# Patient Record
Sex: FEMALE | Race: WHITE | NOT HISPANIC OR LATINO | Employment: OTHER | URBAN - METROPOLITAN AREA
[De-identification: names, ages, dates, MRNs, and addresses within clinical notes are randomized per-mention and may not be internally consistent; named-entity substitution may affect disease eponyms.]

---

## 2017-01-04 RX ORDER — OMEPRAZOLE 40 MG/1
CAPSULE, DELAYED RELEASE ORAL
Qty: 30 CAPSULE | Refills: 0 | OUTPATIENT
Start: 2017-01-04

## 2017-01-10 DIAGNOSIS — I25.10 CHRONIC CORONARY ARTERY DISEASE: ICD-10-CM

## 2017-01-10 RX ORDER — ASPIRIN 325 MG
TABLET, DELAYED RELEASE (ENTERIC COATED) ORAL
Qty: 30 TABLET | Refills: 2 | Status: SHIPPED | OUTPATIENT
Start: 2017-01-10 | End: 2017-01-13 | Stop reason: SDUPTHER

## 2017-01-12 RX ORDER — OMEPRAZOLE 40 MG/1
CAPSULE, DELAYED RELEASE ORAL
Qty: 30 CAPSULE | Refills: 0 | Status: CANCELLED | OUTPATIENT
Start: 2017-01-12

## 2017-01-13 DIAGNOSIS — I25.10 CHRONIC CORONARY ARTERY DISEASE: ICD-10-CM

## 2017-01-13 RX ORDER — ASPIRIN 325 MG
325 TABLET, DELAYED RELEASE (ENTERIC COATED) ORAL DAILY
Qty: 30 TABLET | Refills: 5 | Status: SHIPPED | OUTPATIENT
Start: 2017-01-13 | End: 2017-10-10 | Stop reason: SDUPTHER

## 2017-01-13 RX ORDER — OMEPRAZOLE 40 MG/1
40 CAPSULE, DELAYED RELEASE ORAL DAILY
Qty: 30 CAPSULE | Refills: 5 | Status: SHIPPED | OUTPATIENT
Start: 2017-01-13 | End: 2017-07-14 | Stop reason: SDUPTHER

## 2017-01-13 RX ORDER — DULOXETIN HYDROCHLORIDE 60 MG/1
60 CAPSULE, DELAYED RELEASE ORAL DAILY
Qty: 30 CAPSULE | Refills: 5 | Status: SHIPPED | OUTPATIENT
Start: 2017-01-13 | End: 2017-10-10 | Stop reason: SDUPTHER

## 2017-01-16 ENCOUNTER — TELEPHONE (OUTPATIENT)
Dept: INTERNAL MEDICINE | Facility: CLINIC | Age: 72
End: 2017-01-16

## 2017-01-16 NOTE — TELEPHONE ENCOUNTER
----- Message from Adiel Morse sent at 2017  9:35 AM EST -----  Contact: Pt. Rx Refills  Patient informed 2017 that medication was refilled.  ----- Message -----     From: Gaby Guzman MD     Sent: 2017  12:30 PM       To: Adiel Morse    Is he asking for a RF? It says she received 5 RF's in 10/2016 for her Duloxetine.  ----- Message -----     From: Adiel Morse     Sent: 2017  12:10 PM       To: Gaby Guzman MD    Last seen:10/03/2016 by Syeda Combs for  Generalized anxiety disorder    Patient's new provider Dr. Mas appt. 2017    aspirin 325 MG tablet [[id number]] (Order [id number])   aspirin 325 MG tablet [[id number]]   Dose: 325 mg Route: Oral Frequency: Daily  Dispense Quantity:  -- Refills:  -- Fills Remaining:  --    Sig: Take 325 mg by mouth Daily.    omeprazole (priLOSEC) 40 MG capsule [[id number]]   Dose, Route, Frequency: As Directed   Dispense Quantity:  30 capsule Refills:  0 Fills Remainin   Written Date:  16     Sig: TAKE ONE CAPSULE BY MOUTH DAILY     DULoxetine (CYMBALTA) 60 MG capsule [[id number]]   Dose, Route, Frequency: As Directed   Dispense Quantity:  30 capsule Refills:  5 Fills Remainin   Written Date:  10/13/16      Sig: TAKE ONE CAPSULE BY MOUTH DAILY        DAQUAN:  Scan on 10/4/2016  4:21 PM by Kayleigh Betancourt : 10/1/2016    Pharmacy:    [id number] [place] [address] AT [facility] -   Phone:  [phone] Fax:  [phone]

## 2017-01-30 ENCOUNTER — OFFICE VISIT (OUTPATIENT)
Dept: INTERNAL MEDICINE | Facility: CLINIC | Age: 72
End: 2017-01-30

## 2017-01-30 VITALS
HEART RATE: 78 BPM | HEIGHT: 60 IN | TEMPERATURE: 98.4 F | BODY MASS INDEX: 45.16 KG/M2 | OXYGEN SATURATION: 98 % | WEIGHT: 230 LBS

## 2017-01-30 DIAGNOSIS — E11.42 DIABETIC PERIPHERAL NEUROPATHY (HCC): ICD-10-CM

## 2017-01-30 DIAGNOSIS — I25.10 CHRONIC CORONARY ARTERY DISEASE: ICD-10-CM

## 2017-01-30 DIAGNOSIS — G47.33 OSA (OBSTRUCTIVE SLEEP APNEA): ICD-10-CM

## 2017-01-30 DIAGNOSIS — G47.34 NOCTURNAL HYPOXIA: ICD-10-CM

## 2017-01-30 DIAGNOSIS — E78.2 MIXED HYPERLIPIDEMIA: ICD-10-CM

## 2017-01-30 DIAGNOSIS — E11.59 TYPE 2 DIABETES MELLITUS WITH OTHER CIRCULATORY COMPLICATION, WITH LONG-TERM CURRENT USE OF INSULIN (HCC): Primary | ICD-10-CM

## 2017-01-30 DIAGNOSIS — D72.829 LEUKOCYTOSIS, UNSPECIFIED TYPE: ICD-10-CM

## 2017-01-30 DIAGNOSIS — F33.1 MODERATE EPISODE OF RECURRENT MAJOR DEPRESSIVE DISORDER (HCC): ICD-10-CM

## 2017-01-30 DIAGNOSIS — J45.30 MILD PERSISTENT ASTHMA WITHOUT COMPLICATION: ICD-10-CM

## 2017-01-30 DIAGNOSIS — Z79.4 TYPE 2 DIABETES MELLITUS WITH OTHER CIRCULATORY COMPLICATION, WITH LONG-TERM CURRENT USE OF INSULIN (HCC): Primary | ICD-10-CM

## 2017-01-30 DIAGNOSIS — I10 ESSENTIAL HYPERTENSION: ICD-10-CM

## 2017-01-30 DIAGNOSIS — D50.9 IRON DEFICIENCY ANEMIA, UNSPECIFIED IRON DEFICIENCY ANEMIA TYPE: ICD-10-CM

## 2017-01-30 PROCEDURE — 99215 OFFICE O/P EST HI 40 MIN: CPT | Performed by: FAMILY MEDICINE

## 2017-01-30 RX ORDER — INFLUENZA A VIRUS A/MICHIGAN/45/2015 X-275 (H1N1) ANTIGEN (FORMALDEHYDE INACTIVATED), INFLUENZA A VIRUS A/SINGAPORE/INFIMH-16-0019/2016 IVR-186 (H3N2) ANTIGEN (FORMALDEHYDE INACTIVATED), AND INFLUENZA B VIRUS B/MARYLAND/15/2016 BX-69A (A B/COLORADO/6/2017-LIKE VIRUS) ANTIGEN (FORMALDEHYDE INACTIVATED) 60; 60; 60 UG/.5ML; UG/.5ML; UG/.5ML
INJECTION, SUSPENSION INTRAMUSCULAR
COMMUNITY
Start: 2016-12-27 | End: 2017-03-20

## 2017-01-30 RX ORDER — MIRTAZAPINE 15 MG/1
TABLET, FILM COATED ORAL
COMMUNITY
Start: 2017-01-19 | End: 2017-03-20 | Stop reason: ALTCHOICE

## 2017-01-30 RX ORDER — ATENOLOL 25 MG/1
TABLET ORAL
COMMUNITY
Start: 2016-12-11 | End: 2017-03-14 | Stop reason: SDUPTHER

## 2017-01-30 RX ORDER — PROMETHAZINE HYDROCHLORIDE 25 MG/1
25 TABLET ORAL EVERY 6 HOURS PRN
COMMUNITY
End: 2017-05-31 | Stop reason: SDUPTHER

## 2017-01-30 NOTE — PROGRESS NOTES
Subjective   Miguelina Lopez is a 71 y.o. female.     Chief Complaint   Patient presents with   • Hypertension   • Depression   • oxygen therapy   anemia, elevated white blood cells  Disease hyperlipidemia sleep apnea      History of Present Illness patient comes in for follow-up of chronic medical problems as stated abovethat are new to this office.she has been using her inhaler minutes intermittently for asthma her blood pressures well-controlled she has no chest pain shortness of breath or increased fatigue medications were reviewed she's been taking Tenormin and metoprolol.  Her blood sugars have been adequately controlled with the Amaryl and Levemirexposure blood sugars intermittently has a history of gastrointestinal bleeding but nothing significance of late and feels that her reflux is not  well-controlled Pepcid as it has been in the past she needs a new prescription for her nocturnal hypoxia and is presently on CPAP for the last year auto titrating she wakes well rested and also seemed to help her blood pressure  *By cardiology as well as psychiatrywe'll manage her anxiety and depressive symptoms    The following portions of the patient's history were reviewed and updated as appropriate: allergies, current medications, past family history, past medical history, past social history, past surgical history and problem list.    Review of Systems   Constitutional: Negative.    HENT: Negative.    Eyes: Negative.    Respiratory: Negative.    Cardiovascular: Negative.    Gastrointestinal: Negative.    Endocrine: Negative.    Genitourinary: Negative.    Musculoskeletal: Positive for gait problem.   Allergic/Immunologic: Negative.    Hematological: Negative.    Psychiatric/Behavioral: Positive for dysphoric mood. The patient is nervous/anxious.        Objective   Physical Exam   Constitutional: She is oriented to person, place, and time. She appears well-developed and well-nourished. No distress.   HENT:   Head:  Normocephalic and atraumatic.   Right Ear: External ear normal.   Nose: Nose normal.   Eyes: Conjunctivae and EOM are normal. Pupils are equal, round, and reactive to light. Right eye exhibits no discharge. Left eye exhibits no discharge. No scleral icterus.   Neck: Normal range of motion. Neck supple. No tracheal deviation present. No thyromegaly present.   Cardiovascular: Normal rate, regular rhythm and normal pulses.  Exam reveals no gallop.    Murmur heard.  Pulmonary/Chest: Effort normal and breath sounds normal. No respiratory distress. She has no wheezes. She has no rales.   Abdominal: Soft. Bowel sounds are normal. There is no tenderness.   Musculoskeletal: Normal range of motion.   Neurological: She is alert and oriented to person, place, and time. She exhibits normal muscle tone. Coordination normal.   Skin: Skin is warm. No rash noted. No erythema. No pallor.   Psychiatric: She has a normal mood and affect. Her behavior is normal. Judgment and thought content normal.   Nursing note and vitals reviewed.      Assessment/Plan   Miguelina was seen today for hypertension, depression and oxygen therapy.    Diagnoses and all orders for this visit:    Type 2 diabetes mellitus with other circulatory complication, with long-term current use of insulin  -     Hemoglobin A1c  -     Microalbumin / Creatinine Urine Ratio    Iron deficiency anemia, unspecified iron deficiency anemia type  -     CBC & Differential    Leukocytosis, unspecified type  -     CBC & Differential    Essential hypertension  -     Comprehensive Metabolic Panel    Mixed hyperlipidemia  -     Lipid Panel  -     TSH    Chronic coronary artery disease    Moderate episode of recurrent major depressive disorder    OCHOA (obstructive sleep apnea)    Mild persistent asthma without complication    Nocturnal hypoxia  -     Oxygen Therapy    Diabetic peripheral neuropathy  -     Hemoglobin A1c  -     Microalbumin / Creatinine Urine Ratio      Follow-up results  of blood work continue present management of chronic medical problems him in follow-up in 3 months

## 2017-01-30 NOTE — MR AVS SNAPSHOT
Miguelina Lopez   1/30/2017 2:00 PM   Office Visit    Dept Phone:  [phone]   Encounter #:  [id number]    Provider:  Channing Bush MD   Department:  [facility]                Your Full Care Plan              Today's Medication Changes          These changes are accurate as of: 1/30/17  2:50 PM.  If you have any questions, ask your nurse or doctor.               Medication(s)that have changed:     valsartan-hydrochlorothiazide 320-25 MG per tablet   Commonly known as:  DIOVAN-HCT   What changed:  Another medication with the same name was removed. Continue taking this medication, and follow the directions you see here.   Changed by:  Eliceer Mistry MD         Stop taking medication(s)listed here:     atorvastatin 40 MG tablet   Commonly known as:  LIPITOR   Stopped by:  Channing Bush MD           lisinopril 5 MG tablet   Commonly known as:  PRINIVIL,ZESTRIL   Stopped by:  Channing Bush MD           VOLTAREN 1 % gel gel   Generic drug:  diclofenac   Stopped by:  Channing Bush MD                      Your Updated Medication List          This list is accurate as of: 1/30/17  2:50 PM.  Always use your most recent med list.                ADVAIR DISKUS 250-50 MCG/DOSE DISKUS   Generic drug:  fluticasone-salmeterol       albuterol 108 (90 BASE) MCG/ACT inhaler   Commonly known as:  PROVENTIL HFA;VENTOLIN HFA   Inhale 2 puffs every 4 (four) hours as needed for wheezing.       ALPRAZolam 1 MG tablet   Commonly known as:  XANAX   Take 1 tablet by mouth 2 (Two) Times a Day As Needed for anxiety.       aspirin  MG tablet   Take 1 tablet by mouth Daily.       atenolol 25 MG tablet   Commonly known as:  TENORMIN       diclofenac 3 % gel gel   Commonly known as:  VOLTAREN   Apply  topically 2 (Two) Times a Day. for 60 days.       DULoxetine 60 MG capsule   Commonly known as:  CYMBALTA   Take 1 capsule by mouth Daily.       FLUZONE HIGH-DOSE  0.5 ML suspension prefilled syringe injection   Generic drug:  influenza vac split high-dose       furosemide 40 MG tablet   Commonly known as:  LASIX   Take 1 tablet by mouth Daily.       glimepiride 2 MG tablet   Commonly known as:  AMARYL   TAKE ONE TABLET BY MOUTH DAILY BEFORE BREAKFAST       insulin detemir 100 UNIT/ML injection   Commonly known as:  LEVEMIR   Inject 20 Units under the skin daily       ipratropium-albuterol 0.5-2.5 mg/mL nebulizer   Commonly known as:  DUO-NEB   Take 3 mL by nebulization 4 (four) times a day.       metFORMIN  MG 24 hr tablet   Commonly known as:  GLUCOPHAGE-XR   TAKE ONE TABLET BY MOUTH THREE TIMES A DAY       metoprolol tartrate 25 MG tablet   Commonly known as:  LOPRESSOR   Take 1 tablet by mouth 2 (Two) Times a Day.       mirtazapine 15 MG tablet   Commonly known as:  REMERON       omeprazole 40 MG capsule   Commonly known as:  priLOSEC   Take 1 capsule by mouth Daily.       potassium chloride 20 MEQ CR tablet   Commonly known as:  K-DUR,KLOR-CON   Take 1 tablet by mouth Daily.       promethazine 25 MG tablet   Commonly known as:  PHENERGAN       senna-docusate 8.6-50 MG per tablet   Commonly known as:  PERICOLACE       valsartan-hydrochlorothiazide 320-25 MG per tablet   Commonly known as:  DIOVAN-HCT               We Performed the Following     CBC & Differential     Comprehensive Metabolic Panel     Hemoglobin A1c     Lipid Panel     Microalbumin / Creatinine Urine Ratio     Oxygen Therapy     TSH       You Were Diagnosed With        Codes Comments    Type 2 diabetes mellitus with other circulatory complication, with long-term current use of insulin    -  Primary ICD-10-CM: E11.59, Z79.4     Iron deficiency anemia, unspecified iron deficiency anemia type     ICD-10-CM: D50.9  ICD-9-CM: 280.9     Leukocytosis, unspecified type     ICD-10-CM: D72.829  ICD-9-CM: 288.60     Essential hypertension     ICD-10-CM: I10  ICD-9-CM: 401.9     Mixed hyperlipidemia      "ICD-10-CM: E78.2  ICD-9-CM: 272.2     Chronic coronary artery disease     ICD-10-CM: I25.10  ICD-9-CM: 414.00     Moderate episode of recurrent major depressive disorder     ICD-10-CM: F33.1  ICD-9-CM: 296.32     OCHOA (obstructive sleep apnea)     ICD-10-CM: G47.33  ICD-9-CM: 327.23     Mild persistent asthma without complication     ICD-10-CM: J45.30  ICD-9-CM: 493.90     Nocturnal hypoxia     ICD-10-CM: G47.34  ICD-9-CM: 327.24     Diabetic peripheral neuropathy     ICD-10-CM: E11.42  ICD-9-CM: 250.60, 357.2       Instructions     None    Patient Instructions History      Upcoming Appointments     Visit Type Date Time Department    NEW PATIENT 2017  2:00 PM [facility] [place]      [facility] Signup     [facility] allows you to send messages to your doctor, view your test results, renew your prescriptions, schedule appointments, and more. To sign up, go to [employer] and click on the Sign Up Now link in the New User? box. Enter your [facility] Activation Code exactly as it appears below along with the last four digits of your Social Security Number and your Date of Birth () to complete the sign-up process. If you do not sign up before the expiration date, you must request a new code.    [facility] Activation Code: [email or web address]  Expires: 2017  2:50 PM    If you have questions, you can email [email or web address] or call [phone] to talk to our [facility] staff. Remember, [facility] is NOT to be used for urgent needs. For medical emergencies, dial 911.               Other Info from Your Visit           Allergies     Coumadin [Warfarin Sodium] Intolerance Diarrhea, Nausea Only    Erythromycin      Penicillins      Sulfa Antibiotics        Reason for Visit     Hypertension     Depression     oxygen therapy           Vital Signs     Pulse Temperature Height Weight Oxygen Saturation Breastfeeding?    78 98.4 °F (36.9 °C) (Oral) 60\" (152.4 cm) 230 lb (104 kg) 98% No    Body Mass " Index Smoking Status                44.92 kg/m2 Never Smoker          Problems and Diagnoses Noted     Anemia    Asthma    Heart disease due to blocked artery    Depression    Diabetic nerve disorder    Type 2 diabetes    High blood pressure    High cholesterol or triglycerides    Elevated white blood cell count    Nocturnal hypoxia    OCHOA (obstructive sleep apnea)      No Longer an Issue     Chest pain    SOB (shortness of breath)

## 2017-01-31 LAB
ALBUMIN SERPL-MCNC: 4.2 G/DL (ref 3.5–5.2)
ALBUMIN/CREAT UR: <5.9 MG/G CREAT (ref 0–30)
ALBUMIN/GLOB SERPL: 1.4 G/DL
ALP SERPL-CCNC: 72 U/L (ref 39–117)
ALT SERPL-CCNC: 20 U/L (ref 1–33)
AST SERPL-CCNC: 35 U/L (ref 1–32)
BASOPHILS # BLD AUTO: 0.11 10*3/MM3 (ref 0–0.2)
BASOPHILS NFR BLD AUTO: 0.7 % (ref 0–1.5)
BILIRUB SERPL-MCNC: 0.2 MG/DL (ref 0.1–1.2)
BUN SERPL-MCNC: 34 MG/DL (ref 8–23)
BUN/CREAT SERPL: 22.4 (ref 7–25)
CALCIUM SERPL-MCNC: 9.8 MG/DL (ref 8.6–10.5)
CHLORIDE SERPL-SCNC: 99 MMOL/L (ref 98–107)
CHOLEST SERPL-MCNC: 217 MG/DL (ref 0–200)
CO2 SERPL-SCNC: 24.9 MMOL/L (ref 22–29)
CREAT SERPL-MCNC: 1.52 MG/DL (ref 0.57–1)
CREAT UR-MCNC: 50.7 MG/DL
DIFFERENTIAL COMMENT: NORMAL
EOSINOPHIL # BLD AUTO: 0.3 10*3/MM3 (ref 0–0.7)
EOSINOPHIL NFR BLD AUTO: 1.9 % (ref 0.3–6.2)
ERYTHROCYTE [DISTWIDTH] IN BLOOD BY AUTOMATED COUNT: 17.4 % (ref 11.7–13)
GLOBULIN SER CALC-MCNC: 3 GM/DL
GLUCOSE SERPL-MCNC: 138 MG/DL (ref 65–99)
HBA1C MFR BLD: 5.96 % (ref 4.8–5.6)
HCT VFR BLD AUTO: 36.9 % (ref 35.6–45.5)
HDLC SERPL-MCNC: 52 MG/DL (ref 40–60)
HGB BLD-MCNC: 10.7 G/DL (ref 11.9–15.5)
IMM GRANULOCYTES # BLD: 0.41 10*3/MM3 (ref 0–0.03)
IMM GRANULOCYTES NFR BLD: 2.5 % (ref 0–0.5)
LDLC SERPL CALC-MCNC: 116 MG/DL (ref 0–100)
LYMPHOCYTES # BLD AUTO: 3.31 10*3/MM3 (ref 0.9–4.8)
LYMPHOCYTES NFR BLD AUTO: 20.6 % (ref 19.6–45.3)
MCH RBC QN AUTO: 23.1 PG (ref 26.9–32)
MCHC RBC AUTO-ENTMCNC: 29 G/DL (ref 32.4–36.3)
MCV RBC AUTO: 79.7 FL (ref 80.5–98.2)
MICROALBUMIN UR-MCNC: <3 UG/ML
MONOCYTES # BLD AUTO: 0.95 10*3/MM3 (ref 0.2–1.2)
MONOCYTES NFR BLD AUTO: 5.9 % (ref 5–12)
NEUTROPHILS # BLD AUTO: 11 10*3/MM3 (ref 1.9–8.1)
NEUTROPHILS NFR BLD AUTO: 68.4 % (ref 42.7–76)
NRBC BLD AUTO-RTO: 0 /100 WBC (ref 0–0)
PLATELET # BLD AUTO: 446 10*3/MM3 (ref 140–500)
PLATELET BLD QL SMEAR: NORMAL
POTASSIUM SERPL-SCNC: 4.2 MMOL/L (ref 3.5–5.2)
PROT SERPL-MCNC: 7.2 G/DL (ref 6–8.5)
RBC # BLD AUTO: 4.63 10*6/MM3 (ref 3.9–5.2)
RBC MORPH BLD: NORMAL
SODIUM SERPL-SCNC: 143 MMOL/L (ref 136–145)
TRIGL SERPL-MCNC: 244 MG/DL (ref 0–150)
TSH SERPL DL<=0.005 MIU/L-ACNC: 4.03 MIU/ML (ref 0.27–4.2)
VLDLC SERPL CALC-MCNC: 48.8 MG/DL (ref 5–40)
WBC # BLD AUTO: 16.08 10*3/MM3 (ref 4.5–10.7)

## 2017-02-02 ENCOUNTER — TELEPHONE (OUTPATIENT)
Dept: INTERNAL MEDICINE | Facility: CLINIC | Age: 72
End: 2017-02-02

## 2017-02-02 DIAGNOSIS — E61.1 LOW IRON: ICD-10-CM

## 2017-02-02 DIAGNOSIS — E78.2 ELEVATED CHOLESTEROL WITH HIGH TRIGLYCERIDES: Primary | ICD-10-CM

## 2017-02-02 RX ORDER — ATORVASTATIN CALCIUM 10 MG/1
10 TABLET, FILM COATED ORAL DAILY
Qty: 90 TABLET | Refills: 0 | Status: SHIPPED | OUTPATIENT
Start: 2017-02-02 | End: 2017-04-30 | Stop reason: SDUPTHER

## 2017-02-02 NOTE — TELEPHONE ENCOUNTER
----- Message from Channing Bush MD sent at 1/31/2017  3:14 PM EST -----  Has a slightly elevated iron recommend restarting her iron supplement and also cholesterol is elevated and she is not taking crest cholesterol medicine at this time recommend restarting Lipitor 10 mg daily # 30 Rf 3 recheck iron and lipid level in 3 months

## 2017-02-07 ENCOUNTER — TELEPHONE (OUTPATIENT)
Dept: INTERNAL MEDICINE | Facility: CLINIC | Age: 72
End: 2017-02-07

## 2017-02-07 DIAGNOSIS — R79.89 ELEVATED SERUM CREATININE: Primary | ICD-10-CM

## 2017-02-07 DIAGNOSIS — E78.2 ELEVATED CHOLESTEROL WITH ELEVATED TRIGLYCERIDES: ICD-10-CM

## 2017-02-07 NOTE — TELEPHONE ENCOUNTER
[facility] [phone] sent refill request for fenofibrate 145 mg.  Spoke to patient as this is not on her medication list.  She was not sure where the refill came from.  Refill denied.  Patient stated that she is unable to take the iron supplement because it makes her sick to her stomach and constipated.

## 2017-02-08 NOTE — TELEPHONE ENCOUNTER
Stop Lasix and recheck BMP in 1 month.  Hold fenofibrate will check fasting lipid level at that time also.

## 2017-02-15 RX ORDER — METFORMIN HYDROCHLORIDE 500 MG/1
TABLET, EXTENDED RELEASE ORAL
Qty: 90 TABLET | Refills: 2 | Status: CANCELLED | OUTPATIENT
Start: 2017-02-15

## 2017-02-23 RX ORDER — VALSARTAN AND HYDROCHLOROTHIAZIDE 320; 25 MG/1; MG/1
1 TABLET, FILM COATED ORAL DAILY
Qty: 90 TABLET | Refills: 1 | Status: SHIPPED | OUTPATIENT
Start: 2017-02-23 | End: 2017-08-14 | Stop reason: SDUPTHER

## 2017-02-23 RX ORDER — METFORMIN HYDROCHLORIDE 500 MG/1
500 TABLET, EXTENDED RELEASE ORAL 3 TIMES DAILY
Qty: 270 TABLET | Refills: 1 | Status: SHIPPED | OUTPATIENT
Start: 2017-02-23 | End: 2017-08-18 | Stop reason: SDUPTHER

## 2017-03-01 ENCOUNTER — TELEPHONE (OUTPATIENT)
Dept: INTERNAL MEDICINE | Facility: CLINIC | Age: 72
End: 2017-03-01

## 2017-03-01 NOTE — TELEPHONE ENCOUNTER
Patient called stating that she would like a prescription for Ibuprofen 800 mg.  She fell today - she said that she is fine but her arm and the side of her leg is sore.  She had a prescription for the Ibuprofen 800 mg from Dr. Guzman.  Please advise.

## 2017-03-14 ENCOUNTER — TELEPHONE (OUTPATIENT)
Dept: INTERNAL MEDICINE | Facility: CLINIC | Age: 72
End: 2017-03-14

## 2017-03-14 RX ORDER — ATENOLOL 25 MG/1
25 TABLET ORAL DAILY
Qty: 90 TABLET | Refills: 0 | Status: SHIPPED | OUTPATIENT
Start: 2017-03-14 | End: 2017-06-19 | Stop reason: SDUPTHER

## 2017-03-14 NOTE — TELEPHONE ENCOUNTER
Caro with [facility] called stating that patient has a mild cough and is taking Tracy Mellen for this and is using her nebulizer.  She wanted to know if patient could use Mucinex.  Spoke to patient - she stated that she has a cough (coughing up mucus), is losing her voice, temperature of 97.6.  She wanted to know if she could try Mucinex but she wanted to know which one to try.  Please advise.

## 2017-03-16 ENCOUNTER — TELEPHONE (OUTPATIENT)
Dept: INTERNAL MEDICINE | Facility: CLINIC | Age: 72
End: 2017-03-16

## 2017-03-16 NOTE — TELEPHONE ENCOUNTER
Patient called stating that she was notified to discontinue the Lasix due to her kidney function.  She has started to have swelling in her feet since yesterday.  She does not have any shortness of breath, dizziness, or other symptoms.  Please advise.

## 2017-03-20 ENCOUNTER — OFFICE VISIT (OUTPATIENT)
Dept: INTERNAL MEDICINE | Facility: CLINIC | Age: 72
End: 2017-03-20

## 2017-03-20 VITALS
WEIGHT: 254.4 LBS | HEIGHT: 60 IN | HEART RATE: 100 BPM | SYSTOLIC BLOOD PRESSURE: 178 MMHG | TEMPERATURE: 98.3 F | BODY MASS INDEX: 49.94 KG/M2 | OXYGEN SATURATION: 98 % | DIASTOLIC BLOOD PRESSURE: 110 MMHG

## 2017-03-20 DIAGNOSIS — I25.10 CHRONIC CORONARY ARTERY DISEASE: ICD-10-CM

## 2017-03-20 DIAGNOSIS — E61.1 LOW IRON: ICD-10-CM

## 2017-03-20 DIAGNOSIS — I48.20 CHRONIC ATRIAL FIBRILLATION (HCC): ICD-10-CM

## 2017-03-20 DIAGNOSIS — E78.2 ELEVATED CHOLESTEROL WITH HIGH TRIGLYCERIDES: ICD-10-CM

## 2017-03-20 DIAGNOSIS — N18.30 CHRONIC KIDNEY DISEASE, STAGE 3 (MODERATE): ICD-10-CM

## 2017-03-20 DIAGNOSIS — I10 ESSENTIAL HYPERTENSION: Primary | ICD-10-CM

## 2017-03-20 LAB
BASOPHILS # BLD AUTO: 0.04 10*3/MM3 (ref 0–0.2)
BASOPHILS NFR BLD AUTO: 0.3 % (ref 0–1.5)
BUN SERPL-MCNC: 18 MG/DL (ref 8–23)
BUN/CREAT SERPL: 17.5 (ref 7–25)
CALCIUM SERPL-MCNC: 9.8 MG/DL (ref 8.6–10.5)
CHLORIDE SERPL-SCNC: 100 MMOL/L (ref 98–107)
CHOLEST SERPL-MCNC: 178 MG/DL (ref 0–200)
CO2 SERPL-SCNC: 25.3 MMOL/L (ref 22–29)
CREAT SERPL-MCNC: 1.03 MG/DL (ref 0.57–1)
EOSINOPHIL # BLD AUTO: 0.21 10*3/MM3 (ref 0–0.7)
EOSINOPHIL NFR BLD AUTO: 1.5 % (ref 0.3–6.2)
ERYTHROCYTE [DISTWIDTH] IN BLOOD BY AUTOMATED COUNT: 20.2 % (ref 11.7–13)
GLUCOSE SERPL-MCNC: 158 MG/DL (ref 65–99)
HCT VFR BLD AUTO: 35.2 % (ref 35.6–45.5)
HDLC SERPL-MCNC: 71 MG/DL (ref 40–60)
HGB BLD-MCNC: 10.1 G/DL (ref 11.9–15.5)
IMM GRANULOCYTES # BLD: 0.06 10*3/MM3 (ref 0–0.03)
IMM GRANULOCYTES NFR BLD: 0.4 % (ref 0–0.5)
LDLC SERPL CALC-MCNC: 83 MG/DL (ref 0–100)
LYMPHOCYTES # BLD AUTO: 1.95 10*3/MM3 (ref 0.9–4.8)
LYMPHOCYTES NFR BLD AUTO: 14.1 % (ref 19.6–45.3)
MAGNESIUM SERPL-MCNC: 1.8 MG/DL (ref 1.6–2.4)
MCH RBC QN AUTO: 23.9 PG (ref 26.9–32)
MCHC RBC AUTO-ENTMCNC: 28.7 G/DL (ref 32.4–36.3)
MCV RBC AUTO: 83.2 FL (ref 80.5–98.2)
MONOCYTES # BLD AUTO: 0.76 10*3/MM3 (ref 0.2–1.2)
MONOCYTES NFR BLD AUTO: 5.5 % (ref 5–12)
NEUTROPHILS # BLD AUTO: 10.79 10*3/MM3 (ref 1.9–8.1)
NEUTROPHILS NFR BLD AUTO: 78.2 % (ref 42.7–76)
PLATELET # BLD AUTO: 327 10*3/MM3 (ref 140–500)
POTASSIUM SERPL-SCNC: 4.6 MMOL/L (ref 3.5–5.2)
RBC # BLD AUTO: 4.23 10*6/MM3 (ref 3.9–5.2)
SODIUM SERPL-SCNC: 142 MMOL/L (ref 136–145)
TRIGL SERPL-MCNC: 119 MG/DL (ref 0–150)
VLDLC SERPL CALC-MCNC: 23.8 MG/DL (ref 5–40)
WBC # BLD AUTO: 13.81 10*3/MM3 (ref 4.5–10.7)

## 2017-03-20 PROCEDURE — 99214 OFFICE O/P EST MOD 30 MIN: CPT | Performed by: FAMILY MEDICINE

## 2017-03-20 RX ORDER — TRAZODONE HYDROCHLORIDE 100 MG/1
1 TABLET ORAL NIGHTLY
COMMUNITY
Start: 2017-03-15 | End: 2018-02-22

## 2017-03-20 NOTE — PROGRESS NOTES
Subjective   Miguelina Lopez is a 71 y.o. female.     Chief Complaint   Patient presents with   • swelling in both legs/feet     about 1 week   • coughing up greenish/yellow mucus     for about 2 weeks - taking Mucinex   • Shortness of Breath     since Friday   • Back Pain     for 1 week         History of Present Illness   Patient with above symptoms for about a week or 2 he has no specific fevers arm muscle aches she has no chest pain she is sleeping with a couple pillows.  She monitors her blood pressure intermittently and some not taken her medications today. Rising creatinine reduced lasix  The following portions of the patient's history were reviewed and updated as appropriate: allergies, current medications, past family history, past medical history, past social history, past surgical history and problem list.    Review of Systems   Constitutional: Positive for activity change and unexpected weight change. Negative for diaphoresis, fatigue and fever.   HENT: Negative.    Eyes: Negative.    Respiratory: Positive for cough and shortness of breath.    Cardiovascular: Positive for leg swelling.   Gastrointestinal: Negative.    Endocrine: Negative.    Genitourinary: Negative.    Neurological: Negative.        Objective   Physical Exam   Constitutional: She is oriented to person, place, and time. She appears well-developed and well-nourished. No distress.   HENT:   Head: Normocephalic and atraumatic.   Right Ear: External ear normal.   Nose: Nose normal.   Eyes: Conjunctivae and EOM are normal. Pupils are equal, round, and reactive to light. Right eye exhibits no discharge. Left eye exhibits no discharge. No scleral icterus.   Neck: Normal range of motion. Neck supple. No tracheal deviation present. No thyromegaly present.   Cardiovascular: Normal rate, regular rhythm and normal pulses.  Exam reveals no gallop.    Murmur heard.  Pulmonary/Chest: Effort normal and breath sounds normal. No respiratory distress. She  has no wheezes. She has no rales.   Abdominal: Soft. Bowel sounds are normal. There is no tenderness.   Musculoskeletal: Normal range of motion. She exhibits edema.   Neurological: She is alert and oriented to person, place, and time. She exhibits normal muscle tone. Coordination normal.   Skin: Skin is warm. No rash noted. No erythema. No pallor.   Psychiatric: She has a normal mood and affect. Her behavior is normal. Judgment and thought content normal.   Nursing note and vitals reviewed.      Assessment/Plan   Miguelina was seen today for swelling in both legs/feet, coughing up greenish/yellow mucus, shortness of breath and back pain.    Diagnoses and all orders for this visit:    Essential hypertension  -     Magnesium    Chronic kidney disease, stage 3 (moderate)  -     Magnesium    Chronic coronary artery disease    Chronic atrial fibrillation    Other orders  -     Cancel: Comprehensive Metabolic Panel     patient will institute Lasix therapy again two a day as well as 2 potassiums daily she will call in 2 days for daily weight loss  She was recommended to  follow-up with her cardiologist and cardiac surgeon in January.

## 2017-03-22 ENCOUNTER — TELEPHONE (OUTPATIENT)
Dept: INTERNAL MEDICINE | Facility: CLINIC | Age: 72
End: 2017-03-22

## 2017-03-22 DIAGNOSIS — E61.1 LOW IRON: Primary | ICD-10-CM

## 2017-03-22 RX ORDER — POTASSIUM CHLORIDE 20 MEQ/1
20 TABLET, EXTENDED RELEASE ORAL 2 TIMES DAILY
Qty: 30 TABLET | Refills: 3
Start: 2017-03-22 | End: 2017-07-05 | Stop reason: SDUPTHER

## 2017-03-22 RX ORDER — FUROSEMIDE 40 MG/1
40 TABLET ORAL 2 TIMES DAILY
Qty: 30 TABLET | Refills: 3
Start: 2017-03-22 | End: 2017-04-25 | Stop reason: SDUPTHER

## 2017-03-22 NOTE — TELEPHONE ENCOUNTER
"Patient notified.  Patient stated that she is unable to take iron because it makes her \"deathly sick\".  Please advise.  "

## 2017-03-22 NOTE — TELEPHONE ENCOUNTER
----- Message from Channing Bush MD sent at 3/21/2017  8:27 AM EDT -----  Labs improved Lasix and potassium twice a day reestablish cardiology and cardiac surgery visits

## 2017-03-22 NOTE — TELEPHONE ENCOUNTER
----- Message from Channing Bush MD sent at 3/21/2017  8:31 AM EDT -----  Patient is to restart iron ferrous [id number] 1 daily over-the-counter

## 2017-03-23 ENCOUNTER — RESULTS ENCOUNTER (OUTPATIENT)
Dept: INTERNAL MEDICINE | Facility: CLINIC | Age: 72
End: 2017-03-23

## 2017-03-23 DIAGNOSIS — R79.89 ELEVATED SERUM CREATININE: ICD-10-CM

## 2017-03-23 DIAGNOSIS — E78.2 ELEVATED CHOLESTEROL WITH ELEVATED TRIGLYCERIDES: ICD-10-CM

## 2017-03-24 ENCOUNTER — TELEPHONE (OUTPATIENT)
Dept: INTERNAL MEDICINE | Facility: CLINIC | Age: 72
End: 2017-03-24

## 2017-03-24 RX ORDER — NITROGLYCERIN 0.4 MG/1
TABLET SUBLINGUAL
Qty: 25 TABLET | Refills: 0 | Status: SHIPPED | OUTPATIENT
Start: 2017-03-24 | End: 2018-07-31 | Stop reason: SDUPTHER

## 2017-03-29 DIAGNOSIS — E61.1 IRON DEFICIENCY: Primary | ICD-10-CM

## 2017-03-31 ENCOUNTER — APPOINTMENT (OUTPATIENT)
Dept: OTHER | Facility: HOSPITAL | Age: 72
End: 2017-03-31

## 2017-03-31 ENCOUNTER — APPOINTMENT (OUTPATIENT)
Dept: ONCOLOGY | Facility: CLINIC | Age: 72
End: 2017-03-31

## 2017-04-03 RX ORDER — POTASSIUM CHLORIDE 1500 MG/1
TABLET, EXTENDED RELEASE ORAL
Qty: 30 TABLET | Refills: 2 | Status: SHIPPED | OUTPATIENT
Start: 2017-04-03 | End: 2018-03-05 | Stop reason: SDUPTHER

## 2017-04-05 ENCOUNTER — TELEPHONE (OUTPATIENT)
Dept: INTERNAL MEDICINE | Facility: CLINIC | Age: 72
End: 2017-04-05

## 2017-04-05 DIAGNOSIS — D64.9 ANEMIA, UNSPECIFIED TYPE: Primary | ICD-10-CM

## 2017-04-05 NOTE — TELEPHONE ENCOUNTER
Patient called asking if Dr. Bush would prescribe iron pills.  She was referred to a hematologist but she would rather not do this.  She would prefer to try taking the iron pills instead.  Please advise.

## 2017-04-06 NOTE — TELEPHONE ENCOUNTER
Environmental survey over-the-counter is ferrous sulfate 325 mg daily however it is not just her low iron and anemia she has elevated white blood cell count and that is why I would like consultation with hematology she also is recommended to undergo GI consultation defined reason for her blood loss

## 2017-04-10 NOTE — TELEPHONE ENCOUNTER
Patient notified.  She stated that she has been taking OTC iron and hasn't had any problems taking this so far.  Patient cancelled the appointment with the hematologist but will call back to reschedule this appointment.  A referral was put in EPIC for patient to see GI.

## 2017-04-12 ENCOUNTER — APPOINTMENT (OUTPATIENT)
Dept: LAB | Facility: HOSPITAL | Age: 72
End: 2017-04-12

## 2017-04-12 ENCOUNTER — APPOINTMENT (OUTPATIENT)
Dept: ONCOLOGY | Facility: CLINIC | Age: 72
End: 2017-04-12

## 2017-04-25 RX ORDER — FUROSEMIDE 40 MG/1
40 TABLET ORAL 2 TIMES DAILY
Qty: 30 TABLET | Refills: 2 | Status: SHIPPED | OUTPATIENT
Start: 2017-04-25 | End: 2017-06-11 | Stop reason: SDUPTHER

## 2017-05-01 ENCOUNTER — APPOINTMENT (OUTPATIENT)
Dept: LAB | Facility: HOSPITAL | Age: 72
End: 2017-05-01

## 2017-05-01 ENCOUNTER — OFFICE VISIT (OUTPATIENT)
Dept: INTERNAL MEDICINE | Facility: CLINIC | Age: 72
End: 2017-05-01

## 2017-05-01 VITALS
BODY MASS INDEX: 47.63 KG/M2 | DIASTOLIC BLOOD PRESSURE: 70 MMHG | HEIGHT: 60 IN | HEART RATE: 73 BPM | WEIGHT: 242.6 LBS | OXYGEN SATURATION: 95 % | SYSTOLIC BLOOD PRESSURE: 160 MMHG | TEMPERATURE: 98.6 F

## 2017-05-01 DIAGNOSIS — L30.9 DERMATITIS: ICD-10-CM

## 2017-05-01 DIAGNOSIS — E78.2 MIXED HYPERLIPIDEMIA: ICD-10-CM

## 2017-05-01 DIAGNOSIS — I10 ESSENTIAL HYPERTENSION: Primary | ICD-10-CM

## 2017-05-01 DIAGNOSIS — Z79.4 TYPE 2 DIABETES MELLITUS WITH OTHER CIRCULATORY COMPLICATION, WITH LONG-TERM CURRENT USE OF INSULIN (HCC): ICD-10-CM

## 2017-05-01 DIAGNOSIS — E11.59 TYPE 2 DIABETES MELLITUS WITH OTHER CIRCULATORY COMPLICATION, WITH LONG-TERM CURRENT USE OF INSULIN (HCC): ICD-10-CM

## 2017-05-01 LAB
ALT SERPL W P-5'-P-CCNC: 9 U/L (ref 1–33)
AST SERPL-CCNC: 15 U/L (ref 1–32)
CHOLEST SERPL-MCNC: 214 MG/DL (ref 0–200)
HBA1C MFR BLD: 6.8 % (ref 4.8–5.6)
HDLC SERPL-MCNC: 60 MG/DL (ref 40–60)
LDLC SERPL CALC-MCNC: 121 MG/DL (ref 0–100)
LDLC/HDLC SERPL: 2.01 {RATIO}
TRIGL SERPL-MCNC: 167 MG/DL (ref 0–150)
VLDLC SERPL-MCNC: 33.4 MG/DL (ref 5–40)

## 2017-05-01 PROCEDURE — 80061 LIPID PANEL: CPT | Performed by: FAMILY MEDICINE

## 2017-05-01 PROCEDURE — 84450 TRANSFERASE (AST) (SGOT): CPT | Performed by: FAMILY MEDICINE

## 2017-05-01 PROCEDURE — 36415 COLL VENOUS BLD VENIPUNCTURE: CPT | Performed by: FAMILY MEDICINE

## 2017-05-01 PROCEDURE — 99214 OFFICE O/P EST MOD 30 MIN: CPT | Performed by: FAMILY MEDICINE

## 2017-05-01 PROCEDURE — 84460 ALANINE AMINO (ALT) (SGPT): CPT | Performed by: FAMILY MEDICINE

## 2017-05-01 PROCEDURE — 83036 HEMOGLOBIN GLYCOSYLATED A1C: CPT | Performed by: FAMILY MEDICINE

## 2017-05-01 RX ORDER — FERROUS SULFATE TAB EC 324 MG (65 MG FE EQUIVALENT) 324 (65 FE) MG
324 TABLET DELAYED RESPONSE ORAL
Status: ON HOLD | COMMUNITY
End: 2021-02-01

## 2017-05-01 RX ORDER — AMLODIPINE BESYLATE 5 MG/1
5 TABLET ORAL DAILY
Qty: 30 TABLET | Refills: 3 | Status: CANCELLED | OUTPATIENT
Start: 2017-05-01

## 2017-05-01 RX ORDER — ATORVASTATIN CALCIUM 10 MG/1
TABLET, FILM COATED ORAL
Qty: 90 TABLET | Refills: 0 | Status: SHIPPED | OUTPATIENT
Start: 2017-05-01 | End: 2017-05-04 | Stop reason: DRUGHIGH

## 2017-05-01 RX ORDER — NYSTATIN 100000 U/G
CREAM TOPICAL 2 TIMES DAILY
Qty: 30 G | Refills: 1 | Status: SHIPPED | OUTPATIENT
Start: 2017-05-01 | End: 2017-08-01

## 2017-05-04 ENCOUNTER — TELEPHONE (OUTPATIENT)
Dept: INTERNAL MEDICINE | Facility: CLINIC | Age: 72
End: 2017-05-04

## 2017-05-04 DIAGNOSIS — E78.5 HYPERLIPIDEMIA, UNSPECIFIED HYPERLIPIDEMIA TYPE: Primary | ICD-10-CM

## 2017-05-04 RX ORDER — ATORVASTATIN CALCIUM 20 MG/1
20 TABLET, FILM COATED ORAL DAILY
Qty: 90 TABLET | Refills: 0 | Status: SHIPPED | OUTPATIENT
Start: 2017-05-04 | End: 2017-07-31 | Stop reason: SDUPTHER

## 2017-05-31 RX ORDER — PROMETHAZINE HYDROCHLORIDE 25 MG/1
25 TABLET ORAL EVERY 6 HOURS PRN
Qty: 30 TABLET | Refills: 0 | Status: SHIPPED | OUTPATIENT
Start: 2017-05-31 | End: 2017-09-27 | Stop reason: SDUPTHER

## 2017-06-12 RX ORDER — FUROSEMIDE 40 MG/1
TABLET ORAL
Qty: 30 TABLET | Refills: 1 | Status: SHIPPED | OUTPATIENT
Start: 2017-06-12 | End: 2017-06-13 | Stop reason: SDUPTHER

## 2017-06-13 RX ORDER — FUROSEMIDE 40 MG/1
40 TABLET ORAL 2 TIMES DAILY
Qty: 60 TABLET | Refills: 1 | Status: SHIPPED | OUTPATIENT
Start: 2017-06-13 | End: 2017-08-09 | Stop reason: SDUPTHER

## 2017-06-19 RX ORDER — ATENOLOL 25 MG/1
TABLET ORAL
Qty: 90 TABLET | Refills: 0 | Status: SHIPPED | OUTPATIENT
Start: 2017-06-19 | End: 2017-09-11 | Stop reason: SDUPTHER

## 2017-07-05 RX ORDER — POTASSIUM CHLORIDE 20 MEQ/1
20 TABLET, EXTENDED RELEASE ORAL 2 TIMES DAILY
Qty: 30 TABLET | Refills: 2 | Status: SHIPPED | OUTPATIENT
Start: 2017-07-05 | End: 2017-09-06 | Stop reason: SDUPTHER

## 2017-07-14 RX ORDER — OMEPRAZOLE 40 MG/1
40 CAPSULE, DELAYED RELEASE ORAL DAILY
Qty: 30 CAPSULE | Refills: 2 | Status: SHIPPED | OUTPATIENT
Start: 2017-07-14 | End: 2017-10-09 | Stop reason: SDUPTHER

## 2017-07-14 NOTE — TELEPHONE ENCOUNTER
Patient called stating that she would like to have an order for a screening mammogram sent to [facility] ([facility] Please advise.

## 2017-07-28 RX ORDER — ATORVASTATIN CALCIUM 10 MG/1
TABLET, FILM COATED ORAL
Qty: 90 TABLET | Refills: 0 | OUTPATIENT
Start: 2017-07-28

## 2017-07-31 RX ORDER — ATORVASTATIN CALCIUM 20 MG/1
TABLET, FILM COATED ORAL
Qty: 90 TABLET | Refills: 0 | Status: SHIPPED | OUTPATIENT
Start: 2017-07-31 | End: 2017-11-02 | Stop reason: SDUPTHER

## 2017-08-01 ENCOUNTER — RESULTS ENCOUNTER (OUTPATIENT)
Dept: INTERNAL MEDICINE | Facility: CLINIC | Age: 72
End: 2017-08-01

## 2017-08-01 ENCOUNTER — OFFICE VISIT (OUTPATIENT)
Dept: INTERNAL MEDICINE | Facility: CLINIC | Age: 72
End: 2017-08-01

## 2017-08-01 VITALS
BODY MASS INDEX: 45.92 KG/M2 | SYSTOLIC BLOOD PRESSURE: 124 MMHG | DIASTOLIC BLOOD PRESSURE: 68 MMHG | HEIGHT: 60 IN | WEIGHT: 233.9 LBS | OXYGEN SATURATION: 96 % | TEMPERATURE: 98.7 F | HEART RATE: 83 BPM

## 2017-08-01 DIAGNOSIS — E78.5 HYPERLIPIDEMIA, UNSPECIFIED HYPERLIPIDEMIA TYPE: ICD-10-CM

## 2017-08-01 DIAGNOSIS — E11.59 TYPE 2 DIABETES MELLITUS WITH OTHER CIRCULATORY COMPLICATION, WITH LONG-TERM CURRENT USE OF INSULIN (HCC): ICD-10-CM

## 2017-08-01 DIAGNOSIS — D50.9 IRON DEFICIENCY ANEMIA, UNSPECIFIED IRON DEFICIENCY ANEMIA TYPE: ICD-10-CM

## 2017-08-01 DIAGNOSIS — Z12.31 ENCOUNTER FOR SCREENING MAMMOGRAM FOR MALIGNANT NEOPLASM OF BREAST: ICD-10-CM

## 2017-08-01 DIAGNOSIS — E78.2 MIXED HYPERLIPIDEMIA: ICD-10-CM

## 2017-08-01 DIAGNOSIS — Z79.4 TYPE 2 DIABETES MELLITUS WITH OTHER CIRCULATORY COMPLICATION, WITH LONG-TERM CURRENT USE OF INSULIN (HCC): ICD-10-CM

## 2017-08-01 DIAGNOSIS — I25.10 CHRONIC CORONARY ARTERY DISEASE: ICD-10-CM

## 2017-08-01 DIAGNOSIS — I10 ESSENTIAL HYPERTENSION: Primary | ICD-10-CM

## 2017-08-01 DIAGNOSIS — Z00.00 MEDICARE ANNUAL WELLNESS VISIT, INITIAL: ICD-10-CM

## 2017-08-01 PROCEDURE — 96160 PT-FOCUSED HLTH RISK ASSMT: CPT | Performed by: FAMILY MEDICINE

## 2017-08-01 PROCEDURE — 99214 OFFICE O/P EST MOD 30 MIN: CPT | Performed by: FAMILY MEDICINE

## 2017-08-01 PROCEDURE — G0438 PPPS, INITIAL VISIT: HCPCS | Performed by: FAMILY MEDICINE

## 2017-08-01 RX ORDER — HYDROXYZINE PAMOATE 50 MG/1
CAPSULE ORAL
COMMUNITY
Start: 2017-07-24 | End: 2018-02-22

## 2017-08-01 NOTE — PROGRESS NOTES
Miguelina Lopez is a 71 y.o. female.  Seen 08/01/2017    Assessment/Plan   Problem List Items Addressed This Visit        Cardiovascular and Mediastinum    Chronic coronary artery disease    Overview     Description: 2 stents in 2013         Relevant Orders    Comprehensive Metabolic Panel    Hyperlipidemia    Relevant Orders    Lipid Panel    Essential hypertension - Primary    Overview     Impression: 03/30/2015 - BP is high will add BB;          Relevant Orders    Comprehensive Metabolic Panel       Endocrine    DM type 2 (diabetes mellitus, type 2)    Overview     Impression: 03/30/2015 - A1c is 7.5 , Pt to check BS BID add, Amryl 1 mg once to twice daily;          Relevant Orders    Hemoglobin A1c       Hematopoietic and Hemostatic    Anemia    Overview     Impression: 03/30/2015 - recheck and get iron level , Her Colonoscopy was normal;             Other    Medicare annual wellness visit, initial    Relevant Orders    CBC & Differential    Mammo Screening Bilateral With CAD      Other Visit Diagnoses     Encounter for screening mammogram for malignant neoplasm of breast         Relevant Orders    Mammo Screening Bilateral With CAD           Follow-up results of lab work just medication that time continue monitoring blood pressure goals with some 140/90 scheduled preventative services for mammogram follow-up 3-6 months    Subjective     Chief Complaint   Patient presents with   • follow up to hypertension   • follow up to diabetes   • initial medicare wellness   Coronary artery disease hyperlipidemia obesity  Social History   Substance Use Topics   • Smoking status: Never Smoker   • Smokeless tobacco: Never Used   • Alcohol use No       History of Present Illness   Patient comes in for follow-up of chronic medical problems hyperlipidemia poorly controlled increased dose of Lasix Lipitor at last visit she is fasting today and we'll check labs for support in dosing.  Blood pressure is well controlled no chest  "pain shortness of breath or increased fatigue she is feeling much better than a year ago after having her hospitalization and surgery coronary disease.  Psychiatric her blood sugars very often and she has no change in vision or rashes noted she has no symptoms related to angina her chronic medical problems seem to be stable at this time other than unknown lipids  The following portions of the patient's history were reviewed and updated as appropriate:PMHroutine: Social history , Past Medical History, Surgical history , Allergies, Current Medications, Active Problem List and Health Maintenance    Review of Systems   Constitutional: Negative for activity change, diaphoresis, fatigue, fever and unexpected weight change.   HENT: Negative.  Negative for trouble swallowing.    Eyes: Negative.  Negative for photophobia.   Respiratory: Negative for cough and shortness of breath.    Cardiovascular: Negative for chest pain and leg swelling.   Gastrointestinal: Negative.    Endocrine: Negative.    Genitourinary: Negative.    Musculoskeletal: Positive for gait problem. Negative for neck pain.   Neurological: Negative for numbness and headaches.   Hematological: Negative.    Psychiatric/Behavioral: Positive for agitation and sleep disturbance. The patient is nervous/anxious.        Objective   Vitals:    08/01/17 1238   BP: 124/68   BP Location: Right arm   Patient Position: Sitting   Cuff Size: Large Adult   Pulse: 83   Temp: 98.7 °F (37.1 °C)   TempSrc: Oral   SpO2: 96%   Weight: 233 lb 14.4 oz (106 kg)   Height: 60\" (152.4 cm)     Body mass index is 45.68 kg/(m^2).  Physical Exam   Constitutional: She is oriented to person, place, and time. She appears well-developed and well-nourished. No distress.   HENT:   Head: Normocephalic and atraumatic.   Eyes: Conjunctivae and EOM are normal. Pupils are equal, round, and reactive to light. Right eye exhibits no discharge. Left eye exhibits no discharge. No scleral icterus.   Neck: " Normal range of motion. Neck supple. No tracheal deviation present. No thyromegaly present.   Cardiovascular: Normal rate, regular rhythm, normal heart sounds, intact distal pulses and normal pulses.  Exam reveals no gallop.    No murmur heard.  Pulmonary/Chest: Effort normal and breath sounds normal. No respiratory distress. She has no wheezes. She has no rales.   Abdominal: Soft. Bowel sounds are normal. She exhibits no distension. There is no tenderness.   Musculoskeletal: Normal range of motion.   Uses cane   Neurological: She is alert and oriented to person, place, and time. She exhibits normal muscle tone. Coordination normal.   Skin: Skin is warm. No rash noted. No erythema. No pallor.   Psychiatric: She has a normal mood and affect. Her behavior is normal. Judgment and thought content normal.   Nursing note and vitals reviewed.    Reviewed Data:  No visits with results within 1 Month(s) from this visit.  Latest known visit with results is:    Office Visit on 05/01/2017   Component Date Value Ref Range Status   • Total Cholesterol 05/01/2017 214* 0 - 200 mg/dL Final   • Triglycerides 05/01/2017 167* 0 - 150 mg/dL Final   • HDL Cholesterol 05/01/2017 60  40 - 60 mg/dL Final   • LDL Cholesterol  05/01/2017 121* 0 - 100 mg/dL Final   • VLDL Cholesterol 05/01/2017 33.4  5 - 40 mg/dL Final   • LDL/HDL Ratio 05/01/2017 2.01   Final   • Hemoglobin A1C 05/01/2017 6.80* 4.80 - 5.60 % Final   • AST (SGOT) 05/01/2017 15  1 - 32 U/L Final   • ALT (SGPT) 05/01/2017 9  1 - 33 U/L Final

## 2017-08-01 NOTE — PROGRESS NOTES
QUICK REFERENCE INFORMATION:  The ABCs of the Annual Wellness Visit    Initial Medicare Wellness Visit    HEALTH RISK ASSESSMENT    1945    Recent Hospitalizations:  No hospitalization(s) within the last year..        Current Medical Providers:  Patient Care Team:  Channing Bush MD as PCP - General (Family Medicine)  Channing Bush MD as Referring Physician (Family Medicine)  Robbie Wilson MD as Consulting Physician (Hematology and Oncology)  Chace Johnson MD as Consulting Physician (Cardiology)        Smoking Status:  History   Smoking Status   • Never Smoker   Smokeless Tobacco   • Never Used       Alcohol Consumption:  History   Alcohol Use No       Depression Screen:   PHQ-9 Depression Screening 8/1/2017   Little interest or pleasure in doing things 1   Feeling down, depressed, or hopeless 2   Trouble falling or staying asleep, or sleeping too much 2   Feeling tired or having little energy 1   Poor appetite or overeating 1   Feeling bad about yourself - or that you are a failure or have let yourself or your family down 1   Trouble concentrating on things, such as reading the newspaper or watching television 0   Moving or speaking so slowly that other people could have noticed. Or the opposite - being so fidgety or restless that you have been moving around a lot more than usual 0   Thoughts that you would be better off dead, or of hurting yourself in some way 0   PHQ-9 Total Score 8   If you checked off any problems, how difficult have these problems made it for you to do your work, take care of things at home, or get along with other people? Not difficult at all       Health Habits and Functional and Cognitive Screening:  Functional & Cognitive Status 8/1/2017   Do you have difficulty preparing food and eating? No   Do you have difficulty bathing yourself? No   Do you have difficulty getting dressed? No   Do you have difficulty using the toilet? No   Do you have difficulty moving around from  place to place? No   In the past year have you fallen or experienced a near fall? No   Do you need help using the phone?  No   Are you deaf or do you have serious difficulty hearing?  No   Do you need help with transportation? Yes   Do you need help shopping? No   Do you need help preparing meals?  No   Do you need help with housework?  No   Do you need help with laundry? No   Do you need help taking your medications? No   Do you need help managing money? No   Do you have difficulty concentrating, remembering or making decisions? No       Health Habits  Current Diet: Well Balanced Diet  Dental Exam: Not up to date  Eye Exam: Up to date  Exercise (times per week): 0 times per week  Current Exercise Activities Include: None          Does the patient have evidence of cognitive impairment? No    Asiprin use counseling: Taking ASA appropriately as indicated      Recent Lab Results:    Visual Acuity:  No exam data present    Age-appropriate Screening Schedule:  Refer to the list below for future screening recommendations based on patient's age, sex and/or medical conditions. Orders for these recommended tests are listed in the plan section. The patient has been provided with a written plan.    Health Maintenance   Topic Date Due   • TDAP/TD VACCINES (1 - Tdap) 12/20/1964   • MAMMOGRAM  03/01/2017   • HEMOGLOBIN A1C  11/01/2017   • URINE MICROALBUMIN  01/30/2018   • DIABETIC FOOT EXAM  05/01/2018   • LIPID PANEL  05/01/2018   • DIABETIC EYE EXAM  07/01/2018   • COLONOSCOPY  01/01/2023   • ZOSTER VACCINE  Completed   • INFLUENZA VACCINE  Excluded   • PNEUMOCOCCAL VACCINES (65+ LOW/MEDIUM RISK)  Excluded        Subjective   History of Present Illness    Miguelina Lopez is a 71 y.o. female who presents for an Annual Wellness Visit.    The following portions of the patient's history were reviewed and updated as appropriate: allergies, current medications, past family history, past medical history, past social history, past  surgical history and problem list.    Outpatient Medications Prior to Visit   Medication Sig Dispense Refill   • albuterol (PROVENTIL HFA;VENTOLIN HFA) 108 (90 BASE) MCG/ACT inhaler Inhale 2 puffs every 4 (four) hours as needed for wheezing. 1 inhaler 11   • ALPRAZolam (XANAX) 1 MG tablet Take 1 tablet by mouth 2 (Two) Times a Day As Needed for anxiety. 24 tablet 0   • aspirin  MG tablet Take 1 tablet by mouth Daily. 30 tablet 5   • atenolol (TENORMIN) 25 MG tablet TAKE ONE TABLET BY MOUTH DAILY 90 tablet 0   • atorvastatin (LIPITOR) 20 MG tablet TAKE ONE TABLET BY MOUTH DAILY 90 tablet 0   • diclofenac (VOLTAREN) 3 % gel gel Apply  topically 2 (Two) Times a Day. for 60 days. (Patient taking differently: Apply 2 g topically 2 (Two) Times a Day As Needed. for 60 days.) 100 g 0   • DULoxetine (CYMBALTA) 60 MG capsule Take 1 capsule by mouth Daily. 30 capsule 5   • ferrous sulfate 324 (65 FE) MG tablet delayed-release EC tablet Take 324 mg by mouth Daily With Breakfast.     • fluticasone-salmeterol (ADVAIR DISKUS) 250-50 MCG/DOSE DISKUS Inhale 2 puffs Daily As Needed.     • furosemide (LASIX) 40 MG tablet Take 1 tablet by mouth 2 (Two) Times a Day. 60 tablet 1   • glimepiride (AMARYL) 2 MG tablet TAKE ONE TABLET BY MOUTH DAILY BEFORE BREAKFAST 90 tablet 0   • insulin detemir (LEVEMIR) 100 UNIT/ML injection Inject 20 Units under the skin daily 3 mL 11   • ipratropium-albuterol (DUO-NEB) 0.5-2.5 mg/mL nebulizer Take 3 mL by nebulization 4 (four) times a day. (Patient taking differently: Take 3 mL by nebulization Daily As Needed.) 3 mL 5   • KLOR-CON 20 MEQ CR tablet TAKE ONE TABLET BY MOUTH DAILY 30 tablet 2   • metFORMIN ER (GLUCOPHAGE-XR) 500 MG 24 hr tablet Take 1 tablet by mouth 3 (Three) Times a Day. 270 tablet 1   • NITROSTAT 0.4 MG SL tablet DISSOLVE 1 TAB UNDER TONGUE FOR CHEST PAIN - IF PAIN REMAINS AFTER 5 MIN, CALL 911 AND REPEAT DOSE. MAX 3 TABS IN 15 MINUTES 25 tablet 0   • omeprazole (priLOSEC) 40 MG  capsule Take 1 capsule by mouth Daily. 30 capsule 2   • potassium chloride (K-DUR,KLOR-CON) 20 MEQ CR tablet Take 1 tablet by mouth 2 (Two) Times a Day. 30 tablet 2   • promethazine (PHENERGAN) 25 MG tablet Take 1 tablet by mouth Every 6 (Six) Hours As Needed for Nausea or Vomiting. 30 tablet 0   • senna-docusate (PERICOLACE) 8.6-50 MG per tablet Take 1 tablet by mouth daily.     • traZODone (DESYREL) 100 MG tablet Take 1 tablet by mouth Every Night.     • valsartan-hydrochlorothiazide (DIOVAN-HCT) 320-25 MG per tablet Take 1 tablet by mouth Daily. 90 tablet 1   • nystatin (MYCOSTATIN) 662344 UNIT/GM cream Apply  topically 2 (Two) Times a Day. 30 g 1     No facility-administered medications prior to visit.        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    Review of Systems    Compared to one year ago, the patient feels her physical health is better.  Compared to one year ago, the patient feels her mental health is better.    Objective     Physical Exam    Vitals:    08/01/17 1238   BP: 124/68   BP Location: Right arm   Patient Position:  "Sitting   Cuff Size: Large Adult   Pulse: 83   Temp: 98.7 °F (37.1 °C)   TempSrc: Oral   SpO2: 96%   Weight: 233 lb 14.4 oz (106 kg)   Height: 60\" (152.4 cm)   PainSc:   6       Body mass index is 45.68 kg/(m^2).  Discussed the patient's BMI with her. The BMI is above average; BMI management plan is completed.    Assessment/Plan   Patient Self-Management and Personalized Health Advice  The patient has been provided with information about: diet, exercise, weight management, prevention of cardiac or vascular disease, the relationship between weight and GERD, fall prevention and designing advance directives and preventive services including:   · Advance directive, Exercise counseling provided, Fall Risk assessment done, Fall Risk plan of care done.    Visit Diagnoses:    ICD-10-CM ICD-9-CM   1. Essential hypertension I10 401.9   2. Chronic coronary artery disease I25.10 414.00   3. Mixed hyperlipidemia E78.2 272.2   4. Type 2 diabetes mellitus with other circulatory complication, with long-term current use of insulin E11.59     Z79.4    5. Iron deficiency anemia, unspecified iron deficiency anemia type D50.9 280.9   6. Medicare annual wellness visit, initial Z00.00 V70.0   7. Encounter for screening mammogram for malignant neoplasm of breast  Z12.31 V76.12       Orders Placed This Encounter   Procedures   • Mammo Screening Bilateral With CAD     Order Specific Question:   Reason for Exam:     Answer:   screen   • Hemoglobin A1c   • Lipid Panel   • Comprehensive Metabolic Panel   • CBC & Differential     Order Specific Question:   Manual Differential     Answer:   No       Outpatient Encounter Prescriptions as of 8/1/2017   Medication Sig Dispense Refill   • albuterol (PROVENTIL HFA;VENTOLIN HFA) 108 (90 BASE) MCG/ACT inhaler Inhale 2 puffs every 4 (four) hours as needed for wheezing. 1 inhaler 11   • ALPRAZolam (XANAX) 1 MG tablet Take 1 tablet by mouth 2 (Two) Times a Day As Needed for anxiety. 24 tablet 0   • aspirin "  MG tablet Take 1 tablet by mouth Daily. 30 tablet 5   • atenolol (TENORMIN) 25 MG tablet TAKE ONE TABLET BY MOUTH DAILY 90 tablet 0   • atorvastatin (LIPITOR) 20 MG tablet TAKE ONE TABLET BY MOUTH DAILY 90 tablet 0   • diclofenac (VOLTAREN) 3 % gel gel Apply  topically 2 (Two) Times a Day. for 60 days. (Patient taking differently: Apply 2 g topically 2 (Two) Times a Day As Needed. for 60 days.) 100 g 0   • DULoxetine (CYMBALTA) 60 MG capsule Take 1 capsule by mouth Daily. 30 capsule 5   • ferrous sulfate 324 (65 FE) MG tablet delayed-release EC tablet Take 324 mg by mouth Daily With Breakfast.     • fluticasone-salmeterol (ADVAIR DISKUS) 250-50 MCG/DOSE DISKUS Inhale 2 puffs Daily As Needed.     • furosemide (LASIX) 40 MG tablet Take 1 tablet by mouth 2 (Two) Times a Day. 60 tablet 1   • glimepiride (AMARYL) 2 MG tablet TAKE ONE TABLET BY MOUTH DAILY BEFORE BREAKFAST 90 tablet 0   • insulin detemir (LEVEMIR) 100 UNIT/ML injection Inject 20 Units under the skin daily 3 mL 11   • ipratropium-albuterol (DUO-NEB) 0.5-2.5 mg/mL nebulizer Take 3 mL by nebulization 4 (four) times a day. (Patient taking differently: Take 3 mL by nebulization Daily As Needed.) 3 mL 5   • KLOR-CON 20 MEQ CR tablet TAKE ONE TABLET BY MOUTH DAILY 30 tablet 2   • metFORMIN ER (GLUCOPHAGE-XR) 500 MG 24 hr tablet Take 1 tablet by mouth 3 (Three) Times a Day. 270 tablet 1   • NITROSTAT 0.4 MG SL tablet DISSOLVE 1 TAB UNDER TONGUE FOR CHEST PAIN - IF PAIN REMAINS AFTER 5 MIN, CALL 911 AND REPEAT DOSE. MAX 3 TABS IN 15 MINUTES 25 tablet 0   • omeprazole (priLOSEC) 40 MG capsule Take 1 capsule by mouth Daily. 30 capsule 2   • potassium chloride (K-DUR,KLOR-CON) 20 MEQ CR tablet Take 1 tablet by mouth 2 (Two) Times a Day. 30 tablet 2   • promethazine (PHENERGAN) 25 MG tablet Take 1 tablet by mouth Every 6 (Six) Hours As Needed for Nausea or Vomiting. 30 tablet 0   • senna-docusate (PERICOLACE) 8.6-50 MG per tablet Take 1 tablet by mouth daily.      • traZODone (DESYREL) 100 MG tablet Take 1 tablet by mouth Every Night.     • valsartan-hydrochlorothiazide (DIOVAN-HCT) 320-25 MG per tablet Take 1 tablet by mouth Daily. 90 tablet 1   • hydrOXYzine (VISTARIL) 50 MG capsule 102 tablets daily at bedtime     • [DISCONTINUED] nystatin (MYCOSTATIN) 801761 UNIT/GM cream Apply  topically 2 (Two) Times a Day. 30 g 1     No facility-administered encounter medications on file as of 8/1/2017.        Reviewed use of high risk medication in the elderly: yes  Reviewed for potential of harmful drug interactions in the elderly: yes    Follow Up:  Chronic problems     An After Visit Summary and PPPS with all of these plans were given to the patient.

## 2017-08-02 ENCOUNTER — TELEPHONE (OUTPATIENT)
Dept: INTERNAL MEDICINE | Facility: CLINIC | Age: 72
End: 2017-08-02

## 2017-08-02 LAB
ALBUMIN SERPL-MCNC: 4.2 G/DL (ref 3.5–5.2)
ALBUMIN/GLOB SERPL: 1.3 G/DL
ALP SERPL-CCNC: 120 U/L (ref 39–117)
ALT SERPL-CCNC: 11 U/L (ref 1–33)
AST SERPL-CCNC: 13 U/L (ref 1–32)
BASOPHILS # BLD AUTO: 0.07 10*3/MM3 (ref 0–0.2)
BASOPHILS NFR BLD AUTO: 0.6 % (ref 0–1.5)
BILIRUB SERPL-MCNC: 0.2 MG/DL (ref 0.1–1.2)
BUN SERPL-MCNC: 35 MG/DL (ref 8–23)
BUN/CREAT SERPL: 24.5 (ref 7–25)
CALCIUM SERPL-MCNC: 9.5 MG/DL (ref 8.6–10.5)
CHLORIDE SERPL-SCNC: 96 MMOL/L (ref 98–107)
CHOLEST SERPL-MCNC: 200 MG/DL (ref 0–200)
CO2 SERPL-SCNC: 26.5 MMOL/L (ref 22–29)
CREAT SERPL-MCNC: 1.43 MG/DL (ref 0.57–1)
EOSINOPHIL # BLD AUTO: 0.14 10*3/MM3 (ref 0–0.7)
EOSINOPHIL NFR BLD AUTO: 1.2 % (ref 0.3–6.2)
ERYTHROCYTE [DISTWIDTH] IN BLOOD BY AUTOMATED COUNT: 16.8 % (ref 11.7–13)
GLOBULIN SER CALC-MCNC: 3.2 GM/DL
GLUCOSE SERPL-MCNC: 201 MG/DL (ref 65–99)
HBA1C MFR BLD: 8.09 % (ref 4.8–5.6)
HCT VFR BLD AUTO: 35.9 % (ref 35.6–45.5)
HDLC SERPL-MCNC: 53 MG/DL (ref 40–60)
HGB BLD-MCNC: 10.8 G/DL (ref 11.9–15.5)
IMM GRANULOCYTES # BLD: 0.04 10*3/MM3 (ref 0–0.03)
IMM GRANULOCYTES NFR BLD: 0.4 % (ref 0–0.5)
LDLC SERPL CALC-MCNC: 100 MG/DL (ref 0–100)
LYMPHOCYTES # BLD AUTO: 2.45 10*3/MM3 (ref 0.9–4.8)
LYMPHOCYTES NFR BLD AUTO: 21.5 % (ref 19.6–45.3)
MCH RBC QN AUTO: 24.4 PG (ref 26.9–32)
MCHC RBC AUTO-ENTMCNC: 30.1 G/DL (ref 32.4–36.3)
MCV RBC AUTO: 81.2 FL (ref 80.5–98.2)
MONOCYTES # BLD AUTO: 0.59 10*3/MM3 (ref 0.2–1.2)
MONOCYTES NFR BLD AUTO: 5.2 % (ref 5–12)
NEUTROPHILS # BLD AUTO: 8.1 10*3/MM3 (ref 1.9–8.1)
NEUTROPHILS NFR BLD AUTO: 71.1 % (ref 42.7–76)
NRBC BLD AUTO-RTO: 0 /100 WBC (ref 0–0)
PLATELET # BLD AUTO: 356 10*3/MM3 (ref 140–500)
POTASSIUM SERPL-SCNC: 4.4 MMOL/L (ref 3.5–5.2)
PROT SERPL-MCNC: 7.4 G/DL (ref 6–8.5)
RBC # BLD AUTO: 4.42 10*6/MM3 (ref 3.9–5.2)
SODIUM SERPL-SCNC: 142 MMOL/L (ref 136–145)
TRIGL SERPL-MCNC: 235 MG/DL (ref 0–150)
VLDLC SERPL CALC-MCNC: 47 MG/DL (ref 5–40)
WBC # BLD AUTO: 11.39 10*3/MM3 (ref 4.5–10.7)

## 2017-08-02 NOTE — TELEPHONE ENCOUNTER
----- Message from Channing Bush MD sent at 8/2/2017  7:47 AM EDT -----  Monitor morning blood sugars daily increase Levemir 4 units every 4 days until morning blood sugar is below 110.  Reconciled dose of furosemide recommend one daily since she has additional water pill with her valsartan.  Follow-up 1 month bring in blood sugar readings

## 2017-08-09 RX ORDER — FUROSEMIDE 40 MG/1
TABLET ORAL
Qty: 60 TABLET | Refills: 2 | Status: SHIPPED | OUTPATIENT
Start: 2017-08-09 | End: 2018-02-03 | Stop reason: SDUPTHER

## 2017-08-15 RX ORDER — VALSARTAN AND HYDROCHLOROTHIAZIDE 320; 25 MG/1; MG/1
TABLET, FILM COATED ORAL
Qty: 90 TABLET | Refills: 0 | Status: SHIPPED | OUTPATIENT
Start: 2017-08-15 | End: 2017-11-09 | Stop reason: SDUPTHER

## 2017-08-17 ENCOUNTER — TELEPHONE (OUTPATIENT)
Dept: INTERNAL MEDICINE | Facility: CLINIC | Age: 72
End: 2017-08-17

## 2017-08-17 NOTE — TELEPHONE ENCOUNTER
Caro Ballard ([phone]) with [employer] called stating that patient is having issues with her blood sugar running low.  Spoke to patient and she said that her blood sugar has been running, 156, 115, 115, 119, 94, 101, 109, 138, 28, 191.  Patient has been taking Levemir 20 units plus 4 units at times but she is still confused as to when to take the extra 4 units as previously directed.  It was decided to have patient take 22 units daily for 1 week and to have patient call with her blood sugar results and to have patient follow the American Diabetic Association 1600 calorie diet per Dr. Bush.  Patient asked for this to be explained to her granddaughter Carmen ([phone]) and to Caro Ballard.  Both were left voicemail messages stating the new instructions.

## 2017-08-18 ENCOUNTER — TELEPHONE (OUTPATIENT)
Dept: INTERNAL MEDICINE | Facility: CLINIC | Age: 72
End: 2017-08-18

## 2017-08-18 RX ORDER — METFORMIN HYDROCHLORIDE 500 MG/1
TABLET, EXTENDED RELEASE ORAL
Qty: 270 TABLET | Refills: 0 | Status: SHIPPED | OUTPATIENT
Start: 2017-08-18 | End: 2017-08-21 | Stop reason: SDUPTHER

## 2017-08-18 NOTE — TELEPHONE ENCOUNTER
----- Message from Channing Bush MD sent at 8/18/2017  7:50 AM EDT -----  No evidence of malignancy and breast exam

## 2017-08-22 RX ORDER — METFORMIN HYDROCHLORIDE 500 MG/1
TABLET, EXTENDED RELEASE ORAL
Qty: 270 TABLET | Refills: 0 | Status: SHIPPED | OUTPATIENT
Start: 2017-08-22 | End: 2018-02-18 | Stop reason: SDUPTHER

## 2017-08-24 ENCOUNTER — TELEPHONE (OUTPATIENT)
Dept: INTERNAL MEDICINE | Facility: CLINIC | Age: 72
End: 2017-08-24

## 2017-08-24 NOTE — TELEPHONE ENCOUNTER
Patient called to let Dr. Bush know how her blood sugars having been running for the past week.                              Before breakfast                          Before supper  7/18/17                          98                                               180  7/19/17                         105                                              164  7/20/17                          89                                               163  7/21/17                         138                                               133  7/22/17                          119                                               142  7/23/17                          115                                               111  7/24/17                          104      Please advise.  Patient is taking Levemir 22 units daily.

## 2017-09-07 RX ORDER — POTASSIUM CHLORIDE 20 MEQ/1
TABLET, EXTENDED RELEASE ORAL
Qty: 30 TABLET | Refills: 5 | Status: SHIPPED | OUTPATIENT
Start: 2017-09-07 | End: 2018-02-22 | Stop reason: SDUPTHER

## 2017-09-11 RX ORDER — ATENOLOL 25 MG/1
TABLET ORAL
Qty: 90 TABLET | Refills: 0 | Status: SHIPPED | OUTPATIENT
Start: 2017-09-11 | End: 2017-12-10 | Stop reason: SDUPTHER

## 2017-09-27 RX ORDER — PROMETHAZINE HYDROCHLORIDE 25 MG/1
TABLET ORAL
Qty: 30 TABLET | Refills: 0 | Status: SHIPPED | OUTPATIENT
Start: 2017-09-27 | End: 2018-01-05 | Stop reason: SDUPTHER

## 2017-10-09 RX ORDER — OMEPRAZOLE 40 MG/1
CAPSULE, DELAYED RELEASE ORAL
Qty: 30 CAPSULE | Refills: 0 | Status: SHIPPED | OUTPATIENT
Start: 2017-10-09 | End: 2017-11-13 | Stop reason: SDUPTHER

## 2017-10-10 DIAGNOSIS — I25.10 CHRONIC CORONARY ARTERY DISEASE: ICD-10-CM

## 2017-10-10 RX ORDER — ASPIRIN 325 MG
325 TABLET, DELAYED RELEASE (ENTERIC COATED) ORAL DAILY
Qty: 30 TABLET | Refills: 2 | Status: SHIPPED | OUTPATIENT
Start: 2017-10-10 | End: 2017-12-10 | Stop reason: SDUPTHER

## 2017-10-10 RX ORDER — DULOXETIN HYDROCHLORIDE 60 MG/1
60 CAPSULE, DELAYED RELEASE ORAL DAILY
Qty: 30 CAPSULE | Refills: 2 | Status: SHIPPED | OUTPATIENT
Start: 2017-10-10 | End: 2018-02-22

## 2017-10-23 RX ORDER — ATORVASTATIN CALCIUM 10 MG/1
TABLET, FILM COATED ORAL
Qty: 90 TABLET | Refills: 1 | Status: SHIPPED | OUTPATIENT
Start: 2017-10-23 | End: 2017-11-02 | Stop reason: SDUPTHER

## 2017-11-02 RX ORDER — ATORVASTATIN CALCIUM 20 MG/1
TABLET, FILM COATED ORAL
Qty: 90 TABLET | Refills: 0 | Status: SHIPPED | OUTPATIENT
Start: 2017-11-02 | End: 2018-02-01 | Stop reason: SDUPTHER

## 2017-11-06 RX ORDER — FUROSEMIDE 40 MG/1
TABLET ORAL
Qty: 60 TABLET | Refills: 1 | OUTPATIENT
Start: 2017-11-06

## 2017-11-06 NOTE — TELEPHONE ENCOUNTER
Patient canceled to previous appointments needs follow-up evaluation  through this office and  cardiology

## 2017-11-07 ENCOUNTER — TELEPHONE (OUTPATIENT)
Dept: INTERNAL MEDICINE | Facility: CLINIC | Age: 72
End: 2017-11-07

## 2017-11-07 NOTE — TELEPHONE ENCOUNTER
Patient called asking why her prescription for Lipitor was changed from 10 mg to 20 mg.  Please advise.

## 2017-11-09 RX ORDER — VALSARTAN AND HYDROCHLOROTHIAZIDE 320; 25 MG/1; MG/1
TABLET, FILM COATED ORAL
Qty: 90 TABLET | Refills: 0 | Status: SHIPPED | OUTPATIENT
Start: 2017-11-09 | End: 2018-02-03 | Stop reason: SDUPTHER

## 2017-11-13 RX ORDER — OMEPRAZOLE 40 MG/1
CAPSULE, DELAYED RELEASE ORAL
Qty: 30 CAPSULE | Refills: 0 | Status: SHIPPED | OUTPATIENT
Start: 2017-11-13 | End: 2017-12-10 | Stop reason: SDUPTHER

## 2017-12-10 DIAGNOSIS — I25.10 CHRONIC CORONARY ARTERY DISEASE: ICD-10-CM

## 2017-12-11 RX ORDER — OMEPRAZOLE 40 MG/1
CAPSULE, DELAYED RELEASE ORAL
Qty: 90 CAPSULE | Refills: 0 | Status: SHIPPED | OUTPATIENT
Start: 2017-12-11 | End: 2018-03-12 | Stop reason: SDUPTHER

## 2017-12-11 RX ORDER — ATENOLOL 25 MG/1
TABLET ORAL
Qty: 90 TABLET | Refills: 0 | Status: SHIPPED | OUTPATIENT
Start: 2017-12-11 | End: 2018-03-12 | Stop reason: SDUPTHER

## 2017-12-11 RX ORDER — ASPIRIN 325 MG
TABLET, DELAYED RELEASE (ENTERIC COATED) ORAL
Qty: 90 TABLET | Refills: 0 | Status: SHIPPED | OUTPATIENT
Start: 2017-12-11 | End: 2018-04-08 | Stop reason: SDUPTHER

## 2017-12-12 ENCOUNTER — TELEPHONE (OUTPATIENT)
Dept: INTERNAL MEDICINE | Facility: CLINIC | Age: 72
End: 2017-12-12

## 2017-12-12 NOTE — TELEPHONE ENCOUNTER
Pt called stating glucose checks have been in 200s the past few days. Hasn't changed anything.  Next appt Feb 2018.  Please advise.

## 2017-12-13 NOTE — TELEPHONE ENCOUNTER
Reconcile Levemir dose recommended increasing Levemir 2 units every 4 days until morning blood sugar is less than 120

## 2017-12-13 NOTE — TELEPHONE ENCOUNTER
Called pt to advise of medication changes per Dr Bush , advised to call back by Friday if this doesnt help or levels get much worse.

## 2017-12-15 ENCOUNTER — TELEPHONE (OUTPATIENT)
Dept: INTERNAL MEDICINE | Facility: CLINIC | Age: 72
End: 2017-12-15

## 2017-12-15 RX ORDER — INSULIN DETEMIR 100 [IU]/ML
INJECTION, SOLUTION SUBCUTANEOUS
Qty: 1 PEN | Refills: 0 | Status: SHIPPED | OUTPATIENT
Start: 2017-12-15 | End: 2018-03-19 | Stop reason: SDUPTHER

## 2017-12-15 NOTE — TELEPHONE ENCOUNTER
Patient had been on Levemir 20 units she's been on 22 units if her morning sugar is still greater than 120 she's had increased to 24 units check daily for 4 days in the morning if greater than 120 then she should increase to 26 units and so on

## 2017-12-15 NOTE — TELEPHONE ENCOUNTER
Pt called still concerned about glucose levels.  You increased Levemir to 2 units every 4 days on 12/12.  She states levels have been in low 200s nightly and mid 100s in AM.  Please advise.

## 2017-12-15 NOTE — TELEPHONE ENCOUNTER
Called pt to verify med direction, advised of increasing units and monitoring for the next week. If no other sx and feeling ok continue this and follow up as scheduled. If worse see us sooner or got to ER.

## 2018-01-05 RX ORDER — PROMETHAZINE HYDROCHLORIDE 25 MG/1
TABLET ORAL
Qty: 30 TABLET | Refills: 0 | Status: SHIPPED | OUTPATIENT
Start: 2018-01-05 | End: 2018-10-29 | Stop reason: SDUPTHER

## 2018-01-05 NOTE — TELEPHONE ENCOUNTER
Pt called complaining of diarrhea, and nausea.  Says her sugars are going up to low 200s, can she have something for the nausea?

## 2018-02-01 RX ORDER — ATORVASTATIN CALCIUM 20 MG/1
TABLET, FILM COATED ORAL
Qty: 90 TABLET | Refills: 0 | Status: SHIPPED | OUTPATIENT
Start: 2018-02-01 | End: 2018-05-03 | Stop reason: SDUPTHER

## 2018-02-05 RX ORDER — FUROSEMIDE 40 MG/1
TABLET ORAL
Qty: 60 TABLET | Refills: 2 | Status: SHIPPED | OUTPATIENT
Start: 2018-02-05 | End: 2018-02-23

## 2018-02-05 RX ORDER — VALSARTAN AND HYDROCHLOROTHIAZIDE 320; 25 MG/1; MG/1
TABLET, FILM COATED ORAL
Qty: 90 TABLET | Refills: 0 | Status: SHIPPED | OUTPATIENT
Start: 2018-02-05 | End: 2018-05-03 | Stop reason: SDUPTHER

## 2018-02-07 ENCOUNTER — TELEPHONE (OUTPATIENT)
Dept: INTERNAL MEDICINE | Facility: CLINIC | Age: 73
End: 2018-02-07

## 2018-02-07 NOTE — TELEPHONE ENCOUNTER
Patient called stating that she stopped taking Mucinex and she has felt stopped up and just doesn't feel well.  She thought that it is from her sinuses.  She wanted to know if she could take Mucinex again.  Please advise.

## 2018-02-19 RX ORDER — METFORMIN HYDROCHLORIDE 500 MG/1
TABLET, EXTENDED RELEASE ORAL
Qty: 270 TABLET | Refills: 0 | Status: SHIPPED | OUTPATIENT
Start: 2018-02-19 | End: 2018-02-23 | Stop reason: SDUPTHER

## 2018-02-22 ENCOUNTER — OFFICE VISIT (OUTPATIENT)
Dept: INTERNAL MEDICINE | Facility: CLINIC | Age: 73
End: 2018-02-22

## 2018-02-22 VITALS
OXYGEN SATURATION: 97 % | SYSTOLIC BLOOD PRESSURE: 120 MMHG | DIASTOLIC BLOOD PRESSURE: 72 MMHG | BODY MASS INDEX: 45.45 KG/M2 | TEMPERATURE: 98.5 F | HEART RATE: 102 BPM | HEIGHT: 60 IN | WEIGHT: 231.5 LBS

## 2018-02-22 DIAGNOSIS — Z79.4 TYPE 2 DIABETES MELLITUS WITH OTHER CIRCULATORY COMPLICATION, WITH LONG-TERM CURRENT USE OF INSULIN (HCC): ICD-10-CM

## 2018-02-22 DIAGNOSIS — E11.59 TYPE 2 DIABETES MELLITUS WITH OTHER CIRCULATORY COMPLICATION, WITH LONG-TERM CURRENT USE OF INSULIN (HCC): ICD-10-CM

## 2018-02-22 DIAGNOSIS — E11.42 DIABETIC PERIPHERAL NEUROPATHY (HCC): ICD-10-CM

## 2018-02-22 DIAGNOSIS — I48.20 CHRONIC ATRIAL FIBRILLATION (HCC): Primary | ICD-10-CM

## 2018-02-22 DIAGNOSIS — IMO0001 CLASS 3 OBESITY DUE TO EXCESS CALORIES WITH SERIOUS COMORBIDITY AND BODY MASS INDEX (BMI) OF 45.0 TO 49.9 IN ADULT: ICD-10-CM

## 2018-02-22 DIAGNOSIS — I10 ESSENTIAL HYPERTENSION: ICD-10-CM

## 2018-02-22 DIAGNOSIS — E78.2 MIXED HYPERLIPIDEMIA: ICD-10-CM

## 2018-02-22 PROCEDURE — 99214 OFFICE O/P EST MOD 30 MIN: CPT | Performed by: FAMILY MEDICINE

## 2018-02-22 RX ORDER — DULOXETIN HYDROCHLORIDE 30 MG/1
CAPSULE, DELAYED RELEASE ORAL
COMMUNITY
Start: 2018-02-04 | End: 2018-02-22

## 2018-02-22 RX ORDER — MIRTAZAPINE 15 MG/1
TABLET, FILM COATED ORAL
COMMUNITY
Start: 2017-12-11 | End: 2018-02-22

## 2018-02-22 RX ORDER — ESCITALOPRAM OXALATE 20 MG/1
20 TABLET ORAL DAILY
COMMUNITY
Start: 2018-02-15 | End: 2018-07-12 | Stop reason: ALTCHOICE

## 2018-02-23 ENCOUNTER — TELEPHONE (OUTPATIENT)
Dept: INTERNAL MEDICINE | Facility: CLINIC | Age: 73
End: 2018-02-23

## 2018-02-23 DIAGNOSIS — R79.89 ELEVATED SERUM CREATININE: Primary | ICD-10-CM

## 2018-02-23 LAB
ALBUMIN SERPL-MCNC: 4.2 G/DL (ref 3.5–5.2)
ALBUMIN/CREAT UR: <4.6 (ref 0–30)
ALBUMIN/GLOB SERPL: 1.3 G/DL
ALP SERPL-CCNC: 115 U/L (ref 39–117)
ALT SERPL-CCNC: 11 U/L (ref 1–33)
AST SERPL-CCNC: 13 U/L (ref 1–32)
BILIRUB SERPL-MCNC: 0.2 MG/DL (ref 0.1–1.2)
BUN SERPL-MCNC: 43 MG/DL (ref 8–23)
BUN/CREAT SERPL: 23.4 (ref 7–25)
CALCIUM SERPL-MCNC: 9.6 MG/DL (ref 8.6–10.5)
CHLORIDE SERPL-SCNC: 96 MMOL/L (ref 98–107)
CO2 SERPL-SCNC: 26.2 MMOL/L (ref 22–29)
CREAT SERPL-MCNC: 1.84 MG/DL (ref 0.57–1)
CREAT UR-MCNC: 65.4 MG/DL
GFR SERPLBLD CREATININE-BSD FMLA CKD-EPI: 27 ML/MIN/1.73
GFR SERPLBLD CREATININE-BSD FMLA CKD-EPI: 33 ML/MIN/1.73
GLOBULIN SER CALC-MCNC: 3.2 GM/DL
GLUCOSE SERPL-MCNC: 110 MG/DL (ref 65–99)
HBA1C MFR BLD: 7.63 % (ref 4.8–5.6)
MICROALBUMIN UR-MCNC: <3 UG/ML
POTASSIUM SERPL-SCNC: 4.6 MMOL/L (ref 3.5–5.2)
PROT SERPL-MCNC: 7.4 G/DL (ref 6–8.5)
SODIUM SERPL-SCNC: 143 MMOL/L (ref 136–145)

## 2018-02-23 RX ORDER — METFORMIN HYDROCHLORIDE 500 MG/1
TABLET, EXTENDED RELEASE ORAL
Qty: 270 TABLET | Refills: 0 | Status: SHIPPED | OUTPATIENT
Start: 2018-02-23 | End: 2018-02-23

## 2018-02-23 NOTE — TELEPHONE ENCOUNTER
----- Message from Channing Bush MD sent at 2/23/2018 12:03 PM EST -----  Stop metformin.  Stop Lasix recheck BMP in 2 weeks

## 2018-02-23 NOTE — TELEPHONE ENCOUNTER
Patient notified.  Patient to check her blood sugar and will call the office if she has any increase in her blood sugar, shortness of breath, or edema.

## 2018-02-28 ENCOUNTER — TELEPHONE (OUTPATIENT)
Dept: INTERNAL MEDICINE | Facility: CLINIC | Age: 73
End: 2018-02-28

## 2018-03-05 ENCOUNTER — RESULTS ENCOUNTER (OUTPATIENT)
Dept: INTERNAL MEDICINE | Facility: CLINIC | Age: 73
End: 2018-03-05

## 2018-03-05 DIAGNOSIS — R79.89 ELEVATED SERUM CREATININE: ICD-10-CM

## 2018-03-05 RX ORDER — POTASSIUM CHLORIDE 20 MEQ/1
TABLET, EXTENDED RELEASE ORAL
Qty: 30 TABLET | Refills: 2 | Status: SHIPPED | OUTPATIENT
Start: 2018-03-05 | End: 2018-04-28 | Stop reason: SDUPTHER

## 2018-03-12 RX ORDER — OMEPRAZOLE 40 MG/1
CAPSULE, DELAYED RELEASE ORAL
Qty: 30 CAPSULE | Refills: 2 | Status: SHIPPED | OUTPATIENT
Start: 2018-03-12 | End: 2018-06-12 | Stop reason: SDUPTHER

## 2018-03-12 RX ORDER — ATENOLOL 25 MG/1
TABLET ORAL
Qty: 90 TABLET | Refills: 0 | Status: SHIPPED | OUTPATIENT
Start: 2018-03-12 | End: 2018-05-28 | Stop reason: SDUPTHER

## 2018-03-16 LAB
BUN SERPL-MCNC: 24 MG/DL (ref 8–23)
BUN/CREAT SERPL: 17.5 (ref 7–25)
CALCIUM SERPL-MCNC: 9.4 MG/DL (ref 8.6–10.5)
CHLORIDE SERPL-SCNC: 99 MMOL/L (ref 98–107)
CO2 SERPL-SCNC: 25.2 MMOL/L (ref 22–29)
CREAT SERPL-MCNC: 1.37 MG/DL (ref 0.57–1)
GFR SERPLBLD CREATININE-BSD FMLA CKD-EPI: 38 ML/MIN/1.73
GFR SERPLBLD CREATININE-BSD FMLA CKD-EPI: 46 ML/MIN/1.73
GLUCOSE SERPL-MCNC: 188 MG/DL (ref 65–99)
POTASSIUM SERPL-SCNC: 4.6 MMOL/L (ref 3.5–5.2)
SODIUM SERPL-SCNC: 142 MMOL/L (ref 136–145)

## 2018-03-19 ENCOUNTER — TELEPHONE (OUTPATIENT)
Dept: INTERNAL MEDICINE | Facility: CLINIC | Age: 73
End: 2018-03-19

## 2018-03-19 RX ORDER — INSULIN DETEMIR 100 [IU]/ML
INJECTION, SOLUTION SUBCUTANEOUS
Qty: 15 ML | Refills: 0 | Status: SHIPPED | OUTPATIENT
Start: 2018-03-19 | End: 2018-05-11 | Stop reason: SDUPTHER

## 2018-03-19 NOTE — TELEPHONE ENCOUNTER
----- Message from Channing Bush MD sent at 3/19/2018  8:15 AM EDT -----  Do not take metformin, follow strict ADA 1600-calorie diet and monitor blood sugars follow-up office 1 month

## 2018-03-19 NOTE — TELEPHONE ENCOUNTER
Patient notified.  Patient asked to call granddaughter, Carmen [phone], to give her information - which was done.

## 2018-03-23 ENCOUNTER — TELEPHONE (OUTPATIENT)
Dept: INTERNAL MEDICINE | Facility: CLINIC | Age: 73
End: 2018-03-23

## 2018-03-23 NOTE — TELEPHONE ENCOUNTER
Patient has not been seen for 1 month recommend if she is worsening she needs to be seen before medication for infection is prescribed

## 2018-03-23 NOTE — TELEPHONE ENCOUNTER
Patient called stating that when she was seen by Dr. Bush he told her that her symptoms were allergy related.  She is still having symptoms and she thinks that she has a cold.  She is coughing up mucus and has started wheezing.  She is taking Mucinex and the nose spray that Dr. Bush recommended at her visit.  Please advise.

## 2018-03-27 RX ORDER — ALBUTEROL SULFATE 90 UG/1
2 AEROSOL, METERED RESPIRATORY (INHALATION) EVERY 4 HOURS PRN
Qty: 1 INHALER | Refills: 0 | Status: SHIPPED | OUTPATIENT
Start: 2018-03-27 | End: 2018-05-18 | Stop reason: SDUPTHER

## 2018-03-27 RX ORDER — ALBUTEROL SULFATE 90 UG/1
2 AEROSOL, METERED RESPIRATORY (INHALATION) EVERY 4 HOURS PRN
Qty: 1 INHALER | Refills: 11 | Status: CANCELLED | OUTPATIENT
Start: 2018-03-27

## 2018-03-27 NOTE — TELEPHONE ENCOUNTER
[facility] ([phone]) faxed our office requesting refills for patient on Ventolin inhaler and Advair inhaler. Dr. Bush has never prescribed these medications before. Medications are pending. Please advise.

## 2018-04-08 DIAGNOSIS — I25.10 CHRONIC CORONARY ARTERY DISEASE: ICD-10-CM

## 2018-04-09 RX ORDER — ASPIRIN 325 MG
TABLET, DELAYED RELEASE (ENTERIC COATED) ORAL
Qty: 90 TABLET | Refills: 0 | Status: SHIPPED | OUTPATIENT
Start: 2018-04-09 | End: 2018-06-30 | Stop reason: SDUPTHER

## 2018-04-10 ENCOUNTER — HOSPITAL ENCOUNTER (OUTPATIENT)
Dept: GENERAL RADIOLOGY | Facility: HOSPITAL | Age: 73
Discharge: HOME | End: 2018-04-10
Admitting: FAMILY MEDICINE

## 2018-04-10 ENCOUNTER — OFFICE VISIT (OUTPATIENT)
Dept: INTERNAL MEDICINE | Facility: CLINIC | Age: 73
End: 2018-04-10

## 2018-04-10 VITALS
WEIGHT: 241.2 LBS | TEMPERATURE: 98.3 F | HEART RATE: 63 BPM | HEIGHT: 60 IN | SYSTOLIC BLOOD PRESSURE: 124 MMHG | DIASTOLIC BLOOD PRESSURE: 82 MMHG | BODY MASS INDEX: 47.36 KG/M2 | OXYGEN SATURATION: 97 %

## 2018-04-10 DIAGNOSIS — R05.8 COUGH WITH SPUTUM: Primary | ICD-10-CM

## 2018-04-10 PROCEDURE — 71046 X-RAY EXAM CHEST 2 VIEWS: CPT

## 2018-04-10 PROCEDURE — 99214 OFFICE O/P EST MOD 30 MIN: CPT | Performed by: FAMILY MEDICINE

## 2018-04-10 RX ORDER — AZITHROMYCIN 250 MG/1
TABLET, FILM COATED ORAL
Qty: 6 TABLET | Refills: 0 | Status: SHIPPED | OUTPATIENT
Start: 2018-04-10 | End: 2018-06-04

## 2018-04-10 RX ORDER — FUROSEMIDE 40 MG/1
TABLET ORAL
COMMUNITY
Start: 2018-04-08 | End: 2018-04-10

## 2018-04-10 RX ORDER — DULOXETIN HYDROCHLORIDE 30 MG/1
CAPSULE, DELAYED RELEASE ORAL
COMMUNITY
Start: 2018-04-08 | End: 2018-04-10

## 2018-04-10 NOTE — PROGRESS NOTES
Miguelina Lopez is a 72 y.o. female.      Assessment/Plan   Problem List Items Addressed This Visit        Respiratory    Cough with sputum - Primary    Relevant Orders    CBC & Differential    XR Chest 2 View      Other Visit Diagnoses    None.          CBC and chest x-ray follow-up written prescription for Zithromax given discussed patient's blood sugar readings she has concerns about how she was doing her Levemir recommended to continue Levemir at 22 units daily and to administer daily check blood sugar in the morning if greater than 130 for  4 days increase Levemir 2 units.  Follow-up one month  25 minutes was spent with patient with greater than 50% time discussing coordination of care and education for insulin administration  Chief Complaint   Patient presents with   • chest congestion/pain   • Cough     used Flonase and Mucinex   • fatigued   • right ear pain   • blood sugar running 243,279     Social History   Substance Use Topics   • Smoking status: Never Smoker   • Smokeless tobacco: Never Used   • Alcohol use No       History of Present Illness   Patient with two-week history of cough with elevated blood sugars sputum is productive of yellow she has intermittent bouts and chills she is no improvement with over-the-counter remedies she's not changing her diet to start having trouble deciding how much insulin Levemir specifically to give herself she's not been following previous routine of increasing Levemir every 4 days by 2 units if her morning blood sugars greater than 1:30 over discuss this again in about instructions for her daughter to help her with  The following portions of the patient's history were reviewed and updated as appropriate:PMHroutine: Social history , Past Medical History, Allergies, Current Medications, Active Problem List and Health Maintenance    Review of Systems   Constitutional: Positive for fatigue. Negative for activity change and unexpected weight change.   HENT: Positive for  "congestion, postnasal drip and sore throat. Negative for ear pain.    Eyes: Negative for pain and discharge.   Respiratory: Positive for cough. Negative for chest tightness, shortness of breath and wheezing.    Cardiovascular: Negative for chest pain and palpitations.   Gastrointestinal: Negative for abdominal pain, diarrhea and vomiting.   Endocrine: Negative.    Genitourinary: Negative.    Musculoskeletal: Negative for joint swelling.   Skin: Negative for color change, rash and wound.   Allergic/Immunologic: Negative.    Neurological: Negative for seizures and syncope.   Psychiatric/Behavioral: Negative.        Objective   Vitals:    04/10/18 1501   BP: 124/82   BP Location: Right arm   Patient Position: Sitting   Cuff Size: Large Adult   Pulse: 63   Temp: 98.3 °F (36.8 °C)   TempSrc: Oral   SpO2: 97%   Weight: 109 kg (241 lb 3.2 oz)   Height: 152.4 cm (60\")     Body mass index is 47.11 kg/m².  Physical Exam   Constitutional: She is oriented to person, place, and time. She appears well-developed and well-nourished.   HENT:   Head: Normocephalic and atraumatic.   Right Ear: External ear normal.   Left Ear: External ear normal.   Mouth/Throat: Oropharynx is clear and moist.   Eyes: Conjunctivae are normal. Pupils are equal, round, and reactive to light.   Neck: No thyromegaly present.   Cardiovascular: Normal rate and regular rhythm.    Pulmonary/Chest: Effort normal. She has rhonchi.   Musculoskeletal: Normal range of motion. She exhibits no edema.   Lymphadenopathy:     She has no cervical adenopathy.   Neurological: She is alert and oriented to person, place, and time.   Skin: Skin is warm and dry.     Reviewed Data:  No visits with results within 1 Month(s) from this visit.   Latest known visit with results is:   Results Encounter on 03/05/2018   Component Date Value Ref Range Status   • Glucose 03/16/2018 188* 65 - 99 mg/dL Final   • BUN 03/16/2018 24* 8 - 23 mg/dL Final   • Creatinine 03/16/2018 1.37* 0.57 - " 1.00 mg/dL Final   • eGFR Non  Am 03/16/2018 38* >60 mL/min/1.73 Final    Comment: The MDRD GFR formula is only valid for adults with stable  renal function between ages 18 and 70.     • eGFR  Am 03/16/2018 46* >60 mL/min/1.73 Final   • BUN/Creatinine Ratio 03/16/2018 17.5  7.0 - 25.0 Final   • Sodium 03/16/2018 142  136 - 145 mmol/L Final   • Potassium 03/16/2018 4.6  3.5 - 5.2 mmol/L Final   • Chloride 03/16/2018 99  98 - 107 mmol/L Final   • Total CO2 03/16/2018 25.2  22.0 - 29.0 mmol/L Final   • Calcium 03/16/2018 9.4  8.6 - 10.5 mg/dL Final

## 2018-04-11 ENCOUNTER — TELEPHONE (OUTPATIENT)
Dept: INTERNAL MEDICINE | Facility: CLINIC | Age: 73
End: 2018-04-11

## 2018-04-11 LAB
BASOPHILS # BLD AUTO: 0.05 10*3/MM3 (ref 0–0.2)
BASOPHILS NFR BLD AUTO: 0.4 % (ref 0–1.5)
EOSINOPHIL # BLD AUTO: 0.15 10*3/MM3 (ref 0–0.7)
EOSINOPHIL NFR BLD AUTO: 1.1 % (ref 0.3–6.2)
ERYTHROCYTE [DISTWIDTH] IN BLOOD BY AUTOMATED COUNT: 18.4 % (ref 11.7–13)
HCT VFR BLD AUTO: 37.5 % (ref 35.6–45.5)
HGB BLD-MCNC: 11 G/DL (ref 11.9–15.5)
IMM GRANULOCYTES # BLD: 0.04 10*3/MM3 (ref 0–0.03)
IMM GRANULOCYTES NFR BLD: 0.3 % (ref 0–0.5)
LYMPHOCYTES # BLD AUTO: 2.33 10*3/MM3 (ref 0.9–4.8)
LYMPHOCYTES NFR BLD AUTO: 17.3 % (ref 19.6–45.3)
MCH RBC QN AUTO: 24.4 PG (ref 26.9–32)
MCHC RBC AUTO-ENTMCNC: 29.3 G/DL (ref 32.4–36.3)
MCV RBC AUTO: 83.1 FL (ref 80.5–98.2)
MONOCYTES # BLD AUTO: 0.85 10*3/MM3 (ref 0.2–1.2)
MONOCYTES NFR BLD AUTO: 6.3 % (ref 5–12)
NEUTROPHILS # BLD AUTO: 10.01 10*3/MM3 (ref 1.9–8.1)
NEUTROPHILS NFR BLD AUTO: 74.6 % (ref 42.7–76)
PLATELET # BLD AUTO: 331 10*3/MM3 (ref 140–500)
RBC # BLD AUTO: 4.51 10*6/MM3 (ref 3.9–5.2)
WBC # BLD AUTO: 13.43 10*3/MM3 (ref 4.5–10.7)

## 2018-04-11 NOTE — TELEPHONE ENCOUNTER
----- Message from Channing Bush MD sent at 4/11/2018 12:55 PM EDT -----  CXR normal if fever developing or worsening cough fill written Rx.

## 2018-04-30 RX ORDER — POTASSIUM CHLORIDE 20 MEQ/1
TABLET, EXTENDED RELEASE ORAL
Qty: 60 TABLET | Refills: 1 | Status: SHIPPED | OUTPATIENT
Start: 2018-04-30 | End: 2018-06-30 | Stop reason: SDUPTHER

## 2018-05-03 RX ORDER — VALSARTAN AND HYDROCHLOROTHIAZIDE 320; 25 MG/1; MG/1
TABLET, FILM COATED ORAL
Qty: 90 TABLET | Refills: 0 | Status: SHIPPED | OUTPATIENT
Start: 2018-05-03 | End: 2018-08-03 | Stop reason: RX

## 2018-05-03 RX ORDER — ATORVASTATIN CALCIUM 20 MG/1
TABLET, FILM COATED ORAL
Qty: 90 TABLET | Refills: 0 | Status: SHIPPED | OUTPATIENT
Start: 2018-05-03 | End: 2018-08-02 | Stop reason: SDUPTHER

## 2018-05-11 RX ORDER — INSULIN DETEMIR 100 [IU]/ML
INJECTION, SOLUTION SUBCUTANEOUS
Qty: 15 ML | Refills: 0 | Status: SHIPPED | OUTPATIENT
Start: 2018-05-11 | End: 2018-07-12 | Stop reason: SINTOL

## 2018-05-15 ENCOUNTER — TELEPHONE (OUTPATIENT)
Dept: INTERNAL MEDICINE | Facility: CLINIC | Age: 73
End: 2018-05-15

## 2018-05-15 NOTE — TELEPHONE ENCOUNTER
Patient's is taking a water pill as part of her losartan blood pressure medication needs to watch salt intake and increase her water intake

## 2018-05-15 NOTE — TELEPHONE ENCOUNTER
Patient states feet have been swollen and hurting since last night.  Wants to know if she should take water pill?

## 2018-05-18 RX ORDER — FUROSEMIDE 20 MG/1
20 TABLET ORAL DAILY
Qty: 30 TABLET | Refills: 0 | Status: SHIPPED | OUTPATIENT
Start: 2018-05-18 | End: 2018-05-28 | Stop reason: SDUPTHER

## 2018-05-29 RX ORDER — FUROSEMIDE 20 MG/1
TABLET ORAL
Qty: 30 TABLET | Refills: 3 | Status: SHIPPED | OUTPATIENT
Start: 2018-05-29 | End: 2018-07-26 | Stop reason: DRUGHIGH

## 2018-05-29 RX ORDER — ATENOLOL 25 MG/1
TABLET ORAL
Qty: 90 TABLET | Refills: 0 | Status: SHIPPED | OUTPATIENT
Start: 2018-05-29 | End: 2018-08-26 | Stop reason: SDUPTHER

## 2018-06-04 ENCOUNTER — OFFICE VISIT (OUTPATIENT)
Dept: INTERNAL MEDICINE | Facility: CLINIC | Age: 73
End: 2018-06-04

## 2018-06-04 VITALS
RESPIRATION RATE: 22 BRPM | HEIGHT: 60 IN | BODY MASS INDEX: 51.03 KG/M2 | DIASTOLIC BLOOD PRESSURE: 92 MMHG | HEART RATE: 74 BPM | SYSTOLIC BLOOD PRESSURE: 141 MMHG | WEIGHT: 259.9 LBS | OXYGEN SATURATION: 98 %

## 2018-06-04 DIAGNOSIS — G47.33 OSA (OBSTRUCTIVE SLEEP APNEA): ICD-10-CM

## 2018-06-04 DIAGNOSIS — I10 ESSENTIAL HYPERTENSION: ICD-10-CM

## 2018-06-04 DIAGNOSIS — Z95.2 HISTORY OF MVR WITH CARDIOPULMONARY BYPASS: ICD-10-CM

## 2018-06-04 DIAGNOSIS — Z79.4 TYPE 2 DIABETES MELLITUS WITH OTHER CIRCULATORY COMPLICATION, WITH LONG-TERM CURRENT USE OF INSULIN (HCC): ICD-10-CM

## 2018-06-04 DIAGNOSIS — G47.34 NOCTURNAL HYPOXIA: ICD-10-CM

## 2018-06-04 DIAGNOSIS — G47.8 PULMONARY HYPERTENSION DUE TO SLEEP-DISORDERED BREATHING (HCC): ICD-10-CM

## 2018-06-04 DIAGNOSIS — I27.29 PULMONARY HYPERTENSION DUE TO SLEEP-DISORDERED BREATHING (HCC): ICD-10-CM

## 2018-06-04 DIAGNOSIS — E11.59 TYPE 2 DIABETES MELLITUS WITH OTHER CIRCULATORY COMPLICATION, WITH LONG-TERM CURRENT USE OF INSULIN (HCC): ICD-10-CM

## 2018-06-04 DIAGNOSIS — I25.10 CHRONIC CORONARY ARTERY DISEASE: Primary | ICD-10-CM

## 2018-06-04 PROBLEM — R05.8 COUGH WITH SPUTUM: Status: RESOLVED | Noted: 2018-04-10 | Resolved: 2018-06-04

## 2018-06-04 PROCEDURE — 99214 OFFICE O/P EST MOD 30 MIN: CPT | Performed by: FAMILY MEDICINE

## 2018-06-04 NOTE — PROGRESS NOTES
Miguelina Lopez is a 72 y.o. female.      Assessment/Plan   Problem List Items Addressed This Visit        Cardiovascular and Mediastinum    Chronic coronary artery disease - Primary    Overview     Description: 2 stents in 2013         Essential hypertension    Overview     Impression: 03/30/2015 - BP is high will add BB;          Relevant Orders    Basic Metabolic Panel    Pulmonary hypertension due to sleep-disordered breathing    Overview     RVSP 61 mmHg TARIQ 06/10/16         Relevant Orders    CBC & Differential       Respiratory    OCHOA (obstructive sleep apnea)    Overview      CPAP auto with O2 at 2 L  Nasal Pillows         Nocturnal hypoxia    Relevant Orders    CBC & Differential       Endocrine    DM type 2 (diabetes mellitus, type 2)    Overview     Impression: 03/30/2015 - A1c is 7.5 , Pt to check BS BID add, Amryl 1 mg once to twice daily;             Other    s/p MVR, TV-repair, 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  Follow up results of blood work  Form filled out for nocturnal hypoxia with a sleep apnea follow-up results of blood work      Chief Complaint   Patient presents with   • Follow-up     hypertension   • Follow-up     LE edema   • paperwork for oxygen machines'     Social History   Substance Use Topics   • Smoking status: Never Smoker   • Smokeless tobacco: Never Used   • Alcohol use No       History of Present Illness   Patient comes in for follow-up of chronic problems of coronary artery disease hypertension pulmonary hypertension obstructive sleep apnea and nocturnal hypoxia diabetes type 2.  She is fairly inactive gaining weight without any evidence of edema blood pressures well-controlled is no chest pain or increasing shortness of breath she requires oxygen at night, some shallow breathing and sleep apnea she feels fairly stable at this time and is  "recommended to gradually increase her physical activity  The following portions of the patient's history were reviewed and updated as appropriate:PMHroutine: Social history , Past Medical History, Allergies, Current Medications, Active Problem List and Health Maintenance    Review of Systems   Constitutional: Negative for activity change, appetite change and unexpected weight change.   HENT: Negative for nosebleeds and trouble swallowing.    Eyes: Negative for pain and visual disturbance.   Respiratory: Negative for chest tightness, shortness of breath and wheezing.    Cardiovascular: Negative for chest pain and palpitations.   Gastrointestinal: Negative for abdominal pain and blood in stool.   Endocrine: Negative.    Genitourinary: Negative for difficulty urinating and hematuria.   Musculoskeletal: Negative for joint swelling.   Skin: Negative for color change and rash.   Allergic/Immunologic: Negative.    Neurological: Negative for syncope and speech difficulty.   Hematological: Negative for adenopathy.   Psychiatric/Behavioral: Negative for agitation and confusion.   All other systems reviewed and are negative.      Objective   Vitals:    06/04/18 1229   BP: 141/92   BP Location: Left arm   Patient Position: Sitting   Cuff Size: Large Adult   Pulse: 74   Resp: 22   SpO2: 98%   Weight: 118 kg (259 lb 14.4 oz)   Height: 152.4 cm (60\")     Body mass index is 50.76 kg/m².  Physical Exam   Constitutional: She is oriented to person, place, and time. She appears well-developed and well-nourished.   HENT:   Head: Normocephalic and atraumatic.   Right Ear: External ear normal.   Left Ear: External ear normal.   Mouth/Throat: Oropharynx is clear and moist.   Eyes: Conjunctivae are normal. Pupils are equal, round, and reactive to light.   Neck: No thyromegaly present.   Cardiovascular: Normal rate and regular rhythm.    Pulmonary/Chest: Effort normal. She has rhonchi.   Musculoskeletal: Normal range of motion. She exhibits " no edema.   Lymphadenopathy:     She has no cervical adenopathy.   Neurological: She is alert and oriented to person, place, and time.   Skin: Skin is warm and dry.     Reviewed Data:  No visits with results within 1 Month(s) from this visit.   Latest known visit with results is:   Office Visit on 04/10/2018   Component Date Value Ref Range Status   • WBC 04/10/2018 13.43* 4.50 - 10.70 10*3/mm3 Final   • RBC 04/10/2018 4.51  3.90 - 5.20 10*6/mm3 Final   • Hemoglobin 04/10/2018 11.0* 11.9 - 15.5 g/dL Final   • Hematocrit 04/10/2018 37.5  35.6 - 45.5 % Final   • MCV 04/10/2018 83.1  80.5 - 98.2 fL Final   • MCH 04/10/2018 24.4* 26.9 - 32.0 pg Final   • MCHC 04/10/2018 29.3* 32.4 - 36.3 g/dL Final   • RDW 04/10/2018 18.4* 11.7 - 13.0 % Final   • Platelets 04/10/2018 331  140 - 500 10*3/mm3 Final   • Neutrophil Rel % 04/10/2018 74.6  42.7 - 76.0 % Final   • Lymphocyte Rel % 04/10/2018 17.3* 19.6 - 45.3 % Final   • Monocyte Rel % 04/10/2018 6.3  5.0 - 12.0 % Final   • Eosinophil Rel % 04/10/2018 1.1  0.3 - 6.2 % Final   • Basophil Rel % 04/10/2018 0.4  0.0 - 1.5 % Final   • Neutrophils Absolute 04/10/2018 10.01* 1.90 - 8.10 10*3/mm3 Final   • Lymphocytes Absolute 04/10/2018 2.33  0.90 - 4.80 10*3/mm3 Final   • Monocytes Absolute 04/10/2018 0.85  0.20 - 1.20 10*3/mm3 Final   • Eosinophils Absolute 04/10/2018 0.15  0.00 - 0.70 10*3/mm3 Final   • Basophils Absolute 04/10/2018 0.05  0.00 - 0.20 10*3/mm3 Final   • Immature Granulocyte Rel % 04/10/2018 0.3  0.0 - 0.5 % Final   • Immature Grans Absolute 04/10/2018 0.04* 0.00 - 0.03 10*3/mm3 Final

## 2018-06-05 LAB
BASOPHILS # BLD AUTO: 0.04 10*3/MM3 (ref 0–0.2)
BASOPHILS NFR BLD AUTO: 0.3 % (ref 0–1.5)
BUN SERPL-MCNC: 22 MG/DL (ref 8–23)
BUN/CREAT SERPL: 18.3 (ref 7–25)
CALCIUM SERPL-MCNC: 9.4 MG/DL (ref 8.6–10.5)
CHLORIDE SERPL-SCNC: 99 MMOL/L (ref 98–107)
CO2 SERPL-SCNC: 31.2 MMOL/L (ref 22–29)
CREAT SERPL-MCNC: 1.2 MG/DL (ref 0.57–1)
EOSINOPHIL # BLD AUTO: 0.1 10*3/MM3 (ref 0–0.7)
EOSINOPHIL NFR BLD AUTO: 0.8 % (ref 0.3–6.2)
ERYTHROCYTE [DISTWIDTH] IN BLOOD BY AUTOMATED COUNT: 18 % (ref 11.7–13)
GFR SERPLBLD CREATININE-BSD FMLA CKD-EPI: 44 ML/MIN/1.73
GFR SERPLBLD CREATININE-BSD FMLA CKD-EPI: 53 ML/MIN/1.73
GLUCOSE SERPL-MCNC: 119 MG/DL (ref 65–99)
HCT VFR BLD AUTO: 42.3 % (ref 35.6–45.5)
HGB BLD-MCNC: 12.2 G/DL (ref 11.9–15.5)
IMM GRANULOCYTES # BLD: 0.05 10*3/MM3 (ref 0–0.03)
IMM GRANULOCYTES NFR BLD: 0.4 % (ref 0–0.5)
LYMPHOCYTES # BLD AUTO: 1.9 10*3/MM3 (ref 0.9–4.8)
LYMPHOCYTES NFR BLD AUTO: 15.9 % (ref 19.6–45.3)
MCH RBC QN AUTO: 24.4 PG (ref 26.9–32)
MCHC RBC AUTO-ENTMCNC: 28.8 G/DL (ref 32.4–36.3)
MCV RBC AUTO: 84.8 FL (ref 80.5–98.2)
MONOCYTES # BLD AUTO: 0.74 10*3/MM3 (ref 0.2–1.2)
MONOCYTES NFR BLD AUTO: 6.2 % (ref 5–12)
NEUTROPHILS # BLD AUTO: 9.19 10*3/MM3 (ref 1.9–8.1)
NEUTROPHILS NFR BLD AUTO: 76.8 % (ref 42.7–76)
NRBC BLD AUTO-RTO: 0 /100 WBC (ref 0–0)
PLATELET # BLD AUTO: 259 10*3/MM3 (ref 140–500)
POTASSIUM SERPL-SCNC: 3.9 MMOL/L (ref 3.5–5.2)
RBC # BLD AUTO: 4.99 10*6/MM3 (ref 3.9–5.2)
SODIUM SERPL-SCNC: 145 MMOL/L (ref 136–145)
WBC # BLD AUTO: 11.97 10*3/MM3 (ref 4.5–10.7)

## 2018-06-06 ENCOUNTER — TELEPHONE (OUTPATIENT)
Dept: INTERNAL MEDICINE | Facility: CLINIC | Age: 73
End: 2018-06-06

## 2018-06-06 NOTE — TELEPHONE ENCOUNTER
Patient called stating that when she was in the office on Monday she discussed that she has having swelling in both feet and hands, a rash where she gives herself her Levemir injection, and fatigue.  She read the package insert for Levemir that everything that she discussed are side-effects to the Levemir.  Please advise.

## 2018-06-06 NOTE — TELEPHONE ENCOUNTER
The swelling in her arms and legs is not due to the Levemir is more associated with her blood pressure and inactivity her blood count was normal for any evidence of inflammation nor anemia recommend continue taking the iron and continue taking the Levemir she is to increase her physical activity walking more and this will help reduce the swelling in her legs and hands follow-up appointment one month

## 2018-06-11 ENCOUNTER — TELEPHONE (OUTPATIENT)
Dept: INTERNAL MEDICINE | Facility: CLINIC | Age: 73
End: 2018-06-11

## 2018-06-11 NOTE — TELEPHONE ENCOUNTER
Patient called stating that her feet are still swollen.  She has been walking and everything else that Dr. Bush has suggested.  She said that if this is due to her blood pressure medication as Dr. Bush stated then she would like to have this changed or she will go to the ER because something needs to be done.  Please advise.

## 2018-06-11 NOTE — TELEPHONE ENCOUNTER
Patient called stating that she would like to start Slim fast and wanted to know if this was okay and if she could use the shakes with 1% milk.  Please advise.

## 2018-06-13 RX ORDER — OMEPRAZOLE 40 MG/1
CAPSULE, DELAYED RELEASE ORAL
Qty: 30 CAPSULE | Refills: 1 | Status: SHIPPED | OUTPATIENT
Start: 2018-06-13 | End: 2018-08-05 | Stop reason: SDUPTHER

## 2018-06-15 ENCOUNTER — TELEPHONE (OUTPATIENT)
Dept: INTERNAL MEDICINE | Facility: CLINIC | Age: 73
End: 2018-06-15

## 2018-06-15 NOTE — TELEPHONE ENCOUNTER
Patient called stating that she has been taking furosemide 20 mg 2 daily for 4 days and her feet are still swollen.  Please advise.

## 2018-06-30 DIAGNOSIS — I25.10 CHRONIC CORONARY ARTERY DISEASE: ICD-10-CM

## 2018-07-02 RX ORDER — POTASSIUM CHLORIDE 20 MEQ/1
TABLET, EXTENDED RELEASE ORAL
Qty: 30 TABLET | Refills: 0 | Status: SHIPPED | OUTPATIENT
Start: 2018-07-02 | End: 2018-07-12 | Stop reason: SDUPTHER

## 2018-07-02 RX ORDER — ASPIRIN 325 MG
TABLET, DELAYED RELEASE (ENTERIC COATED) ORAL
Qty: 90 TABLET | Refills: 0 | Status: SHIPPED | OUTPATIENT
Start: 2018-07-02 | End: 2018-09-27 | Stop reason: SDUPTHER

## 2018-07-06 ENCOUNTER — TELEPHONE (OUTPATIENT)
Dept: INTERNAL MEDICINE | Facility: CLINIC | Age: 73
End: 2018-07-06

## 2018-07-06 DIAGNOSIS — R60.9 EDEMA, UNSPECIFIED TYPE: ICD-10-CM

## 2018-07-06 DIAGNOSIS — M25.472 EDEMA OF BOTH ANKLES: Primary | ICD-10-CM

## 2018-07-06 DIAGNOSIS — M25.471 EDEMA OF BOTH ANKLES: Primary | ICD-10-CM

## 2018-07-06 NOTE — TELEPHONE ENCOUNTER
Patient called stating that her feet are swollen again.  She is not having any shortness of breath.  Patient was notified that Dr. Bush was out of the office and was referred to Urgent Care/ER.  Patient stated that she will just keep her feet elevated since this helped before and if it gets worse then she will go to Urgent Care/ER for treatment

## 2018-07-09 ENCOUNTER — TELEPHONE (OUTPATIENT)
Dept: INTERNAL MEDICINE | Facility: CLINIC | Age: 73
End: 2018-07-09

## 2018-07-09 NOTE — TELEPHONE ENCOUNTER
Patient called stating that she did not go to the Urgent Care/ER.  Not short of breath.  Patient stated that she did take an extra water pill as suggested by Dr. Bush when this happened before but it has not helped.  Please advise.

## 2018-07-09 NOTE — TELEPHONE ENCOUNTER
Patient notified that Dr. Bush would like to refer her to see a cardiologist.  She is fine with this and referral put in EPIC.

## 2018-07-09 NOTE — TELEPHONE ENCOUNTER
----- Message from Channing Bush MD sent at 7/9/2018 10:56 AM EDT -----  Confirm patient is receiving nasal cannula oxygen

## 2018-07-10 ENCOUNTER — TELEPHONE (OUTPATIENT)
Dept: INTERNAL MEDICINE | Facility: CLINIC | Age: 73
End: 2018-07-10

## 2018-07-10 NOTE — TELEPHONE ENCOUNTER
Patient called stating that she will see the cardiologist on 7/26/18.  She has an appointment to see Dr. Bush tomorrow and does not see a need to keep this.  Please advise.  She did also have a visit from a [facility]a physician yesterday.

## 2018-07-11 NOTE — TELEPHONE ENCOUNTER
Patient notified - patient now stated that she will keep her appointment to see Dr. Bush tomorrow.

## 2018-07-12 ENCOUNTER — OFFICE VISIT (OUTPATIENT)
Dept: INTERNAL MEDICINE | Facility: CLINIC | Age: 73
End: 2018-07-12

## 2018-07-12 VITALS
HEART RATE: 92 BPM | HEIGHT: 60 IN | RESPIRATION RATE: 22 BRPM | SYSTOLIC BLOOD PRESSURE: 139 MMHG | WEIGHT: 272.3 LBS | OXYGEN SATURATION: 100 % | BODY MASS INDEX: 53.46 KG/M2 | DIASTOLIC BLOOD PRESSURE: 82 MMHG

## 2018-07-12 DIAGNOSIS — Z99.81 SUPPLEMENTAL OXYGEN DEPENDENT: ICD-10-CM

## 2018-07-12 DIAGNOSIS — G47.33 OSA (OBSTRUCTIVE SLEEP APNEA): ICD-10-CM

## 2018-07-12 DIAGNOSIS — E66.2 CLASS 3 OBESITY WITH ALVEOLAR HYPOVENTILATION, SERIOUS COMORBIDITY, AND BODY MASS INDEX (BMI) OF 50.0 TO 59.9 IN ADULT (HCC): ICD-10-CM

## 2018-07-12 DIAGNOSIS — E11.59 TYPE 2 DIABETES MELLITUS WITH OTHER CIRCULATORY COMPLICATION, WITH LONG-TERM CURRENT USE OF INSULIN (HCC): ICD-10-CM

## 2018-07-12 DIAGNOSIS — I25.10 CHRONIC CORONARY ARTERY DISEASE: Primary | ICD-10-CM

## 2018-07-12 DIAGNOSIS — Z79.4 TYPE 2 DIABETES MELLITUS WITH OTHER CIRCULATORY COMPLICATION, WITH LONG-TERM CURRENT USE OF INSULIN (HCC): ICD-10-CM

## 2018-07-12 PROCEDURE — 99214 OFFICE O/P EST MOD 30 MIN: CPT | Performed by: FAMILY MEDICINE

## 2018-07-12 RX ORDER — METFORMIN HYDROCHLORIDE 750 MG/1
750 TABLET, EXTENDED RELEASE ORAL 2 TIMES DAILY
Qty: 60 TABLET | Refills: 6 | Status: SHIPPED | OUTPATIENT
Start: 2018-07-12 | End: 2019-02-03 | Stop reason: SDUPTHER

## 2018-07-12 RX ORDER — VILAZODONE HYDROCHLORIDE 10 MG/1
10 TABLET ORAL DAILY
COMMUNITY
End: 2019-01-03 | Stop reason: DRUGHIGH

## 2018-07-12 RX ORDER — POTASSIUM CHLORIDE 20 MEQ/1
20 TABLET, EXTENDED RELEASE ORAL 2 TIMES DAILY
Qty: 60 TABLET | Refills: 6 | Status: SHIPPED | OUTPATIENT
Start: 2018-07-12 | End: 2018-08-10 | Stop reason: DRUGHIGH

## 2018-07-12 NOTE — PROGRESS NOTES
Miguelina Lopez is a 72 y.o. female.      Assessment/Plan   Problem List Items Addressed This Visit        Cardiovascular and Mediastinum    Chronic coronary artery disease - Primary    Overview     Description: 2 stents in 2013            Respiratory    OCHOA (obstructive sleep apnea)    Overview      CPAP auto with O2 at 2 L  Nasal Pillows            Endocrine    DM type 2 (diabetes mellitus, type 2) (CMS/[facility])    Overview     Impression: 03/30/2015 - A1c is 7.5 , Pt to check BS BID add, Amryl 1 mg once to twice daily;          Relevant Medications    metFORMIN ER (GLUCOPHAGE-XR) 750 MG 24 hr tablet    Dulaglutide (TRULICITY) 0.75 MG/0.5ML solution pen-injector       Other    Class 3 obesity with serious comorbidity and body mass index (BMI) of 50.0 to 59.9 in adult (CMS/[facility])    Supplemental oxygen dependent    Overview     Restrictive lung disease              stopped Levemir  Start metformin extended release 750 mg 1 every 12 hours start Victoza 0.75 mg weekly follow-up in 2 months  Recommend support socks to apply daily in the morning        Chief Complaint   Patient presents with   • Follow-up     hypertension   • Follow-up     edema, weight gain   • Follow-up     shortness of breath/oxygen use     Social History   Substance Use Topics   • Smoking status: Never Smoker   • Smokeless tobacco: Never Used   • Alcohol use No       History of Present Illness   Patient follow-up for chronic problems of hypertension morbid obesity with hypoxia alveolar hypoventilation edema she feels that she is gaining weight ever she's and she started Levemir she is decreased activity because of lower extremities Monica her blood sugars are well controlled since starting 11 she is using supplemental potassium because of diuretic therapy her blood pressures well-controlled she has no chest pain or short of breath with oxygen concentrator and feels fairly stable at this point  The following portions of the patient's history were  "reviewed and updated as appropriate:PMHroutine: Social history , Past Medical History, Allergies, Current Medications, Active Problem List and Health Maintenance    Review of Systems   Constitutional: Positive for appetite change. Negative for activity change and unexpected weight change.   HENT: Negative for nosebleeds and trouble swallowing.    Eyes: Negative for pain and visual disturbance.   Respiratory: Positive for shortness of breath. Negative for chest tightness and wheezing.    Cardiovascular: Positive for leg swelling. Negative for chest pain and palpitations.   Gastrointestinal: Negative for abdominal pain and blood in stool.   Endocrine: Negative.    Genitourinary: Negative for difficulty urinating and hematuria.   Musculoskeletal: Positive for gait problem. Negative for joint swelling.   Skin: Negative for color change and rash.   Allergic/Immunologic: Negative.    Neurological: Negative for syncope and speech difficulty.   Hematological: Negative for adenopathy.   Psychiatric/Behavioral: Negative for agitation and confusion.   All other systems reviewed and are negative.      Objective   Vitals:    07/12/18 1527   BP: 139/82   BP Location: Left arm   Patient Position: Sitting   Cuff Size: Large Adult   Pulse: 92   Resp: 22   SpO2: 100%   Weight: 124 kg (272 lb 4.8 oz)   Height: 152.4 cm (60\")     Body mass index is 53.18 kg/m².  Physical Exam   Constitutional: She appears well-developed and well-nourished.   HENT:   Head: Normocephalic and atraumatic.   Neck: Normal range of motion.   Cardiovascular: Normal rate and regular rhythm.    Pulmonary/Chest: Effort normal and breath sounds normal. No respiratory distress. She has no wheezes. She has no rales. She exhibits no tenderness.   Abdominal:   obese   Musculoskeletal: She exhibits edema.   Large legs bilaterally secondary to adiposity   Skin: Skin is warm and dry.   Psychiatric: She has a normal mood and affect. Her behavior is normal. Judgment and " thought content normal.   Nursing note and vitals reviewed.    Reviewed Data:  No visits with results within 1 Month(s) from this visit.   Latest known visit with results is:   Office Visit on 06/04/2018   Component Date Value Ref Range Status   • WBC 06/04/2018 11.97* 4.50 - 10.70 10*3/mm3 Final   • RBC 06/04/2018 4.99  3.90 - 5.20 10*6/mm3 Final   • Hemoglobin 06/04/2018 12.2  11.9 - 15.5 g/dL Final   • Hematocrit 06/04/2018 42.3  35.6 - 45.5 % Final   • MCV 06/04/2018 84.8  80.5 - 98.2 fL Final   • MCH 06/04/2018 24.4* 26.9 - 32.0 pg Final   • MCHC 06/04/2018 28.8* 32.4 - 36.3 g/dL Final   • RDW 06/04/2018 18.0* 11.7 - 13.0 % Final   • Platelets 06/04/2018 259  140 - 500 10*3/mm3 Final   • Neutrophil Rel % 06/04/2018 76.8* 42.7 - 76.0 % Final   • Lymphocyte Rel % 06/04/2018 15.9* 19.6 - 45.3 % Final   • Monocyte Rel % 06/04/2018 6.2  5.0 - 12.0 % Final   • Eosinophil Rel % 06/04/2018 0.8  0.3 - 6.2 % Final   • Basophil Rel % 06/04/2018 0.3  0.0 - 1.5 % Final   • Neutrophils Absolute 06/04/2018 9.19* 1.90 - 8.10 10*3/mm3 Final   • Lymphocytes Absolute 06/04/2018 1.90  0.90 - 4.80 10*3/mm3 Final   • Monocytes Absolute 06/04/2018 0.74  0.20 - 1.20 10*3/mm3 Final   • Eosinophils Absolute 06/04/2018 0.10  0.00 - 0.70 10*3/mm3 Final   • Basophils Absolute 06/04/2018 0.04  0.00 - 0.20 10*3/mm3 Final   • Immature Granulocyte Rel % 06/04/2018 0.4  0.0 - 0.5 % Final   • Immature Grans Absolute 06/04/2018 0.05* 0.00 - 0.03 10*3/mm3 Final   • nRBC 06/04/2018 0.0  0.0 - 0.0 /100 WBC Final   • Glucose 06/04/2018 119* 65 - 99 mg/dL Final   • BUN 06/04/2018 22  8 - 23 mg/dL Final   • Creatinine 06/04/2018 1.20* 0.57 - 1.00 mg/dL Final   • eGFR Non African Am 06/04/2018 44* >60 mL/min/1.73 Final    Comment: The MDRD GFR formula is only valid for adults with stable  renal function between ages 18 and 70.     • eGFR  Am 06/04/2018 53* >60 mL/min/1.73 Final   • BUN/Creatinine Ratio 06/04/2018 18.3  7.0 - 25.0 Final   • Sodium  06/04/2018 145  136 - 145 mmol/L Final   • Potassium 06/04/2018 3.9  3.5 - 5.2 mmol/L Final   • Chloride 06/04/2018 99  98 - 107 mmol/L Final   • Total CO2 06/04/2018 31.2* 22.0 - 29.0 mmol/L Final   • Calcium 06/04/2018 9.4  8.6 - 10.5 mg/dL Final

## 2018-07-20 ENCOUNTER — TELEPHONE (OUTPATIENT)
Dept: INTERNAL MEDICINE | Facility: CLINIC | Age: 73
End: 2018-07-20

## 2018-07-20 NOTE — TELEPHONE ENCOUNTER
BRAIN COMBO FOR MS



CLINICAL HISTORY: Frequent falls. Syncope. Muscle weakness. Lyme disease.

Possible multiple sclerosis.    



COMPARISON STUDY:  MRI of the brain July 1, 2016.



TECHNIQUE: Utilizing 1.5 Mago magnet, multiplanar, multi echo imaging of the

brain was performed pre and postcontrast administration. Injection of 6.5 cc of

Gadavist IV was uneventful.



FINDINGS: There no areas of restricted diffusion. No acute intracranial

hemorrhage, midline shift or mass effect is present. Ventricular system is

normal. Basilar cisterns are patent. There are no extra-axial collections.

Flow-voids for the major intracranial vessels are present. Scattered white

matter T2 hyperintense foci are similar to exam of May 4, 2015 and July 1, 2016.

There are no areas of enhancement. There are no intracranial masses. The

appearance of the brain is unchanged. Calvarial signal is maintained.



IMPRESSION:  



1. No acute intracranial findings.



2. No significant change in a few small white matter T2 hyperintense foci which

could reflect minimal small vessel disease or sequela of migraine headaches. The

appearance is not strongly suggestive of multiple sclerosis.



3. No intracranial masses or pathologic enhancement.







Electronically signed by:  Ben Arana M.D.

4/13/2017 4:22 PM



Dictated Date/Time:  4/13/2017 4:17 PM Patient called stating that she has B/L feet swelling and pain due to the swelling. Patient wanted to know if she could have some medication for this since the swelling is causing pain. Dr. Bush was contacted via telephone and stated that patient may take furosemide 20 mg two tablet qd until the swelling is decreased, do not take any NSAIDS, take tylenol as well. Patient notified and voiced understanding.

## 2018-07-23 ENCOUNTER — TELEPHONE (OUTPATIENT)
Dept: INTERNAL MEDICINE | Facility: CLINIC | Age: 73
End: 2018-07-23

## 2018-07-26 ENCOUNTER — OFFICE VISIT (OUTPATIENT)
Dept: CARDIOLOGY | Facility: CLINIC | Age: 73
End: 2018-07-26

## 2018-07-26 VITALS
BODY MASS INDEX: 54.97 KG/M2 | HEIGHT: 60 IN | WEIGHT: 280 LBS | DIASTOLIC BLOOD PRESSURE: 92 MMHG | HEART RATE: 96 BPM | SYSTOLIC BLOOD PRESSURE: 162 MMHG

## 2018-07-26 DIAGNOSIS — G47.8 PULMONARY HYPERTENSION DUE TO SLEEP-DISORDERED BREATHING (HCC): ICD-10-CM

## 2018-07-26 DIAGNOSIS — I50.33 ACUTE ON CHRONIC DIASTOLIC CONGESTIVE HEART FAILURE (HCC): Primary | ICD-10-CM

## 2018-07-26 DIAGNOSIS — I36.1 NON-RHEUMATIC TRICUSPID VALVE INSUFFICIENCY: ICD-10-CM

## 2018-07-26 DIAGNOSIS — I27.29 PULMONARY HYPERTENSION DUE TO SLEEP-DISORDERED BREATHING (HCC): ICD-10-CM

## 2018-07-26 DIAGNOSIS — I25.10 CHRONIC CORONARY ARTERY DISEASE: ICD-10-CM

## 2018-07-26 DIAGNOSIS — I34.0 NON-RHEUMATIC MITRAL REGURGITATION: ICD-10-CM

## 2018-07-26 PROCEDURE — 99204 OFFICE O/P NEW MOD 45 MIN: CPT | Performed by: INTERNAL MEDICINE

## 2018-07-26 PROCEDURE — 93000 ELECTROCARDIOGRAM COMPLETE: CPT | Performed by: INTERNAL MEDICINE

## 2018-07-26 RX ORDER — FUROSEMIDE 40 MG/1
40 TABLET ORAL DAILY
COMMUNITY
End: 2018-07-26

## 2018-07-26 RX ORDER — BUMETANIDE 2 MG/1
2 TABLET ORAL 2 TIMES DAILY
Qty: 60 TABLET | Refills: 1 | Status: SHIPPED | OUTPATIENT
Start: 2018-07-26 | End: 2018-08-10 | Stop reason: SINTOL

## 2018-07-26 NOTE — PROGRESS NOTES
Date of Office Visit: 2018  Encounter Provider: Antoine Ayers MD  Place of Service: [facility] CARDIOLOGY  Patient Name: Miguelina Lopez  :1945  Channing Bush MD    Chief Complaint   Patient presents with   • Coronary Artery Disease   • Edema     History of Present Illness    The patient is a 72-year-old white female who enters the office today for a consultation.  She had previously been seen a number of years ago by another cardiologist.  At that time she was found to have coronary disease for which she underwent bypass surgery, mitral regurgitation with a mitral valve replacement, tissue prosthesis, and a tricuspid valve repair.  He has not had any cardiac follow-up in over 2 years.    The patient is referred today for evaluation of progressive lower extremity edema.  She also has chronic shortness of breath, orthopnea as well as fatigue.  She is a diabetic with sleep apnea, hypertension and hyperlipidemia.    When I review her records from the past in  she was down to approximately 220 pounds.  She is now up to 280 pounds.  She has been on a low dose Lasix prescription but it is not really benefited her edema.  She 9 is any chest pain.    Past Medical History:   Diagnosis Date   • Anemia    • Anxiety    • CHF (congestive heart failure) (CMS/HCC)    • Depression    • Diabetes mellitus (CMS/HCC)    • Heart murmur    • Pneumonia     2016   • Sleep apnea     Uses CPAP or oxygen         Past Surgical History:   Procedure Laterality Date   • CARDIAC CATHETERIZATION     • CARDIAC CATHETERIZATION N/A 6/10/2016    Procedure: Left Heart Cath;  Surgeon: Chace Johnson MD;  Location: [facility] CATH INVASIVE LOCATION;  Service:    • CARDIAC CATHETERIZATION N/A 6/10/2016    Procedure: Right Heart Cath;  Surgeon: Chace Johnson MD;  Location: [facility] CATH INVASIVE LOCATION;  Service:    • CORONARY ARTERY BYPASS GRAFT      2 vessel   • CORONARY ARTERY BYPASS  GRAFT WITH MITRAL VALVE REPAIR/REPLACEMENT N/A 6/13/2016    Procedure: INTRAOPERATIVE TARIQ, MIDLINE STERNOTOMY, CORONARY ARTERY BYPASS GRAFTING X  2 UTILIZING ENDOSCOPICALLY HARVESTED LEFT GREATER SAPHENOUS VEIN, MITRAL VALVE REPLACEMENT AND TRICUSPID VALVE REPAIR;  Surgeon: Eliecer Mistry MD;  Location: [facility];  Service:    • CORONARY STENT PLACEMENT  2010    Approx. 6 yrs ago at [facility]   • HEMORRHOIDECTOMY     • HYSTERECTOMY     • MITRAL VALVE REPLACEMENT     • REPLACEMENT TOTAL KNEE Right    • THYROID SURGERY      Cyst removed from thyroid           Current Outpatient Prescriptions:   •  ALPRAZolam (XANAX) 1 MG tablet, Take 1 tablet by mouth 2 (Two) Times a Day As Needed for anxiety., Disp: 24 tablet, Rfl: 0  •  aspirin  MG tablet, TAKE ONE TABLET BY MOUTH DAILY, Disp: 90 tablet, Rfl: 0  •  atenolol (TENORMIN) 25 MG tablet, TAKE ONE TABLET BY MOUTH DAILY, Disp: 90 tablet, Rfl: 0  •  atorvastatin (LIPITOR) 20 MG tablet, TAKE ONE TABLET BY MOUTH DAILY, Disp: 90 tablet, Rfl: 0  •  diclofenac (VOLTAREN) 3 % gel gel, Apply  topically 2 (Two) Times a Day. for 60 days. (Patient taking differently: Apply 2 g topically 2 (Two) Times a Day As Needed. for 60 days.), Disp: 100 g, Rfl: 0  •  Dulaglutide (TRULICITY) 0.75 MG/0.5ML solution pen-injector, Inject 0.75 mg under the skin 1 (One) Time Per Week., Disp: 4 pen, Rfl: 2  •  ferrous sulfate 324 (65 FE) MG tablet delayed-release EC tablet, Take 324 mg by mouth Daily With Breakfast., Disp: , Rfl:   •  fluticasone-salmeterol (ADVAIR DISKUS) 250-50 MCG/DOSE DISKUS, Inhale 1 puff Daily As Needed (cough and wheezing)., Disp: 60 each, Rfl: 0  •  ipratropium-albuterol (DUO-NEB) 0.5-2.5 mg/mL nebulizer, Take 3 mL by nebulization 4 (four) times a day. (Patient taking differently: Take 3 mL by nebulization Daily As Needed.), Disp: 3 mL, Rfl: 5  •  metFORMIN ER (GLUCOPHAGE-XR) 750 MG 24 hr tablet, Take 1 tablet by mouth 2 (Two) Times a Day., Disp: 60 tablet, Rfl: 6  •   NITROSTAT 0.4 MG SL tablet, DISSOLVE 1 TAB UNDER TONGUE FOR CHEST PAIN - IF PAIN REMAINS AFTER 5 MIN, CALL 911 AND REPEAT DOSE. MAX 3 TABS IN 15 MINUTES, Disp: 25 tablet, Rfl: 0  •  O2 (OXYGEN), Inhale 2 L/min Every Night., Disp: , Rfl:   •  omeprazole (priLOSEC) 40 MG capsule, TAKE ONE CAPSULE BY MOUTH DAILY, Disp: 30 capsule, Rfl: 1  •  potassium chloride (K-DUR,KLOR-CON) 20 MEQ CR tablet, Take 1 tablet by mouth 2 (Two) Times a Day., Disp: 60 tablet, Rfl: 6  •  promethazine (PHENERGAN) 25 MG tablet, TAKE ONE TABLET BY MOUTH EVERY 6 HOURS AS NEEDED FOR NAUSEA AND VOMITING, Disp: 30 tablet, Rfl: 0  •  senna-docusate (PERICOLACE) 8.6-50 MG per tablet, Take 1 tablet by mouth daily., Disp: , Rfl:   •  valsartan-hydrochlorothiazide (DIOVAN-HCT) 320-25 MG per tablet, TAKE ONE TABLET BY MOUTH DAILY, Disp: 90 tablet, Rfl: 0  •  VENTOLIN  (90 Base) MCG/ACT inhaler, INHALE TWO PUFFS BY MOUTH EVERY 4 HOURS AS NEEDED FOR WHEEZING, Disp: 18 g, Rfl: 0  •  vilazodone (VIIBRYD) 10 MG tablet tablet, Take 10 mg by mouth Daily., Disp: , Rfl:   •  bumetanide (BUMEX) 2 MG tablet, Take 1 tablet by mouth 2 (Two) Times a Day., Disp: 60 tablet, Rfl: 1      Social History     Social History   • Marital status:      Spouse name: N/A   • Number of children: N/A   • Years of education: N/A     Occupational History   • Not on file.     Social History Main Topics   • Smoking status: Never Smoker   • Smokeless tobacco: Never Used   • Alcohol use No   • Drug use: No   • Sexual activity: Defer     Other Topics Concern   • Not on file     Social History Narrative   • No narrative on file         Review of Systems   Constitution: Positive for malaise/fatigue.   HENT: Negative.    Eyes: Negative.    Cardiovascular: Positive for dyspnea on exertion and leg swelling.   Respiratory: Negative.    Endocrine: Negative.    Skin: Negative.    Musculoskeletal: Negative.    Gastrointestinal: Negative.    Neurological: Negative.   "  Psychiatric/Behavioral: Negative.        Procedures      ECG 12 Lead  Date/Time: 7/26/2018 3:12 PM  Performed by: FINESSE MEYER  Authorized by: FINESSE MEYER   Comparison: compared with previous ECG from 7/29/2016  Similar to previous ECG  Rhythm: sinus rhythm  Rate: normal  QRS axis: left  Other findings: PRWP                Objective:    /92   Pulse 96   Ht 152.4 cm (60\")   Wt 127 kg (280 lb)   BMI 54.68 kg/m²         Physical Exam   Constitutional: She is oriented to person, place, and time. She appears well-developed and well-nourished.   Morbidly obese   HENT:   Head: Normocephalic.   Eyes: Pupils are equal, round, and reactive to light.   Neck: Normal range of motion. No JVD present. Carotid bruit is not present. No thyromegaly present.   Cardiovascular: Normal rate, regular rhythm, S1 normal, S2 normal, normal heart sounds and intact distal pulses.  Exam reveals no gallop and no friction rub.    No murmur heard.  Pulmonary/Chest: Effort normal and breath sounds normal.   Abdominal: Soft. Bowel sounds are normal.   Pulsatile liver   Musculoskeletal: She exhibits edema.   Neurological: She is alert and oriented to person, place, and time.   Skin: Skin is warm, dry and intact. No erythema.   Psychiatric: She has a normal mood and affect.   Vitals reviewed.          Assessment:       Diagnosis Plan   1. Acute on chronic diastolic congestive heart failure (CMS/HCC)  Adult Transthoracic Echo 3D Complete W/ Cont If Necessary Per Protocol   2. Pulmonary hypertension due to sleep-disordered breathing     3. Chronic coronary artery disease     4. Non-rheumatic mitral regurgitation     5. Non-rheumatic tricuspid valve insufficiency       1.  Acute on chronic diastolic heart failure: I reviewed her evaluation from 2016.  The patient had normal left ventricular systolic function but pulmonary hypertension with pressures in the 60s.  Her failure appears to be mostly right sided. I'm going to try her " on Bumex 2 mg twice a day.  An echocardiogram will also be obtained  2.  Pulmonary hypertension: Severe uncertain as to status at this point  3.  Obstructive sleep apnea: Using sleep apparatus, chronic oxygen therapy  4.  Coronary artery disease: Status post CABG, no angina pectoris at this time  5.  Mitral regurgitation: Status post mitral valve replacement with tissue prosthesis  6.  Tricuspid regurgitation: Status post repair  7.  Chronic kidney disease: Last creatinine 1.2  8.  Diabetes mellitus, type II: On oral therapy  9.  Morbid obesity: Discussed health initiative is  Plan:       Review her echocardiogram and make further recommendations.  I did inform the patient that she may require hospitalization to initiate IV diuretic therapy if we cannot achieve any change with the oral medication.

## 2018-07-30 RX ORDER — NITROGLYCERIN 0.4 MG/1
TABLET SUBLINGUAL
Qty: 25 TABLET | Refills: 0 | OUTPATIENT
Start: 2018-07-30

## 2018-07-31 RX ORDER — NITROGLYCERIN 0.4 MG/1
0.4 TABLET SUBLINGUAL AS NEEDED
Qty: 25 TABLET | Refills: 5 | Status: SHIPPED | OUTPATIENT
Start: 2018-07-31 | End: 2020-09-18

## 2018-08-02 ENCOUNTER — HOSPITAL ENCOUNTER (OUTPATIENT)
Dept: CARDIOLOGY | Facility: HOSPITAL | Age: 73
Discharge: HOME | End: 2018-08-02
Attending: INTERNAL MEDICINE | Admitting: INTERNAL MEDICINE

## 2018-08-02 VITALS
DIASTOLIC BLOOD PRESSURE: 84 MMHG | HEART RATE: 92 BPM | BODY MASS INDEX: 54.97 KG/M2 | WEIGHT: 280 LBS | HEIGHT: 60 IN | SYSTOLIC BLOOD PRESSURE: 122 MMHG

## 2018-08-02 DIAGNOSIS — I50.33 ACUTE ON CHRONIC DIASTOLIC CONGESTIVE HEART FAILURE (HCC): ICD-10-CM

## 2018-08-02 LAB
AORTIC DIMENSIONLESS INDEX: 0.6 (DI)
ASCENDING AORTA: 2.5 CM
BH CV ECHO MEAS - ACS: 1.4 CM
BH CV ECHO MEAS - AO MAX PG (FULL): 20.7 MMHG
BH CV ECHO MEAS - AO MAX PG: 25 MMHG
BH CV ECHO MEAS - AO MEAN PG (FULL): 11.8 MMHG
BH CV ECHO MEAS - AO MEAN PG: 15 MMHG
BH CV ECHO MEAS - AO ROOT AREA (BSA CORRECTED): 1.4
BH CV ECHO MEAS - AO ROOT AREA: 7.2 CM^2
BH CV ECHO MEAS - AO ROOT DIAM: 3 CM
BH CV ECHO MEAS - AO V2 MAX: 253.9 CM/SEC
BH CV ECHO MEAS - AO V2 MEAN: 185.3 CM/SEC
BH CV ECHO MEAS - AO V2 VTI: 50.9 CM
BH CV ECHO MEAS - AVA(I,A): 1.5 CM^2
BH CV ECHO MEAS - AVA(I,D): 1.5 CM^2
BH CV ECHO MEAS - AVA(V,A): 1.4 CM^2
BH CV ECHO MEAS - AVA(V,D): 1.4 CM^2
BH CV ECHO MEAS - BSA(HAYCOCK): 2.4 M^2
BH CV ECHO MEAS - BSA: 2.2 M^2
BH CV ECHO MEAS - BZI_BMI: 54.7 KILOGRAMS/M^2
BH CV ECHO MEAS - BZI_METRIC_HEIGHT: 152.4 CM
BH CV ECHO MEAS - BZI_METRIC_WEIGHT: 127 KG
BH CV ECHO MEAS - CONTRAST EF (2CH): 46.7 ML/M^2
BH CV ECHO MEAS - CONTRAST EF 4CH: 44.6 ML/M^2
BH CV ECHO MEAS - EDV(MOD-SP2): 92 ML
BH CV ECHO MEAS - EDV(MOD-SP4): 101 ML
BH CV ECHO MEAS - EDV(TEICH): 95.9 ML
BH CV ECHO MEAS - EF(CUBED): 56.7 %
BH CV ECHO MEAS - EF(MOD-BP): 45 %
BH CV ECHO MEAS - EF(MOD-SP2): 46.7 %
BH CV ECHO MEAS - EF(MOD-SP4): 44.6 %
BH CV ECHO MEAS - EF(TEICH): 48.5 %
BH CV ECHO MEAS - ESV(MOD-SP2): 49 ML
BH CV ECHO MEAS - ESV(MOD-SP4): 56 ML
BH CV ECHO MEAS - ESV(TEICH): 49.4 ML
BH CV ECHO MEAS - FS: 24.4 %
BH CV ECHO MEAS - IVS/LVPW: 1.1
BH CV ECHO MEAS - IVSD: 1.1 CM
BH CV ECHO MEAS - LAT PEAK E' VEL: 6 CM/SEC
BH CV ECHO MEAS - LV DIASTOLIC VOL/BSA (35-75): 46.9 ML/M^2
BH CV ECHO MEAS - LV MASS(C)D: 169.5 GRAMS
BH CV ECHO MEAS - LV MASS(C)DI: 78.7 GRAMS/M^2
BH CV ECHO MEAS - LV MAX PG: 5.1 MMHG
BH CV ECHO MEAS - LV MEAN PG: 3.4 MMHG
BH CV ECHO MEAS - LV SYSTOLIC VOL/BSA (12-30): 26 ML/M^2
BH CV ECHO MEAS - LV V1 MAX: 113.1 CM/SEC
BH CV ECHO MEAS - LV V1 MEAN: 88.4 CM/SEC
BH CV ECHO MEAS - LV V1 VTI: 24.3 CM
BH CV ECHO MEAS - LVIDD: 4.6 CM
BH CV ECHO MEAS - LVIDS: 3.5 CM
BH CV ECHO MEAS - LVLD AP2: 7.7 CM
BH CV ECHO MEAS - LVLD AP4: 7.9 CM
BH CV ECHO MEAS - LVLS AP2: 7.4 CM
BH CV ECHO MEAS - LVLS AP4: 7.3 CM
BH CV ECHO MEAS - LVOT AREA (M): 3.1 CM^2
BH CV ECHO MEAS - LVOT AREA: 3.2 CM^2
BH CV ECHO MEAS - LVOT DIAM: 2 CM
BH CV ECHO MEAS - LVPWD: 1 CM
BH CV ECHO MEAS - MED PEAK E' VEL: 6 CM/SEC
BH CV ECHO MEAS - MV DEC TIME: 0.22 SEC
BH CV ECHO MEAS - MV E MAX VEL: 205.7 CM/SEC
BH CV ECHO MEAS - MV MAX PG: 24 MMHG
BH CV ECHO MEAS - MV MEAN PG: 7 MMHG
BH CV ECHO MEAS - MV P1/2T MAX VEL: 231.3 CM/SEC
BH CV ECHO MEAS - MV V2 MAX: 245.4 CM/SEC
BH CV ECHO MEAS - MV V2 MEAN: 112.1 CM/SEC
BH CV ECHO MEAS - MV V2 VTI: 53 CM
BH CV ECHO MEAS - MVA P1/2T LCG: 0.95 CM^2
BH CV ECHO MEAS - MVA(VTI): 1.5 CM^2
BH CV ECHO MEAS - PA ACC TIME: 0.15 SEC
BH CV ECHO MEAS - PA MAX PG (FULL): 3.6 MMHG
BH CV ECHO MEAS - PA MAX PG: 5.1 MMHG
BH CV ECHO MEAS - PA PR(ACCEL): 12.5 MMHG
BH CV ECHO MEAS - PA V2 MAX: 112.7 CM/SEC
BH CV ECHO MEAS - PVA(V,A): 2 CM^2
BH CV ECHO MEAS - PVA(V,D): 2 CM^2
BH CV ECHO MEAS - QP/QS: 0.62
BH CV ECHO MEAS - RAP SYSTOLE: 8 MMHG
BH CV ECHO MEAS - RV MAX PG: 1.5 MMHG
BH CV ECHO MEAS - RV MEAN PG: 0.8 MMHG
BH CV ECHO MEAS - RV V1 MAX: 61.1 CM/SEC
BH CV ECHO MEAS - RV V1 MEAN: 42.3 CM/SEC
BH CV ECHO MEAS - RV V1 VTI: 13.3 CM
BH CV ECHO MEAS - RVOT AREA: 3.6 CM^2
BH CV ECHO MEAS - RVOT DIAM: 2.1 CM
BH CV ECHO MEAS - RVSP: 56 MMHG
BH CV ECHO MEAS - SI(AO): 169.3 ML/M^2
BH CV ECHO MEAS - SI(CUBED): 25.2 ML/M^2
BH CV ECHO MEAS - SI(LVOT): 35.9 ML/M^2
BH CV ECHO MEAS - SI(MOD-SP2): 20 ML/M^2
BH CV ECHO MEAS - SI(MOD-SP4): 20.9 ML/M^2
BH CV ECHO MEAS - SI(TEICH): 21.6 ML/M^2
BH CV ECHO MEAS - SUP REN AO DIAM: 1.8 CM
BH CV ECHO MEAS - SV(AO): 364.6 ML
BH CV ECHO MEAS - SV(CUBED): 54.2 ML
BH CV ECHO MEAS - SV(LVOT): 77.2 ML
BH CV ECHO MEAS - SV(MOD-SP2): 43 ML
BH CV ECHO MEAS - SV(MOD-SP4): 45 ML
BH CV ECHO MEAS - SV(RVOT): 48 ML
BH CV ECHO MEAS - SV(TEICH): 46.5 ML
BH CV ECHO MEAS - TAPSE (>1.6): 0.8 CM2
BH CV ECHO MEAS - TR MAX VEL: 347.5 CM/SEC
BH CV ECHO MEASUREMENTS AVERAGE E/E' RATIO: 34.28
BH CV XLRA - RV BASE: 4 CM
BH CV XLRA - TDI S': 6 CM/SEC
LEFT ATRIUM VOLUME INDEX: 41 ML/M2
MAXIMAL PREDICTED HEART RATE: 147 BPM
SINUS: 3.2 CM
STJ: 2.3 CM
STRESS TARGET HR: 125 BPM
Z-SCORE OF LEFT VENTRICULAR DIMENSION IN END SYSTOLE: 4

## 2018-08-02 PROCEDURE — 93306 TTE W/DOPPLER COMPLETE: CPT

## 2018-08-02 PROCEDURE — 93306 TTE W/DOPPLER COMPLETE: CPT | Performed by: INTERNAL MEDICINE

## 2018-08-02 RX ORDER — ATORVASTATIN CALCIUM 20 MG/1
TABLET, FILM COATED ORAL
Qty: 90 TABLET | Refills: 0 | Status: SHIPPED | OUTPATIENT
Start: 2018-08-02 | End: 2018-11-01 | Stop reason: SDUPTHER

## 2018-08-03 RX ORDER — IRBESARTAN 300 MG/1
300 TABLET ORAL NIGHTLY
Qty: 90 TABLET | Refills: 0 | Status: SHIPPED | OUTPATIENT
Start: 2018-08-03 | End: 2018-10-20 | Stop reason: SDUPTHER

## 2018-08-03 RX ORDER — VALSARTAN AND HYDROCHLOROTHIAZIDE 320; 25 MG/1; MG/1
TABLET, FILM COATED ORAL
Qty: 90 TABLET | Refills: 0 | OUTPATIENT
Start: 2018-08-03

## 2018-08-03 RX ORDER — HYDROCHLOROTHIAZIDE 25 MG/1
25 TABLET ORAL DAILY
Qty: 90 TABLET | Refills: 0 | Status: SHIPPED | OUTPATIENT
Start: 2018-08-03 | End: 2018-08-07

## 2018-08-06 RX ORDER — OMEPRAZOLE 40 MG/1
CAPSULE, DELAYED RELEASE ORAL
Qty: 90 CAPSULE | Refills: 0 | Status: SHIPPED | OUTPATIENT
Start: 2018-08-06 | End: 2018-10-29 | Stop reason: SDUPTHER

## 2018-08-07 ENCOUNTER — APPOINTMENT (OUTPATIENT)
Dept: CT IMAGING | Facility: HOSPITAL | Age: 73
End: 2018-08-07

## 2018-08-07 ENCOUNTER — APPOINTMENT (OUTPATIENT)
Dept: GENERAL RADIOLOGY | Facility: HOSPITAL | Age: 73
End: 2018-08-07

## 2018-08-07 ENCOUNTER — HOSPITAL ENCOUNTER (EMERGENCY)
Facility: HOSPITAL | Age: 73
Discharge: HOME | End: 2018-08-07
Attending: EMERGENCY MEDICINE | Admitting: EMERGENCY MEDICINE

## 2018-08-07 VITALS
BODY MASS INDEX: 50.4 KG/M2 | DIASTOLIC BLOOD PRESSURE: 69 MMHG | HEART RATE: 92 BPM | SYSTOLIC BLOOD PRESSURE: 164 MMHG | WEIGHT: 256.7 LBS | RESPIRATION RATE: 18 BRPM | OXYGEN SATURATION: 100 % | TEMPERATURE: 98.2 F | HEIGHT: 60 IN

## 2018-08-07 DIAGNOSIS — T78.40XA ALLERGIC REACTION, INITIAL ENCOUNTER: Primary | ICD-10-CM

## 2018-08-07 LAB
ALBUMIN SERPL-MCNC: 4 G/DL (ref 3.5–5.2)
ALBUMIN/GLOB SERPL: 1.3 G/DL
ALP SERPL-CCNC: 168 U/L (ref 39–117)
ALT SERPL W P-5'-P-CCNC: 11 U/L (ref 1–33)
ANION GAP SERPL CALCULATED.3IONS-SCNC: 15 MMOL/L
AST SERPL-CCNC: 19 U/L (ref 1–32)
BASOPHILS # BLD AUTO: 0.03 10*3/MM3 (ref 0–0.2)
BASOPHILS NFR BLD AUTO: 0.3 % (ref 0–1.5)
BILIRUB SERPL-MCNC: 0.5 MG/DL (ref 0.1–1.2)
BILIRUB UR QL STRIP: NEGATIVE
BUN BLD-MCNC: 28 MG/DL (ref 8–23)
BUN/CREAT SERPL: 20.6 (ref 7–25)
CALCIUM SPEC-SCNC: 7.9 MG/DL (ref 8.6–10.5)
CHLORIDE SERPL-SCNC: 98 MMOL/L (ref 98–107)
CLARITY UR: CLEAR
CO2 SERPL-SCNC: 32 MMOL/L (ref 22–29)
COLOR UR: YELLOW
CREAT BLD-MCNC: 1.36 MG/DL (ref 0.57–1)
DEPRECATED RDW RBC AUTO: 54.2 FL (ref 37–54)
EOSINOPHIL # BLD AUTO: 0.25 10*3/MM3 (ref 0–0.7)
EOSINOPHIL NFR BLD AUTO: 2.1 % (ref 0.3–6.2)
ERYTHROCYTE [DISTWIDTH] IN BLOOD BY AUTOMATED COUNT: 17.6 % (ref 11.7–13)
GFR SERPL CREATININE-BSD FRML MDRD: 38 ML/MIN/1.73
GLOBULIN UR ELPH-MCNC: 3.2 GM/DL
GLUCOSE BLD-MCNC: 155 MG/DL (ref 65–99)
GLUCOSE UR STRIP-MCNC: NEGATIVE MG/DL
HCT VFR BLD AUTO: 38 % (ref 35.6–45.5)
HGB BLD-MCNC: 11.2 G/DL (ref 11.9–15.5)
HGB UR QL STRIP.AUTO: NEGATIVE
IMM GRANULOCYTES # BLD: 0.05 10*3/MM3 (ref 0–0.03)
IMM GRANULOCYTES NFR BLD: 0.4 % (ref 0–0.5)
KETONES UR QL STRIP: NEGATIVE
LEUKOCYTE ESTERASE UR QL STRIP.AUTO: NEGATIVE
LIPASE SERPL-CCNC: 66 U/L (ref 13–60)
LYMPHOCYTES # BLD AUTO: 2.14 10*3/MM3 (ref 0.9–4.8)
LYMPHOCYTES NFR BLD AUTO: 18.2 % (ref 19.6–45.3)
MCH RBC QN AUTO: 24.6 PG (ref 26.9–32)
MCHC RBC AUTO-ENTMCNC: 29.5 G/DL (ref 32.4–36.3)
MCV RBC AUTO: 83.5 FL (ref 80.5–98.2)
MONOCYTES # BLD AUTO: 0.81 10*3/MM3 (ref 0.2–1.2)
MONOCYTES NFR BLD AUTO: 6.9 % (ref 5–12)
NEUTROPHILS # BLD AUTO: 8.54 10*3/MM3 (ref 1.9–8.1)
NEUTROPHILS NFR BLD AUTO: 72.5 % (ref 42.7–76)
NITRITE UR QL STRIP: NEGATIVE
PH UR STRIP.AUTO: 6.5 [PH] (ref 5–8)
PLATELET # BLD AUTO: 260 10*3/MM3 (ref 140–500)
PMV BLD AUTO: 10.9 FL (ref 6–12)
POTASSIUM BLD-SCNC: 3.5 MMOL/L (ref 3.5–5.2)
PROT SERPL-MCNC: 7.2 G/DL (ref 6–8.5)
PROT UR QL STRIP: NEGATIVE
RBC # BLD AUTO: 4.55 10*6/MM3 (ref 3.9–5.2)
SODIUM BLD-SCNC: 145 MMOL/L (ref 136–145)
SP GR UR STRIP: <=1.005 (ref 1–1.03)
UROBILINOGEN UR QL STRIP: NORMAL
WBC NRBC COR # BLD: 11.77 10*3/MM3 (ref 4.5–10.7)

## 2018-08-07 PROCEDURE — 96374 THER/PROPH/DIAG INJ IV PUSH: CPT

## 2018-08-07 PROCEDURE — 83690 ASSAY OF LIPASE: CPT | Performed by: NURSE PRACTITIONER

## 2018-08-07 PROCEDURE — 70450 CT HEAD/BRAIN W/O DYE: CPT

## 2018-08-07 PROCEDURE — 85025 COMPLETE CBC W/AUTO DIFF WBC: CPT | Performed by: NURSE PRACTITIONER

## 2018-08-07 PROCEDURE — 71045 X-RAY EXAM CHEST 1 VIEW: CPT

## 2018-08-07 PROCEDURE — 81003 URINALYSIS AUTO W/O SCOPE: CPT | Performed by: NURSE PRACTITIONER

## 2018-08-07 PROCEDURE — 96375 TX/PRO/DX INJ NEW DRUG ADDON: CPT

## 2018-08-07 PROCEDURE — 80053 COMPREHEN METABOLIC PANEL: CPT | Performed by: NURSE PRACTITIONER

## 2018-08-07 PROCEDURE — 25010000002 METHYLPREDNISOLONE PER 125 MG: Performed by: EMERGENCY MEDICINE

## 2018-08-07 PROCEDURE — 99285 EMERGENCY DEPT VISIT HI MDM: CPT

## 2018-08-07 PROCEDURE — 96361 HYDRATE IV INFUSION ADD-ON: CPT

## 2018-08-07 RX ORDER — FAMOTIDINE 10 MG/ML
20 INJECTION, SOLUTION INTRAVENOUS ONCE
Status: COMPLETED | OUTPATIENT
Start: 2018-08-07 | End: 2018-08-07

## 2018-08-07 RX ORDER — DIPHENHYDRAMINE HCL 25 MG
25 TABLET ORAL EVERY 6 HOURS
Qty: 30 TABLET | Refills: 0 | Status: SHIPPED | OUTPATIENT
Start: 2018-08-07 | End: 2018-08-09

## 2018-08-07 RX ORDER — SODIUM CHLORIDE 9 MG/ML
125 INJECTION, SOLUTION INTRAVENOUS CONTINUOUS
Status: DISCONTINUED | OUTPATIENT
Start: 2018-08-07 | End: 2018-08-07

## 2018-08-07 RX ORDER — SODIUM CHLORIDE 0.9 % (FLUSH) 0.9 %
10 SYRINGE (ML) INJECTION AS NEEDED
Status: DISCONTINUED | OUTPATIENT
Start: 2018-08-07 | End: 2018-08-08 | Stop reason: HOSPADM

## 2018-08-07 RX ORDER — PREDNISONE 20 MG/1
40 TABLET ORAL DAILY
Qty: 4 TABLET | Refills: 0 | Status: SHIPPED | OUTPATIENT
Start: 2018-08-07 | End: 2018-08-09

## 2018-08-07 RX ORDER — CIMETIDINE 300 MG/1
300 TABLET, FILM COATED ORAL 2 TIMES DAILY
Qty: 8 TABLET | Refills: 0 | Status: SHIPPED | OUTPATIENT
Start: 2018-08-07 | End: 2018-08-09

## 2018-08-07 RX ORDER — METHYLPREDNISOLONE SODIUM SUCCINATE 125 MG/2ML
125 INJECTION, POWDER, LYOPHILIZED, FOR SOLUTION INTRAMUSCULAR; INTRAVENOUS ONCE
Status: COMPLETED | OUTPATIENT
Start: 2018-08-07 | End: 2018-08-07

## 2018-08-07 RX ADMIN — METHYLPREDNISOLONE SODIUM SUCCINATE 125 MG: 125 INJECTION, POWDER, FOR SOLUTION INTRAMUSCULAR; INTRAVENOUS at 20:48

## 2018-08-07 RX ADMIN — FAMOTIDINE 20 MG: 10 INJECTION, SOLUTION INTRAVENOUS at 20:48

## 2018-08-07 RX ADMIN — SODIUM CHLORIDE 1000 ML: 9 INJECTION, SOLUTION INTRAVENOUS at 19:00

## 2018-08-07 NOTE — ED PROVIDER NOTES
EMERGENCY DEPARTMENT ENCOUNTER    CHIEF COMPLAINT  Chief Complaint: facial swelling  History given by: patient  History limited by: nothing  Room Number: 14/14  PMD: Channing Bush MD      HPI:  Pt is a 72 y.o. female who presents complaining of right-sided facial swelling [of the right cheek, right side of the lips, and right side of the tongue] that began around 1730 today and has improved. The patient reports that she was given  Benadryl by EMS en route to the ED with improvement of her swelling. The patient also complains of nausea, diarrhea for the past two days, fatigue, and slurred speech d/t swelling of the tongue. The patient denies rash, visual disturbance, cough, cold, fever, vomiting, or weakness/numbness/tingling. The patient is on 2L of oxygen at home. The patient has been taking Hydrochlorothiazide BID for the past two weeks and Trulicity once a week for the past four weeks with her last dosage three days ago. There are no other complaints at this time.    Duration: 2.5 hours  Onset: gradual  Timing: constant  Location: right side of the face [cheek, lips, and tongue]  Quality: swelling  Intensity/Severity: moderate  Progression: improved  Associated Symptoms: nausea, diarrhea, fatigue, and slurred speech d/t swelling of the tongue  Aggravating Factors: none specified  Alleviating Factors: none specified  Previous Episodes: none specified  Treatment before arrival: The patient reports that she was given  Benadryl by EMS en route to the ED with improvement of her swelling.    PAST MEDICAL HISTORY  Active Ambulatory Problems     Diagnosis Date Noted   • Anxiety disorder 02/13/2016   • Arthritis of knee 02/13/2016   • Asthma 02/13/2016   • Chronic coronary artery disease 02/13/2016   • Chronic kidney disease 02/13/2016   • Depression 02/13/2016   • Diabetic peripheral neuropathy (CMS/HCC) 02/13/2016   • Gastroesophageal reflux disease 02/13/2016   • Hyperlipidemia 02/13/2016   • Insomnia 02/13/2016    • Lower gastrointestinal hemorrhage 02/13/2016   • Anemia 02/13/2016   • OCHOA (obstructive sleep apnea) 02/13/2016   • DM type 2 (diabetes mellitus, type 2) (CMS/HCC) 02/13/2016   • Essential hypertension 02/13/2016   • Hospital discharge follow-up 02/18/2016   • Mitral stenosis 02/24/2016   • Pulmonary hypertension due to sleep-disordered breathing 06/10/2016   • s/p MVR, TV-repair, CABG x2 6/13/16 06/18/2016   • OLI (acute kidney injury) (CMS/HCC) 06/18/2016   • Leukocytosis 06/18/2016   • Atrial fibrillation (CMS/HCC) 06/20/2016   • Nocturnal hypoxia 01/30/2017   • Dermatitis 05/01/2017   • Medicare annual wellness visit, initial 08/01/2017   • Class 3 obesity with serious comorbidity and body mass index (BMI) of 50.0 to 59.9 in adult (CMS/HCC) 02/22/2018   • Supplemental oxygen dependent 07/12/2018     Resolved Ambulatory Problems     Diagnosis Date Noted   • SOB (shortness of breath) 02/24/2016   • Chest pain 06/08/2016   • Wound infection 07/29/2016   • Cough with sputum 04/10/2018     Past Medical History:   Diagnosis Date   • Anemia    • Anxiety    • CHF (congestive heart failure) (CMS/HCC)    • Depression    • Diabetes mellitus (CMS/HCC)    • Heart murmur    • Pneumonia    • Sleep apnea        PAST SURGICAL HISTORY  Past Surgical History:   Procedure Laterality Date   • CARDIAC CATHETERIZATION     • CARDIAC CATHETERIZATION N/A 6/10/2016    Procedure: Left Heart Cath;  Surgeon: Chace Johnson MD;  Location: [facility] INVASIVE LOCATION;  Service:    • CARDIAC CATHETERIZATION N/A 6/10/2016    Procedure: Right Heart Cath;  Surgeon: Chace Johnson MD;  Location: [facility] CATH INVASIVE LOCATION;  Service:    • CORONARY ARTERY BYPASS GRAFT      2 vessel   • CORONARY ARTERY BYPASS GRAFT WITH MITRAL VALVE REPAIR/REPLACEMENT N/A 6/13/2016    Procedure: INTRAOPERATIVE TARIQ, MIDLINE STERNOTOMY, CORONARY ARTERY BYPASS GRAFTING X  2 UTILIZING ENDOSCOPICALLY HARVESTED LEFT GREATER SAPHENOUS VEIN, MITRAL  VALVE REPLACEMENT AND TRICUSPID VALVE REPAIR;  Surgeon: Eliecer Mistry MD;  Location: [facility] OR;  Service:    • CORONARY STENT PLACEMENT  2010    Approx. 6 yrs ago at [facility]   • HEMORRHOIDECTOMY     • HYSTERECTOMY     • MITRAL VALVE REPLACEMENT     • REPLACEMENT TOTAL KNEE Right    • THYROID SURGERY      Cyst removed from thyroid       FAMILY HISTORY  Family History   Problem Relation Age of Onset   • Heart attack Father    • Heart disease Father        SOCIAL HISTORY  Social History     Social History   • Marital status:      Spouse name: N/A   • Number of children: N/A   • Years of education: N/A     Occupational History   • Not on file.     Social History Main Topics   • Smoking status: Never Smoker   • Smokeless tobacco: Never Used   • Alcohol use No   • Drug use: No   • Sexual activity: Defer     Other Topics Concern   • Not on file     Social History Narrative   • No narrative on file       ALLERGIES  Coumadin [warfarin sodium]; Erythromycin; Penicillins; and Sulfa antibiotics    REVIEW OF SYSTEMS  Review of Systems   Constitutional: Positive for fatigue. Negative for chills and fever.   HENT: Positive for facial swelling (of the right check, tongue, and lips). Negative for rhinorrhea and sore throat.    Eyes: Negative for visual disturbance.   Respiratory: Negative for cough and shortness of breath.    Cardiovascular: Negative for chest pain, palpitations and leg swelling.   Gastrointestinal: Positive for diarrhea and nausea. Negative for abdominal pain and vomiting.   Endocrine: Negative.    Genitourinary: Negative for decreased urine volume, dysuria and frequency.   Musculoskeletal: Negative for neck pain.   Skin: Negative for rash.   Neurological: Positive for speech difficulty (slurred speech). Negative for syncope, numbness and headaches.   Psychiatric/Behavioral: Negative.    All other systems reviewed and are negative.      PHYSICAL EXAM  ED Triage Vitals [08/07/18 1844]   Temp Heart  Rate Resp BP SpO2   98.2 °F (36.8 °C) 92 18 161/73 94 %      Temp src Heart Rate Source Patient Position BP Location FiO2 (%)   Tympanic Monitor -- -- --       Physical Exam   Constitutional: She is oriented to person, place, and time. She appears distressed (mild).   HENT:   Head: Normocephalic and atraumatic.   Mouth/Throat: Oropharynx is clear and moist.   No appreciable swelling of face/mouth/throat   Eyes: Pupils are equal, round, and reactive to light. EOM are normal.   Neck: Normal range of motion. Neck supple.   Cardiovascular: Normal rate and regular rhythm.  Exam reveals no gallop and no friction rub.    Murmur heard.   Systolic murmur is present with a grade of 3/6   Pulses:       Posterior tibial pulses are 2+ on the right side, and 2+ on the left side.   Pulmonary/Chest: Effort normal. No respiratory distress. She has no decreased breath sounds. She has no wheezes. She has no rhonchi. She has no rales. She exhibits no tenderness.   Abdominal: Soft. She exhibits no distension and no mass. There is no tenderness. There is no rebound and no guarding.   Musculoskeletal: Normal range of motion. She exhibits edema (trace edema bilaterally lower ext).   Neurological: She is alert and oriented to person, place, and time. She has normal motor skills, normal sensation, normal strength and intact cranial nerves. She is not disoriented. No sensory deficit. She shows no pronator drift.   Skin: Skin is warm and dry. No rash noted.   Psychiatric: Mood and affect normal.   Nursing note and vitals reviewed.      LAB RESULTS  Lab Results (last 24 hours)     Procedure Component Value Units Date/Time    CBC & Differential [[id number]] Collected:  08/07/18 1858    Specimen:  Blood Updated:  08/07/18 1917    Narrative:       The following orders were created for panel order CBC & Differential.  Procedure                               Abnormality         Status                     ---------                                -----------         ------                     CBC Auto Differential[[id number]]        Abnormal            Final result                 Please view results for these tests on the individual orders.    Comprehensive Metabolic Panel [[id number]]  (Abnormal) Collected:  08/07/18 1858    Specimen:  Blood Updated:  08/07/18 1928     Glucose 155 (H) mg/dL      BUN 28 (H) mg/dL      Creatinine 1.36 (H) mg/dL      Sodium 145 mmol/L      Potassium 3.5 mmol/L      Chloride 98 mmol/L      CO2 32.0 (H) mmol/L      Calcium 7.9 (L) mg/dL      Total Protein 7.2 g/dL      Albumin 4.00 g/dL      ALT (SGPT) 11 U/L      AST (SGOT) 19 U/L      Alkaline Phosphatase 168 (H) U/L      Total Bilirubin 0.5 mg/dL      eGFR Non African Amer 38 (L) mL/min/1.73      Globulin 3.2 gm/dL      A/G Ratio 1.3 g/dL      BUN/Creatinine Ratio 20.6     Anion Gap 15.0 mmol/L     Narrative:       The MDRD GFR formula is only valid for adults with stable renal function between ages 18 and 70.    Lipase [[id number]]  (Abnormal) Collected:  08/07/18 1858    Specimen:  Blood Updated:  08/07/18 1928     Lipase 66 (H) U/L     CBC Auto Differential [[id number]]  (Abnormal) Collected:  08/07/18 1858    Specimen:  Blood Updated:  08/07/18 1917     WBC 11.77 (H) 10*3/mm3      RBC 4.55 10*6/mm3      Hemoglobin 11.2 (L) g/dL      Hematocrit 38.0 %      MCV 83.5 fL      MCH 24.6 (L) pg      MCHC 29.5 (L) g/dL      RDW 17.6 (H) %      RDW-SD 54.2 (H) fl      MPV 10.9 fL      Platelets 260 10*3/mm3      Neutrophil % 72.5 %      Lymphocyte % 18.2 (L) %      Monocyte % 6.9 %      Eosinophil % 2.1 %      Basophil % 0.3 %      Immature Grans % 0.4 %      Neutrophils, Absolute 8.54 (H) 10*3/mm3      Lymphocytes, Absolute 2.14 10*3/mm3      Monocytes, Absolute 0.81 10*3/mm3      Eosinophils, Absolute 0.25 10*3/mm3      Basophils, Absolute 0.03 10*3/mm3      Immature Grans, Absolute 0.05 (H) 10*3/mm3     Urinalysis With Microscopic If Indicated (No Culture) - Urine, Clean Catch  [[id number]]  (Normal) Collected:  08/07/18 1940    Specimen:  Urine from Urine, Clean Catch Updated:  08/07/18 2019     Color, UA Yellow     Appearance, UA Clear     pH, UA 6.5     Specific Gravity, UA <=1.005     Glucose, UA Negative     Ketones, UA Negative     Bilirubin, UA Negative     Blood, UA Negative     Protein, UA Negative     Leuk Esterase, UA Negative     Nitrite, UA Negative     Urobilinogen, UA 0.2 E.U./dL    Narrative:       Urine microscopic not indicated.          I ordered the above labs and reviewed the results    RADIOLOGY  CT Head Without Contrast   Final Result   1. No evidence for acute intracranial abnormality.   2. Within the inferior left cerebral hemisphere there is focal area of   encephalomalacia measuring 14 x 7 mm consistent with chronic infarction.       Radiation dose reduction techniques were utilized, including automated   exposure control and exposure modulation based on body size.       This report was finalized on 8/7/2018 8:58 PM by Dr. Jared Doyle M.D.          XR Chest 1 View   Final Result   The lungs are well-expanded and clear. There is moderate cardiomegaly with several prosthetic cardiac valves in place and the overall appearance shows no change from 04/10/2018.        I ordered the above noted radiological studies. Interpreted by radiologist. Reviewed by me in PACS.       PROCEDURES  Procedures    NIH Stroke Scale   Interval baseline     1a. Level of Consciousness 0-->Alert, keenly responsive     1b. LOC Questions 0-->Answers both questions correctly     1c. LOC Commands 0-->Performs both tasks correctly     2. Best Gaze 0-->Normal     3. Visual 0-->No visual loss     4. Facial Palsy 0-->Normal symmetrical movements     5a. Motor Arm, Left 0-->No drift, limb holds 90 (or 45) degrees for full 10 secs     5b. Motor Arm, Right 0-->No drift, limb holds 90 (or 45) degrees for full 10 secs     6a. Motor Leg, Left 0-->No drift, leg holds 30 degree position for full 5  secs     6b. Motor Leg, Right 0-->No drift, leg holds 30 degree position for full 5 secs     7. Limb Ataxia 0-->Absent     8. Sensory 0-->Normal, no sensory loss     9. Best Language 0-->No aphasia, normal     10. Dysarthria 0-->Normal     11. Extinction and Inattention (formerly Neglect) 0-->No abnormality     Total (NIH Stroke Scale) 0 (calculated)         PROGRESS AND CONSULTS  ED Course as of Aug 07 2227   Tue Aug 07, 2018   1855 Complaining of diarrhea for 2 days and lower abdominal pain.  With complaint of coughing, shortness of breath and having to increase her oxygen from 2 L to 3.  The patient was worried this afternoon she was having a stroke because her tongue felt thick; symptoms have resolved  [KG]      ED Course User Index  [KG] Faviola Chapa, APRN     2025 Ordered Pepcid to treat abdominal discomfort and Solu-Medrol to treat inflammation. Also ordered IVF for hydration.    2208 Rechecked pt who is resting comfortably and in NAD. The patient reports that she feels much better after hydrating in the ED. Discussed the unremarkable lab and imaging results. Also discussed the plan for discharge with a prescription for Tagamet, Benadryl, and Prednisone. Advised the patient to hydrate well and f/u with her PCP for further evaluation. Also advised the patient to return to the ED if the facial swelling worsens, if she develops any neurological deficits, or has trouble breathing/swallowing. Pt understands and agrees with the plan, all questions answered.    MEDICAL DECISION MAKING  Results were reviewed/discussed with the patient and they were also made aware of online access. Pt also made aware that some labs, such as cultures, will not be resulted during ER visit and follow up with PMD is necessary.     MDM  Number of Diagnoses or Management Options     Amount and/or Complexity of Data Reviewed  Clinical lab tests: ordered and reviewed (CBC WBC: 11.77)  Tests in the radiology section of CPT®: ordered  and reviewed (CT Head is unremarkable. CXR shows that the lungs are well-expanded and clear. There is moderate cardiomegaly with several prosthetic cardiac valves in place and the overall appearance shows no change from 04/10/2018.)  Decide to obtain previous medical records or to obtain history from someone other than the patient: yes  Review and summarize past medical records: yes (The patient has no pertinent previous ED visits in [facility].)  Independent visualization of images, tracings, or specimens: yes    Patient Progress  Patient progress: stable         DIAGNOSIS  Final diagnoses:   Allergic reaction, initial encounter       DISPOSITION  DISCHARGE    Patient discharged in stable condition.    Reviewed implications of results, diagnosis, meds, responsibility to follow up, warning signs and symptoms of possible worsening, potential complications and reasons to return to ER, including any new or worsening symptoms.    Patient/Family voiced understanding of above instructions.    Discussed plan for discharge, as there is no emergent indication for admission. Patient referred to primary care provider for BP management due to today's BP. Pt/family is agreeable and understands need for follow up and repeat testing.  Pt is aware that discharge does not mean that nothing is wrong but it indicates no emergency is present that requires admission and they must continue care with follow-up as given below or physician of their choice.     FOLLOW-UP  Channing Bush MD  [id number] [address]  [phone]    Schedule an appointment as soon as possible for a visit in 3 days  EVEN IF WELL         Medication List      New Prescriptions    cimetidine 300 MG tablet  Commonly known as:  TAGAMET  Take 1 tablet by mouth 2 (Two) Times a Day for 2 days. Then q 12 hours prn   rash/itching/swelling     diphenhydrAMINE 25 MG tablet  Commonly known as:  BENADRYL  Take 1 tablet by mouth Every 6 (Six) Hours for  2 days.     predniSONE 20 MG tablet  Commonly known as:  DELTASONE  Take 2 tablets by mouth Daily for 2 days.        Changed    ipratropium-albuterol 0.5-2.5 mg/3 ml nebulizer  Commonly known as:  DUO-NEB  Take 3 mL by nebulization 4 (four) times a day.  What changed:  when to take this  reasons to take this        Stop    diclofenac 3 % gel gel  Commonly known as:  VOLTAREN     hydrochlorothiazide 25 MG tablet  Commonly known as:  HYDRODIURIL              Latest Documented Vital Signs:  As of 10:27 PM  BP- 164/69 HR- 92 Temp- 98.2 °F (36.8 °C) (Tympanic) O2 sat- 100%    --  Documentation assistance provided by joann Moore for Dr. Maradiaga.  Information recorded by the scribe was done at my direction and has been verified and validated by me.                Summer Moore  08/07/18 [id number]       Yvonne Maradiaga MD  08/13/18 1659

## 2018-08-07 NOTE — ED NOTES
Pt c/o dry mouth; reports her voice is less slurred than it was earlier. Denies difficulty breathing. Denies chest pain, SOA, or abd pain. Reassurance given; call light in reach. Pts breathing even and unlabored. Pt appears in NAD at this time. Family at bedside.        Rylee Olguin, JAY  08/07/18 [id number]

## 2018-08-07 NOTE — ED TRIAGE NOTES
Pt started on Trulicity this week, today c/o right sided facial swelling and tongue swelling onset 1500. Denies difficulty breathing. Pt given 25mg Benadryl, pt states tongue no longer feels swollen. Pt in NAD upon arrival to ED

## 2018-08-08 NOTE — DISCHARGE INSTRUCTIONS
You are advised to follow closely with Dr Bush in 2-3 days for recheck, final results of lab work and imaging testing, and further testing/treatment as needed.    Strict low concentrated sweet diet  Hydrate well - at least 10 glasses of fluids daily  Probiotics daily  Discontinue hydrochlorothiazide    Please return to the emergency department immediately with chest pain different than usual for you, shortness of air, abdominal pain, persistent vomiting/fever, blood in emesis or stool, lightheadedness/fainting, problems with speech, one sided weakness/numbness, new incontinence, problems with vision, swelling of mouth or throat or for worsening of symptoms or other concerns.

## 2018-08-09 ENCOUNTER — TELEPHONE (OUTPATIENT)
Dept: CARDIOLOGY | Facility: CLINIC | Age: 73
End: 2018-08-09

## 2018-08-09 NOTE — TELEPHONE ENCOUNTER
Pt was in the ER for allergic reaction, facial swelling. They stopped her HCTZ due to this, do you want to put her on anything else to replace this. Pt # [phone]. dmk

## 2018-08-10 RX ORDER — POTASSIUM CHLORIDE 750 MG/1
10 TABLET, FILM COATED, EXTENDED RELEASE ORAL DAILY
Qty: 90 TABLET | Refills: 3 | Status: SHIPPED | OUTPATIENT
Start: 2018-08-10 | End: 2018-08-20 | Stop reason: DRUGHIGH

## 2018-08-10 RX ORDER — FUROSEMIDE 20 MG/1
20 TABLET ORAL DAILY
Qty: 90 TABLET | Refills: 3 | Status: SHIPPED | OUTPATIENT
Start: 2018-08-10 | End: 2018-08-20 | Stop reason: DRUGHIGH

## 2018-08-10 NOTE — TELEPHONE ENCOUNTER
Called patient, I went to put in rx for bumex 1 mg & you had already rx'd Bumex 2 mg bid, so I called her back to ask her if it was Bumex or HCTZ that she was taking. Patient is confused & I couldn't get a straight answer from her, she went back & forth with what caused the reaction, according to ER note she told them she was taking HCTZ bid, that you gave her. Dr. Bush gave her HCTZ qd on 8-3-18 to replace Valsartan. Bottom line, she is currently taking neither of them. dmk

## 2018-08-13 RX ORDER — METFORMIN HYDROCHLORIDE 500 MG/1
500 TABLET, EXTENDED RELEASE ORAL 2 TIMES DAILY
Qty: 180 TABLET | Refills: 0 | Status: SHIPPED | OUTPATIENT
Start: 2018-08-13 | End: 2018-08-21 | Stop reason: DRUGHIGH

## 2018-08-20 NOTE — TELEPHONE ENCOUNTER
Pt calling, the swelling in her legs did get better with the lasix 20 mg daily, but now they are swelling again. What do you want her to do? Pt 3# [phone]. dmk

## 2018-08-27 RX ORDER — ATENOLOL 25 MG/1
TABLET ORAL
Qty: 90 TABLET | Refills: 0 | Status: SHIPPED | OUTPATIENT
Start: 2018-08-27 | End: 2018-11-10 | Stop reason: SDUPTHER

## 2018-08-28 ENCOUNTER — TELEPHONE (OUTPATIENT)
Dept: INTERNAL MEDICINE | Facility: CLINIC | Age: 73
End: 2018-08-28

## 2018-08-28 RX ORDER — FUROSEMIDE 20 MG/1
20 TABLET ORAL 2 TIMES DAILY
Qty: 180 TABLET | Refills: 1 | Status: SHIPPED | OUTPATIENT
Start: 2018-08-28 | End: 2019-03-21 | Stop reason: SDUPTHER

## 2018-08-31 ENCOUNTER — TELEPHONE (OUTPATIENT)
Dept: CARDIOLOGY | Facility: CLINIC | Age: 73
End: 2018-08-31

## 2018-09-17 ENCOUNTER — TELEPHONE (OUTPATIENT)
Dept: INTERNAL MEDICINE | Facility: CLINIC | Age: 73
End: 2018-09-17

## 2018-09-17 NOTE — TELEPHONE ENCOUNTER
Patient called stating that she heard that there are a few flu vaccines out there and she wanted to know which one that she should get.  Please advise.

## 2018-09-24 RX ORDER — DULAGLUTIDE 0.75 MG/.5ML
INJECTION, SOLUTION SUBCUTANEOUS
Qty: 2 PEN | Refills: 1 | Status: SHIPPED | OUTPATIENT
Start: 2018-09-24 | End: 2018-11-17 | Stop reason: SDUPTHER

## 2018-09-27 DIAGNOSIS — I25.10 CHRONIC CORONARY ARTERY DISEASE: ICD-10-CM

## 2018-09-27 RX ORDER — ASPIRIN 325 MG
TABLET, DELAYED RELEASE (ENTERIC COATED) ORAL
Qty: 90 TABLET | Refills: 0 | Status: SHIPPED | OUTPATIENT
Start: 2018-09-27 | End: 2018-12-23 | Stop reason: SDUPTHER

## 2018-10-22 ENCOUNTER — TELEPHONE (OUTPATIENT)
Dept: INTERNAL MEDICINE | Facility: CLINIC | Age: 73
End: 2018-10-22

## 2018-10-22 RX ORDER — HYDROCHLOROTHIAZIDE 25 MG/1
TABLET ORAL
Qty: 90 TABLET | Refills: 0 | OUTPATIENT
Start: 2018-10-22

## 2018-10-22 RX ORDER — IRBESARTAN 300 MG/1
TABLET ORAL
Qty: 90 TABLET | Refills: 0 | Status: SHIPPED | OUTPATIENT
Start: 2018-10-22 | End: 2019-01-17 | Stop reason: SDUPTHER

## 2018-10-29 ENCOUNTER — OFFICE VISIT (OUTPATIENT)
Dept: INTERNAL MEDICINE | Facility: CLINIC | Age: 73
End: 2018-10-29

## 2018-10-29 VITALS
HEART RATE: 67 BPM | OXYGEN SATURATION: 96 % | HEIGHT: 60 IN | SYSTOLIC BLOOD PRESSURE: 162 MMHG | WEIGHT: 241 LBS | DIASTOLIC BLOOD PRESSURE: 84 MMHG | BODY MASS INDEX: 47.32 KG/M2 | RESPIRATION RATE: 18 BRPM

## 2018-10-29 DIAGNOSIS — I48.20 CHRONIC ATRIAL FIBRILLATION (HCC): ICD-10-CM

## 2018-10-29 DIAGNOSIS — I10 ESSENTIAL HYPERTENSION: Primary | ICD-10-CM

## 2018-10-29 DIAGNOSIS — E78.2 MIXED HYPERLIPIDEMIA: ICD-10-CM

## 2018-10-29 DIAGNOSIS — Z79.4 TYPE 2 DIABETES MELLITUS WITH OTHER CIRCULATORY COMPLICATION, WITH LONG-TERM CURRENT USE OF INSULIN (HCC): ICD-10-CM

## 2018-10-29 DIAGNOSIS — E66.01 CLASS 3 SEVERE OBESITY DUE TO EXCESS CALORIES WITH SERIOUS COMORBIDITY AND BODY MASS INDEX (BMI) OF 45.0 TO 49.9 IN ADULT (HCC): ICD-10-CM

## 2018-10-29 DIAGNOSIS — E11.59 TYPE 2 DIABETES MELLITUS WITH OTHER CIRCULATORY COMPLICATION, WITH LONG-TERM CURRENT USE OF INSULIN (HCC): ICD-10-CM

## 2018-10-29 LAB
ALBUMIN SERPL-MCNC: 4.4 G/DL (ref 3.5–5.2)
ALBUMIN/GLOB SERPL: 1.5 G/DL
ALP SERPL-CCNC: 183 U/L (ref 39–117)
ALT SERPL-CCNC: 11 U/L (ref 1–33)
AST SERPL-CCNC: 16 U/L (ref 1–32)
BILIRUB SERPL-MCNC: 0.4 MG/DL (ref 0.1–1.2)
BUN SERPL-MCNC: 28 MG/DL (ref 8–23)
BUN/CREAT SERPL: 21.1 (ref 7–25)
CALCIUM SERPL-MCNC: 9.7 MG/DL (ref 8.6–10.5)
CHLORIDE SERPL-SCNC: 99 MMOL/L (ref 98–107)
CHOLEST SERPL-MCNC: 181 MG/DL (ref 0–200)
CO2 SERPL-SCNC: 30.2 MMOL/L (ref 22–29)
CREAT SERPL-MCNC: 1.33 MG/DL (ref 0.57–1)
GLOBULIN SER CALC-MCNC: 3 GM/DL
GLUCOSE SERPL-MCNC: 138 MG/DL (ref 65–99)
HBA1C MFR BLD: 6.89 % (ref 4.8–5.6)
HDLC SERPL-MCNC: 67 MG/DL (ref 40–60)
LDLC SERPL CALC-MCNC: 90 MG/DL (ref 0–100)
POTASSIUM SERPL-SCNC: 4.2 MMOL/L (ref 3.5–5.2)
PROT SERPL-MCNC: 7.4 G/DL (ref 6–8.5)
SODIUM SERPL-SCNC: 146 MMOL/L (ref 136–145)
TRIGL SERPL-MCNC: 118 MG/DL (ref 0–150)
VLDLC SERPL CALC-MCNC: 23.6 MG/DL (ref 5–40)

## 2018-10-29 PROCEDURE — 99214 OFFICE O/P EST MOD 30 MIN: CPT | Performed by: FAMILY MEDICINE

## 2018-10-29 RX ORDER — BUMETANIDE 2 MG/1
TABLET ORAL
COMMUNITY
Start: 2018-08-21 | End: 2019-01-03

## 2018-10-29 RX ORDER — POTASSIUM CHLORIDE 20 MEQ/1
TABLET, EXTENDED RELEASE ORAL
COMMUNITY
Start: 2018-10-08 | End: 2018-10-29 | Stop reason: SDUPTHER

## 2018-10-29 RX ORDER — HYDROCHLOROTHIAZIDE 25 MG/1
TABLET ORAL
Qty: 90 TABLET | Refills: 0 | OUTPATIENT
Start: 2018-10-29

## 2018-10-29 RX ORDER — PROMETHAZINE HYDROCHLORIDE 25 MG/1
25 TABLET ORAL EVERY 8 HOURS PRN
Qty: 30 TABLET | Refills: 0 | Status: SHIPPED | OUTPATIENT
Start: 2018-10-29 | End: 2019-01-07 | Stop reason: SDUPTHER

## 2018-10-29 RX ORDER — OMEPRAZOLE 40 MG/1
CAPSULE, DELAYED RELEASE ORAL
Qty: 30 CAPSULE | Refills: 0 | Status: SHIPPED | OUTPATIENT
Start: 2018-10-29 | End: 2018-12-09 | Stop reason: SDUPTHER

## 2018-10-29 NOTE — PROGRESS NOTES
Miguelina Lopez is a 72 y.o. female.      Assessment/Plan   Problem List Items Addressed This Visit        Cardiovascular and Mediastinum    Hyperlipidemia    Relevant Orders    Lipid Panel    Essential hypertension - Primary    Overview     Impression: 03/30/2015 - BP is high will add BB;          Relevant Medications    bumetanide (BUMEX) 2 MG tablet    Other Relevant Orders    Comprehensive Metabolic Panel    Atrial fibrillation (CMS/[facility])       Digestive    Class 3 severe obesity due to excess calories with serious comorbidity and body mass index (BMI) of 45.0 to 49.9 in adult (CMS/[facility])       Endocrine    DM type 2 (diabetes mellitus, type 2) (CMS/[facility])    Overview     Impression: 03/30/2015 - A1c is 7.5 , Pt to check BS BID add, Amryl 1 mg once to twice daily;          Relevant Orders    Hemoglobin A1c           Follow-up results of blood for ongoing management of chronic problems continue weight loss is primary treatment for diabetes      Chief Complaint   Patient presents with   • Follow-up     for hypertension   • Follow-up     for diabetes   • some nausea     would like phenergan      Social History   Substance Use Topics   • Smoking status: Never Smoker   • Smokeless tobacco: Never Used   • Alcohol use No       History of Present Illness   Follow-up visit for chronic problems of hypertension diabetes hyperlipidemia morbid obesity she's been losing weight and blood pressures well-controlled sugars are doing well she has no chest pain shortness of breath or increased fatigue she's on aggressive diuretic therapy through the cardiologist to help control her edema      The following portions of the patient's history were reviewed and updated as appropriate:PMHroutine: Social history , Past Medical History, Surgical history , Allergies, Current Medications, Active Problem List and Health Maintenance    Review of Systems   Constitutional: Negative for activity change, appetite change and unexpected weight change.  "  HENT: Negative for nosebleeds and trouble swallowing.    Eyes: Negative for pain and visual disturbance.   Respiratory: Negative for chest tightness, shortness of breath and wheezing.    Cardiovascular: Negative for chest pain and palpitations.   Gastrointestinal: Negative for abdominal pain and blood in stool.   Endocrine: Negative.    Genitourinary: Negative for difficulty urinating and hematuria.   Musculoskeletal: Negative for joint swelling.   Skin: Negative for color change and rash.   Allergic/Immunologic: Negative.    Neurological: Negative for syncope and speech difficulty.   Hematological: Negative for adenopathy.   Psychiatric/Behavioral: Negative for agitation and confusion.   All other systems reviewed and are negative.      Objective   Vitals:    10/29/18 0957   BP: 162/84   BP Location: Right arm   Patient Position: Sitting   Cuff Size: Large Adult   Pulse: 67   Resp: 18   SpO2: 96%   Weight: 109 kg (241 lb)   Height: 152.4 cm (60\")     Body mass index is 47.07 kg/m².  Physical Exam   Constitutional: She is oriented to person, place, and time. She appears well-developed and well-nourished. No distress.   HENT:   Head: Normocephalic and atraumatic.   Eyes: Pupils are equal, round, and reactive to light. Conjunctivae and EOM are normal. Right eye exhibits no discharge. Left eye exhibits no discharge. No scleral icterus.   Neck: Normal range of motion. Neck supple. No tracheal deviation present. No thyromegaly present.   Cardiovascular: Normal rate, regular rhythm, normal heart sounds, intact distal pulses and normal pulses.  Exam reveals no gallop.    No murmur heard.  Pulmonary/Chest: Effort normal and breath sounds normal. No respiratory distress. She has no wheezes. She has no rales.   Musculoskeletal: Normal range of motion.    Miguelina had a diabetic foot exam performed today.   During the foot exam she had a monofilament test performed.    Neurological Sensory Findings -  Unaltered sharp/dull " right ankle/foot discrimination and unaltered sharp/dull left ankle/foot discrimination. No right ankle/foot altered proprioception and no left ankle/foot altered proprioception  Vascular Status -  Her right foot exhibits normal foot vasculature  and no edema. Her left foot exhibits normal foot vasculature  and no edema.  Skin Integrity  -  Her right foot skin is intact. She has callous right foot.Her left foot skin is intact.She has callous left foot..  Neurological: She is alert and oriented to person, place, and time. She exhibits normal muscle tone. Coordination normal.   Skin: Skin is warm. No rash noted. No erythema. No pallor.   Psychiatric: She has a normal mood and affect. Her behavior is normal. Judgment and thought content normal.   Nursing note and vitals reviewed.    Reviewed Data:  No visits with results within 1 Month(s) from this visit.   Latest known visit with results is:   Admission on 08/07/2018, Discharged on 08/07/2018   Component Date Value Ref Range Status   • Glucose 08/07/2018 155* 65 - 99 mg/dL Final   • BUN 08/07/2018 28* 8 - 23 mg/dL Final   • Creatinine 08/07/2018 1.36* 0.57 - 1.00 mg/dL Final   • Sodium 08/07/2018 145  136 - 145 mmol/L Final   • Potassium 08/07/2018 3.5  3.5 - 5.2 mmol/L Final   • Chloride 08/07/2018 98  98 - 107 mmol/L Final   • CO2 08/07/2018 32.0* 22.0 - 29.0 mmol/L Final   • Calcium 08/07/2018 7.9* 8.6 - 10.5 mg/dL Final   • Total Protein 08/07/2018 7.2  6.0 - 8.5 g/dL Final   • Albumin 08/07/2018 4.00  3.50 - 5.20 g/dL Final   • ALT (SGPT) 08/07/2018 11  1 - 33 U/L Final   • AST (SGOT) 08/07/2018 19  1 - 32 U/L Final   • Alkaline Phosphatase 08/07/2018 168* 39 - 117 U/L Final   • Total Bilirubin 08/07/2018 0.5  0.1 - 1.2 mg/dL Final   • eGFR Non  Amer 08/07/2018 38* >60 mL/min/1.73 Final   • Globulin 08/07/2018 3.2  gm/dL Final   • A/G Ratio 08/07/2018 1.3  g/dL Final   • BUN/Creatinine Ratio 08/07/2018 20.6  7.0 - 25.0 Final   • Anion Gap 08/07/2018 15.0   mmol/L Final   • Lipase 08/07/2018 66* 13 - 60 U/L Final   • Color, UA 08/07/2018 Yellow  Yellow, Straw Final   • Appearance, UA 08/07/2018 Clear  Clear Final   • pH, UA 08/07/2018 6.5  5.0 - 8.0 Final   • Specific Gravity, UA 08/07/2018 <=1.005  1.005 - 1.030 Final   • Glucose, UA 08/07/2018 Negative  Negative Final   • Ketones, UA 08/07/2018 Negative  Negative Final   • Bilirubin, UA 08/07/2018 Negative  Negative Final   • Blood, UA 08/07/2018 Negative  Negative Final   • Protein, UA 08/07/2018 Negative  Negative Final   • Leuk Esterase, UA 08/07/2018 Negative  Negative Final   • Nitrite, UA 08/07/2018 Negative  Negative Final   • Urobilinogen, UA 08/07/2018 0.2 E.U./dL  0.2 - 1.0 E.U./dL Final   • WBC 08/07/2018 11.77* 4.50 - 10.70 10*3/mm3 Final   • RBC 08/07/2018 4.55  3.90 - 5.20 10*6/mm3 Final   • Hemoglobin 08/07/2018 11.2* 11.9 - 15.5 g/dL Final   • Hematocrit 08/07/2018 38.0  35.6 - 45.5 % Final   • MCV 08/07/2018 83.5  80.5 - 98.2 fL Final   • MCH 08/07/2018 24.6* 26.9 - 32.0 pg Final   • MCHC 08/07/2018 29.5* 32.4 - 36.3 g/dL Final   • RDW 08/07/2018 17.6* 11.7 - 13.0 % Final   • RDW-SD 08/07/2018 54.2* 37.0 - 54.0 fl Final   • MPV 08/07/2018 10.9  6.0 - 12.0 fL Final   • Platelets 08/07/2018 260  140 - 500 10*3/mm3 Final   • Neutrophil % 08/07/2018 72.5  42.7 - 76.0 % Final   • Lymphocyte % 08/07/2018 18.2* 19.6 - 45.3 % Final   • Monocyte % 08/07/2018 6.9  5.0 - 12.0 % Final   • Eosinophil % 08/07/2018 2.1  0.3 - 6.2 % Final   • Basophil % 08/07/2018 0.3  0.0 - 1.5 % Final   • Immature Grans % 08/07/2018 0.4  0.0 - 0.5 % Final   • Neutrophils, Absolute 08/07/2018 8.54* 1.90 - 8.10 10*3/mm3 Final   • Lymphocytes, Absolute 08/07/2018 2.14  0.90 - 4.80 10*3/mm3 Final   • Monocytes, Absolute 08/07/2018 0.81  0.20 - 1.20 10*3/mm3 Final   • Eosinophils, Absolute 08/07/2018 0.25  0.00 - 0.70 10*3/mm3 Final   • Basophils, Absolute 08/07/2018 0.03  0.00 - 0.20 10*3/mm3 Final   • Immature Grans, Absolute  08/07/2018 0.05* 0.00 - 0.03 10*3/mm3 Final

## 2018-10-30 RX ORDER — HYDROCHLOROTHIAZIDE 25 MG/1
TABLET ORAL
Qty: 90 TABLET | Refills: 0 | OUTPATIENT
Start: 2018-10-30

## 2018-10-31 ENCOUNTER — TELEPHONE (OUTPATIENT)
Dept: INTERNAL MEDICINE | Facility: CLINIC | Age: 73
End: 2018-10-31

## 2018-10-31 NOTE — TELEPHONE ENCOUNTER
----- Message from Channing Bush MD sent at 10/30/2018  9:41 AM EDT -----  Labs sugar and lipids well-controlled renal status seems to be stable continue present medications

## 2018-10-31 NOTE — TELEPHONE ENCOUNTER
Patient called and would like to know why her HCTZ has not been renewed.   This is not on current med list, it was discontinued on 08/07/18 at ER by Dr Yvonne Marlow   She states she has been taking it for months now but has ran out.  Please advise

## 2018-11-01 RX ORDER — ATORVASTATIN CALCIUM 20 MG/1
TABLET, FILM COATED ORAL
Qty: 90 TABLET | Refills: 1 | Status: SHIPPED | OUTPATIENT
Start: 2018-11-01 | End: 2019-05-03 | Stop reason: SDUPTHER

## 2018-11-12 ENCOUNTER — TELEPHONE (OUTPATIENT)
Dept: INTERNAL MEDICINE | Facility: CLINIC | Age: 73
End: 2018-11-12

## 2018-11-12 RX ORDER — ATENOLOL 25 MG/1
TABLET ORAL
Qty: 90 TABLET | Refills: 0 | Status: SHIPPED | OUTPATIENT
Start: 2018-11-12 | End: 2019-02-17 | Stop reason: SDUPTHER

## 2018-11-12 NOTE — TELEPHONE ENCOUNTER
Patient called stating that she has a cold and is coughing up yellow mucus.  She wanted to know what she could take for this.  Please advise.

## 2018-11-19 ENCOUNTER — TELEPHONE (OUTPATIENT)
Dept: INTERNAL MEDICINE | Facility: CLINIC | Age: 73
End: 2018-11-19

## 2018-11-19 RX ORDER — DULAGLUTIDE 0.75 MG/.5ML
INJECTION, SOLUTION SUBCUTANEOUS
Qty: 6 ML | Refills: 0 | Status: SHIPPED | OUTPATIENT
Start: 2018-11-19 | End: 2019-02-15 | Stop reason: SDUPTHER

## 2018-11-19 NOTE — TELEPHONE ENCOUNTER
Patient called stating that she is still coughing after taking Robitussin DM.  Patient was referred to Urgent Care per Dr. Bush.  Patient stated that she would not go to Urgent Care. Patient wondered if the new Mucinex would help. Please advise.

## 2018-11-20 RX ORDER — DULAGLUTIDE 0.75 MG/.5ML
INJECTION, SOLUTION SUBCUTANEOUS
Refills: 0 | OUTPATIENT
Start: 2018-11-20

## 2018-12-10 RX ORDER — OMEPRAZOLE 40 MG/1
CAPSULE, DELAYED RELEASE ORAL
Qty: 30 CAPSULE | Refills: 0 | Status: SHIPPED | OUTPATIENT
Start: 2018-12-10 | End: 2019-01-07 | Stop reason: SDUPTHER

## 2018-12-20 ENCOUNTER — TELEPHONE (OUTPATIENT)
Dept: INTERNAL MEDICINE | Facility: CLINIC | Age: 73
End: 2018-12-20

## 2018-12-20 DIAGNOSIS — K14.6 TONGUE PAIN: Primary | ICD-10-CM

## 2018-12-20 NOTE — TELEPHONE ENCOUNTER
Bump on the very end of her tongue - some days it looks big, some days it looks small, but it hurts.  She wanted to know if there was something that Dr. Bush could suggest for her to do for this.  Please advise.

## 2018-12-20 NOTE — TELEPHONE ENCOUNTER
Usually they don't last longer than a couple days it persists recommend consultation with Dr. Camara ENT

## 2018-12-23 DIAGNOSIS — I25.10 CHRONIC CORONARY ARTERY DISEASE: ICD-10-CM

## 2018-12-24 RX ORDER — ASPIRIN 325 MG
TABLET, DELAYED RELEASE (ENTERIC COATED) ORAL
Qty: 90 TABLET | Refills: 0 | Status: SHIPPED | OUTPATIENT
Start: 2018-12-24 | End: 2019-03-24 | Stop reason: SDUPTHER

## 2019-01-02 PROBLEM — I34.0 MITRAL REGURGITATION: Status: ACTIVE | Noted: 2019-01-02

## 2019-01-02 PROBLEM — I35.0 AORTIC STENOSIS: Status: ACTIVE | Noted: 2019-01-02

## 2019-01-02 PROBLEM — I50.43 ACUTE ON CHRONIC COMBINED SYSTOLIC AND DIASTOLIC HF (HEART FAILURE) (HCC): Status: ACTIVE | Noted: 2019-01-02

## 2019-01-02 NOTE — PROGRESS NOTES
Date of Office Visit: 2019  Encounter Provider: ZOILA Ryan  Place of Service: [facility] CARDIOLOGY  Patient Name: Miguelina Lopez  :1945      Subjective:     Chief Complaint:  CAD, Pulm HTN, Valvular Heart Disease (MR s/p tissue replacement, TR s/p repair, AS), Obesity    History of Present Illness:  Miguelina Lopez is a pleasant 73 y.o. female who is new to me.  Outside records have been obtained and reviewed by me.     The patient was first seen by Dr. Ayers on 18 for consultation.  She has been followed by what looks like Dr. Johnson in the past.  She is on the left coronary disease for which she underwent CABG, mitral regurgitation with a mitral valve replacement with tissue prosthesis and a tricuspid valve repair.  She hadn't had any cardiac follow-up in over 2 years.  She was referred back in 2018 for evaluation of progressive lower extremity edema.  She also has chronic shortness of breath and orthopnea as well as fatigue.  She has diabetes, sleep apnea, hypertension and hyperlipidemia.  In 2016 her weight was up and down to 220 pounds but at the visit with Dr. Ayers she was up to 280 pounds.  She had been on low-dose Lasix but wasn't really helping her edema.  She wasn't having any chest pain at that time. Dr. Ayers ordered another echocardiogram to evaluate her LV function and valvular status and pulmonary hypertension.  He did informt the patient that she may require hospitalization to initiate IV diuretic therapy if he was unable to achieve any change in her symptoms with starting her on Bumex 2 mg twice daily. Patient had several telephone encounters in 2018.  Apparently she had a reaction/facial swelling r/t a medication and went to the ER and told them she was taking HCTZ BID but looks like it was just prescribed on 8/3/18 per Dr. Bush and so that was stopped, but eventually it turned out that she was confused on  whether or not she was taking Bumex or HCTZ and what was discontinued in the ED, but it looks like she wasn't actually taking either of those, so Dr. Ayers started her on Lasix 20 mg daily and later the patient reported it helped initially but then she started swelling again and so it was increased to twice a day.     She is here today for follow up after cancelling several prior appointments. She canceled appointments in September 2018, October 2018, and December 2018.  Since her last visit she has been doing pretty well.  She reports her swelling in her legs is much improved and she is taking Lasix 20 mg twice a day with potassium supplementation.  She still has some chronic shortness of breath and orthopnea as well as fatigue but this is not worsening.  She reports she always sleeps on 2 pillows ever tried without it.  She reports her dyspnea on exertion is intermittent.  She denies feeling short of breath at rest.  She wears 2 L of oxygen during the day and 3 L of oxygen at nighttime.  When asking her about her compliance with her CPAP machine she reports she has not worn it in approximately 3 months.  Currently she has a mildly productive cough (white mucus per her report) which started a few days ago per her report.  She reports she started discussed this with Dr. Bush.  She denies any fever or chills.  The patient denies any chest pain, palpitations, dizziness, lightheadedness, syncope or near-syncope.  She reports she has a scale at home that was sent by her insurance company and he gives her instructions on when to way and to call if there is an issue.  She reports her weight has been stable and she is actually down to about 252 pounds from 280 pounds in August.  She reports she has been trying to eat better which has helped.  The patient did not bring in a medication list and there was a little confusion on what she was taking called her pharmacy to get her med list faxed over.  She is going to be seen  ENT for consutl for a lesion on her tongue that grows in size and then shrinks back down.  Her blood pressure was little elevated today at 158/90.  She does not check her blood pressure at home but she does have a means to do so she just needs help from her granddaughter.      8/2/18 Echo:  · There is severe septal hypokinesis  · Estimated EF appears to be in the range of 46 - 50%.  · Normal right ventricular systolic function noted. Right ventricular cavity is moderately dilated.  · Left atrial cavity size is moderate-to-severely dilated.  · There is mild aortic stenosis with a peak gradient of 25 mmHg and a mean gradient of 15 mmHg  · There is severe nodular posterior mitral valve calcification.  · There is a bioprosthetic mitral valve replacement.  · Gradients across the bioprosthetic mitral valve replacement are elevated, with a peak gradient of 24 mmHg, and a mean gradient of 7 mmHg.  · There is mild to moderate tricuspid regurgitation through the tricuspid valve repair  · Calculated right ventricular systolic pressure from tricuspid regurgitation is 56 mmHg.  · There is no evidence of pericardial effusion.         Past Medical History:   Diagnosis Date   • Anemia    • Anxiety    • CHF (congestive heart failure) (CMS/HCC)    • Depression    • Diabetes mellitus (CMS/[facility])    • Heart murmur    • Pneumonia     1/2016   • Sleep apnea     Uses CPAP or oxygen     Past Surgical History:   Procedure Laterality Date   • CARDIAC CATHETERIZATION     • CARDIAC CATHETERIZATION N/A 6/10/2016    Procedure: Left Heart Cath;  Surgeon: Chace Johnson MD;  Location: [facility] CATH INVASIVE LOCATION;  Service:    • CARDIAC CATHETERIZATION N/A 6/10/2016    Procedure: Right Heart Cath;  Surgeon: Chace Johnson MD;  Location:  [facility] CATH INVASIVE LOCATION;  Service:    • CORONARY ARTERY BYPASS GRAFT      2 vessel   • CORONARY ARTERY BYPASS GRAFT WITH MITRAL VALVE REPAIR/REPLACEMENT N/A 6/13/2016    Procedure: INTRAOPERATIVE  TARIQ, MIDLINE STERNOTOMY, CORONARY ARTERY BYPASS GRAFTING X  2 UTILIZING ENDOSCOPICALLY HARVESTED LEFT GREATER SAPHENOUS VEIN, MITRAL VALVE REPLACEMENT AND TRICUSPID VALVE REPAIR;  Surgeon: Eliecer Mistry MD;  Location: [facility];  Service:    • CORONARY STENT PLACEMENT  2010    Approx. 6 yrs ago at [facility]   • HEMORRHOIDECTOMY     • HYSTERECTOMY     • MITRAL VALVE REPLACEMENT     • REPLACEMENT TOTAL KNEE Right    • THYROID SURGERY      Cyst removed from thyroid     Outpatient Medications Prior to Visit   Medication Sig Dispense Refill   • ALPRAZolam (XANAX) 1 MG tablet Take 1 tablet by mouth 2 (Two) Times a Day As Needed for anxiety. 24 tablet 0   • aspirin  MG tablet TAKE ONE TABLET BY MOUTH DAILY 90 tablet 0   • atenolol (TENORMIN) 25 MG tablet TAKE ONE TABLET BY MOUTH DAILY 90 tablet 0   • atorvastatin (LIPITOR) 20 MG tablet TAKE ONE TABLET BY MOUTH DAILY 90 tablet 1   • ferrous sulfate 324 (65 FE) MG tablet delayed-release EC tablet Take 324 mg by mouth Daily With Breakfast.     • fluticasone-salmeterol (ADVAIR DISKUS) 250-50 MCG/DOSE DISKUS Inhale 1 puff Daily As Needed (cough and wheezing). 60 each 0   • furosemide (LASIX) 20 MG tablet Take 1 tablet by mouth 2 (Two) Times a Day. 180 tablet 1   • ipratropium-albuterol (DUO-NEB) 0.5-2.5 mg/mL nebulizer Take 3 mL by nebulization 4 (four) times a day. (Patient taking differently: Take 3 mL by nebulization Daily As Needed.) 3 mL 5   • irbesartan (AVAPRO) 300 MG tablet TAKE ONE TABLET BY MOUTH ONCE NIGHTLY 90 tablet 0   • metFORMIN ER (GLUCOPHAGE-XR) 750 MG 24 hr tablet Take 1 tablet by mouth 2 (Two) Times a Day. 60 tablet 6   • nitroglycerin (NITROSTAT) 0.4 MG SL tablet Place 1 tablet under the tongue As Needed for Chest Pain.  - IF PAIN REMAINS AFTER 5 MIN CALL 911 AND REPEAT DOSE MAX 3 TABS IN 15 MINUTES 25 tablet 5   • O2 (OXYGEN) Inhale 2 L/min Every Night.     • omeprazole (priLOSEC) 40 MG capsule TAKE ONE CAPSULE BY MOUTH DAILY 30 capsule 0   •  potassium chloride (K-DUR,KLOR-CON) 10 MEQ CR tablet Take 10 mEq by mouth 2 (Two) Times a Day.     • promethazine (PHENERGAN) 25 MG tablet Take 1 tablet by mouth Every 8 (Eight) Hours As Needed for Nausea or Vomiting. 30 tablet 0   • senna-docusate (PERICOLACE) 8.6-50 MG per tablet Take 1 tablet by mouth daily.     • TRULICITY 0.75 MG/0.5ML solution pen-injector INJECT 0.75 MG UNDER THE SKIN ONCE WEEKLY 6 mL 0   • VENTOLIN  (90 Base) MCG/ACT inhaler INHALE TWO PUFFS BY MOUTH EVERY 4 HOURS AS NEEDED FOR WHEEZING 18 g 0   • VIIBRYD 20 MG tablet tablet      • bumetanide (BUMEX) 2 MG tablet      • UNKNOWN TO PATIENT Anti depressant from dr hines     • vilazodone (VIIBRYD) 10 MG tablet tablet Take 10 mg by mouth Daily.       No facility-administered medications prior to visit.        Allergies as of 01/03/2019 - Reviewed 01/03/2019   Allergen Reaction Noted   • Coumadin [warfarin sodium] Diarrhea and Nausea Only 06/12/2016   • Bumex [bumetanide] Swelling 08/28/2018   • Erythromycin  07/29/2016   • Hctz [hydrochlorothiazide] Swelling 08/28/2018   • Penicillins  02/18/2016   • Sulfa antibiotics  02/18/2016     Social History     Socioeconomic History   • Marital status:      Spouse name: Not on file   • Number of children: Not on file   • Years of education: Not on file   • Highest education level: Not on file   Social Needs   • Financial resource strain: Not on file   • Food insecurity - worry: Not on file   • Food insecurity - inability: Not on file   • Transportation needs - medical: Not on file   • Transportation needs - non-medical: Not on file   Occupational History   • Not on file   Tobacco Use   • Smoking status: Never Smoker   • Smokeless tobacco: Never Used   Substance and Sexual Activity   • Alcohol use: No   • Drug use: No   • Sexual activity: Defer   Other Topics Concern   • Not on file   Social History Narrative   • Not on file     Family History   Problem Relation Age of Onset   • Heart attack  "Father    • Heart disease Father      Review of Systems   Constitution: Positive for malaise/fatigue. Negative for chills and fever.   HENT: Negative for ear pain, hearing loss, nosebleeds and sore throat.    Eyes: Negative for double vision, pain, vision loss in left eye and vision loss in right eye.   Cardiovascular: Positive for leg swelling. Negative for chest pain, claudication, dyspnea on exertion, irregular heartbeat, near-syncope, orthopnea, palpitations, paroxysmal nocturnal dyspnea and syncope.        Pain in legs with walking   Respiratory: Positive for cough and shortness of breath. Negative for snoring and wheezing.    Endocrine: Negative for cold intolerance and heat intolerance.   Hematologic/Lymphatic: Negative for bleeding problem.   Skin: Negative for color change, itching, rash and unusual hair distribution.   Musculoskeletal: Positive for joint pain. Negative for joint swelling.   Gastrointestinal: Negative for abdominal pain, diarrhea, hematochezia, melena, nausea and vomiting.   Genitourinary: Negative for decreased libido, frequency, hematuria, hesitancy and incomplete emptying.   Neurological: Positive for headaches. Negative for excessive daytime sleepiness, dizziness, light-headedness, loss of balance, numbness, paresthesias and seizures.   Psychiatric/Behavioral: Positive for depression.          Objective:     Vitals:    01/03/19 1226   BP: 158/90   BP Location: Left arm   Patient Position: Sitting   Pulse: 70   Weight: 114 kg (252 lb)   Height: 152.4 cm (60\")     Body mass index is 49.22 kg/m².    PHYSICAL EXAM:  Physical Exam   Constitutional: She is oriented to person, place, and time. She appears well-developed and well-nourished. No distress. Nasal cannula in place.   Morbidly obese   HENT:   Head: Normocephalic and atraumatic.   Mouth/Throat: Oral lesions (tip of tongue ) present.   Eyes: Pupils are equal, round, and reactive to light.   glasses   Neck: Trachea normal. No " hepatojugular reflux present. Carotid bruit is not present.   Cardiovascular: Normal rate, regular rhythm, normal heart sounds and intact distal pulses.   No murmur heard.  Pulses:       Radial pulses are 2+ on the right side, and 2+ on the left side.        Posterior tibial pulses are 2+ on the right side, and 2+ on the left side.   Pulmonary/Chest: Effort normal and breath sounds normal. No accessory muscle usage. No tachypnea. No respiratory distress. She has no decreased breath sounds. She has no wheezes. She has no rhonchi. She has no rales.   Abdominal: Soft. Bowel sounds are normal. She exhibits no distension. There is no tenderness.   Obese   Musculoskeletal: Normal range of motion. She exhibits no edema.   Neurological: She is alert and oriented to person, place, and time.   Skin: Skin is warm, dry and intact. She is not diaphoretic. No erythema.   Psychiatric: She has a normal mood and affect. Her speech is normal and behavior is normal. Judgment and thought content normal. Cognition and memory are normal.   Nursing note and vitals reviewed.        ECG 12 Lead  Date/Time: 1/3/2019 12:37 PM  Performed by: Adri Ackerman APRN  Authorized by: Adri Ackerman APRN   Comparison: compared with previous ECG from 7/26/2018  Comparison to previous ECG: Presence of artifact  Rate: normal  BPM: 73  QRS axis: normal  Q waves: V1 and V2  Clinical impression: abnormal ECG  Comments: Sinus vs Ectopic atrial rhythem  Indication: CAD, Pulm HTN            Assessment:       Diagnosis Plan   1. 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 Pulmonary hypertension due to sleep-disordered breathing (CMS/HCC)  ECG 12 Lead   3. OCHOA (obstructive sleep apnea)  ECG 12 Lead   4. Supplemental oxygen dependent  ECG 12 Lead   5. Chronic coronary artery disease  ECG 12 Lead   6. s/p MVR, TV-repair, CABG x2 6/13/16  ECG 12 Lead   7. Mitral valve insufficiency, unspecified etiology  ECG  12 Lead   8. Stage 3 chronic kidney disease (CMS/[facility])  ECG 12 Lead   9. Type 2 diabetes mellitus with other circulatory complication, with long-term current use of insulin (CMS/HCC)  ECG 12 Lead   10. Class 3 severe obesity due to excess calories with serious comorbidity and body mass index (BMI) of 45.0 to 49.9 in adult (CMS/[facility])  ECG 12 Lead   11. Aortic valve stenosis, etiology of cardiac valve disease unspecified  ECG 12 Lead       Plan:       1.  Acute on chronic diastolic heart failure: I reviewed her evaluation from 2016.  The patient had normal left ventricular systolic function but pulmonary hypertension with pressures in the 60s.  Her failure appears to be mostly right sided. Confusion on prior meds and reaction to Bumex vs HCTZ, patient not taking either, both listed as allergy. Lasix resumed. Echo 8/2018 showed EF 46-50%. Currently the patient denies worsening SOB, orthopnea and reports her leg swelling is down.  No Rales or hepatic reflux noted on exam.     Heart Failure  Assessment  • Beta blocker prescribed  • Diuretics prescribed  • Angiotensin receptor blocker (ARB) prescribed  • The left ventricle was last assessed on 8/2/2018    Plan  • The patient has received heart failure education on the following topics: dietary sodium restriction, medication instructions, smoking cessation, minimizing or avoiding NSAID use, symptom management, physical activity, weight monitoring and minimizing alcohol intake  • The heart failure care plan was discussed with the patient today including: continuing the current program and lifestyle modifications    Subjective/Objective  • The patient reports dyspnea    • Physical exam findings negative for rales.      2.  Pulmonary hypertension: Repeat echo showed RVSP of 56 mmHg and moderately dilated RV. Intermittent dyspnea with exertion. Patient not compliant with CPAP for last 3 months. Advised to resume.     3.  Obstructive sleep apnea: Supposed to be wearing cpap  machine (has not been using last 3 months) chronic oxygen therapy. Advised to resume    4.  Coronary artery disease: Status post CABG, no angina pectoris at this time, on aspirin and statin  Coronary Artery Disease  Assessment  • The patient has no angina    Plan  • Lifestyle modifications discussed include adhering to a heart healthy diet, avoidance of tobacco products, maintenance of a healthy weight, medication compliance, regular exercise and regular monitoring of cholesterol and blood pressure    Subjective - Objective  • There is a history of previous coronary artery bypass graft  • Current antiplatelet therapy includes aspirin 325 mg        5.  Mitral regurgitation: Status post mitral valve replacement with tissue prosthesis, repeat echo on 8/2/18 showed a peak gradient of 25 mmHG and mean of 15 mmHg. Continue to monitor    6.  Tricuspid regurgitation: Status post repair, 8/2018 echo showed mild to mod TR with elevated RVSP 56mmHg. Continue to monitor    7.  Chronic kidney disease: Last creatinine 1.33.  10/29/18 with PCP labs.    8.  Diabetes mellitus, type II: On oral therapy, managed by PCP, A1c improving on recent labs    9.  Morbid obesity: BMI 49.2. Long discussion about the importance of maintaining healthy weight.  Patient has been reported late watching her diet.  She is down to 252 pounds from 280 back in August.    10. Aortic stenosis: Mild on 8/2018 echo. Continue to monitor.     11. Hypertension: Blood pressure little elevated today at 158/90.  Reports she has not been wearing her CPAP mask for the last 3 months or so.  I advised her to resume her cpap and check her blood pressure at home periodically and let me know if it is staying above 140/90 as we will have to titrate medications.  She demonstrated understanding of these instructions.      I think the biggest thing we need to do with this patient right now is to get her back on her CPAP mask and see if her blood pressure improves as well as  her intermittent dyspnea with exertion.  Her weight is still a huge issue/risk factor for her although she is working on diet to get her weight back down.  I also advised the patient that she needs to make a list of what medication she is taking and bring it with her to all of her doctors appointments so that there is no confusion or potential drug interactions with prescribing.  She demonstrated understanding.  Follow up with Dr. Ayers in 3 months, unless otherwise needed sooner.  I advised the patient to contact our office with any questions or concerns.         Your medication list           Accurate as of 1/3/19  1:31 PM. If you have any questions, ask your nurse or doctor.               CHANGE how you take these medications      Instructions Last Dose Given Next Dose Due   ipratropium-albuterol 0.5-2.5 mg/3 ml nebulizer  Commonly known as:  DUO-NEB  What changed:    · when to take this  · reasons to take this      Take 3 mL by nebulization 4 (four) times a day.       VIIBRYD 20 MG tablet tablet  Generic drug:  vilazodone  What changed:  Another medication with the same name was removed. Continue taking this medication, and follow the directions you see here.  Changed by:  ZOILA Ryan              CONTINUE taking these medications      Instructions Last Dose Given Next Dose Due   ALPRAZolam 1 MG tablet  Commonly known as:  XANAX      Take 1 tablet by mouth 2 (Two) Times a Day As Needed for anxiety.       aspirin  MG tablet      TAKE ONE TABLET BY MOUTH DAILY       atenolol 25 MG tablet  Commonly known as:  TENORMIN      TAKE ONE TABLET BY MOUTH DAILY       atorvastatin 20 MG tablet  Commonly known as:  LIPITOR      TAKE ONE TABLET BY MOUTH DAILY       ferrous sulfate 324 (65 Fe) MG tablet delayed-release EC tablet      Take 324 mg by mouth Daily With Breakfast.       fluticasone-salmeterol 250-50 MCG/DOSE DISKUS  Commonly known as:  ADVAIR DISKUS      Inhale 1 puff Daily As Needed (cough and  wheezing).       furosemide 20 MG tablet  Commonly known as:  LASIX      Take 1 tablet by mouth 2 (Two) Times a Day.       irbesartan 300 MG tablet  Commonly known as:  AVAPRO      TAKE ONE TABLET BY MOUTH ONCE NIGHTLY       metFORMIN  MG 24 hr tablet  Commonly known as:  GLUCOPHAGE-XR      Take 1 tablet by mouth 2 (Two) Times a Day.       nitroglycerin 0.4 MG SL tablet  Commonly known as:  NITROSTAT      Place 1 tablet under the tongue As Needed for Chest Pain.  - IF PAIN REMAINS AFTER 5 MIN CALL 911 AND REPEAT DOSE MAX 3 TABS IN 15 MINUTES       O2  Commonly known as:  OXYGEN      Inhale 2 L/min Every Night.       omeprazole 40 MG capsule  Commonly known as:  priLOSEC      TAKE ONE CAPSULE BY MOUTH DAILY       potassium chloride 10 MEQ CR tablet  Commonly known as:  K-DUR,KLOR-CON      Take 10 mEq by mouth 2 (Two) Times a Day.       promethazine 25 MG tablet  Commonly known as:  PHENERGAN      Take 1 tablet by mouth Every 8 (Eight) Hours As Needed for Nausea or Vomiting.       senna-docusate 8.6-50 MG per tablet  Commonly known as:  PERICOLACE      Take 1 tablet by mouth daily.       TRULICITY 0.75 MG/0.5ML solution pen-injector  Generic drug:  Dulaglutide      INJECT 0.75 MG UNDER THE SKIN ONCE WEEKLY       VENTOLIN  (90 Base) MCG/ACT inhaler  Generic drug:  albuterol sulfate HFA      INHALE TWO PUFFS BY MOUTH EVERY 4 HOURS AS NEEDED FOR WHEEZING          STOP taking these medications    bumetanide 2 MG tablet  Commonly known as:  BUMEX  Stopped by:  ZOILA Ryan               The above medication changes may not have been made by this provider.  Medication list was updated to reflect medications patient is currently taking including medication changes and discontinuations made by other healthcare providers.       I have reviewed this case with Dr. Ayers. It has been a pleasure to participate in this patient's care. Please feel free to contact me with any questions or concerns.     Adri  FRANKIE Ackerman, APRN  01/03/2019       Dictated utilizing Dragon Dictation System.

## 2019-01-03 ENCOUNTER — OFFICE VISIT (OUTPATIENT)
Dept: CARDIOLOGY | Facility: CLINIC | Age: 74
End: 2019-01-03

## 2019-01-03 VITALS
WEIGHT: 252 LBS | HEART RATE: 70 BPM | DIASTOLIC BLOOD PRESSURE: 90 MMHG | SYSTOLIC BLOOD PRESSURE: 158 MMHG | BODY MASS INDEX: 49.48 KG/M2 | HEIGHT: 60 IN

## 2019-01-03 DIAGNOSIS — I27.29 PULMONARY HYPERTENSION DUE TO SLEEP-DISORDERED BREATHING (HCC): ICD-10-CM

## 2019-01-03 DIAGNOSIS — N18.30 STAGE 3 CHRONIC KIDNEY DISEASE (HCC): ICD-10-CM

## 2019-01-03 DIAGNOSIS — I25.10 CHRONIC CORONARY ARTERY DISEASE: ICD-10-CM

## 2019-01-03 DIAGNOSIS — G47.8 PULMONARY HYPERTENSION DUE TO SLEEP-DISORDERED BREATHING (HCC): ICD-10-CM

## 2019-01-03 DIAGNOSIS — Z99.81 SUPPLEMENTAL OXYGEN DEPENDENT: ICD-10-CM

## 2019-01-03 DIAGNOSIS — I50.43 ACUTE ON CHRONIC COMBINED SYSTOLIC AND DIASTOLIC HF (HEART FAILURE) (HCC): Primary | ICD-10-CM

## 2019-01-03 DIAGNOSIS — G47.33 OSA (OBSTRUCTIVE SLEEP APNEA): ICD-10-CM

## 2019-01-03 DIAGNOSIS — Z95.2 HISTORY OF MVR WITH CARDIOPULMONARY BYPASS: ICD-10-CM

## 2019-01-03 DIAGNOSIS — I35.0 AORTIC VALVE STENOSIS, ETIOLOGY OF CARDIAC VALVE DISEASE UNSPECIFIED: ICD-10-CM

## 2019-01-03 DIAGNOSIS — Z79.4 TYPE 2 DIABETES MELLITUS WITH OTHER CIRCULATORY COMPLICATION, WITH LONG-TERM CURRENT USE OF INSULIN (HCC): ICD-10-CM

## 2019-01-03 DIAGNOSIS — I34.0 MITRAL VALVE INSUFFICIENCY, UNSPECIFIED ETIOLOGY: ICD-10-CM

## 2019-01-03 DIAGNOSIS — E11.59 TYPE 2 DIABETES MELLITUS WITH OTHER CIRCULATORY COMPLICATION, WITH LONG-TERM CURRENT USE OF INSULIN (HCC): ICD-10-CM

## 2019-01-03 DIAGNOSIS — E66.01 CLASS 3 SEVERE OBESITY DUE TO EXCESS CALORIES WITH SERIOUS COMORBIDITY AND BODY MASS INDEX (BMI) OF 45.0 TO 49.9 IN ADULT (HCC): ICD-10-CM

## 2019-01-03 PROCEDURE — 99214 OFFICE O/P EST MOD 30 MIN: CPT | Performed by: NURSE PRACTITIONER

## 2019-01-03 PROCEDURE — 93000 ELECTROCARDIOGRAM COMPLETE: CPT | Performed by: NURSE PRACTITIONER

## 2019-01-03 RX ORDER — VILAZODONE HYDROCHLORIDE 20 MG/1
TABLET ORAL
COMMUNITY
Start: 2019-01-02 | End: 2019-06-10

## 2019-01-07 RX ORDER — OMEPRAZOLE 40 MG/1
CAPSULE, DELAYED RELEASE ORAL
Qty: 30 CAPSULE | Refills: 0 | Status: SHIPPED | OUTPATIENT
Start: 2019-01-07 | End: 2019-02-03 | Stop reason: SDUPTHER

## 2019-01-07 RX ORDER — PROMETHAZINE HYDROCHLORIDE 25 MG/1
TABLET ORAL
Qty: 30 TABLET | Refills: 0 | Status: SHIPPED | OUTPATIENT
Start: 2019-01-07 | End: 2019-06-10

## 2019-01-10 ENCOUNTER — TELEPHONE (OUTPATIENT)
Dept: CARDIOLOGY | Facility: CLINIC | Age: 74
End: 2019-01-10

## 2019-01-10 NOTE — TELEPHONE ENCOUNTER
4:16 PM  Patient instructed to increase Atenolol and scheduled to see Adri Monday.  Solange Altman RN  Triage nurse

## 2019-01-10 NOTE — TELEPHONE ENCOUNTER
Patient called to report her daughter bought her a new automatic blood pressure monitor with a large cuff to fit her arm better.  She states that her BP yesterday with the new cuff was 197/101 HR 92 early in the day, 197/105  HR 92 later in the day.  She is using her CPAP every evening and the shortness of breath is improving.      Patient is currently taking:  Atenolol 25 mg daily, Irbesartan 300 mg daily and Lasix 20 mg daily.     Patient can be reached at [phone]./ MIKKI

## 2019-01-10 NOTE — TELEPHONE ENCOUNTER
Ewelina requested that I call for further information and triage.    Patients bp now is 209/116 hr 91  This is a new bp cuff, but she tells me that she got out her old bp cuff and got the same value, so she feels her machine is correct.    Her only symptom is a ha.    Cardiac meds  Atenolol 25mg daily  lipitor  Irbesartan 300mg hs  Furosemide 20mg BID    Does she need a med change?    Solange Altman RN  Triage nurse

## 2019-01-14 RX ORDER — CLONIDINE HYDROCHLORIDE 0.2 MG/1
0.2 TABLET ORAL DAILY
Qty: 30 TABLET | Refills: 0 | Status: SHIPPED | OUTPATIENT
Start: 2019-01-14 | End: 2019-02-09 | Stop reason: SDUPTHER

## 2019-01-14 NOTE — TELEPHONE ENCOUNTER
Have her take atenolol BID.  Also have Adri send in prescription for Clonidine 0.2 mg daily.  She needs to see pt later this week too

## 2019-01-14 NOTE — TELEPHONE ENCOUNTER
Pt called back stating she had to cancel her appt due to GI bug.  She states her BP is still 190/101.  Currently taking all meds as prescribed.  Please advise.    [facility] RMA

## 2019-01-14 NOTE — TELEPHONE ENCOUNTER
Rx sent. She needs to f/u with bp readings in 1 week and see her PCP as previously advised by Dr. Ayers. Also insure she is wearing her cpap machine as she had not been last time I saw her.

## 2019-01-17 RX ORDER — IRBESARTAN 300 MG/1
TABLET ORAL
Qty: 90 TABLET | Refills: 0 | Status: SHIPPED | OUTPATIENT
Start: 2019-01-17 | End: 2019-04-18 | Stop reason: SDUPTHER

## 2019-01-25 ENCOUNTER — TELEPHONE (OUTPATIENT)
Dept: INTERNAL MEDICINE | Facility: CLINIC | Age: 74
End: 2019-01-25

## 2019-01-25 NOTE — TELEPHONE ENCOUNTER
Patient states she has had cough and sore throat for past 2 days, She is taking  mucinex but it is not helping, is there anything else she can do?

## 2019-02-04 RX ORDER — METFORMIN HYDROCHLORIDE 750 MG/1
TABLET, EXTENDED RELEASE ORAL
Qty: 60 TABLET | Refills: 2 | Status: SHIPPED | OUTPATIENT
Start: 2019-02-04 | End: 2019-04-29

## 2019-02-04 RX ORDER — OMEPRAZOLE 40 MG/1
CAPSULE, DELAYED RELEASE ORAL
Qty: 30 CAPSULE | Refills: 2 | Status: SHIPPED | OUTPATIENT
Start: 2019-02-04 | End: 2019-05-05 | Stop reason: SDUPTHER

## 2019-02-11 ENCOUNTER — TELEPHONE (OUTPATIENT)
Dept: INTERNAL MEDICINE | Facility: CLINIC | Age: 74
End: 2019-02-11

## 2019-02-11 RX ORDER — CLONIDINE HYDROCHLORIDE 0.2 MG/1
TABLET ORAL
Qty: 90 TABLET | Refills: 0 | Status: SHIPPED | OUTPATIENT
Start: 2019-02-11 | End: 2019-06-10 | Stop reason: SDUPTHER

## 2019-02-11 NOTE — TELEPHONE ENCOUNTER
Patient called stating that she has a sinus headache (she thinks that it is from her oxygen).  She has tried Excedrin, Excedrin ES, and Excedrin Migraine.  She wanted to know if there was anything else that she could try.  Please advise.

## 2019-02-13 RX ORDER — CLONIDINE HYDROCHLORIDE 0.2 MG/1
TABLET ORAL
Qty: 30 TABLET | Refills: 0 | Status: SHIPPED | OUTPATIENT
Start: 2019-02-13 | End: 2019-03-21 | Stop reason: SDUPTHER

## 2019-02-15 DIAGNOSIS — J45.909 UNCOMPLICATED ASTHMA, UNSPECIFIED ASTHMA SEVERITY, UNSPECIFIED WHETHER PERSISTENT: Primary | ICD-10-CM

## 2019-02-15 RX ORDER — DULAGLUTIDE 0.75 MG/.5ML
INJECTION, SOLUTION SUBCUTANEOUS
Qty: 1 PEN | Refills: 2 | Status: SHIPPED | OUTPATIENT
Start: 2019-02-15 | End: 2019-05-10 | Stop reason: SDUPTHER

## 2019-02-15 RX ORDER — ALBUTEROL SULFATE 90 UG/1
2 AEROSOL, METERED RESPIRATORY (INHALATION) EVERY 6 HOURS PRN
Qty: 18 G | Refills: 0 | Status: SHIPPED | OUTPATIENT
Start: 2019-02-15 | End: 2021-01-06

## 2019-02-15 NOTE — TELEPHONE ENCOUNTER
Patient request refill for nebulizer medication, ipratropium albuterol  previously from Dr Guzman patient states she has not seen him in 3 years. She states she only uses it when she has a cold, Can you refill?

## 2019-02-15 NOTE — TELEPHONE ENCOUNTER
Use metered-dose Ventolin inhaler follow-up appointment if escalating treatment by using nebulizer

## 2019-02-18 RX ORDER — ATENOLOL 25 MG/1
TABLET ORAL
Qty: 90 TABLET | Refills: 0 | Status: SHIPPED | OUTPATIENT
Start: 2019-02-18 | End: 2019-05-19 | Stop reason: SDUPTHER

## 2019-03-01 RX ORDER — NYSTATIN 100000 U/G
CREAM TOPICAL
Qty: 30 G | Refills: 3 | Status: SHIPPED | OUTPATIENT
Start: 2019-03-01 | End: 2019-05-24 | Stop reason: SDUPTHER

## 2019-03-18 ENCOUNTER — TELEPHONE (OUTPATIENT)
Dept: INTERNAL MEDICINE | Facility: CLINIC | Age: 74
End: 2019-03-18

## 2019-03-18 NOTE — TELEPHONE ENCOUNTER
Tessa with [facility] called stating that patient has been having bilateral leg pain - pain level 10 out of 10.  She has been taking Excedrin.  Unable to reach patient - LMA to call.

## 2019-03-19 NOTE — TELEPHONE ENCOUNTER
Patient returned call stating that her legs are swollen and very painful.  It is hard for her to walk.  She said that they have been this way for about a month.  She has had some shortness of breath but said that it is normal for her. She said that if she keeps her feet elevated the swelling is better.  She said that Dr. Bush took her off of her Motrin 800 mg and this helped with the pain.  Patient stated that she will not go to the ER or Urgent Care to be seen due to the expense.  Patient was notified that Dr. Bush would like to see her in the office but she declined stating that she does not have a ride and then stated that her son-in-law has to take her dog for his shots and a check up.  Patient does not understand why she can not take the Motrin 800 mg.  Patient also stated that the nurse with [facility] told her that she probably needs a new sleep apnea study for a new C-PAP machine.  Please advise.

## 2019-03-21 ENCOUNTER — OFFICE VISIT (OUTPATIENT)
Dept: INTERNAL MEDICINE | Facility: CLINIC | Age: 74
End: 2019-03-21

## 2019-03-21 VITALS
DIASTOLIC BLOOD PRESSURE: 72 MMHG | BODY MASS INDEX: 55.37 KG/M2 | TEMPERATURE: 98.4 F | SYSTOLIC BLOOD PRESSURE: 156 MMHG | HEART RATE: 65 BPM | OXYGEN SATURATION: 98 % | WEIGHT: 283.5 LBS

## 2019-03-21 DIAGNOSIS — I27.29 PULMONARY HYPERTENSION DUE TO SLEEP-DISORDERED BREATHING (HCC): ICD-10-CM

## 2019-03-21 DIAGNOSIS — I35.0 AORTIC VALVE STENOSIS, ETIOLOGY OF CARDIAC VALVE DISEASE UNSPECIFIED: ICD-10-CM

## 2019-03-21 DIAGNOSIS — I50.43 ACUTE ON CHRONIC COMBINED SYSTOLIC AND DIASTOLIC HF (HEART FAILURE) (HCC): ICD-10-CM

## 2019-03-21 DIAGNOSIS — E78.2 MIXED HYPERLIPIDEMIA: ICD-10-CM

## 2019-03-21 DIAGNOSIS — I10 ESSENTIAL HYPERTENSION: Primary | ICD-10-CM

## 2019-03-21 DIAGNOSIS — G47.8 PULMONARY HYPERTENSION DUE TO SLEEP-DISORDERED BREATHING (HCC): ICD-10-CM

## 2019-03-21 DIAGNOSIS — G47.33 OSA (OBSTRUCTIVE SLEEP APNEA): ICD-10-CM

## 2019-03-21 PROCEDURE — 99214 OFFICE O/P EST MOD 30 MIN: CPT | Performed by: FAMILY MEDICINE

## 2019-03-21 RX ORDER — PNEUMOCOCCAL 13-VALENT CONJUGATE VACCINE 2.2; 2.2; 2.2; 2.2; 2.2; 4.4; 2.2; 2.2; 2.2; 2.2; 2.2; 2.2; 2.2 UG/.5ML; UG/.5ML; UG/.5ML; UG/.5ML; UG/.5ML; UG/.5ML; UG/.5ML; UG/.5ML; UG/.5ML; UG/.5ML; UG/.5ML; UG/.5ML; UG/.5ML
INJECTION, SUSPENSION INTRAMUSCULAR
COMMUNITY
Start: 2019-02-18 | End: 2019-03-21

## 2019-03-21 RX ORDER — DOXEPIN HYDROCHLORIDE 50 MG/1
50 CAPSULE ORAL DAILY
COMMUNITY
Start: 2019-03-04 | End: 2019-12-11

## 2019-03-21 RX ORDER — TRAMADOL HYDROCHLORIDE 50 MG/1
50 TABLET ORAL EVERY 8 HOURS PRN
Qty: 30 TABLET | Refills: 0 | Status: SHIPPED | OUTPATIENT
Start: 2019-03-21 | End: 2019-09-27 | Stop reason: SDUPTHER

## 2019-03-21 RX ORDER — FUROSEMIDE 20 MG/1
20 TABLET ORAL 3 TIMES DAILY
Qty: 42 TABLET | Refills: 0 | Status: SHIPPED | OUTPATIENT
Start: 2019-03-21 | End: 2019-04-01 | Stop reason: SDUPTHER

## 2019-03-21 RX ORDER — QUETIAPINE FUMARATE 100 MG/1
100 TABLET, FILM COATED ORAL NIGHTLY
COMMUNITY
Start: 2019-03-15 | End: 2019-06-10 | Stop reason: ALTCHOICE

## 2019-03-21 NOTE — PROGRESS NOTES
Miguelina Lopez is a 73 y.o. female.      Assessment/Plan   Problem List Items Addressed This Visit        Cardiovascular and Mediastinum    Hyperlipidemia    Essential hypertension - Primary    Overview     Impression: 03/30/2015 - BP is high will add BB;          Relevant Medications    furosemide (LASIX) 20 MG tablet    Other Relevant Orders    Basic Metabolic Panel    Pulmonary hypertension due to sleep-disordered breathing (CMS/HCC)    Overview     RVSP 61 mmHg TARIQ 06/10/16         Acute on chronic combined systolic and diastolic HF (heart failure) (CMS/HCC)    Aortic stenosis       Respiratory    OCHOA (obstructive sleep apnea)    Overview      CPAP auto with O2 at 2 L  Nasal Pillows              Increase furosemide 60 mg daily for 2 weeks follow-up results of BMP may need to decrease metformin or discontinue altogether encourage physical activity walking tramadol for pain follow-up in 1-2 weeks if no improvement    Return in about 2 weeks (around 4/4/2019), or if symptoms worsen or fail to improve, for Recheck.      Chief Complaint   Patient presents with   • leg/feet swelling     Social History     Tobacco Use   • Smoking status: Never Smoker   • Smokeless tobacco: Never Used   Substance Use Topics   • Alcohol use: No   • Drug use: No       History of Present Illness   Patient comes in for swelling of lower extremities that has been worsened over the last few weeks no specific dietary indiscretions she is not very active her furosemide had been controlling her lower extremity edema she is also using ambulatory O2 and well as CPAP like a new sleep apparatus and prescription is written for her to obtain those  She feels that her heart rate is stable is underlying pulmonary hypertension as well as obstructive sleep apnea.  Pain behind left knee is bothering her more than pain in the right this is been chronic in nature  The following portions of the patient's history were reviewed and updated as  appropriate:PMHroutine: Social history , Allergies, Current Medications, Active Problem List and Health Maintenance    Review of Systems   Constitutional: Positive for fatigue.   HENT: Negative.    Eyes: Negative.    Respiratory: Positive for shortness of breath. Negative for cough and chest tightness.    Cardiovascular: Positive for leg swelling. Negative for chest pain and palpitations.   Gastrointestinal: Negative.    Genitourinary: Negative.    Musculoskeletal: Positive for gait problem.   Psychiatric/Behavioral: Negative.        Objective   Vitals:    03/21/19 1521   BP: 156/72   BP Location: Right arm   Patient Position: Sitting   Cuff Size: Large Adult   Pulse: 65   Temp: 98.4 °F (36.9 °C)   TempSrc: Oral   SpO2: 98%   Weight: 129 kg (283 lb 8 oz)     Body mass index is 55.37 kg/m².  Physical Exam   Constitutional: She is oriented to person, place, and time. She appears well-developed and well-nourished.   HENT:   Head: Normocephalic and atraumatic.   Mouth/Throat: Oropharynx is clear and moist.   Eyes: Conjunctivae are normal. Right eye exhibits no discharge. Left eye exhibits no discharge. No scleral icterus.   Cardiovascular: Normal rate.   Murmur heard.  Pulmonary/Chest: Effort normal. No stridor. She has no wheezes.   Abdominal:   Morbidly obese   Musculoskeletal: She exhibits edema.   3+ bilaterally to knees   Neurological: She is alert and oriented to person, place, and time.   Psychiatric: She has a normal mood and affect. Her behavior is normal. Thought content normal.   Nursing note and vitals reviewed.    Reviewed Data:  No visits with results within 1 Month(s) from this visit.   Latest known visit with results is:   Office Visit on 10/29/2018   Component Date Value Ref Range Status   • Glucose 10/29/2018 138* 65 - 99 mg/dL Final   • BUN 10/29/2018 28* 8 - 23 mg/dL Final   • Creatinine 10/29/2018 1.33* 0.57 - 1.00 mg/dL Final   • eGFR Non  Am 10/29/2018 39* >60 mL/min/1.73 Final    Comment:  The MDRD GFR formula is only valid for adults with stable  renal function between ages 18 and 70.     • eGFR  Am 10/29/2018 47* >60 mL/min/1.73 Final   • BUN/Creatinine Ratio 10/29/2018 21.1  7.0 - 25.0 Final   • Sodium 10/29/2018 146* 136 - 145 mmol/L Final   • Potassium 10/29/2018 4.2  3.5 - 5.2 mmol/L Final   • Chloride 10/29/2018 99  98 - 107 mmol/L Final   • Total CO2 10/29/2018 30.2* 22.0 - 29.0 mmol/L Final   • Calcium 10/29/2018 9.7  8.6 - 10.5 mg/dL Final   • Total Protein 10/29/2018 7.4  6.0 - 8.5 g/dL Final   • Albumin 10/29/2018 4.40  3.50 - 5.20 g/dL Final   • Globulin 10/29/2018 3.0  gm/dL Final   • A/G Ratio 10/29/2018 1.5  g/dL Final   • Total Bilirubin 10/29/2018 0.4  0.1 - 1.2 mg/dL Final   • Alkaline Phosphatase 10/29/2018 183* 39 - 117 U/L Final   • AST (SGOT) 10/29/2018 16  1 - 32 U/L Final   • ALT (SGPT) 10/29/2018 11  1 - 33 U/L Final   • Hemoglobin A1C 10/29/2018 6.89* 4.80 - 5.60 % Final    Comment: Hemoglobin A1C Ranges:  Increased Risk for Diabetes  5.7% to 6.4%  Diabetes                     >= 6.5%  Diabetic Goal                < 7.0%     • Total Cholesterol 10/29/2018 181  0 - 200 mg/dL Final   • Triglycerides 10/29/2018 118  0 - 150 mg/dL Final   • HDL Cholesterol 10/29/2018 67* 40 - 60 mg/dL Final   • VLDL Cholesterol 10/29/2018 23.6  5 - 40 mg/dL Final   • LDL Cholesterol  10/29/2018 90  0 - 100 mg/dL Final

## 2019-03-22 LAB
BUN SERPL-MCNC: 30 MG/DL (ref 8–23)
BUN/CREAT SERPL: 24.8 (ref 7–25)
CALCIUM SERPL-MCNC: 9.4 MG/DL (ref 8.6–10.5)
CHLORIDE SERPL-SCNC: 101 MMOL/L (ref 98–107)
CO2 SERPL-SCNC: 29.5 MMOL/L (ref 22–29)
CREAT SERPL-MCNC: 1.21 MG/DL (ref 0.57–1)
GLUCOSE SERPL-MCNC: 104 MG/DL (ref 65–99)
POTASSIUM SERPL-SCNC: 4.8 MMOL/L (ref 3.5–5.2)
SODIUM SERPL-SCNC: 145 MMOL/L (ref 136–145)

## 2019-03-24 DIAGNOSIS — I25.10 CHRONIC CORONARY ARTERY DISEASE: ICD-10-CM

## 2019-03-25 RX ORDER — FUROSEMIDE 20 MG/1
TABLET ORAL
Qty: 180 TABLET | Refills: 3 | Status: SHIPPED | OUTPATIENT
Start: 2019-03-25 | End: 2019-04-10 | Stop reason: SDUPTHER

## 2019-03-25 RX ORDER — ASPIRIN 325 MG
TABLET, DELAYED RELEASE (ENTERIC COATED) ORAL
Qty: 90 TABLET | Refills: 0 | Status: SHIPPED | OUTPATIENT
Start: 2019-03-25 | End: 2019-06-10

## 2019-04-01 RX ORDER — FUROSEMIDE 20 MG/1
TABLET ORAL
Qty: 42 TABLET | Refills: 0 | Status: SHIPPED | OUTPATIENT
Start: 2019-04-01 | End: 2019-04-10 | Stop reason: SDUPTHER

## 2019-04-01 NOTE — TELEPHONE ENCOUNTER
Patient notified and expressed understanding.  Patient stated that she weighed 279 at home today.

## 2019-04-01 NOTE — TELEPHONE ENCOUNTER
----- Message from Channing Bush MD sent at 3/29/2019  1:46 PM EDT -----  Labs stable continue same medications follow-up in 3 months oe as needed

## 2019-04-03 ENCOUNTER — TELEPHONE (OUTPATIENT)
Dept: INTERNAL MEDICINE | Facility: CLINIC | Age: 74
End: 2019-04-03

## 2019-04-03 NOTE — TELEPHONE ENCOUNTER
Patient called stating that she would like to know if Dr. Bush would sign orders for her C-PAP machine (she does not see a pulmonologist). She has been using a C-PAP that was previously ordered by Dr. Bush.  She also wanted to know if she is to continue taking metformin.  Please advise.

## 2019-04-04 NOTE — TELEPHONE ENCOUNTER
Stop metformin because of kidney function up appointment 1 month to discuss all chronic medical problems come in fasting

## 2019-04-10 RX ORDER — FUROSEMIDE 20 MG/1
TABLET ORAL
Qty: 180 TABLET | Refills: 0 | Status: SHIPPED | OUTPATIENT
Start: 2019-04-10 | End: 2019-04-29 | Stop reason: SDUPTHER

## 2019-04-18 RX ORDER — IRBESARTAN 300 MG/1
TABLET ORAL
Qty: 90 TABLET | Refills: 0 | Status: SHIPPED | OUTPATIENT
Start: 2019-04-18 | End: 2019-06-10

## 2019-04-19 ENCOUNTER — TELEPHONE (OUTPATIENT)
Dept: INTERNAL MEDICINE | Facility: CLINIC | Age: 74
End: 2019-04-19

## 2019-04-19 DIAGNOSIS — R60.0 BILATERAL EDEMA OF LOWER EXTREMITY: Primary | ICD-10-CM

## 2019-04-19 NOTE — TELEPHONE ENCOUNTER
Patient called stated that she is still having edema in both legs.  She is fine if she has her feet elevated but as soon as she starts to walk around she gets out of breath and the swelling in her legs gets worse.  Patient also stated that she thinks that she has a spur on the bottom of her right foot.  Patient stated that she is taking furosemide 30 mg 1 three times a day.  Please advise.

## 2019-04-22 ENCOUNTER — TELEPHONE (OUTPATIENT)
Dept: INTERNAL MEDICINE | Facility: CLINIC | Age: 74
End: 2019-04-22

## 2019-04-22 RX ORDER — FUROSEMIDE 20 MG/1
20 TABLET ORAL DAILY
Qty: 30 TABLET | Refills: 1 | Status: SHIPPED | OUTPATIENT
Start: 2019-04-22 | End: 2019-06-10 | Stop reason: SDUPTHER

## 2019-04-22 NOTE — TELEPHONE ENCOUNTER
Rody [phone] called asking for correct dosage for Lasix - there is 2 sets of directions on prescription.  Please advise.

## 2019-04-22 NOTE — TELEPHONE ENCOUNTER
FYI - patient was also prescribed Lasix 20 mg 1 BID #180 by Dr. Ayers on 4/10/19 (this was confirmed by Rody).  Please advise.

## 2019-04-24 NOTE — TELEPHONE ENCOUNTER
Patient notified that Dr. Bush would like for her to follow  's direction on taking Lasix.  Patient expressed understanding.

## 2019-04-29 ENCOUNTER — RESULTS ENCOUNTER (OUTPATIENT)
Dept: INTERNAL MEDICINE | Facility: CLINIC | Age: 74
End: 2019-04-29

## 2019-04-29 ENCOUNTER — OFFICE VISIT (OUTPATIENT)
Dept: INTERNAL MEDICINE | Facility: CLINIC | Age: 74
End: 2019-04-29

## 2019-04-29 ENCOUNTER — TELEPHONE (OUTPATIENT)
Dept: INTERNAL MEDICINE | Facility: CLINIC | Age: 74
End: 2019-04-29

## 2019-04-29 VITALS
TEMPERATURE: 98.4 F | SYSTOLIC BLOOD PRESSURE: 154 MMHG | WEIGHT: 282.4 LBS | BODY MASS INDEX: 55.15 KG/M2 | DIASTOLIC BLOOD PRESSURE: 70 MMHG | OXYGEN SATURATION: 91 % | HEART RATE: 70 BPM

## 2019-04-29 DIAGNOSIS — I50.43 ACUTE ON CHRONIC COMBINED SYSTOLIC AND DIASTOLIC HF (HEART FAILURE) (HCC): Primary | ICD-10-CM

## 2019-04-29 DIAGNOSIS — E66.01 CLASS 3 SEVERE OBESITY DUE TO EXCESS CALORIES WITH SERIOUS COMORBIDITY AND BODY MASS INDEX (BMI) OF 45.0 TO 49.9 IN ADULT (HCC): ICD-10-CM

## 2019-04-29 DIAGNOSIS — I25.10 CHRONIC CORONARY ARTERY DISEASE: ICD-10-CM

## 2019-04-29 DIAGNOSIS — Z00.00 MEDICARE ANNUAL WELLNESS VISIT, SUBSEQUENT: ICD-10-CM

## 2019-04-29 DIAGNOSIS — E11.59 TYPE 2 DIABETES MELLITUS WITH OTHER CIRCULATORY COMPLICATION, WITH LONG-TERM CURRENT USE OF INSULIN (HCC): ICD-10-CM

## 2019-04-29 DIAGNOSIS — Z79.4 TYPE 2 DIABETES MELLITUS WITH OTHER CIRCULATORY COMPLICATION, WITH LONG-TERM CURRENT USE OF INSULIN (HCC): ICD-10-CM

## 2019-04-29 DIAGNOSIS — N18.30 STAGE 3 CHRONIC KIDNEY DISEASE (HCC): ICD-10-CM

## 2019-04-29 DIAGNOSIS — I10 ESSENTIAL HYPERTENSION: ICD-10-CM

## 2019-04-29 PROCEDURE — 99214 OFFICE O/P EST MOD 30 MIN: CPT | Performed by: FAMILY MEDICINE

## 2019-04-29 PROCEDURE — G0439 PPPS, SUBSEQ VISIT: HCPCS | Performed by: FAMILY MEDICINE

## 2019-04-29 PROCEDURE — 96160 PT-FOCUSED HLTH RISK ASSMT: CPT | Performed by: FAMILY MEDICINE

## 2019-04-29 RX ORDER — METOLAZONE 5 MG/1
5 TABLET ORAL DAILY
Qty: 15 TABLET | Refills: 1 | Status: SHIPPED | OUTPATIENT
Start: 2019-04-29 | End: 2019-05-02 | Stop reason: SINTOL

## 2019-04-29 RX ORDER — POTASSIUM CHLORIDE 20 MEQ/1
TABLET, EXTENDED RELEASE ORAL
COMMUNITY
Start: 2019-04-07 | End: 2019-05-06 | Stop reason: SDUPTHER

## 2019-04-29 RX ORDER — FUROSEMIDE 20 MG/1
60 TABLET ORAL DAILY
Qty: 90 TABLET | Refills: 3 | Status: SHIPPED | OUTPATIENT
Start: 2019-04-29 | End: 2019-06-14 | Stop reason: HOSPADM

## 2019-04-29 NOTE — PROGRESS NOTES
QUICK REFERENCE INFORMATION:  The ABCs of the Annual Wellness Visit    Subsequent Medicare Wellness Visit     HEALTH RISK ASSESSMENT    : 1945    Recent Hospitalizations:  No hospitalization(s) within the last year..  ccc      Current Medical Providers:  Patient Care Team:  Channing Bush MD as PCP - General (Family Medicine)  Channing Bush MD as Referring Physician (Family Medicine)  Rbobie Wilson MD as Consulting Physician (Hematology and Oncology)  Chace Johnson MD as Consulting Physician (Cardiology)        Smoking Status:  Social History     Tobacco Use   Smoking Status Never Smoker   Smokeless Tobacco Never Used       Alcohol Consumption:  Social History     Substance and Sexual Activity   Alcohol Use No       Depression Screen:   PHQ-2/PHQ-9 Depression Screening 2019   Little interest or pleasure in doing things 3   Feeling down, depressed, or hopeless 3   Trouble falling or staying asleep, or sleeping too much 1   Feeling tired or having little energy 1   Poor appetite or overeating 3   Feeling bad about yourself - or that you are a failure or have let yourself or your family down 1   Trouble concentrating on things, such as reading the newspaper or watching television 1   Moving or speaking so slowly that other people could have noticed. Or the opposite - being so fidgety or restless that you have been moving around a lot more than usual 3   Thoughts that you would be better off dead, or of hurting yourself in some way 0   Total Score 16   If you checked off any problems, how difficult have these problems made it for you to do your work, take care of things at home, or get along with other people? Somewhat difficult       Health Habits and Functional and Cognitive Screening:  Functional & Cognitive Status 2019   Do you have difficulty preparing food and eating? No   Do you have difficulty bathing yourself, getting dressed or grooming yourself? No   Do you have difficulty  using the toilet? No   Do you have difficulty moving around from place to place? Yes   Do you have trouble with steps or getting out of a bed or a chair? Yes   In the past year have you fallen or experienced a near fall? Yes   Current Diet Limited Junk Food   Dental Exam Not up to date   Eye Exam Not up to date   Exercise (times per week) 2 times per week   Current Exercise Activities Include Housecleaning   Do you need help using the phone?  No   Are you deaf or do you have serious difficulty hearing?  No   Do you need help with transportation? Yes   Do you need help shopping? No   Do you need help preparing meals?  No   Do you need help with housework?  Yes   Do you need help with laundry? No   Do you need help taking your medications? No   Do you need help managing money? No   Do you ever drive or ride in a car without wearing a seat belt? No   Have you felt unusual stress, anger or loneliness in the last month? Yes   Who do you live with? Other   If you need help, do you have trouble finding someone available to you? No   Have you been bothered in the last four weeks by sexual problems? No   Do you have difficulty concentrating, remembering or making decisions? Yes           Does the patient have evidence of cognitive impairment? No    Asiprin use counseling: Taking ASA appropriately as indicated      Recent Lab Results:    Lab Results   Component Value Date     (H) 03/21/2019     Lab Results   Component Value Date    HGBA1C 6.89 (H) 10/29/2018     Lab Results   Component Value Date    CHOL 214 (H) 05/01/2017    TRIG 118 10/29/2018    HDL 67 (H) 10/29/2018    VLDL 23.6 10/29/2018    LDLHDL 2.01 05/01/2017           Age-appropriate Screening Schedule:  Refer to the list below for future screening recommendations based on patient's age, sex and/or medical conditions. Orders for these recommended tests are listed in the plan section. The patient has been provided with a written plan.    Health Maintenance    Topic Date Due   • TDAP/TD VACCINES (1 - Tdap) 12/20/1963   • ZOSTER VACCINE (2 of 2) 06/26/2012   • DIABETIC EYE EXAM  09/01/2018   • URINE MICROALBUMIN  02/22/2019   • HEMOGLOBIN A1C  04/29/2019   • MAMMOGRAM  08/14/2019   • DIABETIC FOOT EXAM  10/29/2019   • LIPID PANEL  10/29/2019   • COLONOSCOPY  01/01/2023   • INFLUENZA VACCINE  Discontinued   • PNEUMOCOCCAL VACCINES (65+ LOW/MEDIUM RISK)  Discontinued        Subjective   History of Present Illness    Miguelina Lopez is a 73 y.o. female who presents for an Annual Wellness Visit.    The following portions of the patient's history were reviewed and updated as appropriate: allergies, current medications, past family history, past medical history, past social history, past surgical history and problem list.    Outpatient Medications Prior to Visit   Medication Sig Dispense Refill   • albuterol sulfate HFA (VENTOLIN HFA) 108 (90 Base) MCG/ACT inhaler Inhale 2 puffs Every 6 (Six) Hours As Needed for Wheezing. 18 g 0   • ALPRAZolam (XANAX) 1 MG tablet Take 1 tablet by mouth 2 (Two) Times a Day As Needed for anxiety. 24 tablet 0   • aspirin  MG tablet TAKE ONE TABLET BY MOUTH DAILY 90 tablet 0   • atenolol (TENORMIN) 25 MG tablet TAKE ONE TABLET BY MOUTH DAILY 90 tablet 0   • atorvastatin (LIPITOR) 20 MG tablet TAKE ONE TABLET BY MOUTH DAILY 90 tablet 1   • CloNIDine (CATAPRES) 0.2 MG tablet TAKE ONE TABLET BY MOUTH DAILY 90 tablet 0   • doxepin (SINEquan) 50 MG capsule Take 50 mg by mouth Daily.     • ferrous sulfate 324 (65 FE) MG tablet delayed-release EC tablet Take 324 mg by mouth Daily With Breakfast.     • fluticasone-salmeterol (ADVAIR DISKUS) 250-50 MCG/DOSE DISKUS Inhale 1 puff Daily As Needed (cough and wheezing). 60 each 0   • furosemide (LASIX) 20 MG tablet Take 1 tablet by mouth Daily. TAKE THREE TABLETS BY MOUTH DAILY FOR 2 WEEKS 30 tablet 1   • ipratropium-albuterol (DUO-NEB) 0.5-2.5 mg/mL nebulizer Take 3 mL by nebulization 4 (four) times a  day. (Patient taking differently: Take 3 mL by nebulization Daily As Needed.) 3 mL 5   • irbesartan (AVAPRO) 300 MG tablet TAKE ONE TABLET BY MOUTH ONCE NIGHTLY 90 tablet 0   • nitroglycerin (NITROSTAT) 0.4 MG SL tablet Place 1 tablet under the tongue As Needed for Chest Pain.  - IF PAIN REMAINS AFTER 5 MIN CALL 911 AND REPEAT DOSE MAX 3 TABS IN 15 MINUTES 25 tablet 5   • nystatin (MYCOSTATIN) 891811 UNIT/GM cream APPLY TO AFFECTED AREA(S) TWO TIMES A DAY (Patient taking differently: APPLY TO AFFECTED AREA(S) TWO TIMES A DAY PRN) 30 g 3   • O2 (OXYGEN) Inhale 2 L/min Continuous.     • omeprazole (priLOSEC) 40 MG capsule TAKE ONE CAPSULE BY MOUTH DAILY 30 capsule 2   • promethazine (PHENERGAN) 25 MG tablet TAKE ONE TABLET BY MOUTH EVERY 8 HOURS AS NEEDED FOR NAUSEA AND VOMITING 30 tablet 0   • QUEtiapine (SEROquel) 100 MG tablet Take 100 mg by mouth Every Night.     • senna-docusate (PERICOLACE) 8.6-50 MG per tablet Take 1 tablet by mouth daily.     • traMADol (ULTRAM) 50 MG tablet Take 1 tablet by mouth Every 8 (Eight) Hours As Needed for Moderate Pain . 30 tablet 0   • TRULICITY 0.75 MG/0.5ML solution pen-injector INJECT 0.75 MG UNDER THE SKIN ONCE WEEKLY 1 pen 2   • potassium chloride (K-DUR,KLOR-CON) 10 MEQ CR tablet Take 10 mEq by mouth 2 (Two) Times a Day.     • potassium chloride (K-DUR,KLOR-CON) 20 MEQ CR tablet      • VIIBRYD 20 MG tablet tablet      • furosemide (LASIX) 20 MG tablet TAKE ONE TABLET BY MOUTH TWICE A  tablet 0   • metFORMIN ER (GLUCOPHAGE-XR) 750 MG 24 hr tablet TAKE ONE TABLET BY MOUTH TWICE A DAY 60 tablet 2     No facility-administered medications prior to visit.        Patient Active Problem List   Diagnosis   • Anxiety disorder   • Arthritis of knee   • Asthma   • Chronic coronary artery disease   • Chronic kidney disease   • Depression   • Diabetic peripheral neuropathy (CMS/HCC)   • Gastroesophageal reflux disease   • Hyperlipidemia   • Insomnia   • Lower gastrointestinal  hemorrhage   • Anemia   • OCHOA (obstructive sleep apnea)   • DM type 2 (diabetes mellitus, type 2) (CMS/HCC)   • Essential hypertension   • Hospital discharge follow-up   • Mitral stenosis   • Pulmonary hypertension due to sleep-disordered breathing (CMS/HCC)   • s/p MVR, TV-repair, CABG x2 6/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   • OLI (acute kidney injury) (CMS/HCC)   • Leukocytosis   • Atrial fibrillation (CMS/HCC)   • Nocturnal hypoxia   • Dermatitis   • Medicare annual wellness visit, initial   • Class 3 severe obesity due to excess calories with serious comorbidity and body mass index (BMI) of 45.0 to 49.9 in adult (CMS/HCC)   • Supplemental oxygen dependent   • Acute on chronic combined systolic and diastolic HF (heart failure) (CMS/HCC)   • Mitral regurgitation   • Aortic stenosis   • Medicare annual wellness visit, subsequent       Advance Care Planning:  Patient does not have an advance directive - information provided to the patient today    Identification of Risk Factors:  Risk factors include: weight , cardiovascular risk, inactivity and increased fall risk.    Review of Systems    Compared to one year ago, the patient feels her physical health is worse.  Compared to one year ago, the patient feels her mental health is the same.    Objective     Physical Exam    Vitals:    04/29/19 1144   BP: 154/70   BP Location: Right arm   Patient Position: Sitting   Cuff Size: Large Adult   Pulse: 70   Temp: 98.4 °F (36.9 °C)   TempSrc: Oral   SpO2: 91%   Weight: 128 kg (282 lb 6.4 oz)   PainSc:   8       Patient's Body mass index is 55.15 kg/m². BMI is above normal parameters. Recommendations include: educational material.      Assessment/Plan   Patient Self-Management and Personalized Health Advice  The patient has been provided with information about: diet, weight management, prevention of cardiac or vascular disease and fall prevention and preventive services including:   · Advance directive.    Visit Diagnoses:    ICD-10-CM ICD-9-CM   1.  Acute on chronic combined systolic and diastolic HF (heart failure) (CMS/[facility]) I50.43 428.43   2. Class 3 severe obesity due to excess calories with serious comorbidity and body mass index (BMI) of 45.0 to 49.9 in adult (CMS/[facility]) E66.01 278.01    Z68.42 V85.42   3. Medicare annual wellness visit, subsequent Z00.00 V70.0   4. Essential hypertension I10 401.9   5. Chronic coronary artery disease I25.10 414.00   6. Stage 3 chronic kidney disease (CMS/HCC) N18.3 585.3       No orders of the defined types were placed in this encounter.      Outpatient Encounter Medications as of 4/29/2019   Medication Sig Dispense Refill   • albuterol sulfate HFA (VENTOLIN HFA) 108 (90 Base) MCG/ACT inhaler Inhale 2 puffs Every 6 (Six) Hours As Needed for Wheezing. 18 g 0   • ALPRAZolam (XANAX) 1 MG tablet Take 1 tablet by mouth 2 (Two) Times a Day As Needed for anxiety. 24 tablet 0   • aspirin  MG tablet TAKE ONE TABLET BY MOUTH DAILY 90 tablet 0   • atenolol (TENORMIN) 25 MG tablet TAKE ONE TABLET BY MOUTH DAILY 90 tablet 0   • atorvastatin (LIPITOR) 20 MG tablet TAKE ONE TABLET BY MOUTH DAILY 90 tablet 1   • CloNIDine (CATAPRES) 0.2 MG tablet TAKE ONE TABLET BY MOUTH DAILY 90 tablet 0   • doxepin (SINEquan) 50 MG capsule Take 50 mg by mouth Daily.     • ferrous sulfate 324 (65 FE) MG tablet delayed-release EC tablet Take 324 mg by mouth Daily With Breakfast.     • fluticasone-salmeterol (ADVAIR DISKUS) 250-50 MCG/DOSE DISKUS Inhale 1 puff Daily As Needed (cough and wheezing). 60 each 0   • furosemide (LASIX) 20 MG tablet Take 1 tablet by mouth Daily. TAKE THREE TABLETS BY MOUTH DAILY FOR 2 WEEKS 30 tablet 1   • ipratropium-albuterol (DUO-NEB) 0.5-2.5 mg/mL nebulizer Take 3 mL by nebulization 4 (four) times a day. (Patient taking differently: Take 3 mL by nebulization Daily As Needed.) 3 mL 5   • irbesartan (AVAPRO) 300 MG tablet TAKE ONE TABLET BY MOUTH ONCE NIGHTLY 90 tablet 0   • nitroglycerin (NITROSTAT) 0.4 MG SL tablet Place  1 tablet under the tongue As Needed for Chest Pain.  - IF PAIN REMAINS AFTER 5 MIN CALL 911 AND REPEAT DOSE MAX 3 TABS IN 15 MINUTES 25 tablet 5   • nystatin (MYCOSTATIN) 895887 UNIT/GM cream APPLY TO AFFECTED AREA(S) TWO TIMES A DAY (Patient taking differently: APPLY TO AFFECTED AREA(S) TWO TIMES A DAY PRN) 30 g 3   • O2 (OXYGEN) Inhale 2 L/min Continuous.     • omeprazole (priLOSEC) 40 MG capsule TAKE ONE CAPSULE BY MOUTH DAILY 30 capsule 2   • promethazine (PHENERGAN) 25 MG tablet TAKE ONE TABLET BY MOUTH EVERY 8 HOURS AS NEEDED FOR NAUSEA AND VOMITING 30 tablet 0   • QUEtiapine (SEROquel) 100 MG tablet Take 100 mg by mouth Every Night.     • senna-docusate (PERICOLACE) 8.6-50 MG per tablet Take 1 tablet by mouth daily.     • traMADol (ULTRAM) 50 MG tablet Take 1 tablet by mouth Every 8 (Eight) Hours As Needed for Moderate Pain . 30 tablet 0   • TRULICITY 0.75 MG/0.5ML solution pen-injector INJECT 0.75 MG UNDER THE SKIN ONCE WEEKLY 1 pen 2   • furosemide (LASIX) 20 MG tablet Take 3 tablets by mouth Daily. 90 tablet 3   • metOLazone (ZAROXOLYN) 5 MG tablet Take 1 tablet by mouth Daily. 15 tablet 1   • potassium chloride (K-DUR,KLOR-CON) 10 MEQ CR tablet Take 10 mEq by mouth 2 (Two) Times a Day.     • potassium chloride (K-DUR,KLOR-CON) 20 MEQ CR tablet      • VIIBRYD 20 MG tablet tablet      • [DISCONTINUED] furosemide (LASIX) 20 MG tablet TAKE ONE TABLET BY MOUTH TWICE A  tablet 0   • [DISCONTINUED] metFORMIN ER (GLUCOPHAGE-XR) 750 MG 24 hr tablet TAKE ONE TABLET BY MOUTH TWICE A DAY 60 tablet 2     No facility-administered encounter medications on file as of 4/29/2019.        Reviewed use of high risk medication in the elderly: yes  Reviewed for potential of harmful drug interactions in the elderly: yes    Follow Up:  Return in about 3 months (around 7/29/2019), or if symptoms worsen or fail to improve, for Recheck, Next scheduled follow up.     An After Visit Summary and PPPS with all of these plans were  given to the patient.

## 2019-04-29 NOTE — TELEPHONE ENCOUNTER
Patient wanted to know if she is still not to take metformin - her blood sugars in the morning have been running 200, 159, 157 in the mornings.  Please advise.

## 2019-04-29 NOTE — PROGRESS NOTES
Miguelina Lopez is a 73 y.o. female.      Assessment/Plan   Problem List Items Addressed This Visit        Cardiovascular and Mediastinum    Chronic coronary artery disease    Overview     Description: 2 stents in 2013         Essential hypertension    Overview     Impression: 03/30/2015 - BP is high will add BB;          Relevant Medications    metOLazone (ZAROXOLYN) 5 MG tablet    furosemide (LASIX) 20 MG tablet    Other Relevant Orders    Lipid Panel    Acute on chronic combined systolic and diastolic HF (heart failure) (CMS/[facility]) - Primary       Digestive    Class 3 severe obesity due to excess calories with serious comorbidity and body mass index (BMI) of 45.0 to 49.9 in adult (CMS/[facility])       Endocrine    DM type 2 (diabetes mellitus, type 2) (CMS/[facility])    Overview     Impression: 03/30/2015 - A1c is 7.5 , Pt to check BS BID add, Amryl 1 mg once to twice daily;          Relevant Orders    Hemoglobin A1c    Lipid Panel    Microalbumin / Creatinine Urine Ratio - Urine, Clean Catch    Basic Metabolic Panel       Genitourinary    Chronic kidney disease    Overview     Impression: 03/30/2015 - Will watch , Avoid NSAID  , Control DM;          Relevant Medications    metOLazone (ZAROXOLYN) 5 MG tablet    furosemide (LASIX) 20 MG tablet    Other Relevant Orders    Basic Metabolic Panel       Other    Medicare annual wellness visit, subsequent           Continue furosemide 20 mg take 60 mg daily add Zaroxolyn 5 mg daily 2 weeks to present blood pressure medicine, follow-up 1 to 2 weeks fasting labs as ordered symptoms of cardiac dysfunction discussed  Return in about 3 months (around 7/29/2019), or if symptoms worsen or fail to improve, for Recheck, Next scheduled follow up.      Chief Complaint   Patient presents with   • Medicare Wellness-subsequent     Social History     Tobacco Use   • Smoking status: Never Smoker   • Smokeless tobacco: Never Used   Substance Use Topics   • Alcohol use: No   • Drug use: No        History of Present Illness   Follow-up appointment for lower extremity swelling bilaterally known to have coronary disease mitral regurgitation CHF she has a basal weight of 200 2230 pounds and she is gained 30 pounds in the last 3 months she had been on Lasix and she says she is taking it 3 times a day she does have any muscle cramps she is trying to watch her fluid intake and following a low salt diet  She has minimal cough that is productive of whitish sputum no fever no sweats or chills she says she urinates throughout the day approximately half a cup she is also had combination with Bumex in the past with improved diuresis she has no chest pain or dizziness  The following portions of the patient's history were reviewed and updated as appropriate:PMHroutine: Social history , Allergies, Current Medications, Active Problem List and Health Maintenance    Review of Systems   Constitutional: Positive for fatigue.   HENT: Negative.    Respiratory: Positive for shortness of breath.    Cardiovascular: Positive for leg swelling.   Gastrointestinal: Negative.    Genitourinary: Negative.    Musculoskeletal: Positive for gait problem.   Skin: Negative.    Psychiatric/Behavioral: Negative.        Objective   Vitals:    04/29/19 1144   BP: 154/70   BP Location: Right arm   Patient Position: Sitting   Cuff Size: Large Adult   Pulse: 70   Temp: 98.4 °F (36.9 °C)   TempSrc: Oral   SpO2: 91%   Weight: 128 kg (282 lb 6.4 oz)     Body mass index is 55.15 kg/m².  Physical Exam   Constitutional: She is oriented to person, place, and time. She appears well-developed and well-nourished.   HENT:   Head: Normocephalic and atraumatic.   Eyes: Conjunctivae are normal. Pupils are equal, round, and reactive to light. No scleral icterus.   Cardiovascular: Normal rate.   Murmur heard.  Pulmonary/Chest: Effort normal. She has rales.   Abdominal: She exhibits no distension. There is no tenderness.   Musculoskeletal: She exhibits edema.    Neurological: She is alert and oriented to person, place, and time.   Psychiatric: She has a normal mood and affect. Her behavior is normal. Judgment and thought content normal.   Nursing note and vitals reviewed.    Reviewed Data:  No visits with results within 1 Month(s) from this visit.   Latest known visit with results is:   Office Visit on 03/21/2019   Component Date Value Ref Range Status   • Glucose 03/21/2019 104* 65 - 99 mg/dL Final   • BUN 03/21/2019 30* 8 - 23 mg/dL Final   • Creatinine 03/21/2019 1.21* 0.57 - 1.00 mg/dL Final   • eGFR Non African Am 03/21/2019 43* >60 mL/min/1.73 Final    Comment: The MDRD GFR formula is only valid for adults with stable  renal function between ages 18 and 70.     • eGFR  Am 03/21/2019 53* >60 mL/min/1.73 Final   • BUN/Creatinine Ratio 03/21/2019 24.8  7.0 - 25.0 Final   • Sodium 03/21/2019 145  136 - 145 mmol/L Final   • Potassium 03/21/2019 4.8  3.5 - 5.2 mmol/L Final   • Chloride 03/21/2019 101  98 - 107 mmol/L Final   • Total CO2 03/21/2019 29.5* 22.0 - 29.0 mmol/L Final   • Calcium 03/21/2019 9.4  8.6 - 10.5 mg/dL Final

## 2019-04-30 ENCOUNTER — TELEPHONE (OUTPATIENT)
Dept: INTERNAL MEDICINE | Facility: CLINIC | Age: 74
End: 2019-04-30

## 2019-04-30 RX ORDER — GLIMEPIRIDE 1 MG/1
1 TABLET ORAL
Qty: 90 TABLET | Refills: 0 | Status: SHIPPED | OUTPATIENT
Start: 2019-04-30 | End: 2019-07-25 | Stop reason: SDUPTHER

## 2019-04-30 NOTE — TELEPHONE ENCOUNTER
Patient called stating that at yesterday's appointment Dr. Bush asked her if she has having any problems with her kidneys.  She said that she pees when she goes to the bathroom but she does have an odor.  She thought that it was because of the medicine.  Please advise.

## 2019-05-02 ENCOUNTER — TELEPHONE (OUTPATIENT)
Dept: INTERNAL MEDICINE | Facility: CLINIC | Age: 74
End: 2019-05-02

## 2019-05-03 RX ORDER — ATORVASTATIN CALCIUM 20 MG/1
TABLET, FILM COATED ORAL
Qty: 90 TABLET | Refills: 0 | Status: SHIPPED | OUTPATIENT
Start: 2019-05-03 | End: 2019-06-10

## 2019-05-03 RX ORDER — METFORMIN HYDROCHLORIDE 750 MG/1
TABLET, EXTENDED RELEASE ORAL
Qty: 60 TABLET | Refills: 1 | OUTPATIENT
Start: 2019-05-03

## 2019-05-04 ENCOUNTER — RESULTS ENCOUNTER (OUTPATIENT)
Dept: INTERNAL MEDICINE | Facility: CLINIC | Age: 74
End: 2019-05-04

## 2019-05-04 DIAGNOSIS — I10 ESSENTIAL HYPERTENSION: ICD-10-CM

## 2019-05-04 DIAGNOSIS — N18.30 STAGE 3 CHRONIC KIDNEY DISEASE (HCC): ICD-10-CM

## 2019-05-04 DIAGNOSIS — E11.59 TYPE 2 DIABETES MELLITUS WITH OTHER CIRCULATORY COMPLICATION, WITH LONG-TERM CURRENT USE OF INSULIN (HCC): ICD-10-CM

## 2019-05-04 DIAGNOSIS — Z79.4 TYPE 2 DIABETES MELLITUS WITH OTHER CIRCULATORY COMPLICATION, WITH LONG-TERM CURRENT USE OF INSULIN (HCC): ICD-10-CM

## 2019-05-06 RX ORDER — OMEPRAZOLE 40 MG/1
CAPSULE, DELAYED RELEASE ORAL
Qty: 30 CAPSULE | Refills: 6 | Status: SHIPPED | OUTPATIENT
Start: 2019-05-06 | End: 2019-06-10

## 2019-05-06 RX ORDER — POTASSIUM CHLORIDE 20 MEQ/1
TABLET, EXTENDED RELEASE ORAL
Qty: 60 TABLET | Refills: 5 | Status: SHIPPED | OUTPATIENT
Start: 2019-05-06 | End: 2019-06-10

## 2019-05-09 ENCOUNTER — APPOINTMENT (OUTPATIENT)
Dept: OCCUPATIONAL THERAPY | Facility: HOSPITAL | Age: 74
End: 2019-05-09

## 2019-05-10 RX ORDER — DULAGLUTIDE 0.75 MG/.5ML
INJECTION, SOLUTION SUBCUTANEOUS
Qty: 2 PEN | Refills: 1 | Status: SHIPPED | OUTPATIENT
Start: 2019-05-10 | End: 2019-06-10

## 2019-05-16 ENCOUNTER — HOSPITAL ENCOUNTER (OUTPATIENT)
Dept: OCCUPATIONAL THERAPY | Facility: HOSPITAL | Age: 74
Setting detail: THERAPIES SERIES
Discharge: HOME | End: 2019-05-16

## 2019-05-16 DIAGNOSIS — I89.0 LYMPHEDEMA OF BOTH LOWER EXTREMITIES: Primary | ICD-10-CM

## 2019-05-16 PROCEDURE — 97165 OT EVAL LOW COMPLEX 30 MIN: CPT

## 2019-05-16 PROCEDURE — 97535 SELF CARE MNGMENT TRAINING: CPT

## 2019-05-16 NOTE — THERAPY EVALUATION
Outpatient Occupational Therapy Lymphedema Initial Evaluation  [facility]     Patient Name: Miguelina Lopez  : 1945  MRN: [id number]  Today's Date: 2019      Visit Date: 2019    Patient Active Problem List   Diagnosis   • Anxiety disorder   • Arthritis of knee   • Asthma   • Chronic coronary artery disease   • Chronic kidney disease   • Depression   • Diabetic peripheral neuropathy (CMS/[facility])   • Gastroesophageal reflux disease   • Hyperlipidemia   • Insomnia   • Lower gastrointestinal hemorrhage   • Anemia   • OCHOA (obstructive sleep apnea)   • DM type 2 (diabetes mellitus, type 2) (CMS/[facility])   • Essential hypertension   • Hospital discharge follow-up   • Mitral stenosis   • Pulmonary hypertension due to sleep-disordered breathing (CMS/[facility])   • s/p MVR, TV-repair, CABG x2 16   • OLI (acute kidney injury) (CMS/[facility])   • Leukocytosis   • Atrial fibrillation (CMS/[facility])   • Nocturnal hypoxia   • Dermatitis   • Medicare annual wellness visit, initial   • Class 3 severe obesity due to excess calories with serious comorbidity and body mass index (BMI) of 45.0 to 49.9 in adult (CMS/[facility])   • Supplemental oxygen dependent   • Acute on chronic combined systolic and diastolic HF (heart failure) (CMS/[facility])   • Mitral regurgitation   • Aortic stenosis   • Medicare annual wellness visit, subsequent        Past Medical History:   Diagnosis Date   • Anemia    • Anxiety    • Aortic valve stenosis    • CHF (congestive heart failure) (CMS/[facility])    • Chronic coronary artery disease    • Class 3 severe obesity due to excess calories in adult (CMS/[facility])    • Depression    • Diabetes mellitus (CMS/[facility])    • Heart murmur    • Mitral valve insufficiency    • Pneumonia     2016   • Pulmonary hypertension (CMS/[facility])     due to sleep disordered breathing   • Sleep apnea     Uses CPAP or oxygen   • Stage 3 chronic kidney disease (CMS/[facility])    • Supplemental oxygen dependent         Past Surgical History:   Procedure  Laterality Date   • CARDIAC CATHETERIZATION     • CARDIAC CATHETERIZATION N/A 6/10/2016    Procedure: Left Heart Cath;  Surgeon: Chace Johnson MD;  Location:  [facility]ATH [facility]ASIVE [facility];  Service:    • CARDIAC CATHETERIZATION N/A 6/10/2016    Procedure: Right Heart Cath;  Surgeon: Chace Johnson MD;  Location:  [facility] CATH [facility]VASIVE LOCATION;  Service:    • CORONARY ARTERY BYPASS GRAFT      2 vessel   • CORONARY ARTERY BYPASS GRAFT WITH MITRAL VALVE REPAIR/REPLACEMENT N/A 6/13/2016    Procedure: INTRAOPERATIVE TARIQ, MIDLINE STERNOTOMY, CORONARY ARTERY BYPASS GRAFTING X  2 UTILIZING ENDOSCOPICALLY HARVESTED LEFT GREATER SAPHENOUS VEIN, MITRAL VALVE REPLACEMENT AND TRICUSPID VALVE REPAIR;  Surgeon: Eliecer Mistry MD;  Location: [facility] MAIN OR;  Service:    • CORONARY STENT PLACEMENT  2010    Approx. 6 yrs ago at [facility]   • HEMORRHOIDECTOMY     • HYSTERECTOMY     • MITRAL VALVE REPLACEMENT     • REPLACEMENT TOTAL KNEE Right    • THYROID SURGERY      Cyst removed from thyroid         Visit Dx:     ICD-10-CM ICD-9-CM   1. Lymphedema of both lower extremities I89.0 457.1       Patient History     Row Name 05/16/19 1100             History    Chief Complaint  Swelling  -RE      Date Current Problem(s) Began  04/19/19  -RE      Brief Description of Current Complaint  Patient reports a history of fluctuating LE edema which has recently increased despite medication changes.   -RE      Patient/Caregiver Goals  Decrease swelling;Know what to do to help the symptoms;Return to prior level of function  -RE      How has patient tried to help current problem?  elevation  -RE      Are you or can you be pregnant  No  -RE         Pain     Pain Location  Leg  -RE      Pain at Present  6  -RE      Pain at Best  5  -RE      Pain at Worst  8  -RE      Pain Frequency  Constant/continuous  -RE         Fall Risk Assessment    Any falls in the past year:  Yes  -RE      Number of falls reported in the last 12 months  1  -RE       Factors that contributed to the fall:  Crowded environment  -RE         Services    Are you currently receiving Home Health services  No  -RE         Daily Activities    Primary Language  English  -RE      Are you able to read  Yes  -RE      Are you able to write  Yes  -RE      How does patient learn best?  Listening  -RE      Teaching needs identified  Home Exercise Program;Management of Condition  -RE      Patient is concerned about/has problems with  Walking;Standing;Performing home management (household chores, shopping, care of dependents)  -RE      Does patient have problems with the following?  Depression;Anxiety  -RE      Barriers to learning  None  -RE      Functional Status  mobility issues preventing performance of daily activities  -RE      Pt Participated in POC and Goals  Yes  -RE         Safety    Are you being hurt, hit, or frightened by anyone at home or in your life?  No  -RE      Are you being neglected by a caregiver  No  -RE        User Key  (r) = Recorded By, (t) = Taken By, (c) = Cosigned By    Initials Name Provider Type    RE Judy Shultz OTR Occupational Therapist          Lymphedema     Row Name 05/16/19 2207             Subjective Pain    Able to rate subjective pain?  yes  -RE      Pre-Treatment Pain Level  6  -RE      Post-Treatment Pain Level  6  -RE         Lymphedema Assessment    Lymphedema Classification  RLE:;LLE:;secondary;stage 2 (Spontaneously Irreversible)  -RE      Infections or Cellulitis?  no  -RE         General ROM    GENERAL ROM COMMENTS  ROM is impaired.   -RE         MMT (Manual Muscle Testing)    General MMT Comments  Impaired. Min to mod A to stand   -RE         Lymphedema Edema Assessment    Ptting Edema Category  By grade out of 4  -RE      Pitting Edema  + 3/4;Moderate;Severe  -RE      Stemmer Sign  bilateral:;positive  -RE      Laramie Hump  bilateral:;negative  -RE      Edema Assessment Comment  Edema extends from toes to groin.  Patient is most concerned  "re; lower legs  -RE         Skin Changes/Observations    Skin Observations Comment  Skinis intact and normal in color  -RE         Lymphedema Sensation    Lymphedema Sensation Reports  RLE:;LLE:;normal  -RE         Lymphedema Pulses/Capillary Refill    Lymphedema Pulses/Capillary Refill  capillary refill  -RE      Capillary Refill  lower extremity capillary refill  -RE      Lower Extremity Capillary Refill  right:;left:;less than 3 seconds  -RE         Lymphedema Measurements    Measurement Type(s)  Circumferential  -RE      Circumferential Areas  Lower extremities  -RE         BLE Circumferential (cm)    Measurement Location 1  20 cm above knee  -RE      Left 1  82 cm  -RE      Right 1  80 cm  -RE      Measurement Location 2  10 cm above knee  -RE      Left 2  74 cm  -RE      Right 2  70 cm  -RE      Measurement Location 3  Knee  -RE      Left 3  49 cm  -RE      Right 3  48 cm  -RE      Measurement Location 4  10 cm below knee  -RE      Left 4  56 cm  -RE      Right 4  51 cm  -RE      Measurement Location 5  20 cm below knee  -RE      Left 5  45 cm  -RE      Right 5  42.5 cm  -RE      Measurement Location 6  30 cm below knee  -RE      Left 6  26.5 cm  -RE      Right 6  27 cm  -RE      Measurement Location 7  Ankle   -RE      Left 7  25.5 cm  -RE      Right 7  25 cm  -RE      Measurement Location 8  Mid foot  -RE      Left 8  21.5 cm  -RE      Right 8  21.5 cm  -RE      LLE Circumferential Total  379.5 cm  -RE      RLE Circumferential Total  365 cm  -RE        User Key  (r) = Recorded By, (t) = Taken By, (c) = Cosigned By    Initials Name Provider Type    RE Judy Shultz, WOODR Occupational Therapist                  Therapy Education  Education Details: Discussed program and plan.  Gave bandage order form.  Patient does have family who can asist with bandaging.  Instructed on elevation. Pt is somewhat limited as she can not lay flat.  Gave written information on \"What is lymphedema\"and Healthy habits  How Provided: " "Verbal, Written  Provided to: Patient, Caregiver  Level of Understanding: Teach back education performed, Verbalized  36462 - OT Self Care/Mgmt Minutes: 15        OT Goals     Row Name 05/16/19 1700          OT Short Term Goals    STG Date to Achieve  05/30/19  -RE     STG 1  Patient independent and compliant with self-wrapping techniques of compression bandages with assistance of caregiver as needed for improved self-management of condition.  -RE     STG 1 Progress  New  -RE     STG 2  Patient will demonstrate proper awareness of “What is Lymphedema?” and \"Healthy Habits\" for improved prevention, management, care of symptoms and ease of transition to self-care of condition.   -RE     STG 2 Progress  New  -RE     STG 3  Patient will demonstrate decreased net edema of bilateral lower extremities >/= 10-15 cm  for decrease in edema, symptoms, decreased risk of infection and improved skin care/transition to self-care of condition.   -RE     STG 3 Progress  New  -RE        Long Term Goals    LTG Date to Achieve  06/13/19  -RE     LTG 1  Patient will demonstrate decreased net edema of bilateral lower extremities >/= 16-30 cm  for decrease in edema, symptoms, decreased risk of infection and improved skin care/transition to self-care of condition.   -RE     LTG 1 Progress  New  -RE     LTG 2  Patient independent and compliant with initial home exercise program focused on diaphragmatic breathing, range of motion, flexibility to decrease edema and improve lymphatic flow for decreased edema and decreased risk of infection.   -RE     LTG 2 Progress  New  -RE     LTG 3  Patient independent and compliant with use and care of compression garments or Velcro products with assistance of a caregiver as needed to promote self-care independence.    -RE     LTG 3 Progress  New  -RE        Time Calculation    OT Goal Re-Cert Due Date  08/16/19  -RE       User Key  (r) = Recorded By, (t) = Taken By, (c) = Cosigned By    Initials Name " Provider Type    Judy Fuentes OTR Occupational Therapist          OT Assessment/Plan     Row Name 05/16/19 0372          OT Assessment    Functional Limitations  Impaired gait;Limitation in home management;Performance in self-care ADL;Performance in leisure activities  -RE     Impairments  Impaired lymphatic circulation;Impaired venous circulation;Pain;Edema;Muscle strength  -RE     Assessment Comments  Patient is a 73 y.o. female that presents with signs and symptoms consistent with bilateralLE lymphedema which extends from toes to groin. Edema is firm with 3+ pitting.  The total circumference of the R LE is 365 cm and the L LE is 379.5m. she could benefit from Complete Decongestive Therapy to decrease edema, protect and improve skin integrity, decrease the risk of infection and to learn self-care strategies.    -RE     Please refer to paper survey for additional self-reported information  Yes  -RE     OT Diagnosis  funmilayo LE lymphedema  -RE     OT Rehab Potential  Good  -RE     Patient/caregiver participated in establishment of treatment plan and goals  Yes  -RE     Patient would benefit from skilled therapy intervention  Yes  -RE        OT Plan    OT Frequency  4x/week  -RE     Predicted Duration of Therapy Intervention (Therapy Eval)  4-6 weeks  -RE     Planned CPT's?  OT EVAL LOW COMPLEXITY: 72123;OT THER ACT EA 15 MIN: [id number];OT THER PROC EA 15 MIN: [id number];OT SELF CARE/MGMT/TRAIN 15 MIN: 95611;OT MANUAL THERAPY EA 15 MIN: 44186  -RE     Planned Therapy Interventions (Optional Details)  home exercise program;patient/family education;manual therapy techniques;other (see comments) skin care and compression bandaging  -RE       User Key  (r) = Recorded By, (t) = Taken By, (c) = Cosigned By    Initials Name Provider Type    Judy Fuentes OTR Occupational Therapist                    Time Calculation:   OT Start Time: 1400  OT Stop Time: 1500  OT Time Calculation (min): 60 min  Total Timed Code Minutes- OT:  15 minute(s)     Therapy Charges for Today     Code Description Service Date Service Provider Modifiers Qty    [id number] HC OT SELF CARE/MGMT/TRAIN EA 15 MIN 5/16/2019 Judy Shultz OTR GO 1    [id number]  OT EVAL LOW COMPLEXITY 3 5/16/2019 Judy Shultz OTR GO 1                    ANNEMARIE Jean-Baptiste  5/16/2019

## 2019-05-20 RX ORDER — ATENOLOL 25 MG/1
TABLET ORAL
Qty: 90 TABLET | Refills: 0 | Status: SHIPPED | OUTPATIENT
Start: 2019-05-20 | End: 2019-06-10

## 2019-05-24 RX ORDER — NYSTATIN 100000 U/G
CREAM TOPICAL 2 TIMES DAILY
Qty: 30 G | Refills: 3 | Status: SHIPPED | OUTPATIENT
Start: 2019-05-24 | End: 2022-01-01 | Stop reason: HOSPADM

## 2019-05-28 RX ORDER — METOLAZONE 5 MG/1
TABLET ORAL
Qty: 15 TABLET | Refills: 0 | OUTPATIENT
Start: 2019-05-28

## 2019-06-10 ENCOUNTER — OFFICE VISIT (OUTPATIENT)
Dept: INTERNAL MEDICINE | Facility: CLINIC | Age: 74
End: 2019-06-10

## 2019-06-10 ENCOUNTER — APPOINTMENT (OUTPATIENT)
Dept: GENERAL RADIOLOGY | Facility: HOSPITAL | Age: 74
End: 2019-06-10

## 2019-06-10 ENCOUNTER — HOSPITAL ENCOUNTER (INPATIENT)
Facility: HOSPITAL | Age: 74
LOS: 4 days | Discharge: HOME | End: 2019-06-14
Attending: EMERGENCY MEDICINE | Admitting: INTERNAL MEDICINE

## 2019-06-10 VITALS
SYSTOLIC BLOOD PRESSURE: 136 MMHG | HEART RATE: 67 BPM | TEMPERATURE: 98.2 F | BODY MASS INDEX: 57.16 KG/M2 | WEIGHT: 292.7 LBS | OXYGEN SATURATION: 95 % | DIASTOLIC BLOOD PRESSURE: 98 MMHG

## 2019-06-10 DIAGNOSIS — I50.43 ACUTE ON CHRONIC COMBINED SYSTOLIC AND DIASTOLIC HF (HEART FAILURE) (HCC): Primary | ICD-10-CM

## 2019-06-10 DIAGNOSIS — R09.02 HYPOXIA: ICD-10-CM

## 2019-06-10 DIAGNOSIS — N18.30 STAGE 3 CHRONIC KIDNEY DISEASE (HCC): ICD-10-CM

## 2019-06-10 DIAGNOSIS — I50.9 ACUTE ON CHRONIC CONGESTIVE HEART FAILURE, UNSPECIFIED HEART FAILURE TYPE (HCC): Primary | ICD-10-CM

## 2019-06-10 DIAGNOSIS — I25.10 CHRONIC CORONARY ARTERY DISEASE: ICD-10-CM

## 2019-06-10 DIAGNOSIS — R60.0 LOCALIZED EDEMA: ICD-10-CM

## 2019-06-10 LAB
ALBUMIN SERPL-MCNC: 4.4 G/DL (ref 3.5–5.2)
ALBUMIN/GLOB SERPL: 1.4 G/DL
ALP SERPL-CCNC: 266 U/L (ref 39–117)
ALT SERPL W P-5'-P-CCNC: 9 U/L (ref 1–33)
ANION GAP SERPL CALCULATED.3IONS-SCNC: 13.5 MMOL/L
AST SERPL-CCNC: 19 U/L (ref 1–32)
BASOPHILS # BLD AUTO: 0.07 10*3/MM3 (ref 0–0.2)
BASOPHILS NFR BLD AUTO: 0.7 % (ref 0–1.5)
BILIRUB SERPL-MCNC: 0.6 MG/DL (ref 0.2–1.2)
BUN BLD-MCNC: 23 MG/DL (ref 8–23)
BUN/CREAT SERPL: 15.2 (ref 7–25)
CALCIUM SPEC-SCNC: 9 MG/DL (ref 8.6–10.5)
CHLORIDE SERPL-SCNC: 101 MMOL/L (ref 98–107)
CO2 SERPL-SCNC: 30.5 MMOL/L (ref 22–29)
CREAT BLD-MCNC: 1.51 MG/DL (ref 0.57–1)
DEPRECATED RDW RBC AUTO: 52.2 FL (ref 37–54)
EOSINOPHIL # BLD AUTO: 0.38 10*3/MM3 (ref 0–0.4)
EOSINOPHIL NFR BLD AUTO: 3.6 % (ref 0.3–6.2)
ERYTHROCYTE [DISTWIDTH] IN BLOOD BY AUTOMATED COUNT: 16 % (ref 12.3–15.4)
GFR SERPL CREATININE-BSD FRML MDRD: 34 ML/MIN/1.73
GLOBULIN UR ELPH-MCNC: 3.1 GM/DL
GLUCOSE BLD-MCNC: 93 MG/DL (ref 65–99)
GLUCOSE BLDC GLUCOMTR-MCNC: 93 MG/DL (ref 70–130)
HCT VFR BLD AUTO: 41.7 % (ref 34–46.6)
HGB BLD-MCNC: 11.8 G/DL (ref 12–15.9)
HOLD SPECIMEN: NORMAL
HOLD SPECIMEN: NORMAL
IMM GRANULOCYTES # BLD AUTO: 0.04 10*3/MM3 (ref 0–0.05)
IMM GRANULOCYTES NFR BLD AUTO: 0.4 % (ref 0–0.5)
LYMPHOCYTES # BLD AUTO: 1.51 10*3/MM3 (ref 0.7–3.1)
LYMPHOCYTES NFR BLD AUTO: 14.3 % (ref 19.6–45.3)
MCH RBC QN AUTO: 25.1 PG (ref 26.6–33)
MCHC RBC AUTO-ENTMCNC: 28.3 G/DL (ref 31.5–35.7)
MCV RBC AUTO: 88.7 FL (ref 79–97)
MONOCYTES # BLD AUTO: 0.59 10*3/MM3 (ref 0.1–0.9)
MONOCYTES NFR BLD AUTO: 5.6 % (ref 5–12)
NEUTROPHILS # BLD AUTO: 7.97 10*3/MM3 (ref 1.7–7)
NEUTROPHILS NFR BLD AUTO: 75.4 % (ref 42.7–76)
NRBC BLD AUTO-RTO: 0 /100 WBC (ref 0–0.2)
NT-PROBNP SERPL-MCNC: ABNORMAL PG/ML (ref 5–900)
PLATELET # BLD AUTO: 264 10*3/MM3 (ref 140–450)
PMV BLD AUTO: 10.6 FL (ref 6–12)
POTASSIUM BLD-SCNC: 4.5 MMOL/L (ref 3.5–5.2)
PROT SERPL-MCNC: 7.5 G/DL (ref 6–8.5)
RBC # BLD AUTO: 4.7 10*6/MM3 (ref 3.77–5.28)
SODIUM BLD-SCNC: 145 MMOL/L (ref 136–145)
TROPONIN T SERPL-MCNC: 0.03 NG/ML (ref 0–0.03)
WBC NRBC COR # BLD: 10.56 10*3/MM3 (ref 3.4–10.8)
WHOLE BLOOD HOLD SPECIMEN: NORMAL
WHOLE BLOOD HOLD SPECIMEN: NORMAL

## 2019-06-10 PROCEDURE — 25010000002 FUROSEMIDE PER 20 MG: Performed by: INTERNAL MEDICINE

## 2019-06-10 PROCEDURE — 25010000002 ENOXAPARIN PER 10 MG: Performed by: INTERNAL MEDICINE

## 2019-06-10 PROCEDURE — 93010 ELECTROCARDIOGRAM REPORT: CPT | Performed by: INTERNAL MEDICINE

## 2019-06-10 PROCEDURE — G0378 HOSPITAL OBSERVATION PER HR: HCPCS

## 2019-06-10 PROCEDURE — 71046 X-RAY EXAM CHEST 2 VIEWS: CPT

## 2019-06-10 PROCEDURE — 82962 GLUCOSE BLOOD TEST: CPT

## 2019-06-10 PROCEDURE — 99214 OFFICE O/P EST MOD 30 MIN: CPT | Performed by: FAMILY MEDICINE

## 2019-06-10 PROCEDURE — 25010000002 FUROSEMIDE PER 20 MG: Performed by: NURSE PRACTITIONER

## 2019-06-10 PROCEDURE — 85025 COMPLETE CBC W/AUTO DIFF WBC: CPT | Performed by: NURSE PRACTITIONER

## 2019-06-10 PROCEDURE — 83880 ASSAY OF NATRIURETIC PEPTIDE: CPT | Performed by: NURSE PRACTITIONER

## 2019-06-10 PROCEDURE — 84484 ASSAY OF TROPONIN QUANT: CPT | Performed by: NURSE PRACTITIONER

## 2019-06-10 PROCEDURE — 99285 EMERGENCY DEPT VISIT HI MDM: CPT

## 2019-06-10 PROCEDURE — 93005 ELECTROCARDIOGRAM TRACING: CPT | Performed by: NURSE PRACTITIONER

## 2019-06-10 PROCEDURE — 80053 COMPREHEN METABOLIC PANEL: CPT | Performed by: NURSE PRACTITIONER

## 2019-06-10 RX ORDER — ATORVASTATIN CALCIUM 20 MG/1
20 TABLET, FILM COATED ORAL DAILY
COMMUNITY
End: 2019-08-01 | Stop reason: SDUPTHER

## 2019-06-10 RX ORDER — ALBUTEROL SULFATE 90 UG/1
2 AEROSOL, METERED RESPIRATORY (INHALATION) EVERY 6 HOURS PRN
Status: DISCONTINUED | OUTPATIENT
Start: 2019-06-10 | End: 2019-06-10 | Stop reason: CLARIF

## 2019-06-10 RX ORDER — PROMETHAZINE HYDROCHLORIDE 25 MG/1
25 TABLET ORAL EVERY 8 HOURS PRN
Status: DISCONTINUED | OUTPATIENT
Start: 2019-06-10 | End: 2019-06-14 | Stop reason: HOSPADM

## 2019-06-10 RX ORDER — POTASSIUM CHLORIDE 20 MEQ/1
20 TABLET, EXTENDED RELEASE ORAL 2 TIMES DAILY
COMMUNITY
End: 2019-11-11 | Stop reason: SDUPTHER

## 2019-06-10 RX ORDER — SENNA AND DOCUSATE SODIUM 50; 8.6 MG/1; MG/1
1 TABLET, FILM COATED ORAL DAILY
Status: DISCONTINUED | OUTPATIENT
Start: 2019-06-11 | End: 2019-06-14 | Stop reason: HOSPADM

## 2019-06-10 RX ORDER — PROMETHAZINE HYDROCHLORIDE 25 MG/1
25 TABLET ORAL EVERY 8 HOURS PRN
COMMUNITY
End: 2020-01-08 | Stop reason: HOSPADM

## 2019-06-10 RX ORDER — ACETAMINOPHEN 325 MG/1
650 TABLET ORAL EVERY 4 HOURS PRN
Status: DISCONTINUED | OUTPATIENT
Start: 2019-06-10 | End: 2019-06-14 | Stop reason: HOSPADM

## 2019-06-10 RX ORDER — POTASSIUM CHLORIDE 1.5 G/1.77G
20 POWDER, FOR SOLUTION ORAL DAILY
Status: DISCONTINUED | OUTPATIENT
Start: 2019-06-11 | End: 2019-06-13

## 2019-06-10 RX ORDER — TRAMADOL HYDROCHLORIDE 50 MG/1
50 TABLET ORAL EVERY 8 HOURS PRN
Status: DISCONTINUED | OUTPATIENT
Start: 2019-06-10 | End: 2019-06-14 | Stop reason: HOSPADM

## 2019-06-10 RX ORDER — CLONIDINE HYDROCHLORIDE 0.2 MG/1
0.2 TABLET ORAL DAILY
COMMUNITY
End: 2019-06-14 | Stop reason: HOSPADM

## 2019-06-10 RX ORDER — LOSARTAN POTASSIUM 100 MG/1
100 TABLET ORAL
Status: DISCONTINUED | OUTPATIENT
Start: 2019-06-11 | End: 2019-06-14 | Stop reason: HOSPADM

## 2019-06-10 RX ORDER — DEXTROSE MONOHYDRATE 25 G/50ML
25 INJECTION, SOLUTION INTRAVENOUS
Status: DISCONTINUED | OUTPATIENT
Start: 2019-06-10 | End: 2019-06-14 | Stop reason: HOSPADM

## 2019-06-10 RX ORDER — VILAZODONE HYDROCHLORIDE 20 MG/1
20 TABLET ORAL NIGHTLY
COMMUNITY
End: 2021-06-09 | Stop reason: SDUPTHER

## 2019-06-10 RX ORDER — IPRATROPIUM BROMIDE AND ALBUTEROL SULFATE 2.5; .5 MG/3ML; MG/3ML
3 SOLUTION RESPIRATORY (INHALATION) EVERY 4 HOURS PRN
Status: DISCONTINUED | OUTPATIENT
Start: 2019-06-10 | End: 2019-06-12

## 2019-06-10 RX ORDER — CLONIDINE HYDROCHLORIDE 0.2 MG/1
TABLET ORAL
Qty: 30 TABLET | Refills: 0 | Status: SHIPPED | OUTPATIENT
Start: 2019-06-10 | End: 2019-06-10

## 2019-06-10 RX ORDER — ATENOLOL 25 MG/1
25 TABLET ORAL DAILY
COMMUNITY
End: 2019-06-14 | Stop reason: HOSPADM

## 2019-06-10 RX ORDER — IRBESARTAN 300 MG/1
300 TABLET ORAL NIGHTLY
COMMUNITY
End: 2019-07-19 | Stop reason: SDUPTHER

## 2019-06-10 RX ORDER — PANTOPRAZOLE SODIUM 40 MG/1
40 TABLET, DELAYED RELEASE ORAL EVERY MORNING
Status: DISCONTINUED | OUTPATIENT
Start: 2019-06-11 | End: 2019-06-14 | Stop reason: HOSPADM

## 2019-06-10 RX ORDER — VILAZODONE HYDROCHLORIDE 40 MG/1
20 TABLET ORAL NIGHTLY
Status: DISCONTINUED | OUTPATIENT
Start: 2019-06-10 | End: 2019-06-14 | Stop reason: HOSPADM

## 2019-06-10 RX ORDER — OMEPRAZOLE 40 MG/1
40 CAPSULE, DELAYED RELEASE ORAL DAILY
COMMUNITY
End: 2019-12-09 | Stop reason: SDUPTHER

## 2019-06-10 RX ORDER — ASPIRIN 325 MG
325 TABLET ORAL DAILY
COMMUNITY
End: 2019-06-24 | Stop reason: SDUPTHER

## 2019-06-10 RX ORDER — NICOTINE POLACRILEX 4 MG
15 LOZENGE BUCCAL
Status: DISCONTINUED | OUTPATIENT
Start: 2019-06-10 | End: 2019-06-14 | Stop reason: HOSPADM

## 2019-06-10 RX ORDER — ALPRAZOLAM 0.5 MG/1
1 TABLET ORAL 2 TIMES DAILY PRN
Status: DISCONTINUED | OUTPATIENT
Start: 2019-06-10 | End: 2019-06-14 | Stop reason: HOSPADM

## 2019-06-10 RX ORDER — NITROGLYCERIN 0.4 MG/1
0.4 TABLET SUBLINGUAL
Status: DISCONTINUED | OUTPATIENT
Start: 2019-06-10 | End: 2019-06-14 | Stop reason: HOSPADM

## 2019-06-10 RX ORDER — SODIUM CHLORIDE 0.9 % (FLUSH) 0.9 %
10 SYRINGE (ML) INJECTION AS NEEDED
Status: DISCONTINUED | OUTPATIENT
Start: 2019-06-10 | End: 2019-06-14 | Stop reason: HOSPADM

## 2019-06-10 RX ORDER — ASPIRIN 325 MG
325 TABLET ORAL DAILY
Status: DISCONTINUED | OUTPATIENT
Start: 2019-06-11 | End: 2019-06-14 | Stop reason: HOSPADM

## 2019-06-10 RX ORDER — SODIUM CHLORIDE 0.9 % (FLUSH) 0.9 %
3-10 SYRINGE (ML) INJECTION AS NEEDED
Status: DISCONTINUED | OUTPATIENT
Start: 2019-06-10 | End: 2019-06-14 | Stop reason: HOSPADM

## 2019-06-10 RX ORDER — ATENOLOL 25 MG/1
25 TABLET ORAL DAILY
Status: DISCONTINUED | OUTPATIENT
Start: 2019-06-11 | End: 2019-06-12

## 2019-06-10 RX ORDER — ATORVASTATIN CALCIUM 20 MG/1
20 TABLET, FILM COATED ORAL DAILY
Status: DISCONTINUED | OUTPATIENT
Start: 2019-06-11 | End: 2019-06-14 | Stop reason: HOSPADM

## 2019-06-10 RX ORDER — FUROSEMIDE 10 MG/ML
40 INJECTION INTRAMUSCULAR; INTRAVENOUS ONCE
Status: COMPLETED | OUTPATIENT
Start: 2019-06-10 | End: 2019-06-10

## 2019-06-10 RX ORDER — FUROSEMIDE 10 MG/ML
60 INJECTION INTRAMUSCULAR; INTRAVENOUS ONCE
Status: COMPLETED | OUTPATIENT
Start: 2019-06-10 | End: 2019-06-10

## 2019-06-10 RX ORDER — ALBUTEROL SULFATE 2.5 MG/3ML
2.5 SOLUTION RESPIRATORY (INHALATION) EVERY 6 HOURS PRN
Status: DISCONTINUED | OUTPATIENT
Start: 2019-06-10 | End: 2019-06-14 | Stop reason: HOSPADM

## 2019-06-10 RX ORDER — SODIUM CHLORIDE 0.9 % (FLUSH) 0.9 %
3 SYRINGE (ML) INJECTION EVERY 12 HOURS SCHEDULED
Status: DISCONTINUED | OUTPATIENT
Start: 2019-06-10 | End: 2019-06-14 | Stop reason: HOSPADM

## 2019-06-10 RX ORDER — METOLAZONE 5 MG/1
TABLET ORAL
COMMUNITY
Start: 2019-05-11 | End: 2019-06-10 | Stop reason: SDUPTHER

## 2019-06-10 RX ORDER — DOXEPIN HYDROCHLORIDE 25 MG/1
50 CAPSULE ORAL DAILY
Status: DISCONTINUED | OUTPATIENT
Start: 2019-06-11 | End: 2019-06-14 | Stop reason: HOSPADM

## 2019-06-10 RX ORDER — CLONIDINE HYDROCHLORIDE 0.1 MG/1
0.2 TABLET ORAL DAILY
Status: DISCONTINUED | OUTPATIENT
Start: 2019-06-11 | End: 2019-06-12

## 2019-06-10 RX ADMIN — VILAZODONE HYDROCHLORIDE 20 MG: 40 TABLET ORAL at 23:58

## 2019-06-10 RX ADMIN — FUROSEMIDE 40 MG: 10 INJECTION, SOLUTION INTRAMUSCULAR; INTRAVENOUS at 23:57

## 2019-06-10 RX ADMIN — FUROSEMIDE 60 MG: 10 INJECTION, SOLUTION INTRAMUSCULAR; INTRAVENOUS at 20:36

## 2019-06-10 RX ADMIN — ALPRAZOLAM 1 MG: 0.5 TABLET ORAL at 23:57

## 2019-06-10 RX ADMIN — SODIUM CHLORIDE, PRESERVATIVE FREE 10 ML: 5 INJECTION INTRAVENOUS at 20:39

## 2019-06-10 RX ADMIN — SODIUM CHLORIDE, PRESERVATIVE FREE 3 ML: 5 INJECTION INTRAVENOUS at 23:58

## 2019-06-10 RX ADMIN — ENOXAPARIN SODIUM 40 MG: 40 INJECTION SUBCUTANEOUS at 23:58

## 2019-06-10 NOTE — ED PROVIDER NOTES
EMERGENCY DEPARTMENT ENCOUNTER    CHIEF COMPLAINT  Chief Complaint: bilateral leg swelling  History given by: patient  History limited by: none  Room Number: S409/1  PMD: Channing Bush MD      HPI:  Pt is a 73 y.o. female with hx of CHF who presents complaining of bilateral leg swelling that started three months ago and has progressively worsened since. Pt also c/o SOA with exertion and recent weight gain (ten pounds over the last month). Pt denies CP, heart palpitations, and syncope. Pt has hx of CABG performed three years ago. Pt denies hx of a-fib and having a pacemaker. Pt is currently taking lasix, but denies taking any blood thinners.     Duration/Onset/Timing: three months/gradual/constant  Location: bilateral legs  Radiation: n/a  Quality: bilateral leg swelling  Intensity/Severity: moderate  Associated Symptoms: SOA with exertion, weight gain (ten pounds over the last month)  Aggravating or Alleviating Factors: none  Previous Episodes: Pt has hx of CHF.       PAST MEDICAL HISTORY  Active Ambulatory Problems     Diagnosis Date Noted   • Anxiety disorder 02/13/2016   • Arthritis of knee 02/13/2016   • Asthma 02/13/2016   • Chronic coronary artery disease 02/13/2016   • Chronic kidney disease 02/13/2016   • Depression 02/13/2016   • Diabetic peripheral neuropathy (CMS/[facility]) 02/13/2016   • Gastroesophageal reflux disease 02/13/2016   • Hyperlipidemia 02/13/2016   • Insomnia 02/13/2016   • Lower gastrointestinal hemorrhage 02/13/2016   • Anemia 02/13/2016   • OCHOA (obstructive sleep apnea) 02/13/2016   • DM type 2 (diabetes mellitus, type 2) (CMS/[facility]) 02/13/2016   • Essential hypertension 02/13/2016   • Hospital discharge follow-up 02/18/2016   • Mitral stenosis 02/24/2016   • Pulmonary hypertension due to sleep-disordered breathing (CMS/[facility]) 06/10/2016   • s/p MVR, TV-repair, CABG x2 6/13/16 06/18/2016   • OLI (acute kidney injury) (CMS/[facility]) 06/18/2016   • Leukocytosis 06/18/2016   • Atrial fibrillation  (CMS/HCC) 06/20/2016   • Nocturnal hypoxia 01/30/2017   • Dermatitis 05/01/2017   • Medicare annual wellness visit, initial 08/01/2017   • Class 3 severe obesity due to excess calories with serious comorbidity and body mass index (BMI) of 45.0 to 49.9 in adult (CMS/HCC) 02/22/2018   • Supplemental oxygen dependent 07/12/2018   • Acute on chronic combined systolic and diastolic HF (heart failure) (CMS/HCC) 01/02/2019   • Mitral regurgitation 01/02/2019   • Aortic stenosis 01/02/2019   • Medicare annual wellness visit, subsequent 04/29/2019   • Localized edema 06/10/2019     Resolved Ambulatory Problems     Diagnosis Date Noted   • SOB (shortness of breath) 02/24/2016   • Chest pain 06/08/2016   • Wound infection 07/29/2016   • Cough with sputum 04/10/2018     Past Medical History:   Diagnosis Date   • Anemia    • Anxiety    • Aortic valve stenosis    • CHF (congestive heart failure) (CMS/HCC)    • Chronic coronary artery disease    • Class 3 severe obesity due to excess calories in adult (CMS/HCC)    • Depression    • Diabetes mellitus (CMS/HCC)    • Heart murmur    • Mitral valve insufficiency    • Pneumonia    • Pulmonary hypertension (CMS/HCC)    • Sleep apnea    • Stage 3 chronic kidney disease (CMS/HCC)    • Supplemental oxygen dependent        PAST SURGICAL HISTORY  Past Surgical History:   Procedure Laterality Date   • CARDIAC CATHETERIZATION     • CARDIAC CATHETERIZATION N/A 6/10/2016    Procedure: Left Heart Cath;  Surgeon: Chace Johnson MD;  Location: [facility] INVASIVE LOCATION;  Service:    • CARDIAC CATHETERIZATION N/A 6/10/2016    Procedure: Right Heart Cath;  Surgeon: Chace Johnson MD;  Location: [facility] INVASIVE LOCATION;  Service:    • CORONARY ARTERY BYPASS GRAFT      2 vessel   • CORONARY ARTERY BYPASS GRAFT WITH MITRAL VALVE REPAIR/REPLACEMENT N/A 6/13/2016    Procedure: INTRAOPERATIVE TARIQ, MIDLINE STERNOTOMY, CORONARY ARTERY BYPASS GRAFTING X  2 UTILIZING ENDOSCOPICALLY  HARVESTED LEFT GREATER SAPHENOUS VEIN, MITRAL VALVE REPLACEMENT AND TRICUSPID VALVE REPAIR;  Surgeon: Eliecer Mistry MD;  Location: [facility];  Service:    • CORONARY STENT PLACEMENT  2010    Approx. 6 yrs ago at [facility]   • HEMORRHOIDECTOMY     • HYSTERECTOMY     • MITRAL VALVE REPLACEMENT     • REPLACEMENT TOTAL KNEE Right    • THYROID SURGERY      Cyst removed from thyroid       FAMILY HISTORY  Family History   Problem Relation Age of Onset   • Heart attack Father    • Heart disease Father        SOCIAL HISTORY  Social History     Socioeconomic History   • Marital status:      Spouse name: Not on file   • Number of children: Not on file   • Years of education: Not on file   • Highest education level: Not on file   Tobacco Use   • Smoking status: Never Smoker   • Smokeless tobacco: Never Used   Substance and Sexual Activity   • Alcohol use: No   • Drug use: No   • Sexual activity: Defer       ALLERGIES  Coumadin [warfarin sodium]; Bumex [bumetanide]; Erythromycin; Hctz [hydrochlorothiazide]; Penicillins; Sulfa antibiotics; and Zaroxolyn [metolazone]    REVIEW OF SYSTEMS  Review of Systems   Constitutional: Positive for unexpected weight change (10 pounds gained over the last month). Negative for fatigue and fever.   HENT: Negative for congestion and sore throat.    Eyes: Negative for visual disturbance.   Respiratory: Positive for shortness of breath (with exertion). Negative for cough and wheezing.    Cardiovascular: Positive for leg swelling (bilaterally). Negative for chest pain and palpitations.   Gastrointestinal: Negative for abdominal pain, diarrhea, nausea and vomiting.   Genitourinary: Negative for dysuria, frequency and urgency.   Musculoskeletal: Negative for arthralgias, back pain and myalgias.   Skin: Negative for rash.   Neurological: Negative for dizziness, syncope, weakness and headaches.   Psychiatric/Behavioral: Negative for confusion and self-injury. The patient is not  nervous/anxious.        PHYSICAL EXAM  ED Triage Vitals [06/10/19 1607]   Temp Heart Rate Resp BP SpO2   97.8 °F (36.6 °C) 101 16 -- 93 %      Temp src Heart Rate Source Patient Position BP Location FiO2 (%)   Tympanic Monitor -- -- --       Physical Exam   Constitutional: She is oriented to person, place, and time and well-developed, well-nourished, and in no distress.   Pt is morbidly obese   HENT:   Head: Normocephalic and atraumatic.   Nose: Nose normal.   Mouth/Throat: Uvula is midline, oropharynx is clear and moist and mucous membranes are normal.   Eyes: Pupils are equal, round, and reactive to light.   Neck: Normal range of motion. Neck supple.   Cardiovascular: Normal rate, regular rhythm and normal heart sounds.   Pulmonary/Chest: Effort normal. Tachypnea noted. She has decreased breath sounds (diminished).   Abdominal: Soft. Normal appearance and bowel sounds are normal. There is no tenderness.   Musculoskeletal: She exhibits edema ( dependent edema 2+ bilaterally).   Neurological: She is alert and oriented to person, place, and time. GCS score is 15.   Skin: Skin is warm, dry and intact.   Psychiatric: Mood, memory, affect and judgment normal.   Nursing note and vitals reviewed.      LAB RESULTS  Lab Results (last 24 hours)     Procedure Component Value Units Date/Time    CBC & Differential [[id number]] Collected:  06/10/19 1714    Specimen:  Blood Updated:  06/10/19 1743    Narrative:       The following orders were created for panel order CBC & Differential.  Procedure                               Abnormality         Status                     ---------                               -----------         ------                     CBC Auto Differential[[id number]]        Abnormal            Final result                 Please view results for these tests on the individual orders.    Comprehensive Metabolic Panel [[id number]]  (Abnormal) Collected:  06/10/19 1714    Specimen:  Blood Updated:  06/10/19 1747      Glucose 93 mg/dL      BUN 23 mg/dL      Creatinine 1.51 mg/dL      Sodium 145 mmol/L      Potassium 4.5 mmol/L      Chloride 101 mmol/L      CO2 30.5 mmol/L      Calcium 9.0 mg/dL      Total Protein 7.5 g/dL      Albumin 4.40 g/dL      ALT (SGPT) 9 U/L      AST (SGOT) 19 U/L      Alkaline Phosphatase 266 U/L      Total Bilirubin 0.6 mg/dL      eGFR Non African Amer 34 mL/min/1.73      Globulin 3.1 gm/dL      A/G Ratio 1.4 g/dL      BUN/Creatinine Ratio 15.2     Anion Gap 13.5 mmol/L     Narrative:       GFR Normal >60  Chronic Kidney Disease <60  Kidney Failure <15    BNP [[id number]]  (Abnormal) Collected:  06/10/19 1714    Specimen:  Blood Updated:  06/10/19 1751     proBNP 15,016.0 pg/mL     Narrative:       Among patients with dyspnea, NT-proBNP is highly sensitive for the detection of acute congestive heart failure. In addition NT-proBNP of <300 pg/ml effectively rules out acute congestive heart failure with 99% negative predictive value.    Troponin [[id number]]  (Normal) Collected:  06/10/19 1714    Specimen:  Blood Updated:  06/10/19 1751     Troponin T 0.027 ng/mL     Narrative:       Troponin T Reference Range:  <= 0.03 ng/mL-   Negative for AMI  >0.03 ng/mL-     Abnormal for myocardial necrosis.  Clinicians would have to utilize clinical acumen, EKG, Troponin and serial changes to determine if it is an Acute Myocardial Infarction or myocardial injury due to an underlying chronic condition.     CBC Auto Differential [[id number]]  (Abnormal) Collected:  06/10/19 1714    Specimen:  Blood Updated:  06/10/19 1743     WBC 10.56 10*3/mm3      RBC 4.70 10*6/mm3      Hemoglobin 11.8 g/dL      Hematocrit 41.7 %      MCV 88.7 fL      MCH 25.1 pg      MCHC 28.3 g/dL      RDW 16.0 %      RDW-SD 52.2 fl      MPV 10.6 fL      Platelets 264 10*3/mm3      Neutrophil % 75.4 %      Lymphocyte % 14.3 %      Monocyte % 5.6 %      Eosinophil % 3.6 %      Basophil % 0.7 %      Immature Grans % 0.4 %      Neutrophils, Absolute  7.97 10*3/mm3      Lymphocytes, Absolute 1.51 10*3/mm3      Monocytes, Absolute 0.59 10*3/mm3      Eosinophils, Absolute 0.38 10*3/mm3      Basophils, Absolute 0.07 10*3/mm3      Immature Grans, Absolute 0.04 10*3/mm3      nRBC 0.0 /100 WBC           I ordered the above labs and reviewed the results    RADIOLOGY  XR Chest 2 View   Final Result   IMPRESSIONS: Moderate cardiomegaly. No acute process is identified.        I ordered the above noted radiological studies. Interpreted by radiologist. Reviewed by me in PACS.       PROCEDURES  Procedures      PROGRESS AND CONSULTS     1705 CXR, EKG, Troponin, and Labs ordered for further evaluation.     1740 Spoke with Dr. Barillas (ER Physician) and discussed pt's case. After bedside evaluation, they agree with the plan of care.    2001 Call placed to [facility]. Lasix ordered for further tx and symptom management.     2033 Received a call from Dr. Hoffamn (Cardiologist) and discussed patient's case. Dr. Hoffman agrees with the plan to admit the pt.     2110 Rechecked pt. Pt is resting comfortably. Notified pt that I spoke with Cardiology and that they would like to admit the pt for tx and evaluation of her acute on chronic CHF. Discussed the plan to admit the pt. Pt and family agree with the plan and all questions were addressed.    MEDICAL DECISION MAKING  Results were reviewed/discussed with the patient and they were also made aware of online access. Pt also made aware that some labs, such as cultures, will not be resulted during ER visit and follow up with PMD is necessary.     MDM  Number of Diagnoses or Management Options  Acute on chronic congestive heart failure, unspecified heart failure type (CMS/HCC):   Hypoxia:      Amount and/or Complexity of Data Reviewed  Clinical lab tests: reviewed and ordered (Creatinine - 1.51)  Tests in the radiology section of CPT®: ordered and reviewed (CXR - Moderate cardiomegaly. No acute process is identified.)  Tests in the medicine section of  CPT®: reviewed and ordered (EKG - see physician note)  Decide to obtain previous medical records or to obtain history from someone other than the patient: yes  Discuss the patient with other providers: yes (Dr. Hoffman (Cardiologist))  Independent visualization of images, tracings, or specimens: yes           DIAGNOSIS  Final diagnoses:   Acute on chronic congestive heart failure, unspecified heart failure type (CMS/HCC)   Hypoxia       DISPOSITION  ADMISSION    Discussed treatment plan and reason for admission with pt/family and admitting physician.  Pt/family voiced understanding of the plan for admission for further testing/treatment as needed.         Latest Documented Vital Signs:  As of 10:57 PM  BP- 144/100 HR- 79 Temp- 98.2 °F (36.8 °C) (Oral) O2 sat- 99%    --  Documentation assistance provided by joann Robles for Pau Saunders (APRN).  Information recorded by the scribe was done at my direction and has been verified and validated by me.       Fabrice Robles  06/10/19 2160       Pau Saunders APRN  06/10/19 6008

## 2019-06-10 NOTE — PROGRESS NOTES
Miguelina Lopez is a 73 y.o. female.      Assessment/Plan   Problem List Items Addressed This Visit        Cardiovascular and Mediastinum    Chronic coronary artery disease    Overview     Description: 2 stents in 2013         Acute on chronic combined systolic and diastolic HF (heart failure) (CMS/[facility]) - Primary       Genitourinary    Chronic kidney disease    Overview     Impression: 03/30/2015 - Will watch , Avoid NSAID  , Control DM;             Other    Localized edema         Failed outpatient management of chronic renal insufficiency and edema with CHF recommend inpatient short stay for diuresis  Patient sent to [facility], operated by [facility] emergency department    Chief Complaint   Patient presents with   • swelling in both legs   • Foot Pain     Social History     Tobacco Use   • Smoking status: Never Smoker   • Smokeless tobacco: Never Used   Substance Use Topics   • Alcohol use: No   • Drug use: No       History of Present Illness   Patient comes in for worsening swelling of legs bilaterally increasing shortness of breath and difficulty ambulating secondary to significant weight gain over the last couple months.  Has been intolerant of complementary diuretic therapy and is presently oral furosemide without much benefit  The following portions of the patient's history were reviewed and updated as appropriate:PMHroutine: Social history , Allergies, Current Medications, Active Problem List and Health Maintenance    Review of Systems   HENT: Negative.    Respiratory: Positive for cough and shortness of breath.    Cardiovascular: Positive for chest pain and leg swelling.   Gastrointestinal: Negative.    Genitourinary: Positive for frequency.   Musculoskeletal: Positive for gait problem.   Skin: Positive for rash.   Neurological: Positive for weakness.   Hematological: Negative.        Objective   Vitals:    06/10/19 1449   BP: 136/98   BP Location: Left arm   Patient Position: Sitting   Cuff Size: Large Adult   Pulse: 67    Temp: 98.2 °F (36.8 °C)   TempSrc: Oral   SpO2: 95%   Weight: 133 kg (292 lb 11.2 oz)     Body mass index is 57.16 kg/m².  Physical Exam   Constitutional: She is oriented to person, place, and time. She appears well-developed and well-nourished.   HENT:   Head: Normocephalic and atraumatic.   Eyes: EOM are normal. Pupils are equal, round, and reactive to light. Right eye exhibits no discharge. Left eye exhibits no discharge. No scleral icterus.   Cardiovascular: Normal rate and regular rhythm.   Pulmonary/Chest: She is in respiratory distress. She has rales.   Musculoskeletal: She exhibits edema and tenderness.   Neurological: She is alert and oriented to person, place, and time.   Psychiatric: She has a normal mood and affect. Her behavior is normal. Judgment and thought content normal.   Nursing note and vitals reviewed.    Reviewed Data:  No visits with results within 1 Month(s) from this visit.   Latest known visit with results is:   Office Visit on 03/21/2019   Component Date Value Ref Range Status   • Glucose 03/21/2019 104* 65 - 99 mg/dL Final   • BUN 03/21/2019 30* 8 - 23 mg/dL Final   • Creatinine 03/21/2019 1.21* 0.57 - 1.00 mg/dL Final   • eGFR Non African Am 03/21/2019 43* >60 mL/min/1.73 Final    Comment: The MDRD GFR formula is only valid for adults with stable  renal function between ages 18 and 70.     • eGFR  Am 03/21/2019 53* >60 mL/min/1.73 Final   • BUN/Creatinine Ratio 03/21/2019 24.8  7.0 - 25.0 Final   • Sodium 03/21/2019 145  136 - 145 mmol/L Final   • Potassium 03/21/2019 4.8  3.5 - 5.2 mmol/L Final   • Chloride 03/21/2019 101  98 - 107 mmol/L Final   • Total CO2 03/21/2019 29.5* 22.0 - 29.0 mmol/L Final   • Calcium 03/21/2019 9.4  8.6 - 10.5 mg/dL Final

## 2019-06-10 NOTE — ED TRIAGE NOTES
"Pt failed outpt tx for chf.  Gained 10 pounds over the last month.  Has been on lasix.  \"i'm on 3 water pills a day\"  "

## 2019-06-11 LAB
ANION GAP SERPL CALCULATED.3IONS-SCNC: 14.8 MMOL/L
ANION GAP SERPL CALCULATED.3IONS-SCNC: 15.2 MMOL/L
BUN BLD-MCNC: 23 MG/DL (ref 8–23)
BUN BLD-MCNC: 23 MG/DL (ref 8–23)
BUN/CREAT SERPL: 17.4 (ref 7–25)
BUN/CREAT SERPL: 18.5 (ref 7–25)
CALCIUM SPEC-SCNC: 9 MG/DL (ref 8.6–10.5)
CALCIUM SPEC-SCNC: 9.2 MG/DL (ref 8.6–10.5)
CHLORIDE SERPL-SCNC: 100 MMOL/L (ref 98–107)
CHLORIDE SERPL-SCNC: 101 MMOL/L (ref 98–107)
CO2 SERPL-SCNC: 29.8 MMOL/L (ref 22–29)
CO2 SERPL-SCNC: 31.2 MMOL/L (ref 22–29)
CREAT BLD-MCNC: 1.24 MG/DL (ref 0.57–1)
CREAT BLD-MCNC: 1.32 MG/DL (ref 0.57–1)
GFR SERPL CREATININE-BSD FRML MDRD: 39 ML/MIN/1.73
GFR SERPL CREATININE-BSD FRML MDRD: 42 ML/MIN/1.73
GLUCOSE BLD-MCNC: 117 MG/DL (ref 65–99)
GLUCOSE BLD-MCNC: 130 MG/DL (ref 65–99)
GLUCOSE BLDC GLUCOMTR-MCNC: 123 MG/DL (ref 70–130)
GLUCOSE BLDC GLUCOMTR-MCNC: 133 MG/DL (ref 70–130)
GLUCOSE BLDC GLUCOMTR-MCNC: 87 MG/DL (ref 70–130)
GLUCOSE BLDC GLUCOMTR-MCNC: 99 MG/DL (ref 70–130)
POTASSIUM BLD-SCNC: 3.7 MMOL/L (ref 3.5–5.2)
POTASSIUM BLD-SCNC: 4.3 MMOL/L (ref 3.5–5.2)
SODIUM BLD-SCNC: 145 MMOL/L (ref 136–145)
SODIUM BLD-SCNC: 147 MMOL/L (ref 136–145)

## 2019-06-11 PROCEDURE — 99220 PR INITIAL OBSERVATION CARE/DAY 70 MINUTES: CPT | Performed by: INTERNAL MEDICINE

## 2019-06-11 PROCEDURE — 82962 GLUCOSE BLOOD TEST: CPT

## 2019-06-11 PROCEDURE — 25010000002 ENOXAPARIN PER 10 MG: Performed by: INTERNAL MEDICINE

## 2019-06-11 PROCEDURE — G0378 HOSPITAL OBSERVATION PER HR: HCPCS

## 2019-06-11 PROCEDURE — 80048 BASIC METABOLIC PNL TOTAL CA: CPT | Performed by: INTERNAL MEDICINE

## 2019-06-11 PROCEDURE — 25010000002 FUROSEMIDE PER 20 MG: Performed by: INTERNAL MEDICINE

## 2019-06-11 PROCEDURE — 97162 PT EVAL MOD COMPLEX 30 MIN: CPT

## 2019-06-11 RX ORDER — FLUTICASONE PROPIONATE 50 MCG
2 SPRAY, SUSPENSION (ML) NASAL DAILY
Status: DISCONTINUED | OUTPATIENT
Start: 2019-06-11 | End: 2019-06-14 | Stop reason: HOSPADM

## 2019-06-11 RX ORDER — FUROSEMIDE 10 MG/ML
60 INJECTION INTRAMUSCULAR; INTRAVENOUS
Status: DISCONTINUED | OUTPATIENT
Start: 2019-06-11 | End: 2019-06-12

## 2019-06-11 RX ORDER — ECHINACEA PURPUREA EXTRACT 125 MG
2 TABLET ORAL AS NEEDED
Status: DISCONTINUED | OUTPATIENT
Start: 2019-06-11 | End: 2019-06-14 | Stop reason: HOSPADM

## 2019-06-11 RX ADMIN — PANTOPRAZOLE SODIUM 40 MG: 40 TABLET, DELAYED RELEASE ORAL at 06:38

## 2019-06-11 RX ADMIN — DOXEPIN HYDROCHLORIDE 50 MG: 25 CAPSULE ORAL at 08:16

## 2019-06-11 RX ADMIN — ATORVASTATIN CALCIUM 20 MG: 20 TABLET, FILM COATED ORAL at 08:16

## 2019-06-11 RX ADMIN — ALPRAZOLAM 1 MG: 0.5 TABLET ORAL at 18:24

## 2019-06-11 RX ADMIN — ATENOLOL 25 MG: 25 TABLET ORAL at 08:16

## 2019-06-11 RX ADMIN — FUROSEMIDE 60 MG: 10 INJECTION, SOLUTION INTRAMUSCULAR; INTRAVENOUS at 17:20

## 2019-06-11 RX ADMIN — LOSARTAN POTASSIUM 100 MG: 100 TABLET, FILM COATED ORAL at 08:16

## 2019-06-11 RX ADMIN — ASPIRIN 325 MG: 325 TABLET ORAL at 08:16

## 2019-06-11 RX ADMIN — SODIUM CHLORIDE, PRESERVATIVE FREE 3 ML: 5 INJECTION INTRAVENOUS at 21:02

## 2019-06-11 RX ADMIN — CLONIDINE HYDROCHLORIDE 0.2 MG: 0.1 TABLET ORAL at 08:16

## 2019-06-11 RX ADMIN — SENNOSIDES,DOCUSATE SODIUM 1 TABLET: 50; 8.6 TABLET, FILM COATED ORAL at 08:16

## 2019-06-11 RX ADMIN — ENOXAPARIN SODIUM 40 MG: 40 INJECTION SUBCUTANEOUS at 17:20

## 2019-06-11 RX ADMIN — FLUTICASONE PROPIONATE 2 SPRAY: 50 SPRAY, METERED NASAL at 12:28

## 2019-06-11 RX ADMIN — FUROSEMIDE 60 MG: 10 INJECTION, SOLUTION INTRAMUSCULAR; INTRAVENOUS at 10:15

## 2019-06-11 RX ADMIN — POTASSIUM CHLORIDE 20 MEQ: 1.5 POWDER, FOR SOLUTION ORAL at 08:16

## 2019-06-11 RX ADMIN — SODIUM CHLORIDE, PRESERVATIVE FREE 3 ML: 5 INJECTION INTRAVENOUS at 08:17

## 2019-06-11 RX ADMIN — VILAZODONE HYDROCHLORIDE 20 MG: 40 TABLET ORAL at 21:01

## 2019-06-11 NOTE — PROGRESS NOTES
Clinical Pharmacy Services: Medication History    Miguelina Lopez is a 73 y.o. female presenting to [facility] for   Chief Complaint   Patient presents with   • Edema       She  has a past medical history of Anemia, Anxiety, Aortic valve stenosis, CHF (congestive heart failure) (CMS/[facility]), Chronic coronary artery disease, Class 3 severe obesity due to excess calories in adult (CMS/[facility]), Depression, Diabetes mellitus (CMS/[facility]), Heart murmur, Mitral valve insufficiency, Pneumonia, Pulmonary hypertension (CMS/[facility]), Sleep apnea, Stage 3 chronic kidney disease (CMS/[facility]), and Supplemental oxygen dependent.    Allergies as of 06/10/2019 - Reviewed 06/10/2019   Allergen Reaction Noted   • Coumadin [warfarin sodium] Diarrhea and Nausea Only 06/12/2016   • Bumex [bumetanide] Swelling 08/28/2018   • Erythromycin  07/29/2016   • Hctz [hydrochlorothiazide] Swelling 08/28/2018   • Penicillins  02/18/2016   • Sulfa antibiotics  02/18/2016   • Zaroxolyn [metolazone] Diarrhea 05/02/2019       Medication information was obtained from: Patient  Pharmacy and Phone Number: [facility] [phone]    Prior to Admission Medications     Prescriptions Last Dose Informant Patient Reported? Taking?    albuterol sulfate HFA (VENTOLIN HFA) 108 (90 Base) MCG/ACT inhaler  Self No Yes    Inhale 2 puffs Every 6 (Six) Hours As Needed for Wheezing.    ALPRAZolam (XANAX) 1 MG tablet  Self No Yes    Take 1 tablet by mouth 2 (Two) Times a Day As Needed for anxiety.    aspirin 325 MG tablet  Self Yes Yes    Take 325 mg by mouth Daily.    atenolol (TENORMIN) 25 MG tablet  Self Yes Yes    Take 25 mg by mouth Daily.    atorvastatin (LIPITOR) 20 MG tablet  Self Yes Yes    Take 20 mg by mouth Daily.    CloNIDine (CATAPRES) 0.2 MG tablet  Self Yes Yes    Take 0.2 mg by mouth Daily.    doxepin (SINEquan) 50 MG capsule  Self Yes Yes    Take 50 mg by mouth Daily.    Dulaglutide (TRULICITY) 0.75 MG/0.5ML solution pen-injector  Self Yes Yes    Inject 0.5  mL under the skin into the appropriate area as directed 1 (One) Time Per Week. Saturday    ferrous sulfate 324 (65 FE) MG tablet delayed-release EC tablet  Self Yes Yes    Take 324 mg by mouth Daily With Breakfast.    fluticasone-salmeterol (ADVAIR DISKUS) 250-50 MCG/DOSE DISKUS  Self No Yes    Inhale 1 puff Daily As Needed (cough and wheezing).    furosemide (LASIX) 20 MG tablet  Self No Yes    Take 3 tablets by mouth Daily.    glimepiride (AMARYL) 1 MG tablet  Self No Yes    Take 1 tablet by mouth Every Morning Before Breakfast.    ipratropium-albuterol (DUO-NEB) 0.5-2.5 mg/mL nebulizer  Self No Yes    Take 3 mL by nebulization 4 (four) times a day.    Patient taking differently:  Take 3 mL by nebulization Daily As Needed.    irbesartan (AVAPRO) 300 MG tablet  Self Yes Yes    Take 300 mg by mouth Every Night.    nitroglycerin (NITROSTAT) 0.4 MG SL tablet  Self No Yes    Place 1 tablet under the tongue As Needed for Chest Pain.  - IF PAIN REMAINS AFTER 5 MIN CALL 911 AND REPEAT DOSE MAX 3 TABS IN 15 MINUTES    nystatin (MYCOSTATIN) 740646 UNIT/GM cream  Self No Yes    Apply  topically to the appropriate area as directed 2 (Two) Times a Day. to affected area(s)    O2 (OXYGEN)  Self Yes Yes    Inhale 2 L/min Continuous.    omeprazole (priLOSEC) 40 MG capsule  Self Yes Yes    Take 40 mg by mouth Daily.    potassium chloride (K-DUR,KLOR-CON) 20 MEQ CR tablet  Self Yes Yes    Take 20 mEq by mouth 2 (Two) Times a Day.    promethazine (PHENERGAN) 25 MG tablet  Self Yes Yes    Take 25 mg by mouth Every 8 (Eight) Hours As Needed for Nausea or Vomiting.    senna-docusate (PERICOLACE) 8.6-50 MG per tablet  Self Yes Yes    Take 1 tablet by mouth daily.    traMADol (ULTRAM) 50 MG tablet  Self No Yes    Take 1 tablet by mouth Every 8 (Eight) Hours As Needed for Moderate Pain .    vilazodone (VIIBRYD) 20 MG tablet tablet  Self Yes Yes    Take 20 mg by mouth Every Night.            Medication notes: Seroquel removed, patient says  she it no longer taking this medication. She is taking Viibryd QHS.    This medication list is complete to the best of my knowledge as of 6/10/2019    Please call if questions.    Reina Vitale, Medication History Technician  6/10/2019 9:06 PM

## 2019-06-11 NOTE — PLAN OF CARE
Problem: Patient Care Overview  Goal: Plan of Care Review   06/11/19 0248   Coping/Psychosocial   Plan of Care Reviewed With patient   Plan of Care Review   Progress no change   OTHER   Outcome Summary Denies any c/o of pain. Patient's BP has been elevated. IV lasix given and patient has been responding well. Frequent weight shift encouraged. Will continue to monitor

## 2019-06-11 NOTE — PLAN OF CARE
Problem: Patient Care Overview  Goal: Plan of Care Review  Outcome: Ongoing (interventions implemented as appropriate)   06/11/19 8696   Coping/Psychosocial   Plan of Care Reviewed With patient   OTHER   Outcome Summary no significant changes, continue to diurese, no c/o pain or discomfort, vss, plan for echo, will continue to monitor      Goal: Individualization and Mutuality  Outcome: Ongoing (interventions implemented as appropriate)    Goal: Discharge Needs Assessment  Outcome: Ongoing (interventions implemented as appropriate)    Goal: Interprofessional Rounds/Family Conf  Outcome: Ongoing (interventions implemented as appropriate)      Problem: Fall Risk (Adult)  Goal: Identify Related Risk Factors and Signs and Symptoms  Outcome: Ongoing (interventions implemented as appropriate)    Goal: Absence of Fall  Outcome: Ongoing (interventions implemented as appropriate)      Problem: Cardiac: Heart Failure (Adult)  Goal: Signs and Symptoms of Listed Potential Problems Will be Absent, Minimized or Managed (Cardiac: Heart Failure)  Outcome: Ongoing (interventions implemented as appropriate)

## 2019-06-11 NOTE — PLAN OF CARE
Problem: Patient Care Overview  Goal: Plan of Care Review   06/11/19 0177   Coping/Psychosocial   Plan of Care Reviewed With patient   OTHER   Outcome Summary Pt is a 74 yo female who presents from home with acute on chronic CHF, CAD, CKD, B LE edema. Upon exam, pt demonstrates generalized weakness, impaired balance, and decreased aerobic capacity/endurance limiting mobility. B LE pain also limiting. Pt was independent with cane prior to admission; currently requiring CGA to Min A for safety. Pt would benefit from continued PT to address impairments and increase independence with functional mobility.

## 2019-06-11 NOTE — THERAPY EVALUATION
Acute Care - Physical Therapy Initial Evaluation  [facility]     Patient Name: Miguelina Lopez  : 1945  MRN: [id number]  Today's Date: 2019   Onset of Illness/Injury or Date of Surgery: 06/10/19     Referring Physician: Armen      Admit Date: 6/10/2019    Visit Dx:     ICD-10-CM ICD-9-CM   1. Acute on chronic congestive heart failure, unspecified heart failure type (CMS/[facility]) I50.9 428.0   2. Hypoxia R09.02 799.02     Patient Active Problem List   Diagnosis   • Anxiety disorder   • Arthritis of knee   • Asthma   • Chronic coronary artery disease   • Chronic kidney disease   • Depression   • Diabetic peripheral neuropathy (CMS/[facility])   • Gastroesophageal reflux disease   • Hyperlipidemia   • Insomnia   • Lower gastrointestinal hemorrhage   • Anemia   • OCHOA (obstructive sleep apnea)   • DM type 2 (diabetes mellitus, type 2) (CMS/[facility])   • Essential hypertension   • Hospital discharge follow-up   • Mitral stenosis   • Pulmonary hypertension due to sleep-disordered breathing (CMS/[facility])   • s/p MVR, TV-repair, CABG x2 16   • OLI (acute kidney injury) (CMS/[facility])   • Leukocytosis   • Atrial fibrillation (CMS/[facility])   • Nocturnal hypoxia   • Dermatitis   • Medicare annual wellness visit, initial   • Class 3 severe obesity due to excess calories with serious comorbidity and body mass index (BMI) of 45.0 to 49.9 in adult (CMS/[facility])   • Supplemental oxygen dependent   • Acute on chronic combined systolic and diastolic HF (heart failure) (CMS/[facility])   • Mitral regurgitation   • Aortic stenosis   • Medicare annual wellness visit, subsequent   • Localized edema   • Acute on chronic congestive heart failure (CMS/[facility])     Past Medical History:   Diagnosis Date   • Anemia    • Anxiety    • Aortic valve stenosis    • CHF (congestive heart failure) (CMS/[facility])    • Chronic coronary artery disease    • Class 3 severe obesity due to excess calories in adult (CMS/[facility])    • Depression    • Diabetes mellitus (CMS/[facility])    •  Heart murmur    • Mitral valve insufficiency    • Pneumonia     1/2016   • Pulmonary hypertension (CMS/HCC)     due to sleep disordered breathing   • Sleep apnea     Uses CPAP or oxygen   • Stage 3 chronic kidney disease (CMS/HCC)    • Supplemental oxygen dependent      Past Surgical History:   Procedure Laterality Date   • CARDIAC CATHETERIZATION     • CARDIAC CATHETERIZATION N/A 6/10/2016    Procedure: Left Heart Cath;  Surgeon: Chace Johnson MD;  Location: [facility] CATH INVASIVE LOCATION;  Service:    • CARDIAC CATHETERIZATION N/A 6/10/2016    Procedure: Right Heart Cath;  Surgeon: Chace Johnson MD;  Location: [facility] CATH INVASIVE LOCATION;  Service:    • CORONARY ARTERY BYPASS GRAFT      2 vessel   • CORONARY ARTERY BYPASS GRAFT WITH MITRAL VALVE REPAIR/REPLACEMENT N/A 6/13/2016    Procedure: INTRAOPERATIVE TARIQ, MIDLINE STERNOTOMY, CORONARY ARTERY BYPASS GRAFTING X  2 UTILIZING ENDOSCOPICALLY HARVESTED LEFT GREATER SAPHENOUS VEIN, MITRAL VALVE REPLACEMENT AND TRICUSPID VALVE REPAIR;  Surgeon: Eliecer Mistry MD;  Location: [facility] OR;  Service:    • CORONARY STENT PLACEMENT  2010    Approx. 6 yrs ago at [facility]   • HEMORRHOIDECTOMY     • HYSTERECTOMY     • MITRAL VALVE REPLACEMENT     • REPLACEMENT TOTAL KNEE Right    • THYROID SURGERY      Cyst removed from thyroid        PT ASSESSMENT (last 12 hours)      Physical Therapy Evaluation     Row Name 06/11/19 1040          PT Evaluation Time/Intention    Subjective Information  complains of;pain  -EE     Document Type  evaluation  -EE     Mode of Treatment  physical therapy;individual therapy  -EE     Patient Effort  good  -EE     Symptoms Noted During/After Treatment  none  -EE     Row Name 06/11/19 1040          General Information    Onset of Illness/Injury or Date of Surgery  06/10/19  -EE     Referring Physician  Licandro  -EE     Patient Observations  alert;cooperative;agree to therapy  -EE     Patient/Family Observations  Pt supine in bed  in no acute distress  -EE     Prior Level of Function  independent:;all household mobility;community mobility  -EE     Equipment Currently Used at Home  cane, straight;oxygen  -EE     Pertinent History of Current Functional Problem  acute on chronic CHF, CAD, CKD, B LE edema  -EE     Existing Precautions/Restrictions  fall;oxygen therapy device and L/min  -EE     Barriers to Rehab  none identified  -EE     Row Name 06/11/19 1040          Cognitive Assessment/Interventions    Additional Documentation  Cognitive Assessment/Intervention (Group)  -EE     Row Name 06/11/19 1040          Cognitive Assessment/Intervention- PT/OT    Orientation Status (Cognition)  oriented x 4  -EE     Follows Commands (Cognition)  WNL  -EE     Personal Safety Interventions  fall prevention program maintained;gait belt;nonskid shoes/slippers when out of bed;supervised activity  -EE     Row Name 06/11/19 1040          Bed Mobility Assessment/Treatment    Bed Mobility Assessment/Treatment  supine-sit  -EE     Supine-Sit [place] (Bed Mobility)  supervision  -EE     Assistive Device (Bed Mobility)  bed rails  -EE     Row Name 06/11/19 1040          Transfer Assessment/Treatment    Transfer Assessment/Treatment  sit-stand transfer;stand-sit transfer;toilet transfer  -EE     Sit-Stand [place] (Transfers)  contact guard  -EE     Stand-Sit [place] (Transfers)  contact guard  -EE     [place] Level (Toilet Transfer)  stand by assist  -EE     Assistive Device (Toilet Transfer)  grab bars/safety frame  -EE     Row Name 06/11/19 1040          Toilet Transfer    Type (Toilet Transfer)  sit-stand;stand-sit  -EE     Row Name 06/11/19 1040          Gait/Stairs Assessment/Training    [place] Level (Gait)  minimum assist (75% patient effort);contact guard;verbal cues  -EE     Assistive Device (Gait)  -- HHA  -EE     Distance in Feet (Gait)  70  -EE     Deviations/Abnormal Patterns (Gait)  lina decreased;stride length decreased;base  of support, wide  -EE     Bilateral Gait Deviations  weight shift ability decreased  -EE     Comment (Gait/Stairs)  Fatigue and B LE pain limiting  -EE     Row Name 06/11/19 1040          General ROM    GENERAL ROM COMMENTS  B LE AROM WFL  -EE     Row Name 06/11/19 1040          MMT (Manual Muscle Testing)    General MMT Comments  B LEs grossly 3+/5  -EE     Row Name 06/11/19 1040          Pain Assessment    Additional Documentation  Pain Scale: Numbers Pre/Post-Treatment (Group)  -EE     Row Name 06/11/19 1040          Pain Scale: Numbers Pre/Post-Treatment    Pain Scale: Numbers, Pretreatment  5/10  -EE     Pain Scale: Numbers, Post-Treatment  5/10  -EE     Pain Location - Side  Bilateral  -EE     Pain Location - Orientation  lower  -EE     Pain Location  extremity  -EE     Pain Intervention(s)  Repositioned;Rest  -EE     Row Name 06/11/19 1040          Plan of Care Review    Plan of Care Reviewed With  patient  -EE     Row Name 06/11/19 1040          Physical Therapy Clinical Impression    Patient/Family Goals Statement (PT Clinical Impression)  Go home  -EE     Criteria for Skilled Interventions Met (PT Clinical Impression)  yes;treatment indicated  -EE     Pathology/Pathophysiology Noted (Describe Specifically for Each System)  musculoskeletal;pulmonary  -EE     Rehab Potential (PT Clinical Summary)  good, to achieve stated therapy goals  -EE     Row Name 06/11/19 1040          Vital Signs    Pre SpO2 (%)  95  -EE     O2 Delivery Pre Treatment  supplemental O2  -EE     O2 Delivery Intra Treatment  supplemental O2  -EE     Post SpO2 (%)  97  -EE     O2 Delivery Post Treatment  supplemental O2  -EE     Pre Patient Position  Supine  -EE     Intra Patient Position  Standing  -EE     Post Patient Position  Sitting  -EE     Row Name 06/11/19 1040          Physical Therapy Goals    Bed Mobility Goal Selection (PT)  bed mobility, PT goal 1  -EE     Transfer Goal Selection (PT)  transfer, PT goal 1  -EE     Gait  Training Goal Selection (PT)  gait training, PT goal 1  -EE     Row Name 06/11/19 1040          Bed Mobility Goal 1 (PT)    Activity/Assistive Device (Bed Mobility Goal 1, PT)  bed mobility activities, all  -EE     [place] Level/Cues Needed (Bed Mobility Goal 1, PT)  independent  -EE     Time Frame (Bed Mobility Goal 1, PT)  1 week  -EE     Progress/Outcomes (Bed Mobility Goal 1, PT)  goal ongoing  -EE     Row Name 06/11/19 1040          Transfer Goal 1 (PT)    Activity/Assistive Device (Transfer Goal 1, PT)  sit-to-stand/stand-to-sit  -EE     [place] Level/Cues Needed (Transfer Goal 1, PT)  supervision required  -EE     Time Frame (Transfer Goal 1, PT)  1 week  -EE     Progress/Outcome (Transfer Goal 1, PT)  goal ongoing  -EE     Row Name 06/11/19 1040          Gait Training Goal 1 (PT)    Activity/Assistive Device (Gait Training Goal 1, PT)  gait (walking locomotion);cane, straight  -EE     [place] Level (Gait Training Goal 1, PT)  standby assist  -EE     Distance (Gait Goal 1, PT)  150  -EE     Time Frame (Gait Training Goal 1, PT)  1 week  -EE     Progress/Outcome (Gait Training Goal 1, PT)  goal ongoing  -EE     Row Name 06/11/19 1040          Positioning and Restraints    Pre-Treatment Position  in bed  -EE     Post Treatment Position  chair  -EE     In Chair  reclined;call light within reach;encouraged to call for assist;notified nsg;legs elevated  -EE       User Key  (r) = Recorded By, (t) = Taken By, (c) = Cosigned By    Initials Name Provider Type    EE Claudia Pastrana PT Physical Therapist        Physical Therapy Education     Title: PT OT SLP Therapies (In Progress)     Topic: Physical Therapy (In Progress)     Point: Mobility training (Done)     Learning Progress Summary           Patient Acceptance, E, VU,NR by EE at 6/11/2019 10:48 AM                               User Key     Initials Effective Dates Name Provider Type Discipline    EE 04/03/18 -  Claudia Pastrana PT Physical Therapist PT               PT Recommendation and Plan  Anticipated Discharge Disposition (PT): home with home health  Planned Therapy Interventions (PT Eval): balance training, bed mobility training, gait training, home exercise program, patient/family education, ROM (range of motion), stair training, strengthening, stretching, transfer training  Therapy Frequency (PT Clinical Impression): daily  Outcome Summary/Treatment Plan (PT)  Anticipated Discharge Disposition (PT): home with home health  Plan of Care Reviewed With: patient  Outcome Summary: Pt is a 74 yo female who presents from home with acute on chronic CHF, CAD, CKD, B LE edema. Upon exam, pt demonstrates generalized weakness, impaired balance, and decreased aerobic capacity/endurance limiting mobility. B LE pain also limiting. Pt was independent with cane prior to admission; currently requiring CGA to Min A for safety. Pt would benefit from continued PT to address impairments and increase independence with functional mobility.   Outcome Measures     Row Name 06/11/19 1100             How much help from another person do you currently need...    Turning from your back to your side while in flat bed without using bedrails?  4  -EE      Moving from lying on back to sitting on the side of a flat bed without bedrails?  3  -EE      Moving to and from a bed to a chair (including a wheelchair)?  3  -EE      Standing up from a chair using your arms (e.g., wheelchair, bedside chair)?  3  -EE      Climbing 3-5 steps with a railing?  2  -EE      To walk in hospital room?  3  -EE      AM-PAC 6 Clicks Score  18  -EE         Functional Assessment    Outcome Measure Options  AM-PAC 6 Clicks Basic Mobility (PT)  -EE        User Key  (r) = Recorded By, (t) = Taken By, (c) = Cosigned By    Initials Name Provider Type    EE Claudia Pastrana, PT Physical Therapist         Time Calculation:   PT Charges     Row Name 06/11/19 1120             Time Calculation    Start Time  1040  -EE      Stop  Time  1056  -EE      Time Calculation (min)  16 min  -EE      PT Received On  06/11/19  -EE      PT - Next Appointment  06/12/19  -EE      PT Goal Re-Cert Due Date  06/18/19  -EE        User Key  (r) = Recorded By, (t) = Taken By, (c) = Cosigned By    Initials Name Provider Type    EE Claudia Pastrana, PT Physical Therapist        Therapy Charges for Today     Code Description Service Date Service Provider Modifiers Qty    [id number] HC PT EVAL MOD COMPLEXITY 2 6/11/2019 Claudia Pastrana, PT GP 1          PT G-Codes  Outcome Measure Options: AM-PAC 6 Clicks Basic Mobility (PT)  AM-PAC 6 Clicks Score: 18      Claudia Pastrana PT  6/11/2019

## 2019-06-11 NOTE — CONSULTS
Patient Name: Miguelina Lopez  Age/Sex: 73 y.o. female  : 1945  MRN: [id number]    Date of Admission: 6/10/2019  Date of Encounter Visit: 19  Encounter Provider: Antoine Ayers MD  Place of Service: [facility] CARDIOLOGY      Referring Provider: Antoine Ayers MD  Patient Care Team:  Channing Bush MD as PCP - General (Family Medicine)  Channing Bush MD as Referring Physician (Family Medicine)  Robbie Wilson MD as Consulting Physician (Hematology and Oncology)  Chace Johnson MD as Consulting Physician (Cardiology)    Subjective:   Admitted/Consulted for: acute on chronic CHF    Chief Complaint: worsening BLE edema x3 months    History of Present Illness:  Miguelina Lopez is a 73 y.o. female known to me with history of CAD, valvular heart disease, HTN, DM, HLD and obesity. She was seen previously in 2016 and found to have significant coronary disease and underwent bypass surgery, mitral valve regurgitation with a mitral valve replacement, tissue prosthesis, and a tricuspid valve repair. She did not follow up for a 2 year period until 2018 when she was evaluated for progressive lower extremity edema. She also reports chronic shortness of breath, orthopnea and fatigue and was changed from Lasix to Bumex at that time. Her Bumex was eventually discontinued and switched back to Lasix due to reported allergic reaction. Last echocardiogram completed  showed severe septal hypokinesis, normal LV function with EF 46-50%, moderate to severely dilated LA cavity size, mild AS, severe nodular posterior MC calcification, a bioprosthetic MVR with a peak gradient of 24 mmHg and mean gradient of 7 mmHg, mild to moderate TR through TVR and an RSVP of 56 mmHg.     She presented to the ED 6/10/19 with reports of worsening bilateral lower extremity edema and weight gain that had been ongoing for 3 months. Troponin was negative. BNP 15,016. Creatinine  1.51. CXR showed moderate cardiomegaly but no acute processes were noted. She recieved 100mg IV total Lasix and was admitted.         Previous testing:     Echo 8/2/18  · There is severe septal hypokinesis  · Estimated EF appears to be in the range of 46 - 50%.  · Normal right ventricular systolic function noted. Right ventricular cavity is moderately dilated.  · Left atrial cavity size is moderate-to-severely dilated.  · There is mild aortic stenosis with a peak gradient of 25 mmHg and a mean gradient of 15 mmHg  · There is severe nodular posterior mitral valve calcification.  · There is a bioprosthetic mitral valve replacement.  · Gradients across the bioprosthetic mitral valve replacement are elevated, with a peak gradient of 24 mmHg, and a mean gradient of 7 mmHg.  · There is mild to moderate tricuspid regurgitation through the tricuspid valve repair  · Calculated right ventricular systolic pressure from tricuspid regurgitation is 56 mmHg.  · There is no evidence of pericardial effusion.    Cardiac Catheterization 6/10/16  HEMODYNAMIC / ANGIOGRAPHIC DATA:    1. Pulmonary capillary wedge pressure was 30.   2. Pulmonary artery systolic pressure was 60/30.  3. Right atrial pressure was 12.  4. Cardiac index was 2.52.  5. Left ventricular end diastolic pressure was 10 mmHg.  6. Left ventriculography revealed the EF to be 60 %.  7. The left main is normal.  8. The LAD is early atherosclerotic plaque with a large diagonal 70-80%  9. Circumflex nondominant with early atherosclerotic plaque.  10. The right coronary artery is dominant but the distal 90% stenosis.     RECOMMENDATIONS: Considering significant coronary artery disease as well as significant mitral regurgitation, significant mitral stenosis, and severe tricuspid regurgitation patient will have surgical consultation obtained      Past Medical History:  Past Medical History:   Diagnosis Date   • Anemia    • Anxiety    • Aortic valve stenosis    • CHF  (congestive heart failure) (CMS/[facility])    • Chronic coronary artery disease    • Class 3 severe obesity due to excess calories in adult (CMS/[facility])    • Depression    • Diabetes mellitus (CMS/[facility])    • Heart murmur    • Mitral valve insufficiency    • Pneumonia     1/2016   • Pulmonary hypertension (CMS/[facility])     due to sleep disordered breathing   • Sleep apnea     Uses CPAP or oxygen   • Stage 3 chronic kidney disease (CMS/[facility])    • Supplemental oxygen dependent        Past Surgical History:   Procedure Laterality Date   • CARDIAC CATHETERIZATION     • CARDIAC CATHETERIZATION N/A 6/10/2016    Procedure: Left Heart Cath;  Surgeon: Chace Johnson MD;  Location: [facility] CATH INVASIVE LOCATION;  Service:    • CARDIAC CATHETERIZATION N/A 6/10/2016    Procedure: Right Heart Cath;  Surgeon: Chace Johnson MD;  Location: [facility] CATH INVASIVE LOCATION;  Service:    • CORONARY ARTERY BYPASS GRAFT      2 vessel   • CORONARY ARTERY BYPASS GRAFT WITH MITRAL VALVE REPAIR/REPLACEMENT N/A 6/13/2016    Procedure: INTRAOPERATIVE TARIQ, MIDLINE STERNOTOMY, CORONARY ARTERY BYPASS GRAFTING X  2 UTILIZING ENDOSCOPICALLY HARVESTED LEFT GREATER SAPHENOUS VEIN, MITRAL VALVE REPLACEMENT AND TRICUSPID VALVE REPAIR;  Surgeon: Eliecer Mistry MD;  Location: [facility] OR;  Service:    • CORONARY STENT PLACEMENT  2010    Approx. 6 yrs ago at [facility]   • HEMORRHOIDECTOMY     • HYSTERECTOMY     • MITRAL VALVE REPLACEMENT     • REPLACEMENT TOTAL KNEE Right    • THYROID SURGERY      Cyst removed from thyroid       Home Medications:   Medications Prior to Admission   Medication Sig Dispense Refill Last Dose   • albuterol sulfate HFA (VENTOLIN HFA) 108 (90 Base) MCG/ACT inhaler Inhale 2 puffs Every 6 (Six) Hours As Needed for Wheezing. 18 g 0 Taking   • ALPRAZolam (XANAX) 1 MG tablet Take 1 tablet by mouth 2 (Two) Times a Day As Needed for anxiety. 24 tablet 0 Taking   • aspirin 325 MG tablet Take 325 mg by mouth Daily.      • atenolol  (TENORMIN) 25 MG tablet Take 25 mg by mouth Daily.      • atorvastatin (LIPITOR) 20 MG tablet Take 20 mg by mouth Daily.      • CloNIDine (CATAPRES) 0.2 MG tablet Take 0.2 mg by mouth Daily.      • doxepin (SINEquan) 50 MG capsule Take 50 mg by mouth Daily.   Taking   • Dulaglutide (TRULICITY) 0.75 MG/0.5ML solution pen-injector Inject 0.5 mL under the skin into the appropriate area as directed 1 (One) Time Per Week. Saturday      • ferrous sulfate 324 (65 FE) MG tablet delayed-release EC tablet Take 324 mg by mouth Daily With Breakfast.   Taking   • fluticasone-salmeterol (ADVAIR DISKUS) 250-50 MCG/DOSE DISKUS Inhale 1 puff Daily As Needed (cough and wheezing). 60 each 0 Taking   • furosemide (LASIX) 20 MG tablet Take 3 tablets by mouth Daily. 90 tablet 3 Taking   • glimepiride (AMARYL) 1 MG tablet Take 1 tablet by mouth Every Morning Before Breakfast. 90 tablet 0 Taking   • ipratropium-albuterol (DUO-NEB) 0.5-2.5 mg/mL nebulizer Take 3 mL by nebulization 4 (four) times a day. (Patient taking differently: Take 3 mL by nebulization Daily As Needed.) 3 mL 5 Taking   • irbesartan (AVAPRO) 300 MG tablet Take 300 mg by mouth Every Night.      • nitroglycerin (NITROSTAT) 0.4 MG SL tablet Place 1 tablet under the tongue As Needed for Chest Pain.  - IF PAIN REMAINS AFTER 5 MIN CALL 911 AND REPEAT DOSE MAX 3 TABS IN 15 MINUTES 25 tablet 5 Taking   • nystatin (MYCOSTATIN) 512621 UNIT/GM cream Apply  topically to the appropriate area as directed 2 (Two) Times a Day. to affected area(s) 30 g 3 Taking   • O2 (OXYGEN) Inhale 2 L/min Continuous.   Taking   • omeprazole (priLOSEC) 40 MG capsule Take 40 mg by mouth Daily.      • potassium chloride (K-DUR,KLOR-CON) 20 MEQ CR tablet Take 20 mEq by mouth 2 (Two) Times a Day.      • promethazine (PHENERGAN) 25 MG tablet Take 25 mg by mouth Every 8 (Eight) Hours As Needed for Nausea or Vomiting.      • senna-docusate (PERICOLACE) 8.6-50 MG per tablet Take 1 tablet by mouth daily.    Taking   • traMADol (ULTRAM) 50 MG tablet Take 1 tablet by mouth Every 8 (Eight) Hours As Needed for Moderate Pain . 30 tablet 0 Taking   • vilazodone (VIIBRYD) 20 MG tablet tablet Take 20 mg by mouth Every Night.          Allergies:  Allergies   Allergen Reactions   • Coumadin [Warfarin Sodium] Diarrhea and Nausea Only   • Bumex [Bumetanide] Swelling   • Erythromycin    • Hctz [Hydrochlorothiazide] Swelling   • Penicillins    • Sulfa Antibiotics    • Zaroxolyn [Metolazone] Diarrhea       Past Social History:  Social History     Socioeconomic History   • Marital status:      Spouse name: Not on file   • Number of children: Not on file   • Years of education: Not on file   • Highest education level: Not on file   Tobacco Use   • Smoking status: Never Smoker   • Smokeless tobacco: Never Used   Substance and Sexual Activity   • Alcohol use: No   • Drug use: No   • Sexual activity: Defer        Past Family History:  Family History   Problem Relation Age of Onset   • Heart attack Father    • Heart disease Father        Review of Systems: All systems reviewed. Pertinent positives identified in HPI. All other systems are negative.     REVIEW OF SYSTEMS:   CONSTITUTIONAL: No weight loss, fever, chills, weakness or fatigue.   HEENT: Eyes: No visual loss, blurred vision, double vision or yellow sclerae. Ears, Nose, Throat: No hearing loss, sneezing, congestion, runny nose or sore throat.   SKIN: No rash or itching.     RESPIRATORY: No shortness of breath, hemoptysis, cough or sputum.   GASTROINTESTINAL: No anorexia, nausea, vomiting or diarrhea. No abdominal pain, bright red blood per rectum or melena.  GENITOURINARY: No burning on urination, hematuria or increased frequency.  NEUROLOGICAL: No headache, dizziness, syncope, paralysis, ataxia, numbness or tingling in the extremities. No change in bowel or bladder control.   MUSCULOSKELETAL: No muscle, back pain, joint pain or stiffness.   HEMATOLOGIC: No anemia,  bleeding or bruising.   LYMPHATICS: No enlarged nodes. No history of splenectomy.   PSYCHIATRIC: No history of depression, anxiety, hallucinations.   ENDOCRINOLOGIC: No reports of sweating, cold or heat intolerance. No polyuria or polydipsia.       Objective:     Objective:  Temp:  [97.8 °F (36.6 °C)-98.2 °F (36.8 °C)] 97.9 °F (36.6 °C)  Heart Rate:  [] 83  Resp:  [15-18] 18  BP: (136-212)/() 186/97    Intake/Output Summary (Last 24 hours) at 6/11/2019 0854  Last data filed at 6/11/2019 0639  Gross per 24 hour   Intake 170 ml   Output 5500 ml   Net -5330 ml     Body mass index is 54.7 kg/m².      06/10/19  1800 06/10/19  2200 06/11/19  0521   Weight: 132 kg (292 lb) 131 kg (289 lb) 127 kg (280 lb 1.6 oz)           Physical Exam:   Physical Exam   Constitutional: She is oriented to person, place, and time. She appears well-developed and well-nourished.   Morbidly obese   HENT:   Head: Normocephalic.   Eyes: Pupils are equal, round, and reactive to light.   Neck: Normal range of motion. No JVD present. Carotid bruit is not present. No thyromegaly present.   Cardiovascular: Normal rate, regular rhythm, S1 normal, S2 normal, normal heart sounds and intact distal pulses. Exam reveals no gallop and no friction rub.   No murmur heard.  Pulmonary/Chest: Effort normal and breath sounds normal.   Abdominal: Soft. Bowel sounds are normal.   Musculoskeletal: She exhibits no edema.   Neurological: She is alert and oriented to person, place, and time.   Skin: Skin is warm, dry and intact. No erythema.   Psychiatric: She has a normal mood and affect.   Vitals reviewed.        Lab Review:     Results from last 7 days   Lab Units 06/11/19  0338 06/10/19  2327 06/10/19  1714   SODIUM mmol/L 145 147* 145   POTASSIUM mmol/L 3.7 4.3 4.5   CHLORIDE mmol/L 100 101 101   CO2 mmol/L 29.8* 31.2* 30.5*   BUN mg/dL 23 23 23   CREATININE mg/dL 1.24* 1.32* 1.51*   GLUCOSE mg/dL 130* 117* 93   CALCIUM mg/dL 9.2 9.0 9.0       Results  from last 7 days   Lab Units 06/10/19  1714   TROPONIN T ng/mL 0.027     Results from last 7 days   Lab Units 06/10/19  1714   WBC 10*3/mm3 10.56   HEMOGLOBIN g/dL 11.8*   HEMATOCRIT % 41.7   PLATELETS 10*3/mm3 264                     Results from last 7 days   Lab Units 06/10/19  1714   PROBNP pg/mL 15,016.0*               Imaging:    Imaging Results (most recent)     Procedure Component Value Units Date/Time    XR Chest 2 View [[id number]] Collected:  06/10/19 1933     Updated:  06/10/19 1937    Narrative:       AP AND LATERAL CHEST     CLINICAL HISTORY: Weight gain. Edema. Possible CHF.     Compared to previous chest dated 08/07/2018.     The lungs are fairly well-expanded and appear free of focal infiltrates.  There are no pleural effusions. The heart is moderately enlarged and  unchanged. The pulmonary vasculature is within normal limits. Prosthetic  heart valves are noted. There are also stigmata of previous coronary  artery bypass procedure.     IMPRESSIONS: Moderate cardiomegaly. No acute process is identified.     This report was finalized on 6/10/2019 7:34 PM by Dr. Chris Dias M.D.             EKG 6/10/19        Baseline 1/319      I personally viewed and interpreted the patient's EKG/Telemetry data.    Assessment:     1.  Acute congestive heart failure: Probably systolic.  Last echocardiogram July 2018 with ejection fraction mildly reduced to 45%.  We will repeat  2.  Valvular heart disease: Status post mitral valve replacement with tissue prosthesis.  Mild aortic stenosis noted on last echo.  3.  Coronary artery disease: Status post CABG  4.  Chronic kidney disease  5.  Morbid obesity  6.  Hypertension: On medical therapy        Antoine Ayers MD  [facility]  06/11/19  8:54 AM

## 2019-06-12 ENCOUNTER — APPOINTMENT (OUTPATIENT)
Dept: CARDIOLOGY | Facility: HOSPITAL | Age: 74
End: 2019-06-12

## 2019-06-12 LAB
ANION GAP SERPL CALCULATED.3IONS-SCNC: 13.2 MMOL/L
AORTIC DIMENSIONLESS INDEX: 0.5 (DI)
BH CV ECHO MEAS - AO MAX PG: 15.4 MMHG
BH CV ECHO MEAS - AO MEAN PG (FULL): 14 MMHG
BH CV ECHO MEAS - AO MEAN PG: 17 MMHG
BH CV ECHO MEAS - AO ROOT AREA (BSA CORRECTED): 1.3
BH CV ECHO MEAS - AO ROOT AREA: 6.2 CM^2
BH CV ECHO MEAS - AO ROOT DIAM: 2.8 CM
BH CV ECHO MEAS - AO V2 MAX: 196 CM/SEC
BH CV ECHO MEAS - AO V2 MEAN: 195 CM/SEC
BH CV ECHO MEAS - AO V2 VTI: 56 CM
BH CV ECHO MEAS - ASC AORTA: 2.6 CM
BH CV ECHO MEAS - AVA(I,A): 1.3 CM^2
BH CV ECHO MEAS - AVA(I,D): 1.3 CM^2
BH CV ECHO MEAS - BSA(HAYCOCK): 2.4 M^2
BH CV ECHO MEAS - BSA: 2.1 M^2
BH CV ECHO MEAS - BZI_BMI: 53.5 KILOGRAMS/M^2
BH CV ECHO MEAS - BZI_METRIC_HEIGHT: 152.4 CM
BH CV ECHO MEAS - BZI_METRIC_WEIGHT: 124.3 KG
BH CV ECHO MEAS - EDV(CUBED): 85.2 ML
BH CV ECHO MEAS - EDV(MOD-SP2): 53 ML
BH CV ECHO MEAS - EDV(MOD-SP4): 120 ML
BH CV ECHO MEAS - EDV(TEICH): 87.7 ML
BH CV ECHO MEAS - EF(CUBED): 74.2 %
BH CV ECHO MEAS - EF(MOD-BP): 53 %
BH CV ECHO MEAS - EF(MOD-SP2): 49.1 %
BH CV ECHO MEAS - EF(MOD-SP4): 52.5 %
BH CV ECHO MEAS - EF(TEICH): 66.3 %
BH CV ECHO MEAS - ESV(CUBED): 22 ML
BH CV ECHO MEAS - ESV(MOD-SP2): 27 ML
BH CV ECHO MEAS - ESV(MOD-SP4): 57 ML
BH CV ECHO MEAS - ESV(TEICH): 29.6 ML
BH CV ECHO MEAS - FS: 36.4 %
BH CV ECHO MEAS - IVS/LVPW: 1.2
BH CV ECHO MEAS - IVSD: 1.5 CM
BH CV ECHO MEAS - LAT PEAK E' VEL: 4.5 CM/SEC
BH CV ECHO MEAS - LV DIASTOLIC VOL/BSA (35-75): 56.2 ML/M^2
BH CV ECHO MEAS - LV MASS(C)D: 240.3 GRAMS
BH CV ECHO MEAS - LV MASS(C)DI: 112.6 GRAMS/M^2
BH CV ECHO MEAS - LV MEAN PG: 3 MMHG
BH CV ECHO MEAS - LV SYSTOLIC VOL/BSA (12-30): 26.7 ML/M^2
BH CV ECHO MEAS - LV V1 MAX: 125 CM/SEC
BH CV ECHO MEAS - LV V1 MEAN: 86.9 CM/SEC
BH CV ECHO MEAS - LV V1 VTI: 26.3 CM
BH CV ECHO MEAS - LVIDD: 4.4 CM
BH CV ECHO MEAS - LVIDS: 2.8 CM
BH CV ECHO MEAS - LVLD AP2: 7.3 CM
BH CV ECHO MEAS - LVLD AP4: 8.3 CM
BH CV ECHO MEAS - LVLS AP2: 6.5 CM
BH CV ECHO MEAS - LVLS AP4: 7 CM
BH CV ECHO MEAS - LVOT AREA (M): 2.8 CM^2
BH CV ECHO MEAS - LVOT AREA: 2.8 CM^2
BH CV ECHO MEAS - LVOT DIAM: 1.9 CM
BH CV ECHO MEAS - LVPWD: 1.3 CM
BH CV ECHO MEAS - MED PEAK E' VEL: 3.2 CM/SEC
BH CV ECHO MEAS - MV A DUR: 0.17 SEC
BH CV ECHO MEAS - MV A MAX VEL: 114 CM/SEC
BH CV ECHO MEAS - MV DEC SLOPE: 776 CM/SEC^2
BH CV ECHO MEAS - MV DEC TIME: 239 SEC
BH CV ECHO MEAS - MV E MAX VEL: 201 CM/SEC
BH CV ECHO MEAS - MV E/A: 1.8
BH CV ECHO MEAS - MV MEAN PG: 7 MMHG
BH CV ECHO MEAS - MV P1/2T MAX VEL: 217 CM/SEC
BH CV ECHO MEAS - MV P1/2T: 81.9 MSEC
BH CV ECHO MEAS - MV V2 MEAN: 122 CM/SEC
BH CV ECHO MEAS - MV V2 VTI: 61.9 CM
BH CV ECHO MEAS - MVA P1/2T LCG: 1 CM^2
BH CV ECHO MEAS - MVA(P1/2T): 2.7 CM^2
BH CV ECHO MEAS - MVA(VTI): 1.2 CM^2
BH CV ECHO MEAS - PA ACC SLOPE: 1876 CM/SEC^2
BH CV ECHO MEAS - PA ACC TIME: 0.05 SEC
BH CV ECHO MEAS - PA MAX PG (FULL): 1.7 MMHG
BH CV ECHO MEAS - PA MAX PG: 3.8 MMHG
BH CV ECHO MEAS - PA PR(ACCEL): 55.2 MMHG
BH CV ECHO MEAS - PA V2 MAX: 97.7 CM/SEC
BH CV ECHO MEAS - RAP SYSTOLE: 15 MMHG
BH CV ECHO MEAS - RV MAX PG: 2.1 MMHG
BH CV ECHO MEAS - RV MEAN PG: 1 MMHG
BH CV ECHO MEAS - RV V1 MAX: 72.9 CM/SEC
BH CV ECHO MEAS - RV V1 MEAN: 55.4 CM/SEC
BH CV ECHO MEAS - RV V1 VTI: 18.3 CM
BH CV ECHO MEAS - RVSP: 53 MMHG
BH CV ECHO MEAS - SI(AO): 161.6 ML/M^2
BH CV ECHO MEAS - SI(CUBED): 29.6 ML/M^2
BH CV ECHO MEAS - SI(LVOT): 34.9 ML/M^2
BH CV ECHO MEAS - SI(MOD-SP2): 12.2 ML/M^2
BH CV ECHO MEAS - SI(MOD-SP4): 29.5 ML/M^2
BH CV ECHO MEAS - SI(TEICH): 27.2 ML/M^2
BH CV ECHO MEAS - SV(AO): 344.8 ML
BH CV ECHO MEAS - SV(CUBED): 63.2 ML
BH CV ECHO MEAS - SV(LVOT): 74.6 ML
BH CV ECHO MEAS - SV(MOD-SP2): 26 ML
BH CV ECHO MEAS - SV(MOD-SP4): 63 ML
BH CV ECHO MEAS - SV(TEICH): 58.1 ML
BH CV ECHO MEAS - TAPSE (>1.6): 1.4 CM2
BH CV ECHO MEAS - TR MAX VEL: 307 CM/SEC
BH CV ECHO MEASUREMENTS AVERAGE E/E' RATIO: 52.21
BH CV XLRA - RV BASE: 3.9 CM
BH CV XLRA - TDI S': 3.4 CM/SEC
BUN BLD-MCNC: 24 MG/DL (ref 8–23)
BUN/CREAT SERPL: 16.3 (ref 7–25)
CALCIUM SPEC-SCNC: 9 MG/DL (ref 8.6–10.5)
CHLORIDE SERPL-SCNC: 99 MMOL/L (ref 98–107)
CO2 SERPL-SCNC: 31.8 MMOL/L (ref 22–29)
CREAT BLD-MCNC: 1.47 MG/DL (ref 0.57–1)
GFR SERPL CREATININE-BSD FRML MDRD: 35 ML/MIN/1.73
GLUCOSE BLD-MCNC: 86 MG/DL (ref 65–99)
GLUCOSE BLDC GLUCOMTR-MCNC: 111 MG/DL (ref 70–130)
GLUCOSE BLDC GLUCOMTR-MCNC: 135 MG/DL (ref 70–130)
GLUCOSE BLDC GLUCOMTR-MCNC: 135 MG/DL (ref 70–130)
GLUCOSE BLDC GLUCOMTR-MCNC: 137 MG/DL (ref 70–130)
LEFT ATRIUM VOLUME INDEX: 44 ML/M2
LV EF 2D ECHO EST: 53 %
MAXIMAL PREDICTED HEART RATE: 146 BPM
POTASSIUM BLD-SCNC: 3.5 MMOL/L (ref 3.5–5.2)
SODIUM BLD-SCNC: 144 MMOL/L (ref 136–145)
STRESS TARGET HR: 124 BPM

## 2019-06-12 PROCEDURE — 82962 GLUCOSE BLOOD TEST: CPT

## 2019-06-12 PROCEDURE — 93306 TTE W/DOPPLER COMPLETE: CPT

## 2019-06-12 PROCEDURE — 99226 PR SBSQ OBSERVATION CARE/DAY 35 MINUTES: CPT | Performed by: INTERNAL MEDICINE

## 2019-06-12 PROCEDURE — 25010000002 ENOXAPARIN PER 10 MG: Performed by: INTERNAL MEDICINE

## 2019-06-12 PROCEDURE — 93306 TTE W/DOPPLER COMPLETE: CPT | Performed by: INTERNAL MEDICINE

## 2019-06-12 PROCEDURE — 25010000002 FUROSEMIDE PER 20 MG: Performed by: INTERNAL MEDICINE

## 2019-06-12 PROCEDURE — 80048 BASIC METABOLIC PNL TOTAL CA: CPT | Performed by: INTERNAL MEDICINE

## 2019-06-12 RX ORDER — FUROSEMIDE 10 MG/ML
40 INJECTION INTRAMUSCULAR; INTRAVENOUS
Status: DISCONTINUED | OUTPATIENT
Start: 2019-06-12 | End: 2019-06-13

## 2019-06-12 RX ORDER — CLONIDINE HYDROCHLORIDE 0.1 MG/1
0.2 TABLET ORAL EVERY 12 HOURS SCHEDULED
Status: DISCONTINUED | OUTPATIENT
Start: 2019-06-12 | End: 2019-06-14 | Stop reason: HOSPADM

## 2019-06-12 RX ORDER — ATENOLOL 50 MG/1
50 TABLET ORAL DAILY
Status: DISCONTINUED | OUTPATIENT
Start: 2019-06-12 | End: 2019-06-14 | Stop reason: HOSPADM

## 2019-06-12 RX ORDER — PETROLATUM 420 MG/G
OINTMENT TOPICAL
Status: DISCONTINUED | OUTPATIENT
Start: 2019-06-12 | End: 2019-06-14 | Stop reason: HOSPADM

## 2019-06-12 RX ADMIN — ASPIRIN 325 MG: 325 TABLET ORAL at 08:20

## 2019-06-12 RX ADMIN — ATENOLOL 50 MG: 25 TABLET ORAL at 08:30

## 2019-06-12 RX ADMIN — DOXEPIN HYDROCHLORIDE 50 MG: 25 CAPSULE ORAL at 08:20

## 2019-06-12 RX ADMIN — CLONIDINE HYDROCHLORIDE 0.2 MG: 0.1 TABLET ORAL at 20:53

## 2019-06-12 RX ADMIN — FUROSEMIDE 40 MG: 10 INJECTION, SOLUTION INTRAMUSCULAR; INTRAVENOUS at 18:15

## 2019-06-12 RX ADMIN — ENOXAPARIN SODIUM 40 MG: 40 INJECTION SUBCUTANEOUS at 18:15

## 2019-06-12 RX ADMIN — FUROSEMIDE 40 MG: 10 INJECTION, SOLUTION INTRAMUSCULAR; INTRAVENOUS at 08:30

## 2019-06-12 RX ADMIN — VILAZODONE HYDROCHLORIDE 20 MG: 40 TABLET ORAL at 20:53

## 2019-06-12 RX ADMIN — ALPRAZOLAM 1 MG: 0.5 TABLET ORAL at 08:19

## 2019-06-12 RX ADMIN — SENNOSIDES,DOCUSATE SODIUM 1 TABLET: 50; 8.6 TABLET, FILM COATED ORAL at 08:20

## 2019-06-12 RX ADMIN — SODIUM CHLORIDE, PRESERVATIVE FREE 3 ML: 5 INJECTION INTRAVENOUS at 08:20

## 2019-06-12 RX ADMIN — POTASSIUM CHLORIDE 20 MEQ: 1.5 POWDER, FOR SOLUTION ORAL at 08:20

## 2019-06-12 RX ADMIN — PANTOPRAZOLE SODIUM 40 MG: 40 TABLET, DELAYED RELEASE ORAL at 06:25

## 2019-06-12 RX ADMIN — FLUTICASONE PROPIONATE 2 SPRAY: 50 SPRAY, METERED NASAL at 08:21

## 2019-06-12 RX ADMIN — ENOXAPARIN SODIUM 40 MG: 40 INJECTION SUBCUTANEOUS at 06:25

## 2019-06-12 RX ADMIN — SODIUM CHLORIDE, PRESERVATIVE FREE 3 ML: 5 INJECTION INTRAVENOUS at 20:54

## 2019-06-12 RX ADMIN — ATORVASTATIN CALCIUM 20 MG: 20 TABLET, FILM COATED ORAL at 08:20

## 2019-06-12 RX ADMIN — PETROLATUM: 420 OINTMENT TOPICAL at 18:59

## 2019-06-12 RX ADMIN — LOSARTAN POTASSIUM 100 MG: 100 TABLET, FILM COATED ORAL at 08:20

## 2019-06-12 RX ADMIN — CLONIDINE HYDROCHLORIDE 0.2 MG: 0.1 TABLET ORAL at 08:31

## 2019-06-12 NOTE — PLAN OF CARE
Problem: Patient Care Overview  Goal: Plan of Care Review   06/12/19 1519   Coping/Psychosocial   Plan of Care Reviewed With patient   Plan of Care Review   Progress improving   OTHER   Outcome Summary VSS, denies any c/o of pain. Adequate urine output. Echo in the am. Will continue to monitor

## 2019-06-12 NOTE — PLAN OF CARE
Problem: Patient Care Overview  Goal: Plan of Care Review  Outcome: Ongoing (interventions implemented as appropriate)   06/12/19 1528   Coping/Psychosocial   Plan of Care Reviewed With patient   Plan of Care Review   Progress improving   OTHER   Outcome Summary Patient still getting iV lasix bid, ECHO today, and will continue to monitor.        Problem: Fall Risk (Adult)  Goal: Identify Related Risk Factors and Signs and Symptoms  Outcome: Outcome(s) achieved Date Met: 06/12/19    Goal: Absence of Fall  Outcome: Ongoing (interventions implemented as appropriate)      Problem: Cardiac: Heart Failure (Adult)  Goal: Signs and Symptoms of Listed Potential Problems Will be Absent, Minimized or Managed (Cardiac: Heart Failure)  Outcome: Ongoing (interventions implemented as appropriate)

## 2019-06-12 NOTE — PROGRESS NOTES
Hospital Follow Up    LOS:  LOS: 0 days   Patient Name: Miguelina Lopez  Age/Sex: 73 y.o. female  : 1945  MRN: [id number]    Date of Hospital Visit: 19  Length of Stay: 0  Encounter Provider: Antoine Ayers MD  Place of Service: [facility] CARDIOLOGY    Subjective:     Follow Up for: Congestive heart failure    Interval History: The patient has diuresed quite a bit.  Her blood pressure still remains an issue.  She is less short of breath and actually feeling better.  Echocardiogram is pending.      Objective:     Objective:  Temp:  [97.7 °F (36.5 °C)-98.1 °F (36.7 °C)] 98.1 °F (36.7 °C)  Heart Rate:  [48-91] 91  Resp:  [16-18] 16  BP: (155-182)/(66-99) 182/99  Body mass index is 53.51 kg/m².    Intake/Output Summary (Last 24 hours) at 2019 0801  Last data filed at 2019 0750  Gross per 24 hour   Intake 1240 ml   Output 4700 ml   Net -3460 ml         06/10/19  2200 19  0521 19  0558   Weight: 131 kg (289 lb) 127 kg (280 lb 1.6 oz) 124 kg (274 lb)     Weight change: -8.165 kg ()    Physical Exam:   General Appearance: Alert, cooperative, in no acute distress. AAOx4.   HEENT: Normocephalic.  Neck: Supple. No JVD. No Carotid bruit. No thyromegaly  Lungs: CTAB. Normal respiratory effort and rate.  Heart:: Regular rate and rhythm, normal S1 and S2, 2/6 systolic murmur heard at the right upper sternal border  Abdomen: Soft, nontender, non-distended. positive bowel sounds  Extremities: Warm, no cyanosis, or clubbing. No edema.     Lab Review:   Results from last 7 days   Lab Units 19  0353 19  0338 06/10/19  2327   SODIUM mmol/L 144 145 147*   POTASSIUM mmol/L 3.5 3.7 4.3   CHLORIDE mmol/L 99 100 101   CO2 mmol/L 31.8* 29.8* 31.2*   BUN mg/dL 24* 23 23   CREATININE mg/dL 1.47* 1.24* 1.32*   GLUCOSE mg/dL 86 130* 117*   CALCIUM mg/dL 9.0 9.2 9.0       Results from last 7 days   Lab Units 06/10/19  1714   TROPONIN T ng/mL 0.027     Results  from last 7 days   Lab Units 06/10/19  1714   WBC 10*3/mm3 10.56   HEMOGLOBIN g/dL 11.8*   HEMATOCRIT % 41.7   PLATELETS 10*3/mm3 264                 Results from last 7 days   Lab Units 06/10/19  1714   PROBNP pg/mL 15,016.0*             I reviewed the patient's new clinical results.          I personally viewed and interpreted the patient's EKG/Telemetry data.  Current Medications:   Scheduled Meds:  aspirin 325 mg Oral Daily   atenolol 25 mg Oral Daily   atorvastatin 20 mg Oral Daily   CloNIDine 0.2 mg Oral Daily   doxepin 50 mg Oral Daily   enoxaparin 40 mg Subcutaneous Q12H   fluticasone 2 spray Each Nare Daily   furosemide 60 mg Intravenous BID   insulin lispro 0-14 Units Subcutaneous 4x Daily With Meals & Nightly   losartan 100 mg Oral Q24H   pantoprazole 40 mg Oral QAM   potassium chloride 20 mEq Oral Daily   sennosides-docusate sodium 1 tablet Oral Daily   sodium chloride 3 mL Intravenous Q12H   vilazodone 20 mg Oral Nightly     Continuous Infusions:     Allergies:  Allergies   Allergen Reactions   • Coumadin [Warfarin Sodium] Diarrhea and Nausea Only   • Bumex [Bumetanide] Swelling   • Erythromycin    • Hctz [Hydrochlorothiazide] Swelling   • Penicillins    • Sulfa Antibiotics    • Zaroxolyn [Metolazone] Diarrhea       Assessment & Plan       Acute on chronic congestive heart failure (CMS/[facility])    1.  Congestive heart failure: Acute on chronic systolic.  Good diuresis.  2.  Chronic kidney disease: We will need to cut back slightly on diuretic therapy.  Creatinine slightly elevated.  3.  Coronary artery disease: Status post CABG: No angina  4.  Hypertension: Uncontrolled.  Adjust medication  5.  Valvular heart disease: Status post mitral valve replacement with tissue prosthesis, mild aortic stenosis.  Echocardiogram pending  6.  Morbid obesity  7.  Diabetes mellitus, type II on insulin therapy          Antoine Ayers MD  06/12/19

## 2019-06-13 LAB
ANION GAP SERPL CALCULATED.3IONS-SCNC: 10.9 MMOL/L
BUN BLD-MCNC: 25 MG/DL (ref 8–23)
BUN/CREAT SERPL: 18.2 (ref 7–25)
CALCIUM SPEC-SCNC: 8.4 MG/DL (ref 8.6–10.5)
CHLORIDE SERPL-SCNC: 98 MMOL/L (ref 98–107)
CO2 SERPL-SCNC: 33.1 MMOL/L (ref 22–29)
CREAT BLD-MCNC: 1.37 MG/DL (ref 0.57–1)
GFR SERPL CREATININE-BSD FRML MDRD: 38 ML/MIN/1.73
GLUCOSE BLD-MCNC: 137 MG/DL (ref 65–99)
GLUCOSE BLDC GLUCOMTR-MCNC: 109 MG/DL (ref 70–130)
GLUCOSE BLDC GLUCOMTR-MCNC: 126 MG/DL (ref 70–130)
GLUCOSE BLDC GLUCOMTR-MCNC: 146 MG/DL (ref 70–130)
GLUCOSE BLDC GLUCOMTR-MCNC: 178 MG/DL (ref 70–130)
GLUCOSE BLDC GLUCOMTR-MCNC: 548 MG/DL (ref 70–130)
PLATELET # BLD AUTO: 211 10*3/MM3 (ref 140–450)
POTASSIUM BLD-SCNC: 3.1 MMOL/L (ref 3.5–5.2)
SODIUM BLD-SCNC: 142 MMOL/L (ref 136–145)

## 2019-06-13 PROCEDURE — 25010000002 ENOXAPARIN PER 10 MG: Performed by: INTERNAL MEDICINE

## 2019-06-13 PROCEDURE — 85049 AUTOMATED PLATELET COUNT: CPT | Performed by: INTERNAL MEDICINE

## 2019-06-13 PROCEDURE — 82962 GLUCOSE BLOOD TEST: CPT

## 2019-06-13 PROCEDURE — 99233 SBSQ HOSP IP/OBS HIGH 50: CPT | Performed by: INTERNAL MEDICINE

## 2019-06-13 PROCEDURE — 97110 THERAPEUTIC EXERCISES: CPT

## 2019-06-13 PROCEDURE — 63710000001 INSULIN LISPRO (HUMAN) PER 5 UNITS: Performed by: INTERNAL MEDICINE

## 2019-06-13 PROCEDURE — 80048 BASIC METABOLIC PNL TOTAL CA: CPT | Performed by: INTERNAL MEDICINE

## 2019-06-13 RX ORDER — POTASSIUM CHLORIDE 1.5 G/1.77G
20 POWDER, FOR SOLUTION ORAL 2 TIMES DAILY
Status: DISCONTINUED | OUTPATIENT
Start: 2019-06-13 | End: 2019-06-14 | Stop reason: HOSPADM

## 2019-06-13 RX ORDER — FUROSEMIDE 40 MG/1
40 TABLET ORAL
Status: DISCONTINUED | OUTPATIENT
Start: 2019-06-13 | End: 2019-06-14 | Stop reason: HOSPADM

## 2019-06-13 RX ADMIN — FUROSEMIDE 40 MG: 40 TABLET ORAL at 20:01

## 2019-06-13 RX ADMIN — FUROSEMIDE 40 MG: 40 TABLET ORAL at 11:41

## 2019-06-13 RX ADMIN — ENOXAPARIN SODIUM 40 MG: 40 INJECTION SUBCUTANEOUS at 06:19

## 2019-06-13 RX ADMIN — LOSARTAN POTASSIUM 100 MG: 100 TABLET, FILM COATED ORAL at 08:44

## 2019-06-13 RX ADMIN — PETROLATUM: 420 OINTMENT TOPICAL at 11:41

## 2019-06-13 RX ADMIN — SODIUM CHLORIDE, PRESERVATIVE FREE 3 ML: 5 INJECTION INTRAVENOUS at 08:46

## 2019-06-13 RX ADMIN — CLONIDINE HYDROCHLORIDE 0.2 MG: 0.1 TABLET ORAL at 20:00

## 2019-06-13 RX ADMIN — DOXEPIN HYDROCHLORIDE 50 MG: 25 CAPSULE ORAL at 08:44

## 2019-06-13 RX ADMIN — POTASSIUM CHLORIDE 20 MEQ: 1.5 POWDER, FOR SOLUTION ORAL at 08:44

## 2019-06-13 RX ADMIN — PANTOPRAZOLE SODIUM 40 MG: 40 TABLET, DELAYED RELEASE ORAL at 06:18

## 2019-06-13 RX ADMIN — POTASSIUM CHLORIDE 20 MEQ: 1.5 POWDER, FOR SOLUTION ORAL at 21:28

## 2019-06-13 RX ADMIN — SODIUM CHLORIDE, PRESERVATIVE FREE 3 ML: 5 INJECTION INTRAVENOUS at 20:00

## 2019-06-13 RX ADMIN — ASPIRIN 325 MG: 325 TABLET ORAL at 08:44

## 2019-06-13 RX ADMIN — CLONIDINE HYDROCHLORIDE 0.2 MG: 0.1 TABLET ORAL at 08:44

## 2019-06-13 RX ADMIN — ENOXAPARIN SODIUM 40 MG: 40 INJECTION SUBCUTANEOUS at 18:59

## 2019-06-13 RX ADMIN — SENNOSIDES,DOCUSATE SODIUM 1 TABLET: 50; 8.6 TABLET, FILM COATED ORAL at 08:44

## 2019-06-13 RX ADMIN — ATENOLOL 50 MG: 25 TABLET ORAL at 08:44

## 2019-06-13 RX ADMIN — ALPRAZOLAM 1 MG: 0.5 TABLET ORAL at 22:33

## 2019-06-13 RX ADMIN — ATORVASTATIN CALCIUM 20 MG: 20 TABLET, FILM COATED ORAL at 08:44

## 2019-06-13 RX ADMIN — VILAZODONE HYDROCHLORIDE 20 MG: 40 TABLET ORAL at 20:01

## 2019-06-13 RX ADMIN — FLUTICASONE PROPIONATE 2 SPRAY: 50 SPRAY, METERED NASAL at 11:41

## 2019-06-13 RX ADMIN — INSULIN LISPRO 3 UNITS: 100 INJECTION, SOLUTION INTRAVENOUS; SUBCUTANEOUS at 21:02

## 2019-06-13 NOTE — THERAPY TREATMENT NOTE
Acute Care - Physical Therapy Treatment Note  [facility]     Patient Name: Miguelina Lopez  : 1945  MRN: [id number]  Today's Date: 2019  Onset of Illness/Injury or Date of Surgery: 06/10/19     Referring Physician: Armen    Admit Date: 6/10/2019    Visit Dx:    ICD-10-CM ICD-9-CM   1. Acute on chronic congestive heart failure, unspecified heart failure type (CMS/[facility]) I50.9 428.0   2. Hypoxia R09.02 799.02     Patient Active Problem List   Diagnosis   • Anxiety disorder   • Arthritis of knee   • Asthma   • Chronic coronary artery disease   • Chronic kidney disease   • Depression   • Diabetic peripheral neuropathy (CMS/[facility])   • Gastroesophageal reflux disease   • Hyperlipidemia   • Insomnia   • Lower gastrointestinal hemorrhage   • Anemia   • OCHOA (obstructive sleep apnea)   • DM type 2 (diabetes mellitus, type 2) (CMS/[facility])   • Essential hypertension   • Hospital discharge follow-up   • Mitral stenosis   • Pulmonary hypertension due to sleep-disordered breathing (CMS/[facility])   • s/p MVR, TV-repair, CABG x2 16   • OLI (acute kidney injury) (CMS/[facility])   • Leukocytosis   • Atrial fibrillation (CMS/[facility])   • Nocturnal hypoxia   • Dermatitis   • Medicare annual wellness visit, initial   • Class 3 severe obesity due to excess calories with serious comorbidity and body mass index (BMI) of 45.0 to 49.9 in adult (CMS/[facility])   • Supplemental oxygen dependent   • Acute on chronic combined systolic and diastolic HF (heart failure) (CMS/[facility])   • Mitral regurgitation   • Aortic stenosis   • Medicare annual wellness visit, subsequent   • Localized edema   • Acute on chronic congestive heart failure (CMS/[facility])       Therapy Treatment    Rehabilitation Treatment Summary     Row Name 19 1100             Treatment Time/Intention    Discipline  physical therapy assistant  -      Document Type  therapy note (daily note)  -SINDY      Subjective Information  no complaints  -SINDY      Care Plan Review  patient/other agree  "to care plan  -      Comment  pt reports feeling better today  -      Existing Precautions/Restrictions  fall;oxygen therapy device and L/min 3L  -      Recorded by [] Pau Hensley, [facility] 06/13/19 1117      Row Name 06/13/19 1100             Vital Signs    Pre SpO2 (%)  95  -      O2 Delivery Pre Treatment  supplemental O2  -JM      Post SpO2 (%)  92  -      O2 Delivery Post Treatment  supplemental O2  -JM      Recorded by [] Pau Hensley, [facility] 06/13/19 1117      Row Name 06/13/19 1100             Bed Mobility Assessment/Treatment    Comment (Bed Mobility)  in chair  -      Recorded by [] Pau Hesnley, [facility] 06/13/19 1117      Row Name 06/13/19 1100             Sit-Stand Transfer    Sit-Stand Jenkins (Transfers)  contact guard;verbal cues slight post lean  -JM      Recorded by [] Pau Hensley [facility] 06/13/19 1117      Row Name 06/13/19 1100             Stand-Sit Transfer    Stand-Sit Jenkins (Transfers)  supervision  -JM      Recorded by [] Pau Hensley, [facility] 06/13/19 1117      Row Name 06/13/19 1100             Gait/Stairs Assessment/Training    Jenkins Level (Gait)  contact guard;stand by assist  -      Assistive Device (Gait)  cane, straight  -      Distance in Feet (Gait)  100  -JM      Bilateral Gait Deviations  weight shift ability decreased L knee \"acts up\" sometimes  -JM      Recorded by [] Pau Hensley [facility] 06/13/19 1117      Row Name 06/13/19 1100             Therapeutic Exercise    Lower Extremity (Therapeutic Exercise)  LAQ (long arc quad), bilateral;marching while seated  -      Lower Extremity Range of Motion (Therapeutic Exercise)  ankle dorsiflexion/plantar flexion, bilateral  -      Sets/Reps (Therapeutic Exercise)  15 reps  -      Recorded by [] Pau Hensley, [facility] 06/13/19 1117      Row Name 06/13/19 1100             Positioning and Restraints    Pre-Treatment Position  sitting in chair/recliner  -      In Chair  reclined;call " light within reach;encouraged to call for assist no alarm upon entry  -      Recorded by [] Pau Hensley PTA 06/13/19 1117      Row Name 06/13/19 1100             Pain Scale: Numbers Pre/Post-Treatment    Pain Scale: Numbers, Pretreatment  0/10 - no pain  -SINDY      Pain Scale: Numbers, Post-Treatment  0/10 - no pain  -      Recorded by [] Pau Hensley PTA 06/13/19 1117        User Key  (r) = Recorded By, (t) = Taken By, (c) = Cosigned By    Initials Name Effective Dates Discipline     Pau Henlsey PTA 03/07/18 -  PT                   Physical Therapy Education     Title: PT OT SLP Therapies (Done)     Topic: Physical Therapy (Done)     Point: Mobility training (Done)     Learning Progress Summary           Patient Eager, E,TB, VU by  at 6/13/2019 11:19 AM    Acceptance, E, VU,NR by  at 6/11/2019 10:48 AM                   Point: Home exercise program (Done)     Learning Progress Summary           Patient Eager, E,TB, VU by SINDY at 6/13/2019 11:19 AM                   Point: Body mechanics (Done)     Learning Progress Summary           Patient Eager, E,TB, VU by  at 6/13/2019 11:19 AM                   Point: Precautions (Done)     Learning Progress Summary           Patient Eager, E,TB, VU by  at 6/13/2019 11:19 AM                               User Key     Initials Effective Dates Name Provider Type Discipline     04/03/18 -  Claudia Pastrana, PT Physical Therapist PT     03/07/18 -  Pau Hensley PTA Physical Therapy Assistant PT                PT Recommendation and Plan     Plan of Care Reviewed With: patient  Progress: improving  Outcome Summary: incr amb dist, feels stronger today, plans home at [place], possibly tomorrow  Outcome Measures     Row Name 06/13/19 1100 06/11/19 1100          How much help from another person do you currently need...    Turning from your back to your side while in flat bed without using bedrails?  4  -  4  -EE     Moving from lying on back to sitting  on the side of a flat bed without bedrails?  3  -  3  -EE     Moving to and from a bed to a chair (including a wheelchair)?  3  -  3  -EE     Standing up from a chair using your arms (e.g., wheelchair, bedside chair)?  3  -  3  -EE     Climbing 3-5 steps with a railing?  2  -  2  -EE     To walk in hospital room?  3  -  3  -EE     AM-PAC 6 Clicks Score  18  -  18  -EE        Functional Assessment    Outcome Measure Options  --  AM-PAC 6 Clicks Basic Mobility (PT)  -EE       User Key  (r) = Recorded By, (t) = Taken By, (c) = Cosigned By    Initials Name Provider Type     Claudia Pastrana, PT Physical Therapist    Pau Lopez PTA Physical Therapy Assistant         Time Calculation:   PT Charges     Row Name 06/13/19 1120             Time Calculation    Start Time  1055  -      Stop Time  1109  -      Time Calculation (min)  14 min  -      PT Received On  06/13/19  -SINDY      PT - Next Appointment  06/14/19  -SINDY         Time Calculation- PT    Total Timed Code Minutes- PT  14 minute(s)  -        User Key  (r) = Recorded By, (t) = Taken By, (c) = Cosigned By    Initials Name Provider Type    Pau Lopez PTA Physical Therapy Assistant        Therapy Charges for Today     Code Description Service Date Service Provider Modifiers Qty    74152224692 HC PT THER PROC EA 15 MIN 6/13/2019 Pau Hensley PTA GP 1          PT G-Codes  Outcome Measure Options: AM-PAC 6 Clicks Basic Mobility (PT)  AM-PAC 6 Clicks Score: 18    Pau Hensley PTA  6/13/2019

## 2019-06-13 NOTE — PLAN OF CARE
Problem: Patient Care Overview  Goal: Plan of Care Review   06/13/19 4351   Coping/Psychosocial   Plan of Care Reviewed With patient   Plan of Care Review   Progress improving   OTHER   Outcome Summary VSS, denies any pain. IV lasix with adequate output. Will continue to monitor

## 2019-06-13 NOTE — PROGRESS NOTES
Hospital Follow Up    LOS:  LOS: 1 day   Patient Name: Miguelina Lopez  Age/Sex: 73 y.o. female  : 1945  MRN: [id number]    Date of Hospital Visit: 19  Length of Stay: 1  Encounter Provider: Antoine Ayers MD  Place of Service: [facility] CARDIOLOGY    Subjective:     Follow Up for: CHF    Interval History: Patient feeling better.  Continues significant diuresis.  Blood pressure still elevated.  Echocardiogram reviewed.    Objective:     Objective:  Temp:  [97.3 °F (36.3 °C)-98.7 °F (37.1 °C)] 97.7 °F (36.5 °C)  Heart Rate:  [54-97] 54  Resp:  [16] 16  BP: (169-174)/(73-83) 169/83  Body mass index is 52.83 kg/m².    Intake/Output Summary (Last 24 hours) at 2019 0830  Last data filed at 2019 0547  Gross per 24 hour   Intake 1100 ml   Output 4200 ml   Net -3100 ml         19  0521 19  0558 19  0547   Weight: 127 kg (280 lb 1.6 oz) 124 kg (274 lb) 123 kg (270 lb 8 oz)     Weight change: -1.588 kg (-8 oz)    Physical Exam:   General Appearance: Alert, cooperative, in no acute distress. AAOx4.   HEENT: Normocephalic.  Neck: Supple. No JVD. No Carotid bruit. No thyromegaly  Lungs: CTAB. Normal respiratory effort and rate.  Heart:: Regular rate and rhythm, first and second heart sounds soft.  2/6 systolic murmur.  Abdomen: Soft, nontender, non-distended. positive bowel sounds  Extremities: Warm, no cyanosis, or clubbing. No edema.     Lab Review:   Results from last 7 days   Lab Units 19  0358 19  0353 19  0338   SODIUM mmol/L 142 144 145   POTASSIUM mmol/L 3.1* 3.5 3.7   CHLORIDE mmol/L 98 99 100   CO2 mmol/L 33.1* 31.8* 29.8*   BUN mg/dL 25* 24* 23   CREATININE mg/dL 1.37* 1.47* 1.24*   GLUCOSE mg/dL 137* 86 130*   CALCIUM mg/dL 8.4* 9.0 9.2       Results from last 7 days   Lab Units 06/10/19  1714   TROPONIN T ng/mL 0.027     Results from last 7 days   Lab Units 19  0358 06/10/19  1714   WBC 10*3/mm3  --  10.56    HEMOGLOBIN g/dL  --  11.8*   HEMATOCRIT %  --  41.7   PLATELETS 10*3/mm3 211 264                 Results from last 7 days   Lab Units 06/10/19  1714   PROBNP pg/mL 15,016.0*             I reviewed the patient's new clinical results.          I personally viewed and interpreted the patient's EKG/Telemetry data.  Current Medications:   Scheduled Meds:  aspirin 325 mg Oral Daily   atenolol 50 mg Oral Daily   atorvastatin 20 mg Oral Daily   CloNIDine 0.2 mg Oral Q12H   doxepin 50 mg Oral Daily   enoxaparin 40 mg Subcutaneous Q12H   fluticasone 2 spray Each Nare Daily   furosemide 40 mg Oral BID   insulin lispro 0-14 Units Subcutaneous 4x Daily With Meals & Nightly   losartan 100 mg Oral Q24H   pantoprazole 40 mg Oral QAM   Petrolatum  Topical Q24H   potassium chloride 20 mEq Oral BID   sennosides-docusate sodium 1 tablet Oral Daily   sodium chloride 3 mL Intravenous Q12H   vilazodone 20 mg Oral Nightly     Continuous Infusions:     Allergies:  Allergies   Allergen Reactions   • Coumadin [Warfarin Sodium] Diarrhea and Nausea Only   • Bumex [Bumetanide] Swelling   • Erythromycin    • Hctz [Hydrochlorothiazide] Swelling   • Penicillins    • Sulfa Antibiotics    • Zaroxolyn [Metolazone] Diarrhea       Assessment & Plan       Acute on chronic congestive heart failure (CMS/HCC)    1.  Congestive heart failure: Acute on chronic.  Appears to be more diastolic.  Continues to diuresis.  Switch to oral diuretics today.  2.  Chronic kidney disease: We will need to cut back slightly on diuretic therapy.  Creatinine slightly elevated.  3.  Coronary artery disease: Status post CABG: No angina  4.  Hypertension: Uncontrolled.  Adjust medication  5.  Valvular heart disease: Status post mitral valve replacement with tissue prosthesis, mild aortic stenosis.  Findings confirmed on echocardiogram   6.  Morbid obesity  7.  Diabetes mellitus, type II on insulin therapy        Plan:       Antoine Ayers MD  06/13/19

## 2019-06-13 NOTE — PLAN OF CARE
Problem: Patient Care Overview  Goal: Plan of Care Review  Outcome: Ongoing (interventions implemented as appropriate)   06/13/19 1117   Coping/Psychosocial   Plan of Care Reviewed With patient   Plan of Care Review   Progress improving   OTHER   Outcome Summary incr amb dist, feels stronger today, plans home at [place], possibly tomorrow     Goal: Individualization and Mutuality  Outcome: Ongoing (interventions implemented as appropriate)    Goal: Discharge Needs Assessment  Outcome: Ongoing (interventions implemented as appropriate)    Goal: Interprofessional Rounds/Family Conf  Outcome: Ongoing (interventions implemented as appropriate)      Problem: Fall Risk (Adult)  Goal: Absence of Fall  Outcome: Ongoing (interventions implemented as appropriate)      Problem: Cardiac: Heart Failure (Adult)  Goal: Signs and Symptoms of Listed Potential Problems Will be Absent, Minimized or Managed (Cardiac: Heart Failure)  Outcome: Ongoing (interventions implemented as appropriate)

## 2019-06-13 NOTE — PLAN OF CARE
Problem: Patient Care Overview  Goal: Plan of Care Review  Outcome: Ongoing (interventions implemented as appropriate)   06/13/19 1117   Coping/Psychosocial   Plan of Care Reviewed With patient   Plan of Care Review   Progress improving   OTHER   Outcome Summary incr amb dist, feels stronger today, plans home at [place], possibly tomorrow

## 2019-06-14 VITALS
RESPIRATION RATE: 18 BRPM | HEIGHT: 60 IN | OXYGEN SATURATION: 100 % | TEMPERATURE: 97.6 F | HEART RATE: 68 BPM | SYSTOLIC BLOOD PRESSURE: 176 MMHG | DIASTOLIC BLOOD PRESSURE: 87 MMHG | BODY MASS INDEX: 53.36 KG/M2 | WEIGHT: 271.8 LBS

## 2019-06-14 LAB
ANION GAP SERPL CALCULATED.3IONS-SCNC: 13 MMOL/L
BUN BLD-MCNC: 26 MG/DL (ref 8–23)
BUN/CREAT SERPL: 18.4 (ref 7–25)
CALCIUM SPEC-SCNC: 8.3 MG/DL (ref 8.6–10.5)
CHLORIDE SERPL-SCNC: 98 MMOL/L (ref 98–107)
CO2 SERPL-SCNC: 33 MMOL/L (ref 22–29)
CREAT BLD-MCNC: 1.41 MG/DL (ref 0.57–1)
GFR SERPL CREATININE-BSD FRML MDRD: 36 ML/MIN/1.73
GLUCOSE BLD-MCNC: 127 MG/DL (ref 65–99)
GLUCOSE BLDC GLUCOMTR-MCNC: 117 MG/DL (ref 70–130)
GLUCOSE BLDC GLUCOMTR-MCNC: 154 MG/DL (ref 70–130)
POTASSIUM BLD-SCNC: 3.5 MMOL/L (ref 3.5–5.2)
SODIUM BLD-SCNC: 144 MMOL/L (ref 136–145)

## 2019-06-14 PROCEDURE — 80048 BASIC METABOLIC PNL TOTAL CA: CPT | Performed by: INTERNAL MEDICINE

## 2019-06-14 PROCEDURE — 99238 HOSP IP/OBS DSCHRG MGMT 30/<: CPT | Performed by: INTERNAL MEDICINE

## 2019-06-14 PROCEDURE — 25010000002 ENOXAPARIN PER 10 MG: Performed by: INTERNAL MEDICINE

## 2019-06-14 PROCEDURE — 82962 GLUCOSE BLOOD TEST: CPT

## 2019-06-14 RX ORDER — FUROSEMIDE 40 MG/1
40 TABLET ORAL 2 TIMES DAILY
Qty: 60 TABLET | Refills: 3 | Status: SHIPPED | OUTPATIENT
Start: 2019-06-14 | End: 2019-11-24 | Stop reason: SDUPTHER

## 2019-06-14 RX ORDER — ATENOLOL 50 MG/1
50 TABLET ORAL DAILY
Qty: 30 TABLET | Refills: 3 | Status: SHIPPED | OUTPATIENT
Start: 2019-06-14 | End: 2019-10-09 | Stop reason: SDUPTHER

## 2019-06-14 RX ORDER — AMLODIPINE BESYLATE 10 MG/1
10 TABLET ORAL
Status: DISCONTINUED | OUTPATIENT
Start: 2019-06-14 | End: 2019-06-14 | Stop reason: HOSPADM

## 2019-06-14 RX ORDER — AMLODIPINE BESYLATE 10 MG/1
10 TABLET ORAL
Qty: 30 TABLET | Refills: 3 | Status: SHIPPED | OUTPATIENT
Start: 2019-06-14 | End: 2019-10-13 | Stop reason: SDUPTHER

## 2019-06-14 RX ORDER — CLONIDINE HYDROCHLORIDE 0.2 MG/1
0.2 TABLET ORAL 2 TIMES DAILY
Qty: 60 TABLET | Refills: 3 | Status: SHIPPED | OUTPATIENT
Start: 2019-06-14 | End: 2019-07-07 | Stop reason: SDUPTHER

## 2019-06-14 RX ADMIN — SODIUM CHLORIDE, PRESERVATIVE FREE 3 ML: 5 INJECTION INTRAVENOUS at 09:41

## 2019-06-14 RX ADMIN — AMLODIPINE BESYLATE 10 MG: 10 TABLET ORAL at 11:23

## 2019-06-14 RX ADMIN — FLUTICASONE PROPIONATE 2 SPRAY: 50 SPRAY, METERED NASAL at 09:43

## 2019-06-14 RX ADMIN — SENNOSIDES,DOCUSATE SODIUM 1 TABLET: 50; 8.6 TABLET, FILM COATED ORAL at 09:40

## 2019-06-14 RX ADMIN — ATENOLOL 50 MG: 25 TABLET ORAL at 09:40

## 2019-06-14 RX ADMIN — FUROSEMIDE 40 MG: 40 TABLET ORAL at 09:40

## 2019-06-14 RX ADMIN — PANTOPRAZOLE SODIUM 40 MG: 40 TABLET, DELAYED RELEASE ORAL at 06:02

## 2019-06-14 RX ADMIN — CLONIDINE HYDROCHLORIDE 0.2 MG: 0.1 TABLET ORAL at 09:40

## 2019-06-14 RX ADMIN — ATORVASTATIN CALCIUM 20 MG: 20 TABLET, FILM COATED ORAL at 09:40

## 2019-06-14 RX ADMIN — PETROLATUM 100 APPLICATION: 420 OINTMENT TOPICAL at 09:43

## 2019-06-14 RX ADMIN — LOSARTAN POTASSIUM 100 MG: 100 TABLET, FILM COATED ORAL at 09:40

## 2019-06-14 RX ADMIN — DOXEPIN HYDROCHLORIDE 50 MG: 25 CAPSULE ORAL at 09:41

## 2019-06-14 RX ADMIN — ASPIRIN 325 MG: 325 TABLET ORAL at 09:40

## 2019-06-14 RX ADMIN — POTASSIUM CHLORIDE 20 MEQ: 1.5 POWDER, FOR SOLUTION ORAL at 09:40

## 2019-06-14 RX ADMIN — ENOXAPARIN SODIUM 40 MG: 40 INJECTION SUBCUTANEOUS at 06:02

## 2019-06-14 NOTE — DISCHARGE SUMMARY
HOSPITAL DISCHARGE SUMMARY    Patient Name: Miguelina Lopez  Age/Sex: 73 y.o. female  : 1945  MRN: [id number]    Encounter Provider: Antoine Ayers MD  Referring Provider: Antoine Ayers MD  Place of Service: [facility] CARDIOLOGY  Patient Care Team:  Channing Bush MD as PCP - General (Family Medicine)  Channing Bush MD as Referring Physician (Family Medicine)  Robbie Wilson MD as Consulting Physician (Hematology and Oncology)  Chace Johnson MD as Consulting Physician (Cardiology)         Date of Discharge:  2019     Date of Admit: 6/10/2019      Discharge Diagnosis:     Chronic kidney disease    DM type 2 (diabetes mellitus, type 2) (CMS/[facility])    Essential hypertension    s/p MVR, TV-repair, CABG x2 16    Acute on chronic combined systolic and diastolic HF (heart failure) (CMS/[facility])    Acute on chronic congestive heart failure (CMS/[facility])  Pulmonary hypertension  Morbid obesity  Obstructive sleep apnea  Hospital Course: The patient is a 73-year-old white female with a history of coronary disease, valvular heart disease, hypertension, diabetes mellitus and obesity.  She has undergone bypass surgery as well as a mitral valve replacement with a tissue prosthesis and a tricuspid valve repair.  She has had intermittent follow-up.  Recently she came to the emergency room because of shortness of breath and worsening lower extremity edema.  Her troponin was elevated along with her creatinine at 1.5.  Chest x-ray showed moderate cardiomegaly but no acute process.  During the course of her hospitalization she is received intravenous Lasix and eventually transitioned over to oral.  Creatinine remains stable.  Her echocardiogram showed her left ventricular ejection fraction to be 53%.  There was septal hypokinesia.  The left atrium was severely dilated as was the right atrium.   Pulmonary hypertension was also noted.  In addition to her diastolic heart failure the patient is morbidly obese.  Dietary measures and healthy lifestyle were emphasized to the patient.  Her blood pressure did remain somewhat of a problem.  We will continue to monitor and work on this as an outpatient.  Procedures Performed:             Consults:  Consults     Date and Time Order Name Status Description    6/10/2019 2002 COLLETTE (on-call MD unless specified) Completed           Pertinent Test Results:    Results from last 7 days   Lab Units 06/14/19  0546 06/13/19  0358 06/12/19  0353   SODIUM mmol/L 144 142 144   POTASSIUM mmol/L 3.5 3.1* 3.5   CHLORIDE mmol/L 98 98 99   CO2 mmol/L 33.0* 33.1* 31.8*   BUN mg/dL 26* 25* 24*   CREATININE mg/dL 1.41* 1.37* 1.47*   GLUCOSE mg/dL 127* 137* 86   CALCIUM mg/dL 8.3* 8.4* 9.0       Results from last 7 days   Lab Units 06/10/19  1714   TROPONIN T ng/mL 0.027     Results from last 7 days   Lab Units 06/13/19  0358 06/10/19  1714   WBC 10*3/mm3  --  10.56   HEMOGLOBIN g/dL  --  11.8*   HEMATOCRIT %  --  41.7   PLATELETS 10*3/mm3 211 264                 Results from last 7 days   Lab Units 06/10/19  1714   PROBNP pg/mL 15,016.0*               Discharge Medications     Discharge Medications      New Medications      Instructions Start Date   amLODIPine 10 MG tablet  Commonly known as:  NORVASC   10 mg, Oral, Every 24 Hours Scheduled         Changes to Medications      Instructions Start Date   atenolol 50 MG tablet  Commonly known as:  TENORMIN  What changed:    · medication strength  · how much to take   50 mg, Oral, Daily      CloNIDine 0.2 MG tablet  Commonly known as:  CATAPRES  What changed:  when to take this   0.2 mg, Oral, 2 Times Daily      furosemide 40 MG tablet  Commonly known as:  LASIX  What changed:    · medication strength  · how much to take  · when to take this   40 mg, Oral, 2 Times Daily      ipratropium-albuterol 0.5-2.5 mg/3 ml nebulizer  Commonly known as:   DUO-NEB  What changed:    · when to take this  · reasons to take this   3 mL, Nebulization, 4 Times Daily - RT         Continue These Medications      Instructions Start Date   albuterol sulfate  (90 Base) MCG/ACT inhaler  Commonly known as:  VENTOLIN HFA   2 puffs, Inhalation, Every 6 Hours PRN      ALPRAZolam 1 MG tablet  Commonly known as:  XANAX   1 mg, Oral, 2 Times Daily PRN      aspirin 325 MG tablet   325 mg, Oral, Daily      atorvastatin 20 MG tablet  Commonly known as:  LIPITOR   20 mg, Oral, Daily      AVAPRO 300 MG tablet  Generic drug:  irbesartan   300 mg, Oral, Nightly      doxepin 50 MG capsule  Commonly known as:  SINEquan   50 mg, Oral, Daily      ferrous sulfate 324 (65 Fe) MG tablet delayed-release EC tablet   324 mg, Oral, Daily With Breakfast      fluticasone-salmeterol 250-50 MCG/DOSE DISKUS  Commonly known as:  ADVAIR DISKUS   1 puff, Inhalation, Daily PRN      glimepiride 1 MG tablet  Commonly known as:  AMARYL   1 mg, Oral, Every Morning Before Breakfast      nitroglycerin 0.4 MG SL tablet  Commonly known as:  NITROSTAT   0.4 mg, Sublingual, As Needed,  - IF PAIN REMAINS AFTER 5 MIN CALL 911 AND REPEAT DOSE MAX 3 TABS IN 15 MINUTES      nystatin [id number] UNIT/GM cream  Commonly known as:  MYCOSTATIN   Topical, 2 Times Daily, to affected area(s)      O2  Commonly known as:  OXYGEN   2 L/min, Inhalation, Continuous      omeprazole 40 MG capsule  Commonly known as:  priLOSEC   40 mg, Oral, Daily      potassium chloride 20 MEQ CR tablet  Commonly known as:  K-DUR,KLOR-CON   20 mEq, Oral, 2 Times Daily      promethazine 25 MG tablet  Commonly known as:  PHENERGAN   25 mg, Oral, Every 8 Hours PRN      senna-docusate 8.6-50 MG per tablet  Commonly known as:  PERICOLACE   1 tablet, Oral, Daily      traMADol 50 MG tablet  Commonly known as:  ULTRAM   50 mg, Oral, Every 8 Hours PRN      TRULICITY 0.75 MG/0.5ML solution pen-injector  Generic drug:  Dulaglutide   0.5 mL, Subcutaneous, Weekly,  Saturday      vilazodone 20 MG tablet tablet  Commonly known as:  VIIBRYD   20 mg, Oral, Nightly               Discharge Diet: Heart Healthy      Activity at Discharge:  Personal-care      Discharge disposition: Home    Discharge Activity Instructions:     1) No driving for 5 days and no when no longer taking Narcotics  2) May shower/sponge bath today  3) Do not lift/push/pull more than 5 pounds.       Follow-up Appointments  Future Appointments   Date Time Provider Department Center   6/21/2019 11:00 AM Channing Bush MD [facility]  [facility] None     Follow-up Information     Channing Bush MD Follow up in 7 day(s).    Specialty:  Family Medicine  Why:  Follow up on June 21st @ 11:00 AM  Contact information:  [address] [id number] [phone]               Patient will see ZOILA Hewitt in 7 to 10 days.  I will see her back in approximately 6 to 8 weeks         Antoine Ayers MD  06/14/19  10:23 AM

## 2019-06-14 NOTE — PLAN OF CARE
Problem: Patient Care Overview  Goal: Plan of Care Review  Outcome: Ongoing (interventions implemented as appropriate)   06/14/19 0230   Coping/Psychosocial   Plan of Care Reviewed With patient   Plan of Care Review   Progress improving   OTHER   Outcome Summary BP remains elevated, MD adjusting medications as necessary. Monitoring VS closely. Up frequently urinating--diuretics changed to PO on the 13th. Daily weight this AM and maintaining strict I&O. Denies pain. PRN Xanax given. Possible dc today.      Goal: Individualization and Mutuality  Outcome: Ongoing (interventions implemented as appropriate)    Goal: Discharge Needs Assessment  Outcome: Ongoing (interventions implemented as appropriate)    Goal: Interprofessional Rounds/Family Conf  Outcome: Ongoing (interventions implemented as appropriate)      Problem: Fall Risk (Adult)  Goal: Absence of Fall  Outcome: Ongoing (interventions implemented as appropriate)      Problem: Cardiac: Heart Failure (Adult)  Goal: Signs and Symptoms of Listed Potential Problems Will be Absent, Minimized or Managed (Cardiac: Heart Failure)  Outcome: Ongoing (interventions implemented as appropriate)

## 2019-06-14 NOTE — PLAN OF CARE
Problem: Patient Care Overview  Goal: Plan of Care Review  Outcome: Ongoing (interventions implemented as appropriate)   06/14/19 1027   Coping/Psychosocial   Plan of Care Reviewed With patient   Plan of Care Review   Progress improving   OTHER   Outcome Summary BP high but slowly improving, meds adjusted. Sinus olamide on monitor. Ambulating well to BR. To be discharged home today. Will closely monitor.       Problem: Fall Risk (Adult)  Goal: Absence of Fall  Outcome: Ongoing (interventions implemented as appropriate)      Problem: Cardiac: Heart Failure (Adult)  Goal: Signs and Symptoms of Listed Potential Problems Will be Absent, Minimized or Managed (Cardiac: Heart Failure)  Outcome: Ongoing (interventions implemented as appropriate)

## 2019-06-15 ENCOUNTER — READMISSION MANAGEMENT (OUTPATIENT)
Dept: CALL CENTER | Facility: HOSPITAL | Age: 74
End: 2019-06-15

## 2019-06-15 NOTE — OUTREACH NOTE
Prep Survey      Responses   Facility patient discharged from?  [place]   Is patient eligible?  Yes   Discharge diagnosis  Chronic kidney disease   Does the patient have one of the following disease processes/diagnoses(primary or secondary)?  CHF   Does the patient have Home health ordered?  No   Is there a DME ordered?  No   Prep survey completed?  Yes          Wanda Pa RN

## 2019-06-18 ENCOUNTER — READMISSION MANAGEMENT (OUTPATIENT)
Dept: CALL CENTER | Facility: HOSPITAL | Age: 74
End: 2019-06-18

## 2019-06-18 NOTE — OUTREACH NOTE
CHF Week 1 Survey      Responses   Facility patient discharged from?  [place]   Does the patient have one of the following disease processes/diagnoses(primary or secondary)?  CHF   Is there a successful TCM telephone encounter documented?  No   CHF Week 1 attempt successful?  Yes   Call start time  1436   Call end time  1444   Discharge diagnosis  Chronic kidney disease - Diastolic HF   Meds reviewed with patient/caregiver?  Yes   Is the patient having any side effects they believe may be caused by any medication additions or changes?  No   Does the patient have all medications ordered at discharge?  Yes   Is the patient taking all medications as directed (includes completed medication regime)?  Yes   Does the patient have a primary care provider?   Yes   Does the patient have an appointment with their PCP within 7 days of discharge?  Yes   Has the patient kept scheduled appointments due by today?  Yes   Has home health visited the patient within 72 hours of discharge?  N/A   Psychosocial issues?  No   Did the patient receive a copy of their discharge instructions?  Yes   Nursing interventions  Reviewed instructions with patient   What is the patient's perception of their health status since discharge?  Improving   Nursing interventions  Nurse provided patient education   Is the patient weighing daily?  No   Does the patient have scales?  Yes   Daily weight interventions  Education provided on importance of daily weight   Is the patient able to teach back Heart Failure diet management?  Yes   Is the patient able to teach back Heart Failure Zones?  Yes   Is the patient able to teach back signs and symptoms of worsening condition? (i.e. weight gain, shortness of air, etc.)  Yes   Is the patient/caregiver able to teach back the hierarchy of who to call/visit for symptoms/problems? PCP, Specialist, Home health nurse, Urgent Care, ED, 911  Yes   Additional teach back comments  No issues reported. Spoke about CHF  exacerbation s/s. Advised about diet choices. Advised to speak to PCP about her request for HH.    CHF Week 1 call completed?  Yes          Chris Lua RN

## 2019-06-26 ENCOUNTER — READMISSION MANAGEMENT (OUTPATIENT)
Dept: CALL CENTER | Facility: HOSPITAL | Age: 74
End: 2019-06-26

## 2019-06-26 NOTE — OUTREACH NOTE
CHF Week 2 Survey      Responses   Facility patient discharged from?  [place]   Does the patient have one of the following disease processes/diagnoses(primary or secondary)?  CHF   Week 2 attempt successful?  Yes   Call start time  1136   Call end time  1138   Discharge diagnosis  Chronic kidney disease - Diastolic HF   Meds reviewed with patient/caregiver?  Yes   Is the patient having any side effects they believe may be caused by any medication additions or changes?  No   Does the patient have all medications ordered at discharge?  Yes   Is the patient taking all medications as directed (includes completed medication regime)?  Yes   Does the patient have a primary care provider?   Yes   Does the patient have an appointment with their PCP within 7 days of discharge?  Yes   Has the patient kept scheduled appointments due by today?  Yes   Has home health visited the patient within 72 hours of discharge?  N/A   Psychosocial issues?  No   Comments  Patient is doing well. No weight gain.    Did the patient receive a copy of their discharge instructions?  Yes   Nursing interventions  Reviewed instructions with patient   What is the patient's perception of their health status since discharge?  Improving   Nursing interventions  Nurse provided patient education   Is the patient weighing daily?  Yes   Does the patient have scales?  Yes   Daily weight interventions  Education provided on importance of daily weight   Is the patient able to teach back Heart Failure diet management?  Yes   Is the patient able to teach back Heart Failure Zones?  Yes   Is the patient able to teach back signs and symptoms of worsening condition? (i.e. weight gain, shortness of air, etc.)  Yes   Is the patient/caregiver able to teach back the hierarchy of who to call/visit for symptoms/problems? PCP, Specialist, Home health nurse, Urgent Care, ED, 911  Yes   CHF Week 2 call completed?  Yes          Reji Srivastava RN

## 2019-07-01 ENCOUNTER — OFFICE VISIT (OUTPATIENT)
Dept: CARDIOLOGY | Facility: CLINIC | Age: 74
End: 2019-07-01

## 2019-07-01 VITALS
DIASTOLIC BLOOD PRESSURE: 70 MMHG | SYSTOLIC BLOOD PRESSURE: 120 MMHG | BODY MASS INDEX: 53.05 KG/M2 | HEIGHT: 60 IN | HEART RATE: 67 BPM | WEIGHT: 270.2 LBS | OXYGEN SATURATION: 98 %

## 2019-07-01 DIAGNOSIS — E11.59 TYPE 2 DIABETES MELLITUS WITH OTHER CIRCULATORY COMPLICATION, WITH LONG-TERM CURRENT USE OF INSULIN (HCC): ICD-10-CM

## 2019-07-01 DIAGNOSIS — I27.29 PULMONARY HYPERTENSION DUE TO SLEEP-DISORDERED BREATHING (HCC): ICD-10-CM

## 2019-07-01 DIAGNOSIS — I35.0 AORTIC VALVE STENOSIS, ETIOLOGY OF CARDIAC VALVE DISEASE UNSPECIFIED: ICD-10-CM

## 2019-07-01 DIAGNOSIS — I34.0 MITRAL VALVE INSUFFICIENCY, UNSPECIFIED ETIOLOGY: ICD-10-CM

## 2019-07-01 DIAGNOSIS — I25.10 CHRONIC CORONARY ARTERY DISEASE: ICD-10-CM

## 2019-07-01 DIAGNOSIS — I10 ESSENTIAL HYPERTENSION: ICD-10-CM

## 2019-07-01 DIAGNOSIS — G47.8 PULMONARY HYPERTENSION DUE TO SLEEP-DISORDERED BREATHING (HCC): ICD-10-CM

## 2019-07-01 DIAGNOSIS — E78.2 MIXED HYPERLIPIDEMIA: ICD-10-CM

## 2019-07-01 DIAGNOSIS — I50.23 ACUTE ON CHRONIC SYSTOLIC CONGESTIVE HEART FAILURE (HCC): Primary | ICD-10-CM

## 2019-07-01 DIAGNOSIS — Z79.4 TYPE 2 DIABETES MELLITUS WITH OTHER CIRCULATORY COMPLICATION, WITH LONG-TERM CURRENT USE OF INSULIN (HCC): ICD-10-CM

## 2019-07-01 DIAGNOSIS — E66.01 CLASS 3 SEVERE OBESITY DUE TO EXCESS CALORIES WITH SERIOUS COMORBIDITY AND BODY MASS INDEX (BMI) OF 45.0 TO 49.9 IN ADULT (HCC): ICD-10-CM

## 2019-07-01 DIAGNOSIS — G47.33 OSA (OBSTRUCTIVE SLEEP APNEA): ICD-10-CM

## 2019-07-01 DIAGNOSIS — I05.0 MITRAL VALVE STENOSIS, UNSPECIFIED ETIOLOGY: ICD-10-CM

## 2019-07-01 DIAGNOSIS — Z99.81 SUPPLEMENTAL OXYGEN DEPENDENT: ICD-10-CM

## 2019-07-01 PROCEDURE — 93000 ELECTROCARDIOGRAM COMPLETE: CPT | Performed by: NURSE PRACTITIONER

## 2019-07-01 PROCEDURE — 99214 OFFICE O/P EST MOD 30 MIN: CPT | Performed by: NURSE PRACTITIONER

## 2019-07-03 ENCOUNTER — READMISSION MANAGEMENT (OUTPATIENT)
Dept: CALL CENTER | Facility: HOSPITAL | Age: 74
End: 2019-07-03

## 2019-07-03 NOTE — OUTREACH NOTE
CHF Week 3 Survey      Responses   Facility patient discharged from?  [place]   Does the patient have one of the following disease processes/diagnoses(primary or secondary)?  CHF   Week 3 attempt successful?  No   Unsuccessful attempts  Attempt 1          Roseline Walsh RN

## 2019-07-05 ENCOUNTER — READMISSION MANAGEMENT (OUTPATIENT)
Dept: CALL CENTER | Facility: HOSPITAL | Age: 74
End: 2019-07-05

## 2019-07-05 NOTE — OUTREACH NOTE
"CHF Week 3 Survey      Responses   Facility patient discharged from?  [place]   Does the patient have one of the following disease processes/diagnoses(primary or secondary)?  CHF   Week 3 attempt successful?  Yes   Call start time  0907   Call end time  0909   Discharge diagnosis  Chronic kidney disease - Diastolic HF   Meds reviewed with patient/caregiver?  Yes   Is the patient having any side effects they believe may be caused by any medication additions or changes?  No   Does the patient have all medications ordered at discharge?  Yes   Is the patient taking all medications as directed (includes completed medication regime)?  Yes   Does the patient have a primary care provider?   Yes   Does the patient have an appointment with their PCP within 7 days of discharge?  Yes   Has the patient kept scheduled appointments due by today?  Yes   Has home health visited the patient within 72 hours of discharge?  N/A   Psychosocial issues?  No   Comments  Pt reports she is \"losing weight.\" Reports that she has a \"cold\" but not SOA or swelling in her feet or ankles. Advised to contact Dr. Bush's office if her symptoms worsen.   Did the patient receive a copy of their discharge instructions?  Yes   Nursing interventions  Reviewed instructions with patient   What is the patient's perception of their health status since discharge?  Improving   Nursing interventions  Nurse provided patient education   Is the patient weighing daily?  Yes   Does the patient have scales?  Yes   Daily weight interventions  Education provided on importance of daily weight   Is the patient able to teach back Heart Failure diet management?  Yes   Is the patient able to teach back Heart Failure Zones?  Yes   Is the patient able to teach back signs and symptoms of worsening condition? (i.e. weight gain, shortness of air, etc.)  Yes   Is the patient/caregiver able to teach back the hierarchy of who to call/visit for symptoms/problems? PCP, Specialist, " Home health nurse, Urgent Care, ED, 911  Yes   CHF Week 3 call completed?  Yes          Manuel Hernandez RN

## 2019-07-08 RX ORDER — CLONIDINE HYDROCHLORIDE 0.2 MG/1
TABLET ORAL
Qty: 30 TABLET | Refills: 0 | Status: SHIPPED | OUTPATIENT
Start: 2019-07-08 | End: 2019-07-29 | Stop reason: SDUPTHER

## 2019-07-08 RX ORDER — DULAGLUTIDE 0.75 MG/.5ML
INJECTION, SOLUTION SUBCUTANEOUS
Qty: 2 PEN | Refills: 0 | Status: SHIPPED | OUTPATIENT
Start: 2019-07-08 | End: 2019-08-02 | Stop reason: SDUPTHER

## 2019-07-11 ENCOUNTER — OFFICE VISIT (OUTPATIENT)
Dept: INTERNAL MEDICINE | Facility: CLINIC | Age: 74
End: 2019-07-11

## 2019-07-11 VITALS
HEART RATE: 84 BPM | WEIGHT: 270.5 LBS | TEMPERATURE: 98.4 F | OXYGEN SATURATION: 90 % | SYSTOLIC BLOOD PRESSURE: 148 MMHG | DIASTOLIC BLOOD PRESSURE: 66 MMHG | BODY MASS INDEX: 52.83 KG/M2

## 2019-07-11 DIAGNOSIS — G47.8 PULMONARY HYPERTENSION DUE TO SLEEP-DISORDERED BREATHING (HCC): ICD-10-CM

## 2019-07-11 DIAGNOSIS — I27.29 PULMONARY HYPERTENSION DUE TO SLEEP-DISORDERED BREATHING (HCC): ICD-10-CM

## 2019-07-11 DIAGNOSIS — E66.01 CLASS 3 SEVERE OBESITY DUE TO EXCESS CALORIES WITH SERIOUS COMORBIDITY AND BODY MASS INDEX (BMI) OF 45.0 TO 49.9 IN ADULT (HCC): ICD-10-CM

## 2019-07-11 DIAGNOSIS — E78.2 MIXED HYPERLIPIDEMIA: Primary | ICD-10-CM

## 2019-07-11 DIAGNOSIS — E11.59 TYPE 2 DIABETES MELLITUS WITH OTHER CIRCULATORY COMPLICATION, WITH LONG-TERM CURRENT USE OF INSULIN (HCC): ICD-10-CM

## 2019-07-11 DIAGNOSIS — R60.0 LOCALIZED EDEMA: ICD-10-CM

## 2019-07-11 DIAGNOSIS — Z79.4 TYPE 2 DIABETES MELLITUS WITH OTHER CIRCULATORY COMPLICATION, WITH LONG-TERM CURRENT USE OF INSULIN (HCC): ICD-10-CM

## 2019-07-11 PROCEDURE — 99214 OFFICE O/P EST MOD 30 MIN: CPT | Performed by: FAMILY MEDICINE

## 2019-07-11 NOTE — PROGRESS NOTES
Miguelina Lopez is a 73 y.o. female.      Assessment/Plan   Problem List Items Addressed This Visit        Cardiovascular and Mediastinum    Hyperlipidemia - Primary    Pulmonary hypertension due to sleep-disordered breathing (CMS/[facility])    Overview     RVSP 61 mmHg TARIQ 06/10/16            Digestive    Class 3 severe obesity due to excess calories with serious comorbidity and body mass index (BMI) of 45.0 to 49.9 in adult (CMS/[facility])       Endocrine    DM type 2 (diabetes mellitus, type 2) (CMS/[facility])    Overview     Impression: 03/30/2015 - A1c is 7.5 , Pt to check BS BID add, Amryl 1 mg once to twice daily;          Relevant Orders    Lipid Panel    Microalbumin / Creatinine Urine Ratio - Urine, Clean Catch    Hemoglobin A1c    Comprehensive Metabolic Panel       Other    Localized edema    Relevant Orders    Comprehensive Metabolic Panel         Follow-up results of blood work for ongoing management of chronic problems Daily weights call if greater than 3 pound weight gain in a week follow-up with cardiology follow-up with pulmonology for sleep apnea otherwise return as needed or    Return in about 3 months (around 10/11/2019), or if symptoms worsen or fail to improve, for Recheck, Next scheduled follow up.      Chief Complaint   Patient presents with   • [facility] follow up to CHF     Social History     Tobacco Use   • Smoking status: Never Smoker   • Smokeless tobacco: Never Used   Substance Use Topics   • Alcohol use: No   • Drug use: No       History of Present Illness   Patient follow-up for recent hospitalization admit date [facility] 10 discharge June 14 reason for hospitalization acute on chronic heart failure with pulmonary hypertension morbid obesity obstructive sleep apnea diabetes she feels that she is back to baseline doing well furosemide is 80 mg daily she is not checking her blood sugars but does not have any increased thirst she needs chronic diabetes management lab testing today and is stable on  present treatment plan through cardiology  The following portions of the patient's history were reviewed and updated as appropriate:PMHroutine: Social history , Allergies, Current Medications, Active Problem List and Health Maintenance    Review of Systems   Constitutional: Positive for activity change.   HENT: Negative.    Respiratory: Positive for shortness of breath.    Cardiovascular: Positive for leg swelling.   Gastrointestinal: Negative.    Endocrine: Negative.    Genitourinary: Negative.    Neurological: Negative.    Current outpatient and discharge medications have been reconciled for the patient.  Reviewed by: Channing Bush MD    Objective   Vitals:    07/11/19 1110   BP: 148/66   BP Location: Left arm   Patient Position: Sitting   Cuff Size: Adult   Pulse: 84   Temp: 98.4 °F (36.9 °C)   TempSrc: Oral   SpO2: 90%   Weight: 123 kg (270 lb 8 oz)     Body mass index is 52.83 kg/m².  Physical Exam   Constitutional: She appears well-developed and well-nourished.   HENT:   Head: Normocephalic and atraumatic.   Eyes: No scleral icterus.   Cardiovascular: Normal rate.   Murmur heard.  Pulmonary/Chest: Effort normal and breath sounds normal.   Abdominal: She exhibits distension.   Morbid obesity   Musculoskeletal: She exhibits edema.   Trace bilaterally   Psychiatric: She has a normal mood and affect. Her behavior is normal. Judgment and thought content normal.   Nursing note and vitals reviewed.    Reviewed Data:  Admission on 06/10/2019, Discharged on 06/14/2019   No results displayed because visit has over 200 results.

## 2019-07-12 ENCOUNTER — READMISSION MANAGEMENT (OUTPATIENT)
Dept: CALL CENTER | Facility: HOSPITAL | Age: 74
End: 2019-07-12

## 2019-07-12 LAB
ALBUMIN/CREAT UR: 161.2 MG/G CREAT (ref 0–30)
BUN SERPL-MCNC: 30 MG/DL (ref 8–23)
BUN/CREAT SERPL: 20.7 (ref 7–25)
CALCIUM SERPL-MCNC: 9.5 MG/DL (ref 8.6–10.5)
CHLORIDE SERPL-SCNC: 100 MMOL/L (ref 98–107)
CHOLEST SERPL-MCNC: 163 MG/DL (ref 0–200)
CO2 SERPL-SCNC: 32.2 MMOL/L (ref 22–29)
CREAT SERPL-MCNC: 1.45 MG/DL (ref 0.57–1)
CREAT UR-MCNC: 34.5 MG/DL
GLUCOSE SERPL-MCNC: 128 MG/DL (ref 65–99)
HBA1C MFR BLD: 6.7 % (ref 4.8–5.6)
HDLC SERPL-MCNC: 78 MG/DL (ref 40–60)
LDLC SERPL CALC-MCNC: 73 MG/DL (ref 0–100)
MICROALBUMIN UR-MCNC: 55.6 UG/ML
POTASSIUM SERPL-SCNC: 4.6 MMOL/L (ref 3.5–5.2)
SODIUM SERPL-SCNC: 147 MMOL/L (ref 136–145)
TRIGL SERPL-MCNC: 59 MG/DL (ref 0–150)
VLDLC SERPL CALC-MCNC: 11.8 MG/DL

## 2019-07-12 NOTE — OUTREACH NOTE
CHF Week 4 Survey      Responses   Facility patient discharged from?  [place]   Does the patient have one of the following disease processes/diagnoses(primary or secondary)?  CHF   Week 4 attempt successful?  Yes   Call start time  1448   Call end time  1451   Meds reviewed with patient/caregiver?  Yes   Is the patient taking all medications as directed (includes completed medication regime)?  Yes   Has the patient kept scheduled appointments due by today?  Yes   Is the patient still receiving Home Health Services?  N/A   What is the patient's perception of their health status since discharge?  Improving   Is the patient weighing daily?  Yes   Is the patient able to teach back Heart Failure Zones?  Yes   Week 4 Call Completed?  Yes   Would the patient like one additional call?  No   Graduated  Yes   Did the patient feel the follow up calls were helpful during their recovery period?  Yes   Was the number of calls appropriate?  Yes          Zenaida Harris RN

## 2019-07-15 ENCOUNTER — HOSPITAL ENCOUNTER (OUTPATIENT)
Dept: CARDIOLOGY | Facility: HOSPITAL | Age: 74
Discharge: HOME | End: 2019-07-15
Admitting: NURSE PRACTITIONER

## 2019-07-15 ENCOUNTER — TELEPHONE (OUTPATIENT)
Dept: CARDIOLOGY | Facility: CLINIC | Age: 74
End: 2019-07-15

## 2019-07-15 VITALS — BODY MASS INDEX: 57.52 KG/M2 | WEIGHT: 293 LBS | HEIGHT: 60 IN

## 2019-07-15 DIAGNOSIS — I05.0 MITRAL VALVE STENOSIS, UNSPECIFIED ETIOLOGY: ICD-10-CM

## 2019-07-15 DIAGNOSIS — Z79.4 TYPE 2 DIABETES MELLITUS WITH OTHER CIRCULATORY COMPLICATION, WITH LONG-TERM CURRENT USE OF INSULIN (HCC): ICD-10-CM

## 2019-07-15 DIAGNOSIS — G47.33 OSA (OBSTRUCTIVE SLEEP APNEA): ICD-10-CM

## 2019-07-15 DIAGNOSIS — I25.10 CHRONIC CORONARY ARTERY DISEASE: ICD-10-CM

## 2019-07-15 DIAGNOSIS — I34.0 MITRAL VALVE INSUFFICIENCY, UNSPECIFIED ETIOLOGY: ICD-10-CM

## 2019-07-15 DIAGNOSIS — E66.01 CLASS 3 SEVERE OBESITY DUE TO EXCESS CALORIES WITH SERIOUS COMORBIDITY AND BODY MASS INDEX (BMI) OF 45.0 TO 49.9 IN ADULT (HCC): ICD-10-CM

## 2019-07-15 DIAGNOSIS — I35.0 AORTIC VALVE STENOSIS, ETIOLOGY OF CARDIAC VALVE DISEASE UNSPECIFIED: ICD-10-CM

## 2019-07-15 DIAGNOSIS — G47.8 PULMONARY HYPERTENSION DUE TO SLEEP-DISORDERED BREATHING (HCC): ICD-10-CM

## 2019-07-15 DIAGNOSIS — I50.23 ACUTE ON CHRONIC SYSTOLIC CONGESTIVE HEART FAILURE (HCC): ICD-10-CM

## 2019-07-15 DIAGNOSIS — I10 ESSENTIAL HYPERTENSION: ICD-10-CM

## 2019-07-15 DIAGNOSIS — E11.59 TYPE 2 DIABETES MELLITUS WITH OTHER CIRCULATORY COMPLICATION, WITH LONG-TERM CURRENT USE OF INSULIN (HCC): ICD-10-CM

## 2019-07-15 DIAGNOSIS — Z99.81 SUPPLEMENTAL OXYGEN DEPENDENT: ICD-10-CM

## 2019-07-15 DIAGNOSIS — I27.29 PULMONARY HYPERTENSION DUE TO SLEEP-DISORDERED BREATHING (HCC): ICD-10-CM

## 2019-07-15 DIAGNOSIS — E78.2 MIXED HYPERLIPIDEMIA: ICD-10-CM

## 2019-07-15 LAB
BH CV NUCLEAR PRIOR STUDY: 3
BH CV STRESS BP STAGE 1: NORMAL
BH CV STRESS COMMENTS STAGE 1: NORMAL
BH CV STRESS DOSE REGADENOSON STAGE 1: 0.4
BH CV STRESS DURATION MIN STAGE 1: 0
BH CV STRESS DURATION SEC STAGE 1: 10
BH CV STRESS HR STAGE 1: 95
BH CV STRESS PROTOCOL 1: NORMAL
BH CV STRESS RECOVERY BP: NORMAL MMHG
BH CV STRESS RECOVERY HR: 85 BPM
BH CV STRESS STAGE 1: 1
LV EF NUC BP: 55 %
MAXIMAL PREDICTED HEART RATE: 146 BPM
PERCENT MAX PREDICTED HR: 65.07 %
STRESS BASELINE BP: NORMAL MMHG
STRESS BASELINE HR: 87 BPM
STRESS PERCENT HR: 77 %
STRESS POST EXERCISE DUR SEC: 10 SEC
STRESS POST PEAK BP: NORMAL MMHG
STRESS POST PEAK HR: 95 BPM
STRESS TARGET HR: 124 BPM

## 2019-07-15 PROCEDURE — 78452 HT MUSCLE IMAGE SPECT MULT: CPT | Performed by: INTERNAL MEDICINE

## 2019-07-15 PROCEDURE — 93016 CV STRESS TEST SUPVJ ONLY: CPT | Performed by: INTERNAL MEDICINE

## 2019-07-15 PROCEDURE — 93017 CV STRESS TEST TRACING ONLY: CPT

## 2019-07-15 PROCEDURE — 0 TECHNETIUM TETROFOSMIN KIT: Performed by: NURSE PRACTITIONER

## 2019-07-15 PROCEDURE — A9502 TC99M TETROFOSMIN: HCPCS | Performed by: NURSE PRACTITIONER

## 2019-07-15 PROCEDURE — 93018 CV STRESS TEST I&R ONLY: CPT | Performed by: INTERNAL MEDICINE

## 2019-07-15 PROCEDURE — 25010000002 REGADENOSON 0.4 MG/5ML SOLUTION: Performed by: NURSE PRACTITIONER

## 2019-07-15 PROCEDURE — 78452 HT MUSCLE IMAGE SPECT MULT: CPT

## 2019-07-15 RX ADMIN — REGADENOSON 0.4 MG: 0.08 INJECTION, SOLUTION INTRAVENOUS at 12:06

## 2019-07-15 RX ADMIN — TETROFOSMIN 1 DOSE: 1.38 INJECTION, POWDER, LYOPHILIZED, FOR SOLUTION INTRAVENOUS at 11:05

## 2019-07-15 RX ADMIN — TETROFOSMIN 1 DOSE: 1.38 INJECTION, POWDER, LYOPHILIZED, FOR SOLUTION INTRAVENOUS at 12:06

## 2019-07-15 NOTE — TELEPHONE ENCOUNTER
Patient notified of normal stress test results.  She was encouraged to contact the office between now and her scheduled 8/1/2019 follow-up visit with Dr. Ayers if she has any further symptoms or problems.  She demonstrated understanding.

## 2019-07-17 DIAGNOSIS — R79.89 ELEVATED SERUM CREATININE: Primary | ICD-10-CM

## 2019-07-19 RX ORDER — IRBESARTAN 300 MG/1
TABLET ORAL
Qty: 90 TABLET | Refills: 0 | Status: SHIPPED | OUTPATIENT
Start: 2019-07-19 | End: 2019-10-17 | Stop reason: SDUPTHER

## 2019-07-23 LAB
PROT 24H UR-MRATE: 360 MG/24HOURS (ref 0–150)
PROT UR-MCNC: 15 MG/DL

## 2019-07-25 ENCOUNTER — APPOINTMENT (OUTPATIENT)
Dept: SLEEP MEDICINE | Facility: HOSPITAL | Age: 74
End: 2019-07-25

## 2019-07-25 RX ORDER — GLIMEPIRIDE 1 MG/1
TABLET ORAL
Qty: 90 TABLET | Refills: 0 | Status: SHIPPED | OUTPATIENT
Start: 2019-07-25 | End: 2019-10-25 | Stop reason: SDUPTHER

## 2019-07-29 RX ORDER — CLONIDINE HYDROCHLORIDE 0.2 MG/1
TABLET ORAL
Qty: 30 TABLET | Refills: 0 | Status: SHIPPED | OUTPATIENT
Start: 2019-07-29 | End: 2019-08-26 | Stop reason: SDUPTHER

## 2019-08-01 ENCOUNTER — OFFICE VISIT (OUTPATIENT)
Dept: CARDIOLOGY | Facility: CLINIC | Age: 74
End: 2019-08-01

## 2019-08-01 VITALS
RESPIRATION RATE: 18 BRPM | SYSTOLIC BLOOD PRESSURE: 132 MMHG | WEIGHT: 271.4 LBS | OXYGEN SATURATION: 94 % | HEIGHT: 60 IN | DIASTOLIC BLOOD PRESSURE: 76 MMHG | BODY MASS INDEX: 53.28 KG/M2 | HEART RATE: 55 BPM

## 2019-08-01 DIAGNOSIS — I27.29 PULMONARY HYPERTENSION DUE TO SLEEP-DISORDERED BREATHING (HCC): ICD-10-CM

## 2019-08-01 DIAGNOSIS — N18.30 STAGE 3 CHRONIC KIDNEY DISEASE (HCC): ICD-10-CM

## 2019-08-01 DIAGNOSIS — I35.0 AORTIC VALVE STENOSIS, ETIOLOGY OF CARDIAC VALVE DISEASE UNSPECIFIED: ICD-10-CM

## 2019-08-01 DIAGNOSIS — I50.812 CHRONIC RIGHT-SIDED CONGESTIVE HEART FAILURE (HCC): ICD-10-CM

## 2019-08-01 DIAGNOSIS — G47.8 PULMONARY HYPERTENSION DUE TO SLEEP-DISORDERED BREATHING (HCC): ICD-10-CM

## 2019-08-01 DIAGNOSIS — Z95.2 HISTORY OF MVR WITH CARDIOPULMONARY BYPASS: ICD-10-CM

## 2019-08-01 DIAGNOSIS — G47.33 OSA (OBSTRUCTIVE SLEEP APNEA): ICD-10-CM

## 2019-08-01 DIAGNOSIS — I25.10 CHRONIC CORONARY ARTERY DISEASE: Primary | ICD-10-CM

## 2019-08-01 PROCEDURE — 93000 ELECTROCARDIOGRAM COMPLETE: CPT | Performed by: INTERNAL MEDICINE

## 2019-08-01 PROCEDURE — 99214 OFFICE O/P EST MOD 30 MIN: CPT | Performed by: INTERNAL MEDICINE

## 2019-08-01 RX ORDER — IBUPROFEN 200 MG
TABLET ORAL
COMMUNITY
Start: 2019-07-26 | End: 2020-01-08 | Stop reason: HOSPADM

## 2019-08-01 RX ORDER — QUETIAPINE FUMARATE 100 MG/1
100 TABLET, FILM COATED ORAL DAILY
COMMUNITY
Start: 2019-07-26 | End: 2019-12-11

## 2019-08-01 NOTE — PROGRESS NOTES
Date of Office Visit: 2019  Encounter Provider: Antoine Ayers MD  Place of Service: [facility] CARDIOLOGY  Patient Name: Miguelina Lopez  :1945      Chief Complaint   Patient presents with   • Congestive Heart Failure     History of Present Illness    The patient is a 73-year-old white female with coronary disease as well as valvular heart disease who returns to the office today for follow-up.  The patient has undergone previous bypass surgery as well as a mitral valve replacement with a tissue prosthesis and a tricuspid valve repair.     She was hospitalized in  of this year with worsening bilateral lower extremity edema and increase in her weight and signs and symptoms of congestive heart failure.  An echocardiogram was performed which showed normal left ventricular systolic function with an ejection fraction of 53%.  The diastolic function was indeterminate.  The left atrium was severely increased.  The right atrium was severely increased.  There was mild to moderate aortic stenosis with a mean gradient of 17.  There is moderate pulmonary hypertension with there are PSP of 53.  She was diuresed with adjustments due to renal insufficiency.    Comorbid conditions include morbid obesity, obstructive sleep apnea, diabetes mellitus.    The patient reports she feels slightly better.  She still has chronic exertional dyspnea.  She is on oxygen continuously.  She also complains of severe edema as well as easy fatigability.  Her weight is up about 10 pounds from her previous visit.  Past Medical History:   Diagnosis Date   • Anemia    • Anxiety    • Aortic valve stenosis    • CHF (congestive heart failure) (CMS/HCC)    • Chronic coronary artery disease    • Class 3 severe obesity due to excess calories in adult (CMS/HCC)    • Depression    • Diabetes mellitus (CMS/HCC)    • Heart murmur    • Mitral valve insufficiency    • Pneumonia     2016   • Pulmonary  hypertension (CMS/HCC)     due to sleep disordered breathing   • Sleep apnea     Uses CPAP or oxygen   • Stage 3 chronic kidney disease (CMS/HCC)    • Supplemental oxygen dependent          Past Surgical History:   Procedure Laterality Date   • CARDIAC CATHETERIZATION     • CARDIAC CATHETERIZATION N/A 6/10/2016    Procedure: Left Heart Cath;  Surgeon: Chace Johnson MD;  Location: [facility] CATH INVASIVE LOCATION;  Service:    • CARDIAC CATHETERIZATION N/A 6/10/2016    Procedure: Right Heart Cath;  Surgeon: Chace Johnson MD;  Location:  [facility] CATH INVASIVE LOCATION;  Service:    • CORONARY ARTERY BYPASS GRAFT      2 vessel   • CORONARY ARTERY BYPASS GRAFT WITH MITRAL VALVE REPAIR/REPLACEMENT N/A 6/13/2016    Procedure: INTRAOPERATIVE TARIQ, MIDLINE STERNOTOMY, CORONARY ARTERY BYPASS GRAFTING X  2 UTILIZING ENDOSCOPICALLY HARVESTED LEFT GREATER SAPHENOUS VEIN, MITRAL VALVE REPLACEMENT AND TRICUSPID VALVE REPAIR;  Surgeon: Eliecer Mistry MD;  Location: [facility] MAIN OR;  Service:    • CORONARY STENT PLACEMENT  2010    Approx. 6 yrs ago at [facility]   • HEMORRHOIDECTOMY     • HYSTERECTOMY     • MITRAL VALVE REPLACEMENT     • REPLACEMENT TOTAL KNEE Right    • THYROID SURGERY      Cyst removed from thyroid           Current Outpatient Medications:   •  albuterol sulfate HFA (VENTOLIN HFA) 108 (90 Base) MCG/ACT inhaler, Inhale 2 puffs Every 6 (Six) Hours As Needed for Wheezing., Disp: 18 g, Rfl: 0  •  ALPRAZolam (XANAX) 1 MG tablet, Take 1 tablet by mouth 2 (Two) Times a Day As Needed for anxiety., Disp: 24 tablet, Rfl: 0  •  amLODIPine (NORVASC) 10 MG tablet, Take 1 tablet by mouth Daily., Disp: 30 tablet, Rfl: 3  •  aspirin 325 MG EC tablet, TAKE ONE TABLET BY MOUTH DAILY, Disp: 90 tablet, Rfl: 0  •  atenolol (TENORMIN) 50 MG tablet, Take 1 tablet by mouth Daily., Disp: 30 tablet, Rfl: 3  •  atorvastatin (LIPITOR) 20 MG tablet, Take 20 mg by mouth Daily., Disp: , Rfl:   •  CloNIDine (CATAPRES) 0.2 MG tablet,  TAKE ONE TABLET BY MOUTH DAILY, Disp: 30 tablet, Rfl: 0  •  doxepin (SINEquan) 50 MG capsule, Take 50 mg by mouth Daily., Disp: , Rfl:   •  ferrous sulfate 324 (65 FE) MG tablet delayed-release EC tablet, Take 324 mg by mouth Daily With Breakfast., Disp: , Rfl:   •  fluticasone-salmeterol (ADVAIR DISKUS) 250-50 MCG/DOSE DISKUS, Inhale 1 puff Daily As Needed (cough and wheezing)., Disp: 60 each, Rfl: 0  •  furosemide (LASIX) 40 MG tablet, Take 1 tablet by mouth 2 (Two) Times a Day., Disp: 60 tablet, Rfl: 3  •  glimepiride (AMARYL) 1 MG tablet, TAKE ONE TABLET BY MOUTH EVERY MORNING BEFORE BREAKFAST, Disp: 90 tablet, Rfl: 0  •  ibuprofen (ADVIL,MOTRIN) 200 MG tablet, , Disp: , Rfl:   •  ipratropium-albuterol (DUO-NEB) 0.5-2.5 mg/mL nebulizer, Take 3 mL by nebulization 4 (four) times a day. (Patient taking differently: Take 3 mL by nebulization Daily As Needed.), Disp: 3 mL, Rfl: 5  •  irbesartan (AVAPRO) 300 MG tablet, TAKE ONE TABLET BY MOUTH ONCE NIGHTLY, Disp: 90 tablet, Rfl: 0  •  nitroglycerin (NITROSTAT) 0.4 MG SL tablet, Place 1 tablet under the tongue As Needed for Chest Pain.  - IF PAIN REMAINS AFTER 5 MIN CALL 911 AND REPEAT DOSE MAX 3 TABS IN 15 MINUTES, Disp: 25 tablet, Rfl: 5  •  nystatin (MYCOSTATIN) 869796 UNIT/GM cream, Apply  topically to the appropriate area as directed 2 (Two) Times a Day. to affected area(s), Disp: 30 g, Rfl: 3  •  O2 (OXYGEN), Inhale 2.5 L/min Continuous., Disp: , Rfl:   •  omeprazole (priLOSEC) 40 MG capsule, Take 40 mg by mouth Daily., Disp: , Rfl:   •  Oxymetazoline HCl (NASAL DECONGESTANT SPRAY NA), , Disp: , Rfl:   •  potassium chloride (K-DUR,KLOR-CON) 20 MEQ CR tablet, Take 20 mEq by mouth 2 (Two) Times a Day., Disp: , Rfl:   •  promethazine (PHENERGAN) 25 MG tablet, Take 25 mg by mouth Every 8 (Eight) Hours As Needed for Nausea or Vomiting., Disp: , Rfl:   •  QUEtiapine (SEROquel) 100 MG tablet, Take 100 mg by mouth Daily., Disp: , Rfl:   •  senna-docusate (PERICOLACE)  "8.6-50 MG per tablet, Take 1 tablet by mouth daily., Disp: , Rfl:   •  traMADol (ULTRAM) 50 MG tablet, Take 1 tablet by mouth Every 8 (Eight) Hours As Needed for Moderate Pain ., Disp: 30 tablet, Rfl: 0  •  TRULICITY 0.75 MG/0.5ML solution pen-injector, INJECT 0.75 MG UNDER THE SKIN ONCE WEEKLY, Disp: 2 pen, Rfl: 0  •  vilazodone (VIIBRYD) 20 MG tablet tablet, Take 20 mg by mouth Every Night., Disp: , Rfl:       Social History     Socioeconomic History   • Marital status:      Spouse name: Not on file   • Number of children: Not on file   • Years of education: Not on file   • Highest education level: Not on file   Tobacco Use   • Smoking status: Never Smoker   • Smokeless tobacco: Never Used   Substance and Sexual Activity   • Alcohol use: No   • Drug use: No   • Sexual activity: Defer         Review of Systems   Constitution: Positive for malaise/fatigue.   HENT: Negative.    Eyes: Negative.    Cardiovascular: Positive for dyspnea on exertion and leg swelling.   Respiratory: Negative.    Endocrine: Negative.    Skin: Negative.    Musculoskeletal: Negative.    Gastrointestinal: Negative.    Neurological: Negative.    Psychiatric/Behavioral: Negative.        Procedures      ECG 12 Lead  Date/Time: 8/1/2019 2:13 PM  Performed by: Antoine Ayers MD  Authorized by: Antoine Ayers MD   Comparison: compared with previous ECG from 7/1/2019  Rhythm: sinus rhythm  Rate: normal  Conduction: 1st degree AV block and non-specific intraventricular conduction delay  QRS axis: normal                  Objective:    /76 (BP Location: Left arm, Patient Position: Sitting)   Pulse 55   Resp 18   Ht 152.4 cm (60\")   Wt 123 kg (271 lb 6.4 oz)   SpO2 94%   BMI 53.00 kg/m²         Physical Exam   Constitutional: She is oriented to person, place, and time. She appears well-developed and well-nourished.   Morbidly obese   HENT:   Head: Normocephalic.   Eyes: Pupils are equal, round, and reactive to light. "   Neck: Normal range of motion. No JVD present. Carotid bruit is not present. No thyromegaly present.   Cardiovascular: Normal rate, regular rhythm, S1 normal, S2 normal and intact distal pulses. Exam reveals no gallop and no friction rub.   Murmur heard.   Harsh midsystolic murmur is present with a grade of 2/6 at the upper right sternal border radiating to the neck.  Pulmonary/Chest: Effort normal and breath sounds normal.   Abdominal: Soft. Bowel sounds are normal.   Musculoskeletal: She exhibits edema.   Neurological: She is alert and oriented to person, place, and time.   Skin: Skin is warm, dry and intact. No erythema.   Psychiatric: She has a normal mood and affect.   Vitals reviewed.          Assessment:       Diagnosis Plan   1. Chronic coronary artery disease     2. Aortic valve stenosis, etiology of cardiac valve disease unspecified     3. Chronic right-sided congestive heart failure (CMS/HCC)     4. s/p MVR, TV-repair, CABG x2 6/13/16     5. Stage 3 chronic kidney disease (CMS/HCC)     6. OCHOA (obstructive sleep apnea)     7. Pulmonary hypertension due to sleep-disordered breathing (CMS/HCC)         1. Coronary Artery Disease  Assessment  • The patient has no angina    Plan  • Lifestyle modifications discussed include adhering to a heart healthy diet and regular exercise    Subjective - Objective  • There is a history of previous coronary artery bypass graft  • Current antiplatelet therapy includes aspirin 325 mg      2.  Chronic right-sided congestive heart failure.  Slight improvement on diuretic therapy  3.  Valvular heart disease: Status post mitral valve replacement with tissue prosthesis, tricuspid valve repair 2016    4.  Chronic kidney disease: Stage III.  Continue to monitor    4.  Obstructive sleep apnea: Presently being treated    5.  Pulmonary hypertension: Moderate    6.  Morbid obesity: Healthy lifestyle and diet emphasized  Plan:       No change in the patient's medications today.  I will  have her see ZOILA Hewitt back in approximately 4 months

## 2019-08-02 RX ORDER — DULAGLUTIDE 0.75 MG/.5ML
INJECTION, SOLUTION SUBCUTANEOUS
Qty: 2 PEN | Refills: 0 | Status: SHIPPED | OUTPATIENT
Start: 2019-08-02 | End: 2019-08-12 | Stop reason: SDUPTHER

## 2019-08-02 RX ORDER — ATORVASTATIN CALCIUM 20 MG/1
TABLET, FILM COATED ORAL
Qty: 90 TABLET | Refills: 0 | Status: SHIPPED | OUTPATIENT
Start: 2019-08-02 | End: 2019-11-09 | Stop reason: SDUPTHER

## 2019-08-08 ENCOUNTER — APPOINTMENT (OUTPATIENT)
Dept: SLEEP MEDICINE | Facility: HOSPITAL | Age: 74
End: 2019-08-08

## 2019-08-12 RX ORDER — DULAGLUTIDE 0.75 MG/.5ML
INJECTION, SOLUTION SUBCUTANEOUS
Qty: 2 ML | Refills: 0 | Status: SHIPPED | OUTPATIENT
Start: 2019-08-12 | End: 2019-09-09 | Stop reason: SDUPTHER

## 2019-08-27 RX ORDER — CLONIDINE HYDROCHLORIDE 0.2 MG/1
TABLET ORAL
Qty: 30 TABLET | Refills: 0 | Status: SHIPPED | OUTPATIENT
Start: 2019-08-27 | End: 2019-09-22 | Stop reason: SDUPTHER

## 2019-09-09 RX ORDER — DULAGLUTIDE 0.75 MG/.5ML
INJECTION, SOLUTION SUBCUTANEOUS
Qty: 2 ML | Refills: 0 | Status: SHIPPED | OUTPATIENT
Start: 2019-09-09 | End: 2019-11-04 | Stop reason: SDUPTHER

## 2019-09-23 RX ORDER — CLONIDINE HYDROCHLORIDE 0.2 MG/1
TABLET ORAL
Qty: 30 TABLET | Refills: 0 | Status: SHIPPED | OUTPATIENT
Start: 2019-09-23 | End: 2019-10-23 | Stop reason: SDUPTHER

## 2019-09-23 RX ORDER — ASPIRIN 325 MG
TABLET, DELAYED RELEASE (ENTERIC COATED) ORAL
Qty: 90 TABLET | Refills: 0 | Status: SHIPPED | OUTPATIENT
Start: 2019-09-23 | End: 2020-01-21 | Stop reason: SDUPTHER

## 2019-09-26 ENCOUNTER — TELEPHONE (OUTPATIENT)
Dept: INTERNAL MEDICINE | Facility: CLINIC | Age: 74
End: 2019-09-26

## 2019-09-26 NOTE — TELEPHONE ENCOUNTER
Patient left a message yesterday stating that her feet are swollen and her left leg from the knee down was red/swollen.  Please advise.

## 2019-09-27 ENCOUNTER — OFFICE VISIT (OUTPATIENT)
Dept: INTERNAL MEDICINE | Facility: CLINIC | Age: 74
End: 2019-09-27

## 2019-09-27 VITALS
WEIGHT: 278.7 LBS | OXYGEN SATURATION: 98 % | BODY MASS INDEX: 54.43 KG/M2 | HEART RATE: 78 BPM | DIASTOLIC BLOOD PRESSURE: 66 MMHG | TEMPERATURE: 98 F | SYSTOLIC BLOOD PRESSURE: 134 MMHG

## 2019-09-27 DIAGNOSIS — R60.0 LOCALIZED EDEMA: ICD-10-CM

## 2019-09-27 DIAGNOSIS — E66.01 CLASS 3 SEVERE OBESITY DUE TO EXCESS CALORIES WITH SERIOUS COMORBIDITY AND BODY MASS INDEX (BMI) OF 50.0 TO 59.9 IN ADULT (HCC): Primary | ICD-10-CM

## 2019-09-27 DIAGNOSIS — Z99.81 SUPPLEMENTAL OXYGEN DEPENDENT: ICD-10-CM

## 2019-09-27 DIAGNOSIS — L03.116 CELLULITIS OF LEFT LOWER EXTREMITY: ICD-10-CM

## 2019-09-27 PROBLEM — I50.43 ACUTE ON CHRONIC COMBINED SYSTOLIC AND DIASTOLIC HF (HEART FAILURE) (HCC): Status: RESOLVED | Noted: 2019-01-02 | Resolved: 2019-09-27

## 2019-09-27 PROCEDURE — 99214 OFFICE O/P EST MOD 30 MIN: CPT | Performed by: FAMILY MEDICINE

## 2019-09-27 RX ORDER — BUSPIRONE HYDROCHLORIDE 7.5 MG/1
7.5 TABLET ORAL NIGHTLY
COMMUNITY
Start: 2019-09-05 | End: 2019-12-11

## 2019-09-27 RX ORDER — TRAMADOL HYDROCHLORIDE 50 MG/1
50 TABLET ORAL EVERY 8 HOURS PRN
Qty: 30 TABLET | Refills: 0 | Status: SHIPPED | OUTPATIENT
Start: 2019-09-27 | End: 2019-11-11 | Stop reason: SDUPTHER

## 2019-09-27 NOTE — PROGRESS NOTES
Miguelina Lopez is a 73 y.o. female.      Assessment/Plan   Problem List Items Addressed This Visit        Respiratory    Supplemental oxygen dependent    Overview     Restrictive lung disease            Digestive    Class 3 severe obesity due to excess calories with serious comorbidity and body mass index (BMI) of 50.0 to 59.9 in adult (CMS/[facility]) - Primary       Other    Localized edema    Cellulitis of left lower extremity         Applied Unna's boot patient will removing 1 week  Unable to wear support socks or hose  incease water intake  Return in about 1 week (around 10/4/2019), or if symptoms worsen or fail to improve, for Recheck, Next scheduled follow up.      Chief Complaint   Patient presents with   • bilateral leg swelling/reddness     for about 1 week     Social History     Tobacco Use   • Smoking status: Never Smoker   • Smokeless tobacco: Never Used   Substance Use Topics   • Alcohol use: No   • Drug use: No       History of Present Illness   Patient follows up for chronic problems obesity O2 dependence local edema cellulitis that is developed in her left lower leg she has no fever no sweats no chills she has had on-and-off episodes of this over the last couple years she is done fairly well since last hospitalization with appropriate diuretic therapy and blood pressure control and trying to have her leg raise when she is sitting morbid obesity contributes to significant vascular compromise she is had normal ejection fractions on cardiac evaluation  The following portions of the patient's history were reviewed and updated as appropriate:PMHroutine: Social history , Allergies, Current Medications, Active Problem List and Health Maintenance    Review of Systems   Constitutional: Positive for activity change. Negative for appetite change, diaphoresis, fatigue, fever and unexpected weight change.   HENT: Negative.    Respiratory: Negative.    Cardiovascular: Positive for leg swelling.   Gastrointestinal:  Negative.    Neurological: Negative.    Hematological: Negative.        Objective   Vitals:    09/27/19 1315   BP: 134/66   BP Location: Left arm   Patient Position: Sitting   Cuff Size: Large Adult   Pulse: 78   Temp: 98 °F (36.7 °C)   TempSrc: Oral   SpO2: 98%   Weight: 126 kg (278 lb 11.2 oz)     Body mass index is 54.43 kg/m².  Physical Exam   Constitutional: She is oriented to person, place, and time. She appears well-developed and well-nourished.   HENT:   Head: Normocephalic and atraumatic.   Eyes: Conjunctivae are normal. No scleral icterus.   Cardiovascular: Normal rate and regular rhythm.   Pulmonary/Chest: Effort normal and breath sounds normal.   Musculoskeletal: She exhibits edema.   Neurological: She is alert and oriented to person, place, and time.   Skin: There is erythema.   Left lower leg greater than right lower leg with some posterior blistering   Nursing note and vitals reviewed.    Reviewed Data:  No visits with results within 1 Month(s) from this visit.   Latest known visit with results is:   Orders Only on 07/17/2019   Component Date Value Ref Range Status   • Total Protein, Urine 07/22/2019 15.0  mg/dL Final   • Protein, 24H Urine 07/22/2019 360.0* 0.0 - 150.0 mg/24hours Final

## 2019-10-03 ENCOUNTER — TELEPHONE (OUTPATIENT)
Dept: INTERNAL MEDICINE | Facility: CLINIC | Age: 74
End: 2019-10-03

## 2019-10-03 DIAGNOSIS — L03.116 CELLULITIS OF LEFT LOWER EXTREMITY: Primary | ICD-10-CM

## 2019-10-03 DIAGNOSIS — R60.0 LOCALIZED EDEMA: ICD-10-CM

## 2019-10-03 NOTE — TELEPHONE ENCOUNTER
Patient called stating that she took the wrapping off last night.  She said that her leg is swollen but she thinks that it is from being wrapped so tight.  The rash is still there, it is not as red as it was.  She said that she had trouble last summer with the swelling and once it started getting cold it got better.  Please advise.

## 2019-10-09 RX ORDER — ATENOLOL 50 MG/1
TABLET ORAL
Qty: 30 TABLET | Refills: 5 | Status: SHIPPED | OUTPATIENT
Start: 2019-10-09 | End: 2020-01-08 | Stop reason: HOSPADM

## 2019-10-14 RX ORDER — AMLODIPINE BESYLATE 10 MG/1
TABLET ORAL
Qty: 90 TABLET | Refills: 3 | Status: SHIPPED | OUTPATIENT
Start: 2019-10-14 | End: 2020-01-08 | Stop reason: HOSPADM

## 2019-10-17 RX ORDER — IRBESARTAN 300 MG/1
TABLET ORAL
Qty: 90 TABLET | Refills: 0 | Status: SHIPPED | OUTPATIENT
Start: 2019-10-17 | End: 2020-01-08 | Stop reason: HOSPADM

## 2019-10-22 ENCOUNTER — APPOINTMENT (OUTPATIENT)
Dept: OCCUPATIONAL THERAPY | Facility: HOSPITAL | Age: 74
End: 2019-10-22

## 2019-10-24 RX ORDER — CLONIDINE HYDROCHLORIDE 0.2 MG/1
TABLET ORAL
Qty: 30 TABLET | Refills: 0 | Status: SHIPPED | OUTPATIENT
Start: 2019-10-24 | End: 2019-11-23 | Stop reason: SDUPTHER

## 2019-10-25 RX ORDER — GLIMEPIRIDE 1 MG/1
TABLET ORAL
Qty: 90 TABLET | Refills: 0 | Status: SHIPPED | OUTPATIENT
Start: 2019-10-25 | End: 2019-10-28 | Stop reason: SDUPTHER

## 2019-10-28 RX ORDER — GLIMEPIRIDE 1 MG/1
TABLET ORAL
Qty: 90 TABLET | Refills: 0 | Status: SHIPPED | OUTPATIENT
Start: 2019-10-28 | End: 2020-05-29 | Stop reason: SDUPTHER

## 2019-10-31 RX ORDER — CLONIDINE HYDROCHLORIDE 0.2 MG/1
TABLET ORAL
Qty: 30 TABLET | Refills: 0 | OUTPATIENT
Start: 2019-10-31

## 2019-11-04 RX ORDER — DULAGLUTIDE 0.75 MG/.5ML
INJECTION, SOLUTION SUBCUTANEOUS
Qty: 2 ML | Refills: 0 | Status: SHIPPED | OUTPATIENT
Start: 2019-11-04 | End: 2019-12-05 | Stop reason: SDUPTHER

## 2019-11-11 ENCOUNTER — TELEPHONE (OUTPATIENT)
Dept: INTERNAL MEDICINE | Facility: CLINIC | Age: 74
End: 2019-11-11

## 2019-11-11 RX ORDER — TRAMADOL HYDROCHLORIDE 50 MG/1
50 TABLET ORAL EVERY 8 HOURS PRN
Qty: 30 TABLET | Refills: 0 | Status: SHIPPED | OUTPATIENT
Start: 2019-11-11 | End: 2019-12-11

## 2019-11-11 RX ORDER — ATORVASTATIN CALCIUM 20 MG/1
TABLET, FILM COATED ORAL
Qty: 90 TABLET | Refills: 0 | Status: SHIPPED | OUTPATIENT
Start: 2019-11-11 | End: 2020-06-09 | Stop reason: SDUPTHER

## 2019-11-11 RX ORDER — POTASSIUM CHLORIDE 20 MEQ/1
TABLET, EXTENDED RELEASE ORAL
Qty: 60 TABLET | Refills: 4 | Status: SHIPPED | OUTPATIENT
Start: 2019-11-11 | End: 2020-01-08 | Stop reason: HOSPADM

## 2019-11-11 NOTE — TELEPHONE ENCOUNTER
Patient called stating that her feet swell off and on.  She is keeping them elevated and taking her water pills.  She also said that she fell Friday and fell on Saturday.  She would also like to have a prescription for her pain pills.  Please advise.

## 2019-11-14 NOTE — TELEPHONE ENCOUNTER
Patient notified that Dr. Bush would still like for her to go to the Lymphedema clinic.  She refused this stating that she never heard from them before and she she did not like the way she was treated by them.  Please advise.

## 2019-11-18 ENCOUNTER — TELEPHONE (OUTPATIENT)
Dept: INTERNAL MEDICINE | Facility: CLINIC | Age: 74
End: 2019-11-18

## 2019-11-18 DIAGNOSIS — Z79.4 TYPE 2 DIABETES MELLITUS WITH OTHER CIRCULATORY COMPLICATION, WITH LONG-TERM CURRENT USE OF INSULIN (HCC): Primary | ICD-10-CM

## 2019-11-18 DIAGNOSIS — E11.59 TYPE 2 DIABETES MELLITUS WITH OTHER CIRCULATORY COMPLICATION, WITH LONG-TERM CURRENT USE OF INSULIN (HCC): Primary | ICD-10-CM

## 2019-11-18 NOTE — TELEPHONE ENCOUNTER
Patient called asking to be referred to Cain Garcia M.D.  - podiatrist (ph: [phone]) for diabetic foot exam.  Please advise.

## 2019-11-25 RX ORDER — FUROSEMIDE 40 MG/1
TABLET ORAL
Qty: 60 TABLET | Refills: 0 | Status: SHIPPED | OUTPATIENT
Start: 2019-11-25 | End: 2020-03-23 | Stop reason: SDUPTHER

## 2019-11-26 RX ORDER — CLONIDINE HYDROCHLORIDE 0.2 MG/1
TABLET ORAL
Qty: 30 TABLET | Refills: 3 | Status: SHIPPED | OUTPATIENT
Start: 2019-11-26 | End: 2020-01-08 | Stop reason: HOSPADM

## 2019-11-27 RX ORDER — CLONIDINE HYDROCHLORIDE 0.2 MG/1
TABLET ORAL
Qty: 30 TABLET | Refills: 0 | OUTPATIENT
Start: 2019-11-27

## 2019-12-05 ENCOUNTER — TELEPHONE (OUTPATIENT)
Dept: INTERNAL MEDICINE | Facility: CLINIC | Age: 74
End: 2019-12-05

## 2019-12-05 NOTE — TELEPHONE ENCOUNTER
Patient called stating that she would like for Dr. Bush to prescribe a cream for the rash/swelling/ and scabs on the back of her legs.  Please advise.

## 2019-12-06 RX ORDER — CLOTRIMAZOLE AND BETAMETHASONE DIPROPIONATE 10; .64 MG/G; MG/G
CREAM TOPICAL 2 TIMES DAILY
Qty: 45 G | Refills: 0 | Status: SHIPPED | OUTPATIENT
Start: 2019-12-06 | End: 2021-04-09 | Stop reason: SDUPTHER

## 2019-12-09 RX ORDER — OMEPRAZOLE 40 MG/1
CAPSULE, DELAYED RELEASE ORAL
Qty: 30 CAPSULE | Refills: 5 | Status: SHIPPED | OUTPATIENT
Start: 2019-12-09 | End: 2020-07-13 | Stop reason: SDUPTHER

## 2019-12-11 ENCOUNTER — OFFICE VISIT (OUTPATIENT)
Dept: FAMILY MEDICINE CLINIC | Facility: CLINIC | Age: 74
End: 2019-12-11

## 2019-12-11 VITALS
DIASTOLIC BLOOD PRESSURE: 62 MMHG | TEMPERATURE: 97.7 F | HEIGHT: 60 IN | HEART RATE: 73 BPM | SYSTOLIC BLOOD PRESSURE: 132 MMHG | OXYGEN SATURATION: 93 % | WEIGHT: 290 LBS | RESPIRATION RATE: 16 BRPM | BODY MASS INDEX: 56.93 KG/M2

## 2019-12-11 DIAGNOSIS — D64.9 ANEMIA, UNSPECIFIED TYPE: ICD-10-CM

## 2019-12-11 DIAGNOSIS — R60.9 PERIPHERAL EDEMA: ICD-10-CM

## 2019-12-11 DIAGNOSIS — G47.8 PULMONARY HYPERTENSION DUE TO SLEEP-DISORDERED BREATHING (HCC): ICD-10-CM

## 2019-12-11 DIAGNOSIS — E11.42 TYPE 2 DIABETES MELLITUS WITH DIABETIC POLYNEUROPATHY, WITHOUT LONG-TERM CURRENT USE OF INSULIN (HCC): ICD-10-CM

## 2019-12-11 DIAGNOSIS — G62.9 NEUROPATHY: ICD-10-CM

## 2019-12-11 DIAGNOSIS — Z95.2 HISTORY OF MVR WITH CARDIOPULMONARY BYPASS: ICD-10-CM

## 2019-12-11 DIAGNOSIS — I50.812 CHRONIC RIGHT-SIDED CONGESTIVE HEART FAILURE (HCC): ICD-10-CM

## 2019-12-11 DIAGNOSIS — E66.01 CLASS 3 SEVERE OBESITY DUE TO EXCESS CALORIES WITH SERIOUS COMORBIDITY AND BODY MASS INDEX (BMI) OF 50.0 TO 59.9 IN ADULT (HCC): ICD-10-CM

## 2019-12-11 DIAGNOSIS — I25.10 CHRONIC CORONARY ARTERY DISEASE: ICD-10-CM

## 2019-12-11 DIAGNOSIS — N18.30 STAGE 3 CHRONIC KIDNEY DISEASE (HCC): ICD-10-CM

## 2019-12-11 DIAGNOSIS — E78.2 MIXED HYPERLIPIDEMIA: Primary | ICD-10-CM

## 2019-12-11 DIAGNOSIS — I27.29 PULMONARY HYPERTENSION DUE TO SLEEP-DISORDERED BREATHING (HCC): ICD-10-CM

## 2019-12-11 DIAGNOSIS — I35.0 AORTIC VALVE STENOSIS, ETIOLOGY OF CARDIAC VALVE DISEASE UNSPECIFIED: ICD-10-CM

## 2019-12-11 DIAGNOSIS — I10 ESSENTIAL HYPERTENSION: ICD-10-CM

## 2019-12-11 DIAGNOSIS — R53.82 CHRONIC FATIGUE: ICD-10-CM

## 2019-12-11 DIAGNOSIS — Z99.81 SUPPLEMENTAL OXYGEN DEPENDENT: ICD-10-CM

## 2019-12-11 DIAGNOSIS — E11.42 DIABETIC PERIPHERAL NEUROPATHY (HCC): ICD-10-CM

## 2019-12-11 DIAGNOSIS — G47.33 OSA (OBSTRUCTIVE SLEEP APNEA): ICD-10-CM

## 2019-12-11 PROCEDURE — 90653 IIV ADJUVANT VACCINE IM: CPT | Performed by: FAMILY MEDICINE

## 2019-12-11 PROCEDURE — G0008 ADMIN INFLUENZA VIRUS VAC: HCPCS | Performed by: FAMILY MEDICINE

## 2019-12-11 PROCEDURE — 99214 OFFICE O/P EST MOD 30 MIN: CPT | Performed by: FAMILY MEDICINE

## 2019-12-11 NOTE — PATIENT INSTRUCTIONS
Recommend increasing Lasix to 80mg in the morning and 40mg at night  Return in one week for recheck.  Call or return to clinic immediately if your symptoms return

## 2019-12-11 NOTE — PROGRESS NOTES
The ABCs of the Annual Wellness Visit  Subsequent Medicare Wellness Visit    Chief Complaint   Patient presents with   • Establish Care   • Leg Swelling     pt states has been going on for  months    • Foot Swelling       Subjective   History of Present Illness:  Miguelina Lopez is a 73 y.o. female who presents for a Subsequent Medicare Wellness Visit.  She has PMH of DM type 2 (diagnosed 15 years ago),  diabetic neuropathy, OCHOA (not currently using the CPAP due to technical issues with the machine), aortic stenosis, pulm HTN and right sided heart failure, CAD and valvular heart disease (2 vessel CABG, MVR and TVR in 2016). Currently takes aspirin 325mg daily for antiplatelet therapy.  She is on 3-3.5L oxygen/min continuously, up to 4 L if needed.     She presents today accompanied by her step-granddaughter with complaint of increased LE edema and pain for the past 6 months. She was hospitalized 6/2019 with CHF exacerbation, treated with IV diuretics at [facility] which helped temporarily but the swelling returned within one month. She also reports increased SOB with exertion for the past few months.   During hospitalization, TTE was done which showed EF 53%, indeterminate diastolic dysfunction, LA and RA severely dilated, mild-mod AS (mean gradient 17), moderate pulm HTN w/ PSP 53.  Her cardiologist is Dr. Antoine Ayers, last seen 8/2019 during which patient reported feeling slightly better however with persistent exertional dyspnea, severe peripheral edema, and fatigue.   Her current diuretic therapy consists of Lasix 40mg twice daily, but she takes both in the morning since she doesn't want to have to urinate so much during the night.    Angina has remained stable.  Denies fevers. Appetite is normal.  She is able to ambulate minimally at her apartment, but uses a cane and walker mostly. She does not own a wheelchair.   She uses albuterol nebulizer once a day if needed.  She has fallen twice at home  "within the past few months. Attributed to leg weakness and pain. No injuries were sustained.  She also sees a psychiatrist once a month or less for history of depression and anxiety.    She lives at home with her son-in law.   Granddaughter states she does not eat healthy.    She also takes trulicity once a week.    Oldest daughter- passed away from DM type 1 complications at age 37 yrs.     She states she has had pneumonia vaccinations.   She has received some vaccinations from [employer].     Will attempt to obtain patient's previous health records.    HEALTH RISK ASSESSMENT    Recent Hospitalizations:  {Hospitalization history:2921426390::\"No hospitalization(s) within the last year.\"}    Current Medical Providers:  Patient Care Team:  Channing Bush MD as PCP - General (Family Medicine)  Channing Bush MD as Referring Physician (Family Medicine)  Robbie Wilson MD as Consulting Physician (Hematology and Oncology)  Chace Johnson MD as Consulting Physician (Cardiology)    Smoking Status:  Social History     Tobacco Use   Smoking Status Never Smoker   Smokeless Tobacco Never Used       Alcohol Consumption:  Social History     Substance and Sexual Activity   Alcohol Use No       Depression Screen:   PHQ-2/PHQ-9 Depression Screening 4/29/2019   Little interest or pleasure in doing things 3   Feeling down, depressed, or hopeless 3   Trouble falling or staying asleep, or sleeping too much 1   Feeling tired or having little energy 1   Poor appetite or overeating 3   Feeling bad about yourself - or that you are a failure or have let yourself or your family down 1   Trouble concentrating on things, such as reading the newspaper or watching television 1   Moving or speaking so slowly that other people could have noticed. Or the opposite - being so fidgety or restless that you have been moving around a lot more than usual 3   Thoughts that you would be better off dead, or of hurting yourself in some way 0   Total " "Score 16   If you checked off any problems, how difficult have these problems made it for you to do your work, take care of things at home, or get along with other people? Somewhat difficult       Fall Risk Screen:  HOLLIE Fall Risk Assessment has not been completed.    Health Habits and Functional and Cognitive Screening:  Functional & Cognitive Status 4/29/2019   Do you have difficulty preparing food and eating? No   Do you have difficulty bathing yourself, getting dressed or grooming yourself? No   Do you have difficulty using the toilet? No   Do you have difficulty moving around from place to place? Yes   Do you have trouble with steps or getting out of a bed or a chair? Yes   Current Diet Limited Junk Food   Dental Exam Not up to date   Eye Exam Not up to date   Exercise (times per week) 2 times per week   Current Exercise Activities Include Housecleaning   Do you need help using the phone?  No   Are you deaf or do you have serious difficulty hearing?  No   Do you need help with transportation? Yes   Do you need help shopping? No   Do you need help preparing meals?  No   Do you need help with housework?  Yes   Do you need help with laundry? No   Do you need help taking your medications? No   Do you need help managing money? No   Do you ever drive or ride in a car without wearing a seat belt? No   Have you felt unusual stress, anger or loneliness in the last month? Yes   Who do you live with? Other   If you need help, do you have trouble finding someone available to you? No   Have you been bothered in the last four weeks by sexual problems? No   Do you have difficulty concentrating, remembering or making decisions? Yes         Does the patient have evidence of cognitive impairment? {Yes/No w/ pre-defaulted No:47908::\"No\"}    Asprin use counseling:{Aspirin :30240}    Age-appropriate Screening Schedule:  Refer to the list below for future screening recommendations based on patient's age, sex and/or medical " "conditions. Orders for these recommended tests are listed in the plan section. The patient has been provided with a written plan.    Health Maintenance   Topic Date Due   • TDAP/TD VACCINES (1 - Tdap) 12/20/1956   • ZOSTER VACCINE (2 of 2) 06/26/2012   • PNEUMOCOCCAL VACCINE (65+ HIGH RISK) (2 of 2 - PPSV23) 12/28/2015   • DIABETIC EYE EXAM  09/01/2018   • MAMMOGRAM  08/14/2019   • DIABETIC FOOT EXAM  10/29/2019   • HEMOGLOBIN A1C  01/11/2020   • LIPID PANEL  07/11/2020   • URINE MICROALBUMIN  07/22/2020   • COLONOSCOPY  01/01/2023   • INFLUENZA VACCINE  Discontinued          The following portions of the patient's history were reviewed and updated as appropriate: {history reviewed:20406::\"allergies\",\"current medications\",\"past family history\",\"past medical history\",\"past social history\",\"past surgical history\",\"problem list\"}.    Outpatient Medications Prior to Visit   Medication Sig Dispense Refill   • albuterol sulfate HFA (VENTOLIN HFA) 108 (90 Base) MCG/ACT inhaler Inhale 2 puffs Every 6 (Six) Hours As Needed for Wheezing. 18 g 0   • ALPRAZolam (XANAX) 1 MG tablet Take 1 tablet by mouth 2 (Two) Times a Day As Needed for anxiety. 24 tablet 0   • amLODIPine (NORVASC) 10 MG tablet TAKE ONE TABLET BY MOUTH DAILY 90 tablet 3   • aspirin  MG tablet TAKE ONE TABLET BY MOUTH DAILY 90 tablet 0   • atenolol (TENORMIN) 50 MG tablet TAKE ONE TABLET BY MOUTH DAILY 30 tablet 5   • atorvastatin (LIPITOR) 20 MG tablet TAKE ONE TABLET BY MOUTH DAILY 90 tablet 0   • cloNIDine (CATAPRES) 0.2 MG tablet TAKE ONE TABLET BY MOUTH DAILY 30 tablet 3   • clotrimazole-betamethasone (LOTRISONE) 1-0.05 % cream Apply  topically to the appropriate area as directed 2 (Two) Times a Day. 45 g 0   • Dulaglutide (TRULICITY) 0.75 MG/0.5ML solution pen-injector Inject 0.75 mg under the skin into the appropriate area as directed Every 7 (Seven) Days. 2 mL 0   • ferrous sulfate 324 (65 FE) MG tablet delayed-release EC tablet Take 324 mg by " mouth Daily With Breakfast.     • fluticasone-salmeterol (ADVAIR DISKUS) 250-50 MCG/DOSE DISKUS Inhale 1 puff Daily As Needed (cough and wheezing). 60 each 0   • furosemide (LASIX) 40 MG tablet TAKE ONE TABLET BY MOUTH TWICE A DAY 60 tablet 0   • glimepiride (AMARYL) 1 MG tablet TAKE ONE TABLET BY MOUTH EVERY MORNING BEFORE BREAKFAST 90 tablet 0   • ibuprofen (ADVIL,MOTRIN) 200 MG tablet      • ipratropium-albuterol (DUO-NEB) 0.5-2.5 mg/mL nebulizer Take 3 mL by nebulization 4 (four) times a day. (Patient taking differently: Take 3 mL by nebulization Daily As Needed.) 3 mL 5   • irbesartan (AVAPRO) 300 MG tablet TAKE ONE TABLET BY MOUTH ONCE NIGHTLY 90 tablet 0   • nitroglycerin (NITROSTAT) 0.4 MG SL tablet Place 1 tablet under the tongue As Needed for Chest Pain.  - IF PAIN REMAINS AFTER 5 MIN CALL 911 AND REPEAT DOSE MAX 3 TABS IN 15 MINUTES 25 tablet 5   • nystatin (MYCOSTATIN) 748022 UNIT/GM cream Apply  topically to the appropriate area as directed 2 (Two) Times a Day. to affected area(s) 30 g 3   • O2 (OXYGEN) Inhale 2.5 L/min Continuous.     • omeprazole (priLOSEC) 40 MG capsule TAKE ONE CAPSULE BY MOUTH DAILY 30 capsule 5   • potassium chloride (K-DUR,KLOR-CON) 20 MEQ CR tablet TAKE ONE TABLET BY MOUTH TWICE A DAY 60 tablet 4   • Probiotic Product (PROBIOTIC PO) Take  by mouth.     • promethazine (PHENERGAN) 25 MG tablet Take 25 mg by mouth Every 8 (Eight) Hours As Needed for Nausea or Vomiting.     • senna-docusate (PERICOLACE) 8.6-50 MG per tablet Take 1 tablet by mouth daily.     • vilazodone (VIIBRYD) 20 MG tablet tablet Take 20 mg by mouth Every Night.     • busPIRone (BUSPAR) 7.5 MG tablet Take 7.5 mg by mouth Every Night.     • doxepin (SINEquan) 50 MG capsule Take 50 mg by mouth Daily.     • QUEtiapine (SEROquel) 100 MG tablet Take 100 mg by mouth Daily.     • traMADol (ULTRAM) 50 MG tablet Take 1 tablet by mouth Every 8 (Eight) Hours As Needed for Moderate Pain . 30 tablet 0     No  "facility-administered medications prior to visit.        Patient Active Problem List   Diagnosis   • Anxiety disorder   • Arthritis of knee   • Asthma   • Chronic coronary artery disease   • Chronic kidney disease   • Depression   • Diabetic peripheral neuropathy (CMS/[facility])   • Gastroesophageal reflux disease   • Hyperlipidemia   • Insomnia   • Lower gastrointestinal hemorrhage   • Anemia   • OCHOA (obstructive sleep apnea)   • DM type 2 (diabetes mellitus, type 2) (CMS/[facility])   • Essential hypertension   • Hospital discharge follow-up   • Mitral stenosis   • Pulmonary hypertension due to sleep-disordered breathing (CMS/[facility])   • s/p MVR, TV-repair, CABG x2 6/13/16   • OLI (acute kidney injury) (CMS/HCC)   • Leukocytosis   • Atrial fibrillation (CMS/[facility])   • Nocturnal hypoxia   • Dermatitis   • Medicare annual wellness visit, initial   • Class 3 severe obesity due to excess calories with serious comorbidity and body mass index (BMI) of 50.0 to 59.9 in adult (CMS/[facility])   • Supplemental oxygen dependent   • Mitral regurgitation   • Aortic stenosis   • Medicare annual wellness visit, subsequent   • Localized edema   • Chronic right-sided congestive heart failure (CMS/[facility])   • Cellulitis of left lower extremity       Advanced Care Planning:  {Advanced Directive Status:76654}    Review of Systems    Compared to one year ago, the patient feels her physical health is {better worse same:22496}.  Compared to one year ago, the patient feels her mental health is {better worse same:14351}.    Reviewed chart for potential of high risk medication in the elderly: {Response;Yes/No/NA:[id number]::\"yes\"}  Reviewed chart for potential of harmful drug interactions in the elderly:{Response;Yes/No/NA:1723338497::\"yes\"}    Objective         Vitals:    12/11/19 1327   BP: 132/62   Pulse: 73   Resp: 16   Temp: 97.7 °F (36.5 °C)   TempSrc: Oral   SpO2: 93%   Weight: 132 kg (290 lb)   Height: 152.4 cm (60\")   PainSc:   8   PainLoc: Leg       Body " mass index is 56.64 kg/m².  Discussed the patient's BMI with her. The BMI {[facility]25578}.    Physical Exam          Assessment/Plan   Medicare Risks and Personalized Health Plan  CMS Preventative Services Quick Reference  {Medicare Wellness Risk Factors and Personalized Health Plan:13588}    The above risks/problems have been discussed with the patient.  Pertinent information has been shared with the patient in the After Visit Summary.  Follow up plans and orders are seen below in the Assessment/Plan Section.    There are no diagnoses linked to this encounter.  Follow Up:  No follow-ups on file.     An After Visit Summary and PPPS were given to the patient.       Recommend increasing Lasix to 80mg in the morning and 40mg at night  Return in one week for recheck.  Call or return to clinic immediately if your symptoms return.

## 2019-12-12 LAB
ALBUMIN SERPL-MCNC: 4.4 G/DL (ref 3.5–5.2)
ALBUMIN/GLOB SERPL: 1.5 G/DL
ALP SERPL-CCNC: 241 U/L (ref 39–117)
ALT SERPL-CCNC: 11 U/L (ref 1–33)
AST SERPL-CCNC: 17 U/L (ref 1–32)
BASOPHILS # BLD AUTO: 0.08 10*3/MM3 (ref 0–0.2)
BASOPHILS NFR BLD AUTO: 0.8 % (ref 0–1.5)
BILIRUB SERPL-MCNC: 0.5 MG/DL (ref 0.2–1.2)
BUN SERPL-MCNC: 42 MG/DL (ref 8–23)
BUN/CREAT SERPL: 22.7 (ref 7–25)
CALCIUM SERPL-MCNC: 8.8 MG/DL (ref 8.6–10.5)
CHLORIDE SERPL-SCNC: 98 MMOL/L (ref 98–107)
CHOLEST SERPL-MCNC: 147 MG/DL (ref 0–200)
CO2 SERPL-SCNC: 32.5 MMOL/L (ref 22–29)
CREAT SERPL-MCNC: 1.85 MG/DL (ref 0.57–1)
EOSINOPHIL # BLD AUTO: 0.56 10*3/MM3 (ref 0–0.4)
EOSINOPHIL NFR BLD AUTO: 5.4 % (ref 0.3–6.2)
ERYTHROCYTE [DISTWIDTH] IN BLOOD BY AUTOMATED COUNT: 15 % (ref 12.3–15.4)
GLOBULIN SER CALC-MCNC: 2.9 GM/DL
GLUCOSE SERPL-MCNC: 99 MG/DL (ref 65–99)
HCT VFR BLD AUTO: 32.6 % (ref 34–46.6)
HDLC SERPL-MCNC: 59 MG/DL (ref 40–60)
HGB BLD-MCNC: 10.3 G/DL (ref 12–15.9)
IMM GRANULOCYTES # BLD AUTO: 0.04 10*3/MM3 (ref 0–0.05)
IMM GRANULOCYTES NFR BLD AUTO: 0.4 % (ref 0–0.5)
LDLC SERPL CALC-MCNC: 71 MG/DL (ref 0–100)
LYMPHOCYTES # BLD AUTO: 1.07 10*3/MM3 (ref 0.7–3.1)
LYMPHOCYTES NFR BLD AUTO: 10.4 % (ref 19.6–45.3)
MCH RBC QN AUTO: 26.3 PG (ref 26.6–33)
MCHC RBC AUTO-ENTMCNC: 31.6 G/DL (ref 31.5–35.7)
MCV RBC AUTO: 83.2 FL (ref 79–97)
MONOCYTES # BLD AUTO: 0.68 10*3/MM3 (ref 0.1–0.9)
MONOCYTES NFR BLD AUTO: 6.6 % (ref 5–12)
NEUTROPHILS # BLD AUTO: 7.9 10*3/MM3 (ref 1.7–7)
NEUTROPHILS NFR BLD AUTO: 76.4 % (ref 42.7–76)
NRBC BLD AUTO-RTO: 0 /100 WBC (ref 0–0.2)
PLATELET # BLD AUTO: 269 10*3/MM3 (ref 140–450)
POTASSIUM SERPL-SCNC: 5.7 MMOL/L (ref 3.5–5.2)
PROT SERPL-MCNC: 7.3 G/DL (ref 6–8.5)
RBC # BLD AUTO: 3.92 10*6/MM3 (ref 3.77–5.28)
SODIUM SERPL-SCNC: 144 MMOL/L (ref 136–145)
TRIGL SERPL-MCNC: 87 MG/DL (ref 0–150)
VIT B12 SERPL-MCNC: 705 PG/ML (ref 211–946)
VLDLC SERPL CALC-MCNC: 17.4 MG/DL
WBC # BLD AUTO: 10.33 10*3/MM3 (ref 3.4–10.8)

## 2019-12-13 DIAGNOSIS — R74.8 ELEVATED ALKALINE PHOSPHATASE LEVEL: ICD-10-CM

## 2019-12-13 DIAGNOSIS — D64.9 ANEMIA, UNSPECIFIED TYPE: Primary | ICD-10-CM

## 2019-12-13 DIAGNOSIS — N18.30 STAGE 3 CHRONIC KIDNEY DISEASE (HCC): Primary | ICD-10-CM

## 2019-12-13 DIAGNOSIS — D64.9 ANEMIA, UNSPECIFIED TYPE: ICD-10-CM

## 2019-12-13 NOTE — PROGRESS NOTES
Discussed results with patient. Her labs demonstrate normocytic anemia with Hgb of 10.3, which appears relatively stable over the past two years. She has history of anemia, she is unsure why exactly but she takes an iron tablet daily, she is unsure of the dosage. She denies blood in stool or dark tarry stools. Denies vaginal bleeding. Her CKD has worsened slightly with Cr of 1.86 compared to 1.41 six months ago.  Potassium is elevated at 5.7, however with increased Lasix this should decrease. Her alkaline phosphatase is elevated. Will repeat CMP, CBC along with anemia work up including iron studies, folate, and FOBT. Vitamin B12 was already ordered and was normal.   Patient states her swelling has not decreased since Lasix was increased two days ago. She states that her weight has remained at 290 lbs. Will give this dose a few more days to see if it takes effect. Will call patient Monday to see if she has improved.

## 2019-12-15 ENCOUNTER — APPOINTMENT (OUTPATIENT)
Dept: GENERAL RADIOLOGY | Facility: HOSPITAL | Age: 74
End: 2019-12-15

## 2019-12-15 ENCOUNTER — APPOINTMENT (OUTPATIENT)
Dept: ULTRASOUND IMAGING | Facility: HOSPITAL | Age: 74
End: 2019-12-15

## 2019-12-15 ENCOUNTER — HOSPITAL ENCOUNTER (INPATIENT)
Facility: HOSPITAL | Age: 74
LOS: 24 days | Discharge: HOME HEALTH | End: 2020-01-08
Attending: EMERGENCY MEDICINE | Admitting: INTERNAL MEDICINE

## 2019-12-15 ENCOUNTER — DOCUMENTATION (OUTPATIENT)
Dept: FAMILY MEDICINE CLINIC | Facility: CLINIC | Age: 74
End: 2019-12-15

## 2019-12-15 DIAGNOSIS — I50.43 ACUTE ON CHRONIC COMBINED SYSTOLIC AND DIASTOLIC CHF (CONGESTIVE HEART FAILURE) (HCC): Primary | ICD-10-CM

## 2019-12-15 DIAGNOSIS — N17.9 ACUTE KIDNEY INJURY SUPERIMPOSED ON CHRONIC KIDNEY DISEASE (HCC): ICD-10-CM

## 2019-12-15 DIAGNOSIS — N18.9 ACUTE KIDNEY INJURY SUPERIMPOSED ON CHRONIC KIDNEY DISEASE (HCC): ICD-10-CM

## 2019-12-15 PROBLEM — J96.11 CHRONIC RESPIRATORY FAILURE WITH HYPOXIA (HCC): Status: ACTIVE | Noted: 2019-12-15

## 2019-12-15 PROBLEM — R60.0 BILATERAL LOWER EXTREMITY EDEMA: Status: ACTIVE | Noted: 2019-12-15

## 2019-12-15 PROBLEM — R80.9 PROTEINURIA: Status: ACTIVE | Noted: 2019-12-15

## 2019-12-15 LAB
ALBUMIN SERPL-MCNC: 4.3 G/DL (ref 3.5–5.2)
ALBUMIN/GLOB SERPL: 1.3 G/DL
ALP SERPL-CCNC: 207 U/L (ref 39–117)
ALT SERPL W P-5'-P-CCNC: 8 U/L (ref 1–33)
ANION GAP SERPL CALCULATED.3IONS-SCNC: 15.5 MMOL/L (ref 5–15)
AST SERPL-CCNC: 12 U/L (ref 1–32)
BASOPHILS # BLD AUTO: 0.05 10*3/MM3 (ref 0–0.2)
BASOPHILS NFR BLD AUTO: 0.5 % (ref 0–1.5)
BILIRUB SERPL-MCNC: 0.4 MG/DL (ref 0.2–1.2)
BUN BLD-MCNC: 60 MG/DL (ref 8–23)
BUN/CREAT SERPL: 23.3 (ref 7–25)
CALCIUM SPEC-SCNC: 8.1 MG/DL (ref 8.6–10.5)
CHLORIDE SERPL-SCNC: 97 MMOL/L (ref 98–107)
CO2 SERPL-SCNC: 30.5 MMOL/L (ref 22–29)
CREAT BLD-MCNC: 2.57 MG/DL (ref 0.57–1)
DEPRECATED RDW RBC AUTO: 46.1 FL (ref 37–54)
EOSINOPHIL # BLD AUTO: 0.42 10*3/MM3 (ref 0–0.4)
EOSINOPHIL NFR BLD AUTO: 4.6 % (ref 0.3–6.2)
ERYTHROCYTE [DISTWIDTH] IN BLOOD BY AUTOMATED COUNT: 15.1 % (ref 12.3–15.4)
GFR SERPL CREATININE-BSD FRML MDRD: 18 ML/MIN/1.73
GLOBULIN UR ELPH-MCNC: 3.4 GM/DL
GLUCOSE BLD-MCNC: 160 MG/DL (ref 65–99)
GLUCOSE BLDC GLUCOMTR-MCNC: 122 MG/DL (ref 70–130)
GLUCOSE BLDC GLUCOMTR-MCNC: 81 MG/DL (ref 70–130)
HCT VFR BLD AUTO: 31.1 % (ref 34–46.6)
HGB BLD-MCNC: 9.7 G/DL (ref 12–15.9)
HOLD SPECIMEN: NORMAL
HOLD SPECIMEN: NORMAL
IMM GRANULOCYTES # BLD AUTO: 0.06 10*3/MM3 (ref 0–0.05)
IMM GRANULOCYTES NFR BLD AUTO: 0.7 % (ref 0–0.5)
LYMPHOCYTES # BLD AUTO: 0.88 10*3/MM3 (ref 0.7–3.1)
LYMPHOCYTES NFR BLD AUTO: 9.5 % (ref 19.6–45.3)
MCH RBC QN AUTO: 26.1 PG (ref 26.6–33)
MCHC RBC AUTO-ENTMCNC: 31.2 G/DL (ref 31.5–35.7)
MCV RBC AUTO: 83.8 FL (ref 79–97)
MONOCYTES # BLD AUTO: 0.62 10*3/MM3 (ref 0.1–0.9)
MONOCYTES NFR BLD AUTO: 6.7 % (ref 5–12)
NEUTROPHILS # BLD AUTO: 7.19 10*3/MM3 (ref 1.7–7)
NEUTROPHILS NFR BLD AUTO: 78 % (ref 42.7–76)
NRBC BLD AUTO-RTO: 0 /100 WBC (ref 0–0.2)
NT-PROBNP SERPL-MCNC: 7841 PG/ML (ref 5–900)
PLATELET # BLD AUTO: 243 10*3/MM3 (ref 140–450)
PMV BLD AUTO: 10.7 FL (ref 6–12)
POTASSIUM BLD-SCNC: 5 MMOL/L (ref 3.5–5.2)
PROT SERPL-MCNC: 7.7 G/DL (ref 6–8.5)
RBC # BLD AUTO: 3.71 10*6/MM3 (ref 3.77–5.28)
SODIUM BLD-SCNC: 143 MMOL/L (ref 136–145)
T4 FREE SERPL-MCNC: 0.95 NG/DL (ref 0.93–1.7)
TROPONIN T SERPL-MCNC: <0.01 NG/ML (ref 0–0.03)
TSH SERPL DL<=0.05 MIU/L-ACNC: 10.1 UIU/ML (ref 0.27–4.2)
URATE SERPL-MCNC: 10.9 MG/DL (ref 2.4–5.7)
WBC NRBC COR # BLD: 9.22 10*3/MM3 (ref 3.4–10.8)
WHOLE BLOOD HOLD SPECIMEN: NORMAL
WHOLE BLOOD HOLD SPECIMEN: NORMAL

## 2019-12-15 PROCEDURE — 84484 ASSAY OF TROPONIN QUANT: CPT | Performed by: PHYSICIAN ASSISTANT

## 2019-12-15 PROCEDURE — 85025 COMPLETE CBC W/AUTO DIFF WBC: CPT | Performed by: PHYSICIAN ASSISTANT

## 2019-12-15 PROCEDURE — 25010000002 FUROSEMIDE PER 20 MG: Performed by: EMERGENCY MEDICINE

## 2019-12-15 PROCEDURE — 94799 UNLISTED PULMONARY SVC/PX: CPT

## 2019-12-15 PROCEDURE — 99285 EMERGENCY DEPT VISIT HI MDM: CPT

## 2019-12-15 PROCEDURE — 93005 ELECTROCARDIOGRAM TRACING: CPT | Performed by: PHYSICIAN ASSISTANT

## 2019-12-15 PROCEDURE — 83880 ASSAY OF NATRIURETIC PEPTIDE: CPT | Performed by: PHYSICIAN ASSISTANT

## 2019-12-15 PROCEDURE — 80053 COMPREHEN METABOLIC PANEL: CPT | Performed by: PHYSICIAN ASSISTANT

## 2019-12-15 PROCEDURE — 94664 DEMO&/EVAL PT USE INHALER: CPT

## 2019-12-15 PROCEDURE — 93010 ELECTROCARDIOGRAM REPORT: CPT | Performed by: INTERNAL MEDICINE

## 2019-12-15 PROCEDURE — 84550 ASSAY OF BLOOD/URIC ACID: CPT | Performed by: NURSE PRACTITIONER

## 2019-12-15 PROCEDURE — 82962 GLUCOSE BLOOD TEST: CPT

## 2019-12-15 PROCEDURE — 71045 X-RAY EXAM CHEST 1 VIEW: CPT

## 2019-12-15 PROCEDURE — 94640 AIRWAY INHALATION TREATMENT: CPT

## 2019-12-15 PROCEDURE — 84439 ASSAY OF FREE THYROXINE: CPT | Performed by: NURSE PRACTITIONER

## 2019-12-15 PROCEDURE — 76775 US EXAM ABDO BACK WALL LIM: CPT

## 2019-12-15 PROCEDURE — 25010000002 FUROSEMIDE PER 20 MG: Performed by: INTERNAL MEDICINE

## 2019-12-15 PROCEDURE — 84443 ASSAY THYROID STIM HORMONE: CPT | Performed by: NURSE PRACTITIONER

## 2019-12-15 RX ORDER — ACETAMINOPHEN 160 MG/5ML
650 SOLUTION ORAL EVERY 4 HOURS PRN
Status: DISCONTINUED | OUTPATIENT
Start: 2019-12-15 | End: 2020-01-08 | Stop reason: HOSPADM

## 2019-12-15 RX ORDER — ACETAMINOPHEN 325 MG/1
650 TABLET ORAL EVERY 4 HOURS PRN
Status: DISCONTINUED | OUTPATIENT
Start: 2019-12-15 | End: 2020-01-08 | Stop reason: HOSPADM

## 2019-12-15 RX ORDER — BUDESONIDE AND FORMOTEROL FUMARATE DIHYDRATE 160; 4.5 UG/1; UG/1
2 AEROSOL RESPIRATORY (INHALATION)
Status: DISCONTINUED | OUTPATIENT
Start: 2019-12-15 | End: 2020-01-08 | Stop reason: HOSPADM

## 2019-12-15 RX ORDER — DEXTROSE MONOHYDRATE 25 G/50ML
25 INJECTION, SOLUTION INTRAVENOUS
Status: DISCONTINUED | OUTPATIENT
Start: 2019-12-15 | End: 2020-01-08 | Stop reason: HOSPADM

## 2019-12-15 RX ORDER — SENNA AND DOCUSATE SODIUM 50; 8.6 MG/1; MG/1
1 TABLET, FILM COATED ORAL NIGHTLY PRN
Status: DISCONTINUED | OUTPATIENT
Start: 2019-12-15 | End: 2020-01-08 | Stop reason: HOSPADM

## 2019-12-15 RX ORDER — L.ACID,PARA/B.BIFIDUM/S.THERM 8B CELL
1 CAPSULE ORAL DAILY
Status: DISCONTINUED | OUTPATIENT
Start: 2019-12-15 | End: 2020-01-08 | Stop reason: HOSPADM

## 2019-12-15 RX ORDER — NICOTINE POLACRILEX 4 MG
15 LOZENGE BUCCAL
Status: DISCONTINUED | OUTPATIENT
Start: 2019-12-15 | End: 2020-01-08 | Stop reason: HOSPADM

## 2019-12-15 RX ORDER — IPRATROPIUM BROMIDE AND ALBUTEROL SULFATE 2.5; .5 MG/3ML; MG/3ML
6 SOLUTION RESPIRATORY (INHALATION) ONCE
Status: COMPLETED | OUTPATIENT
Start: 2019-12-15 | End: 2019-12-15

## 2019-12-15 RX ORDER — VILAZODONE HYDROCHLORIDE 40 MG/1
20 TABLET ORAL NIGHTLY
Status: DISCONTINUED | OUTPATIENT
Start: 2019-12-15 | End: 2020-01-08 | Stop reason: HOSPADM

## 2019-12-15 RX ORDER — PANTOPRAZOLE SODIUM 40 MG/1
40 TABLET, DELAYED RELEASE ORAL EVERY MORNING
Status: DISCONTINUED | OUTPATIENT
Start: 2019-12-16 | End: 2020-01-06

## 2019-12-15 RX ORDER — FUROSEMIDE 10 MG/ML
80 INJECTION INTRAMUSCULAR; INTRAVENOUS ONCE
Status: DISCONTINUED | OUTPATIENT
Start: 2019-12-15 | End: 2019-12-15

## 2019-12-15 RX ORDER — ONDANSETRON 2 MG/ML
4 INJECTION INTRAMUSCULAR; INTRAVENOUS EVERY 6 HOURS PRN
Status: DISCONTINUED | OUTPATIENT
Start: 2019-12-15 | End: 2020-01-08 | Stop reason: HOSPADM

## 2019-12-15 RX ORDER — ASPIRIN 325 MG
325 TABLET, DELAYED RELEASE (ENTERIC COATED) ORAL DAILY
Status: DISCONTINUED | OUTPATIENT
Start: 2019-12-15 | End: 2020-01-08 | Stop reason: HOSPADM

## 2019-12-15 RX ORDER — ALBUTEROL SULFATE 2.5 MG/3ML
2.5 SOLUTION RESPIRATORY (INHALATION) EVERY 6 HOURS PRN
Status: DISCONTINUED | OUTPATIENT
Start: 2019-12-15 | End: 2020-01-08 | Stop reason: HOSPADM

## 2019-12-15 RX ORDER — SODIUM CHLORIDE 0.9 % (FLUSH) 0.9 %
10 SYRINGE (ML) INJECTION AS NEEDED
Status: DISCONTINUED | OUTPATIENT
Start: 2019-12-15 | End: 2020-01-08 | Stop reason: HOSPADM

## 2019-12-15 RX ORDER — SODIUM CHLORIDE 0.9 % (FLUSH) 0.9 %
10 SYRINGE (ML) INJECTION EVERY 12 HOURS SCHEDULED
Status: DISCONTINUED | OUTPATIENT
Start: 2019-12-15 | End: 2020-01-08 | Stop reason: HOSPADM

## 2019-12-15 RX ORDER — ALPRAZOLAM 0.5 MG/1
1 TABLET ORAL 2 TIMES DAILY PRN
Status: DISCONTINUED | OUTPATIENT
Start: 2019-12-15 | End: 2019-12-22

## 2019-12-15 RX ORDER — ACETAMINOPHEN 650 MG/1
650 SUPPOSITORY RECTAL EVERY 4 HOURS PRN
Status: DISCONTINUED | OUTPATIENT
Start: 2019-12-15 | End: 2020-01-08 | Stop reason: HOSPADM

## 2019-12-15 RX ORDER — SODIUM CHLORIDE 9 MG/ML
50 INJECTION, SOLUTION INTRAVENOUS CONTINUOUS
Status: DISCONTINUED | OUTPATIENT
Start: 2019-12-15 | End: 2019-12-15

## 2019-12-15 RX ORDER — NYSTATIN 100000 U/G
CREAM TOPICAL 2 TIMES DAILY
Status: DISCONTINUED | OUTPATIENT
Start: 2019-12-15 | End: 2020-01-08 | Stop reason: HOSPADM

## 2019-12-15 RX ORDER — FUROSEMIDE 10 MG/ML
40 INJECTION INTRAMUSCULAR; INTRAVENOUS ONCE
Status: COMPLETED | OUTPATIENT
Start: 2019-12-15 | End: 2019-12-15

## 2019-12-15 RX ORDER — FERROUS SULFATE 325(65) MG
325 TABLET ORAL
Status: DISCONTINUED | OUTPATIENT
Start: 2019-12-16 | End: 2020-01-08 | Stop reason: HOSPADM

## 2019-12-15 RX ORDER — ATENOLOL 50 MG/1
50 TABLET ORAL DAILY
Status: DISCONTINUED | OUTPATIENT
Start: 2019-12-15 | End: 2019-12-27

## 2019-12-15 RX ORDER — CLOTRIMAZOLE AND BETAMETHASONE DIPROPIONATE 10; .64 MG/G; MG/G
CREAM TOPICAL 2 TIMES DAILY
Status: DISCONTINUED | OUTPATIENT
Start: 2019-12-15 | End: 2019-12-18

## 2019-12-15 RX ORDER — ECHINACEA PURPUREA EXTRACT 125 MG
1 TABLET ORAL AS NEEDED
Status: DISCONTINUED | OUTPATIENT
Start: 2019-12-15 | End: 2020-01-08 | Stop reason: HOSPADM

## 2019-12-15 RX ORDER — ATORVASTATIN CALCIUM 20 MG/1
20 TABLET, FILM COATED ORAL DAILY
Status: DISCONTINUED | OUTPATIENT
Start: 2019-12-15 | End: 2020-01-06

## 2019-12-15 RX ORDER — ALBUTEROL SULFATE 90 UG/1
2 AEROSOL, METERED RESPIRATORY (INHALATION) EVERY 6 HOURS PRN
Status: DISCONTINUED | OUTPATIENT
Start: 2019-12-15 | End: 2019-12-15 | Stop reason: CLARIF

## 2019-12-15 RX ADMIN — NYSTATIN: 100000 CREAM TOPICAL at 20:35

## 2019-12-15 RX ADMIN — SODIUM CHLORIDE, PRESERVATIVE FREE 10 ML: 5 INJECTION INTRAVENOUS at 20:37

## 2019-12-15 RX ADMIN — FUROSEMIDE 20 MG/HR: 10 INJECTION, SOLUTION INTRAMUSCULAR; INTRAVENOUS at 20:11

## 2019-12-15 RX ADMIN — IPRATROPIUM BROMIDE AND ALBUTEROL SULFATE 6 ML: 2.5; .5 SOLUTION RESPIRATORY (INHALATION) at 12:09

## 2019-12-15 RX ADMIN — FUROSEMIDE 40 MG: 20 INJECTION, SOLUTION INTRAMUSCULAR; INTRAVENOUS at 13:31

## 2019-12-15 RX ADMIN — BUDESONIDE AND FORMOTEROL FUMARATE DIHYDRATE 2 PUFF: 160; 4.5 AEROSOL RESPIRATORY (INHALATION) at 20:07

## 2019-12-15 NOTE — PROGRESS NOTES
Subjective   Miguelina Lopez is a 73 y.o. female.     Chief Complaint   Patient presents with   • Establish Care   • Leg Swelling     pt states has been going on for  months    • Foot Swelling       History of Present Illness   Miguelina presents to clinic today as a new patient with the complaint of leg swelling.  She has PMH of DM type 2 (diagnosed 15 years ago),  diabetic neuropathy, OCHOA (not currently using the CPAP due to technical issues with the machine), aortic stenosis, pulm HTN and right sided heart failure, CAD and valvular heart disease (2 vessel CABG, MVR and TVR in 2016). Currently takes aspirin 325mg daily for antiplatelet therapy.  She is on 3-3.5L oxygen/min continuously, up to 4 L if needed.     She presents today accompanied by her step-granddaughter with complaint of increased LE edema and pain for the past 6 months. She was hospitalized 6/2019 with CHF exacerbation, treated with IV diuretics at [facility] which helped temporarily but the swelling returned within one month. She also reports increased SOB with exertion for the past few months.   During hospitalization, TTE was done which showed EF 53%, indeterminate diastolic dysfunction, LA and RA severely dilated, mild-mod AS (mean gradient 17), moderate pulm HTN w/ PSP 53.  Her cardiologist is Dr. Antoine Ayers, last seen 8/2019 during which patient reported feeling slightly better however with persistent exertional dyspnea, severe peripheral edema, and fatigue.   Her current diuretic therapy consists of Lasix 40mg twice daily, but she takes both in the morning regularly since she doesn't want to have to urinate so much during the night.    Angina has remained stable.  Denies fevers. Appetite is normal.  She is able to ambulate minimally at her apartment, but uses a cane and walker mostly. She does not own a wheelchair.   She uses albuterol nebulizer once a day if needed.  She has fallen twice at home within the past few months.  Attributed to leg weakness and pain. No injuries were sustained.  She also sees a psychiatrist once a month or less for history of depression and anxiety.    She lives at home with her son-in law.   Granddaughter states she does not eat healthy.    She also takes trulicity once a week.    Oldest daughter- passed away from DM type 1 complications at age 37 yrs.     She states she has had pneumonia vaccinations.   She has received some vaccinations from [employer].     Will attempt to obtain patient's previous health records.      Past Medical History:   Diagnosis Date   • Anemia    • Anxiety    • Aortic valve stenosis    • CHF (congestive heart failure) (CMS/[facility])    • Chronic coronary artery disease    • Class 3 severe obesity due to excess calories in adult (CMS/[facility])    • Depression    • Diabetes mellitus (CMS/[facility])    • Heart murmur    • Mitral valve insufficiency    • Pneumonia     1/2016   • Pulmonary hypertension (CMS/[facility])     due to sleep disordered breathing   • Sleep apnea     Uses CPAP or oxygen   • Stage 3 chronic kidney disease (CMS/[facility])    • Supplemental oxygen dependent      Past Surgical History:   Procedure Laterality Date   • CARDIAC CATHETERIZATION     • CARDIAC CATHETERIZATION N/A 6/10/2016    Procedure: Left Heart Cath;  Surgeon: Chace Johnson MD;  Location: [facility] INVASIVE LOCATION;  Service:    • CARDIAC CATHETERIZATION N/A 6/10/2016    Procedure: Right Heart Cath;  Surgeon: Chace Johnson MD;  Location: [facility] INVASIVE LOCATION;  Service:    • CORONARY ARTERY BYPASS GRAFT      2 vessel   • CORONARY ARTERY BYPASS GRAFT WITH MITRAL VALVE REPAIR/REPLACEMENT N/A 6/13/2016    Procedure: INTRAOPERATIVE TARIQ, MIDLINE STERNOTOMY, CORONARY ARTERY BYPASS GRAFTING X  2 UTILIZING ENDOSCOPICALLY HARVESTED LEFT GREATER SAPHENOUS VEIN, MITRAL VALVE REPLACEMENT AND TRICUSPID VALVE REPAIR;  Surgeon: Eliecer Mistry MD;  Location: [facility] OR;  Service:    • CORONARY STENT PLACEMENT   "2010    Approx. 6 yrs ago at [facility]   • HEMORRHOIDECTOMY     • HYSTERECTOMY     • MITRAL VALVE REPLACEMENT     • REPLACEMENT TOTAL KNEE Right    • THYROID SURGERY      Cyst removed from thyroid     Social History     Tobacco Use   • Smoking status: Never Smoker   • Smokeless tobacco: Never Used   Substance Use Topics   • Alcohol use: No   • Drug use: No     Family History   Problem Relation Age of Onset   • Heart attack Father    • Heart disease Father        Review of Systems   Constitutional: Positive for fatigue. Negative for activity change, appetite change, fever and unexpected weight loss.   Respiratory: Positive for shortness of breath.    Cardiovascular: Positive for leg swelling. Negative for chest pain and palpitations.   Gastrointestinal: Negative for abdominal pain.   Neurological: Positive for numbness (Chronic neuropathy of legs/feet.).       Objective   /62   Pulse 73   Temp 97.7 °F (36.5 °C) (Oral)   Resp 16   Ht 152.4 cm (60\")   Wt 132 kg (290 lb)   SpO2 93%   BMI 56.64 kg/m²     Physical Exam   Constitutional: She appears well-developed and well-nourished. No distress.   Pleasant, morbidly obese female, accompanied by step- granddaughter.    HENT:   Head: Normocephalic.   Right Ear: External ear normal.   Left Ear: External ear normal.   Mouth/Throat: Oropharynx is clear and moist.   Eyes: Conjunctivae are normal.   Neck: Normal range of motion. Neck supple.   Cardiovascular: Normal rate, regular rhythm and intact distal pulses.   3+ pitting edema of the legs, up to the knees, bilaterally.     Pulmonary/Chest: Effort normal. No respiratory distress. She has no wheezes. She has rales (Bibasilar rales.).   Portable oxygen tank in use via NC, at 3.5L/min.  02 saturation 93%.  No signs of respiratory distress.   Musculoskeletal: Normal range of motion.   Lymphadenopathy:     She has no cervical adenopathy.   Neurological: She is alert.   Skin: Skin is warm and dry. Capillary refill takes " less than 2 seconds.   Psychiatric: She has a normal mood and affect.   Vitals reviewed.      Procedures    Assessment/Plan   Miguelina was seen today for establish care, leg swelling and foot swelling.    Diagnoses and all orders for this visit:    Mixed hyperlipidemia  -     Lipid Panel    Peripheral edema  -     Comprehensive Metabolic Panel    Chronic right-sided congestive heart failure (CMS/HCC)    Pulmonary hypertension due to sleep-disordered breathing (CMS/[facility])    OCHOA (obstructive sleep apnea)    Supplemental oxygen dependent    Class 3 severe obesity due to excess calories with serious comorbidity and body mass index (BMI) of 50.0 to 59.9 in adult (CMS/[facility])    Type 2 diabetes mellitus with diabetic polyneuropathy, without long-term current use of insulin (CMS/[facility])    Aortic valve stenosis, etiology of cardiac valve disease unspecified    Chronic coronary artery disease    s/p MVR, TV-repair, CABG x2 6/13/16    Essential hypertension  -     Comprehensive Metabolic Panel    Chronic fatigue  -     CBC & Differential    Anemia, unspecified type    Neuropathy  -     Vitamin B12    Stage 3 chronic kidney disease (CMS/[facility])    Diabetic peripheral neuropathy (CMS/[facility])    Other orders  -     Fluad Quad >65 years  -     Vitamin B12    Patient appears stable without significant respiratory distress. Will first try to increase Lasix to see if this relieves some of her edema.  Instructed patient to weigh herself daily and to call the office if her weight is not decreasing, and if her symptoms worsen or do not improve within the next few days.       Patient Instructions    Increase Lasix to 80mg in the morning and 40mg at night.  Return to clinic in one week for recheck.

## 2019-12-15 NOTE — PROGRESS NOTES
Spoke with caregiver by phone, weekend on call. Worsening leg swelling. Calling all night long in pain. Diuretics increased by PCP last week. Not helping. Multiple comorbidities including CKD, DM, CHF. No SOA change but trouble ambulating. On O2. Family aware ER visit may be a good option. Can’t rule out anything over the phone. Similar problem in past, was admitted for diuretics.

## 2019-12-16 ENCOUNTER — APPOINTMENT (OUTPATIENT)
Dept: CARDIOLOGY | Facility: HOSPITAL | Age: 74
End: 2019-12-16

## 2019-12-16 PROBLEM — E03.8 SUBCLINICAL HYPOTHYROIDISM: Status: ACTIVE | Noted: 2019-12-16

## 2019-12-16 LAB
ANION GAP SERPL CALCULATED.3IONS-SCNC: 16.4 MMOL/L (ref 5–15)
AORTIC DIMENSIONLESS INDEX: 0.4 (DI)
BH CV ECHO MEAS - ACS: 1.1 CM
BH CV ECHO MEAS - AO MAX PG: 51 MMHG
BH CV ECHO MEAS - AO MEAN PG (FULL): 30 MMHG
BH CV ECHO MEAS - AO MEAN PG: 34 MMHG
BH CV ECHO MEAS - AO ROOT AREA (BSA CORRECTED): 1.3
BH CV ECHO MEAS - AO ROOT AREA: 6.6 CM^2
BH CV ECHO MEAS - AO ROOT DIAM: 2.9 CM
BH CV ECHO MEAS - AO V2 MAX: 358 CM/SEC
BH CV ECHO MEAS - AO V2 MEAN: 271 CM/SEC
BH CV ECHO MEAS - AO V2 VTI: 89.5 CM
BH CV ECHO MEAS - ASC AORTA: 2.4 CM
BH CV ECHO MEAS - AVA(I,A): 0.79 CM^2
BH CV ECHO MEAS - AVA(I,D): 0.79 CM^2
BH CV ECHO MEAS - BSA(HAYCOCK): 2.5 M^2
BH CV ECHO MEAS - BSA: 2.2 M^2
BH CV ECHO MEAS - BZI_BMI: 57.2 KILOGRAMS/M^2
BH CV ECHO MEAS - BZI_METRIC_HEIGHT: 152.4 CM
BH CV ECHO MEAS - BZI_METRIC_WEIGHT: 132.9 KG
BH CV ECHO MEAS - EDV(CUBED): 110.6 ML
BH CV ECHO MEAS - EDV(MOD-SP2): 114 ML
BH CV ECHO MEAS - EDV(MOD-SP4): 115 ML
BH CV ECHO MEAS - EDV(TEICH): 107.5 ML
BH CV ECHO MEAS - EF(CUBED): 64.5 %
BH CV ECHO MEAS - EF(MOD-BP): 67 %
BH CV ECHO MEAS - EF(MOD-SP2): 67.5 %
BH CV ECHO MEAS - EF(MOD-SP4): 66.1 %
BH CV ECHO MEAS - EF(TEICH): 55.9 %
BH CV ECHO MEAS - ESV(CUBED): 39.3 ML
BH CV ECHO MEAS - ESV(MOD-SP2): 37 ML
BH CV ECHO MEAS - ESV(MOD-SP4): 39 ML
BH CV ECHO MEAS - ESV(TEICH): 47.4 ML
BH CV ECHO MEAS - FS: 29.2 %
BH CV ECHO MEAS - IVS/LVPW: 1
BH CV ECHO MEAS - IVSD: 1.2 CM
BH CV ECHO MEAS - LA DIMENSION: 4.1 CM
BH CV ECHO MEAS - LA/AO: 1.4
BH CV ECHO MEAS - LAT PEAK E' VEL: 9 CM/SEC
BH CV ECHO MEAS - LV DIASTOLIC VOL/BSA (35-75): 52.4 ML/M^2
BH CV ECHO MEAS - LV MASS(C)D: 219.1 GRAMS
BH CV ECHO MEAS - LV MASS(C)DI: 99.8 GRAMS/M^2
BH CV ECHO MEAS - LV MAX PG: 8 MMHG
BH CV ECHO MEAS - LV MEAN PG: 4 MMHG
BH CV ECHO MEAS - LV SYSTOLIC VOL/BSA (12-30): 17.8 ML/M^2
BH CV ECHO MEAS - LV V1 MAX: 140 CM/SEC
BH CV ECHO MEAS - LV V1 MEAN: 97.6 CM/SEC
BH CV ECHO MEAS - LV V1 VTI: 31.3 CM
BH CV ECHO MEAS - LVIDD: 4.8 CM
BH CV ECHO MEAS - LVIDS: 3.4 CM
BH CV ECHO MEAS - LVLD AP2: 8.2 CM
BH CV ECHO MEAS - LVLD AP4: 8.1 CM
BH CV ECHO MEAS - LVLS AP2: 6.8 CM
BH CV ECHO MEAS - LVLS AP4: 7.1 CM
BH CV ECHO MEAS - LVOT AREA (M): 2.3 CM^2
BH CV ECHO MEAS - LVOT AREA: 2.3 CM^2
BH CV ECHO MEAS - LVOT DIAM: 1.7 CM
BH CV ECHO MEAS - LVPWD: 1.2 CM
BH CV ECHO MEAS - MED PEAK E' VEL: 6 CM/SEC
BH CV ECHO MEAS - MV A DUR: 0.23 SEC
BH CV ECHO MEAS - MV A MAX VEL: 197 CM/SEC
BH CV ECHO MEAS - MV DEC SLOPE: 1092 CM/SEC^2
BH CV ECHO MEAS - MV DEC TIME: 300 SEC
BH CV ECHO MEAS - MV E MAX VEL: 231 CM/SEC
BH CV ECHO MEAS - MV E/A: 1.2
BH CV ECHO MEAS - MV MAX PG: 29 MMHG
BH CV ECHO MEAS - MV MEAN PG: 12 MMHG
BH CV ECHO MEAS - MV P1/2T MAX VEL: 271 CM/SEC
BH CV ECHO MEAS - MV P1/2T: 72.7 MSEC
BH CV ECHO MEAS - MV V2 MEAN: 156 CM/SEC
BH CV ECHO MEAS - MV V2 VTI: 59.1 CM
BH CV ECHO MEAS - MVA P1/2T LCG: 0.81 CM^2
BH CV ECHO MEAS - MVA(P1/2T): 3 CM^2
BH CV ECHO MEAS - MVA(VTI): 1.2 CM^2
BH CV ECHO MEAS - PA ACC SLOPE: 1137 CM/SEC^2
BH CV ECHO MEAS - PA ACC TIME: 0.11 SEC
BH CV ECHO MEAS - PA MAX PG (FULL): 3.3 MMHG
BH CV ECHO MEAS - PA MAX PG: 6.7 MMHG
BH CV ECHO MEAS - PA PR(ACCEL): 30 MMHG
BH CV ECHO MEAS - PA V2 MAX: 129 CM/SEC
BH CV ECHO MEAS - PI END-D VEL: 230 CM/SEC
BH CV ECHO MEAS - PULM DIAS VEL: 54.3 CM/SEC
BH CV ECHO MEAS - PULM S/D: 0.74
BH CV ECHO MEAS - PULM SYS VEL: 40 CM/SEC
BH CV ECHO MEAS - PVA(V,A): 2.5 CM^2
BH CV ECHO MEAS - PVA(V,D): 2.5 CM^2
BH CV ECHO MEAS - QP/QS: 0.79
BH CV ECHO MEAS - RAP SYSTOLE: 8 MMHG
BH CV ECHO MEAS - RV MAX PG: 3.4 MMHG
BH CV ECHO MEAS - RV MEAN PG: 2 MMHG
BH CV ECHO MEAS - RV V1 MAX: 91.7 CM/SEC
BH CV ECHO MEAS - RV V1 MEAN: 62.7 CM/SEC
BH CV ECHO MEAS - RV V1 VTI: 16.3 CM
BH CV ECHO MEAS - RVOT AREA: 3.5 CM^2
BH CV ECHO MEAS - RVOT DIAM: 2.1 CM
BH CV ECHO MEAS - RVSP: 57.3 MMHG
BH CV ECHO MEAS - SI(AO): 269.3 ML/M^2
BH CV ECHO MEAS - SI(CUBED): 32.5 ML/M^2
BH CV ECHO MEAS - SI(LVOT): 32.4 ML/M^2
BH CV ECHO MEAS - SI(MOD-SP2): 35.1 ML/M^2
BH CV ECHO MEAS - SI(MOD-SP4): 34.6 ML/M^2
BH CV ECHO MEAS - SI(TEICH): 27.4 ML/M^2
BH CV ECHO MEAS - SV(AO): 591.2 ML
BH CV ECHO MEAS - SV(CUBED): 71.3 ML
BH CV ECHO MEAS - SV(LVOT): 71 ML
BH CV ECHO MEAS - SV(MOD-SP2): 77 ML
BH CV ECHO MEAS - SV(MOD-SP4): 76 ML
BH CV ECHO MEAS - SV(RVOT): 56.5 ML
BH CV ECHO MEAS - SV(TEICH): 60.1 ML
BH CV ECHO MEAS - TAPSE (>1.6): 1.3 CM2
BH CV ECHO MEAS - TR MAX VEL: 351 CM/SEC
BH CV ECHO MEASUREMENTS AVERAGE E/E' RATIO: 30.8
BH CV LOWER VASCULAR LEFT COMMON FEMORAL AUGMENT: NORMAL
BH CV LOWER VASCULAR LEFT COMMON FEMORAL COMPETENT: NORMAL
BH CV LOWER VASCULAR LEFT COMMON FEMORAL COMPRESS: NORMAL
BH CV LOWER VASCULAR LEFT COMMON FEMORAL PHASIC: NORMAL
BH CV LOWER VASCULAR LEFT COMMON FEMORAL SPONT: NORMAL
BH CV LOWER VASCULAR LEFT DISTAL FEMORAL COMPRESS: NORMAL
BH CV LOWER VASCULAR LEFT GASTRONEMIUS COMPRESS: NORMAL
BH CV LOWER VASCULAR LEFT GREATER SAPH AK COMPRESS: NORMAL
BH CV LOWER VASCULAR LEFT GREATER SAPH BK COMPRESS: NORMAL
BH CV LOWER VASCULAR LEFT MID FEMORAL AUGMENT: NORMAL
BH CV LOWER VASCULAR LEFT MID FEMORAL COMPETENT: NORMAL
BH CV LOWER VASCULAR LEFT MID FEMORAL COMPRESS: NORMAL
BH CV LOWER VASCULAR LEFT MID FEMORAL PHASIC: NORMAL
BH CV LOWER VASCULAR LEFT MID FEMORAL SPONT: NORMAL
BH CV LOWER VASCULAR LEFT PERONEAL COMPRESS: NORMAL
BH CV LOWER VASCULAR LEFT POPLITEAL AUGMENT: NORMAL
BH CV LOWER VASCULAR LEFT POPLITEAL COMPETENT: NORMAL
BH CV LOWER VASCULAR LEFT POPLITEAL COMPRESS: NORMAL
BH CV LOWER VASCULAR LEFT POPLITEAL PHASIC: NORMAL
BH CV LOWER VASCULAR LEFT POPLITEAL SPONT: NORMAL
BH CV LOWER VASCULAR LEFT POSTERIOR TIBIAL COMPRESS: NORMAL
BH CV LOWER VASCULAR LEFT PROFUNDA FEMORAL COMPRESS: NORMAL
BH CV LOWER VASCULAR LEFT PROXIMAL FEMORAL COMPRESS: NORMAL
BH CV LOWER VASCULAR LEFT SAPHENOFEMORAL JUNCTION COMPRESS: NORMAL
BH CV LOWER VASCULAR RIGHT COMMON FEMORAL AUGMENT: NORMAL
BH CV LOWER VASCULAR RIGHT COMMON FEMORAL COMPETENT: NORMAL
BH CV LOWER VASCULAR RIGHT COMMON FEMORAL COMPRESS: NORMAL
BH CV LOWER VASCULAR RIGHT COMMON FEMORAL PHASIC: NORMAL
BH CV LOWER VASCULAR RIGHT COMMON FEMORAL SPONT: NORMAL
BH CV LOWER VASCULAR RIGHT DISTAL FEMORAL COMPRESS: NORMAL
BH CV LOWER VASCULAR RIGHT GASTRONEMIUS COMPRESS: NORMAL
BH CV LOWER VASCULAR RIGHT GREATER SAPH AK COMPRESS: NORMAL
BH CV LOWER VASCULAR RIGHT GREATER SAPH BK COMPRESS: NORMAL
BH CV LOWER VASCULAR RIGHT MID FEMORAL AUGMENT: NORMAL
BH CV LOWER VASCULAR RIGHT MID FEMORAL COMPETENT: NORMAL
BH CV LOWER VASCULAR RIGHT MID FEMORAL COMPRESS: NORMAL
BH CV LOWER VASCULAR RIGHT MID FEMORAL PHASIC: NORMAL
BH CV LOWER VASCULAR RIGHT MID FEMORAL SPONT: NORMAL
BH CV LOWER VASCULAR RIGHT PERONEAL COMPRESS: NORMAL
BH CV LOWER VASCULAR RIGHT POPLITEAL AUGMENT: NORMAL
BH CV LOWER VASCULAR RIGHT POPLITEAL COMPETENT: NORMAL
BH CV LOWER VASCULAR RIGHT POPLITEAL COMPRESS: NORMAL
BH CV LOWER VASCULAR RIGHT POPLITEAL PHASIC: NORMAL
BH CV LOWER VASCULAR RIGHT POPLITEAL SPONT: NORMAL
BH CV LOWER VASCULAR RIGHT POSTERIOR TIBIAL COMPRESS: NORMAL
BH CV LOWER VASCULAR RIGHT PROFUNDA FEMORAL COMPRESS: NORMAL
BH CV LOWER VASCULAR RIGHT PROXIMAL FEMORAL COMPRESS: NORMAL
BH CV LOWER VASCULAR RIGHT SAPHENOFEMORAL JUNCTION COMPRESS: NORMAL
BH CV VAS BP RIGHT ARM: NORMAL MMHG
BH CV XLRA - RV BASE: 4.1 CM
BH CV XLRA - TDI S': 5 CM/SEC
BILIRUB UR QL STRIP: NEGATIVE
BUN BLD-MCNC: 58 MG/DL (ref 8–23)
BUN/CREAT SERPL: 29.6 (ref 7–25)
CALCIUM SPEC-SCNC: 7.9 MG/DL (ref 8.6–10.5)
CHLORIDE SERPL-SCNC: 101 MMOL/L (ref 98–107)
CLARITY UR: CLEAR
CO2 SERPL-SCNC: 29.6 MMOL/L (ref 22–29)
COLOR UR: YELLOW
CREAT BLD-MCNC: 1.96 MG/DL (ref 0.57–1)
CREAT UR-MCNC: 25.9 MG/DL
DEPRECATED RDW RBC AUTO: 44.9 FL (ref 37–54)
ERYTHROCYTE [DISTWIDTH] IN BLOOD BY AUTOMATED COUNT: 14.8 % (ref 12.3–15.4)
FERRITIN SERPL-MCNC: 80.7 NG/ML (ref 13–150)
GFR SERPL CREATININE-BSD FRML MDRD: 25 ML/MIN/1.73
GLUCOSE BLD-MCNC: 63 MG/DL (ref 65–99)
GLUCOSE BLDC GLUCOMTR-MCNC: 111 MG/DL (ref 70–130)
GLUCOSE BLDC GLUCOMTR-MCNC: 76 MG/DL (ref 70–130)
GLUCOSE BLDC GLUCOMTR-MCNC: 77 MG/DL (ref 70–130)
GLUCOSE BLDC GLUCOMTR-MCNC: 92 MG/DL (ref 70–130)
GLUCOSE UR STRIP-MCNC: NEGATIVE MG/DL
HBA1C MFR BLD: 6.46 % (ref 4.8–5.6)
HCT VFR BLD AUTO: 29.3 % (ref 34–46.6)
HGB BLD-MCNC: 9.3 G/DL (ref 12–15.9)
HGB UR QL STRIP.AUTO: NEGATIVE
IRON 24H UR-MRATE: 25 MCG/DL (ref 37–145)
IRON SATN MFR SERPL: 7 % (ref 20–50)
KETONES UR QL STRIP: NEGATIVE
LEFT ATRIUM VOLUME INDEX: 36 ML/M2
LEFT ATRIUM VOLUME: 74 CM3
LEUKOCYTE ESTERASE UR QL STRIP.AUTO: ABNORMAL
MAXIMAL PREDICTED HEART RATE: 146 BPM
MCH RBC QN AUTO: 26.3 PG (ref 26.6–33)
MCHC RBC AUTO-ENTMCNC: 31.7 G/DL (ref 31.5–35.7)
MCV RBC AUTO: 82.8 FL (ref 79–97)
NITRITE UR QL STRIP: NEGATIVE
PH UR STRIP.AUTO: <=5 [PH] (ref 5–8)
PLATELET # BLD AUTO: 240 10*3/MM3 (ref 140–450)
PMV BLD AUTO: 10.7 FL (ref 6–12)
POTASSIUM BLD-SCNC: 3.8 MMOL/L (ref 3.5–5.2)
PROT UR QL STRIP: NEGATIVE
PROT UR-MCNC: <4 MG/DL
PROT/CREAT UR: NORMAL MG/G{CREAT}
RBC # BLD AUTO: 3.54 10*6/MM3 (ref 3.77–5.28)
SODIUM BLD-SCNC: 147 MMOL/L (ref 136–145)
SP GR UR STRIP: 1.01 (ref 1–1.03)
STRESS TARGET HR: 124 BPM
TIBC SERPL-MCNC: 347 MCG/DL (ref 298–536)
TRANSFERRIN SERPL-MCNC: 233 MG/DL (ref 200–360)
UROBILINOGEN UR QL STRIP: ABNORMAL
WBC NRBC COR # BLD: 10.03 10*3/MM3 (ref 3.4–10.8)

## 2019-12-16 PROCEDURE — 94799 UNLISTED PULMONARY SVC/PX: CPT

## 2019-12-16 PROCEDURE — 82570 ASSAY OF URINE CREATININE: CPT | Performed by: INTERNAL MEDICINE

## 2019-12-16 PROCEDURE — 25010000002 FUROSEMIDE PER 20 MG: Performed by: INTERNAL MEDICINE

## 2019-12-16 PROCEDURE — 99221 1ST HOSP IP/OBS SF/LOW 40: CPT | Performed by: INTERNAL MEDICINE

## 2019-12-16 PROCEDURE — 93970 EXTREMITY STUDY: CPT

## 2019-12-16 PROCEDURE — 85027 COMPLETE CBC AUTOMATED: CPT | Performed by: NURSE PRACTITIONER

## 2019-12-16 PROCEDURE — 84156 ASSAY OF PROTEIN URINE: CPT | Performed by: INTERNAL MEDICINE

## 2019-12-16 PROCEDURE — 82728 ASSAY OF FERRITIN: CPT | Performed by: NURSE PRACTITIONER

## 2019-12-16 PROCEDURE — 83540 ASSAY OF IRON: CPT | Performed by: NURSE PRACTITIONER

## 2019-12-16 PROCEDURE — 80048 BASIC METABOLIC PNL TOTAL CA: CPT | Performed by: NURSE PRACTITIONER

## 2019-12-16 PROCEDURE — 93306 TTE W/DOPPLER COMPLETE: CPT | Performed by: INTERNAL MEDICINE

## 2019-12-16 PROCEDURE — 81003 URINALYSIS AUTO W/O SCOPE: CPT | Performed by: INTERNAL MEDICINE

## 2019-12-16 PROCEDURE — 84466 ASSAY OF TRANSFERRIN: CPT | Performed by: NURSE PRACTITIONER

## 2019-12-16 PROCEDURE — 83036 HEMOGLOBIN GLYCOSYLATED A1C: CPT | Performed by: HOSPITALIST

## 2019-12-16 PROCEDURE — 82962 GLUCOSE BLOOD TEST: CPT

## 2019-12-16 PROCEDURE — 93306 TTE W/DOPPLER COMPLETE: CPT

## 2019-12-16 RX ORDER — LEVOTHYROXINE SODIUM 0.03 MG/1
25 TABLET ORAL
Status: DISCONTINUED | OUTPATIENT
Start: 2019-12-17 | End: 2020-01-08 | Stop reason: HOSPADM

## 2019-12-16 RX ADMIN — Medication 1 CAPSULE: at 08:20

## 2019-12-16 RX ADMIN — NYSTATIN: 100000 CREAM TOPICAL at 08:21

## 2019-12-16 RX ADMIN — BUDESONIDE AND FORMOTEROL FUMARATE DIHYDRATE 2 PUFF: 160; 4.5 AEROSOL RESPIRATORY (INHALATION) at 19:33

## 2019-12-16 RX ADMIN — FUROSEMIDE 20 MG/HR: 10 INJECTION, SOLUTION INTRAMUSCULAR; INTRAVENOUS at 06:19

## 2019-12-16 RX ADMIN — ASPIRIN 325 MG: 325 TABLET, COATED ORAL at 08:20

## 2019-12-16 RX ADMIN — NYSTATIN: 100000 CREAM TOPICAL at 20:37

## 2019-12-16 RX ADMIN — FUROSEMIDE 20 MG/HR: 10 INJECTION, SOLUTION INTRAMUSCULAR; INTRAVENOUS at 16:50

## 2019-12-16 RX ADMIN — VILAZODONE HYDROCHLORIDE 20 MG: 40 TABLET ORAL at 20:37

## 2019-12-16 RX ADMIN — SODIUM CHLORIDE, PRESERVATIVE FREE 10 ML: 5 INJECTION INTRAVENOUS at 08:21

## 2019-12-16 RX ADMIN — ALPRAZOLAM 1 MG: 0.5 TABLET ORAL at 16:05

## 2019-12-16 RX ADMIN — SODIUM CHLORIDE, PRESERVATIVE FREE 10 ML: 5 INJECTION INTRAVENOUS at 21:40

## 2019-12-16 RX ADMIN — CLOTRIMAZOLE AND BETAMETHASONE DIPROPIONATE: 10; .5 CREAM TOPICAL at 20:37

## 2019-12-16 RX ADMIN — FUROSEMIDE 20 MG/HR: 10 INJECTION, SOLUTION INTRAMUSCULAR; INTRAVENOUS at 11:44

## 2019-12-16 RX ADMIN — FUROSEMIDE 20 MG/HR: 10 INJECTION, SOLUTION INTRAMUSCULAR; INTRAVENOUS at 02:00

## 2019-12-16 RX ADMIN — ATENOLOL 50 MG: 50 TABLET ORAL at 08:20

## 2019-12-16 RX ADMIN — ATORVASTATIN CALCIUM 20 MG: 20 TABLET, FILM COATED ORAL at 08:20

## 2019-12-16 RX ADMIN — PANTOPRAZOLE SODIUM 40 MG: 40 TABLET, DELAYED RELEASE ORAL at 05:54

## 2019-12-16 RX ADMIN — VILAZODONE HYDROCHLORIDE 20 MG: 40 TABLET ORAL at 01:25

## 2019-12-16 RX ADMIN — CLOTRIMAZOLE AND BETAMETHASONE DIPROPIONATE: 10; .5 CREAM TOPICAL at 08:21

## 2019-12-16 RX ADMIN — FERROUS SULFATE TAB 325 MG (65 MG ELEMENTAL FE) 325 MG: 325 (65 FE) TAB at 08:20

## 2019-12-16 RX ADMIN — BUDESONIDE AND FORMOTEROL FUMARATE DIHYDRATE 2 PUFF: 160; 4.5 AEROSOL RESPIRATORY (INHALATION) at 06:57

## 2019-12-16 RX ADMIN — FUROSEMIDE 20 MG/HR: 10 INJECTION, SOLUTION INTRAMUSCULAR; INTRAVENOUS at 21:40

## 2019-12-17 PROBLEM — I50.33 ACUTE ON CHRONIC DIASTOLIC CHF (CONGESTIVE HEART FAILURE) (HCC): Chronic | Status: ACTIVE | Noted: 2019-12-17

## 2019-12-17 LAB
ANION GAP SERPL CALCULATED.3IONS-SCNC: 16.5 MMOL/L (ref 5–15)
BUN BLD-MCNC: 48 MG/DL (ref 8–23)
BUN/CREAT SERPL: 27.3 (ref 7–25)
CALCIUM SPEC-SCNC: 8.5 MG/DL (ref 8.6–10.5)
CHLORIDE SERPL-SCNC: 96 MMOL/L (ref 98–107)
CO2 SERPL-SCNC: 36.5 MMOL/L (ref 22–29)
CREAT BLD-MCNC: 1.76 MG/DL (ref 0.57–1)
GFR SERPL CREATININE-BSD FRML MDRD: 28 ML/MIN/1.73
GLUCOSE BLD-MCNC: 119 MG/DL (ref 65–99)
GLUCOSE BLDC GLUCOMTR-MCNC: 113 MG/DL (ref 70–130)
GLUCOSE BLDC GLUCOMTR-MCNC: 149 MG/DL (ref 70–130)
GLUCOSE BLDC GLUCOMTR-MCNC: 150 MG/DL (ref 70–130)
GLUCOSE BLDC GLUCOMTR-MCNC: 189 MG/DL (ref 70–130)
POTASSIUM BLD-SCNC: 3.2 MMOL/L (ref 3.5–5.2)
POTASSIUM BLD-SCNC: 3.4 MMOL/L (ref 3.5–5.2)
SODIUM BLD-SCNC: 149 MMOL/L (ref 136–145)

## 2019-12-17 PROCEDURE — 63710000001 INSULIN LISPRO (HUMAN) PER 5 UNITS: Performed by: NURSE PRACTITIONER

## 2019-12-17 PROCEDURE — 82962 GLUCOSE BLOOD TEST: CPT

## 2019-12-17 PROCEDURE — 84132 ASSAY OF SERUM POTASSIUM: CPT | Performed by: INTERNAL MEDICINE

## 2019-12-17 PROCEDURE — 99233 SBSQ HOSP IP/OBS HIGH 50: CPT | Performed by: INTERNAL MEDICINE

## 2019-12-17 PROCEDURE — 25010000002 FUROSEMIDE PER 20 MG: Performed by: INTERNAL MEDICINE

## 2019-12-17 PROCEDURE — 94799 UNLISTED PULMONARY SVC/PX: CPT

## 2019-12-17 PROCEDURE — 97110 THERAPEUTIC EXERCISES: CPT

## 2019-12-17 PROCEDURE — 97140 MANUAL THERAPY 1/> REGIONS: CPT

## 2019-12-17 PROCEDURE — 97165 OT EVAL LOW COMPLEX 30 MIN: CPT

## 2019-12-17 PROCEDURE — 80048 BASIC METABOLIC PNL TOTAL CA: CPT | Performed by: NURSE PRACTITIONER

## 2019-12-17 PROCEDURE — 97162 PT EVAL MOD COMPLEX 30 MIN: CPT

## 2019-12-17 RX ORDER — FUROSEMIDE 10 MG/ML
60 INJECTION INTRAMUSCULAR; INTRAVENOUS EVERY 8 HOURS
Status: DISPENSED | OUTPATIENT
Start: 2019-12-17 | End: 2019-12-18

## 2019-12-17 RX ORDER — POTASSIUM CHLORIDE 750 MG/1
40 CAPSULE, EXTENDED RELEASE ORAL EVERY 4 HOURS
Status: COMPLETED | OUTPATIENT
Start: 2019-12-17 | End: 2019-12-17

## 2019-12-17 RX ADMIN — SODIUM CHLORIDE, PRESERVATIVE FREE 10 ML: 5 INJECTION INTRAVENOUS at 08:35

## 2019-12-17 RX ADMIN — FUROSEMIDE 20 MG/HR: 10 INJECTION, SOLUTION INTRAMUSCULAR; INTRAVENOUS at 08:35

## 2019-12-17 RX ADMIN — ALPRAZOLAM 1 MG: 0.5 TABLET ORAL at 00:14

## 2019-12-17 RX ADMIN — POTASSIUM CHLORIDE 40 MEQ: 750 CAPSULE, EXTENDED RELEASE ORAL at 18:19

## 2019-12-17 RX ADMIN — LEVOTHYROXINE SODIUM 25 MCG: 25 TABLET ORAL at 07:04

## 2019-12-17 RX ADMIN — PANTOPRAZOLE SODIUM 40 MG: 40 TABLET, DELAYED RELEASE ORAL at 07:04

## 2019-12-17 RX ADMIN — POTASSIUM CHLORIDE 40 MEQ: 750 CAPSULE, EXTENDED RELEASE ORAL at 14:23

## 2019-12-17 RX ADMIN — FUROSEMIDE 20 MG/HR: 10 INJECTION, SOLUTION INTRAMUSCULAR; INTRAVENOUS at 03:51

## 2019-12-17 RX ADMIN — CLOTRIMAZOLE AND BETAMETHASONE DIPROPIONATE: 10; .5 CREAM TOPICAL at 22:08

## 2019-12-17 RX ADMIN — Medication 1 CAPSULE: at 08:34

## 2019-12-17 RX ADMIN — BUDESONIDE AND FORMOTEROL FUMARATE DIHYDRATE 2 PUFF: 160; 4.5 AEROSOL RESPIRATORY (INHALATION) at 19:10

## 2019-12-17 RX ADMIN — NYSTATIN: 100000 CREAM TOPICAL at 08:35

## 2019-12-17 RX ADMIN — ALPRAZOLAM 1 MG: 0.5 TABLET ORAL at 13:02

## 2019-12-17 RX ADMIN — CLOTRIMAZOLE AND BETAMETHASONE DIPROPIONATE: 10; .5 CREAM TOPICAL at 08:35

## 2019-12-17 RX ADMIN — ATENOLOL 50 MG: 50 TABLET ORAL at 08:34

## 2019-12-17 RX ADMIN — INSULIN LISPRO 2 UNITS: 100 INJECTION, SOLUTION INTRAVENOUS; SUBCUTANEOUS at 12:50

## 2019-12-17 RX ADMIN — ATORVASTATIN CALCIUM 20 MG: 20 TABLET, FILM COATED ORAL at 08:34

## 2019-12-17 RX ADMIN — ACETAMINOPHEN 650 MG: 325 TABLET, FILM COATED ORAL at 04:47

## 2019-12-17 RX ADMIN — SODIUM CHLORIDE, PRESERVATIVE FREE 10 ML: 5 INJECTION INTRAVENOUS at 22:08

## 2019-12-17 RX ADMIN — ALPRAZOLAM 1 MG: 0.5 TABLET ORAL at 23:50

## 2019-12-17 RX ADMIN — BUDESONIDE AND FORMOTEROL FUMARATE DIHYDRATE 2 PUFF: 160; 4.5 AEROSOL RESPIRATORY (INHALATION) at 07:55

## 2019-12-17 RX ADMIN — ASPIRIN 325 MG: 325 TABLET, COATED ORAL at 08:34

## 2019-12-17 RX ADMIN — FUROSEMIDE 60 MG: 20 INJECTION, SOLUTION INTRAMUSCULAR; INTRAVENOUS at 22:08

## 2019-12-17 RX ADMIN — VILAZODONE HYDROCHLORIDE 20 MG: 40 TABLET ORAL at 22:07

## 2019-12-17 RX ADMIN — NYSTATIN: 100000 CREAM TOPICAL at 22:08

## 2019-12-18 ENCOUNTER — RESULTS ENCOUNTER (OUTPATIENT)
Dept: FAMILY MEDICINE CLINIC | Facility: CLINIC | Age: 74
End: 2019-12-18

## 2019-12-18 DIAGNOSIS — D64.9 ANEMIA, UNSPECIFIED TYPE: ICD-10-CM

## 2019-12-18 DIAGNOSIS — R74.8 ELEVATED ALKALINE PHOSPHATASE LEVEL: ICD-10-CM

## 2019-12-18 DIAGNOSIS — N18.30 STAGE 3 CHRONIC KIDNEY DISEASE (HCC): ICD-10-CM

## 2019-12-18 LAB
ANION GAP SERPL CALCULATED.3IONS-SCNC: 13.6 MMOL/L (ref 5–15)
BUN BLD-MCNC: 42 MG/DL (ref 8–23)
BUN/CREAT SERPL: 30.7 (ref 7–25)
CALCIUM SPEC-SCNC: 8 MG/DL (ref 8.6–10.5)
CHLORIDE SERPL-SCNC: 98 MMOL/L (ref 98–107)
CO2 SERPL-SCNC: 36.4 MMOL/L (ref 22–29)
CREAT BLD-MCNC: 1.37 MG/DL (ref 0.57–1)
DEPRECATED RDW RBC AUTO: 43.8 FL (ref 37–54)
ERYTHROCYTE [DISTWIDTH] IN BLOOD BY AUTOMATED COUNT: 14.6 % (ref 12.3–15.4)
GFR SERPL CREATININE-BSD FRML MDRD: 38 ML/MIN/1.73
GLUCOSE BLD-MCNC: 110 MG/DL (ref 65–99)
GLUCOSE BLDC GLUCOMTR-MCNC: 114 MG/DL (ref 70–130)
GLUCOSE BLDC GLUCOMTR-MCNC: 116 MG/DL (ref 70–130)
GLUCOSE BLDC GLUCOMTR-MCNC: 150 MG/DL (ref 70–130)
GLUCOSE BLDC GLUCOMTR-MCNC: 154 MG/DL (ref 70–130)
HCT VFR BLD AUTO: 30.4 % (ref 34–46.6)
HGB BLD-MCNC: 9.9 G/DL (ref 12–15.9)
MAGNESIUM SERPL-MCNC: 2 MG/DL (ref 1.6–2.4)
MCH RBC QN AUTO: 26.8 PG (ref 26.6–33)
MCHC RBC AUTO-ENTMCNC: 32.6 G/DL (ref 31.5–35.7)
MCV RBC AUTO: 82.4 FL (ref 79–97)
PLATELET # BLD AUTO: 251 10*3/MM3 (ref 140–450)
PMV BLD AUTO: 10.6 FL (ref 6–12)
POTASSIUM BLD-SCNC: 3.5 MMOL/L (ref 3.5–5.2)
RBC # BLD AUTO: 3.69 10*6/MM3 (ref 3.77–5.28)
SODIUM BLD-SCNC: 148 MMOL/L (ref 136–145)
URATE SERPL-MCNC: 11.5 MG/DL (ref 2.4–5.7)
WBC NRBC COR # BLD: 8.84 10*3/MM3 (ref 3.4–10.8)

## 2019-12-18 PROCEDURE — 97110 THERAPEUTIC EXERCISES: CPT

## 2019-12-18 PROCEDURE — 99233 SBSQ HOSP IP/OBS HIGH 50: CPT | Performed by: INTERNAL MEDICINE

## 2019-12-18 PROCEDURE — 25010000002 FUROSEMIDE PER 20 MG: Performed by: INTERNAL MEDICINE

## 2019-12-18 PROCEDURE — 82962 GLUCOSE BLOOD TEST: CPT

## 2019-12-18 PROCEDURE — 97140 MANUAL THERAPY 1/> REGIONS: CPT

## 2019-12-18 PROCEDURE — 94799 UNLISTED PULMONARY SVC/PX: CPT

## 2019-12-18 PROCEDURE — 85027 COMPLETE CBC AUTOMATED: CPT | Performed by: HOSPITALIST

## 2019-12-18 PROCEDURE — 83735 ASSAY OF MAGNESIUM: CPT | Performed by: INTERNAL MEDICINE

## 2019-12-18 PROCEDURE — 84550 ASSAY OF BLOOD/URIC ACID: CPT | Performed by: INTERNAL MEDICINE

## 2019-12-18 PROCEDURE — 80048 BASIC METABOLIC PNL TOTAL CA: CPT | Performed by: NURSE PRACTITIONER

## 2019-12-18 PROCEDURE — 25010000002 ONDANSETRON PER 1 MG: Performed by: NURSE PRACTITIONER

## 2019-12-18 PROCEDURE — 63710000001 INSULIN LISPRO (HUMAN) PER 5 UNITS: Performed by: NURSE PRACTITIONER

## 2019-12-18 RX ORDER — AMLODIPINE BESYLATE 2.5 MG/1
2.5 TABLET ORAL
Status: DISCONTINUED | OUTPATIENT
Start: 2019-12-18 | End: 2020-01-01

## 2019-12-18 RX ORDER — CLOTRIMAZOLE AND BETAMETHASONE DIPROPIONATE 10; .64 MG/G; MG/G
CREAM TOPICAL DAILY
Status: DISCONTINUED | OUTPATIENT
Start: 2019-12-19 | End: 2020-01-08 | Stop reason: HOSPADM

## 2019-12-18 RX ADMIN — PANTOPRAZOLE SODIUM 40 MG: 40 TABLET, DELAYED RELEASE ORAL at 06:21

## 2019-12-18 RX ADMIN — NYSTATIN: 100000 CREAM TOPICAL at 21:25

## 2019-12-18 RX ADMIN — AMLODIPINE BESYLATE 2.5 MG: 2.5 TABLET ORAL at 10:07

## 2019-12-18 RX ADMIN — VILAZODONE HYDROCHLORIDE 20 MG: 40 TABLET ORAL at 21:23

## 2019-12-18 RX ADMIN — BUDESONIDE AND FORMOTEROL FUMARATE DIHYDRATE 2 PUFF: 160; 4.5 AEROSOL RESPIRATORY (INHALATION) at 08:23

## 2019-12-18 RX ADMIN — SODIUM CHLORIDE, PRESERVATIVE FREE 10 ML: 5 INJECTION INTRAVENOUS at 10:08

## 2019-12-18 RX ADMIN — ALPRAZOLAM 1 MG: 0.5 TABLET ORAL at 21:23

## 2019-12-18 RX ADMIN — LEVOTHYROXINE SODIUM 25 MCG: 25 TABLET ORAL at 06:21

## 2019-12-18 RX ADMIN — FUROSEMIDE 60 MG: 20 INJECTION, SOLUTION INTRAMUSCULAR; INTRAVENOUS at 06:21

## 2019-12-18 RX ADMIN — Medication 1 CAPSULE: at 10:06

## 2019-12-18 RX ADMIN — INSULIN LISPRO 2 UNITS: 100 INJECTION, SOLUTION INTRAVENOUS; SUBCUTANEOUS at 12:50

## 2019-12-18 RX ADMIN — FERROUS SULFATE TAB 325 MG (65 MG ELEMENTAL FE) 325 MG: 325 (65 FE) TAB at 10:06

## 2019-12-18 RX ADMIN — CLOTRIMAZOLE AND BETAMETHASONE DIPROPIONATE: 10; .5 CREAM TOPICAL at 13:01

## 2019-12-18 RX ADMIN — ATORVASTATIN CALCIUM 20 MG: 20 TABLET, FILM COATED ORAL at 10:07

## 2019-12-18 RX ADMIN — ACETAMINOPHEN 650 MG: 325 TABLET, FILM COATED ORAL at 14:57

## 2019-12-18 RX ADMIN — ASPIRIN 325 MG: 325 TABLET, COATED ORAL at 10:06

## 2019-12-18 RX ADMIN — BUDESONIDE AND FORMOTEROL FUMARATE DIHYDRATE 2 PUFF: 160; 4.5 AEROSOL RESPIRATORY (INHALATION) at 20:59

## 2019-12-18 RX ADMIN — ONDANSETRON 4 MG: 2 INJECTION INTRAMUSCULAR; INTRAVENOUS at 15:03

## 2019-12-18 RX ADMIN — ATENOLOL 50 MG: 50 TABLET ORAL at 10:06

## 2019-12-19 PROBLEM — J96.11 CHRONIC RESPIRATORY FAILURE WITH HYPOXIA AND HYPERCAPNIA (HCC): Chronic | Status: ACTIVE | Noted: 2019-12-19

## 2019-12-19 PROBLEM — J96.12 CHRONIC RESPIRATORY FAILURE WITH HYPOXIA AND HYPERCAPNIA (HCC): Chronic | Status: ACTIVE | Noted: 2019-12-19

## 2019-12-19 LAB
ANION GAP SERPL CALCULATED.3IONS-SCNC: 14 MMOL/L (ref 5–15)
ARTERIAL PATENCY WRIST A: POSITIVE
ATMOSPHERIC PRESS: 761.6 MMHG
BASE EXCESS BLDA CALC-SCNC: 13.7 MMOL/L (ref 0–2)
BDY SITE: ABNORMAL
BUN BLD-MCNC: 40 MG/DL (ref 8–23)
BUN/CREAT SERPL: 27.6 (ref 7–25)
CALCIUM SPEC-SCNC: 8.1 MG/DL (ref 8.6–10.5)
CHLORIDE SERPL-SCNC: 97 MMOL/L (ref 98–107)
CO2 SERPL-SCNC: 36 MMOL/L (ref 22–29)
CREAT BLD-MCNC: 1.45 MG/DL (ref 0.57–1)
GAS FLOW AIRWAY: 2 LPM
GFR SERPL CREATININE-BSD FRML MDRD: 35 ML/MIN/1.73
GLUCOSE BLD-MCNC: 105 MG/DL (ref 65–99)
GLUCOSE BLDC GLUCOMTR-MCNC: 103 MG/DL (ref 70–130)
GLUCOSE BLDC GLUCOMTR-MCNC: 125 MG/DL (ref 70–130)
GLUCOSE BLDC GLUCOMTR-MCNC: 177 MG/DL (ref 70–130)
GLUCOSE BLDC GLUCOMTR-MCNC: 215 MG/DL (ref 70–130)
HCO3 BLDA-SCNC: 39.8 MMOL/L (ref 22–28)
MODALITY: ABNORMAL
PCO2 BLDA: 55.1 MM HG (ref 35–45)
PH BLDA: 7.47 PH UNITS (ref 7.35–7.45)
PO2 BLDA: 62.6 MM HG (ref 80–100)
POTASSIUM BLD-SCNC: 3.6 MMOL/L (ref 3.5–5.2)
SAO2 % BLDCOA: 92.2 % (ref 92–99)
SET MECH RESP RATE: 20
SODIUM BLD-SCNC: 147 MMOL/L (ref 136–145)

## 2019-12-19 PROCEDURE — 99232 SBSQ HOSP IP/OBS MODERATE 35: CPT | Performed by: NURSE PRACTITIONER

## 2019-12-19 PROCEDURE — 80048 BASIC METABOLIC PNL TOTAL CA: CPT | Performed by: NURSE PRACTITIONER

## 2019-12-19 PROCEDURE — 94799 UNLISTED PULMONARY SVC/PX: CPT

## 2019-12-19 PROCEDURE — 63710000001 INSULIN LISPRO (HUMAN) PER 5 UNITS: Performed by: NURSE PRACTITIONER

## 2019-12-19 PROCEDURE — 36600 WITHDRAWAL OF ARTERIAL BLOOD: CPT

## 2019-12-19 PROCEDURE — 82962 GLUCOSE BLOOD TEST: CPT

## 2019-12-19 PROCEDURE — 82803 BLOOD GASES ANY COMBINATION: CPT

## 2019-12-19 PROCEDURE — 97140 MANUAL THERAPY 1/> REGIONS: CPT

## 2019-12-19 RX ORDER — FUROSEMIDE 40 MG/1
40 TABLET ORAL
Status: DISCONTINUED | OUTPATIENT
Start: 2019-12-19 | End: 2020-01-08 | Stop reason: HOSPADM

## 2019-12-19 RX ORDER — GUAIFENESIN AND DEXTROMETHORPHAN HYDROBROMIDE 600; 30 MG/1; MG/1
1 TABLET, EXTENDED RELEASE ORAL 2 TIMES DAILY PRN
Status: DISCONTINUED | OUTPATIENT
Start: 2019-12-19 | End: 2020-01-08 | Stop reason: HOSPADM

## 2019-12-19 RX ADMIN — NYSTATIN: 100000 CREAM TOPICAL at 08:37

## 2019-12-19 RX ADMIN — INSULIN LISPRO 2 UNITS: 100 INJECTION, SOLUTION INTRAVENOUS; SUBCUTANEOUS at 12:53

## 2019-12-19 RX ADMIN — SODIUM CHLORIDE, PRESERVATIVE FREE 10 ML: 5 INJECTION INTRAVENOUS at 08:39

## 2019-12-19 RX ADMIN — ATENOLOL 50 MG: 50 TABLET ORAL at 08:38

## 2019-12-19 RX ADMIN — BUDESONIDE AND FORMOTEROL FUMARATE DIHYDRATE 2 PUFF: 160; 4.5 AEROSOL RESPIRATORY (INHALATION) at 08:52

## 2019-12-19 RX ADMIN — CLOTRIMAZOLE AND BETAMETHASONE DIPROPIONATE: 10; .5 CREAM TOPICAL at 08:37

## 2019-12-19 RX ADMIN — AMLODIPINE BESYLATE 2.5 MG: 2.5 TABLET ORAL at 08:37

## 2019-12-19 RX ADMIN — FUROSEMIDE 40 MG: 40 TABLET ORAL at 12:54

## 2019-12-19 RX ADMIN — GUAIFENESIN AND DEXTROMETHORPHAN HYDROBROMIDE 1 TABLET: 600; 30 TABLET, EXTENDED RELEASE ORAL at 16:39

## 2019-12-19 RX ADMIN — ALPRAZOLAM 1 MG: 0.5 TABLET ORAL at 16:38

## 2019-12-19 RX ADMIN — PANTOPRAZOLE SODIUM 40 MG: 40 TABLET, DELAYED RELEASE ORAL at 06:40

## 2019-12-19 RX ADMIN — ASPIRIN 325 MG: 325 TABLET, COATED ORAL at 08:38

## 2019-12-19 RX ADMIN — FUROSEMIDE 40 MG: 40 TABLET ORAL at 18:00

## 2019-12-19 RX ADMIN — NYSTATIN: 100000 CREAM TOPICAL at 21:22

## 2019-12-19 RX ADMIN — Medication 1 CAPSULE: at 08:38

## 2019-12-19 RX ADMIN — INSULIN LISPRO 4 UNITS: 100 INJECTION, SOLUTION INTRAVENOUS; SUBCUTANEOUS at 21:22

## 2019-12-19 RX ADMIN — ACETAMINOPHEN 650 MG: 325 TABLET, FILM COATED ORAL at 16:38

## 2019-12-19 RX ADMIN — VILAZODONE HYDROCHLORIDE 20 MG: 40 TABLET ORAL at 21:21

## 2019-12-19 RX ADMIN — LEVOTHYROXINE SODIUM 25 MCG: 25 TABLET ORAL at 06:40

## 2019-12-19 RX ADMIN — ATORVASTATIN CALCIUM 20 MG: 20 TABLET, FILM COATED ORAL at 08:38

## 2019-12-20 LAB
ALBUMIN SERPL-MCNC: 3.6 G/DL (ref 3.5–5.2)
ANION GAP SERPL CALCULATED.3IONS-SCNC: 17 MMOL/L (ref 5–15)
BUN BLD-MCNC: 36 MG/DL (ref 8–23)
BUN/CREAT SERPL: 27.3 (ref 7–25)
CALCIUM SPEC-SCNC: 8.3 MG/DL (ref 8.6–10.5)
CHLORIDE SERPL-SCNC: 98 MMOL/L (ref 98–107)
CO2 SERPL-SCNC: 32 MMOL/L (ref 22–29)
CREAT BLD-MCNC: 1.32 MG/DL (ref 0.57–1)
DEPRECATED RDW RBC AUTO: 44.8 FL (ref 37–54)
ERYTHROCYTE [DISTWIDTH] IN BLOOD BY AUTOMATED COUNT: 14.6 % (ref 12.3–15.4)
GFR SERPL CREATININE-BSD FRML MDRD: 39 ML/MIN/1.73
GLUCOSE BLD-MCNC: 127 MG/DL (ref 65–99)
GLUCOSE BLDC GLUCOMTR-MCNC: 125 MG/DL (ref 70–130)
GLUCOSE BLDC GLUCOMTR-MCNC: 152 MG/DL (ref 70–130)
GLUCOSE BLDC GLUCOMTR-MCNC: 196 MG/DL (ref 70–130)
GLUCOSE BLDC GLUCOMTR-MCNC: 236 MG/DL (ref 70–130)
HCT VFR BLD AUTO: 31.6 % (ref 34–46.6)
HGB BLD-MCNC: 10 G/DL (ref 12–15.9)
MCH RBC QN AUTO: 27 PG (ref 26.6–33)
MCHC RBC AUTO-ENTMCNC: 31.6 G/DL (ref 31.5–35.7)
MCV RBC AUTO: 85.2 FL (ref 79–97)
PHOSPHATE SERPL-MCNC: 4.2 MG/DL (ref 2.5–4.5)
PLATELET # BLD AUTO: 235 10*3/MM3 (ref 140–450)
PMV BLD AUTO: 11 FL (ref 6–12)
POTASSIUM BLD-SCNC: 3.7 MMOL/L (ref 3.5–5.2)
RBC # BLD AUTO: 3.71 10*6/MM3 (ref 3.77–5.28)
SODIUM BLD-SCNC: 147 MMOL/L (ref 136–145)
WBC NRBC COR # BLD: 12.27 10*3/MM3 (ref 3.4–10.8)

## 2019-12-20 PROCEDURE — 97110 THERAPEUTIC EXERCISES: CPT

## 2019-12-20 PROCEDURE — 80069 RENAL FUNCTION PANEL: CPT | Performed by: INTERNAL MEDICINE

## 2019-12-20 PROCEDURE — 99232 SBSQ HOSP IP/OBS MODERATE 35: CPT | Performed by: INTERNAL MEDICINE

## 2019-12-20 PROCEDURE — 82962 GLUCOSE BLOOD TEST: CPT

## 2019-12-20 PROCEDURE — 94799 UNLISTED PULMONARY SVC/PX: CPT

## 2019-12-20 PROCEDURE — 97140 MANUAL THERAPY 1/> REGIONS: CPT

## 2019-12-20 PROCEDURE — 63710000001 INSULIN LISPRO (HUMAN) PER 5 UNITS: Performed by: NURSE PRACTITIONER

## 2019-12-20 PROCEDURE — 85027 COMPLETE CBC AUTOMATED: CPT | Performed by: HOSPITALIST

## 2019-12-20 RX ORDER — GABAPENTIN 100 MG/1
100 CAPSULE ORAL EVERY 8 HOURS SCHEDULED
Status: DISCONTINUED | OUTPATIENT
Start: 2019-12-20 | End: 2019-12-23

## 2019-12-20 RX ADMIN — SODIUM CHLORIDE, PRESERVATIVE FREE 10 ML: 5 INJECTION INTRAVENOUS at 08:12

## 2019-12-20 RX ADMIN — LEVOTHYROXINE SODIUM 25 MCG: 25 TABLET ORAL at 07:16

## 2019-12-20 RX ADMIN — ASPIRIN 325 MG: 325 TABLET, COATED ORAL at 08:11

## 2019-12-20 RX ADMIN — GABAPENTIN 100 MG: 100 CAPSULE ORAL at 21:18

## 2019-12-20 RX ADMIN — VILAZODONE HYDROCHLORIDE 20 MG: 40 TABLET ORAL at 21:18

## 2019-12-20 RX ADMIN — INSULIN LISPRO 2 UNITS: 100 INJECTION, SOLUTION INTRAVENOUS; SUBCUTANEOUS at 11:54

## 2019-12-20 RX ADMIN — BUDESONIDE AND FORMOTEROL FUMARATE DIHYDRATE 2 PUFF: 160; 4.5 AEROSOL RESPIRATORY (INHALATION) at 21:07

## 2019-12-20 RX ADMIN — NYSTATIN: 100000 CREAM TOPICAL at 21:18

## 2019-12-20 RX ADMIN — ACETAMINOPHEN 650 MG: 325 TABLET, FILM COATED ORAL at 01:15

## 2019-12-20 RX ADMIN — ATENOLOL 50 MG: 50 TABLET ORAL at 08:11

## 2019-12-20 RX ADMIN — Medication 1 CAPSULE: at 08:11

## 2019-12-20 RX ADMIN — FERROUS SULFATE TAB 325 MG (65 MG ELEMENTAL FE) 325 MG: 325 (65 FE) TAB at 08:11

## 2019-12-20 RX ADMIN — AMLODIPINE BESYLATE 2.5 MG: 2.5 TABLET ORAL at 08:11

## 2019-12-20 RX ADMIN — ALPRAZOLAM 1 MG: 0.5 TABLET ORAL at 01:14

## 2019-12-20 RX ADMIN — CLOTRIMAZOLE AND BETAMETHASONE DIPROPIONATE: 10; .5 CREAM TOPICAL at 08:10

## 2019-12-20 RX ADMIN — INSULIN LISPRO 4 UNITS: 100 INJECTION, SOLUTION INTRAVENOUS; SUBCUTANEOUS at 17:34

## 2019-12-20 RX ADMIN — NYSTATIN: 100000 CREAM TOPICAL at 08:10

## 2019-12-20 RX ADMIN — ATORVASTATIN CALCIUM 20 MG: 20 TABLET, FILM COATED ORAL at 08:11

## 2019-12-20 RX ADMIN — GABAPENTIN 100 MG: 100 CAPSULE ORAL at 16:31

## 2019-12-20 RX ADMIN — BUDESONIDE AND FORMOTEROL FUMARATE DIHYDRATE 2 PUFF: 160; 4.5 AEROSOL RESPIRATORY (INHALATION) at 00:16

## 2019-12-20 RX ADMIN — FUROSEMIDE 40 MG: 40 TABLET ORAL at 17:33

## 2019-12-20 RX ADMIN — BUDESONIDE AND FORMOTEROL FUMARATE DIHYDRATE 2 PUFF: 160; 4.5 AEROSOL RESPIRATORY (INHALATION) at 07:18

## 2019-12-20 RX ADMIN — FUROSEMIDE 40 MG: 40 TABLET ORAL at 08:11

## 2019-12-20 RX ADMIN — PANTOPRAZOLE SODIUM 40 MG: 40 TABLET, DELAYED RELEASE ORAL at 07:16

## 2019-12-21 LAB
ANION GAP SERPL CALCULATED.3IONS-SCNC: 13.3 MMOL/L (ref 5–15)
ARTERIAL PATENCY WRIST A: POSITIVE
ARTERIAL PATENCY WRIST A: POSITIVE
ATMOSPHERIC PRESS: 761.4 MMHG
ATMOSPHERIC PRESS: 761.6 MMHG
BASE EXCESS BLDA CALC-SCNC: 10.1 MMOL/L (ref 0–2)
BASE EXCESS BLDA CALC-SCNC: 11.9 MMOL/L (ref 0–2)
BDY SITE: ABNORMAL
BDY SITE: ABNORMAL
BUN BLD-MCNC: 31 MG/DL (ref 8–23)
BUN/CREAT SERPL: 23 (ref 7–25)
CALCIUM SPEC-SCNC: 8.2 MG/DL (ref 8.6–10.5)
CHLORIDE SERPL-SCNC: 97 MMOL/L (ref 98–107)
CO2 SERPL-SCNC: 33.7 MMOL/L (ref 22–29)
CREAT BLD-MCNC: 1.35 MG/DL (ref 0.57–1)
DEPRECATED RDW RBC AUTO: 42.8 FL (ref 37–54)
ERYTHROCYTE [DISTWIDTH] IN BLOOD BY AUTOMATED COUNT: 14.6 % (ref 12.3–15.4)
GFR SERPL CREATININE-BSD FRML MDRD: 38 ML/MIN/1.73
GLUCOSE BLD-MCNC: 172 MG/DL (ref 65–99)
GLUCOSE BLDC GLUCOMTR-MCNC: 114 MG/DL (ref 70–130)
GLUCOSE BLDC GLUCOMTR-MCNC: 118 MG/DL (ref 70–130)
GLUCOSE BLDC GLUCOMTR-MCNC: 165 MG/DL (ref 70–130)
GLUCOSE BLDC GLUCOMTR-MCNC: 196 MG/DL (ref 70–130)
HCO3 BLDA-SCNC: 38.5 MMOL/L (ref 22–28)
HCO3 BLDA-SCNC: 38.9 MMOL/L (ref 22–28)
HCT VFR BLD AUTO: 29.9 % (ref 34–46.6)
HGB BLD-MCNC: 9.4 G/DL (ref 12–15.9)
HOROWITZ INDEX BLD+IHG-RTO: 40 %
HOROWITZ INDEX BLD+IHG-RTO: 50 %
MCH RBC QN AUTO: 25.8 PG (ref 26.6–33)
MCHC RBC AUTO-ENTMCNC: 31.4 G/DL (ref 31.5–35.7)
MCV RBC AUTO: 82.1 FL (ref 79–97)
MODALITY: ABNORMAL
MODALITY: ABNORMAL
O2 A-A PPRESDIFF RESPIRATORY: 0.2 MMHG
O2 A-A PPRESDIFF RESPIRATORY: 0.3 MMHG
PCO2 BLDA: 63.7 MM HG (ref 35–45)
PCO2 BLDA: 69.3 MM HG (ref 35–45)
PEEP RESPIRATORY: 6 CM[H2O]
PH BLDA: 7.35 PH UNITS (ref 7.35–7.45)
PH BLDA: 7.39 PH UNITS (ref 7.35–7.45)
PLATELET # BLD AUTO: 233 10*3/MM3 (ref 140–450)
PMV BLD AUTO: 10.3 FL (ref 6–12)
PO2 BLDA: 51.1 MM HG (ref 80–100)
PO2 BLDA: 65.6 MM HG (ref 80–100)
POTASSIUM BLD-SCNC: 3.9 MMOL/L (ref 3.5–5.2)
PSV: 14 CMH2O
RBC # BLD AUTO: 3.64 10*6/MM3 (ref 3.77–5.28)
SAO2 % BLDCOA: 82.2 % (ref 92–99)
SAO2 % BLDCOA: 91.5 % (ref 92–99)
SET MECH RESP RATE: 16
SODIUM BLD-SCNC: 144 MMOL/L (ref 136–145)
TOTAL RATE: 18 BREATHS/MINUTE
TOTAL RATE: 22 BREATHS/MINUTE
VIT B12 BLD-MCNC: 576 PG/ML (ref 211–946)
VT ON VENT VENT: 412 ML
WBC NRBC COR # BLD: 11.38 10*3/MM3 (ref 3.4–10.8)

## 2019-12-21 PROCEDURE — 36600 WITHDRAWAL OF ARTERIAL BLOOD: CPT

## 2019-12-21 PROCEDURE — 94799 UNLISTED PULMONARY SVC/PX: CPT

## 2019-12-21 PROCEDURE — 80048 BASIC METABOLIC PNL TOTAL CA: CPT | Performed by: HOSPITALIST

## 2019-12-21 PROCEDURE — 85027 COMPLETE CBC AUTOMATED: CPT | Performed by: HOSPITALIST

## 2019-12-21 PROCEDURE — 63710000001 INSULIN LISPRO (HUMAN) PER 5 UNITS: Performed by: NURSE PRACTITIONER

## 2019-12-21 PROCEDURE — 82962 GLUCOSE BLOOD TEST: CPT

## 2019-12-21 PROCEDURE — 82803 BLOOD GASES ANY COMBINATION: CPT

## 2019-12-21 PROCEDURE — 94660 CPAP INITIATION&MGMT: CPT

## 2019-12-21 PROCEDURE — 82607 VITAMIN B-12: CPT | Performed by: HOSPITALIST

## 2019-12-21 RX ADMIN — SODIUM CHLORIDE, PRESERVATIVE FREE 10 ML: 5 INJECTION INTRAVENOUS at 21:13

## 2019-12-21 RX ADMIN — ASPIRIN 325 MG: 325 TABLET, COATED ORAL at 18:07

## 2019-12-21 RX ADMIN — GABAPENTIN 100 MG: 100 CAPSULE ORAL at 21:11

## 2019-12-21 RX ADMIN — ATENOLOL 50 MG: 50 TABLET ORAL at 18:07

## 2019-12-21 RX ADMIN — PANTOPRAZOLE SODIUM 40 MG: 40 TABLET, DELAYED RELEASE ORAL at 08:16

## 2019-12-21 RX ADMIN — LEVOTHYROXINE SODIUM 25 MCG: 25 TABLET ORAL at 08:16

## 2019-12-21 RX ADMIN — NYSTATIN: 100000 CREAM TOPICAL at 21:12

## 2019-12-21 RX ADMIN — GABAPENTIN 100 MG: 100 CAPSULE ORAL at 05:12

## 2019-12-21 RX ADMIN — SODIUM CHLORIDE, PRESERVATIVE FREE 10 ML: 5 INJECTION INTRAVENOUS at 08:42

## 2019-12-21 RX ADMIN — SENNOSIDES AND DOCUSATE SODIUM 1 TABLET: 8.6; 5 TABLET ORAL at 22:31

## 2019-12-21 RX ADMIN — FUROSEMIDE 40 MG: 40 TABLET ORAL at 18:06

## 2019-12-21 RX ADMIN — AMLODIPINE BESYLATE 2.5 MG: 2.5 TABLET ORAL at 18:06

## 2019-12-21 RX ADMIN — INSULIN LISPRO 2 UNITS: 100 INJECTION, SOLUTION INTRAVENOUS; SUBCUTANEOUS at 21:11

## 2019-12-21 RX ADMIN — BUDESONIDE AND FORMOTEROL FUMARATE DIHYDRATE 2 PUFF: 160; 4.5 AEROSOL RESPIRATORY (INHALATION) at 23:28

## 2019-12-21 RX ADMIN — GUAIFENESIN AND DEXTROMETHORPHAN HYDROBROMIDE 1 TABLET: 600; 30 TABLET, EXTENDED RELEASE ORAL at 22:31

## 2019-12-21 RX ADMIN — ATORVASTATIN CALCIUM 20 MG: 20 TABLET, FILM COATED ORAL at 18:07

## 2019-12-21 RX ADMIN — VILAZODONE HYDROCHLORIDE 20 MG: 40 TABLET ORAL at 20:20

## 2019-12-21 RX ADMIN — ALPRAZOLAM 1 MG: 0.5 TABLET ORAL at 20:20

## 2019-12-22 LAB
ALBUMIN SERPL-MCNC: 3.3 G/DL (ref 3.5–5.2)
ALBUMIN/GLOB SERPL: 0.9 G/DL
ALP SERPL-CCNC: 184 U/L (ref 39–117)
ALT SERPL W P-5'-P-CCNC: 12 U/L (ref 1–33)
ANION GAP SERPL CALCULATED.3IONS-SCNC: 13.3 MMOL/L (ref 5–15)
ARTERIAL PATENCY WRIST A: POSITIVE
AST SERPL-CCNC: 16 U/L (ref 1–32)
ATMOSPHERIC PRESS: 754.1 MMHG
BASE EXCESS BLDA CALC-SCNC: 8.9 MMOL/L (ref 0–2)
BASOPHILS # BLD AUTO: 0.06 10*3/MM3 (ref 0–0.2)
BASOPHILS NFR BLD AUTO: 0.5 % (ref 0–1.5)
BDY SITE: ABNORMAL
BILIRUB SERPL-MCNC: 0.6 MG/DL (ref 0.2–1.2)
BUN BLD-MCNC: 30 MG/DL (ref 8–23)
BUN/CREAT SERPL: 23.6 (ref 7–25)
CALCIUM SPEC-SCNC: 8.6 MG/DL (ref 8.6–10.5)
CHLORIDE SERPL-SCNC: 98 MMOL/L (ref 98–107)
CO2 SERPL-SCNC: 33.7 MMOL/L (ref 22–29)
CREAT BLD-MCNC: 1.27 MG/DL (ref 0.57–1)
DEPRECATED RDW RBC AUTO: 42.6 FL (ref 37–54)
EOSINOPHIL # BLD AUTO: 0.27 10*3/MM3 (ref 0–0.4)
EOSINOPHIL NFR BLD AUTO: 2.2 % (ref 0.3–6.2)
ERYTHROCYTE [DISTWIDTH] IN BLOOD BY AUTOMATED COUNT: 14.5 % (ref 12.3–15.4)
GFR SERPL CREATININE-BSD FRML MDRD: 41 ML/MIN/1.73
GLOBULIN UR ELPH-MCNC: 3.8 GM/DL
GLUCOSE BLD-MCNC: 132 MG/DL (ref 65–99)
GLUCOSE BLDC GLUCOMTR-MCNC: 122 MG/DL (ref 70–130)
GLUCOSE BLDC GLUCOMTR-MCNC: 147 MG/DL (ref 70–130)
GLUCOSE BLDC GLUCOMTR-MCNC: 203 MG/DL (ref 70–130)
GLUCOSE BLDC GLUCOMTR-MCNC: 267 MG/DL (ref 70–130)
HCO3 BLDA-SCNC: 35.8 MMOL/L (ref 22–28)
HCT VFR BLD AUTO: 29.3 % (ref 34–46.6)
HGB BLD-MCNC: 9.5 G/DL (ref 12–15.9)
HOROWITZ INDEX BLD+IHG-RTO: 40 %
IMM GRANULOCYTES # BLD AUTO: 0.05 10*3/MM3 (ref 0–0.05)
IMM GRANULOCYTES NFR BLD AUTO: 0.4 % (ref 0–0.5)
LYMPHOCYTES # BLD AUTO: 1.01 10*3/MM3 (ref 0.7–3.1)
LYMPHOCYTES NFR BLD AUTO: 8.1 % (ref 19.6–45.3)
MCH RBC QN AUTO: 26.4 PG (ref 26.6–33)
MCHC RBC AUTO-ENTMCNC: 32.4 G/DL (ref 31.5–35.7)
MCV RBC AUTO: 81.4 FL (ref 79–97)
MODALITY: ABNORMAL
MONOCYTES # BLD AUTO: 1.08 10*3/MM3 (ref 0.1–0.9)
MONOCYTES NFR BLD AUTO: 8.6 % (ref 5–12)
NEUTROPHILS # BLD AUTO: 10.07 10*3/MM3 (ref 1.7–7)
NEUTROPHILS NFR BLD AUTO: 80.2 % (ref 42.7–76)
NRBC BLD AUTO-RTO: 0 /100 WBC (ref 0–0.2)
O2 A-A PPRESDIFF RESPIRATORY: 0.3 MMHG
PCO2 BLDA: 61.4 MM HG (ref 35–45)
PH BLDA: 7.37 PH UNITS (ref 7.35–7.45)
PLATELET # BLD AUTO: 226 10*3/MM3 (ref 140–450)
PMV BLD AUTO: 10.7 FL (ref 6–12)
PO2 BLDA: 69.2 MM HG (ref 80–100)
POTASSIUM BLD-SCNC: 3.9 MMOL/L (ref 3.5–5.2)
PROT SERPL-MCNC: 7.1 G/DL (ref 6–8.5)
RBC # BLD AUTO: 3.6 10*6/MM3 (ref 3.77–5.28)
SAO2 % BLDCOA: 92.4 % (ref 92–99)
SET MECH RESP RATE: 16
SODIUM BLD-SCNC: 145 MMOL/L (ref 136–145)
TOTAL RATE: 16 BREATHS/MINUTE
VT ON VENT VENT: 1052 ML
WBC NRBC COR # BLD: 12.54 10*3/MM3 (ref 3.4–10.8)

## 2019-12-22 PROCEDURE — 93010 ELECTROCARDIOGRAM REPORT: CPT | Performed by: INTERNAL MEDICINE

## 2019-12-22 PROCEDURE — 93005 ELECTROCARDIOGRAM TRACING: CPT | Performed by: HOSPITALIST

## 2019-12-22 PROCEDURE — 82803 BLOOD GASES ANY COMBINATION: CPT

## 2019-12-22 PROCEDURE — 94799 UNLISTED PULMONARY SVC/PX: CPT

## 2019-12-22 PROCEDURE — 82962 GLUCOSE BLOOD TEST: CPT

## 2019-12-22 PROCEDURE — 85025 COMPLETE CBC W/AUTO DIFF WBC: CPT | Performed by: HOSPITALIST

## 2019-12-22 PROCEDURE — 36600 WITHDRAWAL OF ARTERIAL BLOOD: CPT

## 2019-12-22 PROCEDURE — 63710000001 INSULIN LISPRO (HUMAN) PER 5 UNITS: Performed by: NURSE PRACTITIONER

## 2019-12-22 PROCEDURE — 80053 COMPREHEN METABOLIC PANEL: CPT | Performed by: HOSPITALIST

## 2019-12-22 RX ORDER — ALPRAZOLAM 0.5 MG/1
0.5 TABLET ORAL 2 TIMES DAILY PRN
Status: DISCONTINUED | OUTPATIENT
Start: 2019-12-22 | End: 2020-01-08 | Stop reason: HOSPADM

## 2019-12-22 RX ADMIN — ASPIRIN 325 MG: 325 TABLET, COATED ORAL at 08:24

## 2019-12-22 RX ADMIN — NYSTATIN: 100000 CREAM TOPICAL at 08:24

## 2019-12-22 RX ADMIN — PANTOPRAZOLE SODIUM 40 MG: 40 TABLET, DELAYED RELEASE ORAL at 06:14

## 2019-12-22 RX ADMIN — INSULIN LISPRO 6 UNITS: 100 INJECTION, SOLUTION INTRAVENOUS; SUBCUTANEOUS at 12:13

## 2019-12-22 RX ADMIN — SODIUM CHLORIDE, PRESERVATIVE FREE 10 ML: 5 INJECTION INTRAVENOUS at 08:23

## 2019-12-22 RX ADMIN — FUROSEMIDE 40 MG: 40 TABLET ORAL at 17:16

## 2019-12-22 RX ADMIN — VILAZODONE HYDROCHLORIDE 20 MG: 40 TABLET ORAL at 21:17

## 2019-12-22 RX ADMIN — GABAPENTIN 100 MG: 100 CAPSULE ORAL at 06:14

## 2019-12-22 RX ADMIN — ATORVASTATIN CALCIUM 20 MG: 20 TABLET, FILM COATED ORAL at 08:24

## 2019-12-22 RX ADMIN — AMLODIPINE BESYLATE 2.5 MG: 2.5 TABLET ORAL at 08:24

## 2019-12-22 RX ADMIN — SODIUM CHLORIDE, PRESERVATIVE FREE 10 ML: 5 INJECTION INTRAVENOUS at 21:17

## 2019-12-22 RX ADMIN — ALPRAZOLAM 1 MG: 0.5 TABLET ORAL at 17:16

## 2019-12-22 RX ADMIN — BUDESONIDE AND FORMOTEROL FUMARATE DIHYDRATE 2 PUFF: 160; 4.5 AEROSOL RESPIRATORY (INHALATION) at 11:11

## 2019-12-22 RX ADMIN — NYSTATIN: 100000 CREAM TOPICAL at 21:22

## 2019-12-22 RX ADMIN — FUROSEMIDE 40 MG: 40 TABLET ORAL at 08:24

## 2019-12-22 RX ADMIN — LEVOTHYROXINE SODIUM 25 MCG: 25 TABLET ORAL at 06:14

## 2019-12-22 RX ADMIN — BUDESONIDE AND FORMOTEROL FUMARATE DIHYDRATE 2 PUFF: 160; 4.5 AEROSOL RESPIRATORY (INHALATION) at 20:23

## 2019-12-22 RX ADMIN — Medication 1 CAPSULE: at 08:24

## 2019-12-22 RX ADMIN — GABAPENTIN 100 MG: 100 CAPSULE ORAL at 14:12

## 2019-12-22 RX ADMIN — ATENOLOL 50 MG: 50 TABLET ORAL at 08:24

## 2019-12-22 RX ADMIN — CLOTRIMAZOLE AND BETAMETHASONE DIPROPIONATE: 10; .5 CREAM TOPICAL at 08:24

## 2019-12-22 RX ADMIN — FERROUS SULFATE TAB 325 MG (65 MG ELEMENTAL FE) 325 MG: 325 (65 FE) TAB at 08:24

## 2019-12-23 PROBLEM — J96.21 ACUTE ON CHRONIC RESPIRATORY FAILURE WITH HYPOXIA AND HYPERCAPNIA (HCC): Status: ACTIVE | Noted: 2019-12-23

## 2019-12-23 PROBLEM — J96.22 ACUTE ON CHRONIC RESPIRATORY FAILURE WITH HYPOXIA AND HYPERCAPNIA: Status: ACTIVE | Noted: 2019-12-23

## 2019-12-23 LAB
ALBUMIN SERPL-MCNC: 3.2 G/DL (ref 3.5–5.2)
ALBUMIN/GLOB SERPL: 0.8 G/DL
ALP SERPL-CCNC: 177 U/L (ref 39–117)
ALT SERPL W P-5'-P-CCNC: 13 U/L (ref 1–33)
ANION GAP SERPL CALCULATED.3IONS-SCNC: 12.2 MMOL/L (ref 5–15)
AST SERPL-CCNC: 16 U/L (ref 1–32)
BASOPHILS # BLD AUTO: 0.05 10*3/MM3 (ref 0–0.2)
BASOPHILS NFR BLD AUTO: 0.5 % (ref 0–1.5)
BILIRUB SERPL-MCNC: 0.5 MG/DL (ref 0.2–1.2)
BUN BLD-MCNC: 28 MG/DL (ref 8–23)
BUN/CREAT SERPL: 22.2 (ref 7–25)
CALCIUM SPEC-SCNC: 8.7 MG/DL (ref 8.6–10.5)
CHLORIDE SERPL-SCNC: 101 MMOL/L (ref 98–107)
CO2 SERPL-SCNC: 33.8 MMOL/L (ref 22–29)
CREAT BLD-MCNC: 1.26 MG/DL (ref 0.57–1)
DEPRECATED RDW RBC AUTO: 43.3 FL (ref 37–54)
EOSINOPHIL # BLD AUTO: 0.23 10*3/MM3 (ref 0–0.4)
EOSINOPHIL NFR BLD AUTO: 2.1 % (ref 0.3–6.2)
ERYTHROCYTE [DISTWIDTH] IN BLOOD BY AUTOMATED COUNT: 14.5 % (ref 12.3–15.4)
GFR SERPL CREATININE-BSD FRML MDRD: 41 ML/MIN/1.73
GLOBULIN UR ELPH-MCNC: 3.9 GM/DL
GLUCOSE BLD-MCNC: 115 MG/DL (ref 65–99)
GLUCOSE BLDC GLUCOMTR-MCNC: 103 MG/DL (ref 70–130)
GLUCOSE BLDC GLUCOMTR-MCNC: 165 MG/DL (ref 70–130)
GLUCOSE BLDC GLUCOMTR-MCNC: 195 MG/DL (ref 70–130)
GLUCOSE BLDC GLUCOMTR-MCNC: 200 MG/DL (ref 70–130)
HCT VFR BLD AUTO: 29.7 % (ref 34–46.6)
HGB BLD-MCNC: 9.4 G/DL (ref 12–15.9)
IMM GRANULOCYTES # BLD AUTO: 0.05 10*3/MM3 (ref 0–0.05)
IMM GRANULOCYTES NFR BLD AUTO: 0.5 % (ref 0–0.5)
LYMPHOCYTES # BLD AUTO: 1.43 10*3/MM3 (ref 0.7–3.1)
LYMPHOCYTES NFR BLD AUTO: 12.9 % (ref 19.6–45.3)
MCH RBC QN AUTO: 26.3 PG (ref 26.6–33)
MCHC RBC AUTO-ENTMCNC: 31.6 G/DL (ref 31.5–35.7)
MCV RBC AUTO: 83.2 FL (ref 79–97)
MONOCYTES # BLD AUTO: 0.99 10*3/MM3 (ref 0.1–0.9)
MONOCYTES NFR BLD AUTO: 8.9 % (ref 5–12)
NEUTROPHILS # BLD AUTO: 8.34 10*3/MM3 (ref 1.7–7)
NEUTROPHILS NFR BLD AUTO: 75.1 % (ref 42.7–76)
NRBC BLD AUTO-RTO: 0 /100 WBC (ref 0–0.2)
PLATELET # BLD AUTO: 242 10*3/MM3 (ref 140–450)
PMV BLD AUTO: 10.8 FL (ref 6–12)
POTASSIUM BLD-SCNC: 4 MMOL/L (ref 3.5–5.2)
PROT SERPL-MCNC: 7.1 G/DL (ref 6–8.5)
RBC # BLD AUTO: 3.57 10*6/MM3 (ref 3.77–5.28)
SODIUM BLD-SCNC: 147 MMOL/L (ref 136–145)
WBC NRBC COR # BLD: 11.09 10*3/MM3 (ref 3.4–10.8)

## 2019-12-23 PROCEDURE — 63710000001 INSULIN LISPRO (HUMAN) PER 5 UNITS: Performed by: NURSE PRACTITIONER

## 2019-12-23 PROCEDURE — 99232 SBSQ HOSP IP/OBS MODERATE 35: CPT | Performed by: INTERNAL MEDICINE

## 2019-12-23 PROCEDURE — 25010000002 ONDANSETRON PER 1 MG: Performed by: NURSE PRACTITIONER

## 2019-12-23 PROCEDURE — 97110 THERAPEUTIC EXERCISES: CPT

## 2019-12-23 PROCEDURE — 82962 GLUCOSE BLOOD TEST: CPT

## 2019-12-23 PROCEDURE — 94799 UNLISTED PULMONARY SVC/PX: CPT

## 2019-12-23 PROCEDURE — 97140 MANUAL THERAPY 1/> REGIONS: CPT

## 2019-12-23 PROCEDURE — 85025 COMPLETE CBC W/AUTO DIFF WBC: CPT | Performed by: HOSPITALIST

## 2019-12-23 PROCEDURE — 80053 COMPREHEN METABOLIC PANEL: CPT | Performed by: HOSPITALIST

## 2019-12-23 RX ORDER — GLIPIZIDE 5 MG/1
5 TABLET ORAL
Status: DISCONTINUED | OUTPATIENT
Start: 2019-12-24 | End: 2020-01-08 | Stop reason: HOSPADM

## 2019-12-23 RX ORDER — GABAPENTIN 100 MG/1
100 CAPSULE ORAL EVERY 12 HOURS SCHEDULED
Status: DISCONTINUED | OUTPATIENT
Start: 2019-12-23 | End: 2020-01-08 | Stop reason: HOSPADM

## 2019-12-23 RX ADMIN — INSULIN LISPRO 4 UNITS: 100 INJECTION, SOLUTION INTRAVENOUS; SUBCUTANEOUS at 21:28

## 2019-12-23 RX ADMIN — ATORVASTATIN CALCIUM 20 MG: 20 TABLET, FILM COATED ORAL at 08:05

## 2019-12-23 RX ADMIN — FERROUS SULFATE TAB 325 MG (65 MG ELEMENTAL FE) 325 MG: 325 (65 FE) TAB at 08:05

## 2019-12-23 RX ADMIN — ONDANSETRON 4 MG: 2 INJECTION INTRAMUSCULAR; INTRAVENOUS at 12:10

## 2019-12-23 RX ADMIN — NYSTATIN: 100000 CREAM TOPICAL at 21:29

## 2019-12-23 RX ADMIN — AMLODIPINE BESYLATE 2.5 MG: 2.5 TABLET ORAL at 08:05

## 2019-12-23 RX ADMIN — PANTOPRAZOLE SODIUM 40 MG: 40 TABLET, DELAYED RELEASE ORAL at 06:26

## 2019-12-23 RX ADMIN — ATENOLOL 50 MG: 50 TABLET ORAL at 08:05

## 2019-12-23 RX ADMIN — INSULIN LISPRO 2 UNITS: 100 INJECTION, SOLUTION INTRAVENOUS; SUBCUTANEOUS at 17:08

## 2019-12-23 RX ADMIN — SODIUM CHLORIDE, PRESERVATIVE FREE 10 ML: 5 INJECTION INTRAVENOUS at 21:29

## 2019-12-23 RX ADMIN — ASPIRIN 325 MG: 325 TABLET, COATED ORAL at 08:05

## 2019-12-23 RX ADMIN — INSULIN LISPRO 2 UNITS: 100 INJECTION, SOLUTION INTRAVENOUS; SUBCUTANEOUS at 12:07

## 2019-12-23 RX ADMIN — GABAPENTIN 100 MG: 100 CAPSULE ORAL at 14:02

## 2019-12-23 RX ADMIN — NYSTATIN: 100000 CREAM TOPICAL at 08:05

## 2019-12-23 RX ADMIN — Medication 1 CAPSULE: at 08:05

## 2019-12-23 RX ADMIN — LEVOTHYROXINE SODIUM 25 MCG: 25 TABLET ORAL at 06:26

## 2019-12-23 RX ADMIN — BUDESONIDE AND FORMOTEROL FUMARATE DIHYDRATE 2 PUFF: 160; 4.5 AEROSOL RESPIRATORY (INHALATION) at 07:40

## 2019-12-23 RX ADMIN — CLOTRIMAZOLE AND BETAMETHASONE DIPROPIONATE: 10; .5 CREAM TOPICAL at 08:05

## 2019-12-23 RX ADMIN — GABAPENTIN 100 MG: 100 CAPSULE ORAL at 06:26

## 2019-12-23 RX ADMIN — VILAZODONE HYDROCHLORIDE 20 MG: 40 TABLET ORAL at 21:29

## 2019-12-23 RX ADMIN — BUDESONIDE AND FORMOTEROL FUMARATE DIHYDRATE 2 PUFF: 160; 4.5 AEROSOL RESPIRATORY (INHALATION) at 22:01

## 2019-12-23 RX ADMIN — SODIUM CHLORIDE, PRESERVATIVE FREE 10 ML: 5 INJECTION INTRAVENOUS at 08:05

## 2019-12-23 RX ADMIN — GABAPENTIN 100 MG: 100 CAPSULE ORAL at 21:29

## 2019-12-23 RX ADMIN — FUROSEMIDE 40 MG: 40 TABLET ORAL at 17:08

## 2019-12-24 LAB
ALBUMIN SERPL-MCNC: 2.9 G/DL (ref 3.5–5.2)
ALBUMIN/GLOB SERPL: 0.7 G/DL
ALP SERPL-CCNC: 182 U/L (ref 39–117)
ALT SERPL W P-5'-P-CCNC: 17 U/L (ref 1–33)
ANION GAP SERPL CALCULATED.3IONS-SCNC: 10.4 MMOL/L (ref 5–15)
AST SERPL-CCNC: 19 U/L (ref 1–32)
BASOPHILS # BLD AUTO: 0.05 10*3/MM3 (ref 0–0.2)
BASOPHILS NFR BLD AUTO: 0.5 % (ref 0–1.5)
BILIRUB SERPL-MCNC: 0.4 MG/DL (ref 0.2–1.2)
BUN BLD-MCNC: 26 MG/DL (ref 8–23)
BUN/CREAT SERPL: 21.7 (ref 7–25)
CALCIUM SPEC-SCNC: 8.5 MG/DL (ref 8.6–10.5)
CHLORIDE SERPL-SCNC: 100 MMOL/L (ref 98–107)
CO2 SERPL-SCNC: 35.6 MMOL/L (ref 22–29)
CREAT BLD-MCNC: 1.2 MG/DL (ref 0.57–1)
DEPRECATED RDW RBC AUTO: 41.7 FL (ref 37–54)
EOSINOPHIL # BLD AUTO: 0.26 10*3/MM3 (ref 0–0.4)
EOSINOPHIL NFR BLD AUTO: 2.7 % (ref 0.3–6.2)
ERYTHROCYTE [DISTWIDTH] IN BLOOD BY AUTOMATED COUNT: 14.4 % (ref 12.3–15.4)
GFR SERPL CREATININE-BSD FRML MDRD: 44 ML/MIN/1.73
GLOBULIN UR ELPH-MCNC: 3.9 GM/DL
GLUCOSE BLD-MCNC: 109 MG/DL (ref 65–99)
GLUCOSE BLDC GLUCOMTR-MCNC: 113 MG/DL (ref 70–130)
GLUCOSE BLDC GLUCOMTR-MCNC: 117 MG/DL (ref 70–130)
GLUCOSE BLDC GLUCOMTR-MCNC: 152 MG/DL (ref 70–130)
GLUCOSE BLDC GLUCOMTR-MCNC: 166 MG/DL (ref 70–130)
HCT VFR BLD AUTO: 28.4 % (ref 34–46.6)
HGB BLD-MCNC: 9.1 G/DL (ref 12–15.9)
IMM GRANULOCYTES # BLD AUTO: 0.04 10*3/MM3 (ref 0–0.05)
IMM GRANULOCYTES NFR BLD AUTO: 0.4 % (ref 0–0.5)
LYMPHOCYTES # BLD AUTO: 1.16 10*3/MM3 (ref 0.7–3.1)
LYMPHOCYTES NFR BLD AUTO: 11.9 % (ref 19.6–45.3)
MCH RBC QN AUTO: 25.9 PG (ref 26.6–33)
MCHC RBC AUTO-ENTMCNC: 32 G/DL (ref 31.5–35.7)
MCV RBC AUTO: 80.9 FL (ref 79–97)
MONOCYTES # BLD AUTO: 0.92 10*3/MM3 (ref 0.1–0.9)
MONOCYTES NFR BLD AUTO: 9.5 % (ref 5–12)
NEUTROPHILS # BLD AUTO: 7.3 10*3/MM3 (ref 1.7–7)
NEUTROPHILS NFR BLD AUTO: 75 % (ref 42.7–76)
NRBC BLD AUTO-RTO: 0 /100 WBC (ref 0–0.2)
PLATELET # BLD AUTO: 233 10*3/MM3 (ref 140–450)
PMV BLD AUTO: 10.5 FL (ref 6–12)
POTASSIUM BLD-SCNC: 3.7 MMOL/L (ref 3.5–5.2)
PROT SERPL-MCNC: 6.8 G/DL (ref 6–8.5)
RBC # BLD AUTO: 3.51 10*6/MM3 (ref 3.77–5.28)
SODIUM BLD-SCNC: 146 MMOL/L (ref 136–145)
WBC NRBC COR # BLD: 9.73 10*3/MM3 (ref 3.4–10.8)

## 2019-12-24 PROCEDURE — 94799 UNLISTED PULMONARY SVC/PX: CPT

## 2019-12-24 PROCEDURE — 80053 COMPREHEN METABOLIC PANEL: CPT | Performed by: HOSPITALIST

## 2019-12-24 PROCEDURE — 97140 MANUAL THERAPY 1/> REGIONS: CPT

## 2019-12-24 PROCEDURE — 82962 GLUCOSE BLOOD TEST: CPT

## 2019-12-24 PROCEDURE — 63710000001 INSULIN LISPRO (HUMAN) PER 5 UNITS: Performed by: NURSE PRACTITIONER

## 2019-12-24 PROCEDURE — 97110 THERAPEUTIC EXERCISES: CPT

## 2019-12-24 PROCEDURE — 85025 COMPLETE CBC W/AUTO DIFF WBC: CPT | Performed by: HOSPITALIST

## 2019-12-24 PROCEDURE — 99232 SBSQ HOSP IP/OBS MODERATE 35: CPT | Performed by: INTERNAL MEDICINE

## 2019-12-24 RX ORDER — ASPIRIN 325 MG
TABLET, DELAYED RELEASE (ENTERIC COATED) ORAL
Qty: 90 TABLET | Refills: 0 | OUTPATIENT
Start: 2019-12-24

## 2019-12-24 RX ADMIN — GABAPENTIN 100 MG: 100 CAPSULE ORAL at 21:37

## 2019-12-24 RX ADMIN — ASPIRIN 325 MG: 325 TABLET, COATED ORAL at 08:52

## 2019-12-24 RX ADMIN — GUAIFENESIN AND DEXTROMETHORPHAN HYDROBROMIDE 1 TABLET: 600; 30 TABLET, EXTENDED RELEASE ORAL at 21:37

## 2019-12-24 RX ADMIN — ALPRAZOLAM 0.5 MG: 0.5 TABLET ORAL at 16:27

## 2019-12-24 RX ADMIN — BUDESONIDE AND FORMOTEROL FUMARATE DIHYDRATE 2 PUFF: 160; 4.5 AEROSOL RESPIRATORY (INHALATION) at 21:00

## 2019-12-24 RX ADMIN — SODIUM CHLORIDE, PRESERVATIVE FREE 10 ML: 5 INJECTION INTRAVENOUS at 08:55

## 2019-12-24 RX ADMIN — SODIUM CHLORIDE, PRESERVATIVE FREE 10 ML: 5 INJECTION INTRAVENOUS at 21:38

## 2019-12-24 RX ADMIN — INSULIN LISPRO 2 UNITS: 100 INJECTION, SOLUTION INTRAVENOUS; SUBCUTANEOUS at 11:53

## 2019-12-24 RX ADMIN — FUROSEMIDE 40 MG: 40 TABLET ORAL at 17:16

## 2019-12-24 RX ADMIN — INSULIN LISPRO 2 UNITS: 100 INJECTION, SOLUTION INTRAVENOUS; SUBCUTANEOUS at 21:38

## 2019-12-24 RX ADMIN — NYSTATIN: 100000 CREAM TOPICAL at 21:37

## 2019-12-24 RX ADMIN — ATENOLOL 50 MG: 50 TABLET ORAL at 08:52

## 2019-12-24 RX ADMIN — AMLODIPINE BESYLATE 2.5 MG: 2.5 TABLET ORAL at 08:52

## 2019-12-24 RX ADMIN — VILAZODONE HYDROCHLORIDE 20 MG: 40 TABLET ORAL at 21:37

## 2019-12-24 RX ADMIN — CLOTRIMAZOLE AND BETAMETHASONE DIPROPIONATE: 10; .5 CREAM TOPICAL at 08:52

## 2019-12-24 RX ADMIN — Medication 1 CAPSULE: at 08:52

## 2019-12-24 RX ADMIN — GLIPIZIDE 5 MG: 5 TABLET ORAL at 06:37

## 2019-12-24 RX ADMIN — GABAPENTIN 100 MG: 100 CAPSULE ORAL at 08:53

## 2019-12-24 RX ADMIN — LEVOTHYROXINE SODIUM 25 MCG: 25 TABLET ORAL at 06:37

## 2019-12-24 RX ADMIN — ATORVASTATIN CALCIUM 20 MG: 20 TABLET, FILM COATED ORAL at 08:52

## 2019-12-24 RX ADMIN — FERROUS SULFATE TAB 325 MG (65 MG ELEMENTAL FE) 325 MG: 325 (65 FE) TAB at 08:52

## 2019-12-24 RX ADMIN — FUROSEMIDE 40 MG: 40 TABLET ORAL at 08:52

## 2019-12-24 RX ADMIN — NYSTATIN: 100000 CREAM TOPICAL at 08:52

## 2019-12-24 RX ADMIN — PANTOPRAZOLE SODIUM 40 MG: 40 TABLET, DELAYED RELEASE ORAL at 06:37

## 2019-12-25 LAB
ALBUMIN SERPL-MCNC: 3.3 G/DL (ref 3.5–5.2)
ANION GAP SERPL CALCULATED.3IONS-SCNC: 11.4 MMOL/L (ref 5–15)
BUN BLD-MCNC: 27 MG/DL (ref 8–23)
BUN/CREAT SERPL: 21.1 (ref 7–25)
CALCIUM SPEC-SCNC: 8.6 MG/DL (ref 8.6–10.5)
CHLORIDE SERPL-SCNC: 96 MMOL/L (ref 98–107)
CO2 SERPL-SCNC: 33.6 MMOL/L (ref 22–29)
CREAT BLD-MCNC: 1.28 MG/DL (ref 0.57–1)
GFR SERPL CREATININE-BSD FRML MDRD: 41 ML/MIN/1.73
GLUCOSE BLD-MCNC: 125 MG/DL (ref 65–99)
GLUCOSE BLDC GLUCOMTR-MCNC: 120 MG/DL (ref 70–130)
GLUCOSE BLDC GLUCOMTR-MCNC: 154 MG/DL (ref 70–130)
GLUCOSE BLDC GLUCOMTR-MCNC: 161 MG/DL (ref 70–130)
GLUCOSE BLDC GLUCOMTR-MCNC: 170 MG/DL (ref 70–130)
MAGNESIUM SERPL-MCNC: 2.1 MG/DL (ref 1.6–2.4)
PHOSPHATE SERPL-MCNC: 3.9 MG/DL (ref 2.5–4.5)
POTASSIUM BLD-SCNC: 4 MMOL/L (ref 3.5–5.2)
SODIUM BLD-SCNC: 141 MMOL/L (ref 136–145)

## 2019-12-25 PROCEDURE — 94799 UNLISTED PULMONARY SVC/PX: CPT

## 2019-12-25 PROCEDURE — 82962 GLUCOSE BLOOD TEST: CPT

## 2019-12-25 PROCEDURE — 94760 N-INVAS EAR/PLS OXIMETRY 1: CPT

## 2019-12-25 PROCEDURE — 63710000001 INSULIN LISPRO (HUMAN) PER 5 UNITS: Performed by: NURSE PRACTITIONER

## 2019-12-25 PROCEDURE — 83735 ASSAY OF MAGNESIUM: CPT | Performed by: INTERNAL MEDICINE

## 2019-12-25 PROCEDURE — 80069 RENAL FUNCTION PANEL: CPT | Performed by: INTERNAL MEDICINE

## 2019-12-25 RX ADMIN — ATORVASTATIN CALCIUM 20 MG: 20 TABLET, FILM COATED ORAL at 10:23

## 2019-12-25 RX ADMIN — SODIUM CHLORIDE, PRESERVATIVE FREE 10 ML: 5 INJECTION INTRAVENOUS at 22:32

## 2019-12-25 RX ADMIN — FUROSEMIDE 40 MG: 40 TABLET ORAL at 10:23

## 2019-12-25 RX ADMIN — INSULIN LISPRO 2 UNITS: 100 INJECTION, SOLUTION INTRAVENOUS; SUBCUTANEOUS at 12:45

## 2019-12-25 RX ADMIN — GLIPIZIDE 5 MG: 5 TABLET ORAL at 06:37

## 2019-12-25 RX ADMIN — ATENOLOL 50 MG: 50 TABLET ORAL at 10:23

## 2019-12-25 RX ADMIN — ASPIRIN 325 MG: 325 TABLET, COATED ORAL at 10:23

## 2019-12-25 RX ADMIN — PANTOPRAZOLE SODIUM 40 MG: 40 TABLET, DELAYED RELEASE ORAL at 06:37

## 2019-12-25 RX ADMIN — GABAPENTIN 100 MG: 100 CAPSULE ORAL at 10:22

## 2019-12-25 RX ADMIN — SODIUM CHLORIDE, PRESERVATIVE FREE 10 ML: 5 INJECTION INTRAVENOUS at 10:23

## 2019-12-25 RX ADMIN — LEVOTHYROXINE SODIUM 25 MCG: 25 TABLET ORAL at 06:37

## 2019-12-25 RX ADMIN — AMLODIPINE BESYLATE 2.5 MG: 2.5 TABLET ORAL at 10:23

## 2019-12-25 RX ADMIN — GABAPENTIN 100 MG: 100 CAPSULE ORAL at 22:30

## 2019-12-25 RX ADMIN — VILAZODONE HYDROCHLORIDE 20 MG: 40 TABLET ORAL at 22:31

## 2019-12-25 RX ADMIN — INSULIN LISPRO 2 UNITS: 100 INJECTION, SOLUTION INTRAVENOUS; SUBCUTANEOUS at 22:31

## 2019-12-25 RX ADMIN — NYSTATIN: 100000 CREAM TOPICAL at 22:30

## 2019-12-25 RX ADMIN — BUDESONIDE AND FORMOTEROL FUMARATE DIHYDRATE 2 PUFF: 160; 4.5 AEROSOL RESPIRATORY (INHALATION) at 11:09

## 2019-12-25 RX ADMIN — Medication 1 CAPSULE: at 10:22

## 2019-12-25 RX ADMIN — BUDESONIDE AND FORMOTEROL FUMARATE DIHYDRATE 2 PUFF: 160; 4.5 AEROSOL RESPIRATORY (INHALATION) at 21:47

## 2019-12-25 RX ADMIN — CLOTRIMAZOLE AND BETAMETHASONE DIPROPIONATE: 10; .5 CREAM TOPICAL at 16:16

## 2019-12-25 RX ADMIN — FUROSEMIDE 40 MG: 40 TABLET ORAL at 17:26

## 2019-12-26 LAB
ALBUMIN SERPL-MCNC: 3.2 G/DL (ref 3.5–5.2)
ANION GAP SERPL CALCULATED.3IONS-SCNC: 13.1 MMOL/L (ref 5–15)
BUN BLD-MCNC: 25 MG/DL (ref 8–23)
BUN/CREAT SERPL: 21 (ref 7–25)
CALCIUM SPEC-SCNC: 8.5 MG/DL (ref 8.6–10.5)
CHLORIDE SERPL-SCNC: 99 MMOL/L (ref 98–107)
CO2 SERPL-SCNC: 31.9 MMOL/L (ref 22–29)
CREAT BLD-MCNC: 1.19 MG/DL (ref 0.57–1)
GFR SERPL CREATININE-BSD FRML MDRD: 44 ML/MIN/1.73
GLUCOSE BLD-MCNC: 126 MG/DL (ref 65–99)
GLUCOSE BLDC GLUCOMTR-MCNC: 124 MG/DL (ref 70–130)
GLUCOSE BLDC GLUCOMTR-MCNC: 152 MG/DL (ref 70–130)
GLUCOSE BLDC GLUCOMTR-MCNC: 165 MG/DL (ref 70–130)
GLUCOSE BLDC GLUCOMTR-MCNC: 204 MG/DL (ref 70–130)
MAGNESIUM SERPL-MCNC: 2.2 MG/DL (ref 1.6–2.4)
PHOSPHATE SERPL-MCNC: 4.2 MG/DL (ref 2.5–4.5)
POTASSIUM BLD-SCNC: 4 MMOL/L (ref 3.5–5.2)
SODIUM BLD-SCNC: 144 MMOL/L (ref 136–145)

## 2019-12-26 PROCEDURE — 94799 UNLISTED PULMONARY SVC/PX: CPT

## 2019-12-26 PROCEDURE — 97110 THERAPEUTIC EXERCISES: CPT

## 2019-12-26 PROCEDURE — 82962 GLUCOSE BLOOD TEST: CPT

## 2019-12-26 PROCEDURE — 63710000001 INSULIN LISPRO (HUMAN) PER 5 UNITS: Performed by: NURSE PRACTITIONER

## 2019-12-26 PROCEDURE — 83735 ASSAY OF MAGNESIUM: CPT | Performed by: INTERNAL MEDICINE

## 2019-12-26 PROCEDURE — 80069 RENAL FUNCTION PANEL: CPT | Performed by: INTERNAL MEDICINE

## 2019-12-26 PROCEDURE — 97140 MANUAL THERAPY 1/> REGIONS: CPT

## 2019-12-26 RX ORDER — DOCUSATE SODIUM 100 MG/1
100 CAPSULE, LIQUID FILLED ORAL 2 TIMES DAILY
Status: DISCONTINUED | OUTPATIENT
Start: 2019-12-26 | End: 2020-01-08 | Stop reason: HOSPADM

## 2019-12-26 RX ADMIN — CLOTRIMAZOLE AND BETAMETHASONE DIPROPIONATE: 10; .5 CREAM TOPICAL at 09:23

## 2019-12-26 RX ADMIN — GABAPENTIN 100 MG: 100 CAPSULE ORAL at 09:23

## 2019-12-26 RX ADMIN — AMLODIPINE BESYLATE 2.5 MG: 2.5 TABLET ORAL at 09:23

## 2019-12-26 RX ADMIN — INSULIN LISPRO 2 UNITS: 100 INJECTION, SOLUTION INTRAVENOUS; SUBCUTANEOUS at 21:54

## 2019-12-26 RX ADMIN — LEVOTHYROXINE SODIUM 25 MCG: 25 TABLET ORAL at 07:05

## 2019-12-26 RX ADMIN — PANTOPRAZOLE SODIUM 40 MG: 40 TABLET, DELAYED RELEASE ORAL at 07:05

## 2019-12-26 RX ADMIN — GLIPIZIDE 5 MG: 5 TABLET ORAL at 07:05

## 2019-12-26 RX ADMIN — NYSTATIN: 100000 CREAM TOPICAL at 22:29

## 2019-12-26 RX ADMIN — DOCUSATE SODIUM 100 MG: 100 CAPSULE, LIQUID FILLED ORAL at 21:54

## 2019-12-26 RX ADMIN — GUAIFENESIN AND DEXTROMETHORPHAN HYDROBROMIDE 1 TABLET: 600; 30 TABLET, EXTENDED RELEASE ORAL at 21:54

## 2019-12-26 RX ADMIN — Medication 1 CAPSULE: at 09:23

## 2019-12-26 RX ADMIN — NYSTATIN: 100000 CREAM TOPICAL at 09:23

## 2019-12-26 RX ADMIN — ACETAMINOPHEN 650 MG: 325 TABLET, FILM COATED ORAL at 03:23

## 2019-12-26 RX ADMIN — ATORVASTATIN CALCIUM 20 MG: 20 TABLET, FILM COATED ORAL at 09:23

## 2019-12-26 RX ADMIN — GABAPENTIN 100 MG: 100 CAPSULE ORAL at 21:54

## 2019-12-26 RX ADMIN — INSULIN LISPRO 2 UNITS: 100 INJECTION, SOLUTION INTRAVENOUS; SUBCUTANEOUS at 17:27

## 2019-12-26 RX ADMIN — VILAZODONE HYDROCHLORIDE 20 MG: 40 TABLET ORAL at 21:54

## 2019-12-26 RX ADMIN — SODIUM CHLORIDE, PRESERVATIVE FREE 10 ML: 5 INJECTION INTRAVENOUS at 21:55

## 2019-12-26 RX ADMIN — FERROUS SULFATE TAB 325 MG (65 MG ELEMENTAL FE) 325 MG: 325 (65 FE) TAB at 09:23

## 2019-12-26 RX ADMIN — INSULIN LISPRO 4 UNITS: 100 INJECTION, SOLUTION INTRAVENOUS; SUBCUTANEOUS at 12:47

## 2019-12-26 RX ADMIN — DOCUSATE SODIUM 100 MG: 100 CAPSULE, LIQUID FILLED ORAL at 12:47

## 2019-12-26 RX ADMIN — FUROSEMIDE 40 MG: 40 TABLET ORAL at 17:27

## 2019-12-26 RX ADMIN — FUROSEMIDE 40 MG: 40 TABLET ORAL at 09:24

## 2019-12-26 RX ADMIN — BUDESONIDE AND FORMOTEROL FUMARATE DIHYDRATE 2 PUFF: 160; 4.5 AEROSOL RESPIRATORY (INHALATION) at 19:28

## 2019-12-26 RX ADMIN — SODIUM CHLORIDE, PRESERVATIVE FREE 10 ML: 5 INJECTION INTRAVENOUS at 09:24

## 2019-12-26 RX ADMIN — ASPIRIN 325 MG: 325 TABLET, COATED ORAL at 09:23

## 2019-12-26 RX ADMIN — ATENOLOL 50 MG: 50 TABLET ORAL at 09:24

## 2019-12-26 RX ADMIN — ACETAMINOPHEN 650 MG: 325 TABLET, FILM COATED ORAL at 22:29

## 2019-12-27 LAB
ANION GAP SERPL CALCULATED.3IONS-SCNC: 11.2 MMOL/L (ref 5–15)
BUN BLD-MCNC: 23 MG/DL (ref 8–23)
BUN/CREAT SERPL: 19.8 (ref 7–25)
CALCIUM SPEC-SCNC: 8.4 MG/DL (ref 8.6–10.5)
CHLORIDE SERPL-SCNC: 100 MMOL/L (ref 98–107)
CO2 SERPL-SCNC: 32.8 MMOL/L (ref 22–29)
CREAT BLD-MCNC: 1.16 MG/DL (ref 0.57–1)
GFR SERPL CREATININE-BSD FRML MDRD: 46 ML/MIN/1.73
GLUCOSE BLD-MCNC: 117 MG/DL (ref 65–99)
GLUCOSE BLDC GLUCOMTR-MCNC: 105 MG/DL (ref 70–130)
GLUCOSE BLDC GLUCOMTR-MCNC: 119 MG/DL (ref 70–130)
GLUCOSE BLDC GLUCOMTR-MCNC: 155 MG/DL (ref 70–130)
GLUCOSE BLDC GLUCOMTR-MCNC: 182 MG/DL (ref 70–130)
POTASSIUM BLD-SCNC: 4 MMOL/L (ref 3.5–5.2)
SODIUM BLD-SCNC: 144 MMOL/L (ref 136–145)

## 2019-12-27 PROCEDURE — 94799 UNLISTED PULMONARY SVC/PX: CPT

## 2019-12-27 PROCEDURE — 82962 GLUCOSE BLOOD TEST: CPT

## 2019-12-27 PROCEDURE — 63710000001 INSULIN LISPRO (HUMAN) PER 5 UNITS: Performed by: NURSE PRACTITIONER

## 2019-12-27 PROCEDURE — 80048 BASIC METABOLIC PNL TOTAL CA: CPT | Performed by: INTERNAL MEDICINE

## 2019-12-27 PROCEDURE — 97140 MANUAL THERAPY 1/> REGIONS: CPT

## 2019-12-27 RX ORDER — ATENOLOL 25 MG/1
25 TABLET ORAL DAILY
Status: DISCONTINUED | OUTPATIENT
Start: 2019-12-28 | End: 2020-01-06

## 2019-12-27 RX ORDER — ATENOLOL 25 MG/1
25 TABLET ORAL ONCE
Status: COMPLETED | OUTPATIENT
Start: 2019-12-27 | End: 2019-12-27

## 2019-12-27 RX ADMIN — NYSTATIN: 100000 CREAM TOPICAL at 08:36

## 2019-12-27 RX ADMIN — CLOTRIMAZOLE AND BETAMETHASONE DIPROPIONATE: 10; .5 CREAM TOPICAL at 08:36

## 2019-12-27 RX ADMIN — FUROSEMIDE 40 MG: 40 TABLET ORAL at 08:36

## 2019-12-27 RX ADMIN — ALPRAZOLAM 0.5 MG: 0.5 TABLET ORAL at 16:49

## 2019-12-27 RX ADMIN — Medication 1 CAPSULE: at 08:35

## 2019-12-27 RX ADMIN — SODIUM CHLORIDE, PRESERVATIVE FREE 10 ML: 5 INJECTION INTRAVENOUS at 13:00

## 2019-12-27 RX ADMIN — BUDESONIDE AND FORMOTEROL FUMARATE DIHYDRATE 2 PUFF: 160; 4.5 AEROSOL RESPIRATORY (INHALATION) at 19:29

## 2019-12-27 RX ADMIN — FUROSEMIDE 40 MG: 40 TABLET ORAL at 17:23

## 2019-12-27 RX ADMIN — LEVOTHYROXINE SODIUM 25 MCG: 25 TABLET ORAL at 06:56

## 2019-12-27 RX ADMIN — ALPRAZOLAM 0.5 MG: 0.5 TABLET ORAL at 21:44

## 2019-12-27 RX ADMIN — ASPIRIN 325 MG: 325 TABLET, COATED ORAL at 08:36

## 2019-12-27 RX ADMIN — DOCUSATE SODIUM 100 MG: 100 CAPSULE, LIQUID FILLED ORAL at 08:35

## 2019-12-27 RX ADMIN — GLIPIZIDE 5 MG: 5 TABLET ORAL at 06:56

## 2019-12-27 RX ADMIN — NYSTATIN: 100000 CREAM TOPICAL at 21:45

## 2019-12-27 RX ADMIN — GABAPENTIN 100 MG: 100 CAPSULE ORAL at 21:44

## 2019-12-27 RX ADMIN — BUDESONIDE AND FORMOTEROL FUMARATE DIHYDRATE 2 PUFF: 160; 4.5 AEROSOL RESPIRATORY (INHALATION) at 07:33

## 2019-12-27 RX ADMIN — INSULIN LISPRO 2 UNITS: 100 INJECTION, SOLUTION INTRAVENOUS; SUBCUTANEOUS at 21:44

## 2019-12-27 RX ADMIN — GUAIFENESIN AND DEXTROMETHORPHAN HYDROBROMIDE 1 TABLET: 600; 30 TABLET, EXTENDED RELEASE ORAL at 21:44

## 2019-12-27 RX ADMIN — AMLODIPINE BESYLATE 2.5 MG: 2.5 TABLET ORAL at 08:36

## 2019-12-27 RX ADMIN — SODIUM CHLORIDE, PRESERVATIVE FREE 10 ML: 5 INJECTION INTRAVENOUS at 21:45

## 2019-12-27 RX ADMIN — INSULIN LISPRO 2 UNITS: 100 INJECTION, SOLUTION INTRAVENOUS; SUBCUTANEOUS at 17:23

## 2019-12-27 RX ADMIN — VILAZODONE HYDROCHLORIDE 20 MG: 40 TABLET ORAL at 21:44

## 2019-12-27 RX ADMIN — GABAPENTIN 100 MG: 100 CAPSULE ORAL at 08:35

## 2019-12-27 RX ADMIN — ATENOLOL 25 MG: 25 TABLET ORAL at 16:39

## 2019-12-27 RX ADMIN — ATORVASTATIN CALCIUM 20 MG: 20 TABLET, FILM COATED ORAL at 08:35

## 2019-12-27 RX ADMIN — DOCUSATE SODIUM 100 MG: 100 CAPSULE, LIQUID FILLED ORAL at 21:44

## 2019-12-27 RX ADMIN — PANTOPRAZOLE SODIUM 40 MG: 40 TABLET, DELAYED RELEASE ORAL at 06:56

## 2019-12-28 LAB
ANION GAP SERPL CALCULATED.3IONS-SCNC: 13.1 MMOL/L (ref 5–15)
BUN BLD-MCNC: 23 MG/DL (ref 8–23)
BUN/CREAT SERPL: 20.4 (ref 7–25)
CALCIUM SPEC-SCNC: 8.4 MG/DL (ref 8.6–10.5)
CHLORIDE SERPL-SCNC: 100 MMOL/L (ref 98–107)
CO2 SERPL-SCNC: 29.9 MMOL/L (ref 22–29)
CREAT BLD-MCNC: 1.13 MG/DL (ref 0.57–1)
DEPRECATED RDW RBC AUTO: 43.2 FL (ref 37–54)
ERYTHROCYTE [DISTWIDTH] IN BLOOD BY AUTOMATED COUNT: 14.4 % (ref 12.3–15.4)
GFR SERPL CREATININE-BSD FRML MDRD: 47 ML/MIN/1.73
GLUCOSE BLD-MCNC: 121 MG/DL (ref 65–99)
GLUCOSE BLDC GLUCOMTR-MCNC: 121 MG/DL (ref 70–130)
GLUCOSE BLDC GLUCOMTR-MCNC: 143 MG/DL (ref 70–130)
GLUCOSE BLDC GLUCOMTR-MCNC: 144 MG/DL (ref 70–130)
GLUCOSE BLDC GLUCOMTR-MCNC: 230 MG/DL (ref 70–130)
HCT VFR BLD AUTO: 28 % (ref 34–46.6)
HGB BLD-MCNC: 8.7 G/DL (ref 12–15.9)
MCH RBC QN AUTO: 26 PG (ref 26.6–33)
MCHC RBC AUTO-ENTMCNC: 31.1 G/DL (ref 31.5–35.7)
MCV RBC AUTO: 83.8 FL (ref 79–97)
PLATELET # BLD AUTO: 277 10*3/MM3 (ref 140–450)
PMV BLD AUTO: 10.6 FL (ref 6–12)
POTASSIUM BLD-SCNC: 4.1 MMOL/L (ref 3.5–5.2)
RBC # BLD AUTO: 3.34 10*6/MM3 (ref 3.77–5.28)
SODIUM BLD-SCNC: 143 MMOL/L (ref 136–145)
WBC NRBC COR # BLD: 10.02 10*3/MM3 (ref 3.4–10.8)

## 2019-12-28 PROCEDURE — 82962 GLUCOSE BLOOD TEST: CPT

## 2019-12-28 PROCEDURE — 80048 BASIC METABOLIC PNL TOTAL CA: CPT | Performed by: INTERNAL MEDICINE

## 2019-12-28 PROCEDURE — 93010 ELECTROCARDIOGRAM REPORT: CPT | Performed by: INTERNAL MEDICINE

## 2019-12-28 PROCEDURE — 94799 UNLISTED PULMONARY SVC/PX: CPT

## 2019-12-28 PROCEDURE — 85027 COMPLETE CBC AUTOMATED: CPT | Performed by: INTERNAL MEDICINE

## 2019-12-28 PROCEDURE — 63710000001 INSULIN LISPRO (HUMAN) PER 5 UNITS: Performed by: NURSE PRACTITIONER

## 2019-12-28 PROCEDURE — 93005 ELECTROCARDIOGRAM TRACING: CPT | Performed by: INTERNAL MEDICINE

## 2019-12-28 RX ADMIN — CLOTRIMAZOLE AND BETAMETHASONE DIPROPIONATE 1 APPLICATION: 10; .5 CREAM TOPICAL at 09:46

## 2019-12-28 RX ADMIN — AMLODIPINE BESYLATE 2.5 MG: 2.5 TABLET ORAL at 09:45

## 2019-12-28 RX ADMIN — Medication 1 CAPSULE: at 09:44

## 2019-12-28 RX ADMIN — ACETAMINOPHEN 650 MG: 325 TABLET, FILM COATED ORAL at 21:49

## 2019-12-28 RX ADMIN — LEVOTHYROXINE SODIUM 25 MCG: 25 TABLET ORAL at 06:57

## 2019-12-28 RX ADMIN — SODIUM CHLORIDE, PRESERVATIVE FREE 10 ML: 5 INJECTION INTRAVENOUS at 09:50

## 2019-12-28 RX ADMIN — VILAZODONE HYDROCHLORIDE 20 MG: 40 TABLET ORAL at 21:31

## 2019-12-28 RX ADMIN — PANTOPRAZOLE SODIUM 40 MG: 40 TABLET, DELAYED RELEASE ORAL at 06:57

## 2019-12-28 RX ADMIN — GUAIFENESIN AND DEXTROMETHORPHAN HYDROBROMIDE 1 TABLET: 600; 30 TABLET, EXTENDED RELEASE ORAL at 21:32

## 2019-12-28 RX ADMIN — FERROUS SULFATE TAB 325 MG (65 MG ELEMENTAL FE) 325 MG: 325 (65 FE) TAB at 09:45

## 2019-12-28 RX ADMIN — BUDESONIDE AND FORMOTEROL FUMARATE DIHYDRATE 2 PUFF: 160; 4.5 AEROSOL RESPIRATORY (INHALATION) at 08:18

## 2019-12-28 RX ADMIN — NYSTATIN: 100000 CREAM TOPICAL at 09:45

## 2019-12-28 RX ADMIN — NYSTATIN: 100000 CREAM TOPICAL at 21:32

## 2019-12-28 RX ADMIN — BUDESONIDE AND FORMOTEROL FUMARATE DIHYDRATE 2 PUFF: 160; 4.5 AEROSOL RESPIRATORY (INHALATION) at 19:10

## 2019-12-28 RX ADMIN — GABAPENTIN 100 MG: 100 CAPSULE ORAL at 09:45

## 2019-12-28 RX ADMIN — INSULIN LISPRO 4 UNITS: 100 INJECTION, SOLUTION INTRAVENOUS; SUBCUTANEOUS at 21:32

## 2019-12-28 RX ADMIN — ATORVASTATIN CALCIUM 20 MG: 20 TABLET, FILM COATED ORAL at 09:44

## 2019-12-28 RX ADMIN — ACETAMINOPHEN 650 MG: 325 TABLET, FILM COATED ORAL at 03:19

## 2019-12-28 RX ADMIN — SODIUM CHLORIDE, PRESERVATIVE FREE 10 ML: 5 INJECTION INTRAVENOUS at 21:32

## 2019-12-28 RX ADMIN — DOCUSATE SODIUM 100 MG: 100 CAPSULE, LIQUID FILLED ORAL at 21:32

## 2019-12-28 RX ADMIN — FUROSEMIDE 40 MG: 40 TABLET ORAL at 17:57

## 2019-12-28 RX ADMIN — FUROSEMIDE 40 MG: 40 TABLET ORAL at 09:44

## 2019-12-28 RX ADMIN — GLIPIZIDE 5 MG: 5 TABLET ORAL at 06:57

## 2019-12-28 RX ADMIN — DOCUSATE SODIUM 100 MG: 100 CAPSULE, LIQUID FILLED ORAL at 09:45

## 2019-12-28 RX ADMIN — GABAPENTIN 100 MG: 100 CAPSULE ORAL at 21:32

## 2019-12-28 RX ADMIN — ASPIRIN 325 MG: 325 TABLET, COATED ORAL at 09:45

## 2019-12-29 LAB
GLUCOSE BLDC GLUCOMTR-MCNC: 107 MG/DL (ref 70–130)
GLUCOSE BLDC GLUCOMTR-MCNC: 119 MG/DL (ref 70–130)
GLUCOSE BLDC GLUCOMTR-MCNC: 129 MG/DL (ref 70–130)
GLUCOSE BLDC GLUCOMTR-MCNC: 185 MG/DL (ref 70–130)

## 2019-12-29 PROCEDURE — 63710000001 INSULIN LISPRO (HUMAN) PER 5 UNITS: Performed by: NURSE PRACTITIONER

## 2019-12-29 PROCEDURE — 82962 GLUCOSE BLOOD TEST: CPT

## 2019-12-29 PROCEDURE — 94660 CPAP INITIATION&MGMT: CPT

## 2019-12-29 PROCEDURE — 94799 UNLISTED PULMONARY SVC/PX: CPT

## 2019-12-29 RX ADMIN — ASPIRIN 325 MG: 325 TABLET, COATED ORAL at 09:46

## 2019-12-29 RX ADMIN — SODIUM CHLORIDE, PRESERVATIVE FREE 10 ML: 5 INJECTION INTRAVENOUS at 09:46

## 2019-12-29 RX ADMIN — NYSTATIN: 100000 CREAM TOPICAL at 21:14

## 2019-12-29 RX ADMIN — ATENOLOL 25 MG: 25 TABLET ORAL at 09:46

## 2019-12-29 RX ADMIN — FUROSEMIDE 40 MG: 40 TABLET ORAL at 17:25

## 2019-12-29 RX ADMIN — ALPRAZOLAM 0.5 MG: 0.5 TABLET ORAL at 23:06

## 2019-12-29 RX ADMIN — DOCUSATE SODIUM 100 MG: 100 CAPSULE, LIQUID FILLED ORAL at 21:13

## 2019-12-29 RX ADMIN — NYSTATIN: 100000 CREAM TOPICAL at 09:46

## 2019-12-29 RX ADMIN — DOCUSATE SODIUM 100 MG: 100 CAPSULE, LIQUID FILLED ORAL at 09:45

## 2019-12-29 RX ADMIN — GABAPENTIN 100 MG: 100 CAPSULE ORAL at 21:13

## 2019-12-29 RX ADMIN — GLIPIZIDE 5 MG: 5 TABLET ORAL at 09:46

## 2019-12-29 RX ADMIN — VILAZODONE HYDROCHLORIDE 20 MG: 40 TABLET ORAL at 21:13

## 2019-12-29 RX ADMIN — ATORVASTATIN CALCIUM 20 MG: 20 TABLET, FILM COATED ORAL at 09:46

## 2019-12-29 RX ADMIN — GABAPENTIN 100 MG: 100 CAPSULE ORAL at 09:45

## 2019-12-29 RX ADMIN — FERROUS SULFATE TAB 325 MG (65 MG ELEMENTAL FE) 325 MG: 325 (65 FE) TAB at 09:45

## 2019-12-29 RX ADMIN — BUDESONIDE AND FORMOTEROL FUMARATE DIHYDRATE 2 PUFF: 160; 4.5 AEROSOL RESPIRATORY (INHALATION) at 19:52

## 2019-12-29 RX ADMIN — PANTOPRAZOLE SODIUM 40 MG: 40 TABLET, DELAYED RELEASE ORAL at 09:45

## 2019-12-29 RX ADMIN — CLOTRIMAZOLE AND BETAMETHASONE DIPROPIONATE: 10; .5 CREAM TOPICAL at 09:46

## 2019-12-29 RX ADMIN — INSULIN LISPRO 2 UNITS: 100 INJECTION, SOLUTION INTRAVENOUS; SUBCUTANEOUS at 21:13

## 2019-12-29 RX ADMIN — Medication 1 CAPSULE: at 09:45

## 2019-12-29 RX ADMIN — AMLODIPINE BESYLATE 2.5 MG: 2.5 TABLET ORAL at 09:46

## 2019-12-29 RX ADMIN — FUROSEMIDE 40 MG: 40 TABLET ORAL at 09:45

## 2019-12-29 RX ADMIN — LEVOTHYROXINE SODIUM 25 MCG: 25 TABLET ORAL at 09:46

## 2019-12-30 ENCOUNTER — TELEPHONE (OUTPATIENT)
Dept: INTERNAL MEDICINE | Facility: CLINIC | Age: 74
End: 2019-12-30

## 2019-12-30 LAB
ANION GAP SERPL CALCULATED.3IONS-SCNC: 11.6 MMOL/L (ref 5–15)
BUN BLD-MCNC: 19 MG/DL (ref 8–23)
BUN/CREAT SERPL: 17 (ref 7–25)
CALCIUM SPEC-SCNC: 8.7 MG/DL (ref 8.6–10.5)
CHLORIDE SERPL-SCNC: 99 MMOL/L (ref 98–107)
CO2 SERPL-SCNC: 31.4 MMOL/L (ref 22–29)
CREAT BLD-MCNC: 1.12 MG/DL (ref 0.57–1)
DEPRECATED RDW RBC AUTO: 41.9 FL (ref 37–54)
ERYTHROCYTE [DISTWIDTH] IN BLOOD BY AUTOMATED COUNT: 14.3 % (ref 12.3–15.4)
GFR SERPL CREATININE-BSD FRML MDRD: 47 ML/MIN/1.73
GLUCOSE BLD-MCNC: 79 MG/DL (ref 65–99)
GLUCOSE BLDC GLUCOMTR-MCNC: 104 MG/DL (ref 70–130)
GLUCOSE BLDC GLUCOMTR-MCNC: 84 MG/DL (ref 70–130)
GLUCOSE BLDC GLUCOMTR-MCNC: 95 MG/DL (ref 70–130)
GLUCOSE BLDC GLUCOMTR-MCNC: 99 MG/DL (ref 70–130)
HCT VFR BLD AUTO: 28 % (ref 34–46.6)
HGB BLD-MCNC: 9.2 G/DL (ref 12–15.9)
MAGNESIUM SERPL-MCNC: 2.1 MG/DL (ref 1.6–2.4)
MCH RBC QN AUTO: 27.1 PG (ref 26.6–33)
MCHC RBC AUTO-ENTMCNC: 32.9 G/DL (ref 31.5–35.7)
MCV RBC AUTO: 82.4 FL (ref 79–97)
PLATELET # BLD AUTO: 304 10*3/MM3 (ref 140–450)
PMV BLD AUTO: 10.4 FL (ref 6–12)
POTASSIUM BLD-SCNC: 3.6 MMOL/L (ref 3.5–5.2)
RBC # BLD AUTO: 3.4 10*6/MM3 (ref 3.77–5.28)
SODIUM BLD-SCNC: 142 MMOL/L (ref 136–145)
WBC NRBC COR # BLD: 9.04 10*3/MM3 (ref 3.4–10.8)

## 2019-12-30 PROCEDURE — 80048 BASIC METABOLIC PNL TOTAL CA: CPT | Performed by: INTERNAL MEDICINE

## 2019-12-30 PROCEDURE — 82962 GLUCOSE BLOOD TEST: CPT

## 2019-12-30 PROCEDURE — 94799 UNLISTED PULMONARY SVC/PX: CPT

## 2019-12-30 PROCEDURE — 97110 THERAPEUTIC EXERCISES: CPT

## 2019-12-30 PROCEDURE — 83735 ASSAY OF MAGNESIUM: CPT | Performed by: INTERNAL MEDICINE

## 2019-12-30 PROCEDURE — 97140 MANUAL THERAPY 1/> REGIONS: CPT

## 2019-12-30 PROCEDURE — 85027 COMPLETE CBC AUTOMATED: CPT | Performed by: INTERNAL MEDICINE

## 2019-12-30 RX ORDER — POTASSIUM CHLORIDE 750 MG/1
40 CAPSULE, EXTENDED RELEASE ORAL ONCE
Status: COMPLETED | OUTPATIENT
Start: 2019-12-30 | End: 2019-12-30

## 2019-12-30 RX ADMIN — ALPRAZOLAM 0.5 MG: 0.5 TABLET ORAL at 23:24

## 2019-12-30 RX ADMIN — PANTOPRAZOLE SODIUM 40 MG: 40 TABLET, DELAYED RELEASE ORAL at 06:41

## 2019-12-30 RX ADMIN — FUROSEMIDE 40 MG: 40 TABLET ORAL at 09:28

## 2019-12-30 RX ADMIN — BUDESONIDE AND FORMOTEROL FUMARATE DIHYDRATE 2 PUFF: 160; 4.5 AEROSOL RESPIRATORY (INHALATION) at 08:25

## 2019-12-30 RX ADMIN — Medication 1 CAPSULE: at 09:28

## 2019-12-30 RX ADMIN — ATORVASTATIN CALCIUM 20 MG: 20 TABLET, FILM COATED ORAL at 09:28

## 2019-12-30 RX ADMIN — DOCUSATE SODIUM 100 MG: 100 CAPSULE, LIQUID FILLED ORAL at 21:40

## 2019-12-30 RX ADMIN — AMLODIPINE BESYLATE 2.5 MG: 2.5 TABLET ORAL at 09:28

## 2019-12-30 RX ADMIN — SODIUM CHLORIDE, PRESERVATIVE FREE 10 ML: 5 INJECTION INTRAVENOUS at 09:28

## 2019-12-30 RX ADMIN — FUROSEMIDE 40 MG: 40 TABLET ORAL at 17:14

## 2019-12-30 RX ADMIN — LEVOTHYROXINE SODIUM 25 MCG: 25 TABLET ORAL at 06:41

## 2019-12-30 RX ADMIN — NYSTATIN: 100000 CREAM TOPICAL at 21:43

## 2019-12-30 RX ADMIN — VILAZODONE HYDROCHLORIDE 20 MG: 40 TABLET ORAL at 21:40

## 2019-12-30 RX ADMIN — GABAPENTIN 100 MG: 100 CAPSULE ORAL at 21:40

## 2019-12-30 RX ADMIN — DOCUSATE SODIUM 100 MG: 100 CAPSULE, LIQUID FILLED ORAL at 09:28

## 2019-12-30 RX ADMIN — NYSTATIN: 100000 CREAM TOPICAL at 09:28

## 2019-12-30 RX ADMIN — GLIPIZIDE 5 MG: 5 TABLET ORAL at 06:41

## 2019-12-30 RX ADMIN — ASPIRIN 325 MG: 325 TABLET, COATED ORAL at 09:28

## 2019-12-30 RX ADMIN — ALPRAZOLAM 0.5 MG: 0.5 TABLET ORAL at 16:06

## 2019-12-30 RX ADMIN — CLOTRIMAZOLE AND BETAMETHASONE DIPROPIONATE: 10; .5 CREAM TOPICAL at 09:28

## 2019-12-30 RX ADMIN — ATENOLOL 25 MG: 25 TABLET ORAL at 09:28

## 2019-12-30 RX ADMIN — BUDESONIDE AND FORMOTEROL FUMARATE DIHYDRATE 2 PUFF: 160; 4.5 AEROSOL RESPIRATORY (INHALATION) at 20:44

## 2019-12-30 RX ADMIN — GABAPENTIN 100 MG: 100 CAPSULE ORAL at 09:28

## 2019-12-30 RX ADMIN — POTASSIUM CHLORIDE 40 MEQ: 750 CAPSULE, EXTENDED RELEASE ORAL at 21:40

## 2019-12-30 NOTE — TELEPHONE ENCOUNTER
Patient left voicemail message stating that she is receiving a bill from [facility] for her oxygen that shows that she owes over $1000.00 and that Dr. Bush better sign the paperwork to get this covered.  Spoke to [facility] [phone] and was notified that they do not need anything from this office.  Patient's insurance does not cover her oxygen in full and she does owe this.  They are trying to get her covered as a financial hardship and have explained this to patient and her granddaughter.  They will call them again to let them know that this office does not need to do anything and will try to work out something with them for payment.

## 2019-12-31 LAB
ALBUMIN SERPL-MCNC: 3.2 G/DL (ref 3.5–5.2)
ANION GAP SERPL CALCULATED.3IONS-SCNC: 11.5 MMOL/L (ref 5–15)
BUN BLD-MCNC: 19 MG/DL (ref 8–23)
BUN/CREAT SERPL: 17 (ref 7–25)
CALCIUM SPEC-SCNC: 8.7 MG/DL (ref 8.6–10.5)
CHLORIDE SERPL-SCNC: 102 MMOL/L (ref 98–107)
CO2 SERPL-SCNC: 30.5 MMOL/L (ref 22–29)
CREAT BLD-MCNC: 1.12 MG/DL (ref 0.57–1)
GFR SERPL CREATININE-BSD FRML MDRD: 47 ML/MIN/1.73
GLUCOSE BLD-MCNC: 88 MG/DL (ref 65–99)
GLUCOSE BLDC GLUCOMTR-MCNC: 140 MG/DL (ref 70–130)
GLUCOSE BLDC GLUCOMTR-MCNC: 152 MG/DL (ref 70–130)
GLUCOSE BLDC GLUCOMTR-MCNC: 156 MG/DL (ref 70–130)
GLUCOSE BLDC GLUCOMTR-MCNC: 88 MG/DL (ref 70–130)
PHOSPHATE SERPL-MCNC: 4.5 MG/DL (ref 2.5–4.5)
POTASSIUM BLD-SCNC: 4.5 MMOL/L (ref 3.5–5.2)
SODIUM BLD-SCNC: 144 MMOL/L (ref 136–145)

## 2019-12-31 PROCEDURE — 94799 UNLISTED PULMONARY SVC/PX: CPT

## 2019-12-31 PROCEDURE — 80069 RENAL FUNCTION PANEL: CPT | Performed by: INTERNAL MEDICINE

## 2019-12-31 PROCEDURE — 63710000001 INSULIN LISPRO (HUMAN) PER 5 UNITS: Performed by: NURSE PRACTITIONER

## 2019-12-31 PROCEDURE — 82962 GLUCOSE BLOOD TEST: CPT

## 2019-12-31 PROCEDURE — 94660 CPAP INITIATION&MGMT: CPT

## 2019-12-31 RX ADMIN — AMLODIPINE BESYLATE 2.5 MG: 2.5 TABLET ORAL at 09:20

## 2019-12-31 RX ADMIN — FUROSEMIDE 40 MG: 40 TABLET ORAL at 18:52

## 2019-12-31 RX ADMIN — ATENOLOL 25 MG: 25 TABLET ORAL at 09:20

## 2019-12-31 RX ADMIN — GABAPENTIN 100 MG: 100 CAPSULE ORAL at 20:03

## 2019-12-31 RX ADMIN — FUROSEMIDE 40 MG: 40 TABLET ORAL at 09:20

## 2019-12-31 RX ADMIN — INSULIN LISPRO 2 UNITS: 100 INJECTION, SOLUTION INTRAVENOUS; SUBCUTANEOUS at 21:57

## 2019-12-31 RX ADMIN — NYSTATIN: 100000 CREAM TOPICAL at 09:20

## 2019-12-31 RX ADMIN — ATORVASTATIN CALCIUM 20 MG: 20 TABLET, FILM COATED ORAL at 09:20

## 2019-12-31 RX ADMIN — ASPIRIN 325 MG: 325 TABLET, COATED ORAL at 09:20

## 2019-12-31 RX ADMIN — GABAPENTIN 100 MG: 100 CAPSULE ORAL at 09:20

## 2019-12-31 RX ADMIN — PANTOPRAZOLE SODIUM 40 MG: 40 TABLET, DELAYED RELEASE ORAL at 06:20

## 2019-12-31 RX ADMIN — DOCUSATE SODIUM 100 MG: 100 CAPSULE, LIQUID FILLED ORAL at 09:20

## 2019-12-31 RX ADMIN — DOCUSATE SODIUM 100 MG: 100 CAPSULE, LIQUID FILLED ORAL at 20:03

## 2019-12-31 RX ADMIN — NYSTATIN: 100000 CREAM TOPICAL at 20:03

## 2019-12-31 RX ADMIN — VILAZODONE HYDROCHLORIDE 20 MG: 40 TABLET ORAL at 20:02

## 2019-12-31 RX ADMIN — CLOTRIMAZOLE AND BETAMETHASONE DIPROPIONATE: 10; .5 CREAM TOPICAL at 09:20

## 2019-12-31 RX ADMIN — ALPRAZOLAM 0.5 MG: 0.5 TABLET ORAL at 20:02

## 2019-12-31 RX ADMIN — SODIUM CHLORIDE, PRESERVATIVE FREE 10 ML: 5 INJECTION INTRAVENOUS at 20:03

## 2019-12-31 RX ADMIN — LEVOTHYROXINE SODIUM 25 MCG: 25 TABLET ORAL at 06:20

## 2019-12-31 RX ADMIN — BUDESONIDE AND FORMOTEROL FUMARATE DIHYDRATE 2 PUFF: 160; 4.5 AEROSOL RESPIRATORY (INHALATION) at 20:33

## 2019-12-31 RX ADMIN — FERROUS SULFATE TAB 325 MG (65 MG ELEMENTAL FE) 325 MG: 325 (65 FE) TAB at 09:20

## 2019-12-31 RX ADMIN — GLIPIZIDE 5 MG: 5 TABLET ORAL at 06:20

## 2019-12-31 RX ADMIN — Medication 1 CAPSULE: at 09:20

## 2019-12-31 RX ADMIN — SODIUM CHLORIDE, PRESERVATIVE FREE 10 ML: 5 INJECTION INTRAVENOUS at 09:21

## 2019-12-31 RX ADMIN — INSULIN LISPRO 2 UNITS: 100 INJECTION, SOLUTION INTRAVENOUS; SUBCUTANEOUS at 12:01

## 2020-01-01 LAB
GLUCOSE BLDC GLUCOMTR-MCNC: 121 MG/DL (ref 70–130)
GLUCOSE BLDC GLUCOMTR-MCNC: 132 MG/DL (ref 70–130)
GLUCOSE BLDC GLUCOMTR-MCNC: 143 MG/DL (ref 70–130)
GLUCOSE BLDC GLUCOMTR-MCNC: 90 MG/DL (ref 70–130)

## 2020-01-01 PROCEDURE — 82962 GLUCOSE BLOOD TEST: CPT

## 2020-01-01 PROCEDURE — 94799 UNLISTED PULMONARY SVC/PX: CPT

## 2020-01-01 PROCEDURE — 97110 THERAPEUTIC EXERCISES: CPT

## 2020-01-01 RX ORDER — LOSARTAN POTASSIUM 100 MG/1
100 TABLET ORAL
Status: DISCONTINUED | OUTPATIENT
Start: 2020-01-01 | End: 2020-01-08 | Stop reason: HOSPADM

## 2020-01-01 RX ADMIN — ATORVASTATIN CALCIUM 20 MG: 20 TABLET, FILM COATED ORAL at 09:13

## 2020-01-01 RX ADMIN — LEVOTHYROXINE SODIUM 25 MCG: 25 TABLET ORAL at 06:20

## 2020-01-01 RX ADMIN — Medication 1 CAPSULE: at 09:13

## 2020-01-01 RX ADMIN — ACETAMINOPHEN 650 MG: 325 TABLET, FILM COATED ORAL at 00:42

## 2020-01-01 RX ADMIN — BUDESONIDE AND FORMOTEROL FUMARATE DIHYDRATE 2 PUFF: 160; 4.5 AEROSOL RESPIRATORY (INHALATION) at 09:29

## 2020-01-01 RX ADMIN — NYSTATIN: 100000 CREAM TOPICAL at 09:13

## 2020-01-01 RX ADMIN — GABAPENTIN 100 MG: 100 CAPSULE ORAL at 09:13

## 2020-01-01 RX ADMIN — VILAZODONE HYDROCHLORIDE 20 MG: 40 TABLET ORAL at 20:30

## 2020-01-01 RX ADMIN — ASPIRIN 325 MG: 325 TABLET, COATED ORAL at 09:12

## 2020-01-01 RX ADMIN — ATENOLOL 25 MG: 25 TABLET ORAL at 09:12

## 2020-01-01 RX ADMIN — GUAIFENESIN AND DEXTROMETHORPHAN HYDROBROMIDE 1 TABLET: 600; 30 TABLET, EXTENDED RELEASE ORAL at 21:28

## 2020-01-01 RX ADMIN — GLIPIZIDE 5 MG: 5 TABLET ORAL at 09:13

## 2020-01-01 RX ADMIN — AMLODIPINE BESYLATE 2.5 MG: 2.5 TABLET ORAL at 09:13

## 2020-01-01 RX ADMIN — FUROSEMIDE 40 MG: 40 TABLET ORAL at 17:59

## 2020-01-01 RX ADMIN — BUDESONIDE AND FORMOTEROL FUMARATE DIHYDRATE 2 PUFF: 160; 4.5 AEROSOL RESPIRATORY (INHALATION) at 23:24

## 2020-01-01 RX ADMIN — NYSTATIN: 100000 CREAM TOPICAL at 20:30

## 2020-01-01 RX ADMIN — DOCUSATE SODIUM 100 MG: 100 CAPSULE, LIQUID FILLED ORAL at 09:13

## 2020-01-01 RX ADMIN — SODIUM CHLORIDE, PRESERVATIVE FREE 10 ML: 5 INJECTION INTRAVENOUS at 20:30

## 2020-01-01 RX ADMIN — GABAPENTIN 100 MG: 100 CAPSULE ORAL at 20:30

## 2020-01-01 RX ADMIN — SODIUM CHLORIDE, PRESERVATIVE FREE 10 ML: 5 INJECTION INTRAVENOUS at 09:14

## 2020-01-01 RX ADMIN — CLOTRIMAZOLE AND BETAMETHASONE DIPROPIONATE: 10; .5 CREAM TOPICAL at 09:14

## 2020-01-01 RX ADMIN — ALPRAZOLAM 0.5 MG: 0.5 TABLET ORAL at 11:33

## 2020-01-01 RX ADMIN — PANTOPRAZOLE SODIUM 40 MG: 40 TABLET, DELAYED RELEASE ORAL at 06:20

## 2020-01-01 RX ADMIN — LOSARTAN POTASSIUM 100 MG: 100 TABLET, FILM COATED ORAL at 15:18

## 2020-01-01 RX ADMIN — FUROSEMIDE 40 MG: 40 TABLET ORAL at 09:12

## 2020-01-01 RX ADMIN — ACETAMINOPHEN 650 MG: 325 TABLET, FILM COATED ORAL at 23:09

## 2020-01-01 RX ADMIN — DOCUSATE SODIUM 100 MG: 100 CAPSULE, LIQUID FILLED ORAL at 20:30

## 2020-01-01 NOTE — PLAN OF CARE
Problem: Patient Care Overview  Goal: Plan of Care Review  Flowsheets (Taken 1/1/2020 1603)  Progress: improving  Plan of Care Reviewed With: patient  Outcome Summary: Ambulated 200' SBA with a RW- 92% O2 sats reading post ambulation. Patient plans for home with  PT upon DC to ensure home safety and address any environmental concerns. Patient has met all goals for PT services acutely- encourage patient to continue to ambulate with nursing.

## 2020-01-01 NOTE — PROGRESS NOTES
[facility] INPATIENT PROGRESS NOTE         [address]    2020      PATIENT IDENTIFICATION:  Name: Miguelina Lopez ADMIT: 12/15/2019   : 1945  PCP: Michelle Abernathy MD    MRN: [id number] LOS: 17 days   AGE/SEX: 74 y.o. female  ROOM: [facility]                     LOS 17    Reason for visit: Follow-up respiratory failure with OCHOA/OHS and chronic CO2 retention      SUBJECTIVE:      Attempting to do [id number].  Have called the phone number for peer to peer multiple times.  Unable to get an agent.  The numbers that I am not aware of.  Discussed with patient.  She spoke to [employer] herself yesterday and says that the wrap on the telephone felt that she would likely have approval in the next day or so.  Their office is currently closed and I am unable to connect to a real person for [id number].    Objective   OBJECTIVE:    Vital Sign Min/Max for last 24 hours  Temp  Min: 98.4 °F (36.9 °C)  Max: 99.9 °F (37.7 °C)   BP  Min: 129/64  Max: 150/60   Pulse  Min: 43  Max: 84   Resp  Min: 16  Max: 16   SpO2  Min: 94 %  Max: 100 %   No data recorded   Weight  Min: 120 kg (265 lb 9.6 oz)  Max: 120 kg (265 lb 9.6 oz)                         Body mass index is 51.87 kg/m².    Intake/Output Summary (Last 24 hours) at 2020 1138  Last data filed at 2020 1008  Gross per 24 hour   Intake 642 ml   Output 1801 ml   Net -1159 ml         Exam:  GEN:  No distress, appears stated age  EYES:   PERRL, anicteric sclera  ENT:    External ears/nose normal, OP clear  NECK:  No adenopathy, midline trachea  LUNGS: Normal chest on inspection, palpation and diminished breath sounds on auscultation  CV:  Normal S1S2, without murmur  ABD:  Non tender, non distended, no hepatosplenomegaly, +BS  EXT:  No edema, cyanosis or clubbing    Scheduled meds:      amLODIPine 2.5 mg Oral Q24H   aspirin  mg Oral Daily   atenolol 25 mg Oral Daily   atorvastatin 20 mg Oral Daily   budesonide-formoterol 2 puff Inhalation BID -  RT   clotrimazole-betamethasone  Topical Daily   docusate sodium 100 mg Oral BID   ferrous sulfate 325 mg Oral Daily With Breakfast   furosemide 40 mg Oral BID   gabapentin 100 mg Oral Q12H   glipizide 5 mg Oral QAM AC   insulin lispro 0-9 Units Subcutaneous 4x Daily With Meals & Nightly   lactobacillus acidophilus 1 capsule Oral Daily   levothyroxine 25 mcg Oral Q AM   nystatin  Topical BID   pantoprazole 40 mg Oral QAM   sodium chloride 10 mL Intravenous Q12H   vilazodone 20 mg Oral Nightly     IV meds:                         Data Review:  Results from last 7 days   Lab Units 12/31/19  0631 12/30/19  0629 12/28/19  0544 12/27/19  0459 12/26/19  0548   SODIUM mmol/L 144 142 143 144 144   POTASSIUM mmol/L 4.5 3.6 4.1 4.0 4.0   CHLORIDE mmol/L 102 99 100 100 99   CO2 mmol/L 30.5* 31.4* 29.9* 32.8* 31.9*   BUN mg/dL 19 19 23 23 25*   CREATININE mg/dL 1.12* 1.12* 1.13* 1.16* 1.19*   GLUCOSE mg/dL 88 79 121* 117* 126*   CALCIUM mg/dL 8.7 8.7 8.4* 8.4* 8.5*         Estimated Creatinine Clearance: 51.6 mL/min (A) (by C-G formula based on SCr of 1.12 mg/dL (H)).  Results from last 7 days   Lab Units 12/30/19  0629 12/28/19  0544   WBC 10*3/mm3 9.04 10.02   HEMOGLOBIN g/dL 9.2* 8.7*   PLATELETS 10*3/mm3 304 277                             )Assessment   ASSESSMENT:      Active Hospital Problems    Diagnosis  POA   • **Acute on chronic diastolic CHF (congestive heart failure) (CMS/[facility]) [I50.33]  Yes   • Acute on chronic respiratory failure with hypoxia and hypercapnia (CMS/[facility]) [J96.21, J96.22]  No   • Subclinical hypothyroidism [E03.9]  Yes   • Proteinuria [R80.9]  Yes   • Bilateral lower extremity edema [R60.0]  Yes   • Class 3 severe obesity due to excess calories with serious comorbidity and body mass index (BMI) of 50.0 to 59.9 in adult (CMS/HCC) [E66.01, Z68.43]  Not Applicable   • OLI (acute kidney injury) (CMS/HCC) [N17.9]  Yes   • Pulmonary hypertension due to sleep-disordered breathing (CMS/HCC) [I27.29, G47.8]   Yes   • Anemia [D64.9]  Yes   • DM type 2 (diabetes mellitus, type 2) (CMS/[facility]) [E11.9]  Yes   • Diabetic peripheral neuropathy (CMS/[facility]) [E11.42]  Yes   • Essential hypertension [I10]  Yes   • CKD (chronic kidney disease) stage 3, GFR 30-59 ml/min (CMS/[facility]) [N18.3]  Yes      Resolved Hospital Problems   No resolved problems to display.     Acute hypoxemic and chronic hypercapnic respiratory failure  Obstructive sleep apnea   Obesity hypoventilation  Morbid obesity  Chronic respiratory failure  Congestive heart failure  Chronic kidney disease  Valvular heart disease  CAD  DM      PLAN:  Awaiting trilogy machine.  Has chronic CO2 retention and would certainly benefit from Trilogy use.  Has tried and failed CPAP/BiPAP with persistent elevation of CO2 despite BiPAP attempt.  Without having trilogy available patient will certainly be at increased risk for recurrent hospitalizations as well as death.  Discussed with Dr Huang.  Encourage pulmonary toilet.    Sree Kilpatrick MD  Pulmonary and Critical Care Medicine  [facility], [facility]  1/1/2020    11:38 AM

## 2020-01-01 NOTE — PROGRESS NOTES
Name: Miguelina Lopez ADMIT: 12/15/2019   : 1945  PCP: Michelle Abernathy MD    MRN: [id number] LOS: 17 days   AGE/SEX: 74 y.o. female  ROOM: [facility]   Subjective   Chief Complaint   Patient presents with   • Foot Swelling      No CP NVD. SOA stable.    Objective   Vital Signs  Temp:  [98.4 °F (36.9 °C)-99.9 °F (37.7 °C)] 98.6 °F (37 °C)  Heart Rate:  [43-84] 70  Resp:  [16] 16  BP: (129-150)/(60-71) 150/60  SpO2:  [94 %-100 %] 98 %  on  Flow (L/min):  [1.5-3] 2;   Device (Oxygen Therapy): nasal cannula  Body mass index is 51.87 kg/m².    Physical Exam   Constitutional: She is oriented to person, place, and time. She appears well-developed and well-nourished.   HENT:   Head: Atraumatic.   Nose: Nose normal.   Eyes: Conjunctivae and EOM are normal.   Neck: Neck supple. No tracheal deviation present.   Cardiovascular: Normal rate and regular rhythm.   Murmur heard.  Pulmonary/Chest: Effort normal. No respiratory distress. She has decreased breath sounds. She has no wheezes. She has no rales.   Abdominal: Soft. She exhibits no distension. There is no tenderness.   Musculoskeletal: She exhibits edema (BLE wraps in place). She exhibits no tenderness.   Neurological: She is alert and oriented to person, place, and time.   Skin: Skin is warm and dry. She is not diaphoretic.   Psychiatric: She has a normal mood and affect. Her behavior is normal.   Nursing note and vitals reviewed.      Results Review:       I reviewed the patient's new clinical results.      Results from last 7 days   Lab Units 19  0629 19  0544   WBC 10*3/mm3 9.04 10.02   HEMOGLOBIN g/dL 9.2* 8.7*   PLATELETS 10*3/mm3 304 277     Results from last 7 days   Lab Units 19  0631 19  0629 19  0544 19  0459   SODIUM mmol/L 144 142 143 144   POTASSIUM mmol/L 4.5 3.6 4.1 4.0   CHLORIDE mmol/L 102 99 100 100   CO2 mmol/L 30.5* 31.4* 29.9* 32.8*   BUN mg/dL 19 19 23 23   CREATININE mg/dL 1.12* 1.12* 1.13* 1.16*    GLUCOSE mg/dL 88 79 121* 117*   Estimated Creatinine Clearance: 51.6 mL/min (A) (by C-G formula based on SCr of 1.12 mg/dL (H)).  Results from last 7 days   Lab Units 12/31/19  0631 12/30/19  0629 12/28/19  0544 12/27/19  0459 12/26/19  0548   CALCIUM mg/dL 8.7 8.7 8.4* 8.4* 8.5*   ALBUMIN g/dL 3.20*  --   --   --  3.20*   MAGNESIUM mg/dL  --  2.1  --   --  2.2   PHOSPHORUS mg/dL 4.5  --   --   --  4.2         amLODIPine 2.5 mg Oral Q24H   aspirin  mg Oral Daily   atenolol 25 mg Oral Daily   atorvastatin 20 mg Oral Daily   budesonide-formoterol 2 puff Inhalation BID - RT   clotrimazole-betamethasone  Topical Daily   docusate sodium 100 mg Oral BID   ferrous sulfate 325 mg Oral Daily With Breakfast   furosemide 40 mg Oral BID   gabapentin 100 mg Oral Q12H   glipizide 5 mg Oral QAM AC   insulin lispro 0-9 Units Subcutaneous 4x Daily With Meals & Nightly   lactobacillus acidophilus 1 capsule Oral Daily   levothyroxine 25 mcg Oral Q AM   nystatin  Topical BID   pantoprazole 40 mg Oral QAM   sodium chloride 10 mL Intravenous Q12H   vilazodone 20 mg Oral Nightly      Diet Regular; Cardiac, Consistent Carbohydrate, Daily Fluid Restriction; 1500 mL Fluid Per Day      Assessment/Plan      Active Hospital Problems    Diagnosis  POA   • **Acute on chronic diastolic CHF (congestive heart failure) (CMS/[facility]) [I50.33]  Yes   • Acute on chronic respiratory failure with hypoxia and hypercapnia (CMS/[facility]) [J96.21, J96.22]  No   • Subclinical hypothyroidism [E03.9]  Yes   • Proteinuria [R80.9]  Yes   • Bilateral lower extremity edema [R60.0]  Yes   • Class 3 severe obesity due to excess calories with serious comorbidity and body mass index (BMI) of 50.0 to 59.9 in adult (CMS/[facility]) [E66.01, Z68.43]  Not Applicable   • OLI (acute kidney injury) (CMS/[facility]) [N17.9]  Yes   • Pulmonary hypertension due to sleep-disordered breathing (CMS/[facility]) [I27.29, G47.8]  Yes   • Anemia [D64.9]  Yes   • DM type 2 (diabetes mellitus, type 2)  "(CMS/[facility]) [E11.9]  Yes   • Diabetic peripheral neuropathy (CMS/[facility]) [E11.42]  Yes   • Essential hypertension [I10]  Yes   • CKD (chronic kidney disease) stage 3, GFR 30-59 ml/min (CMS/[facility]) [N18.3]  Yes      Resolved Hospital Problems   No resolved problems to display.       · Acute on chronic diastolic heart failure/valvular heart disease: S/P Lasix gtt. Continue oral diuretics.  Nephrology following.  · OLI/CKD 3  · Pulmonary hypertension/OHS/OCHOA/acute on chronic mixed respiratory failure: Trilogy recommended by pulmonology and awaiting insurance approval/coordination.  Pulmonology following.  · Hypothyroidism: Synthroid.  Repeat TSH in 6 to 8 weeks.  · Diabetes: A1c 6.4.  Continue current diabetic regimen  · Lymphedema  · Anemia: Chronic iron deficient  · Disposition: Home health, STILL waiting on trilogy determination by her insurance. This is the only thing keeping her in the hospital.    Patient called her [employer] rep and was told that decision should be made in the morning when the appropriate staff at [facility] is available.  They apparently told her that it would help if the hospital contacted them.  I personally know that our care coordinator has been contacting them daily.  In addition I discussed with Dr. Kilpatrick, pulmonology, and he attempted to contact them multiple times today getting voicemail and some type of machine that required so much information he could not get through.  He was never able to successfully talk to a person.  I appreciate his attention to the patient and multiple attempts with trying to coordinate with her insurance.  Per his note the patient \"Has tried and failed CPAP/BiPAP with persistent elevation of CO2 despite BiPAP attempt.  Without having trilogy available patient will certainly be at increased risk for recurrent hospitalizations as well as death.\"    Calvin Huang MD  [facility]  01/01/20  11:49 AM    Dictated portions using Dragon dictation " software.

## 2020-01-01 NOTE — PLAN OF CARE
Problem: Patient Care Overview  Goal: Plan of Care Review  Outcome: Ongoing (interventions implemented as appropriate)  Flowsheets (Taken 1/1/2020 8298)  Progress: improving  Plan of Care Reviewed With: patient  Outcome Summary: VSS. BiPAP on while sleeping. Awaiting insurance and Trilogy to come in for patient to be discharged. No new complaints overnight. Will continue to monitor.     Problem: Fall Risk (Adult)  Goal: Absence of Fall  Outcome: Ongoing (interventions implemented as appropriate)     Problem: Skin Injury Risk (Adult)  Goal: Skin Health and Integrity  Outcome: Ongoing (interventions implemented as appropriate)     Problem: Activity Intolerance (Adult)  Goal: Activity Tolerance  Outcome: Ongoing (interventions implemented as appropriate)     Problem: Breathing Pattern Ineffective (Adult)  Goal: Anxiety/Fear Reduction  Outcome: Ongoing (interventions implemented as appropriate)

## 2020-01-01 NOTE — THERAPY DISCHARGE NOTE
Patient Name: Miguelina Lopez  : 1945    MRN: [id number]                              Today's Date: 2020       Admit Date: 12/15/2019    Visit Dx:     ICD-10-CM ICD-9-CM   1. Acute on chronic combined systolic and diastolic CHF (congestive heart failure) (CMS/[facility]) I50.43 428.43     428.0   2. Acute kidney injury superimposed on chronic kidney disease (CMS/[facility]) N17.9 866.00    N18.9 585.9     Patient Active Problem List   Diagnosis   • Anxiety disorder   • Arthritis of knee   • Asthma   • Chronic coronary artery disease   • CKD (chronic kidney disease) stage 3, GFR 30-59 ml/min (CMS/[facility])   • Depression   • Diabetic peripheral neuropathy (CMS/[facility])   • Gastroesophageal reflux disease   • Hyperlipidemia   • Insomnia   • Lower gastrointestinal hemorrhage   • Anemia   • OCHOA (obstructive sleep apnea)   • DM type 2 (diabetes mellitus, type 2) (CMS/[facility])   • Essential hypertension   • Hospital discharge follow-up   • Pulmonary hypertension due to sleep-disordered breathing (CMS/[facility])   • s/p MVR, TV-repair, CABG x2 16   • OLI (acute kidney injury) (CMS/[facility])   • Leukocytosis   • Atrial fibrillation (CMS/[facility])   • Nocturnal hypoxia   • Dermatitis   • Medicare annual wellness visit, initial   • Class 3 severe obesity due to excess calories with serious comorbidity and body mass index (BMI) of 50.0 to 59.9 in adult (CMS/[facility])   • Supplemental oxygen dependent   • Mitral regurgitation   • Aortic stenosis   • Medicare annual wellness visit, subsequent   • Localized edema   • Chronic right-sided congestive heart failure (CMS/[facility])   • Cellulitis of left lower extremity   • Proteinuria   • Bilateral lower extremity edema   • Subclinical hypothyroidism   • Acute on chronic diastolic CHF (congestive heart failure) (CMS/[facility])   • Chronic respiratory failure with hypoxia and hypercapnia (CMS/[facility])   • Acute on chronic respiratory failure with hypoxia and hypercapnia (CMS/[facility])     Past Medical History:   Diagnosis Date   •  Anemia    • Anxiety    • Aortic valve stenosis    • CHF (congestive heart failure) (CMS/[facility])    • Chronic coronary artery disease    • Class 3 severe obesity due to excess calories in adult (CMS/[facility])    • Depression    • Diabetes mellitus (CMS/[facility])    • Heart murmur    • Mitral valve insufficiency    • Pneumonia     1/2016   • Pulmonary hypertension (CMS/[facility])     due to sleep disordered breathing   • Sleep apnea     Uses CPAP or oxygen   • Stage 3 chronic kidney disease (CMS/[facility])    • Supplemental oxygen dependent      Past Surgical History:   Procedure Laterality Date   • CARDIAC CATHETERIZATION     • CARDIAC CATHETERIZATION N/A 6/10/2016    Procedure: Left Heart Cath;  Surgeon: Chace Johnson MD;  Location: [facility] CATH INVASIVE LOCATION;  Service:    • CARDIAC CATHETERIZATION N/A 6/10/2016    Procedure: Right Heart Cath;  Surgeon: Chace Johnson MD;  Location: [facility] CATH INVASIVE LOCATION;  Service:    • CORONARY ARTERY BYPASS GRAFT      2 vessel   • CORONARY ARTERY BYPASS GRAFT WITH MITRAL VALVE REPAIR/REPLACEMENT N/A 6/13/2016    Procedure: INTRAOPERATIVE TARIQ, MIDLINE STERNOTOMY, CORONARY ARTERY BYPASS GRAFTING X  2 UTILIZING ENDOSCOPICALLY HARVESTED LEFT GREATER SAPHENOUS VEIN, MITRAL VALVE REPLACEMENT AND TRICUSPID VALVE REPAIR;  Surgeon: Eilecer Mistry MD;  Location: [facility] MAIN OR;  Service:    • CORONARY STENT PLACEMENT  2010    Approx. 6 yrs ago at [facility]   • HEMORRHOIDECTOMY     • HYSTERECTOMY     • MITRAL VALVE REPLACEMENT     • REPLACEMENT TOTAL KNEE Right    • THYROID SURGERY      Cyst removed from thyroid     General Information     Row Name 01/01/20 1601          PT Evaluation Time/Intention    Document Type  discharge treatment  -MS     Mode of Treatment  physical therapy  -MS     Row Name 01/01/20 1601          General Information    Existing Precautions/Restrictions  fall;oxygen therapy device and L/min  -MS     Row Name 01/01/20 1601          Cognitive Assessment/Intervention-  PT/OT    Orientation Status (Cognition)  oriented x 3  -MS       User Key  (r) = Recorded By, (t) = Taken By, (c) = Cosigned By    Initials Name Provider Type    MS NeilSylvie PT Physical Therapist        Mobility     Row Name 01/01/20 1601          Bed Mobility Assessment/Treatment    Comment (Bed Mobility)  NT- [facility]  -MS     Row Name 01/01/20 1601          Sit-Stand Transfer    Sit-Stand [place] (Transfers)  stand by assist;verbal cues;nonverbal cues (demo/gesture)  -MS     Assistive Device (Sit-Stand Transfers)  walker, front-wheeled  -MS     Row Name 01/01/20 1601          Gait/Stairs Assessment/Training    Gait/Stairs Assessment/Training  gait/ambulation independence  -MS     [place] Level (Gait)  stand by assist;verbal cues;nonverbal cues (demo/gesture)  -MS     Assistive Device (Gait)  walker, front-wheeled  -MS     Distance in Feet (Gait)  200  -MS     Pattern (Gait)  step-through  -MS     Deviations/Abnormal Patterns (Gait)  base of support, wide;gait speed decreased;lina decreased;stride length decreased  -MS     Bilateral Gait Deviations  forward flexed posture  -MS     Comment (Gait/Stairs)  Slowed lina but steady, no LOB or veering noted. O2 sats reading 93% post ambulation.  -MS       User Key  (r) = Recorded By, (t) = Taken By, (c) = Cosigned By    Initials Name Provider Type    Sylvie Ventura CHAZ PT Physical Therapist        Obj/Interventions     Row Name 01/01/20 1603          Static Sitting Balance    Level of [place] (Unsupported Sitting, Static Balance)  supervision  -MS     Sitting Position (Unsupported Sitting, Static Balance)  sitting on edge of bed  -MS     Row Name 01/01/20 1603          Static Standing Balance    Level of [place] (Supported Standing, Static Balance)  standby assist  -MS     Assistive Device Utilized (Supported Standing, Static Balance)  walker, rolling  -MS       User Key  (r) = Recorded By, (t) = Taken By, (c) = Cosigned By    Initials  Name Provider Type    Sylvie Ventura, PT Physical Therapist        Goals/Plan    No documentation.       Clinical Impression     Row Name 01/01/20 1603          Pain Scale: Numbers Pre/Post-Treatment    Pain Scale: Numbers, Pretreatment  0/10 - no pain  -MS     Pain Scale: Numbers, Post-Treatment  0/10 - no pain  -MS     Row Name 01/01/20 1603          Plan of Care Review    Plan of Care Reviewed With  patient  -MS     Progress  improving  -MS     Outcome Summary  Ambulated 200' SBA with a RW- 92% O2 sats reading post ambulation. Patient plans for home with HH PT upon DC to ensure home safety and address any environmental concerns. Patient has met all goals for PT services acutely- encourage patient to continue to ambulate with nursing.  -MS     Row Name 01/01/20 1603          Vital Signs    Pre SpO2 (%)  95  -MS     O2 Delivery Pre Treatment  supplemental O2  -MS     Intra SpO2 (%)  92  -MS     O2 Delivery Intra Treatment  supplemental O2  -MS     Post SpO2 (%)  94  -MS     O2 Delivery Post Treatment  supplemental O2  -MS     Row Name 01/01/20 1603          Positioning and Restraints    Pre-Treatment Position  sitting in chair/recliner  -MS     Post Treatment Position  chair  -MS     In Chair  sitting;call light within reach;encouraged to call for assist;legs elevated  -MS       User Key  (r) = Recorded By, (t) = Taken By, (c) = Cosigned By    Initials Name Provider Type    MS NeilSylvie, PT Physical Therapist        Outcome Measures     Row Name 01/01/20 1606          How much help from another person do you currently need...    Turning from your back to your side while in flat bed without using bedrails?  4  -MS     Moving from lying on back to sitting on the side of a flat bed without bedrails?  3  -MS     Moving to and from a bed to a chair (including a wheelchair)?  3  -MS     Standing up from a chair using your arms (e.g., wheelchair, bedside chair)?  3  -MS     Climbing 3-5 steps with a  railing?  3  -MS     To walk in hospital room?  3  -MS     AM-PAC 6 Clicks Score (PT)  19  -MS       User Key  (r) = Recorded By, (t) = Taken By, (c) = Cosigned By    Initials Name Provider Type    MS NeilSylvie PT Physical Therapist          PT Recommendation and Plan  Planned Therapy Interventions (PT Eval): balance training, bed mobility training, gait training, home exercise program, patient/family education, postural re-education, ROM (range of motion), stair training, strengthening, transfer training  Outcome Summary/Treatment Plan (PT)  Anticipated Discharge Disposition (PT): home with assist, home with home health  Plan of Care Reviewed With: patient  Progress: improving  Outcome Summary: Ambulated 200' SBA with a RW- 92% O2 sats reading post ambulation. Patient plans for home with  PT upon DC to ensure home safety and address any environmental concerns. Patient has met all goals for PT services acutely- encourage patient to continue to ambulate with nursing.     Time Calculation:   PT Charges     Row Name 01/01/20 1600             Time Calculation    Start Time  1544  -MS      Stop Time  1600  -MS      Time Calculation (min)  16 min  -MS      PT Received On  01/01/20  -MS        User Key  (r) = Recorded By, (t) = Taken By, (c) = Cosigned By    Initials Name Provider Type    MS NeilSylvie PT Physical Therapist        Therapy Charges for Today     Code Description Service Date Service Provider Modifiers Qty    15001675518  PT THER PROC EA 15 MIN 1/1/2020 Sylvie Neil, PT GP 1    [id number] HC PT THER SUPP EA 15 MIN 1/1/2020 Sylvie Neil, PT GP 1          PT G-Codes  Outcome Measure Options: AM-PAC 6 Clicks Daily Activity (OT)  AM-PAC 6 Clicks Score (PT): 19  AM-PAC 6 Clicks Score (OT): 18    PT Discharge Summary  Anticipated Discharge Disposition (PT): home with assist, home with home health    Sylvie Neil, CHARISMA  1/1/2020

## 2020-01-01 NOTE — PROGRESS NOTES
"   LOS: 17 days    Patient Care Team:  Michelle Abernathy MD as PCP - General (Family Medicine)  Robbie Wilson MD as Consulting Physician (Hematology and Oncology)  Chace Johnson MD as Consulting Physician (Cardiology)    Chief Complaint:    Chief Complaint   Patient presents with   • Foot Swelling     Follow UP OLI on CKD 3  Subjective     Interval History:   Frustrated by [employer]'s inertia on Trilogy; wheezing is comfortable at present; leg swelling improving; appetite fine    Review of Systems:   Bowels moving.  Eating.      Objective     Vital Signs  Temp:  [98.4 °F (36.9 °C)-99.9 °F (37.7 °C)] 98.6 °F (37 °C)  Heart Rate:  [43-84] 70  Resp:  [16] 16  BP: (129-150)/(60-71) 150/60    Flowsheet Rows      First Filed Value   Admission Height  152.4 cm (60\") Documented at 12/15/2019 1138   Admission Weight  (!) 139 kg (306 lb 6.4 oz) Documented at 12/15/2019 1138          I/O this shift:  In: 180 [P.O.:180]  Out: 200 [Urine:200]  I/O last 3 completed shifts:  In: 882 [P.O.:882]  Out: 2501 [Urine:2500; Stool:1]    Intake/Output Summary (Last 24 hours) at 1/1/2020 1302  Last data filed at 1/1/2020 1008  Gross per 24 hour   Intake 642 ml   Output 1801 ml   Net -1159 ml       Physical Exam:  Awake, alert; obese  Oral mucosa moist  Unable to  JVD  Heart RRR, 3/6 systolic M over precordium. No rub  Lungs clear; not labored on 3 L/min  Abd Obese, soft, nontender; nondistended; body wall edema: BS +  Ext 1-2+ edema lower ext; wrapped      Results Review:    Results from last 7 days   Lab Units 12/31/19  0631 12/30/19  0629 12/28/19  0544   SODIUM mmol/L 144 142 143   POTASSIUM mmol/L 4.5 3.6 4.1   CHLORIDE mmol/L 102 99 100   CO2 mmol/L 30.5* 31.4* 29.9*   BUN mg/dL 19 19 23   CREATININE mg/dL 1.12* 1.12* 1.13*   CALCIUM mg/dL 8.7 8.7 8.4*   GLUCOSE mg/dL 88 79 121*       Estimated Creatinine Clearance: 51.6 mL/min (A) (by C-G formula based on SCr of 1.12 mg/dL (H)).    Results from last 7 days   Lab Units " 12/31/19  0631 12/30/19  0629 12/26/19  0548   MAGNESIUM mg/dL  --  2.1 2.2   PHOSPHORUS mg/dL 4.5  --  4.2             Results from last 7 days   Lab Units 12/30/19  0629 12/28/19  0544   WBC 10*3/mm3 9.04 10.02   HEMOGLOBIN g/dL 9.2* 8.7*   PLATELETS 10*3/mm3 304 277               Imaging Results (Last 24 Hours)     ** No results found for the last 24 hours. **          amLODIPine 2.5 mg Oral Q24H   aspirin  mg Oral Daily   atenolol 25 mg Oral Daily   atorvastatin 20 mg Oral Daily   budesonide-formoterol 2 puff Inhalation BID - RT   clotrimazole-betamethasone  Topical Daily   docusate sodium 100 mg Oral BID   ferrous sulfate 325 mg Oral Daily With Breakfast   furosemide 40 mg Oral BID   gabapentin 100 mg Oral Q12H   glipizide 5 mg Oral QAM AC   insulin lispro 0-9 Units Subcutaneous 4x Daily With Meals & Nightly   lactobacillus acidophilus 1 capsule Oral Daily   levothyroxine 25 mcg Oral Q AM   nystatin  Topical BID   pantoprazole 40 mg Oral QAM   sodium chloride 10 mL Intravenous Q12H   vilazodone 20 mg Oral Nightly          Medication Review:   Current Facility-Administered Medications   Medication Dose Route Frequency Provider Last Rate Last Dose   • acetaminophen (TYLENOL) tablet 650 mg  650 mg Oral Q4H PRN Brenda Mcclain APRN   650 mg at 01/01/20 0042    Or   • acetaminophen (TYLENOL) 160 MG/5ML solution 650 mg  650 mg Oral Q4H PRN Brenda Mcclain APRN        Or   • acetaminophen (TYLENOL) suppository 650 mg  650 mg Rectal Q4H PRN Brenda Mcclain APRN       • albuterol (PROVENTIL) nebulizer solution 0.083% 2.5 mg/3mL  2.5 mg Nebulization Q6H PRN Brenda Mcclain APRN       • ALPRAZolam (XANAX) tablet 0.5 mg  0.5 mg Oral BID PRN Pavan Garcia MD   0.5 mg at 01/01/20 1133   • amLODIPine (NORVASC) tablet 2.5 mg  2.5 mg Oral Q24H Antoine Ayers MD   2.5 mg at 01/01/20 0913   • aspirin EC tablet 325 mg  325 mg Oral Daily Brenda Mcclain APRN   325 mg at 01/01/20 0912   • atenolol (TENORMIN) tablet  25 mg  25 mg Oral Daily Calvin Huang MD   25 mg at 01/01/20 0912   • atorvastatin (LIPITOR) tablet 20 mg  20 mg Oral Daily Brenda Mcclain APRN   20 mg at 01/01/20 0913   • budesonide-formoterol (SYMBICORT) 160-4.5 MCG/ACT inhaler 2 puff  2 puff Inhalation BID - RT Brenda Mcclain APRN   2 puff at 01/01/20 0929   • clotrimazole-betamethasone (LOTRISONE) 1-0.05 % cream   Topical Daily Eliazar Bridges MD       • dextrose (D50W) 25 g/ 50mL Intravenous Solution 25 g  25 g Intravenous Q15 Min PRN Brenda Mcclain APRN       • dextrose (GLUTOSE) oral gel 15 g  15 g Oral Q15 Min PRN Brenda Mcclain APRN       • docusate sodium (COLACE) capsule 100 mg  100 mg Oral BID Brenda Mcclain APRN   100 mg at 01/01/20 0913   • ferrous sulfate tablet 325 mg  325 mg Oral Daily With Breakfast Brenda Mcclain APRN   325 mg at 12/31/19 0920   • furosemide (LASIX) tablet 40 mg  40 mg Oral BID Raisa Rosa MD   40 mg at 01/01/20 0912   • gabapentin (NEURONTIN) capsule 100 mg  100 mg Oral Q12H Eliazar Bridges MD   100 mg at 01/01/20 0913   • glipizide (GLUCOTROL) tablet 5 mg  5 mg Oral QAM AC Eliazar Bridges MD   5 mg at 01/01/20 0913   • glucagon (human recombinant) (GLUCAGEN DIAGNOSTIC) injection 1 mg  1 mg Subcutaneous Q15 Min PRN Brenda Mcclain APRN       • guaifenesin-dextromethorphan (MUCINEX DM) tablet 1 tablet  1 tablet Oral BID PRN Adri Ackerman APRN   1 tablet at 12/28/19 2132   • insulin lispro (humaLOG) injection 0-9 Units  0-9 Units Subcutaneous 4x Daily With Meals & Nightly Brenda Mcclain APRN   2 Units at 12/31/19 2157   • lactobacillus acidophilus (RISAQUAD) capsule 1 capsule  1 capsule Oral Daily Brenda Mcclain APRN   1 capsule at 01/01/20 0913   • levothyroxine (SYNTHROID, LEVOTHROID) tablet 25 mcg  25 mcg Oral Q AM Brennen Conley MD   25 mcg at 01/01/20 0620   • nystatin (MYCOSTATIN) 374479 UNIT/GM cream   Topical BID Brenda Mcclain APRN       • ondansetron (ZOFRAN) injection 4 mg  4 mg  Intravenous Q6H PRN Brenda Mcclain APRN   4 mg at 12/23/19 1210   • pantoprazole (PROTONIX) EC tablet 40 mg  40 mg Oral QAM Brenda Mcclain APRN   40 mg at 01/01/20 0620   • polyethylene glycol 3350 powder (packet)  17 g Oral Daily PRN Brenda Mcclain APRN       • senna-docusate sodium (SENOKOT-S) 8.6-50 MG tablet 1 tablet  1 tablet Oral Nightly PRN Brenda Mcclain APRN   1 tablet at 12/21/19 2231   • sodium chloride 0.9 % flush 10 mL  10 mL Intravenous Q12H Brenda Mcclain APRN   10 mL at 01/01/20 0914   • sodium chloride 0.9 % flush 10 mL  10 mL Intravenous PRN Brenda Mcclain APRN       • sodium chloride nasal spray 1 spray  1 spray Each Nare PRN Brennen Conley MD       • vilazodone (VIIBRYD) tablet 20 mg  20 mg Oral Nightly Brenda Mcclain APRN   20 mg at 12/31/19 2002       Assessment/Plan   1. OLI on CKD3:  renal function stable.  Improving volume excess with help of diuretic. Mild hypernatremia and elevated serum bicarbonate at last check at last check yesterday, due to diuretic therapy, but stable  2. Acute on chronic diastolic heart failure.  Valvular heart disease: moderate AS, mild to moderate TR, mitral valve replacement, regurg.  Weight is down 30 pounds this admission and continues to remain stable  3. DM2    4. Anemia, iron deficiency.   5. HTN, not controlled  6. Lymphedema   7. Confusion.  Resolved  8. OCHOA, needs BIPAP   9. Morbid obesity     Plan:  1.  Continue fluid restriction: 1500 mL/day  2.  Continue oral furosemide 40 mg twice daily  3.  Home once Trilogy device approved by insurance  4.  Discontinue amlodipine and resume ARB (her home irbesartan--300 mg daily--not available in hospital, so will give losartan 100 mg daily while she remains in-patient, with plan to revert to irbesartan 300 mg daily once home0    Calvin Diamond MD  01/01/20  1:02 PM

## 2020-01-02 LAB
ALBUMIN SERPL-MCNC: 3.4 G/DL (ref 3.5–5.2)
ANION GAP SERPL CALCULATED.3IONS-SCNC: 13.1 MMOL/L (ref 5–15)
BUN BLD-MCNC: 22 MG/DL (ref 8–23)
BUN/CREAT SERPL: 18 (ref 7–25)
CALCIUM SPEC-SCNC: 8.7 MG/DL (ref 8.6–10.5)
CHLORIDE SERPL-SCNC: 103 MMOL/L (ref 98–107)
CO2 SERPL-SCNC: 29.9 MMOL/L (ref 22–29)
CREAT BLD-MCNC: 1.22 MG/DL (ref 0.57–1)
GFR SERPL CREATININE-BSD FRML MDRD: 43 ML/MIN/1.73
GLUCOSE BLD-MCNC: 131 MG/DL (ref 65–99)
GLUCOSE BLDC GLUCOMTR-MCNC: 108 MG/DL (ref 70–130)
GLUCOSE BLDC GLUCOMTR-MCNC: 130 MG/DL (ref 70–130)
GLUCOSE BLDC GLUCOMTR-MCNC: 147 MG/DL (ref 70–130)
GLUCOSE BLDC GLUCOMTR-MCNC: 159 MG/DL (ref 70–130)
PHOSPHATE SERPL-MCNC: 4.7 MG/DL (ref 2.5–4.5)
POTASSIUM BLD-SCNC: 4 MMOL/L (ref 3.5–5.2)
SODIUM BLD-SCNC: 146 MMOL/L (ref 136–145)

## 2020-01-02 PROCEDURE — 25010000002 ONDANSETRON PER 1 MG: Performed by: NURSE PRACTITIONER

## 2020-01-02 PROCEDURE — 94799 UNLISTED PULMONARY SVC/PX: CPT

## 2020-01-02 PROCEDURE — 82962 GLUCOSE BLOOD TEST: CPT

## 2020-01-02 PROCEDURE — 80069 RENAL FUNCTION PANEL: CPT | Performed by: INTERNAL MEDICINE

## 2020-01-02 PROCEDURE — 63710000001 INSULIN LISPRO (HUMAN) PER 5 UNITS: Performed by: NURSE PRACTITIONER

## 2020-01-02 PROCEDURE — 97140 MANUAL THERAPY 1/> REGIONS: CPT

## 2020-01-02 RX ADMIN — LOSARTAN POTASSIUM 100 MG: 100 TABLET, FILM COATED ORAL at 08:16

## 2020-01-02 RX ADMIN — ACETAMINOPHEN 650 MG: 325 TABLET, FILM COATED ORAL at 11:00

## 2020-01-02 RX ADMIN — ALPRAZOLAM 0.5 MG: 0.5 TABLET ORAL at 15:32

## 2020-01-02 RX ADMIN — FERROUS SULFATE TAB 325 MG (65 MG ELEMENTAL FE) 325 MG: 325 (65 FE) TAB at 08:17

## 2020-01-02 RX ADMIN — ONDANSETRON 4 MG: 2 INJECTION INTRAMUSCULAR; INTRAVENOUS at 09:56

## 2020-01-02 RX ADMIN — NYSTATIN: 100000 CREAM TOPICAL at 20:50

## 2020-01-02 RX ADMIN — GLIPIZIDE 5 MG: 5 TABLET ORAL at 08:17

## 2020-01-02 RX ADMIN — VILAZODONE HYDROCHLORIDE 20 MG: 40 TABLET ORAL at 20:50

## 2020-01-02 RX ADMIN — GUAIFENESIN AND DEXTROMETHORPHAN HYDROBROMIDE 1 TABLET: 600; 30 TABLET, EXTENDED RELEASE ORAL at 22:17

## 2020-01-02 RX ADMIN — CLOTRIMAZOLE AND BETAMETHASONE DIPROPIONATE: 10; .5 CREAM TOPICAL at 17:41

## 2020-01-02 RX ADMIN — ATENOLOL 25 MG: 25 TABLET ORAL at 08:16

## 2020-01-02 RX ADMIN — PANTOPRAZOLE SODIUM 40 MG: 40 TABLET, DELAYED RELEASE ORAL at 06:00

## 2020-01-02 RX ADMIN — BUDESONIDE AND FORMOTEROL FUMARATE DIHYDRATE 2 PUFF: 160; 4.5 AEROSOL RESPIRATORY (INHALATION) at 22:39

## 2020-01-02 RX ADMIN — BUDESONIDE AND FORMOTEROL FUMARATE DIHYDRATE 2 PUFF: 160; 4.5 AEROSOL RESPIRATORY (INHALATION) at 08:43

## 2020-01-02 RX ADMIN — LEVOTHYROXINE SODIUM 25 MCG: 25 TABLET ORAL at 06:00

## 2020-01-02 RX ADMIN — GABAPENTIN 100 MG: 100 CAPSULE ORAL at 08:16

## 2020-01-02 RX ADMIN — ATORVASTATIN CALCIUM 20 MG: 20 TABLET, FILM COATED ORAL at 08:16

## 2020-01-02 RX ADMIN — FUROSEMIDE 40 MG: 40 TABLET ORAL at 08:17

## 2020-01-02 RX ADMIN — SODIUM CHLORIDE, PRESERVATIVE FREE 10 ML: 5 INJECTION INTRAVENOUS at 20:49

## 2020-01-02 RX ADMIN — GABAPENTIN 100 MG: 100 CAPSULE ORAL at 20:50

## 2020-01-02 RX ADMIN — ASPIRIN 325 MG: 325 TABLET, COATED ORAL at 08:17

## 2020-01-02 RX ADMIN — FUROSEMIDE 40 MG: 40 TABLET ORAL at 17:40

## 2020-01-02 RX ADMIN — DOCUSATE SODIUM 100 MG: 100 CAPSULE, LIQUID FILLED ORAL at 20:50

## 2020-01-02 RX ADMIN — Medication 1 CAPSULE: at 08:17

## 2020-01-02 RX ADMIN — DOCUSATE SODIUM 100 MG: 100 CAPSULE, LIQUID FILLED ORAL at 08:16

## 2020-01-02 RX ADMIN — NYSTATIN: 100000 CREAM TOPICAL at 14:12

## 2020-01-02 RX ADMIN — SODIUM CHLORIDE, PRESERVATIVE FREE 10 ML: 5 INJECTION INTRAVENOUS at 08:23

## 2020-01-02 RX ADMIN — INSULIN LISPRO 2 UNITS: 100 INJECTION, SOLUTION INTRAVENOUS; SUBCUTANEOUS at 21:37

## 2020-01-02 NOTE — PLAN OF CARE
Problem: Patient Care Overview  Goal: Plan of Care Review  Outcome: Ongoing (interventions implemented as appropriate)  Flowsheets  Taken 1/2/2020 0437 by Maliha Ayala RN  Progress: improving  Taken 1/2/2020 1454 by Ericka Bishop OTR  Plan of Care Reviewed With: patient     Problem: Fall Risk (Adult)  Goal: Absence of Fall  Outcome: Ongoing (interventions implemented as appropriate)  Flowsheets (Taken 12/29/2019 0503 by Judi Raya, RN)  Absence of Fall: achieves outcome     Problem: Skin Injury Risk (Adult)  Goal: Skin Health and Integrity  Outcome: Ongoing (interventions implemented as appropriate)  Flowsheets (Taken 12/29/2019 0503 by Judi Raya, RN)  Skin Health and Integrity: achieves outcome     Problem: Activity Intolerance (Adult)  Goal: Activity Tolerance  Outcome: Ongoing (interventions implemented as appropriate)  Flowsheets (Taken 12/29/2019 0503 by Judi Raya, RN)  Activity Tolerance: making progress toward outcome     Problem: Activity Intolerance (Adult)  Goal: Effective Energy Conservation Techniques  Outcome: Ongoing (interventions implemented as appropriate)  Flowsheets (Taken 12/29/2019 0503 by Judi Raya, RN)  Effective Energy Conservation Techniques: making progress toward outcome     Problem: Breathing Pattern Ineffective (Adult)  Goal: Anxiety/Fear Reduction  Outcome: Ongoing (interventions implemented as appropriate)  Flowsheets (Taken 12/29/2019 0503 by Judi Raya, RN)  Anxiety/Fear Reduction: achieves outcome

## 2020-01-02 NOTE — THERAPY TREATMENT NOTE
Acute Care - Occupational Therapy Treatment Note  [facility]     Patient Name: Miguelina Lopez  : 1945  MRN: [id number]  Today's Date: 2020             Admit Date: 12/15/2019       ICD-10-CM ICD-9-CM   1. Acute on chronic combined systolic and diastolic CHF (congestive heart failure) (CMS/[facility]) I50.43 428.43     428.0   2. Acute kidney injury superimposed on chronic kidney disease (CMS/[facility]) N17.9 866.00    N18.9 585.9     Patient Active Problem List   Diagnosis   • Anxiety disorder   • Arthritis of knee   • Asthma   • Chronic coronary artery disease   • CKD (chronic kidney disease) stage 3, GFR 30-59 ml/min (CMS/[facility])   • Depression   • Diabetic peripheral neuropathy (CMS/[facility])   • Gastroesophageal reflux disease   • Hyperlipidemia   • Insomnia   • Lower gastrointestinal hemorrhage   • Anemia   • OCHOA (obstructive sleep apnea)   • DM type 2 (diabetes mellitus, type 2) (CMS/[facility])   • Essential hypertension   • Hospital discharge follow-up   • Pulmonary hypertension due to sleep-disordered breathing (CMS/[facility])   • s/p MVR, TV-repair, CABG x2 16   • OLI (acute kidney injury) (CMS/[facility])   • Leukocytosis   • Atrial fibrillation (CMS/[facility])   • Nocturnal hypoxia   • Dermatitis   • Medicare annual wellness visit, initial   • Class 3 severe obesity due to excess calories with serious comorbidity and body mass index (BMI) of 50.0 to 59.9 in adult (CMS/[facility])   • Supplemental oxygen dependent   • Mitral regurgitation   • Aortic stenosis   • Medicare annual wellness visit, subsequent   • Localized edema   • Chronic right-sided congestive heart failure (CMS/[facility])   • Cellulitis of left lower extremity   • Proteinuria   • Bilateral lower extremity edema   • Subclinical hypothyroidism   • Acute on chronic diastolic CHF (congestive heart failure) (CMS/[facility])   • Chronic respiratory failure with hypoxia and hypercapnia (CMS/[facility])   • Acute on chronic respiratory failure with hypoxia and hypercapnia (CMS/[facility])     Past Medical  History:   Diagnosis Date   • Anemia    • Anxiety    • Aortic valve stenosis    • CHF (congestive heart failure) (CMS/[facility])    • Chronic coronary artery disease    • Class 3 severe obesity due to excess calories in adult (CMS/[facility])    • Depression    • Diabetes mellitus (CMS/[facility])    • Heart murmur    • Mitral valve insufficiency    • Pneumonia     1/2016   • Pulmonary hypertension (CMS/[facility])     due to sleep disordered breathing   • Sleep apnea     Uses CPAP or oxygen   • Stage 3 chronic kidney disease (CMS/[facility])    • Supplemental oxygen dependent      Past Surgical History:   Procedure Laterality Date   • CARDIAC CATHETERIZATION     • CARDIAC CATHETERIZATION N/A 6/10/2016    Procedure: Left Heart Cath;  Surgeon: Chace Johnson MD;  Location: [facility] CATH INVASIVE LOCATION;  Service:    • CARDIAC CATHETERIZATION N/A 6/10/2016    Procedure: Right Heart Cath;  Surgeon: Chace Johnson MD;  Location: [facility] CATH INVASIVE LOCATION;  Service:    • CORONARY ARTERY BYPASS GRAFT      2 vessel   • CORONARY ARTERY BYPASS GRAFT WITH MITRAL VALVE REPAIR/REPLACEMENT N/A 6/13/2016    Procedure: INTRAOPERATIVE TARIQ, MIDLINE STERNOTOMY, CORONARY ARTERY BYPASS GRAFTING X  2 UTILIZING ENDOSCOPICALLY HARVESTED LEFT GREATER SAPHENOUS VEIN, MITRAL VALVE REPLACEMENT AND TRICUSPID VALVE REPAIR;  Surgeon: Eliecer Mistry MD;  Location: [facility] OR;  Service:    • CORONARY STENT PLACEMENT  2010    Approx. 6 yrs ago at [facility]   • HEMORRHOIDECTOMY     • HYSTERECTOMY     • MITRAL VALVE REPLACEMENT     • REPLACEMENT TOTAL KNEE Right    • THYROID SURGERY      Cyst removed from thyroid       Therapy Treatment    Rehabilitation Treatment Summary     Row Name 01/02/20 3651             Treatment Time/Intention    Discipline  occupational therapist  -VS      Document Type  therapy note (daily note)  -VS      Subjective Information  no complaints  -VS      Mode of Treatment  occupational therapy  -VS      Patient/Family Observations  Pt.  "was reclined in chair in room  -VS      Care Plan Review  evaluation/treatment results reviewed;care plan/treatment goals reviewed;patient/other agree to care plan  -VS      Patient Effort  good  -VS      Existing Precautions/Restrictions  fall;oxygen therapy device and L/min  -VS      Patient Response to Treatment  \"I like my legs wrapped they feel better when they are wrapped\" pt. stated   -VS      Recorded by [VS] Ericka Bishop OTR 01/02/20 1454      Row Name 01/02/20 1205             Cognitive Assessment/Intervention- PT/OT    Orientation Status (Cognition)  oriented x 3  -VS      Follows Commands (Cognition)  WFL  -VS      Recorded by [VS] Ericka Bishop OTR 01/02/20 1454      Row Name 01/02/20 120             Functional Mobility    Functional Mobility- Comment  NA  -VS      Recorded by [VS] Ericka Bishop OTR 01/02/20 1454      Row Name 01/02/20 1205             Bathing Assessment/Intervention    Bathing Sawyer Level  bathing skills;lower body  -VS      Comment (Bathing)  (B)LE bathed toes to knees and lotion applied   -VS      Recorded by [VS] Ericka Bishop OTR 01/02/20 1454      Row Name 01/02/20 120             Positioning and Restraints    Pre-Treatment Position  sitting in chair/recliner  -VS      Post Treatment Position  chair  -VS      In Chair  notified nsg;call light within reach;encouraged to call for assist;exit alarm on;legs elevated  -VS      Recorded by [VS] Ericka Bishop OTR 01/02/20 1454      Row Name 01/02/20 1205             Pain Assessment    Additional Documentation  Pain Scale: Numbers Pre/Post-Treatment (Group)  -VS      Recorded by [VS] Ericka Bishop OTR 01/02/20 1454      Row Name 01/02/20 1209             Pain Scale: Numbers Pre/Post-Treatment    Pain Scale: Numbers, Pretreatment  0/10 - no pain  -VS      Pain Scale: Numbers, Post-Treatment  0/10 - no pain  -VS      Recorded by [VS] Ericka Bishop OTR 01/02/20 1454      Row Name 01/02/20 1205          "    Edema Assessment & Management    Location (Edema)  lower extremity, left;lower extremity, right  -VS      Additional Documentation  Edema Symptoms/Interventions (Group)  -VS      Recorded by [VS] Ericka Bishop, OTR 01/02/20 1454      Row Name 01/02/20 1205             Lower Extremity Edema Measures, Left    Lower Extremity, Left (Edema)  knee;mid-calf  -VS      Recorded by [VS] Ericka Bishop, OTR 01/02/20 1454      Row Name 01/02/20 1205             Lower Extremity Edema Measures, Right    Lower Extremity, Right (Edema)  mid-calf  -VS      Recorded by [VS] Ericka Bishop, OTR 01/02/20 1454      Row Name 01/02/20 1205             Edema Symptoms/Interventions    Description (Edema)  localized;pitting  -VS      Pitting Scale (Edema)  -- (L)LE - Mild, (R)LE Mod   -VS      Associated Symptoms (Edema)  skin color changes  -VS      Treatment Interventions (Edema)  compression bandaging, short stretch;compression wrapping/taping [id number],#[id number],Rosdial #8&10 toes to knees (B)ly   -VS      Recorded by [VS] Ericka Bishop, OTR 01/02/20 1806        User Key  (r) = Recorded By, (t) = Taken By, (c) = Cosigned By    Initials Name Effective Dates Discipline    VS Ericka Bishop, OTR 06/08/18 -  OT                 OT Recommendation and Plan  Outcome Summary/Treatment Plan (OT)  Anticipated Discharge Disposition (OT): home with assist, home with home health  Planned Therapy Interventions (OT Eval): edema control/reduction  Therapy Frequency (OT Eval): 5 times/wk  Plan of Care Review  Plan of Care Reviewed With: patient  Plan of Care Reviewed With: patient  Outcome Summary: OT:  Edema with LE greater in (L) than (R).  Pt was wrapped from toes to knees (B)ly.  Pt. tolerateing it well.  Pt. will benefit for Lymph wraps while in- patient  Outcome Measures     Row Name 01/02/20 1400             How much help from another is currently needed...    Putting on and taking off regular lower body clothing?  2  -VS       Bathing (including washing, rinsing, and drying)  2  -VS      Toileting (which includes using toilet bed pan or urinal)  3  -VS      Putting on and taking off regular upper body clothing  3  -VS      Taking care of personal grooming (such as brushing teeth)  4  -VS      Eating meals  4  -VS      AM-PAC 6 Clicks Score (OT)  18  -VS         Functional Assessment    Outcome Measure Options  AM-PAC 6 Clicks Daily Activity (OT)  -VS        User Key  (r) = Recorded By, (t) = Taken By, (c) = Cosigned By    Initials Name Provider Type    VS Ericka Bishop OTR Occupational Therapist           Time Calculation:   Time Calculation- OT     Row Name 01/02/20 1457             Time Calculation- OT    OT Start Time  1103  -VS      OT Stop Time  1134  -VS      OT Time Calculation (min)  31 min  -VS      Total Timed Code Minutes- OT  31 minute(s)  -VS        User Key  (r) = Recorded By, (t) = Taken By, (c) = Cosigned By    Initials Name Provider Type    VS Ericka Bishop OTR Occupational Therapist        Therapy Charges for Today     Code Description Service Date Service Provider Modifiers Qty    [id number]  OT MANUAL THERAPY EA 15 MIN 1/2/2020 Ericka Bishop OTR GO 2               ANNEMARIE Wiggins  1/2/2020

## 2020-01-02 NOTE — PLAN OF CARE
Problem: Patient Care Overview  Goal: Plan of Care Review  Flowsheets (Taken 1/2/2020 6292)  Plan of Care Reviewed With: patient  Note:   OT Lymph:  Pt. Continue to tolerate the LE wraps, (L)LE now has mild edema and (R) remains at moderate.  Pt. Continues to benefit for LE wrapping, Pt's grand daughter will continue to wrap the LE once the pt. Has been discharged.  Pt. Grand daughter has written instructions for LE wrapping.

## 2020-01-02 NOTE — PROGRESS NOTES
[facility] INPATIENT PROGRESS NOTE         [address]    2020      PATIENT IDENTIFICATION:  Name: Miguelina Lopez ADMIT: 12/15/2019   : 1945  PCP: Michelle Abernathy MD    MRN: [id number] LOS: 18 days   AGE/SEX: 74 y.o. female  ROOM: [facility]                     LOS 18    Reason for visit: Follow-up respiratory failure with OCHOA/OHS and chronic CO2 retention      SUBJECTIVE:      No new issues.  Still awaiting approval for trilogy.  Attempted to do [id number] yesterday but unable to actually speak to a doctor.  Discussed with CCP this morning.  They are going to look into it further and get back with me.    Objective   OBJECTIVE:    Vital Sign Min/Max for last 24 hours  Temp  Min: 97.5 °F (36.4 °C)  Max: 98.1 °F (36.7 °C)   BP  Min: 136/62  Max: 152/74   Pulse  Min: 48  Max: 68   Resp  Min: 16  Max: 22   SpO2  Min: 90 %  Max: 97 %   No data recorded   Weight  Min: 120 kg (264 lb 4.8 oz)  Max: 120 kg (264 lb 4.8 oz)                         Body mass index is 51.62 kg/m².    Intake/Output Summary (Last 24 hours) at 2020 1051  Last data filed at 2020 0950  Gross per 24 hour   Intake 720 ml   Output 1750 ml   Net -1030 ml         Exam:  GEN:  No distress, appears stated age  EYES:   PERRL, anicteric sclera  ENT:    External ears/nose normal, OP clear  NECK:  No adenopathy, midline trachea  LUNGS: Normal chest on inspection, palpation and diminished breath sounds on auscultation  CV:  Normal S1S2, without murmur  ABD:  Non tender, non distended, no hepatosplenomegaly, +BS  EXT:  No edema, cyanosis or clubbing    Scheduled meds:      aspirin  mg Oral Daily   atenolol 25 mg Oral Daily   atorvastatin 20 mg Oral Daily   budesonide-formoterol 2 puff Inhalation BID - RT   clotrimazole-betamethasone  Topical Daily   docusate sodium 100 mg Oral BID   ferrous sulfate 325 mg Oral Daily With Breakfast   furosemide 40 mg Oral BID   gabapentin 100 mg Oral Q12H   glipizide 5 mg Oral QAM AC    insulin lispro 0-9 Units Subcutaneous 4x Daily With Meals & Nightly   lactobacillus acidophilus 1 capsule Oral Daily   levothyroxine 25 mcg Oral Q AM   losartan 100 mg Oral Q24H   nystatin  Topical BID   pantoprazole 40 mg Oral QAM   sodium chloride 10 mL Intravenous Q12H   vilazodone 20 mg Oral Nightly     IV meds:                         Data Review:  Results from last 7 days   Lab Units 01/02/20  0530 12/31/19  0631 12/30/19  0629 12/28/19  0544 12/27/19  0459   SODIUM mmol/L 146* 144 142 143 144   POTASSIUM mmol/L 4.0 4.5 3.6 4.1 4.0   CHLORIDE mmol/L 103 102 99 100 100   CO2 mmol/L 29.9* 30.5* 31.4* 29.9* 32.8*   BUN mg/dL 22 19 19 23 23   CREATININE mg/dL 1.22* 1.12* 1.12* 1.13* 1.16*   GLUCOSE mg/dL 131* 88 79 121* 117*   CALCIUM mg/dL 8.7 8.7 8.7 8.4* 8.4*         Estimated Creatinine Clearance: 47.4 mL/min (A) (by C-G formula based on SCr of 1.22 mg/dL (H)).  Results from last 7 days   Lab Units 12/30/19  0629 12/28/19  0544   WBC 10*3/mm3 9.04 10.02   HEMOGLOBIN g/dL 9.2* 8.7*   PLATELETS 10*3/mm3 304 277                             )Assessment   ASSESSMENT:      Active Hospital Problems    Diagnosis  POA   • **Acute on chronic diastolic CHF (congestive heart failure) (CMS/[facility]) [I50.33]  Yes   • Acute on chronic respiratory failure with hypoxia and hypercapnia (CMS/[facility]) [J96.21, J96.22]  No   • Subclinical hypothyroidism [E03.9]  Yes   • Proteinuria [R80.9]  Yes   • Bilateral lower extremity edema [R60.0]  Yes   • Class 3 severe obesity due to excess calories with serious comorbidity and body mass index (BMI) of 50.0 to 59.9 in adult (CMS/[facility]) [E66.01, Z68.43]  Not Applicable   • OLI (acute kidney injury) (CMS/[facility]) [N17.9]  Yes   • Pulmonary hypertension due to sleep-disordered breathing (CMS/[facility]) [I27.29, G47.8]  Yes   • Anemia [D64.9]  Yes   • DM type 2 (diabetes mellitus, type 2) (CMS/[facility]) [E11.9]  Yes   • Diabetic peripheral neuropathy (CMS/[facility]) [E11.42]  Yes   • Essential hypertension [I10]  Yes   •  CKD (chronic kidney disease) stage 3, GFR 30-59 ml/min (CMS/[facility]) [N18.3]  Yes      Resolved Hospital Problems   No resolved problems to display.     Acute hypoxemic and chronic hypercapnic respiratory failure  Obstructive sleep apnea   Obesity hypoventilation  Morbid obesity  Chronic respiratory failure  Congestive heart failure  Chronic kidney disease  Valvular heart disease  CAD  DM      PLAN:  Awaiting trilogy machine.  Has chronic CO2 retention and would certainly benefit from Trilogy use.  Has tried and failed CPAP/BiPAP with persistent elevation of CO2 despite BiPAP attempt.  Without having trilogy available patient will certainly be at increased risk for recurrent hospitalizations as well as death.  Discussed with CCP.  Encourage pulmonary toilet.    Sree Kilpatrick MD  Pulmonary and Critical Care Medicine  [facility], [facility]  1/2/2020    10:51 AM

## 2020-01-02 NOTE — PLAN OF CARE
Problem: Patient Care Overview  Goal: Plan of Care Review  Outcome: Ongoing (interventions implemented as appropriate)  Flowsheets (Taken 1/2/2020 5013)  Progress: improving  Plan of Care Reviewed With: patient  Outcome Summary: VSS. No new complaints. Slept most of the night. Waiting for insurance and [employer] to deliver Trilogy machine. Will continue to monitor.     Problem: Fall Risk (Adult)  Goal: Absence of Fall  Outcome: Ongoing (interventions implemented as appropriate)     Problem: Skin Injury Risk (Adult)  Goal: Skin Health and Integrity  Outcome: Ongoing (interventions implemented as appropriate)     Problem: Activity Intolerance (Adult)  Goal: Activity Tolerance  Outcome: Ongoing (interventions implemented as appropriate)     Problem: Breathing Pattern Ineffective (Adult)  Goal: Anxiety/Fear Reduction  Outcome: Ongoing (interventions implemented as appropriate)

## 2020-01-02 NOTE — PROGRESS NOTES
" LOS: 18 days     Name: Miguelina Lopez  Age: 74 y.o.  Sex: female  :  1945  MRN: [id number]         Primary Care Physician: Michelle Abernathy MD    Subjective   Subjective  Feels a little bit congested in her nose today.  \"I have a cold\".  Denies shortness of breath or chest pain.  Still awaiting approval of trilogy    Objective   Vital Signs  Temp:  [97.5 °F (36.4 °C)-98.1 °F (36.7 °C)] 97.5 °F (36.4 °C)  Heart Rate:  [48-68] 68  Resp:  [16-22] 16  BP: (136-152)/(62-77) 152/74  Body mass index is 51.62 kg/m².    Objective:  General Appearance:  Comfortable and in no acute distress (Chronically ill-appearing).    Vital signs: (most recent): Blood pressure 152/74, pulse 68, temperature 97.5 °F (36.4 °C), temperature source Oral, resp. rate 16, height 152.4 cm (60\"), weight 120 kg (264 lb 4.8 oz), SpO2 97 %, not currently breastfeeding.    Lungs:  Normal effort and normal respiratory rate.  She is not in respiratory distress.  There are decreased breath sounds.    Heart: Normal rate.  Regular rhythm.    Abdomen: Abdomen is soft.  Bowel sounds are normal.   There is no abdominal tenderness.     Extremities: There is no dependent edema or local swelling.    Neurological: Patient is alert and oriented to person, place and time.    Skin:  Warm and dry.              Results Review:       I reviewed the patient's new clinical results.    Results from last 7 days   Lab Units 19  0629 19  0544   WBC 10*3/mm3 9.04 10.02   HEMOGLOBIN g/dL 9.2* 8.7*   PLATELETS 10*3/mm3 304 277     Results from last 7 days   Lab Units 20  0530 19  0631 19  0629 19  0544 19  0459   SODIUM mmol/L 146* 144 142 143 144   POTASSIUM mmol/L 4.0 4.5 3.6 4.1 4.0   CHLORIDE mmol/L 103 102 99 100 100   CO2 mmol/L 29.9* 30.5* 31.4* 29.9* 32.8*   BUN mg/dL 22 19 19 23 23   CREATININE mg/dL 1.22* 1.12* 1.12* 1.13* 1.16*   CALCIUM mg/dL 8.7 8.7 8.7 8.4* 8.4*   GLUCOSE mg/dL 131* 88 79 121* 117*       "           Scheduled Meds:     aspirin  mg Oral Daily   atenolol 25 mg Oral Daily   atorvastatin 20 mg Oral Daily   budesonide-formoterol 2 puff Inhalation BID - RT   clotrimazole-betamethasone  Topical Daily   docusate sodium 100 mg Oral BID   ferrous sulfate 325 mg Oral Daily With Breakfast   furosemide 40 mg Oral BID   gabapentin 100 mg Oral Q12H   glipizide 5 mg Oral QAM AC   insulin lispro 0-9 Units Subcutaneous 4x Daily With Meals & Nightly   lactobacillus acidophilus 1 capsule Oral Daily   levothyroxine 25 mcg Oral Q AM   losartan 100 mg Oral Q24H   nystatin  Topical BID   pantoprazole 40 mg Oral QAM   sodium chloride 10 mL Intravenous Q12H   vilazodone 20 mg Oral Nightly     PRN Meds:   •  acetaminophen **OR** acetaminophen **OR** acetaminophen  •  albuterol  •  ALPRAZolam  •  dextrose  •  dextrose  •  glucagon (human recombinant)  •  guaifenesin-dextromethorphan  •  ondansetron  •  polyethylene glycol  •  senna-docusate sodium  •  sodium chloride  •  sodium chloride  Continuous Infusions:       Assessment/Plan   Active Hospital Problems    Diagnosis  POA   • **Acute on chronic diastolic CHF (congestive heart failure) (CMS/[facility]) [I50.33]  Yes   • Acute on chronic respiratory failure with hypoxia and hypercapnia (CMS/[facility]) [J96.21, J96.22]  No   • Subclinical hypothyroidism [E03.9]  Yes   • Proteinuria [R80.9]  Yes   • Bilateral lower extremity edema [R60.0]  Yes   • Class 3 severe obesity due to excess calories with serious comorbidity and body mass index (BMI) of 50.0 to 59.9 in adult (CMS/[facility]) [E66.01, Z68.43]  Not Applicable   • OLI (acute kidney injury) (CMS/[facility]) [N17.9]  Yes   • Pulmonary hypertension due to sleep-disordered breathing (CMS/[facility]) [I27.29, G47.8]  Yes   • Anemia [D64.9]  Yes   • DM type 2 (diabetes mellitus, type 2) (CMS/[facility]) [E11.9]  Yes   • Diabetic peripheral neuropathy (CMS/[facility]) [E11.42]  Yes   • Essential hypertension [I10]  Yes   • CKD (chronic kidney disease) stage 3, GFR 30-59  ml/min (CMS/[facility]) [N18.3]  Yes      Resolved Hospital Problems   No resolved problems to display.       Assessment & Plan    · Acute on chronic diastolic heart failure/valvular heart disease: S/P Lasix gtt. Continue oral diuretics.  Nephrology following.  · OLI/CKD 3  · Pulmonary hypertension/OHS/OCHOA/acute on chronic mixed respiratory failure: Trilogy recommended by pulmonology and awaiting insurance approval/coordination.  Pulmonology following.  · Hypothyroidism: Synthroid.  Repeat TSH in 6 to 8 weeks.  · Diabetes: A1c 6.4.  Continue current diabetic regimen  · Lymphedema  · Anemia: Chronic iron deficient  · Disposition: Home health, STILL waiting on trilogy determination by her insurance. This is the only thing keeping her in the hospital.    Inder Salvador MD  [facility]  01/02/20  12:39 PM

## 2020-01-03 ENCOUNTER — APPOINTMENT (OUTPATIENT)
Dept: RESPIRATORY THERAPY | Facility: HOSPITAL | Age: 75
End: 2020-01-03

## 2020-01-03 LAB
GLUCOSE BLDC GLUCOMTR-MCNC: 127 MG/DL (ref 70–130)
GLUCOSE BLDC GLUCOMTR-MCNC: 134 MG/DL (ref 70–130)
GLUCOSE BLDC GLUCOMTR-MCNC: 139 MG/DL (ref 70–130)
GLUCOSE BLDC GLUCOMTR-MCNC: 161 MG/DL (ref 70–130)

## 2020-01-03 PROCEDURE — 94799 UNLISTED PULMONARY SVC/PX: CPT

## 2020-01-03 PROCEDURE — 94010 BREATHING CAPACITY TEST: CPT

## 2020-01-03 PROCEDURE — 94660 CPAP INITIATION&MGMT: CPT

## 2020-01-03 PROCEDURE — 82962 GLUCOSE BLOOD TEST: CPT

## 2020-01-03 PROCEDURE — 97140 MANUAL THERAPY 1/> REGIONS: CPT

## 2020-01-03 PROCEDURE — 63710000001 INSULIN LISPRO (HUMAN) PER 5 UNITS: Performed by: NURSE PRACTITIONER

## 2020-01-03 RX ADMIN — LOSARTAN POTASSIUM 100 MG: 100 TABLET, FILM COATED ORAL at 08:06

## 2020-01-03 RX ADMIN — FUROSEMIDE 40 MG: 40 TABLET ORAL at 08:06

## 2020-01-03 RX ADMIN — DOCUSATE SODIUM 100 MG: 100 CAPSULE, LIQUID FILLED ORAL at 08:05

## 2020-01-03 RX ADMIN — INSULIN LISPRO 2 UNITS: 100 INJECTION, SOLUTION INTRAVENOUS; SUBCUTANEOUS at 21:40

## 2020-01-03 RX ADMIN — GLIPIZIDE 5 MG: 5 TABLET ORAL at 06:18

## 2020-01-03 RX ADMIN — PANTOPRAZOLE SODIUM 40 MG: 40 TABLET, DELAYED RELEASE ORAL at 06:16

## 2020-01-03 RX ADMIN — Medication 1 CAPSULE: at 08:06

## 2020-01-03 RX ADMIN — ACETAMINOPHEN 650 MG: 325 TABLET, FILM COATED ORAL at 13:12

## 2020-01-03 RX ADMIN — VILAZODONE HYDROCHLORIDE 20 MG: 40 TABLET ORAL at 20:34

## 2020-01-03 RX ADMIN — ACETAMINOPHEN 650 MG: 325 TABLET, FILM COATED ORAL at 23:09

## 2020-01-03 RX ADMIN — DOCUSATE SODIUM 100 MG: 100 CAPSULE, LIQUID FILLED ORAL at 20:34

## 2020-01-03 RX ADMIN — BUDESONIDE AND FORMOTEROL FUMARATE DIHYDRATE 2 PUFF: 160; 4.5 AEROSOL RESPIRATORY (INHALATION) at 20:49

## 2020-01-03 RX ADMIN — SODIUM CHLORIDE, PRESERVATIVE FREE 10 ML: 5 INJECTION INTRAVENOUS at 08:15

## 2020-01-03 RX ADMIN — NYSTATIN: 100000 CREAM TOPICAL at 20:34

## 2020-01-03 RX ADMIN — NYSTATIN: 100000 CREAM TOPICAL at 08:06

## 2020-01-03 RX ADMIN — ALPRAZOLAM 0.5 MG: 0.5 TABLET ORAL at 15:05

## 2020-01-03 RX ADMIN — SODIUM CHLORIDE, PRESERVATIVE FREE 10 ML: 5 INJECTION INTRAVENOUS at 20:34

## 2020-01-03 RX ADMIN — ATORVASTATIN CALCIUM 20 MG: 20 TABLET, FILM COATED ORAL at 08:06

## 2020-01-03 RX ADMIN — FERROUS SULFATE TAB 325 MG (65 MG ELEMENTAL FE) 325 MG: 325 (65 FE) TAB at 08:06

## 2020-01-03 RX ADMIN — LEVOTHYROXINE SODIUM 25 MCG: 25 TABLET ORAL at 06:16

## 2020-01-03 RX ADMIN — GABAPENTIN 100 MG: 100 CAPSULE ORAL at 08:05

## 2020-01-03 RX ADMIN — ASPIRIN 325 MG: 325 TABLET, COATED ORAL at 08:06

## 2020-01-03 RX ADMIN — GABAPENTIN 100 MG: 100 CAPSULE ORAL at 20:34

## 2020-01-03 RX ADMIN — GUAIFENESIN AND DEXTROMETHORPHAN HYDROBROMIDE 1 TABLET: 600; 30 TABLET, EXTENDED RELEASE ORAL at 20:34

## 2020-01-03 RX ADMIN — ATENOLOL 25 MG: 25 TABLET ORAL at 08:06

## 2020-01-03 RX ADMIN — BUDESONIDE AND FORMOTEROL FUMARATE DIHYDRATE 2 PUFF: 160; 4.5 AEROSOL RESPIRATORY (INHALATION) at 09:10

## 2020-01-03 RX ADMIN — FUROSEMIDE 40 MG: 40 TABLET ORAL at 17:59

## 2020-01-03 NOTE — PROGRESS NOTES
Nephrology    Stable kidney function  Electrolytes compensated  Good control of volume with fluid restriction  (1.5 L/day) and forsemide (40 mg BID)  Will arrange f/u in our office in one month  Will sign off, but please call if any questions    --Calvin Diamond MD

## 2020-01-03 NOTE — PROGRESS NOTES
[facility] INPATIENT PROGRESS NOTE         [address]    1/3/2020      PATIENT IDENTIFICATION:  Name: Miguelina Lopez ADMIT: 12/15/2019   : 1945  PCP: Michelle Abernathy MD    MRN: [id number] LOS: 19 days   AGE/SEX: 74 y.o. female  ROOM: [facility]                     LOS 19    Reason for visit: Follow-up respiratory failure with OCHOA/OHS and chronic CO2 retention      SUBJECTIVE:      Still awaiting input from CCP in relation to trilogy.  No new issues from a pulmonary standpoint otherwise.    Objective   OBJECTIVE:    Vital Sign Min/Max for last 24 hours  Temp  Min: 97.8 °F (36.6 °C)  Max: 98.5 °F (36.9 °C)   BP  Min: 136/75  Max: 147/62   Pulse  Min: 44  Max: 80   Resp  Min: 16  Max: 18   SpO2  Min: 93 %  Max: 100 %   No data recorded   Weight  Min: 120 kg (263 lb 14.4 oz)  Max: 120 kg (263 lb 14.4 oz)                         Body mass index is 51.54 kg/m².    Intake/Output Summary (Last 24 hours) at 1/3/2020 1000  Last data filed at 1/3/2020 0600  Gross per 24 hour   Intake 570 ml   Output 900 ml   Net -330 ml         Exam:  GEN:  No distress, appears stated age  EYES:   PERRL, anicteric sclera  ENT:    External ears/nose normal, OP clear  NECK:  No adenopathy, midline trachea  LUNGS: Normal chest on inspection, palpation and diminished breath sounds on auscultation  CV:  Normal S1S2, without murmur  ABD:  Non tender, non distended, no hepatosplenomegaly, +BS  EXT:  No edema, cyanosis or clubbing    Scheduled meds:      aspirin  mg Oral Daily   atenolol 25 mg Oral Daily   atorvastatin 20 mg Oral Daily   budesonide-formoterol 2 puff Inhalation BID - RT   clotrimazole-betamethasone  Topical Daily   docusate sodium 100 mg Oral BID   ferrous sulfate 325 mg Oral Daily With Breakfast   furosemide 40 mg Oral BID   gabapentin 100 mg Oral Q12H   glipizide 5 mg Oral QAM AC   insulin lispro 0-9 Units Subcutaneous 4x Daily With Meals & Nightly   lactobacillus acidophilus 1 capsule Oral Daily    levothyroxine 25 mcg Oral Q AM   losartan 100 mg Oral Q24H   nystatin  Topical BID   pantoprazole 40 mg Oral QAM   sodium chloride 10 mL Intravenous Q12H   vilazodone 20 mg Oral Nightly     IV meds:                         Data Review:  Results from last 7 days   Lab Units 01/02/20  0530 12/31/19  0631 12/30/19  0629 12/28/19  0544   SODIUM mmol/L 146* 144 142 143   POTASSIUM mmol/L 4.0 4.5 3.6 4.1   CHLORIDE mmol/L 103 102 99 100   CO2 mmol/L 29.9* 30.5* 31.4* 29.9*   BUN mg/dL 22 19 19 23   CREATININE mg/dL 1.22* 1.12* 1.12* 1.13*   GLUCOSE mg/dL 131* 88 79 121*   CALCIUM mg/dL 8.7 8.7 8.7 8.4*         Estimated Creatinine Clearance: 47.4 mL/min (A) (by C-G formula based on SCr of 1.22 mg/dL (H)).  Results from last 7 days   Lab Units 12/30/19  0629 12/28/19  0544   WBC 10*3/mm3 9.04 10.02   HEMOGLOBIN g/dL 9.2* 8.7*   PLATELETS 10*3/mm3 304 277                             )Assessment   ASSESSMENT:      Active Hospital Problems    Diagnosis  POA   • **Acute on chronic diastolic CHF (congestive heart failure) (CMS/[facility]) [I50.33]  Yes   • Acute on chronic respiratory failure with hypoxia and hypercapnia (CMS/[facility]) [J96.21, J96.22]  No   • Subclinical hypothyroidism [E03.9]  Yes   • Proteinuria [R80.9]  Yes   • Bilateral lower extremity edema [R60.0]  Yes   • Class 3 severe obesity due to excess calories with serious comorbidity and body mass index (BMI) of 50.0 to 59.9 in adult (CMS/[facility]) [E66.01, Z68.43]  Not Applicable   • OLI (acute kidney injury) (CMS/[facility]) [N17.9]  Yes   • Pulmonary hypertension due to sleep-disordered breathing (CMS/[facility]) [I27.29, G47.8]  Yes   • Anemia [D64.9]  Yes   • DM type 2 (diabetes mellitus, type 2) (CMS/[facility]) [E11.9]  Yes   • Diabetic peripheral neuropathy (CMS/HCC) [E11.42]  Yes   • Essential hypertension [I10]  Yes   • CKD (chronic kidney disease) stage 3, GFR 30-59 ml/min (CMS/HCC) [N18.3]  Yes      Resolved Hospital Problems   No resolved problems to display.     Acute hypoxemic and  chronic hypercapnic respiratory failure  Obstructive sleep apnea   Obesity hypoventilation  Morbid obesity  Chronic respiratory failure  Congestive heart failure  Chronic kidney disease  Valvular heart disease  CAD  DM      PLAN:  Awaiting trilogy machine.  Has chronic CO2 retention and would certainly benefit from Trilogy use.  Has tried and failed CPAP/BiPAP with persistent elevation of CO2 despite BiPAP attempt.  Without having trilogy available patient will certainly be at increased risk for recurrent hospitalizations as well as death.  Discussed with CCP.  Encourage pulmonary toilet.    Sree Kilpatrick MD  Pulmonary and Critical Care Medicine  [facility], [facility]  1/3/2020    10:00 AM       Addendum:    I discussed case with [id number] with [employer].  They stated that this is only qualify for trilogy in patients with her insurance if she has COPD or neuromuscular disorder.  They did not consider obesity hypoventilation syndrome with sleep apnea and chronic hypercapnic respiratory failure despite failed BiPAP a reason to approve trilogy.  We could check spirometry and if has obstructive process could re-appeal with diagnosis of COPD.      Sree Kilpatrick MD

## 2020-01-03 NOTE — PLAN OF CARE
Pt was denied her Trilogy equipment and is disappointed however she has tolerated the news well and has stayed in good spirits.

## 2020-01-03 NOTE — THERAPY TREATMENT NOTE
Acute Care - Occupational Therapy Treatment Note  [facility]     Patient Name: Miguelina Lopez  : 1945  MRN: [id number]  Today's Date: 1/3/2020             Admit Date: 12/15/2019       ICD-10-CM ICD-9-CM   1. Acute on chronic combined systolic and diastolic CHF (congestive heart failure) (CMS/[facility]) I50.43 428.43     428.0   2. Acute kidney injury superimposed on chronic kidney disease (CMS/[facility]) N17.9 866.00    N18.9 585.9     Patient Active Problem List   Diagnosis   • Anxiety disorder   • Arthritis of knee   • Asthma   • Chronic coronary artery disease   • CKD (chronic kidney disease) stage 3, GFR 30-59 ml/min (CMS/[facility])   • Depression   • Diabetic peripheral neuropathy (CMS/[facility])   • Gastroesophageal reflux disease   • Hyperlipidemia   • Insomnia   • Lower gastrointestinal hemorrhage   • Anemia   • OCHOA (obstructive sleep apnea)   • DM type 2 (diabetes mellitus, type 2) (CMS/[facility])   • Essential hypertension   • Hospital discharge follow-up   • Pulmonary hypertension due to sleep-disordered breathing (CMS/[facility])   • s/p MVR, TV-repair, CABG x2 16   • OLI (acute kidney injury) (CMS/[facility])   • Leukocytosis   • Atrial fibrillation (CMS/[facility])   • Nocturnal hypoxia   • Dermatitis   • Medicare annual wellness visit, initial   • Class 3 severe obesity due to excess calories with serious comorbidity and body mass index (BMI) of 50.0 to 59.9 in adult (CMS/[facility])   • Supplemental oxygen dependent   • Mitral regurgitation   • Aortic stenosis   • Medicare annual wellness visit, subsequent   • Localized edema   • Chronic right-sided congestive heart failure (CMS/[facility])   • Cellulitis of left lower extremity   • Proteinuria   • Bilateral lower extremity edema   • Subclinical hypothyroidism   • Acute on chronic diastolic CHF (congestive heart failure) (CMS/[facility])   • Chronic respiratory failure with hypoxia and hypercapnia (CMS/[facility])   • Acute on chronic respiratory failure with hypoxia and hypercapnia (CMS/[facility])     Past Medical  History:   Diagnosis Date   • Anemia    • Anxiety    • Aortic valve stenosis    • CHF (congestive heart failure) (CMS/[facility])    • Chronic coronary artery disease    • Class 3 severe obesity due to excess calories in adult (CMS/[facility])    • Depression    • Diabetes mellitus (CMS/[facility])    • Heart murmur    • Mitral valve insufficiency    • Pneumonia     1/2016   • Pulmonary hypertension (CMS/[facility])     due to sleep disordered breathing   • Sleep apnea     Uses CPAP or oxygen   • Stage 3 chronic kidney disease (CMS/[facility])    • Supplemental oxygen dependent      Past Surgical History:   Procedure Laterality Date   • CARDIAC CATHETERIZATION     • CARDIAC CATHETERIZATION N/A 6/10/2016    Procedure: Left Heart Cath;  Surgeon: Chace Johnson MD;  Location: [facility] CATH INVASIVE LOCATION;  Service:    • CARDIAC CATHETERIZATION N/A 6/10/2016    Procedure: Right Heart Cath;  Surgeon: Chace Johnson MD;  Location: [facility] CATH INVASIVE LOCATION;  Service:    • CORONARY ARTERY BYPASS GRAFT      2 vessel   • CORONARY ARTERY BYPASS GRAFT WITH MITRAL VALVE REPAIR/REPLACEMENT N/A 6/13/2016    Procedure: INTRAOPERATIVE TARIQ, MIDLINE STERNOTOMY, CORONARY ARTERY BYPASS GRAFTING X  2 UTILIZING ENDOSCOPICALLY HARVESTED LEFT GREATER SAPHENOUS VEIN, MITRAL VALVE REPLACEMENT AND TRICUSPID VALVE REPAIR;  Surgeon: Eliecer Mistry MD;  Location: [facility] OR;  Service:    • CORONARY STENT PLACEMENT  2010    Approx. 6 yrs ago at [facility]   • HEMORRHOIDECTOMY     • HYSTERECTOMY     • MITRAL VALVE REPLACEMENT     • REPLACEMENT TOTAL KNEE Right    • THYROID SURGERY      Cyst removed from thyroid       Therapy Treatment    Rehabilitation Treatment Summary     Row Name 01/03/20 1207             Treatment Time/Intention    Discipline  occupational therapist  -VS      Document Type  therapy note (daily note)  -VS      Subjective Information  no complaints  -VS      Mode of Treatment  occupational therapy  -VS      Patient/Family Observations  Pt.  "was reclined in a  chair in the room  -VS      Care Plan Review  evaluation/treatment results reviewed;care plan/treatment goals reviewed;risks/benefits reviewed;patient/other agree to care plan  -VS      Patient Effort  adequate  -VS      Comment  \" My insurance company is still saying no to the machine. \"  pt. stated   -VS      Existing Precautions/Restrictions  fall  -VS      Patient Response to Treatment  \" My legs feel better when they are wrapped by you\"   -VS      Recorded by [VS] Ericka Bishop OTR 01/03/20 1322      Row Name 01/03/20 1207             Cognitive Assessment/Intervention- PT/OT    Orientation Status (Cognition)  oriented x 3  -VS      Follows Commands (Cognition)  WFL  -VS      Recorded by [VS] Ericka Bishop OTR 01/03/20 1322      Row Name 01/03/20 1207             ADL Assessment/Intervention    BADL Assessment/Intervention  bathing  -VS      Recorded by [VS] Ericka Bishop OTR 01/03/20 1322      Row Name 01/03/20 1207             Bathing Assessment/Intervention    Comment (Bathing)  Therapist bathed and applied lotion from toes to knees (B)ly   -VS      Recorded by [VS] Ericka Bishop OTR 01/03/20 1322      Row Name 01/03/20 1207             Positioning and Restraints    Pre-Treatment Position  sitting in chair/recliner  -VS      Post Treatment Position  chair  -VS      In Chair  notified nsg;reclined;call light within reach;encouraged to call for assist;exit alarm on;with other staff;legs elevated with RT  -VS      Recorded by [VS] Ericka Bishop OTR 01/03/20 1322      Row Name 01/03/20 1207             Pain Assessment    Additional Documentation  Pain Scale: Numbers Pre/Post-Treatment (Group)  -VS      Recorded by [VS] Ericka Bishop OTR 01/03/20 1322      Row Name 01/03/20 1207             Pain Scale: Numbers Pre/Post-Treatment    Pain Scale: Numbers, Pretreatment  0/10 - no pain  -VS      Recorded by [VS] Ericka Bishop OTR 01/03/20 1322      Row Name 01/03/20 1207    "          Edema Assessment & Management    Location (Edema)  lower extremity, left;lower extremity, right  -VS      Additional Documentation  Edema Symptoms/Interventions (Group)  -VS      Recorded by [VS] Ericka Bishop, OTR 01/03/20 1322      Row Name 01/03/20 1207             Lower Extremity Edema Measures, Left    Lower Extremity, Left (Edema)  mid-calf  -VS      Recorded by [VS] Ericka Bishop, OTR 01/03/20 1322      Row Name 01/03/20 1207             Lower Extremity Edema Measures, Right    Lower Extremity, Right (Edema)  mid-calf  -VS      Recorded by [VS] Ericka Bishop, OTR 01/03/20 1322      Row Name 01/03/20 1207             Edema Symptoms/Interventions    Description (Edema)  localized;pitting  -VS      Pitting Scale (Edema)  -- (R) mild, (L) Moderate   -VS      Associated Symptoms (Edema)  skin color changes  -VS      Treatment Interventions (Edema)  compression bandaging, short stretch;compression wrapping/taping [id number],#10artiflex,#8&10Rosdial bandages toe/knee B  -VS      Recorded by [VS] Ericka Bishop, OTR 01/03/20 1322        User Key  (r) = Recorded By, (t) = Taken By, (c) = Cosigned By    Initials Name Effective Dates Discipline    VS Ericka Bishop, OTR 06/08/18 -  OT           Rehab Goal Summary     Row Name 01/03/20 1300             Occupational Therapy Goals    Problem Specific Goal Selection (OT)  problem specific goal 1, OT  -VS         Problem Specific Goal 1 (OT)    Problem Specific Goal 1 (OT)  Pt's LE edema to be reduced  and pt. to tolerate wearing the LE wraps   -VS      Time Frame (Problem Specific Goal 1, OT)  short term goal (STG);1 week  -VS      Progress/Outcome (Problem Specific Goal 1, OT)  continuing progress toward goal  -VS        User Key  (r) = Recorded By, (t) = Taken By, (c) = Cosigned By    Initials Name Provider Type Discipline    VS Ericka Bishop, OTR Occupational Therapist OT            OT Recommendation and Plan  Outcome Summary/Treatment Plan  (OT)  Anticipated Discharge Disposition (OT): home with assist, home with home health  Planned Therapy Interventions (OT Eval): edema control/reduction  Therapy Frequency (OT Eval): 5 times/wk  Plan of Care Review  Plan of Care Reviewed With: patient  Plan of Care Reviewed With: patient  Outcome Summary: OT:  Edema with LE greater in (L) than (R).  Pt was wrapped from toes to knees (B)ly.  Pt. tolerateing it well.  Pt. will benefit for Lymph wraps while in- patient  Outcome Measures     Row Name 01/03/20 1300 01/02/20 1400          How much help from another is currently needed...    Putting on and taking off regular lower body clothing?  2  -VS  2  -VS     Bathing (including washing, rinsing, and drying)  2  -VS  2  -VS     Toileting (which includes using toilet bed pan or urinal)  3  -VS  3  -VS     Putting on and taking off regular upper body clothing  3  -VS  3  -VS     Taking care of personal grooming (such as brushing teeth)  4  -VS  4  -VS     Eating meals  4  -VS  4  -VS     AM-PAC 6 Clicks Score (OT)  18  -VS  18  -VS        Functional Assessment    Outcome Measure Options  9 Hole Peg;AM-PAC 6 Clicks Daily Activity (OT)  -VS  AM-PAC 6 Clicks Daily Activity (OT)  -VS       User Key  (r) = Recorded By, (t) = Taken By, (c) = Cosigned By    Initials Name Provider Type    VS Ericka Bishop OTR Occupational Therapist           Time Calculation:   Time Calculation- OT     Row Name 01/03/20 1326             Time Calculation- OT    OT Start Time  1131  -VS      OT Stop Time  1206  -VS      OT Time Calculation (min)  35 min  -VS      Total Timed Code Minutes- OT  35 minute(s)  -VS        User Key  (r) = Recorded By, (t) = Taken By, (c) = Cosigned By    Initials Name Provider Type    VS Ericka Bishop OTR Occupational Therapist        Therapy Charges for Today     Code Description Service Date Service Provider Modifiers Qty    88400270329  OT MANUAL THERAPY EA 15 MIN 1/2/2020 Ericka Bishop OTR GO 2     [id number]  OT MANUAL THERAPY EA 15 MIN 1/3/2020 Ericka Bishop, ANNEMARIE GO 2               ANNEMARIE Wiggins  1/3/2020

## 2020-01-03 NOTE — PLAN OF CARE
Patients vitals stable. Patient denies pain and discomfort this shift. Patient reports feeling like she has a cold and request PRN municex once this shift. Patient put on bipap to sleep around 2300 this shift and was asleep for rest of shift. Will continue to monitor.

## 2020-01-03 NOTE — PROGRESS NOTES
" LOS: 19 days     Name: Miguelina Lopez  Age: 74 y.o.  Sex: female  :  1945  MRN: [id number]         Primary Care Physician: Michelle Abernathy MD    Subjective   Subjective  No new complaints or overnight events.    Objective   Vital Signs  Temp:  [97.8 °F (36.6 °C)-98.5 °F (36.9 °C)] 98.5 °F (36.9 °C)  Heart Rate:  [44-80] 54  Resp:  [18] 18  BP: (136-158)/(62-81) 158/63  Body mass index is 51.54 kg/m².    Objective:  General Appearance:  Comfortable and in no acute distress (Chronically ill-appearing).    Vital signs: (most recent): Blood pressure 158/63, pulse 54, temperature 98.5 °F (36.9 °C), temperature source Oral, resp. rate 18, height 152.4 cm (60\"), weight 120 kg (263 lb 14.4 oz), SpO2 99 %, not currently breastfeeding.    Lungs:  Normal effort and normal respiratory rate.  She is not in respiratory distress.  There are decreased breath sounds.    Heart: Normal rate.  Regular rhythm.    Abdomen: Abdomen is soft.  Bowel sounds are normal.   There is no abdominal tenderness.     Extremities: There is no dependent edema or local swelling.    Neurological: Patient is alert and oriented to person, place and time.    Skin:  Warm and dry.              Results Review:       I reviewed the patient's new clinical results.    Results from last 7 days   Lab Units 19  0629 19  0544   WBC 10*3/mm3 9.04 10.02   HEMOGLOBIN g/dL 9.2* 8.7*   PLATELETS 10*3/mm3 304 277     Results from last 7 days   Lab Units 20  0530 19  0631 19  0629 19  0544   SODIUM mmol/L 146* 144 142 143   POTASSIUM mmol/L 4.0 4.5 3.6 4.1   CHLORIDE mmol/L 103 102 99 100   CO2 mmol/L 29.9* 30.5* 31.4* 29.9*   BUN mg/dL 22  19 23   CREATININE mg/dL 1.22* 1.12* 1.12* 1.13*   CALCIUM mg/dL 8.7 8.7 8.7 8.4*   GLUCOSE mg/dL 131* 88 79 121*                 Scheduled Meds:     aspirin  mg Oral Daily   atenolol 25 mg Oral Daily   atorvastatin 20 mg Oral Daily   budesonide-formoterol 2 puff Inhalation BID - RT "   clotrimazole-betamethasone  Topical Daily   docusate sodium 100 mg Oral BID   ferrous sulfate 325 mg Oral Daily With Breakfast   furosemide 40 mg Oral BID   gabapentin 100 mg Oral Q12H   glipizide 5 mg Oral QAM AC   insulin lispro 0-9 Units Subcutaneous 4x Daily With Meals & Nightly   lactobacillus acidophilus 1 capsule Oral Daily   levothyroxine 25 mcg Oral Q AM   losartan 100 mg Oral Q24H   nystatin  Topical BID   pantoprazole 40 mg Oral QAM   sodium chloride 10 mL Intravenous Q12H   vilazodone 20 mg Oral Nightly     PRN Meds:   •  acetaminophen **OR** acetaminophen **OR** acetaminophen  •  albuterol  •  ALPRAZolam  •  dextrose  •  dextrose  •  glucagon (human recombinant)  •  guaifenesin-dextromethorphan  •  ondansetron  •  polyethylene glycol  •  senna-docusate sodium  •  sodium chloride  •  sodium chloride  Continuous Infusions:       Assessment/Plan   Active Hospital Problems    Diagnosis  POA   • **Acute on chronic diastolic CHF (congestive heart failure) (CMS/[facility]) [I50.33]  Yes   • Acute on chronic respiratory failure with hypoxia and hypercapnia (CMS/[facility]) [J96.21, J96.22]  No   • Subclinical hypothyroidism [E03.9]  Yes   • Proteinuria [R80.9]  Yes   • Bilateral lower extremity edema [R60.0]  Yes   • Class 3 severe obesity due to excess calories with serious comorbidity and body mass index (BMI) of 50.0 to 59.9 in adult (CMS/[facility]) [E66.01, Z68.43]  Not Applicable   • OLI (acute kidney injury) (CMS/[facility]) [N17.9]  Yes   • Pulmonary hypertension due to sleep-disordered breathing (CMS/[facility]) [I27.29, G47.8]  Yes   • Anemia [D64.9]  Yes   • DM type 2 (diabetes mellitus, type 2) (CMS/[facility]) [E11.9]  Yes   • Diabetic peripheral neuropathy (CMS/[facility]) [E11.42]  Yes   • Essential hypertension [I10]  Yes   • CKD (chronic kidney disease) stage 3, GFR 30-59 ml/min (CMS/[facility]) [N18.3]  Yes      Resolved Hospital Problems   No resolved problems to display.       Assessment & Plan    · Acute on chronic diastolic heart  failure/valvular heart disease: S/P Lasix gtt. Continue oral diuretics.  Nephrology indicates that her renal function and electrolytes are stable on current regimen and have signed off.  They will see her in 1 month.  · OLI/CKD 3  · Pulmonary hypertension/OHS/OCHOA/acute on chronic mixed respiratory failure: Trilogy recommended by pulmonology and awaiting insurance approval/coordination.  Pulmonology following and discussed with Dr. Kilpatrick.  [employer] insurance continues to decline approval of the Trilogy.  PFTs were performed today with results pending.  We feel that without this the patient will be at high risk for recurrent hospitalizations or possibly even death.  · Hypothyroidism: Synthroid.  Repeat TSH in 6 to 8 weeks.  · Diabetes: A1c 6.4.  Continue current diabetic regimen  · Lymphedema  · Anemia: Chronic iron deficient  · Disposition: Home health, STILL waiting on trilogy determination by her insurance. This is the only thing keeping her in the hospital.    Inder Salvador MD  [facility]  01/03/20  4:36 PM

## 2020-01-03 NOTE — PROGRESS NOTES
Continued Stay Note  [facility]     Patient Name: Miguelina Lopez  MRN: [id number]  Today's Date: 1/3/2020    Admit Date: 12/15/2019    Discharge Plan     Row Name 01/03/20 1642       Plan    Plan  Plans are home with family and [facility].     Plan Comments  Pt has been denied for Trilogy after peer to peer.  CCP will follow for additional orders and needs.  Raisa Sarmiento RN/CCP        Discharge Codes    No documentation.             Raisa Sarmineto RN

## 2020-01-03 NOTE — PLAN OF CARE
Problem: Patient Care Overview  Goal: Plan of Care Review  Flowsheets (Taken 1/3/2020 1323)  Plan of Care Reviewed With: patient  Note:   OT Lymph:  Pt. Continue to tolerate the LE Lymph wraps.  (R)LE edema - mild (L)LE edema - moderate.  Pt. Continue to benefit from skilled OT for LE wrapping.  Pt's grand daughter has been instructed on how to wrap the LEs, written information in the room.  OT will continue to wrap Mon - Fri and RN will wrap Sat &  Sun.

## 2020-01-04 LAB
GLUCOSE BLDC GLUCOMTR-MCNC: 102 MG/DL (ref 70–130)
GLUCOSE BLDC GLUCOMTR-MCNC: 110 MG/DL (ref 70–130)
GLUCOSE BLDC GLUCOMTR-MCNC: 310 MG/DL (ref 70–130)
GLUCOSE BLDC GLUCOMTR-MCNC: 63 MG/DL (ref 70–130)

## 2020-01-04 PROCEDURE — 94799 UNLISTED PULMONARY SVC/PX: CPT

## 2020-01-04 PROCEDURE — 63710000001 INSULIN LISPRO (HUMAN) PER 5 UNITS: Performed by: NURSE PRACTITIONER

## 2020-01-04 PROCEDURE — 82962 GLUCOSE BLOOD TEST: CPT

## 2020-01-04 RX ADMIN — ATORVASTATIN CALCIUM 20 MG: 20 TABLET, FILM COATED ORAL at 09:20

## 2020-01-04 RX ADMIN — DOCUSATE SODIUM 100 MG: 100 CAPSULE, LIQUID FILLED ORAL at 09:19

## 2020-01-04 RX ADMIN — ASPIRIN 325 MG: 325 TABLET, COATED ORAL at 09:20

## 2020-01-04 RX ADMIN — NYSTATIN: 100000 CREAM TOPICAL at 20:28

## 2020-01-04 RX ADMIN — GLIPIZIDE 5 MG: 5 TABLET ORAL at 06:31

## 2020-01-04 RX ADMIN — VILAZODONE HYDROCHLORIDE 20 MG: 40 TABLET ORAL at 20:26

## 2020-01-04 RX ADMIN — FERROUS SULFATE TAB 325 MG (65 MG ELEMENTAL FE) 325 MG: 325 (65 FE) TAB at 09:20

## 2020-01-04 RX ADMIN — FUROSEMIDE 40 MG: 40 TABLET ORAL at 17:02

## 2020-01-04 RX ADMIN — INSULIN LISPRO 7 UNITS: 100 INJECTION, SOLUTION INTRAVENOUS; SUBCUTANEOUS at 17:02

## 2020-01-04 RX ADMIN — GABAPENTIN 100 MG: 100 CAPSULE ORAL at 20:26

## 2020-01-04 RX ADMIN — CLOTRIMAZOLE AND BETAMETHASONE DIPROPIONATE: 10; .5 CREAM TOPICAL at 09:21

## 2020-01-04 RX ADMIN — SODIUM CHLORIDE, PRESERVATIVE FREE 10 ML: 5 INJECTION INTRAVENOUS at 20:28

## 2020-01-04 RX ADMIN — BUDESONIDE AND FORMOTEROL FUMARATE DIHYDRATE 2 PUFF: 160; 4.5 AEROSOL RESPIRATORY (INHALATION) at 11:16

## 2020-01-04 RX ADMIN — Medication 1 CAPSULE: at 09:19

## 2020-01-04 RX ADMIN — FUROSEMIDE 40 MG: 40 TABLET ORAL at 09:19

## 2020-01-04 RX ADMIN — ATENOLOL 25 MG: 25 TABLET ORAL at 09:19

## 2020-01-04 RX ADMIN — DOCUSATE SODIUM 100 MG: 100 CAPSULE, LIQUID FILLED ORAL at 20:26

## 2020-01-04 RX ADMIN — LOSARTAN POTASSIUM 100 MG: 100 TABLET, FILM COATED ORAL at 11:16

## 2020-01-04 RX ADMIN — ACETAMINOPHEN 650 MG: 325 TABLET, FILM COATED ORAL at 20:26

## 2020-01-04 RX ADMIN — ALPRAZOLAM 0.5 MG: 0.5 TABLET ORAL at 22:02

## 2020-01-04 RX ADMIN — NYSTATIN: 100000 CREAM TOPICAL at 09:20

## 2020-01-04 RX ADMIN — GABAPENTIN 100 MG: 100 CAPSULE ORAL at 09:19

## 2020-01-04 RX ADMIN — PANTOPRAZOLE SODIUM 40 MG: 40 TABLET, DELAYED RELEASE ORAL at 06:31

## 2020-01-04 RX ADMIN — BUDESONIDE AND FORMOTEROL FUMARATE DIHYDRATE 2 PUFF: 160; 4.5 AEROSOL RESPIRATORY (INHALATION) at 21:53

## 2020-01-04 RX ADMIN — MAGNESIUM HYDROXIDE 10 ML: 2400 SUSPENSION ORAL at 12:06

## 2020-01-04 RX ADMIN — ALPRAZOLAM 0.5 MG: 0.5 TABLET ORAL at 14:33

## 2020-01-04 RX ADMIN — LEVOTHYROXINE SODIUM 25 MCG: 25 TABLET ORAL at 06:31

## 2020-01-04 NOTE — PROGRESS NOTES
Continued Stay Note  [facility]     Patient Name: Miguelina Lopez  MRN: [id number]  Today's Date: 1/4/2020    Admit Date: 12/15/2019    Discharge Plan     Row Name 01/04/20 1525       Plan    Plan  Plans home with [facility] and Michele for Trilogy if approved by insurance    Patient/Family in Agreement with Plan  yes    Plan Comments  Spoke with Nancy and she states we are waiting for [employer] to approve Trilogy. CCP to work with Benigno to see if this can be arranged for the weekend. Nancy faxed new MD order to [phone]. New order signed by Dr. Kilpatrick. Called [employer]o greivance&appeals #[phone] option#3. Spoke with Nate and requested an expidited appeal for Trilogy based off of MD notes from 1/3/2020 and 1/4/2020--pt has completed PFTs and has new diagnosis of COPD which is what MD documented Benigno needed to support Trilogy use, among her several other comorbidities, after a adsr-kg-jqap was done on 1/3/2020. Faxed updated clinicals and MD order to [phone]. Per Nate, this appeal could take up to 72hrs to complete, but he will request a 24hr turn-around if possible d/t pt's already extensive hospital stay. Case# for expidited appeal is #[id number]. Updated Nancy and she states they would be able to supply the Trilogy to pt this weekend if we get the authorization. CCP to follow................JW    Row Name 01/04/20 1422       Plan    Plan Comments  CCP consult from 1/4/2020 noted: emailed [facility]hele rep Reji Valdivia in follow up. Waiting for return response from Michele at this time. CCP to follow...........JW         Discharge Codes    No documentation.             Fabiana Segundo RN

## 2020-01-04 NOTE — PLAN OF CARE
Pt is 75 yo female; pt VS's stable and up in chair.  Pt will be discharged as soon as her trilogy machine is approved with insurance.  PFT showed moderate obstruction with decreased FEVI and flow loop consistent with obstructive process consistent with COPD.   Will continue to monitor.    Problem: Patient Care Overview  Goal: Plan of Care Review  Outcome: Ongoing (interventions implemented as appropriate)  Flowsheets (Taken 1/4/2020 1729)  Progress: no change  Plan of Care Reviewed With: patient  Goal: Individualization and Mutuality  Outcome: Ongoing (interventions implemented as appropriate)  Flowsheets  Taken 12/27/2019 0540 by Judi Raya, RN  Patient Specific Goals (Include Timeframe): personal safety during stay  Taken 12/26/2019 1636 by Adri Mathis RN  Patient Specific Interventions: Monitor VS, labs, admin meds per MD order  Taken 1/4/2020 1729 by Guerline Arias RN  Patient Specific Preferences: To get trilogy machine and go home  Goal: Discharge Needs Assessment  Outcome: Ongoing (interventions implemented as appropriate)  Flowsheets (Taken 1/4/2020 1729)  Concerns to be Addressed: discharge planning; care coordination/care conferences  Readmission Within the Last 30 Days: no previous admission in last 30 days  Goal: Interprofessional Rounds/Family Conf  Outcome: Ongoing (interventions implemented as appropriate)  Flowsheets (Taken 1/4/2020 1729)  Participants: nursing; physician     Problem: Fall Risk (Adult)  Goal: Absence of Fall  Outcome: Ongoing (interventions implemented as appropriate)  Flowsheets (Taken 1/4/2020 1729)  Absence of Fall: making progress toward outcome     Problem: Skin Injury Risk (Adult)  Goal: Skin Health and Integrity  Outcome: Ongoing (interventions implemented as appropriate)  Flowsheets (Taken 1/4/2020 1729)  Skin Health and Integrity: making progress toward outcome     Problem: Activity Intolerance (Adult)  Goal: Activity Tolerance  Outcome: Ongoing (interventions  implemented as appropriate)  Flowsheets (Taken 1/4/2020 1720)  Activity Tolerance: making progress toward outcome     Problem: Breathing Pattern Ineffective (Adult)  Goal: Anxiety/Fear Reduction  Outcome: Ongoing (interventions implemented as appropriate)  Flowsheets (Taken 1/4/2020 172)  Anxiety/Fear Reduction: making progress toward outcome

## 2020-01-04 NOTE — PLAN OF CARE
Patients vitals stable. Patient reports headache this shift and PRN tylenol given. Patient denies any other discomfort. Patient reports still feels sick and PRN municex given. Patients O2 was weaned to 2liters and patient reports she feels short of air so it was increased back to 3 liters patient reports she is on 3 liters at home. Will continue to monitor.

## 2020-01-04 NOTE — PROGRESS NOTES
[facility] INPATIENT PROGRESS NOTE         [address]    2020      PATIENT IDENTIFICATION:  Name: Miguelina Lopez ADMIT: 12/15/2019   : 1945  PCP: Michelle Abernathy MD    MRN: [id number] LOS: 20 days   AGE/SEX: 74 y.o. female  ROOM: [facility]                     LOS 20    Reason for visit: Follow-up respiratory failure with OCHOA/OHS and chronic CO2 retention      SUBJECTIVE:      Still awaiting input from CCP in relation to trilogy.  No new issues from a pulmonary standpoint otherwise.  Spirometry performed yesterday read by Dr. Amado as normal since both FEV1 and FVC were reduced but clearly this is an inaccurate interpretation.  Did not take obesity into consideration.  Flow loop clearly shows signs of obstruction.    Objective   OBJECTIVE:    Vital Sign Min/Max for last 24 hours  Temp  Min: 97.9 °F (36.6 °C)  Max: 98.5 °F (36.9 °C)   BP  Min: 135/58  Max: 158/63   Pulse  Min: 52  Max: 68   Resp  Min: 18  Max: 23   SpO2  Min: 94 %  Max: 100 %   No data recorded   Weight  Min: 121 kg (267 lb 3.2 oz)  Max: 121 kg (267 lb 3.2 oz)                         Body mass index is 52.18 kg/m².    Intake/Output Summary (Last 24 hours) at 2020 1107  Last data filed at 1/3/2020 1227  Gross per 24 hour   Intake --   Output 250 ml   Net -250 ml         Exam:  GEN:  No distress, appears stated age  EYES:   PERRL, anicteric sclera  ENT:    External ears/nose normal, OP clear  NECK:  No adenopathy, midline trachea  LUNGS: Normal chest on inspection, palpation and diminished breath sounds on auscultation  CV:  Normal S1S2, without murmur  ABD:  Non tender, non distended, no hepatosplenomegaly, +BS  EXT:  No edema, cyanosis or clubbing    Scheduled meds:      aspirin  mg Oral Daily   atenolol 25 mg Oral Daily   atorvastatin 20 mg Oral Daily   budesonide-formoterol 2 puff Inhalation BID - RT   clotrimazole-betamethasone  Topical Daily   docusate sodium 100 mg Oral BID   ferrous sulfate 325 mg Oral  Daily With Breakfast   furosemide 40 mg Oral BID   gabapentin 100 mg Oral Q12H   glipizide 5 mg Oral QAM AC   insulin lispro 0-9 Units Subcutaneous 4x Daily With Meals & Nightly   lactobacillus acidophilus 1 capsule Oral Daily   levothyroxine 25 mcg Oral Q AM   losartan 100 mg Oral Q24H   nystatin  Topical BID   pantoprazole 40 mg Oral QAM   sodium chloride 10 mL Intravenous Q12H   vilazodone 20 mg Oral Nightly     IV meds:                         Data Review:  Results from last 7 days   Lab Units 01/02/20  0530 12/31/19  0631 12/30/19  0629   SODIUM mmol/L 146* 144 142   POTASSIUM mmol/L 4.0 4.5 3.6   CHLORIDE mmol/L 103 102 99   CO2 mmol/L 29.9* 30.5* 31.4*   BUN mg/dL 22 19 19   CREATININE mg/dL 1.22* 1.12* 1.12*   GLUCOSE mg/dL 131* 88 79   CALCIUM mg/dL 8.7 8.7 8.7         Estimated Creatinine Clearance: 47.6 mL/min (A) (by C-G formula based on SCr of 1.22 mg/dL (H)).  Results from last 7 days   Lab Units 12/30/19  0629   WBC 10*3/mm3 9.04   HEMOGLOBIN g/dL 9.2*   PLATELETS 10*3/mm3 304                               I reviewed the pulmonary function testing and absolutely disagree with Dr. Amado's interpretation.  Reduced FVC is due to obesity.  FEV1 reduction and flow loop clearly consistent with obstructive process with moderate COPD.      )Assessment   ASSESSMENT:      Active Hospital Problems    Diagnosis  POA   • **Acute on chronic diastolic CHF (congestive heart failure) (CMS/[facility]) [I50.33]  Yes   • Acute on chronic respiratory failure with hypoxia and hypercapnia (CMS/[facility]) [J96.21, J96.22]  No   • Subclinical hypothyroidism [E03.9]  Yes   • Proteinuria [R80.9]  Yes   • Bilateral lower extremity edema [R60.0]  Yes   • Class 3 severe obesity due to excess calories with serious comorbidity and body mass index (BMI) of 50.0 to 59.9 in adult (CMS/[facility]) [E66.01, Z68.43]  Not Applicable   • OLI (acute kidney injury) (CMS/[facility]) [N17.9]  Yes   • Pulmonary hypertension due to sleep-disordered breathing (CMS/[facility])  [I27.29, G47.8]  Yes   • Anemia [D64.9]  Yes   • DM type 2 (diabetes mellitus, type 2) (CMS/HCC) [E11.9]  Yes   • Diabetic peripheral neuropathy (CMS/HCC) [E11.42]  Yes   • Essential hypertension [I10]  Yes   • CKD (chronic kidney disease) stage 3, GFR 30-59 ml/min (CMS/HCC) [N18.3]  Yes      Resolved Hospital Problems   No resolved problems to display.     Acute hypoxemic and chronic hypercapnic respiratory failure  Obstructive sleep apnea   Obesity hypoventilation  Morbid obesity  Chronic respiratory failure  Congestive heart failure  Chronic kidney disease  Valvular heart disease  CAD  DM  COPD      PLAN:  Awaiting trilogy machine.  Has chronic CO2 retention and would certainly benefit from Trilogy use.  Has tried and failed CPAP/BiPAP with persistent elevation of CO2 despite BiPAP attempt.  Without having trilogy available patient will certainly be at increased risk for recurrent hospitalizations as well as death.  Has signs of obstructive lung disease and spirometry shows moderate obstruction with decreased FEV1 and flow loop consistent with obstructive process consistent with COPD.  On Symbicort as maintenance inhaled COPD treatment.  We will re-appeal for her trilogy machine.  Encourage pulmonary toilet.    Sree Kilpatrick MD  Pulmonary and Critical Care Medicine  [facility]  1/4/2020    11:07 AM

## 2020-01-04 NOTE — PROGRESS NOTES
" LOS: 20 days     Name: Miguelina Lopez  Age: 74 y.o.  Sex: female  :  1945  MRN: [id number]         Primary Care Physician: Michelle Abernathy MD    Subjective   Subjective  No new complaints or overnight events    Objective   Vital Signs  Temp:  [97.9 °F (36.6 °C)-98.7 °F (37.1 °C)] 98.7 °F (37.1 °C)  Heart Rate:  [52-68] 54  Resp:  [18-23] 20  BP: (135-158)/(50-63) 149/55  Body mass index is 52.18 kg/m².    Objective:  General Appearance:  Comfortable and in no acute distress.    Vital signs: (most recent): Blood pressure 149/55, pulse 54, temperature 98.7 °F (37.1 °C), temperature source Oral, resp. rate 20, height 152.4 cm (60\"), weight 121 kg (267 lb 3.2 oz), SpO2 95 %, not currently breastfeeding.    Lungs:  Normal effort and normal respiratory rate.    Heart: Normal rate.  Regular rhythm.    Abdomen: Abdomen is soft.  Bowel sounds are normal.   There is no abdominal tenderness.     Extremities: There is no dependent edema or local swelling.    Neurological: Patient is alert and oriented to person, place and time.    Skin:  Warm and dry.              Results Review:       I reviewed the patient's new clinical results.    Results from last 7 days   Lab Units 19  0629   WBC 10*3/mm3 9.04   HEMOGLOBIN g/dL 9.2*   PLATELETS 10*3/mm3 304     Results from last 7 days   Lab Units 20  0530 19  0631 19  0629   SODIUM mmol/L 146* 144 142   POTASSIUM mmol/L 4.0 4.5 3.6   CHLORIDE mmol/L 103 102 99   CO2 mmol/L 29.9* 30.5* 31.4*   BUN mg/dL    CREATININE mg/dL 1.22* 1.12* 1.12*   CALCIUM mg/dL 8.7 8.7 8.7   GLUCOSE mg/dL 131* 88 79                 Scheduled Meds:     aspirin  mg Oral Daily   atenolol 25 mg Oral Daily   atorvastatin 20 mg Oral Daily   budesonide-formoterol 2 puff Inhalation BID - RT   clotrimazole-betamethasone  Topical Daily   docusate sodium 100 mg Oral BID   ferrous sulfate 325 mg Oral Daily With Breakfast   furosemide 40 mg Oral BID   gabapentin 100 mg Oral " Q12H   glipizide 5 mg Oral QAM AC   insulin lispro 0-9 Units Subcutaneous 4x Daily With Meals & Nightly   lactobacillus acidophilus 1 capsule Oral Daily   levothyroxine 25 mcg Oral Q AM   losartan 100 mg Oral Q24H   nystatin  Topical BID   pantoprazole 40 mg Oral QAM   sodium chloride 10 mL Intravenous Q12H   vilazodone 20 mg Oral Nightly     PRN Meds:   •  acetaminophen **OR** acetaminophen **OR** acetaminophen  •  albuterol  •  ALPRAZolam  •  dextrose  •  dextrose  •  glucagon (human recombinant)  •  guaifenesin-dextromethorphan  •  magnesium hydroxide  •  ondansetron  •  polyethylene glycol  •  senna-docusate sodium  •  sodium chloride  •  sodium chloride  Continuous Infusions:       Assessment/Plan   Active Hospital Problems    Diagnosis  POA   • **Acute on chronic diastolic CHF (congestive heart failure) (CMS/[facility]) [I50.33]  Yes   • Acute on chronic respiratory failure with hypoxia and hypercapnia (CMS/[facility]) [J96.21, J96.22]  No   • Subclinical hypothyroidism [E03.9]  Yes   • Proteinuria [R80.9]  Yes   • Bilateral lower extremity edema [R60.0]  Yes   • Class 3 severe obesity due to excess calories with serious comorbidity and body mass index (BMI) of 50.0 to 59.9 in adult (CMS/[facility]) [E66.01, Z68.43]  Not Applicable   • OLI (acute kidney injury) (CMS/[facility]) [N17.9]  Yes   • Pulmonary hypertension due to sleep-disordered breathing (CMS/[facility]) [I27.29, G47.8]  Yes   • Anemia [D64.9]  Yes   • DM type 2 (diabetes mellitus, type 2) (CMS/[facility]) [E11.9]  Yes   • Diabetic peripheral neuropathy (CMS/[facility]) [E11.42]  Yes   • Essential hypertension [I10]  Yes   • CKD (chronic kidney disease) stage 3, GFR 30-59 ml/min (CMS/[facility]) [N18.3]  Yes      Resolved Hospital Problems   No resolved problems to display.       Assessment & Plan    · Acute on chronic diastolic heart failure/valvular heart disease: S/P Lasix gtt. Continue oral diuretics.  Nephrology indicates that her renal function and electrolytes are stable on current regimen and  have signed off.  They will see her in 1 month.  · OLI/CKD 3  · Pulmonary hypertension/OHS/OCHOA/acute on chronic mixed respiratory failure: Trilogy recommended by pulmonology and awaiting insurance approval/coordination.  Pulmonology following and discussed with Dr. Kilpatrick.  [employer] continues to decline approval of the Trilogy.  We feel that without this the patient will be at high risk for recurrent hospitalizations or possibly even death.  PFTs were performed which Dr Kilpatrick says are consistent with COPD and appeal will be sent for approval of the trilogy on Monday.  · Hypothyroidism: Synthroid.  Repeat TSH in 6 to 8 weeks.  · Diabetes: A1c 6.4.  Continue current diabetic regimen  · Lymphedema  · Anemia: Chronic iron deficient  · Disposition: Home health, STILL waiting on trilogy determination by her insurance. This is the only thing keeping her in the hospital.    Inder Salvador MD  [facility]  01/04/20  3:12 PM

## 2020-01-05 LAB
GLUCOSE BLDC GLUCOMTR-MCNC: 143 MG/DL (ref 70–130)
GLUCOSE BLDC GLUCOMTR-MCNC: 166 MG/DL (ref 70–130)
GLUCOSE BLDC GLUCOMTR-MCNC: 169 MG/DL (ref 70–130)
GLUCOSE BLDC GLUCOMTR-MCNC: 90 MG/DL (ref 70–130)

## 2020-01-05 PROCEDURE — 82962 GLUCOSE BLOOD TEST: CPT

## 2020-01-05 PROCEDURE — 94799 UNLISTED PULMONARY SVC/PX: CPT

## 2020-01-05 PROCEDURE — 63710000001 INSULIN LISPRO (HUMAN) PER 5 UNITS: Performed by: NURSE PRACTITIONER

## 2020-01-05 RX ADMIN — INSULIN LISPRO 2 UNITS: 100 INJECTION, SOLUTION INTRAVENOUS; SUBCUTANEOUS at 22:41

## 2020-01-05 RX ADMIN — ATENOLOL 25 MG: 25 TABLET ORAL at 10:31

## 2020-01-05 RX ADMIN — FUROSEMIDE 40 MG: 40 TABLET ORAL at 17:14

## 2020-01-05 RX ADMIN — Medication 1 CAPSULE: at 10:30

## 2020-01-05 RX ADMIN — SODIUM CHLORIDE, PRESERVATIVE FREE 10 ML: 5 INJECTION INTRAVENOUS at 20:39

## 2020-01-05 RX ADMIN — FUROSEMIDE 40 MG: 40 TABLET ORAL at 10:30

## 2020-01-05 RX ADMIN — ASPIRIN 325 MG: 325 TABLET, COATED ORAL at 10:31

## 2020-01-05 RX ADMIN — BUDESONIDE AND FORMOTEROL FUMARATE DIHYDRATE 2 PUFF: 160; 4.5 AEROSOL RESPIRATORY (INHALATION) at 10:31

## 2020-01-05 RX ADMIN — NYSTATIN: 100000 CREAM TOPICAL at 20:39

## 2020-01-05 RX ADMIN — BUDESONIDE AND FORMOTEROL FUMARATE DIHYDRATE 2 PUFF: 160; 4.5 AEROSOL RESPIRATORY (INHALATION) at 19:27

## 2020-01-05 RX ADMIN — ATORVASTATIN CALCIUM 20 MG: 20 TABLET, FILM COATED ORAL at 10:31

## 2020-01-05 RX ADMIN — DOCUSATE SODIUM 100 MG: 100 CAPSULE, LIQUID FILLED ORAL at 10:30

## 2020-01-05 RX ADMIN — DOCUSATE SODIUM 100 MG: 100 CAPSULE, LIQUID FILLED ORAL at 20:39

## 2020-01-05 RX ADMIN — NYSTATIN: 100000 CREAM TOPICAL at 10:31

## 2020-01-05 RX ADMIN — LEVOTHYROXINE SODIUM 25 MCG: 25 TABLET ORAL at 06:16

## 2020-01-05 RX ADMIN — ALPRAZOLAM 0.5 MG: 0.5 TABLET ORAL at 22:37

## 2020-01-05 RX ADMIN — GLIPIZIDE 5 MG: 5 TABLET ORAL at 06:16

## 2020-01-05 RX ADMIN — PANTOPRAZOLE SODIUM 40 MG: 40 TABLET, DELAYED RELEASE ORAL at 06:16

## 2020-01-05 RX ADMIN — ALPRAZOLAM 0.5 MG: 0.5 TABLET ORAL at 14:08

## 2020-01-05 RX ADMIN — FERROUS SULFATE TAB 325 MG (65 MG ELEMENTAL FE) 325 MG: 325 (65 FE) TAB at 10:30

## 2020-01-05 RX ADMIN — CLOTRIMAZOLE AND BETAMETHASONE DIPROPIONATE: 10; .5 CREAM TOPICAL at 10:33

## 2020-01-05 RX ADMIN — LOSARTAN POTASSIUM 100 MG: 100 TABLET, FILM COATED ORAL at 10:31

## 2020-01-05 RX ADMIN — VILAZODONE HYDROCHLORIDE 20 MG: 40 TABLET ORAL at 20:39

## 2020-01-05 RX ADMIN — GABAPENTIN 100 MG: 100 CAPSULE ORAL at 20:39

## 2020-01-05 RX ADMIN — SODIUM CHLORIDE, PRESERVATIVE FREE 10 ML: 5 INJECTION INTRAVENOUS at 10:32

## 2020-01-05 RX ADMIN — GABAPENTIN 100 MG: 100 CAPSULE ORAL at 10:30

## 2020-01-05 NOTE — PROGRESS NOTES
Continued Stay Note  [facility]     Patient Name: Miguelina Lopez  MRN: [id number]  Today's Date: 1/5/2020    Admit Date: 12/15/2019    Discharge Plan     Row Name 01/05/20 1037       Plan    Plan  Plans home with [facility] and Trilogy from [facility] if insurance approves the expidited appeal    Patient/Family in Agreement with Plan  yes    Plan Comments  Inbound call from Brianna/[employer] Grievance[employer]Appeals--per Terry, pt needs an Appointment of Representative form on file to continue the expidited appeal initiated on 1/4/2020 by SEE Segundo RN. Received Appointment of Representative form via fax from [facility]. Spoke with pt at bedside. Form completed and signed by patient. Form faxed back to [facility] as requested. Benigno Reed also requested additional clinicals be faxed to [phone]. Updated Terry that clinicals were faxed yesterday and confirmation of fax was received. Brianna requested clinicals be faxed again. Clinicals faxed again and confirmation of fax received. Updated CHRISTOPH AYALA of status of appeal. Waiting for return communication from Benigno. SEE to follow..............JW        Discharge Codes    No documentation.             Fabiana Segundo RN

## 2020-01-05 NOTE — PROGRESS NOTES
Continued Stay Note  [facility]     Patient Name: Miguelina Lopez  MRN: [id number]  Today's Date: 1/5/2020    Admit Date: 12/15/2019    Discharge Plan     Row Name 01/05/20 1426       Plan    Plan Comments  Called [employer]'s Grievance&Appeals line ([phone] option #2) to check status of appeal and make sure [employer] had received all info I faxed to them earlier in the day. Spoke with Radha--she was able to confirm receipt of informaion faxed today at 10:41AM. Radha states case is still pending review in their system and they have until 1/8/2020 to complete their review. Updated Radha that CCP will be available for any determinations until 8PM today and phone number they have is a confidential voicemail. CCP will continue to follow................JW        Discharge Codes    No documentation.             Fabiana Segundo RN

## 2020-01-05 NOTE — PROGRESS NOTES
" LOS: 21 days     Name: Miguelina Lopez  Age: 74 y.o.  Sex: female  :  1945  MRN: [id number]         Primary Care Physician: Michelle Abernathy MD    Subjective   Subjective  No new complaints or overnight events.    Objective   Vital Signs  Temp:  [98.3 °F (36.8 °C)-98.7 °F (37.1 °C)] 98.3 °F (36.8 °C)  Heart Rate:  [44-68] 51  Resp:  [18-20] 20  BP: (140-149)/(50-68) 144/50  Body mass index is 52.18 kg/m².    Objective:  General Appearance:  Comfortable and in no acute distress (Chronically ill-appearing).    Vital signs: (most recent): Blood pressure 144/50, pulse 51, temperature 98.3 °F (36.8 °C), temperature source Oral, resp. rate 20, height 152.4 cm (60\"), weight 121 kg (267 lb 3.2 oz), SpO2 95 %, not currently breastfeeding.    Lungs:  Normal effort and normal respiratory rate.  She is not in respiratory distress.  There are decreased breath sounds.    Heart: Normal rate.  Regular rhythm.    Abdomen: Abdomen is soft.  Bowel sounds are normal.   There is no abdominal tenderness.     Extremities: There is no dependent edema or local swelling.    Neurological: Patient is alert and oriented to person, place and time.    Skin:  Warm and dry.              Results Review:       I reviewed the patient's new clinical results.    Results from last 7 days   Lab Units 19  0629   WBC 10*3/mm3 9.04   HEMOGLOBIN g/dL 9.2*   PLATELETS 10*3/mm3 304     Results from last 7 days   Lab Units 20  0530 19  0631 19  0629   SODIUM mmol/L 146* 144 142   POTASSIUM mmol/L 4.0 4.5 3.6   CHLORIDE mmol/L 103 102 99   CO2 mmol/L 29.9* 30.5* 31.4*   BUN mg/dL    CREATININE mg/dL 1.22* 1.12* 1.12*   CALCIUM mg/dL 8.7 8.7 8.7   GLUCOSE mg/dL 131* 88 79                 Scheduled Meds:     aspirin  mg Oral Daily   atenolol 25 mg Oral Daily   atorvastatin 20 mg Oral Daily   budesonide-formoterol 2 puff Inhalation BID - RT   clotrimazole-betamethasone  Topical Daily   docusate sodium 100 mg Oral BID "   ferrous sulfate 325 mg Oral Daily With Breakfast   furosemide 40 mg Oral BID   gabapentin 100 mg Oral Q12H   glipizide 5 mg Oral QAM AC   insulin lispro 0-9 Units Subcutaneous 4x Daily With Meals & Nightly   lactobacillus acidophilus 1 capsule Oral Daily   levothyroxine 25 mcg Oral Q AM   losartan 100 mg Oral Q24H   nystatin  Topical BID   pantoprazole 40 mg Oral QAM   sodium chloride 10 mL Intravenous Q12H   vilazodone 20 mg Oral Nightly     PRN Meds:   •  acetaminophen **OR** acetaminophen **OR** acetaminophen  •  albuterol  •  ALPRAZolam  •  dextrose  •  dextrose  •  glucagon (human recombinant)  •  guaifenesin-dextromethorphan  •  magnesium hydroxide  •  ondansetron  •  polyethylene glycol  •  senna-docusate sodium  •  sodium chloride  •  sodium chloride  Continuous Infusions:       Assessment/Plan   Active Hospital Problems    Diagnosis  POA   • **Acute on chronic diastolic CHF (congestive heart failure) (CMS/[facility]) [I50.33]  Yes   • Acute on chronic respiratory failure with hypoxia and hypercapnia (CMS/[facility]) [J96.21, J96.22]  No   • Subclinical hypothyroidism [E03.9]  Yes   • Proteinuria [R80.9]  Yes   • Bilateral lower extremity edema [R60.0]  Yes   • Class 3 severe obesity due to excess calories with serious comorbidity and body mass index (BMI) of 50.0 to 59.9 in adult (CMS/[facility]) [E66.01, Z68.43]  Not Applicable   • OLI (acute kidney injury) (CMS/[facility]) [N17.9]  Yes   • Pulmonary hypertension due to sleep-disordered breathing (CMS/[facility]) [I27.29, G47.8]  Yes   • Anemia [D64.9]  Yes   • DM type 2 (diabetes mellitus, type 2) (CMS/[facility]) [E11.9]  Yes   • Diabetic peripheral neuropathy (CMS/[facility]) [E11.42]  Yes   • Essential hypertension [I10]  Yes   • CKD (chronic kidney disease) stage 3, GFR 30-59 ml/min (CMS/[facility]) [N18.3]  Yes      Resolved Hospital Problems   No resolved problems to display.       Assessment & Plan    · Acute on chronic diastolic heart failure/valvular heart disease: S/P Lasix gtt. Continue oral  diuretics.  Nephrology indicates that her renal function and electrolytes are stable on current regimen and have signed off.  They will see her in 1 month.  · OLI/CKD 3  · Pulmonary hypertension/OHS/OCHOA/acute on chronic mixed respiratory failure: Trilogy recommended by pulmonology and awaiting insurance approval/coordination.  Pulmonology following and discussed with Dr. Kilpatrick.  [employer] insurance continues to decline approval of the Trilogy.  We feel that without this the patient will be at high risk for recurrent hospitalizations or possibly even death.  PFTs were performed which Dr Kilpatrick says are consistent with COPD and appeal has been sent for approval of the trilogy  · Hypothyroidism: Synthroid.  Repeat TSH in 6 to 8 weeks.  · Diabetes: A1c 6.4.  Continue current diabetic regimen  · Lymphedema  · Anemia: Chronic iron deficient  · Disposition: Home health, STILL waiting on trilogy determination by her insurance. This is the only thing keeping her in the hospital.    Inder Salvador MD  [facility]  01/05/20  12:53 PM

## 2020-01-05 NOTE — PROGRESS NOTES
Continued Stay Note  [facility]     Patient Name: Miguelina Lopez  MRN: [id number]  Today's Date: 1/5/2020    Admit Date: 12/15/2019    Discharge Plan     Row Name 01/05/20 1809       Plan    Plan Comments  Called [employer]'s Grievance&Appeals line ([phone] option #3) to check status of appeal for Trilogy vent. Spoke with Lyric who states case is now in medical review vs pending medical review. Per Lyric if a determination is made this evening Benigno will call CCP at [phone]. Updated Lyric that CCP will be available at that number until 8PM this evening and that it is a confidential voicemail and determination can be left on that line if necessary. Updated staff RN. CCP to follow....................JW        Discharge Codes    No documentation.             Fabiana Segundo, RN

## 2020-01-05 NOTE — PROGRESS NOTES
[facility] INPATIENT PROGRESS NOTE         [address]    2020      PATIENT IDENTIFICATION:  Name: Miguelina Lopez ADMIT: 12/15/2019   : 1945  PCP: Michelle Abernathy MD    MRN: [id number] LOS: 21 days   AGE/SEX: 74 y.o. female  ROOM: [facility]                     LOS 21    Reason for visit: Follow-up respiratory failure with OCHOA/OHS and chronic CO2 retention      SUBJECTIVE:      Resting comfortably.  No new complaints.  Hoping to have approval for trilogy today.    Objective   OBJECTIVE:    Vital Sign Min/Max for last 24 hours  Temp  Min: 98.3 °F (36.8 °C)  Max: 98.7 °F (37.1 °C)   BP  Min: 140/63  Max: 149/55   Pulse  Min: 44  Max: 68   Resp  Min: 18  Max: 20   SpO2  Min: 95 %  Max: 100 %   No data recorded   No data recorded                         Body mass index is 52.18 kg/m².    Intake/Output Summary (Last 24 hours) at 2020 1307  Last data filed at 2020 1142  Gross per 24 hour   Intake 720 ml   Output 1618 ml   Net -898 ml         Exam:  GEN:  No distress, appears stated age  EYES:   PERRL, anicteric sclera  ENT:    External ears/nose normal, OP clear  NECK:  No adenopathy, midline trachea  LUNGS: Normal chest on inspection, palpation and diminished breath sounds on auscultation  CV:  Normal S1S2, without murmur  ABD:  Non tender, non distended, no hepatosplenomegaly, +BS  EXT:  No edema, cyanosis or clubbing    Scheduled meds:      aspirin  mg Oral Daily   atenolol 25 mg Oral Daily   atorvastatin 20 mg Oral Daily   budesonide-formoterol 2 puff Inhalation BID - RT   clotrimazole-betamethasone  Topical Daily   docusate sodium 100 mg Oral BID   ferrous sulfate 325 mg Oral Daily With Breakfast   furosemide 40 mg Oral BID   gabapentin 100 mg Oral Q12H   glipizide 5 mg Oral QAM AC   insulin lispro 0-9 Units Subcutaneous 4x Daily With Meals & Nightly   lactobacillus acidophilus 1 capsule Oral Daily   levothyroxine 25 mcg Oral Q AM   losartan 100 mg Oral Q24H   nystatin   Topical BID   pantoprazole 40 mg Oral QAM   sodium chloride 10 mL Intravenous Q12H   vilazodone 20 mg Oral Nightly     IV meds:                         Data Review:  Results from last 7 days   Lab Units 01/02/20  0530 12/31/19  0631 12/30/19  0629   SODIUM mmol/L 146* 144 142   POTASSIUM mmol/L 4.0 4.5 3.6   CHLORIDE mmol/L 103 102 99   CO2 mmol/L 29.9* 30.5* 31.4*   BUN mg/dL 22 19 19   CREATININE mg/dL 1.22* 1.12* 1.12*   GLUCOSE mg/dL 131* 88 79   CALCIUM mg/dL 8.7 8.7 8.7         Estimated Creatinine Clearance: 47.6 mL/min (A) (by C-G formula based on SCr of 1.22 mg/dL (H)).  Results from last 7 days   Lab Units 12/30/19  0629   WBC 10*3/mm3 9.04   HEMOGLOBIN g/dL 9.2*   PLATELETS 10*3/mm3 304                               I reviewed the pulmonary function testing and absolutely disagree with Dr. Amado's interpretation.  Reduced FVC is due to obesity.  FEV1 reduction and flow loop clearly consistent with obstructive process with moderate COPD.      )Assessment   ASSESSMENT:      Active Hospital Problems    Diagnosis  POA   • **Acute on chronic diastolic CHF (congestive heart failure) (CMS/[facility]) [I50.33]  Yes   • Acute on chronic respiratory failure with hypoxia and hypercapnia (CMS/[facility]) [J96.21, J96.22]  No   • Subclinical hypothyroidism [E03.9]  Yes   • Proteinuria [R80.9]  Yes   • Bilateral lower extremity edema [R60.0]  Yes   • Class 3 severe obesity due to excess calories with serious comorbidity and body mass index (BMI) of 50.0 to 59.9 in adult (CMS/[facility]) [E66.01, Z68.43]  Not Applicable   • OLI (acute kidney injury) (CMS/[facility]) [N17.9]  Yes   • Pulmonary hypertension due to sleep-disordered breathing (CMS/[facility]) [I27.29, G47.8]  Yes   • Anemia [D64.9]  Yes   • DM type 2 (diabetes mellitus, type 2) (CMS/[facility]) [E11.9]  Yes   • Diabetic peripheral neuropathy (CMS/[facility]) [E11.42]  Yes   • Essential hypertension [I10]  Yes   • CKD (chronic kidney disease) stage 3, GFR 30-59 ml/min (CMS/[facility]) [N18.3]  Yes      Resolved  Hospital Problems   No resolved problems to display.     Acute hypoxemic and chronic hypercapnic respiratory failure  Obstructive sleep apnea   Obesity hypoventilation  Morbid obesity  Chronic respiratory failure  Congestive heart failure  Chronic kidney disease  Valvular heart disease  CAD  DM  COPD      PLAN:  Awaiting trilogy machine.  Has chronic CO2 retention and would certainly benefit from Trilogy use.  Has tried and failed CPAP/BiPAP with persistent elevation of CO2 despite BiPAP attempt.  Without having trilogy available patient will certainly be at increased risk for recurrent hospitalizations as well as death.  Has signs of obstructive lung disease and spirometry shows moderate obstruction with decreased FEV1 and flow loop consistent with obstructive process consistent with COPD.  On Symbicort as maintenance inhaled COPD treatment.  We will re-appeal for her trilogy machine.  Encourage pulmonary toilet.    Sree Kilpatrick MD  Pulmonary and Critical Care Medicine  [facility], [facility]  1/5/2020    1:07 PM

## 2020-01-06 LAB
GLUCOSE BLDC GLUCOMTR-MCNC: 126 MG/DL (ref 70–130)
GLUCOSE BLDC GLUCOMTR-MCNC: 145 MG/DL (ref 70–130)
GLUCOSE BLDC GLUCOMTR-MCNC: 147 MG/DL (ref 70–130)
GLUCOSE BLDC GLUCOMTR-MCNC: 179 MG/DL (ref 70–130)

## 2020-01-06 PROCEDURE — 94799 UNLISTED PULMONARY SVC/PX: CPT

## 2020-01-06 PROCEDURE — 97140 MANUAL THERAPY 1/> REGIONS: CPT

## 2020-01-06 PROCEDURE — 63710000001 INSULIN LISPRO (HUMAN) PER 5 UNITS: Performed by: NURSE PRACTITIONER

## 2020-01-06 PROCEDURE — 99232 SBSQ HOSP IP/OBS MODERATE 35: CPT | Performed by: INTERNAL MEDICINE

## 2020-01-06 PROCEDURE — 25010000002 ONDANSETRON PER 1 MG: Performed by: NURSE PRACTITIONER

## 2020-01-06 PROCEDURE — 82962 GLUCOSE BLOOD TEST: CPT

## 2020-01-06 RX ORDER — PANTOPRAZOLE SODIUM 40 MG/1
40 TABLET, DELAYED RELEASE ORAL
Status: DISCONTINUED | OUTPATIENT
Start: 2020-01-07 | End: 2020-01-08 | Stop reason: HOSPADM

## 2020-01-06 RX ORDER — ATORVASTATIN CALCIUM 20 MG/1
20 TABLET, FILM COATED ORAL NIGHTLY
Status: DISCONTINUED | OUTPATIENT
Start: 2020-01-06 | End: 2020-01-08 | Stop reason: HOSPADM

## 2020-01-06 RX ADMIN — ALPRAZOLAM 0.5 MG: 0.5 TABLET ORAL at 23:23

## 2020-01-06 RX ADMIN — INSULIN LISPRO 2 UNITS: 100 INJECTION, SOLUTION INTRAVENOUS; SUBCUTANEOUS at 18:51

## 2020-01-06 RX ADMIN — FUROSEMIDE 40 MG: 40 TABLET ORAL at 11:23

## 2020-01-06 RX ADMIN — ALPRAZOLAM 0.5 MG: 0.5 TABLET ORAL at 12:55

## 2020-01-06 RX ADMIN — GABAPENTIN 100 MG: 100 CAPSULE ORAL at 20:27

## 2020-01-06 RX ADMIN — ONDANSETRON 4 MG: 2 INJECTION INTRAMUSCULAR; INTRAVENOUS at 23:15

## 2020-01-06 RX ADMIN — BUDESONIDE AND FORMOTEROL FUMARATE DIHYDRATE 2 PUFF: 160; 4.5 AEROSOL RESPIRATORY (INHALATION) at 09:50

## 2020-01-06 RX ADMIN — FUROSEMIDE 40 MG: 40 TABLET ORAL at 18:51

## 2020-01-06 RX ADMIN — GLIPIZIDE 5 MG: 5 TABLET ORAL at 06:23

## 2020-01-06 RX ADMIN — DOCUSATE SODIUM 100 MG: 100 CAPSULE, LIQUID FILLED ORAL at 20:27

## 2020-01-06 RX ADMIN — BUDESONIDE AND FORMOTEROL FUMARATE DIHYDRATE 2 PUFF: 160; 4.5 AEROSOL RESPIRATORY (INHALATION) at 20:59

## 2020-01-06 RX ADMIN — NYSTATIN: 100000 CREAM TOPICAL at 20:27

## 2020-01-06 RX ADMIN — DOCUSATE SODIUM 100 MG: 100 CAPSULE, LIQUID FILLED ORAL at 11:22

## 2020-01-06 RX ADMIN — Medication 1 CAPSULE: at 11:23

## 2020-01-06 RX ADMIN — PANTOPRAZOLE SODIUM 40 MG: 40 TABLET, DELAYED RELEASE ORAL at 06:23

## 2020-01-06 RX ADMIN — NYSTATIN: 100000 CREAM TOPICAL at 11:24

## 2020-01-06 RX ADMIN — CLOTRIMAZOLE AND BETAMETHASONE DIPROPIONATE: 10; .5 CREAM TOPICAL at 11:25

## 2020-01-06 RX ADMIN — ACETAMINOPHEN 650 MG: 325 TABLET, FILM COATED ORAL at 16:40

## 2020-01-06 RX ADMIN — SODIUM CHLORIDE, PRESERVATIVE FREE 10 ML: 5 INJECTION INTRAVENOUS at 11:24

## 2020-01-06 RX ADMIN — LEVOTHYROXINE SODIUM 25 MCG: 25 TABLET ORAL at 06:23

## 2020-01-06 RX ADMIN — ATORVASTATIN CALCIUM 20 MG: 20 TABLET, FILM COATED ORAL at 20:27

## 2020-01-06 RX ADMIN — ASPIRIN 325 MG: 325 TABLET, COATED ORAL at 11:23

## 2020-01-06 RX ADMIN — GABAPENTIN 100 MG: 100 CAPSULE ORAL at 11:26

## 2020-01-06 RX ADMIN — LOSARTAN POTASSIUM 100 MG: 100 TABLET, FILM COATED ORAL at 11:23

## 2020-01-06 RX ADMIN — SODIUM CHLORIDE, PRESERVATIVE FREE 10 ML: 5 INJECTION INTRAVENOUS at 20:28

## 2020-01-06 RX ADMIN — VILAZODONE HYDROCHLORIDE 20 MG: 40 TABLET ORAL at 20:27

## 2020-01-06 NOTE — PROGRESS NOTES
Patient Name: Miguelina Lopez  Patient : 1945        Date of Service:20  Provider of Service: Antoine Ayers MD  Place of Service: [facility]  Referral Provider: Brennen Conley MD          Follow Up: Bradycardia    Interval Hx: Asked to see the patient again because of sinus bradycardia with rates down into the 30s.        OBJECTIVE  Temp:  [97.6 °F (36.4 °C)-99.1 °F (37.3 °C)] 97.9 °F (36.6 °C)  Heart Rate:  [28-56] 56  Resp:  [16-22] 16  BP: (131-152)/(50-74) 150/50     Intake/Output Summary (Last 24 hours) at 2020 1156  Last data filed at 2020 0600  Gross per 24 hour   Intake 535 ml   Output 1150 ml   Net -615 ml     Body mass index is 51.93 kg/m².      20  0600 20  0446 20  0600   Weight: 120 kg (263 lb 14.4 oz) 121 kg (267 lb 3.2 oz) 121 kg (265 lb 14 oz)         Physical Exam:   Physical Exam   Cardiovascular: A regularly irregular rhythm present. Bradycardia present.         CURRENT MEDS    Scheduled Meds:  aspirin  mg Oral Daily   atenolol 25 mg Oral Daily   atorvastatin 20 mg Oral Nightly   budesonide-formoterol 2 puff Inhalation BID - RT   clotrimazole-betamethasone  Topical Daily   docusate sodium 100 mg Oral BID   ferrous sulfate 325 mg Oral Daily With Breakfast   furosemide 40 mg Oral BID   gabapentin 100 mg Oral Q12H   glipizide 5 mg Oral QAM AC   insulin lispro 0-9 Units Subcutaneous 4x Daily With Meals & Nightly   lactobacillus acidophilus 1 capsule Oral Daily   levothyroxine 25 mcg Oral Q AM   losartan 100 mg Oral Q24H   nystatin  Topical BID   [START ON 2020] pantoprazole 40 mg Oral Q AM   sodium chloride 10 mL Intravenous Q12H   vilazodone 20 mg Oral Nightly     Continuous Infusions:       Lab Review:   Results from last 7 days   Lab Units 20  0530 19  0631   SODIUM mmol/L 146* 144   POTASSIUM mmol/L 4.0 4.5   CHLORIDE mmol/L 103 102   CO2 mmol/L 29.9* 30.5*   BUN mg/dL 22 19   CREATININE mg/dL 1.22*  1.12*   GLUCOSE mg/dL 131* 88   CALCIUM mg/dL 8.7 8.7                                   I personally reviewed the patient's ECG and telemetry data    ASSESSMENT & PLAN    Acute on chronic diastolic CHF (congestive heart failure) (CMS/[facility])    CKD (chronic kidney disease) stage 3, GFR 30-59 ml/min (CMS/[facility])    Diabetic peripheral neuropathy (CMS/[facility])    Anemia    DM type 2 (diabetes mellitus, type 2) (CMS/[facility])    Essential hypertension    Pulmonary hypertension due to sleep-disordered breathing (CMS/[facility])    OLI (acute kidney injury) (CMS/[facility])    Class 3 severe obesity due to excess calories with serious comorbidity and body mass index (BMI) of 50.0 to 59.9 in adult (CMS/[facility])    Proteinuria    Bilateral lower extremity edema    Subclinical hypothyroidism    Acute on chronic respiratory failure with hypoxia and hypercapnia (CMS/[facility])    1.  Bradycardia: At this point we will discontinue the patient's atenolol.  Her TSH was elevated.  She is on replacement therapy.  She might need an increase in her dose.  This could possibly be the early signs of sick sinus syndrome as well.  We will continue to monitor.      Antoine Ayers MD  01/06/20

## 2020-01-06 NOTE — PROGRESS NOTES
Continued Stay Note  [facility]     Patient Name: Miguelina Lopez  MRN: [id number]  Today's Date: 1/6/2020    Admit Date: 12/15/2019    Discharge Plan     Row Name 01/06/20 0841       Plan    Plan  Home with [facility] and Trilogy from [facility] if insurance approves the expidited appeal    Patient/Family in Agreement with Plan  yes    Plan Comments  Called [employer] Grievance[employer]Appeals line ([phone] option #3) to check status of appeal for Trilogy vent. Spoke with Rosaura/[employer] (reference# for call [id number]) who states case (case # for the expidited appeal [id number]) is still in progress, waiting for MD review--[employer] has until 1/8/2020 to make their decision. [facility] to continue to follow up with [employer] on status of expidited appeal for DME............JW        Discharge Codes    No documentation.             Fabiana Segundo RN

## 2020-01-06 NOTE — PROGRESS NOTES
" LOS: 22 days     Name: Miguelina Lopez  Age: 74 y.o.  Sex: female  :  1945  MRN: [id number]         Primary Care Physician: Michelle Abernathy MD    Subjective   Subjective  No new complaints or overnight events.    Objective   Vital Signs  Temp:  [97.6 °F (36.4 °C)-99.1 °F (37.3 °C)] 97.9 °F (36.6 °C)  Heart Rate:  [28-56] 56  Resp:  [16-22] 16  BP: (131-152)/(50-74) 150/50  Body mass index is 51.93 kg/m².    Objective:  General Appearance:  Comfortable and in no acute distress (Chronically ill-appearing).    Vital signs: (most recent): Blood pressure 150/50, pulse 56, temperature 97.9 °F (36.6 °C), temperature source Oral, resp. rate 16, height 152.4 cm (60\"), weight 121 kg (265 lb 14 oz), SpO2 96 %, not currently breastfeeding.    Lungs:  Normal effort and normal respiratory rate.  She is not in respiratory distress.  There are decreased breath sounds.    Heart: Normal rate.  Regular rhythm.    Abdomen: Abdomen is soft.  Bowel sounds are normal.   There is no abdominal tenderness.     Extremities: There is no dependent edema or local swelling.    Neurological: Patient is alert and oriented to person, place and time.    Skin:  Warm and dry.              Results Review:       I reviewed the patient's new clinical results.        Results from last 7 days   Lab Units 20  0530 19  0631   SODIUM mmol/L 146* 144   POTASSIUM mmol/L 4.0 4.5   CHLORIDE mmol/L 103 102   CO2 mmol/L 29.9* 30.5*   BUN mg/dL 22 19   CREATININE mg/dL 1.22* 1.12*   CALCIUM mg/dL 8.7 8.7   GLUCOSE mg/dL 131* 88                 Scheduled Meds:     aspirin  mg Oral Daily   atenolol 25 mg Oral Daily   atorvastatin 20 mg Oral Nightly   budesonide-formoterol 2 puff Inhalation BID - RT   clotrimazole-betamethasone  Topical Daily   docusate sodium 100 mg Oral BID   ferrous sulfate 325 mg Oral Daily With Breakfast   furosemide 40 mg Oral BID   gabapentin 100 mg Oral Q12H   glipizide 5 mg Oral QAM AC   insulin lispro 0-9 Units " Subcutaneous 4x Daily With Meals & Nightly   lactobacillus acidophilus 1 capsule Oral Daily   levothyroxine 25 mcg Oral Q AM   losartan 100 mg Oral Q24H   nystatin  Topical BID   [START ON 1/7/2020] pantoprazole 40 mg Oral Q AM   sodium chloride 10 mL Intravenous Q12H   vilazodone 20 mg Oral Nightly     PRN Meds:   •  acetaminophen **OR** acetaminophen **OR** acetaminophen  •  albuterol  •  ALPRAZolam  •  dextrose  •  dextrose  •  glucagon (human recombinant)  •  guaifenesin-dextromethorphan  •  magnesium hydroxide  •  ondansetron  •  polyethylene glycol  •  senna-docusate sodium  •  sodium chloride  •  sodium chloride  Continuous Infusions:       Assessment/Plan   Active Hospital Problems    Diagnosis  POA   • **Acute on chronic diastolic CHF (congestive heart failure) (CMS/[facility]) [I50.33]  Yes   • Acute on chronic respiratory failure with hypoxia and hypercapnia (CMS/[facility]) [J96.21, J96.22]  No   • Subclinical hypothyroidism [E03.9]  Yes   • Proteinuria [R80.9]  Yes   • Bilateral lower extremity edema [R60.0]  Yes   • Class 3 severe obesity due to excess calories with serious comorbidity and body mass index (BMI) of 50.0 to 59.9 in adult (CMS/[facility]) [E66.01, Z68.43]  Not Applicable   • OLI (acute kidney injury) (CMS/[facility]) [N17.9]  Yes   • Pulmonary hypertension due to sleep-disordered breathing (CMS/[facility]) [I27.29, G47.8]  Yes   • Anemia [D64.9]  Yes   • DM type 2 (diabetes mellitus, type 2) (CMS/[facility]) [E11.9]  Yes   • Diabetic peripheral neuropathy (CMS/[facility]) [E11.42]  Yes   • Essential hypertension [I10]  Yes   • CKD (chronic kidney disease) stage 3, GFR 30-59 ml/min (CMS/[facility]) [N18.3]  Yes      Resolved Hospital Problems   No resolved problems to display.       Assessment & Plan    · Acute on chronic diastolic heart failure/valvular heart disease: S/P Lasix gtt. Continue oral diuretics.  Nephrology indicates that her renal function and electrolytes are stable on current regimen and have signed off.  They will see her in 1  month.  · OLI/CKD 3  · Pulmonary hypertension/OHS/OCHOA/acute on chronic mixed respiratory failure: Trilogy recommended by pulmonology and awaiting insurance approval/coordination.  Pulmonology following and discussed with Dr. Kilpatrick.  [employer] insurance continues to decline approval of the Trilogy.  We feel that without this the patient will be at high risk for recurrent hospitalizations or possibly even death.  PFTs were performed which Dr Kilpatrick says are consistent with COPD and appeal has been sent for approval of the trilogy  · Hypothyroidism: Synthroid.  Repeat TSH in 6 to 8 weeks.  · Diabetes: A1c 6.4.  Continue current diabetic regimen  · Lymphedema  · Anemia: Chronic iron deficient  · Disposition: Home health, STILL waiting on trilogy determination by her insurance, apprently they have until 1/8/20 to make their decision. This is the only thing keeping her in the hospital.    ZOILA Jung  [facility]  01/06/20  11:32 AM

## 2020-01-06 NOTE — PLAN OF CARE
Problem: Patient Care Overview  Goal: Plan of Care Review  Outcome: Ongoing (interventions implemented as appropriate)  Flowsheets (Taken 1/6/2020 1638)  Plan of Care Reviewed With: patient  Outcome Summary: Lymphedema clinic.-Pt. tolerated the compression bandages over the weekend. Bandages removed, washed LE's and re-applied the bandages with moderate compression. No pain c/o from the bandages at present. Edema B lower legs decreased per pt. observation. LLE edema >RLE. Reviewed bandage precautions and wearing schedule. Discussed long term edema management.

## 2020-01-06 NOTE — PLAN OF CARE
Problem: Patient Care Overview  Goal: Plan of Care Review  Outcome: Ongoing (interventions implemented as appropriate)  Flowsheets (Taken 1/6/2020 8004)  Progress: improving  Plan of Care Reviewed With: patient  Outcome Summary: HR continues to drop, olamide 40s-30s, droppped to high 20s while sleeping 2.1 second pause. call to MD, as no note speaks of HR in recent days. no complaints. asymptomatic. BiPAP. slept well. up to bathroom with walker. 3L NC. Awaiting insurance for [employer] machine. will continue to monitor.

## 2020-01-06 NOTE — PROGRESS NOTES
LOS: 22 days   Patient Care Team:  Michelle Abernathy MD as PCP - General (Family Medicine)  Robbie Wilson MD as Consulting Physician (Hematology and Oncology)  Chace Johnson MD as Consulting Physician (Cardiology)    Subjective     Patient reports she is doing well she says with the noninvasive ventilator she is sleeping much better she is back to her baseline 3 L O2 with no shortness of breath.  She wants to know what kind of exercise she can do to help her lose weight in addition to diet and we discussed with her multiple problems probably water exercise just walking in the water even would be 1 of the best options she says she likes that idea very well and will look into it.    Review of Systems:          Objective     Vital Signs  Vital Sign Min/Max for last 24 hours  Temp  Min: 97.6 °F (36.4 °C)  Max: 98.2 °F (36.8 °C)   BP  Min: 140/52  Max: 152/74   Pulse  Min: 28  Max: 56   Resp  Min: 16  Max: 22   SpO2  Min: 93 %  Max: 98 %   Flow (L/min)  Min: 3  Max: 3   Weight  Min: 121 kg (265 lb 14 oz)  Max: 121 kg (265 lb 14 oz)        Ventilator/Non-Invasive Ventilation Settings (From admission, onward)     Start     Ordered    12/21/19 0930  NIPPV (CPAP or BIPAP)  Until Discontinued     Question Answer Comment   Indication: Sleep Apnea    Type: BIPAP    NIPPV Mask Interface: Full Face Mask    Tidal Volume  >500   Titrate for SPO2 90%        12/21/19 0930    12/20/19 1400  NIPPV-IPPV / BIPAP / CPAP  At Bedtime & As Needed-RT     Question Answer Comment   Type: AVAPS-AE    NIPPV Mask Interface: Per Patient Preference    Rate 8    VT (mL) 550    EPAP Min (cm H2O) 8    EPAP Max (cm H2O) 20    Max Pressure (cm H2O) 30    Pressure Support Min (cm H2O) 4    Pressure Support Max (cm H2O) 12        12/20/19 1359                             Body mass index is 51.93 kg/m².  I/O last 3 completed shifts:  In: 555 [P.O.:555]  Out: 1950 [Urine:1950]  No intake/output data recorded.        Physical Exam:  General  Appearance: Well-developed morbidly obese white female she is resting comfortably in a chair on 3 L O2 in no acute distress oxygen saturations are 94%  Eyes: Conjunctiva are clear and anicteric pupils are equal  ENT: Mucous membranes are moist no erythema no exudates she has a Mallampati type III airway nasal septum is midline  Neck: No adenopathy or thyromegaly no jugular venous tension trachea is midline neck is obese  Lungs: Clear nonlabored symmetric expansion no wheezes no rales no rhonchi nonlabored  Cardiac: Regular rate rhythm there is a murmur heard loudest at the right upper sternal border systolic  Abdomen: Obese soft nontender no palpable hepatosplenomegaly or masses but obesity does limit exam  : Not examined  Musculoskeletal: He has wraps on both lower extremities  Skin: No jaundice no petechiae skin is warm and dry  Neuro: She is alert oriented cooperative following commands grossly intact  Extremities/P Vascular: No clubbing no cyanosis she does have some lower extremity edema its hard to determine exactly with the wraps in place.  She has palpable radial pulses I cannot palpate dorsalis pedis pulses through the wraps.  MSE: Seems to be in good spirits       Labs:  Results from last 7 days   Lab Units 01/02/20  0530 12/31/19  0631   GLUCOSE mg/dL 131* 88   SODIUM mmol/L 146* 144   POTASSIUM mmol/L 4.0 4.5   CO2 mmol/L 29.9* 30.5*   CHLORIDE mmol/L 103 102   ANION GAP mmol/L 13.1 11.5   CREATININE mg/dL 1.22* 1.12*   BUN mg/dL 22 19   BUN / CREAT RATIO  18.0 17.0   CALCIUM mg/dL 8.7 8.7   EGFR IF NONAFRICN AM mL/min/1.73 43* 47*   ALBUMIN g/dL 3.40* 3.20*     Estimated Creatinine Clearance: 47.6 mL/min (A) (by C-G formula based on SCr of 1.22 mg/dL (H)).                                  Microbiology Results (last 10 days)     ** No results found for the last 240 hours. **                aspirin  mg Oral Daily   atorvastatin 20 mg Oral Nightly   budesonide-formoterol 2 puff Inhalation BID - RT    clotrimazole-betamethasone  Topical Daily   docusate sodium 100 mg Oral BID   ferrous sulfate 325 mg Oral Daily With Breakfast   furosemide 40 mg Oral BID   gabapentin 100 mg Oral Q12H   glipizide 5 mg Oral QAM AC   insulin lispro 0-9 Units Subcutaneous 4x Daily With Meals & Nightly   lactobacillus acidophilus 1 capsule Oral Daily   levothyroxine 25 mcg Oral Q AM   losartan 100 mg Oral Q24H   nystatin  Topical BID   [START ON 1/7/2020] pantoprazole 40 mg Oral Q AM   sodium chloride 10 mL Intravenous Q12H   vilazodone 20 mg Oral Nightly          Diagnostics:  Xr Chest 1 View    Result Date: 12/15/2019  1 VIEW PORTABLE CHEST  HISTORY: Shortness of breath.  FINDINGS: There is cardiomegaly with sternal wires from previous cardiac surgery. There is minimal vascular congestion and the overall appearance shows no change from 06/10/2019. There is no focal infiltrate.  This report was finalized on 12/15/2019 1:37 PM by Dr. Joshua Rogel M.D.      Us Renal Bilateral    Result Date: 12/15/2019  BILATERAL RENAL SONOGRAM  HISTORY: Renal failure.  TECHNIQUE: Bilateral renal sonogram was performed in standard fashion.  FINDINGS: Both kidneys appear normal. There is no hydronephrosis or renal lesion. The urinary bladder appears normal. The right kidney measures 10.5 x 5.0 x 5.0 cm and the left kidney measures 9.8 x 5.5 x 5.3 cm.      Negative.  This report was finalized on 12/15/2019 7:35 PM by Dr. Eliazar Ruelas M.D.      Results for orders placed during the hospital encounter of 12/15/19   Adult Transthoracic Echo Complete W/ Cont if Necessary Per Protocol    Narrative · Left ventricular systolic function is normal. Calculated EF = 67.0%.   Estimated EF was in disagreement with the calculated EF. Estimated EF   appears to be in the range of 56 - 60%  · Normal left ventricular cavity size noted. Left ventricular wall   thickness is consistent with mild-to-moderate concentric hypertrophy.   Septal wall motion is abnormal,  consistent with a post-operative state.   Systolic flattening of the interventricular septum consistent with right   ventricle pressure overload. Left ventricular diastolic function was   indeterminate.  · Right ventricular cavity is mild-to-moderately dilated.  · The aortic valve is abnormal in structure. There is moderate thickening   of the aortic valve. No significant aortic valve regurgitation is present.   Moderate aortic valve stenosis is present.  · There is a bioprosthetic mitral valve present. The mean inflow gradient   across the prosthetic mitral valve is 12 mmHg, which is elevated. There   appears to be a mild to moderate amount of paravalvular regurgitation.  · Mild to moderate tricuspid valve regurgitation is present. Estimated   right ventricular systolic pressure from tricuspid regurgitation is   markedly elevated (>55 mmHg). Calculated right ventricular systolic   pressure from tricuspid regurgitation is 57.3 mmHg. There is a tricuspid   ring valve present.              Active Hospital Problems    Diagnosis  POA   • **Acute on chronic diastolic CHF (congestive heart failure) (CMS/[facility]) [I50.33]  Yes   • Acute on chronic respiratory failure with hypoxia and hypercapnia (CMS/[facility]) [J96.21, J96.22]  No   • Subclinical hypothyroidism [E03.9]  Yes   • Proteinuria [R80.9]  Yes   • Bilateral lower extremity edema [R60.0]  Yes   • Class 3 severe obesity due to excess calories with serious comorbidity and body mass index (BMI) of 50.0 to 59.9 in adult (CMS/[facility]) [E66.01, Z68.43]  Not Applicable   • OLI (acute kidney injury) (CMS/[facility]) [N17.9]  Yes   • Pulmonary hypertension due to sleep-disordered breathing (CMS/[facility]) [I27.29, G47.8]  Yes   • Anemia [D64.9]  Yes   • DM type 2 (diabetes mellitus, type 2) (CMS/[facility]) [E11.9]  Yes   • Diabetic peripheral neuropathy (CMS/[facility]) [E11.42]  Yes   • Essential hypertension [I10]  Yes   • CKD (chronic kidney disease) stage 3, GFR 30-59 ml/min (CMS/[facility]) [N18.3]  Yes       Resolved Hospital Problems   No resolved problems to display.         Assessment/Plan     1. Acute on chronic hypoxemic and hypercapnic respiratory failure she is back to her home 3 L O2.  Patient does have chronic respiratory failure hypercapnic and per Dr. Kilpatrick's note they have tried CPAP and it is not effective they have tried BiPAP and it is not effective in correcting her CO2.  And volume assured ventilation seems to be the best therapy for her.  It is felt that this therapy can be life prolonging and certainly improve the quality of her life  2. Obstructive sleep apnea  3. Obesity hypoventilation syndrome  4. COPD  5. Morbid obesity  6. Acute kidney injury on chronic kidney disease stage III  7. On chronic diastolic CHF  8. Prosthetic mitral valve with regurgitation  9. Moderate aortic stenosis  10. Pulmonary hypertension by echocardiogram probably WHO group 2 and 3 disease  11. Diabetes mellitus type 2  12. Diabetic peripheral neuropathy  13. Essential hypertension  14. Hypothyroidism  15. Anemia    Plan for disposition:    Heath Amado MD  01/06/20  1:59 PM    Time:

## 2020-01-06 NOTE — THERAPY TREATMENT NOTE
Acute Care - Occupational Therapy Lymphedema Treatment Note  [facility]     Patient Name: Miguelnia Lopez  : 1945  MRN: [id number]  Today's Date: 2020                 Admit Date: 12/15/2019    Visit Dx:    ICD-10-CM ICD-9-CM   1. Acute on chronic combined systolic and diastolic CHF (congestive heart failure) (CMS/[facility]) I50.43 428.43     428.0   2. Acute kidney injury superimposed on chronic kidney disease (CMS/[facility]) N17.9 866.00    N18.9 585.9       Patient Active Problem List   Diagnosis   • Anxiety disorder   • Arthritis of knee   • Asthma   • Chronic coronary artery disease   • CKD (chronic kidney disease) stage 3, GFR 30-59 ml/min (CMS/[facility])   • Depression   • Diabetic peripheral neuropathy (CMS/[facility])   • Gastroesophageal reflux disease   • Hyperlipidemia   • Insomnia   • Lower gastrointestinal hemorrhage   • Anemia   • OCHOA (obstructive sleep apnea)   • DM type 2 (diabetes mellitus, type 2) (CMS/[facility])   • Essential hypertension   • Hospital discharge follow-up   • Pulmonary hypertension due to sleep-disordered breathing (CMS/[facility])   • s/p MVR, TV-repair, 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   • OLI (acute kidney injury) (CMS/[facility])   • Leukocytosis   • Atrial fibrillation (CMS/[facility])   • Nocturnal hypoxia   • Dermatitis   • Medicare annual wellness visit, initial   • Class 3 severe obesity due to excess calories with serious comorbidity and body mass index (BMI) of 50.0 to 59.9 in adult (CMS/[facility])   • Supplemental oxygen dependent   • Mitral regurgitation   • Aortic stenosis   • Medicare annual wellness visit, subsequent   • Localized edema   • Chronic right-sided congestive heart failure (CMS/[facility])   • Cellulitis of left lower extremity   • Proteinuria   • Bilateral lower extremity edema   • Subclinical hypothyroidism   • Acute on chronic diastolic CHF (congestive heart failure) (CMS/[facility])   • Chronic respiratory failure with hypoxia and hypercapnia (CMS/[facility])   • Acute on chronic respiratory failure with hypoxia and hypercapnia  (CMS/[facility])             Therapy Treatment  Rehabilitation Treatment Summary     Row Name 01/06/20 1300             Treatment Time/Intention    Discipline  occupational therapist Lymphedema  -CW      Document Type  therapy note (daily note)  -CW      Subjective Information  complains of;pain toes  -CW      Mode of Treatment  occupational therapy  -CW      Patient/Family Observations  Pt. sitting in recliner with LE's elevated  -CW      Existing Precautions/Restrictions  fall  -CW      Patient Response to Treatment  Pt. says her LE pain is decreased with the bandages on.   -CW      Recorded by [CW] Briana Cramer OTR 01/06/20 1331      Row Name 01/06/20 1300             ADL Assessment/Intervention    BADL Assessment/Intervention  bathing  -CW      Recorded by [CW] Braina Cramer OTR 01/06/20 1331      Row Name 01/06/20 1300             Bathing Assessment/Intervention    Comment (Bathing)  Bandages removed. Washed LE's with mild soap. Eucerin lotion to lower legs.   -CW      Recorded by [CW] Briana Cramer OTR 01/06/20 1331      Row Name 01/06/20 1300             Positioning and Restraints    Pre-Treatment Position  sitting in chair/recliner  -CW      Post Treatment Position  chair  -CW      In Chair  reclined;call light within reach;encouraged to call for assist  -CW      Recorded by [CW] Briana Cramer OTR 01/06/20 1331      Row Name 01/06/20 1300             Pain Scale: Numbers Pre/Post-Treatment    Pain Scale: Numbers, Pretreatment  4/10  -CW      Pain Scale: Numbers, Post-Treatment  4/10  -CW      Pain Location - Side  Bilateral  -CW      Pain Location  toe B toes  -CW      Pain Intervention(s)  Repositioned  -CW      Recorded by [CW] Briana Cramer OTR 01/06/20 1331      Row Name 01/06/20 1300             Edema Symptoms/Interventions    Description (Edema)  localized;pitting  -CW      Pitting Scale (Edema)  3-->moderate  LLE>RLE today.   -CW      Associated Symptoms (Edema)  skin color changes;skin creases  diminished;tissue elasticity loss  -CW      Treatment Interventions (Edema)  compression bandaging, short stretch Tg 9., 1-15 cm. artiflex 1- 8cm., 1- 10cm. Rosidal.   -CW      Recorded by [CW] Briana Cramer OTR 01/06/20 1331      Row Name 01/06/20 1300             Plan of Care Review    Plan of Care Reviewed With  patient  -CW      Progress  improving  -CW      Recorded by [CW] Briana Cramer OTR 01/06/20 1331        User Key  (r) = Recorded By, (t) = Taken By, (c) = Cosigned By    Initials Name Effective Dates Discipline    CW Briana Cramer OTR 06/08/18 -  OT               Outcome Summary                  OT Recommendation and Plan     Plan of Care Review  Plan of Care Reviewed With: patient  Plan of Care Reviewed With: patient  Outcome Summary: Lymphedema clinic.-Pt. tolerated the compression bandages over the weekend. Bandages removed, washed LE's and re-applied the bandages with moderate compression. No pain c/o from the bandages at present. Edema B lower legs decreased per pt. observation. LLE edema >RLE. Reviewed bandage precautions and wearing schedule. Discussed long term edema management.    Outcome Measures     Row Name 01/06/20 1300             How much help from another is currently needed...    Putting on and taking off regular lower body clothing?  2  -CW      Bathing (including washing, rinsing, and drying)  2  -CW      Toileting (which includes using toilet bed pan or urinal)  3  -CW      Putting on and taking off regular upper body clothing  3  -CW      Taking care of personal grooming (such as brushing teeth)  4  -CW      Eating meals  4  -CW      AM-PAC 6 Clicks Score (OT)  18  -CW         Functional Assessment    Outcome Measure Options  AM-PAC 6 Clicks Daily Activity (OT)  -CW        User Key  (r) = Recorded By, (t) = Taken By, (c) = Cosigned By    Initials Name Provider Type    CW Briana Cramer OTR Occupational Therapist            Time Calculation:   Time Calculation- OT     Row Name  01/06/20 1337             Time Calculation- OT    OT Start Time  0930  -CW      OT Stop Time  0958  -CW      OT Time Calculation (min)  28 min  -CW      Total Timed Code Minutes- OT  28 minute(s)  -CW        User Key  (r) = Recorded By, (t) = Taken By, (c) = Cosigned By    Initials Name Provider Type    Briana Holbrook OTR Occupational Therapist          Therapy Charges for Today     Code Description Service Date Service Provider Modifiers Qty    [id number] HC OT MANUAL THERAPY EA 15 MIN 1/6/2020 Briana Cramer OTR GO 2                   ANNEMARIE Galicia  1/6/2020

## 2020-01-06 NOTE — PROGRESS NOTES
Adult Nutrition  Assessment/PES    Patient Name:  Miguelina Lopez  YOB: 1945  MRN: [id number]  Admit Date:  12/15/2019    Assessment Date:  1/6/2020    Comments:  Nutrition follow up.  Awaiting insurance for trilog[employer]" machine.  Patient reports appetite comes and goes, 0-75% at meals.  Eating well at lunch at time of visit.  Reports likes the food.  RD to continue to follow.    Reason for Assessment     Row Name 01/06/20 1324          Reason for Assessment    Reason For Assessment  follow-up protocol         Nutrition/Diet History     Row Name 01/06/20 1324          Nutrition/Diet History    Typical Food/Fluid Intake  reports appetite comes and goes, has eaten most of her lunch at time of visit, likes the food         Anthropometrics     Row Name 01/06/20 1324 01/06/20 0600       Anthropometrics    Weight  --  121 kg (265 lb 14 oz)       Admit Weight    Admit Weight  -- 265# 1/6  --       Body Mass Index (BMI)    BMI Assessment  BMI 40 or greater: obesity grade III  --        Labs/Tests/Procedures/Meds     Row Name 01/06/20 1325          Labs/Procedures/Meds    Lab Results Reviewed  reviewed, pertinent     Lab Results Comments  Gluc        Diagnostic Tests/Procedures    Diagnostic Test/Procedure Reviewed  reviewed, pertinent        Medications    Pertinent Medications Reviewed  reviewed, pertinent     Pertinent Medications Comments  colace, FeSO4, lasix, glucotrol, insulin, synthroid, protonix         Physical Findings     Row Name 01/06/20 1326          Physical Findings    Overall Physical Appearance  obese;on oxygen therapy     Skin  -- B=19, intact, bruised         Nutrition Prescription Ordered     Row Name 01/06/20 1326          Nutrition Prescription PO    Current PO Diet  Regular     Common Modifiers  Cardiac;Consistent Carbohydrate;Fluid Restriction     Fluid Restriction mL per Day  1500 mL         Evaluation of Received Nutrient/Fluid Intake     Row Name 01/06/20 1326          PO Evaluation     Number of Meals  2     % PO Intake  38 0-75%         Problem/Interventions:    Intervention Goal     Row Name 01/06/20 1327          Intervention Goal    General  Maintain nutrition;Reduce/improve symptoms;Disease management/therapy     PO  Increase intake;PO intake (%)     PO Intake %  75 %     Weight  Appropriate weight loss         Nutrition Intervention     Row Name 01/06/20 1327          Nutrition Intervention    RD/Tech Action  Follow Tx progress;Care plan reviewd;Encourage intake         Education/Evaluation     Row Name 01/06/20 1327          Education    Education  Will Instruct as appropriate        Monitor/Evaluation    Monitor  Per protocol;PO intake;Pertinent labs;Weight;Symptoms           Electronically signed by:  Jessica Pelletier RD  01/06/20 1:27 PM

## 2020-01-07 LAB
GLUCOSE BLDC GLUCOMTR-MCNC: 127 MG/DL (ref 70–130)
GLUCOSE BLDC GLUCOMTR-MCNC: 131 MG/DL (ref 70–130)
GLUCOSE BLDC GLUCOMTR-MCNC: 148 MG/DL (ref 70–130)
GLUCOSE BLDC GLUCOMTR-MCNC: 152 MG/DL (ref 70–130)
TSH SERPL DL<=0.05 MIU/L-ACNC: 3.04 UIU/ML (ref 0.27–4.2)

## 2020-01-07 PROCEDURE — 93010 ELECTROCARDIOGRAM REPORT: CPT | Performed by: INTERNAL MEDICINE

## 2020-01-07 PROCEDURE — 63710000001 INSULIN LISPRO (HUMAN) PER 5 UNITS: Performed by: NURSE PRACTITIONER

## 2020-01-07 PROCEDURE — 82962 GLUCOSE BLOOD TEST: CPT

## 2020-01-07 PROCEDURE — 97140 MANUAL THERAPY 1/> REGIONS: CPT

## 2020-01-07 PROCEDURE — 93005 ELECTROCARDIOGRAM TRACING: CPT | Performed by: INTERNAL MEDICINE

## 2020-01-07 PROCEDURE — 94799 UNLISTED PULMONARY SVC/PX: CPT

## 2020-01-07 PROCEDURE — 84443 ASSAY THYROID STIM HORMONE: CPT | Performed by: INTERNAL MEDICINE

## 2020-01-07 PROCEDURE — 99232 SBSQ HOSP IP/OBS MODERATE 35: CPT | Performed by: INTERNAL MEDICINE

## 2020-01-07 RX ADMIN — DOCUSATE SODIUM 100 MG: 100 CAPSULE, LIQUID FILLED ORAL at 21:23

## 2020-01-07 RX ADMIN — GLIPIZIDE 5 MG: 5 TABLET ORAL at 06:47

## 2020-01-07 RX ADMIN — BUDESONIDE AND FORMOTEROL FUMARATE DIHYDRATE 2 PUFF: 160; 4.5 AEROSOL RESPIRATORY (INHALATION) at 09:19

## 2020-01-07 RX ADMIN — ALPRAZOLAM 0.5 MG: 0.5 TABLET ORAL at 21:41

## 2020-01-07 RX ADMIN — DOCUSATE SODIUM 100 MG: 100 CAPSULE, LIQUID FILLED ORAL at 09:55

## 2020-01-07 RX ADMIN — ACETAMINOPHEN 650 MG: 325 TABLET, FILM COATED ORAL at 13:44

## 2020-01-07 RX ADMIN — VILAZODONE HYDROCHLORIDE 20 MG: 40 TABLET ORAL at 21:23

## 2020-01-07 RX ADMIN — GABAPENTIN 100 MG: 100 CAPSULE ORAL at 21:23

## 2020-01-07 RX ADMIN — CLOTRIMAZOLE AND BETAMETHASONE DIPROPIONATE: 10; .5 CREAM TOPICAL at 09:55

## 2020-01-07 RX ADMIN — BUDESONIDE AND FORMOTEROL FUMARATE DIHYDRATE 2 PUFF: 160; 4.5 AEROSOL RESPIRATORY (INHALATION) at 21:27

## 2020-01-07 RX ADMIN — SODIUM CHLORIDE, PRESERVATIVE FREE 10 ML: 5 INJECTION INTRAVENOUS at 09:55

## 2020-01-07 RX ADMIN — Medication 1 CAPSULE: at 09:54

## 2020-01-07 RX ADMIN — FUROSEMIDE 40 MG: 40 TABLET ORAL at 18:27

## 2020-01-07 RX ADMIN — GUAIFENESIN AND DEXTROMETHORPHAN HYDROBROMIDE 1 TABLET: 600; 30 TABLET, EXTENDED RELEASE ORAL at 21:23

## 2020-01-07 RX ADMIN — SODIUM CHLORIDE, PRESERVATIVE FREE 10 ML: 5 INJECTION INTRAVENOUS at 21:24

## 2020-01-07 RX ADMIN — ATORVASTATIN CALCIUM 20 MG: 20 TABLET, FILM COATED ORAL at 21:23

## 2020-01-07 RX ADMIN — PANTOPRAZOLE SODIUM 40 MG: 40 TABLET, DELAYED RELEASE ORAL at 06:47

## 2020-01-07 RX ADMIN — NYSTATIN: 100000 CREAM TOPICAL at 21:23

## 2020-01-07 RX ADMIN — LEVOTHYROXINE SODIUM 25 MCG: 25 TABLET ORAL at 06:47

## 2020-01-07 RX ADMIN — ASPIRIN 325 MG: 325 TABLET, COATED ORAL at 09:54

## 2020-01-07 RX ADMIN — ALPRAZOLAM 0.5 MG: 0.5 TABLET ORAL at 13:44

## 2020-01-07 RX ADMIN — FUROSEMIDE 40 MG: 40 TABLET ORAL at 09:54

## 2020-01-07 RX ADMIN — INSULIN LISPRO 2 UNITS: 100 INJECTION, SOLUTION INTRAVENOUS; SUBCUTANEOUS at 18:26

## 2020-01-07 RX ADMIN — NYSTATIN: 100000 CREAM TOPICAL at 09:55

## 2020-01-07 RX ADMIN — GABAPENTIN 100 MG: 100 CAPSULE ORAL at 09:55

## 2020-01-07 RX ADMIN — LOSARTAN POTASSIUM 100 MG: 100 TABLET, FILM COATED ORAL at 09:54

## 2020-01-07 NOTE — PROGRESS NOTES
Patient Name: Miguelina Lopez  Patient : 1945        Date of Service:20  Provider of Service: Antoine Ayers MD  Place of Service: [facility]  Referral Provider: Brennen Conley MD          Follow Up: bradycardia    Interval Hx: The patient's heart rate is slightly better but there is still periods of bradycardia.  That she is either in atrial fibrillation or A-V dissociation.  No significant pauses noted.  Blood pressure is more elevated now that she is off atenolol.  Thyroid profile normal        OBJECTIVE  Temp:  [97.4 °F (36.3 °C)-98.2 °F (36.8 °C)] 97.4 °F (36.3 °C)  Heart Rate:  [37-56] 52  Resp:  [16-26] 26  BP: (142-158)/(41-74) 158/74     Intake/Output Summary (Last 24 hours) at 2020 0753  Last data filed at 2020 2300  Gross per 24 hour   Intake 240 ml   Output --   Net 240 ml     Body mass index is 51.93 kg/m².      20  0600 20  0446 20  0600   Weight: 120 kg (263 lb 14.4 oz) 121 kg (267 lb 3.2 oz) 121 kg (265 lb 14 oz)         Physical Exam:   Physical Exam   Constitutional: She is oriented to person, place, and time. She appears well-developed and well-nourished.   HENT:   Head: Normocephalic.   Eyes: Pupils are equal, round, and reactive to light.   Neck: Normal range of motion. No JVD present. Carotid bruit is not present. No thyromegaly present.   Cardiovascular: S1 normal, S2 normal, normal heart sounds and intact distal pulses. An irregularly irregular rhythm present. Bradycardia present. Exam reveals no gallop and no friction rub.   No murmur heard.  Pulmonary/Chest: Effort normal and breath sounds normal.   Abdominal: Soft. Bowel sounds are normal.   Musculoskeletal: She exhibits no edema.   Neurological: She is alert and oriented to person, place, and time.   Skin: Skin is warm, dry and intact. No erythema.   Psychiatric: She has a normal mood and affect.   Vitals reviewed.        CURRENT MEDS    Scheduled Meds:  aspirin EC  325 mg Oral Daily   atorvastatin 20 mg Oral Nightly   budesonide-formoterol 2 puff Inhalation BID - RT   clotrimazole-betamethasone  Topical Daily   docusate sodium 100 mg Oral BID   ferrous sulfate 325 mg Oral Daily With Breakfast   furosemide 40 mg Oral BID   gabapentin 100 mg Oral Q12H   glipizide 5 mg Oral QAM AC   insulin lispro 0-9 Units Subcutaneous 4x Daily With Meals & Nightly   lactobacillus acidophilus 1 capsule Oral Daily   levothyroxine 25 mcg Oral Q AM   losartan 100 mg Oral Q24H   nystatin  Topical BID   pantoprazole 40 mg Oral Q AM   sodium chloride 10 mL Intravenous Q12H   vilazodone 20 mg Oral Nightly     Continuous Infusions:       Lab Review:   Results from last 7 days   Lab Units 01/02/20  0530   SODIUM mmol/L 146*   POTASSIUM mmol/L 4.0   CHLORIDE mmol/L 103   CO2 mmol/L 29.9*   BUN mg/dL 22   CREATININE mg/dL 1.22*   GLUCOSE mg/dL 131*   CALCIUM mg/dL 8.7                             Results from last 7 days   Lab Units 01/07/20  0445   TSH uIU/mL 3.040       I personally reviewed the patient's ECG and telemetry data    ASSESSMENT & PLAN    Acute on chronic diastolic CHF (congestive heart failure) (CMS/[facility])    CKD (chronic kidney disease) stage 3, GFR 30-59 ml/min (CMS/[facility])    Diabetic peripheral neuropathy (CMS/[facility])    Anemia    DM type 2 (diabetes mellitus, type 2) (CMS/[facility])    Essential hypertension    Pulmonary hypertension due to sleep-disordered breathing (CMS/[facility])    OLI (acute kidney injury) (CMS/[facility])    Class 3 severe obesity due to excess calories with serious comorbidity and body mass index (BMI) of 50.0 to 59.9 in adult (CMS/[facility])    Proteinuria    Bilateral lower extremity edema    Subclinical hypothyroidism    Acute on chronic respiratory failure with hypoxia and hypercapnia (CMS/[facility])    Bradycardia: We will continue to monitor at this time.  Her blood pressure may need further adjustment now that she is off atenolol.  I suspect the patient is now developing sick sinus syndrome.   She may require permanent pacemaker.  Would like to monitor 24 more hours before making a decision.      Antoine Ayers MD  01/07/20

## 2020-01-07 NOTE — PROGRESS NOTES
LOS: 23 days   Patient Care Team:  Michelle Abernathy MD as PCP - General (Family Medicine)  Robbie Wilson MD as Consulting Physician (Hematology and Oncology)  Chace Johnson MD as Consulting Physician (Cardiology)    Subjective     Patient is noted no real change from yesterday slept with noninvasive ventilation again last night and says did well with it.    Review of Systems:          Objective     Vital Signs  Vital Sign Min/Max for last 24 hours  Temp  Min: 97.9 °F (36.6 °C)  Max: 98.2 °F (36.8 °C)   BP  Min: 142/54  Max: 158/74   Pulse  Min: 37  Max: 56   Resp  Min: 16  Max: 26   SpO2  Min: 94 %  Max: 98 %   Flow (L/min)  Min: 3  Max: 3   No data recorded        Ventilator/Non-Invasive Ventilation Settings (From admission, onward)     Start     Ordered    12/21/19 0930  NIPPV (CPAP or BIPAP)  Until Discontinued     Question Answer Comment   Indication: Sleep Apnea    Type: BIPAP    NIPPV Mask Interface: Full Face Mask    Tidal Volume  >500   Titrate for SPO2 90%        12/21/19 0930    12/20/19 1400  NIPPV-IPPV / BIPAP / CPAP  At Bedtime & As Needed-RT     Question Answer Comment   Type: AVAPS-AE    NIPPV Mask Interface: Per Patient Preference    Rate 8    VT (mL) 550    EPAP Min (cm H2O) 8    EPAP Max (cm H2O) 20    Max Pressure (cm H2O) 30    Pressure Support Min (cm H2O) 4    Pressure Support Max (cm H2O) 12        12/20/19 1359                             Body mass index is 51.93 kg/m².  I/O last 3 completed shifts:  In: 660 [P.O.:660]  Out: 800 [Urine:800]  No intake/output data recorded.        Physical Exam:  General Appearance: Well-developed morbidly obese white female she is resting comfortably in a chair on 3 L O2 in no acute distress oxygen saturations are 95%  Eyes: Conjunctiva are clear and anicteric pupils are equal  ENT: Mucous membranes are moist no erythema no exudates she has a Mallampati type III airway nasal septum is midline  Neck: No adenopathy or thyromegaly no jugular  venous tension trachea is midline neck is obese  Lungs: Clear nonlabored symmetric expansion no wheezes no rales no rhonchi nonlabored  Cardiac: Regular rate rhythm there is a murmur heard loudest at the right upper sternal border systolic  Abdomen: Obese soft nontender no palpable hepatosplenomegaly or masses but obesity does limit exam  : Not examined  Musculoskeletal: He has wraps on both lower extremities  Skin: No jaundice no petechiae skin is warm and dry  Neuro: She is alert oriented cooperative following commands grossly intact  Extremities/P Vascular: No clubbing no cyanosis she does have some lower extremity edema its hard to determine exactly with the wraps in place.  She has palpable radial pulses I cannot palpate dorsalis pedis pulses through the wraps.  MSE: Seems to be in good spirits  Exam is essentially unchanged from yesterday.     Labs:  Results from last 7 days   Lab Units 01/02/20  0530   GLUCOSE mg/dL 131*   SODIUM mmol/L 146*   POTASSIUM mmol/L 4.0   CO2 mmol/L 29.9*   CHLORIDE mmol/L 103   ANION GAP mmol/L 13.1   CREATININE mg/dL 1.22*   BUN mg/dL 22   BUN / CREAT RATIO  18.0   CALCIUM mg/dL 8.7   EGFR IF NONAFRICN AM mL/min/1.73 43*   ALBUMIN g/dL 3.40*     Estimated Creatinine Clearance: 47.6 mL/min (A) (by C-G formula based on SCr of 1.22 mg/dL (H)).                      Results from last 7 days   Lab Units 01/07/20  0445   TSH uIU/mL 3.040             Microbiology Results (last 10 days)     ** No results found for the last 240 hours. **                aspirin  mg Oral Daily   atorvastatin 20 mg Oral Nightly   budesonide-formoterol 2 puff Inhalation BID - RT   clotrimazole-betamethasone  Topical Daily   docusate sodium 100 mg Oral BID   ferrous sulfate 325 mg Oral Daily With Breakfast   furosemide 40 mg Oral BID   gabapentin 100 mg Oral Q12H   glipizide 5 mg Oral QAM AC   insulin lispro 0-9 Units Subcutaneous 4x Daily With Meals & Nightly   lactobacillus acidophilus 1 capsule Oral  Daily   levothyroxine 25 mcg Oral Q AM   losartan 100 mg Oral Q24H   nystatin  Topical BID   pantoprazole 40 mg Oral Q AM   sodium chloride 10 mL Intravenous Q12H   vilazodone 20 mg Oral Nightly          Diagnostics:  Xr Chest 1 View    Result Date: 12/15/2019  1 VIEW PORTABLE CHEST  HISTORY: Shortness of breath.  FINDINGS: There is cardiomegaly with sternal wires from previous cardiac surgery. There is minimal vascular congestion and the overall appearance shows no change from 06/10/2019. There is no focal infiltrate.  This report was finalized on 12/15/2019 1:37 PM by Dr. Joshua Rogel M.D.      Us Renal Bilateral    Result Date: 12/15/2019  BILATERAL RENAL SONOGRAM  HISTORY: Renal failure.  TECHNIQUE: Bilateral renal sonogram was performed in standard fashion.  FINDINGS: Both kidneys appear normal. There is no hydronephrosis or renal lesion. The urinary bladder appears normal. The right kidney measures 10.5 x 5.0 x 5.0 cm and the left kidney measures 9.8 x 5.5 x 5.3 cm.      Negative.  This report was finalized on 12/15/2019 7:35 PM by Dr. Eliazar Ruelas M.D.      Results for orders placed during the hospital encounter of 12/15/19   Adult Transthoracic Echo Complete W/ Cont if Necessary Per Protocol    Narrative · Left ventricular systolic function is normal. Calculated EF = 67.0%.   Estimated EF was in disagreement with the calculated EF. Estimated EF   appears to be in the range of 56 - 60%  · Normal left ventricular cavity size noted. Left ventricular wall   thickness is consistent with mild-to-moderate concentric hypertrophy.   Septal wall motion is abnormal, consistent with a post-operative state.   Systolic flattening of the interventricular septum consistent with right   ventricle pressure overload. Left ventricular diastolic function was   indeterminate.  · Right ventricular cavity is mild-to-moderately dilated.  · The aortic valve is abnormal in structure. There is moderate thickening   of the aortic  valve. No significant aortic valve regurgitation is present.   Moderate aortic valve stenosis is present.  · There is a bioprosthetic mitral valve present. The mean inflow gradient   across the prosthetic mitral valve is 12 mmHg, which is elevated. There   appears to be a mild to moderate amount of paravalvular regurgitation.  · Mild to moderate tricuspid valve regurgitation is present. Estimated   right ventricular systolic pressure from tricuspid regurgitation is   markedly elevated (>55 mmHg). Calculated right ventricular systolic   pressure from tricuspid regurgitation is 57.3 mmHg. There is a tricuspid   ring valve present.              Active Hospital Problems    Diagnosis  POA   • **Acute on chronic diastolic CHF (congestive heart failure) (CMS/[facility]) [I50.33]  Yes   • Acute on chronic respiratory failure with hypoxia and hypercapnia (CMS/[facility]) [J96.21, J96.22]  No   • Subclinical hypothyroidism [E03.9]  Yes   • Proteinuria [R80.9]  Yes   • Bilateral lower extremity edema [R60.0]  Yes   • Class 3 severe obesity due to excess calories with serious comorbidity and body mass index (BMI) of 50.0 to 59.9 in adult (CMS/[facility]) [E66.01, Z68.43]  Not Applicable   • OLI (acute kidney injury) (CMS/[facility]) [N17.9]  Yes   • Pulmonary hypertension due to sleep-disordered breathing (CMS/[facility]) [I27.29, G47.8]  Yes   • Anemia [D64.9]  Yes   • DM type 2 (diabetes mellitus, type 2) (CMS/[facility]) [E11.9]  Yes   • Diabetic peripheral neuropathy (CMS/[facility]) [E11.42]  Yes   • Essential hypertension [I10]  Yes   • CKD (chronic kidney disease) stage 3, GFR 30-59 ml/min (CMS/[facility]) [N18.3]  Yes      Resolved Hospital Problems   No resolved problems to display.         Assessment/Plan     1. Acute on chronic hypoxemic and hypercapnic respiratory failure she is back to her home 3 L O2.  Patient does have chronic respiratory failure hypercapnic and per Dr. Kilpatrick's note they have tried CPAP and it is not effective they have tried BiPAP and it is not  effective in correcting her CO2.  And volume assured ventilation seems to be the best therapy for her.  It is felt that this therapy can be life prolonging and certainly improve the quality of her life.  Still awaiting insurance word on volume assured ventilation at discharge.  2. Obstructive sleep apnea  3. Obesity hypoventilation syndrome  4. COPD  5. Morbid obesity  6. Acute kidney injury on chronic kidney disease stage III  7. On chronic diastolic CHF  8. Prosthetic mitral valve with regurgitation  9. Moderate aortic stenosis  10. Pulmonary hypertension by echocardiogram probably WHO group 2 and 3 disease  11. Diabetes mellitus type 2  12. Diabetic peripheral neuropathy  13. Essential hypertension  14. Hypothyroidism  15. Anemia    Plan for disposition: Okay pulmonary standpoint for discharge pending insurance approval for noninvasive volume assured ventilation    Heath Amado MD  01/07/20  7:18 AM    Time:

## 2020-01-07 NOTE — PROGRESS NOTES
" LOS: 23 days     Name: Miguelina Lopez  Age: 74 y.o.  Sex: female  :  1945  MRN: [id number]         Primary Care Physician: Michelle Abernathy MD    Subjective   Subjective  No new complaints or overnight events.    Objective   Vital Signs  Temp:  [97.4 °F (36.3 °C)-98.2 °F (36.8 °C)] 97.4 °F (36.3 °C)  Heart Rate:  [37-56] 55  Resp:  [16-26] 22  BP: (142-158)/(41-74) 145/64  Body mass index is 51.93 kg/m².    Objective:  General Appearance:  Comfortable and in no acute distress (Chronically ill-appearing).    Vital signs: (most recent): Blood pressure 145/64, pulse 55, temperature 97.4 °F (36.3 °C), temperature source Oral, resp. rate 22, height 152.4 cm (60\"), weight 121 kg (265 lb 14 oz), SpO2 95 %, not currently breastfeeding.    Lungs:  Normal effort and normal respiratory rate.  She is not in respiratory distress.  There are decreased breath sounds.    Heart: Normal rate.  Regular rhythm.    Abdomen: Abdomen is soft.  Bowel sounds are normal.   There is no abdominal tenderness.     Extremities: There is no dependent edema or local swelling.    Neurological: Patient is alert and oriented to person, place and time.    Skin:  Warm and dry.              Results Review:       I reviewed the patient's new clinical results.        Results from last 7 days   Lab Units 20  0530   SODIUM mmol/L 146*   POTASSIUM mmol/L 4.0   CHLORIDE mmol/L 103   CO2 mmol/L 29.9*   BUN mg/dL 22   CREATININE mg/dL 1.22*   CALCIUM mg/dL 8.7   GLUCOSE mg/dL 131*                 Scheduled Meds:     aspirin  mg Oral Daily   atorvastatin 20 mg Oral Nightly   budesonide-formoterol 2 puff Inhalation BID - RT   clotrimazole-betamethasone  Topical Daily   docusate sodium 100 mg Oral BID   ferrous sulfate 325 mg Oral Daily With Breakfast   furosemide 40 mg Oral BID   gabapentin 100 mg Oral Q12H   glipizide 5 mg Oral QAM AC   insulin lispro 0-9 Units Subcutaneous 4x Daily With Meals & Nightly   lactobacillus acidophilus 1 capsule " Oral Daily   levothyroxine 25 mcg Oral Q AM   losartan 100 mg Oral Q24H   nystatin  Topical BID   pantoprazole 40 mg Oral Q AM   sodium chloride 10 mL Intravenous Q12H   vilazodone 20 mg Oral Nightly     PRN Meds:   •  acetaminophen **OR** acetaminophen **OR** acetaminophen  •  albuterol  •  ALPRAZolam  •  dextrose  •  dextrose  •  glucagon (human recombinant)  •  guaifenesin-dextromethorphan  •  magnesium hydroxide  •  ondansetron  •  polyethylene glycol  •  senna-docusate sodium  •  sodium chloride  •  sodium chloride  Continuous Infusions:       Assessment/Plan   Active Hospital Problems    Diagnosis  POA   • **Acute on chronic diastolic CHF (congestive heart failure) (CMS/HCC) [I50.33]  Yes   • Acute on chronic respiratory failure with hypoxia and hypercapnia (CMS/HCC) [J96.21, J96.22]  No   • Subclinical hypothyroidism [E03.9]  Yes   • Proteinuria [R80.9]  Yes   • Bilateral lower extremity edema [R60.0]  Yes   • Class 3 severe obesity due to excess calories with serious comorbidity and body mass index (BMI) of 50.0 to 59.9 in adult (CMS/[facility]) [E66.01, Z68.43]  Not Applicable   • OLI (acute kidney injury) (CMS/HCC) [N17.9]  Yes   • Pulmonary hypertension due to sleep-disordered breathing (CMS/HCC) [I27.29, G47.8]  Yes   • Anemia [D64.9]  Yes   • DM type 2 (diabetes mellitus, type 2) (CMS/HCC) [E11.9]  Yes   • Diabetic peripheral neuropathy (CMS/HCC) [E11.42]  Yes   • Essential hypertension [I10]  Yes   • CKD (chronic kidney disease) stage 3, GFR 30-59 ml/min (CMS/HCC) [N18.3]  Yes      Resolved Hospital Problems   No resolved problems to display.       Assessment & Plan    · Acute on chronic diastolic heart failure/valvular heart disease: S/P Lasix gtt. Continue oral diuretics.  Nephrology indicates that her renal function and electrolytes are stable on current regimen and have signed off.  They will see her in 1 month. Cardiology afraid patient developing SSS and would like to monitor for another 24 hours. May  require PPM.  · OLI/CKD 3  · Pulmonary hypertension/OHS/OCHOA/acute on chronic mixed respiratory failure: Trilogy recommended by pulmonology.  Pulmonology following and discussed with Dr. Kilpatrick. We feel that without this the patient will be at high risk for recurrent hospitalizations or possibly even death.  PFTs were performed which Dr Kilpatrick says are consistent with COPD  · Hypothyroidism: Synthroid.  Repeat TSH in 6 to 8 weeks.  · Diabetes: A1c 6.4.  Continue current diabetic regimen  · Lymphedema  · Anemia: Chronic iron deficient  · Disposition: trilogy finally approved     ZOILA Jung  [facility]  01/07/20  10:58 AM

## 2020-01-07 NOTE — PROGRESS NOTES
Continued Stay Note  [facility]     Patient Name: Miguelina Lopez  MRN: [id number]  Today's Date: 1/7/2020    Admit Date: 12/15/2019    Discharge Plan     Row Name 01/07/20 1516       Plan    Plan  Plans are home with [facility].      Plan Comments  Pt has been approved for Trilogy// spoke with Dr. Amado, pt will not need to trial Trilogy, so Michele will deliver to home at [place].  Call to [facility] to have them follow for [facility] @ [place].  Raisa Sarmiento RN/CCP        Discharge Codes    No documentation.             Raisa Sarmiento, RN

## 2020-01-07 NOTE — PLAN OF CARE
Problem: Patient Care Overview  Goal: Plan of Care Review  Outcome: Ongoing (interventions implemented as appropriate)  Flowsheets (Taken 1/7/2020 0622)  Progress: improving  Plan of Care Reviewed With: patient  Outcome Summary: HR better tonight, 50-30s. BiPAP, slept through the night. ambulated to bathroom. pt concerned about weight gain. fluid restriction. vss. will continue to monitor.

## 2020-01-07 NOTE — PROGRESS NOTES
" LOS: 23 days     Name: Miguelina Lopez  Age: 74 y.o.  Sex: female  :  1945  MRN: [id number]         Primary Care Physician: Michelle Abernathy MD    Subjective   Subjective  No new complaints or overnight events.  Excited that she was approved for the trilogy.  Remains bradycardic.    Objective   Vital Signs  Temp:  [97.4 °F (36.3 °C)-98.2 °F (36.8 °C)] 97.4 °F (36.3 °C)  Heart Rate:  [37-55] 55  Resp:  [16-26] 22  BP: (142-158)/(41-74) 145/64  Body mass index is 51.93 kg/m².    Objective:  General Appearance:  Comfortable and in no acute distress.    Vital signs: (most recent): Blood pressure 145/64, pulse 55, temperature 97.4 °F (36.3 °C), temperature source Oral, resp. rate 22, height 152.4 cm (60\"), weight 121 kg (265 lb 14 oz), SpO2 95 %, not currently breastfeeding.    Lungs:  Normal effort and normal respiratory rate.  She is not in respiratory distress.  There are decreased breath sounds.    Heart: Bradycardia.  Regular rhythm.    Abdomen: Abdomen is soft.  Bowel sounds are normal.   There is no abdominal tenderness.     Extremities: There is no dependent edema or local swelling.    Neurological: Patient is alert and oriented to person, place and time.    Skin:  Warm and dry.              Results Review:       I reviewed the patient's new clinical results.        Results from last 7 days   Lab Units 20  0530   SODIUM mmol/L 146*   POTASSIUM mmol/L 4.0   CHLORIDE mmol/L 103   CO2 mmol/L 29.9*   BUN mg/dL 22   CREATININE mg/dL 1.22*   CALCIUM mg/dL 8.7   GLUCOSE mg/dL 131*                 Scheduled Meds:     aspirin  mg Oral Daily   atorvastatin 20 mg Oral Nightly   budesonide-formoterol 2 puff Inhalation BID - RT   clotrimazole-betamethasone  Topical Daily   docusate sodium 100 mg Oral BID   ferrous sulfate 325 mg Oral Daily With Breakfast   furosemide 40 mg Oral BID   gabapentin 100 mg Oral Q12H   glipizide 5 mg Oral QAM AC   insulin lispro 0-9 Units Subcutaneous 4x Daily With Meals & " Nightly   lactobacillus acidophilus 1 capsule Oral Daily   levothyroxine 25 mcg Oral Q AM   losartan 100 mg Oral Q24H   nystatin  Topical BID   pantoprazole 40 mg Oral Q AM   sodium chloride 10 mL Intravenous Q12H   vilazodone 20 mg Oral Nightly     PRN Meds:   •  acetaminophen **OR** acetaminophen **OR** acetaminophen  •  albuterol  •  ALPRAZolam  •  dextrose  •  dextrose  •  glucagon (human recombinant)  •  guaifenesin-dextromethorphan  •  magnesium hydroxide  •  ondansetron  •  polyethylene glycol  •  senna-docusate sodium  •  sodium chloride  •  sodium chloride  Continuous Infusions:       Assessment/Plan   Active Hospital Problems    Diagnosis  POA   • **Acute on chronic diastolic CHF (congestive heart failure) (CMS/[facility]) [I50.33]  Yes   • Acute on chronic respiratory failure with hypoxia and hypercapnia (CMS/[facility]) [J96.21, J96.22]  No   • Subclinical hypothyroidism [E03.9]  Yes   • Proteinuria [R80.9]  Yes   • Bilateral lower extremity edema [R60.0]  Yes   • Class 3 severe obesity due to excess calories with serious comorbidity and body mass index (BMI) of 50.0 to 59.9 in adult (CMS/[facility]) [E66.01, Z68.43]  Not Applicable   • OLI (acute kidney injury) (CMS/[facility]) [N17.9]  Yes   • Pulmonary hypertension due to sleep-disordered breathing (CMS/[facility]) [I27.29, G47.8]  Yes   • Anemia [D64.9]  Yes   • DM type 2 (diabetes mellitus, type 2) (CMS/[facility]) [E11.9]  Yes   • Diabetic peripheral neuropathy (CMS/[facility]) [E11.42]  Yes   • Essential hypertension [I10]  Yes   • CKD (chronic kidney disease) stage 3, GFR 30-59 ml/min (CMS/[facility]) [N18.3]  Yes      Resolved Hospital Problems   No resolved problems to display.       Assessment & Plan  · Bradycardia: Atenolol has been discontinued and cardiology indicates they will watch the patient another 24 hours and decide on pacemaker tomorrow as it is suspected she is developing sick sinus syndrome  · Acute on chronic diastolic heart failure/valvular heart disease: S/P Lasix gtt. Continue oral  diuretics.  Nephrology indicates that her renal function and electrolytes are stable on current regimen and have signed off.  They will see her in 1 month.  · OLI/CKD 3  · Pulmonary hypertension/OHS/OCHOA/acute on chronic mixed respiratory failure: Trilogy has now been approved and will be brought to the patient today  · Hypothyroidism: Synthroid.  Repeat TSH in 6 to 8 weeks.  · Diabetes: A1c 6.4.  Continue current diabetic regimen  · Lymphedema  · Anemia: Chronic iron deficient  · Disposition: Home health upon discharge once bradycardia is sorted out now that she has been approved for Trilogy    Inder Salvador MD  [facility]  01/07/20  12:24 PM

## 2020-01-07 NOTE — THERAPY TREATMENT NOTE
Acute Care - Occupational Therapy Lymphedema Treatment Note  [facility]     Patient Name: Miguelina Lopez  : 1945  MRN: [id number]  Today's Date: 2020                 Admit Date: 12/15/2019    Visit Dx:    ICD-10-CM ICD-9-CM   1. Acute on chronic combined systolic and diastolic CHF (congestive heart failure) (CMS/[facility]) I50.43 428.43     428.0   2. Acute kidney injury superimposed on chronic kidney disease (CMS/[facility]) N17.9 866.00    N18.9 585.9       Patient Active Problem List   Diagnosis   • Anxiety disorder   • Arthritis of knee   • Asthma   • Chronic coronary artery disease   • CKD (chronic kidney disease) stage 3, GFR 30-59 ml/min (CMS/[facility])   • Depression   • Diabetic peripheral neuropathy (CMS/[facility])   • Gastroesophageal reflux disease   • Hyperlipidemia   • Insomnia   • Lower gastrointestinal hemorrhage   • Anemia   • OCHOA (obstructive sleep apnea)   • DM type 2 (diabetes mellitus, type 2) (CMS/[facility])   • Essential hypertension   • Hospital discharge follow-up   • Pulmonary hypertension due to sleep-disordered breathing (CMS/[facility])   • s/p MVR, TV-repair, 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   • OLI (acute kidney injury) (CMS/[facility])   • Leukocytosis   • Atrial fibrillation (CMS/[facility])   • Nocturnal hypoxia   • Dermatitis   • Medicare annual wellness visit, initial   • Class 3 severe obesity due to excess calories with serious comorbidity and body mass index (BMI) of 50.0 to 59.9 in adult (CMS/[facility])   • Supplemental oxygen dependent   • Mitral regurgitation   • Aortic stenosis   • Medicare annual wellness visit, subsequent   • Localized edema   • Chronic right-sided congestive heart failure (CMS/[facility])   • Cellulitis of left lower extremity   • Proteinuria   • Bilateral lower extremity edema   • Subclinical hypothyroidism   • Acute on chronic diastolic CHF (congestive heart failure) (CMS/[facility])   • Chronic respiratory failure with hypoxia and hypercapnia (CMS/[facility])   • Acute on chronic respiratory failure with hypoxia and hypercapnia  (CMS/[facility])             Therapy Treatment  Rehabilitation Treatment Summary     Row Name 01/07/20 1200             Treatment Time/Intention    Discipline  occupational therapist Lymphedema  -CW      Document Type  therapy note (daily note)  -CW      Subjective Information  complains of;pain Toes L foot  -CW      Mode of Treatment  occupational therapy  -CW      Patient/Family Observations  Pt. awake supine bed level  -CW      Existing Precautions/Restrictions  fall cardiac  -CW      Treatment Considerations/Comments  RN completed wound dressing (Mepilex) yesterday L foot   -CW      Patient Response to Treatment  Pt. reports her legs feel better with the bandages on.   -CW      Recorded by [CW] Briana Cramer OTR 01/07/20 1310      Row Name 01/07/20 1200             Bathing Assessment/Intervention    Bathing [place] Level  bathing skills;lower body  -CW      Comment (Bathing)  Bandages removed, washed LE's with mild soap. Eucerin lotion to BLE's.   -CW      Recorded by [CW] Briana Cramer OTR 01/07/20 1310      Row Name 01/07/20 1200             Positioning and Restraints    Pre-Treatment Position  in bed  -CW      Post Treatment Position  bed  -CW      In Bed  supine;patient within staff view;with other staff Staff in room for EKG  -CW      Recorded by [CW] Briana Cramer OTR 01/07/20 1310      Row Name 01/07/20 1200             Pain Scale: Numbers Pre/Post-Treatment    Pain Scale: Numbers, Pretreatment  4/10  -CW      Pain Scale: Numbers, Post-Treatment  4/10  -CW      Pain Location - Side  Left  -CW      Pain Location - Orientation  lower  -CW      Pain Location  toe toes L foot  -CW      Pain Intervention(s)  Repositioned  -CW      Recorded by [CW] Briana Cramer OTR 01/07/20 1310      Row Name 01/07/20 1200             Edema Assessment & Management    Additional Documentation  -- Bandaged BLE's feet to knees with mod compression  -CW      Recorded by [CW] Briana Cramer OTR 01/07/20 1310      Row Name  01/07/20 1200             Edema Symptoms/Interventions    Description (Edema)  localized;pitting  -CW      Pitting Scale (Edema)  3-->moderate  LLE>RLE today.   -CW      Associated Symptoms (Edema)  skin color changes;skin creases diminished;tissue elasticity loss  -CW      Treatment Interventions (Edema)  compression bandaging, short stretch Tg 9, 1- 15 cm Artiflex, 1- 8cm., 1- 10 cm. Rosidal  -CW      Recorded by [CW] Briana Cramer, OTR 01/07/20 1310      Row Name 01/07/20 1200             Plan of Care Review    Plan of Care Reviewed With  patient  -CW      Recorded by [CW] Briana Cramer, OTR 01/07/20 1310        User Key  (r) = Recorded By, (t) = Taken By, (c) = Cosigned By    Initials Name Effective Dates Discipline    CW Briana Cramer, OTR 06/08/18 -  OT               Outcome Summary                  OT Recommendation and Plan     Plan of Care Review  Plan of Care Reviewed With: patient  Plan of Care Reviewed With: patient  Outcome Summary: Lymphedema clinic-Pt. was able to wear the bandages all night. Pain L toes noted. Edema decreased RLE vs. LLE. Skin intact. Reviewed bandage precautions, wearing schedule, LE positioning/elevation, modified HEP, and skin care. Issued pt. handout on how to apply bandages. Pt. reports her grand[place] will be able to assist at home. Discussed long term edema management with options when her edema is reduced.Plan to cont. tx.    Outcome Measures     Row Name 01/07/20 1300 01/06/20 1300          How much help from another is currently needed...    Putting on and taking off regular lower body clothing?  2  -CW  2  -CW     Bathing (including washing, rinsing, and drying)  2  -CW  2  -CW     Toileting (which includes using toilet bed pan or urinal)  3  -CW  3  -CW     Putting on and taking off regular upper body clothing  3  -CW  3  -CW     Taking care of personal grooming (such as brushing teeth)  4  -CW  4  -CW     Eating meals  4  -CW  4  -CW     AM-PAC 6 Clicks Score (OT)  18   -CW  18  -CW        Functional Assessment    Outcome Measure Options  AM-PAC 6 Clicks Daily Activity (OT)  -CW  AM-PAC 6 Clicks Daily Activity (OT)  -CW       User Key  (r) = Recorded By, (t) = Taken By, (c) = Cosigned By    Initials Name Provider Type    Briana Holbrook OTR Occupational Therapist            Time Calculation:   Time Calculation- OT     Row Name 01/07/20 1320             Time Calculation- OT    OT Start Time  0813  -CW      OT Stop Time  0843  -CW      OT Time Calculation (min)  30 min  -CW      Total Timed Code Minutes- OT  30 minute(s)  -CW         Timed Charges    63706 - OT Manual Therapy Minutes  30  -CW        User Key  (r) = Recorded By, (t) = Taken By, (c) = Cosigned By    Initials Name Provider Type    Briana Holbrook OTR Occupational Therapist          Therapy Charges for Today     Code Description Service Date Service Provider Modifiers Qty    68468105865 HC OT MANUAL THERAPY EA 15 MIN 1/6/2020 Briana Cramer OTR GO 2    01557411221 HC OT MANUAL THERAPY EA 15 MIN 1/7/2020 Briana Cramer OTR GO 2                   Briana Cramer OTR  1/7/2020

## 2020-01-07 NOTE — PROGRESS NOTES
Continued Stay Note  [facility]     Patient Name: Miguelina Lopez  MRN: [id number]  Today's Date: 1/7/2020    Admit Date: 12/15/2019    Discharge Plan     Row Name 01/07/20 0949       Plan    Plan  Home with [facility], Trilogy from [facility].    Patient/Family in Agreement with Plan  yes    Plan Comments  M from [facility]--after medical review, expedited appeal for Trilogy vent has been approved (case# [id number]). Updated 5S CCP. Updated pt at bedside. Updated MD. Email to Reji/Michele with update. CCP to follow............JW        Discharge Codes    No documentation.             Fabiana Segundo RN

## 2020-01-07 NOTE — PLAN OF CARE
Problem: Patient Care Overview  Goal: Plan of Care Review  Outcome: Ongoing (interventions implemented as appropriate)  Flowsheets (Taken 1/7/2020 1313)  Plan of Care Reviewed With: patient  Outcome Summary: Lymphedema clinic-Pt. was able to wear the bandages all night. Pain L toes noted. Edema decreased RLE vs. LLE. Skin intact. Reviewed bandage precautions, wearing schedule, LE positioning/elevation, modified HEP, and skin care. Issued pt. handout on how to apply bandages. Pt. reports her grandaujustin will be able to assist at home. Discussed long term edema management with options when her edema is reduced.Plan to cont. tx.

## 2020-01-08 ENCOUNTER — APPOINTMENT (OUTPATIENT)
Dept: CARDIOLOGY | Facility: HOSPITAL | Age: 75
End: 2020-01-08

## 2020-01-08 VITALS
HEART RATE: 44 BPM | DIASTOLIC BLOOD PRESSURE: 46 MMHG | WEIGHT: 261.69 LBS | HEIGHT: 60 IN | OXYGEN SATURATION: 100 % | SYSTOLIC BLOOD PRESSURE: 145 MMHG | RESPIRATION RATE: 18 BRPM | TEMPERATURE: 98.2 F | BODY MASS INDEX: 51.38 KG/M2

## 2020-01-08 LAB
GLUCOSE BLDC GLUCOMTR-MCNC: 163 MG/DL (ref 70–130)
GLUCOSE BLDC GLUCOMTR-MCNC: 97 MG/DL (ref 70–130)

## 2020-01-08 PROCEDURE — 94799 UNLISTED PULMONARY SVC/PX: CPT

## 2020-01-08 PROCEDURE — 63710000001 INSULIN LISPRO (HUMAN) PER 5 UNITS: Performed by: NURSE PRACTITIONER

## 2020-01-08 PROCEDURE — 82962 GLUCOSE BLOOD TEST: CPT

## 2020-01-08 PROCEDURE — 97140 MANUAL THERAPY 1/> REGIONS: CPT | Performed by: OCCUPATIONAL THERAPIST

## 2020-01-08 PROCEDURE — 99232 SBSQ HOSP IP/OBS MODERATE 35: CPT | Performed by: INTERNAL MEDICINE

## 2020-01-08 PROCEDURE — 0298T HOLTER MONITOR - 72 HOUR UP TO 21 DAY: CPT | Performed by: INTERNAL MEDICINE

## 2020-01-08 PROCEDURE — 0296T HC EXT ECG > 48HR TO 21 DAY RCRD W/CONECT INTL RCRD: CPT

## 2020-01-08 RX ORDER — ALPRAZOLAM 0.5 MG/1
0.5 TABLET ORAL 2 TIMES DAILY PRN
Start: 2020-01-08 | End: 2020-01-22

## 2020-01-08 RX ORDER — LOSARTAN POTASSIUM 100 MG/1
100 TABLET ORAL
Qty: 30 TABLET | Refills: 0 | Status: SHIPPED | OUTPATIENT
Start: 2020-01-09 | End: 2020-02-06 | Stop reason: SDUPTHER

## 2020-01-08 RX ORDER — LEVOTHYROXINE SODIUM 0.03 MG/1
25 TABLET ORAL DAILY
Qty: 30 TABLET | Refills: 0 | Status: SHIPPED | OUTPATIENT
Start: 2020-01-08 | End: 2020-02-06 | Stop reason: SDUPTHER

## 2020-01-08 RX ORDER — GABAPENTIN 100 MG/1
100 CAPSULE ORAL EVERY 12 HOURS SCHEDULED
Qty: 30 CAPSULE | Refills: 0 | Status: SHIPPED | OUTPATIENT
Start: 2020-01-08 | End: 2020-01-22 | Stop reason: SDUPTHER

## 2020-01-08 RX ADMIN — Medication 1 CAPSULE: at 09:26

## 2020-01-08 RX ADMIN — ASPIRIN 325 MG: 325 TABLET, COATED ORAL at 09:26

## 2020-01-08 RX ADMIN — SODIUM CHLORIDE, PRESERVATIVE FREE 10 ML: 5 INJECTION INTRAVENOUS at 09:32

## 2020-01-08 RX ADMIN — LOSARTAN POTASSIUM 100 MG: 100 TABLET, FILM COATED ORAL at 09:26

## 2020-01-08 RX ADMIN — GLIPIZIDE 5 MG: 5 TABLET ORAL at 06:44

## 2020-01-08 RX ADMIN — BUDESONIDE AND FORMOTEROL FUMARATE DIHYDRATE 2 PUFF: 160; 4.5 AEROSOL RESPIRATORY (INHALATION) at 09:00

## 2020-01-08 RX ADMIN — DOCUSATE SODIUM 100 MG: 100 CAPSULE, LIQUID FILLED ORAL at 09:26

## 2020-01-08 RX ADMIN — PANTOPRAZOLE SODIUM 40 MG: 40 TABLET, DELAYED RELEASE ORAL at 06:44

## 2020-01-08 RX ADMIN — FUROSEMIDE 40 MG: 40 TABLET ORAL at 09:26

## 2020-01-08 RX ADMIN — INSULIN LISPRO 2 UNITS: 100 INJECTION, SOLUTION INTRAVENOUS; SUBCUTANEOUS at 12:59

## 2020-01-08 RX ADMIN — GABAPENTIN 100 MG: 100 CAPSULE ORAL at 09:26

## 2020-01-08 RX ADMIN — FERROUS SULFATE TAB 325 MG (65 MG ELEMENTAL FE) 325 MG: 325 (65 FE) TAB at 09:25

## 2020-01-08 RX ADMIN — LEVOTHYROXINE SODIUM 25 MCG: 25 TABLET ORAL at 06:44

## 2020-01-08 NOTE — PROGRESS NOTES
Continued Stay Note  [facility]     Patient Name: Miguelina Lopez  MRN: [id number]  Today's Date: 1/8/2020    Admit Date: 12/15/2019    Discharge Plan     Row Name 01/08/20 0930       Plan    Plan  Plans are home with her granddtr and son in law, and [facility]    Plan Comments  Spoke with Reji/ Michele to notify anticpate probable DC today.  Financial assistance packet faxed from Reji and given to pt per Reji' request.  CCP will notify Reji when pt is DC'd.  [facility]  is following.         Discharge Codes    No documentation.             Zenaida Woo RN

## 2020-01-08 NOTE — PLAN OF CARE
Problem: Patient Care Overview  Goal: Plan of Care Review  Outcome: Ongoing (interventions implemented as appropriate)  Flowsheets (Taken 1/8/2020 4645)  Plan of Care Reviewed With: patient  Outcome Summary: Pt remains in a junctional rhythm. Having more P waves than QRS. Appears like a 2nd degree type 2 but junctional. When QRS is conducted from the extra P wave, it is a different configeration. Pt very concerned about possible discharge home wit HR in the 30's.

## 2020-01-08 NOTE — PLAN OF CARE
Problem: Patient Care Overview  Goal: Plan of Care Review  Flowsheets  Taken 1/7/2020 0622 by Isabel Farr, RN  Progress: improving  Taken 1/8/2020 1148 by Jen Martinez OTR  Plan of Care Reviewed With: patient  Outcome Summary: pt B LEs washed, applied lotion and wraps re applied, pt w. decr in swelling and happy w. progress. Pt to dc today home w. family to A w. wrapping her B LEs.

## 2020-01-08 NOTE — PROGRESS NOTES
Patient Name: Miguelina Lopez  Patient : 1945        Date of Service:20  Provider of Service: Antoine Ayers MD  Place of Service: [facility]  Referral Provider: Brennen Conley MD          Follow Up: Arrhythmia  Interval Hx: Patient continues to show evidence of 2-1 heart block as well as possible second-degree Mobitz type I heart block.  No prolonged pauses noted.        OBJECTIVE  Temp:  [97.4 °F (36.3 °C)-98.4 °F (36.9 °C)] 98.4 °F (36.9 °C)  Heart Rate:  [32-60] 32  Resp:  [18-24] 18  BP: (136-165)/(56-64) 136/62     Intake/Output Summary (Last 24 hours) at 2020 0731  Last data filed at 2020 0650  Gross per 24 hour   Intake 568 ml   Output 350 ml   Net 218 ml     Body mass index is 51.11 kg/m².      20  0446 20  0600 20  0500   Weight: 121 kg (267 lb 3.2 oz) 121 kg (265 lb 14 oz) 119 kg (261 lb 11 oz)         Physical Exam:   Physical Exam      CURRENT MEDS    Scheduled Meds:  aspirin  mg Oral Daily   atorvastatin 20 mg Oral Nightly   budesonide-formoterol 2 puff Inhalation BID - RT   clotrimazole-betamethasone  Topical Daily   docusate sodium 100 mg Oral BID   ferrous sulfate 325 mg Oral Daily With Breakfast   furosemide 40 mg Oral BID   gabapentin 100 mg Oral Q12H   glipizide 5 mg Oral QAM AC   insulin lispro 0-9 Units Subcutaneous 4x Daily With Meals & Nightly   lactobacillus acidophilus 1 capsule Oral Daily   levothyroxine 25 mcg Oral Q AM   losartan 100 mg Oral Q24H   nystatin  Topical BID   pantoprazole 40 mg Oral Q AM   sodium chloride 10 mL Intravenous Q12H   vilazodone 20 mg Oral Nightly     Continuous Infusions:       Lab Review:   Results from last 7 days   Lab Units 20  0530   SODIUM mmol/L 146*   POTASSIUM mmol/L 4.0   CHLORIDE mmol/L 103   CO2 mmol/L 29.9*   BUN mg/dL 22   CREATININE mg/dL 1.22*   GLUCOSE mg/dL 131*   CALCIUM mg/dL 8.7                             Results from last 7 days   Lab Units 20  1800    TSH uIU/mL 3.040       I personally reviewed the patient's ECG and telemetry data    ASSESSMENT & PLAN    Acute on chronic diastolic CHF (congestive heart failure) (CMS/[facility])    CKD (chronic kidney disease) stage 3, GFR 30-59 ml/min (CMS/[facility])    Diabetic peripheral neuropathy (CMS/[facility])    Anemia    DM type 2 (diabetes mellitus, type 2) (CMS/[facility])    Essential hypertension    Pulmonary hypertension due to sleep-disordered breathing (CMS/[facility])    OLI (acute kidney injury) (CMS/[facility])    Class 3 severe obesity due to excess calories with serious comorbidity and body mass index (BMI) of 50.0 to 59.9 in adult (CMS/[facility])    Proteinuria    Bilateral lower extremity edema    Subclinical hypothyroidism    Acute on chronic respiratory failure with hypoxia and hypercapnia (CMS/[facility])  Arrhythmia: The patient appears to have 2-1 heart block as well as probable Mobitz type I second-degree heart block.  There are no prolonged pauses.  I am going to have the patient wear a ZIO patch at discharge.  I will see her back in about 3 weeks.  Eventually she may need a pacemaker but I do not see the indication to proceed at this time.        Antoine Ayers MD  01/08/20

## 2020-01-08 NOTE — PROGRESS NOTES
Continued Stay Note  [facility]     Patient Name: Miguelina Lopez  MRN: [id number]  Today's Date: 1/8/2020    Admit Date: 12/15/2019    Discharge Plan     Row Name 01/08/20 1456       Plan    Plan Comments  DC orders noted.  [facility] notified Reji/ [facility]hele ([phone]) and she will notify [facility]hele for delivery of Trilogy vent at home.  [facility] notified Olga Lidia/ [facility] CAMPBELL and she will followup.     Final Note  Pt to be DC'd home with her granddtr and son in law with [facility].  New Trilogy vent will be delivered to pt's home today from [facility].         Discharge Codes    No documentation.       Expected Discharge Date and Time     Expected Discharge Date Expected Discharge Time    Jan 8, 2020             Zenaida Woo RN

## 2020-01-08 NOTE — THERAPY TREATMENT NOTE
Acute Care - Occupational Therapy Lymphedema Treatment Note  [facility]     Patient Name: Miguelina Lopez  : 1945  MRN: [id number]  Today's Date: 2020                 Admit Date: 12/15/2019    Visit Dx:    ICD-10-CM ICD-9-CM   1. Acute on chronic combined systolic and diastolic CHF (congestive heart failure) (CMS/[facility]) I50.43 428.43     428.0   2. Acute kidney injury superimposed on chronic kidney disease (CMS/[facility]) N17.9 866.00    N18.9 585.9       Patient Active Problem List   Diagnosis   • Anxiety disorder   • Arthritis of knee   • Asthma   • Chronic coronary artery disease   • CKD (chronic kidney disease) stage 3, GFR 30-59 ml/min (CMS/[facility])   • Depression   • Diabetic peripheral neuropathy (CMS/[facility])   • Gastroesophageal reflux disease   • Hyperlipidemia   • Insomnia   • Lower gastrointestinal hemorrhage   • Anemia   • OCHOA (obstructive sleep apnea)   • DM type 2 (diabetes mellitus, type 2) (CMS/[facility])   • Essential hypertension   • Hospital discharge follow-up   • Pulmonary hypertension due to sleep-disordered breathing (CMS/[facility])   • s/p MVR, TV-repair, 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   • OLI (acute kidney injury) (CMS/[facility])   • Leukocytosis   • Atrial fibrillation (CMS/[facility])   • Nocturnal hypoxia   • Dermatitis   • Medicare annual wellness visit, initial   • Class 3 severe obesity due to excess calories with serious comorbidity and body mass index (BMI) of 50.0 to 59.9 in adult (CMS/[facility])   • Supplemental oxygen dependent   • Mitral regurgitation   • Aortic stenosis   • Medicare annual wellness visit, subsequent   • Localized edema   • Chronic right-sided congestive heart failure (CMS/[facility])   • Cellulitis of left lower extremity   • Proteinuria   • Bilateral lower extremity edema   • Subclinical hypothyroidism   • Acute on chronic diastolic CHF (congestive heart failure) (CMS/[facility])   • Chronic respiratory failure with hypoxia and hypercapnia (CMS/[facility])   • Acute on chronic respiratory failure with hypoxia and hypercapnia  (CMS/[facility])         Lymphedema     Row Name 01/08/20 1100             Lymphedema Assessment    Lymphedema Classification  RLE:;LLE:  -KP      Lymphedema Assessment Comments  Removed B LE wraps, washed B LEs, applied stocinet, artiflex, and re wrapped B LEs foot to knee. Two wraps on each LE. Legs are decr in swelling and pt happy with progress.   -KP      Recorded by [] Jen Martinez, OTR              Lymphedema Sensation    Lymphedema Sensation Reports  RLE:;LLE:;normal  -      Lymphedema Sensation Tests  light touch  -KP      Recorded by [] Jen Martinez, OTR        User Key  (r) = Recorded By, (t) = Taken By, (c) = Cosigned By    Initials Name Effective Dates     Jen Martinez, OTR 06/08/18 -           Therapy Treatment           Outcome Summary                  OT Recommendation and Plan     Plan of Care Review  Plan of Care Reviewed With: patient  Plan of Care Reviewed With: patient  Outcome Summary: pt B LEs washed, applied lotion and wraps re applied, pt w. decr in swelling and happy w. progress. Pt to [place] today home w. family to [place] w. wrapping her B LEs.    Outcome Measures     Row Name 01/08/20 1149 01/07/20 1300 01/06/20 1300       How much help from another is currently needed...    Putting on and taking off regular lower body clothing?  2  -KP  2  -CW  2  -CW    Bathing (including washing, rinsing, and drying)  2  -KP  2  -CW  2  -CW    Toileting (which includes using toilet bed pan or urinal)  3  -KP  3  -CW  3  -CW    Putting on and taking off regular upper body clothing  3  -KP  3  -CW  3  -CW    Taking care of personal grooming (such as brushing teeth)  4  -KP  4  -CW  4  -CW    Eating meals  4  -KP  4  -CW  4  -CW    AM-PAC 6 Clicks Score (OT)  18  -KP  18  -CW  18  -CW       Functional Assessment    Outcome Measure Options  --  AM-PAC 6 Clicks Daily Activity (OT)  -CW  AM-PAC 6 Clicks Daily Activity (OT)  -CW      User Key  (r) = Recorded By, (t) = Taken By, (c) =  Cosigned By    Initials Name Provider Type    Jen Toscano OTR Occupational Therapist     Briana Cramer OTR Occupational Therapist            Time Calculation:   Time Calculation- OT     Row Name 01/08/20 1149             Time Calculation- OT    OT Start Time  0928  -      OT Stop Time  1006  -      OT Time Calculation (min)  38 min  -      Total Timed Code Minutes- OT  38 minute(s)  -      OT Received On  01/08/20  -        User Key  (r) = Recorded By, (t) = Taken By, (c) = Cosigned By    Initials Name Provider Type    Jen Toscano OTR Occupational Therapist          Therapy Charges for Today     Code Description Service Date Service Provider Modifiers Qty    34704448347 HC OT MANUAL THERAPY EA 15 MIN 1/8/2020 Jen Martinez OTR GO 3                   ANNEMARIE Johns  1/8/2020

## 2020-01-08 NOTE — DISCHARGE SUMMARY
Date of Admission: 12/15/2019  Date of Discharge:  1/8/2020  Primary Care Physician: Michelle Abernathy MD     Discharge Diagnosis:  Active Hospital Problems    Diagnosis  POA   • **Acute on chronic diastolic CHF (congestive heart failure) (CMS/HCC) [I50.33]  Yes   • Acute on chronic respiratory failure with hypoxia and hypercapnia (CMS/HCC) [J96.21, J96.22]  No   • Subclinical hypothyroidism [E03.9]  Yes   • Proteinuria [R80.9]  Yes   • Bilateral lower extremity edema [R60.0]  Yes   • Class 3 severe obesity due to excess calories with serious comorbidity and body mass index (BMI) of 50.0 to 59.9 in adult (CMS/HCC) [E66.01, Z68.43]  Not Applicable   • OLI (acute kidney injury) (CMS/HCC) [N17.9]  Yes   • Pulmonary hypertension due to sleep-disordered breathing (CMS/HCC) [I27.29, G47.8]  Yes   • Anemia [D64.9]  Yes   • DM type 2 (diabetes mellitus, type 2) (CMS/HCC) [E11.9]  Yes   • Diabetic peripheral neuropathy (CMS/HCC) [E11.42]  Yes   • Essential hypertension [I10]  Yes   • CKD (chronic kidney disease) stage 3, GFR 30-59 ml/min (CMS/HCC) [N18.3]  Yes      Resolved Hospital Problems   No resolved problems to display.       DETAILS OF HOSPITAL STAY     Pertinent Test Results and Procedures Performed    Chest x-ray:  There is cardiomegaly with sternal wires from previous cardiac  surgery. There is minimal vascular congestion and the overall appearance  shows no change from 06/10/2019. There is no focal infiltrate.    Renal ultrasound:  Both kidneys appear normal. There is no hydronephrosis or  renal lesion. The urinary bladder appears normal. The right kidney  measures 10.5 x 5.0 x 5.0 cm and the left kidney measures 9.8 x 5.5 x  5.3 cm.    Lower extremity Doppler negative for DVT bilaterally    Hospital Course  This is a 74-year-old female with a history of valvular heart disease admitted with edema and acute kidney injury.  Please see H&P for full details of admission nephrology and cardiology were consulted and she  was placed on a Lasix drip.  They managed her diuresis throughout her hospitalization and she is now stable on oral diuretics and a fluid restriction.  Several days into her hospital stay the patient was found to have acid/base derangements due to obstructive sleep apnea and obesity hypoventilation syndrome.  She required BiPAP for correction of this and pulmonology followed her very closely.  Ultimately, pulmonology recommended use of Trilogy at home and we were not comfortable sending her out of the hospital without this.  She had a very prolonged hospital stay due to the fact that her [employer] insurance would not approve this.  Ultimately, she was approved after we obtained pulmonary function test that were consistent with COPD.  Towards the end of her hospital stay, as we were wrapping all this up, she was noted to become more bradycardic.  Her atenolol was discontinued and cardiology came back to see her.  They do believe that she may be developing sick sinus syndrome but do not feel that she needs a pacemaker at this time given that she is asymptomatic from the bradycardia.  They will place a ZIO patch upon discharge and see her in the office in 3 weeks for further decisions regarding this.  At this time the patient is medically stable, has been cleared by all consulting services for discharge, has been approved for the Trology and will be released home today.    Physical Exam at Discharge:  General: No acute distress, AAOx3, chronically ill-appearing  HEENT: EOMI, PERRL  Cardiovascular: +s1 and s2, RRR  Lungs: No rhonchi or wheezing  Abdomen: soft, nontender, morbidly obese    Consults:   Consults     Date and Time Order Name Status Description    1/6/2020 1010 Inpatient Cardiology Consult      12/28/2019 0955 Inpatient Cardiology Consult      12/19/2019 0826 Inpatient Pulmonology Consult Completed     12/15/2019 1703 Inpatient Cardiology Consult      12/15/2019 1632 Inpatient Nephrology Consult Completed      12/15/2019 1335 [facility] (on-call MD unless specified) Details Completed             Condition on Discharge: Stable, improved    Discharge Disposition  Home or Self Care    Discharge Medications     Discharge Medications      New Medications      Instructions Start Date   Commode Bedside misc   1 each, Does not apply, Daily      gabapentin 100 MG capsule  Commonly known as:  NEURONTIN   100 mg, Oral, Every 12 Hours Scheduled      levothyroxine 25 MCG tablet  Commonly known as:  SYNTHROID, LEVOTHROID   25 mcg, Oral, Daily      losartan 100 MG tablet  Commonly known as:  COZAAR   100 mg, Oral, Every 24 Hours Scheduled   Start Date:  January 9, 2020        Changes to Medications      Instructions Start Date   ALPRAZolam 0.5 MG tablet  Commonly known as:  XANAX  What changed:    · medication strength  · how much to take   0.5 mg, Oral, 2 Times Daily PRN      ipratropium-albuterol 0.5-2.5 mg/3 ml nebulizer  Commonly known as:  DUO-NEB  What changed:    · when to take this  · reasons to take this   3 mL, Nebulization, 4 Times Daily - RT         Continue These Medications      Instructions Start Date   albuterol sulfate  (90 Base) MCG/ACT inhaler  Commonly known as:  VENTOLIN HFA   2 puffs, Inhalation, Every 6 Hours PRN      aspirin  MG tablet   TAKE ONE TABLET BY MOUTH DAILY      atorvastatin 20 MG tablet  Commonly known as:  LIPITOR   TAKE ONE TABLET BY MOUTH DAILY      clotrimazole-betamethasone 1-0.05 % cream  Commonly known as:  LOTRISONE   Topical, 2 Times Daily      Dulaglutide 0.75 MG/0.5ML solution pen-injector  Commonly known as:  TRULICITY   0.75 mg, Subcutaneous, Every 7 Days      ferrous sulfate 324 (65 Fe) MG tablet delayed-release EC tablet   324 mg, Oral, Daily With Breakfast      fluticasone-salmeterol 250-50 MCG/DOSE DISKUS  Commonly known as:  ADVAIR DISKUS   1 puff, Inhalation, Daily PRN      furosemide 40 MG tablet  Commonly known as:  LASIX   TAKE ONE TABLET BY MOUTH TWICE A DAY       glimepiride 1 MG tablet  Commonly known as:  AMARYL   TAKE ONE TABLET BY MOUTH EVERY MORNING BEFORE BREAKFAST      nitroglycerin 0.4 MG SL tablet  Commonly known as:  NITROSTAT   0.4 mg, Sublingual, As Needed,  - IF PAIN REMAINS AFTER 5 MIN CALL 911 AND REPEAT DOSE MAX 3 TABS IN 15 MINUTES      nystatin [id number] UNIT/GM cream  Commonly known as:  MYCOSTATIN   Topical, 2 Times Daily, to affected area(s)      O2  Commonly known as:  OXYGEN   2.5 L/min, Inhalation, Continuous      omeprazole 40 MG capsule  Commonly known as:  priLOSEC   TAKE ONE CAPSULE BY MOUTH DAILY      PROBIOTIC PO   Oral      sennosides-docusate 8.6-50 MG per tablet  Commonly known as:  PERICOLACE   1 tablet, Oral, Daily      vilazodone 20 MG tablet tablet  Commonly known as:  VIIBRYD   20 mg, Oral, Nightly         Stop These Medications    amLODIPine 10 MG tablet  Commonly known as:  NORVASC     atenolol 50 MG tablet  Commonly known as:  TENORMIN     cloNIDine 0.2 MG tablet  Commonly known as:  CATAPRES     ibuprofen 200 MG tablet  Commonly known as:  ADVIL,MOTRIN     irbesartan 300 MG tablet  Commonly known as:  AVAPRO     potassium chloride 20 MEQ CR tablet  Commonly known as:  K-DUR,KLOR-CON     promethazine 25 MG tablet  Commonly known as:  PHENERGAN            Discharge Diet:   Diet Instructions     Diet: Regular, Cardiac, Consistent Carbohydrate      Discharge Diet:   Regular  Cardiac  Consistent Carbohydrate       Fluid Restriction per day:  1500 mL Fluid          Activity at Discharge:   Activity Instructions     Activity as Tolerated            Follow-up Appointments  Future Appointments   Date Time Provider Department Center   1/29/2020 11:30 AM Antoine Ayers MD [facility] None     Additional Instructions for the Follow-ups that You Need to Schedule     Discharge Follow-up with PCP   As directed       Currently Documented PCP:    Michelle Abernathy MD    PCP Phone Number:    [phone]     Follow Up Details:  1 week          Discharge Follow-up with Specified Provider: Dr. Diamond of nephrology in 1 month   As directed      To:  Dr. Diamond of nephrology in 1 month         Discharge Follow-up with Specified Provider: Dr. Ayers in 3 weeks   As directed      To:  Dr. Ayers in 3 weeks         Discharge Follow-up with Specified Provider: Dr. Cuevas of pulmonology in 2-3 weeks   As directed      To:  Dr. Cuevas of pulmonology in 2-3 weeks               I have examined and discussed discharge planning with the patient today.     Inder Salvador MD  01/08/20  12:54 PM    Time: Discharge greater than 30 min

## 2020-01-09 ENCOUNTER — READMISSION MANAGEMENT (OUTPATIENT)
Dept: CALL CENTER | Facility: HOSPITAL | Age: 75
End: 2020-01-09

## 2020-01-09 ENCOUNTER — TELEPHONE (OUTPATIENT)
Dept: CARDIOLOGY | Facility: CLINIC | Age: 75
End: 2020-01-09

## 2020-01-09 NOTE — TELEPHONE ENCOUNTER
[phone]  3:24 pm    Felicita at [facility] states she saw the pt and her HR was 42 and /80.  She states she knows RL is aware of pts low HR but she wanted it documented.     [facility]

## 2020-01-09 NOTE — OUTREACH NOTE
Prep Survey      Responses   Facility patient discharged from?  [place]   Is patient eligible?  Yes   Discharge diagnosis  Acute on chronic diastolic CHF    Does the patient have one of the following disease processes/diagnoses(primary or secondary)?  CHF   Does the patient have Home health ordered?  Yes   Is there a DME ordered?  Yes   What DME was ordered?  Trilogy lexi Bautista    Prep survey completed?  Yes          Wanda Pa RN

## 2020-01-10 ENCOUNTER — READMISSION MANAGEMENT (OUTPATIENT)
Dept: CALL CENTER | Facility: HOSPITAL | Age: 75
End: 2020-01-10

## 2020-01-10 NOTE — OUTREACH NOTE
CHF Week 1 Survey      Responses   Facility patient discharged from?  [facility]   Does the patient have one of the following disease processes/diagnoses(primary or secondary)?  CHF   Is there a successful TCM telephone encounter documented?  No   CHF Week 1 attempt successful?  Yes   Call start time  1443   Call end time  1448   Is patient permission given to speak with other caregiver?  Yes   List who call center can speak with  Alin, or Carmenleeroy Maciels reviewed with patient/caregiver?  Yes   Is the patient having any side effects they believe may be caused by any medication additions or changes?  No   Does the patient have all medications ordered at discharge?  Yes   Is the patient taking all medications as directed (includes completed medication regime)?  Yes   Does the patient have a primary care provider?   Yes   Does the patient have an appointment with their PCP within 7 days of discharge?  Yes   Comments regarding PCP  Michelle Abernathy   Has the patient kept scheduled appointments due by today?  N/A   Comments  HH comes an Golgi appts.    What is the Home health agency?   HH and PT has been there [facility]   Has home health visited the patient within 72 hours of discharge?  Yes   What DME was ordered?  Trilogy lexi Bautista    Has all DME been delivered?  Yes   Psychosocial issues?  No   Comments  kaitlynn Gauthier,   Did the patient receive a copy of their discharge instructions?  Yes   Nursing interventions  Reviewed instructions with patient   What is the patient's perception of their health status since discharge?  Improving   Nursing interventions  Nurse provided patient education   Is the patient weighing daily?  Yes   Does the patient have scales?  Yes   Daily weight interventions  Education provided on importance of daily weight   Is the patient able to teach back Heart Failure diet management?  Yes   Is the patient able to teach back Heart Failure Zones?  -- [green zone]   Is the patient able to teach back  signs and symptoms of worsening condition? (i.e. weight gain, shortness of air, etc.)  Yes    CHF Week 1 call completed?  Yes   Wrap up additional comments  Is wearing a monitor will mail it back next week , sleeping well with triology          Karie Islas, RN

## 2020-01-13 RX ORDER — IRBESARTAN 300 MG/1
TABLET ORAL
Qty: 90 TABLET | Refills: 0 | OUTPATIENT
Start: 2020-01-13

## 2020-01-20 ENCOUNTER — READMISSION MANAGEMENT (OUTPATIENT)
Dept: CALL CENTER | Facility: HOSPITAL | Age: 75
End: 2020-01-20

## 2020-01-20 NOTE — OUTREACH NOTE
CHF Week 2 Survey      Responses   Facility patient discharged from?  [place]   Does the patient have one of the following disease processes/diagnoses(primary or secondary)?  CHF   Week 2 attempt successful?  Yes   Call start time  1231   Call end time  1237   Discharge diagnosis  Acute on chronic diastolic CHF    Is patient permission given to speak with other caregiver?  Yes   List who call center can speak with  Alin, or Carmen Maciels reviewed with patient/caregiver?  Yes   Is the patient having any side effects they believe may be caused by any medication additions or changes?  No   Does the patient have all medications ordered at discharge?  Yes   Is the patient taking all medications as directed (includes completed medication regime)?  Yes   Does the patient have a primary care provider?   Yes   Has the patient kept scheduled appointments due by today?  Yes   Comments  Hospital Follow Up with Antoine Ayers MD, Wednesday Jan 29, 2020 11:30 AM   What is the Home health agency?   [facility] continued   Psychosocial issues?  No   Did the patient receive a copy of their discharge instructions?  Yes   Nursing interventions  Reviewed instructions with patient   What is the patient's perception of their health status since discharge?  Improving   Is the patient weighing daily?  No   Does the patient have scales?  Yes [Patient states that she has new scales but needs to get battery. ]   Daily weight interventions  Education provided on importance of daily weight [Advised to have HH weigh weekly until she gets battery for her scales. ]   Is the patient able to teach back Heart Failure diet management?  Yes   Is the patient able to teach back signs and symptoms of worsening condition? (i.e. weight gain, shortness of air, etc.)  Yes   Is the patient/caregiver able to teach back the hierarchy of who to call/visit for symptoms/problems? PCP, Specialist, Home health nurse, Urgent Care, ED, 911  Yes   CHF Week 2  call completed?  Yes          Elaine Oliver RN

## 2020-01-21 RX ORDER — ASPIRIN 325 MG
325 TABLET, DELAYED RELEASE (ENTERIC COATED) ORAL DAILY
Qty: 90 TABLET | Refills: 1 | Status: SHIPPED | OUTPATIENT
Start: 2020-01-21 | End: 2020-07-16

## 2020-01-21 RX ORDER — ASPIRIN 325 MG
TABLET, DELAYED RELEASE (ENTERIC COATED) ORAL
Qty: 90 TABLET | Refills: 0 | OUTPATIENT
Start: 2020-01-21

## 2020-01-22 ENCOUNTER — OFFICE VISIT (OUTPATIENT)
Dept: FAMILY MEDICINE CLINIC | Facility: CLINIC | Age: 75
End: 2020-01-22

## 2020-01-22 VITALS
RESPIRATION RATE: 16 BRPM | OXYGEN SATURATION: 93 % | WEIGHT: 266 LBS | DIASTOLIC BLOOD PRESSURE: 84 MMHG | HEIGHT: 60 IN | HEART RATE: 83 BPM | BODY MASS INDEX: 52.22 KG/M2 | TEMPERATURE: 97.7 F | SYSTOLIC BLOOD PRESSURE: 144 MMHG

## 2020-01-22 DIAGNOSIS — R79.89 ELEVATED SERUM CREATININE: Primary | ICD-10-CM

## 2020-01-22 DIAGNOSIS — M79.604 BILATERAL LEG PAIN: ICD-10-CM

## 2020-01-22 DIAGNOSIS — G63 POLYNEUROPATHY ASSOCIATED WITH UNDERLYING DISEASE (HCC): ICD-10-CM

## 2020-01-22 DIAGNOSIS — M79.605 BILATERAL LEG PAIN: ICD-10-CM

## 2020-01-22 DIAGNOSIS — D50.9 IRON DEFICIENCY ANEMIA, UNSPECIFIED IRON DEFICIENCY ANEMIA TYPE: ICD-10-CM

## 2020-01-22 DIAGNOSIS — E03.9 HYPOTHYROIDISM, UNSPECIFIED TYPE: ICD-10-CM

## 2020-01-22 PROCEDURE — 99213 OFFICE O/P EST LOW 20 MIN: CPT | Performed by: FAMILY MEDICINE

## 2020-01-22 RX ORDER — AMITRIPTYLINE HYDROCHLORIDE 50 MG/1
TABLET, FILM COATED ORAL
COMMUNITY
Start: 2019-12-23 | End: 2020-01-22

## 2020-01-22 RX ORDER — BUSPIRONE HYDROCHLORIDE 15 MG/1
TABLET ORAL
COMMUNITY
Start: 2019-12-23 | End: 2020-01-22

## 2020-01-22 RX ORDER — ALPRAZOLAM 1 MG/1
1 TABLET ORAL 2 TIMES DAILY
Status: ON HOLD | COMMUNITY
Start: 2020-01-12 | End: 2021-02-11 | Stop reason: SDUPTHER

## 2020-01-22 RX ORDER — TRAMADOL HYDROCHLORIDE 50 MG/1
50 TABLET ORAL EVERY 8 HOURS PRN
Qty: 30 TABLET | Refills: 2 | Status: SHIPPED | OUTPATIENT
Start: 2020-01-22 | End: 2020-03-04

## 2020-01-22 RX ORDER — ATENOLOL 50 MG/1
TABLET ORAL
COMMUNITY
Start: 2020-01-08 | End: 2020-01-22

## 2020-01-22 RX ORDER — GABAPENTIN 100 MG/1
100 CAPSULE ORAL EVERY 12 HOURS SCHEDULED
Qty: 60 CAPSULE | Refills: 2 | Status: SHIPPED | OUTPATIENT
Start: 2020-01-22 | End: 2020-04-17

## 2020-01-22 NOTE — PROGRESS NOTES
Subjective   Miguelina Lopez is a 74 y.o. female.     Chief Complaint   Patient presents with   • Follow-up     chf        History of Present Illness   Patient presents for follow up after hospitalization for CHF exacerbation with edema and OLI, managed with IV lasix. She was hospitalized on 12/15/19 and discharged early January after 3.5 weeks. During hospitalization was found to have acid/base derangements from OCHOA and obesity hypoventilation syndrome requiring BiPAP. Hospitalization was prolonged due to insurance not approving Trilogy for home use, which was ultimately approved after PFT's were consistent with COPD. She was also diagnosed with iron deficiency anemia (hg stable around 9.5) and started on iron supplement. End of hospitalization was complicated by asymptomatic bradycardia- atenolol was discontinued, she was given ZIO patch, with plan to follow up with cardiology in three weeks.  At her last office visit approximately 6 weeks ago, she weighed 290 lbs. Today she is 266 lbs (24 lbs less).     Home Health has been visiting patient twice a week.   She denies CP, SOB.   Her HR has been decreasing to 40's. She has been wearing a COREEN patch. She follows up with cardiologist next Wednesday and may need to have a pacemaker.     She is on fluid restriction- 1500ml/day and she is taking Lasix 40mg BID.   Her appetite comes and goes, but has improved since hospitalization.   She has been taking Iron supplement- one every other day to avoid constipation.   She was also officially diagnosed with neuropathy and has been started on neurontin twice a day which she states is very helpful for her.  She was started on levothyroxine as well during hospitalization for TSH of 10.1.   Past Medical History:   Diagnosis Date   • Anemia    • Anxiety    • Aortic valve stenosis    • CHF (congestive heart failure) (CMS/[facility])    • Chronic coronary artery disease    • Class 3 severe obesity due to excess calories in adult (CMS/[facility])     • Depression    • Diabetes mellitus (CMS/HCC)    • Heart murmur    • Mitral valve insufficiency    • Pneumonia     1/2016   • Pulmonary hypertension (CMS/[facility])     due to sleep disordered breathing   • Sleep apnea     Uses CPAP or oxygen   • Stage 3 chronic kidney disease (CMS/[facility])    • Supplemental oxygen dependent      Past Surgical History:   Procedure Laterality Date   • CARDIAC CATHETERIZATION     • CARDIAC CATHETERIZATION N/A 6/10/2016    Procedure: Left Heart Cath;  Surgeon: Chace Johnson MD;  Location: [facility] CATH INVASIVE LOCATION;  Service:    • CARDIAC CATHETERIZATION N/A 6/10/2016    Procedure: Right Heart Cath;  Surgeon: Chace Johnson MD;  Location:  [facility] CATH INVASIVE LOCATION;  Service:    • CORONARY ARTERY BYPASS GRAFT      2 vessel   • CORONARY ARTERY BYPASS GRAFT WITH MITRAL VALVE REPAIR/REPLACEMENT N/A 6/13/2016    Procedure: INTRAOPERATIVE TARIQ, MIDLINE STERNOTOMY, CORONARY ARTERY BYPASS GRAFTING X  2 UTILIZING ENDOSCOPICALLY HARVESTED LEFT GREATER SAPHENOUS VEIN, MITRAL VALVE REPLACEMENT AND TRICUSPID VALVE REPAIR;  Surgeon: Eliecer Mistry MD;  Location: [facility] OR;  Service:    • CORONARY STENT PLACEMENT  2010    Approx. 6 yrs ago at [facility]   • HEMORRHOIDECTOMY     • HYSTERECTOMY     • MITRAL VALVE REPLACEMENT     • REPLACEMENT TOTAL KNEE Right    • THYROID SURGERY      Cyst removed from thyroid     Social History     Tobacco Use   • Smoking status: Never Smoker   • Smokeless tobacco: Never Used   Substance Use Topics   • Alcohol use: No   • Drug use: No     Family History   Problem Relation Age of Onset   • Heart attack Father    • Heart disease Father        Review of Systems   Constitutional: Positive for fatigue. Negative for activity change, appetite change (Improved appetite), fever, unexpected weight gain and unexpected weight loss.   Respiratory: Negative for cough, chest tightness, shortness of breath and wheezing.    Cardiovascular: Positive for leg swelling  "(Improved significantly). Negative for chest pain and palpitations.   Gastrointestinal: Positive for constipation. Negative for blood in stool, diarrhea, nausea and vomiting.   Musculoskeletal: Positive for arthralgias (Tramadol helps her knee pain).       Objective   /84   Pulse 83   Temp 97.7 °F (36.5 °C) (Oral)   Resp 16   Ht 152.4 cm (60\")   Wt 121 kg (266 lb)   SpO2 93%   BMI 51.95 kg/m²     Physical Exam   Constitutional: She appears well-developed and well-nourished. No distress.   Pleasant, elderly, obese female. Accompanied by her daughter.   HENT:   Head: Normocephalic.   Right Ear: External ear normal.   Left Ear: External ear normal.   Mouth/Throat: Oropharynx is clear and moist.   Eyes: Pupils are equal, round, and reactive to light. Conjunctivae and EOM are normal.   Neck: Normal range of motion. Neck supple.   Cardiovascular: Normal rate, regular rhythm and normal heart sounds.   Legs wrapped bilaterally with clean, dry, ace wraps. Edema has significantly decreased.    Pulmonary/Chest: Effort normal and breath sounds normal. No stridor. No respiratory distress. She has no decreased breath sounds. She has no wheezes. She has no rhonchi. She has no rales.   Abdominal: Soft. Bowel sounds are normal.   Musculoskeletal: Normal range of motion.   Lymphadenopathy:     She has no cervical adenopathy.   Neurological: She is alert.   Skin: Skin is warm and dry. Capillary refill takes less than 2 seconds.   Psychiatric: She has a normal mood and affect.   Vitals reviewed.      Procedures    Assessment/Plan   Miguelina was seen today for follow-up.    Diagnoses and all orders for this visit:    Elevated serum creatinine  -     Comprehensive Metabolic Panel    Iron deficiency anemia, unspecified iron deficiency anemia type  -     CBC & Differential    Polyneuropathy associated with underlying disease (CMS/HCC)  -     gabapentin (NEURONTIN) 100 MG capsule; Take 1 capsule by mouth every 12 (Twelve) " hours.    Hypothyroidism, unspecified type  -     TSH    Bilateral leg pain  -     traMADol (ULTRAM) 50 MG tablet; Take 1 tablet by mouth Every 8 (Eight) Hours As Needed for Moderate Pain  (leg pain) for up to 90 doses.          I do not see that FOBT was done during hospitalization. I had ordered it previously along with iron studies just prior to patient's hospitalization.   Patient is stable and doing much better since prior to hospitalization. Will recheck lab work today.   Gabapentin and Tramadol refilled. Will plan to see her back in 3 months, sooner if needed.          Patient Instructions    Lab work ordered, will call with results  Gabapentin refilled  Tramadol refilled  Follow up with cardiologist as planned next week  Return to clinic in 3 months for recheck, sooner if needed

## 2020-01-22 NOTE — PATIENT INSTRUCTIONS
Patient Instructions    Lab work ordered, will call with results  Gabapentin refilled  Tramadol refilled  Follow up with cardiologist as planned next week  Return to clinic in 3 months for recheck, sooner if needed

## 2020-01-23 LAB
ALBUMIN SERPL-MCNC: 4.4 G/DL (ref 3.7–4.7)
ALBUMIN/GLOB SERPL: 1.5 {RATIO} (ref 1.2–2.2)
ALP SERPL-CCNC: 253 IU/L (ref 39–117)
ALT SERPL-CCNC: 9 IU/L (ref 0–32)
AST SERPL-CCNC: 18 IU/L (ref 0–40)
BASOPHILS # BLD AUTO: 0.1 X10E3/UL (ref 0–0.2)
BASOPHILS NFR BLD AUTO: 1 %
BILIRUB SERPL-MCNC: 0.5 MG/DL (ref 0–1.2)
BUN SERPL-MCNC: 21 MG/DL (ref 8–27)
BUN/CREAT SERPL: 17 (ref 12–28)
CALCIUM SERPL-MCNC: 9.4 MG/DL (ref 8.7–10.3)
CHLORIDE SERPL-SCNC: 100 MMOL/L (ref 96–106)
CO2 SERPL-SCNC: 27 MMOL/L (ref 20–29)
CREAT SERPL-MCNC: 1.24 MG/DL (ref 0.57–1)
EOSINOPHIL # BLD AUTO: 0.2 X10E3/UL (ref 0–0.4)
EOSINOPHIL NFR BLD AUTO: 2 %
ERYTHROCYTE [DISTWIDTH] IN BLOOD BY AUTOMATED COUNT: 14.4 % (ref 11.7–15.4)
GLOBULIN SER CALC-MCNC: 3 G/DL (ref 1.5–4.5)
GLUCOSE SERPL-MCNC: 105 MG/DL (ref 65–99)
HCT VFR BLD AUTO: 36.5 % (ref 34–46.6)
HGB BLD-MCNC: 11.3 G/DL (ref 11.1–15.9)
IMM GRANULOCYTES # BLD AUTO: 0.1 X10E3/UL (ref 0–0.1)
IMM GRANULOCYTES NFR BLD AUTO: 1 %
LYMPHOCYTES # BLD AUTO: 1.2 X10E3/UL (ref 0.7–3.1)
LYMPHOCYTES NFR BLD AUTO: 12 %
MCH RBC QN AUTO: 25.6 PG (ref 26.6–33)
MCHC RBC AUTO-ENTMCNC: 31 G/DL (ref 31.5–35.7)
MCV RBC AUTO: 83 FL (ref 79–97)
MONOCYTES # BLD AUTO: 0.5 X10E3/UL (ref 0.1–0.9)
MONOCYTES NFR BLD AUTO: 5 %
NEUTROPHILS # BLD AUTO: 8.1 X10E3/UL (ref 1.4–7)
NEUTROPHILS NFR BLD AUTO: 79 %
PLATELET # BLD AUTO: 260 X10E3/UL (ref 150–450)
POTASSIUM SERPL-SCNC: 4 MMOL/L (ref 3.5–5.2)
PROT SERPL-MCNC: 7.4 G/DL (ref 6–8.5)
RBC # BLD AUTO: 4.42 X10E6/UL (ref 3.77–5.28)
SODIUM SERPL-SCNC: 148 MMOL/L (ref 134–144)
TSH SERPL DL<=0.005 MIU/L-ACNC: 3.46 UIU/ML (ref 0.45–4.5)
WBC # BLD AUTO: 10.1 X10E3/UL (ref 3.4–10.8)

## 2020-01-27 ENCOUNTER — READMISSION MANAGEMENT (OUTPATIENT)
Dept: CALL CENTER | Facility: HOSPITAL | Age: 75
End: 2020-01-27

## 2020-01-27 NOTE — OUTREACH NOTE
CHF Week 3 Survey      Responses   Facility patient discharged from?  [place]   Does the patient have one of the following disease processes/diagnoses(primary or secondary)?  CHF   Week 3 attempt successful?  Yes   Call start time  1613   Call end time  1615   Meds reviewed with patient/caregiver?  Yes   Is the patient taking all medications as directed (includes completed medication regime)?  Yes   Has the patient kept scheduled appointments due by today?  Yes   What is the patient's perception of their health status since discharge?  Improving   Is the patient weighing daily?  No [She is weighing weekly.]   Daily weight interventions  Education provided on importance of daily weight   Is the patient able to teach back Heart Failure Zones?  Yes   CHF Week 3 call completed?  Yes          Zenaida Harris, RN

## 2020-01-28 ENCOUNTER — TELEPHONE (OUTPATIENT)
Dept: FAMILY MEDICINE CLINIC | Facility: CLINIC | Age: 75
End: 2020-01-28

## 2020-01-29 DIAGNOSIS — R74.8 ELEVATED ALKALINE PHOSPHATASE LEVEL: Primary | ICD-10-CM

## 2020-01-29 NOTE — PROGRESS NOTES
Spoke with patient regarding lab results. Her Cr has returned to baseline. Anemia has resolved. Alk phos has increased- looking back at her previous lab work, it appears alk phos has been increased since 8/2018 and is now higher at 253. Patient denies RUQ pain or significant bone pain. The elevation may be medication related- will review patient's medications. Will check lab work including GGT and 5' nucleotidase. Patient is in agreement with this plan.

## 2020-02-03 ENCOUNTER — RESULTS ENCOUNTER (OUTPATIENT)
Dept: FAMILY MEDICINE CLINIC | Facility: CLINIC | Age: 75
End: 2020-02-03

## 2020-02-03 ENCOUNTER — READMISSION MANAGEMENT (OUTPATIENT)
Dept: CALL CENTER | Facility: HOSPITAL | Age: 75
End: 2020-02-03

## 2020-02-03 DIAGNOSIS — R74.8 ELEVATED ALKALINE PHOSPHATASE LEVEL: ICD-10-CM

## 2020-02-03 NOTE — OUTREACH NOTE
CHF Week 4 Survey      Responses   Facility patient discharged from?  [place]   Does the patient have one of the following disease processes/diagnoses(primary or secondary)?  CHF   Week 4 attempt successful?  Yes   Call start time  1524   Call end time  1525   Discharge diagnosis  Acute on chronic diastolic CHF    Meds reviewed with patient/caregiver?  Yes   Is the patient taking all medications as directed (includes completed medication regime)?  Yes   Has the patient kept scheduled appointments due by today?  Yes   Is the patient still receiving Home Health Services?  Yes   What is the patient's perception of their health status since discharge?  New symptoms unrelated to diagnosis   Is the patient weighing daily?  Yes   Is the patient able to teach back Heart Failure Zones?  Yes   Week 4 Call Completed?  Yes   Would the patient like one additional call?  No   Graduated  Yes   Was the number of calls appropriate?  Yes          Roseline Walsh RN

## 2020-02-05 ENCOUNTER — TELEPHONE (OUTPATIENT)
Dept: FAMILY MEDICINE CLINIC | Facility: CLINIC | Age: 75
End: 2020-02-05

## 2020-02-06 RX ORDER — LOSARTAN POTASSIUM 100 MG/1
100 TABLET ORAL
Qty: 30 TABLET | Refills: 5 | Status: SHIPPED | OUTPATIENT
Start: 2020-02-06 | End: 2020-06-03

## 2020-02-06 RX ORDER — LEVOTHYROXINE SODIUM 0.03 MG/1
25 TABLET ORAL DAILY
Qty: 30 TABLET | Refills: 5 | Status: SHIPPED | OUTPATIENT
Start: 2020-02-06 | End: 2020-08-03

## 2020-02-06 RX ORDER — ATORVASTATIN CALCIUM 20 MG/1
TABLET, FILM COATED ORAL
Qty: 90 TABLET | Refills: 0 | OUTPATIENT
Start: 2020-02-06

## 2020-03-04 ENCOUNTER — OFFICE VISIT (OUTPATIENT)
Dept: CARDIOLOGY | Facility: CLINIC | Age: 75
End: 2020-03-04

## 2020-03-04 VITALS
BODY MASS INDEX: 50.58 KG/M2 | HEIGHT: 60 IN | SYSTOLIC BLOOD PRESSURE: 134 MMHG | HEART RATE: 56 BPM | OXYGEN SATURATION: 97 % | DIASTOLIC BLOOD PRESSURE: 76 MMHG | WEIGHT: 257.6 LBS

## 2020-03-04 DIAGNOSIS — I44.1 AV BLOCK, 2ND DEGREE: ICD-10-CM

## 2020-03-04 DIAGNOSIS — E03.8 SUBCLINICAL HYPOTHYROIDISM: ICD-10-CM

## 2020-03-04 DIAGNOSIS — I25.10 CHRONIC CORONARY ARTERY DISEASE: ICD-10-CM

## 2020-03-04 DIAGNOSIS — E66.01 CLASS 3 SEVERE OBESITY DUE TO EXCESS CALORIES WITH SERIOUS COMORBIDITY AND BODY MASS INDEX (BMI) OF 50.0 TO 59.9 IN ADULT (HCC): ICD-10-CM

## 2020-03-04 DIAGNOSIS — I34.0 MITRAL VALVE INSUFFICIENCY, UNSPECIFIED ETIOLOGY: ICD-10-CM

## 2020-03-04 DIAGNOSIS — E11.42 DIABETIC PERIPHERAL NEUROPATHY (HCC): ICD-10-CM

## 2020-03-04 DIAGNOSIS — I44.0 1ST DEGREE AV BLOCK: ICD-10-CM

## 2020-03-04 DIAGNOSIS — I50.33 ACUTE ON CHRONIC DIASTOLIC CHF (CONGESTIVE HEART FAILURE) (HCC): Chronic | ICD-10-CM

## 2020-03-04 DIAGNOSIS — I10 ESSENTIAL HYPERTENSION: ICD-10-CM

## 2020-03-04 DIAGNOSIS — I35.0 AORTIC VALVE STENOSIS, ETIOLOGY OF CARDIAC VALVE DISEASE UNSPECIFIED: ICD-10-CM

## 2020-03-04 DIAGNOSIS — G47.8 PULMONARY HYPERTENSION DUE TO SLEEP-DISORDERED BREATHING (HCC): ICD-10-CM

## 2020-03-04 DIAGNOSIS — I50.812 CHRONIC RIGHT-SIDED CONGESTIVE HEART FAILURE (HCC): Primary | ICD-10-CM

## 2020-03-04 DIAGNOSIS — J96.11 CHRONIC RESPIRATORY FAILURE WITH HYPOXIA AND HYPERCAPNIA (HCC): Chronic | ICD-10-CM

## 2020-03-04 DIAGNOSIS — E78.2 MIXED HYPERLIPIDEMIA: ICD-10-CM

## 2020-03-04 DIAGNOSIS — J96.12 CHRONIC RESPIRATORY FAILURE WITH HYPOXIA AND HYPERCAPNIA (HCC): Chronic | ICD-10-CM

## 2020-03-04 DIAGNOSIS — Z95.2 HISTORY OF MVR WITH CARDIOPULMONARY BYPASS: ICD-10-CM

## 2020-03-04 DIAGNOSIS — I27.29 PULMONARY HYPERTENSION DUE TO SLEEP-DISORDERED BREATHING (HCC): ICD-10-CM

## 2020-03-04 PROCEDURE — 99215 OFFICE O/P EST HI 40 MIN: CPT | Performed by: NURSE PRACTITIONER

## 2020-03-04 PROCEDURE — 93000 ELECTROCARDIOGRAM COMPLETE: CPT | Performed by: NURSE PRACTITIONER

## 2020-03-04 NOTE — PROGRESS NOTES
Date of Office Visit: 2020  Encounter Provider: ZOILA Ryan  Place of Service: [facility] CARDIOLOGY  Patient Name: Miguelina Lopez  :1945  Primary Cardiologist: Dr. Ayers    Subjective:     Chief Complaint: Hospital follow up, right heart/diastolic HF, CAD, valvular disease      History of Present Illness:  Miguelina Lopez is a pleasant 74 y.o. female who is know to me.  Outside records have been obtained and reviewed by me.     This is a patient of Dr. Ayers with coronary artery disease s/p CABG, valvular heart disease s/p mitral valve replacement with tissue prosthesis and tricuspid valve repair, hypertension, diabetes, hyperlipidemia, morbid obesity, OCHOA,  oxygen dependent, pulmonary hypertension and renal insufficiency.    The patient was first seen by Dr. Ayers on 18 for consultation for lower extremity edema.  She has been followed by Dr. Johnson in the past but prior to for seeing Dr. Ayers had not had cardiac follow-up in over 2 years.  She is on the left coronary disease for which she underwent CABG, mitral regurgitation with a mitral valve replacement with tissue prosthesis and a tricuspid valve repair.  She hadn't had any cardiac follow-up in over 2 years.  She was referred back in 2018 for evaluation of progressive lower extremity edema.,  Chronic shortness of breath orthopnea and fatigue.  Repeat echo was ordered done in 2018 showing severe septal hypokinesis, low normal LV function with EF 46 to 50%, moderate to severely dilated left atrial cavity size, mild aortic stenosis, severe nodule posterior mitral valve calcification, mild to moderate tricuspid regurgitation through tricuspid valve repair, elevated gradients across her bioprosthetic mitral valve peak gradient 24 mmHg, mean gradient 7 mmHg, moderate pulmonary hypertension with normal RV systolic function and moderately dilated RV cavity size. He adjusted  diuretics although there was confusion and she had reaction to Bumex as well as HCTZ so Lasix was resumed and increased frequency.     I saw her for the first time January 2019 after she canceled appointments in September 2018, October 2018, and December 2018.  She was doing pretty well on Lasix 20 mg twice a day and her weight was down and edema was improved.  She had chronic shortness of breath, orthopnea and fatigue but was not worsening.  She had no chest pain, palpitations, syncope, near syncope dizziness/lightheadedness.  She admitted to noncompliance with her CPAP machine.  She also was not checking her blood pressure at home although it was elevated in the office that day and she was advised to do monitor b/p at home and resume CPAP therapy.  She called shortly after and required increasing her atenolol to twice daily for hypertension.    She presented to the ED on 6/10/2019 with heart failure and worsening bilateral lower extremity edema and weight gain ongoing for 3 months.  Negative troponin, elevated BNP 15,016.  Creatinine 1.51.  X-ray showed moderate cardiomegaly but no acute processes she was given IV Lasix and admitted.  She diuresed well but diuretics had to be cut back d/t kidney function. Repeat echo was ordered which was done on 6/12/2019 and showed normal LV systolic function with EF of 53%, LV diastolic function was indeterminate, septal hypokinesis, severely increased left atrial volume, severely dilated right atrial cavity, mild aortic stenosis aortic valve area 1.3 cm², mean pressure gradient 17 there was some abnormalities of prosthetic mitral valve with mild perivalvular regurgitation and mean gradient increased at 7 mmHg, moderate tricuspid valve regurgitation with previous tricuspid valve repair and moderate pulmonary hypertension with RVSP of 53 mmHg (which were similar findings to her previous echo).  Hypertension remained an issue so meds were adjusted. She was discharged on 6/14/2019  amlodipine 10 mg daily was added to her medical regimen, in addition to Avapro 300 mg nightly, furosemide 40 mg twice daily, clonidine 0.2 mg twice daily.    07/1/2019 I last saw her in the office for a hospital follow-up appointment.  Her weight was down about 23 pounds and her lower extremity edema was some better.  She had no chest pain.  Her palpitations had improved.  She had chronic dyspnea on exertion and orthopnea that was not changed.  She had issues with intermittent occasional productive cough without fevers.  She had a rash on her lower extremities that she developed in the hospital that was improving with the topical cream.  She was not having nocturnal dyspnea.  They were having trouble monitoring her blood pressure at home with her home blood pressure cuff.  I offered them a blood pressure check but they wanted to take it into her upcoming appointment with her PCP.  Unfortunately she had still not been using her CPAP machine.  I referred her to sleep medicine to guide to get her sleep apnea appropriately treated especially with her right-sided heart failure. She had a new left bundle branch block so stress test was ordered and performed on 7/15/2019 which showed no evidence of ischemia some GI artifact was present and her EF was 55%.     8/1/2019 patient followed up with Dr. Ayers in the office.  She felt slightly better but had chronic exertional dyspnea and was on oxygen continuously.  She had severe edema as well as easy fatigability and her weight was up about 10 pounds since her last visit.  Dr. Ayers made no changes to her regimen for chronic right sided congestive heart failure and wanted her to see me back in about 4 months.    She was a no-show to her 12/5/2019 appointment with me.  She ended up getting admitted from 12/15/2019-1/8/2020.  She had a prolonged hospital stay requiring aggressive diuresis with Lasix drip she also had some renal dysfunction and nephrology was stabilizing her  fluid status with fluid restriction she was eventually able to be transitioned on oral diuretics.  She was found to have acid-base derangements due to obstructive sleep apnea and obesity hypoventilation syndrome required BiPAP for correction of that.  Pulmonology followed with her very closely and did not feel comfortable sending her out of the hospital without use of trilogy home ventilator.  She eventually had pulmonary function tests that were consistent with COPD and was eventually approved for trilogy home ventilator.  Towards the end of her hospitalization she was noted to be bradycardic.  It was felt that she was developing sick sinus syndrome.  Her atenolol was stopped.  She had a ZIO placed placed at discharge.  She was supposed to follow-up in the office with me in 3 weeks but this did not happen.    12/16/2019 patient had a repeat echocardiogram which showed her EF was 67% calculated but estimated appear to be 56 to 60% she had normal LV cavity size, mild to moderate concentric LVH, flattening of interventricular septum consistent with right ventricular pressure overload, indeterminate diastolic function, mild to moderately dilated right ventricle, dilated left atrial cavity size, mildly dilated right atrial cavity size, moderate thickening of the aortic valve with moderate aortic valve stenosis present, bioprosthetic mitral valve with elevated gradient and mild to moderate perivalvular regurgitation, mild to moderate tricuspid valve regurgitation with tricuspid valve ring and severe pulmonary hypertension with an RVSP of 57.3 mmHg mild to moderate pulmonic regurgitation.She had a normal bilateral lower extremity venous duplex at that time as well and a bilateral renal ultrasound was reportedly negative.    1/8/2020 patient wore a Holter monitor.  She was found to have rare PACs and PVCs, underlying rhythm was sinus rhythm with first-degree AV block and bundle branch block at baseline with occasional  second-degree Mobitz 1 AV block and occasional 2-1 AV block documented with no pauses greater than 3 seconds.  Dr. Ayers did not feel she needed a pacemaker at this time.    She is presenting today for hospital follow-up. She is present today with her granddaughter.  She reports she has overall been doing okay.  She continues to experience chronic dyspnea on exertion that is better if she uses her oxygen.  She has intermittent chest pain with exertion that is worse if she forgets to wear oxygen.  She will have to take a sublingual nitroglycerin 1 tablet max a couple times a week.  She said she over the last 6 months she is new lease started having to take nitroglycerin but has not progressed in the last month or 2.  She has been compliant with [facility] home ventilator.  Home health nurse has signed off this past week.  She is not measuring her blood pressure at home or heart rate and she does not know how to do it but has been pretty stable reportedly with home health since her hospital discharge.  She reports her weight has been stable and only fluctuates a pound up or down since hospital discharge.  Her lower extremity edema though still present is greatly improved.  She intermittently wraps her legs with compression wraps which helps.  She denies dizziness, lightheadedness, syncope, near syncope.    Past Medical History:   Diagnosis Date   • Acute on chronic respiratory failure with hypoxia and hypercapnia (CMS/[facility])    • OLI (acute kidney injury) (CMS/[facility])    • Anemia    • Anxiety    • Aortic valve stenosis    • Bilateral lower extremity edema    • CHF (congestive heart failure) (CMS/[facility])    • Chronic coronary artery disease    • Class 3 severe obesity due to excess calories in adult (CMS/[facility])    • Depression    • Diabetes mellitus (CMS/[facility])    • Heart murmur    • Hypertension    • Mitral valve insufficiency    • Pneumonia     1/2016   • Pulmonary hypertension (CMS/[facility])     due to sleep disordered breathing   •  Sleep apnea     Uses CPAP or oxygen   • Stage 3 chronic kidney disease (CMS/HCC)    • Subclinical hypothyroidism    • Supplemental oxygen dependent    • Valvular heart disease      Past Surgical History:   Procedure Laterality Date   • CARDIAC CATHETERIZATION     • CARDIAC CATHETERIZATION N/A 6/10/2016    Procedure: Left Heart Cath;  Surgeon: Chace Johnson MD;  Location: [facility] CATH INVASIVE LOCATION;  Service:    • CARDIAC CATHETERIZATION N/A 6/10/2016    Procedure: Right Heart Cath;  Surgeon: Chace Johnson MD;  Location: [facility] CATH INVASIVE LOCATION;  Service:    • CORONARY ARTERY BYPASS GRAFT      2 vessel   • CORONARY ARTERY BYPASS GRAFT WITH MITRAL VALVE REPAIR/REPLACEMENT N/A 6/13/2016    Procedure: INTRAOPERATIVE TARIQ, MIDLINE STERNOTOMY, CORONARY ARTERY BYPASS GRAFTING X  2 UTILIZING ENDOSCOPICALLY HARVESTED LEFT GREATER SAPHENOUS VEIN, MITRAL VALVE REPLACEMENT AND TRICUSPID VALVE REPAIR;  Surgeon: Eliecer Mistry MD;  Location: [facility] MAIN OR;  Service:    • CORONARY STENT PLACEMENT  2010    Approx. 6 yrs ago at [facility]   • HEMORRHOIDECTOMY     • HYSTERECTOMY     • MITRAL VALVE REPLACEMENT     • REPLACEMENT TOTAL KNEE Right    • THYROID SURGERY      Cyst removed from thyroid     Outpatient Medications Prior to Visit   Medication Sig Dispense Refill   • albuterol sulfate HFA (VENTOLIN HFA) 108 (90 Base) MCG/ACT inhaler Inhale 2 puffs Every 6 (Six) Hours As Needed for Wheezing. 18 g 0   • ALPRAZolam (XANAX) 1 MG tablet Take 0.5 mg by mouth At Night As Needed.     • aspirin  MG tablet Take 1 tablet by mouth Daily. 90 tablet 1   • atorvastatin (LIPITOR) 20 MG tablet TAKE ONE TABLET BY MOUTH DAILY 90 tablet 0   • clotrimazole-betamethasone (LOTRISONE) 1-0.05 % cream Apply  topically to the appropriate area as directed 2 (Two) Times a Day. 45 g 0   • Dulaglutide 0.75 MG/0.5ML solution pen-injector INJECT 0.75 MG UNDER THE SKIN ONCE WEEKLY 2 pen 0   • ferrous sulfate 324 (65 FE) MG tablet  delayed-release EC tablet Take 324 mg by mouth Daily With Breakfast.     • fluticasone-salmeterol (ADVAIR DISKUS) 250-50 MCG/DOSE DISKUS Inhale 1 puff Daily As Needed (cough and wheezing). 60 each 0   • furosemide (LASIX) 40 MG tablet TAKE ONE TABLET BY MOUTH TWICE A DAY 60 tablet 0   • gabapentin (NEURONTIN) 100 MG capsule Take 1 capsule by mouth every 12 (Twelve) hours. 60 capsule 2   • glimepiride (AMARYL) 1 MG tablet TAKE ONE TABLET BY MOUTH EVERY MORNING BEFORE BREAKFAST 90 tablet 0   • ipratropium-albuterol (DUO-NEB) 0.5-2.5 mg/mL nebulizer Take 3 mL by nebulization 4 (four) times a day. (Patient taking differently: Take 3 mL by nebulization Daily As Needed.) 3 mL 5   • levothyroxine (SYNTHROID, LEVOTHROID) 25 MCG tablet Take 1 tablet by mouth daily for 30 days. 30 tablet 5   • losartan (COZAAR) 100 MG tablet Take 1 tablet by mouth daily for 30 days. 30 tablet 5   • Misc. Devices (COMMODE BEDSIDE) misc 1 each Daily. 1 each 0   • nitroglycerin (NITROSTAT) 0.4 MG SL tablet Place 1 tablet under the tongue As Needed for Chest Pain.  - IF PAIN REMAINS AFTER 5 MIN CALL 911 AND REPEAT DOSE MAX 3 TABS IN 15 MINUTES 25 tablet 5   • nystatin (MYCOSTATIN) 797295 UNIT/GM cream Apply  topically to the appropriate area as directed 2 (Two) Times a Day. to affected area(s) 30 g 3   • O2 (OXYGEN) Inhale 2.5 L/min Continuous.     • omeprazole (priLOSEC) 40 MG capsule TAKE ONE CAPSULE BY MOUTH DAILY 30 capsule 5   • Probiotic Product (PROBIOTIC PO) Take  by mouth.     • senna-docusate (PERICOLACE) 8.6-50 MG per tablet Take 1 tablet by mouth daily.     • vilazodone (VIIBRYD) 20 MG tablet tablet Take 20 mg by mouth Every Night.     • traMADol (ULTRAM) 50 MG tablet Take 1 tablet by mouth Every 8 (Eight) Hours As Needed for Moderate Pain  (leg pain) for up to 90 doses. 30 tablet 2     No facility-administered medications prior to visit.        Allergies as of 03/04/2020 - Reviewed 03/04/2020   Allergen Reaction Noted   • Coumadin  [warfarin sodium] Diarrhea and Nausea Only 06/12/2016   • Bumex [bumetanide] Swelling 08/28/2018   • Erythromycin  07/29/2016   • Hctz [hydrochlorothiazide] Swelling 08/28/2018   • Penicillins  02/18/2016   • Sulfa antibiotics  02/18/2016   • Zaroxolyn [metolazone] Diarrhea 05/02/2019     Social History     Socioeconomic History   • Marital status:      Spouse name: Not on file   • Number of children: Not on file   • Years of education: Not on file   • Highest education level: Not on file   Tobacco Use   • Smoking status: Never Smoker   • Smokeless tobacco: Never Used   • Tobacco comment: no caffeine    Substance and Sexual Activity   • Alcohol use: No   • Drug use: No   • Sexual activity: Defer     Family History   Problem Relation Age of Onset   • Heart attack Father    • Heart disease Father      Review of Systems   Constitution: Positive for malaise/fatigue. Negative for chills and fever.   HENT: Positive for nosebleeds. Negative for ear pain, hearing loss and sore throat.    Eyes: Positive for double vision. Negative for pain, vision loss in left eye and vision loss in right eye.   Cardiovascular: Positive for chest pain, dyspnea on exertion and leg swelling. Negative for irregular heartbeat, near-syncope, orthopnea, palpitations, paroxysmal nocturnal dyspnea and syncope.        Pain in legs with walking   Respiratory: Positive for shortness of breath and wheezing. Negative for cough and snoring.    Endocrine: Positive for heat intolerance. Negative for cold intolerance and polyuria.   Hematologic/Lymphatic: Negative for bleeding problem.   Skin: Positive for rash. Negative for color change, itching and unusual hair distribution.   Musculoskeletal: Positive for joint pain, joint swelling and myalgias.   Gastrointestinal: Positive for nausea. Negative for abdominal pain, diarrhea, hematochezia, melena and vomiting.   Genitourinary: Negative for decreased libido, frequency, hematuria, hesitancy and  "incomplete emptying.   Neurological: Positive for excessive daytime sleepiness. Negative for dizziness, headaches, light-headedness, loss of balance, numbness, paresthesias and seizures.   Psychiatric/Behavioral: Positive for depression. The patient is nervous/anxious.           Objective:     Vitals:    03/04/20 1344 03/04/20 1452   BP: 134/76    BP Location: Right arm    Patient Position: Sitting    Cuff Size: Large Adult    Pulse: 56    SpO2: 97% 97%   Weight: 117 kg (257 lb 9.6 oz)    Height: 152.4 cm (60\")      Body mass index is 50.31 kg/m².    PHYSICAL EXAM:  Physical Exam   Constitutional: She is oriented to person, place, and time. She appears well-developed and well-nourished. No distress. Nasal cannula in place.   Morbidly obese   HENT:   Head: Normocephalic and atraumatic.   Mouth/Throat: Oral lesions: tip of tongue    Eyes: Pupils are equal, round, and reactive to light. Conjunctivae and EOM are normal.   Wears glasses   Neck: Trachea normal. Carotid bruit is not present.   Difficult to assess JVD d/t body habitus   Cardiovascular: Normal rate, regular rhythm and intact distal pulses.   Murmur heard.   Harsh midsystolic murmur is present with a grade of 3/6 at the upper right sternal border radiating to the neck.  Pulses:       Radial pulses are 2+ on the right side, and 2+ on the left side.        Dorsalis pedis pulses are 2+ on the right side, and 2+ on the left side.        Posterior tibial pulses are 2+ on the right side, and 2+ on the left side.   1 + BLE left > right   Pulmonary/Chest: Effort normal and breath sounds normal. No accessory muscle usage. No tachypnea. No respiratory distress. She has no decreased breath sounds. She has no wheezes. She has no rhonchi. She has no rales. She exhibits no tenderness.   Abdominal: Soft. Bowel sounds are normal. She exhibits no distension. There is no tenderness. There is no rebound and no guarding.   Obese   Neurological: She is alert and oriented to " person, place, and time.   Skin: Skin is warm, dry and intact. No rash (bilateral shin, faint) noted. She is not diaphoretic. No erythema.   Psychiatric: She has a normal mood and affect. Her speech is normal and behavior is normal. Judgment and thought content normal. Cognition and memory are normal.   Nursing note and vitals reviewed.        ECG 12 Lead  Date/Time: 3/4/2020 2:05 PM  Performed by: Adri Ackerman APRN  Authorized by: Adri Ackerman APRN   Comparison: compared with previous ECG from 1/7/2020  Similar to previous ECG  Rhythm comments: ectopic atrial rhythm  Rate: normal  BPM: 68  Conduction: right bundle branch block and 2nd degree AV block (Mobitz 1)  Other findings: non-specific ST-T wave changes    Clinical impression: abnormal EKG  Comments: Indication: CAD, chronic right sided/diastolic heart failure, intermittent second degree AVB Mobitz 1 and 2-1 AV block, pulm HTN, intermittent chest pain, chronic RAPHAEL            Assessment:       Diagnosis Plan   1. Chronic right-sided congestive heart failure (CMS/HCC)     2. Acute on chronic diastolic CHF (congestive heart failure) (CMS/HCC)     3. Chronic coronary artery disease     4. Aortic valve stenosis, etiology of cardiac valve disease unspecified     5. Mitral valve insufficiency, unspecified etiology     6. Pulmonary hypertension due to sleep-disordered breathing (CMS/HCC)     7. Chronic respiratory failure with hypoxia and hypercapnia (CMS/HCC)     8. Essential hypertension     9. Mixed hyperlipidemia     10. Diabetic peripheral neuropathy (CMS/HCC)     11. Subclinical hypothyroidism     12. Class 3 severe obesity due to excess calories with serious comorbidity and body mass index (BMI) of 50.0 to 59.9 in adult (CMS/HCC)     13. s/p MVR, TV-repair, CABG x2 6/13/16     14. 1st degree AV block     15. AV block, 2nd degree         Plan:     This is a patient with multiple comorbidities and medical problems.    1.  Acute on chronic diastolic and  right sided heart failure: Reported Allergies to Metolazone, Bumex and HCTZ. On lasix and overall appears to be more stable than in the past.     2.  Pulmonary hypertension: Follows with pulmonology now but has not made a hospital follow up appt. She was advised and given the pulmonary office phone number to call to make appt..  She has severe underlying lung disease.  RVSP 57 mmHg and mild to moderate pulmonic [id number]/2019 echo.    3.  Obstructive sleep apnea: This had not been well treated in the past.  She was sent home on trilogy home ventilator after her last admission January 2020.     4.  Coronary artery disease: Status post CABG in 2016, 7/2019 had a stress test that was negative for ischemia.  On aspirin and statin. December 2019 lipid panel under good control    5.  Mitral regurgitation: Status post mitral valve replacement with tissue prosthesis 2016, repeat echo on 12/2019 showed bioprosthetic mitral valve with elevated gradient and mild to moderate perivalvular regurgitation    6.  Tricuspid regurgitation: Status post repair 2016, 12/2019 echo showed mild to moderate tricuspid regurgitation . Continue to monitor    7.  OLI and Chronic kidney disease: Nephrology followed her in the hospital during her 12/2019 admission.  She had a bilateral renal ultrasound that was negative.  She was advised to follow-up with nephrology as an outpatient but has not done so yet. She was advised and given the nephrology office phone number to call to make appt.    8.  Diabetes mellitus, type II: Managed by PCP. Hemoglobin A1c at goal 6.46% 12/2019    9.  Morbid obesity: BMI 50.3. We discussed this is a major health concern for the patient.     10. Aortic stenosis: Mild on 6/2019 echo mean gradient 17 mmHg, COREY 1.3cm2,now moderate on 12/2019 echo with COREY 0.79 cm2, mean pressure at 34 mmHg, max pressure gradient 51 mmHg    11. Hypertension: Blood pressure is stable today.    12.  First-degree AV block with left  bundle branch block    13.  Intermittent 2-1 AV block and Mobitz type I: This was on January 2020 Dante and present on today's ECG.  Reviewed by Dr. Ayers who did not feel that she met criteria for pacemaker.  She was already discontinued off her beta-blocker for bradycardia.     14.  Iron deficiency anemia: On iron supplement.  Defer.    15.  Elevated alkaline phosphatase level: Work-up ordered per PCP.      I spent over 40 minutes with the patient discussing her conditions and plan of care.  She started requiring nitroglycerin about 6 months ago.  It is not progressed in the last month or 2 and symptoms typically resolve with 1 sublingual nitroglycerin and she is averaging  2-3 nitroglycerin per week.  We discussed the possibility of proceeding with right and left cardiac catheterization and the risks versus benefits were discussed at great length.  Since her symptoms have not progressed in the last month or 2 she is going to keep an eye on her symptoms pay close attention to how frequently she is having to use nitroglycerin and will call if her symptoms progress between now and her next follow-up visit with Dr. Ayers at which time we will possibly proceed with right and left cardiac catheterization to determine if her symptoms are caused by her coronary disease or possible worsening of her aortic stenosis.    Follow up with Dr. Ayers in 2-3 months, unless otherwise needed sooner.  I advised the patient to contact our office with any questions or concerns.     I have reviewed this case with Dr. Ayers. It has been a pleasure to participate in this patient's care. Please feel free to contact me with any questions or concerns.     ZOILA Ryan  03/04/2020          Your medication list           Accurate as of March 4, 2020  3:20 PM. If you have any questions, ask your nurse or doctor.               CHANGE how you take these medications      Instructions Last Dose Given Next Dose Due      ipratropium-albuterol 0.5-2.5 mg/3 ml nebulizer  Commonly known as:  DUO-NEB  What changed:    · when to take this  · reasons to take this      Take 3 mL by nebulization 4 (four) times a day.          CONTINUE taking these medications      Instructions Last Dose Given Next Dose Due   albuterol sulfate  (90 Base) MCG/ACT inhaler  Commonly known as:  VENTOLIN HFA      Inhale 2 puffs Every 6 (Six) Hours As Needed for Wheezing.       ALPRAZolam 1 MG tablet  Commonly known as:  XANAX      Take 0.5 mg by mouth At Night As Needed.       aspirin  MG tablet      Take 1 tablet by mouth Daily.       atorvastatin 20 MG tablet  Commonly known as:  LIPITOR      TAKE ONE TABLET BY MOUTH DAILY       clotrimazole-betamethasone 1-0.05 % cream  Commonly known as:  LOTRISONE      Apply  topically to the appropriate area as directed 2 (Two) Times a Day.       Commode Bedside misc      1 each Daily.       Dulaglutide 0.75 MG/0.5ML solution pen-injector      INJECT 0.75 MG UNDER THE SKIN ONCE WEEKLY       ferrous sulfate 324 (65 Fe) MG tablet delayed-release EC tablet      Take 324 mg by mouth Daily With Breakfast.       fluticasone-salmeterol 250-50 MCG/DOSE DISKUS  Commonly known as:  ADVAIR DISKUS      Inhale 1 puff Daily As Needed (cough and wheezing).       furosemide 40 MG tablet  Commonly known as:  LASIX      TAKE ONE TABLET BY MOUTH TWICE A DAY       gabapentin 100 MG capsule  Commonly known as:  NEURONTIN      Take 1 capsule by mouth every 12 (Twelve) hours.       glimepiride 1 MG tablet  Commonly known as:  AMARYL      TAKE ONE TABLET BY MOUTH EVERY MORNING BEFORE BREAKFAST       levothyroxine 25 MCG tablet  Commonly known as:  SYNTHROID, LEVOTHROID      Take 1 tablet by mouth daily for 30 days.       losartan 100 MG tablet  Commonly known as:  COZAAR      Take 1 tablet by mouth daily for 30 days.       nitroglycerin 0.4 MG SL tablet  Commonly known as:  NITROSTAT      Place 1 tablet under the tongue As Needed  for Chest Pain.  - IF PAIN REMAINS AFTER 5 MIN CALL 911 AND REPEAT DOSE MAX 3 TABS IN 15 MINUTES       nystatin 488026 UNIT/GM cream  Commonly known as:  MYCOSTATIN      Apply  topically to the appropriate area as directed 2 (Two) Times a Day. to affected area(s)       O2  Commonly known as:  OXYGEN      Inhale 2.5 L/min Continuous.       omeprazole 40 MG capsule  Commonly known as:  priLOSEC      TAKE ONE CAPSULE BY MOUTH DAILY       PROBIOTIC PO      Take  by mouth.       sennosides-docusate 8.6-50 MG per tablet  Commonly known as:  PERICOLACE      Take 1 tablet by mouth daily.       vilazodone 20 MG tablet tablet  Commonly known as:  VIIBRYD      Take 20 mg by mouth Every Night.          STOP taking these medications    traMADol 50 MG tablet  Commonly known as:  ULTRAM  Stopped by:  ZOILA Ryan               The above medication changes may not have been made by this provider.  Medication list was updated to reflect medications patient is currently taking including medication changes and discontinuations made by other healthcare providers.           Dictated utilizing Dragon Dictation System.

## 2020-03-23 RX ORDER — FUROSEMIDE 40 MG/1
40 TABLET ORAL 2 TIMES DAILY
Qty: 60 TABLET | Refills: 3 | Status: SHIPPED | OUTPATIENT
Start: 2020-03-23 | End: 2020-07-28

## 2020-04-17 DIAGNOSIS — G63 POLYNEUROPATHY ASSOCIATED WITH UNDERLYING DISEASE (HCC): ICD-10-CM

## 2020-04-17 RX ORDER — GABAPENTIN 100 MG/1
CAPSULE ORAL
Qty: 60 CAPSULE | Refills: 2 | Status: SHIPPED | OUTPATIENT
Start: 2020-04-17 | End: 2020-07-16

## 2020-04-20 ENCOUNTER — TELEPHONE (OUTPATIENT)
Dept: FAMILY MEDICINE CLINIC | Facility: CLINIC | Age: 75
End: 2020-04-20

## 2020-04-20 NOTE — TELEPHONE ENCOUNTER
PATIENT SAW ON THE TV THAT SHE SHOULD CHECK IN WITH HER DOCTOR. PATIENT WANTED TO SPEAK TO NURSE REGARDING SEVERAL UPDATES ON HOW SHE'S DOING.     PATIENT DID NOT WANT TO GO INTO TOO MUCH DETAIL ON THE PHONE UNTIL THE NURSE CALLED HER BACK SHE CAN BE REACHED [phone]     OR SHE CAN BE REACHED AT HER [facility] NUMBER [phone]

## 2020-05-04 RX ORDER — FUROSEMIDE 20 MG/1
TABLET ORAL
Qty: 180 TABLET | Refills: 3 | Status: SHIPPED | OUTPATIENT
Start: 2020-05-04 | End: 2020-09-09

## 2020-05-26 RX ORDER — GLIMEPIRIDE 1 MG/1
TABLET ORAL
Qty: 90 TABLET | Refills: 0 | OUTPATIENT
Start: 2020-05-26

## 2020-05-28 RX ORDER — GLIMEPIRIDE 1 MG/1
TABLET ORAL
Qty: 90 TABLET | Refills: 0 | OUTPATIENT
Start: 2020-05-28

## 2020-05-29 RX ORDER — GLIMEPIRIDE 1 MG/1
1 TABLET ORAL
Qty: 90 TABLET | Refills: 0 | Status: SHIPPED | OUTPATIENT
Start: 2020-05-29 | End: 2020-08-19

## 2020-05-29 NOTE — TELEPHONE ENCOUNTER
PATIENT  CALLED TO REQUEST A REFILL ON HER GLIMEPIRIDE 1 MG TABLET MEDICATION AND WOULD LIKE TO HAVE THIS SENT [facility] THAT IS IN HER CHART.    THE PATIENT WOULD LIKE FOR THIS TO BE SENT  TO HER PHARM AS SOON AS POSSIBLE AS SHE STATES THAT SHE IS OUT AND HAS BEEN FOR TWO DAYS NOW.    THE PATIENT WOULD LIKE TO GET A CALL BACK IN REGARDS TO THIS MATTER -.

## 2020-06-03 RX ORDER — LOSARTAN POTASSIUM 100 MG/1
TABLET ORAL
Qty: 90 TABLET | Refills: 4 | Status: SHIPPED | OUTPATIENT
Start: 2020-06-03 | End: 2021-02-11 | Stop reason: HOSPADM

## 2020-06-04 RX ORDER — ATORVASTATIN CALCIUM 20 MG/1
TABLET, FILM COATED ORAL
Qty: 90 TABLET | Refills: 0 | OUTPATIENT
Start: 2020-06-04

## 2020-06-09 RX ORDER — ATORVASTATIN CALCIUM 20 MG/1
TABLET, FILM COATED ORAL
Qty: 90 TABLET | Refills: 0 | OUTPATIENT
Start: 2020-06-09

## 2020-06-09 NOTE — TELEPHONE ENCOUNTER
PATIENT CALLED FOR MED REFILL    atorvastatin (LIPITOR) 20 MG tablet    SHE IS OUT OF MEDICATION AS OF TONIGHT    [facility] [address] [address] [facility] [address] [address] - [phone]  - [phone]   [phone]      PATIENT CALL BACK NUMBER [phone]

## 2020-06-10 RX ORDER — ATORVASTATIN CALCIUM 20 MG/1
20 TABLET, FILM COATED ORAL DAILY
Qty: 90 TABLET | Refills: 1 | Status: SHIPPED | OUTPATIENT
Start: 2020-06-10 | End: 2020-06-10 | Stop reason: SDUPTHER

## 2020-06-10 RX ORDER — ATORVASTATIN CALCIUM 20 MG/1
20 TABLET, FILM COATED ORAL DAILY
Qty: 90 TABLET | Refills: 1 | Status: SHIPPED | OUTPATIENT
Start: 2020-06-10 | End: 2020-12-03

## 2020-07-08 RX ORDER — OMEPRAZOLE 40 MG/1
CAPSULE, DELAYED RELEASE ORAL
Qty: 30 CAPSULE | Refills: 4 | OUTPATIENT
Start: 2020-07-08

## 2020-07-13 RX ORDER — OMEPRAZOLE 40 MG/1
40 CAPSULE, DELAYED RELEASE ORAL DAILY
Qty: 30 CAPSULE | Refills: 5 | Status: SHIPPED | OUTPATIENT
Start: 2020-07-13 | End: 2020-12-17 | Stop reason: SDUPTHER

## 2020-07-15 DIAGNOSIS — G63 POLYNEUROPATHY ASSOCIATED WITH UNDERLYING DISEASE (HCC): ICD-10-CM

## 2020-07-16 RX ORDER — ASPIRIN 325 MG
TABLET, DELAYED RELEASE (ENTERIC COATED) ORAL
Qty: 90 TABLET | Refills: 1 | Status: SHIPPED | OUTPATIENT
Start: 2020-07-16 | End: 2021-01-19 | Stop reason: SDUPTHER

## 2020-07-16 RX ORDER — GABAPENTIN 100 MG/1
CAPSULE ORAL
Qty: 60 CAPSULE | Refills: 2 | Status: SHIPPED | OUTPATIENT
Start: 2020-07-16 | End: 2020-10-12

## 2020-07-28 RX ORDER — FUROSEMIDE 40 MG/1
TABLET ORAL
Qty: 60 TABLET | Refills: 2 | Status: SHIPPED | OUTPATIENT
Start: 2020-07-28 | End: 2020-10-26

## 2020-08-03 RX ORDER — LEVOTHYROXINE SODIUM 0.03 MG/1
TABLET ORAL
Qty: 30 TABLET | Refills: 4 | Status: SHIPPED | OUTPATIENT
Start: 2020-08-03 | End: 2020-12-17 | Stop reason: SDUPTHER

## 2020-08-07 RX ORDER — DULAGLUTIDE 0.75 MG/.5ML
INJECTION, SOLUTION SUBCUTANEOUS
Qty: 6 PEN | Refills: 5 | Status: SHIPPED | OUTPATIENT
Start: 2020-08-07 | End: 2021-03-20 | Stop reason: HOSPADM

## 2020-08-19 RX ORDER — GLIMEPIRIDE 1 MG/1
TABLET ORAL
Qty: 90 TABLET | Refills: 1 | Status: SHIPPED | OUTPATIENT
Start: 2020-08-19 | End: 2021-02-18 | Stop reason: SDUPTHER

## 2020-09-08 NOTE — PROGRESS NOTES
Date of Office Visit: 2020  Encounter Provider: ZOILA Ryan  Place of Service: [facility] CARDIOLOGY  Patient Name: Miguelina Lopez  :1945  Primary Cardiologist: Dr. Ayers    Subjective:     Chief Complaint:   Overdue follow-up CAD, valvular disease, right-sided/diastolic CHF      History of Present Illness:  Miguelina Lopez is a pleasant 74 y.o. female who is known to me.  Outside records have been obtained and reviewed by me.     This is a patient of Dr. Ayers with numerous medical problems including coronary artery disease s/p CABG, valvular heart disease s/p mitral valve replacement with tissue prosthesis and tricuspid valve repair, hypertension, diabetes, hyperlipidemia, morbid obesity, OCHOA,  oxygen dependent, pulmonary hypertension and renal insufficiency.    The patient was first seen by Dr. Ayers on 18 for consultation for lower extremity edema.  She has been followed by Dr. Johnson in the past but prior to for seeing Dr. Ayers had not had cardiac follow-up in over 2 years.  She is on the left coronary disease for which she underwent CABG, mitral regurgitation with a mitral valve replacement with tissue prosthesis and a tricuspid valve repair.  She hadn't had any cardiac follow-up in over 2 years.  She was referred back in 2018 for evaluation of progressive lower extremity edema.,  Chronic shortness of breath orthopnea and fatigue.  Repeat echo was ordered done in 2018 showing severe septal hypokinesis, low normal LV function with EF 46 to 50%, moderate to severely dilated left atrial cavity size, mild aortic stenosis, severe nodule posterior mitral valve calcification, mild to moderate tricuspid regurgitation through tricuspid valve repair, elevated gradients across her bioprosthetic mitral valve peak gradient 24 mmHg, mean gradient 7 mmHg, moderate pulmonary hypertension with normal RV systolic function and moderately  dilated RV cavity size. He adjusted diuretics although there was confusion and she had reaction to Bumex as well as HCTZ so Lasix was resumed and increased frequency.     I saw her for the first time January 2019 after she canceled appointments in September 2018, October 2018, and December 2018.  She was doing pretty well on Lasix 20 mg twice a day and her weight was down and edema was improved.  She had chronic shortness of breath, orthopnea and fatigue but was not worsening.  She had no chest pain, palpitations, syncope, near syncope dizziness/lightheadedness.  She admitted to noncompliance with her CPAP machine.  She also was not checking her blood pressure at home although it was elevated in the office that day and she was advised to do monitor b/p at home and resume CPAP therapy.  She called shortly after and required increasing her atenolol to twice daily for hypertension.    She presented to the ED on 6/10/2019 with heart failure and worsening bilateral lower extremity edema and weight gain ongoing for 3 months.  Negative troponin, elevated BNP 15,016.  Creatinine 1.51.  X-ray showed moderate cardiomegaly but no acute processes she was given IV Lasix and admitted.  She diuresed well but diuretics had to be cut back d/t kidney function. Repeat echo was ordered which was done on 6/12/2019 and showed normal LV systolic function with EF of 53%, LV diastolic function was indeterminate, septal hypokinesis, severely increased left atrial volume, severely dilated right atrial cavity, mild aortic stenosis aortic valve area 1.3 cm², mean pressure gradient 17 there was some abnormalities of prosthetic mitral valve with mild perivalvular regurgitation and mean gradient increased at 7 mmHg, moderate tricuspid valve regurgitation with previous tricuspid valve repair and moderate pulmonary hypertension with RVSP of 53 mmHg (which were similar findings to her previous echo).  Hypertension remained an issue so meds were  adjusted. She was discharged on 6/14/2019 amlodipine 10 mg daily was added to her medical regimen, in addition to Avapro 300 mg nightly, furosemide 40 mg twice daily, clonidine 0.2 mg twice daily.    07/1/2019 I last saw her in the office for a hospital follow-up appointment.  Her weight was down about 23 pounds and her lower extremity edema was some better.  She had no chest pain.  Her palpitations had improved.  She had chronic dyspnea on exertion and orthopnea that was not changed.  She had issues with intermittent occasional productive cough without fevers.  She had a rash on her lower extremities that she developed in the hospital that was improving with the topical cream.  She was not having nocturnal dyspnea.  They were having trouble monitoring her blood pressure at home with her home blood pressure cuff.  I offered them a blood pressure check but they wanted to take it into her upcoming appointment with her PCP.  Unfortunately she had still not been using her CPAP machine.  I referred her to sleep medicine to guide to get her sleep apnea appropriately treated especially with her right-sided heart failure. She had a new left bundle branch block so stress test was ordered and performed on 7/15/2019 which showed no evidence of ischemia some GI artifact was present and her EF was 55%.     8/1/2019 patient followed up with Dr. Ayers in the office.  She felt slightly better but had chronic exertional dyspnea and was on oxygen continuously.  She had severe edema as well as easy fatigability and her weight was up about 10 pounds since her last visit.  Dr. Ayers made no changes to her regimen for chronic right sided congestive heart failure and wanted her to see me back in about 4 months.    She was a no-show to her 12/5/2019 appointment with me.  She ended up getting admitted from 12/15/2019-1/8/2020.  She had a prolonged hospital stay requiring aggressive diuresis with Lasix drip she also had some renal  dysfunction and nephrology was stabilizing her fluid status with fluid restriction she was eventually able to be transitioned on oral diuretics.  She was found to have acid-base derangements due to obstructive sleep apnea and obesity hypoventilation syndrome required BiPAP for correction of that.  Pulmonology followed with her very closely and did not feel comfortable sending her out of the hospital without use of trilogy home ventilator.  She eventually had pulmonary function tests that were consistent with COPD and was eventually approved for trilogy home ventilator.  Towards the end of her hospitalization she was noted to be bradycardic.  It was felt that she was developing sick sinus syndrome.  Her atenolol was stopped.  She had a ZIO placed placed at discharge.  She was supposed to follow-up in the office with me in 3 weeks but this did not happen.    12/16/2019 patient had a repeat echocardiogram which showed her EF was 67% calculated but estimated appear to be 56 to 60% she had normal LV cavity size, mild to moderate concentric LVH, flattening of interventricular septum consistent with right ventricular pressure overload, indeterminate diastolic function, mild to moderately dilated right ventricle, dilated left atrial cavity size, mildly dilated right atrial cavity size, moderate thickening of the aortic valve with moderate aortic valve stenosis present, bioprosthetic mitral valve with elevated gradient and mild to moderate perivalvular regurgitation, mild to moderate tricuspid valve regurgitation with tricuspid valve ring and severe pulmonary hypertension with an RVSP of 57.3 mmHg mild to moderate pulmonic regurgitation.She had a normal bilateral lower extremity venous duplex at that time as well and a bilateral renal ultrasound was reportedly negative.    1/8/2020 patient wore a Holter monitor.  She was found to have rare PACs and PVCs, underlying rhythm was sinus rhythm with first-degree AV block and  bundle branch block at baseline with occasional second-degree Mobitz 1 AV block and occasional 2-1 AV block documented with no pauses greater than 3 seconds.  Dr. Ayers did not feel she needed a pacemaker at this time.    03/04/2020 I last saw her in the office for hospital follow-up.  She continue experience chronic dyspnea on exertion that was better with use of her oxygen as well as chest pain with exertion that was worse if she would forget her oxygen.  She was taking sublingual nitroglycerin 1 tablet at a time a few times a week.  That started over the last 6 months but does not progress in the last month or 2.  She was compliant with her trilogy home ventilator.  She did not believe she was having issues with her blood pressure heart rate at home as with home health it was pretty stable but she was not measuring it herself.  Lower extremity edema though present was greatly improved and she was intermittently wrapping her legs with compression wraps which would help.  No dizziness, lightheadedness, syncope, near syncope.  Her weight was overall stable.  We discussed proceeding with right and left heart cath to determine if her symptoms were caused by her aortic stenosis or possibly worsening coronary disease.  Patient declined to proceed with further testing at that time and the plan was going to be to monitor her symptoms for progression and follow-up with Dr. Ayers in 2 to 3 months for reevaluation.      She is presenting today for overdue follow-up.  She had not been seen in the office again due to the pandemic.  She refused an in office evaluation and has consented to help telehealth only despite me explaining to her that I prefer to do in an office visit as with her multiple comorbid conditions I think she needs an EKG and physical examination.  She has not left her house since March to see any providers at all.  This patient has consented to a telehealth visit via telephone. The visit was scheduled  as a telephone visit to comply with patient safety concerns in accordance with CDC recommendations.  All vitals recorded within this visit are reported by the patient.  I spent 25 minutes in total including but not limited to the 13 minutes spent in direct conversation with this patient.  Patient reports that she is overall been doing pretty well.  Fortunately she has had no more chest pain.  Her dyspnea is stable with no worsening symptoms.  She chronically sleeps elevated but this is not worsening and she is not waking up out of her sleep out of breath.  She feels she is doing really well with her home ventilator.  She has chronic lower extremity edema left greater than right but she reports both legs are down at this time.  Her weight has been relatively stable range between 255 and 257 pounds.  She denies any significant cough.  No bleeding issues.  Oxygen saturation is good on her 3 L of O2.  Her rate is stable.  She has not had any significant dizziness, syncope, near syncope and denies palpitations.      Past Medical History:   Diagnosis Date   • Acute on chronic respiratory failure with hypoxia and hypercapnia (CMS/Formerly [facility])    • OLI (acute kidney injury) (CMS/Formerly [facility])    • Anemia    • Anxiety    • Aortic valve stenosis    • Bilateral lower extremity edema    • CHF (congestive heart failure) (CMS/HCC)    • Chronic coronary artery disease    • Class 3 severe obesity due to excess calories in adult (CMS/HCC)    • COPD (chronic obstructive pulmonary disease) (CMS/HCC)    • Depression    • Diabetes mellitus (CMS/HCC)    • Heart murmur    • Hypertension    • Mitral valve insufficiency    • Pneumonia     1/2016   • Pulmonary hypertension (CMS/[facility])     due to sleep disordered breathing   • Sleep apnea     Uses CPAP or oxygen   • Stage 3 chronic kidney disease (CMS/Formerly [facility])    • Subclinical hypothyroidism    • Supplemental oxygen dependent    • Valvular heart disease      Past Surgical History:   Procedure Laterality Date   •  CARDIAC CATHETERIZATION     • CARDIAC CATHETERIZATION N/A 6/10/2016    Procedure: Left Heart Cath;  Surgeon: Chace Johnson MD;  Location:  [facility] CATH INVASIVE [facility];  Service:    • CARDIAC CATHETERIZATION N/A 6/10/2016    Procedure: Right Heart Cath;  Surgeon: Chace Johnson MD;  Location:  [facility] CATH INVASIVE [facility]ATION;  Service:    • CORONARY ARTERY BYPASS GRAFT      2 vessel   • CORONARY ARTERY BYPASS GRAFT WITH MITRAL VALVE REPAIR/REPLACEMENT N/A 6/13/2016    Procedure: INTRAOPERATIVE TARIQ, MIDLINE STERNOTOMY, CORONARY ARTERY BYPASS GRAFTING X  2 UTILIZING ENDOSCOPICALLY HARVESTED LEFT GREATER SAPHENOUS VEIN, MITRAL VALVE REPLACEMENT AND TRICUSPID VALVE REPAIR;  Surgeon: Eliecer Mistry MD;  Location: [facility] MAIN OR;  Service:    • CORONARY STENT PLACEMENT  2010    Approx. 6 yrs ago at [facility]   • HEMORRHOIDECTOMY     • HYSTERECTOMY     • MITRAL VALVE REPLACEMENT     • REPLACEMENT TOTAL KNEE Right    • THYROID SURGERY      Cyst removed from thyroid     Outpatient Medications Prior to Visit   Medication Sig Dispense Refill   • albuterol sulfate HFA (VENTOLIN HFA) 108 (90 Base) MCG/ACT inhaler Inhale 2 puffs Every 6 (Six) Hours As Needed for Wheezing. 18 g 0   • ALPRAZolam (XANAX) 1 MG tablet Take 0.5 mg by mouth At Night As Needed.     • aspirin  MG tablet TAKE ONE TABLET BY MOUTH DAILY 90 tablet 1   • atorvastatin (LIPITOR) 20 MG tablet Take 1 tablet by mouth Daily. 90 tablet 1   • clotrimazole-betamethasone (LOTRISONE) 1-0.05 % cream Apply  topically to the appropriate area as directed 2 (Two) Times a Day. 45 g 0   • ferrous sulfate 324 (65 FE) MG tablet delayed-release EC tablet Take 324 mg by mouth Daily With Breakfast.     • fluticasone-salmeterol (ADVAIR DISKUS) 250-50 MCG/DOSE DISKUS Inhale 1 puff Daily As Needed (cough and wheezing). 60 each 0   • furosemide (LASIX) 40 MG tablet TAKE ONE TABLET BY MOUTH TWICE A DAY 60 tablet 2   • gabapentin (NEURONTIN) 100 MG capsule TAKE ONE  CAPSULE BY MOUTH EVERY 12 HOURS 60 capsule 2   • glimepiride (AMARYL) 1 MG tablet TAKE ONE TABLET BY MOUTH EVERY MORNING BEFORE BREAKFAST 90 tablet 1   • ipratropium-albuterol (DUO-NEB) 0.5-2.5 mg/mL nebulizer Take 3 mL by nebulization 4 (four) times a day. (Patient taking differently: Take 3 mL by nebulization Daily As Needed.) 3 mL 5   • levothyroxine (SYNTHROID, LEVOTHROID) 25 MCG tablet TAKE ONE TABLET BY MOUTH DAILY 30 tablet 4   • losartan (COZAAR) 100 MG tablet TAKE ONE TABLET BY MOUTH DAILY 90 tablet 4   • Misc. Devices (COMMODE BEDSIDE) misc 1 each Daily. 1 each 0   • nitroglycerin (NITROSTAT) 0.4 MG SL tablet Place 1 tablet under the tongue As Needed for Chest Pain.  - IF PAIN REMAINS AFTER 5 MIN CALL 911 AND REPEAT DOSE MAX 3 TABS IN 15 MINUTES 25 tablet 5   • nystatin (MYCOSTATIN) 884526 UNIT/GM cream Apply  topically to the appropriate area as directed 2 (Two) Times a Day. to affected area(s) 30 g 3   • O2 (OXYGEN) Inhale 2.5 L/min Continuous.     • omeprazole (priLOSEC) 40 MG capsule Take 1 capsule by mouth Daily. 30 capsule 5   • Probiotic Product (PROBIOTIC PO) Take  by mouth.     • senna-docusate (PERICOLACE) 8.6-50 MG per tablet Take 1 tablet by mouth daily.     • traMADol (ULTRAM) 50 MG tablet Take 50 mg by mouth Every 6 (Six) Hours As Needed.     • TRULICITY 0.75 MG/0.5ML solution pen-injector INJECT 0.75 MG UNDER THE SKIN ONCE WEEKLY 6 pen 5   • vilazodone (VIIBRYD) 20 MG tablet tablet Take 20 mg by mouth Every Night.     • furosemide (LASIX) 20 MG tablet TAKE ONE TABLET BY MOUTH TWICE A  tablet 3     No facility-administered medications prior to visit.        Allergies as of 09/09/2020 - Reviewed 09/09/2020   Allergen Reaction Noted   • Coumadin [warfarin sodium] Diarrhea and Nausea Only 06/12/2016   • Bumex [bumetanide] Swelling 08/28/2018   • Erythromycin  07/29/2016   • Hctz [hydrochlorothiazide] Swelling 08/28/2018   • Zaroxolyn [metolazone] Diarrhea 05/02/2019   • Penicillins Rash  02/18/2016   • Sulfa antibiotics Rash 02/18/2016     Social History     Socioeconomic History   • Marital status:      Spouse name: Not on file   • Number of children: Not on file   • Years of education: Not on file   • Highest education level: Not on file   Tobacco Use   • Smoking status: Never Smoker   • Smokeless tobacco: Never Used   • Tobacco comment: no caffeine    Substance and Sexual Activity   • Alcohol use: No   • Drug use: No   • Sexual activity: Defer     Family History   Problem Relation Age of Onset   • Heart attack Father    • Heart disease Father      Review of Systems   Constitution: Negative for chills, fever, malaise/fatigue, weight gain and weight loss.   HENT: Negative for ear pain, hearing loss, nosebleeds (no recurrence) and sore throat.    Eyes: Negative for double vision (improved with new glasses), pain, vision loss in left eye and vision loss in right eye.   Cardiovascular: Positive for dyspnea on exertion and leg swelling (overall better). Negative for chest pain, irregular heartbeat, near-syncope, orthopnea (not worsening), palpitations, paroxysmal nocturnal dyspnea and syncope.        Pain in legs with walking   Respiratory: Positive for shortness of breath. Negative for cough, snoring and wheezing.    Endocrine: Positive for heat intolerance. Negative for cold intolerance and polyuria.   Hematologic/Lymphatic: Negative for bleeding problem.   Skin: Negative for color change, itching, rash and unusual hair distribution.   Musculoskeletal: Positive for joint pain, joint swelling and myalgias. Negative for falls.   Gastrointestinal: Positive for nausea. Negative for abdominal pain, diarrhea, hematochezia, melena and vomiting.   Genitourinary: Negative for decreased libido, frequency, hematuria, hesitancy and incomplete emptying.   Neurological: Negative for excessive daytime sleepiness, dizziness, headaches, light-headedness, loss of balance, numbness, paresthesias and seizures.  "  Psychiatric/Behavioral: Positive for depression. The patient is nervous/anxious.           Objective:        Vitals:    09/09/20 1251 09/09/20 1326   Pulse:  87   SpO2:  98%   Weight: 121 kg (267 lb)    Height: 152.4 cm (60\")      Body mass index is 52.14 kg/m². Patient's weight is not actually 267 lbs. Andre HENDRICKS MA to fix this when he returns tomorrow.  She reports her weight ranges between 255 to 257 pounds.  She does not have a current working blood pressure cuff but was advised to get a new one and her granddaughter will work on that.    PHYSICAL EXAM:  Physical Exam      Unable to perform due to telephone encounter.    Procedures    Unable to perform 12 lead ECG due to out of office telehealth visit.     Assessment:       Diagnosis Plan   1. Acute on chronic diastolic CHF (congestive heart failure) (CMS/[facility])     2. Chronic right-sided congestive heart failure (CMS/[facility])     3. Chronic coronary artery disease     4. Pulmonary hypertension due to sleep-disordered breathing (CMS/[facility])     5. Chronic respiratory failure with hypoxia and hypercapnia (CMS/[facility])     6. Supplemental oxygen dependent     7. OCHOA (obstructive sleep apnea)     8. Atrial fibrillation, unspecified type (CMS/[facility])     9. Aortic valve stenosis, etiology of cardiac valve disease unspecified     10. Essential hypertension     11. Mitral valve insufficiency, unspecified etiology     12. Mixed hyperlipidemia     13. AV block, 2nd degree     14. Class 3 severe obesity due to excess calories with serious comorbidity and body mass index (BMI) of 50.0 to 59.9 in adult (CMS/[facility])     15. CKD (chronic kidney disease) stage 3, GFR 30-59 ml/min (CMS/[facility])     16. Anemia, unspecified type     17. s/p MVR, TV-repair, CABG x2 6/13/16         Plan:     This is a patient with multiple comorbidities and medical problems.    1.  Acute on chronic diastolic and right sided heart failure: Reported Allergies to Metolazone, Bumex and HCTZ. On lasix and overall appears to " be more stable than in the past.     2.  Pulmonary hypertension: Follows with pulmonology now but has not made a hospital follow up appt. She was advised and given the pulmonary office phone number to call to make appt..  She has severe underlying lung disease.  RVSP 57 mmHg and mild to moderate pulmonic [id number] echo.    3.  Obstructive sleep apnea: This had not been well treated in the past.  She was sent home on trilogy home ventilator after her last admission January 2020.     4.  Coronary artery disease: Status post CABG in 2016, 7/2019 had a stress test that was negative for ischemia.  On aspirin and statin. December 2019 lipid panel under good control.  Her chest pain symptoms have resolved and no longer requiring nitroglycerin fortunately.    5.  Mitral regurgitation: Status post mitral valve replacement with tissue prosthesis 2016, repeat echo on 12/2019 showed bioprosthetic mitral valve with elevated gradient and mild to moderate perivalvular regurgitation    6.  Tricuspid regurgitation: Status post repair 2016, 12/2019 echo showed mild to moderate tricuspid regurgitation . Continue to monitor    7.  OLI and Chronic kidney disease: Nephrology followed her in the hospital during her 12/2019 admission.  She had a bilateral renal ultrasound that was negative.  She was advised to follow-up with nephrology as an outpatient but did not appear to do so.  She has not had repeat labs.  She is going arrange us with her PCP as she has repeat labs scheduled with their office anyways including a CMP.  She is going to call their office today.    8.  Diabetes mellitus, type II: Managed by PCP. Hemoglobin A1c at goal 6.46% 12/2019    9.  Morbid obesity: BMI greater than 50.  It has been discussed this is a major health concern for the patient.  She is fortunately doing some walking and overall her weight has been down some recently.    10. Aortic stenosis: Mild on 6/2019 echo mean gradient 17 mmHg, COREY  1.3cm2,now moderate on 12/2019 echo with COREY 0.79 cm2, mean pressure at 34 mmHg, max pressure gradient 51 mmHg    11. Hypertension: Unable to check blood pressure at home due to not having a working blood pressure cuff.    12.  First-degree AV block with left bundle branch block    13.  Intermittent 2-1 AV block and Mobitz type I: This was on January 2020 Dante and present on today's ECG.  Reviewed by Dr. Ayers who did not feel that she met criteria for pacemaker.  She was already discontinued off her beta-blocker for bradycardia.  Heart rate was stable and her pulse ox today at 87.    14.  Iron deficiency anemia: On iron supplement.  Defer.    15.  Elevated alkaline phosphatase level: Work-up ordered per PCP.      Overall from a symptom standpoint she sounds like she is doing better than she has in the past.  She has no major complaints or concerns today.  Her chest pain is resolved.  She is past due for labs with her PCP.  She admits she has not left her house since March due to fear of COVID-19.  I recommended she discuss with her PCP having her follow-up labs to reassess her kidney function as well as the follow-up LFT testing that they had plan to do back in January.  Based on today's visit no changes will be made but we will need to see her in office soon for an EKG and to follow-up on her aortic stenosis.    Follow up with Dr. Ayers in 6- 8 weeks IN OFFICE, unless otherwise needed sooner.  I advised the patient to contact our office with any questions or concerns.  They denied the need for any medication refills today.     It has been a pleasure to participate in this patient's care. Please feel free to contact me with any questions or concerns.     Adri Ackerman, ZOILA  09/09/2020          Your medication list           Accurate as of September 9, 2020  1:35 PM. If you have any questions, ask your nurse or doctor.               CHANGE how you take these medications      Instructions Last Dose Given  Next Dose Due   furosemide 40 MG tablet  Commonly known as:  LASIX  What changed:  Another medication with the same name was removed. Continue taking this medication, and follow the directions you see here.  Changed by:  ZOILA Ryan      TAKE ONE TABLET BY MOUTH TWICE A DAY       ipratropium-albuterol 0.5-2.5 mg/3 ml nebulizer  Commonly known as:  DUO-NEB  What changed:    · when to take this  · reasons to take this      Take 3 mL by nebulization 4 (four) times a day.          CONTINUE taking these medications      Instructions Last Dose Given Next Dose Due   albuterol sulfate  (90 Base) MCG/ACT inhaler  Commonly known as:  Ventolin HFA      Inhale 2 puffs Every 6 (Six) Hours As Needed for Wheezing.       ALPRAZolam 1 MG tablet  Commonly known as:  XANAX      Take 0.5 mg by mouth At Night As Needed.       aspirin  MG tablet      TAKE ONE TABLET BY MOUTH DAILY       atorvastatin 20 MG tablet  Commonly known as:  LIPITOR      Take 1 tablet by mouth Daily.       clotrimazole-betamethasone 1-0.05 % cream  Commonly known as:  Lotrisone      Apply  topically to the appropriate area as directed 2 (Two) Times a Day.       Commode Bedside misc      1 each Daily.       ferrous sulfate 324 (65 Fe) MG tablet delayed-release EC tablet      Take 324 mg by mouth Daily With Breakfast.       fluticasone-salmeterol 250-50 MCG/DOSE DISKUS  Commonly known as:  Advair Diskus      Inhale 1 puff Daily As Needed (cough and wheezing).       gabapentin 100 MG capsule  Commonly known as:  NEURONTIN      TAKE ONE CAPSULE BY MOUTH EVERY 12 HOURS       glimepiride 1 MG tablet  Commonly known as:  AMARYL      TAKE ONE TABLET BY MOUTH EVERY MORNING BEFORE BREAKFAST       levothyroxine 25 MCG tablet  Commonly known as:  SYNTHROID, LEVOTHROID      TAKE ONE TABLET BY MOUTH DAILY       losartan 100 MG tablet  Commonly known as:  COZAAR      TAKE ONE TABLET BY MOUTH DAILY       nitroglycerin 0.4 MG SL tablet  Commonly known as:   Nitrostat      Place 1 tablet under the tongue As Needed for Chest Pain.  - IF PAIN REMAINS AFTER 5 MIN CALL 911 AND REPEAT DOSE MAX 3 TABS IN 15 MINUTES       nystatin 072443 UNIT/GM cream  Commonly known as:  MYCOSTATIN      Apply  topically to the appropriate area as directed 2 (Two) Times a Day. to affected area(s)       O2  Commonly known as:  OXYGEN      Inhale 2.5 L/min Continuous.       omeprazole 40 MG capsule  Commonly known as:  priLOSEC      Take 1 capsule by mouth Daily.       PROBIOTIC PO      Take  by mouth.       sennosides-docusate 8.6-50 MG per tablet  Commonly known as:  PERICOLACE      Take 1 tablet by mouth daily.       traMADol 50 MG tablet  Commonly known as:  ULTRAM      Take 50 mg by mouth Every 6 (Six) Hours As Needed.       Trulicity 0.75 MG/0.5ML solution pen-injector  Generic drug:  Dulaglutide      INJECT 0.75 MG UNDER THE SKIN ONCE WEEKLY       vilazodone 20 MG tablet tablet  Commonly known as:  VIIBRYD      Take 20 mg by mouth Every Night.              The above medication changes may not have been made by this provider.  Medication list was updated to reflect medications patient is currently taking including medication changes and discontinuations made by other healthcare providers.           Dictated utilizing Dragon Dictation System.

## 2020-09-09 ENCOUNTER — TELEPHONE (OUTPATIENT)
Dept: CARDIOLOGY | Facility: CLINIC | Age: 75
End: 2020-09-09

## 2020-09-09 ENCOUNTER — OFFICE VISIT (OUTPATIENT)
Dept: CARDIOLOGY | Facility: CLINIC | Age: 75
End: 2020-09-09

## 2020-09-09 VITALS — HEIGHT: 60 IN | BODY MASS INDEX: 50.31 KG/M2 | OXYGEN SATURATION: 98 % | HEART RATE: 87 BPM

## 2020-09-09 DIAGNOSIS — I44.1 AV BLOCK, 2ND DEGREE: ICD-10-CM

## 2020-09-09 DIAGNOSIS — E78.2 MIXED HYPERLIPIDEMIA: ICD-10-CM

## 2020-09-09 DIAGNOSIS — J96.12 CHRONIC RESPIRATORY FAILURE WITH HYPOXIA AND HYPERCAPNIA (HCC): Chronic | ICD-10-CM

## 2020-09-09 DIAGNOSIS — J96.11 CHRONIC RESPIRATORY FAILURE WITH HYPOXIA AND HYPERCAPNIA (HCC): Chronic | ICD-10-CM

## 2020-09-09 DIAGNOSIS — Z99.81 SUPPLEMENTAL OXYGEN DEPENDENT: ICD-10-CM

## 2020-09-09 DIAGNOSIS — I27.29 PULMONARY HYPERTENSION DUE TO SLEEP-DISORDERED BREATHING (HCC): ICD-10-CM

## 2020-09-09 DIAGNOSIS — I35.0 AORTIC VALVE STENOSIS, ETIOLOGY OF CARDIAC VALVE DISEASE UNSPECIFIED: ICD-10-CM

## 2020-09-09 DIAGNOSIS — D64.9 ANEMIA, UNSPECIFIED TYPE: ICD-10-CM

## 2020-09-09 DIAGNOSIS — G47.8 PULMONARY HYPERTENSION DUE TO SLEEP-DISORDERED BREATHING (HCC): ICD-10-CM

## 2020-09-09 DIAGNOSIS — I25.10 CHRONIC CORONARY ARTERY DISEASE: ICD-10-CM

## 2020-09-09 DIAGNOSIS — I34.0 MITRAL VALVE INSUFFICIENCY, UNSPECIFIED ETIOLOGY: ICD-10-CM

## 2020-09-09 DIAGNOSIS — I50.812 CHRONIC RIGHT-SIDED CONGESTIVE HEART FAILURE (HCC): ICD-10-CM

## 2020-09-09 DIAGNOSIS — I10 ESSENTIAL HYPERTENSION: ICD-10-CM

## 2020-09-09 DIAGNOSIS — G47.33 OSA (OBSTRUCTIVE SLEEP APNEA): ICD-10-CM

## 2020-09-09 DIAGNOSIS — N18.30 CKD (CHRONIC KIDNEY DISEASE) STAGE 3, GFR 30-59 ML/MIN (HCC): ICD-10-CM

## 2020-09-09 DIAGNOSIS — E66.01 CLASS 3 SEVERE OBESITY DUE TO EXCESS CALORIES WITH SERIOUS COMORBIDITY AND BODY MASS INDEX (BMI) OF 50.0 TO 59.9 IN ADULT (HCC): ICD-10-CM

## 2020-09-09 DIAGNOSIS — I48.91 ATRIAL FIBRILLATION, UNSPECIFIED TYPE (HCC): ICD-10-CM

## 2020-09-09 DIAGNOSIS — I50.33 ACUTE ON CHRONIC DIASTOLIC CHF (CONGESTIVE HEART FAILURE) (HCC): Primary | Chronic | ICD-10-CM

## 2020-09-09 DIAGNOSIS — Z95.2 HISTORY OF MVR WITH CARDIOPULMONARY BYPASS: ICD-10-CM

## 2020-09-09 PROCEDURE — 99442 PR PHYS/QHP TELEPHONE EVALUATION 11-20 MIN: CPT | Performed by: NURSE PRACTITIONER

## 2020-09-09 RX ORDER — TRAMADOL HYDROCHLORIDE 50 MG/1
50 TABLET ORAL EVERY 6 HOURS PRN
COMMUNITY
Start: 2020-06-22 | End: 2020-11-10

## 2020-09-09 NOTE — TELEPHONE ENCOUNTER
Andre- will you please delete the weight you charted for this patient for her telehealth visit?.  She actually told me this was incorrect. Thanks!

## 2020-09-18 RX ORDER — NITROGLYCERIN 0.4 MG/1
TABLET SUBLINGUAL
Qty: 25 TABLET | Refills: 4 | Status: SHIPPED | OUTPATIENT
Start: 2020-09-18

## 2020-10-12 DIAGNOSIS — G63 POLYNEUROPATHY ASSOCIATED WITH UNDERLYING DISEASE (HCC): ICD-10-CM

## 2020-10-12 RX ORDER — GABAPENTIN 100 MG/1
CAPSULE ORAL
Qty: 60 CAPSULE | Refills: 1 | Status: SHIPPED | OUTPATIENT
Start: 2020-10-12 | End: 2020-12-23 | Stop reason: SDUPTHER

## 2020-10-14 ENCOUNTER — OFFICE VISIT (OUTPATIENT)
Dept: FAMILY MEDICINE CLINIC | Facility: CLINIC | Age: 75
End: 2020-10-14

## 2020-10-14 VITALS
HEART RATE: 90 BPM | HEIGHT: 60 IN | WEIGHT: 262 LBS | BODY MASS INDEX: 51.44 KG/M2 | OXYGEN SATURATION: 92 % | SYSTOLIC BLOOD PRESSURE: 132 MMHG | DIASTOLIC BLOOD PRESSURE: 78 MMHG | TEMPERATURE: 97 F | RESPIRATION RATE: 16 BRPM

## 2020-10-14 DIAGNOSIS — M25.562 CHRONIC PAIN OF LEFT KNEE: ICD-10-CM

## 2020-10-14 DIAGNOSIS — G47.33 OSA (OBSTRUCTIVE SLEEP APNEA): ICD-10-CM

## 2020-10-14 DIAGNOSIS — Z11.59 NEED FOR HEPATITIS C SCREENING TEST: ICD-10-CM

## 2020-10-14 DIAGNOSIS — E55.9 VITAMIN D DEFICIENCY: ICD-10-CM

## 2020-10-14 DIAGNOSIS — Z23 NEED FOR INFLUENZA VACCINATION: ICD-10-CM

## 2020-10-14 DIAGNOSIS — I50.812 CHRONIC RIGHT-SIDED CONGESTIVE HEART FAILURE (HCC): ICD-10-CM

## 2020-10-14 DIAGNOSIS — Z12.31 ENCOUNTER FOR SCREENING MAMMOGRAM FOR MALIGNANT NEOPLASM OF BREAST: ICD-10-CM

## 2020-10-14 DIAGNOSIS — E66.01 CLASS 3 SEVERE OBESITY DUE TO EXCESS CALORIES WITH SERIOUS COMORBIDITY AND BODY MASS INDEX (BMI) OF 50.0 TO 59.9 IN ADULT (HCC): ICD-10-CM

## 2020-10-14 DIAGNOSIS — M17.12 PRIMARY OSTEOARTHRITIS OF LEFT KNEE: ICD-10-CM

## 2020-10-14 DIAGNOSIS — E03.9 HYPOTHYROIDISM, UNSPECIFIED TYPE: ICD-10-CM

## 2020-10-14 DIAGNOSIS — E78.2 MIXED HYPERLIPIDEMIA: Primary | ICD-10-CM

## 2020-10-14 DIAGNOSIS — I10 ESSENTIAL HYPERTENSION: ICD-10-CM

## 2020-10-14 DIAGNOSIS — Z99.81 SUPPLEMENTAL OXYGEN DEPENDENT: ICD-10-CM

## 2020-10-14 DIAGNOSIS — G89.29 CHRONIC PAIN OF LEFT KNEE: ICD-10-CM

## 2020-10-14 DIAGNOSIS — E11.42 TYPE 2 DIABETES MELLITUS WITH DIABETIC POLYNEUROPATHY, WITHOUT LONG-TERM CURRENT USE OF INSULIN (HCC): ICD-10-CM

## 2020-10-14 PROCEDURE — 96160 PT-FOCUSED HLTH RISK ASSMT: CPT | Performed by: FAMILY MEDICINE

## 2020-10-14 PROCEDURE — G0009 ADMIN PNEUMOCOCCAL VACCINE: HCPCS | Performed by: FAMILY MEDICINE

## 2020-10-14 PROCEDURE — G0439 PPPS, SUBSEQ VISIT: HCPCS | Performed by: FAMILY MEDICINE

## 2020-10-14 PROCEDURE — 90694 VACC AIIV4 NO PRSRV 0.5ML IM: CPT | Performed by: FAMILY MEDICINE

## 2020-10-14 PROCEDURE — 90732 PPSV23 VACC 2 YRS+ SUBQ/IM: CPT | Performed by: FAMILY MEDICINE

## 2020-10-14 PROCEDURE — G0008 ADMIN INFLUENZA VIRUS VAC: HCPCS | Performed by: FAMILY MEDICINE

## 2020-10-14 RX ORDER — METHYLPREDNISOLONE ACETATE 40 MG/ML
40 INJECTION, SUSPENSION INTRA-ARTICULAR; INTRALESIONAL; INTRAMUSCULAR; SOFT TISSUE ONCE
Status: DISCONTINUED | OUTPATIENT
Start: 2020-10-14 | End: 2021-02-11 | Stop reason: HOSPADM

## 2020-10-14 NOTE — PATIENT INSTRUCTIONS
Recommend Shingrix vaccination- can get this at your pharmacy  Pneumovax administered  Lab work done  Mammogram ordered  Will obtain eye exam records  Return to clinic in 6 months for follow up, and as needed

## 2020-10-14 NOTE — PROGRESS NOTES
The ABCs of the Annual Wellness Visit  Subsequent Medicare Wellness Visit    Chief Complaint   Patient presents with   • Hyperlipidemia     6 month f/u   • Itching     itching on buttocks       Subjective   History of Present Illness:  Miguelina Lopez is a 74 y.o. female who presents for a Subsequent Medicare Wellness Visit.  PMH of DM type 2 (diagnosed 15 years ago),  diabetic neuropathy, OCHOA (not currently using the CPAP due to technical issues with the machine), aortic stenosis, pulm HTN and right sided heart failure, CAD and valvular heart disease (2 vessel CABG, MVR and TVR in 2016). Currently takes aspirin 325mg daily for antiplatelet therapy.  Has COPD as well. She is on 3-3.5L oxygen/min continuously, up to 4 L if needed.     She has never been a smoker.  She sees Dr. Ayers next month (cardiologist). She has also been referred to pulmonologist since her hospitalization and nephrologist since hospitalization.     UTD on flu vx.  Needs pneumovax 23. Needs mammogram.   She just had an eye exam- vision works off [address].     She complains of L knee pain and is requesting injection today she has history of OA and injection has been helpful for her in the past. No recent injury or worsening of her pain.     HEALTH RISK ASSESSMENT    Recent Hospitalizations:  No hospitalization(s) within the last year.    Current Medical Providers:  Patient Care Team:  Michelle Abernathy MD as PCP - General (Family Medicine)  Robbie Wilson MD as Consulting Physician (Hematology and Oncology)  Chace Johnson MD as Consulting Physician (Cardiology)    Smoking Status:  Social History     Tobacco Use   Smoking Status Never Smoker   Smokeless Tobacco Never Used   Tobacco Comment    no caffeine        Alcohol Consumption:  Social History     Substance and Sexual Activity   Alcohol Use No       Depression Screen:   PHQ-2/PHQ-9 Depression Screening 10/14/2020   Little interest or pleasure in doing things 0   Feeling  down, depressed, or hopeless 0   Trouble falling or staying asleep, or sleeping too much -   Feeling tired or having little energy -   Poor appetite or overeating -   Feeling bad about yourself - or that you are a failure or have let yourself or your family down -   Trouble concentrating on things, such as reading the newspaper or watching television -   Moving or speaking so slowly that other people could have noticed. Or the opposite - being so fidgety or restless that you have been moving around a lot more than usual -   Thoughts that you would be better off dead, or of hurting yourself in some way -   Total Score 0   If you checked off any problems, how difficult have these problems made it for you to do your work, take care of things at home, or get along with other people? -       Fall Risk Screen:  HOLLIE Fall Risk Assessment was completed, and patient is at LOW risk for falls.Assessment completed on:10/14/2020    Health Habits and Functional and Cognitive Screening:  Functional & Cognitive Status 4/29/2019   Do you have difficulty preparing food and eating? No   Do you have difficulty bathing yourself, getting dressed or grooming yourself? No   Do you have difficulty using the toilet? No   Do you have difficulty moving around from place to place? Yes   Do you have trouble with steps or getting out of a bed or a chair? Yes   Current Diet Limited Junk Food   Dental Exam Not up to date   Eye Exam Not up to date   Exercise (times per week) 2 times per week   Current Exercise Activities Include Housecleaning   Do you need help using the phone?  No   Are you deaf or do you have serious difficulty hearing?  No   Do you need help with transportation? Yes   Do you need help shopping? No   Do you need help preparing meals?  No   Do you need help with housework?  Yes   Do you need help with laundry? No   Do you need help taking your medications? No   Do you need help managing money? No   Do you ever drive or ride in a  car without wearing a seat belt? No   Have you felt unusual stress, anger or loneliness in the last month? Yes   Who do you live with? Other   If you need help, do you have trouble finding someone available to you? No   Have you been bothered in the last four weeks by sexual problems? No   Do you have difficulty concentrating, remembering or making decisions? Yes         Does the patient have evidence of cognitive impairment? No    Asprin use counseling:Taking ASA appropriately as indicated    Age-appropriate Screening Schedule:  Refer to the list below for future screening recommendations based on patient's age, sex and/or medical conditions. Orders for these recommended tests are listed in the plan section. The patient has been provided with a written plan.    Health Maintenance   Topic Date Due   • TDAP/TD VACCINES (1 - Tdap) 12/20/1964   • ZOSTER VACCINE (2 of 2) 06/26/2012   • DIABETIC EYE EXAM  09/01/2018   • MAMMOGRAM  08/14/2019   • DIABETIC FOOT EXAM  10/29/2019   • HEMOGLOBIN A1C  06/16/2020   • LIPID PANEL  12/11/2020   • URINE MICROALBUMIN  12/16/2020   • COLONOSCOPY  01/01/2023   • INFLUENZA VACCINE  Discontinued          The following portions of the patient's history were reviewed and updated as appropriate: allergies, current medications, past family history, past medical history, past social history, past surgical history and problem list.    Outpatient Medications Prior to Visit   Medication Sig Dispense Refill   • albuterol sulfate HFA (VENTOLIN HFA) 108 (90 Base) MCG/ACT inhaler Inhale 2 puffs Every 6 (Six) Hours As Needed for Wheezing. 18 g 0   • ALPRAZolam (XANAX) 1 MG tablet Take 0.5 mg by mouth At Night As Needed.     • aspirin  MG tablet TAKE ONE TABLET BY MOUTH DAILY 90 tablet 1   • atorvastatin (LIPITOR) 20 MG tablet Take 1 tablet by mouth Daily. 90 tablet 1   • clotrimazole-betamethasone (LOTRISONE) 1-0.05 % cream Apply  topically to the appropriate area as directed 2 (Two) Times a  Day. 45 g 0   • ferrous sulfate 324 (65 FE) MG tablet delayed-release EC tablet Take 324 mg by mouth Daily With Breakfast.     • fluticasone-salmeterol (ADVAIR DISKUS) 250-50 MCG/DOSE DISKUS Inhale 1 puff Daily As Needed (cough and wheezing). 60 each 0   • furosemide (LASIX) 40 MG tablet TAKE ONE TABLET BY MOUTH TWICE A DAY 60 tablet 2   • gabapentin (NEURONTIN) 100 MG capsule TAKE ONE CAPSULE BY MOUTH EVERY 12 HOURS 60 capsule 1   • glimepiride (AMARYL) 1 MG tablet TAKE ONE TABLET BY MOUTH EVERY MORNING BEFORE BREAKFAST 90 tablet 1   • ipratropium-albuterol (DUO-NEB) 0.5-2.5 mg/mL nebulizer Take 3 mL by nebulization 4 (four) times a day. (Patient taking differently: Take 3 mL by nebulization Daily As Needed.) 3 mL 5   • levothyroxine (SYNTHROID, LEVOTHROID) 25 MCG tablet TAKE ONE TABLET BY MOUTH DAILY 30 tablet 4   • losartan (COZAAR) 100 MG tablet TAKE ONE TABLET BY MOUTH DAILY 90 tablet 4   • Misc. Devices (COMMODE BEDSIDE) misc 1 each Daily. 1 each 0   • nitroglycerin (NITROSTAT) 0.4 MG SL tablet DISSOLVE 1 TAB UNDER TONGUE FOR CHEST PAIN - IF PAIN REMAINS AFTER 5 MIN, CALL 911 AND REPEAT DOSE. MAX 3 TABS IN 15 MINUTES 25 tablet 4   • nystatin (MYCOSTATIN) 848232 UNIT/GM cream Apply  topically to the appropriate area as directed 2 (Two) Times a Day. to affected area(s) 30 g 3   • O2 (OXYGEN) Inhale 2.5 L/min Continuous.     • omeprazole (priLOSEC) 40 MG capsule Take 1 capsule by mouth Daily. 30 capsule 5   • Probiotic Product (PROBIOTIC PO) Take  by mouth.     • senna-docusate (PERICOLACE) 8.6-50 MG per tablet Take 1 tablet by mouth daily.     • traMADol (ULTRAM) 50 MG tablet Take 50 mg by mouth Every 6 (Six) Hours As Needed.     • TRULICITY 0.75 MG/0.5ML solution pen-injector INJECT 0.75 MG UNDER THE SKIN ONCE WEEKLY 6 pen 5   • vilazodone (VIIBRYD) 20 MG tablet tablet Take 20 mg by mouth Every Night.       No facility-administered medications prior to visit.        Patient Active Problem List   Diagnosis   •  Anxiety disorder   • Arthritis of knee   • Asthma   • Chronic coronary artery disease   • CKD (chronic kidney disease) stage 3, GFR 30-59 ml/min   • Depression   • Diabetic peripheral neuropathy (CMS/[facility])   • Gastroesophageal reflux disease   • Hyperlipidemia   • Insomnia   • Lower gastrointestinal hemorrhage   • Anemia   • OCHOA (obstructive sleep apnea)   • DM type 2 (diabetes mellitus, type 2) (CMS/[facility])   • Essential hypertension   • Hospital discharge follow-up   • Pulmonary hypertension due to sleep-disordered breathing (CMS/[facility])   • s/p MVR, TV-repair, CABG x2 6/13/16   • OLI (acute kidney injury) (CMS/[facility])   • Leukocytosis   • Atrial fibrillation (CMS/[facility])   • Nocturnal hypoxia   • Dermatitis   • Medicare annual wellness visit, initial   • Class 3 severe obesity due to excess calories with serious comorbidity and body mass index (BMI) of 50.0 to 59.9 in adult (CMS/[facility])   • Supplemental oxygen dependent   • Mitral regurgitation   • Aortic stenosis   • Medicare annual wellness visit, subsequent   • Localized edema   • Chronic right-sided congestive heart failure (CMS/[facility])   • Cellulitis of left lower extremity   • Proteinuria   • Bilateral lower extremity edema   • Subclinical hypothyroidism   • Acute on chronic diastolic CHF (congestive heart failure) (CMS/[facility])   • Chronic respiratory failure with hypoxia and hypercapnia (CMS/[facility])   • Acute on chronic respiratory failure with hypoxia and hypercapnia (CMS/[facility])   • AV block, 2nd degree   • 1st degree AV block       Advanced Care Planning:  ACP discussion was held with the patient during this visit. Patient does not have an advance directive, information provided.    Review of Systems   Constitutional: Negative for activity change, appetite change, fatigue, fever and unexpected weight change.   HENT: Negative for sore throat and trouble swallowing.    Respiratory: Positive for shortness of breath (chronic exertional dyspnea, at baseline). Negative for cough.   "  Cardiovascular: Negative for chest pain and leg swelling.   Gastrointestinal: Negative for abdominal pain, constipation and diarrhea.   Musculoskeletal: Positive for arthralgias (L knee). Negative for back pain.   Neurological: Negative for dizziness and headaches.   Psychiatric/Behavioral: Negative for sleep disturbance. The patient is not nervous/anxious.        Compared to one year ago, the patient feels her physical health is the same.  Compared to one year ago, the patient feels her mental health is the same.    Reviewed chart for potential of high risk medication in the elderly: yes  Reviewed chart for potential of harmful drug interactions in the elderly:yes    Objective         Vitals:    10/14/20 1325   BP: 132/78   Pulse: 90   Resp: 16   Temp: 97 °F (36.1 °C)   SpO2: 92%   Weight: 119 kg (262 lb)   Height: 152.4 cm (60\")       Body mass index is 51.17 kg/m².  Discussed the patient's BMI with her. The BMI is above average; BMI management plan is completed.    Physical Exam  Vitals signs reviewed.   Constitutional:       General: She is not in acute distress.     Appearance: She is well-developed. She is obese. She is not ill-appearing.      Comments: Pleasant, morbidly obese female   HENT:      Head: Normocephalic and atraumatic.      Right Ear: External ear normal.      Left Ear: External ear normal.      Nose: Nose normal.   Eyes:      Conjunctiva/sclera: Conjunctivae normal.      Pupils: Pupils are equal, round, and reactive to light.   Neck:      Musculoskeletal: Normal range of motion and neck supple.      Thyroid: No thyromegaly.   Cardiovascular:      Rate and Rhythm: Normal rate and regular rhythm.      Heart sounds: Normal heart sounds.   Pulmonary:      Effort: Pulmonary effort is normal. No respiratory distress.      Breath sounds: Normal breath sounds. No wheezing or rales.      Comments: on 4 L 02 via NC  Abdominal:      General: Bowel sounds are normal. There is no distension.      " "Palpations: Abdomen is soft.      Tenderness: There is no abdominal tenderness.   Musculoskeletal:         General: No swelling or tenderness.      Right lower leg: No edema.      Left lower leg: No edema.      Comments: In wheelchair.     Lymphadenopathy:      Cervical: No cervical adenopathy.   Skin:     General: Skin is warm and dry.      Findings: No rash.   Neurological:      Mental Status: She is alert.       L knee injection intraarticular    Date/Time: 10/25/2020 1:24 PM  Performed by: Michelle Abernathy MD  Authorized by: Michelle Abernathy MD   Consent: Verbal consent obtained.  Consent given by: patient  Patient understanding: patient states understanding of the procedure being performed  Patient consent: the patient's understanding of the procedure matches consent given  Site marked: the operative site was marked  Patient identity confirmed: verbally with patient  Indications: pain   Body area: knee  Joint: left knee  Local anesthesia used: no    Anesthesia:  Local anesthesia used: no    Sedation:  Patient sedated: no    Preparation: Patient was prepped and draped in the usual sterile fashion.  Ultrasound guidance: no  Approach: lateral  Patient tolerance: patient tolerated the procedure well with no immediate complications  Comments: 2ml of 1% lidocaine, and 1 ml of 40mg depo-medrol administered with 5ml syringe and 25g 1.5\" needle to the L knee joint, from lateral approach.  No complications. Patient tolerated procedure well.               Assessment/Plan   Medicare Risks and Personalized Health Plan  CMS Preventative Services Quick Reference  Advance Directive Discussion  Breast Cancer/Mammogram Screening  Cardiovascular risk  Colon Cancer Screening  Depression/Dysphoria  Immunizations Discussed/Encouraged (specific immunizations; Shingrix )  Obesity/Overweight     The above risks/problems have been discussed with the patient.  Pertinent information has been shared with the patient in the After Visit " Summary.  Follow up plans and orders are seen below in the Assessment/Plan Section.    Diagnoses and all orders for this visit:    1. Mixed hyperlipidemia (Primary)  -     Lipid Panel    2. Need for influenza vaccination  -     Fluad Quad >65 years    3. Essential hypertension  -     Comprehensive Metabolic Panel    4. Chronic right-sided congestive heart failure (CMS/HCC)  -     CBC & Differential    5. OCHOA (obstructive sleep apnea)  -     CBC & Differential    6. Supplemental oxygen dependent    7. Class 3 severe obesity due to excess calories with serious comorbidity and body mass index (BMI) of 50.0 to 59.9 in adult (CMS/HCC)    8. Type 2 diabetes mellitus with diabetic polyneuropathy, without long-term current use of insulin (CMS/[facility])  -     Hemoglobin A1c    9. Hypothyroidism, unspecified type  -     TSH Rfx On Abnormal To Free T4    10. Need for hepatitis C screening test  -     Hepatitis C Antibody    11. Vitamin D deficiency  -     Vitamin D 25 Hydroxy    12. Chronic pain of right knee  -     methylPREDNISolone acetate (DEPO-medrol) injection 40 mg    13. Primary osteoarthritis of right knee  -     methylPREDNISolone acetate (DEPO-medrol) injection 40 mg    14. Encounter for screening mammogram for malignant neoplasm of breast  -     Mammo Screening Bilateral With CAD; Future    Other orders  -     Pneumococcal Polysaccharide Vaccine 23-Valent Greater Than or Equal To 1yo Subcutaneous / IM      Follow Up:  Return in about 6 months (around 4/14/2021) for Recheck.     An After Visit Summary and PPPS were given to the patient.       Recommended Shingrix vaccination  Pneumovax administered  Knee injection performed in office  Lab work ordered  Mammogram ordered  Will obtain eye exam records  Will plan to see her in 6 months for follow up, and as needed.

## 2020-10-15 LAB
25(OH)D3+25(OH)D2 SERPL-MCNC: 8.4 NG/ML (ref 30–100)
ALBUMIN SERPL-MCNC: 4.3 G/DL (ref 3.7–4.7)
ALBUMIN/GLOB SERPL: 1.4 {RATIO} (ref 1.2–2.2)
ALP SERPL-CCNC: 272 IU/L (ref 39–117)
ALT SERPL-CCNC: 11 IU/L (ref 0–32)
AST SERPL-CCNC: 20 IU/L (ref 0–40)
BASOPHILS # BLD AUTO: 0.1 X10E3/UL (ref 0–0.2)
BASOPHILS NFR BLD AUTO: 1 %
BILIRUB SERPL-MCNC: 0.7 MG/DL (ref 0–1.2)
BUN SERPL-MCNC: 23 MG/DL (ref 8–27)
BUN/CREAT SERPL: 17 (ref 12–28)
CALCIUM SERPL-MCNC: 9.2 MG/DL (ref 8.7–10.3)
CHLORIDE SERPL-SCNC: 101 MMOL/L (ref 96–106)
CHOLEST SERPL-MCNC: 162 MG/DL (ref 100–199)
CO2 SERPL-SCNC: 27 MMOL/L (ref 20–29)
CREAT SERPL-MCNC: 1.38 MG/DL (ref 0.57–1)
EOSINOPHIL # BLD AUTO: 0.2 X10E3/UL (ref 0–0.4)
EOSINOPHIL NFR BLD AUTO: 2 %
ERYTHROCYTE [DISTWIDTH] IN BLOOD BY AUTOMATED COUNT: 16.3 % (ref 11.7–15.4)
GLOBULIN SER CALC-MCNC: 3 G/DL (ref 1.5–4.5)
GLUCOSE SERPL-MCNC: 96 MG/DL (ref 65–99)
HBA1C MFR BLD: 6.4 % (ref 4.8–5.6)
HCT VFR BLD AUTO: 39.2 % (ref 34–46.6)
HCV AB S/CO SERPL IA: <0.1 S/CO RATIO (ref 0–0.9)
HDLC SERPL-MCNC: 64 MG/DL
HGB BLD-MCNC: 12.5 G/DL (ref 11.1–15.9)
IMM GRANULOCYTES # BLD AUTO: 0 X10E3/UL (ref 0–0.1)
IMM GRANULOCYTES NFR BLD AUTO: 0 %
LDLC SERPL CALC-MCNC: 79 MG/DL (ref 0–99)
LYMPHOCYTES # BLD AUTO: 1.3 X10E3/UL (ref 0.7–3.1)
LYMPHOCYTES NFR BLD AUTO: 12 %
MCH RBC QN AUTO: 25.3 PG (ref 26.6–33)
MCHC RBC AUTO-ENTMCNC: 31.9 G/DL (ref 31.5–35.7)
MCV RBC AUTO: 79 FL (ref 79–97)
MONOCYTES # BLD AUTO: 0.5 X10E3/UL (ref 0.1–0.9)
MONOCYTES NFR BLD AUTO: 5 %
NEUTROPHILS # BLD AUTO: 8.7 X10E3/UL (ref 1.4–7)
NEUTROPHILS NFR BLD AUTO: 80 %
PLATELET # BLD AUTO: 260 X10E3/UL (ref 150–450)
POTASSIUM SERPL-SCNC: 4.1 MMOL/L (ref 3.5–5.2)
PROT SERPL-MCNC: 7.3 G/DL (ref 6–8.5)
RBC # BLD AUTO: 4.95 X10E6/UL (ref 3.77–5.28)
SODIUM SERPL-SCNC: 145 MMOL/L (ref 134–144)
TRIGL SERPL-MCNC: 106 MG/DL (ref 0–149)
TSH SERPL DL<=0.005 MIU/L-ACNC: 4.24 UIU/ML (ref 0.45–4.5)
VLDLC SERPL CALC-MCNC: 19 MG/DL (ref 5–40)
WBC # BLD AUTO: 10.8 X10E3/UL (ref 3.4–10.8)

## 2020-10-16 ENCOUNTER — TELEPHONE (OUTPATIENT)
Dept: FAMILY MEDICINE CLINIC | Facility: CLINIC | Age: 75
End: 2020-10-16

## 2020-10-16 NOTE — TELEPHONE ENCOUNTER
A medical record request was sent back from Dr.Gay Jarrett, stating that they have no records for the pt. I tried to call the pt to get more information but there was no answer and I was unable to lvm.

## 2020-10-22 ENCOUNTER — TELEPHONE (OUTPATIENT)
Dept: FAMILY MEDICINE CLINIC | Facility: CLINIC | Age: 75
End: 2020-10-22

## 2020-10-23 DIAGNOSIS — E55.9 VITAMIN D DEFICIENCY: Primary | ICD-10-CM

## 2020-10-23 DIAGNOSIS — R74.8 ELEVATED ALKALINE PHOSPHATASE LEVEL: ICD-10-CM

## 2020-10-23 RX ORDER — ERGOCALCIFEROL 1.25 MG/1
50000 CAPSULE ORAL
Qty: 8 CAPSULE | Refills: 0 | Status: SHIPPED | OUTPATIENT
Start: 2020-10-23 | End: 2020-12-12

## 2020-10-23 NOTE — TELEPHONE ENCOUNTER
Yes, I received it and I spoke to patient and I made the appt for her.  Please see result note for the documentation.

## 2020-10-23 NOTE — PROGRESS NOTES
Alicia,    Could you please call Miguelina with her lab results? Everything looks good and stable- however her vitamin D level is very low- I am going to prescribe a prescription strength Vitamin D tablet that she should take once a week for 8 weeks- then we can recheck the levels. I'm also going to repeat her liver enzymes at that time because one of her liver enzymes is high (it has been high in the past but is a little higher now). Could you schedule her for lab work in 8 weeks? I will go ahead and order them. Please let me know if she has any questions or concerns. Thank you!    CN

## 2020-10-25 PROCEDURE — 20610 DRAIN/INJ JOINT/BURSA W/O US: CPT | Performed by: FAMILY MEDICINE

## 2020-10-25 RX ADMIN — METHYLPREDNISOLONE ACETATE 40 MG: 40 INJECTION, SUSPENSION INTRA-ARTICULAR; INTRALESIONAL; INTRAMUSCULAR; SOFT TISSUE at 13:24

## 2020-10-26 RX ORDER — FUROSEMIDE 40 MG/1
TABLET ORAL
Qty: 60 TABLET | Refills: 1 | Status: SHIPPED | OUTPATIENT
Start: 2020-10-26 | End: 2020-12-04

## 2020-10-26 RX ORDER — METHYLPREDNISOLONE ACETATE 40 MG/ML
40 INJECTION, SUSPENSION INTRA-ARTICULAR; INTRALESIONAL; INTRAMUSCULAR; SOFT TISSUE
Status: COMPLETED | OUTPATIENT
Start: 2020-10-25 | End: 2020-10-25

## 2020-11-08 DIAGNOSIS — M25.562 CHRONIC PAIN OF LEFT KNEE: Primary | ICD-10-CM

## 2020-11-08 DIAGNOSIS — G89.29 CHRONIC PAIN OF LEFT KNEE: Primary | ICD-10-CM

## 2020-11-10 RX ORDER — TRAMADOL HYDROCHLORIDE 50 MG/1
TABLET ORAL
Qty: 30 TABLET | Refills: 1 | Status: SHIPPED | OUTPATIENT
Start: 2020-11-10 | End: 2021-02-11 | Stop reason: HOSPADM

## 2020-11-11 ENCOUNTER — OFFICE VISIT (OUTPATIENT)
Dept: CARDIOLOGY | Facility: CLINIC | Age: 75
End: 2020-11-11

## 2020-11-11 VITALS — BODY MASS INDEX: 50.26 KG/M2 | HEIGHT: 60 IN | WEIGHT: 256 LBS

## 2020-11-11 DIAGNOSIS — I10 ESSENTIAL HYPERTENSION: ICD-10-CM

## 2020-11-11 DIAGNOSIS — I35.0 NONRHEUMATIC AORTIC VALVE STENOSIS: ICD-10-CM

## 2020-11-11 DIAGNOSIS — I34.0 NONRHEUMATIC MITRAL VALVE REGURGITATION: ICD-10-CM

## 2020-11-11 DIAGNOSIS — I50.812 CHRONIC RIGHT-SIDED CONGESTIVE HEART FAILURE (HCC): ICD-10-CM

## 2020-11-11 DIAGNOSIS — N18.32 STAGE 3B CHRONIC KIDNEY DISEASE (HCC): ICD-10-CM

## 2020-11-11 DIAGNOSIS — I50.32 CHRONIC DIASTOLIC HEART FAILURE (HCC): Primary | ICD-10-CM

## 2020-11-11 DIAGNOSIS — I48.0 PAROXYSMAL ATRIAL FIBRILLATION (HCC): ICD-10-CM

## 2020-11-11 PROCEDURE — 99442 PR PHYS/QHP TELEPHONE EVALUATION 11-20 MIN: CPT | Performed by: INTERNAL MEDICINE

## 2020-11-11 NOTE — PROGRESS NOTES
Date of Office Visit: 2020    Patient Name: Miguelina Lopez  : 1945    Encounter Provider: Antoine Ayers MD  Referring Provider: No ref. provider found  Place of Service: [facility] CARDIOLOGY  Patient Care Team:  Michelle Abernathy MD as PCP - General (Family Medicine)  Robbie Wilson MD as Consulting Physician (Hematology and Oncology)  Chace Johnson MD as Consulting Physician (Cardiology)    The patient consented to a telephone telehealth visit today.    Chief Complaint   Patient presents with   • Coronary Artery Disease     History of Present Illness    The patient is a 74-year-old white female with a fairly complex medical history.  We are having a telephone visit today due to the patient's concerns about the pandemic department.  She is fairly well confined to her apartment.      The patient has a history of coronary artery disease and valvular heart disease.  She underwent coronary bypass surgery as well as a mitral valve replacement with a tissue prosthesis and a tricuspid valve repair.  The patient's last echocardiogram was performed in 2019.  She was noted to have normal left ventricular systolic function.  There is mild to moderate concentric left ventricular hypertrophy.  Septum contracted abnormally consistent with a postsurgical finding.  The right ventricle is moderately dilated.  The aortic valve has thickening with moderate aortic stenosis present.  There is a bioprosthetic mitral valve that is essentially abnormal with perivalvular regurgitation.  Right-sided pressures are elevated with a pulmonary pressure of 57.    The patient is presently on oxygen therapy.  She also continues on diuretic therapy because of her right-sided heart failure.  From a symptom standpoint other than fatigue she states overall she has generally been feeling fairly well.  She does not complain of chest pain.  She denies any significant exertional  dyspnea although she leads a rather sedentary existence.  She denies palpitations nor lightheadedness.    I reviewed her medication.  She had a question about beta-blockers.  At one point she was very bradycardic and the medication was discontinued.  She is now noted to have normal left ventricular systolic function so the evidence to support beta-blocker in view of this is not there.    Past Medical History:   Diagnosis Date   • Acute on chronic respiratory failure with hypoxia and hypercapnia (CMS/[facility])    • OLI (acute kidney injury) (CMS/[facility])    • Anemia    • Anxiety    • Aortic valve stenosis    • Bilateral lower extremity edema    • CHF (congestive heart failure) (CMS/[facility])    • Chronic coronary artery disease    • Class 3 severe obesity due to excess calories in adult (CMS/[facility])    • COPD (chronic obstructive pulmonary disease) (CMS/[facility])    • Depression    • Diabetes mellitus (CMS/[facility])    • Heart murmur    • Hypertension    • Mitral valve insufficiency    • Pneumonia     1/2016   • Pulmonary hypertension (CMS/[facility])     due to sleep disordered breathing   • Sleep apnea     Uses CPAP or oxygen   • Stage 3 chronic kidney disease    • Subclinical hypothyroidism    • Supplemental oxygen dependent    • Valvular heart disease          Past Surgical History:   Procedure Laterality Date   • CARDIAC CATHETERIZATION     • CARDIAC CATHETERIZATION N/A 6/10/2016    Procedure: Left Heart Cath;  Surgeon: Chace Johnson MD;  Location: [facility] CATH INVASIVE LOCATION;  Service:    • CARDIAC CATHETERIZATION N/A 6/10/2016    Procedure: Right Heart Cath;  Surgeon: Chace Johnson MD;  Location: [facility] CATH INVASIVE LOCATION;  Service:    • CORONARY ARTERY BYPASS GRAFT      2 vessel   • CORONARY ARTERY BYPASS GRAFT WITH MITRAL VALVE REPAIR/REPLACEMENT N/A 6/13/2016    Procedure: INTRAOPERATIVE TARIQ, MIDLINE STERNOTOMY, CORONARY ARTERY BYPASS GRAFTING X  2 UTILIZING ENDOSCOPICALLY HARVESTED LEFT GREATER SAPHENOUS VEIN, MITRAL  VALVE REPLACEMENT AND TRICUSPID VALVE REPAIR;  Surgeon: Eliecer Mistry MD;  Location: [facility];  Service:    • CORONARY STENT PLACEMENT  2010    Approx. 6 yrs ago at [facility]   • HEMORRHOIDECTOMY     • HYSTERECTOMY     • MITRAL VALVE REPLACEMENT     • REPLACEMENT TOTAL KNEE Right    • THYROID SURGERY      Cyst removed from thyroid           Current Outpatient Medications:   •  albuterol sulfate HFA (VENTOLIN HFA) 108 (90 Base) MCG/ACT inhaler, Inhale 2 puffs Every 6 (Six) Hours As Needed for Wheezing., Disp: 18 g, Rfl: 0  •  ALPRAZolam (XANAX) 1 MG tablet, Take 0.5 mg by mouth At Night As Needed., Disp: , Rfl:   •  aspirin  MG tablet, TAKE ONE TABLET BY MOUTH DAILY, Disp: 90 tablet, Rfl: 1  •  atorvastatin (LIPITOR) 20 MG tablet, Take 1 tablet by mouth Daily., Disp: 90 tablet, Rfl: 1  •  clotrimazole-betamethasone (LOTRISONE) 1-0.05 % cream, Apply  topically to the appropriate area as directed 2 (Two) Times a Day., Disp: 45 g, Rfl: 0  •  ferrous sulfate 324 (65 FE) MG tablet delayed-release EC tablet, Take 324 mg by mouth Daily With Breakfast., Disp: , Rfl:   •  fluticasone-salmeterol (ADVAIR DISKUS) 250-50 MCG/DOSE DISKUS, Inhale 1 puff Daily As Needed (cough and wheezing)., Disp: 60 each, Rfl: 0  •  furosemide (LASIX) 40 MG tablet, TAKE ONE TABLET BY MOUTH TWICE A DAY, Disp: 60 tablet, Rfl: 1  •  gabapentin (NEURONTIN) 100 MG capsule, TAKE ONE CAPSULE BY MOUTH EVERY 12 HOURS, Disp: 60 capsule, Rfl: 1  •  glimepiride (AMARYL) 1 MG tablet, TAKE ONE TABLET BY MOUTH EVERY MORNING BEFORE BREAKFAST, Disp: 90 tablet, Rfl: 1  •  ipratropium-albuterol (DUO-NEB) 0.5-2.5 mg/mL nebulizer, Take 3 mL by nebulization 4 (four) times a day. (Patient taking differently: Take 3 mL by nebulization Daily As Needed.), Disp: 3 mL, Rfl: 5  •  levothyroxine (SYNTHROID, LEVOTHROID) 25 MCG tablet, TAKE ONE TABLET BY MOUTH DAILY, Disp: 30 tablet, Rfl: 4  •  losartan (COZAAR) 100 MG tablet, TAKE ONE TABLET BY MOUTH DAILY, Disp:  90 tablet, Rfl: 4  •  Misc. Devices (COMMODE BEDSIDE) misc, 1 each Daily., Disp: 1 each, Rfl: 0  •  nitroglycerin (NITROSTAT) 0.4 MG SL tablet, DISSOLVE 1 TAB UNDER TONGUE FOR CHEST PAIN - IF PAIN REMAINS AFTER 5 MIN, CALL 911 AND REPEAT DOSE. MAX 3 TABS IN 15 MINUTES, Disp: 25 tablet, Rfl: 4  •  nystatin (MYCOSTATIN) 288247 UNIT/GM cream, Apply  topically to the appropriate area as directed 2 (Two) Times a Day. to affected area(s), Disp: 30 g, Rfl: 3  •  O2 (OXYGEN), Inhale 2.5 L/min Continuous., Disp: , Rfl:   •  omeprazole (priLOSEC) 40 MG capsule, Take 1 capsule by mouth Daily., Disp: 30 capsule, Rfl: 5  •  Probiotic Product (PROBIOTIC PO), Take  by mouth., Disp: , Rfl:   •  senna-docusate (PERICOLACE) 8.6-50 MG per tablet, Take 1 tablet by mouth daily., Disp: , Rfl:   •  traMADol (ULTRAM) 50 MG tablet, TAKE ONE TABLET BY MOUTH EVERY 8 HOURS AS NEEDED FOR MODERATE LEG PAIN FOR UP TO 90 DOSES, Disp: 30 tablet, Rfl: 1  •  TRULICITY 0.75 MG/0.5ML solution pen-injector, INJECT 0.75 MG UNDER THE SKIN ONCE WEEKLY, Disp: 6 pen, Rfl: 5  •  vilazodone (VIIBRYD) 20 MG tablet tablet, Take 20 mg by mouth Every Night., Disp: , Rfl:   •  vitamin D (ERGOCALCIFEROL) 1.25 MG (65063 UT) capsule capsule, Take 1 capsule by mouth Every 7 (Seven) Days for 8 doses., Disp: 8 capsule, Rfl: 0    Current Facility-Administered Medications:   •  methylPREDNISolone acetate (DEPO-medrol) injection 40 mg, 40 mg, Intra-articular, Once, Michelle Abernathy MD      Social History     Socioeconomic History   • Marital status:      Spouse name: Not on file   • Number of children: Not on file   • Years of education: Not on file   • Highest education level: Not on file   Tobacco Use   • Smoking status: Never Smoker   • Smokeless tobacco: Never Used   • Tobacco comment: no caffeine    Substance and Sexual Activity   • Alcohol use: No   • Drug use: No   • Sexual activity: Defer         Review of Systems   Constitution: Positive for malaise/fatigue.  "  HENT: Negative.    Eyes: Negative.    Cardiovascular: Negative.    Respiratory: Negative.    Endocrine: Negative.    Skin: Negative.    Musculoskeletal: Negative.    Gastrointestinal: Negative.    Neurological: Negative.    Psychiatric/Behavioral: Negative.        Procedures    Procedures      No ECG performed.    Objective:    Ht 152.4 cm (60\")   Wt 116 kg (256 lb)   BMI 50.00 kg/m²         Physical Exam  No physical examination performed.      Assessment:       Diagnosis Plan   1. Chronic diastolic heart failure (CMS/HCC)     2. Chronic right-sided congestive heart failure (CMS/HCC)     3. Paroxysmal atrial fibrillation (CMS/HCC)     4. Nonrheumatic mitral valve regurgitation     5. Nonrheumatic aortic valve stenosis     6. Essential hypertension     7. Stage 3b chronic kidney disease       1.  Chronic diastolic heart failure: Moderate left ventricular hypertrophy  2.  Chronic right-sided congestive heart failure: Secondary to severe pulmonary disease  3.  Paroxysmal atrial fibrillation: No known recent recurrence  4.  Mitral regurgitation: Status post mitral valve replacement with tissue prosthesis: Perivalvular leak noted  5.  Tricuspid regurgitation: That is post repair  6.  Aortic stenosis: Mild  7.  Pulmonary hypertension: Severe  8.  Hypertension: No blood pressure reading obtainable  9.  Chronic kidney disease: Renal function about the same  10.  Morbid obesity  11.  Diabetes mellitus on oral therapy  12.  Respiratory failure: On nocturnal oxygen       Plan:       No changes were made in the patient's medication.  Hopefully the patient will be able to follow-up in office visit in 6 months.    Total time for the patient's evaluation including direct patient contact: 15 minutes  "

## 2020-11-23 ENCOUNTER — TELEPHONE (OUTPATIENT)
Dept: FAMILY MEDICINE CLINIC | Facility: CLINIC | Age: 75
End: 2020-11-23

## 2020-11-23 NOTE — TELEPHONE ENCOUNTER
PT IS CALLING IN STATING THAT DR HEART PUT HER ON A VITAMIN D TABLET(TAKE ONE A WEEK FOR 8 WEEKS)) AND WAS TO COME IN AFTER SHE IS DONE FOR A FOLLOW UP VISIT WITH DR.  NOW THAT DR HEART IS NO LONGER WITH THE OFFICE SHE WILL BE SEEING CARIN FOSTER BUT CANT GET IN UNTIL April.  PT WANTS TO KNOW IF SHE CAN BE SEEN SOONER.      PT CALL BACK  [phone]

## 2020-12-04 RX ORDER — FUROSEMIDE 40 MG/1
TABLET ORAL
Qty: 60 TABLET | Refills: 0 | Status: SHIPPED | OUTPATIENT
Start: 2020-12-04 | End: 2021-02-11 | Stop reason: HOSPADM

## 2020-12-04 RX ORDER — ATORVASTATIN CALCIUM 20 MG/1
TABLET, FILM COATED ORAL
Qty: 90 TABLET | Refills: 0 | Status: SHIPPED | OUTPATIENT
Start: 2020-12-04 | End: 2021-09-20

## 2020-12-17 RX ORDER — ERGOCALCIFEROL 1.25 MG/1
50000 CAPSULE ORAL WEEKLY
Qty: 4 CAPSULE | Refills: 0 | Status: SHIPPED | OUTPATIENT
Start: 2020-12-17 | End: 2021-01-11

## 2020-12-17 RX ORDER — OMEPRAZOLE 40 MG/1
40 CAPSULE, DELAYED RELEASE ORAL DAILY
Qty: 30 CAPSULE | Refills: 0 | Status: SHIPPED | OUTPATIENT
Start: 2020-12-17 | End: 2021-01-14 | Stop reason: SDUPTHER

## 2020-12-17 RX ORDER — LEVOTHYROXINE SODIUM 0.03 MG/1
25 TABLET ORAL DAILY
Qty: 30 TABLET | Refills: 0 | Status: SHIPPED | OUTPATIENT
Start: 2020-12-17 | End: 2021-01-05 | Stop reason: SDUPTHER

## 2020-12-18 ENCOUNTER — TELEPHONE (OUTPATIENT)
Dept: FAMILY MEDICINE CLINIC | Facility: CLINIC | Age: 75
End: 2020-12-18

## 2020-12-18 DIAGNOSIS — G63 POLYNEUROPATHY ASSOCIATED WITH UNDERLYING DISEASE (HCC): ICD-10-CM

## 2020-12-18 RX ORDER — GABAPENTIN 100 MG/1
100 CAPSULE ORAL EVERY 12 HOURS
Qty: 60 CAPSULE | Refills: 1 | Status: CANCELLED | OUTPATIENT
Start: 2020-12-18

## 2020-12-18 NOTE — TELEPHONE ENCOUNTER
Caller: Doyle Carrizalesa    Relationship: Emergency Contact    Best call back number: [phone]    Medication needed:   Requested Prescriptions     Pending Prescriptions Disp Refills   • gabapentin (NEURONTIN) 100 MG capsule 60 capsule 1     Sig: Take 1 capsule by mouth Every 12 (Twelve) Hours.       When do you need the refill by: ENOUGH TO GET THROUGH TO 12/19.    Does the patient have less than a 3 day supply:  [x] Yes  [] No    What is the patient's preferred pharmacy: [address] [facility] [phone] [facility] [phone] FX           DELETE AFTER READING TO PATIENT: “Thank you for sharing this information with me. I will send a message to the clinical team. Please allow 48 hours for the clinical staff to follow up on this request.”

## 2020-12-21 ENCOUNTER — RESULTS ENCOUNTER (OUTPATIENT)
Dept: FAMILY MEDICINE CLINIC | Facility: CLINIC | Age: 75
End: 2020-12-21

## 2020-12-21 DIAGNOSIS — R74.8 ELEVATED ALKALINE PHOSPHATASE LEVEL: ICD-10-CM

## 2020-12-21 DIAGNOSIS — E55.9 VITAMIN D DEFICIENCY: ICD-10-CM

## 2020-12-21 NOTE — TELEPHONE ENCOUNTER
Patient now has an appt scheduled 12/28/20200 patient has been made aware that she needs an appt before medication can be prescribed

## 2020-12-23 DIAGNOSIS — G63 POLYNEUROPATHY ASSOCIATED WITH UNDERLYING DISEASE (HCC): ICD-10-CM

## 2020-12-23 RX ORDER — GABAPENTIN 100 MG/1
100 CAPSULE ORAL EVERY 12 HOURS
Qty: 15 CAPSULE | Refills: 0 | Status: SHIPPED | OUTPATIENT
Start: 2020-12-23 | End: 2020-12-28 | Stop reason: SDUPTHER

## 2020-12-23 NOTE — TELEPHONE ENCOUNTER
Pt states she is going through withdrawls with now being out of Gabapentin for a week. Rosie instructed me to send to James Epley and see if he would be ok filling this until patient's appointment on Monday 12/28.

## 2020-12-28 NOTE — TELEPHONE ENCOUNTER
Patient takes a low-dose gabapentin  Her doctor no longer practices with [facility]  She has appointment next week  We will refill 1 week due to symptoms of withdrawal

## 2021-01-01 ENCOUNTER — TELEPHONE (OUTPATIENT)
Dept: FAMILY MEDICINE CLINIC | Facility: CLINIC | Age: 76
End: 2021-01-01

## 2021-01-01 DIAGNOSIS — E11.65 UNCONTROLLED TYPE 2 DIABETES MELLITUS WITH HYPERGLYCEMIA (HCC): ICD-10-CM

## 2021-01-01 DIAGNOSIS — G63 POLYNEUROPATHY ASSOCIATED WITH UNDERLYING DISEASE (HCC): ICD-10-CM

## 2021-01-01 RX ORDER — ATORVASTATIN CALCIUM 20 MG/1
TABLET, FILM COATED ORAL
Qty: 90 TABLET | Refills: 0 | Status: SHIPPED | OUTPATIENT
Start: 2021-01-01 | End: 2022-01-01 | Stop reason: DRUGHIGH

## 2021-01-01 RX ORDER — ERGOCALCIFEROL 1.25 MG/1
CAPSULE ORAL
Qty: 12 CAPSULE | Refills: 0 | OUTPATIENT
Start: 2021-01-01

## 2021-01-01 RX ORDER — GABAPENTIN 100 MG/1
100 CAPSULE ORAL EVERY 12 HOURS
Qty: 60 CAPSULE | Refills: 2 | Status: SHIPPED | OUTPATIENT
Start: 2021-01-01 | End: 2022-01-01

## 2021-01-01 RX ORDER — DULAGLUTIDE 0.75 MG/.5ML
0.75 INJECTION, SOLUTION SUBCUTANEOUS WEEKLY
Qty: 3 PEN | Refills: 2 | Status: SHIPPED | OUTPATIENT
Start: 2021-01-01 | End: 2022-01-01 | Stop reason: SDUPTHER

## 2021-01-05 RX ORDER — LEVOTHYROXINE SODIUM 0.03 MG/1
25 TABLET ORAL DAILY
Qty: 90 TABLET | Refills: 1 | Status: SHIPPED | OUTPATIENT
Start: 2021-01-05 | End: 2021-06-24 | Stop reason: SDUPTHER

## 2021-01-06 DIAGNOSIS — J45.909 UNCOMPLICATED ASTHMA, UNSPECIFIED ASTHMA SEVERITY, UNSPECIFIED WHETHER PERSISTENT: ICD-10-CM

## 2021-01-06 RX ORDER — ALBUTEROL SULFATE 90 UG/1
AEROSOL, METERED RESPIRATORY (INHALATION)
Qty: 18 G | Refills: 2 | Status: ON HOLD | OUTPATIENT
Start: 2021-01-06 | End: 2021-02-01

## 2021-01-11 RX ORDER — ERGOCALCIFEROL 1.25 MG/1
CAPSULE ORAL
Qty: 4 CAPSULE | Refills: 0 | Status: ON HOLD | OUTPATIENT
Start: 2021-01-11 | End: 2021-02-08

## 2021-01-12 ENCOUNTER — APPOINTMENT (OUTPATIENT)
Dept: MAMMOGRAPHY | Facility: HOSPITAL | Age: 76
End: 2021-01-12

## 2021-01-14 RX ORDER — OMEPRAZOLE 40 MG/1
40 CAPSULE, DELAYED RELEASE ORAL DAILY
Qty: 90 CAPSULE | Refills: 0 | Status: ON HOLD | OUTPATIENT
Start: 2021-01-14 | End: 2021-03-20 | Stop reason: SDUPTHER

## 2021-01-18 RX ORDER — LEVOTHYROXINE SODIUM 0.03 MG/1
TABLET ORAL
Qty: 30 TABLET | Refills: 0 | OUTPATIENT
Start: 2021-01-18

## 2021-01-20 ENCOUNTER — TELEMEDICINE (OUTPATIENT)
Dept: FAMILY MEDICINE CLINIC | Facility: CLINIC | Age: 76
End: 2021-01-20

## 2021-01-20 DIAGNOSIS — I89.0 ACQUIRED LYMPHEDEMA OF LEG: ICD-10-CM

## 2021-01-20 DIAGNOSIS — E11.42 TYPE 2 DIABETES MELLITUS WITH DIABETIC POLYNEUROPATHY, WITHOUT LONG-TERM CURRENT USE OF INSULIN (HCC): ICD-10-CM

## 2021-01-20 DIAGNOSIS — I50.812 CHRONIC RIGHT-SIDED CONGESTIVE HEART FAILURE (HCC): ICD-10-CM

## 2021-01-20 DIAGNOSIS — G63 POLYNEUROPATHY ASSOCIATED WITH UNDERLYING DISEASE (HCC): Primary | ICD-10-CM

## 2021-01-20 DIAGNOSIS — G89.29 CHRONIC PAIN OF LEFT KNEE: ICD-10-CM

## 2021-01-20 DIAGNOSIS — L30.4 INTERTRIGO: ICD-10-CM

## 2021-01-20 DIAGNOSIS — M25.562 CHRONIC PAIN OF LEFT KNEE: ICD-10-CM

## 2021-01-20 DIAGNOSIS — R53.1 GENERALIZED WEAKNESS: ICD-10-CM

## 2021-01-20 DIAGNOSIS — R60.9 PERIPHERAL EDEMA: ICD-10-CM

## 2021-01-20 PROCEDURE — 99214 OFFICE O/P EST MOD 30 MIN: CPT | Performed by: NURSE PRACTITIONER

## 2021-01-20 RX ORDER — ASPIRIN 325 MG
325 TABLET, DELAYED RELEASE (ENTERIC COATED) ORAL DAILY
Qty: 90 TABLET | Refills: 1 | Status: SHIPPED | OUTPATIENT
Start: 2021-01-20 | End: 2021-02-11 | Stop reason: HOSPADM

## 2021-01-20 NOTE — PROGRESS NOTES
Chief Complaint  Extremity Weakness, Leg Swelling, and Rash    Subjective          Miguelina Lopez presents to [facility] for   History of Present Illness   Patient scheduled a televisit with her granddaughter present to assist with technology.  Patient reports that she fell on the floor over there the weekend.  She was there alone.  She reports that normally she is able to walk across the room with her walker but her left knee gave out.  It has been more swollen than normal.  She called 911 not because she was hurt but because she could not get up off the floor.  She had 4 first responders come and help her get off the floor.  Since she was not injured she did not go the hospital.  She denies any LOC or injuries.  She has no bruising.    She has had chronic leg swelling due to lymphedema and in the past has done wraps.  Her left leg is chronically more swollen than her right due to CABG vein harvesting.  She is taking and tolerating her diuretics well.  She follows the recommended water restriction.    She has had some trouble bending her legs due to the weakness and also due to the swelling.  Her granddaughter began wrapping her legs again and the swelling is improved.    She lives in an apartment with her son.  Her granddaughter works night shift and her son works during the day.  So she is left alone quite a bit.  She is not able to go up and down the stairs of her apartment.  She reports there are 4 steps and she is not able to navigate these.    She was instructed to monitor her weight for history of CHF but she has not been doing this.  She denies any new dyspnea or worsening leg swelling.  She denies any cough or new orthopnea.  She continues oxygen at home.    She also has a chronic intermittent rash which is currently on one side beneath her abdominal folds.  It is bright red, itchy and irritated and has an odor.  It has improved over the last 2 days with the application of  medicated powders and making sure they are drying the area and using a folded towel between skin folds as well as some hydrocortisone as needed.    Patient denies any chest pain, heart palpitations, dizziness, lightheadedness.  She is at her baseline for dyspnea and cough, uses her oxygen continuously as directed.  She denies any nausea, vomiting, diarrhea, blood loss in her stool or urine.  She denies constipation.  She denies any dysuria or flank pain.  She denies any new or worsening headache or any vision changes.  She has no polydipsia or polyuria.        Objective   Vital Signs:   There were no vitals taken for this visit.    Physical Exam  Limited by video visit.  She is well appearing, non toxic, and does not seem to be distressed.  She seems alert and oriented and her mood and affect are normal, good historian of medical history.  No cough or dyspnea appreciated, able to complete sentences without problem.  She is wearing nasal cannula.  Her legs do appear swollen.  The left is about 3+ in the right about 2+.  There are no bruises noted.  She is able to move them well.  Rash noted to left abdominal skin folds.  No signs or symptoms of complications.    Result Review :                 Assessment and Plan    Problem List Items Addressed This Visit        Cardiac and Vasculature    Chronic right-sided congestive heart failure (CMS/[facility])    Relevant Orders    Ambulatory Referral to Home Health       Endocrine and Metabolic    DM type 2 (diabetes mellitus, type 2) (CMS/[facility])    Overview     Impression: 03/30/2015 - A1c is 7.5 , Pt to check BS BID add, Amryl 1 mg once to twice daily;          Relevant Orders    Ambulatory Referral to Home Health      Other Visit Diagnoses     Polyneuropathy associated with underlying disease (CMS/[facility])    -  Primary    Relevant Orders    Ambulatory Referral to Home Health    Chronic pain of left knee        Relevant Orders    Ambulatory Referral to Home Health    Peripheral edema         Generalized weakness        Relevant Orders    Ambulatory Referral to Home Health    Acquired lymphedema of leg        Relevant Orders    Ambulatory Referral to Home Health    Intertrigo            She has a scale at home and I recommend a daily weight with documentation.  If she experiences a weight gain of more than 3 to 5 pounds overnight and shortness of breath or cough or worsening leg swelling she is to contact cardiology or heart failure clinic.  We discussed the importance of early monitoring and outpatient treatment.    Lymphedema: She has had wraps in the past and does have chronic leg swelling.  We discussed elevating and continuing diuretics, mobilization.  She is not able to leave her home for evaluation by lymphedema clinic at this time.  Family will continue to wrap as they have been previously instructed.    Intertrigo: They will continue the medicated power.  We discussed management and prevention.  She is diabetic making it more likely to get fungal disease.  If it worsens they will let me know.    Leg weakness and fall: It is difficult to evaluate her in a televisit.  She is not able come to the office due to mobility.  I will order physical therapy as well as nursing for home management of COPD, CHF, type 2 diabetes, skin care.  She can take Tylenol as needed for knee pain.    Family is agreeable with this plan and will let me know if there are any worsening problems or complications.  We did discuss need for patient to always make sure her walker is available and that she uses it even with short walks.    Patient gave consent today for a telehealth video visit as following recommendations of our governor and CDC during the COVID-19 pandemic.    30 min was spent in discussion with pt and greater than 50% of that time was spent counseling.          Follow Up   No follow-ups on file.  Patient was given instructions and counseling regarding her condition or for health maintenance advice. Please  see specific information pulled into the AVS if appropriate.     Zoom platform used with good A/V quality.

## 2021-01-27 ENCOUNTER — TELEPHONE (OUTPATIENT)
Dept: FAMILY MEDICINE CLINIC | Facility: CLINIC | Age: 76
End: 2021-01-27

## 2021-02-01 ENCOUNTER — APPOINTMENT (OUTPATIENT)
Dept: GENERAL RADIOLOGY | Facility: HOSPITAL | Age: 76
End: 2021-02-01

## 2021-02-01 ENCOUNTER — HOSPITAL ENCOUNTER (INPATIENT)
Facility: HOSPITAL | Age: 76
LOS: 10 days | Discharge: NURSING, REHAB OR LONG-TERM CARE | End: 2021-02-11
Attending: EMERGENCY MEDICINE | Admitting: STUDENT IN AN ORGANIZED HEALTH CARE EDUCATION/TRAINING PROGRAM

## 2021-02-01 ENCOUNTER — APPOINTMENT (OUTPATIENT)
Dept: CARDIOLOGY | Facility: HOSPITAL | Age: 76
End: 2021-02-01

## 2021-02-01 DIAGNOSIS — R93.89 ABNORMAL CHEST X-RAY: ICD-10-CM

## 2021-02-01 DIAGNOSIS — I10 HYPERTENSION, UNSPECIFIED TYPE: ICD-10-CM

## 2021-02-01 DIAGNOSIS — I50.9 CONGESTIVE HEART FAILURE, UNSPECIFIED HF CHRONICITY, UNSPECIFIED HEART FAILURE TYPE (HCC): ICD-10-CM

## 2021-02-01 DIAGNOSIS — M25.562 CHRONIC PAIN OF LEFT KNEE: ICD-10-CM

## 2021-02-01 DIAGNOSIS — G63 POLYNEUROPATHY ASSOCIATED WITH UNDERLYING DISEASE (HCC): ICD-10-CM

## 2021-02-01 DIAGNOSIS — E11.42 DIABETIC PERIPHERAL NEUROPATHY (HCC): ICD-10-CM

## 2021-02-01 DIAGNOSIS — I44.2 COMPLETE HEART BLOCK (HCC): ICD-10-CM

## 2021-02-01 DIAGNOSIS — G89.29 CHRONIC PAIN OF LEFT KNEE: ICD-10-CM

## 2021-02-01 DIAGNOSIS — R07.9 CHEST PAIN, UNSPECIFIED TYPE: Primary | ICD-10-CM

## 2021-02-01 DIAGNOSIS — J44.1 COPD EXACERBATION (HCC): ICD-10-CM

## 2021-02-01 DIAGNOSIS — M17.10 ARTHRITIS OF KNEE: ICD-10-CM

## 2021-02-01 DIAGNOSIS — F41.1 GENERALIZED ANXIETY DISORDER: ICD-10-CM

## 2021-02-01 LAB
ALBUMIN SERPL-MCNC: 3.7 G/DL (ref 3.5–5.2)
ALBUMIN/GLOB SERPL: 1.6 G/DL
ALP SERPL-CCNC: 259 U/L (ref 39–117)
ALT SERPL W P-5'-P-CCNC: 14 U/L (ref 1–33)
ANION GAP SERPL CALCULATED.3IONS-SCNC: 10.7 MMOL/L (ref 5–15)
ANION GAP SERPL CALCULATED.3IONS-SCNC: 9.6 MMOL/L (ref 5–15)
AORTIC DIMENSIONLESS INDEX: 0.4 (DI)
ARTERIAL PATENCY WRIST A: POSITIVE
AST SERPL-CCNC: 23 U/L (ref 1–32)
ATMOSPHERIC PRESS: 748.6 MMHG
B PARAPERT DNA SPEC QL NAA+PROBE: NOT DETECTED
B PERT DNA SPEC QL NAA+PROBE: NOT DETECTED
BASE EXCESS BLDA CALC-SCNC: 5.1 MMOL/L (ref 0–2)
BASOPHILS # BLD AUTO: 0.06 10*3/MM3 (ref 0–0.2)
BASOPHILS # BLD AUTO: 0.07 10*3/MM3 (ref 0–0.2)
BASOPHILS NFR BLD AUTO: 0.5 % (ref 0–1.5)
BASOPHILS NFR BLD AUTO: 0.6 % (ref 0–1.5)
BDY SITE: ABNORMAL
BH CV ECHO MEAS - AO MAX PG (FULL): 60.3 MMHG
BH CV ECHO MEAS - AO MAX PG: 69 MMHG
BH CV ECHO MEAS - AO MEAN PG (FULL): 32 MMHG
BH CV ECHO MEAS - AO MEAN PG: 42 MMHG
BH CV ECHO MEAS - AO ROOT AREA (BSA CORRECTED): 1.3
BH CV ECHO MEAS - AO ROOT AREA: 5.7 CM^2
BH CV ECHO MEAS - AO ROOT DIAM: 2.7 CM
BH CV ECHO MEAS - AO V2 MAX: 425 CM/SEC
BH CV ECHO MEAS - AO V2 MEAN: 290 CM/SEC
BH CV ECHO MEAS - AO V2 VTI: 108 CM
BH CV ECHO MEAS - AVA(I,A): 1.1 CM^2
BH CV ECHO MEAS - AVA(I,D): 1.1 CM^2
BH CV ECHO MEAS - AVA(V,A): 1.2 CM^2
BH CV ECHO MEAS - AVA(V,D): 1.2 CM^2
BH CV ECHO MEAS - BSA(HAYCOCK): 2.4 M^2
BH CV ECHO MEAS - BSA: 2.1 M^2
BH CV ECHO MEAS - BZI_BMI: 52.7 KILOGRAMS/M^2
BH CV ECHO MEAS - BZI_METRIC_HEIGHT: 152.4 CM
BH CV ECHO MEAS - BZI_METRIC_WEIGHT: 122.5 KG
BH CV ECHO MEAS - EDV(MOD-SP2): 114 ML
BH CV ECHO MEAS - EDV(MOD-SP4): 119 ML
BH CV ECHO MEAS - EF(MOD-BP): 71.3 %
BH CV ECHO MEAS - EF(MOD-SP2): 69.3 %
BH CV ECHO MEAS - EF(MOD-SP4): 73.1 %
BH CV ECHO MEAS - ESV(MOD-SP2): 35 ML
BH CV ECHO MEAS - ESV(MOD-SP4): 32 ML
BH CV ECHO MEAS - LAT PEAK E' VEL: 6.6 CM/SEC
BH CV ECHO MEAS - LV DIASTOLIC VOL/BSA (35-75): 56.1 ML/M^2
BH CV ECHO MEAS - LV MAX PG: 12 MMHG
BH CV ECHO MEAS - LV MEAN PG: 6 MMHG
BH CV ECHO MEAS - LV SYSTOLIC VOL/BSA (12-30): 15.1 ML/M^2
BH CV ECHO MEAS - LV V1 MAX: 173 CM/SEC
BH CV ECHO MEAS - LV V1 MEAN: 116 CM/SEC
BH CV ECHO MEAS - LV V1 VTI: 42.9 CM
BH CV ECHO MEAS - LVLD AP2: 8.6 CM
BH CV ECHO MEAS - LVLD AP4: 8.3 CM
BH CV ECHO MEAS - LVLS AP2: 7.7 CM
BH CV ECHO MEAS - LVLS AP4: 7.3 CM
BH CV ECHO MEAS - LVOT AREA (M): 2.8 CM^2
BH CV ECHO MEAS - LVOT AREA: 2.8 CM^2
BH CV ECHO MEAS - LVOT DIAM: 1.9 CM
BH CV ECHO MEAS - MED PEAK E' VEL: 4.8 CM/SEC
BH CV ECHO MEAS - MV DEC SLOPE: 720 CM/SEC^2
BH CV ECHO MEAS - MV DEC TIME: 176 SEC
BH CV ECHO MEAS - MV E MAX VEL: 232 CM/SEC
BH CV ECHO MEAS - MV MAX PG: 24 MMHG
BH CV ECHO MEAS - MV MEAN PG: 10 MMHG
BH CV ECHO MEAS - MV P1/2T MAX VEL: 237 CM/SEC
BH CV ECHO MEAS - MV P1/2T: 96.4 MSEC
BH CV ECHO MEAS - MV V2 MAX: 242 CM/SEC
BH CV ECHO MEAS - MV V2 MEAN: 150 CM/SEC
BH CV ECHO MEAS - MV V2 VTI: 78.9 CM
BH CV ECHO MEAS - MVA P1/2T LCG: 0.93 CM^2
BH CV ECHO MEAS - MVA(P1/2T): 2.3 CM^2
BH CV ECHO MEAS - MVA(VTI): 1.5 CM^2
BH CV ECHO MEAS - PA ACC TIME: 0.09 SEC
BH CV ECHO MEAS - PA MAX PG: 7.8 MMHG
BH CV ECHO MEAS - PA PR(ACCEL): 37.6 MMHG
BH CV ECHO MEAS - PA V2 MAX: 140 CM/SEC
BH CV ECHO MEAS - RAP SYSTOLE: 3 MMHG
BH CV ECHO MEAS - RVSP: 47 MMHG
BH CV ECHO MEAS - SI(AO): 291.6 ML/M^2
BH CV ECHO MEAS - SI(LVOT): 57.4 ML/M^2
BH CV ECHO MEAS - SI(MOD-SP2): 37.3 ML/M^2
BH CV ECHO MEAS - SI(MOD-SP4): 41 ML/M^2
BH CV ECHO MEAS - SV(AO): 618.4 ML
BH CV ECHO MEAS - SV(LVOT): 121.6 ML
BH CV ECHO MEAS - SV(MOD-SP2): 79 ML
BH CV ECHO MEAS - SV(MOD-SP4): 87 ML
BH CV ECHO MEAS - TAPSE (>1.6): 1.6 CM
BH CV ECHO MEAS - TR MAX VEL: 326 CM/SEC
BH CV ECHO MEASUREMENTS AVERAGE E/E' RATIO: 40.7
BH CV XLRA - RV BASE: 4.1 CM
BH CV XLRA - TDI S': 5.1 CM/SEC
BILIRUB SERPL-MCNC: 0.6 MG/DL (ref 0–1.2)
BUN SERPL-MCNC: 19 MG/DL (ref 8–23)
BUN SERPL-MCNC: 20 MG/DL (ref 8–23)
BUN/CREAT SERPL: 13.4 (ref 7–25)
BUN/CREAT SERPL: 14.5 (ref 7–25)
C PNEUM DNA NPH QL NAA+NON-PROBE: NOT DETECTED
CALCIUM SPEC-SCNC: 8.5 MG/DL (ref 8.6–10.5)
CALCIUM SPEC-SCNC: 8.9 MG/DL (ref 8.6–10.5)
CHLORIDE SERPL-SCNC: 102 MMOL/L (ref 98–107)
CHLORIDE SERPL-SCNC: 103 MMOL/L (ref 98–107)
CO2 SERPL-SCNC: 32.3 MMOL/L (ref 22–29)
CO2 SERPL-SCNC: 33.4 MMOL/L (ref 22–29)
CREAT SERPL-MCNC: 1.38 MG/DL (ref 0.57–1)
CREAT SERPL-MCNC: 1.42 MG/DL (ref 0.57–1)
DEPRECATED RDW RBC AUTO: 47.2 FL (ref 37–54)
DEPRECATED RDW RBC AUTO: 48.9 FL (ref 37–54)
EOSINOPHIL # BLD AUTO: 0.11 10*3/MM3 (ref 0–0.4)
EOSINOPHIL # BLD AUTO: 0.14 10*3/MM3 (ref 0–0.4)
EOSINOPHIL NFR BLD AUTO: 1 % (ref 0.3–6.2)
EOSINOPHIL NFR BLD AUTO: 1.2 % (ref 0.3–6.2)
ERYTHROCYTE [DISTWIDTH] IN BLOOD BY AUTOMATED COUNT: 15.7 % (ref 12.3–15.4)
ERYTHROCYTE [DISTWIDTH] IN BLOOD BY AUTOMATED COUNT: 15.9 % (ref 12.3–15.4)
FLUAV SUBTYP SPEC NAA+PROBE: NOT DETECTED
FLUBV RNA ISLT QL NAA+PROBE: NOT DETECTED
GAS FLOW AIRWAY: 5 LPM
GFR SERPL CREATININE-BSD FRML MDRD: 36 ML/MIN/1.73
GFR SERPL CREATININE-BSD FRML MDRD: 37 ML/MIN/1.73
GLOBULIN UR ELPH-MCNC: 2.3 GM/DL
GLUCOSE BLDC GLUCOMTR-MCNC: 111 MG/DL (ref 70–130)
GLUCOSE BLDC GLUCOMTR-MCNC: 140 MG/DL (ref 70–130)
GLUCOSE SERPL-MCNC: 112 MG/DL (ref 65–99)
GLUCOSE SERPL-MCNC: 128 MG/DL (ref 65–99)
HADV DNA SPEC NAA+PROBE: NOT DETECTED
HBA1C MFR BLD: 6.71 % (ref 4.8–5.6)
HCO3 BLDA-SCNC: 30.6 MMOL/L (ref 22–28)
HCOV 229E RNA SPEC QL NAA+PROBE: NOT DETECTED
HCOV HKU1 RNA SPEC QL NAA+PROBE: NOT DETECTED
HCOV NL63 RNA SPEC QL NAA+PROBE: NOT DETECTED
HCOV OC43 RNA SPEC QL NAA+PROBE: NOT DETECTED
HCT VFR BLD AUTO: 37 % (ref 34–46.6)
HCT VFR BLD AUTO: 37 % (ref 34–46.6)
HGB BLD-MCNC: 11.6 G/DL (ref 12–15.9)
HGB BLD-MCNC: 11.7 G/DL (ref 12–15.9)
HMPV RNA NPH QL NAA+NON-PROBE: NOT DETECTED
HPIV1 RNA SPEC QL NAA+PROBE: NOT DETECTED
HPIV2 RNA SPEC QL NAA+PROBE: NOT DETECTED
HPIV3 RNA NPH QL NAA+PROBE: NOT DETECTED
HPIV4 P GENE NPH QL NAA+PROBE: NOT DETECTED
IMM GRANULOCYTES # BLD AUTO: 0.04 10*3/MM3 (ref 0–0.05)
IMM GRANULOCYTES # BLD AUTO: 0.07 10*3/MM3 (ref 0–0.05)
IMM GRANULOCYTES NFR BLD AUTO: 0.3 % (ref 0–0.5)
IMM GRANULOCYTES NFR BLD AUTO: 0.6 % (ref 0–0.5)
LEFT ATRIUM VOLUME INDEX: 47 ML/M2
LYMPHOCYTES # BLD AUTO: 1.11 10*3/MM3 (ref 0.7–3.1)
LYMPHOCYTES # BLD AUTO: 1.41 10*3/MM3 (ref 0.7–3.1)
LYMPHOCYTES NFR BLD AUTO: 12.2 % (ref 19.6–45.3)
LYMPHOCYTES NFR BLD AUTO: 9.7 % (ref 19.6–45.3)
M PNEUMO IGG SER IA-ACNC: NOT DETECTED
MAGNESIUM SERPL-MCNC: 2 MG/DL (ref 1.6–2.4)
MCH RBC QN AUTO: 26.3 PG (ref 26.6–33)
MCH RBC QN AUTO: 26.4 PG (ref 26.6–33)
MCHC RBC AUTO-ENTMCNC: 31.4 G/DL (ref 31.5–35.7)
MCHC RBC AUTO-ENTMCNC: 31.6 G/DL (ref 31.5–35.7)
MCV RBC AUTO: 83.1 FL (ref 79–97)
MCV RBC AUTO: 84.1 FL (ref 79–97)
MODALITY: ABNORMAL
MONOCYTES # BLD AUTO: 0.56 10*3/MM3 (ref 0.1–0.9)
MONOCYTES # BLD AUTO: 0.62 10*3/MM3 (ref 0.1–0.9)
MONOCYTES NFR BLD AUTO: 4.9 % (ref 5–12)
MONOCYTES NFR BLD AUTO: 5.4 % (ref 5–12)
NEUTROPHILS NFR BLD AUTO: 80.4 % (ref 42.7–76)
NEUTROPHILS NFR BLD AUTO: 83.2 % (ref 42.7–76)
NEUTROPHILS NFR BLD AUTO: 9.3 10*3/MM3 (ref 1.7–7)
NEUTROPHILS NFR BLD AUTO: 9.5 10*3/MM3 (ref 1.7–7)
NRBC BLD AUTO-RTO: 0 /100 WBC (ref 0–0.2)
NRBC BLD AUTO-RTO: 0 /100 WBC (ref 0–0.2)
NT-PROBNP SERPL-MCNC: ABNORMAL PG/ML (ref 0–1800)
PCO2 BLDA: 46.8 MM HG (ref 35–45)
PH BLDA: 7.42 PH UNITS (ref 7.35–7.45)
PLATELET # BLD AUTO: 226 10*3/MM3 (ref 140–450)
PLATELET # BLD AUTO: 253 10*3/MM3 (ref 140–450)
PMV BLD AUTO: 11 FL (ref 6–12)
PMV BLD AUTO: 11.8 FL (ref 6–12)
PO2 BLDA: 92.4 MM HG (ref 80–100)
POTASSIUM SERPL-SCNC: 3.9 MMOL/L (ref 3.5–5.2)
POTASSIUM SERPL-SCNC: 4.4 MMOL/L (ref 3.5–5.2)
PROCALCITONIN SERPL-MCNC: 0.07 NG/ML (ref 0–0.25)
PROT SERPL-MCNC: 6 G/DL (ref 6–8.5)
QT INTERVAL: 522 MS
RBC # BLD AUTO: 4.4 10*6/MM3 (ref 3.77–5.28)
RBC # BLD AUTO: 4.45 10*6/MM3 (ref 3.77–5.28)
RHINOVIRUS RNA SPEC NAA+PROBE: NOT DETECTED
RSV RNA NPH QL NAA+NON-PROBE: NOT DETECTED
SAO2 % BLDCOA: 97.2 % (ref 92–99)
SARS-COV-2 RNA NPH QL NAA+NON-PROBE: NOT DETECTED
SODIUM SERPL-SCNC: 145 MMOL/L (ref 136–145)
SODIUM SERPL-SCNC: 146 MMOL/L (ref 136–145)
TOTAL RATE: 20 BREATHS/MINUTE
TROPONIN T SERPL-MCNC: 0.01 NG/ML (ref 0–0.03)
TROPONIN T SERPL-MCNC: 0.02 NG/ML (ref 0–0.03)
TROPONIN T SERPL-MCNC: 0.03 NG/ML (ref 0–0.03)
WBC # BLD AUTO: 11.42 10*3/MM3 (ref 3.4–10.8)
WBC # BLD AUTO: 11.57 10*3/MM3 (ref 3.4–10.8)

## 2021-02-01 PROCEDURE — 97166 OT EVAL MOD COMPLEX 45 MIN: CPT

## 2021-02-01 PROCEDURE — 94799 UNLISTED PULMONARY SVC/PX: CPT

## 2021-02-01 PROCEDURE — 82803 BLOOD GASES ANY COMBINATION: CPT

## 2021-02-01 PROCEDURE — 93010 ELECTROCARDIOGRAM REPORT: CPT | Performed by: INTERNAL MEDICINE

## 2021-02-01 PROCEDURE — 84484 ASSAY OF TROPONIN QUANT: CPT | Performed by: NURSE PRACTITIONER

## 2021-02-01 PROCEDURE — 85025 COMPLETE CBC W/AUTO DIFF WBC: CPT | Performed by: STUDENT IN AN ORGANIZED HEALTH CARE EDUCATION/TRAINING PROGRAM

## 2021-02-01 PROCEDURE — 83880 ASSAY OF NATRIURETIC PEPTIDE: CPT | Performed by: PHYSICIAN ASSISTANT

## 2021-02-01 PROCEDURE — 99222 1ST HOSP IP/OBS MODERATE 55: CPT | Performed by: INTERNAL MEDICINE

## 2021-02-01 PROCEDURE — 84145 PROCALCITONIN (PCT): CPT | Performed by: PHYSICIAN ASSISTANT

## 2021-02-01 PROCEDURE — 87077 CULTURE AEROBIC IDENTIFY: CPT | Performed by: NURSE PRACTITIONER

## 2021-02-01 PROCEDURE — 81001 URINALYSIS AUTO W/SCOPE: CPT | Performed by: NURSE PRACTITIONER

## 2021-02-01 PROCEDURE — 25010000002 HYDRALAZINE PER 20 MG: Performed by: PHYSICIAN ASSISTANT

## 2021-02-01 PROCEDURE — 71045 X-RAY EXAM CHEST 1 VIEW: CPT

## 2021-02-01 PROCEDURE — 87186 SC STD MICRODIL/AGAR DIL: CPT | Performed by: NURSE PRACTITIONER

## 2021-02-01 PROCEDURE — 83036 HEMOGLOBIN GLYCOSYLATED A1C: CPT | Performed by: NURSE PRACTITIONER

## 2021-02-01 PROCEDURE — 36415 COLL VENOUS BLD VENIPUNCTURE: CPT | Performed by: STUDENT IN AN ORGANIZED HEALTH CARE EDUCATION/TRAINING PROGRAM

## 2021-02-01 PROCEDURE — 93306 TTE W/DOPPLER COMPLETE: CPT

## 2021-02-01 PROCEDURE — 36600 WITHDRAWAL OF ARTERIAL BLOOD: CPT

## 2021-02-01 PROCEDURE — 85025 COMPLETE CBC W/AUTO DIFF WBC: CPT | Performed by: PHYSICIAN ASSISTANT

## 2021-02-01 PROCEDURE — 93005 ELECTROCARDIOGRAM TRACING: CPT | Performed by: EMERGENCY MEDICINE

## 2021-02-01 PROCEDURE — 97530 THERAPEUTIC ACTIVITIES: CPT

## 2021-02-01 PROCEDURE — 25010000002 HYDRALAZINE PER 20 MG: Performed by: NURSE PRACTITIONER

## 2021-02-01 PROCEDURE — 25010000002 PERFLUTREN (DEFINITY) 8.476 MG IN SODIUM CHLORIDE (PF) 0.9 % 10 ML INJECTION: Performed by: NURSE PRACTITIONER

## 2021-02-01 PROCEDURE — 93306 TTE W/DOPPLER COMPLETE: CPT | Performed by: INTERNAL MEDICINE

## 2021-02-01 PROCEDURE — 82962 GLUCOSE BLOOD TEST: CPT

## 2021-02-01 PROCEDURE — 94640 AIRWAY INHALATION TREATMENT: CPT

## 2021-02-01 PROCEDURE — 0202U NFCT DS 22 TRGT SARS-COV-2: CPT | Performed by: PHYSICIAN ASSISTANT

## 2021-02-01 PROCEDURE — 5A09357 ASSISTANCE WITH RESPIRATORY VENTILATION, LESS THAN 24 CONSECUTIVE HOURS, CONTINUOUS POSITIVE AIRWAY PRESSURE: ICD-10-PCS | Performed by: STUDENT IN AN ORGANIZED HEALTH CARE EDUCATION/TRAINING PROGRAM

## 2021-02-01 PROCEDURE — 83735 ASSAY OF MAGNESIUM: CPT | Performed by: PHYSICIAN ASSISTANT

## 2021-02-01 PROCEDURE — 80053 COMPREHEN METABOLIC PANEL: CPT | Performed by: PHYSICIAN ASSISTANT

## 2021-02-01 PROCEDURE — 36415 COLL VENOUS BLD VENIPUNCTURE: CPT

## 2021-02-01 PROCEDURE — 84484 ASSAY OF TROPONIN QUANT: CPT | Performed by: PHYSICIAN ASSISTANT

## 2021-02-01 PROCEDURE — 25010000002 ENOXAPARIN PER 10 MG: Performed by: NURSE PRACTITIONER

## 2021-02-01 PROCEDURE — 25010000002 FUROSEMIDE PER 20 MG: Performed by: PHYSICIAN ASSISTANT

## 2021-02-01 PROCEDURE — 99285 EMERGENCY DEPT VISIT HI MDM: CPT

## 2021-02-01 PROCEDURE — 87086 URINE CULTURE/COLONY COUNT: CPT | Performed by: NURSE PRACTITIONER

## 2021-02-01 RX ORDER — CALCIUM CARBONATE 200(500)MG
2 TABLET,CHEWABLE ORAL 2 TIMES DAILY PRN
Status: DISCONTINUED | OUTPATIENT
Start: 2021-02-01 | End: 2021-02-11 | Stop reason: HOSPADM

## 2021-02-01 RX ORDER — PANTOPRAZOLE SODIUM 40 MG/1
40 TABLET, DELAYED RELEASE ORAL EVERY MORNING
Status: DISCONTINUED | OUTPATIENT
Start: 2021-02-01 | End: 2021-02-11 | Stop reason: HOSPADM

## 2021-02-01 RX ORDER — HYDRALAZINE HYDROCHLORIDE 20 MG/ML
10 INJECTION INTRAMUSCULAR; INTRAVENOUS ONCE
Status: COMPLETED | OUTPATIENT
Start: 2021-02-01 | End: 2021-02-01

## 2021-02-01 RX ORDER — NITROGLYCERIN 0.4 MG/1
0.4 TABLET SUBLINGUAL
Status: DISCONTINUED | OUTPATIENT
Start: 2021-02-01 | End: 2021-02-11 | Stop reason: HOSPADM

## 2021-02-01 RX ORDER — ASPIRIN 325 MG
325 TABLET ORAL ONCE
Status: COMPLETED | OUTPATIENT
Start: 2021-02-01 | End: 2021-02-01

## 2021-02-01 RX ORDER — NICOTINE POLACRILEX 4 MG
15 LOZENGE BUCCAL
Status: DISCONTINUED | OUTPATIENT
Start: 2021-02-01 | End: 2021-02-11 | Stop reason: HOSPADM

## 2021-02-01 RX ORDER — IPRATROPIUM BROMIDE AND ALBUTEROL SULFATE 2.5; .5 MG/3ML; MG/3ML
3 SOLUTION RESPIRATORY (INHALATION) EVERY 6 HOURS PRN
Status: DISCONTINUED | OUTPATIENT
Start: 2021-02-01 | End: 2021-02-11 | Stop reason: HOSPADM

## 2021-02-01 RX ORDER — HYDRALAZINE HYDROCHLORIDE 20 MG/ML
10 INJECTION INTRAMUSCULAR; INTRAVENOUS EVERY 6 HOURS PRN
Status: DISCONTINUED | OUTPATIENT
Start: 2021-02-01 | End: 2021-02-11 | Stop reason: HOSPADM

## 2021-02-01 RX ORDER — ONDANSETRON 4 MG/1
4 TABLET, FILM COATED ORAL EVERY 6 HOURS PRN
Status: DISCONTINUED | OUTPATIENT
Start: 2021-02-01 | End: 2021-02-11 | Stop reason: HOSPADM

## 2021-02-01 RX ORDER — SODIUM CHLORIDE 0.9 % (FLUSH) 0.9 %
10 SYRINGE (ML) INJECTION AS NEEDED
Status: DISCONTINUED | OUTPATIENT
Start: 2021-02-01 | End: 2021-02-11 | Stop reason: HOSPADM

## 2021-02-01 RX ORDER — VILAZODONE HYDROCHLORIDE 40 MG/1
20 TABLET ORAL NIGHTLY
Status: DISCONTINUED | OUTPATIENT
Start: 2021-02-01 | End: 2021-02-11 | Stop reason: HOSPADM

## 2021-02-01 RX ORDER — ACETAMINOPHEN 650 MG/1
650 SUPPOSITORY RECTAL EVERY 4 HOURS PRN
Status: DISCONTINUED | OUTPATIENT
Start: 2021-02-01 | End: 2021-02-11 | Stop reason: HOSPADM

## 2021-02-01 RX ORDER — BISACODYL 5 MG/1
5 TABLET, DELAYED RELEASE ORAL DAILY PRN
Status: DISCONTINUED | OUTPATIENT
Start: 2021-02-01 | End: 2021-02-11 | Stop reason: HOSPADM

## 2021-02-01 RX ORDER — FUROSEMIDE 10 MG/ML
40 INJECTION INTRAMUSCULAR; INTRAVENOUS ONCE
Status: COMPLETED | OUTPATIENT
Start: 2021-02-01 | End: 2021-02-01

## 2021-02-01 RX ORDER — ACETAMINOPHEN 160 MG/5ML
650 SOLUTION ORAL EVERY 4 HOURS PRN
Status: DISCONTINUED | OUTPATIENT
Start: 2021-02-01 | End: 2021-02-11 | Stop reason: HOSPADM

## 2021-02-01 RX ORDER — ATORVASTATIN CALCIUM 20 MG/1
20 TABLET, FILM COATED ORAL DAILY
Status: DISCONTINUED | OUTPATIENT
Start: 2021-02-01 | End: 2021-02-11 | Stop reason: HOSPADM

## 2021-02-01 RX ORDER — IPRATROPIUM BROMIDE AND ALBUTEROL SULFATE 2.5; .5 MG/3ML; MG/3ML
3 SOLUTION RESPIRATORY (INHALATION) ONCE
Status: COMPLETED | OUTPATIENT
Start: 2021-02-01 | End: 2021-02-01

## 2021-02-01 RX ORDER — ECHINACEA PURPUREA EXTRACT 125 MG
2 TABLET ORAL AS NEEDED
Status: DISCONTINUED | OUTPATIENT
Start: 2021-02-01 | End: 2021-02-01

## 2021-02-01 RX ORDER — ACETAMINOPHEN 325 MG/1
650 TABLET ORAL EVERY 4 HOURS PRN
Status: DISCONTINUED | OUTPATIENT
Start: 2021-02-01 | End: 2021-02-11 | Stop reason: HOSPADM

## 2021-02-01 RX ORDER — INSULIN LISPRO 100 [IU]/ML
0-7 INJECTION, SOLUTION INTRAVENOUS; SUBCUTANEOUS
Status: DISCONTINUED | OUTPATIENT
Start: 2021-02-01 | End: 2021-02-11 | Stop reason: HOSPADM

## 2021-02-01 RX ORDER — NYSTATIN 100000 U/G
CREAM TOPICAL 2 TIMES DAILY
Status: DISCONTINUED | OUTPATIENT
Start: 2021-02-01 | End: 2021-02-11 | Stop reason: HOSPADM

## 2021-02-01 RX ORDER — TRAMADOL HYDROCHLORIDE 50 MG/1
50 TABLET ORAL EVERY 8 HOURS PRN
Status: DISCONTINUED | OUTPATIENT
Start: 2021-02-01 | End: 2021-02-11 | Stop reason: HOSPADM

## 2021-02-01 RX ORDER — SODIUM CHLORIDE 0.9 % (FLUSH) 0.9 %
10 SYRINGE (ML) INJECTION EVERY 12 HOURS SCHEDULED
Status: DISCONTINUED | OUTPATIENT
Start: 2021-02-01 | End: 2021-02-11

## 2021-02-01 RX ORDER — CLOTRIMAZOLE AND BETAMETHASONE DIPROPIONATE 10; .64 MG/G; MG/G
CREAM TOPICAL 2 TIMES DAILY
Status: DISCONTINUED | OUTPATIENT
Start: 2021-02-01 | End: 2021-02-11 | Stop reason: HOSPADM

## 2021-02-01 RX ORDER — DEXTROSE MONOHYDRATE 25 G/50ML
25 INJECTION, SOLUTION INTRAVENOUS
Status: DISCONTINUED | OUTPATIENT
Start: 2021-02-01 | End: 2021-02-11 | Stop reason: HOSPADM

## 2021-02-01 RX ORDER — OXYMETAZOLINE HYDROCHLORIDE 0.05 G/100ML
2 SPRAY NASAL 2 TIMES DAILY
Status: DISCONTINUED | OUTPATIENT
Start: 2021-02-01 | End: 2021-02-02

## 2021-02-01 RX ORDER — L.ACID,PARA/B.BIFIDUM/S.THERM 8B CELL
1 CAPSULE ORAL DAILY
Status: DISCONTINUED | OUTPATIENT
Start: 2021-02-01 | End: 2021-02-11

## 2021-02-01 RX ORDER — GABAPENTIN 100 MG/1
100 CAPSULE ORAL EVERY 12 HOURS
Status: DISCONTINUED | OUTPATIENT
Start: 2021-02-01 | End: 2021-02-11 | Stop reason: HOSPADM

## 2021-02-01 RX ORDER — ONDANSETRON 2 MG/ML
4 INJECTION INTRAMUSCULAR; INTRAVENOUS EVERY 6 HOURS PRN
Status: DISCONTINUED | OUTPATIENT
Start: 2021-02-01 | End: 2021-02-11 | Stop reason: HOSPADM

## 2021-02-01 RX ORDER — BUDESONIDE AND FORMOTEROL FUMARATE DIHYDRATE 160; 4.5 UG/1; UG/1
2 AEROSOL RESPIRATORY (INHALATION)
Status: DISCONTINUED | OUTPATIENT
Start: 2021-02-01 | End: 2021-02-11 | Stop reason: HOSPADM

## 2021-02-01 RX ORDER — ASPIRIN 325 MG
325 TABLET, DELAYED RELEASE (ENTERIC COATED) ORAL DAILY
Status: DISCONTINUED | OUTPATIENT
Start: 2021-02-02 | End: 2021-02-03

## 2021-02-01 RX ORDER — AMLODIPINE BESYLATE 5 MG/1
5 TABLET ORAL
Status: DISCONTINUED | OUTPATIENT
Start: 2021-02-01 | End: 2021-02-11 | Stop reason: HOSPADM

## 2021-02-01 RX ORDER — FUROSEMIDE 10 MG/ML
40 INJECTION INTRAMUSCULAR; INTRAVENOUS EVERY 8 HOURS
Status: DISCONTINUED | OUTPATIENT
Start: 2021-02-01 | End: 2021-02-01

## 2021-02-01 RX ORDER — ASPIRIN 325 MG
325 TABLET, DELAYED RELEASE (ENTERIC COATED) ORAL DAILY
Status: DISCONTINUED | OUTPATIENT
Start: 2021-02-01 | End: 2021-02-01

## 2021-02-01 RX ORDER — FLUTICASONE PROPIONATE 50 MCG
2 SPRAY, SUSPENSION (ML) NASAL DAILY
Status: DISCONTINUED | OUTPATIENT
Start: 2021-02-01 | End: 2021-02-11 | Stop reason: HOSPADM

## 2021-02-01 RX ORDER — AMOXICILLIN 250 MG
1 CAPSULE ORAL DAILY
Status: DISCONTINUED | OUTPATIENT
Start: 2021-02-01 | End: 2021-02-06

## 2021-02-01 RX ORDER — HYDRALAZINE HYDROCHLORIDE 20 MG/ML
20 INJECTION INTRAMUSCULAR; INTRAVENOUS ONCE
Status: COMPLETED | OUTPATIENT
Start: 2021-02-01 | End: 2021-02-01

## 2021-02-01 RX ORDER — ALPRAZOLAM 0.5 MG/1
1 TABLET ORAL 2 TIMES DAILY PRN
Status: DISCONTINUED | OUTPATIENT
Start: 2021-02-01 | End: 2021-02-11 | Stop reason: HOSPADM

## 2021-02-01 RX ORDER — LEVOTHYROXINE SODIUM 0.03 MG/1
25 TABLET ORAL DAILY
Status: DISCONTINUED | OUTPATIENT
Start: 2021-02-01 | End: 2021-02-11 | Stop reason: HOSPADM

## 2021-02-01 RX ORDER — ECHINACEA PURPUREA EXTRACT 125 MG
1 TABLET ORAL AS NEEDED
Status: DISCONTINUED | OUTPATIENT
Start: 2021-02-01 | End: 2021-02-11 | Stop reason: HOSPADM

## 2021-02-01 RX ADMIN — ATORVASTATIN CALCIUM 20 MG: 20 TABLET, FILM COATED ORAL at 14:38

## 2021-02-01 RX ADMIN — FUROSEMIDE 40 MG: 10 INJECTION, SOLUTION INTRAMUSCULAR; INTRAVENOUS at 04:57

## 2021-02-01 RX ADMIN — DOCUSATE SODIUM 50MG AND SENNOSIDES 8.6MG 1 TABLET: 8.6; 5 TABLET, FILM COATED ORAL at 14:39

## 2021-02-01 RX ADMIN — BUMETANIDE 1 MG/HR: 0.25 INJECTION INTRAMUSCULAR; INTRAVENOUS at 18:04

## 2021-02-01 RX ADMIN — NYSTATIN: 100000 CREAM TOPICAL at 20:25

## 2021-02-01 RX ADMIN — AMLODIPINE BESYLATE 5 MG: 5 TABLET ORAL at 14:38

## 2021-02-01 RX ADMIN — ASPIRIN 325 MG: 325 TABLET ORAL at 05:34

## 2021-02-01 RX ADMIN — ALPRAZOLAM 1 MG: 0.5 TABLET ORAL at 23:35

## 2021-02-01 RX ADMIN — IPRATROPIUM BROMIDE AND ALBUTEROL SULFATE 3 ML: 2.5; .5 SOLUTION RESPIRATORY (INHALATION) at 19:18

## 2021-02-01 RX ADMIN — TRAMADOL HYDROCHLORIDE 50 MG: 50 TABLET, FILM COATED ORAL at 18:03

## 2021-02-01 RX ADMIN — NYSTATIN: 100000 CREAM TOPICAL at 14:40

## 2021-02-01 RX ADMIN — LEVOTHYROXINE SODIUM 25 MCG: 0.03 TABLET ORAL at 14:38

## 2021-02-01 RX ADMIN — FLUTICASONE PROPIONATE 2 SPRAY: 50 SPRAY, METERED NASAL at 16:44

## 2021-02-01 RX ADMIN — VILAZODONE HYDROCHLORIDE 20 MG: 40 TABLET ORAL at 20:25

## 2021-02-01 RX ADMIN — ALPRAZOLAM 1 MG: 0.5 TABLET ORAL at 14:39

## 2021-02-01 RX ADMIN — ENOXAPARIN SODIUM 40 MG: 40 INJECTION SUBCUTANEOUS at 16:44

## 2021-02-01 RX ADMIN — Medication 1 CAPSULE: at 14:39

## 2021-02-01 RX ADMIN — CLOTRIMAZOLE AND BETAMETHASONE DIPROPIONATE: 10; .5 CREAM TOPICAL at 20:25

## 2021-02-01 RX ADMIN — SODIUM CHLORIDE, PRESERVATIVE FREE 10 ML: 5 INJECTION INTRAVENOUS at 20:25

## 2021-02-01 RX ADMIN — PERFLUTREN 3 ML: 6.52 INJECTION, SUSPENSION INTRAVENOUS at 13:30

## 2021-02-01 RX ADMIN — IPRATROPIUM BROMIDE AND ALBUTEROL SULFATE 3 ML: 2.5; .5 SOLUTION RESPIRATORY (INHALATION) at 08:10

## 2021-02-01 RX ADMIN — HYDRALAZINE HYDROCHLORIDE 20 MG: 20 INJECTION INTRAMUSCULAR; INTRAVENOUS at 06:25

## 2021-02-01 RX ADMIN — ACETAMINOPHEN 650 MG: 325 TABLET, FILM COATED ORAL at 07:03

## 2021-02-01 RX ADMIN — PANTOPRAZOLE SODIUM 40 MG: 40 TABLET, DELAYED RELEASE ORAL at 14:38

## 2021-02-01 RX ADMIN — ACETAMINOPHEN 650 MG: 325 TABLET, FILM COATED ORAL at 20:24

## 2021-02-01 RX ADMIN — OXYMETAZOLINE HYDROCHLORIDE 2 SPRAY: 0.05 SPRAY NASAL at 20:25

## 2021-02-01 RX ADMIN — HYDRALAZINE HYDROCHLORIDE 10 MG: 20 INJECTION INTRAMUSCULAR; INTRAVENOUS at 05:33

## 2021-02-01 RX ADMIN — HYDRALAZINE HYDROCHLORIDE 10 MG: 20 INJECTION, SOLUTION INTRAMUSCULAR; INTRAVENOUS at 20:33

## 2021-02-01 RX ADMIN — GABAPENTIN 100 MG: 100 CAPSULE ORAL at 20:25

## 2021-02-01 NOTE — PROGRESS NOTES
"Pharmacy Consult - Lovenox    Miguelina Lopez has been consulted for pharmacy to dose enoxaparin for VTE prophylaxis per ZOILA Culver's request.     Relevant clinical data and objective history reviewed:  75 y.o. female 152.4 cm (60\") 122 kg (270 lb)    Home Anticoagulation: none    Past Medical History:   Diagnosis Date   • Acute on chronic respiratory failure with hypoxia and hypercapnia (CMS/[facility])    • OLI (acute kidney injury) (CMS/[facility])    • Anemia    • Anxiety    • Aortic valve stenosis    • Bilateral lower extremity edema    • CHF (congestive heart failure) (CMS/[facility])    • Chronic coronary artery disease    • Class 3 severe obesity due to excess calories in adult (CMS/[facility])    • COPD (chronic obstructive pulmonary disease) (CMS/[facility])    • Depression    • Diabetes mellitus (CMS/[facility])    • Elevated cholesterol    • GERD (gastroesophageal reflux disease)    • Heart murmur    • Hypertension    • Mitral valve insufficiency    • Pneumonia     1/2016   • Pulmonary hypertension (CMS/[facility])     due to sleep disordered breathing   • Sleep apnea     Uses CPAP or oxygen   • Stage 3 chronic kidney disease (CMS/[facility])    • Subclinical hypothyroidism    • Supplemental oxygen dependent    • Valvular heart disease      is allergic to coumadin [warfarin sodium]; bumex [bumetanide]; erythromycin; hctz [hydrochlorothiazide]; zaroxolyn [metolazone]; penicillins; and sulfa antibiotics.    Lab Results   Component Value Date    WBC 11.57 (H) 02/01/2021    HGB 11.6 (L) 02/01/2021    HCT 37.0 02/01/2021    MCV 84.1 02/01/2021     02/01/2021     Lab Results   Component Value Date    GLUCOSE 112 (H) 02/01/2021    CALCIUM 8.9 02/01/2021     (H) 02/01/2021    K 3.9 02/01/2021    CO2 33.4 (H) 02/01/2021     02/01/2021    BUN 19 02/01/2021    CREATININE 1.42 (H) 02/01/2021    EGFRIFAFRI 43 (L) 10/14/2020    EGFRIFNONA 36 (L) 02/01/2021    BCR 13.4 02/01/2021    ANIONGAP 9.6 02/01/2021       Estimated Creatinine Clearance: " 40.5 mL/min (A) (by C-G formula based on SCr of 1.42 mg/dL (H)).    Active Inpatient Anticoagulation Orders: none    Assessment/Plan    1. Will start patient on Lovenox 40 mg SQ every 12 hours based on the indication of VTE prophylaxis, adjusted for BMI=52.73 kg/m2. CrCl is borderline for decreased dosing adjustment - pharmacy will monitor renal function closely.     2. Platelet count is currently 226 at baseline. Platelets should be drawn every 3 days while the patient is on Lovenox per protocol.    3. The Pharmacy to Dose Consult will be discontinued at this time. Pharmacy will continue to follow the patient while on Lovenox and make any necessary adjustments.    Labs to be ordered: platelets q3days    Thank you for allowing me to participate in the care of your patient,    Fernando Harman, PharmD  2/1/2021

## 2021-02-01 NOTE — CONSULTS
Referring Provider: Rina Green APRN   Reason for Consultation: OLI    Subjective     Chief complaint   Chief Complaint   Patient presents with   • Shortness of Breath   • Chest Pain       History of present illness:      73 years old white female with a past medical history of hypertension with baseline creatinine 1.3 mg/dL, hypertension, diabetes mellitus type 2, COPD on home oxygen and pulmonary hypertension, coronary disease status post CABG, valvular heart disease status post mitral valve replacement and morbid obesity who presented to the hospital due to worsening shortness of air and chest tightness.  Last echocardiogram showed pulmonary arterial pressure 57, diastolic dysfunction with a bioprosthetic mitral valve replacement with moderate regurgitation.  Patient wears oxygen chronically 4 L at home.  At time of encounter patient stated that she is short of air despite being on oxygen.  She is trying to get out of bed and go to bedside commode with the help of the staff.  In the emergency department patient was hypertensive with blood pressure 220/71 and chest x-ray showed airspace opacities especially in the right lower lobe.  Labs showed sodium 146 with a potassium 3.9 bicarb 33.4 BUN 19 and creatinine 1.4.  Were consulted to help with management of her kidney dysfunction and volume status.      Past Medical History:   Diagnosis Date   • Acute on chronic respiratory failure with hypoxia and hypercapnia (CMS/[facility])    • OLI (acute kidney injury) (CMS/[facility])    • Anemia    • Anxiety    • Aortic valve stenosis    • Bilateral lower extremity edema    • CHF (congestive heart failure) (CMS/[facility])    • Chronic coronary artery disease    • Class 3 severe obesity due to excess calories in adult (CMS/[facility])    • COPD (chronic obstructive pulmonary disease) (CMS/[facility])    • Depression    • Diabetes mellitus (CMS/[facility])    • Elevated cholesterol    • GERD (gastroesophageal reflux disease)    • Heart murmur    • Hypertension     • Mitral valve insufficiency    • Pneumonia     1/2016   • Pulmonary hypertension (CMS/HCC)     due to sleep disordered breathing   • Sleep apnea     Uses CPAP or oxygen   • Stage 3 chronic kidney disease (CMS/HCC)    • Subclinical hypothyroidism    • Supplemental oxygen dependent    • Valvular heart disease      Past Surgical History:   Procedure Laterality Date   • CARDIAC CATHETERIZATION     • CARDIAC CATHETERIZATION N/A 6/10/2016    Procedure: Left Heart Cath;  Surgeon: Chace Johnson MD;  Location: [facility] CATH INVASIVE LOCATION;  Service:    • CARDIAC CATHETERIZATION N/A 6/10/2016    Procedure: Right Heart Cath;  Surgeon: Chace Johnson MD;  Location: [facility] CATH INVASIVE LOCATION;  Service:    • CARDIAC SURGERY     • CORONARY ARTERY BYPASS GRAFT      2 vessel   • CORONARY ARTERY BYPASS GRAFT WITH MITRAL VALVE REPAIR/REPLACEMENT N/A 6/13/2016    Procedure: INTRAOPERATIVE TARIQ, MIDLINE STERNOTOMY, CORONARY ARTERY BYPASS GRAFTING X  2 UTILIZING ENDOSCOPICALLY HARVESTED LEFT GREATER SAPHENOUS VEIN, MITRAL VALVE REPLACEMENT AND TRICUSPID VALVE REPAIR;  Surgeon: Eliecer Mistry MD;  Location: [facility] OR;  Service:    • CORONARY STENT PLACEMENT  2010    Approx. 6 yrs ago at [facility]   • HEMORRHOIDECTOMY     • HYSTERECTOMY     • MITRAL VALVE REPLACEMENT     • REPLACEMENT TOTAL KNEE Right    • THYROID SURGERY      Cyst removed from thyroid   • VASCULAR SURGERY       Family History   Problem Relation Age of Onset   • Heart attack Father    • Heart disease Father      Social History     Tobacco Use   • Smoking status: Never Smoker   • Smokeless tobacco: Never Used   • Tobacco comment: no caffeine    Substance Use Topics   • Alcohol use: No   • Drug use: No     Facility-Administered Medications Prior to Admission   Medication Dose Route Frequency Provider Last Rate Last Admin   • methylPREDNISolone acetate (DEPO-medrol) injection 40 mg  40 mg Intra-articular Once Michelle Abernathy MD         Medications  Prior to Admission   Medication Sig Dispense Refill Last Dose   • ALPRAZolam (XANAX) 1 MG tablet Take 1 mg by mouth 2 (Two) Times a Day.   Patient Taking Differently at Unknown time   • aspirin  MG tablet Take 1 tablet by mouth Daily. 90 tablet 1 1/31/2021 at Unknown time   • atorvastatin (LIPITOR) 20 MG tablet TAKE ONE TABLET BY MOUTH DAILY 90 tablet 0 1/31/2021 at Unknown time   • clotrimazole-betamethasone (LOTRISONE) 1-0.05 % cream Apply  topically to the appropriate area as directed 2 (Two) Times a Day. 45 g 0 Past Week at Unknown time   • fluticasone-salmeterol (ADVAIR DISKUS) 250-50 MCG/DOSE DISKUS Inhale 1 puff Daily As Needed (cough and wheezing). 60 each 0 1/31/2021 at Unknown time   • furosemide (LASIX) 40 MG tablet TAKE ONE TABLET BY MOUTH TWICE A DAY 60 tablet 0 1/31/2021 at Unknown time   • gabapentin (NEURONTIN) 100 MG capsule Take 1 capsule by mouth Every 12 (Twelve) Hours. 60 capsule 2 1/31/2021 at Unknown time   • glimepiride (AMARYL) 1 MG tablet TAKE ONE TABLET BY MOUTH EVERY MORNING BEFORE BREAKFAST 90 tablet 1 1/31/2021 at Unknown time   • ipratropium-albuterol (DUO-NEB) 0.5-2.5 mg/mL nebulizer Take 3 mL by nebulization 4 (four) times a day. (Patient taking differently: Take 3 mL by nebulization Daily As Needed.) 3 mL 5 1/31/2021 at Unknown time   • levothyroxine (SYNTHROID, LEVOTHROID) 25 MCG tablet Take 1 tablet by mouth Daily. 90 tablet 1 1/31/2021 at Unknown time   • losartan (COZAAR) 100 MG tablet TAKE ONE TABLET BY MOUTH DAILY 90 tablet 4 1/31/2021 at Unknown time   • Misc. Devices (COMMODE BEDSIDE) misc 1 each Daily. 1 each 0 1/31/2021 at Unknown time   • nitroglycerin (NITROSTAT) 0.4 MG SL tablet DISSOLVE 1 TAB UNDER TONGUE FOR CHEST PAIN - IF PAIN REMAINS AFTER 5 MIN, CALL 911 AND REPEAT DOSE. MAX 3 TABS IN 15 MINUTES 25 tablet 4 1/31/2021 at Unknown time   • nystatin (MYCOSTATIN) 478730 UNIT/GM cream Apply  topically to the appropriate area as directed 2 (Two) Times a Day. to  "affected area(s) 30 g 3 1/31/2021 at Unknown time   • O2 (OXYGEN) Inhale 4 L/min Continuous.   Patient Taking Differently at Unknown time   • omeprazole (priLOSEC) 40 MG capsule Take 1 capsule by mouth Daily. 90 capsule 0 1/31/2021 at Unknown time   • Probiotic Product (PROBIOTIC PO) Take  by mouth.   1/31/2021 at Unknown time   • senna-docusate (PERICOLACE) 8.6-50 MG per tablet Take 1 tablet by mouth daily.   1/31/2021 at Unknown time   • traMADol (ULTRAM) 50 MG tablet TAKE ONE TABLET BY MOUTH EVERY 8 HOURS AS NEEDED FOR MODERATE LEG PAIN FOR UP TO 90 DOSES 30 tablet 1 1/31/2021 at Unknown time   • TRULICITY 0.75 MG/0.5ML solution pen-injector INJECT 0.75 MG UNDER THE SKIN ONCE WEEKLY 6 pen 5 1/31/2021 at Unknown time   • vilazodone (VIIBRYD) 20 MG tablet tablet Take 20 mg by mouth Every Night.   1/31/2021 at Unknown time   • vitamin D (ERGOCALCIFEROL) 1.25 MG (11076 UT) capsule capsule TAKE ONE CAPSULE BY MOUTH ONCE WEEKLY 4 capsule 0 Past Week at Unknown time     Allergies:  Coumadin [warfarin sodium], Bumex [bumetanide], Erythromycin, Hctz [hydrochlorothiazide], Zaroxolyn [metolazone], Penicillins, and Sulfa antibiotics    Review of Systems  Pertinent items are noted in HPI.    Objective     Vital Signs  Temp:  [98.1 °F (36.7 °C)-99 °F (37.2 °C)] 98.1 °F (36.7 °C)  Heart Rate:  [43-48] 44  Resp:  [16] 16  BP: (149-224)/(54-94) 173/54    Flowsheet Rows      First Filed Value   Admission Height  152.4 cm (60\") Documented at 02/01/2021 0315   Admission Weight  122 kg (270 lb) Documented at 02/01/2021 0759           No intake/output data recorded.  I/O last 3 completed shifts:  In: -   Out: 100 [Urine:100]    Intake/Output Summary (Last 24 hours) at 2/1/2021 1401  Last data filed at 2/1/2021 0457  Gross per 24 hour   Intake --   Output 100 ml   Net -100 ml       Physical Exam:     General Appearance:   Ill appearing female in mild respiratory distress   Head:    Normocephalic, without obvious abnormality, atraumatic "   Eyes:            Lids and lashes normal, conjunctivae and sclerae normal, no   icterus, no pallor, corneas clear, PERRLA   Ears:    Ears appear intact with no abnormalities noted   Throat:   No oral lesions, no thrush, oral mucosa moist   Neck:   No adenopathy, supple, trachea midline, no thyromegaly, no     carotid bruit, no JVD   Back:     No kyphosis present, no scoliosis present, no skin lesions,       erythema or scars, no tenderness to percussion or                   palpation,   range of motion normal   Lungs:     Clear to auscultation,respirations regular, even and                   unlabored    Heart:    Regular rhythm and normal rate, normal S1 and S2, no            murmur, no gallop, no rub, no click   Breast Exam:    Deferred   Abdomen:     Normal bowel sounds, no masses, no organomegaly, soft        non-tender, non-distended, no guarding, no rebound                 tenderness   Genitalia:    Deferred   Extremities:   ++ edema, no cyanosis, no              redness   Pulses:   Pulses palpable and equal bilaterally   Skin:   No bleeding, bruising or rash               Results Review:  Results from last 7 days   Lab Units 02/01/21  0551 02/01/21  0339   SODIUM mmol/L 146* 145   POTASSIUM mmol/L 3.9 4.4   CHLORIDE mmol/L 103 102   CO2 mmol/L 33.4* 32.3*   BUN mg/dL 19 20   CREATININE mg/dL 1.42* 1.38*   CALCIUM mg/dL 8.9 8.5*   BILIRUBIN mg/dL  --  0.6   ALK PHOS U/L  --  259*   ALT (SGPT) U/L  --  14   AST (SGOT) U/L  --  23   GLUCOSE mg/dL 112* 128*       Estimated Creatinine Clearance: 40.5 mL/min (A) (by C-G formula based on SCr of 1.42 mg/dL (H)).    Results from last 7 days   Lab Units 02/01/21  0339   MAGNESIUM mg/dL 2.0       Results from last 7 days   Lab Units 02/01/21  0829 02/01/21  0339   WBC 10*3/mm3 11.57* 11.42*   HEMOGLOBIN g/dL 11.6* 11.7*   PLATELETS 10*3/mm3 226 253             Active Medications  amLODIPine, 5 mg, Oral, Q24H  [START ON 2/2/2021] aspirin EC, 325 mg, Oral,  Daily  atorvastatin, 20 mg, Oral, Daily  budesonide-formoterol, 2 puff, Inhalation, BID - RT  clotrimazole-betamethasone, , Topical, BID  enoxaparin, 40 mg, Subcutaneous, Q12H  fluticasone, 2 spray, Each Nare, Daily  furosemide, 40 mg, Intravenous, Q8H  gabapentin, 100 mg, Oral, Q12H  insulin lispro, 0-7 Units, Subcutaneous, TID AC  lactobacillus acidophilus, 1 capsule, Oral, Daily  levothyroxine, 25 mcg, Oral, Daily  nystatin, , Topical, BID  pantoprazole, 40 mg, Oral, QAM  sennosides-docusate, 1 tablet, Oral, Daily  sodium chloride, 10 mL, Intravenous, Q12H  vilazodone, 20 mg, Oral, Nightly           Assessment/Plan   Assessment      Chest pain    CKD (chronic kidney disease) stage 3, GFR 30-59 ml/min (CMS/[facility])    Diabetic peripheral neuropathy (CMS/[facility])    Hyperlipidemia    OCHOA (obstructive sleep apnea)    DM type 2 (diabetes mellitus, type 2) (CMS/[facility])    Essential hypertension    Pulmonary hypertension due to sleep-disordered breathing (CMS/[facility])    s/p MVR, TV-repair, CABG x2 6/13/16    Supplemental oxygen dependent    Chronic diastolic heart failure (CMS/[facility])    Chronic respiratory failure with hypoxia and hypercapnia (CMS/[facility])    COPD (chronic obstructive pulmonary disease) (CMS/[facility])         - OLI on CKD3:  Creatinine slightly above her baseline 1.4 mg/dL but patient with significant volume excess.  I will start patient on a Bumex drip and repeat her labs in the afternoon.  Will need repeat echocardiogram for further evaluation of her right heart failure as well.       - Acute on chronic diastolic heart failure with moderate right pulmonary HTN  Valvular heart disease: moderate AS, mild to moderate TR, mitral valve replacement, regurg.    Will need to obtain repeat echocardiogram for further evaluation of her right heart failure  - Acute on chronic respiratory failure: Patient will benefit from using Trelegy.  We will get pulmonary on board  - Anemia, iron deficiency.   - HTN, not controlled: Blood pressure  was extremely elevated on admission.  It is better now.  Will start patient on Bumex drip that would likely improve her volume status and her blood pressure  - Lymphedema   - Morbid obesity       Jhonathan Mitchell MD  02/01/21  14:01 EST

## 2021-02-01 NOTE — ED PROVIDER NOTES
MD ATTESTATION NOTE    The KO and I have discussed this patient's history, physical exam, and treatment plan.  I have reviewed the documentation and personally had a face to face interaction with the patient. I affirm the documentation and agree with the treatment and plan.  The attached note describes my personal findings.      Miguelina Lopez is a 75 y.o. female who presents to the ED c/o shortness of breath and fluid retention and swelling in her legs.  Also ports intermittent chest pains been on for several weeks.  Shortness of breath has been getting worse over the past couple days.  She has history of CHF, paroxysmal A. fib, kidney failure.      On exam:  Acute alert and oriented x3, no acute distress, NCAT, EOMI, regular rate and rhythm 2+ nonpitting edema bilateral lower extremities, diminished breath sounds bilaterally, soft nontender nondistended, moving all extremities well    Labs  Recent Results (from the past 24 hour(s))   ECG 12 Lead    Collection Time: 02/01/21  3:30 AM   Result Value Ref Range    QT Interval 522 ms   Comprehensive Metabolic Panel    Collection Time: 02/01/21  3:39 AM    Specimen: Blood   Result Value Ref Range    Glucose 128 (H) 65 - 99 mg/dL    BUN 20 8 - 23 mg/dL    Creatinine 1.38 (H) 0.57 - 1.00 mg/dL    Sodium 145 136 - 145 mmol/L    Potassium 4.4 3.5 - 5.2 mmol/L    Chloride 102 98 - 107 mmol/L    CO2 32.3 (H) 22.0 - 29.0 mmol/L    Calcium 8.5 (L) 8.6 - 10.5 mg/dL    Total Protein 6.0 6.0 - 8.5 g/dL    Albumin 3.70 3.50 - 5.20 g/dL    ALT (SGPT) 14 1 - 33 U/L    AST (SGOT) 23 1 - 32 U/L    Alkaline Phosphatase 259 (H) 39 - 117 U/L    Total Bilirubin 0.6 0.0 - 1.2 mg/dL    eGFR Non African Amer 37 (L) >60 mL/min/1.73    Globulin 2.3 gm/dL    A/G Ratio 1.6 g/dL    BUN/Creatinine Ratio 14.5 7.0 - 25.0    Anion Gap 10.7 5.0 - 15.0 mmol/L   Troponin    Collection Time: 02/01/21  3:39 AM    Specimen: Blood   Result Value Ref Range    Troponin T 0.011 0.000 - 0.030 ng/mL   BNP     Collection Time: 02/01/21  3:39 AM    Specimen: Blood   Result Value Ref Range    proBNP 12,821.0 (H) 0.0-1,800.0 pg/mL   Procalcitonin    Collection Time: 02/01/21  3:39 AM    Specimen: Blood   Result Value Ref Range    Procalcitonin 0.07 0.00 - 0.25 ng/mL   CBC Auto Differential    Collection Time: 02/01/21  3:39 AM    Specimen: Blood   Result Value Ref Range    WBC 11.42 (H) 3.40 - 10.80 10*3/mm3    RBC 4.45 3.77 - 5.28 10*6/mm3    Hemoglobin 11.7 (L) 12.0 - 15.9 g/dL    Hematocrit 37.0 34.0 - 46.6 %    MCV 83.1 79.0 - 97.0 fL    MCH 26.3 (L) 26.6 - 33.0 pg    MCHC 31.6 31.5 - 35.7 g/dL    RDW 15.7 (H) 12.3 - 15.4 %    RDW-SD 47.2 37.0 - 54.0 fl    MPV 11.8 6.0 - 12.0 fL    Platelets 253 140 - 450 10*3/mm3    Neutrophil % 83.2 (H) 42.7 - 76.0 %    Lymphocyte % 9.7 (L) 19.6 - 45.3 %    Monocyte % 4.9 (L) 5.0 - 12.0 %    Eosinophil % 1.0 0.3 - 6.2 %    Basophil % 0.6 0.0 - 1.5 %    Immature Grans % 0.6 (H) 0.0 - 0.5 %    Neutrophils, Absolute 9.50 (H) 1.70 - 7.00 10*3/mm3    Lymphocytes, Absolute 1.11 0.70 - 3.10 10*3/mm3    Monocytes, Absolute 0.56 0.10 - 0.90 10*3/mm3    Eosinophils, Absolute 0.11 0.00 - 0.40 10*3/mm3    Basophils, Absolute 0.07 0.00 - 0.20 10*3/mm3    Immature Grans, Absolute 0.07 (H) 0.00 - 0.05 10*3/mm3    nRBC 0.0 0.0 - 0.2 /100 WBC   Magnesium    Collection Time: 02/01/21  3:39 AM    Specimen: Blood   Result Value Ref Range    Magnesium 2.0 1.6 - 2.4 mg/dL   Troponin    Collection Time: 02/01/21  5:51 AM    Specimen: Hand, Right; Blood   Result Value Ref Range    Troponin T 0.016 0.000 - 0.030 ng/mL   POC Glucose Once    Collection Time: 02/01/21  6:20 AM    Specimen: Blood   Result Value Ref Range    Glucose 111 70 - 130 mg/dL       Radiology  Xr Chest 1 View    Result Date: 2/1/2021  Patient: VALERIA PACHECO  Time Out: 04:19 Exam(s): FILM CXR 1 VIEW EXAM:   XR Chest, 1 View CLINICAL HISTORY:    Chest pain  Reason for exam: Chest pain. Reason for Exam:->Chest pain. Portable->Yes..  Additional notes: Pt also c o CP with bradycardia. TECHNIQUE:   Frontal view of the chest. COMPARISON:   Chest x-ray dated 12 15 2019 FINDINGS:   Lungs:  Diffuse airspace opacities which may be inflammatory or infectious.   Pleural space:  Unremarkable.   Heart:  Heart is enlarged.   Mediastinum:  Unremarkable.   Bones joints:  Evidence of prior median sternotomy. IMPRESSION:       Diffuse airspace opacities which may be inflammatory or infectious.     Electronically signed by Gerald Moeller MD on 02-01-21 at 0419      Medical Decision Making:  ED Course as of Feb 01 0637   Mon Feb 01, 2021   0331 BP(!): 224/71 [RC]   0331 Temp: 99 °F (37.2 °C) [RC]   0331 Heart Rate(!): 46 [RC]   0331 Resp: 16 [RC]   0331 98% on 4 L oxygen    [RC]   0446 EKG          EKG time: 0330  Rhythm/Rate: 46/Sinus  P waves and AZ: prolonged pr interval  QRS, axis: RBBB   ST and T waves: No acute st changes     Interpreted Contemporaneously by me, independently viewed  -Prolonged AZ interval is new.        [RC]   0448 HEART SCORE    History 1  ECG Normal (0)  Age > or = 65 (2)  Risk factors > or = to 3 RF for atherosclerotic dx (2)  Troponin < or = Normal limit (0)    This patient's HEART score is 5    HEART Score Key:  Scores 0-3: 0.9-1.7% risk of adverse cardiac event. In the HEART Score study, these patients were discharged (0.99% in the retrospective study, 1.7% in the prospective study)  Scores 4-6: 12-16.6% risk of adverse cardiac event. In the HEART Score study, these patients were admitted to the hospital. (11.6% retrospective, 16.6% prospective)  Scores ?7: 50-65% risk of adverse cardiac event. In the HEART Score study, these patients were candidates for early invasive measures. (65.2% retrospective, 50.1% prospective)       [RC]   0450 I have reviewed the patient's chest x-ray and there are diffuse airspace opacity which may be inflammatory or infectious.  She also looks slightly congested to me.  I will order a dose of her Lasix  IV.    [RC]   0523 Discussed patient's case with ZOILA Swain with [facility] .  To admit to Dr Mccray's care for further evaluation.    [RC]   0524 WBC(!): 11.42 [RC]   0524 RBC: 4.45 [RC]   0524 Hemoglobin(!): 11.7 [RC]   0524 Hematocrit: 37.0 [RC]   0525 Platelets: 253 [RC]   0525 Magnesium: 2.0 [RC]   0525 proBNP(!): 12,821.0 [RC]   0525 Procalcitonin: 0.07 [RC]   0525 Troponin T: 0.011 [RC]   0525 Glucose(!): 128 [RC]   0525 BUN: 20 [RC]   0525 Creatinine(!): 1.38 [RC]   0525 Sodium: 145 [RC]   0525 Potassium: 4.4 [RC]   0525 CO2(!): 32.3 [RC]   0525 Anion Gap: 10.7 [RC]   0525 Procalcitonin: 0.07 [RC]      ED Course User Index  [RC] Dwayne Porras III, PA           PPE: Both the patient and I wore a surgical mask throughout the entire patient encounter. I wore protective goggles.     Diagnosis  Final diagnoses:   Chest pain, unspecified type   COPD exacerbation (CMS/HCC)   Congestive heart failure, unspecified HF chronicity, unspecified heart failure type (CMS/HCC)   Hypertension, unspecified type   Abnormal chest x-ray        Nicolás Pepper MD  02/01/21 0666

## 2021-02-01 NOTE — PLAN OF CARE
Goal Outcome Evaluation:     Progress: no change  Outcome Summary: Patient now on 6L NC instead of her home dose of 4-5L, BP elevated, home medicines restarted. Headache noted and pain medicine given per order. Bed alarm in place and functioning. Bed side commode next to bed. UA ordered but unable to get due to patient having BM the same time as urinating. Patient started on Bumex gtt per order. Will ctm

## 2021-02-01 NOTE — ED NOTES
Carmen (granddaughter) [phone]    Okay per pt to give updates     Sade Srivastava, RN  02/01/21 9333

## 2021-02-01 NOTE — CONSULTS
[facility]    Reason for consult: Chronic hypoxemic and hypercapnic respiratory failure, obesity hypoventilation syndrome, COPD, OCHOA    HPI:  Mrs. Lopez is a 76yo wf with multiple medical problems followed by Dr. de la rosa in our office. I reviewed notes by Dr. Amado from hospital admission about a year ago.  Mrs. Lopez presented to the ER today with shortness of berath, fluid retention and intermittent chest pressures for the past few weeks. It has been intermittent, getting worse and better with nitro.  She said the feeling was similar to her prior chest pain she has had in the past.  She downplays all this at the moment and she tells me her breathing and cough are the same as usual at the moment and she says she is here due to nasal congestion.  She uses a NIPPV at home and she says she will have her son in law bring it to the hospital for her to use while she is here.  Her CXR on admission appears to have cardiomegaly and pulmonary vascular congestion.  She has already received some lasix.    Past Medical History:   Diagnosis Date   • Acute on chronic respiratory failure with hypoxia and hypercapnia (CMS/HCC)    • OLI (acute kidney injury) (CMS/HCC)    • Anemia    • Anxiety    • Aortic valve stenosis    • Bilateral lower extremity edema    • CHF (congestive heart failure) (CMS/HCC)    • Chronic coronary artery disease    • Class 3 severe obesity due to excess calories in adult (CMS/HCC)    • COPD (chronic obstructive pulmonary disease) (CMS/HCC)    • Depression    • Diabetes mellitus (CMS/HCC)    • Elevated cholesterol    • GERD (gastroesophageal reflux disease)    • Heart murmur    • Hypertension    • Mitral valve insufficiency    • Pneumonia     1/2016   • Pulmonary hypertension (CMS/HCC)     due to sleep disordered breathing   • Sleep apnea     Uses CPAP or oxygen   • Stage 3 chronic kidney disease (CMS/HCC)    • Subclinical hypothyroidism    • Supplemental oxygen dependent    • Valvular  heart disease      Social History     Socioeconomic History   • Marital status:      Spouse name: Not on file   • Number of children: Not on file   • Years of education: Not on file   • Highest education level: Not on file   Tobacco Use   • Smoking status: Never Smoker   • Smokeless tobacco: Never Used   • Tobacco comment: no caffeine    Substance and Sexual Activity   • Alcohol use: No   • Drug use: No   • Sexual activity: Defer     Family History   Problem Relation Age of Onset   • Heart attack Father    • Heart disease Father      MEDS: reviewed as per chart  ALL: list of 7, reviewed as per chart  ROS: 12 point negative except as in HPI    Vital Sign Min/Max for last 24 hours  Temp  Min: 97.8 °F (36.6 °C)  Max: 99 °F (37.2 °C)   BP  Min: 149/55  Max: 224/71   Pulse  Min: 43  Max: 48   Resp  Min: 16  Max: 16   SpO2  Min: 96 %  Max: 100 %   Flow (L/min)  Min: 4  Max: 10   Weight  Min: 122 kg (270 lb)  Max: 122 kg (270 lb)     GEN:   appears ill, obese, AxOx3  HEENT: PERRL, EOMI, no icterus, mmm, no jvd, trachea midline, neck supple  CHEST: decreased ae bilat, no wheezes, no crackles, no use of accessory muscles  CV: olamide, no m/g/r  ABD: soft, nt, nd +bs, no hepatosplenomegaly  EXT: no c/c/ 2+edema bilaterally  SKIN: no rashes, no xanthomas, nl turgor, warm, dry  LYMPH: no palpable cervical or supraclavicular lymphadenopathy  NEURO: CN 2-12 intact and symmetric bilaterally  PSYCH: nl affect, nl orientation, nl judgement, nl mood  MSK: +kyphosis, 5/5 strength ue and le bilaterally    Labs: 2/1: reviewed:  7.423/46/92  Wbc 11.6  hgb 11.6  plts 226  Trop 0.026  Glucose 112  Bun 19  Cr 1.42  Bicarb 33.4  rpp negative    CXR: 2/1: reviewed: cardiomegaly, pulm vasc congestion    A/P:  1. Acute on chronic diastolic chf -- as per cards/nephrology  2. Pulmonary hypertension group II/III   3. COPD -- stable-- continue bronchodilators  4. OHV -- patient to get her home NIPPV machine  5. OCHOA  6. Morbid obesity  7.  Nasal congestion  8. CKD  9. Chronic hypoxemic respiratory failure

## 2021-02-01 NOTE — PLAN OF CARE
Goal Outcome Evaluation:  Plan of Care Reviewed With: patient     Outcome Summary: Pt presents from home with chest pain, fluid retention. Pt lives with son in law and reports has been sleeping in lift chair for weeks and decline in mobility.  Family assist for dressing and bathing. Pt has functional B UE.  Pt may benefit from skilled OT to increase safety and independence.        Patient was placed in a face mask during this therapy encounter. Therapist used appropriate personal protective equipment including surgical mask, eye shield and gloves during the entire therapy session. Hand hygiene was completed before and after therapy session. Patient is not in enhanced droplet precautions.

## 2021-02-01 NOTE — ED PROVIDER NOTES
EMERGENCY DEPARTMENT ENCOUNTER    Room Number:  06/06  Date of encounter:  2/1/2021  PCP: Arcelia Talbot APRN  Historian: Patient      HPI:  Chief Complaint: Shortness of breath, fluid retention, chest pain  A complete HPI/ROS/PMH/PSH/SH/FH are unobtainable due to: Nothing    Context: Miguelina Lopez is a 75 y.o. female who presents to the ED c/o shortness of breath, fluid retention, intermittent chest pain that is been ongoing for the past few weeks.  Patient states that shortness of breath became significantly worse over the past 2 days.  She describes her chest pain as a pressure radiates to the back.  It comes on at rest and is relieved with nitro.  She denies associated nausea, vomiting, diaphoresis.  She does inform me that it feels very similar to previous chest pain that led to a coronary artery bypass surgery as well as a mitral valve replacement with a tissue prosthesis and tricuspid valve repair in 2019.     Reviewing the patient's chart she also has a history of chronic diastolic heart failure, chronic right-sided heart failure, paroxysmal A. fib, stage III chronic kidney disease, essential hypertension in addition to the valvular disease and coronary artery disease.  She is followed by Dr. Ayers with [place] [facility].  She takes ASA for antiplatelet therapy, Lasix 40 mg, sublingual nitro as needed.  According to her medications listed in epic she is not on any anticoagulant therapy.    PAST MEDICAL HISTORY  Active Ambulatory Problems     Diagnosis Date Noted   • Anxiety disorder 02/13/2016   • Arthritis of knee 02/13/2016   • Asthma 02/13/2016   • Chronic coronary artery disease 02/13/2016   • CKD (chronic kidney disease) stage 3, GFR 30-59 ml/min (CMS/[facility]) 02/13/2016   • Depression 02/13/2016   • Diabetic peripheral neuropathy (CMS/[facility]) 02/13/2016   • Gastroesophageal reflux disease 02/13/2016   • Hyperlipidemia 02/13/2016   • Insomnia 02/13/2016   • Lower gastrointestinal hemorrhage  02/13/2016   • Anemia 02/13/2016   • OCHOA (obstructive sleep apnea) 02/13/2016   • DM type 2 (diabetes mellitus, type 2) (CMS/HCC) 02/13/2016   • Essential hypertension 02/13/2016   • Hospital discharge follow-up 02/18/2016   • Pulmonary hypertension due to sleep-disordered breathing (CMS/HCC) 06/10/2016   • s/p MVR, TV-repair, CABG x2 6/13/16 06/18/2016   • OLI (acute kidney injury) (CMS/HCC) 06/18/2016   • Leukocytosis 06/18/2016   • Atrial fibrillation (CMS/HCC) 06/20/2016   • Nocturnal hypoxia 01/30/2017   • Dermatitis 05/01/2017   • Medicare annual wellness visit, initial 08/01/2017   • Class 3 severe obesity due to excess calories with serious comorbidity and body mass index (BMI) of 50.0 to 59.9 in adult (CMS/HCC) 02/22/2018   • Supplemental oxygen dependent 07/12/2018   • Mitral regurgitation 01/02/2019   • Aortic stenosis 01/02/2019   • Medicare annual wellness visit, subsequent 04/29/2019   • Localized edema 06/10/2019   • Chronic right-sided congestive heart failure (CMS/[facility]) 06/10/2019   • Cellulitis of left lower extremity 09/27/2019   • Proteinuria 12/15/2019   • Bilateral lower extremity edema 12/15/2019   • Subclinical hypothyroidism 12/16/2019   • Chronic diastolic heart failure (CMS/[facility]) 12/17/2019   • Chronic respiratory failure with hypoxia and hypercapnia (CMS/HCC) 12/19/2019   • Acute on chronic respiratory failure with hypoxia and hypercapnia (CMS/HCC) 12/23/2019   • AV block, 2nd degree 03/04/2020   • 1st degree AV block 03/04/2020     Resolved Ambulatory Problems     Diagnosis Date Noted   • Mitral stenosis 02/24/2016   • SOB (shortness of breath) 02/24/2016   • Chest pain 06/08/2016   • Wound infection 07/29/2016   • Cough with sputum 04/10/2018   • Acute on chronic combined systolic and diastolic HF (heart failure) (CMS/HCC) 01/02/2019     Past Medical History:   Diagnosis Date   • Anxiety    • Aortic valve stenosis    • CHF (congestive heart failure) (CMS/HCC)    • Class 3 severe obesity  due to excess calories in adult (CMS/[facility])    • COPD (chronic obstructive pulmonary disease) (CMS/[facility])    • Diabetes mellitus (CMS/[facility])    • Heart murmur    • Hypertension    • Mitral valve insufficiency    • Pneumonia    • Pulmonary hypertension (CMS/[facility])    • Sleep apnea    • Stage 3 chronic kidney disease (CMS/[facility])    • Valvular heart disease          PAST SURGICAL HISTORY  Past Surgical History:   Procedure Laterality Date   • CARDIAC CATHETERIZATION     • CARDIAC CATHETERIZATION N/A 6/10/2016    Procedure: Left Heart Cath;  Surgeon: Chace Johnson MD;  Location: [facility] CATH INVASIVE LOCATION;  Service:    • CARDIAC CATHETERIZATION N/A 6/10/2016    Procedure: Right Heart Cath;  Surgeon: Chace Johnson MD;  Location: [facility] CATH INVASIVE LOCATION;  Service:    • CORONARY ARTERY BYPASS GRAFT      2 vessel   • CORONARY ARTERY BYPASS GRAFT WITH MITRAL VALVE REPAIR/REPLACEMENT N/A 6/13/2016    Procedure: INTRAOPERATIVE TARIQ, MIDLINE STERNOTOMY, CORONARY ARTERY BYPASS GRAFTING X  2 UTILIZING ENDOSCOPICALLY HARVESTED LEFT GREATER SAPHENOUS VEIN, MITRAL VALVE REPLACEMENT AND TRICUSPID VALVE REPAIR;  Surgeon: Eliecer Mistry MD;  Location: [facility] OR;  Service:    • CORONARY STENT PLACEMENT  2010    Approx. 6 yrs ago at [facility]   • HEMORRHOIDECTOMY     • HYSTERECTOMY     • MITRAL VALVE REPLACEMENT     • REPLACEMENT TOTAL KNEE Right    • THYROID SURGERY      Cyst removed from thyroid         FAMILY HISTORY  Family History   Problem Relation Age of Onset   • Heart attack Father    • Heart disease Father          SOCIAL HISTORY  Social History     Socioeconomic History   • Marital status:      Spouse name: Not on file   • Number of children: Not on file   • Years of education: Not on file   • Highest education level: Not on file   Tobacco Use   • Smoking status: Never Smoker   • Smokeless tobacco: Never Used   • Tobacco comment: no caffeine    Substance and Sexual Activity   • Alcohol use: No   •  Drug use: No   • Sexual activity: Defer         ALLERGIES  Coumadin [warfarin sodium], Bumex [bumetanide], Erythromycin, Hctz [hydrochlorothiazide], Zaroxolyn [metolazone], Penicillins, and Sulfa antibiotics        REVIEW OF SYSTEMS  Review of Systems   Constitutional: Negative for chills and fever.   HENT: Negative.    Eyes: Negative.    Respiratory: Positive for shortness of breath. Negative for cough.    Cardiovascular: Positive for chest pain and leg swelling. Negative for palpitations.   Gastrointestinal: Negative for abdominal pain, nausea and vomiting.   Genitourinary: Negative for dysuria, flank pain and hematuria.   Musculoskeletal: Negative.    Skin: Negative.    Neurological: Negative.    Psychiatric/Behavioral: Negative.         All systems reviewed and negative except for those discussed in HPI.       PHYSICAL EXAM    I have reviewed the triage vital signs and nursing notes.    ED Triage Vitals   Temp Heart Rate Resp BP SpO2   02/01/21 0259 02/01/21 0259 02/01/21 0259 02/01/21 0259 02/01/21 0259   99 °F (37.2 °C) (!) 46 16 (!) 224/71 100 %      Temp src Heart Rate Source Patient Position BP Location FiO2 (%)   -- -- 02/01/21 0329 02/01/21 0329 --     Lying Right arm        Physical Exam  GENERAL: [place] obese, nontoxic  HENT: nares patent, atraumatic  EYES: no scleral icterus  CV: regular rhythm, regular rate, systolic murmur best heard at the right upper sternal border, bilateral nonpitting edema, palpable pedal pulses, no obvious JVD  RESPIRATORY: normal effort, 95% on 4 L of O2 nasal cannula, diminished lung sounds bilaterally  ABDOMEN: soft, nontender  MUSCULOSKELETAL: no deformity  NEURO: alert and oriented x4, moves all extremities, follows commands  SKIN: warm, dry        LAB RESULTS  Recent Results (from the past 24 hour(s))   ECG 12 Lead    Collection Time: 02/01/21  3:30 AM   Result Value Ref Range    QT Interval 522 ms   Comprehensive Metabolic Panel    Collection Time: 02/01/21  3:39 AM     Specimen: Blood   Result Value Ref Range    Glucose 128 (H) 65 - 99 mg/dL    BUN 20 8 - 23 mg/dL    Creatinine 1.38 (H) 0.57 - 1.00 mg/dL    Sodium 145 136 - 145 mmol/L    Potassium 4.4 3.5 - 5.2 mmol/L    Chloride 102 98 - 107 mmol/L    CO2 32.3 (H) 22.0 - 29.0 mmol/L    Calcium 8.5 (L) 8.6 - 10.5 mg/dL    Total Protein 6.0 6.0 - 8.5 g/dL    Albumin 3.70 3.50 - 5.20 g/dL    ALT (SGPT) 14 1 - 33 U/L    AST (SGOT) 23 1 - 32 U/L    Alkaline Phosphatase 259 (H) 39 - 117 U/L    Total Bilirubin 0.6 0.0 - 1.2 mg/dL    eGFR Non African Amer 37 (L) >60 mL/min/1.73    Globulin 2.3 gm/dL    A/G Ratio 1.6 g/dL    BUN/Creatinine Ratio 14.5 7.0 - 25.0    Anion Gap 10.7 5.0 - 15.0 mmol/L   Troponin    Collection Time: 02/01/21  3:39 AM    Specimen: Blood   Result Value Ref Range    Troponin T 0.011 0.000 - 0.030 ng/mL   BNP    Collection Time: 02/01/21  3:39 AM    Specimen: Blood   Result Value Ref Range    proBNP 12,821.0 (H) 0.0-1,800.0 pg/mL   Procalcitonin    Collection Time: 02/01/21  3:39 AM    Specimen: Blood   Result Value Ref Range    Procalcitonin 0.07 0.00 - 0.25 ng/mL   CBC Auto Differential    Collection Time: 02/01/21  3:39 AM    Specimen: Blood   Result Value Ref Range    WBC 11.42 (H) 3.40 - 10.80 10*3/mm3    RBC 4.45 3.77 - 5.28 10*6/mm3    Hemoglobin 11.7 (L) 12.0 - 15.9 g/dL    Hematocrit 37.0 34.0 - 46.6 %    MCV 83.1 79.0 - 97.0 fL    MCH 26.3 (L) 26.6 - 33.0 pg    MCHC 31.6 31.5 - 35.7 g/dL    RDW 15.7 (H) 12.3 - 15.4 %    RDW-SD 47.2 37.0 - 54.0 fl    MPV 11.8 6.0 - 12.0 fL    Platelets 253 140 - 450 10*3/mm3    Neutrophil % 83.2 (H) 42.7 - 76.0 %    Lymphocyte % 9.7 (L) 19.6 - 45.3 %    Monocyte % 4.9 (L) 5.0 - 12.0 %    Eosinophil % 1.0 0.3 - 6.2 %    Basophil % 0.6 0.0 - 1.5 %    Immature Grans % 0.6 (H) 0.0 - 0.5 %    Neutrophils, Absolute 9.50 (H) 1.70 - 7.00 10*3/mm3    Lymphocytes, Absolute 1.11 0.70 - 3.10 10*3/mm3    Monocytes, Absolute 0.56 0.10 - 0.90 10*3/mm3    Eosinophils, Absolute 0.11 0.00 -  0.40 10*3/mm3    Basophils, Absolute 0.07 0.00 - 0.20 10*3/mm3    Immature Grans, Absolute 0.07 (H) 0.00 - 0.05 10*3/mm3    nRBC 0.0 0.0 - 0.2 /100 WBC   Magnesium    Collection Time: 02/01/21  3:39 AM    Specimen: Blood   Result Value Ref Range    Magnesium 2.0 1.6 - 2.4 mg/dL       Ordered the above labs and independently reviewed the results.        RADIOLOGY  Xr Chest 1 View    Result Date: 2/1/2021  Patient: VALERIA PACHECO  Time Out: 04:19 Exam(s): FILM CXR 1 VIEW EXAM:   XR Chest, 1 View CLINICAL HISTORY:    Chest pain  Reason for exam: Chest pain. Reason for Exam:->Chest pain. Portable->Yes.. Additional notes: Pt also c o CP with bradycardia. TECHNIQUE:   Frontal view of the chest. COMPARISON:   Chest x-ray dated 12 15 2019 FINDINGS:   Lungs:  Diffuse airspace opacities which may be inflammatory or infectious.   Pleural space:  Unremarkable.   Heart:  Heart is enlarged.   Mediastinum:  Unremarkable.   Bones joints:  Evidence of prior median sternotomy. IMPRESSION:       Diffuse airspace opacities which may be inflammatory or infectious.     Electronically signed by Gerald Moeller MD on 02-01-21 at 0419      I ordered the above noted radiological studies. Reviewed by me and discussed with radiologist.  See dictation for official radiology interpretation.      PROCEDURES    Procedures      MEDICATIONS GIVEN IN ER    Medications   sodium chloride 0.9 % flush 10 mL (has no administration in time range)   sodium chloride 0.9 % flush 10 mL (has no administration in time range)   sodium chloride 0.9 % flush 10 mL (has no administration in time range)   nitroglycerin (NITROSTAT) SL tablet 0.4 mg (has no administration in time range)   acetaminophen (TYLENOL) tablet 650 mg (has no administration in time range)     Or   acetaminophen (TYLENOL) 160 MG/5ML solution 650 mg (has no administration in time range)     Or   acetaminophen (TYLENOL) suppository 650 mg (has no administration in time range)   bisacodyl  (DULCOLAX) EC tablet 5 mg (has no administration in time range)   ondansetron (ZOFRAN) tablet 4 mg (has no administration in time range)     Or   ondansetron (ZOFRAN) injection 4 mg (has no administration in time range)   calcium carbonate (TUMS) chewable tablet 500 mg (200 mg elemental) (has no administration in time range)   furosemide (LASIX) injection 40 mg (40 mg Intravenous Given 2/1/21 1335)   aspirin tablet 325 mg (325 mg Oral Given 2/1/21 0703)   hydrALAZINE (APRESOLINE) injection 10 mg (10 mg Intravenous Given 2/1/21 6093)         PROGRESS, DATA ANALYSIS, CONSULTS, AND MEDICAL DECISION MAKING    All labs have been independently reviewed by me.  All radiology studies have been reviewed by me and discussed with radiologist dictating the report.   EKG's independently viewed and interpreted by me.  Discussion below represents my analysis of pertinent findings related to patient's condition, differential diagnosis, treatment plan and final disposition.    DDx includes but is not limited to: ACS, PTX, PE, PNA, CHF, COPD GERD, pleurisy, musculoskeletal pain.  After history, physical exam, work-up.  The working diagnosis will be COPD exacerbation, CHF, with chest pain.  Given her heart score, bradycardia, new prolonged ME interval, it is felt she would better be brought into the hospital where the COPD, CHF, and chest pain can can be evaluated and treated.  We will place a call to the hospitalist at this time.  I have ordered 40 mg of Lasix IV and will order nebulizer treatments once her COVID-19 testing is negative.  She has been given 325 mg of ASA and 10 mg of hydralazine to lower her blood pressure.      The patient's chest x-ray did show diffuse opacities which could be inflammatory or infectious.  The patient's procalcitonin is normal and I do not feel like this is the picture of a bacterial pneumonia.  I will defer antibiotic therapy to the admitting doctor.    ED Course as of Feb 01 0609   Mon Feb 01, 2021    0331 BP(!): 224/71 [RC]   0331 Temp: 99 °F (37.2 °C) [RC]   0331 Heart Rate(!): 46 [RC]   0331 Resp: 16 [RC]   0331 98% on 4 L oxygen    [RC]   0446 EKG          EKG time: 0330  Rhythm/Rate: 46/Sinus  P waves and FL: prolonged pr interval  QRS, axis: RBBB   ST and T waves: No acute st changes     Interpreted Contemporaneously by me, independently viewed  -Prolonged FL interval is new.        [RC]   0448 HEART SCORE    History 1  ECG Normal (0)  Age > or = 65 (2)  Risk factors > or = to 3 RF for atherosclerotic dx (2)  Troponin < or = Normal limit (0)    This patient's HEART score is 5    HEART Score Key:  Scores 0-3: 0.9-1.7% risk of adverse cardiac event. In the HEART Score study, these patients were discharged (0.99% in the retrospective study, 1.7% in the prospective study)  Scores 4-6: 12-16.6% risk of adverse cardiac event. In the HEART Score study, these patients were admitted to the hospital. (11.6% retrospective, 16.6% prospective)  Scores ?7: 50-65% risk of adverse cardiac event. In the HEART Score study, these patients were candidates for early invasive measures. (65.2% retrospective, 50.1% prospective)       [RC]   0450 I have reviewed the patient's chest x-ray and there are diffuse airspace opacity which may be inflammatory or infectious.  She also looks slightly congested to me.  I will order a dose of her Lasix IV.    [RC]   0523 Discussed patient's case with ZOILA Swain with [facility] .  To admit to Dr Mccray's care for further evaluation.    [RC]   0524 WBC(!): 11.42 [RC]   0524 RBC: 4.45 [RC]   0524 Hemoglobin(!): 11.7 [RC]   0524 Hematocrit: 37.0 [RC]   0525 Platelets: 253 [RC]   0525 Magnesium: 2.0 [RC]   0525 proBNP(!): 12,821.0 [RC]   0525 Procalcitonin: 0.07 [RC]   0525 Troponin T: 0.011 [RC]   0525 Glucose(!): 128 [RC]   0525 BUN: 20 [RC]   0525 Creatinine(!): 1.38 [RC]   0525 Sodium: 145 [RC]   0525 Potassium: 4.4 [RC]   0525 CO2(!): 32.3 [RC]   0525 Anion Gap: 10.7 [RC]   0525  Procalcitonin: 0.07 [RC]      ED Course User Index  [RC] Dwayne Porras III, PA       Patient was placed in face mask in first look. Patient was wearing facemask when I entered the room and throughout our encounter. I wore full protective equipment throughout this patient encounter including a face mask, eye shield, gown and gloves. Hand hygiene was performed before donning protective equipment and after removal when leaving the room.    AS OF 06:09 EST VITALS:    BP - (!) 201/74  HR - (!) 44  TEMP - 99 °F (37.2 °C)  O2 SATS - 98%        DIAGNOSIS  Final diagnoses:   Chest pain, unspecified type   COPD exacerbation (CMS/HCC)   Congestive heart failure, unspecified HF chronicity, unspecified heart failure type (CMS/HCC)   Hypertension, unspecified type   Abnormal chest x-ray         DISPOSITION  ADMISSION    Discussed treatment plan and reason for admission with pt/family and admitting physician.  Pt/family voiced understanding of the plan for admission for further testing/treatment as needed.              Dwayne Porras III, PA  02/01/21 0530       Nicolás Pepper MD  02/01/21 0622

## 2021-02-01 NOTE — ED NOTES
This RN wore gloves, mask, eye protection and all other necessary PPE while performing pt care.     Sade Srivastava, RN  02/01/21 0304

## 2021-02-01 NOTE — H&P
Patient Name:  Miguelina Lopez  YOB: 1945  MRN:  [id number]  Admit Date:  2/1/2021  Patient Care Team:  Arcelia Talbot APRN as PCP - General (Nurse Practitioner)  Robbie Wilson MD as Consulting Physician (Hematology and Oncology)  Chace Johnson MD as Consulting Physician (Cardiology)      Subjective   History Present Illness     Chief Complaint   Patient presents with   • Shortness of Breath   • Chest Pain     History of Present Illness   Ms. Lopez is a 75 y.o. non-smoker with a history of chronic diastolic heart failure, CAD, valvular heart disease, OCHOA with right sided heart failure, chronic oxygen dependence, DM2, neuropathy, CKD3 and COPD that presents to [facility] complaining of shortness of breath and chest pain. The patient states for the last week she has had several episodes of chest pain. She states the pain occurs with rest. It has been on both sides of her chest (left and right) and usually has some radiation to her back. She denies any radiation to her jaw or arms, but states the pain is sharp and usually lasts about 15 minutes. She states she usually takes one nitro with the episodes which relieves her pain.    In addition to her chest pain, she states she has had worsening dyspnea with exertion. She wears 4 L oxygen chronically and states this does not help alleviate her breathing. She has had some cough with phlegm, but no fevers or chills. She denies any recent nausea, vomiting or abdominal pain. She states she did have a couple episodes of diarrhea last week after taking laxatives. She has gained about 2 pounds this week and has had associated lower extremity swelling as well. She denies any dysuria, but does report some foul-smelling urine.   She is an established patient of Dr. Ayers. She was last at this facility in December of 2019 where she was treated for heart failure. She was on a lasix drip at that time at the direction of Nephrology  and felt to have OCHOA and obesity hypoventilation syndrome for which she was placed on Trilogy at home. She was noted to be bradycardic at that time and to be possibly developing SSS. She was not felt to need a pacemaker at that point.   In the ED, she was found to be bradycardic in the 40s. Initially she was on NRB and hiflow oxygen but was weaned to her usual 4 L after having over 700 cc out with one dose of IV Lasix. Her BP was elevated at 220/71 which has also improved. CXR showed diffuse airspace opacities with were felt inflammatory versus infectious. RVP with COVID19 was negative. WBC were 11.42 and proBNP 12, 821. Procal 0.07 and troponin 0.011. creatinine was 1.38 with normal electrolytes. She has been admitted for further care at this time.    Review of Systems   Constitutional: Positive for unexpected weight change. Negative for activity change, appetite change, chills, fatigue and fever.   HENT: Positive for congestion. Negative for ear pain.    Eyes: Negative for pain and discharge.   Respiratory: Positive for cough and shortness of breath. Negative for chest tightness.    Cardiovascular: Positive for chest pain and leg swelling.   Gastrointestinal: Positive for diarrhea. Negative for abdominal distention, abdominal pain, nausea and vomiting.   Endocrine: Negative for cold intolerance and heat intolerance.   Genitourinary: Negative for difficulty urinating and dysuria.        Foul-smelling urine   Musculoskeletal: Negative for arthralgias and back pain.   Skin: Negative for color change and pallor.   Neurological: Positive for weakness. Negative for dizziness and numbness.   Psychiatric/Behavioral: Negative for confusion. The patient is not nervous/anxious.       Personal History     Past Medical History:   Diagnosis Date   • Acute on chronic respiratory failure with hypoxia and hypercapnia (CMS/HCC)    • OLI (acute kidney injury) (CMS/HCC)    • Anemia    • Anxiety    • Aortic valve stenosis    •  Bilateral lower extremity edema    • CHF (congestive heart failure) (CMS/[facility])    • Chronic coronary artery disease    • Class 3 severe obesity due to excess calories in adult (CMS/[facility])    • COPD (chronic obstructive pulmonary disease) (CMS/[facility])    • Depression    • Diabetes mellitus (CMS/[facility])    • Elevated cholesterol    • GERD (gastroesophageal reflux disease)    • Heart murmur    • Hypertension    • Mitral valve insufficiency    • Pneumonia     1/2016   • Pulmonary hypertension (CMS/[facility])     due to sleep disordered breathing   • Sleep apnea     Uses CPAP or oxygen   • Stage 3 chronic kidney disease (CMS/[facility])    • Subclinical hypothyroidism    • Supplemental oxygen dependent    • Valvular heart disease      Past Surgical History:   Procedure Laterality Date   • CARDIAC CATHETERIZATION     • CARDIAC CATHETERIZATION N/A 6/10/2016    Procedure: Left Heart Cath;  Surgeon: Chace Johnson MD;  Location: [facility] CATH INVASIVE LOCATION;  Service:    • CARDIAC CATHETERIZATION N/A 6/10/2016    Procedure: Right Heart Cath;  Surgeon: Chace Johnson MD;  Location: [facility] CATH INVASIVE LOCATION;  Service:    • CARDIAC SURGERY     • CORONARY ARTERY BYPASS GRAFT      2 vessel   • CORONARY ARTERY BYPASS GRAFT WITH MITRAL VALVE REPAIR/REPLACEMENT N/A 6/13/2016    Procedure: INTRAOPERATIVE TARIQ, MIDLINE STERNOTOMY, CORONARY ARTERY BYPASS GRAFTING X  2 UTILIZING ENDOSCOPICALLY HARVESTED LEFT GREATER SAPHENOUS VEIN, MITRAL VALVE REPLACEMENT AND TRICUSPID VALVE REPAIR;  Surgeon: Eliecer Mistry MD;  Location: [facility] OR;  Service:    • CORONARY STENT PLACEMENT  2010    Approx. 6 yrs ago at [facility]   • HEMORRHOIDECTOMY     • HYSTERECTOMY     • MITRAL VALVE REPLACEMENT     • REPLACEMENT TOTAL KNEE Right    • THYROID SURGERY      Cyst removed from thyroid   • VASCULAR SURGERY       Family History   Problem Relation Age of Onset   • Heart attack Father    • Heart disease Father      Social History     Tobacco Use   •  Smoking status: Never Smoker   • Smokeless tobacco: Never Used   • Tobacco comment: no caffeine    Substance Use Topics   • Alcohol use: No   • Drug use: No     Facility-Administered Medications Prior to Admission   Medication Dose Route Frequency Provider Last Rate Last Admin   • methylPREDNISolone acetate (DEPO-medrol) injection 40 mg  40 mg Intra-articular Once Michelle Abernathy MD         Medications Prior to Admission   Medication Sig Dispense Refill Last Dose   • ALPRAZolam (XANAX) 1 MG tablet Take 1 mg by mouth 2 (Two) Times a Day.   Patient Taking Differently at Unknown time   • aspirin  MG tablet Take 1 tablet by mouth Daily. 90 tablet 1 1/31/2021 at Unknown time   • atorvastatin (LIPITOR) 20 MG tablet TAKE ONE TABLET BY MOUTH DAILY 90 tablet 0 1/31/2021 at Unknown time   • clotrimazole-betamethasone (LOTRISONE) 1-0.05 % cream Apply  topically to the appropriate area as directed 2 (Two) Times a Day. 45 g 0 Past Week at Unknown time   • fluticasone-salmeterol (ADVAIR DISKUS) 250-50 MCG/DOSE DISKUS Inhale 1 puff Daily As Needed (cough and wheezing). 60 each 0 1/31/2021 at Unknown time   • furosemide (LASIX) 40 MG tablet TAKE ONE TABLET BY MOUTH TWICE A DAY 60 tablet 0 1/31/2021 at Unknown time   • gabapentin (NEURONTIN) 100 MG capsule Take 1 capsule by mouth Every 12 (Twelve) Hours. 60 capsule 2 1/31/2021 at Unknown time   • glimepiride (AMARYL) 1 MG tablet TAKE ONE TABLET BY MOUTH EVERY MORNING BEFORE BREAKFAST 90 tablet 1 1/31/2021 at Unknown time   • ipratropium-albuterol (DUO-NEB) 0.5-2.5 mg/mL nebulizer Take 3 mL by nebulization 4 (four) times a day. (Patient taking differently: Take 3 mL by nebulization Daily As Needed.) 3 mL 5 1/31/2021 at Unknown time   • levothyroxine (SYNTHROID, LEVOTHROID) 25 MCG tablet Take 1 tablet by mouth Daily. 90 tablet 1 1/31/2021 at Unknown time   • losartan (COZAAR) 100 MG tablet TAKE ONE TABLET BY MOUTH DAILY 90 tablet 4 1/31/2021 at Unknown time   • Misc. Devices  (COMMODE BEDSIDE) misc 1 each Daily. 1 each 0 1/31/2021 at Unknown time   • nitroglycerin (NITROSTAT) 0.4 MG SL tablet DISSOLVE 1 TAB UNDER TONGUE FOR CHEST PAIN - IF PAIN REMAINS AFTER 5 MIN, CALL 911 AND REPEAT DOSE. MAX 3 TABS IN 15 MINUTES 25 tablet 4 1/31/2021 at Unknown time   • nystatin (MYCOSTATIN) 604389 UNIT/GM cream Apply  topically to the appropriate area as directed 2 (Two) Times a Day. to affected area(s) 30 g 3 1/31/2021 at Unknown time   • O2 (OXYGEN) Inhale 4 L/min Continuous.   Patient Taking Differently at Unknown time   • omeprazole (priLOSEC) 40 MG capsule Take 1 capsule by mouth Daily. 90 capsule 0 1/31/2021 at Unknown time   • Probiotic Product (PROBIOTIC PO) Take  by mouth.   1/31/2021 at Unknown time   • senna-docusate (PERICOLACE) 8.6-50 MG per tablet Take 1 tablet by mouth daily.   1/31/2021 at Unknown time   • traMADol (ULTRAM) 50 MG tablet TAKE ONE TABLET BY MOUTH EVERY 8 HOURS AS NEEDED FOR MODERATE LEG PAIN FOR UP TO 90 DOSES 30 tablet 1 1/31/2021 at Unknown time   • TRULICITY 0.75 MG/0.5ML solution pen-injector INJECT 0.75 MG UNDER THE SKIN ONCE WEEKLY 6 pen 5 1/31/2021 at Unknown time   • vilazodone (VIIBRYD) 20 MG tablet tablet Take 20 mg by mouth Every Night.   1/31/2021 at Unknown time   • vitamin D (ERGOCALCIFEROL) 1.25 MG (34638 UT) capsule capsule TAKE ONE CAPSULE BY MOUTH ONCE WEEKLY 4 capsule 0 Past Week at Unknown time     Allergies:    Allergies   Allergen Reactions   • Coumadin [Warfarin Sodium] Diarrhea and Nausea Only   • Bumex [Bumetanide] Swelling   • Erythromycin    • Hctz [Hydrochlorothiazide] Swelling   • Zaroxolyn [Metolazone] Diarrhea   • Penicillins Rash   • Sulfa Antibiotics Rash       Objective    Objective     Vital Signs  Temp:  [98.1 °F (36.7 °C)-99 °F (37.2 °C)] 98.1 °F (36.7 °C)  Heart Rate:  [43-48] 44  Resp:  [16] 16  BP: (149-224)/(54-94) 173/54  SpO2:  [96 %-100 %] 99 %  on  Flow (L/min):  [4-10] 4;   Device (Oxygen Therapy): high-flow nasal  cannula  Body mass index is 52.73 kg/m².    Physical Exam  Vitals signs and nursing note reviewed.   Constitutional:       General: She is not in acute distress.     Appearance: She is obese. She is ill-appearing (chronically). She is not toxic-appearing.   HENT:      Head: Normocephalic and atraumatic.      Right Ear: External ear normal.      Left Ear: External ear normal.      Nose: Nose normal.      Mouth/Throat:      Mouth: Mucous membranes are moist.   Eyes:      General:         Right eye: No discharge.         Left eye: No discharge.      Conjunctiva/sclera: Conjunctivae normal.   Neck:      Musculoskeletal: Normal range of motion and neck supple.   Cardiovascular:      Rate and Rhythm: Regular rhythm. Bradycardia present.      Heart sounds: Murmur present.   Pulmonary:      Effort: Pulmonary effort is normal. No respiratory distress.      Comments: Diminished throughout. On 4 L  Abdominal:      General: Bowel sounds are normal. There is no distension.      Palpations: Abdomen is soft.      Tenderness: There is no abdominal tenderness.   Genitourinary:     Comments: Clear yellow urine 700 cc in bedside cannister.  Musculoskeletal: Normal range of motion.         General: Swelling (2+ BLE) present.   Skin:     General: Skin is warm and dry.      Findings: No bruising.      Comments: Venous stasis changes to BLE   Neurological:      Mental Status: She is alert and oriented to person, place, and time.      Sensory: No sensory deficit.      Motor: Weakness present.      Coordination: Coordination normal.   Psychiatric:         Mood and Affect: Mood normal.         Behavior: Behavior normal.       Results Review:  I reviewed the patient's new clinical results.  I reviewed the patient's new imaging results and agree with the interpretation.  I reviewed the patient's other test results and agree with the interpretation  I personally viewed and interpreted the patient's EKG/Telemetry data  Discussed with ED  provider.    Lab Results (last 24 hours)     Procedure Component Value Units Date/Time    CBC & Differential [[id number]]  (Abnormal) Collected: 02/01/21 0339    Specimen: Blood Updated: 02/01/21 0403    Narrative:      The following orders were created for panel order CBC & Differential.  Procedure                               Abnormality         Status                     ---------                               -----------         ------                     CBC Auto Differential[[id number]]        Abnormal            Final result                 Please view results for these tests on the individual orders.    Comprehensive Metabolic Panel [[id number]]  (Abnormal) Collected: 02/01/21 0339    Specimen: Blood Updated: 02/01/21 0422     Glucose 128 mg/dL      BUN 20 mg/dL      Creatinine 1.38 mg/dL      Sodium 145 mmol/L      Potassium 4.4 mmol/L      Comment: Slight hemolysis detected by analyzer. Results may be affected.        Chloride 102 mmol/L      CO2 32.3 mmol/L      Calcium 8.5 mg/dL      Total Protein 6.0 g/dL      Albumin 3.70 g/dL      ALT (SGPT) 14 U/L      AST (SGOT) 23 U/L      Alkaline Phosphatase 259 U/L      Total Bilirubin 0.6 mg/dL      eGFR Non African Amer 37 mL/min/1.73      Globulin 2.3 gm/dL      A/G Ratio 1.6 g/dL      BUN/Creatinine Ratio 14.5     Anion Gap 10.7 mmol/L     Narrative:      GFR Normal >60  Chronic Kidney Disease <60  Kidney Failure <15      Troponin [[id number]]  (Normal) Collected: 02/01/21 0339    Specimen: Blood Updated: 02/01/21 0428     Troponin T 0.011 ng/mL     Narrative:      Troponin T Reference Range:  <= 0.03 ng/mL-   Negative for AMI  >0.03 ng/mL-     Abnormal for myocardial necrosis.  Clinicians would have to utilize clinical acumen, EKG, Troponin and serial changes to determine if it is an Acute Myocardial Infarction or myocardial injury due to an underlying chronic condition.       Results may be falsely decreased if patient taking Biotin.      BNP [[id number]]   "(Abnormal) Collected: 02/01/21 0339    Specimen: Blood Updated: 02/01/21 0428     proBNP 12,821.0 pg/mL     Narrative:      Among patients with dyspnea, NT-proBNP is highly sensitive for the detection of acute congestive heart failure. In addition NT-proBNP of <300 pg/ml effectively rules out acute congestive heart failure with 99% negative predictive value.    Results may be falsely decreased if patient taking Biotin.      Procalcitonin [[id number]]  (Normal) Collected: 02/01/21 0339    Specimen: Blood Updated: 02/01/21 0428     Procalcitonin 0.07 ng/mL     Narrative:      As a Marker for Sepsis (Non-Neonates):   1. <0.5 ng/mL represents a low risk of severe sepsis and/or septic shock.  1. >2 ng/mL represents a high risk of severe sepsis and/or septic shock.    As a Marker for Lower Respiratory Tract Infections that require antibiotic therapy:  PCT on Admission     Antibiotic Therapy             6-12 Hrs later  > 0.5                Strongly Recommended            >0.25 - <0.5         Recommended  0.1 - 0.25           Discouraged                   Remeasure/reassess PCT  <0.1                 Strongly Discouraged          Remeasure/reassess PCT      As 28 day mortality risk marker: \"Change in Procalcitonin Result\" (> 80 % or <=80 %) if Day 0 (or Day 1) and Day 4 values are available. Refer to http://www.[facility]-pct-calculator.com/   Change in PCT <=80 %   A decrease of PCT levels below or equal to 80 % defines a positive change in PCT test result representing a higher risk for 28-day all-cause mortality of patients diagnosed with severe sepsis or septic shock.  Change in PCT > 80 %   A decrease of PCT levels of more than 80 % defines a negative change in PCT result representing a lower risk for 28-day all-cause mortality of patients diagnosed with severe sepsis or septic shock.                Results may be falsely decreased if patient taking Biotin.     CBC Auto Differential [[id number]]  (Abnormal) Collected: 02/01/21 " 0339    Specimen: Blood Updated: 02/01/21 0403     WBC 11.42 10*3/mm3      RBC 4.45 10*6/mm3      Hemoglobin 11.7 g/dL      Hematocrit 37.0 %      MCV 83.1 fL      MCH 26.3 pg      MCHC 31.6 g/dL      RDW 15.7 %      RDW-SD 47.2 fl      MPV 11.8 fL      Platelets 253 10*3/mm3      Neutrophil % 83.2 %      Lymphocyte % 9.7 %      Monocyte % 4.9 %      Eosinophil % 1.0 %      Basophil % 0.6 %      Immature Grans % 0.6 %      Neutrophils, Absolute 9.50 10*3/mm3      Lymphocytes, Absolute 1.11 10*3/mm3      Monocytes, Absolute 0.56 10*3/mm3      Eosinophils, Absolute 0.11 10*3/mm3      Basophils, Absolute 0.07 10*3/mm3      Immature Grans, Absolute 0.07 10*3/mm3      nRBC 0.0 /100 WBC     Magnesium [[id number]]  (Normal) Collected: 02/01/21 0339    Specimen: Blood Updated: 02/01/21 0422     Magnesium 2.0 mg/dL     Respiratory Panel PCR w/COVID-19(SARS-CoV-2) BREA/KAROLYN/ELMO/PAD/COR/MAD/JARET In-House, NP Swab in UTM/VTM, 3-4 HR TAT - Swab, Nasopharynx [[id number]]  (Normal) Collected: 02/01/21 0341    Specimen: Swab from Nasopharynx Updated: 02/01/21 0652     ADENOVIRUS, PCR Not Detected     Coronavirus 229E Not Detected     Coronavirus HKU1 Not Detected     Coronavirus NL63 Not Detected     Coronavirus OC43 Not Detected     COVID19 Not Detected     Human Metapneumovirus Not Detected     Human Rhinovirus/Enterovirus Not Detected     Influenza A PCR Not Detected     Influenza B PCR Not Detected     Parainfluenza Virus 1 Not Detected     Parainfluenza Virus 2 Not Detected     Parainfluenza Virus 3 Not Detected     Parainfluenza Virus 4 Not Detected     RSV, PCR Not Detected     Bordetella pertussis pcr Not Detected     Bordetella parapertussis PCR Not Detected     Chlamydophila pneumoniae PCR Not Detected     Mycoplasma pneumo by PCR Not Detected    Narrative:      Fact sheet for providers: https://docs.[employer]/wp-content/uploads/ZKH0741-4499-RU9.1-EUA-Provider-Fact-Sheet-3.pdf    Fact sheet for patients:  https://docs.[employer]/wp-content/uploads/GZZ3319-1073-KK5.1-EUA-Patient-Fact-Sheet-1.pdf    Test performed by PCR.    Troponin [[id number]]  (Normal) Collected: 02/01/21 0551    Specimen: Blood from Hand, Right Updated: 02/01/21 0618     Troponin T 0.016 ng/mL     Narrative:      Troponin T Reference Range:  <= 0.03 ng/mL-   Negative for AMI  >0.03 ng/mL-     Abnormal for myocardial necrosis.  Clinicians would have to utilize clinical acumen, EKG, Troponin and serial changes to determine if it is an Acute Myocardial Infarction or myocardial injury due to an underlying chronic condition.       Results may be falsely decreased if patient taking Biotin.      Basic Metabolic Panel [[id number]]  (Abnormal) Collected: 02/01/21 0551    Specimen: Blood from Hand, Right Updated: 02/01/21 0804     Glucose 112 mg/dL      BUN 19 mg/dL      Creatinine 1.42 mg/dL      Sodium 146 mmol/L      Potassium 3.9 mmol/L      Chloride 103 mmol/L      CO2 33.4 mmol/L      Calcium 8.9 mg/dL      eGFR Non African Amer 36 mL/min/1.73      BUN/Creatinine Ratio 13.4     Anion Gap 9.6 mmol/L     Narrative:      GFR Normal >60  Chronic Kidney Disease <60  Kidney Failure <15      POC Glucose Once [[id number]]  (Normal) Collected: 02/01/21 0620    Specimen: Blood Updated: 02/01/21 0624     Glucose 111 mg/dL     CBC & Differential [[id number]]  (Abnormal) Collected: 02/01/21 0829    Specimen: Blood Updated: 02/01/21 0907    Narrative:      The following orders were created for panel order CBC & Differential.  Procedure                               Abnormality         Status                     ---------                               -----------         ------                     CBC Auto Differential[[id number]]        Abnormal            Final result                 Please view results for these tests on the individual orders.    CBC Auto Differential [[id number]]  (Abnormal) Collected: 02/01/21 0829    Specimen: Blood Updated: 02/01/21 0907     WBC  11.57 10*3/mm3      RBC 4.40 10*6/mm3      Hemoglobin 11.6 g/dL      Hematocrit 37.0 %      MCV 84.1 fL      MCH 26.4 pg      MCHC 31.4 g/dL      RDW 15.9 %      RDW-SD 48.9 fl      MPV 11.0 fL      Platelets 226 10*3/mm3      Neutrophil % 80.4 %      Lymphocyte % 12.2 %      Monocyte % 5.4 %      Eosinophil % 1.2 %      Basophil % 0.5 %      Immature Grans % 0.3 %      Neutrophils, Absolute 9.30 10*3/mm3      Lymphocytes, Absolute 1.41 10*3/mm3      Monocytes, Absolute 0.62 10*3/mm3      Eosinophils, Absolute 0.14 10*3/mm3      Basophils, Absolute 0.06 10*3/mm3      Immature Grans, Absolute 0.04 10*3/mm3      nRBC 0.0 /100 WBC     Hemoglobin A1c [[id number]]  (Abnormal) Collected: 02/01/21 0829    Specimen: Blood Updated: 02/01/21 1117     Hemoglobin A1C 6.71 %     Narrative:      Hemoglobin A1C Ranges:    Increased Risk for Diabetes  5.7% to 6.4%  Diabetes                     >= 6.5%  Diabetic Goal                < 7.0%          Imaging Results (Last 24 Hours)     Procedure Component Value Units Date/Time    XR Chest 1 View [[id number]] Collected: 02/01/21 0419     Updated: 02/01/21 0419    Narrative:        Patient: VALERIA PACHECO  Time Out: 04:19  Exam(s): FILM CXR 1 VIEW     EXAM:    XR Chest, 1 View    CLINICAL HISTORY:     Chest pain  Reason for exam: Chest pain. Reason for Exam:->Chest pain.   Portable->Yes.. Additional notes: Pt also c o CP with bradycardia.     TECHNIQUE:    Frontal view of the chest.    COMPARISON:    Chest x-ray dated 12 15 2019    FINDINGS:    Lungs:  Diffuse airspace opacities which may be inflammatory or   infectious.    Pleural space:  Unremarkable.    Heart:  Heart is enlarged.    Mediastinum:  Unremarkable.    Bones joints:  Evidence of prior median sternotomy.    IMPRESSION:         Diffuse airspace opacities which may be inflammatory or infectious.      Impression:          Electronically signed by Gerald Moeller MD on 02-01-21 at 0419          Results for orders placed  during the hospital encounter of 12/15/19   Adult Transthoracic Echo Complete W/ Cont if Necessary Per Protocol    Narrative · Left ventricular systolic function is normal. Calculated EF = 67.0%.   Estimated EF was in disagreement with the calculated EF. Estimated EF   appears to be in the range of 56 - 60%  · Normal left ventricular cavity size noted. Left ventricular wall   thickness is consistent with mild-to-moderate concentric hypertrophy.   Septal wall motion is abnormal, consistent with a post-operative state.   Systolic flattening of the interventricular septum consistent with right   ventricle pressure overload. Left ventricular diastolic function was   indeterminate.  · Right ventricular cavity is mild-to-moderately dilated.  · The aortic valve is abnormal in structure. There is moderate thickening   of the aortic valve. No significant aortic valve regurgitation is present.   Moderate aortic valve stenosis is present.  · There is a bioprosthetic mitral valve present. The mean inflow gradient   across the prosthetic mitral valve is 12 mmHg, which is elevated. There   appears to be a mild to moderate amount of paravalvular regurgitation.  · Mild to moderate tricuspid valve regurgitation is present. Estimated   right ventricular systolic pressure from tricuspid regurgitation is   markedly elevated (>55 mmHg). Calculated right ventricular systolic   pressure from tricuspid regurgitation is 57.3 mmHg. There is a tricuspid   ring valve present.          ECG 12 Lead   Preliminary Result   HEART RATE= 46  bpm   RR Interval= 1312  ms   MT Interval= 270  ms   P Horizontal Axis= 151  deg   P Front Axis= 0  deg   QRSD Interval= 155  ms   QT Interval= 522  ms   QRS Axis= -88  deg   T Wave Axis= 68  deg   - ABNORMAL ECG -   Sinus bradycardia   Prolonged MT interval   Right bundle branch block   Electronically Signed By:    Date and Time of Study: 2021-02-01 03:30:22           Assessment/Plan     Active Hospital Problems     Diagnosis POA   • **Chest pain [R07.9] Yes   • COPD (chronic obstructive pulmonary disease) (CMS/[facility]) [J44.9] Yes   • Chronic respiratory failure with hypoxia and hypercapnia (CMS/[facility]) [J96.11, J96.12] Yes   • Chronic diastolic heart failure (CMS/[facility]) [I50.32] Yes   • Supplemental oxygen dependent [Z99.81] Not Applicable     Restrictive lung disease     • s/p MVR, TV-repair, CABG x2 6/13/16 [Z95.2] Not Applicable   • Pulmonary hypertension due to sleep-disordered breathing (CMS/[facility]) [I27.29, G47.8] Yes     RVSP 61 mmHg TARIQ 06/10/16     • OCHOA (obstructive sleep apnea) [G47.33] Yes      CPAP auto with O2 at 2 L  Nasal Pillows     • DM type 2 (diabetes mellitus, type 2) (CMS/[facility]) [E11.9] Yes     Impression: 03/30/2015 - A1c is 7.5 , Pt to check BS BID add, Amryl 1 mg once to twice daily;      • Diabetic peripheral neuropathy (CMS/[facility]) [E11.42] Yes   • Hyperlipidemia [E78.5] Yes   • Essential hypertension [I10] Yes     Impression: 03/30/2015 - BP is high will add BB;      • CKD (chronic kidney disease) stage 3, GFR 30-59 ml/min (CMS/[facility]) [N18.30] Yes     Impression: 03/30/2015 - Will watch , Avoid NSAID  , Control DM;        Ms. Lopez is a 75 year old female who presented to the hospital with chest pain and dyspnea. Found to have diffuse opacities on CXR with elevated proBNP.    Chest pain/acute on chronic diastolic heart failure  -Given 1 dose IV Lasix in ED with good output. Will continue Lasix 40 mg IV Q8 hours for now (allergy to Bumex) and monitor daily weight/strict intake and output. Need to monitor renal function closely. Will ask Nephrology to see given her history of requiring Lasix gtt in setting of CKD3.  -Consult Cardiology. CP has typical and atypical features. Will continue to monitor on telemetry and use Nitro as needed. Trend troponins.   -Bradycardia with known history. Cards to weigh in. ? SSS in past.  -Resume home regimen when able. ASA on board.    COPD/chronic respiratory failure with hypoxia  and hypercapnia/OCHOA  -May need Pulmonology consultation if unable to get her home Trilogy here.  -Currently down to home oxygen levels. Monitor and resume home inhaler regimen.    CAD/history of CABG/valvular heart disease  -Cards to see as above.  -Repeat echocardiogram.    DM2/neuropathy  -SSI as needed. Monitor ACHS. Hold oral medications.  -Check a1c.    HTN  -BP improving with diuresis. Hold ARB therapy for now.    CKD3  -Baseline creatinine around 1.2. Up to 1.3-1.4.  -Nephrology to see given need for diuresis. Hold ARB therapy for now.  -Avoid nephrotoxins.    Mild leukocytosis as well as foul-smelling urine reported. Will check UA.    · I discussed the patients findings and my recommendations with patient and nursing staff.    VTE Prophylaxis - Pharmacy to dose Lovenox.  Code Status - Full code. Confirmed with patient.       ZOILA Powers  [facility]  02/01/21  11:28 EST

## 2021-02-01 NOTE — THERAPY EVALUATION
Patient Name: Miguelina Lopez  : 1945    MRN: [id number]                              Today's Date: 2021       Admit Date: 2021    Visit Dx:     ICD-10-CM ICD-9-CM   1. Chest pain, unspecified type  R07.9 786.50   2. COPD exacerbation (CMS/HCC)  J44.1 491.21   3. Congestive heart failure, unspecified HF chronicity, unspecified heart failure type (CMS/HCC)  I50.9 428.0   4. Hypertension, unspecified type  I10 401.9   5. Abnormal chest x-ray  R93.89 793.2     Patient Active Problem List   Diagnosis   • Anxiety disorder   • Arthritis of knee   • Asthma   • Chronic coronary artery disease   • CKD (chronic kidney disease) stage 3, GFR 30-59 ml/min (CMS/HCC)   • Depression   • Diabetic peripheral neuropathy (CMS/HCC)   • Gastroesophageal reflux disease   • Hyperlipidemia   • Insomnia   • Lower gastrointestinal hemorrhage   • Anemia   • OCHOA (obstructive sleep apnea)   • DM type 2 (diabetes mellitus, type 2) (CMS/HCC)   • Essential hypertension   • Hospital discharge follow-up   • Pulmonary hypertension due to sleep-disordered breathing (CMS/HCC)   • s/p MVR, TV-repair, CABG x2 16   • OLI (acute kidney injury) (CMS/HCC)   • Leukocytosis   • Atrial fibrillation (CMS/HCC)   • Nocturnal hypoxia   • Dermatitis   • Medicare annual wellness visit, initial   • Class 3 severe obesity due to excess calories with serious comorbidity and body mass index (BMI) of 50.0 to 59.9 in adult (CMS/[facility])   • Supplemental oxygen dependent   • Acute on chronic combined systolic and diastolic HF (heart failure) (CMS/HCC)   • Mitral regurgitation   • Aortic stenosis   • Medicare annual wellness visit, subsequent   • Localized edema   • Chronic right-sided congestive heart failure (CMS/[facility])   • Cellulitis of left lower extremity   • Proteinuria   • Bilateral lower extremity edema   • Subclinical hypothyroidism   • Chronic diastolic heart failure (CMS/[facility])   • Chronic respiratory failure with hypoxia and hypercapnia (CMS/HCC)    • Acute on chronic respiratory failure with hypoxia and hypercapnia (CMS/[facility])   • AV block, 2nd degree   • 1st degree AV block   • Chest pain   • COPD (chronic obstructive pulmonary disease) (CMS/[facility])     Past Medical History:   Diagnosis Date   • Acute on chronic respiratory failure with hypoxia and hypercapnia (CMS/[facility])    • OLI (acute kidney injury) (CMS/[facility])    • Anemia    • Anxiety    • Aortic valve stenosis    • Bilateral lower extremity edema    • CHF (congestive heart failure) (CMS/[facility])    • Chronic coronary artery disease    • Class 3 severe obesity due to excess calories in adult (CMS/[facility])    • COPD (chronic obstructive pulmonary disease) (CMS/[facility])    • Depression    • Diabetes mellitus (CMS/[facility])    • Elevated cholesterol    • GERD (gastroesophageal reflux disease)    • Heart murmur    • Hypertension    • Mitral valve insufficiency    • Pneumonia     1/2016   • Pulmonary hypertension (CMS/HCC)     due to sleep disordered breathing   • Sleep apnea     Uses CPAP or oxygen   • Stage 3 chronic kidney disease (CMS/[facility])    • Subclinical hypothyroidism    • Supplemental oxygen dependent    • Valvular heart disease      Past Surgical History:   Procedure Laterality Date   • CARDIAC CATHETERIZATION     • CARDIAC CATHETERIZATION N/A 6/10/2016    Procedure: Left Heart Cath;  Surgeon: Chace Johnson MD;  Location: [facility] CATH INVASIVE LOCATION;  Service:    • CARDIAC CATHETERIZATION N/A 6/10/2016    Procedure: Right Heart Cath;  Surgeon: Chace Johnson MD;  Location:  [facility] CATH INVASIVE LOCATION;  Service:    • CARDIAC SURGERY     • CORONARY ARTERY BYPASS GRAFT      2 vessel   • CORONARY ARTERY BYPASS GRAFT WITH MITRAL VALVE REPAIR/REPLACEMENT N/A 6/13/2016    Procedure: INTRAOPERATIVE TARIQ, MIDLINE STERNOTOMY, CORONARY ARTERY BYPASS GRAFTING X  2 UTILIZING ENDOSCOPICALLY HARVESTED LEFT GREATER SAPHENOUS VEIN, MITRAL VALVE REPLACEMENT AND TRICUSPID VALVE REPAIR;  Surgeon: Eliecer Mistry MD;   Location: [facility] OR;  Service:    • CORONARY STENT PLACEMENT  2010    Approx. 6 yrs ago at [facility]   • HEMORRHOIDECTOMY     • HYSTERECTOMY     • MITRAL VALVE REPLACEMENT     • REPLACEMENT TOTAL KNEE Right    • THYROID SURGERY      Cyst removed from thyroid   • VASCULAR SURGERY       General Information     Row Name 02/01/21 1514          OT Time and Intention    Document Type  evaluation;therapy note (daily note)  -LE     Mode of Treatment  individual therapy;occupational therapy  -     Row Name 02/01/21 1514          General Information    Patient Profile Reviewed  yes  -LE     Prior Level of Function  -- walker to bathroom. stays in lift chair past weeks.  Son in law walks with pt to BR and helps pt get in/out of bed.  grandght assist with dressing and bathing.  -LE     Existing Precautions/Restrictions  fall reports a fall last week  -     Row Name 02/01/21 1514          Living Environment    Lives With  -- SON IN LAW LIVES WITH PT  -     Row Name 02/01/21 1514          Cognition    Orientation Status (Cognition)  oriented to;person;place vauge with answers and mild confusion  -     Row Name 02/01/21 1514          Safety Issues, Functional Mobility    Safety Issues Affecting Function (Mobility)  insight into deficits/self-awareness  -LE       User Key  (r) = Recorded By, (t) = Taken By, (c) = Cosigned By    Initials Name Provider Type    Solange Cedeno OTR Occupational Therapist          Mobility/ADL's     Row Name 02/01/21 1519          Bed Mobility    Bed Mobility  supine-sit;sit-supine  -LE     Supine-Sit [place] (Bed Mobility)  maximum assist (25% patient effort)  -LE     Sit-Supine [place] (Bed Mobility)  maximum assist (25% patient effort)  -LE     Bed Mobility, Safety Issues  decreased use of arms for pushing/pulling;decreased use of legs for bridging/pushing;impaired trunk control for bed mobility  -LE     Assistive Device (Bed Mobility)  bed rails;head of bed elevated  -LE      "Comment (Bed Mobility)  pt moves mostly to EOB with OT assist to move legs and to support pt to sitting position.  pt's face is red and states \"I have to lay down\".  Assisted with legs to return supine.  -     Row Name 02/01/21 1519          Transfers    Comment (Transfers)  not able to attempt.states at home son in law pulls her up from lift chair.  -     Row Name 02/01/21 1519          Functional Mobility    Functional Mobility- Comment  not able to attempt  -     Row Name 02/01/21 1519          Activities of Daily Living    BADL Assessment/Intervention  feeding assist with all lower body ADL at baseline and at current due to limited mobility, activity tolerance.  -     Row Name 02/01/21 1519          Self-Feeding Assessment/Training    Comment (Feeding)  set up to self feed  -LE       User Key  (r) = Recorded By, (t) = Taken By, (c) = Cosigned By    Initials Name Provider Type    Solange Cedeno, OTFRANKIE Occupational Therapist        Obj/Interventions     Row Name 02/01/21 1519          Range of Motion Comprehensive    Comment, General Range of Motion  B  UE AROM 2/3  -[facility] Name 02/01/21 1519          Strength Comprehensive (MMT)    Comment, General Manual Muscle Testing (MMT) Assessment  B Ue 4/5  -     Row Name 02/01/21 1519          Balance    Balance Assessment  sitting static balance  -LE     Static Sitting Balance  moderate impairment;severe impairment pt unable to move fully to EOB, requires support for sitting up  -LE       User Key  (r) = Recorded By, (t) = Taken By, (c) = Cosigned By    Initials Name Provider Type    Solange Cedeno OTR Occupational Therapist        Goals/Plan     Row Name 02/01/21 1547          Bed Mobility Goal 1 (OT)    Activity/Assistive Device (Bed Mobility Goal 1, OT)  sit to supine;supine to sit  -LE     [place] Level/Cues Needed (Bed Mobility Goal 1, OT)  maximum assist (25-49% patient effort)  -LE     Time Frame (Bed Mobility Goal 1, OT)  2 weeks  -LE     " Progress/Outcomes (Bed Mobility Goal 1, OT)  goal ongoing  -LE     Row Name 02/01/21 1547          Toileting Goal 1 (OT)    Activity/Device (Toileting Goal 1, OT)  perform perineal hygiene  -LE     [place] Level/Cues Needed (Toileting Goal 1, OT)  minimum assist (75% or more patient effort)  -LE     Progress/Outcome (Toileting Goal 1, OT)  goal ongoing  -LE     Row Name 02/01/21 1547          Grooming Goal 1 (OT)    Activity/Device (Grooming Goal 1, OT)  oral care  -LE     [place] (Grooming Goal 1, OT)  set-up required;standby assist sitting EOB  -LE     Time Frame (Grooming Goal 1, OT)  2 weeks  -LE     Progress/Outcome (Grooming Goal 1, OT)  goal ongoing  -LE     Row Name 02/01/21 1547          Therapy Assessment/Plan (OT)    Planned Therapy Interventions (OT)  activity tolerance training;adaptive equipment training;BADL retraining;occupation/activity based interventions;patient/caregiver education/training;transfer/mobility retraining;functional balance retraining  -LE       User Key  (r) = Recorded By, (t) = Taken By, (c) = Cosigned By    Initials Name Provider Type    Solange Cedeno OTR Occupational Therapist        Clinical Impression     Row Name 02/01/21 1521          Pain Assessment    Additional Documentation  Pain Scale: FACES Pre/Post-Treatment (Group)  -[facility] Name 02/01/21 1521          Pain Scale: FACES Pre/Post-Treatment    Pain: FACES Scale, Pretreatment  6-->hurts even more  -LE     Posttreatment Pain Rating  6-->hurts even more  -LE     Pain Location  head  -LE     Row Name 02/01/21 1521          Plan of Care Review    Plan of Care Reviewed With  patient  -LE     Outcome Summary  Pt presents from home with chest pain, fluid retention. Pt lives with son in law and reports has been sleeping in lift chair for weeks and decline in mobility.  Family assist for dressing and bathing. Pt has functional B UE.  Pt may benefit from skilled OT to increase safety and independence.  -PATRICIO Macias  Name 02/01/21 1521          Therapy Assessment/Plan (OT)    Patient/Family Therapy Goal Statement (OT)  get moving better.  -LE     Rehab Potential (OT)  good, to achieve stated therapy goals  -LE     Criteria for Skilled Therapeutic Interventions Met (OT)  yes;meets criteria  -LE     Therapy Frequency (OT)  5 times/wk  -LE     Row Name 02/01/21 1521          Therapy Plan Review/Discharge Plan (OT)    Equipment Needs Upon Discharge (OT)  -- uses walker  -LE     Row Name 02/01/21 1521          Vital Signs    Pre Systolic BP Rehab  208  -LE     Pre Treatment Diastolic BP  71 pt reports BP stays high  -LE     Pretreatment Heart Rate (beats/min)  43  -LE     Posttreatment Heart Rate (beats/min)  41  -LE     Pre SpO2 (%)  100  -LE     O2 Delivery Pre Treatment  supplemental O2 6L high flow  -LE     Intra SpO2 (%)  95 red face and felt too SOA to move fully to EOB  -LE     O2 Delivery Intra Treatment  supplemental O2  -LE     Post SpO2 (%)  96  -LE     O2 Delivery Post Treatment  supplemental O2  -LE     Intra Patient Position  Sitting  -LE     Post Patient Position  Supine  -LE     Activity Duration  -- limited tolerance in sitting, felt difficult to breath.  this happened at home  -LE     Row Name 02/01/21 1521          Positioning and Restraints    Pre-Treatment Position  in bed  -LE     Post Treatment Position  bed  -LE     In Bed  notified nsg;fowlers;exit alarm on;encouraged to call for assist;call light within reach  -LE       User Key  (r) = Recorded By, (t) = Taken By, (c) = Cosigned By    Initials Name Provider Type    Solange Cedeno OTR Occupational Therapist        Outcome Measures     Row Name 02/01/21 1549          How much help from another is currently needed...    Putting on and taking off regular lower body clothing?  1  -LE     Bathing (including washing, rinsing, and drying)  2  -LE     Toileting (which includes using toilet bed pan or urinal)  1  -LE     Putting on and taking off regular upper body  clothing  2  -LE     Taking care of personal grooming (such as brushing teeth)  3  -LE     Eating meals  3  -LE     AM-PAC 6 Clicks Score (OT)  12  -LE     Row Name 02/01/21 1549          Functional Assessment    Outcome Measure Options  AM-PAC 6 Clicks Daily Activity (OT)  -LE       User Key  (r) = Recorded By, (t) = Taken By, (c) = Cosigned By    Initials Name Provider Type    LE Solange Dawkins OTFRANKIE Occupational Therapist        Occupational Therapy Education                 Title: PT OT SLP Therapies (Done)     Topic: Occupational Therapy (Done)     Point: ADL training (Done)     Description:   Instruct learner(s) on proper safety adaptation and remediation techniques during self care or transfers.   Instruct in proper use of assistive devices.              Learning Progress Summary           Patient Acceptance, E,D, VU by PATRICIO at 2/1/2021 1550    Comment: role of OT, plan of care, body mechanics                   Point: Precautions (Done)     Description:   Instruct learner(s) on prescribed precautions during self-care and functional transfers.              Learning Progress Summary           Patient Acceptance, E,D, VU by PATRICIO at 2/1/2021 1550    Comment: role of OT, plan of care, body mechanics                   Point: Body mechanics (Done)     Description:   Instruct learner(s) on proper positioning and spine alignment during self-care, functional mobility activities and/or exercises.              Learning Progress Summary           Patient Acceptance, E,D, VU by PATRICIO at 2/1/2021 1550    Comment: role of OT, plan of care, body mechanics                               User Key     Initials Effective Dates Name Provider Type Discipline    LE 06/08/18 -  Solange Dawkins OTR Occupational Therapist OT              OT Recommendation and Plan  Planned Therapy Interventions (OT): activity tolerance training, adaptive equipment training, BADL retraining, occupation/activity based interventions, patient/caregiver  education/training, transfer/mobility retraining, functional balance retraining  Therapy Frequency (OT): 5 times/wk  Plan of Care Review  Plan of Care Reviewed With: patient  Outcome Summary: Pt presents from home with chest pain, fluid retention. Pt lives with son in law and reports has been sleeping in lift chair for weeks and decline in mobility.  Family assist for dressing and bathing. Pt has functional B UE.  Pt may benefit from skilled OT to increase safety and independence.     Time Calculation:   Time Calculation- OT     Row Name 02/01/21 1552             Time Calculation- OT    OT Start Time  1520  -LE      OT Stop Time  1535  -LE      OT Time Calculation (min)  15 min  -LE      OT Received On  02/01/21  -LE      OT - Next Appointment  02/02/21  -LE      OT Goal Re-Cert Due Date  02/15/21  -        User Key  (r) = Recorded By, (t) = Taken By, (c) = Cosigned By    Initials Name Provider Type    Solange Cedeno OTR Occupational Therapist        Therapy Charges for Today     Code Description Service Date Service Provider Modifiers Qty    [id number]  OT EVAL MOD COMPLEXITY 2 2/1/2021 Solange Dawkins OTR GO 1    [id number]  OT THERAPEUTIC ACT EA 15 MIN 2/1/2021 Solange Dawkins OTR GO 1               ANNEMARIE Rey  2/1/2021

## 2021-02-01 NOTE — CONSULTS
Patient Name: Miguelina Lopez  :1945  75 y.o.    Date of Admission: 2021  Encounter Provider: Pau Hoffman MD  Date of Encounter Visit: 21  Place of Service: [facility] CARDIOLOGY  Referring Provider: No ref. provider found  Patient Care Team:  Arcelia Talbot APRN as PCP - General (Nurse Practitioner)  Robbie Wilson MD as Consulting Physician (Hematology and Oncology)  Chace Johnson MD as Consulting Physician (Cardiology)      Chief complaint: shortness of breath and fluid retention      History of Present Illness:       This is a patient of Dr. Cardoza.  She has a history of chronic diastolic heart failure, paroxysmal atrial fibrillation, chronic kidney disease, systemic hypertension, coronary artery disease with a history of bypass surgery, mitral valve replacement with a tissue valve, tricuspid valve repair, obstructive sleep apnea, diabetes, COPD.    An ECHO in  showed  normal left ventricular systolic function.  There is mild to moderate concentric left ventricular hypertrophy.  Septum contracted abnormally consistent with a postsurgical finding.  The right ventricle is moderately dilated.  The aortic valve has thickening with moderate aortic stenosis present.  There is a bioprosthetic mitral valve that is essentially abnormal with perivalvular regurgitation. Right-sided pressures are elevated with a pulmonary pressure of 57.    She was admitted overnight with shortness of breath and chest pressure.  BNP was elevated.  She says that she has been very short of breath.  She has orthopnea.  She feels like her legs are much more swollen than usual.  She denies  palpitations.      ECHO 19      · Left ventricular systolic function is normal. Calculated EF = 67.0%. Estimated EF was in disagreement with the calculated EF. Estimated EF appears to be in the range of 56 - 60%  · Normal left ventricular cavity size noted. Left ventricular wall  thickness is consistent with mild-to-moderate concentric hypertrophy. Septal wall motion is abnormal, consistent with a post-operative state. Systolic flattening of the interventricular septum consistent with right ventricle pressure overload. Left ventricular diastolic function was indeterminate.  · Right ventricular cavity is mild-to-moderately dilated.  · The aortic valve is abnormal in structure. There is moderate thickening of the aortic valve. No significant aortic valve regurgitation is present. Moderate aortic valve stenosis is present.  · There is a bioprosthetic mitral valve present. The mean inflow gradient across the prosthetic mitral valve is 12 mmHg, which is elevated. There appears to be a mild to moderate amount of paravalvular regurgitation.  · Mild to moderate tricuspid valve regurgitation is present. Estimated right ventricular systolic pressure from tricuspid regurgitation is markedly elevated (>55 mmHg). Calculated right ventricular systolic pressure from tricuspid regurgitation is 57.3 mmHg. There is a tricuspid ring valve present.      Stress Test 7/15/19      · Myocardial perfusion imaging indicates a normal myocardial perfusion study with no evidence of ischemia.  · Left ventricular ejection fraction is normal (Calculated EF = 55%).  · GI artifact is present.      CATH 6/10/16  · Successful right heart catheterization  · Successful left heart catheterization  · Normal left main  · Early atherosclerotic plaque of LAD with 70% first large diagonal  · Nondominant circumflex with early atherosclerotic plaque  · Dominant RCA with a distal 90% stenosis  · 2+ mitral regurgitation  · Moderate mitral stenosis and severe tricuspid regurgitation by transesophageal echocardiogram      Past Medical History:   Diagnosis Date   • Acute on chronic respiratory failure with hypoxia and hypercapnia (CMS/HCC)    • OLI (acute kidney injury) (CMS/HCC)    • Anemia    • Anxiety    • Aortic valve stenosis    •  Bilateral lower extremity edema    • CHF (congestive heart failure) (CMS/[facility])    • Chronic coronary artery disease    • Class 3 severe obesity due to excess calories in adult (CMS/[facility])    • COPD (chronic obstructive pulmonary disease) (CMS/[facility])    • Depression    • Diabetes mellitus (CMS/[facility])    • Elevated cholesterol    • GERD (gastroesophageal reflux disease)    • Heart murmur    • Hypertension    • Mitral valve insufficiency    • Pneumonia     1/2016   • Pulmonary hypertension (CMS/[facility])     due to sleep disordered breathing   • Sleep apnea     Uses CPAP or oxygen   • Stage 3 chronic kidney disease (CMS/[facility])    • Subclinical hypothyroidism    • Supplemental oxygen dependent    • Valvular heart disease        Past Surgical History:   Procedure Laterality Date   • CARDIAC CATHETERIZATION     • CARDIAC CATHETERIZATION N/A 6/10/2016    Procedure: Left Heart Cath;  Surgeon: Chace Johnson MD;  Location: [facility] CATH IN[facility]ASIVE LOC[facility];  Service:    • CARDIAC CATHETERIZATION N/A 6/10/2016    Procedure: Right Heart Cath;  Surgeon: Chace Johnson MD;  Location:  [facility] CATH INVASIVE LOCATION;  Service:    • CARDIAC SURGERY     • CORONARY ARTERY BYPASS GRAFT      2 vessel   • CORONARY ARTERY BYPASS GRAFT WITH MITRAL VALVE REPAIR/REPLACEMENT N/A 6/13/2016    Procedure: INTRAOPERATIVE TARIQ, MIDLINE STERNOTOMY, CORONARY ARTERY BYPASS GRAFTING X  2 UTILIZING ENDOSCOPICALLY HARVESTED LEFT GREATER SAPHENOUS VEIN, MITRAL VALVE REPLACEMENT AND TRICUSPID VALVE REPAIR;  Surgeon: Eliecer Mistry MD;  Location: [facility] OR;  Service:    • CORONARY STENT PLACEMENT  2010    Approx. 6 yrs ago at [facility]   • HEMORRHOIDECTOMY     • HYSTERECTOMY     • MITRAL VALVE REPLACEMENT     • REPLACEMENT TOTAL KNEE Right    • THYROID SURGERY      Cyst removed from thyroid   • VASCULAR SURGERY           Prior to Admission medications    Medication Sig Start Date End Date Taking? Authorizing Provider   ALPRAZolam (XANAX) 1 MG tablet Take  1 mg by mouth 2 (Two) Times a Day. 1/12/20  Yes Pito Argueta MD   aspirin  MG tablet Take 1 tablet by mouth Daily. 1/20/21  Yes Arcelia Talbot APRN   atorvastatin (LIPITOR) 20 MG tablet TAKE ONE TABLET BY MOUTH DAILY 12/4/20  Yes Arcelia Talbot APRN   clotrimazole-betamethasone (LOTRISONE) 1-0.05 % cream Apply  topically to the appropriate area as directed 2 (Two) Times a Day. 12/6/19  Yes Channing Bush MD   fluticasone-salmeterol (ADVAIR DISKUS) 250-50 MCG/DOSE DISKUS Inhale 1 puff Daily As Needed (cough and wheezing). 3/27/18  Yes Channing Bush MD   furosemide (LASIX) 40 MG tablet TAKE ONE TABLET BY MOUTH TWICE A DAY 12/4/20  Yes Antoine Ayers MD   gabapentin (NEURONTIN) 100 MG capsule Take 1 capsule by mouth Every 12 (Twelve) Hours. 12/28/20  Yes Arcelia Talbot APRN   glimepiride (AMARYL) 1 MG tablet TAKE ONE TABLET BY MOUTH EVERY MORNING BEFORE BREAKFAST 8/19/20  Yes Michelle Abenrathy MD   ipratropium-albuterol (DUO-NEB) 0.5-2.5 mg/mL nebulizer Take 3 mL by nebulization 4 (four) times a day.  Patient taking differently: Take 3 mL by nebulization Daily As Needed. 3/18/16  Yes Gaby Guzman MD   levothyroxine (SYNTHROID, LEVOTHROID) 25 MCG tablet Take 1 tablet by mouth Daily. 1/5/21  Yes Arcelia Talbot APRN   losartan (COZAAR) 100 MG tablet TAKE ONE TABLET BY MOUTH DAILY 6/3/20  Yes Michelle Abernathy MD   Misc. Devices (COMMODE BEDSIDE) misc 1 each Daily. 12/24/19  Yes Eliazar Bridges MD   nitroglycerin (NITROSTAT) 0.4 MG SL tablet DISSOLVE 1 TAB UNDER TONGUE FOR CHEST PAIN - IF PAIN REMAINS AFTER 5 MIN, CALL 911 AND REPEAT DOSE. MAX 3 TABS IN 15 MINUTES 9/18/20  Yes Antoine Ayers MD   nystatin (MYCOSTATIN) 739353 UNIT/GM cream Apply  topically to the appropriate area as directed 2 (Two) Times a Day. to affected area(s) 5/24/19  Yes Channing Bush MD   O2 (OXYGEN) Inhale 4 L/min Continuous.   Yes Provider, MD Pito   omeprazole (priLOSEC) 40 MG capsule Take 1  capsule by mouth Daily. 1/14/21  Yes Arcelia Talbot APRN   Probiotic Product (PROBIOTIC PO) Take  by mouth.   Yes ProviderPito MD   senna-docusate (PERICOLACE) 8.6-50 MG per tablet Take 1 tablet by mouth daily.   Yes ProviderPito MD   traMADol (ULTRAM) 50 MG tablet TAKE ONE TABLET BY MOUTH EVERY 8 HOURS AS NEEDED FOR MODERATE LEG PAIN FOR UP TO 90 DOSES 11/10/20  Yes Michelle Abernathy MD   TRULICITY 0.75 MG/0.5ML solution pen-injector INJECT 0.75 MG UNDER THE SKIN ONCE WEEKLY 8/7/20  Yes Michelle Abernathy MD   vilazodone (VIIBRYD) 20 MG tablet tablet Take 20 mg by mouth Every Night.   Yes ProviderPito MD   vitamin D (ERGOCALCIFEROL) 1.25 MG (60658 UT) capsule capsule TAKE ONE CAPSULE BY MOUTH ONCE WEEKLY 1/11/21  Yes Arcelia Talbot APRN   ferrous sulfate 324 (65 FE) MG tablet delayed-release EC tablet Take 324 mg by mouth Daily With Breakfast.    ProviderPito MD   Ventolin  (90 Base) MCG/ACT inhaler INHALE TWO PUFFS BY MOUTH EVERY 6 HOURS FOR WHEEZING AS NEEDED 1/6/21   Arcelia Talbot APRN       Allergies   Allergen Reactions   • Coumadin [Warfarin Sodium] Diarrhea and Nausea Only   • Bumex [Bumetanide] Swelling   • Erythromycin    • Hctz [Hydrochlorothiazide] Swelling   • Zaroxolyn [Metolazone] Diarrhea   • Penicillins Rash   • Sulfa Antibiotics Rash       Social History     Socioeconomic History   • Marital status:      Spouse name: Not on file   • Number of children: Not on file   • Years of education: Not on file   • Highest education level: Not on file   Tobacco Use   • Smoking status: Never Smoker   • Smokeless tobacco: Never Used   • Tobacco comment: no caffeine    Substance and Sexual Activity   • Alcohol use: No   • Drug use: No   • Sexual activity: Defer       Family History   Problem Relation Age of Onset   • Heart attack Father    • Heart disease Father        REVIEW OF SYSTEMS:   All other systems reviewed and negative.        Objective:      Vitals:    02/01/21 0759 02/01/21 0810 02/01/21 0816 02/01/21 1036   BP:    173/54   BP Location:    Right arm   Patient Position:    Lying   Pulse:  (!) 46 (!) 43 (!) 44   Resp:  16 16 16   Temp:    98.1 °F (36.7 °C)   TempSrc:    Oral   SpO2:  97% 99%    Weight: 122 kg (270 lb)      Height:         Body mass index is 52.73 kg/m².    Intake/Output Summary (Last 24 hours) at 2/1/2021 1316  Last data filed at 2/1/2021 0457  Gross per 24 hour   Intake --   Output 100 ml   Net -100 ml       Constitutional: She is oriented to person, place, and time. She appears well-developed. She does not appear ill.   HENT:   Head: Normocephalic and atraumatic. Head is without contusion.   Right Ear: Hearing normal. No drainage.   Left Ear: Hearing normal. No drainage.   Nose: No nasal deformity. No epistaxis.   Eyes: Lids are normal. Right eye exhibits no exudate. Left eye exhibits no exudate.  Neck: + JVD present. Carotid bruit is not present.  Cardiovascular: Normal rate, regular rhythm and normal heart sounds.    Generalized edema.  Pulmonary/Chest: Effort normal and breath sounds normal.   Abdominal: Soft. Normal appearance and bowel sounds are normal. There is no tenderness.   Musculoskeletal: Normal range of motion.        Right shoulder: She exhibits no deformity.        Left shoulder: She exhibits no deformity.   Neurological: She is alert and oriented to person, place, and time. She has normal strength.   Skin: Skin is warm, dry and intact. No rash noted.   Psychiatric: She has a normal mood and affect.  Her memory seems poor   Vitals reviewed    Lab Review:     Results from last 7 days   Lab Units 02/01/21  0551 02/01/21  0339   SODIUM mmol/L 146* 145   POTASSIUM mmol/L 3.9 4.4   CHLORIDE mmol/L 103 102   CO2 mmol/L 33.4* 32.3*   BUN mg/dL 19 20   CREATININE mg/dL 1.42* 1.38*   CALCIUM mg/dL 8.9 8.5*   BILIRUBIN mg/dL  --  0.6   ALK PHOS U/L  --  259*   ALT (SGPT) U/L  --  14   AST (SGOT) U/L  --  23   GLUCOSE mg/dL  112* 128*     Results from last 7 days   Lab Units 02/01/21  1200 02/01/21  0551 02/01/21  0339   TROPONIN T ng/mL 0.026 0.016 0.011     Results from last 7 days   Lab Units 02/01/21  0829   WBC 10*3/mm3 11.57*   HEMOGLOBIN g/dL 11.6*   HEMATOCRIT % 37.0   PLATELETS 10*3/mm3 226         Results from last 7 days   Lab Units 02/01/21  0339   MAGNESIUM mg/dL 2.0                      I personally viewed and interpreted the patient's EKG/Telemetry data.        Assessment and Plan:       1.  Chest pain.  2 - troponins.  No acute EKG changes.  2.  Shortness of breath consistent with acute on chronic diastolic heart failure.  Ejection fraction was normal in December 2019.  BNP is markedly elevated compared to her baseline.  Renal function is essentially at baseline.  I agree with IV Lasix and monitoring laboratory data.  Echocardiogram has been ordered.  3.  Hypertension.  Blood pressure is markedly elevated.  Losartan has been held.  I am going to add some amlodipine.  4.  Sinus bradycardia.  Not on any rate lowering medications.  Thyroid normal.  She says Dr. Ayers has mentioned a pacemaker to her in the past.  It seems that her heart failure is from bradycardia we may need to readdress a pacemaker.  However, at this time I think that it is more likely hypertension that is because the acute exacerbation.  5.  History of paroxysmal atrial fibrillation.  6.  Stage III chronic kidney disease  7.  Coronary disease with history of bypass surgery in the past  8.  Bioprosthetic mitral valve replacement  9.  Tricuspid valve repair  10.  Pulmonary hypertension.  11.  Struct of sleep apnea.  She reports compliance with CPAP at home.    Pau Hoffman MD  02/01/21  13:16 EST

## 2021-02-02 PROBLEM — I44.2 COMPLETE HEART BLOCK: Status: ACTIVE | Noted: 2021-02-01

## 2021-02-02 LAB
ANION GAP SERPL CALCULATED.3IONS-SCNC: 12.7 MMOL/L (ref 5–15)
BACTERIA UR QL AUTO: ABNORMAL /HPF
BILIRUB UR QL STRIP: NEGATIVE
BUN SERPL-MCNC: 20 MG/DL (ref 8–23)
BUN/CREAT SERPL: 14 (ref 7–25)
CALCIUM SPEC-SCNC: 9 MG/DL (ref 8.6–10.5)
CHLORIDE SERPL-SCNC: 101 MMOL/L (ref 98–107)
CLARITY UR: ABNORMAL
CO2 SERPL-SCNC: 31.3 MMOL/L (ref 22–29)
COLOR UR: YELLOW
CREAT SERPL-MCNC: 1.43 MG/DL (ref 0.57–1)
GFR SERPL CREATININE-BSD FRML MDRD: 36 ML/MIN/1.73
GLUCOSE BLDC GLUCOMTR-MCNC: 114 MG/DL (ref 70–130)
GLUCOSE BLDC GLUCOMTR-MCNC: 129 MG/DL (ref 70–130)
GLUCOSE BLDC GLUCOMTR-MCNC: 139 MG/DL (ref 70–130)
GLUCOSE BLDC GLUCOMTR-MCNC: 187 MG/DL (ref 70–130)
GLUCOSE BLDC GLUCOMTR-MCNC: 71 MG/DL (ref 70–130)
GLUCOSE SERPL-MCNC: 142 MG/DL (ref 65–99)
GLUCOSE UR STRIP-MCNC: NEGATIVE MG/DL
HGB UR QL STRIP.AUTO: NEGATIVE
HYALINE CASTS UR QL AUTO: ABNORMAL /LPF
KETONES UR QL STRIP: NEGATIVE
LEUKOCYTE ESTERASE UR QL STRIP.AUTO: ABNORMAL
NITRITE UR QL STRIP: NEGATIVE
PH UR STRIP.AUTO: 8.5 [PH] (ref 5–8)
POTASSIUM SERPL-SCNC: 3.9 MMOL/L (ref 3.5–5.2)
PROT UR QL STRIP: NEGATIVE
QT INTERVAL: 472 MS
QT INTERVAL: 581 MS
RBC # UR: ABNORMAL /HPF
REF LAB TEST METHOD: ABNORMAL
SODIUM SERPL-SCNC: 145 MMOL/L (ref 136–145)
SP GR UR STRIP: 1.01 (ref 1–1.03)
SQUAMOUS #/AREA URNS HPF: ABNORMAL /HPF
TROPONIN T SERPL-MCNC: 0.05 NG/ML (ref 0–0.03)
UROBILINOGEN UR QL STRIP: ABNORMAL
WBC UR QL AUTO: ABNORMAL /HPF

## 2021-02-02 PROCEDURE — 94799 UNLISTED PULMONARY SVC/PX: CPT

## 2021-02-02 PROCEDURE — 33274 TCAT INSJ/RPL PERM LDLS PM: CPT | Performed by: INTERNAL MEDICINE

## 2021-02-02 PROCEDURE — 25010000002 HYDRALAZINE PER 20 MG: Performed by: NURSE PRACTITIONER

## 2021-02-02 PROCEDURE — 84484 ASSAY OF TROPONIN QUANT: CPT | Performed by: INTERNAL MEDICINE

## 2021-02-02 PROCEDURE — 97530 THERAPEUTIC ACTIVITIES: CPT

## 2021-02-02 PROCEDURE — 25010000002 VANCOMYCIN PER 500 MG: Performed by: INTERNAL MEDICINE

## 2021-02-02 PROCEDURE — 94660 CPAP INITIATION&MGMT: CPT

## 2021-02-02 PROCEDURE — 94640 AIRWAY INHALATION TREATMENT: CPT

## 2021-02-02 PROCEDURE — 97535 SELF CARE MNGMENT TRAINING: CPT

## 2021-02-02 PROCEDURE — 80048 BASIC METABOLIC PNL TOTAL CA: CPT | Performed by: INTERNAL MEDICINE

## 2021-02-02 PROCEDURE — 93005 ELECTROCARDIOGRAM TRACING: CPT | Performed by: STUDENT IN AN ORGANIZED HEALTH CARE EDUCATION/TRAINING PROGRAM

## 2021-02-02 PROCEDURE — 25010000002 FENTANYL CITRATE (PF) 100 MCG/2ML SOLUTION: Performed by: INTERNAL MEDICINE

## 2021-02-02 PROCEDURE — C1769 GUIDE WIRE: HCPCS | Performed by: INTERNAL MEDICINE

## 2021-02-02 PROCEDURE — 82962 GLUCOSE BLOOD TEST: CPT

## 2021-02-02 PROCEDURE — C1894 INTRO/SHEATH, NON-LASER: HCPCS | Performed by: INTERNAL MEDICINE

## 2021-02-02 PROCEDURE — 02HK3NZ INSERTION OF INTRACARDIAC PACEMAKER INTO RIGHT VENTRICLE, PERCUTANEOUS APPROACH: ICD-10-PCS | Performed by: INTERNAL MEDICINE

## 2021-02-02 PROCEDURE — 93005 ELECTROCARDIOGRAM TRACING: CPT | Performed by: NURSE PRACTITIONER

## 2021-02-02 PROCEDURE — 99233 SBSQ HOSP IP/OBS HIGH 50: CPT | Performed by: INTERNAL MEDICINE

## 2021-02-02 PROCEDURE — 93010 ELECTROCARDIOGRAM REPORT: CPT | Performed by: INTERNAL MEDICINE

## 2021-02-02 PROCEDURE — C1786 PMKR, SINGLE, RATE-RESP: HCPCS | Performed by: INTERNAL MEDICINE

## 2021-02-02 PROCEDURE — 25010000002 MIDAZOLAM PER 1 MG: Performed by: INTERNAL MEDICINE

## 2021-02-02 PROCEDURE — 63710000001 INSULIN LISPRO (HUMAN) PER 5 UNITS: Performed by: NURSE PRACTITIONER

## 2021-02-02 PROCEDURE — 25010000002 ENOXAPARIN PER 10 MG: Performed by: NURSE PRACTITIONER

## 2021-02-02 PROCEDURE — 25010000002 HEPARIN (PORCINE) PER 1000 UNITS: Performed by: INTERNAL MEDICINE

## 2021-02-02 PROCEDURE — 0 IOPAMIDOL PER 1 ML: Performed by: INTERNAL MEDICINE

## 2021-02-02 DEVICE — GEN PM TPS LEADLESS MICRA/AV VVIR RR 1.75G 0.8CC 18MM: Type: IMPLANTABLE DEVICE | Status: FUNCTIONAL

## 2021-02-02 RX ORDER — ACETAMINOPHEN 650 MG/1
650 SUPPOSITORY RECTAL EVERY 4 HOURS PRN
Status: DISCONTINUED | OUTPATIENT
Start: 2021-02-02 | End: 2021-02-11 | Stop reason: HOSPADM

## 2021-02-02 RX ORDER — HEPARIN SODIUM 1000 [USP'U]/ML
INJECTION, SOLUTION INTRAVENOUS; SUBCUTANEOUS AS NEEDED
Status: DISCONTINUED | OUTPATIENT
Start: 2021-02-02 | End: 2021-02-02 | Stop reason: HOSPADM

## 2021-02-02 RX ORDER — VANCOMYCIN HYDROCHLORIDE 1 G/200ML
1000 INJECTION, SOLUTION INTRAVENOUS
Status: ACTIVE | OUTPATIENT
Start: 2021-02-02 | End: 2021-02-02

## 2021-02-02 RX ORDER — METOPROLOL TARTRATE 5 MG/5ML
INJECTION INTRAVENOUS AS NEEDED
Status: DISCONTINUED | OUTPATIENT
Start: 2021-02-02 | End: 2021-02-02 | Stop reason: HOSPADM

## 2021-02-02 RX ORDER — NALOXONE HCL 0.4 MG/ML
0.4 VIAL (ML) INJECTION
Status: DISCONTINUED | OUTPATIENT
Start: 2021-02-02 | End: 2021-02-11 | Stop reason: HOSPADM

## 2021-02-02 RX ORDER — SODIUM CHLORIDE 0.9 % (FLUSH) 0.9 %
10 SYRINGE (ML) INJECTION AS NEEDED
Status: DISCONTINUED | OUTPATIENT
Start: 2021-02-02 | End: 2021-02-11 | Stop reason: HOSPADM

## 2021-02-02 RX ORDER — HYDROMORPHONE HYDROCHLORIDE 1 MG/ML
0.5 INJECTION, SOLUTION INTRAMUSCULAR; INTRAVENOUS; SUBCUTANEOUS
Status: ACTIVE | OUTPATIENT
Start: 2021-02-02 | End: 2021-02-09

## 2021-02-02 RX ORDER — SODIUM CHLORIDE 9 MG/ML
INJECTION, SOLUTION INTRAVENOUS CONTINUOUS PRN
Status: COMPLETED | OUTPATIENT
Start: 2021-02-02 | End: 2021-02-02

## 2021-02-02 RX ORDER — SODIUM CHLORIDE 0.9 % (FLUSH) 0.9 %
3 SYRINGE (ML) INJECTION EVERY 12 HOURS SCHEDULED
Status: DISCONTINUED | OUTPATIENT
Start: 2021-02-02 | End: 2021-02-11

## 2021-02-02 RX ORDER — HYDROCODONE BITARTRATE AND ACETAMINOPHEN 5; 325 MG/1; MG/1
1 TABLET ORAL EVERY 4 HOURS PRN
Status: DISPENSED | OUTPATIENT
Start: 2021-02-02 | End: 2021-02-09

## 2021-02-02 RX ORDER — FENTANYL CITRATE 50 UG/ML
INJECTION, SOLUTION INTRAMUSCULAR; INTRAVENOUS AS NEEDED
Status: DISCONTINUED | OUTPATIENT
Start: 2021-02-02 | End: 2021-02-02 | Stop reason: HOSPADM

## 2021-02-02 RX ORDER — MIDAZOLAM HYDROCHLORIDE 1 MG/ML
INJECTION INTRAMUSCULAR; INTRAVENOUS AS NEEDED
Status: DISCONTINUED | OUTPATIENT
Start: 2021-02-02 | End: 2021-02-02 | Stop reason: HOSPADM

## 2021-02-02 RX ORDER — LIDOCAINE HYDROCHLORIDE AND EPINEPHRINE 10; 10 MG/ML; UG/ML
INJECTION, SOLUTION INFILTRATION; PERINEURAL AS NEEDED
Status: DISCONTINUED | OUTPATIENT
Start: 2021-02-02 | End: 2021-02-02 | Stop reason: HOSPADM

## 2021-02-02 RX ORDER — ACETAMINOPHEN 325 MG/1
650 TABLET ORAL EVERY 4 HOURS PRN
Status: DISCONTINUED | OUTPATIENT
Start: 2021-02-02 | End: 2021-02-11 | Stop reason: HOSPADM

## 2021-02-02 RX ORDER — VANCOMYCIN HYDROCHLORIDE 1 G/200ML
INJECTION, SOLUTION INTRAVENOUS CONTINUOUS PRN
Status: COMPLETED | OUTPATIENT
Start: 2021-02-02 | End: 2021-02-02

## 2021-02-02 RX ADMIN — NYSTATIN: 100000 CREAM TOPICAL at 21:12

## 2021-02-02 RX ADMIN — INSULIN LISPRO 2 UNITS: 100 INJECTION, SOLUTION INTRAVENOUS; SUBCUTANEOUS at 18:51

## 2021-02-02 RX ADMIN — HYDRALAZINE HYDROCHLORIDE 10 MG: 20 INJECTION, SOLUTION INTRAMUSCULAR; INTRAVENOUS at 23:48

## 2021-02-02 RX ADMIN — AMLODIPINE BESYLATE 5 MG: 5 TABLET ORAL at 09:58

## 2021-02-02 RX ADMIN — GABAPENTIN 100 MG: 100 CAPSULE ORAL at 09:58

## 2021-02-02 RX ADMIN — PANTOPRAZOLE SODIUM 40 MG: 40 TABLET, DELAYED RELEASE ORAL at 06:03

## 2021-02-02 RX ADMIN — ATORVASTATIN CALCIUM 20 MG: 20 TABLET, FILM COATED ORAL at 09:58

## 2021-02-02 RX ADMIN — ALPRAZOLAM 1 MG: 0.5 TABLET ORAL at 21:10

## 2021-02-02 RX ADMIN — BUMETANIDE 1 MG/HR: 0.25 INJECTION INTRAMUSCULAR; INTRAVENOUS at 02:45

## 2021-02-02 RX ADMIN — GABAPENTIN 100 MG: 100 CAPSULE ORAL at 21:11

## 2021-02-02 RX ADMIN — ASPIRIN 325 MG: 325 TABLET, COATED ORAL at 09:58

## 2021-02-02 RX ADMIN — BUDESONIDE AND FORMOTEROL FUMARATE DIHYDRATE 2 PUFF: 160; 4.5 AEROSOL RESPIRATORY (INHALATION) at 11:16

## 2021-02-02 RX ADMIN — BUMETANIDE 1 MG/HR: 0.25 INJECTION INTRAMUSCULAR; INTRAVENOUS at 14:41

## 2021-02-02 RX ADMIN — HYDRALAZINE HYDROCHLORIDE 10 MG: 20 INJECTION, SOLUTION INTRAMUSCULAR; INTRAVENOUS at 02:45

## 2021-02-02 RX ADMIN — BUDESONIDE AND FORMOTEROL FUMARATE DIHYDRATE 2 PUFF: 160; 4.5 AEROSOL RESPIRATORY (INHALATION) at 20:51

## 2021-02-02 RX ADMIN — SODIUM CHLORIDE, PRESERVATIVE FREE 3 ML: 5 INJECTION INTRAVENOUS at 21:14

## 2021-02-02 RX ADMIN — SODIUM CHLORIDE, PRESERVATIVE FREE 10 ML: 5 INJECTION INTRAVENOUS at 21:12

## 2021-02-02 RX ADMIN — ACETAMINOPHEN 650 MG: 325 TABLET, FILM COATED ORAL at 03:19

## 2021-02-02 RX ADMIN — LEVOTHYROXINE SODIUM 25 MCG: 0.03 TABLET ORAL at 09:58

## 2021-02-02 RX ADMIN — ENOXAPARIN SODIUM 40 MG: 40 INJECTION SUBCUTANEOUS at 06:03

## 2021-02-02 RX ADMIN — DOCUSATE SODIUM 50MG AND SENNOSIDES 8.6MG 1 TABLET: 8.6; 5 TABLET, FILM COATED ORAL at 09:59

## 2021-02-02 RX ADMIN — NITROGLYCERIN 0.4 MG: 0.4 TABLET SUBLINGUAL at 06:03

## 2021-02-02 RX ADMIN — Medication 1 CAPSULE: at 09:58

## 2021-02-02 RX ADMIN — CLOTRIMAZOLE AND BETAMETHASONE DIPROPIONATE: 10; .5 CREAM TOPICAL at 21:11

## 2021-02-02 NOTE — CONSULTS
Patient Name: Miguelina Lopez  Patient : 1945        Date of Service:21  Provider of Service: Antoine Ayers MD  Place of Service: [facility]  Referral Provider: No ref. provider found          Follow Up: Congestive heart failure    Interval Hx: The patient states that she is not experiencing any chest discomfort.      OBJECTIVE  Temp:  [97.7 °F (36.5 °C)-98.6 °F (37 °C)] 98.2 °F (36.8 °C)  Heart Rate:  [44-47] 46  Resp:  [16-22] 18  BP: (142-208)/(50-76) 176/58     Intake/Output Summary (Last 24 hours) at 2021 0911  Last data filed at 2021 0603  Gross per 24 hour   Intake --   Output 2525 ml   Net -2525 ml     Body mass index is 52.73 kg/m².      21  0759   Weight: 122 kg (270 lb)         Physical Exam:   Vitals signs reviewed.   Constitutional:       Appearance: Well-developed.   Eyes:      Pupils: Pupils are equal, round, and reactive to light.   HENT:      Head: Normocephalic.   Neck:      Musculoskeletal: Normal range of motion.      Thyroid: No thyromegaly.      Vascular: No carotid bruit or JVD.   Pulmonary:      Effort: Pulmonary effort is normal.      Breath sounds: Normal breath sounds.   Cardiovascular:      Bradycardia present. Regular rhythm.      No gallop.   Pulses:     Intact distal pulses.   Edema:     Thigh: bilateral edema of the thigh.     Pretibial: bilateral edema of the pretibial area.     Ankle: bilateral 2+ edema of the ankle.     Feet: bilateral 2+ edema of the feet.  Abdominal:      General: Bowel sounds are normal.      Palpations: Abdomen is soft.   Skin:     General: Skin is warm and dry.      Findings: No erythema.   Neurological:      Mental Status: Alert and oriented to person, place, and time.           CURRENT MEDS    Scheduled Meds:amLODIPine, 5 mg, Oral, Q24H  aspirin EC, 325 mg, Oral, Daily  atorvastatin, 20 mg, Oral, Daily  budesonide-formoterol, 2 puff, Inhalation, BID - RT  clotrimazole-betamethasone, , Topical,  BID  enoxaparin, 40 mg, Subcutaneous, Q12H  fluticasone, 2 spray, Each Nare, Daily  gabapentin, 100 mg, Oral, Q12H  insulin lispro, 0-7 Units, Subcutaneous, TID AC  lactobacillus acidophilus, 1 capsule, Oral, Daily  levothyroxine, 25 mcg, Oral, Daily  nystatin, , Topical, BID  oxymetazoline, 2 spray, Each Nare, BID  pantoprazole, 40 mg, Oral, QAM  sennosides-docusate, 1 tablet, Oral, Daily  sodium chloride, 10 mL, Intravenous, Q12H  vilazodone, 20 mg, Oral, Nightly      Continuous Infusions:bumetanide, 1 mg/hr, Last Rate: 1 mg/hr (02/02/21 0245)          Lab Review:   Results from last 7 days   Lab Units 02/02/21  0739 02/01/21  0551 02/01/21  0339   SODIUM mmol/L 145 146* 145   POTASSIUM mmol/L 3.9 3.9 4.4   CHLORIDE mmol/L 101 103 102   CO2 mmol/L 31.3* 33.4* 32.3*   BUN mg/dL 20 19 20   CREATININE mg/dL 1.43* 1.42* 1.38*   GLUCOSE mg/dL 142* 112* 128*   CALCIUM mg/dL 9.0 8.9 8.5*   AST (SGOT) U/L  --   --  23   ALT (SGPT) U/L  --   --  14     Results from last 7 days   Lab Units 02/02/21  0739 02/01/21  1200 02/01/21  0551 02/01/21  0339   TROPONIN T ng/mL 0.046* 0.026 0.016 0.011     Results from last 7 days   Lab Units 02/01/21  0829 02/01/21  0339   WBC 10*3/mm3 11.57* 11.42*   HEMOGLOBIN g/dL 11.6* 11.7*   HEMATOCRIT % 37.0 37.0   PLATELETS 10*3/mm3 226 253         Results from last 7 days   Lab Units 02/01/21  0339   MAGNESIUM mg/dL 2.0         Results from last 7 days   Lab Units 02/01/21  0339   PROBNP pg/mL 12,821.0*           I personally reviewed the patient's ECG and telemetry data    ASSESSMENT & PLAN    Chest pain    CKD (chronic kidney disease) stage 3, GFR 30-59 ml/min (CMS/[facility])    Diabetic peripheral neuropathy (CMS/[facility])    Hyperlipidemia    OCHOA (obstructive sleep apnea)    DM type 2 (diabetes mellitus, type 2) (CMS/[facility])    Essential hypertension    Pulmonary hypertension due to sleep-disordered breathing (CMS/[facility])    s/p MVR, TV-repair, CABG x2 6/13/16    Supplemental oxygen dependent    Chronic  diastolic heart failure (CMS/HCC)    Chronic respiratory failure with hypoxia and hypercapnia (CMS/HCC)    COPD (chronic obstructive pulmonary disease) (CMS/HCC)    1.  Complete heart block: Patient has chronic right bundle branch block and a history of paroxysmal atrial fibrillation.  Now she has developed complete heart block which I believe is contributing to her heart failure.  She will require permanent pacemaker implant which will be done today.  I explained the procedure to her in detail  2.  Coronary artery disease: Status post CABG 2016.  Patient without chest discomfort today.  Troponin trending higher but this may be due to heart failure and heart block versus acute coronary syndrome.  No angina pectoris today.  3.  Valvular heart disease: Status post mitral valve replacement, tricuspid valve repair  4.  Chronic kidney disease  5.  Hypertension: We will need to add medication post pacemaker implant for pressure control.  6.  Diabetes mellitus, type II  7.  Obesity    I explained the pacemaker implant to the patient in detail.  She is agreeable to proceed.  Discussed with Dr. Eliazar Bond who will be placing the device.      Antoine Ayers MD  02/02/21

## 2021-02-02 NOTE — THERAPY TREATMENT NOTE
Patient Name: Miguelina Lopez  : 1945    MRN: [id number]                              Today's Date: 2021       Admit Date: 2021    Visit Dx:     ICD-10-CM ICD-9-CM   1. Chest pain, unspecified type  R07.9 786.50   2. COPD exacerbation (CMS/HCC)  J44.1 491.21   3. Congestive heart failure, unspecified HF chronicity, unspecified heart failure type (CMS/HCC)  I50.9 428.0   4. Hypertension, unspecified type  I10 401.9   5. Abnormal chest x-ray  R93.89 793.2   6. Complete heart block (CMS/HCC)  I44.2 426.0     Patient Active Problem List   Diagnosis   • Anxiety disorder   • Arthritis of knee   • Asthma   • Chronic coronary artery disease   • CKD (chronic kidney disease) stage 3, GFR 30-59 ml/min (CMS/HCC)   • Depression   • Diabetic peripheral neuropathy (CMS/HCC)   • Gastroesophageal reflux disease   • Hyperlipidemia   • Insomnia   • Lower gastrointestinal hemorrhage   • Anemia   • OCHOA (obstructive sleep apnea)   • DM type 2 (diabetes mellitus, type 2) (CMS/HCC)   • Essential hypertension   • Hospital discharge follow-up   • Pulmonary hypertension due to sleep-disordered breathing (CMS/HCC)   • s/p MVR, TV-repair, CABG x2 16   • OLI (acute kidney injury) (CMS/HCC)   • Leukocytosis   • Atrial fibrillation (CMS/HCC)   • Nocturnal hypoxia   • Dermatitis   • Medicare annual wellness visit, initial   • Class 3 severe obesity due to excess calories with serious comorbidity and body mass index (BMI) of 50.0 to 59.9 in adult (CMS/[facility])   • Supplemental oxygen dependent   • Acute on chronic combined systolic and diastolic HF (heart failure) (CMS/[facility])   • Mitral regurgitation   • Aortic stenosis   • Medicare annual wellness visit, subsequent   • Localized edema   • Chronic right-sided congestive heart failure (CMS/[facility])   • Cellulitis of left lower extremity   • Proteinuria   • Bilateral lower extremity edema   • Subclinical hypothyroidism   • Chronic diastolic heart failure (CMS/[facility])   • Chronic respiratory  failure with hypoxia and hypercapnia (CMS/[facility])   • Acute on chronic respiratory failure with hypoxia and hypercapnia (CMS/[facility])   • AV block, 2nd degree   • 1st degree AV block   • Chest pain   • COPD (chronic obstructive pulmonary disease) (CMS/[facility])   • Complete heart block (CMS/[facility])     Past Medical History:   Diagnosis Date   • Acute on chronic respiratory failure with hypoxia and hypercapnia (CMS/[facility])    • OLI (acute kidney injury) (CMS/[facility])    • Anemia    • Anxiety    • Aortic valve stenosis    • Bilateral lower extremity edema    • CHF (congestive heart failure) (CMS/[facility])    • Chronic coronary artery disease    • Class 3 severe obesity due to excess calories in adult (CMS/[facility])    • COPD (chronic obstructive pulmonary disease) (CMS/[facility])    • Depression    • Diabetes mellitus (CMS/[facility])    • Elevated cholesterol    • GERD (gastroesophageal reflux disease)    • Heart murmur    • Hypertension    • Mitral valve insufficiency    • Pneumonia     1/2016   • Pulmonary hypertension (CMS/[facility])     due to sleep disordered breathing   • Sleep apnea     Uses CPAP or oxygen   • Stage 3 chronic kidney disease (CMS/[facility])    • Subclinical hypothyroidism    • Supplemental oxygen dependent    • Valvular heart disease      Past Surgical History:   Procedure Laterality Date   • CARDIAC CATHETERIZATION     • CARDIAC CATHETERIZATION N/A 6/10/2016    Procedure: Left Heart Cath;  Surgeon: Chace Johnson MD;  Location:  [facility];  Service:    • CARDIAC CATHETERIZATION N/A 6/10/2016    Procedure: Right Heart Cath;  Surgeon: Chace Johnson MD;  Location:  [facility];  Service:    • CARDIAC SURGERY     • CORONARY ARTERY BYPASS GRAFT      2 vessel   • CORONARY ARTERY BYPASS GRAFT WITH MITRAL VALVE REPAIR/REPLACEMENT N/A 6/13/2016    Procedure: INTRAOPERATIVE TARIQ, MIDLINE STERNOTOMY, CORONARY ARTERY BYPASS GRAFTING X  2 UTILIZING ENDOSCOPICALLY HARVESTED LEFT GREATER SAPHENOUS VEIN, MITRAL  VALVE REPLACEMENT AND TRICUSPID VALVE REPAIR;  Surgeon: Eliecer Mistry MD;  Location: [facility];  Service:    • CORONARY STENT PLACEMENT  2010    Approx. 6 yrs ago at [facility]   • HEMORRHOIDECTOMY     • HYSTERECTOMY     • MITRAL VALVE REPLACEMENT     • REPLACEMENT TOTAL KNEE Right    • THYROID SURGERY      Cyst removed from thyroid   • VASCULAR SURGERY       General Information     Row Name 02/02/21 0959          OT Time and Intention    Document Type  therapy note (daily note)  -RB     Mode of Treatment  individual therapy;occupational therapy  -RB     Row Name 02/02/21 0959          General Information    Patient Profile Reviewed  yes  -RB     Existing Precautions/Restrictions  fall  -RB     Row Name 02/02/21 0959          Cognition    Orientation Status (Cognition)  oriented to;person;place;situation  -RB     Row Name 02/02/21 0959          Safety Issues, Functional Mobility    Safety Issues Affecting Function (Mobility)  safety precaution awareness;safety precautions follow-through/compliance;problem-solving;insight into deficits/self-awareness;awareness of need for assistance  -RB       User Key  (r) = Recorded By, (t) = Taken By, (c) = Cosigned By    Initials Name Provider Type    RB Wanda Lucero OT Occupational Therapist          Mobility/ADL's     Row Name 02/02/21 1000          Bed Mobility    Bed Mobility  bed mobility (all) activities  -RB     All Activities, [place] (Bed Mobility)  moderate assist (50% patient effort)  -RB     Bed Mobility, Safety Issues  decreased use of arms for pushing/pulling;decreased use of legs for bridging/pushing;impaired trunk control for bed mobility  -RB     Assistive Device (Bed Mobility)  bed rails;draw sheet  -RB     Comment (Bed Mobility)  Pt holds breath during transfers, face turns bright red.  -RB     Row Name 02/02/21 1000          Transfers    Transfers  sit-stand transfer  -RB     Sit-Stand [place] (Transfers)  moderate assist (50% patient  effort)  -RB     Row Name 02/02/21 1000          Sit-Stand Transfer    Assistive Device (Sit-Stand Transfers)  walker, front-wheeled  -[facility] Name 02/02/21 1000          Functional Mobility    Functional Mobility- Ind. Level  not tested;unable to perform  -[facility] Name 02/02/21 1000          Activities of Daily Living    BADL Assessment/Intervention  grooming;lower body dressing  -RB     Row Name 02/02/21 1000          Grooming Assessment/Training    [place] Level (Grooming)  grooming skills;set up;supervision  -     Position (Grooming)  edge of bed sitting  -RB     Row Name 02/02/21 1000          Lower Body Dressing Assessment/Training    [place] Level (Lower Body Dressing)  lower body dressing skills;dependent (less than 25% patient effort)  -RB     Position (Lower Body Dressing)  edge of bed sitting  -       User Key  (r) = Recorded By, (t) = Taken By, (c) = Cosigned By    Initials Name Provider Type    Wanda Mckeon OT Occupational Therapist        Obj/Interventions     Row Name 02/02/21 1001          Balance    Balance Assessment  sitting static balance;sitting dynamic balance;sit to stand dynamic balance;standing static balance  -RB     Static Sitting Balance  mild impairment;sitting, edge of bed;supported  -RB     Dynamic Sitting Balance  moderate impairment;sitting, edge of bed;supported  -RB     Sit to Stand Dynamic Balance  moderate impairment;standing;supported  -RB     Static Standing Balance  moderate impairment;standing;supported  -RB       User Key  (r) = Recorded By, (t) = Taken By, (c) = Cosigned By    Initials Name Provider Type    Wanda Mckeon OT Occupational Therapist        Goals/Plan    No documentation.       Clinical Impression     Row Name 02/02/21 1002          Pain Scale: Numbers Pre/Post-Treatment    Pain Intervention(s)  Repositioned;Rest  -RB     Row Name 02/02/21 1002          Pain Scale: FACES Pre/Post-Treatment    Pain: FACES Scale, Pretreatment   0-->no hurt  -RB     Posttreatment Pain Rating  0-->no hurt  -RB     Row Name 02/02/21 1002          Plan of Care Review    Progress  improving  -RB     Row Name 02/02/21 1002          Therapy Assessment/Plan (OT)    Rehab Potential (OT)  good, to achieve stated therapy goals  -RB     Criteria for Skilled Therapeutic Interventions Met (OT)  yes;skilled treatment is necessary  -RB     Therapy Frequency (OT)  5 times/wk  -RB     Row Name 02/02/21 1002          Therapy Plan Review/Discharge Plan (OT)    Anticipated Discharge Disposition (OT)  [facility] nursing facility  -RB     Row Name 02/02/21 1002          Vital Signs    Pre SpO2 (%)  94  -RB     O2 Delivery Pre Treatment  supplemental O2  -RB     Intra SpO2 (%)  91  -RB     O2 Delivery Intra Treatment  supplemental O2  -RB     Post SpO2 (%)  97  -RB     O2 Delivery Post Treatment  supplemental O2  -RB     Pre Patient Position  Supine  -RB     Intra Patient Position  Standing  -RB     Post Patient Position  Supine  -RB     Row Name 02/02/21 1002          Positioning and Restraints    Pre-Treatment Position  in bed  -RB     Post Treatment Position  bed  -RB     In Bed  notified nsg;supine;fowlers;call light within reach;encouraged to call for assist;exit alarm on  -RB       User Key  (r) = Recorded By, (t) = Taken By, (c) = Cosigned By    Initials Name Provider Type    Wanda Mckeon OT Occupational Therapist        Outcome Measures    No documentation.       Occupational Therapy Education                 Title: PT OT SLP Therapies (Done)     Topic: Occupational Therapy (Done)     Point: ADL training (Done)     Description:   Instruct learner(s) on proper safety adaptation and remediation techniques during self care or transfers.   Instruct in proper use of assistive devices.              Learning Progress Summary           Patient Acceptance, E,D, VU by PATRICIO at 2/1/2021 8230    Comment: role of OT, plan of care, body mechanics                   Point: Precautions  (Done)     Description:   Instruct learner(s) on prescribed precautions during self-care and functional transfers.              Learning Progress Summary           Patient Acceptance, E,D, VU by PATRICIO at 2/1/2021 1550    Comment: role of OT, plan of care, body mechanics                   Point: Body mechanics (Done)     Description:   Instruct learner(s) on proper positioning and spine alignment during self-care, functional mobility activities and/or exercises.              Learning Progress Summary           Patient Acceptance, E,D, VU by PATRICIO at 2/1/2021 1550    Comment: role of OT, plan of care, body mechanics                               User Key     Initials Effective Dates Name Provider Type Discipline    LE 06/08/18 -  Solange Dawkins, OTR Occupational Therapist OT              OT Recommendation and Plan  Therapy Frequency (OT): 5 times/wk  Plan of Care Review  Progress: improving     Time Calculation:   Time Calculation- OT     Row Name 02/02/21 0959             Time Calculation- OT    OT Start Time  0903  -RB      OT Stop Time  0932  -RB      OT Time Calculation (min)  29 min  -RB      OT - Next Appointment  02/03/21  -        User Key  (r) = Recorded By, (t) = Taken By, (c) = Cosigned By    Initials Name Provider Type    RB Wanda Lucero OT Occupational Therapist        Therapy Charges for Today     Code Description Service Date Service Provider Modifiers Qty    [id number]  OT SELF CARE/MGMT/TRAIN EA 15 MIN 2/2/2021 Wanda Lucero OT GO 1    [id number]  OT THERAPEUTIC ACT EA 15 MIN 2/2/2021 Wanda Lucero OT GO 1               Wanda Lucero OT  2/2/2021

## 2021-02-02 NOTE — PROGRESS NOTES
[facility]     Mar/chart reviewed  Follow up chronic hypercapnic respiratory failure, chronic hypoxemic respiratory failure, copd, vladimir/OHV  Had chest pain early, has been on bumex drip, complete heart block noted. She has her home trilogy now.    Vital Sign Min/Max for last 24 hours  Temp  Min: 97.7 °F (36.5 °C)  Max: 98.6 °F (37 °C)   BP  Min: 142/74  Max: 208/71   Pulse  Min: 43  Max: 47   Resp  Min: 16  Max: 22   SpO2  Min: 93 %  Max: 99 %   Flow (L/min)  Min: 4  Max: 6   Weight  Min: 122 kg (270 lb)  Max: 122 kg (270 lb)   I?/2525  Nad, axox3,   perrl, eomi, no icterus,  mmm, no jvd, trachea midline, neck supple,  chest decreased bilaterally, no crackles, no wheezes,   Wolf, irrg   soft, nt, nd +bs,  no c/c/ 2+ edema  Skin warm, dry no rashes    Labs: 2/2: reviewed:  Pending    A/P:  1. Acute on chronic diastolic chf -- as per cards/nephrology  2. Pulmonary hypertension group II/III   3. COPD -- stable-- continue bronchodilators  4. OHV -- patient to get her home NIPPV machine  5. VLADIMIR  6. Morbid obesity  7. Nasal congestion  8. CKD  9. Chronic hypoxemic respiratory failure  10. Heart block -- as per cards

## 2021-02-02 NOTE — PLAN OF CARE
Goal Outcome Evaluation:  Plan of Care Reviewed With: patient  Progress: no change  Outcome Summary: Pt on 6L heated hiflow NC, pt with SOA with any amount of exertion. On Bumex drip with adaquate urine output, purkwick in place. Pt hypertensive overnight and given PRN hydralazine X2 and responded well to dose. Pt complaining of chest pain around 6am, nitro given, ekg done. EKG resulted as complete AV block. VVS at this time. Spoke with Loyda with cardiology. Pt being made NPO and stat labs orders. Will continue to monitor.

## 2021-02-02 NOTE — PROGRESS NOTES
Name: Miguelina Lopez ADMIT: 2021   : 1945  PCP: Arcelia Talbot APRN    MRN: [id number] LOS: 1 days   AGE/SEX: 75 y.o. female  ROOM: [facility]/     Subjective   Subjective   Resting in bed on phone. She is going for PPM today and is nervous. Denies any nausea or vomiting. Had some chest pain last night. No cough, fever or chills. No abdominal pain. Breathing about the same.      Objective   Objective   Vital Signs  Temp:  [97.7 °F (36.5 °C)-98.6 °F (37 °C)] 98.2 °F (36.8 °C)  Heart Rate:  [44-47] 46  Resp:  [16-22] 18  BP: (142-208)/(50-76) 176/58  SpO2:  [93 %-97 %] 97 %  on  Flow (L/min):  [4-6] 6;   Device (Oxygen Therapy): nasal cannula  Body mass index is 52.73 kg/m².     Physical Exam  Vitals signs and nursing note reviewed.   Constitutional:       General: She is not in acute distress.     Appearance: She is obese. She is ill-appearing (chronically). She is not toxic-appearing.   Neck:      Musculoskeletal: Normal range of motion and neck supple.   Cardiovascular:      Rate and Rhythm: Regular rhythm. Bradycardia present.      Heart sounds: Murmur present.   Pulmonary:      Effort: Pulmonary effort is normal. No respiratory distress.      Comments: Diminished throughout. On 6 L hi flow.  Abdominal:      General: Bowel sounds are normal. There is no distension.      Palpations: Abdomen is soft.      Tenderness: There is no abdominal tenderness.   Genitourinary:     Comments: Clear yellow urine in bedside cannister.  Musculoskeletal: Normal range of motion.         General: Swelling (2+ BLE) present.   Skin:     General: Skin is warm and dry.      Findings: No bruising.      Comments: Venous stasis changes to BLE   Neurological:      Mental Status: She is alert and oriented to person, place, and time.      Sensory: No sensory deficit.      Motor: Weakness present.      Coordination: Coordination normal.   Psychiatric:         Mood and Affect: Mood normal.         Behavior: Behavior normal.      Results Review:       I reviewed the patient's new clinical results.  Results from last 7 days   Lab Units 02/01/21  0829 02/01/21  0339   WBC 10*3/mm3 11.57* 11.42*   HEMOGLOBIN g/dL 11.6* 11.7*   PLATELETS 10*3/mm3 226 253     Results from last 7 days   Lab Units 02/02/21  0739 02/01/21  0551 02/01/21  0339   SODIUM mmol/L 145 146* 145   POTASSIUM mmol/L 3.9 3.9 4.4   CHLORIDE mmol/L 101 103 102   CO2 mmol/L 31.3* 33.4* 32.3*   BUN mg/dL 20 19 20   CREATININE mg/dL 1.43* 1.42* 1.38*   GLUCOSE mg/dL 142* 112* 128*   Estimated Creatinine Clearance: 40.2 mL/min (A) (by C-G formula based on SCr of 1.43 mg/dL (H)).  Results from last 7 days   Lab Units 02/01/21  0339   ALBUMIN g/dL 3.70   BILIRUBIN mg/dL 0.6   ALK PHOS U/L 259*   AST (SGOT) U/L 23   ALT (SGPT) U/L 14     Results from last 7 days   Lab Units 02/02/21  0739 02/01/21  0551 02/01/21  0339   CALCIUM mg/dL 9.0 8.9 8.5*   ALBUMIN g/dL  --   --  3.70   MAGNESIUM mg/dL  --   --  2.0     Results from last 7 days   Lab Units 02/01/21  0339   PROCALCITONIN ng/mL 0.07     Hemoglobin A1C   Date/Time Value Ref Range Status   02/01/2021 0829 6.71 (H) 4.80 - 5.60 % Final     Glucose   Date/Time Value Ref Range Status   02/02/2021 0646 129 70 - 130 mg/dL Final   02/01/2021 2103 140 (H) 70 - 130 mg/dL Final   02/01/2021 0620 111 70 - 130 mg/dL Final       amLODIPine, 5 mg, Oral, Q24H  aspirin EC, 325 mg, Oral, Daily  atorvastatin, 20 mg, Oral, Daily  budesonide-formoterol, 2 puff, Inhalation, BID - RT  clotrimazole-betamethasone, , Topical, BID  fluticasone, 2 spray, Each Nare, Daily  gabapentin, 100 mg, Oral, Q12H  insulin lispro, 0-7 Units, Subcutaneous, TID AC  lactobacillus acidophilus, 1 capsule, Oral, Daily  levothyroxine, 25 mcg, Oral, Daily  nystatin, , Topical, BID  oxymetazoline, 2 spray, Each Nare, BID  pantoprazole, 40 mg, Oral, QAM  sennosides-docusate, 1 tablet, Oral, Daily  sodium chloride, 10 mL, Intravenous, Q12H  vancomycin, 1,000 mg, Intravenous,  On Call  vilazodone, 20 mg, Oral, Nightly      bumetanide, 1 mg/hr, Last Rate: 1 mg/hr (02/02/21 0245)    NPO Diet       Assessment/Plan     Active Hospital Problems    Diagnosis  POA   • **Chest pain [R07.9]  Yes   • COPD (chronic obstructive pulmonary disease) (CMS/HCC) [J44.9]  Yes   • Chronic respiratory failure with hypoxia and hypercapnia (CMS/HCC) [J96.11, J96.12]  Yes   • Chronic diastolic heart failure (CMS/[facility]) [I50.32]  Yes   • Supplemental oxygen dependent [Z99.81]  Not Applicable   • s/p MVR, TV-repair, CABG x2 6/13/16 [Z95.2]  Not Applicable   • Pulmonary hypertension due to sleep-disordered breathing (CMS/[facility]) [I27.29, G47.8]  Yes   • OCHOA (obstructive sleep apnea) [G47.33]  Yes   • DM type 2 (diabetes mellitus, type 2) (CMS/[facility]) [E11.9]  Yes   • Diabetic peripheral neuropathy (CMS/[facility]) [E11.42]  Yes   • Hyperlipidemia [E78.5]  Yes   • Essential hypertension [I10]  Yes   • CKD (chronic kidney disease) stage 3, GFR 30-59 ml/min (CMS/[facility]) [N18.30]  Yes      Resolved Hospital Problems   No resolved problems to display.     Ms. Lopez is a 75 year old female who presented to the hospital with chest pain and dyspnea. Found to have diffuse opacities on CXR with elevated proBNP.     Chest pain/acute on chronic diastolic heart failure  -Cardiology following. Plans for PPM today secondary to new complete heart block.  -Nephrology managing diuretics. Now on Bumex gtt.   -Cardiac regimen in place with ASA, statin. Norvasc for BP.     COPD/chronic respiratory failure with hypoxia and hypercapnia/OCHOA  -Pulmonology following. To use home Trilogy.   -ABGs yesterday stable. Wean oxygen as able.      CAD/history of CABG/valvular heart disease  -Cards following as above.  -Repeat echo with preserved EF and severe AS.     DM2/neuropathy  -SSI as needed. Monitor ACHS. Hold oral medications.  -A1c 6.71%.     HTN  -BP improving with diuresis. Arb on hold. Has PRN hydralazine. Norvasc added by Cards.     CKD3  -Baseline  creatinine around 1.2. Up to 1.3-1.4.  -Nephrology following.      Mild leukocytosis as well as foul-smelling urine reported. UA with 2+ bacteria and 13-20 WBC. Repeat CBC in AM. Await urine culture.      · I discussed the patients findings and my recommendations with patient, nursing staff and Dr. Guardado.     VTE Prophylaxis - Lovenox.   Code Status - Full code. Confirmed with patient.  Disposition - Anticipate discharge TBD.      ZOILA Powers  [facility]  02/02/21  09:39 EST

## 2021-02-02 NOTE — PLAN OF CARE
Pt agreeable to OT this morning. She transferred to EOB with Min A. Total A for LB dressing. S/s for grooming tasks at EOB. Pt with a short height stature so bed lowered to lowest setting. She stood with Mod A x10 seconds and took side steps towards HOB. Pt returned to supine post session with Mod A. Pt going for a pacemaker placement later this morning/afternoon. OT recommends continued skilled inpatient OT services during her hospital stay to increase strength, endurance and independence with functional tasks. Pt reports she will d/c back home with her son in law. Pt may benefit from a short rehab stay at d/c prior to going home.     Patient was not wearing a face mask during this therapy encounter. Therapist used appropriate personal protective equipment including mask, goggles and gloves. Mask used was standard procedure mask. Appropriate PPE was worn during the entire therapy session. Hand hygiene was completed before and after therapy session. Patient is not in enhanced droplet precautions.

## 2021-02-03 PROBLEM — N39.0 UTI (URINARY TRACT INFECTION), BACTERIAL: Status: ACTIVE | Noted: 2021-02-03

## 2021-02-03 PROBLEM — A49.9 UTI (URINARY TRACT INFECTION), BACTERIAL: Status: ACTIVE | Noted: 2021-02-03

## 2021-02-03 LAB
ANION GAP SERPL CALCULATED.3IONS-SCNC: 12 MMOL/L (ref 5–15)
ANION GAP SERPL CALCULATED.3IONS-SCNC: 14.3 MMOL/L (ref 5–15)
BUN SERPL-MCNC: 20 MG/DL (ref 8–23)
BUN SERPL-MCNC: 21 MG/DL (ref 8–23)
BUN/CREAT SERPL: 14.4 (ref 7–25)
BUN/CREAT SERPL: 18.6 (ref 7–25)
CALCIUM SPEC-SCNC: 8.9 MG/DL (ref 8.6–10.5)
CALCIUM SPEC-SCNC: 9 MG/DL (ref 8.6–10.5)
CHLORIDE SERPL-SCNC: 100 MMOL/L (ref 98–107)
CHLORIDE SERPL-SCNC: 99 MMOL/L (ref 98–107)
CO2 SERPL-SCNC: 31.7 MMOL/L (ref 22–29)
CO2 SERPL-SCNC: 35 MMOL/L (ref 22–29)
CREAT SERPL-MCNC: 1.13 MG/DL (ref 0.57–1)
CREAT SERPL-MCNC: 1.39 MG/DL (ref 0.57–1)
DEPRECATED RDW RBC AUTO: 46.1 FL (ref 37–54)
ERYTHROCYTE [DISTWIDTH] IN BLOOD BY AUTOMATED COUNT: 15.6 % (ref 12.3–15.4)
GFR SERPL CREATININE-BSD FRML MDRD: 37 ML/MIN/1.73
GFR SERPL CREATININE-BSD FRML MDRD: 47 ML/MIN/1.73
GLUCOSE BLDC GLUCOMTR-MCNC: 119 MG/DL (ref 70–130)
GLUCOSE BLDC GLUCOMTR-MCNC: 137 MG/DL (ref 70–130)
GLUCOSE BLDC GLUCOMTR-MCNC: 177 MG/DL (ref 70–130)
GLUCOSE SERPL-MCNC: 126 MG/DL (ref 65–99)
GLUCOSE SERPL-MCNC: 77 MG/DL (ref 65–99)
HCT VFR BLD AUTO: 38.1 % (ref 34–46.6)
HGB BLD-MCNC: 12.2 G/DL (ref 12–15.9)
MAGNESIUM SERPL-MCNC: 2.1 MG/DL (ref 1.6–2.4)
MCH RBC QN AUTO: 26.3 PG (ref 26.6–33)
MCHC RBC AUTO-ENTMCNC: 32 G/DL (ref 31.5–35.7)
MCV RBC AUTO: 82.3 FL (ref 79–97)
PHOSPHATE SERPL-MCNC: 3.5 MG/DL (ref 2.5–4.5)
PLATELET # BLD AUTO: 247 10*3/MM3 (ref 140–450)
PMV BLD AUTO: 11.4 FL (ref 6–12)
POTASSIUM SERPL-SCNC: 3.2 MMOL/L (ref 3.5–5.2)
POTASSIUM SERPL-SCNC: 3.5 MMOL/L (ref 3.5–5.2)
POTASSIUM SERPL-SCNC: 4.5 MMOL/L (ref 3.5–5.2)
QT INTERVAL: 469 MS
RBC # BLD AUTO: 4.63 10*6/MM3 (ref 3.77–5.28)
SODIUM SERPL-SCNC: 145 MMOL/L (ref 136–145)
SODIUM SERPL-SCNC: 147 MMOL/L (ref 136–145)
WBC # BLD AUTO: 12.59 10*3/MM3 (ref 3.4–10.8)

## 2021-02-03 PROCEDURE — 80048 BASIC METABOLIC PNL TOTAL CA: CPT | Performed by: INTERNAL MEDICINE

## 2021-02-03 PROCEDURE — 97140 MANUAL THERAPY 1/> REGIONS: CPT

## 2021-02-03 PROCEDURE — 84100 ASSAY OF PHOSPHORUS: CPT | Performed by: STUDENT IN AN ORGANIZED HEALTH CARE EDUCATION/TRAINING PROGRAM

## 2021-02-03 PROCEDURE — 99233 SBSQ HOSP IP/OBS HIGH 50: CPT | Performed by: INTERNAL MEDICINE

## 2021-02-03 PROCEDURE — 94799 UNLISTED PULMONARY SVC/PX: CPT

## 2021-02-03 PROCEDURE — 85027 COMPLETE CBC AUTOMATED: CPT | Performed by: STUDENT IN AN ORGANIZED HEALTH CARE EDUCATION/TRAINING PROGRAM

## 2021-02-03 PROCEDURE — 93005 ELECTROCARDIOGRAM TRACING: CPT | Performed by: NURSE PRACTITIONER

## 2021-02-03 PROCEDURE — 83735 ASSAY OF MAGNESIUM: CPT | Performed by: STUDENT IN AN ORGANIZED HEALTH CARE EDUCATION/TRAINING PROGRAM

## 2021-02-03 PROCEDURE — 80048 BASIC METABOLIC PNL TOTAL CA: CPT | Performed by: STUDENT IN AN ORGANIZED HEALTH CARE EDUCATION/TRAINING PROGRAM

## 2021-02-03 PROCEDURE — 82962 GLUCOSE BLOOD TEST: CPT

## 2021-02-03 PROCEDURE — 97530 THERAPEUTIC ACTIVITIES: CPT

## 2021-02-03 PROCEDURE — 84132 ASSAY OF SERUM POTASSIUM: CPT | Performed by: INTERNAL MEDICINE

## 2021-02-03 PROCEDURE — 25010000002 CEFTRIAXONE PER 250 MG: Performed by: NURSE PRACTITIONER

## 2021-02-03 PROCEDURE — 97162 PT EVAL MOD COMPLEX 30 MIN: CPT

## 2021-02-03 RX ORDER — POTASSIUM CHLORIDE 1.5 G/1.77G
40 POWDER, FOR SOLUTION ORAL AS NEEDED
Status: DISCONTINUED | OUTPATIENT
Start: 2021-02-03 | End: 2021-02-11 | Stop reason: HOSPADM

## 2021-02-03 RX ORDER — POTASSIUM CHLORIDE 750 MG/1
40 TABLET, FILM COATED, EXTENDED RELEASE ORAL AS NEEDED
Status: DISCONTINUED | OUTPATIENT
Start: 2021-02-03 | End: 2021-02-11 | Stop reason: HOSPADM

## 2021-02-03 RX ORDER — HYDROXYZINE HYDROCHLORIDE 25 MG/1
25 TABLET, FILM COATED ORAL 3 TIMES DAILY PRN
Status: DISCONTINUED | OUTPATIENT
Start: 2021-02-03 | End: 2021-02-11 | Stop reason: HOSPADM

## 2021-02-03 RX ORDER — POTASSIUM CHLORIDE 7.45 MG/ML
10 INJECTION INTRAVENOUS
Status: DISCONTINUED | OUTPATIENT
Start: 2021-02-03 | End: 2021-02-11 | Stop reason: HOSPADM

## 2021-02-03 RX ORDER — CEFTRIAXONE SODIUM 1 G/50ML
1 INJECTION, SOLUTION INTRAVENOUS EVERY 24 HOURS
Status: COMPLETED | OUTPATIENT
Start: 2021-02-03 | End: 2021-02-05

## 2021-02-03 RX ORDER — CARVEDILOL 6.25 MG/1
6.25 TABLET ORAL 2 TIMES DAILY WITH MEALS
Status: DISCONTINUED | OUTPATIENT
Start: 2021-02-03 | End: 2021-02-11 | Stop reason: HOSPADM

## 2021-02-03 RX ORDER — METOLAZONE 5 MG/1
5 TABLET ORAL DAILY
Status: DISCONTINUED | OUTPATIENT
Start: 2021-02-03 | End: 2021-02-05

## 2021-02-03 RX ADMIN — CLOTRIMAZOLE AND BETAMETHASONE DIPROPIONATE: 10; .5 CREAM TOPICAL at 08:39

## 2021-02-03 RX ADMIN — GABAPENTIN 100 MG: 100 CAPSULE ORAL at 08:37

## 2021-02-03 RX ADMIN — HYDROXYZINE HYDROCHLORIDE 25 MG: 25 TABLET ORAL at 16:52

## 2021-02-03 RX ADMIN — APIXABAN 5 MG: 5 TABLET, FILM COATED ORAL at 23:29

## 2021-02-03 RX ADMIN — GABAPENTIN 100 MG: 100 CAPSULE ORAL at 20:22

## 2021-02-03 RX ADMIN — SODIUM CHLORIDE, PRESERVATIVE FREE 10 ML: 5 INJECTION INTRAVENOUS at 23:43

## 2021-02-03 RX ADMIN — ALPRAZOLAM 1 MG: 0.5 TABLET ORAL at 08:44

## 2021-02-03 RX ADMIN — CARVEDILOL 6.25 MG: 6.25 TABLET, FILM COATED ORAL at 08:37

## 2021-02-03 RX ADMIN — BUDESONIDE AND FORMOTEROL FUMARATE DIHYDRATE 2 PUFF: 160; 4.5 AEROSOL RESPIRATORY (INHALATION) at 09:05

## 2021-02-03 RX ADMIN — BUMETANIDE 1 MG/HR: 0.25 INJECTION INTRAMUSCULAR; INTRAVENOUS at 01:10

## 2021-02-03 RX ADMIN — NYSTATIN: 100000 CREAM TOPICAL at 20:23

## 2021-02-03 RX ADMIN — AMLODIPINE BESYLATE 5 MG: 5 TABLET ORAL at 08:38

## 2021-02-03 RX ADMIN — LEVOTHYROXINE SODIUM 25 MCG: 0.03 TABLET ORAL at 08:38

## 2021-02-03 RX ADMIN — APIXABAN 5 MG: 5 TABLET, FILM COATED ORAL at 10:35

## 2021-02-03 RX ADMIN — BUMETANIDE 2 MG/HR: 0.25 INJECTION INTRAMUSCULAR; INTRAVENOUS at 13:02

## 2021-02-03 RX ADMIN — POTASSIUM CHLORIDE 40 MEQ: 750 TABLET, EXTENDED RELEASE ORAL at 13:00

## 2021-02-03 RX ADMIN — CLOTRIMAZOLE AND BETAMETHASONE DIPROPIONATE: 10; .5 CREAM TOPICAL at 20:23

## 2021-02-03 RX ADMIN — SODIUM CHLORIDE, PRESERVATIVE FREE 10 ML: 5 INJECTION INTRAVENOUS at 08:39

## 2021-02-03 RX ADMIN — SODIUM CHLORIDE, PRESERVATIVE FREE 3 ML: 5 INJECTION INTRAVENOUS at 23:44

## 2021-02-03 RX ADMIN — Medication 1 CAPSULE: at 08:38

## 2021-02-03 RX ADMIN — ATORVASTATIN CALCIUM 20 MG: 20 TABLET, FILM COATED ORAL at 08:37

## 2021-02-03 RX ADMIN — PANTOPRAZOLE SODIUM 40 MG: 40 TABLET, DELAYED RELEASE ORAL at 04:59

## 2021-02-03 RX ADMIN — METOLAZONE 5 MG: 5 TABLET ORAL at 12:58

## 2021-02-03 RX ADMIN — VILAZODONE HYDROCHLORIDE 20 MG: 40 TABLET ORAL at 20:21

## 2021-02-03 RX ADMIN — CARVEDILOL 6.25 MG: 6.25 TABLET, FILM COATED ORAL at 18:23

## 2021-02-03 RX ADMIN — POTASSIUM CHLORIDE 40 MEQ: 750 TABLET, EXTENDED RELEASE ORAL at 08:37

## 2021-02-03 RX ADMIN — HYDROCODONE BITARTRATE AND ACETAMINOPHEN 1 TABLET: 5; 325 TABLET ORAL at 12:58

## 2021-02-03 RX ADMIN — NYSTATIN: 100000 CREAM TOPICAL at 08:39

## 2021-02-03 RX ADMIN — BUDESONIDE AND FORMOTEROL FUMARATE DIHYDRATE 2 PUFF: 160; 4.5 AEROSOL RESPIRATORY (INHALATION) at 20:55

## 2021-02-03 RX ADMIN — ALPRAZOLAM 1 MG: 0.5 TABLET ORAL at 20:22

## 2021-02-03 RX ADMIN — CEFTRIAXONE SODIUM 1 G: 1 INJECTION, SOLUTION INTRAVENOUS at 18:23

## 2021-02-03 RX ADMIN — ACETAMINOPHEN 650 MG: 325 TABLET, FILM COATED ORAL at 04:59

## 2021-02-03 NOTE — PROGRESS NOTES
Patient Name: Miguelina Lopez  Patient : 1945        Date of Service:21  Provider of Service: Antoine Ayers MD  Place of Service: [facility]  Referral Provider: No ref. provider found          Follow Up: Heart block, CHF, atrial flutter    Interval Hx: Patient now status post pacemaker implant.  Has ventricular paced rhythm.  Breathing better.  Good urine output.      OBJECTIVE  Temp:  [97.4 °F (36.3 °C)-97.9 °F (36.6 °C)] 97.9 °F (36.6 °C)  Heart Rate:  [79-85] 80  Resp:  [17-32] 17  BP: (124-187)/(54-84) 145/66     Intake/Output Summary (Last 24 hours) at 2/3/2021 0759  Last data filed at 2/3/2021 0700  Gross per 24 hour   Intake 655 ml   Output 3750 ml   Net -3095 ml     Body mass index is 52.73 kg/m².      21  0759   Weight: 122 kg (270 lb)         Physical Exam:   Vitals signs reviewed.   Constitutional:       Appearance: Well-developed.   Eyes:      Pupils: Pupils are equal, round, and reactive to light.   HENT:      Head: Normocephalic.   Neck:      Musculoskeletal: Normal range of motion.      Thyroid: No thyromegaly.      Vascular: No carotid bruit or JVD.   Pulmonary:      Effort: Pulmonary effort is normal.      Breath sounds: Normal breath sounds.   Cardiovascular:      Normal rate. Regular rhythm. distant S1. distant S2.      Murmurs: There is a grade 2/6 harsh midsystolic murmur at the URSB, radiating to the neck.      No gallop.   Pulses:     Intact distal pulses.   Abdominal:      General: Bowel sounds are normal.      Palpations: Abdomen is soft.   Skin:     General: Skin is warm and dry.      Findings: No erythema.   Neurological:      Mental Status: Alert and oriented to person, place, and time.           CURRENT MEDS    Scheduled Meds:amLODIPine, 5 mg, Oral, Q24H  aspirin EC, 325 mg, Oral, Daily  atorvastatin, 20 mg, Oral, Daily  budesonide-formoterol, 2 puff, Inhalation, BID - RT  clotrimazole-betamethasone, , Topical, BID  fluticasone, 2  spray, Each Nare, Daily  gabapentin, 100 mg, Oral, Q12H  insulin lispro, 0-7 Units, Subcutaneous, TID AC  lactobacillus acidophilus, 1 capsule, Oral, Daily  levothyroxine, 25 mcg, Oral, Daily  nystatin, , Topical, BID  pantoprazole, 40 mg, Oral, QAM  sennosides-docusate, 1 tablet, Oral, Daily  sodium chloride, 10 mL, Intravenous, Q12H  sodium chloride, 3 mL, Intravenous, Q12H  vilazodone, 20 mg, Oral, Nightly      Continuous Infusions:bumetanide, 1 mg/hr, Last Rate: 1 mg/hr (02/03/21 0110)          Lab Review:   Results from last 7 days   Lab Units 02/03/21 0419 02/02/21  0739  02/01/21  0339   SODIUM mmol/L 145 145   < > 145   POTASSIUM mmol/L 3.2* 3.9   < > 4.4   CHLORIDE mmol/L 99 101   < > 102   CO2 mmol/L 31.7* 31.3*   < > 32.3*   BUN mg/dL 21 20   < > 20   CREATININE mg/dL 1.13* 1.43*   < > 1.38*   GLUCOSE mg/dL 77 142*   < > 128*   CALCIUM mg/dL 8.9 9.0   < > 8.5*   AST (SGOT) U/L  --   --   --  23   ALT (SGPT) U/L  --   --   --  14    < > = values in this interval not displayed.     Results from last 7 days   Lab Units 02/02/21  0739 02/01/21  1200 02/01/21  0551 02/01/21  0339   TROPONIN T ng/mL 0.046* 0.026 0.016 0.011     Results from last 7 days   Lab Units 02/03/21  0419 02/01/21  0829   WBC 10*3/mm3 12.59* 11.57*   HEMOGLOBIN g/dL 12.2 11.6*   HEMATOCRIT % 38.1 37.0   PLATELETS 10*3/mm3 247 226         Results from last 7 days   Lab Units 02/03/21  0419 02/01/21  0339   MAGNESIUM mg/dL 2.1 2.0         Results from last 7 days   Lab Units 02/01/21  0339   PROBNP pg/mL 12,821.0*           I personally reviewed the patient's ECG and telemetry data    ASSESSMENT & PLAN    Chest pain    CKD (chronic kidney disease) stage 3, GFR 30-59 ml/min (CMS/[facility])    Diabetic peripheral neuropathy (CMS/[facility])    Hyperlipidemia    OCHOA (obstructive sleep apnea)    DM type 2 (diabetes mellitus, type 2) (CMS/[facility])    Essential hypertension    Pulmonary hypertension due to sleep-disordered breathing (CMS/[facility])    s/p MVR,  TV-repair, CABG x2 6/13/16    Supplemental oxygen dependent    Chronic diastolic heart failure (CMS/HCC)    Chronic respiratory failure with hypoxia and hypercapnia (CMS/HCC)    COPD (chronic obstructive pulmonary disease) (CMS/HCC)    Complete heart block (CMS/HCC)    1.  Acute on chronic diastolic heart failure: Left ventricular ejection fraction greater than 50%.  Patient presently on Bumex drip with good diuresis.  Continue per nephrology  2.  Complete heart block: Status post pacemaker implant.  Patient in atrial flutter.  Will need to be back on anticoagulation.  Hope to convert at some point in the future.   3.  Aortic stenosis: When compared to the previous echocardiogram her aortic stenosis appears to be worse.  Certainly a contributing factor to her heart failure.  After stabilization we will evaluate for possible valve replacement or TAVR.   4.  Hypertension: Uncontrolled.  We will add beta-blocker to her medical regimen in the hopes of lowering her blood pressure   5.  Coronary artery disease: Status post CABG.  No further angina pectoris.  Will recheck troponin now that pacemaker is in place.  Do not suspect acute coronary syndrome.   6.  Mitral regurgitation: Status post mitral valve replacement with tissue prosthesis.   7.  Tricuspid regurgitation: Status post repair   8.  Obstructive sleep apnea: Treated   9.  Obesity   10.  Pulmonary hypertension    We will plan to add beta-blocker and initiate anticoagulation for atrial flutter.  At some point we will consider cardioversion.      Antoine Ayers MD  02/03/21

## 2021-02-03 NOTE — PROGRESS NOTES
Name: Miguelina Lopez ADMIT: 2021   : 1945  PCP: Arcelia Talbot APRN    MRN: [id number] LOS: 2 days   AGE/SEX: 75 y.o. female  ROOM: /1     Subjective   Subjective   Resting in chair. Denies any chest pain, but having some soreness. No nausea or vomiting. Eating and drinking. Reports confusion last night that was relieved with re-orientation. Breathing improved and down 2 L. No cough/fever/chills. Reports foul-smelling urine prior to admission.     Objective   Objective   Vital Signs  Temp:  [97.4 °F (36.3 °C)-97.9 °F (36.6 °C)] 97.6 °F (36.4 °C)  Heart Rate:  [79-85] 80  Resp:  [16-22] 18  BP: (124-187)/(54-84) 179/75  SpO2:  [92 %-100 %] 94 %  on  Flow (L/min):  [2-5] 2;   Device (Oxygen Therapy): nasal cannula  Body mass index is 52.73 kg/m².     Physical Exam  Vitals signs and nursing note reviewed.   Constitutional:       General: She is not in acute distress.     Appearance: She is obese. She is ill-appearing (chronically). She is not toxic-appearing.   Neck:      Musculoskeletal: Normal range of motion and neck supple.   Cardiovascular:      Rate and Rhythm: Regular rhythm. Normal rate present. Pacing on monitor.     Heart sounds: Murmur present.   Pulmonary:      Effort: Pulmonary effort is normal. No respiratory distress.      Comments: Diminished throughout. On 2 L nc.  Abdominal:      General: Bowel sounds are normal. There is no distension.      Palpations: Abdomen is soft.      Tenderness: There is no abdominal tenderness.   Genitourinary:     Comments: Clear yellow urine in bedside cannister.  Musculoskeletal: Normal range of motion.         General: moderate Swelling (2+ BLE) present. Slightly improved from yesterday.  Skin:     General: Skin is warm and dry.      Findings: No bruising.      Comments: Venous stasis changes to BLE   Neurological:      Mental Status: She is alert and oriented to person, place, and time.      Sensory: No sensory deficit.      Motor:  Weakness present.      Coordination: Coordination normal.   Psychiatric:         Mood and Affect: Mood normal.         Behavior: Behavior normal.     Results Review:       I reviewed the patient's new clinical results.  Results from last 7 days   Lab Units 02/03/21 0419 02/01/21  0829 02/01/21  0339   WBC 10*3/mm3 12.59* 11.57* 11.42*   HEMOGLOBIN g/dL 12.2 11.6* 11.7*   PLATELETS 10*3/mm3 247 226 253     Results from last 7 days   Lab Units 02/03/21 0419 02/02/21  0739 02/01/21  0551 02/01/21  0339   SODIUM mmol/L 145 145 146* 145   POTASSIUM mmol/L 3.2* 3.9 3.9 4.4   CHLORIDE mmol/L 99 101 103 102   CO2 mmol/L 31.7* 31.3* 33.4* 32.3*   BUN mg/dL 21 20 19 20   CREATININE mg/dL 1.13* 1.43* 1.42* 1.38*   GLUCOSE mg/dL 77 142* 112* 128*   Estimated Creatinine Clearance: 50.9 mL/min (A) (by C-G formula based on SCr of 1.13 mg/dL (H)).  Results from last 7 days   Lab Units 02/01/21  0339   ALBUMIN g/dL 3.70   BILIRUBIN mg/dL 0.6   ALK PHOS U/L 259*   AST (SGOT) U/L 23   ALT (SGPT) U/L 14     Results from last 7 days   Lab Units 02/03/21 0419 02/02/21  0739 02/01/21 0551 02/01/21  0339   CALCIUM mg/dL 8.9 9.0 8.9 8.5*   ALBUMIN g/dL  --   --   --  3.70   MAGNESIUM mg/dL 2.1  --   --  2.0   PHOSPHORUS mg/dL 3.5  --   --   --      Results from last 7 days   Lab Units 02/01/21  0339   PROCALCITONIN ng/mL 0.07     Hemoglobin A1C   Date/Time Value Ref Range Status   02/01/2021 0829 6.71 (H) 4.80 - 5.60 % Final     Glucose   Date/Time Value Ref Range Status   02/03/2021 1054 119 70 - 130 mg/dL Final   02/02/2021 2029 71 70 - 130 mg/dL Final   02/02/2021 1800 187 (H) 70 - 130 mg/dL Final   02/02/2021 1443 139 (H) 70 - 130 mg/dL Final   02/02/2021 1107 114 70 - 130 mg/dL Final   02/02/2021 0646 129 70 - 130 mg/dL Final   02/01/2021 2103 140 (H) 70 - 130 mg/dL Final       amLODIPine, 5 mg, Oral, Q24H  apixaban, 5 mg, Oral, Q12H  atorvastatin, 20 mg, Oral, Daily  budesonide-formoterol, 2 puff, Inhalation, BID - RT  carvedilol,  6.25 mg, Oral, BID With Meals  cefTRIAXone, 1 g, Intravenous, Q24H  clotrimazole-betamethasone, , Topical, BID  fluticasone, 2 spray, Each Nare, Daily  gabapentin, 100 mg, Oral, Q12H  insulin lispro, 0-7 Units, Subcutaneous, TID AC  lactobacillus acidophilus, 1 capsule, Oral, Daily  levothyroxine, 25 mcg, Oral, Daily  metOLazone, 5 mg, Oral, Daily  nystatin, , Topical, BID  pantoprazole, 40 mg, Oral, QAM  sennosides-docusate, 1 tablet, Oral, Daily  sodium chloride, 10 mL, Intravenous, Q12H  sodium chloride, 3 mL, Intravenous, Q12H  vilazodone, 20 mg, Oral, Nightly      bumetanide, 2 mg/hr, Last Rate: 2 mg/hr (02/03/21 1302)    Diet Regular; Consistent Carbohydrate, Cardiac, Daily Fluid Restriction; 1000 mL Fluid Per Day       Assessment/Plan     Active Hospital Problems    Diagnosis  POA   • **Chest pain [R07.9]  Yes   • UTI (urinary tract infection), bacterial [N39.0, A49.9]  Yes   • Complete heart block (CMS/HCC) [I44.2]  Unknown   • COPD (chronic obstructive pulmonary disease) (CMS/[facility]) [J44.9]  Yes   • Chronic respiratory failure with hypoxia and hypercapnia (CMS/HCC) [J96.11, J96.12]  Yes   • Chronic diastolic heart failure (CMS/[facility]) [I50.32]  Yes   • Supplemental oxygen dependent [Z99.81]  Not Applicable   • s/p MVR, TV-repair, CABG x2 6/13/16 [Z95.2]  Not Applicable   • Pulmonary hypertension due to sleep-disordered breathing (CMS/[facility]) [I27.29, G47.8]  Yes   • OCHOA (obstructive sleep apnea) [G47.33]  Yes   • DM type 2 (diabetes mellitus, type 2) (CMS/[facility]) [E11.9]  Yes   • Diabetic peripheral neuropathy (CMS/[facility]) [E11.42]  Yes   • Hyperlipidemia [E78.5]  Yes   • Essential hypertension [I10]  Yes   • CKD (chronic kidney disease) stage 3, GFR 30-59 ml/min (CMS/[facility]) [N18.30]  Yes      Resolved Hospital Problems   No resolved problems to display.     Ms. Lopez is a 75 year old female who presented to the hospital with chest pain and dyspnea. Found to have diffuse opacities on CXR with elevated proBNP.     Chest  pain/acute on chronic diastolic heart failure  -Cardiology following. Found to have complete heart block/aflutter. Had PPM placed 2/2 and now pacing. Now on Eliquis with possible cardioversion in future. BB now back on board.  -Nephrology managing diuretics. Continues on Bumex gtt. metolazone added.   -Cardiac regimen in place with ASA, statin. Norvasc for BP. Coreg added 2/3.     COPD/chronic respiratory failure with hypoxia and hypercapnia/OCHOA  -Pulmonology following. To use home Trilogy.   -ABGs yesterday stable. Wean oxygen as able. Only on 2 L today.     CAD/history of CABG/valvular heart disease  -Cards following as above.  -Repeat echo with preserved EF and severe AS. Valve is worse than prior. May need TAVR in future.     DM2/neuropathy  -SSI as needed. Monitor ACHS. Hold oral medications.  -A1c 6.71%. sugars stable/borderline low. Monitor and encourage oral intake.     HTN  -BP improving with diuresis. Arb on hold. BB and Norvasc added this admission.     CKD3  -Baseline creatinine around 1.2. Down to 1.13 today.  -Nephrology following. Potassium replaced with protocol.     UTI  -Mild leukocytosis as well as foul-smelling urine reported. UA with 2+ bacteria and 13-20 WBC. Culture growing > 100, 000 gram positive cocci. Could be contaminate but with urinary symptoms and leukocytosis will treat for 3 days. Allergy to PCN, but tolerant of Keflex and cefazolin in the past. Will add Rocephin. Await final culture      · I discussed the patients findings and my recommendations with patient and Dr. Guardado.     VTE Prophylaxis - Eliquis  Code Status - Full code. Confirmed with patient.  Disposition - Anticipate discharge TBD.    ZOILA Powers  [facility]  02/03/21  13:27 EST

## 2021-02-03 NOTE — THERAPY TREATMENT NOTE
Acute Care - Occupational Therapy Treatment Note  [facility]     Patient Name: Miguelina Lopez  : 1945  MRN: [id number]  Today's Date: 2/3/2021             Admit Date: 2021       ICD-10-CM ICD-9-CM   1. Chest pain, unspecified type  R07.9 786.50   2. COPD exacerbation (CMS/HCC)  J44.1 491.21   3. Congestive heart failure, unspecified HF chronicity, unspecified heart failure type (CMS/HCC)  I50.9 428.0   4. Hypertension, unspecified type  I10 401.9   5. Abnormal chest x-ray  R93.89 793.2   6. Complete heart block (CMS/HCC)  I44.2 426.0     Patient Active Problem List   Diagnosis   • Anxiety disorder   • Arthritis of knee   • Asthma   • Chronic coronary artery disease   • CKD (chronic kidney disease) stage 3, GFR 30-59 ml/min (CMS/HCC)   • Depression   • Diabetic peripheral neuropathy (CMS/HCC)   • Gastroesophageal reflux disease   • Hyperlipidemia   • Insomnia   • Lower gastrointestinal hemorrhage   • Anemia   • OCHOA (obstructive sleep apnea)   • DM type 2 (diabetes mellitus, type 2) (CMS/HCC)   • Essential hypertension   • Hospital discharge follow-up   • Pulmonary hypertension due to sleep-disordered breathing (CMS/HCC)   • s/p MVR, TV-repair, CABG x2 16   • OLI (acute kidney injury) (CMS/HCC)   • Leukocytosis   • Atrial fibrillation (CMS/HCC)   • Nocturnal hypoxia   • Dermatitis   • Medicare annual wellness visit, initial   • Class 3 severe obesity due to excess calories with serious comorbidity and body mass index (BMI) of 50.0 to 59.9 in adult (CMS/[facility])   • Supplemental oxygen dependent   • Acute on chronic combined systolic and diastolic HF (heart failure) (CMS/[facility])   • Mitral regurgitation   • Aortic stenosis   • Medicare annual wellness visit, subsequent   • Localized edema   • Chronic right-sided congestive heart failure (CMS/[facility])   • Cellulitis of left lower extremity   • Proteinuria   • Bilateral lower extremity edema   • Subclinical hypothyroidism   • Chronic diastolic heart failure  (CMS/[facility])   • Chronic respiratory failure with hypoxia and hypercapnia (CMS/HCC)   • Acute on chronic respiratory failure with hypoxia and hypercapnia (CMS/[facility])   • AV block, 2nd degree   • 1st degree AV block   • Chest pain   • COPD (chronic obstructive pulmonary disease) (CMS/[facility])   • Complete heart block (CMS/HCC)   • UTI (urinary tract infection), bacterial     Past Medical History:   Diagnosis Date   • Acute on chronic respiratory failure with hypoxia and hypercapnia (CMS/HCC)    • OLI (acute kidney injury) (CMS/HCC)    • Anemia    • Anxiety    • Aortic valve stenosis    • Bilateral lower extremity edema    • CHF (congestive heart failure) (CMS/HCC)    • Chronic coronary artery disease    • Class 3 severe obesity due to excess calories in adult (CMS/HCC)    • COPD (chronic obstructive pulmonary disease) (CMS/HCC)    • Depression    • Diabetes mellitus (CMS/HCC)    • Elevated cholesterol    • GERD (gastroesophageal reflux disease)    • Heart murmur    • Hypertension    • Mitral valve insufficiency    • Pneumonia     1/2016   • Pulmonary hypertension (CMS/HCC)     due to sleep disordered breathing   • Sleep apnea     Uses CPAP or oxygen   • Stage 3 chronic kidney disease (CMS/HCC)    • Subclinical hypothyroidism    • Supplemental oxygen dependent    • Valvular heart disease      Past Surgical History:   Procedure Laterality Date   • CARDIAC CATHETERIZATION     • CARDIAC CATHETERIZATION N/A 6/10/2016    Procedure: Left Heart Cath;  Surgeon: Chace Johnson MD;  Location: [facility] INVASIVE LOCATION;  Service:    • CARDIAC CATHETERIZATION N/A 6/10/2016    Procedure: Right Heart Cath;  Surgeon: Chace Johnson MD;  Location: [facility] CATH INVASIVE LOCATION;  Service:    • CARDIAC ELECTROPHYSIOLOGY PROCEDURE N/A 2/2/2021    Procedure: Pacemaker DC new---[employer] MICRA;  Surgeon: Eliazar Bond MD;  Location: [facility] CATH INVASIVE LOCATION;  Service: Cardiology;  Laterality: N/A;   • CARDIAC SURGERY     •  CORONARY ARTERY BYPASS GRAFT      2 vessel   • CORONARY ARTERY BYPASS GRAFT WITH MITRAL VALVE REPAIR/REPLACEMENT N/A 6/13/2016    Procedure: INTRAOPERATIVE TARIQ, MIDLINE STERNOTOMY, CORONARY ARTERY BYPASS GRAFTING X  2 UTILIZING ENDOSCOPICALLY HARVESTED LEFT GREATER SAPHENOUS VEIN, MITRAL VALVE REPLACEMENT AND TRICUSPID VALVE REPAIR;  Surgeon: Eliecer Mistry MD;  Location: [facility];  Service:    • CORONARY STENT PLACEMENT  2010    Approx. 6 yrs ago at [facility]   • HEMORRHOIDECTOMY     • HYSTERECTOMY     • MITRAL VALVE REPLACEMENT     • REPLACEMENT TOTAL KNEE Right    • THYROID SURGERY      Cyst removed from thyroid   • VASCULAR SURGERY         Lymphedema     Row Name 02/03/21 1400             Lymphedema Assessment    Lymphedema Classification  RLE:;LLE:  -VS      Recorded by [VS] Ericka Bishop OTR              Lymphedema Edema Assessment    Ptting Edema Category  -- Mod with (R) and ModSevere with (L)   -VS      Recorded by [VS] Ericka Bishop OTR              Compression/Skin Care    Compression/Skin Care  skin care;wrapping location;bandaging;remove bandages  -VS      Skin Care  washed/dried;lotion applied  -VS      Wrapping Location  lower extremity  -VS      Wrapping Location LE  bilateral:;foot to knee  -VS      Wrapping Comments  Both pain and edema greater in the (L)   -VS      Bandage Layers  cotton liner;padding/fluff layer K1 stockinette, #10 artiflex  -VS      Bandaging Comments  Bandages #6 & #8 toes to knee   -VS      Bandaging Technique  light compression  -VS      Recorded by [VS] Ericka Bishop OTR        User Key  (r) = Recorded By, (t) = Taken By, (c) = Cosigned By    Initials Name Effective Dates    VS Ericka Bishop OTR 03/02/20 -            OT ASSESSMENT FLOWSHEET (last 12 hours)      OT Evaluation and Treatment     Row Name 02/03/21 8047                   OT Time and Intention    Subjective Information  complains of;pain  -VS        Document Type  therapy note (daily note)   -VS        Mode of Treatment  occupational therapy Lymphedema Treatment  -VS        Comment, Session Not Performed  Pt. remembered OT from Lymph treatment 2016  -VS        Patient Effort  adequate  -VS        Symptoms Noted During/After Treatment  increased pain  -VS        Comment  Pt. stated he daughter has wrapped hr LEs since last admission, they were last wrapped 10 days ago per patient   -VS           General Information    Patient Profile Reviewed  yes  -VS        Prior Level of Function  min assist:;transfer;ADL's  -VS        Equipment Currently Used at Home  walker, standard  -VS        Existing Precautions/Restrictions  fall;oxygen therapy device and L/min  -VS        Barriers to Rehab  medically complex;previous functional deficit  -VS           Cognition    Orientation Status (Cognition)  oriented x 3  -VS        Follows Commands (Cognition)  WFL  -VS           Pain Assessment    Additional Documentation  Pain Scale: Numbers Pre/Post-Treatment (Group)  -VS           Pain Scale: Numbers Pre/Post-Treatment    Pretreatment Pain Rating  3/10  -VS        Posttreatment Pain Rating  4/10  -VS        Pain Location - Side  Bilateral  -VS        Pain Location - Orientation  lower  -VS        Pain Location  extremity  -VS        Pain Intervention(s)  Repositioned  -VS           Range of Motion (ROM)    Range of Motion  ROM is WFL;bilateral upper extremities  -VS           Strength Comprehensive (MMT)    Comment, General Manual Muscle Testing (MMT) Assessment  Pt. is wesak with all functional movments :  rolling, supie to sit and sit to supine   -VS           Bed Mobility    Bed Mobility  rolling left;rolling right;scooting/bridging;supine-sit;sit-supine  -VS        Rolling Left [place] (Bed Mobility)  moderate assist (50% patient effort);maximum assist (25% patient effort)  -VS        Rolling Right [place] (Bed Mobility)  moderate assist (50% patient effort);maximum assist (25% patient effort)  -VS         Scooting/Bridging [place] (Bed Mobility)  dependent (less than 25% patient effort);2 person assist  -VS        Supine-Sit [place] (Bed Mobility)  moderate assist (50% patient effort)  -VS        Sit-Supine [place] (Bed Mobility)  moderate assist (50% patient effort)  -VS        Assistive Device (Bed Mobility)  draw sheet  -VS        Comment (Bed Mobility)  Pt. sat on the EOB so therapist could complete LE lymph wrapping  -VS           Transfer Assessment/Treatment    Comment (Transfers)  N/A due to fatique: I just got back to bed I do not want to get up again   -VS           Activities of Daily Living    BADL Assessment/Intervention  bathing  -VS           Bathing Assessment/Intervention    Comment (Bathing)  Therapist bathed, dried and applied lotion to (B)LEs   -VS           Plan of Care Review    Plan of Care Reviewed With  patient  -VS        Outcome Summary  OT Lymph:  Pt. was known to this therapist from Lymph treatment in 2016.  Pt. stated hr daughter has wrapped LEs and then were last wrapped 10 days ago.  Pt. presents with Mod edema in the (R) and Mod/severe in the (L).  Pt. has pain in (B)LE but greater in (L) than (R)..  Therapist applied LE lymph wraps from the base of the toes to her knees (B)ly.  Pt. will benfit from skilled OT to apply LE lymph wraps Mon - Fri, RN to address on Sat and Sun.  Pt. was not able to wear a mask due to SOA with O2 on, OT wore a mask, and eye protection and complete hand hygiene before and after the treatment .  -VS           OT Goals    Problem Specific Goal Selection (OT)  problem specific goal 1, OT  -VS           Problem Specific Goal 1 (OT)    Problem Specific Goal 1 (OT)  Lymph wraps to assist with edema reduction in the LEs to assist with increased ADLs and mobility skills   -VS        Time Frame (Problem Specific Goal 1, OT)  2 weeks  -VS        Progress/Outcome (Problem Specific Goal 1, OT)  continuing progress toward goal  -VS           Positioning  and Restraints    Pre-Treatment Position  in bed  -VS        Post Treatment Position  chair  -VS        In Bed  supine;call light within reach;encouraged to call for assist;exit alarm on;with nsg;side rails up x3  -VS          User Key  (r) = Recorded By, (t) = Taken By, (c) = Cosigned By    Initials Name Effective Dates    VS DarioAislinne, OTR 03/02/20 -          Occupational Therapy Education                 Title: PT OT SLP Therapies (In Progress)     Topic: Occupational Therapy (Done)     Point: ADL training (Done)     Description:   Instruct learner(s) on proper safety adaptation and remediation techniques during self care or transfers.   Instruct in proper use of assistive devices.              Learning Progress Summary           Patient Acceptance, E,D,H, VU by VS at 2/3/2021 1421    Comment: Lymphedema bandages wearing precaution, written/verbal  instruction and demonstration on wrapping LEs    Acceptance, E,D, VU by LE at 2/1/2021 1550    Comment: role of OT, plan of care, body mechanics                   Point: Precautions (Done)     Description:   Instruct learner(s) on prescribed precautions during self-care and functional transfers.              Learning Progress Summary           Patient Acceptance, E,D,H, VU by VS at 2/3/2021 1421    Comment: Lymphedema bandages wearing precaution, written/verbal  instruction and demonstration on wrapping LEs    Acceptance, E,D, VU by LE at 2/1/2021 1550    Comment: role of OT, plan of care, body mechanics                   Point: Body mechanics (Done)     Description:   Instruct learner(s) on proper positioning and spine alignment during self-care, functional mobility activities and/or exercises.              Learning Progress Summary           Patient Acceptance, E,D,H, VU by VS at 2/3/2021 1421    Comment: Lymphedema bandages wearing precaution, written/verbal  instruction and demonstration on wrapping LEs    Acceptance, E,D, VU by LE at 2/1/2021 1550     Comment: role of OT, plan of care, body mechanics                               User Key     Initials Effective Dates Name Provider Type Discipline    LE 06/08/18 -  Solange Dawkins, OTR Occupational Therapist OT    VS 03/02/20 -  Ericka Bishop OTR Occupational Therapist OT                  OT Recommendation and Plan     Plan of Care Review  Plan of Care Reviewed With: patient  Outcome Summary: OT Lymph:  Pt. was known to this therapist from Lymph treatment in 2016.  Pt. stated hr daughter has wrapped LEs and then were last wrapped 10 days ago.  Pt. presents with Mod edema in the (R) and Mod/severe in the (L).  Pt. has pain in (B)LE but greater in (L) than (R)..  Therapist applied LE lymph wraps from the base of the toes to her knees (B)ly.  Pt. will benfit from skilled OT to apply LE lymph wraps Mon - Fri, RN to address on Sat and Sun.  Pt. was not able to wear a mask due to SOA with O2 on, OT wore a mask, and eye protection and complete hand hygiene before and after the treatment .  Plan of Care Reviewed With: patient  Outcome Summary: OT Lymph:  Pt. was known to this therapist from Lymph treatment in 2016.  Pt. stated hr daughter has wrapped LEs and then were last wrapped 10 days ago.  Pt. presents with Mod edema in the (R) and Mod/severe in the (L).  Pt. has pain in (B)LE but greater in (L) than (R)..  Therapist applied LE lymph wraps from the base of the toes to her knees (B)ly.  Pt. will benfit from skilled OT to apply LE lymph wraps Mon - Fri, RN to address on Sat and Sun.  Pt. was not able to wear a mask due to SOA with O2 on, OT wore a mask, and eye protection and complete hand hygiene before and after the treatment .    Outcome Measures     Row Name 02/03/21 1400             How much help from another is currently needed...    Putting on and taking off regular lower body clothing?  1  -VS      Bathing (including washing, rinsing, and drying)  2  -VS      Toileting (which includes using toilet bed pan or  urinal)  1  -VS      Putting on and taking off regular upper body clothing  2  -VS      Taking care of personal grooming (such as brushing teeth)  3  -VS      Eating meals  3  -VS      AM-PAC 6 Clicks Score (OT)  12  -VS         Functional Assessment    Outcome Measure Options  AM-PAC 6 Clicks Daily Activity (OT)  -VS        User Key  (r) = Recorded By, (t) = Taken By, (c) = Cosigned By    Initials Name Provider Type    VS Ericka Bishop OTR Occupational Therapist          Time Calculation:   Time Calculation- OT     Row Name 02/03/21 1424             Time Calculation- OT    OT Start Time  1240  -VS      OT Stop Time  1320  -VS      OT Time Calculation (min)  40 min  -VS      Total Timed Code Minutes- OT  40 minute(s)  -VS      OT Received On  02/03/21  -VS      OT - Next Appointment  02/04/21  -VS      OT Goal Re-Cert Due Date  02/17/21  -VS        User Key  (r) = Recorded By, (t) = Taken By, (c) = Cosigned By    Initials Name Provider Type    VS Ericka Bishop OTR Occupational Therapist        Therapy Charges for Today     Code Description Service Date Service Provider Modifiers Qty    [id number] HC OT MANUAL THERAPY EA 15 MIN 2/3/2021 Ericka Bishop OTR GO 3               ANNEMARIE Wiggins  2/3/2021

## 2021-02-03 NOTE — THERAPY EVALUATION
Patient Name: Miguelina Lopez  : 1945    MRN: [id number]                              Today's Date: 2/3/2021       Admit Date: 2021    Visit Dx:     ICD-10-CM ICD-9-CM   1. Chest pain, unspecified type  R07.9 786.50   2. COPD exacerbation (CMS/HCC)  J44.1 491.21   3. Congestive heart failure, unspecified HF chronicity, unspecified heart failure type (CMS/HCC)  I50.9 428.0   4. Hypertension, unspecified type  I10 401.9   5. Abnormal chest x-ray  R93.89 793.2   6. Complete heart block (CMS/HCC)  I44.2 426.0     Patient Active Problem List   Diagnosis   • Anxiety disorder   • Arthritis of knee   • Asthma   • Chronic coronary artery disease   • CKD (chronic kidney disease) stage 3, GFR 30-59 ml/min (CMS/HCC)   • Depression   • Diabetic peripheral neuropathy (CMS/HCC)   • Gastroesophageal reflux disease   • Hyperlipidemia   • Insomnia   • Lower gastrointestinal hemorrhage   • Anemia   • OCHOA (obstructive sleep apnea)   • DM type 2 (diabetes mellitus, type 2) (CMS/HCC)   • Essential hypertension   • Hospital discharge follow-up   • Pulmonary hypertension due to sleep-disordered breathing (CMS/HCC)   • s/p MVR, TV-repair, CABG x2 16   • OLI (acute kidney injury) (CMS/HCC)   • Leukocytosis   • Atrial fibrillation (CMS/HCC)   • Nocturnal hypoxia   • Dermatitis   • Medicare annual wellness visit, initial   • Class 3 severe obesity due to excess calories with serious comorbidity and body mass index (BMI) of 50.0 to 59.9 in adult (CMS/[facility])   • Supplemental oxygen dependent   • Acute on chronic combined systolic and diastolic HF (heart failure) (CMS/[facility])   • Mitral regurgitation   • Aortic stenosis   • Medicare annual wellness visit, subsequent   • Localized edema   • Chronic right-sided congestive heart failure (CMS/[facility])   • Cellulitis of left lower extremity   • Proteinuria   • Bilateral lower extremity edema   • Subclinical hypothyroidism   • Chronic diastolic heart failure (CMS/[facility])   • Chronic respiratory  failure with hypoxia and hypercapnia (CMS/[facility])   • Acute on chronic respiratory failure with hypoxia and hypercapnia (CMS/[facility])   • AV block, 2nd degree   • 1st degree AV block   • Chest pain   • COPD (chronic obstructive pulmonary disease) (CMS/[facility])   • Complete heart block (CMS/[facility])     Past Medical History:   Diagnosis Date   • Acute on chronic respiratory failure with hypoxia and hypercapnia (CMS/[facility])    • OLI (acute kidney injury) (CMS/[facility])    • Anemia    • Anxiety    • Aortic valve stenosis    • Bilateral lower extremity edema    • CHF (congestive heart failure) (CMS/[facility])    • Chronic coronary artery disease    • Class 3 severe obesity due to excess calories in adult (CMS/[facility])    • COPD (chronic obstructive pulmonary disease) (CMS/[facility])    • Depression    • Diabetes mellitus (CMS/[facility])    • Elevated cholesterol    • GERD (gastroesophageal reflux disease)    • Heart murmur    • Hypertension    • Mitral valve insufficiency    • Pneumonia     1/2016   • Pulmonary hypertension (CMS/[facility])     due to sleep disordered breathing   • Sleep apnea     Uses CPAP or oxygen   • Stage 3 chronic kidney disease (CMS/[facility])    • Subclinical hypothyroidism    • Supplemental oxygen dependent    • Valvular heart disease      Past Surgical History:   Procedure Laterality Date   • CARDIAC CATHETERIZATION     • CARDIAC CATHETERIZATION N/A 6/10/2016    Procedure: Left Heart Cath;  Surgeon: Chace Johnson MD;  Location: [facility] CATH INVASIVE LOCATION;  Service:    • CARDIAC CATHETERIZATION N/A 6/10/2016    Procedure: Right Heart Cath;  Surgeon: Chace Johnson MD;  Location: [facility] CATH INVASIVE LOCATION;  Service:    • CARDIAC ELECTROPHYSIOLOGY PROCEDURE N/A 2/2/2021    Procedure: Pacemaker DC new---[employer] MICRA;  Surgeon: Eliazar Bond MD;  Location: [facility] CATH INVASIVE LOCATION;  Service: Cardiology;  Laterality: N/A;   • CARDIAC SURGERY     • CORONARY ARTERY BYPASS GRAFT      2 vessel   • CORONARY ARTERY BYPASS GRAFT  WITH MITRAL VALVE REPAIR/REPLACEMENT N/A 6/13/2016    Procedure: INTRAOPERATIVE TARIQ, MIDLINE STERNOTOMY, CORONARY ARTERY BYPASS GRAFTING X  2 UTILIZING ENDOSCOPICALLY HARVESTED LEFT GREATER SAPHENOUS VEIN, MITRAL VALVE REPLACEMENT AND TRICUSPID VALVE REPAIR;  Surgeon: Eliecer Mistry MD;  Location: [facility];  Service:    • CORONARY STENT PLACEMENT  2010    Approx. 6 yrs ago at [facility]   • HEMORRHOIDECTOMY     • HYSTERECTOMY     • MITRAL VALVE REPLACEMENT     • REPLACEMENT TOTAL KNEE Right    • THYROID SURGERY      Cyst removed from thyroid   • VASCULAR SURGERY       General Information     Row Name 02/03/21 1045          Physical Therapy Time and Intention    Document Type  evaluation  -     Mode of Treatment  individual therapy;physical therapy  -     Row Name 02/03/21 1045          General Information    Prior Level of Function  min assist:;mod assist:;gait;transfer;bed mobility pt walks with walker and assist, requires assistance from son-in-law to get in and out of bed  -     Existing Precautions/Restrictions  fall  -     Barriers to Rehab  medically complex;previous functional deficit  -     Row Name 02/03/21 1045          Living Environment    Lives With  child(kamryn), adult  -     Row Name 02/03/21 1045          Cognition    Orientation Status (Cognition)  oriented x 3  -     Row Name 02/03/21 1045          Safety Issues, Functional Mobility    Impairments Affecting Function (Mobility)  balance;endurance/activity tolerance;pain;shortness of breath;strength  -       User Key  (r) = Recorded By, (t) = Taken By, (c) = Cosigned By    Initials Name Provider Type     Precious Cisneros, PT Physical Therapist        Mobility     Row Name 02/03/21 1048          Bed Mobility    Bed Mobility  supine-sit;sit-supine  -     Supine-Sit [place] (Bed Mobility)  verbal cues;nonverbal cues (demo/gesture);moderate assist (50% patient effort);maximum assist (25% patient effort)  -     Sit-Supine  [place] (Bed Mobility)  not tested sitting in chair  -     Assistive Device (Bed Mobility)  bed rails;draw sheet;head of bed elevated  -     Row Name 02/03/21 1048          Sit-Stand Transfer    Sit-Stand [place] (Transfers)  verbal cues;nonverbal cues (demo/gesture);moderate assist (50% patient effort)  -     Assistive Device (Sit-Stand Transfers)  walker, front-wheeled  -     Row Name 02/03/21 1048          Gait/Stairs (Locomotion)    [place] Level (Gait)  verbal cues;nonverbal cues (demo/gesture);minimum assist (75% patient effort)  -     Assistive Device (Gait)  walker, front-wheeled  -     Distance in Feet (Gait)  5 ft bed to chair  -     Deviations/Abnormal Patterns (Gait)  lina decreased;gait speed decreased;stride length decreased  -     Bilateral Gait Deviations  forward flexed posture  -     Comment (Gait/Stairs)  cues for posture  -       User Key  (r) = Recorded By, (t) = Taken By, (c) = Cosigned By    Initials Name Provider Type     Precious Cisneros, PT Physical Therapist        Obj/Interventions     Row Name 02/03/21 1049          Range of Motion Comprehensive    Comment, General Range of Motion  B LE AROM appears WFL, some impaired movement secondary to pain and bodily habitus  -     Row Name 02/03/21 1049          Strength Comprehensive (MMT)    Comment, General Manual Muscle Testing (MMT) Assessment  generalized weakness noted with functional mobility and gait, B LE grossly 3/5  -     Row Name 02/03/21 1049          Balance    Balance Assessment  standing static balance;standing dynamic balance  -     Static Standing Balance  mild impairment  -     Dynamic Standing Balance  mild impairment  -       User Key  (r) = Recorded By, (t) = Taken By, (c) = Cosigned By    Initials Name Provider Type     Precious Cisneros PT Physical Therapist        Goals/Plan     Row Name 02/03/21 1054          Bed Mobility Goal 1 (PT)    Activity/Assistive Device (Bed  Mobility Goal 1, PT)  bed mobility activities, all  -CH     [place] Level/Cues Needed (Bed Mobility Goal 1, PT)  contact guard assist  -CH     Time Frame (Bed Mobility Goal 1, PT)  1 week  -     Row Name 02/03/21 1054          Transfer Goal 1 (PT)    Activity/Assistive Device (Transfer Goal 1, PT)  transfers, all;walker, rolling  -CH     [place] Level/Cues Needed (Transfer Goal 1, PT)  contact guard assist  -CH     Time Frame (Transfer Goal 1, PT)  1 week  -     Row Name 02/03/21 1054          Gait Training Goal 1 (PT)    Activity/Assistive Device (Gait Training Goal 1, PT)  gait (walking locomotion);walker, rolling  -CH     [place] Level (Gait Training Goal 1, PT)  contact guard assist  -CH     Distance (Gait Training Goal 1, PT)  100  -CH     Time Frame (Gait Training Goal 1, PT)  1 week  -       User Key  (r) = Recorded By, (t) = Taken By, (c) = Cosigned By    Initials Name Provider Type     Precious Cisneros, PT Physical Therapist        Clinical Impression     Row Name 02/03/21 1051          Pain    Additional Documentation  Pain Scale: FACES Pre/Post-Treatment (Group)  -[facility] Name 02/03/21 1051          Pain Scale: Numbers Pre/Post-Treatment    Pain Intervention(s)  Repositioned  -[facility] Name 02/03/21 1051          Pain Scale: FACES Pre/Post-Treatment    Pain: FACES Scale, Pretreatment  2-->hurts little bit  -CH     Posttreatment Pain Rating  6-->hurts even more  -     Pain Location - Side  Left  -     Pain Location - Orientation  lower  -     Pain Location  extremity  -     Row Name 02/03/21 1051          Plan of Care Review    Plan of Care Reviewed With  patient  -     Outcome Summary  Pt is a 76 yo F who was admitted with chest pain, SOA, and CHF. Pt presents to PT with impaired functional mobility and gait secondary to generalized weakness, pain, impaired balance, and decreased activity tolerance. Pt may benefit from skilled PT to address strength, mobility, and  gait.  -     Row Name 02/03/21 1051          Therapy Assessment/Plan (PT)    Patient/Family Therapy Goals Statement (PT)  to improve mobility  -     Rehab Potential (PT)  good, to achieve stated therapy goals  -     Criteria for Skilled Interventions Met (PT)  skilled treatment is necessary  -     Row Name 02/03/21 1051          Positioning and Restraints    Pre-Treatment Position  in bed  -CH     Post Treatment Position  chair  -     In Chair  reclined;call light within reach;encouraged to call for assist;notified nsg  -       User Key  (r) = Recorded By, (t) = Taken By, (c) = Cosigned By    Initials Name Provider Type     Precious Cisneros, PT Physical Therapist        Outcome Measures     Row Name 02/03/21 1055          How much help from another person do you currently need...    Turning from your back to your side while in flat bed without using bedrails?  2  -CH     Moving from lying on back to sitting on the side of a flat bed without bedrails?  2  -CH     Moving to and from a bed to a chair (including a wheelchair)?  3  -CH     Standing up from a chair using your arms (e.g., wheelchair, bedside chair)?  2  -CH     Climbing 3-5 steps with a railing?  1  -CH     To walk in hospital room?  1  -     AM-PAC 6 Clicks Score (PT)  11  -     Row Name 02/03/21 1055          Functional Assessment    Outcome Measure Options  AM-PAC 6 Clicks Basic Mobility (PT)  -       User Key  (r) = Recorded By, (t) = Taken By, (c) = Cosigned By    Initials Name Provider Type     Precious Cisneros, PT Physical Therapist        Physical Therapy Education                 Title: PT OT SLP Therapies (In Progress)     Topic: Physical Therapy (In Progress)     Point: Mobility training (Done)     Learning Progress Summary           Patient Acceptance, E,TB,D, VU,NR by  at 2/3/2021 1055                   Point: Home exercise program (Not Started)     Learner Progress:  Not documented in this visit.          Point:  Body mechanics (Done)     Learning Progress Summary           Patient Acceptance, E,TB,D, VU,NR by  at 2/3/2021 1055                   Point: Precautions (Done)     Learning Progress Summary           Patient Acceptance, E,TB,D, VU,NR by  at 2/3/2021 1055                               User Key     Initials Effective Dates Name Provider Type Discipline     04/03/18 -  Precious Cisneros PT Physical Therapist PT              PT Recommendation and Plan  Planned Therapy Interventions (PT): balance training, bed mobility training, gait training, home exercise program, patient/family education, strengthening, transfer training  Plan of Care Reviewed With: patient  Outcome Summary: Pt is a 74 yo F who was admitted with chest pain, SOA, and CHF. Pt presents to PT with impaired functional mobility and gait secondary to generalized weakness, pain, impaired balance, and decreased activity tolerance. Pt may benefit from skilled PT to address strength, mobility, and gait.     Time Calculation:   PT Charges     Row Name 02/03/21 1056             Time Calculation    Start Time  1004  -      Stop Time  1021  -      Time Calculation (min)  17 min  -      PT Received On  02/03/21  -      PT - Next Appointment  02/04/21  -      PT Goal Re-Cert Due Date  02/10/21  -         Time Calculation- PT    Total Timed Code Minutes- PT  10 minute(s)  -        User Key  (r) = Recorded By, (t) = Taken By, (c) = Cosigned By    Initials Name Provider Type     Precious Cisneros PT Physical Therapist        Therapy Charges for Today     Code Description Service Date Service Provider Modifiers Qty    [id number]  PT EVAL MOD COMPLEXITY 2 2/3/2021 Precious Cisneros, PT GP 1    [id number]  PT THERAPEUTIC ACT EA 15 MIN 2/3/2021 Precious Cisneros, PT GP 1          PT G-Codes  Outcome Measure Options: AM-PAC 6 Clicks Basic Mobility (PT)  AM-PAC 6 Clicks Score (PT): 11  AM-PAC 6 Clicks Score (OT): 12    Precious Cisneros  PT  2/3/2021

## 2021-02-03 NOTE — PROGRESS NOTES
NEPHROLOGY PROGRESS NOTE    PATIENT IDENTIFICATION:   Name:  Miguelina Lopez      MRN:  [id number]     75 y.o.  female             Reason for visit: OLI    SUBJECTIVE:     Seen and examined. On bumex drip. Breathing easier. No ne libertysudario        OBJECTIVE:  Vitals:    02/03/21 0109 02/03/21 0337 02/03/21 0423 02/03/21 0530   BP: 159/80 166/75  145/66   BP Location:  Left arm  Right arm   Patient Position:  Lying  Lying   Pulse:  85  80   Resp:  18  17   Temp:  97.9 °F (36.6 °C)  97.9 °F (36.6 °C)   TempSrc:  Oral  Oral   SpO2: 95% 93% 96% 95%   Weight:       Height:               Body mass index is 52.73 kg/m².    Intake/Output Summary (Last 24 hours) at 2/3/2021 0749  Last data filed at 2/3/2021 0337  Gross per 24 hour   Intake 655 ml   Output 2800 ml   Net -2145 ml         Exam:  GEN:  No distress, appears stated age  EYES:   Anicteric sclera  ENT:    External ears/nose normal, MM are moist  NECK:  No adenopathy, JVP none  LUNGS: Normal chest on inspection; not labored  CV:  Normal S1S2, without murmur  ABD:  Non-tender, non-distended, no hepatosplenomegaly, +BS  EXT:  ++ edema; no cyanosis; clubbing    Scheduled meds:  amLODIPine, 5 mg, Oral, Q24H  aspirin EC, 325 mg, Oral, Daily  atorvastatin, 20 mg, Oral, Daily  budesonide-formoterol, 2 puff, Inhalation, BID - RT  clotrimazole-betamethasone, , Topical, BID  fluticasone, 2 spray, Each Nare, Daily  gabapentin, 100 mg, Oral, Q12H  insulin lispro, 0-7 Units, Subcutaneous, TID AC  lactobacillus acidophilus, 1 capsule, Oral, Daily  levothyroxine, 25 mcg, Oral, Daily  nystatin, , Topical, BID  pantoprazole, 40 mg, Oral, QAM  sennosides-docusate, 1 tablet, Oral, Daily  sodium chloride, 10 mL, Intravenous, Q12H  sodium chloride, 3 mL, Intravenous, Q12H  vilazodone, 20 mg, Oral, Nightly      IV meds:                      bumetanide, 1 mg/hr, Last Rate: 1 mg/hr (02/03/21 0110)        Data Review:    Results from last 7 days   Lab Units 02/03/21  0419 02/02/21  0751  02/01/21  0551 02/01/21  0339   SODIUM mmol/L 145 145 146* 145   POTASSIUM mmol/L 3.2* 3.9 3.9 4.4   CHLORIDE mmol/L 99 101 103 102   CO2 mmol/L 31.7* 31.3* 33.4* 32.3*   BUN mg/dL 21 20 19 20   CREATININE mg/dL 1.13* 1.43* 1.42* 1.38*   CALCIUM mg/dL 8.9 9.0 8.9 8.5*   BILIRUBIN mg/dL  --   --   --  0.6   ALK PHOS U/L  --   --   --  259*   ALT (SGPT) U/L  --   --   --  14   AST (SGOT) U/L  --   --   --  23   GLUCOSE mg/dL 77 142* 112* 128*       Estimated Creatinine Clearance: 50.9 mL/min (A) (by C-G formula based on SCr of 1.13 mg/dL (H)).    Results from last 7 days   Lab Units 02/03/21 0419 02/01/21  0339   MAGNESIUM mg/dL 2.1 2.0   PHOSPHORUS mg/dL 3.5  --        Results from last 7 days   Lab Units 02/03/21  0419 02/01/21  0829 02/01/21  0339   WBC 10*3/mm3 12.59* 11.57* 11.42*   HEMOGLOBIN g/dL 12.2 11.6* 11.7*   PLATELETS 10*3/mm3 247 226 253                   ASSESSMENT:     Chest pain    CKD (chronic kidney disease) stage 3, GFR 30-59 ml/min (CMS/HCC)    Diabetic peripheral neuropathy (CMS/HCC)    Hyperlipidemia    OCHOA (obstructive sleep apnea)    DM type 2 (diabetes mellitus, type 2) (CMS/[facility])    Essential hypertension    Pulmonary hypertension due to sleep-disordered breathing (CMS/[facility])    s/p MVR, TV-repair, CABG x2 6/13/16    Supplemental oxygen dependent    Chronic diastolic heart failure (CMS/HCC)    Chronic respiratory failure with hypoxia and hypercapnia (CMS/HCC)    COPD (chronic obstructive pulmonary disease) (CMS/HCC)    Complete heart block (CMS/HCC)      - OIL on CKD3:  Creatinine slightly above her baseline 1.4 mg/dL  on admission but patient with significant volume excess.    Him down to 1.1 mg/dL      - Acute on chronic diastolic heart failure with moderate right pulmonary HTN  Valvular heart disease: moderate AS, mild to moderate TR, mitral valve replacement, regurg.      - Acute on chronic respiratory failure:   - Anemia, iron deficiency.   - HTN, not controlled: Blood pressure was  extremely elevated on admission.  Better now  - Lymphedema   - Morbid obesity     PLAN:    Continue Bumex drip but will go up to 2 mg/h and give her 1 dose of metolazone  Continue with the fluid restriction  Replete potassium and recheck in the afternoon  Discussed with Dr. Luna  Surveillance labs      Jhonathan Mitchell MD  2/3/2021    07:49 EST

## 2021-02-03 NOTE — PLAN OF CARE
Goal Outcome Evaluation:  Plan of Care Reviewed With: patient     Outcome Summary: OT Lymph:  Pt. was known to this therapist from Lymph treatment in 2016.  Pt. stated hr daughter has wrapped LEs and then were last wrapped 10 days ago.  Pt. presents with Mod edema in the (R) and Mod/severe in the (L).  Pt. has pain in (B)LE but greater in (L) than (R)..  Therapist applied LE lymph wraps from the base of the toes to her knees (B)ly.  Pt. will benfit from skilled OT to apply LE lymph wraps Mon - Fri, RN to address on Sat and Sun.  Pt. was not able to wear a mask due to SOA with O2 on, OT wore a mask, and eye protection and complete hand hygiene before and after the treatment .

## 2021-02-03 NOTE — PROGRESS NOTES
[facility]      Mar/chart reviewed  Follow up chronic hypercapnic respiratory failure, chronic hypoxemic respiratory failure, copd, vladimir/OHV  Less short of breath  S/p pacer implantation    Vital Sign Min/Max for last 24 hours  Temp  Min: 97.4 °F (36.3 °C)  Max: 97.9 °F (36.6 °C)   BP  Min: 124/58  Max: 187/84   Pulse  Min: 79  Max: 85   Resp  Min: 16  Max: 32   SpO2  Min: 92 %  Max: 100 %   Flow (L/min)  Min: 2  Max: 6   No data recorded   655/3750  Nad, axox3,   perrl, eomi, no icterus,  mmm, no jvd, trachea midline, neck supple,  chest decreased bilaterally, no crackles, no wheezes,   Wolf, irrg   soft, nt, nd +bs,  no c/c/ 1+ edema  Skin warm, dry no rashes    Labs: 2/3: reviewed:  Wbc 12.6  hgb 12.2  plts 247  Glucose 77  Bun 21  Cr 1.13  Na 145  k 3.2  Bicarb 31.7    A/P:  1. Acute on chronic diastolic chf -- as per cards/nephrology  2. Pulmonary hypertension group II/III   3. COPD -- stable-- continue bronchodilators  4. OHV -- patient using her home NIPPV machine  5. VLADIMIR  6. Morbid obesity  7. Nasal congestion  8. CKD  9. Chronic hypoxemic respiratory failure  10. Heart block -- as per cards    improving

## 2021-02-03 NOTE — PLAN OF CARE
Goal Outcome Evaluation:   Paced rhythm on monitor, SBP 140s to 170. Hydralazine given per prn order and informed on-call MD. On Bumex drip .

## 2021-02-04 ENCOUNTER — TELEPHONE (OUTPATIENT)
Dept: FAMILY MEDICINE CLINIC | Facility: CLINIC | Age: 76
End: 2021-02-04

## 2021-02-04 LAB
ALBUMIN SERPL-MCNC: 3.1 G/DL (ref 3.5–5.2)
ALBUMIN/GLOB SERPL: 1 G/DL
ALP SERPL-CCNC: 216 U/L (ref 39–117)
ALT SERPL W P-5'-P-CCNC: 12 U/L (ref 1–33)
ANION GAP SERPL CALCULATED.3IONS-SCNC: 13.9 MMOL/L (ref 5–15)
AST SERPL-CCNC: 28 U/L (ref 1–32)
BACTERIA SPEC AEROBE CULT: ABNORMAL
BILIRUB SERPL-MCNC: 0.7 MG/DL (ref 0–1.2)
BUN SERPL-MCNC: 20 MG/DL (ref 8–23)
BUN/CREAT SERPL: 14.1 (ref 7–25)
CALCIUM SPEC-SCNC: 8.3 MG/DL (ref 8.6–10.5)
CHLORIDE SERPL-SCNC: 100 MMOL/L (ref 98–107)
CO2 SERPL-SCNC: 31.1 MMOL/L (ref 22–29)
CREAT SERPL-MCNC: 1.42 MG/DL (ref 0.57–1)
DEPRECATED RDW RBC AUTO: 47.3 FL (ref 37–54)
ERYTHROCYTE [DISTWIDTH] IN BLOOD BY AUTOMATED COUNT: 15.7 % (ref 12.3–15.4)
GFR SERPL CREATININE-BSD FRML MDRD: 36 ML/MIN/1.73
GLOBULIN UR ELPH-MCNC: 3 GM/DL
GLUCOSE BLDC GLUCOMTR-MCNC: 125 MG/DL (ref 70–130)
GLUCOSE BLDC GLUCOMTR-MCNC: 150 MG/DL (ref 70–130)
GLUCOSE BLDC GLUCOMTR-MCNC: 165 MG/DL (ref 70–130)
GLUCOSE BLDC GLUCOMTR-MCNC: 172 MG/DL (ref 70–130)
GLUCOSE SERPL-MCNC: 133 MG/DL (ref 65–99)
HCT VFR BLD AUTO: 35.5 % (ref 34–46.6)
HGB BLD-MCNC: 11.1 G/DL (ref 12–15.9)
MCH RBC QN AUTO: 26.3 PG (ref 26.6–33)
MCHC RBC AUTO-ENTMCNC: 31.3 G/DL (ref 31.5–35.7)
MCV RBC AUTO: 84.1 FL (ref 79–97)
PLATELET # BLD AUTO: 226 10*3/MM3 (ref 140–450)
PMV BLD AUTO: 11.1 FL (ref 6–12)
POTASSIUM SERPL-SCNC: 3.7 MMOL/L (ref 3.5–5.2)
PROT SERPL-MCNC: 6.1 G/DL (ref 6–8.5)
QT INTERVAL: 470 MS
RBC # BLD AUTO: 4.22 10*6/MM3 (ref 3.77–5.28)
SODIUM SERPL-SCNC: 145 MMOL/L (ref 136–145)
TROPONIN T SERPL-MCNC: 0.1 NG/ML (ref 0–0.03)
WBC # BLD AUTO: 11.44 10*3/MM3 (ref 3.4–10.8)

## 2021-02-04 PROCEDURE — 97140 MANUAL THERAPY 1/> REGIONS: CPT

## 2021-02-04 PROCEDURE — 97110 THERAPEUTIC EXERCISES: CPT

## 2021-02-04 PROCEDURE — 99233 SBSQ HOSP IP/OBS HIGH 50: CPT | Performed by: INTERNAL MEDICINE

## 2021-02-04 PROCEDURE — 94799 UNLISTED PULMONARY SVC/PX: CPT

## 2021-02-04 PROCEDURE — 93005 ELECTROCARDIOGRAM TRACING: CPT | Performed by: INTERNAL MEDICINE

## 2021-02-04 PROCEDURE — 93010 ELECTROCARDIOGRAM REPORT: CPT | Performed by: INTERNAL MEDICINE

## 2021-02-04 PROCEDURE — 97530 THERAPEUTIC ACTIVITIES: CPT

## 2021-02-04 PROCEDURE — 85027 COMPLETE CBC AUTOMATED: CPT | Performed by: NURSE PRACTITIONER

## 2021-02-04 PROCEDURE — 82962 GLUCOSE BLOOD TEST: CPT

## 2021-02-04 PROCEDURE — 80053 COMPREHEN METABOLIC PANEL: CPT | Performed by: NURSE PRACTITIONER

## 2021-02-04 PROCEDURE — 25010000002 CEFTRIAXONE PER 250 MG: Performed by: NURSE PRACTITIONER

## 2021-02-04 PROCEDURE — 63710000001 INSULIN LISPRO (HUMAN) PER 5 UNITS: Performed by: NURSE PRACTITIONER

## 2021-02-04 PROCEDURE — 84484 ASSAY OF TROPONIN QUANT: CPT | Performed by: INTERNAL MEDICINE

## 2021-02-04 RX ADMIN — APIXABAN 5 MG: 5 TABLET, FILM COATED ORAL at 21:14

## 2021-02-04 RX ADMIN — PANTOPRAZOLE SODIUM 40 MG: 40 TABLET, DELAYED RELEASE ORAL at 05:42

## 2021-02-04 RX ADMIN — ATORVASTATIN CALCIUM 20 MG: 20 TABLET, FILM COATED ORAL at 09:04

## 2021-02-04 RX ADMIN — SODIUM CHLORIDE, PRESERVATIVE FREE 10 ML: 5 INJECTION INTRAVENOUS at 11:23

## 2021-02-04 RX ADMIN — CARVEDILOL 6.25 MG: 6.25 TABLET, FILM COATED ORAL at 09:03

## 2021-02-04 RX ADMIN — SODIUM CHLORIDE, PRESERVATIVE FREE 10 ML: 5 INJECTION INTRAVENOUS at 21:51

## 2021-02-04 RX ADMIN — POTASSIUM CHLORIDE 40 MEQ: 750 TABLET, EXTENDED RELEASE ORAL at 14:13

## 2021-02-04 RX ADMIN — BUDESONIDE AND FORMOTEROL FUMARATE DIHYDRATE 2 PUFF: 160; 4.5 AEROSOL RESPIRATORY (INHALATION) at 19:29

## 2021-02-04 RX ADMIN — CLOTRIMAZOLE AND BETAMETHASONE DIPROPIONATE: 10; .5 CREAM TOPICAL at 09:01

## 2021-02-04 RX ADMIN — VILAZODONE HYDROCHLORIDE 20 MG: 40 TABLET ORAL at 21:14

## 2021-02-04 RX ADMIN — APIXABAN 5 MG: 5 TABLET, FILM COATED ORAL at 09:03

## 2021-02-04 RX ADMIN — BUDESONIDE AND FORMOTEROL FUMARATE DIHYDRATE 2 PUFF: 160; 4.5 AEROSOL RESPIRATORY (INHALATION) at 08:07

## 2021-02-04 RX ADMIN — INSULIN LISPRO 2 UNITS: 100 INJECTION, SOLUTION INTRAVENOUS; SUBCUTANEOUS at 17:33

## 2021-02-04 RX ADMIN — CARVEDILOL 6.25 MG: 6.25 TABLET, FILM COATED ORAL at 17:33

## 2021-02-04 RX ADMIN — LEVOTHYROXINE SODIUM 25 MCG: 0.03 TABLET ORAL at 05:42

## 2021-02-04 RX ADMIN — HYDROCODONE BITARTRATE AND ACETAMINOPHEN 1 TABLET: 5; 325 TABLET ORAL at 06:12

## 2021-02-04 RX ADMIN — METOLAZONE 5 MG: 5 TABLET ORAL at 09:03

## 2021-02-04 RX ADMIN — BUMETANIDE 2 MG/HR: 0.25 INJECTION INTRAMUSCULAR; INTRAVENOUS at 01:54

## 2021-02-04 RX ADMIN — GABAPENTIN 100 MG: 100 CAPSULE ORAL at 09:04

## 2021-02-04 RX ADMIN — ALPRAZOLAM 1 MG: 0.5 TABLET ORAL at 21:14

## 2021-02-04 RX ADMIN — SODIUM CHLORIDE, PRESERVATIVE FREE 3 ML: 5 INJECTION INTRAVENOUS at 21:51

## 2021-02-04 RX ADMIN — BUMETANIDE 2 MG/HR: 0.25 INJECTION INTRAMUSCULAR; INTRAVENOUS at 09:05

## 2021-02-04 RX ADMIN — BUMETANIDE 2 MG/HR: 0.25 INJECTION INTRAMUSCULAR; INTRAVENOUS at 23:44

## 2021-02-04 RX ADMIN — SODIUM CHLORIDE, PRESERVATIVE FREE 3 ML: 5 INJECTION INTRAVENOUS at 11:23

## 2021-02-04 RX ADMIN — HYDROCODONE BITARTRATE AND ACETAMINOPHEN 1 TABLET: 5; 325 TABLET ORAL at 14:12

## 2021-02-04 RX ADMIN — BUMETANIDE 2 MG/HR: 0.25 INJECTION INTRAMUSCULAR; INTRAVENOUS at 17:34

## 2021-02-04 RX ADMIN — Medication 1 CAPSULE: at 09:02

## 2021-02-04 RX ADMIN — AMLODIPINE BESYLATE 5 MG: 5 TABLET ORAL at 09:03

## 2021-02-04 RX ADMIN — GABAPENTIN 100 MG: 100 CAPSULE ORAL at 21:14

## 2021-02-04 RX ADMIN — NYSTATIN: 100000 CREAM TOPICAL at 09:02

## 2021-02-04 RX ADMIN — FLUTICASONE PROPIONATE 2 SPRAY: 50 SPRAY, METERED NASAL at 09:01

## 2021-02-04 RX ADMIN — CEFTRIAXONE SODIUM 1 G: 1 INJECTION, SOLUTION INTRAVENOUS at 14:12

## 2021-02-04 RX ADMIN — ALPRAZOLAM 1 MG: 0.5 TABLET ORAL at 09:28

## 2021-02-04 RX ADMIN — DOCUSATE SODIUM 50MG AND SENNOSIDES 8.6MG 1 TABLET: 8.6; 5 TABLET, FILM COATED ORAL at 09:04

## 2021-02-04 RX ADMIN — HYDROCODONE BITARTRATE AND ACETAMINOPHEN 1 TABLET: 5; 325 TABLET ORAL at 19:39

## 2021-02-04 NOTE — PROGRESS NOTES
[facility]      Mar/chart reviewed  Follow up chronic hypercapnic respiratory failure, chronic hypoxemic respiratory failure, copd, vladimir/OHV  Less short of breath    Vital Sign Min/Max for last 24 hours  Temp  Min: 97.9 °F (36.6 °C)  Max: 98.7 °F (37.1 °C)   BP  Min: 143/70  Max: 166/80   Pulse  Min: 79  Max: 82   Resp  Min: 16  Max: 18   SpO2  Min: 90 %  Max: 97 %   Flow (L/min)  Min: 2  Max: 3   No data recorded   590/3750  Nad, axox3,   perrl, eomi, no icterus,  mmm, no jvd, trachea midline, neck supple,  chest decreased bilaterally, no crackles, no wheezes,   rrr   soft, nt, nd +bs,  no c/c/ 1+ edema  Skin warm, dry no rashes    Labs: 2/4: reviewed:  Wbc 11  hgb 11  plts 226  Trop 0.1  Cr 1.42  Bicarb 31      A/P:  1. Acute on chronic diastolic chf -- as per cards/nephrology  2. Pulmonary hypertension group II/III   3. COPD -- stable-- continue bronchodilators  4. OHV -- patient using her home NIPPV machine  5. VLADIMIR  6. Morbid obesity  7. Nasal congestion  8. CKD  9. Chronic hypoxemic respiratory failure  10. Heart block -- as per cards    Ongoing management as per Cards/nephrology

## 2021-02-04 NOTE — THERAPY TREATMENT NOTE
Acute Care - Occupational Therapy Treatment Note  [facility]     Patient Name: Miguelina Lopez  : 1945  MRN: [id number]  Today's Date: 2021             Admit Date: 2021       ICD-10-CM ICD-9-CM   1. Chest pain, unspecified type  R07.9 786.50   2. COPD exacerbation (CMS/HCC)  J44.1 491.21   3. Congestive heart failure, unspecified HF chronicity, unspecified heart failure type (CMS/HCC)  I50.9 428.0   4. Hypertension, unspecified type  I10 401.9   5. Abnormal chest x-ray  R93.89 793.2   6. Complete heart block (CMS/HCC)  I44.2 426.0     Patient Active Problem List   Diagnosis   • Anxiety disorder   • Arthritis of knee   • Asthma   • Chronic coronary artery disease   • CKD (chronic kidney disease) stage 3, GFR 30-59 ml/min (CMS/HCC)   • Depression   • Diabetic peripheral neuropathy (CMS/HCC)   • Gastroesophageal reflux disease   • Hyperlipidemia   • Insomnia   • Lower gastrointestinal hemorrhage   • Anemia   • OCHOA (obstructive sleep apnea)   • DM type 2 (diabetes mellitus, type 2) (CMS/HCC)   • Essential hypertension   • Hospital discharge follow-up   • Pulmonary hypertension due to sleep-disordered breathing (CMS/HCC)   • s/p MVR, TV-repair, CABG x2 16   • OLI (acute kidney injury) (CMS/HCC)   • Leukocytosis   • Atrial fibrillation (CMS/HCC)   • Nocturnal hypoxia   • Dermatitis   • Medicare annual wellness visit, initial   • Class 3 severe obesity due to excess calories with serious comorbidity and body mass index (BMI) of 50.0 to 59.9 in adult (CMS/[facility])   • Supplemental oxygen dependent   • Acute on chronic combined systolic and diastolic HF (heart failure) (CMS/[facility])   • Mitral regurgitation   • Aortic stenosis   • Medicare annual wellness visit, subsequent   • Localized edema   • Chronic right-sided congestive heart failure (CMS/[facility])   • Cellulitis of left lower extremity   • Proteinuria   • Bilateral lower extremity edema   • Subclinical hypothyroidism   • Chronic diastolic heart failure  (CMS/[facility])   • Chronic respiratory failure with hypoxia and hypercapnia (CMS/HCC)   • Acute on chronic respiratory failure with hypoxia and hypercapnia (CMS/[facility])   • AV block, 2nd degree   • 1st degree AV block   • Chest pain   • COPD (chronic obstructive pulmonary disease) (CMS/[facility])   • Complete heart block (CMS/HCC)   • UTI (urinary tract infection), bacterial     Past Medical History:   Diagnosis Date   • Acute on chronic respiratory failure with hypoxia and hypercapnia (CMS/HCC)    • OLI (acute kidney injury) (CMS/HCC)    • Anemia    • Anxiety    • Aortic valve stenosis    • Bilateral lower extremity edema    • CHF (congestive heart failure) (CMS/HCC)    • Chronic coronary artery disease    • Class 3 severe obesity due to excess calories in adult (CMS/HCC)    • COPD (chronic obstructive pulmonary disease) (CMS/HCC)    • Depression    • Diabetes mellitus (CMS/HCC)    • Elevated cholesterol    • GERD (gastroesophageal reflux disease)    • Heart murmur    • Hypertension    • Mitral valve insufficiency    • Pneumonia     1/2016   • Pulmonary hypertension (CMS/HCC)     due to sleep disordered breathing   • Sleep apnea     Uses CPAP or oxygen   • Stage 3 chronic kidney disease (CMS/HCC)    • Subclinical hypothyroidism    • Supplemental oxygen dependent    • Valvular heart disease      Past Surgical History:   Procedure Laterality Date   • CARDIAC CATHETERIZATION     • CARDIAC CATHETERIZATION N/A 6/10/2016    Procedure: Left Heart Cath;  Surgeon: Chace Johnson MD;  Location:  INVASIVE LOCATION;  Service:    • CARDIAC CATHETERIZATION N/A 6/10/2016    Procedure: Right Heart Cath;  Surgeon: Chace Johnson MD;  Location: [facility] CATH INVASIVE LOCATION;  Service:    • CARDIAC ELECTROPHYSIOLOGY PROCEDURE N/A 2/2/2021    Procedure: Pacemaker DC new---[employer] MICRA;  Surgeon: Eliazar Bond MD;  Location: [facility] CATH INVASIVE LOCATION;  Service: Cardiology;  Laterality: N/A;   • CARDIAC SURGERY     •  CORONARY ARTERY BYPASS GRAFT      2 vessel   • CORONARY ARTERY BYPASS GRAFT WITH MITRAL VALVE REPAIR/REPLACEMENT N/A 6/13/2016    Procedure: INTRAOPERATIVE TARIQ, MIDLINE STERNOTOMY, CORONARY ARTERY BYPASS GRAFTING X  2 UTILIZING ENDOSCOPICALLY HARVESTED LEFT GREATER SAPHENOUS VEIN, MITRAL VALVE REPLACEMENT AND TRICUSPID VALVE REPAIR;  Surgeon: Eliecer Mistry MD;  Location: [facility];  Service:    • CORONARY STENT PLACEMENT  2010    Approx. 6 yrs ago at [facility]   • HEMORRHOIDECTOMY     • HYSTERECTOMY     • MITRAL VALVE REPLACEMENT     • REPLACEMENT TOTAL KNEE Right    • THYROID SURGERY      Cyst removed from thyroid   • VASCULAR SURGERY         Lymphedema     Row Name 02/04/21 1600 02/03/21 1400          Lymphedema Assessment    Lymphedema Classification  RLE:;LLE:  -VS  RLE:;LLE:  -VS     Recorded by [VS] Ericka Bishop OTR [VS] Ericka Bishop OTR            Lymphedema Edema Assessment    Ptting Edema Category  --  -- Mod with (R) and ModSevere with (L)   -VS     Pitting Edema  Moderate;Severe (L) greater than (R)  -VS  --     Recorded by [VS] Ericka Bishop OTR [VS] Ericka Bishop OTR            Skin Changes/Observations    Skin Observations Comment  Small abrasion on the back of the (R) calf RN applied Mepliex to this area  -VS  --     Recorded by [VS] Ericka Bishop OTR             Compression/Skin Care    Compression/Skin Care  skin care;wrapping location;remove bandages  -VS  skin care;wrapping location;bandaging;remove bandages  -VS     Skin Care  washed/dried;lotion applied  -VS  washed/dried;lotion applied  -VS     Wrapping Location  lower extremity  -VS  lower extremity  -VS     Wrapping Location LE  bilateral:;foot to knee  -VS  bilateral:;foot to knee  -VS     Wrapping Comments  --  Both pain and edema greater in the (L)   -VS     Bandage Layers  cotton liner;padding/fluff layer;short-stretch bandages (comment size/quantity) 1 x #6 and 1 x #8   -VS  cotton liner;padding/fluff layer  "K1 stockinette, #10 artiflex  -VS     Bandaging Comments  --  Bandages #6 & #8 toes to knee   -VS     Bandaging Technique  light compression  -VS  light compression  -VS     Recorded by [VS] Ericka Bishop OTR [VS] Ericka Bishop OTR       User Key  (r) = Recorded By, (t) = Taken By, (c) = Cosigned By    Initials Name Effective Dates    VS Ericka Bishop OTR 03/02/20 -            OT ASSESSMENT FLOWSHEET (last 12 hours)      OT Evaluation and Treatment     Row Name 02/04/21 1600                   OT Time and Intention    Subjective Information  complains of;weakness;fatigue;pain  -VS        Document Type  therapy note (daily note)  -VS        Mode of Treatment  occupational therapy  -VS        Patient Effort  adequate  -VS        Symptoms Noted During/After Treatment  increased pain  -VS        Comment  \"My legs hurt but they feel better after they are wrapped\" pt. stated   -VS           General Information    General Observations of Patient  Pt. was sitting in chair reclined beside the bed   -VS        Existing Precautions/Restrictions  fall;oxygen therapy device and L/min  -VS        Barriers to Rehab  medically complex;previous functional deficit  -VS           Cognition    Orientation Status (Cognition)  oriented x 3  -VS        Follows Commands (Cognition)  WFL  -VS           Pain Assessment    Additional Documentation  Pain Scale: Numbers Pre/Post-Treatment (Group)  -VS           Pain Scale: Numbers Pre/Post-Treatment    Pretreatment Pain Rating  5/10  -VS        Posttreatment Pain Rating  5/10  -VS        Pain Location - Side  Left  -VS        Pain Location - Orientation  lower  -VS        Pain Location  extremity  -VS        Pain Intervention(s)  Repositioned  -VS           Activities of Daily Living    BADL Assessment/Intervention  bathing;lower body dressing  -VS           Bathing Assessment/Intervention    Comment (Bathing)  Therapist bathed dried and applied lotion   -VS           Lower Body " Dressing Assessment/Training    Comment (Lower Body Dressing)  doff/lore non slip socks   -VS           Plan of Care Review    Plan of Care Reviewed With  patient  -VS        Outcome Summary  OT Lymph:  Pt. is O x 3.  PT. tolerated the LE wraps.  A  small area was noted on the back of the (R) calf when the bandages were doffed, RN applied Mepilex to this area. Pt's edema continues to me mod/severe, (L) greater than (R).  Therrapist rewrapped (B)LE toes to knees.  OT will wrap the LEs Monday - riday and RNs will address on Sat and Sun.  Pt. continues to [facility] for OT.  Therapist wore a mask, eye protections and complete hand hygiene before and after the treatment.  Pt. was not able to tolerate wearing a mask, O2 in palce.    -VS           Positioning and Restraints    Pre-Treatment Position  sitting in chair/recliner  -VS        Post Treatment Position  chair  -VS        In Chair  notified nsg;sitting;call light within reach;encouraged to call for assist  -VS          User Key  (r) = Recorded By, (t) = Taken By, (c) = Cosigned By    Initials Name Effective Dates    VS Ericka Bishop, OTR 03/02/20 -          Occupational Therapy Education                 Title: PT OT SLP Therapies (Done)     Topic: Occupational Therapy (Done)     Point: ADL training (Done)     Description:   Instruct learner(s) on proper safety adaptation and remediation techniques during self care or transfers.   Instruct in proper use of assistive devices.              Learning Progress Summary           Patient Acceptance, E,D,H, VU by VS at 2/3/2021 1421    Comment: Lymphedema bandages wearing precaution, written/verbal  instruction and demonstration on wrapping LEs    Acceptance, E,D, VU by LE at 2/1/2021 1550    Comment: role of OT, plan of care, body mechanics                   Point: Precautions (Done)     Description:   Instruct learner(s) on prescribed precautions during self-care and functional transfers.              Learning Progress  Summary           Patient Acceptance, E,D,H, VU by VS at 2/3/2021 1421    Comment: Lymphedema bandages wearing precaution, written/verbal  instruction and demonstration on wrapping LEs    Acceptance, E,D, VU by PATRICIO at 2/1/2021 1550    Comment: role of OT, plan of care, body mechanics                   Point: Body mechanics (Done)     Description:   Instruct learner(s) on proper positioning and spine alignment during self-care, functional mobility activities and/or exercises.              Learning Progress Summary           Patient Acceptance, E,D,H, VU by VS at 2/3/2021 1421    Comment: Lymphedema bandages wearing precaution, written/verbal  instruction and demonstration on wrapping LEs    Acceptance, E,D, VU by PATRICIO at 2/1/2021 1550    Comment: role of OT, plan of care, body mechanics                               User Key     Initials Effective Dates Name Provider Type Discipline    PATRICIO 06/08/18 -  Solange Dawkins OTR Occupational Therapist OT    VS 03/02/20 -  Ericka Bishop OTFRANKIE Occupational Therapist OT                  OT Recommendation and Plan     Plan of Care Review  Plan of Care Reviewed With: patient  Outcome Summary: OT Lymph:  Pt. is O x 3.  PT. tolerated the LE wraps.  A  small area was noted on the back of the (R) calf when the bandages were doffed, RN applied Mepilex to this area. Pt's edema continues to me mod/severe, (L) greater than (R).  Therrapist rewrapped (B)LE toes to knees.  OT will wrap the LEs Monday - riday and RNs will address on Sat and Sun.  Pt. continues to [facility] for OT.  Therapist wore a mask, eye protections and complete hand hygiene before and after the treatment.  Pt. was not able to tolerate wearing a mask, O2 in palce.    Plan of Care Reviewed With: patient  Outcome Summary: OT Lymph:  Pt. is O x 3.  PT. tolerated the LE wraps.  A  small area was noted on the back of the (R) calf when the bandages were doffed, RN applied Mepilex to this area. Pt's edema continues to me mod/severe,  (L) greater than (R).  Therrapist rewrapped (B)LE toes to knees.  OT will wrap the LEs Monday - riday and RNs will address on Sat and Sun.  Pt. continues to [facility] for OT.  Therapist wore a mask, eye protections and complete hand hygiene before and after the treatment.  Pt. was not able to tolerate wearing a mask, O2 in palce.      Outcome Measures     Row Name 02/04/21 1600 02/03/21 1400          How much help from another is currently needed...    Putting on and taking off regular lower body clothing?  1  -VS  1  -VS     Bathing (including washing, rinsing, and drying)  2  -VS  2  -VS     Toileting (which includes using toilet bed pan or urinal)  1  -VS  1  -VS     Putting on and taking off regular upper body clothing  2  -VS  2  -VS     Taking care of personal grooming (such as brushing teeth)  3  -VS  3  -VS     Eating meals  3  -VS  3  -VS     AM-PAC 6 Clicks Score (OT)  12  -VS  12  -VS        Functional Assessment    Outcome Measure Options  AM-PAC 6 Clicks Daily Activity (OT)  -VS  AM-PAC 6 Clicks Daily Activity (OT)  -VS       User Key  (r) = Recorded By, (t) = Taken By, (c) = Cosigned By    Initials Name Provider Type    VS Ericka Bishop OTR Occupational Therapist          Time Calculation:   Time Calculation- OT     Row Name 02/04/21 1633             Time Calculation- OT    OT Start Time  1500  -VS      OT Stop Time  1535  -VS      OT Time Calculation (min)  35 min  -VS      Total Timed Code Minutes- OT  35 minute(s)  -VS      OT Received On  02/04/21  -VS      OT - Next Appointment  02/05/21  -VS        User Key  (r) = Recorded By, (t) = Taken By, (c) = Cosigned By    Initials Name Provider Type    VS Ericka Bishop OTR Occupational Therapist        Therapy Charges for Today     Code Description Service Date Service Provider Modifiers Qty    [id number] HC OT MANUAL THERAPY EA 15 MIN 2/3/2021 Ericka Bishop OTR GO 3    [id number] HC OT MANUAL THERAPY EA 15 MIN 2/4/2021 Ericka Bishop OTR  GO 2               Ericka Bishop, OTR  2/4/2021

## 2021-02-04 NOTE — THERAPY TREATMENT NOTE
Patient Name: Miguelina Lopez  : 1945    MRN: [id number]                              Today's Date: 2021       Admit Date: 2021    Visit Dx:     ICD-10-CM ICD-9-CM   1. Chest pain, unspecified type  R07.9 786.50   2. COPD exacerbation (CMS/HCC)  J44.1 491.21   3. Congestive heart failure, unspecified HF chronicity, unspecified heart failure type (CMS/HCC)  I50.9 428.0   4. Hypertension, unspecified type  I10 401.9   5. Abnormal chest x-ray  R93.89 793.2   6. Complete heart block (CMS/HCC)  I44.2 426.0     Patient Active Problem List   Diagnosis   • Anxiety disorder   • Arthritis of knee   • Asthma   • Chronic coronary artery disease   • CKD (chronic kidney disease) stage 3, GFR 30-59 ml/min (CMS/HCC)   • Depression   • Diabetic peripheral neuropathy (CMS/HCC)   • Gastroesophageal reflux disease   • Hyperlipidemia   • Insomnia   • Lower gastrointestinal hemorrhage   • Anemia   • OCHOA (obstructive sleep apnea)   • DM type 2 (diabetes mellitus, type 2) (CMS/HCC)   • Essential hypertension   • Hospital discharge follow-up   • Pulmonary hypertension due to sleep-disordered breathing (CMS/HCC)   • s/p MVR, TV-repair, CABG x2 16   • OLI (acute kidney injury) (CMS/HCC)   • Leukocytosis   • Atrial fibrillation (CMS/HCC)   • Nocturnal hypoxia   • Dermatitis   • Medicare annual wellness visit, initial   • Class 3 severe obesity due to excess calories with serious comorbidity and body mass index (BMI) of 50.0 to 59.9 in adult (CMS/[facility])   • Supplemental oxygen dependent   • Acute on chronic combined systolic and diastolic HF (heart failure) (CMS/[facility])   • Mitral regurgitation   • Aortic stenosis   • Medicare annual wellness visit, subsequent   • Localized edema   • Chronic right-sided congestive heart failure (CMS/[facility])   • Cellulitis of left lower extremity   • Proteinuria   • Bilateral lower extremity edema   • Subclinical hypothyroidism   • Chronic diastolic heart failure (CMS/[facility])   • Chronic respiratory  failure with hypoxia and hypercapnia (CMS/[facility])   • Acute on chronic respiratory failure with hypoxia and hypercapnia (CMS/[facility])   • AV block, 2nd degree   • 1st degree AV block   • Chest pain   • COPD (chronic obstructive pulmonary disease) (CMS/[facility])   • Complete heart block (CMS/[facility])   • UTI (urinary tract infection), bacterial     Past Medical History:   Diagnosis Date   • Acute on chronic respiratory failure with hypoxia and hypercapnia (CMS/[facility])    • OLI (acute kidney injury) (CMS/HCC)    • Anemia    • Anxiety    • Aortic valve stenosis    • Bilateral lower extremity edema    • CHF (congestive heart failure) (CMS/HCC)    • Chronic coronary artery disease    • Class 3 severe obesity due to excess calories in adult (CMS/HCC)    • COPD (chronic obstructive pulmonary disease) (CMS/HCC)    • Depression    • Diabetes mellitus (CMS/HCC)    • Elevated cholesterol    • GERD (gastroesophageal reflux disease)    • Heart murmur    • Hypertension    • Mitral valve insufficiency    • Pneumonia     1/2016   • Pulmonary hypertension (CMS/HCC)     due to sleep disordered breathing   • Sleep apnea     Uses CPAP or oxygen   • Stage 3 chronic kidney disease (CMS/HCC)    • Subclinical hypothyroidism    • Supplemental oxygen dependent    • Valvular heart disease      Past Surgical History:   Procedure Laterality Date   • CARDIAC CATHETERIZATION     • CARDIAC CATHETERIZATION N/A 6/10/2016    Procedure: Left Heart Cath;  Surgeon: Chace Johnson MD;  Location: [facility] CATH INVASIVE LOCATION;  Service:    • CARDIAC CATHETERIZATION N/A 6/10/2016    Procedure: Right Heart Cath;  Surgeon: Chace Johnson MD;  Location: [facility] CATH INVASIVE LOCATION;  Service:    • CARDIAC ELECTROPHYSIOLOGY PROCEDURE N/A 2/2/2021    Procedure: Pacemaker DC new---[employer] MICRA;  Surgeon: Eliazar Bond MD;  Location: [facility] CATH INVASIVE LOCATION;  Service: Cardiology;  Laterality: N/A;   • CARDIAC SURGERY     • CORONARY ARTERY BYPASS GRAFT       2 vessel   • CORONARY ARTERY BYPASS GRAFT WITH MITRAL VALVE REPAIR/REPLACEMENT N/A 6/13/2016    Procedure: INTRAOPERATIVE TARIQ, MIDLINE STERNOTOMY, CORONARY ARTERY BYPASS GRAFTING X  2 UTILIZING ENDOSCOPICALLY HARVESTED LEFT GREATER SAPHENOUS VEIN, MITRAL VALVE REPLACEMENT AND TRICUSPID VALVE REPAIR;  Surgeon: Eliecer Mistry MD;  Location: [facility];  Service:    • CORONARY STENT PLACEMENT  2010    Approx. 6 yrs ago at [facility]   • HEMORRHOIDECTOMY     • HYSTERECTOMY     • MITRAL VALVE REPLACEMENT     • REPLACEMENT TOTAL KNEE Right    • THYROID SURGERY      Cyst removed from thyroid   • VASCULAR SURGERY       General Information     Row Name 02/04/21 1524          Physical Therapy Time and Intention    Document Type  therapy note (daily note)  -     Mode of Treatment  individual therapy;physical therapy  -     Row Name 02/04/21 1524          General Information    Existing Precautions/Restrictions  fall;oxygen therapy device and L/min  -     Row Name 02/04/21 1524          Cognition    Orientation Status (Cognition)  oriented x 3  -     Row Name 02/04/21 1524          Safety Issues, Functional Mobility    Impairments Affecting Function (Mobility)  balance;endurance/activity tolerance;pain;shortness of breath;strength  -       User Key  (r) = Recorded By, (t) = Taken By, (c) = Cosigned By    Initials Name Provider Type     Precious Cisneros, PT Physical Therapist        Mobility     Row Name 02/04/21 1527          Bed Mobility    Supine-Sit [place] (Bed Mobility)  verbal cues;nonverbal cues (demo/gesture);moderate assist (50% patient effort)  -     Sit-Supine [place] (Bed Mobility)  not tested sitting in chair  -     Assistive Device (Bed Mobility)  bed rails;draw sheet;head of bed elevated  -     Row Name 02/04/21 1527          Sit-Stand Transfer    Sit-Stand [place] (Transfers)  verbal cues;nonverbal cues (demo/gesture);moderate assist (50% patient effort)  -     Assistive  Device (Sit-Stand Transfers)  walker, front-wheeled  -     Row Name 02/04/21 1527          Gait/Stairs (Locomotion)    [place] Level (Gait)  verbal cues;nonverbal cues (demo/gesture);minimum assist (75% patient effort)  -     Assistive Device (Gait)  walker, front-wheeled  -     Distance in Feet (Gait)  10  -CH     Deviations/Abnormal Patterns (Gait)  lina decreased;gait speed decreased;stride length decreased  -     Bilateral Gait Deviations  forward flexed posture  -       User Key  (r) = Recorded By, (t) = Taken By, (c) = Cosigned By    Initials Name Provider Type     Precious Cisneros, PT Physical Therapist        Obj/Interventions     Row Name 02/04/21 1532          Motor Skills    Therapeutic Exercise  -- 10 reps B LE AP, QS, and hip abduction  -       User Key  (r) = Recorded By, (t) = Taken By, (c) = Cosigned By    Initials Name Provider Type     Precious Cisneros, PT Physical Therapist        Goals/Plan    No documentation.       Clinical Impression     Row Name 02/04/21 1532          Pain    Additional Documentation  Pain Scale: Numbers Pre/Post-Treatment (Group)  -     Row Name 02/04/21 1532          Pain Scale: Numbers Pre/Post-Treatment    Pretreatment Pain Rating  9/10  -     Posttreatment Pain Rating  9/10  -     Pain Location - Side  Left  -     Pain Location - Orientation  lower  -     Pain Location  extremity  -     Pain Intervention(s)  Repositioned  -     Row Name 02/04/21 1532          Plan of Care Review    Plan of Care Reviewed With  patient  -     Outcome Summary  Pt demonstrates some increased activity tolerance as she was able to increase her gait distance slightly. Pt continues to be limited LE pain and fatigue. PT will continue to follow to address strength, mobility, and gait.  -     Row Name 02/04/21 153          Positioning and Restraints    Pre-Treatment Position  in bed  -     Post Treatment Position  chair  -     In Chair   reclined;call light within reach;encouraged to call for assist;exit alarm on  -       User Key  (r) = Recorded By, (t) = Taken By, (c) = Cosigned By    Initials Name Provider Type    Precious Torres PT Physical Therapist        Outcome Measures     Row Name 02/04/21 1539          How much help from another person do you currently need...    Turning from your back to your side while in flat bed without using bedrails?  2  -CH     Moving from lying on back to sitting on the side of a flat bed without bedrails?  2  -CH     Moving to and from a bed to a chair (including a wheelchair)?  3  -CH     Standing up from a chair using your arms (e.g., wheelchair, bedside chair)?  3  -CH     Climbing 3-5 steps with a railing?  1  -CH     To walk in hospital room?  2  -     AM-PAC 6 Clicks Score (PT)  13  -     Row Name 02/04/21 1539          Functional Assessment    Outcome Measure Options  AM-PAC 6 Clicks Basic Mobility (PT)  -       User Key  (r) = Recorded By, (t) = Taken By, (c) = Cosigned By    Initials Name Provider Type    Precious Torres PT Physical Therapist        Physical Therapy Education                 Title: PT OT SLP Therapies (Done)     Topic: Physical Therapy (Done)     Point: Mobility training (Done)     Learning Progress Summary           Patient Acceptance, E,TB,D, VU,NR by  at 2/4/2021 1539    Acceptance, E,TB,D, VU,NR by  at 2/3/2021 1055                   Point: Home exercise program (Done)     Learning Progress Summary           Patient Acceptance, E,TB,D, VU,NR by  at 2/4/2021 1539                   Point: Body mechanics (Done)     Learning Progress Summary           Patient Acceptance, E,TB,D, VU,NR by  at 2/4/2021 1539    Acceptance, E,TB,D, VU,NR by  at 2/3/2021 1055                   Point: Precautions (Done)     Learning Progress Summary           Patient Acceptance, E,TB,D, VU,NR by  at 2/4/2021 1539    Acceptance, E,TB,D, VU,NR by  at 2/3/2021 1055                                User Key     Initials Effective Dates Name Provider Type Discipline     04/03/18 -  Precious Cisneros PT Physical Therapist PT              PT Recommendation and Plan  Planned Therapy Interventions (PT): balance training, bed mobility training, gait training, home exercise program, patient/family education, strengthening, transfer training  Plan of Care Reviewed With: patient  Outcome Summary: Pt demonstrates some increased activity tolerance as she was able to increase her gait distance slightly. Pt continues to be limited LE pain and fatigue. PT will continue to follow to address strength, mobility, and gait.     Time Calculation:   PT Charges     Row Name 02/04/21 1540             Time Calculation    Start Time  1337  -      Stop Time  1401  -      Time Calculation (min)  24 min  -      PT Received On  02/04/21  -      PT - Next Appointment  02/05/21  -         Time Calculation- PT    Total Timed Code Minutes- PT  23 minute(s)  -        User Key  (r) = Recorded By, (t) = Taken By, (c) = Cosigned By    Initials Name Provider Type     Precious Cisneros PT Physical Therapist        Therapy Charges for Today     Code Description Service Date Service Provider Modifiers Qty    05886142501 HC PT EVAL MOD COMPLEXITY 2 2/3/2021 Precious Cisneros, PT GP 1    01303339320 HC PT THERAPEUTIC ACT EA 15 MIN 2/3/2021 Precious Cisneros, PT GP 1    34578435215 HC PT THERAPEUTIC ACT EA 15 MIN 2/4/2021 Precious Cisneros, PT GP 1    03753607816 HC PT THER PROC EA 15 MIN 2/4/2021 Precious Cisneros, PT GP 1          PT G-Codes  Outcome Measure Options: AM-PAC 6 Clicks Basic Mobility (PT)  AM-PAC 6 Clicks Score (PT): 13  AM-PAC 6 Clicks Score (OT): 12    Precious Cisneros PT  2/4/2021

## 2021-02-04 NOTE — TELEPHONE ENCOUNTER
Caller: CARLA     Relationship to patient: [facility]     Best call back number: [phone]    Patient is needing: KYLER KAYE FROM [facility] IS NEEDING VERBAL ORDERS FOR NURSING AND PT FOR INITIAL EVALUATION. KYLER KAYE GAVE THE OK TO LEAVE A VOICEMAIL IF SHE DOES NOT ANSWER.     PLEASE ADVISE

## 2021-02-04 NOTE — PROGRESS NOTES
Name: Miguelina Lopez ADMIT: 2021   : 1945  PCP: Arcelia Talbot APRN    MRN: [id number] LOS: 3 days   AGE/SEX: 75 y.o. female  ROOM: /     Subjective   Subjective   Resting in bed. Reports chest soreness and leg pain on both sides. Legs are wrapped. Denies any nausea, vomiting, diarrhea. No BM in few days. No chest pain, cough, fever or chills. Tolerating a diet.      Objective   Objective   Vital Signs  Temp:  [97.9 °F (36.6 °C)-98.7 °F (37.1 °C)] 98.4 °F (36.9 °C)  Heart Rate:  [79-82] 81  Resp:  [16-18] 18  BP: (147-166)/(50-94) 153/61  SpO2:  [90 %-97 %] 94 %  on  Flow (L/min):  [2-3] 3;   Device (Oxygen Therapy): nasal cannula  Body mass index is 52.73 kg/m².     Physical Exam  Vitals signs and nursing note reviewed.   Constitutional:       General: She is not in acute distress.     Appearance: She is obese. She is ill-appearing (chronically). She is not toxic-appearing.   Neck:      Musculoskeletal: Normal range of motion and neck supple.   Cardiovascular:      Rate and Rhythm: Regular rhythm. Normal rate present. Pacing on monitor.     Heart sounds: Murmur present.   Pulmonary:      Effort: Pulmonary effort is normal. No respiratory distress.      Comments: Diminished throughout. On 3 L nc.  Abdominal:      General: Bowel sounds are normal. There is no distension.      Palpations: Abdomen is soft.      Tenderness: There is no abdominal tenderness.   Genitourinary:     Comments: Clear yellow urine in bedside cannister.  Musculoskeletal: Normal range of motion.         General: moderate Swelling present.   Skin:     General: Skin is warm and dry.      Findings: No bruising.      Comments: BLE wrapped.  Neurological:      Mental Status: She is alert and oriented to person, place, and time.      Sensory: No sensory deficit.      Motor: Weakness present.      Coordination: Coordination normal.   Psychiatric:         Mood and Affect: Mood normal.         Behavior:  Behavior normal.      Results Review:       I reviewed the patient's new clinical results.  Results from last 7 days   Lab Units 02/03/21  0419 02/01/21  0829 02/01/21  0339   WBC 10*3/mm3 12.59* 11.57* 11.42*   HEMOGLOBIN g/dL 12.2 11.6* 11.7*   PLATELETS 10*3/mm3 247 226 253     Results from last 7 days   Lab Units 02/03/21  1716 02/03/21  1304 02/03/21  0419 02/02/21  0739 02/01/21  0551   SODIUM mmol/L  --  147* 145 145 146*   POTASSIUM mmol/L 4.5 3.5 3.2* 3.9 3.9   CHLORIDE mmol/L  --  100 99 101 103   CO2 mmol/L  --  35.0* 31.7* 31.3* 33.4*   BUN mg/dL  --  20 21 20 19   CREATININE mg/dL  --  1.39* 1.13* 1.43* 1.42*   GLUCOSE mg/dL  --  126* 77 142* 112*   Estimated Creatinine Clearance: 41.4 mL/min (A) (by C-G formula based on SCr of 1.39 mg/dL (H)).  Results from last 7 days   Lab Units 02/01/21  0339   ALBUMIN g/dL 3.70   BILIRUBIN mg/dL 0.6   ALK PHOS U/L 259*   AST (SGOT) U/L 23   ALT (SGPT) U/L 14     Results from last 7 days   Lab Units 02/03/21  1304 02/03/21  0419 02/02/21 0739 02/01/21  0551 02/01/21  0339   CALCIUM mg/dL 9.0 8.9 9.0 8.9 8.5*   ALBUMIN g/dL  --   --   --   --  3.70   MAGNESIUM mg/dL  --  2.1  --   --  2.0   PHOSPHORUS mg/dL  --  3.5  --   --   --      Results from last 7 days   Lab Units 02/01/21  0339   PROCALCITONIN ng/mL 0.07     Glucose   Date/Time Value Ref Range Status   02/04/2021 0539 125 70 - 130 mg/dL Final   02/03/2021 2011 177 (H) 70 - 130 mg/dL Final   02/03/2021 1544 137 (H) 70 - 130 mg/dL Final   02/03/2021 1054 119 70 - 130 mg/dL Final   02/02/2021 2029 71 70 - 130 mg/dL Final   02/02/2021 1800 187 (H) 70 - 130 mg/dL Final   02/02/2021 1443 139 (H) 70 - 130 mg/dL Final       amLODIPine, 5 mg, Oral, Q24H  apixaban, 5 mg, Oral, Q12H  atorvastatin, 20 mg, Oral, Daily  budesonide-formoterol, 2 puff, Inhalation, BID - RT  carvedilol, 6.25 mg, Oral, BID With Meals  cefTRIAXone, 1 g, Intravenous, Q24H  clotrimazole-betamethasone, , Topical, BID  fluticasone, 2 spray, Each  Nare, Daily  gabapentin, 100 mg, Oral, Q12H  insulin lispro, 0-7 Units, Subcutaneous, TID AC  lactobacillus acidophilus, 1 capsule, Oral, Daily  levothyroxine, 25 mcg, Oral, Daily  metOLazone, 5 mg, Oral, Daily  nystatin, , Topical, BID  pantoprazole, 40 mg, Oral, QAM  sennosides-docusate, 1 tablet, Oral, Daily  sodium chloride, 10 mL, Intravenous, Q12H  sodium chloride, 3 mL, Intravenous, Q12H  vilazodone, 20 mg, Oral, Nightly      bumetanide, 2 mg/hr, Last Rate: 2 mg/hr (02/04/21 0905)    Diet Regular; Consistent Carbohydrate, Cardiac, Daily Fluid Restriction; 1000 mL Fluid Per Day  NPO Diet       Assessment/Plan     Active Hospital Problems    Diagnosis  POA   • **Chest pain [R07.9]  Yes   • UTI (urinary tract infection), bacterial [N39.0, A49.9]  Yes   • Complete heart block (CMS/HCC) [I44.2]  Unknown   • COPD (chronic obstructive pulmonary disease) (CMS/HCC) [J44.9]  Yes   • Chronic respiratory failure with hypoxia and hypercapnia (CMS/HCC) [J96.11, J96.12]  Yes   • Chronic diastolic heart failure (CMS/HCC) [I50.32]  Yes   • Supplemental oxygen dependent [Z99.81]  Not Applicable   • s/p MVR, TV-repair, CABG x2 6/13/16 [Z95.2]  Not Applicable   • Pulmonary hypertension due to sleep-disordered breathing (CMS/[facility]) [I27.29, G47.8]  Yes   • OCHOA (obstructive sleep apnea) [G47.33]  Yes   • DM type 2 (diabetes mellitus, type 2) (CMS/HCC) [E11.9]  Yes   • Diabetic peripheral neuropathy (CMS/HCC) [E11.42]  Yes   • Hyperlipidemia [E78.5]  Yes   • Essential hypertension [I10]  Yes   • CKD (chronic kidney disease) stage 3, GFR 30-59 ml/min (CMS/HCC) [N18.30]  Yes      Resolved Hospital Problems   No resolved problems to display.     Ms. Lopez is a 75 year old female who presented to the hospital with chest pain and dyspnea. Found to have diffuse opacities on CXR with elevated proBNP.     Chest pain/acute on chronic diastolic heart failure  -Cardiology following. Found to have complete heart block/aflutter. Had PPM placed  2/2 and now pacing. Now on Eliquis with possible cardioversion in future. BB now back on board.  -Nephrology managing diuretics. Continues on Bumex gtt. metolazone added.   -Cardiac regimen in place with ASA, statin. Norvasc for BP. Coreg added 2/3.  -Troponin elevated. Cards planning possible cath.     COPD/chronic respiratory failure with hypoxia and hypercapnia/OCHOA  -Pulmonology following. To use home Trilogy.   -ABGs yesterday stable. Wean oxygen as able. Only on 3 L today.     CAD/history of CABG/valvular heart disease  -Cards following as above.  -Repeat echo with preserved EF and severe AS. Valve is worse than prior. May need TAVR in future.     DM2/neuropathy  -SSI as needed. Monitor ACHS. Hold oral medications.  -A1c 6.71%. sugars stable/borderline low. Monitor and encourage oral intake.     HTN  -BP improving with diuresis. Arb on hold. BB and Norvasc added this admission.     CKD3  -Baseline creatinine around 1.2. up some to 1.42 today.  -Nephrology following. Potassium replaced with protocol.      UTI  -Mild leukocytosis as well as foul-smelling urine reported. UA with 2+ bacteria and 13-20 WBC. Culture growing > 100, 000 Enterococcus faecalis. Could be contaminate but with urinary symptoms and leukocytosis will treat for 3 days. Allergy to PCN, but tolerant of Keflex and cefazolin in the past. On Rocephin.     · I discussed the patients findings and my recommendations with patient and Dr. Guardado.     VTE Prophylaxis - Eliquis  Code Status - Full code. Confirmed with patient.  Disposition - Anticipate discharge TBD.    ZOILA Powers  [facility]  02/04/21  10:29 EST

## 2021-02-04 NOTE — PROGRESS NOTES
NEPHROLOGY PROGRESS NOTE    PATIENT IDENTIFICATION:   Name:  Miguelina Lopez      MRN:  [id number]     75 y.o.  female             Reason for visit: OLI    SUBJECTIVE:     Seen and examined. On bumex drip. Breathing easier.  Diuresing well.        OBJECTIVE:  Vitals:    02/04/21 0336 02/04/21 0616 02/04/21 0757 02/04/21 0809   BP: 166/80 149/64 153/61    BP Location: Left arm  Left arm    Patient Position: Lying  Lying    Pulse: 82  81 81   Resp:   18 18   Temp: 98.7 °F (37.1 °C)  98.4 °F (36.9 °C)    TempSrc: Oral  Oral    SpO2:  95% 97% 94%   Weight:       Height:               Body mass index is 52.73 kg/m².    Intake/Output Summary (Last 24 hours) at 2/4/2021 1200  Last data filed at 2/4/2021 0905  Gross per 24 hour   Intake 590 ml   Output 4750 ml   Net -4160 ml         Exam:  GEN:  No distress, appears stated age  EYES:   Anicteric sclera  ENT:    External ears/nose normal, MM are moist  NECK:  No adenopathy, JVP none  LUNGS: Normal chest on inspection; not labored  CV:  Normal S1S2, without murmur  ABD:  Morbidly obese, non-tender, non-distended, no hepatosplenomegaly, +BS  EXT:  ++ edema; no cyanosis; clubbing    Scheduled meds:  amLODIPine, 5 mg, Oral, Q24H  apixaban, 5 mg, Oral, Q12H  atorvastatin, 20 mg, Oral, Daily  budesonide-formoterol, 2 puff, Inhalation, BID - RT  carvedilol, 6.25 mg, Oral, BID With Meals  cefTRIAXone, 1 g, Intravenous, Q24H  clotrimazole-betamethasone, , Topical, BID  fluticasone, 2 spray, Each Nare, Daily  gabapentin, 100 mg, Oral, Q12H  insulin lispro, 0-7 Units, Subcutaneous, TID AC  lactobacillus acidophilus, 1 capsule, Oral, Daily  levothyroxine, 25 mcg, Oral, Daily  metOLazone, 5 mg, Oral, Daily  nystatin, , Topical, BID  pantoprazole, 40 mg, Oral, QAM  sennosides-docusate, 1 tablet, Oral, Daily  sodium chloride, 10 mL, Intravenous, Q12H  sodium chloride, 3 mL, Intravenous, Q12H  vilazodone, 20 mg, Oral, Nightly      IV meds:                      bumetanide, 2 mg/hr, Last  Rate: 2 mg/hr (02/04/21 0905)        Data Review:    Results from last 7 days   Lab Units 02/04/21  0434 02/03/21  1716 02/03/21  1304 02/03/21  0419  02/01/21  0339   SODIUM mmol/L 145  --  147* 145   < > 145   POTASSIUM mmol/L 3.7 4.5 3.5 3.2*   < > 4.4   CHLORIDE mmol/L 100  --  100 99   < > 102   CO2 mmol/L 31.1*  --  35.0* 31.7*   < > 32.3*   BUN mg/dL 20  --  20 21   < > 20   CREATININE mg/dL 1.42*  --  1.39* 1.13*   < > 1.38*   CALCIUM mg/dL 8.3*  --  9.0 8.9   < > 8.5*   BILIRUBIN mg/dL 0.7  --   --   --   --  0.6   ALK PHOS U/L 216*  --   --   --   --  259*   ALT (SGPT) U/L 12  --   --   --   --  14   AST (SGOT) U/L 28  --   --   --   --  23   GLUCOSE mg/dL 133*  --  126* 77   < > 128*    < > = values in this interval not displayed.       Estimated Creatinine Clearance: 40.5 mL/min (A) (by C-G formula based on SCr of 1.42 mg/dL (H)).    Results from last 7 days   Lab Units 02/03/21 0419 02/01/21  0339   MAGNESIUM mg/dL 2.1 2.0   PHOSPHORUS mg/dL 3.5  --        Results from last 7 days   Lab Units 02/04/21  1124 02/03/21  0419 02/01/21  0829 02/01/21  0339   WBC 10*3/mm3 11.44* 12.59* 11.57* 11.42*   HEMOGLOBIN g/dL 11.1* 12.2 11.6* 11.7*   PLATELETS 10*3/mm3 226 247 226 253                   ASSESSMENT:     Chest pain    CKD (chronic kidney disease) stage 3, GFR 30-59 ml/min (CMS/[facility])    Diabetic peripheral neuropathy (CMS/[facility])    Hyperlipidemia    OCHOA (obstructive sleep apnea)    DM type 2 (diabetes mellitus, type 2) (CMS/[facility])    Essential hypertension    Pulmonary hypertension due to sleep-disordered breathing (CMS/[facility])    s/p MVR, TV-repair, CABG x2 6/13/16    Supplemental oxygen dependent    Chronic diastolic heart failure (CMS/[facility])    Chronic respiratory failure with hypoxia and hypercapnia (CMS/[facility])    COPD (chronic obstructive pulmonary disease) (CMS/[facility])    Complete heart block (CMS/[facility])    UTI (urinary tract infection), bacterial      - OLI on CKD3:  Creatinine slightly above her baseline 1.4  mg/dL  on admission but patient with significant volume excess.  Scr 1.4 mg/dl         - Acute on chronic diastolic heart failure with moderate right pulmonary HTN  Valvular heart disease: moderate AS, mild to moderate TR, mitral valve replacement, regurg.      - Acute on chronic respiratory failure:   - Anemia, iron deficiency.   - HTN, not controlled: Blood pressure was extremely elevated on admission.  Better now  - Lymphedema   - Morbid obesity     PLAN:    Continue Bumex drip for now but we might need to hold if she is going for cardiac cath at [facility]   Continue with the fluid restriction  Replete potassium and recheck in the afternoon  Discussed with Dr. Luna  Surveillance labs      Jhonathan Mitchell MD  2/4/2021    12:00 EST

## 2021-02-04 NOTE — PLAN OF CARE
Goal Outcome Evaluation:  Plan of Care Reviewed With: patient     Outcome Summary: Pt demonstrates some increased activity tolerance as she was able to increase her gait distance slightly. Pt continues to be limited LE pain and fatigue. PT will continue to follow to address strength, mobility, and gait.Patient was intermittently wearing a face mask during this therapy encounter. Therapist used appropriate personal protective equipment including eye protection, mask, and gloves.  Mask used was standard procedure mask. Appropriate PPE was worn during the entire therapy session. Hand hygiene was completed before and after therapy session. Patient is not in enhanced droplet precautions.

## 2021-02-04 NOTE — PROGRESS NOTES
Continued Stay Note  [facility]     Patient Name: Miguelina Lopez  MRN: [id number]  Today's Date: 2/4/2021    Admit Date: 2/1/2021    Discharge Plan     Row Name 02/04/21 1152       Plan    Plan  2/4 Home w/ [facility]    Plan Comments  [facility] has approved and will follow Pt at home.  [facility]P following.  JAY PARKINSON/[facility]P        Discharge Codes    No documentation.             Malina Campos RN

## 2021-02-04 NOTE — PROGRESS NOTES
Discharge Planning Assessment  [facility]     Patient Name: Miguelina Lopez  MRN: [id number]  Today's Date: 2/4/2021    Admit Date: 2/1/2021    Discharge Needs Assessment     Row Name 02/04/21 0830       Living Environment    Lives With  child(kamryn), adult;grandchild(kamryn)    Name(s) of Who Lives With Patient  son-in-law Alin (and occasionally her granddtr Carmen)    Current Living Arrangements  home/apartment/condo    Primary Care Provided by  self    Provides Primary Care For  no one    Family Caregiver if Needed  child(kamryn), adult;grandchild(kamryn), adult    Quality of Family Relationships  supportive;helpful       Resource/Environmental Concerns    Resource/Environmental Concerns  none    Transportation Concerns  car, none       Transition Planning    Patient/Family Anticipates Transition to  home;home with family;home with help/services    Patient/Family Anticipated Services at Transition  home health care    Transportation Anticipated  family or friend will provide       Discharge Needs Assessment    Current Outpatient/Agency/Support Group  homecare agency    Equipment Currently Used at Home  walker, rolling;rollator;shower chair;commode;oxygen;cpap    Equipment Needed After Discharge  none    Outpatient/Agency/Support Group Needs  homecare agency    Discharge Facility/Level of Care Needs  home with home health    Provided Post Acute Provider List?  N/A    N/A Provider List Comment  has used [facility] in the past and agreeable to using them again    Provided Post Acute Provider Quality & Resource List?  N/A        Discharge Plan     Row Name 02/04/21 0830       Plan    Plan  Home with family assistance; [facility] referral pending    Plan Comments  Met w/ patient in room.  Introduced self.  Facesheet verified.  Hx with [facility] in 2016, and Michele for her Trilogy.  Denies any other d/c needs.  Epic referral made to [facility].        Continued Care and Services - Admitted Since 2/1/2021     Home Medical Care     Service  Provider Request Status Selected Services Address Phone Fax Patient Preferred    [facility]  Pending - Request Sent N/A [address] [place] [id number] [phone] --                Demographic Summary     Row Name 02/04/21 0830       General Information    Admission Type  inpatient    Arrived From  home    Referral Source  admission list    Reason for Consult  discharge planning    Preferred Language  English     Used During This Interaction  no       Contact Information    Permission Granted to Share Info With  ;family/designee        Functional Status     Row Name 02/04/21 0830       Functional Status    Usual Activity Tolerance  good    Current Activity Tolerance  good       Functional Status, IADL    Medications  independent    Meal Preparation  independent;assistive person    Housekeeping  independent;assistive person    Laundry  independent;assistive person    Shopping  independent;assistive person       Mental Status    General Appearance WDL  WDL       Mental Status Summary    Recent Changes in Mental Status/Cognitive Functioning  no changes       Employment/    Employment Status  retired              Yen Arthur RN

## 2021-02-04 NOTE — PLAN OF CARE
Goal Outcome Evaluation:  Plan of Care Reviewed With: patient     Outcome Summary: OT Lymph:  Pt. is O x 3.  PT. tolerated the LE wraps.  A  small area was noted on the back of the (R) calf when the bandages were doffed, RN applied Mepilex to this area. Pt's edema continues to me mod/severe, (L) greater than (R).  Therrapist rewrapped (B)LE toes to knees.  OT will wrap the LEs Monday - riday and RNs will address on Sat and Sun.  Pt. continues to [facility] for [facility].  Therapist wore a mask, eye protections and complete hand hygiene before and after the treatment.  Pt. was not able to tolerate wearing a mask, O2 in palce.

## 2021-02-04 NOTE — DISCHARGE PLACEMENT REQUEST
"Miguelina Lopez (75 y.o. Female)     Date of Birth Social Security Number Address Home Phone MRN    1945  [id number]  [place] [id number] [phone] [id number]    J[employer] Marital Status          None        Admission Date Admission Type Admitting Provider Attending Provider Department, Room/Bed    2/1/21 Emergency Salvador Guardado MD Shah, Aakash, MD [facility] CARDIOVASC UNIT, 2228/1    Discharge Date Discharge Disposition Discharge Destination                       Attending Provider: Salvador Guardado MD    Allergies: Coumadin [Warfarin Sodium], Bumex [Bumetanide], Erythromycin, Hctz [Hydrochlorothiazide], Zaroxolyn [Metolazone], Penicillins, Sulfa Antibiotics    Isolation: None   Infection: None   Code Status: CPR    Ht: 152.4 cm (60\")   Wt: 122 kg (270 lb)    Admission Cmt: None   Principal Problem: Chest pain [R07.9]                 Active Insurance as of 2/1/2021     Primary Coverage     Payor Plan Insurance Group Employer/Plan Group    HUMANA MEDICARE REPLACEMENT HUMANA MEDICARE REPLACEMENT [id number]     Payor Plan Address Payor Plan Phone Number Payor Plan Fax Number Effective Dates    [address] [address] [phone]  1/1/2018 - None Entered    [facility] [address]       Subscriber Name Subscriber Birth Date Member ID       MIGUELINA LOPEZ 1945 [id number]                 Emergency Contacts      (Rel.) Home Phone Work Phone Mobile Phone    Carmen Carrizales (Grandchild) [phone] -- --    Puja Rubio (Daughter) [phone] -- [phone]    ANGELICAHERBERTHCHRISTIANNE (Son) [phone] -- [phone]              "

## 2021-02-04 NOTE — PROGRESS NOTES
Patient Name: Migeulina Lopez  Patient : 1945        Date of Service:21  Provider of Service: Antoine Ayers MD  Place of Service: [facility]  Referral Provider: No ref. provider found          Follow Up: CHF, heart block, arrhythmia    Interval Hx: still c/o leg soreness.  Breathing better      OBJECTIVE  Temp:  [97.9 °F (36.6 °C)-98.7 °F (37.1 °C)] 98.4 °F (36.9 °C)  Heart Rate:  [79-82] 81  Resp:  [16-18] 18  BP: (147-166)/(50-94) 153/61     Intake/Output Summary (Last 24 hours) at 2021 0940  Last data filed at 2021 0757  Gross per 24 hour   Intake 590 ml   Output 3750 ml   Net -3160 ml     Body mass index is 52.73 kg/m².      21  0759   Weight: 122 kg (270 lb)         Physical Exam:   Vitals signs reviewed.   Constitutional:       Appearance: Well-developed.   Eyes:      Pupils: Pupils are equal, round, and reactive to light.   HENT:      Head: Normocephalic.   Neck:      Musculoskeletal: Normal range of motion.      Thyroid: No thyromegaly.      Vascular: No carotid bruit or JVD.   Pulmonary:      Effort: Pulmonary effort is normal.      Breath sounds: Normal breath sounds.   Cardiovascular:      Normal rate. Regular rhythm. distant S1. distant S2.      Murmurs: There is a harsh midsystolic murmur at the URSB, radiating to the neck.      No gallop.      Comments: Legs wrapped  Pulses:     Intact distal pulses.   Edema:     Peripheral edema absent.   Abdominal:      General: Bowel sounds are normal.      Palpations: Abdomen is soft.   Skin:     General: Skin is warm and dry.      Findings: No erythema.   Neurological:      Mental Status: Alert and oriented to person, place, and time.           CURRENT MEDS    Scheduled Meds:amLODIPine, 5 mg, Oral, Q24H  apixaban, 5 mg, Oral, Q12H  atorvastatin, 20 mg, Oral, Daily  budesonide-formoterol, 2 puff, Inhalation, BID - RT  carvedilol, 6.25 mg, Oral, BID With Meals  cefTRIAXone, 1 g, Intravenous,  Q24H  clotrimazole-betamethasone, , Topical, BID  fluticasone, 2 spray, Each Nare, Daily  gabapentin, 100 mg, Oral, Q12H  insulin lispro, 0-7 Units, Subcutaneous, TID AC  lactobacillus acidophilus, 1 capsule, Oral, Daily  levothyroxine, 25 mcg, Oral, Daily  metOLazone, 5 mg, Oral, Daily  nystatin, , Topical, BID  pantoprazole, 40 mg, Oral, QAM  sennosides-docusate, 1 tablet, Oral, Daily  sodium chloride, 10 mL, Intravenous, Q12H  sodium chloride, 3 mL, Intravenous, Q12H  vilazodone, 20 mg, Oral, Nightly      Continuous Infusions:bumetanide, 2 mg/hr, Last Rate: 2 mg/hr (02/04/21 0905)          Lab Review:   Results from last 7 days   Lab Units 02/03/21  1716 02/03/21  1304 02/03/21  0419  02/01/21  0339   SODIUM mmol/L  --  147* 145   < > 145   POTASSIUM mmol/L 4.5 3.5 3.2*   < > 4.4   CHLORIDE mmol/L  --  100 99   < > 102   CO2 mmol/L  --  35.0* 31.7*   < > 32.3*   BUN mg/dL  --  20 21   < > 20   CREATININE mg/dL  --  1.39* 1.13*   < > 1.38*   GLUCOSE mg/dL  --  126* 77   < > 128*   CALCIUM mg/dL  --  9.0 8.9   < > 8.5*   AST (SGOT) U/L  --   --   --   --  23   ALT (SGPT) U/L  --   --   --   --  14    < > = values in this interval not displayed.     Results from last 7 days   Lab Units 02/04/21  0434 02/02/21  0739 02/01/21  1200 02/01/21  0551 02/01/21  0339   TROPONIN T ng/mL 0.101* 0.046* 0.026 0.016 0.011     Results from last 7 days   Lab Units 02/03/21  0419 02/01/21  0829   WBC 10*3/mm3 12.59* 11.57*   HEMOGLOBIN g/dL 12.2 11.6*   HEMATOCRIT % 38.1 37.0   PLATELETS 10*3/mm3 247 226         Results from last 7 days   Lab Units 02/03/21  0419 02/01/21  0339   MAGNESIUM mg/dL 2.1 2.0         Results from last 7 days   Lab Units 02/01/21  0339   PROBNP pg/mL 12,821.0*           I personally reviewed the patient's ECG and telemetry data    ASSESSMENT & PLAN    Chest pain    CKD (chronic kidney disease) stage 3, GFR 30-59 ml/min (CMS/[facility])    Diabetic peripheral neuropathy (CMS/[facility])    Hyperlipidemia    OCHOA  (obstructive sleep apnea)    DM type 2 (diabetes mellitus, type 2) (CMS/[facility])    Essential hypertension    Pulmonary hypertension due to sleep-disordered breathing (CMS/[facility])    s/p MVR, TV-repair, CABG x2 6/13/16    Supplemental oxygen dependent    Chronic diastolic heart failure (CMS/[facility])    Chronic respiratory failure with hypoxia and hypercapnia (CMS/[facility])    COPD (chronic obstructive pulmonary disease) (CMS/[facility])    Complete heart block (CMS/[facility])    UTI (urinary tract infection), bacterial      1.  Acute on chronic diastolic heart failure: Left ventricular ejection fraction greater than 50%.  Patient presently on Bumex drip with good diuresis.  Continue per nephrology  2.  Complete heart block: Status post pacemaker implant.  Patient in atrial flutter.  Apixaban started              3.  Aortic stenosis: When compared to the previous echocardiogram her aortic stenosis appears to be worse.  Certainly a contributing factor to her heart failure.  After stabilization we will evaluate for possible valve replacement or TAVR.              4.  Hypertension: improving                5.  Coronary artery disease: Status post CABG.  No further angina pectoris.  Will recheck troponin now that pacemaker is in place.  Troponin elevated.  ECG not helpful.              6.  Mitral regurgitation: Status post mitral valve replacement with tissue prosthesis.              7.  Tricuspid regurgitation: Status post repair              8.  Obstructive sleep apnea: Treated              9.  Obesity              10.  Pulmonary hypertension     Will continue same today.  May need cath tomorrow if troponin continues to rise.  Previous CABg to LAD and PDA    Antoine Ayers MD  02/04/21

## 2021-02-05 LAB
ALBUMIN SERPL-MCNC: 3.1 G/DL (ref 3.5–5.2)
ALBUMIN SERPL-MCNC: 3.3 G/DL (ref 3.5–5.2)
ALBUMIN/GLOB SERPL: 1.1 G/DL
ALBUMIN/GLOB SERPL: 1.1 G/DL
ALP SERPL-CCNC: 218 U/L (ref 39–117)
ALP SERPL-CCNC: 230 U/L (ref 39–117)
ALT SERPL W P-5'-P-CCNC: 10 U/L (ref 1–33)
ALT SERPL W P-5'-P-CCNC: 12 U/L (ref 1–33)
ANION GAP SERPL CALCULATED.3IONS-SCNC: 10.5 MMOL/L (ref 5–15)
ANION GAP SERPL CALCULATED.3IONS-SCNC: 9.3 MMOL/L (ref 5–15)
AST SERPL-CCNC: 24 U/L (ref 1–32)
AST SERPL-CCNC: 25 U/L (ref 1–32)
BILIRUB SERPL-MCNC: 0.6 MG/DL (ref 0–1.2)
BILIRUB SERPL-MCNC: 0.6 MG/DL (ref 0–1.2)
BUN SERPL-MCNC: 25 MG/DL (ref 8–23)
BUN SERPL-MCNC: 26 MG/DL (ref 8–23)
BUN/CREAT SERPL: 15.7 (ref 7–25)
BUN/CREAT SERPL: 17.2 (ref 7–25)
CALCIUM SPEC-SCNC: 8.5 MG/DL (ref 8.6–10.5)
CALCIUM SPEC-SCNC: 8.6 MG/DL (ref 8.6–10.5)
CHLORIDE SERPL-SCNC: 97 MMOL/L (ref 98–107)
CHLORIDE SERPL-SCNC: 98 MMOL/L (ref 98–107)
CO2 SERPL-SCNC: 37.7 MMOL/L (ref 22–29)
CO2 SERPL-SCNC: 38.5 MMOL/L (ref 22–29)
CREAT SERPL-MCNC: 1.51 MG/DL (ref 0.57–1)
CREAT SERPL-MCNC: 1.59 MG/DL (ref 0.57–1)
GFR SERPL CREATININE-BSD FRML MDRD: 32 ML/MIN/1.73
GFR SERPL CREATININE-BSD FRML MDRD: 34 ML/MIN/1.73
GLOBULIN UR ELPH-MCNC: 2.8 GM/DL
GLOBULIN UR ELPH-MCNC: 3 GM/DL
GLUCOSE BLDC GLUCOMTR-MCNC: 113 MG/DL (ref 70–130)
GLUCOSE BLDC GLUCOMTR-MCNC: 118 MG/DL (ref 70–130)
GLUCOSE BLDC GLUCOMTR-MCNC: 122 MG/DL (ref 70–130)
GLUCOSE BLDC GLUCOMTR-MCNC: 215 MG/DL (ref 70–130)
GLUCOSE SERPL-MCNC: 119 MG/DL (ref 65–99)
GLUCOSE SERPL-MCNC: 99 MG/DL (ref 65–99)
HCT VFR BLDA CALC: 34 % (ref 38–51)
HCT VFR BLDA CALC: 35 % (ref 38–51)
HGB BLDA-MCNC: 11.6 G/DL (ref 12–17)
HGB BLDA-MCNC: 11.9 G/DL (ref 12–17)
POTASSIUM SERPL-SCNC: 3.5 MMOL/L (ref 3.5–5.2)
POTASSIUM SERPL-SCNC: 3.5 MMOL/L (ref 3.5–5.2)
PROT SERPL-MCNC: 5.9 G/DL (ref 6–8.5)
PROT SERPL-MCNC: 6.3 G/DL (ref 6–8.5)
SAO2 % BLDA: 71 % (ref 95–98)
SAO2 % BLDA: 98 % (ref 95–98)
SODIUM SERPL-SCNC: 145 MMOL/L (ref 136–145)
SODIUM SERPL-SCNC: 146 MMOL/L (ref 136–145)
TROPONIN T SERPL-MCNC: 0.12 NG/ML (ref 0–0.03)

## 2021-02-05 PROCEDURE — 93461 R&L HRT ART/VENTRICLE ANGIO: CPT | Performed by: INTERNAL MEDICINE

## 2021-02-05 PROCEDURE — C1769 GUIDE WIRE: HCPCS | Performed by: INTERNAL MEDICINE

## 2021-02-05 PROCEDURE — 80053 COMPREHEN METABOLIC PANEL: CPT | Performed by: INTERNAL MEDICINE

## 2021-02-05 PROCEDURE — 25010000002 CEFTRIAXONE PER 250 MG: Performed by: INTERNAL MEDICINE

## 2021-02-05 PROCEDURE — 4A023N8 MEASUREMENT OF CARDIAC SAMPLING AND PRESSURE, BILATERAL, PERCUTANEOUS APPROACH: ICD-10-PCS | Performed by: INTERNAL MEDICINE

## 2021-02-05 PROCEDURE — 99233 SBSQ HOSP IP/OBS HIGH 50: CPT | Performed by: INTERNAL MEDICINE

## 2021-02-05 PROCEDURE — B2131ZZ FLUOROSCOPY OF MULTIPLE CORONARY ARTERY BYPASS GRAFTS USING LOW OSMOLAR CONTRAST: ICD-10-PCS | Performed by: INTERNAL MEDICINE

## 2021-02-05 PROCEDURE — 85018 HEMOGLOBIN: CPT

## 2021-02-05 PROCEDURE — 94799 UNLISTED PULMONARY SVC/PX: CPT

## 2021-02-05 PROCEDURE — 25010000002 HEPARIN (PORCINE) PER 1000 UNITS: Performed by: INTERNAL MEDICINE

## 2021-02-05 PROCEDURE — 25010000002 MIDAZOLAM PER 1 MG: Performed by: INTERNAL MEDICINE

## 2021-02-05 PROCEDURE — 99233 SBSQ HOSP IP/OBS HIGH 50: CPT | Performed by: THORACIC SURGERY (CARDIOTHORACIC VASCULAR SURGERY)

## 2021-02-05 PROCEDURE — 82962 GLUCOSE BLOOD TEST: CPT

## 2021-02-05 PROCEDURE — 25010000002 FENTANYL CITRATE (PF) 100 MCG/2ML SOLUTION: Performed by: INTERNAL MEDICINE

## 2021-02-05 PROCEDURE — 84484 ASSAY OF TROPONIN QUANT: CPT | Performed by: INTERNAL MEDICINE

## 2021-02-05 PROCEDURE — 99222 1ST HOSP IP/OBS MODERATE 55: CPT | Performed by: INTERNAL MEDICINE

## 2021-02-05 PROCEDURE — 0 IOPAMIDOL PER 1 ML: Performed by: INTERNAL MEDICINE

## 2021-02-05 PROCEDURE — C1894 INTRO/SHEATH, NON-LASER: HCPCS | Performed by: INTERNAL MEDICINE

## 2021-02-05 PROCEDURE — 97140 MANUAL THERAPY 1/> REGIONS: CPT

## 2021-02-05 PROCEDURE — B2111ZZ FLUOROSCOPY OF MULTIPLE CORONARY ARTERIES USING LOW OSMOLAR CONTRAST: ICD-10-PCS | Performed by: INTERNAL MEDICINE

## 2021-02-05 PROCEDURE — 85014 HEMATOCRIT: CPT

## 2021-02-05 RX ORDER — MIDAZOLAM HYDROCHLORIDE 1 MG/ML
INJECTION INTRAMUSCULAR; INTRAVENOUS AS NEEDED
Status: DISCONTINUED | OUTPATIENT
Start: 2021-02-05 | End: 2021-02-05 | Stop reason: HOSPADM

## 2021-02-05 RX ORDER — FENTANYL CITRATE 50 UG/ML
INJECTION, SOLUTION INTRAMUSCULAR; INTRAVENOUS AS NEEDED
Status: DISCONTINUED | OUTPATIENT
Start: 2021-02-05 | End: 2021-02-05 | Stop reason: HOSPADM

## 2021-02-05 RX ORDER — SODIUM CHLORIDE 9 MG/ML
75 INJECTION, SOLUTION INTRAVENOUS CONTINUOUS
Status: DISCONTINUED | OUTPATIENT
Start: 2021-02-05 | End: 2021-02-05

## 2021-02-05 RX ORDER — ACETAMINOPHEN 325 MG/1
650 TABLET ORAL EVERY 4 HOURS PRN
Status: DISCONTINUED | OUTPATIENT
Start: 2021-02-05 | End: 2021-02-11 | Stop reason: HOSPADM

## 2021-02-05 RX ORDER — LIDOCAINE HYDROCHLORIDE 20 MG/ML
INJECTION, SOLUTION INFILTRATION; PERINEURAL AS NEEDED
Status: DISCONTINUED | OUTPATIENT
Start: 2021-02-05 | End: 2021-02-05 | Stop reason: HOSPADM

## 2021-02-05 RX ADMIN — AMLODIPINE BESYLATE 5 MG: 5 TABLET ORAL at 08:43

## 2021-02-05 RX ADMIN — CEFTRIAXONE SODIUM 1 G: 1 INJECTION, SOLUTION INTRAVENOUS at 13:22

## 2021-02-05 RX ADMIN — CLOTRIMAZOLE AND BETAMETHASONE DIPROPIONATE 1 APPLICATION: 10; .5 CREAM TOPICAL at 08:45

## 2021-02-05 RX ADMIN — SODIUM CHLORIDE, PRESERVATIVE FREE 3 ML: 5 INJECTION INTRAVENOUS at 09:17

## 2021-02-05 RX ADMIN — CARVEDILOL 6.25 MG: 6.25 TABLET, FILM COATED ORAL at 08:44

## 2021-02-05 RX ADMIN — VILAZODONE HYDROCHLORIDE 20 MG: 40 TABLET ORAL at 21:41

## 2021-02-05 RX ADMIN — CARVEDILOL 6.25 MG: 6.25 TABLET, FILM COATED ORAL at 17:08

## 2021-02-05 RX ADMIN — ALPRAZOLAM 1 MG: 0.5 TABLET ORAL at 21:29

## 2021-02-05 RX ADMIN — SODIUM CHLORIDE 75 ML/HR: 9 INJECTION, SOLUTION INTRAVENOUS at 09:18

## 2021-02-05 RX ADMIN — BUDESONIDE AND FORMOTEROL FUMARATE DIHYDRATE 2 PUFF: 160; 4.5 AEROSOL RESPIRATORY (INHALATION) at 20:28

## 2021-02-05 RX ADMIN — GABAPENTIN 100 MG: 100 CAPSULE ORAL at 08:45

## 2021-02-05 RX ADMIN — SODIUM CHLORIDE, PRESERVATIVE FREE 10 ML: 5 INJECTION INTRAVENOUS at 22:45

## 2021-02-05 RX ADMIN — APIXABAN 5 MG: 5 TABLET, FILM COATED ORAL at 21:29

## 2021-02-05 RX ADMIN — HYDROCODONE BITARTRATE AND ACETAMINOPHEN 1 TABLET: 5; 325 TABLET ORAL at 19:56

## 2021-02-05 RX ADMIN — Medication 1 CAPSULE: at 11:47

## 2021-02-05 RX ADMIN — NYSTATIN: 100000 CREAM TOPICAL at 08:46

## 2021-02-05 RX ADMIN — DOCUSATE SODIUM 50MG AND SENNOSIDES 8.6MG 1 TABLET: 8.6; 5 TABLET, FILM COATED ORAL at 08:44

## 2021-02-05 RX ADMIN — ALPRAZOLAM 1 MG: 0.5 TABLET ORAL at 08:59

## 2021-02-05 RX ADMIN — PANTOPRAZOLE SODIUM 40 MG: 40 TABLET, DELAYED RELEASE ORAL at 08:56

## 2021-02-05 RX ADMIN — LEVOTHYROXINE SODIUM 25 MCG: 0.03 TABLET ORAL at 06:46

## 2021-02-05 RX ADMIN — GABAPENTIN 100 MG: 100 CAPSULE ORAL at 21:29

## 2021-02-05 RX ADMIN — BUMETANIDE 2 MG/HR: 0.25 INJECTION INTRAMUSCULAR; INTRAVENOUS at 06:46

## 2021-02-05 RX ADMIN — SODIUM CHLORIDE, PRESERVATIVE FREE 10 ML: 5 INJECTION INTRAVENOUS at 09:18

## 2021-02-05 RX ADMIN — BUDESONIDE AND FORMOTEROL FUMARATE DIHYDRATE 2 PUFF: 160; 4.5 AEROSOL RESPIRATORY (INHALATION) at 08:21

## 2021-02-05 RX ADMIN — ATORVASTATIN CALCIUM 20 MG: 20 TABLET, FILM COATED ORAL at 21:29

## 2021-02-05 NOTE — PLAN OF CARE
Goal Outcome Evaluation:         Pt has IV Bumex gtt ongoing; responding well with good amount of urine output; see documentation; pt verbally acknowledged PO fluid intake restrictions; no complaints; Discussed external urine catheter - changing of collection container etc; Worried that NA didn't restart suction of ext catheter; Reorientated pt twice; assured pt she was dry with dry linens and gown; pt anxious therefore we changed her gown and completed change of all linens;    Pt verbally acknowledged need for ambulation and willingly worked with PT/OT today;

## 2021-02-05 NOTE — PROGRESS NOTES
Name: Miguelina Lopez ADMIT: 2021   : 1945  PCP: Arcelia Talbot APRN    MRN: [id number] LOS: 4 days   AGE/SEX: 75 y.o. female  ROOM: [facility]/     Subjective   Subjective   Examined at bedside.  Reports no acute issues.  Continues to diurese well.  Down to 2 L via nasal cannula    Review of Systems   Constitutional: Negative.    Respiratory: Negative.    Cardiovascular: Positive for leg swelling. Negative for chest pain.   Gastrointestinal: Negative.         Objective   Objective   Vital Signs  Temp:  [97.6 °F (36.4 °C)-98.2 °F (36.8 °C)] 98 °F (36.7 °C)  Heart Rate:  [79-91] 79  Resp:  [16-18] 16  BP: (130-155)/(59-75) 130/59  SpO2:  [91 %-95 %] 91 %  on  Flow (L/min):  [3] 3;   Device (Oxygen Therapy): nasal cannula  Body mass index is 52.79 kg/m².  Physical Exam  Constitutional:       Appearance: Normal appearance. She is obese.   HENT:      Head: Normocephalic and atraumatic.   Cardiovascular:      Rate and Rhythm: Regular rhythm.      Heart sounds: Murmur present.   Pulmonary:      Effort: Pulmonary effort is normal. No respiratory distress.      Comments: On 2 L nasal cannula  Abdominal:      General: There is no distension.      Palpations: Abdomen is soft.      Tenderness: There is no abdominal tenderness.   Musculoskeletal:      Right lower leg: Edema present.      Left lower leg: Edema present.   Neurological:      Mental Status: She is alert.         Intake/Output Summary (Last 24 hours) at 2021 1233  Last data filed at 2021 1100  Gross per 24 hour   Intake 580 ml   Output 4200 ml   Net -3620 ml       Results Review     I reviewed the patient's new clinical results.  Results from last 7 days   Lab Units 21  0958 21  0952 21  1124 21  0419 21  0829 21  0339   WBC 10*3/mm3  --   --  11.44* 12.59* 11.57* 11.42*   HEMOGLOBIN g/dL  --   --  11.1* 12.2 11.6* 11.7*   HEMOGLOBIN, POC g/dL 11.6* 11.9*  --   --   --   --    PLATELETS 10*3/mm3  --   --   226 247 226 253     Results from last 7 days   Lab Units 02/05/21  1100 02/05/21  0628 02/04/21  0434 02/03/21  1716 02/03/21  1304   SODIUM mmol/L 145 146* 145  --  147*   POTASSIUM mmol/L 3.5 3.5 3.7 4.5 3.5   CHLORIDE mmol/L 98 97* 100  --  100   CO2 mmol/L 37.7* 38.5* 31.1*  --  35.0*   BUN mg/dL 25* 26* 20  --  20   CREATININE mg/dL 1.59* 1.51* 1.42*  --  1.39*   GLUCOSE mg/dL 119* 99 133*  --  126*   Estimated Creatinine Clearance: 36.4 mL/min (A) (by C-G formula based on SCr of 1.59 mg/dL (H)).  Results from last 7 days   Lab Units 02/05/21  1100 02/05/21  0628 02/04/21  0434 02/01/21  0339   ALBUMIN g/dL 3.10* 3.30* 3.10* 3.70   BILIRUBIN mg/dL 0.6 0.6 0.7 0.6   ALK PHOS U/L 218* 230* 216* 259*   AST (SGOT) U/L 25 24 28 23   ALT (SGPT) U/L 10 12 12 14     Results from last 7 days   Lab Units 02/05/21  1100 02/05/21  0628 02/04/21  0434 02/03/21  1304 02/03/21  0419  02/01/21  0339   CALCIUM mg/dL 8.5* 8.6 8.3* 9.0 8.9   < > 8.5*   ALBUMIN g/dL 3.10* 3.30* 3.10*  --   --   --  3.70   MAGNESIUM mg/dL  --   --   --   --  2.1  --  2.0   PHOSPHORUS mg/dL  --   --   --   --  3.5  --   --     < > = values in this interval not displayed.     Results from last 7 days   Lab Units 02/01/21  0339   PROCALCITONIN ng/mL 0.07     COVID19   Date Value Ref Range Status   02/01/2021 Not Detected Not Detected - Ref. Range Final     Glucose   Date/Time Value Ref Range Status   02/05/2021 1053 118 70 - 130 mg/dL Final   02/05/2021 0618 113 70 - 130 mg/dL Final   02/04/2021 2121 165 (H) 70 - 130 mg/dL Final   02/04/2021 1554 172 (H) 70 - 130 mg/dL Final   02/04/2021 1048 150 (H) 70 - 130 mg/dL Final   02/04/2021 0539 125 70 - 130 mg/dL Final   02/03/2021 2011 177 (H) 70 - 130 mg/dL Final       Cardiac Catheterization/Vascular Study  1.  Ischemic heart disease:  Etiology: Coronary atherosclerosis  Anatomy: Three-vessel coronary disease.  Patent AO-D1 SVG, patent AO-PDA   SVG, patent mid LAD stent, patent LCx stent.  Physiology:  Elevated troponin    2.  Aortic stenosis: Severe    3.  Pulmonary hypertension: Severe    Scheduled Medications  amLODIPine, 5 mg, Oral, Q24H  apixaban, 5 mg, Oral, Q12H  atorvastatin, 20 mg, Oral, Daily  budesonide-formoterol, 2 puff, Inhalation, BID - RT  carvedilol, 6.25 mg, Oral, BID With Meals  cefTRIAXone, 1 g, Intravenous, Q24H  clotrimazole-betamethasone, , Topical, BID  fluticasone, 2 spray, Each Nare, Daily  gabapentin, 100 mg, Oral, Q12H  insulin lispro, 0-7 Units, Subcutaneous, TID AC  lactobacillus acidophilus, 1 capsule, Oral, Daily  levothyroxine, 25 mcg, Oral, Daily  nystatin, , Topical, BID  pantoprazole, 40 mg, Oral, QAM  sennosides-docusate, 1 tablet, Oral, Daily  sodium chloride, 10 mL, Intravenous, Q12H  sodium chloride, 3 mL, Intravenous, Q12H  vilazodone, 20 mg, Oral, Nightly    Infusions  sodium chloride, 75 mL/hr, Last Rate: 75 mL/hr (02/05/21 0918)    Diet  Diet Regular; Consistent Carbohydrate, Cardiac       Assessment/Plan     Active Hospital Problems    Diagnosis  POA   • **Chest pain [R07.9]  Yes   • UTI (urinary tract infection), bacterial [N39.0, A49.9]  Yes   • Complete heart block (CMS/HCC) [I44.2]  Unknown   • COPD (chronic obstructive pulmonary disease) (CMS/HCC) [J44.9]  Yes   • Chronic respiratory failure with hypoxia and hypercapnia (CMS/HCC) [J96.11, J96.12]  Yes   • Chronic diastolic heart failure (CMS/HCC) [I50.32]  Yes   • Supplemental oxygen dependent [Z99.81]  Not Applicable   • s/p MVR, TV-repair, CABG x2 6/13/16 [Z95.2]  Not Applicable   • Pulmonary hypertension due to sleep-disordered breathing (CMS/HCC) [I27.29, G47.8]  Yes   • OCHOA (obstructive sleep apnea) [G47.33]  Yes   • DM type 2 (diabetes mellitus, type 2) (CMS/HCC) [E11.9]  Yes   • Diabetic peripheral neuropathy (CMS/HCC) [E11.42]  Yes   • Hyperlipidemia [E78.5]  Yes   • Essential hypertension [I10]  Yes   • CKD (chronic kidney disease) stage 3, GFR 30-59 ml/min (CMS/[facility]) [N18.30]  Yes      Resolved Hospital  Problems   No resolved problems to display.       75 y.o. female admitted with Chest pain.    Ms. Lopez is a 75 year old female who presented to the hospital with chest pain and dyspnea. Found to have diffuse opacities on CXR with elevated proBNP.     Chest pain/acute on chronic diastolic heart failure  -Cardiology following. Found to have complete heart block/aflutter. Had PPM placed 2/2 and now pacing. Now on Eliquis with possible cardioversion in future. BB now back on board.  -Nephrology managing diuretics. Continues on Bumex gtt. metolazone added.   -Cardiac regimen in place with ASA, statin. Norvasc for BP. Coreg added 2/3.  -Troponin elevated.  She will go for left heart cath today provided that her renal function is okay.      COPD/chronic respiratory failure with hypoxia and hypercapnia/OCHOA  -Pulmonology following. To use home Trilogy.   -ABGs stable. Wean oxygen as able. Only on 2-3 L today.     CAD/history of CABG/valvular heart disease  -Cards following as above.  -Repeat echo with preserved EF and severe AS. Valve is worse than prior. May need TAVR in future.     DM2/neuropathy  -SSI as needed. Monitor ACHS. Hold oral medications.  -A1c 6.71%. sugars stable/borderline low. Monitor and encourage oral intake.     HTN  -BP improving with diuresis. Arb on hold. BB and Norvasc added this admission.     CKD3  -Baseline creatinine around 1.2, currently slightly uptrending    -Nephrology following. Potassium replaced with protocol.      UTI  -Mild leukocytosis as well as foul-smelling urine reported. UA with 2+ bacteria and 13-20 WBC. Culture growing > 100, 000 Enterococcus faecalis. Could be contaminate but with urinary symptoms and leukocytosis will treat for 3 days (last dose 2/5/21)  With ceftriaxone.      · Eliquis (home med) for DVT prophylaxis.  · Full code.  · Discussed with patient.  · Anticipate discharge TBD.  when cleared by consultants.      Salvador Guardado MD  [facility]  02/05/21  12:31 EST    Patient was wearing facemask when I entered the room and throughout our encounter.  I wore protective equipment throughout this patient encounter including a face mask, gloves and protective eyewear.  Hand hygiene was performed before donning protective equipment and after removal when leaving the room.

## 2021-02-05 NOTE — CONSULTS
Structural heart team consultation    Referring Provider:   brie    Patient Care Team:  Arcelia Talbot APRN as PCP - General (Nurse Practitioner)  Robbie Wilson MD as Consulting Physician (Hematology and Oncology)  Chace Johnson MD as Consulting Physician (Cardiology)      Reason for Consultation:   Aortic valve stenosis    History of present illness: Pleasant 75-year-old female patient of Dr. Mcgill with a medical history of chronic diastolic heart failure, paroxysmal atrial fibrillation/flutter, chronic kidney disease, essential hypertension, coronary disease with prior CABG, mitral valve replacement with bioprosthetic mitral valve and tricuspid valve repair.  She also has a history of diabetes mellitus and COPD.  She was admitted with shortness of breath and chest discomfort.  She was noted to have an elevated proBNP and acute on chronic diastolic heart failure.  Troponin was noted to be elevated at 0.1 and appears to be stable.  Blood pressure has been poorly controlled and systolic is ranging anywhere from 150 to 160 mmHg.  Chronic kidney disease appears to be stable.  Has been diuresing well and was net -3 L yesterday.  Taken for coronary and graft angiography today with patent grafts.  Transthoracic echocardiogram was performed and reviewed as below showing severe aortic valve stenosis.    Review of Systems  All other systems reviewed and negative.     Past Medical History:   Past Medical History:   Diagnosis Date   • Acute on chronic respiratory failure with hypoxia and hypercapnia (CMS/[facility])    • OLI (acute kidney injury) (CMS/HCC)    • Anemia    • Anxiety    • Aortic valve stenosis    • Bilateral lower extremity edema    • CHF (congestive heart failure) (CMS/HCC)    • Chronic coronary artery disease    • Class 3 severe obesity due to excess calories in adult (CMS/HCC)    • COPD (chronic obstructive pulmonary disease) (CMS/HCC)    • Depression    • Diabetes mellitus (CMS/HCC)    •  Elevated cholesterol    • GERD (gastroesophageal reflux disease)    • Heart murmur    • Hypertension    • Mitral valve insufficiency    • Pneumonia     1/2016   • Pulmonary hypertension (CMS/HCC)     due to sleep disordered breathing   • Sleep apnea     Uses CPAP or oxygen   • Stage 3 chronic kidney disease (CMS/HCC)    • Subclinical hypothyroidism    • Supplemental oxygen dependent    • Valvular heart disease        Past Surgical History:   Past Surgical History:   Procedure Laterality Date   • CARDIAC CATHETERIZATION     • CARDIAC CATHETERIZATION N/A 6/10/2016    Procedure: Left Heart Cath;  Surgeon: Chace Johnson MD;  Location: [facility] CATH INVASIVE LOCATION;  Service:    • CARDIAC CATHETERIZATION N/A 6/10/2016    Procedure: Right Heart Cath;  Surgeon: Chace Johnson MD;  Location: [facility] CATH INVASIVE LOCATION;  Service:    • CARDIAC ELECTROPHYSIOLOGY PROCEDURE N/A 2/2/2021    Procedure: Pacemaker DC new---Medtronic MICRA;  Surgeon: Eliazar Bond MD;  Location: [facility] CATH INVASIVE LOCATION;  Service: Cardiology;  Laterality: N/A;   • CARDIAC SURGERY     • CORONARY ARTERY BYPASS GRAFT      2 vessel   • CORONARY ARTERY BYPASS GRAFT WITH MITRAL VALVE REPAIR/REPLACEMENT N/A 6/13/2016    Procedure: INTRAOPERATIVE TARIQ, MIDLINE STERNOTOMY, CORONARY ARTERY BYPASS GRAFTING X  2 UTILIZING ENDOSCOPICALLY HARVESTED LEFT GREATER SAPHENOUS VEIN, MITRAL VALVE REPLACEMENT AND TRICUSPID VALVE REPAIR;  Surgeon: Eliecer Mistry MD;  Location: [facility] OR;  Service:    • CORONARY STENT PLACEMENT  2010    Approx. 6 yrs ago at [facility]   • HEMORRHOIDECTOMY     • HYSTERECTOMY     • MITRAL VALVE REPLACEMENT     • REPLACEMENT TOTAL KNEE Right    • THYROID SURGERY      Cyst removed from thyroid   • VASCULAR SURGERY         Family History:   Family History   Problem Relation Age of Onset   • Heart attack Father    • Heart disease Father        Social History:   Social History     Tobacco Use   • Smoking status: Never  Smoker   • Smokeless tobacco: Never Used   • Tobacco comment: no caffeine    Substance Use Topics   • Alcohol use: No   • Drug use: No       Home Medications:   Facility-Administered Medications Prior to Admission   Medication Dose Route Frequency Provider Last Rate Last Admin   • methylPREDNISolone acetate (DEPO-medrol) injection 40 mg  40 mg Intra-articular Once Michelle Abernathy MD         Medications Prior to Admission   Medication Sig Dispense Refill Last Dose   • ALPRAZolam (XANAX) 1 MG tablet Take 1 mg by mouth 2 (Two) Times a Day.   Patient Taking Differently at Unknown time   • aspirin  MG tablet Take 1 tablet by mouth Daily. 90 tablet 1 1/31/2021 at Unknown time   • atorvastatin (LIPITOR) 20 MG tablet TAKE ONE TABLET BY MOUTH DAILY 90 tablet 0 1/31/2021 at Unknown time   • clotrimazole-betamethasone (LOTRISONE) 1-0.05 % cream Apply  topically to the appropriate area as directed 2 (Two) Times a Day. 45 g 0 Past Week at Unknown time   • fluticasone-salmeterol (ADVAIR DISKUS) 250-50 MCG/DOSE DISKUS Inhale 1 puff Daily As Needed (cough and wheezing). 60 each 0 1/31/2021 at Unknown time   • furosemide (LASIX) 40 MG tablet TAKE ONE TABLET BY MOUTH TWICE A DAY 60 tablet 0 1/31/2021 at Unknown time   • gabapentin (NEURONTIN) 100 MG capsule Take 1 capsule by mouth Every 12 (Twelve) Hours. 60 capsule 2 1/31/2021 at Unknown time   • glimepiride (AMARYL) 1 MG tablet TAKE ONE TABLET BY MOUTH EVERY MORNING BEFORE BREAKFAST 90 tablet 1 1/31/2021 at Unknown time   • ipratropium-albuterol (DUO-NEB) 0.5-2.5 mg/mL nebulizer Take 3 mL by nebulization 4 (four) times a day. (Patient taking differently: Take 3 mL by nebulization Daily As Needed.) 3 mL 5 1/31/2021 at Unknown time   • levothyroxine (SYNTHROID, LEVOTHROID) 25 MCG tablet Take 1 tablet by mouth Daily. 90 tablet 1 1/31/2021 at Unknown time   • losartan (COZAAR) 100 MG tablet TAKE ONE TABLET BY MOUTH DAILY 90 tablet 4 1/31/2021 at Unknown time   • Misc. Devices  (COMMODE BEDSIDE) misc 1 each Daily. 1 each 0 1/31/2021 at Unknown time   • nitroglycerin (NITROSTAT) 0.4 MG SL tablet DISSOLVE 1 TAB UNDER TONGUE FOR CHEST PAIN - IF PAIN REMAINS AFTER 5 MIN, CALL 911 AND REPEAT DOSE. MAX 3 TABS IN 15 MINUTES 25 tablet 4 1/31/2021 at Unknown time   • nystatin (MYCOSTATIN) 876404 UNIT/GM cream Apply  topically to the appropriate area as directed 2 (Two) Times a Day. to affected area(s) 30 g 3 1/31/2021 at Unknown time   • O2 (OXYGEN) Inhale 4 L/min Continuous.   Patient Taking Differently at Unknown time   • omeprazole (priLOSEC) 40 MG capsule Take 1 capsule by mouth Daily. 90 capsule 0 1/31/2021 at Unknown time   • Probiotic Product (PROBIOTIC PO) Take  by mouth.   1/31/2021 at Unknown time   • senna-docusate (PERICOLACE) 8.6-50 MG per tablet Take 1 tablet by mouth daily.   1/31/2021 at Unknown time   • traMADol (ULTRAM) 50 MG tablet TAKE ONE TABLET BY MOUTH EVERY 8 HOURS AS NEEDED FOR MODERATE LEG PAIN FOR UP TO 90 DOSES 30 tablet 1 1/31/2021 at Unknown time   • TRULICITY 0.75 MG/0.5ML solution pen-injector INJECT 0.75 MG UNDER THE SKIN ONCE WEEKLY 6 pen 5 1/31/2021 at Unknown time   • vilazodone (VIIBRYD) 20 MG tablet tablet Take 20 mg by mouth Every Night.   1/31/2021 at Unknown time   • vitamin D (ERGOCALCIFEROL) 1.25 MG (46952 UT) capsule capsule TAKE ONE CAPSULE BY MOUTH ONCE WEEKLY 4 capsule 0 Past Week at Unknown time       Current Medications:   Current Facility-Administered Medications:   •  acetaminophen (TYLENOL) tablet 650 mg, 650 mg, Oral, Q4H PRN, 650 mg at 02/02/21 0319 **OR** acetaminophen (TYLENOL) 160 MG/5ML solution 650 mg, 650 mg, Oral, Q4H PRN **OR** acetaminophen (TYLENOL) suppository 650 mg, 650 mg, Rectal, Q4H PRN, Antoine Ayers MD  •  acetaminophen (TYLENOL) tablet 650 mg, 650 mg, Oral, Q4H PRN, 650 mg at 02/03/21 0459 **OR** acetaminophen (TYLENOL) suppository 650 mg, 650 mg, Rectal, Q4H PRN, Antoine Ayers MD  •  acetaminophen (TYLENOL)  tablet 650 mg, 650 mg, Oral, Q4H PRN, Antoine Ayers MD  •  ALPRAZolam (XANAX) tablet 1 mg, 1 mg, Oral, BID PRN, Antoine Ayers MD, 1 mg at 02/05/21 0859  •  amLODIPine (NORVASC) tablet 5 mg, 5 mg, Oral, Q24H, Antoine Ayers MD, 5 mg at 02/05/21 0843  •  apixaban (ELIQUIS) tablet 5 mg, 5 mg, Oral, Q12H, Antoine Ayers MD  •  atorvastatin (LIPITOR) tablet 20 mg, 20 mg, Oral, Daily, Antoine Ayers MD, 20 mg at 02/04/21 0904  •  bisacodyl (DULCOLAX) EC tablet 5 mg, 5 mg, Oral, Daily PRN, Antoine Ayers MD  •  budesonide-formoterol (SYMBICORT) 160-4.5 MCG/ACT inhaler 2 puff, 2 puff, Inhalation, BID - RT, Antoine Ayers MD, 2 puff at 02/05/21 0821  •  calcium carbonate (TUMS) chewable tablet 500 mg (200 mg elemental), 2 tablet, Oral, BID PRN, Antoine Ayers MD  •  carvedilol (COREG) tablet 6.25 mg, 6.25 mg, Oral, BID With Meals, Antoine Ayers MD, 6.25 mg at 02/05/21 0844  •  cefTRIAXone (ROCEPHIN) IVPB 1 g, 1 g, Intravenous, Q24H, Antoine Ayers MD, Last Rate: 100 mL/hr at 02/04/21 1412, 1 g at 02/04/21 1412  •  clotrimazole-betamethasone (LOTRISONE) 1-0.05 % cream, , Topical, BID, Antoine Ayers MD, 1 application at 02/05/21 0845  •  dextrose (D50W) 25 g/ 50mL Intravenous Solution 25 g, 25 g, Intravenous, Q15 Min PRN, Antoine Ayers MD  •  dextrose (GLUTOSE) oral gel 15 g, 15 g, Oral, Q15 Min PRN, Antoine Ayers MD  •  fluticasone (FLONASE) 50 MCG/ACT nasal spray 2 spray, 2 spray, Each Nare, Daily, Antoine Ayers MD, 2 spray at 02/04/21 0901  •  gabapentin (NEURONTIN) capsule 100 mg, 100 mg, Oral, Q12H, Antoine Ayers MD, 100 mg at 02/05/21 0845  •  glucagon (human recombinant) (GLUCAGEN DIAGNOSTIC) injection 1 mg, 1 mg, Subcutaneous, Q15 Min PRN, Antoine Ayers MD  •  hydrALAZINE (APRESOLINE) injection 10 mg, 10 mg, Intravenous, Q6H PRN, Antoine Ayers MD, 10 mg at 02/02/21 2866  •  HYDROcodone-acetaminophen (NORCO)  5-325 MG per tablet 1 tablet, 1 tablet, Oral, Q4H PRN, Antoine Ayers MD, 1 tablet at 02/04/21 1939  •  HYDROmorphone (DILAUDID) injection 0.5 mg, 0.5 mg, Intravenous, Q2H PRN **AND** naloxone (NARCAN) injection 0.4 mg, 0.4 mg, Intravenous, Q5 Min PRN, Antoine Ayers MD  •  hydrOXYzine (ATARAX) tablet 25 mg, 25 mg, Oral, TID PRN, Antoine Ayers MD, 25 mg at 02/03/21 1652  •  insulin lispro (humaLOG, ADMELOG) injection 0-7 Units, 0-7 Units, Subcutaneous, TID AC, Antoine Ayers MD, 2 Units at 02/04/21 1733  •  ipratropium-albuterol (DUO-NEB) nebulizer solution 3 mL, 3 mL, Nebulization, Q6H PRN, Antoine Ayers MD, 3 mL at 02/01/21 1918  •  lactobacillus acidophilus (RISAQUAD) capsule 1 capsule, 1 capsule, Oral, Daily, Antoine Ayers MD, 1 capsule at 02/05/21 1147  •  levothyroxine (SYNTHROID, LEVOTHROID) tablet 25 mcg, 25 mcg, Oral, Daily, Antoine Ayers MD, 25 mcg at 02/05/21 0646  •  nitroglycerin (NITROSTAT) SL tablet 0.4 mg, 0.4 mg, Sublingual, Q5 Min PRN, Antoine Ayers MD, 0.4 mg at 02/02/21 0603  •  nystatin (MYCOSTATIN) 376995 UNIT/GM cream, , Topical, BID, Antoine Ayers MD, Given at 02/05/21 0846  •  ondansetron (ZOFRAN) tablet 4 mg, 4 mg, Oral, Q6H PRN **OR** ondansetron (ZOFRAN) injection 4 mg, 4 mg, Intravenous, Q6H PRN, Antoine Ayers MD  •  pantoprazole (PROTONIX) EC tablet 40 mg, 40 mg, Oral, QAM, Antoine Ayers MD, 40 mg at 02/05/21 0856  •  potassium chloride (K-DUR,KLOR-CON) ER tablet 40 mEq, 40 mEq, Oral, PRN, 40 mEq at 02/04/21 1413 **OR** potassium chloride (KLOR-CON) packet 40 mEq, 40 mEq, Oral, PRN **OR** potassium chloride 10 mEq in 100 mL IVPB, 10 mEq, Intravenous, Q1H PRN, Antoine Ayers MD  •  sennosides-docusate (PERICOLACE) 8.6-50 MG per tablet 1 tablet, 1 tablet, Oral, Daily, Antoine Ayers MD, 1 tablet at 02/05/21 0844  •  [COMPLETED] Insert peripheral IV, , , Once **AND** sodium chloride 0.9 % flush 10 mL, 10  "mL, Intravenous, PRN, Antoine Ayers MD  •  sodium chloride 0.9 % flush 10 mL, 10 mL, Intravenous, Q12H, Antoine Ayers MD, 10 mL at 02/05/21 0918  •  sodium chloride 0.9 % flush 10 mL, 10 mL, Intravenous, PRN, Antoine Ayers MD  •  sodium chloride 0.9 % flush 10 mL, 10 mL, Intravenous, PRN, Antoine Ayers MD  •  sodium chloride 0.9 % flush 3 mL, 3 mL, Intravenous, Q12H, Antoine Ayers MD, 3 mL at 02/05/21 0917  •  sodium chloride 0.9 % infusion, 75 mL/hr, Intravenous, Continuous, Antoine Ayers MD, Last Rate: 75 mL/hr at 02/05/21 0918, 75 mL/hr at 02/05/21 0918  •  sodium chloride nasal spray 1 spray, 1 spray, Each Nare, PRN, Antoine Ayers MD  •  traMADol (ULTRAM) tablet 50 mg, 50 mg, Oral, Q8H PRN, Antoine Ayers MD, 50 mg at 02/01/21 1803  •  vilazodone (VIIBRYD) tablet 20 mg, 20 mg, Oral, Nightly, Antoine Ayers MD, 20 mg at 02/04/21 2114     Allergies: Coumadin [warfarin sodium], Bumex [bumetanide], Erythromycin, Hctz [hydrochlorothiazide], Zaroxolyn [metolazone], Penicillins, and Sulfa antibiotics      Vital Signs   Temp:  [97.6 °F (36.4 °C)-98.2 °F (36.8 °C)] 98.2 °F (36.8 °C)  Heart Rate:  [79-91] 79  Resp:  [16-18] 16  BP: (141-155)/(72-75) 155/72  Flowsheet Rows      First Filed Value   Admission Height  152.4 cm (60\") Documented at 02/01/2021 0315   Admission Weight  122 kg (270 lb) Documented at 02/01/2021 0759          General Appearance:    Alert, cooperative, in no acute distress   Head:    Normocephalic, without obvious abnormality, atraumatic       Neck:   No adenopathy, supple, no thyromegaly, no carotid bruit, no    JVD   Lungs:     Clear to auscultation bilaterally, no wheezes, rales, or     rhonchi    Heart:    Normal rate, regular rhythm, 2 out of 6 systolic murmur peak late.  No rub, no gallop   Chest Wall:   Sternotomy   Abdomen:     Normal bowel sounds, soft, nontender, nondistended,            no rebound tenderness   Extremities:   No " cyanosis, clubbing, or edema   Pulses:   Pulses palpable and equal bilaterally   Skin:   No bleeding or rash   Lymph nodes:   No cervical adenopathy   Neurologic:   Cranial nerves 2 - 12 grossly intact, sensation intact               Results Review: I personally viewed and interpreted the patient's EKG/Telemetry data    Results from last 7 days   Lab Units 02/05/21  0958  02/04/21  1124   WBC 10*3/mm3  --   --  11.44*   HEMOGLOBIN g/dL  --   --  11.1*   HEMOGLOBIN, POC g/dL 11.6*   < >  --    HEMATOCRIT %  --   --  35.5   HEMATOCRIT POC % 34*   < >  --    PLATELETS 10*3/mm3  --   --  226    < > = values in this interval not displayed.     Results from last 7 days   Lab Units 02/05/21  1100   SODIUM mmol/L 145   POTASSIUM mmol/L 3.5   CHLORIDE mmol/L 98   CO2 mmol/L 37.7*   BUN mg/dL 25*   CREATININE mg/dL 1.59*   GLUCOSE mg/dL 119*   CALCIUM mg/dL 8.5*     Lab Results   Lab Value Date/Time    TROPONINT 0.121 (C) 02/05/2021 0628    TROPONINT 0.101 (C) 02/04/2021 0434    TROPONINT 0.046 (C) 02/02/2021 0739    TROPONINT 0.026 02/01/2021 1200    TROPONINT 0.016 02/01/2021 0551    TROPONINT 0.011 02/01/2021 0339    TROPONINT <0.010 12/15/2019 1145    TROPONINT 0.027 06/10/2019 1714    TROPONINT 0.103 (C) 07/29/2016 1435    TROPONINT <0.010 06/09/2016 1208    TROPONINT <0.010 06/09/2016 0613    TROPONINT <0.010 06/08/2016 1915    TROPONINT <0.010 06/08/2016 1915    TROPONINT <0.010 05/24/2016 1254    TROPONINT <0.01 01/30/2016 2159    TROPONINT <0.01 01/30/2016 1556       RA pressures  (A/V/M) : 17/14/12 mmHg  RV pressures (S/D/E) : 68/8/PA pressures (S/D/M) : 67/30/42 mmHg  PCW pressures (A/V/M) 32/24 mmHg    Aortic valve area: 0.85 cm²  Mean gradient  29 mmHg    Cardiac output: 4.4 L/min  Cardiac index: 2.07 L/min/m²  Stroke-volume index 25.92 mL per beat per meter square    2/1/2021  · Left ventricular ejection fraction appears to be 66 - 70%.  · Left ventricular wall thickness is consistent with mild concentric  hypertrophy  · The right atrial cavity is mildly dilated.  · The right ventricular cavity is moderately dilated. Normal right ventricular systolic function noted.  · The left atrial cavity is moderately dilated.  · Severe aortic valve stenosis is present  · Aortic valve maximum pressure gradient is 69 mmHg. Aortic valve mean pressure gradient is 42 mmHg.  · There is a tissue mitral valve replacement which appears to be well-seated.  · Gradients across the tissue mitral valve replacement are elevated with a peak of 24 mmHg and a mean of 10 mmHg  · The tricuspid valve is status post repair  · Mild to moderate tricuspid valve regurgitation is present.  · Calculated right ventricular systolic pressure from tricuspid regurgitation is 47 mmHg.  · There is no evidence of pericardial effusion.     2016  Procedure: Mitral valve replacement with a 27 mm Medtronic Mcdermott 2 porcine valve.  Tricuspid valve repair with a 26 mm triad Frias ring.  Coronary artery bypass graft ×2 vein grafts to diagonal branch of the LAD and posterior descending artery    Assessment/Plan   1. Aortic valve stenosis: Severe  2. Coronary artery disease with prior CABG  3. Moderate to severe pulmonary hypertension  4.  Acute on chronic diastolic heart failure  5.  Complete heart block status post pacemaker placement  6.  Paroxysmal atrial flutter  7.  History of mitral valve replacement: Elevated mean gradient across mitral valve prosthesis.  10 mmHg  8.  History of tricuspid valve repair    -I do agree that she has severe aortic valve stenosis that is symptomatic.  I have recommended that she be evaluated by the surgical team and felt to be high risk undergo CTA chest abdomen and pelvis in about a week after the catheterization  -I will continue to follow    I discussed the patient's findings and my recommendations with the patient

## 2021-02-05 NOTE — PLAN OF CARE
Goal Outcome Evaluation:  Plan of Care Reviewed With: patient  Progress: improving  Outcome Summary: OT Lymph:  Pt. is O x 3, Pt. continue to tolerate the LE wraps, (B)LE have continued to reduce in size, both remain moderate (L) continue to be larger than (R).  Therapist deloris and lore LE wraps from the base of the pt's toes to her knees (B)ly.  RNs to complete wrapping on Saturday and Sunday, OT will return on Monday.  Pt. continues to benfit from skilled OT. Written instructions in the pt. Room for LE wrapping.   Pt. Wore a mask, OT wore a mask, eye protection and completed hand hygiene before and after the treatment

## 2021-02-05 NOTE — CONSULTS
CARDIOTHORACIC CONSULT NOTE      Referring Provider: Dr. Ayers    Reason for Consultation: Aortic stenosis    Attending: Salvador Guardado MD Subjective .     History of present illness:  Miguelina Lopez is a 75 y.o. female with a history of CABG/MVR/TV repair in 2016 by Dr. Mistry who presented to the ED on 2/1/2021 with shortness of breath and chest pain, she was initially on high flow oxygen, but was eventually weaned down to 4 liters by nasal cannula, which is her home requirement.  She is being treated for acute on chronic diastolic heart failure.  She has a history of paroxysmal atrial fibrillation and RBBB, she developed complete heart block and is now s/p Micra placement 2/2/2021.  She is being followed by nephrology with OLI on CKD, her creatinine is relatively stable at 1.4-1.6 with diuresis.  She underwent cardiac cath today that demonstrated patent SVG grafts to D1 and PDA, and patent stents in LAD and Lcx.  We have been consulted for surgical opinion regarding her aortic stenosis.      ROS    History  Past Medical History:   Diagnosis Date   • Acute on chronic respiratory failure with hypoxia and hypercapnia (CMS/HCC)    • OLI (acute kidney injury) (CMS/HCC)    • Anemia    • Anxiety    • Aortic valve stenosis    • Bilateral lower extremity edema    • CHF (congestive heart failure) (CMS/HCC)    • Chronic coronary artery disease    • Class 3 severe obesity due to excess calories in adult (CMS/HCC)    • COPD (chronic obstructive pulmonary disease) (CMS/HCC)    • Depression    • Diabetes mellitus (CMS/HCC)    • Elevated cholesterol    • GERD (gastroesophageal reflux disease)    • Heart murmur    • Hypertension    • Mitral valve insufficiency    • Pneumonia     1/2016   • Pulmonary hypertension (CMS/HCC)     due to sleep disordered breathing   • Sleep apnea     Uses CPAP or oxygen   • Stage 3 chronic kidney disease (CMS/HCC)    • Subclinical hypothyroidism    • Supplemental oxygen dependent    • Valvular  heart disease        Past Surgical History:   Procedure Laterality Date   • CARDIAC CATHETERIZATION     • CARDIAC CATHETERIZATION N/A 6/10/2016    Procedure: Left Heart Cath;  Surgeon: Chace Johnson MD;  Location:  [facility];  Service:    • CARDIAC CATHETERIZATION N/A 6/10/2016    Procedure: Right Heart Cath;  Surgeon: Chace Johnson MD;  Location:  [facility];  Service:    • CARDIAC ELECTROPHYSIOLOGY PROCEDURE N/A 2/2/2021    Procedure: Pacemaker DC new---[employer] MICRA;  Surgeon: Eliazar Bond MD;  Location:  [facility];  Service: Cardiology;  Laterality: N/A;   • CARDIAC SURGERY     • CORONARY ARTERY BYPASS GRAFT      2 vessel   • CORONARY ARTERY BYPASS GRAFT WITH MITRAL VALVE REPAIR/REPLACEMENT N/A 6/13/2016    Procedure: INTRAOPERATIVE TARIQ, MIDLINE STERNOTOMY, CORONARY ARTERY BYPASS GRAFTING X  2 UTILIZING ENDOSCOPICALLY HARVESTED LEFT GREATER SAPHENOUS VEIN, MITRAL VALVE REPLACEMENT AND TRICUSPID VALVE REPAIR;  Surgeon: Eliecer Mistry MD;  Location: [facility] MAIN OR;  Service:    • CORONARY STENT PLACEMENT  2010    Approx. 6 yrs ago at [facility]   • HEMORRHOIDECTOMY     • HYSTERECTOMY     • MITRAL VALVE REPLACEMENT     • REPLACEMENT TOTAL KNEE Right    • THYROID SURGERY      Cyst removed from thyroid   • VASCULAR SURGERY         Family History   Problem Relation Age of Onset   • Heart attack Father    • Heart disease Father        Social History     Tobacco Use   • Smoking status: Never Smoker   • Smokeless tobacco: Never Used   • Tobacco comment: no caffeine    Substance Use Topics   • Alcohol use: No   • Drug use: No        Facility-Administered Medications Prior to Admission   Medication Dose Route Frequency Provider Last Rate Last Admin   • methylPREDNISolone acetate (DEPO-medrol) injection 40 mg  40 mg Intra-articular Michelle Purcell MD         Medications Prior to Admission   Medication Sig Dispense Refill Last Dose   •  ALPRAZolam (XANAX) 1 MG tablet Take 1 mg by mouth 2 (Two) Times a Day.   Patient Taking Differently at Unknown time   • aspirin  MG tablet Take 1 tablet by mouth Daily. 90 tablet 1 1/31/2021 at Unknown time   • atorvastatin (LIPITOR) 20 MG tablet TAKE ONE TABLET BY MOUTH DAILY 90 tablet 0 1/31/2021 at Unknown time   • clotrimazole-betamethasone (LOTRISONE) 1-0.05 % cream Apply  topically to the appropriate area as directed 2 (Two) Times a Day. 45 g 0 Past Week at Unknown time   • fluticasone-salmeterol (ADVAIR DISKUS) 250-50 MCG/DOSE DISKUS Inhale 1 puff Daily As Needed (cough and wheezing). 60 each 0 1/31/2021 at Unknown time   • furosemide (LASIX) 40 MG tablet TAKE ONE TABLET BY MOUTH TWICE A DAY 60 tablet 0 1/31/2021 at Unknown time   • gabapentin (NEURONTIN) 100 MG capsule Take 1 capsule by mouth Every 12 (Twelve) Hours. 60 capsule 2 1/31/2021 at Unknown time   • glimepiride (AMARYL) 1 MG tablet TAKE ONE TABLET BY MOUTH EVERY MORNING BEFORE BREAKFAST 90 tablet 1 1/31/2021 at Unknown time   • ipratropium-albuterol (DUO-NEB) 0.5-2.5 mg/mL nebulizer Take 3 mL by nebulization 4 (four) times a day. (Patient taking differently: Take 3 mL by nebulization Daily As Needed.) 3 mL 5 1/31/2021 at Unknown time   • levothyroxine (SYNTHROID, LEVOTHROID) 25 MCG tablet Take 1 tablet by mouth Daily. 90 tablet 1 1/31/2021 at Unknown time   • losartan (COZAAR) 100 MG tablet TAKE ONE TABLET BY MOUTH DAILY 90 tablet 4 1/31/2021 at Unknown time   • Misc. Devices (COMMODE BEDSIDE) misc 1 each Daily. 1 each 0 1/31/2021 at Unknown time   • nitroglycerin (NITROSTAT) 0.4 MG SL tablet DISSOLVE 1 TAB UNDER TONGUE FOR CHEST PAIN - IF PAIN REMAINS AFTER 5 MIN, CALL 911 AND REPEAT DOSE. MAX 3 TABS IN 15 MINUTES 25 tablet 4 1/31/2021 at Unknown time   • nystatin (MYCOSTATIN) 351589 UNIT/GM cream Apply  topically to the appropriate area as directed 2 (Two) Times a Day. to affected area(s) 30 g 3 1/31/2021 at Unknown time   • O2 (OXYGEN)  Inhale 4 L/min Continuous.   Patient Taking Differently at Unknown time   • omeprazole (priLOSEC) 40 MG capsule Take 1 capsule by mouth Daily. 90 capsule 0 1/31/2021 at Unknown time   • Probiotic Product (PROBIOTIC PO) Take  by mouth.   1/31/2021 at Unknown time   • senna-docusate (PERICOLACE) 8.6-50 MG per tablet Take 1 tablet by mouth daily.   1/31/2021 at Unknown time   • traMADol (ULTRAM) 50 MG tablet TAKE ONE TABLET BY MOUTH EVERY 8 HOURS AS NEEDED FOR MODERATE LEG PAIN FOR UP TO 90 DOSES 30 tablet 1 1/31/2021 at Unknown time   • TRULICITY 0.75 MG/0.5ML solution pen-injector INJECT 0.75 MG UNDER THE SKIN ONCE WEEKLY 6 pen 5 1/31/2021 at Unknown time   • vilazodone (VIIBRYD) 20 MG tablet tablet Take 20 mg by mouth Every Night.   1/31/2021 at Unknown time   • vitamin D (ERGOCALCIFEROL) 1.25 MG (00256 UT) capsule capsule TAKE ONE CAPSULE BY MOUTH ONCE WEEKLY 4 capsule 0 Past Week at Unknown time         Coumadin [warfarin sodium], Bumex [bumetanide], Erythromycin, Hctz [hydrochlorothiazide], Zaroxolyn [metolazone], Penicillins, and Sulfa antibiotics    Scheduled Meds:amLODIPine, 5 mg, Oral, Q24H  apixaban, 5 mg, Oral, Q12H  atorvastatin, 20 mg, Oral, Daily  budesonide-formoterol, 2 puff, Inhalation, BID - RT  carvedilol, 6.25 mg, Oral, BID With Meals  clotrimazole-betamethasone, , Topical, BID  fluticasone, 2 spray, Each Nare, Daily  gabapentin, 100 mg, Oral, Q12H  insulin lispro, 0-7 Units, Subcutaneous, TID AC  lactobacillus acidophilus, 1 capsule, Oral, Daily  levothyroxine, 25 mcg, Oral, Daily  nystatin, , Topical, BID  pantoprazole, 40 mg, Oral, QAM  sennosides-docusate, 1 tablet, Oral, Daily  sodium chloride, 10 mL, Intravenous, Q12H  sodium chloride, 3 mL, Intravenous, Q12H  vilazodone, 20 mg, Oral, Nightly      Continuous Infusions:sodium chloride, 75 mL/hr, Last Rate: 75 mL/hr (02/05/21 0918)      PRN Meds:.•  acetaminophen **OR** acetaminophen **OR** acetaminophen  •  acetaminophen **OR**  "acetaminophen  •  acetaminophen  •  ALPRAZolam  •  bisacodyl  •  calcium carbonate  •  dextrose  •  dextrose  •  glucagon (human recombinant)  •  hydrALAZINE  •  HYDROcodone-acetaminophen  •  HYDROmorphone **AND** naloxone  •  hydrOXYzine  •  ipratropium-albuterol  •  nitroglycerin  •  ondansetron **OR** ondansetron  •  potassium chloride **OR** potassium chloride **OR** potassium chloride  •  [COMPLETED] Insert peripheral IV **AND** sodium chloride  •  sodium chloride  •  sodium chloride  •  sodium chloride  •  traMADol    Objective     VITAL SIGNS  Vitals:    02/05/21 1315 02/05/21 1330 02/05/21 1345 02/05/21 1400   BP: 123/58 133/59 130/64 114/72   BP Location:       Patient Position:       Pulse: 79 80 80 80   Resp:       Temp:       TempSrc:       SpO2: 92% 92% 92% 90%   Weight:       Height:           Flowsheet Rows      First Filed Value   Admission Height  152.4 cm (60\") Documented at 02/01/2021 0315   Admission Weight  122 kg (270 lb) Documented at 02/01/2021 0759             Physical Exam:  Physical Exam     Results Review:        WBC WBC   Date Value Ref Range Status   02/04/2021 11.44 (H) 3.40 - 10.80 10*3/mm3 Final   02/03/2021 12.59 (H) 3.40 - 10.80 10*3/mm3 Final      HGB Hemoglobin   Date Value Ref Range Status   02/05/2021 11.6 (L) 12.0 - 17.0 g/dL Final   02/05/2021 11.9 (L) 12.0 - 17.0 g/dL Final   02/04/2021 11.1 (L) 12.0 - 15.9 g/dL Final   02/03/2021 12.2 12.0 - 15.9 g/dL Final      HCT Hematocrit   Date Value Ref Range Status   02/05/2021 34 (L) 38 - 51 % Final   02/05/2021 35 (L) 38 - 51 % Final   02/04/2021 35.5 34.0 - 46.6 % Final   02/03/2021 38.1 34.0 - 46.6 % Final      Platelets Platelets   Date Value Ref Range Status   02/04/2021 226 140 - 450 10*3/mm3 Final   02/03/2021 247 140 - 450 10*3/mm3 Final        PT/INR:  No results found for: PROTIME/No results found for: INR    Sodium Sodium   Date Value Ref Range Status   02/05/2021 145 136 - 145 mmol/L Final   02/05/2021 146 (H) 136 - 145 " mmol/L Final   02/04/2021 145 136 - 145 mmol/L Final   02/03/2021 147 (H) 136 - 145 mmol/L Final   02/03/2021 145 136 - 145 mmol/L Final      Potassium Potassium   Date Value Ref Range Status   02/05/2021 3.5 3.5 - 5.2 mmol/L Final   02/05/2021 3.5 3.5 - 5.2 mmol/L Final   02/04/2021 3.7 3.5 - 5.2 mmol/L Final   02/03/2021 4.5 3.5 - 5.2 mmol/L Final     Comment:     Slight hemolysis detected by analyzer. Results may be affected.   02/03/2021 3.5 3.5 - 5.2 mmol/L Final   02/03/2021 3.2 (L) 3.5 - 5.2 mmol/L Final      Chloride Chloride   Date Value Ref Range Status   02/05/2021 98 98 - 107 mmol/L Final   02/05/2021 97 (L) 98 - 107 mmol/L Final   02/04/2021 100 98 - 107 mmol/L Final   02/03/2021 100 98 - 107 mmol/L Final   02/03/2021 99 98 - 107 mmol/L Final      Bicarbonate CO2   Date Value Ref Range Status   02/05/2021 37.7 (H) 22.0 - 29.0 mmol/L Final   02/05/2021 38.5 (H) 22.0 - 29.0 mmol/L Final   02/04/2021 31.1 (H) 22.0 - 29.0 mmol/L Final   02/03/2021 35.0 (H) 22.0 - 29.0 mmol/L Final   02/03/2021 31.7 (H) 22.0 - 29.0 mmol/L Final      BUN BUN   Date Value Ref Range Status   02/05/2021 25 (H) 8 - 23 mg/dL Final   02/05/2021 26 (H) 8 - 23 mg/dL Final   02/04/2021 20 8 - 23 mg/dL Final   02/03/2021 20 8 - 23 mg/dL Final   02/03/2021 21 8 - 23 mg/dL Final      Creatinine Creatinine   Date Value Ref Range Status   02/05/2021 1.59 (H) 0.57 - 1.00 mg/dL Final   02/05/2021 1.51 (H) 0.57 - 1.00 mg/dL Final   02/04/2021 1.42 (H) 0.57 - 1.00 mg/dL Final   02/03/2021 1.39 (H) 0.57 - 1.00 mg/dL Final   02/03/2021 1.13 (H) 0.57 - 1.00 mg/dL Final      Calcium Calcium   Date Value Ref Range Status   02/05/2021 8.5 (L) 8.6 - 10.5 mg/dL Final   02/05/2021 8.6 8.6 - 10.5 mg/dL Final   02/04/2021 8.3 (L) 8.6 - 10.5 mg/dL Final   02/03/2021 9.0 8.6 - 10.5 mg/dL Final   02/03/2021 8.9 8.6 - 10.5 mg/dL Final      Magnesium Magnesium   Date Value Ref Range Status   02/03/2021 2.1 1.6 - 2.4 mg/dL Final        Troponin No results  found for: TROPONIN   BNP No results found for: BNP           Assessment/Plan       Chest pain    CKD (chronic kidney disease) stage 3, GFR 30-59 ml/min (CMS/HCC)    Diabetic peripheral neuropathy (CMS/HCC)    Hyperlipidemia    OCHOA (obstructive sleep apnea)    DM type 2 (diabetes mellitus, type 2) (CMS/HCC)    Essential hypertension    Pulmonary hypertension due to sleep-disordered breathing (CMS/HCC)    s/p MVR, TV-repair, CABG x2 6/13/16    Supplemental oxygen dependent    Chronic diastolic heart failure (CMS/HCC)    Chronic respiratory failure with hypoxia and hypercapnia (CMS/HCC)    COPD (chronic obstructive pulmonary disease) (CMS/HCC)    Complete heart block (CMS/HCC)    UTI (urinary tract infection), bacterial      -Aortic stenosis-----COREY 0.85 (by cath), Ao peak velocity 425, Ao mean PG 42, max PG 69 (by 2D echo)  -hx CABG, tissue MVR, TV repair 2016  -COPD, chronic O2 at 4 liters at home  -DM II  -HTN  -OCHOA, chronic hypercapnia  -PAF, hx SVT---on Eliquis  -CKD, baseline creatinine 1.4  -Morbid obesity, BMI 52.7         Dr. Mistry to review films and make surgical recommendation.      Thank you for allowing me to participate in the care of this patient    ZOILA Martel  02/05/21  14:37 EST  Keri Toscano, Cardiothoracic Surgery

## 2021-02-05 NOTE — PROGRESS NOTES
Patient Name: Miguelina Lopez  Patient : 1945        Date of Service:21  Provider of Service: Antoine Ayers MD  Place of Service: [facility]  Referral Provider: No ref. provider found          Follow Up: Valvular heart disease, heart block, congestive heart failure  Interval Hx: Patient's troponin continues to rise.  She is breathing better.  Continues to diuresis        OBJECTIVE  Temp:  [97.6 °F (36.4 °C)-98.2 °F (36.8 °C)] 98.2 °F (36.8 °C)  Heart Rate:  [79-91] 91  Resp:  [16-18] 16  BP: (141-155)/(70-75) 155/72     Intake/Output Summary (Last 24 hours) at 2021 0815  Last data filed at 2021 0701  Gross per 24 hour   Intake 480 ml   Output 4200 ml   Net -3720 ml     Body mass index is 52.79 kg/m².      21  0759 21  0645   Weight: 122 kg (270 lb) 123 kg (270 lb 4.5 oz)         Physical Exam:   Vitals signs reviewed.   Constitutional:       Appearance: Well-developed.   Eyes:      Pupils: Pupils are equal, round, and reactive to light.   HENT:      Head: Normocephalic.   Neck:      Musculoskeletal: Normal range of motion.      Thyroid: No thyromegaly.      Vascular: No carotid bruit or JVD.   Pulmonary:      Effort: Pulmonary effort is normal.      Breath sounds: Normal breath sounds.   Cardiovascular:      Normal rate. Regular rhythm.      Murmurs: There is a harsh midsystolic murmur at the URSB, radiating to the neck.      No gallop.   Pulses:     Intact distal pulses.   Edema:     Peripheral edema absent.   Abdominal:      General: Bowel sounds are normal.      Palpations: Abdomen is soft.   Skin:     General: Skin is warm and dry.      Findings: No erythema.   Neurological:      Mental Status: Alert and oriented to person, place, and time.           CURRENT MEDS    Scheduled Meds:amLODIPine, 5 mg, Oral, Q24H  atorvastatin, 20 mg, Oral, Daily  budesonide-formoterol, 2 puff, Inhalation, BID - RT  carvedilol, 6.25 mg, Oral, BID With  Meals  cefTRIAXone, 1 g, Intravenous, Q24H  clotrimazole-betamethasone, , Topical, BID  fluticasone, 2 spray, Each Nare, Daily  gabapentin, 100 mg, Oral, Q12H  insulin lispro, 0-7 Units, Subcutaneous, TID AC  lactobacillus acidophilus, 1 capsule, Oral, Daily  levothyroxine, 25 mcg, Oral, Daily  metOLazone, 5 mg, Oral, Daily  nystatin, , Topical, BID  pantoprazole, 40 mg, Oral, QAM  sennosides-docusate, 1 tablet, Oral, Daily  sodium chloride, 10 mL, Intravenous, Q12H  sodium chloride, 3 mL, Intravenous, Q12H  vilazodone, 20 mg, Oral, Nightly      Continuous Infusions:bumetanide, 2 mg/hr, Last Rate: 2 mg/hr (02/05/21 0646)          Lab Review:   Results from last 7 days   Lab Units 02/04/21  0434 02/03/21  1716 02/03/21  1304  02/01/21  0339   SODIUM mmol/L 145  --  147*   < > 145   POTASSIUM mmol/L 3.7 4.5 3.5   < > 4.4   CHLORIDE mmol/L 100  --  100   < > 102   CO2 mmol/L 31.1*  --  35.0*   < > 32.3*   BUN mg/dL 20  --  20   < > 20   CREATININE mg/dL 1.42*  --  1.39*   < > 1.38*   GLUCOSE mg/dL 133*  --  126*   < > 128*   CALCIUM mg/dL 8.3*  --  9.0   < > 8.5*   AST (SGOT) U/L 28  --   --   --  23   ALT (SGPT) U/L 12  --   --   --  14    < > = values in this interval not displayed.     Results from last 7 days   Lab Units 02/05/21  0628 02/04/21  0434 02/02/21  0739 02/01/21  1200 02/01/21  0551 02/01/21  0339   TROPONIN T ng/mL 0.121* 0.101* 0.046* 0.026 0.016 0.011     Results from last 7 days   Lab Units 02/04/21  1124 02/03/21  0419   WBC 10*3/mm3 11.44* 12.59*   HEMOGLOBIN g/dL 11.1* 12.2   HEMATOCRIT % 35.5 38.1   PLATELETS 10*3/mm3 226 247         Results from last 7 days   Lab Units 02/03/21  0419 02/01/21  0339   MAGNESIUM mg/dL 2.1 2.0         Results from last 7 days   Lab Units 02/01/21  0339   PROBNP pg/mL 12,821.0*           I personally reviewed the patient's ECG and telemetry data    ASSESSMENT & PLAN    Chest pain    CKD (chronic kidney disease) stage 3, GFR 30-59 ml/min (CMS/[facility])    Diabetic  peripheral neuropathy (CMS/[facility])    Hyperlipidemia    OCHOA (obstructive sleep apnea)    DM type 2 (diabetes mellitus, type 2) (CMS/[facility])    Essential hypertension    Pulmonary hypertension due to sleep-disordered breathing (CMS/[facility])    s/p MVR, TV-repair, CABG x2 6/13/16    Supplemental oxygen dependent    Chronic diastolic heart failure (CMS/[facility])    Chronic respiratory failure with hypoxia and hypercapnia (CMS/[facility])    COPD (chronic obstructive pulmonary disease) (CMS/[facility])    Complete heart block (CMS/[facility])    UTI (urinary tract infection), bacterial    1.  Acute on chronic diastolic heart failure: Left ventricular ejection fraction greater than 50%.  Patient presently on Bumex drip with good diuresis.  Continue per nephrology  2.  Complete heart block: Status post pacemaker implant.  Patient in atrial flutter.  Will need to be back on anticoagulation.  Hope to convert at some point in the future.   3.  Aortic stenosis: When compared to the previous echocardiogram her aortic stenosis appears to be worse.  Certainly a contributing factor to her heart failure.  After stabilization we will evaluate for possible valve replacement or TAVR.   4.  Hypertension: Uncontrolled.  We will add beta-blocker to her medical regimen in the hopes of lowering her blood pressure   5.  Coronary artery disease: Status post CABG.  No further angina pectoris.  Will recheck troponin now that pacemaker is in place.  Do not suspect acute coronary syndrome.   6.  Mitral regurgitation: Status post mitral valve replacement with tissue prosthesis.    7.  Tricuspid regurgitation: Status post repair  8.  Obstructive sleep apnea: Treated  9.  Obesity  10.  Pulmonary hypertension     Concerned about rising troponin.  Discussed right and left heart catheterization with the patient today.  She is agreeable to proceed.      Antoine Ayers MD  02/05/21

## 2021-02-05 NOTE — PROGRESS NOTES
Note wedge pressure of 24 on RHC  Continue current pulmonary therapies, diuresis  Will follow peripherally.

## 2021-02-05 NOTE — THERAPY TREATMENT NOTE
Acute Care - Occupational Therapy Treatment Note  [facility]     Patient Name: Miguelina Lopez  : 1945  MRN: [id number]  Today's Date: 2021             Admit Date: 2021       ICD-10-CM ICD-9-CM   1. Chest pain, unspecified type  R07.9 786.50   2. COPD exacerbation (CMS/HCC)  J44.1 491.21   3. Congestive heart failure, unspecified HF chronicity, unspecified heart failure type (CMS/HCC)  I50.9 428.0   4. Hypertension, unspecified type  I10 401.9   5. Abnormal chest x-ray  R93.89 793.2   6. Complete heart block (CMS/HCC)  I44.2 426.0     Patient Active Problem List   Diagnosis   • Anxiety disorder   • Arthritis of knee   • Asthma   • Chronic coronary artery disease   • CKD (chronic kidney disease) stage 3, GFR 30-59 ml/min (CMS/HCC)   • Depression   • Diabetic peripheral neuropathy (CMS/HCC)   • Gastroesophageal reflux disease   • Hyperlipidemia   • Insomnia   • Lower gastrointestinal hemorrhage   • Anemia   • OCHOA (obstructive sleep apnea)   • DM type 2 (diabetes mellitus, type 2) (CMS/HCC)   • Essential hypertension   • Hospital discharge follow-up   • Pulmonary hypertension due to sleep-disordered breathing (CMS/HCC)   • s/p MVR, TV-repair, CABG x2 16   • OLI (acute kidney injury) (CMS/HCC)   • Leukocytosis   • Atrial fibrillation (CMS/HCC)   • Nocturnal hypoxia   • Dermatitis   • Medicare annual wellness visit, initial   • Class 3 severe obesity due to excess calories with serious comorbidity and body mass index (BMI) of 50.0 to 59.9 in adult (CMS/[facility])   • Supplemental oxygen dependent   • Acute on chronic combined systolic and diastolic HF (heart failure) (CMS/[facility])   • Mitral regurgitation   • Aortic stenosis   • Medicare annual wellness visit, subsequent   • Localized edema   • Chronic right-sided congestive heart failure (CMS/[facility])   • Cellulitis of left lower extremity   • Proteinuria   • Bilateral lower extremity edema   • Subclinical hypothyroidism   • Chronic diastolic heart failure  (CMS/[facility])   • Chronic respiratory failure with hypoxia and hypercapnia (CMS/HCC)   • Acute on chronic respiratory failure with hypoxia and hypercapnia (CMS/[facility])   • AV block, 2nd degree   • 1st degree AV block   • Chest pain   • COPD (chronic obstructive pulmonary disease) (CMS/[facility])   • Complete heart block (CMS/HCC)   • UTI (urinary tract infection), bacterial     Past Medical History:   Diagnosis Date   • Acute on chronic respiratory failure with hypoxia and hypercapnia (CMS/HCC)    • OLI (acute kidney injury) (CMS/HCC)    • Anemia    • Anxiety    • Aortic valve stenosis    • Bilateral lower extremity edema    • CHF (congestive heart failure) (CMS/HCC)    • Chronic coronary artery disease    • Class 3 severe obesity due to excess calories in adult (CMS/HCC)    • COPD (chronic obstructive pulmonary disease) (CMS/HCC)    • Depression    • Diabetes mellitus (CMS/HCC)    • Elevated cholesterol    • GERD (gastroesophageal reflux disease)    • Heart murmur    • Hypertension    • Mitral valve insufficiency    • Pneumonia     1/2016   • Pulmonary hypertension (CMS/HCC)     due to sleep disordered breathing   • Sleep apnea     Uses CPAP or oxygen   • Stage 3 chronic kidney disease (CMS/HCC)    • Subclinical hypothyroidism    • Supplemental oxygen dependent    • Valvular heart disease      Past Surgical History:   Procedure Laterality Date   • CARDIAC CATHETERIZATION     • CARDIAC CATHETERIZATION N/A 6/10/2016    Procedure: Left Heart Cath;  Surgeon: Chace Johnson MD;  Location: [facility] INVASIVE LOCATION;  Service:    • CARDIAC CATHETERIZATION N/A 6/10/2016    Procedure: Right Heart Cath;  Surgeon: Chace Johnson MD;  Location: [facility] CATH INVASIVE LOCATION;  Service:    • CARDIAC ELECTROPHYSIOLOGY PROCEDURE N/A 2/2/2021    Procedure: Pacemaker DC new---[employer] MICRA;  Surgeon: Eliazar Bond MD;  Location: [facility] CATH INVASIVE LOCATION;  Service: Cardiology;  Laterality: N/A;   • CARDIAC SURGERY     •  CORONARY ARTERY BYPASS GRAFT      2 vessel   • CORONARY ARTERY BYPASS GRAFT WITH MITRAL VALVE REPAIR/REPLACEMENT N/A 6/13/2016    Procedure: INTRAOPERATIVE TARIQ, MIDLINE STERNOTOMY, CORONARY ARTERY BYPASS GRAFTING X  2 UTILIZING ENDOSCOPICALLY HARVESTED LEFT GREATER SAPHENOUS VEIN, MITRAL VALVE REPLACEMENT AND TRICUSPID VALVE REPAIR;  Surgeon: Eliecer Mistry MD;  Location: [facility];  Service:    • CORONARY STENT PLACEMENT  2010    Approx. 6 yrs ago at [facility]   • HEMORRHOIDECTOMY     • HYSTERECTOMY     • MITRAL VALVE REPLACEMENT     • REPLACEMENT TOTAL KNEE Right    • THYROID SURGERY      Cyst removed from thyroid   • VASCULAR SURGERY         Lymphedema     Row Name 02/05/21 1315 02/04/21 1600 02/03/21 1400       Lymphedema Assessment    Lymphedema Classification  RLE:;LLE:  -VS  RLE:;LLE:  -VS  RLE:;LLE:  -VS    Recorded by [VS] Ericka Bishop, ANNEMARIE [VS] Ericka Bishop, ANNEMARIE [VS] Ericka Bishop, WOODR       Lymphedema Edema Assessment    Ptting Edema Category  --  --  -- Mod with (R) and ModSevere with (L)   -VS    Pitting Edema  Moderate (L) is greater than (R)   -VS  Moderate;Severe (L) greater than (R)  -VS  --    Edema Assessment Comment  Written instruction for LE wrapping in the pt's room   -VS  --  --    Recorded by [VS] Ericka Bishop, ANNEMARIE [VS] Ericka Bishop, ANNEMARIE [VS] Ericka Bishop, OTR       Skin Changes/Observations    Skin Observations Comment  Mepilex in place on the back of (R) calf  -VS  Small abrasion on the back of the (R) calf RN applied Mepliex to this area  -VS  --    Recorded by [VS] Ericka Bishop, ANNEMARIE [VS] Ericka Bishop, WOODR        Compression/Skin Care    Compression/Skin Care  skin care;wrapping location;remove bandages  -VS  skin care;wrapping location;remove bandages  -VS  skin care;wrapping location;bandaging;remove bandages  -VS    Skin Care  washed/dried;lotion applied  -VS  washed/dried;lotion applied  -VS  washed/dried;lotion applied  -VS    Wrapping Location   "lower extremity  -VS  lower extremity  -VS  lower extremity  -VS    Wrapping Location LE  bilateral:;foot to knee  -VS  bilateral:;foot to knee  -VS  bilateral:;foot to knee  -VS    Wrapping Comments  --  --  Both pain and edema greater in the (L)   -VS    Bandage Layers  cotton liner;padding/fluff layer;short-stretch bandages (comment size/quantity)  -VS  cotton liner;padding/fluff layer;short-stretch bandages (comment size/quantity) 1 x #6 and 1 x #8   -VS  cotton liner;padding/fluff layer K1 stockinette, #10 artiflex  -VS    Bandaging Comments  Bandages #6 x 1 and #8 x 1   -VS  --  Bandages #6 & #8 toes to knee   -VS    Bandaging Technique  light compression  -VS  light compression  -VS  light compression  -VS    Recorded by [VS] Ericka Bishop, ANNEMARIE [VS] Ericka Bishop, ANNEMARIE [VS] Ericka Bishop, WOODR      User Key  (r) = Recorded By, (t) = Taken By, (c) = Cosigned By    Initials Name Effective Dates    VS Ericka Bishop, OTR 03/02/20 -            OT ASSESSMENT FLOWSHEET (last 12 hours)      OT Evaluation and Treatment     Row Name 02/05/21 1315                   OT Time and Intention    Subjective Information  complains of;pain  -VS        Document Type  therapy note (daily note)  -VS        Mode of Treatment  occupational therapy  -VS        Patient Effort  adequate  -VS        Comment  \"I had the procedure this morning so I can not move my Right arm until tomorrow.  Pt. stated   -VS           General Information    General Observations of Patient  Pt. was supine in bed with her (R) arm positioned on a pillow.  -VS        Existing Precautions/Restrictions  fall;oxygen therapy device and L/min  -VS        Barriers to Rehab  medically complex;previous functional deficit  -VS           Cognition    Orientation Status (Cognition)  oriented x 3  -VS        Follows Commands (Cognition)  WFL  -VS           Pain Assessment    Additional Documentation  Pain Scale: Numbers Pre/Post-Treatment (Group)  -VS           " Pain Scale: Numbers Pre/Post-Treatment    Pretreatment Pain Rating  5/10  -VS        Posttreatment Pain Rating  5/10  -VS        Pain Location - Side  Left  -VS        Pain Location - Orientation  lower  -VS        Pain Location  extremity  -VS        Pre/Posttreatment Pain Comment  7/10 while therapist completed LE wrapping   -VS        Pain Intervention(s)  Repositioned  -VS           Bed Mobility    Comment (Bed Mobility)  NA  -VS           Transfer Assessment/Treatment    Comment (Transfers)  NA  -VS           Activities of Daily Living    BADL Assessment/Intervention  bathing;lower body dressing  -VS           Bathing Assessment/Intervention    Comment (Bathing)  Therapist bathe, dried and applied lotion to (B) feet Toes to knees   -VS           Lower Body Dressing Assessment/Training    Comment (Lower Body Dressing)  Therapist doff/lore non slip socks (B)ly   -VS           Plan of Care Review    Plan of Care Reviewed With  patient  -VS        Progress  improving  -VS        Outcome Summary  OT Lymph:  Pt. is O x 3, Pt. continue to tolerate the LE wraps, (B)LE have continued to reduce in size, both remain moderate (L) continue to be larger than (R).  Therapist doishaan and lore LE wraps from the base of the pt's toes to her knees (B)ly.  RNs to complete wrapping on Saturday and Sunday, OT will return on Monday.  Pt. continues to benfit from skilled OT.   -VS           Positioning and Restraints    Pre-Treatment Position  in bed  -VS        Post Treatment Position  bed  -VS        In Bed  notified nsg;side lying right;call light within reach;encouraged to call for assist;side rails up x3  -VS          User Key  (r) = Recorded By, (t) = Taken By, (c) = Cosigned By    Initials Name Effective Dates    VS Ericka Bishop OTR 03/02/20 -          Occupational Therapy Education                 Title: PT OT SLP Therapies (Done)     Topic: Occupational Therapy (Done)     Point: ADL training (Done)     Description:    Instruct learner(s) on proper safety adaptation and remediation techniques during self care or transfers.   Instruct in proper use of assistive devices.              Learning Progress Summary           Patient Acceptance, E,D,H, VU by VS at 2/3/2021 1421    Comment: Lymphedema bandages wearing precaution, written/verbal  instruction and demonstration on wrapping LEs    Acceptance, E,D, VU by LE at 2/1/2021 1550    Comment: role of OT, plan of care, body mechanics                   Point: Precautions (Done)     Description:   Instruct learner(s) on prescribed precautions during self-care and functional transfers.              Learning Progress Summary           Patient Acceptance, E,D,H, VU by VS at 2/3/2021 1421    Comment: Lymphedema bandages wearing precaution, written/verbal  instruction and demonstration on wrapping LEs    Acceptance, E,D, VU by LE at 2/1/2021 1550    Comment: role of OT, plan of care, body mechanics                   Point: Body mechanics (Done)     Description:   Instruct learner(s) on proper positioning and spine alignment during self-care, functional mobility activities and/or exercises.              Learning Progress Summary           Patient Acceptance, E,D,H, VU by VS at 2/3/2021 1421    Comment: Lymphedema bandages wearing precaution, written/verbal  instruction and demonstration on wrapping LEs    Acceptance, E,D, VU by PATRICIO at 2/1/2021 1550    Comment: role of OT, plan of care, body mechanics                               User Key     Initials Effective Dates Name Provider Type Discipline     06/08/18 -  Solange Dawkins OTR Occupational Therapist OT    VS 03/02/20 -  Ericka Bishop OTR Occupational Therapist OT                  OT Recommendation and Plan     Plan of Care Review  Plan of Care Reviewed With: patient  Progress: improving  Outcome Summary: OT Lymph:  Pt. is O x 3, Pt. continue to tolerate the LE wraps, (B)LE have continued to reduce in size, both remain moderate (L)  continue to be larger than (R).  Therapist deloris and lore LE wraps from the base of the pt's toes to her knees (B)ly.  RNs to complete wrapping on Saturday and Sunday, OT will return on Monday.  Pt. continues to benfit from skilled OT.   Plan of Care Reviewed With: patient  Outcome Summary: OT Lymph:  Pt. is O x 3, Pt. continue to tolerate the LE wraps, (B)LE have continued to reduce in size, both remain moderate (L) continue to be larger than (R).  Therapist deloris and lore LE wraps from the base of the pt's toes to her knees (B)ly.  RNs to complete wrapping on Saturday and Sunday, OT will return on Monday.  Pt. continues to benfit from skilled OT.     Outcome Measures     Row Name 02/05/21 1500 02/04/21 1600 02/03/21 1400       How much help from another is currently needed...    Putting on and taking off regular lower body clothing?  1  -VS  1  -VS  1  -VS    Bathing (including washing, rinsing, and drying)  2  -VS  2  -VS  2  -VS    Toileting (which includes using toilet bed pan or urinal)  1  -VS  1  -VS  1  -VS    Putting on and taking off regular upper body clothing  2  -VS  2  -VS  2  -VS    Taking care of personal grooming (such as brushing teeth)  3  -VS  3  -VS  3  -VS    Eating meals  3  -VS  3  -VS  3  -VS    AM-PAC 6 Clicks Score (OT)  12  -VS  12  -VS  12  -VS       Functional Assessment    Outcome Measure Options  AM-PAC 6 Clicks Daily Activity (OT)  -VS  AM-PAC 6 Clicks Daily Activity (OT)  -VS  AM-PAC 6 Clicks Daily Activity (OT)  -VS      User Key  (r) = Recorded By, (t) = Taken By, (c) = Cosigned By    Initials Name Provider Type    VS Ericka Bishop OTR Occupational Therapist          Time Calculation:   Time Calculation- OT     Row Name 02/05/21 1529             Time Calculation- OT    OT Start Time  1228  -VS      OT Stop Time  1303  -VS      OT Time Calculation (min)  35 min  -VS      Total Timed Code Minutes- OT  35 minute(s)  -VS      OT Received On  02/05/21  -VS      OT - Next Appointment   02/08/21  -VS        User Key  (r) = Recorded By, (t) = Taken By, (c) = Cosigned By    Initials Name Provider Type    VS Ericka Bishop OTR Occupational Therapist        Therapy Charges for Today     Code Description Service Date Service Provider Modifiers Qty    47343120866  OT MANUAL THERAPY EA 15 MIN 2/4/2021 Ericka Bishop OTR GO 2    [id number]  OT MANUAL THERAPY EA 15 MIN 2/5/2021 Ericka Bishop OTR GO 2               ANNEMARIE Wiggins  2/5/2021

## 2021-02-05 NOTE — PROGRESS NOTES
NEPHROLOGY PROGRESS NOTE    PATIENT IDENTIFICATION:   Name:  Miguelina Lopez      MRN:  [id number]     75 y.o.  female             Reason for visit: OLI    SUBJECTIVE:     Seen and examined.  Feeling better today plan to be taken to cardiac cath later on today.  Appears to be more comfortable and less short of air.        OBJECTIVE:  Vitals:    02/04/21 2122 02/05/21 0421 02/05/21 0645 02/05/21 0736   BP: 149/75 151/74  155/72   BP Location: Left arm   Left arm   Patient Position: Sitting   Sitting   Pulse: 80 80  91   Resp: 18   16   Temp: 97.9 °F (36.6 °C)   98.2 °F (36.8 °C)   TempSrc: Oral   Oral   SpO2: 95%   95%   Weight:   123 kg (270 lb 4.5 oz)    Height:               Body mass index is 52.79 kg/m².    Intake/Output Summary (Last 24 hours) at 2/5/2021 0820  Last data filed at 2/5/2021 0701  Gross per 24 hour   Intake 480 ml   Output 4200 ml   Net -3720 ml         Exam:  GEN:  No distress, appears stated age  EYES:   Anicteric sclera  ENT:    External ears/nose normal, MM are moist  NECK:  No adenopathy, JVP none  LUNGS: Normal chest on inspection; not labored  CV:  Normal S1S2, without murmur  ABD:  Morbidly obese, non-tender, non-distended, no hepatosplenomegaly, +BS  EXT:  ++ edema; no cyanosis; clubbing    Scheduled meds:  amLODIPine, 5 mg, Oral, Q24H  atorvastatin, 20 mg, Oral, Daily  budesonide-formoterol, 2 puff, Inhalation, BID - RT  carvedilol, 6.25 mg, Oral, BID With Meals  cefTRIAXone, 1 g, Intravenous, Q24H  clotrimazole-betamethasone, , Topical, BID  fluticasone, 2 spray, Each Nare, Daily  gabapentin, 100 mg, Oral, Q12H  insulin lispro, 0-7 Units, Subcutaneous, TID AC  lactobacillus acidophilus, 1 capsule, Oral, Daily  levothyroxine, 25 mcg, Oral, Daily  metOLazone, 5 mg, Oral, Daily  nystatin, , Topical, BID  pantoprazole, 40 mg, Oral, QAM  sennosides-docusate, 1 tablet, Oral, Daily  sodium chloride, 10 mL, Intravenous, Q12H  sodium chloride, 3 mL, Intravenous, Q12H  vilazodone, 20 mg,  Oral, Nightly      IV meds:                      bumetanide, 2 mg/hr, Last Rate: 2 mg/hr (02/05/21 0646)        Data Review:    Results from last 7 days   Lab Units 02/04/21  0434 02/03/21  1716 02/03/21  1304 02/03/21  0419  02/01/21  0339   SODIUM mmol/L 145  --  147* 145   < > 145   POTASSIUM mmol/L 3.7 4.5 3.5 3.2*   < > 4.4   CHLORIDE mmol/L 100  --  100 99   < > 102   CO2 mmol/L 31.1*  --  35.0* 31.7*   < > 32.3*   BUN mg/dL 20  --  20 21   < > 20   CREATININE mg/dL 1.42*  --  1.39* 1.13*   < > 1.38*   CALCIUM mg/dL 8.3*  --  9.0 8.9   < > 8.5*   BILIRUBIN mg/dL 0.7  --   --   --   --  0.6   ALK PHOS U/L 216*  --   --   --   --  259*   ALT (SGPT) U/L 12  --   --   --   --  14   AST (SGOT) U/L 28  --   --   --   --  23   GLUCOSE mg/dL 133*  --  126* 77   < > 128*    < > = values in this interval not displayed.       Estimated Creatinine Clearance: 40.7 mL/min (A) (by C-G formula based on SCr of 1.42 mg/dL (H)).    Results from last 7 days   Lab Units 02/03/21 0419 02/01/21  0339   MAGNESIUM mg/dL 2.1 2.0   PHOSPHORUS mg/dL 3.5  --        Results from last 7 days   Lab Units 02/04/21  1124 02/03/21  0419 02/01/21  0829 02/01/21  0339   WBC 10*3/mm3 11.44* 12.59* 11.57* 11.42*   HEMOGLOBIN g/dL 11.1* 12.2 11.6* 11.7*   PLATELETS 10*3/mm3 226 247 226 253                   ASSESSMENT:     Chest pain    CKD (chronic kidney disease) stage 3, GFR 30-59 ml/min (CMS/[facility])    Diabetic peripheral neuropathy (CMS/[facility])    Hyperlipidemia    OCHOA (obstructive sleep apnea)    DM type 2 (diabetes mellitus, type 2) (CMS/HCC)    Essential hypertension    Pulmonary hypertension due to sleep-disordered breathing (CMS/HCC)    s/p MVR, TV-repair, CABG x2 6/13/16    Supplemental oxygen dependent    Chronic diastolic heart failure (CMS/HCC)    Chronic respiratory failure with hypoxia and hypercapnia (CMS/HCC)    COPD (chronic obstructive pulmonary disease) (CMS/HCC)    Complete heart block (CMS/HCC)    UTI (urinary tract infection),  bacterial      - OLI on CKD3:  Creatinine slightly above her baseline 1.4 mg/dL  on admission but patient with significant volume excess.  Scr 1.4 mg/dl         - Acute on chronic diastolic heart failure with moderate right pulmonary HTN  Valvular heart disease: moderate AS, mild to moderate TR, mitral valve replacement, regurg.      Mild rise of troponin and the plan for cardiac cath today per nursing staff  - Acute on chronic respiratory failure:   - Anemia, iron deficiency.   - HTN, not controlled: Blood pressure was extremely elevated on admission.  Better now  - Lymphedema   - Morbid obesity     PLAN:    We will hold Bumex drip and start her on gentle IV hydration as patient might be taken to cardiac cath.    We still waiting on the results of her lab today before we clear her for a cardiac cath.  Cussed with the nursing staff  Surveillance labs      Jhonathan Mitchell MD  2/5/2021    08:20 EST

## 2021-02-06 LAB
ANION GAP SERPL CALCULATED.3IONS-SCNC: 12.5 MMOL/L (ref 5–15)
BUN SERPL-MCNC: 32 MG/DL (ref 8–23)
BUN/CREAT SERPL: 18.9 (ref 7–25)
CALCIUM SPEC-SCNC: 8.2 MG/DL (ref 8.6–10.5)
CHLORIDE SERPL-SCNC: 97 MMOL/L (ref 98–107)
CO2 SERPL-SCNC: 34.5 MMOL/L (ref 22–29)
CREAT SERPL-MCNC: 1.69 MG/DL (ref 0.57–1)
DEPRECATED RDW RBC AUTO: 46 FL (ref 37–54)
ERYTHROCYTE [DISTWIDTH] IN BLOOD BY AUTOMATED COUNT: 15.2 % (ref 12.3–15.4)
GFR SERPL CREATININE-BSD FRML MDRD: 29 ML/MIN/1.73
GLUCOSE BLDC GLUCOMTR-MCNC: 112 MG/DL (ref 70–130)
GLUCOSE BLDC GLUCOMTR-MCNC: 157 MG/DL (ref 70–130)
GLUCOSE BLDC GLUCOMTR-MCNC: 158 MG/DL (ref 70–130)
GLUCOSE BLDC GLUCOMTR-MCNC: 170 MG/DL (ref 70–130)
GLUCOSE SERPL-MCNC: 101 MG/DL (ref 65–99)
HCT VFR BLD AUTO: 34.5 % (ref 34–46.6)
HGB BLD-MCNC: 10.8 G/DL (ref 12–15.9)
MAGNESIUM SERPL-MCNC: 2 MG/DL (ref 1.6–2.4)
MCH RBC QN AUTO: 26.2 PG (ref 26.6–33)
MCHC RBC AUTO-ENTMCNC: 31.3 G/DL (ref 31.5–35.7)
MCV RBC AUTO: 83.5 FL (ref 79–97)
PHOSPHATE SERPL-MCNC: 5 MG/DL (ref 2.5–4.5)
PLATELET # BLD AUTO: 221 10*3/MM3 (ref 140–450)
PMV BLD AUTO: 11.3 FL (ref 6–12)
POTASSIUM SERPL-SCNC: 3.8 MMOL/L (ref 3.5–5.2)
RBC # BLD AUTO: 4.13 10*6/MM3 (ref 3.77–5.28)
SODIUM SERPL-SCNC: 144 MMOL/L (ref 136–145)
WBC # BLD AUTO: 9.34 10*3/MM3 (ref 3.4–10.8)

## 2021-02-06 PROCEDURE — 97110 THERAPEUTIC EXERCISES: CPT

## 2021-02-06 PROCEDURE — 83735 ASSAY OF MAGNESIUM: CPT | Performed by: STUDENT IN AN ORGANIZED HEALTH CARE EDUCATION/TRAINING PROGRAM

## 2021-02-06 PROCEDURE — 85027 COMPLETE CBC AUTOMATED: CPT | Performed by: STUDENT IN AN ORGANIZED HEALTH CARE EDUCATION/TRAINING PROGRAM

## 2021-02-06 PROCEDURE — 99231 SBSQ HOSP IP/OBS SF/LOW 25: CPT | Performed by: NURSE PRACTITIONER

## 2021-02-06 PROCEDURE — 94799 UNLISTED PULMONARY SVC/PX: CPT

## 2021-02-06 PROCEDURE — 80048 BASIC METABOLIC PNL TOTAL CA: CPT | Performed by: INTERNAL MEDICINE

## 2021-02-06 PROCEDURE — 63710000001 INSULIN LISPRO (HUMAN) PER 5 UNITS: Performed by: INTERNAL MEDICINE

## 2021-02-06 PROCEDURE — 82962 GLUCOSE BLOOD TEST: CPT

## 2021-02-06 PROCEDURE — 84100 ASSAY OF PHOSPHORUS: CPT | Performed by: STUDENT IN AN ORGANIZED HEALTH CARE EDUCATION/TRAINING PROGRAM

## 2021-02-06 RX ORDER — AMOXICILLIN 250 MG
2 CAPSULE ORAL NIGHTLY
Status: DISCONTINUED | OUTPATIENT
Start: 2021-02-06 | End: 2021-02-11 | Stop reason: HOSPADM

## 2021-02-06 RX ORDER — POLYETHYLENE GLYCOL 3350 17 G/17G
17 POWDER, FOR SOLUTION ORAL DAILY
Status: DISCONTINUED | OUTPATIENT
Start: 2021-02-06 | End: 2021-02-11 | Stop reason: HOSPADM

## 2021-02-06 RX ADMIN — BUMETANIDE 2 MG/HR: 0.25 INJECTION INTRAMUSCULAR; INTRAVENOUS at 18:17

## 2021-02-06 RX ADMIN — SODIUM CHLORIDE, PRESERVATIVE FREE 3 ML: 5 INJECTION INTRAVENOUS at 09:24

## 2021-02-06 RX ADMIN — INSULIN LISPRO 2 UNITS: 100 INJECTION, SOLUTION INTRAVENOUS; SUBCUTANEOUS at 11:48

## 2021-02-06 RX ADMIN — LEVOTHYROXINE SODIUM 25 MCG: 0.03 TABLET ORAL at 06:14

## 2021-02-06 RX ADMIN — INSULIN LISPRO 2 UNITS: 100 INJECTION, SOLUTION INTRAVENOUS; SUBCUTANEOUS at 18:17

## 2021-02-06 RX ADMIN — NYSTATIN: 100000 CREAM TOPICAL at 09:24

## 2021-02-06 RX ADMIN — CARVEDILOL 6.25 MG: 6.25 TABLET, FILM COATED ORAL at 09:22

## 2021-02-06 RX ADMIN — CARVEDILOL 6.25 MG: 6.25 TABLET, FILM COATED ORAL at 18:17

## 2021-02-06 RX ADMIN — BUDESONIDE AND FORMOTEROL FUMARATE DIHYDRATE 2 PUFF: 160; 4.5 AEROSOL RESPIRATORY (INHALATION) at 20:39

## 2021-02-06 RX ADMIN — SODIUM CHLORIDE, PRESERVATIVE FREE 10 ML: 5 INJECTION INTRAVENOUS at 09:23

## 2021-02-06 RX ADMIN — BUMETANIDE 2 MG/HR: 0.25 INJECTION INTRAMUSCULAR; INTRAVENOUS at 11:26

## 2021-02-06 RX ADMIN — APIXABAN 5 MG: 5 TABLET, FILM COATED ORAL at 21:32

## 2021-02-06 RX ADMIN — CLOTRIMAZOLE AND BETAMETHASONE DIPROPIONATE: 10; .5 CREAM TOPICAL at 09:24

## 2021-02-06 RX ADMIN — ALPRAZOLAM 1 MG: 0.5 TABLET ORAL at 09:23

## 2021-02-06 RX ADMIN — BUMETANIDE 2 MG/HR: 0.25 INJECTION INTRAMUSCULAR; INTRAVENOUS at 06:14

## 2021-02-06 RX ADMIN — HYDROCODONE BITARTRATE AND ACETAMINOPHEN 1 TABLET: 5; 325 TABLET ORAL at 06:57

## 2021-02-06 RX ADMIN — ATORVASTATIN CALCIUM 20 MG: 20 TABLET, FILM COATED ORAL at 21:32

## 2021-02-06 RX ADMIN — DOCUSATE SODIUM 50MG AND SENNOSIDES 8.6MG 1 TABLET: 8.6; 5 TABLET, FILM COATED ORAL at 09:22

## 2021-02-06 RX ADMIN — AMLODIPINE BESYLATE 5 MG: 5 TABLET ORAL at 09:22

## 2021-02-06 RX ADMIN — GABAPENTIN 100 MG: 100 CAPSULE ORAL at 21:32

## 2021-02-06 RX ADMIN — GABAPENTIN 100 MG: 100 CAPSULE ORAL at 09:22

## 2021-02-06 RX ADMIN — FLUTICASONE PROPIONATE 2 SPRAY: 50 SPRAY, METERED NASAL at 09:23

## 2021-02-06 RX ADMIN — PANTOPRAZOLE SODIUM 40 MG: 40 TABLET, DELAYED RELEASE ORAL at 09:22

## 2021-02-06 RX ADMIN — APIXABAN 5 MG: 5 TABLET, FILM COATED ORAL at 09:22

## 2021-02-06 RX ADMIN — VILAZODONE HYDROCHLORIDE 20 MG: 40 TABLET ORAL at 21:32

## 2021-02-06 RX ADMIN — Medication 1 CAPSULE: at 09:22

## 2021-02-06 RX ADMIN — BUDESONIDE AND FORMOTEROL FUMARATE DIHYDRATE 2 PUFF: 160; 4.5 AEROSOL RESPIRATORY (INHALATION) at 09:01

## 2021-02-06 RX ADMIN — SODIUM CHLORIDE, PRESERVATIVE FREE 10 ML: 5 INJECTION INTRAVENOUS at 21:32

## 2021-02-06 NOTE — PLAN OF CARE
Goal Outcome Evaluation:  Plan of Care Reviewed With: patient     Outcome Summary: Pt slow to progress, still req assist of 2 for bed mobility, tfers , amb short distances; pt plans home at [place], but currently MOD/MAX 2 for all activity; unsure family can provide that assist at home, but she states fam already assisted her at home; pain in bilat feet also limiting this session; heels elevated and thick tread socks removed at rest , will need to re-apply before getting up on feet again    Troy Mauro, PT Tech present  Patient was wearing a face mask during this therapy encounter. Therapist used appropriate personal protective equipment including eye protection, mask, and gloves.  Mask used was standard procedure mask. Appropriate PPE was worn during the entire therapy session. Hand hygiene was completed before and after therapy session. Patient is not in enhanced droplet precautions.

## 2021-02-06 NOTE — PROGRESS NOTES
Name: Miguelina Lopez ADMIT: 2021   : 1945  PCP: Arcelia Talbot APRN    MRN: [id number] LOS: 5 days   AGE/SEX: 75 y.o. female  ROOM: 2228/1     Subjective   Subjective   Resting in bed. No new complaints, but her feet are bothering her. She denies any dyspnea, cough, fever or chills. Mild nose bleed this AM from oxygen drying her out. Denies any chest pain. Had heart cath yesterday so babying her right arm. Hasn't been up much the past day. Denies dysuria, nausea or vomiting. No BM since admission.     Objective   Objective   Vital Signs  Temp:  [97.9 °F (36.6 °C)-98.3 °F (36.8 °C)] 97.9 °F (36.6 °C)  Heart Rate:  [79-83] 82  Resp:  [16-18] 16  BP: (100-135)/(40-79) 132/67  SpO2:  [89 %-95 %] 90 %  on  Flow (L/min):  [2-3] 2;   Device (Oxygen Therapy): nasal cannula  Body mass index is 52.66 kg/m².     Physical Exam  Vitals signs and nursing note reviewed.   Constitutional:       General: She is not in acute distress.     Appearance: She is obese. She is ill-appearing (chronically). She is not toxic-appearing.   Neck:      Musculoskeletal: Normal range of motion and neck supple.   Cardiovascular:      Rate and Rhythm: Regular rhythm. Normal rate present. Pacing on monitor.     Heart sounds: Murmur present.   Pulmonary:      Effort: Pulmonary effort is normal. No respiratory distress.      Comments: Diminished throughout. On 3 L nc.  Abdominal:      General: Bowel sounds are normal. There is no distension.      Palpations: Abdomen is soft.      Tenderness: There is no abdominal tenderness.   Genitourinary:     Comments: Clear yellow urine in bedside cannister.  Musculoskeletal: Normal range of motion.         General: moderate Swelling present.   Skin:     General: Skin is warm and dry. Right radial cath site intact. No bruising.     Findings: No bruising.      Comments: BLE wrapped.  Neurological:      Mental Status: She is alert and oriented to person, place, and time.      Sensory: No sensory  deficit.      Motor: Weakness present.      Coordination: Coordination normal.   Psychiatric:         Mood and Affect: Mood normal.         Behavior: Behavior normal.     Results Review:       I reviewed the patient's new clinical results.  Results from last 7 days   Lab Units 02/06/21  0502 02/05/21  0958 02/05/21  0952 02/04/21  1124 02/03/21 0419 02/01/21  0829   WBC 10*3/mm3 9.34  --   --  11.44* 12.59* 11.57*   HEMOGLOBIN g/dL 10.8*  --   --  11.1* 12.2 11.6*   HEMOGLOBIN, POC g/dL  --  11.6* 11.9*  --   --   --    PLATELETS 10*3/mm3 221  --   --  226 247 226     Results from last 7 days   Lab Units 02/06/21  0502 02/05/21 1100 02/05/21 0628 02/04/21  0434   SODIUM mmol/L 144 145 146* 145   POTASSIUM mmol/L 3.8 3.5 3.5 3.7   CHLORIDE mmol/L 97* 98 97* 100   CO2 mmol/L 34.5* 37.7* 38.5* 31.1*   BUN mg/dL 32* 25* 26* 20   CREATININE mg/dL 1.69* 1.59* 1.51* 1.42*   GLUCOSE mg/dL 101* 119* 99 133*   Estimated Creatinine Clearance: 34 mL/min (A) (by C-G formula based on SCr of 1.69 mg/dL (H)).  Results from last 7 days   Lab Units 02/05/21 1100 02/05/21 0628 02/04/21 0434 02/01/21  0339   ALBUMIN g/dL 3.10* 3.30* 3.10* 3.70   BILIRUBIN mg/dL 0.6 0.6 0.7 0.6   ALK PHOS U/L 218* 230* 216* 259*   AST (SGOT) U/L 25 24 28 23   ALT (SGPT) U/L 10 12 12 14     Results from last 7 days   Lab Units 02/06/21  0502 02/05/21 1100 02/05/21  0628 02/04/21  0434  02/03/21  0419  02/01/21  0339   CALCIUM mg/dL 8.2* 8.5* 8.6 8.3*   < > 8.9   < > 8.5*   ALBUMIN g/dL  --  3.10* 3.30* 3.10*  --   --   --  3.70   MAGNESIUM mg/dL 2.0  --   --   --   --  2.1  --  2.0   PHOSPHORUS mg/dL 5.0*  --   --   --   --  3.5  --   --     < > = values in this interval not displayed.     Results from last 7 days   Lab Units 02/01/21  0339   PROCALCITONIN ng/mL 0.07     Glucose   Date/Time Value Ref Range Status   02/06/2021 0549 112 70 - 130 mg/dL Final   02/05/2021 2114 215 (H) 70 - 130 mg/dL Final   02/05/2021 1541 122 70 - 130 mg/dL Final    02/05/2021 1053 118 70 - 130 mg/dL Final   02/05/2021 0618 113 70 - 130 mg/dL Final   02/04/2021 2121 165 (H) 70 - 130 mg/dL Final   02/04/2021 1554 172 (H) 70 - 130 mg/dL Final       amLODIPine, 5 mg, Oral, Q24H  apixaban, 5 mg, Oral, Q12H  atorvastatin, 20 mg, Oral, Daily  budesonide-formoterol, 2 puff, Inhalation, BID - RT  carvedilol, 6.25 mg, Oral, BID With Meals  clotrimazole-betamethasone, , Topical, BID  fluticasone, 2 spray, Each Nare, Daily  gabapentin, 100 mg, Oral, Q12H  insulin lispro, 0-7 Units, Subcutaneous, TID AC  lactobacillus acidophilus, 1 capsule, Oral, Daily  levothyroxine, 25 mcg, Oral, Daily  nystatin, , Topical, BID  pantoprazole, 40 mg, Oral, QAM  sennosides-docusate, 1 tablet, Oral, Daily  sodium chloride, 10 mL, Intravenous, Q12H  sodium chloride, 3 mL, Intravenous, Q12H  vilazodone, 20 mg, Oral, Nightly      bumetanide, 2 mg/hr, Last Rate: 2 mg/hr (02/06/21 0614)    Diet Regular; Consistent Carbohydrate, Cardiac       Assessment/Plan     Active Hospital Problems    Diagnosis  POA   • **Chest pain [R07.9]  Yes   • UTI (urinary tract infection), bacterial [N39.0, A49.9]  Yes   • Complete heart block (CMS/HCC) [I44.2]  Unknown   • COPD (chronic obstructive pulmonary disease) (CMS/HCC) [J44.9]  Yes   • Chronic respiratory failure with hypoxia and hypercapnia (CMS/HCC) [J96.11, J96.12]  Yes   • Chronic diastolic heart failure (CMS/HCC) [I50.32]  Yes   • Supplemental oxygen dependent [Z99.81]  Not Applicable   • s/p MVR, TV-repair, 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     75 y.o. female admitted with Chest pain.     Ms. Lopez is a 75 year old female who presented to the hospital with chest pain and dyspnea. Found to have diffuse opacities on CXR with elevated proBNP.     Chest pain/acute on chronic diastolic heart failure  -Cardiology following. Found to have complete heart block/aflutter. Had PPM placed 2/2 and now pacing. Now on Eliquis with possible cardioversion in future. BB now back on board.  -Nephrology managing diuretics. Continues on Bumex gtt (was held and given fluids for cath, but resumed 2/5) metolazone added.   -Cardiac regimen in place with ASA, statin. Norvasc for BP. Coreg added 2/3.  -Troponin elevated. Right/left heart cath 2/5 showing severe AS and pulmonary HTN. Ischemic heart disease 3 vessel with patent stents. Cardiothoracic consulted for TAVR work up.     COPD/chronic respiratory failure with hypoxia and hypercapnia/OCHOA  -Pulmonology following. To use home Trilogy.   -ABGs stable. Wean oxygen as able. Only on 2-3 L today.     CAD/history of CABG/valvular heart disease  -Cards following as above.  -Repeat echo with preserved EF and severe AS. Valve is worse than prior. TAVR work up in place.     DM2/neuropathy  -SSI as needed. Monitor ACHS. Hold oral medications.  -A1c 6.71%. Sugars stable. Monitor and encourage oral intake.     HTN  -BP improving with diuresis. Arb on hold- defer resumption to Nephrology. BB and Norvasc added this admission.     CKD3  -Baseline creatinine around 1.2, currently slightly uptrending after cath. 1.69  -Nephrology following. Potassium replaced with protocol.      UTI  -Mild leukocytosis as well as foul-smelling urine reported. UA with 2+ bacteria and 13-20 WBC. Culture growing > 100, 000 Enterococcus faecalis. Could be contaminate but with urinary symptoms and leukocytosis will treat for 3 days (last dose 2/5/21)with ceftriaxone. Leukocytosis resolved.      Add  bowel regimen with Miralax and Senokot. Has ocean nasal spray as needed for nasal dryness.      · Eliquis (home med) for DVT prophylaxis.  · Full code.  · Discussed with patient.  · Anticipate discharge TBD.  when cleared by consultants.      ZOILA Powers  [facility]  02/06/21  09:47 EST

## 2021-02-06 NOTE — PROGRESS NOTES
NEPHROLOGY PROGRESS NOTE    PATIENT IDENTIFICATION:   Name:  Miguelina Lopez      MRN:  [id number]     75 y.o.  female             Reason for visit: OLI    SUBJECTIVE:     Patient resting comfortably.  Still has dyspnea but improving.  Good urine output  Denies any chest pain, nausea or vomiting  Status post cardiac cath yesterday  Bumex drip restarted this morning      OBJECTIVE:  Vitals:    02/06/21 0400 02/06/21 0727 02/06/21 0900 02/06/21 1036   BP:  132/67  132/58   BP Location:  Left arm  Left arm   Patient Position:  Lying  Lying   Pulse:  79 82 80   Resp:  16  16   Temp:  97.9 °F (36.6 °C)     TempSrc:  Oral     SpO2:  93% 90% 94%   Weight: 122 kg (269 lb 10 oz)      Height:               Body mass index is 52.66 kg/m².    Intake/Output Summary (Last 24 hours) at 2/6/2021 1128  Last data filed at 2/6/2021 0900  Gross per 24 hour   Intake 1190 ml   Output 1900 ml   Net -710 ml         Exam:  GEN:  No distress, appears stated age  EYES:   Anicteric sclera  ENT:    External ears/nose normal, MM are moist  NECK:  No adenopathy, JVP none  LUNGS: Normal chest on inspection; not labored  CV:  Normal S1S2, without murmur  ABD:  Morbidly obese, non-tender, non-distended, no hepatosplenomegaly, +BS  EXT:  ++ edema; no cyanosis; clubbing    Scheduled meds:  amLODIPine, 5 mg, Oral, Q24H  apixaban, 5 mg, Oral, Q12H  atorvastatin, 20 mg, Oral, Daily  budesonide-formoterol, 2 puff, Inhalation, BID - RT  carvedilol, 6.25 mg, Oral, BID With Meals  clotrimazole-betamethasone, , Topical, BID  fluticasone, 2 spray, Each Nare, Daily  gabapentin, 100 mg, Oral, Q12H  insulin lispro, 0-7 Units, Subcutaneous, TID AC  lactobacillus acidophilus, 1 capsule, Oral, Daily  levothyroxine, 25 mcg, Oral, Daily  nystatin, , Topical, BID  pantoprazole, 40 mg, Oral, QAM  sennosides-docusate, 1 tablet, Oral, Daily  sodium chloride, 10 mL, Intravenous, Q12H  sodium chloride, 3 mL, Intravenous, Q12H  vilazodone, 20 mg, Oral, Nightly      IV  meds:                      bumetanide, 2 mg/hr, Last Rate: 2 mg/hr (02/06/21 1126)        Data Review:    Results from last 7 days   Lab Units 02/06/21  0502 02/05/21  1100 02/05/21  0628 02/04/21  0434   SODIUM mmol/L 144 145 146* 145   POTASSIUM mmol/L 3.8 3.5 3.5 3.7   CHLORIDE mmol/L 97* 98 97* 100   CO2 mmol/L 34.5* 37.7* 38.5* 31.1*   BUN mg/dL 32* 25* 26* 20   CREATININE mg/dL 1.69* 1.59* 1.51* 1.42*   CALCIUM mg/dL 8.2* 8.5* 8.6 8.3*   BILIRUBIN mg/dL  --  0.6 0.6 0.7   ALK PHOS U/L  --  218* 230* 216*   ALT (SGPT) U/L  --  10 12 12   AST (SGOT) U/L  --  25 24 28   GLUCOSE mg/dL 101* 119* 99 133*       Estimated Creatinine Clearance: 34 mL/min (A) (by C-G formula based on SCr of 1.69 mg/dL (H)).    Results from last 7 days   Lab Units 02/06/21  0502 02/03/21 0419 02/01/21  0339   MAGNESIUM mg/dL 2.0 2.1 2.0   PHOSPHORUS mg/dL 5.0* 3.5  --        Results from last 7 days   Lab Units 02/06/21  0502 02/05/21  0958 02/05/21  0952 02/04/21  1124 02/03/21  0419 02/01/21  0829 02/01/21  0339   WBC 10*3/mm3 9.34  --   --  11.44* 12.59* 11.57* 11.42*   HEMOGLOBIN g/dL 10.8*  --   --  11.1* 12.2 11.6* 11.7*   HEMOGLOBIN, POC g/dL  --  11.6* 11.9*  --   --   --   --    PLATELETS 10*3/mm3 221  --   --  226 247 226 253                   ASSESSMENT:     Chest pain    CKD (chronic kidney disease) stage 3, GFR 30-59 ml/min (CMS/[facility])    Diabetic peripheral neuropathy (CMS/[facility])    Hyperlipidemia    OCHOA (obstructive sleep apnea)    DM type 2 (diabetes mellitus, type 2) (CMS/[facility])    Essential hypertension    Pulmonary hypertension due to sleep-disordered breathing (CMS/[facility])    s/p MVR, TV-repair, 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  Volume status generous  Bumex drip restarted  Will follow renal function and electrolytes         - Acute on chronic diastolic heart failure with moderate right pulmonary HTN  Valvular heart disease: moderate AS, mild to moderate TR, mitral valve replacement, regurg.     - Acute on chronic respiratory failure:   - Anemia, iron deficiency.   - HTN.  Blood pressure better now.  Continue current antihypertensives  - Lymphedema   - Morbid obesity       Gómez Lucas MD  2/6/2021    11:28 EST

## 2021-02-06 NOTE — THERAPY TREATMENT NOTE
Patient Name: Miguelina Lopez  : 1945    MRN: [id number]                              Today's Date: 2021       Admit Date: 2021    Visit Dx:     ICD-10-CM ICD-9-CM   1. Chest pain, unspecified type  R07.9 786.50   2. COPD exacerbation (CMS/HCC)  J44.1 491.21   3. Congestive heart failure, unspecified HF chronicity, unspecified heart failure type (CMS/HCC)  I50.9 428.0   4. Hypertension, unspecified type  I10 401.9   5. Abnormal chest x-ray  R93.89 793.2   6. Complete heart block (CMS/HCC)  I44.2 426.0     Patient Active Problem List   Diagnosis   • Anxiety disorder   • Arthritis of knee   • Asthma   • Chronic coronary artery disease   • CKD (chronic kidney disease) stage 3, GFR 30-59 ml/min (CMS/HCC)   • Depression   • Diabetic peripheral neuropathy (CMS/HCC)   • Gastroesophageal reflux disease   • Hyperlipidemia   • Insomnia   • Lower gastrointestinal hemorrhage   • Anemia   • OCHOA (obstructive sleep apnea)   • DM type 2 (diabetes mellitus, type 2) (CMS/HCC)   • Essential hypertension   • Hospital discharge follow-up   • Pulmonary hypertension due to sleep-disordered breathing (CMS/HCC)   • s/p MVR, TV-repair, CABG x2 16   • OLI (acute kidney injury) (CMS/HCC)   • Leukocytosis   • Atrial fibrillation (CMS/HCC)   • Nocturnal hypoxia   • Dermatitis   • Medicare annual wellness visit, initial   • Class 3 severe obesity due to excess calories with serious comorbidity and body mass index (BMI) of 50.0 to 59.9 in adult (CMS/[facility])   • Supplemental oxygen dependent   • Acute on chronic combined systolic and diastolic HF (heart failure) (CMS/[facility])   • Mitral regurgitation   • Aortic stenosis   • Medicare annual wellness visit, subsequent   • Localized edema   • Chronic right-sided congestive heart failure (CMS/[facility])   • Cellulitis of left lower extremity   • Proteinuria   • Bilateral lower extremity edema   • Subclinical hypothyroidism   • Chronic diastolic heart failure (CMS/[facility])   • Chronic respiratory  failure with hypoxia and hypercapnia (CMS/[facility])   • Acute on chronic respiratory failure with hypoxia and hypercapnia (CMS/[facility])   • AV block, 2nd degree   • 1st degree AV block   • Chest pain   • COPD (chronic obstructive pulmonary disease) (CMS/[facility])   • Complete heart block (CMS/[facility])   • UTI (urinary tract infection), bacterial     Past Medical History:   Diagnosis Date   • Acute on chronic respiratory failure with hypoxia and hypercapnia (CMS/[facility])    • OLI (acute kidney injury) (CMS/HCC)    • Anemia    • Anxiety    • Aortic valve stenosis    • Bilateral lower extremity edema    • CHF (congestive heart failure) (CMS/HCC)    • Chronic coronary artery disease    • Class 3 severe obesity due to excess calories in adult (CMS/HCC)    • COPD (chronic obstructive pulmonary disease) (CMS/HCC)    • Depression    • Diabetes mellitus (CMS/HCC)    • Elevated cholesterol    • GERD (gastroesophageal reflux disease)    • Heart murmur    • Hypertension    • Mitral valve insufficiency    • Pneumonia     1/2016   • Pulmonary hypertension (CMS/HCC)     due to sleep disordered breathing   • Sleep apnea     Uses CPAP or oxygen   • Stage 3 chronic kidney disease (CMS/HCC)    • Subclinical hypothyroidism    • Supplemental oxygen dependent    • Valvular heart disease      Past Surgical History:   Procedure Laterality Date   • CARDIAC CATHETERIZATION     • CARDIAC CATHETERIZATION N/A 6/10/2016    Procedure: Left Heart Cath;  Surgeon: Chace Johnson MD;  Location: [facility] CATH INVASIVE LOCATION;  Service:    • CARDIAC CATHETERIZATION N/A 6/10/2016    Procedure: Right Heart Cath;  Surgeon: Chace Johnson MD;  Location: [facility] CATH INVASIVE LOCATION;  Service:    • CARDIAC ELECTROPHYSIOLOGY PROCEDURE N/A 2/2/2021    Procedure: Pacemaker DC new---[employer] MICRA;  Surgeon: Eliazar Bond MD;  Location: [facility] CATH INVASIVE LOCATION;  Service: Cardiology;  Laterality: N/A;   • CARDIAC SURGERY     • CORONARY ARTERY BYPASS GRAFT       2 vessel   • CORONARY ARTERY BYPASS GRAFT WITH MITRAL VALVE REPAIR/REPLACEMENT N/A 6/13/2016    Procedure: INTRAOPERATIVE TARIQ, MIDLINE STERNOTOMY, CORONARY ARTERY BYPASS GRAFTING X  2 UTILIZING ENDOSCOPICALLY HARVESTED LEFT GREATER SAPHENOUS VEIN, MITRAL VALVE REPLACEMENT AND TRICUSPID VALVE REPAIR;  Surgeon: Eliecer Mistry MD;  Location: [facility];  Service:    • CORONARY STENT PLACEMENT  2010    Approx. 6 yrs ago at [facility]   • HEMORRHOIDECTOMY     • HYSTERECTOMY     • MITRAL VALVE REPLACEMENT     • REPLACEMENT TOTAL KNEE Right    • THYROID SURGERY      Cyst removed from thyroid   • VASCULAR SURGERY       General Information     Row Name 02/06/21 1520          Physical Therapy Time and Intention    Document Type  therapy note (daily note)  -     Mode of Treatment  individual therapy;physical therapy  -     Row Name 02/06/21 1520          General Information    Patient Profile Reviewed  yes  -     Existing Precautions/Restrictions  fall;oxygen therapy device and L/min  -     Row Name 02/06/21 1520          Cognition    Orientation Status (Cognition)  oriented x 3  -     Row Name 02/06/21 1520          Safety Issues, Functional Mobility    Safety Issues Affecting Function (Mobility)  insight into deficits/self-awareness;judgment;positioning of assistive device;problem-solving;safety precaution awareness  -     Impairments Affecting Function (Mobility)  balance;endurance/activity tolerance;coordination;postural/trunk control;shortness of breath;strength  -       User Key  (r) = Recorded By, (t) = Taken By, (c) = Cosigned By    Initials Name Provider Type     Pau Hensley PTA Physical Therapy Assistant        Mobility     Row Name 02/06/21 1521          Bed Mobility    Scooting/Bridging [place] (Bed Mobility)  2 person assist;maximum assist (25% patient effort)  -     Supine-Sit [place] (Bed Mobility)  2 person assist;moderate assist (50% patient effort);verbal cues;nonverbal  "cues (demo/gesture)  -     Sit-Supine [place] (Bed Mobility)  not tested  -     Assistive Device (Bed Mobility)  bed rails;draw sheet  -     Row Name 02/06/21 1521          Sit-Stand Transfer    Sit-Stand [place] (Transfers)  2 person assist;moderate assist (50% patient effort);maximum assist (25% patient effort);verbal cues;nonverbal cues (demo/gesture) needs extra time, stopped moving during tfer due to \"feet burning\"  -     Assistive Device (Sit-Stand Transfers)  walker, front-wheeled needs bariatric for width  -     Row Name 02/06/21 1521          Gait/Stairs (Locomotion)    Daniel[place] Level (Gait)  2 person assist;moderate assist (50% patient effort);verbal cues;nonverbal cues (demo/gesture)  -     Assistive Device (Gait)  walker, front-wheeled  -     Distance in Feet (Gait)  8ft, had to pull chair up to pt  -     Deviations/Abnormal Patterns (Gait)  lina decreased;base of support, wide;festinating/shuffling;stride length decreased;weight shifting decreased  -     Bilateral Gait Deviations  forward flexed posture  -       User Key  (r) = Recorded By, (t) = Taken By, (c) = Cosigned By    Initials Name Provider Type    Pau Lopez PTA Physical Therapy Assistant        Obj/Interventions     Row Name 02/06/21 1524          Motor Skills    Therapeutic Exercise  -- cardiac protocol x 10 reps  -       User Key  (r) = Recorded By, (t) = Taken By, (c) = Cosigned By    Initials Name Provider Type    Pau Lopez PTA Physical Therapy Assistant        Goals/Plan    No documentation.       Clinical Impression     Row Name 02/06/21 1525          Pain Scale: Numbers Pre/Post-Treatment    Pretreatment Pain Rating  6/10  -     Posttreatment Pain Rating  7/10  -     Pain Location - Side  Bilateral  -     Pain Location  foot  -     Pain Intervention(s)  Repositioned;Elevated  -     Row Name 02/06/21 1522          Plan of Care Review    Plan of Care Reviewed With  " patient  -     Outcome Summary  Pt slow to progress, still req assist of 2 for bed mobility, tfers , amb short distances; pt plans home at [place], but currently MOD/MAX 2 for all activity; unsure family can provide that assist at home, but she states fam already assisted her at home; pain in bilat feet also limiting this session; heels elevated and thick tread socks removed at rest , will need to re-apply before getting up on feet again  -     Row Name 02/06/21 1525          Therapy Assessment/Plan (PT)    Rehab Potential (PT)  fair, will monitor progress closely  -     Criteria for Skilled Interventions Met (PT)  yes  -     Row Name 02/06/21 1525          Vital Signs    O2 Delivery Pre Treatment  supplemental O2  -     Row Name 02/06/21 1525          Positioning and Restraints    Pre-Treatment Position  in bed  -     Post Treatment Position  chair  -JM     In Chair  reclined;call light within reach;encouraged to call for assist;exit alarm on;heels elevated  -       User Key  (r) = Recorded By, (t) = Taken By, (c) = Cosigned By    Initials Name Provider Type    Pau Lopez PTA Physical Therapy Assistant        Outcome Measures     Row Name 02/06/21 1529          How much help from another person do you currently need...    Turning from your back to your side while in flat bed without using bedrails?  2  -JM     Moving from lying on back to sitting on the side of a flat bed without bedrails?  2  -JM     Moving to and from a bed to a chair (including a wheelchair)?  2  -JM     Standing up from a chair using your arms (e.g., wheelchair, bedside chair)?  2  -JM     Climbing 3-5 steps with a railing?  1  -JM     To walk in hospital room?  2  -JM     AM-PAC 6 Clicks Score (PT)  11  -       User Key  (r) = Recorded By, (t) = Taken By, (c) = Cosigned By    Initials Name Provider Type    Pau Lopez PTA Physical Therapy Assistant        Physical Therapy Education                 Title: PT OT SLP  Therapies (In Progress)     Topic: Physical Therapy (In Progress)     Point: Mobility training (In Progress)     Learning Progress Summary           Patient Acceptance, E,TB,D, NR by  at 2/6/2021 1530    Acceptance, E,TB,D, VU,NR by  at 2/4/2021 1539    Acceptance, E,TB,D, VU,NR by  at 2/3/2021 1055                   Point: Home exercise program (In Progress)     Learning Progress Summary           Patient Acceptance, E,TB,D, NR by  at 2/6/2021 1530    Acceptance, E,TB,D, VU,NR by  at 2/4/2021 1539                   Point: Body mechanics (In Progress)     Learning Progress Summary           Patient Acceptance, E,TB,D, NR by  at 2/6/2021 1530    Acceptance, E,TB,D, VU,NR by  at 2/4/2021 1539    Acceptance, E,TB,D, VU,NR by  at 2/3/2021 1055                   Point: Precautions (In Progress)     Learning Progress Summary           Patient Acceptance, E,TB,D, NR by  at 2/6/2021 1530    Acceptance, E,TB,D, VU,NR by  at 2/4/2021 1539    Acceptance, E,TB,D, VU,NR by  at 2/3/2021 1055                               User Key     Initials Effective Dates Name Provider Type Discipline     04/03/18 -  Precious Cisneros, PT Physical Therapist PT     03/07/18 -  Pau Hensley PTA Physical Therapy Assistant PT              PT Recommendation and Plan     Plan of Care Reviewed With: patient  Outcome Summary: Pt slow to progress, still req assist of 2 for bed mobility, tfers , amb short distances; pt plans home at [place], but currently MOD/MAX 2 for all activity; unsure family can provide that assist at home, but she states fam already assisted her at home; pain in bilat feet also limiting this session; heels elevated and thick tread socks removed at rest , will need to re-apply before getting up on feet again     Time Calculation:   PT Charges     Row Name 02/06/21 1532             Time Calculation    Start Time  1040  -      Stop Time  1115  -      Time Calculation (min)  35 min  -      PT Received  On  02/06/21  -SINDY      PT - Next Appointment  02/07/21  -SINDY        User Key  (r) = Recorded By, (t) = Taken By, (c) = Cosigned By    Initials Name Provider Type    Pau Lopez PTA Physical Therapy Assistant        Therapy Charges for Today     Code Description Service Date Service Provider Modifiers Qty    93473144171 HC PT THER PROC EA 15 MIN 2/6/2021 Pau Hensley PTA GP 2    52868749813 HC PT THER SUPP EA 15 MIN 2/6/2021 Pau Hensley PTA GP 2          PT G-Codes  Outcome Measure Options: AM-PAC 6 Clicks Daily Activity (OT)  AM-PAC 6 Clicks Score (PT): 11  AM-PAC 6 Clicks Score (OT): 12    Pau Hensley PTA  2/6/2021

## 2021-02-07 LAB
ALBUMIN SERPL-MCNC: 3.1 G/DL (ref 3.5–5.2)
ANION GAP SERPL CALCULATED.3IONS-SCNC: 8.6 MMOL/L (ref 5–15)
BUN SERPL-MCNC: 38 MG/DL (ref 8–23)
BUN/CREAT SERPL: 23.9 (ref 7–25)
CALCIUM SPEC-SCNC: 8.3 MG/DL (ref 8.6–10.5)
CHLORIDE SERPL-SCNC: 95 MMOL/L (ref 98–107)
CO2 SERPL-SCNC: 39.4 MMOL/L (ref 22–29)
CREAT SERPL-MCNC: 1.59 MG/DL (ref 0.57–1)
DEPRECATED RDW RBC AUTO: 45.9 FL (ref 37–54)
ERYTHROCYTE [DISTWIDTH] IN BLOOD BY AUTOMATED COUNT: 15.2 % (ref 12.3–15.4)
GFR SERPL CREATININE-BSD FRML MDRD: 32 ML/MIN/1.73
GLUCOSE BLDC GLUCOMTR-MCNC: 125 MG/DL (ref 70–130)
GLUCOSE BLDC GLUCOMTR-MCNC: 152 MG/DL (ref 70–130)
GLUCOSE BLDC GLUCOMTR-MCNC: 158 MG/DL (ref 70–130)
GLUCOSE BLDC GLUCOMTR-MCNC: 185 MG/DL (ref 70–130)
GLUCOSE SERPL-MCNC: 119 MG/DL (ref 65–99)
HCT VFR BLD AUTO: 32.6 % (ref 34–46.6)
HGB BLD-MCNC: 10.5 G/DL (ref 12–15.9)
MCH RBC QN AUTO: 26.6 PG (ref 26.6–33)
MCHC RBC AUTO-ENTMCNC: 32.2 G/DL (ref 31.5–35.7)
MCV RBC AUTO: 82.5 FL (ref 79–97)
PHOSPHATE SERPL-MCNC: 3.8 MG/DL (ref 2.5–4.5)
PLATELET # BLD AUTO: 229 10*3/MM3 (ref 140–450)
PMV BLD AUTO: 10.9 FL (ref 6–12)
POTASSIUM SERPL-SCNC: 3.4 MMOL/L (ref 3.5–5.2)
POTASSIUM SERPL-SCNC: 4.5 MMOL/L (ref 3.5–5.2)
RBC # BLD AUTO: 3.95 10*6/MM3 (ref 3.77–5.28)
SODIUM SERPL-SCNC: 143 MMOL/L (ref 136–145)
WBC # BLD AUTO: 9.62 10*3/MM3 (ref 3.4–10.8)

## 2021-02-07 PROCEDURE — 94799 UNLISTED PULMONARY SVC/PX: CPT

## 2021-02-07 PROCEDURE — 84132 ASSAY OF SERUM POTASSIUM: CPT | Performed by: INTERNAL MEDICINE

## 2021-02-07 PROCEDURE — 97530 THERAPEUTIC ACTIVITIES: CPT | Performed by: PHYSICAL THERAPIST

## 2021-02-07 PROCEDURE — 99231 SBSQ HOSP IP/OBS SF/LOW 25: CPT | Performed by: NURSE PRACTITIONER

## 2021-02-07 PROCEDURE — 82962 GLUCOSE BLOOD TEST: CPT

## 2021-02-07 PROCEDURE — 85027 COMPLETE CBC AUTOMATED: CPT | Performed by: INTERNAL MEDICINE

## 2021-02-07 PROCEDURE — 80069 RENAL FUNCTION PANEL: CPT | Performed by: INTERNAL MEDICINE

## 2021-02-07 PROCEDURE — 63710000001 INSULIN LISPRO (HUMAN) PER 5 UNITS: Performed by: INTERNAL MEDICINE

## 2021-02-07 RX ADMIN — BUMETANIDE 2 MG/HR: 0.25 INJECTION INTRAMUSCULAR; INTRAVENOUS at 18:25

## 2021-02-07 RX ADMIN — ALPRAZOLAM 1 MG: 0.5 TABLET ORAL at 08:25

## 2021-02-07 RX ADMIN — BUMETANIDE 2 MG/HR: 0.25 INJECTION INTRAMUSCULAR; INTRAVENOUS at 12:59

## 2021-02-07 RX ADMIN — GABAPENTIN 100 MG: 100 CAPSULE ORAL at 22:54

## 2021-02-07 RX ADMIN — CARVEDILOL 6.25 MG: 6.25 TABLET, FILM COATED ORAL at 17:02

## 2021-02-07 RX ADMIN — SODIUM CHLORIDE, PRESERVATIVE FREE 3 ML: 5 INJECTION INTRAVENOUS at 08:10

## 2021-02-07 RX ADMIN — POTASSIUM CHLORIDE 40 MEQ: 750 TABLET, EXTENDED RELEASE ORAL at 08:11

## 2021-02-07 RX ADMIN — BUMETANIDE 2 MG/HR: 0.25 INJECTION INTRAMUSCULAR; INTRAVENOUS at 02:10

## 2021-02-07 RX ADMIN — VILAZODONE HYDROCHLORIDE 20 MG: 40 TABLET ORAL at 22:53

## 2021-02-07 RX ADMIN — NYSTATIN: 100000 CREAM TOPICAL at 08:11

## 2021-02-07 RX ADMIN — GABAPENTIN 100 MG: 100 CAPSULE ORAL at 08:05

## 2021-02-07 RX ADMIN — CARVEDILOL 6.25 MG: 6.25 TABLET, FILM COATED ORAL at 08:05

## 2021-02-07 RX ADMIN — POTASSIUM CHLORIDE 40 MEQ: 750 TABLET, EXTENDED RELEASE ORAL at 13:58

## 2021-02-07 RX ADMIN — LEVOTHYROXINE SODIUM 25 MCG: 0.03 TABLET ORAL at 05:46

## 2021-02-07 RX ADMIN — BUMETANIDE 2 MG/HR: 0.25 INJECTION INTRAMUSCULAR; INTRAVENOUS at 08:06

## 2021-02-07 RX ADMIN — Medication 1 CAPSULE: at 08:05

## 2021-02-07 RX ADMIN — ATORVASTATIN CALCIUM 20 MG: 20 TABLET, FILM COATED ORAL at 22:53

## 2021-02-07 RX ADMIN — BUDESONIDE AND FORMOTEROL FUMARATE DIHYDRATE 2 PUFF: 160; 4.5 AEROSOL RESPIRATORY (INHALATION) at 20:11

## 2021-02-07 RX ADMIN — PANTOPRAZOLE SODIUM 40 MG: 40 TABLET, DELAYED RELEASE ORAL at 05:46

## 2021-02-07 RX ADMIN — AMLODIPINE BESYLATE 5 MG: 5 TABLET ORAL at 08:05

## 2021-02-07 RX ADMIN — SODIUM CHLORIDE, PRESERVATIVE FREE 10 ML: 5 INJECTION INTRAVENOUS at 08:09

## 2021-02-07 RX ADMIN — APIXABAN 5 MG: 5 TABLET, FILM COATED ORAL at 22:54

## 2021-02-07 RX ADMIN — BUDESONIDE AND FORMOTEROL FUMARATE DIHYDRATE 2 PUFF: 160; 4.5 AEROSOL RESPIRATORY (INHALATION) at 10:10

## 2021-02-07 RX ADMIN — INSULIN LISPRO 2 UNITS: 100 INJECTION, SOLUTION INTRAVENOUS; SUBCUTANEOUS at 17:02

## 2021-02-07 RX ADMIN — ALPRAZOLAM 1 MG: 0.5 TABLET ORAL at 22:53

## 2021-02-07 RX ADMIN — INSULIN LISPRO 2 UNITS: 100 INJECTION, SOLUTION INTRAVENOUS; SUBCUTANEOUS at 08:04

## 2021-02-07 RX ADMIN — APIXABAN 5 MG: 5 TABLET, FILM COATED ORAL at 08:05

## 2021-02-07 RX ADMIN — ALPRAZOLAM 1 MG: 0.5 TABLET ORAL at 00:14

## 2021-02-07 RX ADMIN — HYDROCODONE BITARTRATE AND ACETAMINOPHEN 1 TABLET: 5; 325 TABLET ORAL at 12:49

## 2021-02-07 RX ADMIN — HYDROCODONE BITARTRATE AND ACETAMINOPHEN 1 TABLET: 5; 325 TABLET ORAL at 00:10

## 2021-02-07 NOTE — PLAN OF CARE
Goal Outcome Evaluation:  Plan of Care Reviewed With: patient  Progress: improving  Outcome Summary: Pt.'s pain controlled with PRN meds. Pt. remains on Bumex drip. Lymphedema wraps still in place. Potassium replaced per protocol. VS stable. Will continue to monitor.

## 2021-02-07 NOTE — PLAN OF CARE
Goal Outcome Evaluation:  Plan of Care Reviewed With: patient  Progress: improving  Outcome Summary: Pt AO x 4 supine in bed. Pt transferred supine to sit with min x 1 HOB elevated and bed rails. Pt transferred sit to stand with min x 1 and RW. Pt ambulated 10 ft wit hRW and min x 1. requires frequent encouragement to perform interventions    PPE: Patient was placed in face mask in first look. Patient was wearing facemask when I entered the room and throughout our encounter. I wore full protective equipment throughout this patient encounter including a face mask, and gloves. Hand hygiene was performed before donning protective equipment and after removal when leaving the room.

## 2021-02-07 NOTE — PROGRESS NOTES
Name: Miguelina Lopez ADMIT: 2021   : 1945  PCP: Arcelia Talbot APRN    MRN: [id number] LOS: 6 days   AGE/SEX: 75 y.o. female  ROOM: /     Subjective   Subjective   Resting in recliner. Denies any nausea or vomiting. Has had a few stools. Unsure what they look like, but had not had a BM in days prior. No abdominal pain. No fever or chills. Breathing improved. No chest pain. Granddaughter arrived during the visit.     Objective   Objective   Vital Signs  Temp:  [97.9 °F (36.6 °C)-98.9 °F (37.2 °C)] 98.3 °F (36.8 °C)  Heart Rate:  [74-88] 79  Resp:  [16-18] 18  BP: (129-133)/(58-60) 129/59  SpO2:  [93 %-96 %] 94 %  on  Flow (L/min):  [2-6] 2;   Device (Oxygen Therapy): nasal cannula  Body mass index is 52.52 kg/m².     Physical Exam  Vitals signs and nursing note reviewed.   Constitutional:       General: She is not in acute distress.     Appearance: She is obese. She is ill-appearing (chronically). She is not toxic-appearing.   Neck:      Musculoskeletal: Normal range of motion and neck supple.   Cardiovascular:      Rate and Rhythm: Regular rhythm. Normal rate present. Pacing on monitor.     Heart sounds: Murmur present.   Pulmonary:      Effort: Pulmonary effort is normal. No respiratory distress.      Comments: Diminished throughout. On 2 L nc.  Abdominal:      General: Bowel sounds are normal. There is no distension.      Palpations: Abdomen is soft.      Tenderness: There is no abdominal tenderness.   Genitourinary:     Comments: Clear yellow urine in bedside cannister.  Musculoskeletal: Normal range of motion.         General: moderate Swelling present.   Skin:     General: Skin is warm and dry. Right radial cath site intact. No bruising.     Findings: No bruising.      Comments: BLE wrapped.  Neurological:      Mental Status: She is alert and oriented to person, place, and time.      Sensory: No sensory deficit.      Motor: Weakness present.      Coordination: Coordination normal.    Psychiatric:         Mood and Affect: Mood normal.         Behavior: Behavior normal.     Results Review:       I reviewed the patient's new clinical results.  Results from last 7 days   Lab Units 02/07/21  0501 02/06/21  0502 02/05/21  0958 02/05/21  0952 02/04/21  1124 02/03/21  0419   WBC 10*3/mm3 9.62 9.34  --   --  11.44* 12.59*   HEMOGLOBIN g/dL 10.5* 10.8*  --   --  11.1* 12.2   HEMOGLOBIN, POC g/dL  --   --  11.6* 11.9*  --   --    PLATELETS 10*3/mm3 229 221  --   --  226 247     Results from last 7 days   Lab Units 02/07/21  0501 02/06/21 0502 02/05/21 1100 02/05/21  0628   SODIUM mmol/L 143 144 145 146*   POTASSIUM mmol/L 3.4* 3.8 3.5 3.5   CHLORIDE mmol/L 95* 97* 98 97*   CO2 mmol/L 39.4* 34.5* 37.7* 38.5*   BUN mg/dL 38* 32* 25* 26*   CREATININE mg/dL 1.59* 1.69* 1.59* 1.51*   GLUCOSE mg/dL 119* 101* 119* 99   Estimated Creatinine Clearance: 36.2 mL/min (A) (by C-G formula based on SCr of 1.59 mg/dL (H)).  Results from last 7 days   Lab Units 02/07/21  0501 02/05/21 1100 02/05/21 0628 02/04/21  0434 02/01/21  0339   ALBUMIN g/dL 3.10* 3.10* 3.30* 3.10* 3.70   BILIRUBIN mg/dL  --  0.6 0.6 0.7 0.6   ALK PHOS U/L  --  218* 230* 216* 259*   AST (SGOT) U/L  --  25 24 28 23   ALT (SGPT) U/L  --  10 12 12 14     Results from last 7 days   Lab Units 02/07/21  0501 02/06/21  0502 02/05/21  1100 02/05/21  0628 02/04/21  0434  02/03/21  0419  02/01/21  0339   CALCIUM mg/dL 8.3* 8.2* 8.5* 8.6 8.3*   < > 8.9   < > 8.5*   ALBUMIN g/dL 3.10*  --  3.10* 3.30* 3.10*  --   --   --  3.70   MAGNESIUM mg/dL  --  2.0  --   --   --   --  2.1  --  2.0   PHOSPHORUS mg/dL 3.8 5.0*  --   --   --   --  3.5  --   --     < > = values in this interval not displayed.     Results from last 7 days   Lab Units 02/01/21  0339   PROCALCITONIN ng/mL 0.07     Glucose   Date/Time Value Ref Range Status   02/07/2021 0535 185 (H) 70 - 130 mg/dL Final   02/06/2021 2005 157 (H) 70 - 130 mg/dL Final   02/06/2021 1557 158 (H) 70 - 130 mg/dL  Final   02/06/2021 1034 170 (H) 70 - 130 mg/dL Final   02/06/2021 0549 112 70 - 130 mg/dL Final   02/05/2021 2114 215 (H) 70 - 130 mg/dL Final   02/05/2021 1541 122 70 - 130 mg/dL Final       amLODIPine, 5 mg, Oral, Q24H  apixaban, 5 mg, Oral, Q12H  atorvastatin, 20 mg, Oral, Daily  budesonide-formoterol, 2 puff, Inhalation, BID - RT  carvedilol, 6.25 mg, Oral, BID With Meals  clotrimazole-betamethasone, , Topical, BID  fluticasone, 2 spray, Each Nare, Daily  gabapentin, 100 mg, Oral, Q12H  insulin lispro, 0-7 Units, Subcutaneous, TID AC  lactobacillus acidophilus, 1 capsule, Oral, Daily  levothyroxine, 25 mcg, Oral, Daily  nystatin, , Topical, BID  pantoprazole, 40 mg, Oral, QAM  polyethylene glycol, 17 g, Oral, Daily  senna-docusate sodium, 2 tablet, Oral, Nightly  sodium chloride, 10 mL, Intravenous, Q12H  sodium chloride, 3 mL, Intravenous, Q12H  vilazodone, 20 mg, Oral, Nightly      bumetanide, 2 mg/hr, Last Rate: 2 mg/hr (02/07/21 0806)    Diet Regular; Consistent Carbohydrate, Cardiac       Assessment/Plan     Active Hospital Problems    Diagnosis  POA   • **Chest pain [R07.9]  Yes   • UTI (urinary tract infection), bacterial [N39.0, A49.9]  Yes   • Complete heart block (CMS/HCC) [I44.2]  Unknown   • COPD (chronic obstructive pulmonary disease) (CMS/HCC) [J44.9]  Yes   • Chronic respiratory failure with hypoxia and hypercapnia (CMS/HCC) [J96.11, J96.12]  Yes   • Chronic diastolic heart failure (CMS/HCC) [I50.32]  Yes   • Supplemental oxygen dependent [Z99.81]  Not Applicable   • s/p MVR, TV-repair, CABG x2 6/13/16 [Z95.2]  Not Applicable   • Pulmonary hypertension due to sleep-disordered breathing (CMS/HCC) [I27.29, G47.8]  Yes   • OCHOA (obstructive sleep apnea) [G47.33]  Yes   • DM type 2 (diabetes mellitus, type 2) (CMS/HCC) [E11.9]  Yes   • Diabetic peripheral neuropathy (CMS/HCC) [E11.42]  Yes   • Hyperlipidemia [E78.5]  Yes   • Essential hypertension [I10]  Yes   • CKD (chronic kidney disease) stage 3,  GFR 30-59 ml/min (CMS/HCC) [N18.30]  Yes      Resolved Hospital Problems   No resolved problems to display.     75 y.o. female admitted with Chest pain.     Ms. Lopez is a 75 year old female who presented to the hospital with chest pain and dyspnea. Found to have diffuse opacities on CXR with elevated proBNP.     Chest pain/acute on chronic diastolic heart failure  -Cardiology following. Found to have complete heart block/aflutter. Had PPM placed 2/2 and now pacing. Now on Eliquis with possible cardioversion in future. BB now back on board.  -Nephrology managing diuretics. Continues on Bumex gtt (was held and given fluids for cath, but resumed 2/5) metolazone added.   -Cardiac regimen in place with ASA, statin. Norvasc for BP. Coreg added 2/3.  -Troponin elevated. Right/left heart cath 2/5 showing severe AS and pulmonary HTN. Ischemic heart disease 3 vessel with patent stents. Cardiothoracic consulted for TAVR work up.     COPD/chronic respiratory failure with hypoxia and hypercapnia/OCHOA  -Pulmonology following. To use home Trilogy.   -ABGs stable. Wean oxygen as able. Only on 2-3 L today.     CAD/history of CABG/valvular heart disease  -Cards following as above.  -Repeat echo with preserved EF and severe AS. Valve is worse than prior. TAVR work up in place.     DM2/neuropathy  -SSI as needed. Monitor ACHS. Hold oral medications.  -A1c 6.71%. Sugars stable. Monitor and encourage oral intake.     HTN  -BP improving with diuresis. Arb on hold- defer resumption to Nephrology. BB and Norvasc added this admission.     CKD3  -Baseline creatinine around 1.2, currently slightly uptrending after cath. 1.69  -Nephrology following. Potassium replaced with protocol.      UTI  -Mild leukocytosis as well as foul-smelling urine reported. UA with 2+ bacteria and 13-20 WBC. Culture growing > 100, 000 Enterococcus faecalis. Finished 3 days of Rocephin therapy. Leukocytosis resolved with this.     +BM with bowel regimen.  Hold for  any loose stools. Continue to monitor.     · Eliquis (home med) for DVT prophylaxis.  · Full code.  · Discussed with patient and granddaughter at bedside.  · Anticipate discharge TBD.  when cleared by consultants.       ZOILA Powers  [facility]  02/07/21  10:38 EST

## 2021-02-07 NOTE — PROGRESS NOTES
Cardiology Progress Note      Patient Name: Miguelina Lopez  Patient : 1945        Date of Service:21  Provider of Service: ZOILA Quinonez  Place of Service: [facility]  Referral Provider: No ref. provider found          Follow Up: Valvular heart disease; HFpEF; complete heart block status post PPM implant      Interval Hx: Patient complains of some loose stools today.  She states her shortness of breath is unchanged.        OBJECTIVE  Temp:  [97.9 °F (36.6 °C)-98.9 °F (37.2 °C)] 98.3 °F (36.8 °C)  Heart Rate:  [74-88] 80  Resp:  [16-18] 16  BP: (129-133)/(58-60) 129/59     Intake/Output Summary (Last 24 hours) at 2021 0955  Last data filed at 2021 0804  Gross per 24 hour   Intake 590 ml   Output 950 ml   Net -360 ml     Body mass index is 52.52 kg/m².      21  0645 21  0400 21  0500   Weight: 123 kg (270 lb 4.5 oz) 122 kg (269 lb 10 oz) 122 kg (268 lb 14.4 oz)         Physical Exam:   Vitals signs and nursing note reviewed.   Constitutional:       Appearance: Well-developed and not in distress.   Eyes:      Conjunctiva/sclera: Conjunctivae normal.      Pupils: Pupils are equal, round, and reactive to light.   HENT:      Head: Normocephalic.   Neck:      Musculoskeletal: Normal range of motion.      Thyroid: No thyromegaly.      Vascular: No carotid bruit or JVD.   Pulmonary:      Effort: Pulmonary effort is normal.      Breath sounds: Normal breath sounds.   Cardiovascular:      Normal rate. Regular rhythm.      Murmurs: There is a harsh midsystolic murmur at the URSB, radiating to the neck.      No gallop. No click. No rub.   Pulses:     Intact distal pulses.   Edema:     Peripheral edema present.     Pretibial: edema of the left pretibial area and 1+ pitting edema of the right pretibial area.     Ankle: bilateral 1+ pitting edema of the ankle.     Feet: bilateral 1+ pitting edema of the feet.  Abdominal:      General: Bowel sounds are normal.  There is no distension.      Palpations: Abdomen is soft.   Skin:     General: Skin is warm and dry.      Findings: No erythema.   Neurological:      Mental Status: Alert, oriented to person, place, and time and oriented to person, place and time.           CURRENT MEDS    Scheduled Meds:amLODIPine, 5 mg, Oral, Q24H  apixaban, 5 mg, Oral, Q12H  atorvastatin, 20 mg, Oral, Daily  budesonide-formoterol, 2 puff, Inhalation, BID - RT  carvedilol, 6.25 mg, Oral, BID With Meals  clotrimazole-betamethasone, , Topical, BID  fluticasone, 2 spray, Each Nare, Daily  gabapentin, 100 mg, Oral, Q12H  insulin lispro, 0-7 Units, Subcutaneous, TID AC  lactobacillus acidophilus, 1 capsule, Oral, Daily  levothyroxine, 25 mcg, Oral, Daily  nystatin, , Topical, BID  pantoprazole, 40 mg, Oral, QAM  polyethylene glycol, 17 g, Oral, Daily  senna-docusate sodium, 2 tablet, Oral, Nightly  sodium chloride, 10 mL, Intravenous, Q12H  sodium chloride, 3 mL, Intravenous, Q12H  vilazodone, 20 mg, Oral, Nightly      Continuous Infusions:bumetanide, 2 mg/hr, Last Rate: 2 mg/hr (02/07/21 0806)          Lab Review:   Results from last 7 days   Lab Units 02/07/21  0501 02/06/21  0502 02/05/21  1100 02/05/21  0628   SODIUM mmol/L 143 144 145 146*   POTASSIUM mmol/L 3.4* 3.8 3.5 3.5   CHLORIDE mmol/L 95* 97* 98 97*   CO2 mmol/L 39.4* 34.5* 37.7* 38.5*   BUN mg/dL 38* 32* 25* 26*   CREATININE mg/dL 1.59* 1.69* 1.59* 1.51*   GLUCOSE mg/dL 119* 101* 119* 99   CALCIUM mg/dL 8.3* 8.2* 8.5* 8.6   AST (SGOT) U/L  --   --  25 24   ALT (SGPT) U/L  --   --  10 12     Results from last 7 days   Lab Units 02/05/21  0628 02/04/21  0434 02/02/21  0739 02/01/21  1200 02/01/21  0551 02/01/21  0339   TROPONIN T ng/mL 0.121* 0.101* 0.046* 0.026 0.016 0.011     Results from last 7 days   Lab Units 02/07/21  0501 02/06/21  0502   WBC 10*3/mm3 9.62 9.34   HEMOGLOBIN g/dL 10.5* 10.8*   HEMATOCRIT % 32.6* 34.5   PLATELETS 10*3/mm3 229 221         Results from last 7 days   Lab  Units 02/06/21  0502 02/03/21  0419   MAGNESIUM mg/dL 2.0 2.1         Results from last 7 days   Lab Units 02/01/21  0339   PROBNP pg/mL 12,821.0*     I reviewed the patient's new clinical results, and personally reviewed and interpreted the patient's ECG and telemetry data from the last 24 hours.    ASSESSMENT & PLAN    Chest pain    CKD (chronic kidney disease) stage 3, GFR 30-59 ml/min (CMS/HCC)    Diabetic peripheral neuropathy (CMS/HCC)    Hyperlipidemia    OCHOA (obstructive sleep apnea)    DM type 2 (diabetes mellitus, type 2) (CMS/[facility])    Essential hypertension    Pulmonary hypertension due to sleep-disordered breathing (CMS/[facility])    s/p MVR, TV-repair, CABG x2 6/13/16    Supplemental oxygen dependent    Chronic diastolic heart failure (CMS/HCC)    Chronic respiratory failure with hypoxia and hypercapnia (CMS/HCC)    COPD (chronic obstructive pulmonary disease) (CMS/HCC)    Complete heart block (CMS/HCC)    UTI (urinary tract infection), bacterial    1.  Acute on chronic diastolic heart failure: Left ventricular ejection fraction greater than 50%.    Bumex drip held prior to cardiac catheterization, and resumed 2/6 per nephrology.  Difficult to ascertain net output given unmeasurable urine output noted although it does appear to be at a deficit.  If accurate, weight is reduced 1 pound from yesterday.  2.  Complete heart block: Status post pacemaker implant.    Remains V paced on telemetry with rate of 80.   3.    Severe aortic stenosis: Noted to be severe per [facility].  Primary cardiology team evaluating for possible valve replacement versus TAVR   4.  Hypertension: Remained stable and controlled.   5.  Coronary artery disease: Status post CABG.   grafts patent per cardiac cath 2/5   6.  Mitral regurgitation: Status post mitral valve replacement with tissue prosthesis.    7.  Tricuspid regurgitation: Status post repair  8.  Obstructive sleep apnea: Treated, pulmonology following  9.  Obesity  10.  Pulmonary  hypertension: Severe per [facility]     Continue current plan of care.  Primary cardiology team to resume care in a.m.  Agree with current diuresis.      Salima Bearden, APRN  02/07/21

## 2021-02-07 NOTE — PROGRESS NOTES
NEPHROLOGY PROGRESS NOTE    PATIENT IDENTIFICATION:   Name:  Miguelina Lopez      MRN:  [id number]     75 y.o.  female             Reason for visit: OLI    SUBJECTIVE:     Patient resting comfortably.    Dyspnea improving slowly.  Good urine output  Denies any chest pain, nausea or vomiting      OBJECTIVE:  Vitals:    02/07/21 0443 02/07/21 0500 02/07/21 0804 02/07/21 1011   BP: 131/60  129/59    BP Location: Left arm  Left arm    Patient Position: Lying  Lying    Pulse: 74  80 79   Resp: 17  16 18   Temp: 98.9 °F (37.2 °C)  98.3 °F (36.8 °C)    TempSrc: Oral  Oral    SpO2:  93%  94%   Weight:  122 kg (268 lb 14.4 oz)     Height:               Body mass index is 52.52 kg/m².    Intake/Output Summary (Last 24 hours) at 2/7/2021 1424  Last data filed at 2/7/2021 1240  Gross per 24 hour   Intake 400 ml   Output 950 ml   Net -550 ml         Exam:  GEN:  No distress, appears stated age  EYES:   Anicteric sclera  ENT:    External ears/nose normal, MM are moist  NECK:  No adenopathy, JVP none  LUNGS: Normal chest on inspection; not labored  CV:  Normal S1S2, without murmur  ABD:  Morbidly obese, non-tender, non-distended, no hepatosplenomegaly, +BS  EXT:  ++ edema; no cyanosis; clubbing    Scheduled meds:  amLODIPine, 5 mg, Oral, Q24H  apixaban, 5 mg, Oral, Q12H  atorvastatin, 20 mg, Oral, Daily  budesonide-formoterol, 2 puff, Inhalation, BID - RT  carvedilol, 6.25 mg, Oral, BID With Meals  clotrimazole-betamethasone, , Topical, BID  fluticasone, 2 spray, Each Nare, Daily  gabapentin, 100 mg, Oral, Q12H  insulin lispro, 0-7 Units, Subcutaneous, TID AC  lactobacillus acidophilus, 1 capsule, Oral, Daily  levothyroxine, 25 mcg, Oral, Daily  nystatin, , Topical, BID  pantoprazole, 40 mg, Oral, QAM  polyethylene glycol, 17 g, Oral, Daily  senna-docusate sodium, 2 tablet, Oral, Nightly  sodium chloride, 10 mL, Intravenous, Q12H  sodium chloride, 3 mL, Intravenous, Q12H  vilazodone, 20 mg, Oral, Nightly      IV meds:                       bumetanide, 2 mg/hr, Last Rate: 2 mg/hr (02/07/21 1259)        Data Review:    Results from last 7 days   Lab Units 02/07/21  0501 02/06/21  0502 02/05/21  1100 02/05/21  0628 02/04/21  0434   SODIUM mmol/L 143 144 145 146* 145   POTASSIUM mmol/L 3.4* 3.8 3.5 3.5 3.7   CHLORIDE mmol/L 95* 97* 98 97* 100   CO2 mmol/L 39.4* 34.5* 37.7* 38.5* 31.1*   BUN mg/dL 38* 32* 25* 26* 20   CREATININE mg/dL 1.59* 1.69* 1.59* 1.51* 1.42*   CALCIUM mg/dL 8.3* 8.2* 8.5* 8.6 8.3*   BILIRUBIN mg/dL  --   --  0.6 0.6 0.7   ALK PHOS U/L  --   --  218* 230* 216*   ALT (SGPT) U/L  --   --  10 12 12   AST (SGOT) U/L  --   --  25 24 28   GLUCOSE mg/dL 119* 101* 119* 99 133*       Estimated Creatinine Clearance: 36.2 mL/min (A) (by C-G formula based on SCr of 1.59 mg/dL (H)).    Results from last 7 days   Lab Units 02/07/21  0501 02/06/21  0502 02/03/21  0419 02/01/21  0339   MAGNESIUM mg/dL  --  2.0 2.1 2.0   PHOSPHORUS mg/dL 3.8 5.0* 3.5  --        Results from last 7 days   Lab Units 02/07/21  0501 02/06/21  0502 02/05/21  0958 02/05/21  0952 02/04/21  1124 02/03/21  0419 02/01/21  0829   WBC 10*3/mm3 9.62 9.34  --   --  11.44* 12.59* 11.57*   HEMOGLOBIN g/dL 10.5* 10.8*  --   --  11.1* 12.2 11.6*   HEMOGLOBIN, POC g/dL  --   --  11.6* 11.9*  --   --   --    PLATELETS 10*3/mm3 229 221  --   --  226 247 226                   ASSESSMENT:     Chest pain    CKD (chronic kidney disease) stage 3, GFR 30-59 ml/min (CMS/[facility])    Diabetic peripheral neuropathy (CMS/[facility])    Hyperlipidemia    OCHOA (obstructive sleep apnea)    DM type 2 (diabetes mellitus, type 2) (CMS/[facility])    Essential hypertension    Pulmonary hypertension due to sleep-disordered breathing (CMS/[facility])    s/p MVR, TV-repair, CABG x2 6/13/16    Supplemental oxygen dependent    Chronic diastolic heart failure (CMS/[facility])    Chronic respiratory failure with hypoxia and hypercapnia (CMS/[facility])    COPD (chronic obstructive pulmonary disease) (CMS/[facility])    Complete heart block  (CMS/[facility])    UTI (urinary tract infection), bacterial      - OLI on CKD3:    Creatinine little better at 1.59 mg/DL.   Electrolytes okay.  Volume status generous  Continue IV Bumex drip         - Acute on chronic diastolic heart failure with moderate right pulmonary HTN  Valvular heart disease: moderate AS, mild to moderate TR, mitral valve replacement, regurg.     - Acute on chronic respiratory failure:   - Anemia, iron deficiency.   - HTN.  Blood pressure okay.  Continue current antihypertensives  - Lymphedema   - Morbid obesity       Gómez Lucas MD  2/7/2021    14:24 EST

## 2021-02-07 NOTE — THERAPY TREATMENT NOTE
Patient Name: Miguelina Lopez  : 1945    MRN: [id number]                              Today's Date: 2021       Admit Date: 2021    Visit Dx:     ICD-10-CM ICD-9-CM   1. Chest pain, unspecified type  R07.9 786.50   2. COPD exacerbation (CMS/HCC)  J44.1 491.21   3. Congestive heart failure, unspecified HF chronicity, unspecified heart failure type (CMS/HCC)  I50.9 428.0   4. Hypertension, unspecified type  I10 401.9   5. Abnormal chest x-ray  R93.89 793.2   6. Complete heart block (CMS/HCC)  I44.2 426.0     Patient Active Problem List   Diagnosis   • Anxiety disorder   • Arthritis of knee   • Asthma   • Chronic coronary artery disease   • CKD (chronic kidney disease) stage 3, GFR 30-59 ml/min (CMS/HCC)   • Depression   • Diabetic peripheral neuropathy (CMS/HCC)   • Gastroesophageal reflux disease   • Hyperlipidemia   • Insomnia   • Lower gastrointestinal hemorrhage   • Anemia   • OCHOA (obstructive sleep apnea)   • DM type 2 (diabetes mellitus, type 2) (CMS/HCC)   • Essential hypertension   • Hospital discharge follow-up   • Pulmonary hypertension due to sleep-disordered breathing (CMS/HCC)   • s/p MVR, TV-repair, CABG x2 16   • OLI (acute kidney injury) (CMS/HCC)   • Leukocytosis   • Atrial fibrillation (CMS/HCC)   • Nocturnal hypoxia   • Dermatitis   • Medicare annual wellness visit, initial   • Class 3 severe obesity due to excess calories with serious comorbidity and body mass index (BMI) of 50.0 to 59.9 in adult (CMS/[facility])   • Supplemental oxygen dependent   • Acute on chronic combined systolic and diastolic HF (heart failure) (CMS/[facility])   • Mitral regurgitation   • Aortic stenosis   • Medicare annual wellness visit, subsequent   • Localized edema   • Chronic right-sided congestive heart failure (CMS/[facility])   • Cellulitis of left lower extremity   • Proteinuria   • Bilateral lower extremity edema   • Subclinical hypothyroidism   • Chronic diastolic heart failure (CMS/[facility])   • Chronic respiratory  failure with hypoxia and hypercapnia (CMS/[facility])   • Acute on chronic respiratory failure with hypoxia and hypercapnia (CMS/[facility])   • AV block, 2nd degree   • 1st degree AV block   • Chest pain   • COPD (chronic obstructive pulmonary disease) (CMS/[facility])   • Complete heart block (CMS/[facility])   • UTI (urinary tract infection), bacterial     Past Medical History:   Diagnosis Date   • Acute on chronic respiratory failure with hypoxia and hypercapnia (CMS/[facility])    • OLI (acute kidney injury) (CMS/HCC)    • Anemia    • Anxiety    • Aortic valve stenosis    • Bilateral lower extremity edema    • CHF (congestive heart failure) (CMS/HCC)    • Chronic coronary artery disease    • Class 3 severe obesity due to excess calories in adult (CMS/HCC)    • COPD (chronic obstructive pulmonary disease) (CMS/HCC)    • Depression    • Diabetes mellitus (CMS/HCC)    • Elevated cholesterol    • GERD (gastroesophageal reflux disease)    • Heart murmur    • Hypertension    • Mitral valve insufficiency    • Pneumonia     1/2016   • Pulmonary hypertension (CMS/HCC)     due to sleep disordered breathing   • Sleep apnea     Uses CPAP or oxygen   • Stage 3 chronic kidney disease (CMS/HCC)    • Subclinical hypothyroidism    • Supplemental oxygen dependent    • Valvular heart disease      Past Surgical History:   Procedure Laterality Date   • CARDIAC CATHETERIZATION     • CARDIAC CATHETERIZATION N/A 6/10/2016    Procedure: Left Heart Cath;  Surgeon: Chace Johnson MD;  Location: [facility] CATH INVASIVE LOCATION;  Service:    • CARDIAC CATHETERIZATION N/A 6/10/2016    Procedure: Right Heart Cath;  Surgeon: Chace Johnson MD;  Location: [facility] CATH INVASIVE LOCATION;  Service:    • CARDIAC ELECTROPHYSIOLOGY PROCEDURE N/A 2/2/2021    Procedure: Pacemaker DC new---[employer] MICRA;  Surgeon: Eliazar Bond MD;  Location: [facility] CATH INVASIVE LOCATION;  Service: Cardiology;  Laterality: N/A;   • CARDIAC SURGERY     • CORONARY ARTERY BYPASS GRAFT       2 vessel   • CORONARY ARTERY BYPASS GRAFT WITH MITRAL VALVE REPAIR/REPLACEMENT N/A 6/13/2016    Procedure: INTRAOPERATIVE TARIQ, MIDLINE STERNOTOMY, CORONARY ARTERY BYPASS GRAFTING X  2 UTILIZING ENDOSCOPICALLY HARVESTED LEFT GREATER SAPHENOUS VEIN, MITRAL VALVE REPLACEMENT AND TRICUSPID VALVE REPAIR;  Surgeon: Eliecer Mistry MD;  Location: [facility];  Service:    • CORONARY STENT PLACEMENT  2010    Approx. 6 yrs ago at [facility]   • HEMORRHOIDECTOMY     • HYSTERECTOMY     • MITRAL VALVE REPLACEMENT     • REPLACEMENT TOTAL KNEE Right    • THYROID SURGERY      Cyst removed from thyroid   • VASCULAR SURGERY       General Information     Row Name 02/07/21 0932          Physical Therapy Time and Intention    Document Type  therapy note (daily note)  -     Mode of Treatment  individual therapy;physical therapy  -     Row Name 02/07/21 0932          General Information    Patient Profile Reviewed  yes  -     Existing Precautions/Restrictions  fall;oxygen therapy device and L/min  -     Row Name 02/07/21 0932          Cognition    Orientation Status (Cognition)  oriented x 3  -     Row Name 02/07/21 0932          Safety Issues, Functional Mobility    Impairments Affecting Function (Mobility)  balance;endurance/activity tolerance;coordination;postural/trunk control;shortness of breath;strength  -       User Key  (r) = Recorded By, (t) = Taken By, (c) = Cosigned By    Initials Name Provider Type     Juan Alberto Caal, PT DPT Physical Therapist        Mobility     Row Name 02/07/21 0933          Bed Mobility    Bed Mobility  rolling left;rolling right;scooting/bridging;supine-sit;sit-supine  -     All Activities, [place] (Bed Mobility)  minimum assist (75% patient effort)  -     Rolling Left [place] (Bed Mobility)  minimum assist (75% patient effort)  -     Rolling Right [place] (Bed Mobility)  minimum assist (75% patient effort)  -     Scooting/Bridging [place] (Bed Mobility)   minimum assist (75% patient effort)  -LC     Supine-Sit [place] (Bed Mobility)  minimum assist (75% patient effort)  -LC     Sit-Supine [place] (Bed Mobility)  minimum assist (75% patient effort)  -LC     Row Name 02/07/21 0933          Bed-Chair Transfer    Bed-Chair [place] (Transfers)  minimum assist (75% patient effort)  -     Assistive Device (Bed-Chair Transfers)  walker, front-wheeled  -LC     Row Name 02/07/21 0933          Sit-Stand Transfer    Sit-Stand [place] (Transfers)  minimum assist (75% patient effort)  -LC     Assistive Device (Sit-Stand Transfers)  walker, front-wheeled  -LC     Row Name 02/07/21 0933          Gait/Stairs (Locomotion)    [place] Level (Gait)  minimum assist (75% patient effort)  -     Assistive Device (Gait)  walker, front-wheeled  -LC     Distance in Feet (Gait)  10 ft bed to chair. Pt required encouragement throughout to complete ambulation and transfer  -     Deviations/Abnormal Patterns (Gait)  lina decreased;base of support, wide;festinating/shuffling;stride length decreased;weight shifting decreased  -       User Key  (r) = Recorded By, (t) = Taken By, (c) = Cosigned By    Initials Name Provider Type    LC Juan Alberto Caal, PT DPT Physical Therapist        Obj/Interventions    No documentation.       Goals/Plan    No documentation.       Clinical Impression     Row Name 02/07/21 0934          Pain Scale: Numbers Pre/Post-Treatment    Pretreatment Pain Rating  6/10  -     Posttreatment Pain Rating  7/10  -     Pain Intervention(s)  Repositioned  -     Row Name 02/07/21 0934          Plan of Care Review    Plan of Care Reviewed With  patient  -     Progress  improving  -     Outcome Summary  Pt AO x 4 supine in bed. Pt transferred supine to sit with min x 1 HOB elevated and bed rails. Pt transferred sit to stand with min x 1 and RW. Pt ambulated 10 ft wit hRW and min x 1. requires frequent encouragement to perform interventions  -      Row Name 02/07/21 0934          Vital Signs    O2 Delivery Pre Treatment  supplemental O2  -LC     O2 Delivery Intra Treatment  supplemental O2  -LC     O2 Delivery Post Treatment  supplemental O2  -LC     Pre Patient Position  Supine  -LC     Intra Patient Position  Standing  -LC     Post Patient Position  Sitting  -LC     Row Name 02/07/21 0934          Positioning and Restraints    Pre-Treatment Position  in bed  -LC     Post Treatment Position  chair  -LC     In Chair  notified nsg;reclined;call light within reach;encouraged to call for assist  -LC       User Key  (r) = Recorded By, (t) = Taken By, (c) = Cosigned By    Initials Name Provider Type    Juan Alberto Joseph, PT DPT Physical Therapist        Outcome Measures     Row Name 02/07/21 0935          How much help from another person do you currently need...    Turning from your back to your side while in flat bed without using bedrails?  3  -LC     Moving from lying on back to sitting on the side of a flat bed without bedrails?  3  -LC     Moving to and from a bed to a chair (including a wheelchair)?  3  -LC     Standing up from a chair using your arms (e.g., wheelchair, bedside chair)?  3  -LC     Climbing 3-5 steps with a railing?  1  -LC     To walk in hospital room?  2  -LC     AM-PAC 6 Clicks Score (PT)  15  -LC     Row Name 02/07/21 0935          Functional Assessment    Outcome Measure Options  AM-PAC 6 Clicks Basic Mobility (PT)  -LC       User Key  (r) = Recorded By, (t) = Taken By, (c) = Cosigned By    Initials Name Provider Type    Juan Alberto Joseph, PT DPT Physical Therapist        Physical Therapy Education                 Title: PT OT SLP Therapies (In Progress)     Topic: Physical Therapy (Done)     Point: Mobility training (Done)     Learning Progress Summary           Patient Acceptance, E,D, VU,DU,NR by ANTHONY at 2/7/2021 0935    Acceptance, E,TB,D, NR by SINDY at 2/6/2021 1530    Acceptance, E,TB,D, VU,NR by  at 2/4/2021 1539     Acceptance, E,TB,D, VU,NR by  at 2/3/2021 1055                   Point: Home exercise program (Done)     Learning Progress Summary           Patient Acceptance, E,D, VU,DU,NR by  at 2/7/2021 0935    Acceptance, E,TB,D, NR by  at 2/6/2021 1530    Acceptance, E,TB,D, VU,NR by  at 2/4/2021 1539                   Point: Body mechanics (Done)     Learning Progress Summary           Patient Acceptance, E,D, VU,DU,NR by  at 2/7/2021 0935    Acceptance, E,TB,D, NR by  at 2/6/2021 1530    Acceptance, E,TB,D, VU,NR by  at 2/4/2021 1539    Acceptance, E,TB,D, VU,NR by  at 2/3/2021 1055                   Point: Precautions (Done)     Learning Progress Summary           Patient Acceptance, E,D, VU,DU,NR by  at 2/7/2021 0935    Acceptance, E,TB,D, NR by  at 2/6/2021 1530    Acceptance, E,TB,D, VU,NR by  at 2/4/2021 1539    Acceptance, E,TB,D, VU,NR by  at 2/3/2021 1055                               User Key     Initials Effective Dates Name Provider Type Discipline     04/03/18 -  Precious Cisneros, PT Physical Therapist PT     03/07/18 -  Pau Hensley PTA Physical Therapy Assistant PT     07/02/20 -  Juan Alberto Caal, PT DPT Physical Therapist PT              PT Recommendation and Plan     Plan of Care Reviewed With: patient  Progress: improving  Outcome Summary: Pt AO x 4 supine in bed. Pt transferred supine to sit with min x 1 HOB elevated and bed rails. Pt transferred sit to stand with min x 1 and RW. Pt ambulated 10 ft wit hRW and min x 1. requires frequent encouragement to perform interventions     Time Calculation:   PT Charges     Row Name 02/07/21 0936             Time Calculation    Start Time  0915  -      Stop Time  0930  -      Time Calculation (min)  15 min  -      PT Received On  02/07/21  -      PT - Next Appointment  02/08/21  -         Time Calculation- PT    Total Timed Code Minutes- PT  15 minute(s)  -         Timed Charges    67303 - PT Therapeutic Activity  Minutes  15  -LC        User Key  (r) = Recorded By, (t) = Taken By, (c) = Cosigned By    Initials Name Provider Type    Juan Alberto Joseph, PT DPT Physical Therapist        Therapy Charges for Today     Code Description Service Date Service Provider Modifiers Qty    [id number]  PT THERAPEUTIC ACT EA 15 MIN 2/7/2021 Juan Alberto Caal, PT DPT GP 1          PT G-Codes  Outcome Measure Options: AM-PAC 6 Clicks Basic Mobility (PT)  AM-PAC 6 Clicks Score (PT): 15  AM-PAC 6 Clicks Score (OT): 12    Juan Alberto Caal PT DPT  2/7/2021

## 2021-02-08 ENCOUNTER — TELEPHONE (OUTPATIENT)
Dept: CARDIOLOGY | Facility: CLINIC | Age: 76
End: 2021-02-08

## 2021-02-08 LAB
ALBUMIN SERPL-MCNC: 3.2 G/DL (ref 3.5–5.2)
ALBUMIN/GLOB SERPL: 1.1 G/DL
ALP SERPL-CCNC: 262 U/L (ref 39–117)
ALT SERPL W P-5'-P-CCNC: 14 U/L (ref 1–33)
ANION GAP SERPL CALCULATED.3IONS-SCNC: 12.7 MMOL/L (ref 5–15)
AST SERPL-CCNC: 29 U/L (ref 1–32)
BILIRUB SERPL-MCNC: 0.5 MG/DL (ref 0–1.2)
BUN SERPL-MCNC: 41 MG/DL (ref 8–23)
BUN/CREAT SERPL: 28.5 (ref 7–25)
CALCIUM SPEC-SCNC: 8.7 MG/DL (ref 8.6–10.5)
CHLORIDE SERPL-SCNC: 94 MMOL/L (ref 98–107)
CO2 SERPL-SCNC: 35.3 MMOL/L (ref 22–29)
CREAT SERPL-MCNC: 1.44 MG/DL (ref 0.57–1)
DEPRECATED RDW RBC AUTO: 46.2 FL (ref 37–54)
ERYTHROCYTE [DISTWIDTH] IN BLOOD BY AUTOMATED COUNT: 15.2 % (ref 12.3–15.4)
GFR SERPL CREATININE-BSD FRML MDRD: 35 ML/MIN/1.73
GLOBULIN UR ELPH-MCNC: 3 GM/DL
GLUCOSE BLDC GLUCOMTR-MCNC: 125 MG/DL (ref 70–130)
GLUCOSE BLDC GLUCOMTR-MCNC: 151 MG/DL (ref 70–130)
GLUCOSE BLDC GLUCOMTR-MCNC: 179 MG/DL (ref 70–130)
GLUCOSE BLDC GLUCOMTR-MCNC: 181 MG/DL (ref 70–130)
GLUCOSE SERPL-MCNC: 134 MG/DL (ref 65–99)
HCT VFR BLD AUTO: 32.7 % (ref 34–46.6)
HGB BLD-MCNC: 10.5 G/DL (ref 12–15.9)
MCH RBC QN AUTO: 27.1 PG (ref 26.6–33)
MCHC RBC AUTO-ENTMCNC: 32.1 G/DL (ref 31.5–35.7)
MCV RBC AUTO: 84.5 FL (ref 79–97)
PHOSPHATE SERPL-MCNC: 3.7 MG/DL (ref 2.5–4.5)
PLATELET # BLD AUTO: 224 10*3/MM3 (ref 140–450)
PMV BLD AUTO: 11.3 FL (ref 6–12)
POTASSIUM SERPL-SCNC: 3.8 MMOL/L (ref 3.5–5.2)
PROT SERPL-MCNC: 6.2 G/DL (ref 6–8.5)
RBC # BLD AUTO: 3.87 10*6/MM3 (ref 3.77–5.28)
SODIUM SERPL-SCNC: 142 MMOL/L (ref 136–145)
WBC # BLD AUTO: 9.26 10*3/MM3 (ref 3.4–10.8)

## 2021-02-08 PROCEDURE — 82962 GLUCOSE BLOOD TEST: CPT

## 2021-02-08 PROCEDURE — 97150 GROUP THERAPEUTIC PROCEDURES: CPT

## 2021-02-08 PROCEDURE — 97530 THERAPEUTIC ACTIVITIES: CPT

## 2021-02-08 PROCEDURE — 84100 ASSAY OF PHOSPHORUS: CPT | Performed by: INTERNAL MEDICINE

## 2021-02-08 PROCEDURE — 63710000001 INSULIN LISPRO (HUMAN) PER 5 UNITS: Performed by: INTERNAL MEDICINE

## 2021-02-08 PROCEDURE — 94799 UNLISTED PULMONARY SVC/PX: CPT

## 2021-02-08 PROCEDURE — 97140 MANUAL THERAPY 1/> REGIONS: CPT

## 2021-02-08 PROCEDURE — 85027 COMPLETE CBC AUTOMATED: CPT | Performed by: NURSE PRACTITIONER

## 2021-02-08 PROCEDURE — 80053 COMPREHEN METABOLIC PANEL: CPT | Performed by: NURSE PRACTITIONER

## 2021-02-08 PROCEDURE — 99232 SBSQ HOSP IP/OBS MODERATE 35: CPT | Performed by: NURSE PRACTITIONER

## 2021-02-08 RX ORDER — ECHINACEA PURPUREA EXTRACT 125 MG
2 TABLET ORAL EVERY 8 HOURS SCHEDULED
Status: DISCONTINUED | OUTPATIENT
Start: 2021-02-08 | End: 2021-02-11 | Stop reason: HOSPADM

## 2021-02-08 RX ORDER — ERGOCALCIFEROL 1.25 MG/1
CAPSULE ORAL
Qty: 4 CAPSULE | Refills: 0 | Status: SHIPPED | OUTPATIENT
Start: 2021-02-08 | End: 2021-03-08

## 2021-02-08 RX ORDER — BUMETANIDE 2 MG/1
4 TABLET ORAL DAILY
Status: DISCONTINUED | OUTPATIENT
Start: 2021-02-08 | End: 2021-02-09

## 2021-02-08 RX ADMIN — NYSTATIN: 100000 CREAM TOPICAL at 10:54

## 2021-02-08 RX ADMIN — CARVEDILOL 6.25 MG: 6.25 TABLET, FILM COATED ORAL at 17:48

## 2021-02-08 RX ADMIN — SODIUM CHLORIDE, PRESERVATIVE FREE 10 ML: 5 INJECTION INTRAVENOUS at 02:23

## 2021-02-08 RX ADMIN — BUDESONIDE AND FORMOTEROL FUMARATE DIHYDRATE 2 PUFF: 160; 4.5 AEROSOL RESPIRATORY (INHALATION) at 20:54

## 2021-02-08 RX ADMIN — VILAZODONE HYDROCHLORIDE 20 MG: 40 TABLET ORAL at 21:35

## 2021-02-08 RX ADMIN — ALPRAZOLAM 1 MG: 0.5 TABLET ORAL at 21:35

## 2021-02-08 RX ADMIN — SALINE NASAL SPRAY 2 SPRAY: 1.5 SOLUTION NASAL at 14:43

## 2021-02-08 RX ADMIN — ATORVASTATIN CALCIUM 20 MG: 20 TABLET, FILM COATED ORAL at 21:35

## 2021-02-08 RX ADMIN — SODIUM CHLORIDE, PRESERVATIVE FREE 3 ML: 5 INJECTION INTRAVENOUS at 02:23

## 2021-02-08 RX ADMIN — AMLODIPINE BESYLATE 5 MG: 5 TABLET ORAL at 09:25

## 2021-02-08 RX ADMIN — LEVOTHYROXINE SODIUM 25 MCG: 0.03 TABLET ORAL at 06:57

## 2021-02-08 RX ADMIN — HYDROCODONE BITARTRATE AND ACETAMINOPHEN 1 TABLET: 5; 325 TABLET ORAL at 02:26

## 2021-02-08 RX ADMIN — BUMETANIDE 4 MG: 2 TABLET ORAL at 21:34

## 2021-02-08 RX ADMIN — SALINE NASAL SPRAY 2 SPRAY: 1.5 SOLUTION NASAL at 21:35

## 2021-02-08 RX ADMIN — CARVEDILOL 6.25 MG: 6.25 TABLET, FILM COATED ORAL at 09:25

## 2021-02-08 RX ADMIN — INSULIN LISPRO 2 UNITS: 100 INJECTION, SOLUTION INTRAVENOUS; SUBCUTANEOUS at 17:04

## 2021-02-08 RX ADMIN — INSULIN LISPRO 2 UNITS: 100 INJECTION, SOLUTION INTRAVENOUS; SUBCUTANEOUS at 10:54

## 2021-02-08 RX ADMIN — BUDESONIDE AND FORMOTEROL FUMARATE DIHYDRATE 2 PUFF: 160; 4.5 AEROSOL RESPIRATORY (INHALATION) at 10:41

## 2021-02-08 RX ADMIN — ALPRAZOLAM 1 MG: 0.5 TABLET ORAL at 09:35

## 2021-02-08 RX ADMIN — CLOTRIMAZOLE AND BETAMETHASONE DIPROPIONATE: 10; .5 CREAM TOPICAL at 21:35

## 2021-02-08 RX ADMIN — HYDROCODONE BITARTRATE AND ACETAMINOPHEN 1 TABLET: 5; 325 TABLET ORAL at 21:34

## 2021-02-08 RX ADMIN — GABAPENTIN 100 MG: 100 CAPSULE ORAL at 21:34

## 2021-02-08 RX ADMIN — PANTOPRAZOLE SODIUM 40 MG: 40 TABLET, DELAYED RELEASE ORAL at 06:57

## 2021-02-08 RX ADMIN — Medication 1 CAPSULE: at 09:25

## 2021-02-08 RX ADMIN — APIXABAN 5 MG: 5 TABLET, FILM COATED ORAL at 21:34

## 2021-02-08 RX ADMIN — GABAPENTIN 100 MG: 100 CAPSULE ORAL at 09:25

## 2021-02-08 RX ADMIN — NYSTATIN: 100000 CREAM TOPICAL at 21:37

## 2021-02-08 NOTE — PLAN OF CARE
Goal Outcome Evaluation:  Plan of Care Reviewed With: patient  Progress: improving  Outcome Summary: OT Lymph:  Pt. was O x 3 but at times seemed confused about situation.  Therapist deloris/lore Lymph wraps to the LEs base of toes to knees, edema was min/mod with the (L) greater than the (R).  Pt. stated she was fatiqued and wanted to go back to bed.  Pt. was max (A) of one to complete a stand pivot transfer from the chair to the bed, Pt. was Max X 2  with sit to supine and with all other bed mobility skills.  Pt. continues to benfit from skilled OT.  Pt. wore a mask but remeoved at times due to SOA.  Theapist wore a mask, eye protection and wash her hands before and after the treatment.     left normal/right normal

## 2021-02-08 NOTE — PROGRESS NOTES
Enter Query Response Below      Query Response: CKD stage 3             If applicable, please update the problem list.         Patient: Miguelina Lopez        : 1945  Account: [id number]           Admit Date:          Options to Respond to Query:    1. Access the Encounter     a. From the To-Do Side bar, click Respond With Note.     b. Click New Note     c. Answer query within the yellow box.                d. Update the Problem List if applicable.      Rina Green APRN    This patient with CKD stage 3, baseline creatinine 1.2 per the H&P, is documented by Nephrologist to have OLI on CKD stage 3. OLI is not mentioned in your progress notes. Creatinine 1.38 on admission increased to 1.69 on  following cardiac catheterization and has improved to 1.44. Treatment includes IV fluid infusion at 75 mL/hr, IV bumex drip, and monitoring of daily labs.    Please clarify the appropriate diagnosis for this patient :    - CKD stage 3  - OLI on CKD stage 3, please include additional clinical indicators in support of OLI:____________  - Other, please specify_______  - Unable to clinically determine      By submitting this query, we are merely seeking further clarification of documentation to accurately reflect all conditions that you are monitoring, evaluating, treating or that extend the hospitalization or utilize additional resources of care. Please utilize your independent clinical judgment when addressing the question(s) above.     This query and your response, once completed, will be entered into the legal medical record.    Sincerely,  Catia Kirkland RN  Clinical Documentation Integrity Program   [email or web address]

## 2021-02-08 NOTE — PROGRESS NOTES
NEPHROLOGY PROGRESS NOTE    PATIENT IDENTIFICATION:   Name:  Miguelina Lopez      MRN:  [id number]     75 y.o.  female             Reason for visit: OLI    SUBJECTIVE:   Sitting on the side of the bed; swelling has improved; very tired; muscles ache; struggling to walk.  Has been on and off Bumex drip since mission on 2/1    OBJECTIVE:  Vitals:    02/08/21 0717 02/08/21 0925 02/08/21 1041 02/08/21 1116   BP: 153/69 132/61  139/64   BP Location: Left arm   Left arm   Patient Position: Lying   Sitting   Pulse: 79 61 80 80   Resp: 16   16   Temp: 98.4 °F (36.9 °C)   98.7 °F (37.1 °C)   TempSrc: Oral   Oral   SpO2: 93%  97% 94%   Weight:       Height:               Body mass index is 52.22 kg/m².    Intake/Output Summary (Last 24 hours) at 2/8/2021 1455  Last data filed at 2/8/2021 1242  Gross per 24 hour   Intake 1030 ml   Output 2500 ml   Net -1470 ml         Exam:  GEN:  No distress, appears stated age; morbidly obese  EYES:   Anicteric sclera  ENT:    External ears/nose normal, MM are moist  NECK:  No adenopathy, JVP normal  LUNGS: A few crackles in bases; not labored 2 L/min  CV:  Normal S1S2, without rub  ABD:  Morbidly obese, non-tender, non-distended, soft +BS  EXT:  +2-3 edema; no cyanosis    Scheduled meds:    amLODIPine, 5 mg, Oral, Q24H  apixaban, 5 mg, Oral, Q12H  atorvastatin, 20 mg, Oral, Daily  budesonide-formoterol, 2 puff, Inhalation, BID - RT  carvedilol, 6.25 mg, Oral, BID With Meals  clotrimazole-betamethasone, , Topical, BID  fluticasone, 2 spray, Each Nare, Daily  gabapentin, 100 mg, Oral, Q12H  insulin lispro, 0-7 Units, Subcutaneous, TID AC  lactobacillus acidophilus, 1 capsule, Oral, Daily  levothyroxine, 25 mcg, Oral, Daily  nystatin, , Topical, BID  pantoprazole, 40 mg, Oral, QAM  polyethylene glycol, 17 g, Oral, Daily  senna-docusate sodium, 2 tablet, Oral, Nightly  sodium chloride, 10 mL, Intravenous, Q12H  sodium chloride, 3 mL, Intravenous, Q12H  sodium chloride, 2 spray, Each Nare,  Q8H  vilazodone, 20 mg, Oral, Nightly      IV meds:                       bumetanide, 2 mg/hr, Last Rate: Stopped (02/08/21 1400)        Data Review:    Results from last 7 days   Lab Units 02/08/21  0259 02/07/21  1736 02/07/21  0501 02/06/21  0502 02/05/21  1100 02/05/21  0628   SODIUM mmol/L 142  --  143 144 145 146*   POTASSIUM mmol/L 3.8 4.5 3.4* 3.8 3.5 3.5   CHLORIDE mmol/L 94*  --  95* 97* 98 97*   CO2 mmol/L 35.3*  --  39.4* 34.5* 37.7* 38.5*   BUN mg/dL 41*  --  38* 32* 25* 26*   CREATININE mg/dL 1.44*  --  1.59* 1.69* 1.59* 1.51*   CALCIUM mg/dL 8.7  --  8.3* 8.2* 8.5* 8.6   BILIRUBIN mg/dL 0.5  --   --   --  0.6 0.6   ALK PHOS U/L 262*  --   --   --  218* 230*   ALT (SGPT) U/L 14  --   --   --  10 12   AST (SGOT) U/L 29  --   --   --  25 24   GLUCOSE mg/dL 134*  --  119* 101* 119* 99       Estimated Creatinine Clearance: 39.7 mL/min (A) (by C-G formula based on SCr of 1.44 mg/dL (H)).    Results from last 7 days   Lab Units 02/08/21  0259 02/07/21  0501 02/06/21  0502 02/03/21  0419   MAGNESIUM mg/dL  --   --  2.0 2.1   PHOSPHORUS mg/dL 3.7 3.8 5.0* 3.5       Results from last 7 days   Lab Units 02/08/21  0259 02/07/21  0501 02/06/21  0502 02/05/21  0958 02/05/21  0952 02/04/21  1124 02/03/21  0419   WBC 10*3/mm3 9.26 9.62 9.34  --   --  11.44* 12.59*   HEMOGLOBIN g/dL 10.5* 10.5* 10.8*  --   --  11.1* 12.2   HEMOGLOBIN, POC g/dL  --   --   --  11.6* 11.9*  --   --    PLATELETS 10*3/mm3 224 229 221  --   --  226 247                   ASSESSMENT:     Chest pain    CKD (chronic kidney disease) stage 3, GFR 30-59 ml/min (CMS/[facility])    Diabetic peripheral neuropathy (CMS/[facility])    Hyperlipidemia    OCHOA (obstructive sleep apnea)    DM type 2 (diabetes mellitus, type 2) (CMS/[facility])    Essential hypertension    Pulmonary hypertension due to sleep-disordered breathing (CMS/[facility])    s/p MVR, TV-repair, CABG x2 6/13/16    Supplemental oxygen dependent    Chronic diastolic heart failure (CMS/[facility])    Chronic respiratory  failure with hypoxia and hypercapnia (CMS/HCC)    COPD (chronic obstructive pulmonary disease) (CMS/HCC)    Complete heart block (CMS/HCC)    UTI (urinary tract infection), bacterial      1.  OLI on CKD3, nonoliguric, stable.  Peripheral volume >> central volume excess; stable potassium and expected metabolic alkalosis due to diuresis.  She reports that leg swelling is almost back to baseline  2.  Acute on chronic diastolic heart failure with moderate right pulmonary HTN  Valvular heart disease: moderate AS, mild to moderate TR, mitral valve replacement, regurg.     3.  Acute on chronic respiratory failure, stable   4.  Anemia, iron deficiency.   5.  HTN.  Blood pressure fine  6.  Lymphedema   7.  Morbid obesity     Plan:  1.  Stop Bumex drip  2.  Begin oral Bumex 4 mg twice daily  3.  Add fluid restriction 1200 mL daily  4.  Surveillance labs    Calvin Diamond MD  2/8/2021    14:55 EST

## 2021-02-08 NOTE — NURSING NOTE
Attempted to change lymphedema wraps on bilateral lower extremities. Could not obtain the correct supplies. Took down the dressings, and applied kerlix and ACE wraps to bilateral legs. OT to re-wrap in the AM.

## 2021-02-08 NOTE — PROGRESS NOTES
Hospital Follow Up    LOS:  LOS: 7 days   Patient Name: Miguelina Lopez  Age/Sex: 75 y.o. female  : 1945  MRN: [id number]    Day of Service: 21   Length of Stay: 7  Encounter Provider: ZOILA Yousif  Place of Service: [facility] CARDIOLOGY  Patient Care Team:  Arcelia Talbot APRN as PCP - General (Nurse Practitioner)  Robbie Wilson MD as Consulting Physician (Hematology and Oncology)  Chace Johnson MD as Consulting Physician (Cardiology)    Subjective:     Chief Complaint: SOA/Severe AS/Pulm HTN    Interval History: Breathing a little better today. But having epistaxis    Objective:     Objective:  Temp:  [97.2 °F (36.2 °C)-98.5 °F (36.9 °C)] 98.4 °F (36.9 °C)  Heart Rate:  [61-88] 61  Resp:  [16-20] 16  BP: (118-153)/(58-69) 132/61     Intake/Output Summary (Last 24 hours) at 2021 1033  Last data filed at 2021 0946  Gross per 24 hour   Intake 1320 ml   Output 2500 ml   Net -1180 ml     Body mass index is 52.22 kg/m².      21  0400 21  0500 21  0500   Weight: 122 kg (269 lb 10 oz) 122 kg (268 lb 14.4 oz) 121 kg (267 lb 6.4 oz)     Weight change: -0.68 kg (-1 lb 8 oz)    Physical Exam:   General Appearance:    Awake alert and oriented in no acute distress.   Color:  Skin:  Neuro:  HEENT:    Lungs:     Pink  Warm and dry  No focal, motor or sensory deficits  Neck supple, pupils equal, round and reactive. No JVD, No Bruit  Crackles in the bases to auscultation,respirations regular, even and unlabored    Heart:    Regular rate and rhythm, S1 and S2, no murmur, no gallop, no rub. DP/PT pulses are unassessible due to leg wrap   Chest Wall:    No abnormalities observed   Abdomen:     Normal bowel sounds, no masses, no organomegaly, soft        non-tender, non-distended, no guarding, no ascites noted   Extremities:   Moves all extremities well, no edema, no cyanosis, no redness       Lab Review:   Results from last 7 days   Lab  Units 02/08/21  0259 02/07/21  1736 02/07/21  0501  02/05/21  1100   SODIUM mmol/L 142  --  143   < > 145   POTASSIUM mmol/L 3.8 4.5 3.4*   < > 3.5   CHLORIDE mmol/L 94*  --  95*   < > 98   CO2 mmol/L 35.3*  --  39.4*   < > 37.7*   BUN mg/dL 41*  --  38*   < > 25*   CREATININE mg/dL 1.44*  --  1.59*   < > 1.59*   GLUCOSE mg/dL 134*  --  119*   < > 119*   CALCIUM mg/dL 8.7  --  8.3*   < > 8.5*   AST (SGOT) U/L 29  --   --   --  25   ALT (SGPT) U/L 14  --   --   --  10    < > = values in this interval not displayed.     Results from last 7 days   Lab Units 02/05/21  0628 02/04/21  0434 02/02/21  0739 02/01/21  1200   TROPONIN T ng/mL 0.121* 0.101* 0.046* 0.026     Results from last 7 days   Lab Units 02/08/21  0259 02/07/21  0501   WBC 10*3/mm3 9.26 9.62   HEMOGLOBIN g/dL 10.5* 10.5*   HEMATOCRIT % 32.7* 32.6*   PLATELETS 10*3/mm3 224 229         Results from last 7 days   Lab Units 02/06/21  0502 02/03/21  0419   MAGNESIUM mg/dL 2.0 2.1           Invalid input(s): LDLCALC          I reviewed the patient's new clinical results.  I personally viewed and interpreted the patient's EKG  Current Medications:   Scheduled Meds:amLODIPine, 5 mg, Oral, Q24H  apixaban, 5 mg, Oral, Q12H  atorvastatin, 20 mg, Oral, Daily  budesonide-formoterol, 2 puff, Inhalation, BID - RT  carvedilol, 6.25 mg, Oral, BID With Meals  clotrimazole-betamethasone, , Topical, BID  fluticasone, 2 spray, Each Nare, Daily  gabapentin, 100 mg, Oral, Q12H  insulin lispro, 0-7 Units, Subcutaneous, TID AC  lactobacillus acidophilus, 1 capsule, Oral, Daily  levothyroxine, 25 mcg, Oral, Daily  nystatin, , Topical, BID  pantoprazole, 40 mg, Oral, QAM  polyethylene glycol, 17 g, Oral, Daily  senna-docusate sodium, 2 tablet, Oral, Nightly  sodium chloride, 10 mL, Intravenous, Q12H  sodium chloride, 3 mL, Intravenous, Q12H  vilazodone, 20 mg, Oral, Nightly      Continuous Infusions:bumetanide, 2 mg/hr, Last Rate: 2 mg/hr (02/07/21  0107)        Allergies:  Allergies   Allergen Reactions   • Coumadin [Warfarin Sodium] Diarrhea and Nausea Only   • Bumex [Bumetanide] Swelling     Tolerated Bumex drip February 2021   • Erythromycin    • Hctz [Hydrochlorothiazide] Swelling   • Zaroxolyn [Metolazone] Diarrhea   • Penicillins Rash     Tolerated cephalosporins in the past    • Sulfa Antibiotics Rash       Assessment:       Chest pain    CKD (chronic kidney disease) stage 3, GFR 30-59 ml/min (CMS/HCC)    Diabetic peripheral neuropathy (CMS/HCC)    Hyperlipidemia    OCHOA (obstructive sleep apnea)    DM type 2 (diabetes mellitus, type 2) (CMS/[facility])    Essential hypertension    Pulmonary hypertension due to sleep-disordered breathing (CMS/HCC)    s/p MVR, TV-repair, CABG x2 6/13/16    Supplemental oxygen dependent    Chronic diastolic heart failure (CMS/HCC)    Chronic respiratory failure with hypoxia and hypercapnia (CMS/HCC)    COPD (chronic obstructive pulmonary disease) (CMS/HCC)    Complete heart block (CMS/HCC)    UTI (urinary tract infection), bacterial  1.  Acute on chronic diastolic heart failure: Left ventricular ejection fraction greater than 50%.    Bumex drip held prior to cardiac catheterization, and resumed 2/6 per nephrology.  Difficult to ascertain net output given unmeasurable urine output noted although it does appear to be at a deficit.  If accurate, weight is reduced 1 pound from yesterday.  2.  Complete heart block: Status post pacemaker implant.    Remains V paced on telemetry with rate of 80.   3.    Severe aortic stenosis: Noted to be severe per [facility].  Primary cardiology team evaluating for possible valve replacement versus TAVR   4.  Hypertension: Remained stable and controlled.   5.  Coronary artery disease: Status post CABG.   grafts patent per cardiac cath 2/5   6.  Mitral regurgitation: Status post mitral valve replacement with tissue prosthesis.    7.  Tricuspid regurgitation: Status post repair  8.  Obstructive sleep apnea:  Treated, pulmonology following  9.  Obesity  10.  Pulmonary hypertension: Severe per [facility]  11. Paroxysmal aflutter      Plan:      Feels like she is breathing a little better.  Fluid balance -1180 cc renal function stable.  Still with significant swelling being treated for lymphedema plan for outpatient CTA for work-up of TAVR.  Discussed with Dr. Pacheco no need for TARIQ at this time.  Had epistaxis this morning uses Afrin as needed for that at home we will also order some motion nasal spray.  Anticoagulated with Eliquis I discussed with the nurse to hold this morning's dose due to nosebleeding.  ZOILA Yousif  02/08/21  10:33 EST  Electronically signed by ZOILA Yousif, 02/08/21, 10:33 AM EST.

## 2021-02-08 NOTE — PLAN OF CARE
Goal Outcome Evaluation:  Plan of Care Reviewed With: patient     Outcome Summary: Pt continues to require a lot of assistance for bed mobility and sit to stand. However, pt was able to increase her gait distance slightly. Activity is limited secondary to generalized weakness, fatigue and LE pain. PT will continue to follow to address strength, mobility and gait.Patient was intermittently wearing a face mask during this therapy encounter. Therapist used appropriate personal protective equipment including eye protection, mask, and gloves.  Mask used was standard procedure mask. Appropriate PPE was worn during the entire therapy session. Hand hygiene was completed before and after therapy session. Patient is not in enhanced droplet precautions.

## 2021-02-08 NOTE — THERAPY TREATMENT NOTE
Patient Name: Miguelina Lopez  : 1945    MRN: [id number]                              Today's Date: 2021       Admit Date: 2021    Visit Dx:     ICD-10-CM ICD-9-CM   1. Chest pain, unspecified type  R07.9 786.50   2. COPD exacerbation (CMS/HCC)  J44.1 491.21   3. Congestive heart failure, unspecified HF chronicity, unspecified heart failure type (CMS/HCC)  I50.9 428.0   4. Hypertension, unspecified type  I10 401.9   5. Abnormal chest x-ray  R93.89 793.2   6. Complete heart block (CMS/HCC)  I44.2 426.0     Patient Active Problem List   Diagnosis   • Anxiety disorder   • Arthritis of knee   • Asthma   • Chronic coronary artery disease   • CKD (chronic kidney disease) stage 3, GFR 30-59 ml/min (CMS/HCC)   • Depression   • Diabetic peripheral neuropathy (CMS/HCC)   • Gastroesophageal reflux disease   • Hyperlipidemia   • Insomnia   • Lower gastrointestinal hemorrhage   • Anemia   • OCHOA (obstructive sleep apnea)   • DM type 2 (diabetes mellitus, type 2) (CMS/HCC)   • Essential hypertension   • Hospital discharge follow-up   • Pulmonary hypertension due to sleep-disordered breathing (CMS/HCC)   • s/p MVR, TV-repair, CABG x2 16   • OLI (acute kidney injury) (CMS/HCC)   • Leukocytosis   • Atrial fibrillation (CMS/HCC)   • Nocturnal hypoxia   • Dermatitis   • Medicare annual wellness visit, initial   • Class 3 severe obesity due to excess calories with serious comorbidity and body mass index (BMI) of 50.0 to 59.9 in adult (CMS/[facility])   • Supplemental oxygen dependent   • Acute on chronic combined systolic and diastolic HF (heart failure) (CMS/[facility])   • Mitral regurgitation   • Aortic stenosis   • Medicare annual wellness visit, subsequent   • Localized edema   • Chronic right-sided congestive heart failure (CMS/[facility])   • Cellulitis of left lower extremity   • Proteinuria   • Bilateral lower extremity edema   • Subclinical hypothyroidism   • Chronic diastolic heart failure (CMS/[facility])   • Chronic respiratory  failure with hypoxia and hypercapnia (CMS/[facility])   • Acute on chronic respiratory failure with hypoxia and hypercapnia (CMS/[facility])   • AV block, 2nd degree   • 1st degree AV block   • Chest pain   • COPD (chronic obstructive pulmonary disease) (CMS/[facility])   • Complete heart block (CMS/[facility])   • UTI (urinary tract infection), bacterial     Past Medical History:   Diagnosis Date   • Acute on chronic respiratory failure with hypoxia and hypercapnia (CMS/[facility])    • OLI (acute kidney injury) (CMS/[facility])    • Anemia    • Anxiety    • Aortic valve stenosis    • Bilateral lower extremity edema    • CHF (congestive heart failure) (CMS/[facility])    • Chronic coronary artery disease    • Class 3 severe obesity due to excess calories in adult (CMS/[facility])    • COPD (chronic obstructive pulmonary disease) (CMS/HCC)    • Depression    • Diabetes mellitus (CMS/HCC)    • Elevated cholesterol    • GERD (gastroesophageal reflux disease)    • Heart murmur    • Hypertension    • Mitral valve insufficiency    • Pneumonia     1/2016   • Pulmonary hypertension (CMS/HCC)     due to sleep disordered breathing   • Sleep apnea     Uses CPAP or oxygen   • Stage 3 chronic kidney disease (CMS/[facility])    • Subclinical hypothyroidism    • Supplemental oxygen dependent    • Valvular heart disease      Past Surgical History:   Procedure Laterality Date   • CARDIAC CATHETERIZATION     • CARDIAC CATHETERIZATION N/A 6/10/2016    Procedure: Left Heart Cath;  Surgeon: Chace Johnson MD;  Location: [facility] CATH INVASIVE LOCATION;  Service:    • CARDIAC CATHETERIZATION N/A 6/10/2016    Procedure: Right Heart Cath;  Surgeon: Chace Johnson MD;  Location: [facility] CATH INVASIVE LOCATION;  Service:    • CARDIAC CATHETERIZATION N/A 2/5/2021    Procedure: RIGHT AND LEFT HEART CATH;  Surgeon: Antoine Ayers MD;  Location: [facility] CATH INVASIVE LOCATION;  Service: Cardiology;  Laterality: N/A;   • CARDIAC CATHETERIZATION N/A 2/5/2021    Procedure: Coronary  angiography;  Surgeon: Antoine Ayers MD;  Location: [facility] CATH INVASIVE LOCATION;  Service: Cardiology;  Laterality: N/A;   • CARDIAC CATHETERIZATION N/A 2/5/2021    Procedure: Left ventriculography- pressures;  Surgeon: Antoine Ayers MD;  Location: [facility] CATH INVASIVE LOCATION;  Service: Cardiology;  Laterality: N/A;   • CARDIAC ELECTROPHYSIOLOGY PROCEDURE N/A 2/2/2021    Procedure: Pacemaker DC new---[employer] MICRA;  Surgeon: Eliazar Bond MD;  Location: [facility] CATH INVASIVE LOCATION;  Service: Cardiology;  Laterality: N/A;   • CARDIAC SURGERY     • CORONARY ARTERY BYPASS GRAFT      2 vessel   • CORONARY ARTERY BYPASS GRAFT WITH MITRAL VALVE REPAIR/REPLACEMENT N/A 6/13/2016    Procedure: INTRAOPERATIVE TARIQ, MIDLINE STERNOTOMY, CORONARY ARTERY BYPASS GRAFTING X  2 UTILIZING ENDOSCOPICALLY HARVESTED LEFT GREATER SAPHENOUS VEIN, MITRAL VALVE REPLACEMENT AND TRICUSPID VALVE REPAIR;  Surgeon: Eliecer Mistry MD;  Location: [facility] OR;  Service:    • CORONARY STENT PLACEMENT  2010    Approx. 6 yrs ago at [facility]   • HEMORRHOIDECTOMY     • HYSTERECTOMY     • MITRAL VALVE REPLACEMENT     • REPLACEMENT TOTAL KNEE Right    • THYROID SURGERY      Cyst removed from thyroid   • VASCULAR SURGERY       General Information     Row Name 02/08/21 1455          Physical Therapy Time and Intention    Document Type  therapy note (daily note)  -     Mode of Treatment  individual therapy;physical therapy  -     Row Name 02/08/21 1455          General Information    Existing Precautions/Restrictions  fall;oxygen therapy device and L/min  -     Row Name 02/08/21 1455          Cognition    Orientation Status (Cognition)  oriented x 3  -     Row Name 02/08/21 1455          Safety Issues, Functional Mobility    Impairments Affecting Function (Mobility)  balance;endurance/activity tolerance;coordination;postural/trunk control;shortness of breath;strength  -       User Key  (r) = Recorded By, (t) = Taken By,  (c) = Cosigned By    Initials Name Provider Type     Precious Cisneros, PT Physical Therapist        Mobility     Row Name 02/08/21 1455          Bed Mobility    Supine-Sit [place] (Bed Mobility)  verbal cues;nonverbal cues (demo/gesture);moderate assist (50% patient effort)  -     Sit-Supine [place] (Bed Mobility)  verbal cues;nonverbal cues (demo/gesture);maximum assist (25% patient effort);2 person assist  -     Assistive Device (Bed Mobility)  bed rails;draw sheet;head of bed elevated  -     Row Name 02/08/21 1455          Sit-Stand Transfer    Sit-Stand [place] (Transfers)  verbal cues;nonverbal cues (demo/gesture);moderate assist (50% patient effort);2 person assist  -     Assistive Device (Sit-Stand Transfers)  walker, front-wheeled  -     Row Name 02/08/21 1455          Gait/Stairs (Locomotion)    [place] Level (Gait)  verbal cues;nonverbal cues (demo/gesture);minimum assist (75% patient effort);2 person assist  -     Assistive Device (Gait)  walker, front-wheeled  -     Distance in Feet (Gait)  15  -CH     Deviations/Abnormal Patterns (Gait)  lina decreased;base of support, wide;festinating/shuffling;stride length decreased;weight shifting decreased  -     Bilateral Gait Deviations  forward flexed posture  -     Comment (Gait/Stairs)  gait distance limited by fatigue and L LE pain  -       User Key  (r) = Recorded By, (t) = Taken By, (c) = Cosigned By    Initials Name Provider Type     Precious Cisneros, PT Physical Therapist        Obj/Interventions     Row Name 02/08/21 1456          Motor Skills    Therapeutic Exercise  -- 10 reps B LE AP and LAQ  -       User Key  (r) = Recorded By, (t) = Taken By, (c) = Cosigned By    Initials Name Provider Type     Precious Cisneros, PT Physical Therapist        Goals/Plan    No documentation.       Clinical Impression     Row Name 02/08/21 1457          Pain    Additional Documentation  Pain Scale: FACES  Pre/Post-Treatment (Group)  -     Row Name 02/08/21 1457          Pain Scale: Numbers Pre/Post-Treatment    Pain Intervention(s)  Repositioned  -     Row Name 02/08/21 1457          Pain Scale: FACES Pre/Post-Treatment    Pain: FACES Scale, Pretreatment  2-->hurts little bit  -CH     Posttreatment Pain Rating  2-->hurts little bit  -CH     Pain Location - Side  Left  -     Pain Location - Orientation  lower  -     Pain Location  extremity  -     Row Name 02/08/21 1457          Plan of Care Review    Plan of Care Reviewed With  patient  -     Outcome Summary  Pt continues to require a lot of assistance for bed mobility and sit to stand. However, pt was able to increase her gait distance slightly. Activity is limited secondary to generalized weakness, fatigue and LE pain. PT will continue to follow to address strength, mobility and gait.  -     Row Name 02/08/21 1457          Positioning and Restraints    Pre-Treatment Position  in bed  -     Post Treatment Position  bed  -CH     In Bed  supine;call light within reach;encouraged to call for assist;exit alarm on  -       User Key  (r) = Recorded By, (t) = Taken By, (c) = Cosigned By    Initials Name Provider Type    CH Precious Cisneros, PT Physical Therapist        Outcome Measures     Row Name 02/08/21 1459          How much help from another person do you currently need...    Turning from your back to your side while in flat bed without using bedrails?  2  -CH     Moving from lying on back to sitting on the side of a flat bed without bedrails?  2  -CH     Moving to and from a bed to a chair (including a wheelchair)?  3  -CH     Standing up from a chair using your arms (e.g., wheelchair, bedside chair)?  2  -CH     Climbing 3-5 steps with a railing?  1  -CH     To walk in hospital room?  3  -CH     AM-PAC 6 Clicks Score (PT)  13  -     Row Name 02/08/21 1452          Functional Assessment    Outcome Measure Options  AM-PAC 6 Clicks Basic Mobility  (PT)  -       User Key  (r) = Recorded By, (t) = Taken By, (c) = Cosigned By    Initials Name Provider Type    CH Precious Cisneros PT Physical Therapist        Physical Therapy Education                 Title: PT OT SLP Therapies (In Progress)     Topic: Physical Therapy (Done)     Point: Mobility training (Done)     Learning Progress Summary           Patient Acceptance, E,TB,D, VU,NR by  at 2/8/2021 1459    Acceptance, E,D, VU,DU,NR by ANTHONY at 2/7/2021 0935    Acceptance, E,TB,D, NR by  at 2/6/2021 1530    Acceptance, E,TB,D, VU,NR by  at 2/4/2021 1539    Acceptance, E,TB,D, VU,NR by  at 2/3/2021 1055                   Point: Home exercise program (Done)     Learning Progress Summary           Patient Acceptance, E,TB,D, VU,NR by  at 2/8/2021 1459    Acceptance, E,D, VU,DU,NR by ANTHONY at 2/7/2021 0935    Acceptance, E,TB,D, NR by SINDY at 2/6/2021 1530    Acceptance, E,TB,D, VU,NR by  at 2/4/2021 1539                   Point: Body mechanics (Done)     Learning Progress Summary           Patient Acceptance, E,TB,D, VU,NR by  at 2/8/2021 1459    Acceptance, E,D, VU,DU,NR by ANTHONY at 2/7/2021 0935    Acceptance, E,TB,D, NR by SINDY at 2/6/2021 1530    Acceptance, E,TB,D, VU,NR by  at 2/4/2021 1539    Acceptance, E,TB,D, VU,NR by  at 2/3/2021 1055                   Point: Precautions (Done)     Learning Progress Summary           Patient Acceptance, E,TB,D, VU,NR by  at 2/8/2021 1459    Acceptance, E,D, VU,DU,NR by ANTHONY at 2/7/2021 0935    Acceptance, E,TB,D, NR by SINDY at 2/6/2021 1530    Acceptance, E,TB,D, VU,NR by  at 2/4/2021 1539    Acceptance, E,TB,D, VU,NR by  at 2/3/2021 1055                               User Key     Initials Effective Dates Name Provider Type Discipline     04/03/18 -  Precious Cisneros, PT Physical Therapist PT     03/07/18 -  Pau Hensley, CHELSEY Physical Therapy Assistant PT    LC 07/02/20 -  Juan Alberto Caal, PT DPT Physical Therapist PT              PT Recommendation  and Plan  Planned Therapy Interventions (PT): balance training, bed mobility training, gait training, home exercise program, patient/family education, strengthening, transfer training  Plan of Care Reviewed With: patient  Outcome Summary: Pt continues to require a lot of assistance for bed mobility and sit to stand. However, pt was able to increase her gait distance slightly. Activity is limited secondary to generalized weakness, fatigue and LE pain. PT will continue to follow to address strength, mobility and gait.     Time Calculation:   PT Charges     Row Name 02/08/21 1500             Time Calculation    Start Time  1343  -      Stop Time  1405  -      Time Calculation (min)  22 min  -      PT Received On  02/08/21  -      PT - Next Appointment  02/09/21  -         Time Calculation- PT    Total Timed Code Minutes- PT  19 minute(s)  -        User Key  (r) = Recorded By, (t) = Taken By, (c) = Cosigned By    Initials Name Provider Type     Precious Cisneros, PT Physical Therapist        Therapy Charges for Today     Code Description Service Date Service Provider Modifiers Qty    95047483238  PT THERAPEUTIC ACT EA 15 MIN 2/8/2021 Precious Cisneros, PT GP 1    [id number]  PT THER SUPP EA 15 MIN 2/8/2021 Precious Cisneros, PT GP 1          PT G-Codes  Outcome Measure Options: AM-PAC 6 Clicks Basic Mobility (PT)  AM-PAC 6 Clicks Score (PT): 13  AM-PAC 6 Clicks Score (OT): 12    Precious Cisneros PT  2/8/2021

## 2021-02-08 NOTE — PROGRESS NOTES
Name: Miguelina Lopez ADMIT: 2021   : 1945  PCP: Arcelia Talbot APRN    MRN: [id number] LOS: 7 days   AGE/SEX: 75 y.o. female  ROOM: [facility]/1     Subjective   Subjective   Resting in recliner. Had a nose bleed this morning. It has now stopped. Using nasal spray and humidification added to oxygen. No nausea or vomiting. No dyspnea or chest pain. No cough/fever/chills. Two BMs yesterday. One today.     Objective   Objective   Vital Signs  Temp:  [97.2 °F (36.2 °C)-98.5 °F (36.9 °C)] 98.4 °F (36.9 °C)  Heart Rate:  [61-88] 61  Resp:  [16-20] 16  BP: (118-153)/(58-69) 132/61  SpO2:  [91 %-99 %] 93 %  on  Flow (L/min):  [2] 2;   Device (Oxygen Therapy): humidified;nasal cannula  Body mass index is 52.22 kg/m².     Physical Exam  Vitals signs and nursing note reviewed.   Constitutional:       General: She is not in acute distress.     Appearance: She is obese. She is ill-appearing (chronically). She is not toxic-appearing.   Neck:      Musculoskeletal: Normal range of motion and neck supple.   Cardiovascular:      Rate and Rhythm: Regular rhythm. Normal rate present. Pacing on monitor.     Heart sounds: Murmur present.   Pulmonary:      Effort: Pulmonary effort is normal. No respiratory distress.      Comments: Diminished throughout. On 2 L nc.  Abdominal:      General: Bowel sounds are normal. There is no distension.      Palpations: Abdomen is soft.      Tenderness: There is no abdominal tenderness.   Genitourinary:     Comments: Clear yellow urine in bedside cannister.  Musculoskeletal: Normal range of motion.         General: moderate Swelling present.   Skin:     General: Skin is warm and dry. Right radial cath site intact. No bruising.     Findings: No bruising.      Comments: BLE wrapped.  Neurological:      Mental Status: She is alert and oriented to person, place, and time.      Sensory: No sensory deficit.      Motor: Weakness present.      Coordination: Coordination normal.   Psychiatric:          Mood and Affect: Mood normal.         Behavior: Behavior normal.      Results Review:       I reviewed the patient's new clinical results.  Results from last 7 days   Lab Units 02/08/21 0259 02/07/21 0501 02/06/21  0502 02/05/21  0958  02/04/21  1124   WBC 10*3/mm3 9.26 9.62 9.34  --   --  11.44*   HEMOGLOBIN g/dL 10.5* 10.5* 10.8*  --   --  11.1*   HEMOGLOBIN, POC g/dL  --   --   --  11.6*   < >  --    PLATELETS 10*3/mm3 224 229 221  --   --  226    < > = values in this interval not displayed.     Results from last 7 days   Lab Units 02/08/21 0259 02/07/21  1736 02/07/21 0501 02/06/21 0502 02/05/21  1100   SODIUM mmol/L 142  --  143 144 145   POTASSIUM mmol/L 3.8 4.5 3.4* 3.8 3.5   CHLORIDE mmol/L 94*  --  95* 97* 98   CO2 mmol/L 35.3*  --  39.4* 34.5* 37.7*   BUN mg/dL 41*  --  38* 32* 25*   CREATININE mg/dL 1.44*  --  1.59* 1.69* 1.59*   GLUCOSE mg/dL 134*  --  119* 101* 119*   Estimated Creatinine Clearance: 39.7 mL/min (A) (by C-G formula based on SCr of 1.44 mg/dL (H)).  Results from last 7 days   Lab Units 02/08/21 0259 02/07/21 0501 02/05/21  1100 02/05/21  0628 02/04/21  0434   ALBUMIN g/dL 3.20* 3.10* 3.10* 3.30* 3.10*   BILIRUBIN mg/dL 0.5  --  0.6 0.6 0.7   ALK PHOS U/L 262*  --  218* 230* 216*   AST (SGOT) U/L 29  --  25 24 28   ALT (SGPT) U/L 14  --  10 12 12     Results from last 7 days   Lab Units 02/08/21 0259 02/07/21 0501 02/06/21  0502 02/05/21  1100 02/05/21  0628  02/03/21  0419   CALCIUM mg/dL 8.7 8.3* 8.2* 8.5* 8.6   < > 8.9   ALBUMIN g/dL 3.20* 3.10*  --  3.10* 3.30*   < >  --    MAGNESIUM mg/dL  --   --  2.0  --   --   --  2.1   PHOSPHORUS mg/dL 3.7 3.8 5.0*  --   --   --  3.5    < > = values in this interval not displayed.       Glucose   Date/Time Value Ref Range Status   02/08/2021 1027 181 (H) 70 - 130 mg/dL Final   02/08/2021 0550 125 70 - 130 mg/dL Final   02/07/2021 2048 158 (H) 70 - 130 mg/dL Final   02/07/2021 1618 152 (H) 70 - 130 mg/dL Final   02/07/2021 1113 125  70 - 130 mg/dL Final   02/07/2021 0535 185 (H) 70 - 130 mg/dL Final   02/06/2021 2005 157 (H) 70 - 130 mg/dL Final       amLODIPine, 5 mg, Oral, Q24H  apixaban, 5 mg, Oral, Q12H  atorvastatin, 20 mg, Oral, Daily  budesonide-formoterol, 2 puff, Inhalation, BID - RT  carvedilol, 6.25 mg, Oral, BID With Meals  clotrimazole-betamethasone, , Topical, BID  fluticasone, 2 spray, Each Nare, Daily  gabapentin, 100 mg, Oral, Q12H  insulin lispro, 0-7 Units, Subcutaneous, TID AC  lactobacillus acidophilus, 1 capsule, Oral, Daily  levothyroxine, 25 mcg, Oral, Daily  nystatin, , Topical, BID  pantoprazole, 40 mg, Oral, QAM  polyethylene glycol, 17 g, Oral, Daily  senna-docusate sodium, 2 tablet, Oral, Nightly  sodium chloride, 10 mL, Intravenous, Q12H  sodium chloride, 3 mL, Intravenous, Q12H  vilazodone, 20 mg, Oral, Nightly      bumetanide, 2 mg/hr, Last Rate: 2 mg/hr (02/07/21 2245)    Diet Regular; Consistent Carbohydrate, Cardiac       Assessment/Plan     Active Hospital Problems    Diagnosis  POA   • **Chest pain [R07.9]  Yes   • UTI (urinary tract infection), bacterial [N39.0, A49.9]  Yes   • Complete heart block (CMS/HCC) [I44.2]  Unknown   • COPD (chronic obstructive pulmonary disease) (CMS/HCC) [J44.9]  Yes   • Chronic respiratory failure with hypoxia and hypercapnia (CMS/HCC) [J96.11, J96.12]  Yes   • Chronic diastolic heart failure (CMS/HCC) [I50.32]  Yes   • Supplemental oxygen dependent [Z99.81]  Not Applicable   • s/p MVR, TV-repair, CABG x2 6/13/16 [Z95.2]  Not Applicable   • Pulmonary hypertension due to sleep-disordered breathing (CMS/HCC) [I27.29, G47.8]  Yes   • OCHOA (obstructive sleep apnea) [G47.33]  Yes   • DM type 2 (diabetes mellitus, type 2) (CMS/HCC) [E11.9]  Yes   • Diabetic peripheral neuropathy (CMS/HCC) [E11.42]  Yes   • Hyperlipidemia [E78.5]  Yes   • Essential hypertension [I10]  Yes   • CKD (chronic kidney disease) stage 3, GFR 30-59 ml/min (CMS/HCC) [N18.30]  Yes      Resolved Hospital Problems    No resolved problems to display.     75 y.o. female admitted with Chest pain.     Ms. Lopez is a 75 year old female who presented to the hospital with chest pain and dyspnea. Found to have diffuse opacities on CXR with elevated proBNP.     Chest pain/acute on chronic diastolic heart failure  -Cardiology following. Found to have complete heart block/aflutter. Had PPM placed 2/2 and now pacing. Now on Eliquis (Cards held dose today for nose bleed) with possible cardioversion in future. BB now back on board.  -Nephrology managing diuretics. Continues on Bumex gtt (was held and given fluids for cath, but resumed 2/5) metolazone added.   -Cardiac regimen in place with ASA, statin. Norvasc for BP. Coreg added 2/3.  -Troponin elevated. Right/left heart cath 2/5 showing severe AS and pulmonary HTN. Ischemic heart disease 3 vessel with patent stents. Cardiothoracic consulted for TAVR work up which at this point appears to be planned for outpatient.      COPD/chronic respiratory failure with hypoxia and hypercapnia/OCHOA  -Pulmonology following. To use home Trilogy.   -ABGs stable. Wean oxygen as able. Only on 2-3 L today.     CAD/history of CABG/valvular heart disease  -Cards following as above.  -Repeat echo with preserved EF and severe AS. Valve is worse than prior. TAVR work up in place outpatient.     DM2/neuropathy  -SSI as needed. Monitor ACHS. Hold oral medications.  -A1c 6.71%. Sugars stable. Monitor and encourage oral intake.     HTN  -BP improving with diuresis. Arb on hold- defer resumption to Nephrology. BB and Norvasc added this admission.     CKD3  -Baseline creatinine around 1.2, currently slightly uptrending after cath. 1.44  -Nephrology following. Potassium replaced with protocol.      UTI  -Mild leukocytosis as well as foul-smelling urine reported. UA with 2+ bacteria and 13-20 WBC. Culture growing > 100, 000 Enterococcus faecalis. Finished 3 days of Rocephin therapy. Leukocytosis resolved with  this.     +BM with bowel regimen.  Hold for any loose stools. Continue to monitor.     · Eliquis (home med) for DVT prophylaxis.  · Full code.  · Discussed with patient and nursing staff.   · Anticipate discharge TBD.  when cleared by consultants. If plans for outpatient TAVR, would anticipate she will be closer to discharge once stable off Bumex gtt. Nursing reports she has been increasingly weaker. May need SNF. Await PT recs.      ZOILA Powers  [facility]  02/08/21  10:33 EST

## 2021-02-08 NOTE — THERAPY TREATMENT NOTE
Acute Care - Occupational Therapy Treatment Note  [facility]     Patient Name: Miguelina Lopez  : 1945  MRN: [id number]  Today's Date: 2021             Admit Date: 2021       ICD-10-CM ICD-9-CM   1. Chest pain, unspecified type  R07.9 786.50   2. COPD exacerbation (CMS/HCC)  J44.1 491.21   3. Congestive heart failure, unspecified HF chronicity, unspecified heart failure type (CMS/HCC)  I50.9 428.0   4. Hypertension, unspecified type  I10 401.9   5. Abnormal chest x-ray  R93.89 793.2   6. Complete heart block (CMS/HCC)  I44.2 426.0     Patient Active Problem List   Diagnosis   • Anxiety disorder   • Arthritis of knee   • Asthma   • Chronic coronary artery disease   • CKD (chronic kidney disease) stage 3, GFR 30-59 ml/min (CMS/HCC)   • Depression   • Diabetic peripheral neuropathy (CMS/HCC)   • Gastroesophageal reflux disease   • Hyperlipidemia   • Insomnia   • Lower gastrointestinal hemorrhage   • Anemia   • OCHOA (obstructive sleep apnea)   • DM type 2 (diabetes mellitus, type 2) (CMS/HCC)   • Essential hypertension   • Hospital discharge follow-up   • Pulmonary hypertension due to sleep-disordered breathing (CMS/HCC)   • s/p MVR, TV-repair, CABG x2 16   • OLI (acute kidney injury) (CMS/HCC)   • Leukocytosis   • Atrial fibrillation (CMS/HCC)   • Nocturnal hypoxia   • Dermatitis   • Medicare annual wellness visit, initial   • Class 3 severe obesity due to excess calories with serious comorbidity and body mass index (BMI) of 50.0 to 59.9 in adult (CMS/[facility])   • Supplemental oxygen dependent   • Acute on chronic combined systolic and diastolic HF (heart failure) (CMS/[facility])   • Mitral regurgitation   • Aortic stenosis   • Medicare annual wellness visit, subsequent   • Localized edema   • Chronic right-sided congestive heart failure (CMS/[facility])   • Cellulitis of left lower extremity   • Proteinuria   • Bilateral lower extremity edema   • Subclinical hypothyroidism   • Chronic diastolic heart failure  (CMS/[facility])   • Chronic respiratory failure with hypoxia and hypercapnia (CMS/[facility])   • Acute on chronic respiratory failure with hypoxia and hypercapnia (CMS/[facility])   • AV block, 2nd degree   • 1st degree AV block   • Chest pain   • COPD (chronic obstructive pulmonary disease) (CMS/[facility])   • Complete heart block (CMS/[facility])   • UTI (urinary tract infection), bacterial     Past Medical History:   Diagnosis Date   • Acute on chronic respiratory failure with hypoxia and hypercapnia (CMS/[facility])    • OLI (acute kidney injury) (CMS/HCC)    • Anemia    • Anxiety    • Aortic valve stenosis    • Bilateral lower extremity edema    • CHF (congestive heart failure) (CMS/[facility])    • Chronic coronary artery disease    • Class 3 severe obesity due to excess calories in adult (CMS/HCC)    • COPD (chronic obstructive pulmonary disease) (CMS/HCC)    • Depression    • Diabetes mellitus (CMS/HCC)    • Elevated cholesterol    • GERD (gastroesophageal reflux disease)    • Heart murmur    • Hypertension    • Mitral valve insufficiency    • Pneumonia     1/2016   • Pulmonary hypertension (CMS/HCC)     due to sleep disordered breathing   • Sleep apnea     Uses CPAP or oxygen   • Stage 3 chronic kidney disease (CMS/[facility])    • Subclinical hypothyroidism    • Supplemental oxygen dependent    • Valvular heart disease      Past Surgical History:   Procedure Laterality Date   • CARDIAC CATHETERIZATION     • CARDIAC CATHETERIZATION N/A 6/10/2016    Procedure: Left Heart Cath;  Surgeon: Chace Johnson MD;  Location: [facility] INVASIVE LOCATION;  Service:    • CARDIAC CATHETERIZATION N/A 6/10/2016    Procedure: Right Heart Cath;  Surgeon: Chace Johnson MD;  Location: [facility] INVASIVE LOCATION;  Service:    • CARDIAC CATHETERIZATION N/A 2/5/2021    Procedure: RIGHT AND LEFT HEART CATH;  Surgeon: Antoine Ayers MD;  Location: [facility] INVASIVE LOCATION;  Service: Cardiology;  Laterality: N/A;   • CARDIAC CATHETERIZATION N/A 2/5/2021     Procedure: Coronary angiography;  Surgeon: Antoine Ayers MD;  Location: [facility] CATH [facility]VASIVE LOCATION;  Service: Cardiology;  Laterality: N/A;   • CARDIAC CATHETERIZATION N/A 2/5/2021    Procedure: Left ventriculography- pressures;  Surgeon: Antoine Ayers MD;  [facility]: [facility] LOC[facility];  Service: Cardiology;  Laterality: N/A;   • CARDIAC ELECTROPHYSIOLOGY PROCEDURE N/A 2/2/2021    Procedure: Pacemaker DC new---[employer] MICRA;  Surgeon: Eliazar Bond MD;  [facility];  Service: Cardiology;  Laterality: N/A;   • CARDIAC SURGERY     • CORONARY ARTERY BYPASS GRAFT      2 vessel   • CORONARY ARTERY BYPASS GRAFT WITH MITRAL VALVE REPAIR/REPLACEMENT N/A 6/13/2016    Procedure: INTRAOPERATIVE TARIQ, MIDLINE STERNOTOMY, CORONARY ARTERY BYPASS GRAFTING X  2 UTILIZING ENDOSCOPICALLY HARVESTED LEFT GREATER SAPHENOUS VEIN, MITRAL VALVE REPLACEMENT AND TRICUSPID VALVE REPAIR;  Surgeon: Eliecer Mistry MD;  Location: [facility] MAIN OR;  Service:    • CORONARY STENT PLACEMENT  2010    Approx. 6 yrs ago at [facility]   • HEMORRHOIDECTOMY     • HYSTERECTOMY     • MITRAL VALVE REPLACEMENT     • REPLACEMENT TOTAL KNEE Right    • THYROID SURGERY      Cyst removed from thyroid   • VASCULAR SURGERY         Lymphedema     Row Name 02/08/21 1215             Lymphedema Assessment    Lymphedema Classification  RLE:;LLE:  -VS      Recorded by [VS] Ericka Bishop OTR              Lymphedema Edema Assessment    Pitting Edema  Mild;Moderate greater in (L) than (R)  -VS      Recorded by [VS] Ericka Bishop OTR              Skin Changes/Observations    Skin Observations Comment  -- Mepliex still in place   -VS      Recorded by [VS] Ericka Bishop OTR              Compression/Skin Care    Compression/Skin Care  skin care;bandaging;remove bandages  -VS      Skin Care  washed/dried;lotion applied  -VS      Wrapping Location  lower extremity  -VS      Wrapping Location LE  bilateral:;foot  "to knee  -VS      Bandage Layers  cotton liner;padding/fluff layer;short-stretch bandages (comment size/quantity)  -VS      Bandaging Comments  Bandage #6 & #8 base of toes to knee   -VS      Bandaging Technique  light compression  -VS      Recorded by [VS] Ericka Bishop OTR        User Key  (r) = Recorded By, (t) = Taken By, (c) = Cosigned By    Initials Name Effective Dates    VS Ericka Bishop OTR 03/02/20 -            OT ASSESSMENT FLOWSHEET (last 12 hours)      OT Evaluation and Treatment     Row Name 02/08/21 6810                   OT Time and Intention    Subjective Information  complains of;weakness;fatigue  -VS        Document Type  therapy note (daily note)  -VS        Mode of Treatment  occupational therapy  -VS        Patient Effort  adequate  -VS        Comment  \"I have been in this chair for hours I want to go back to bed\" OT assisted thept. with NA assistance after Lymph wraps   -VS           General Information    General Observations of Patient  Pt. was sitting reclined in chair   -VS        Existing Precautions/Restrictions  fall;oxygen therapy device and L/min  -VS        Barriers to Rehab  medically complex;previous functional deficit  -VS           Cognition    Orientation Status (Cognition)  oriented to;person;place;time  -VS        Follows Commands (Cognition)  WFL  -VS           Pain Assessment    Additional Documentation  Pain Scale: Numbers Pre/Post-Treatment (Group)  -VS           Pain Scale: Numbers Pre/Post-Treatment    Pretreatment Pain Rating  0/10 - no pain  -VS        Posttreatment Pain Rating  0/10 - no pain  -VS           Bed Mobility    Bed Mobility  rolling left;rolling right;scooting/bridging;sit-supine  -VS        Rolling Left [place] (Bed Mobility)  moderate assist (50% patient effort)  -VS        Rolling Right [place] (Bed Mobility)  moderate assist (50% patient effort)  -VS        Scooting/Bridging [place] (Bed Mobility)  dependent (less than 25% " patient effort);2 person assist  -VS        Sit-Supine [place] (Bed Mobility)  maximum assist (25% patient effort);2 person assist  -VS           Transfer Assessment/Treatment    Transfers  chair-bed transfer  -VS           Transfers    Bed-Chair [place] (Transfers)  maximum assist (25% patient effort);1 person assist  -VS           Activities of Daily Living    BADL Assessment/Intervention  bathing;lower body dressing  -VS           Bathing Assessment/Intervention    Comment (Bathing)  Therapist bathed, dried and applied lotion to (B)LE toes to knees   -VS           Lower Body Dressing Assessment/Training    [place] Level (Lower Body Dressing)  lower body dressing skills;doff;don;socks  -VS        Position (Lower Body Dressing)  supported sitting  -VS        Comment (Lower Body Dressing)  THerapist doff/lore non-slip socks (B)ly   -VS           Plan of Care Review    Plan of Care Reviewed With  patient  -VS        Progress  improving  -VS        Outcome Summary  OT Lymph:  Pt. was O x 3 but at times seemed confused about situation.  Therapist doff/lore Lymph wraps to the LEs base of toes to knees, edema was min/mod with the (L) greater than the (R).  Pt. stated she was fatiqued and wanted to go back to bed.  Pt. was max (A) of one to complete a stand pivot transfer from the chair to the bed, Pt. was Max X 2  with sit to supine and with all other bed mobility skills.  Pt. continues to benfit from skilled OT.  Pt. wore a mask but remeoved at times due to SOA.  Theapist wore a mask, eye protection and wash her hands before and after the treatment.    -VS           Positioning and Restraints    Pre-Treatment Position  sitting in chair/recliner  -VS        Post Treatment Position  bed  -VS        In Bed  supine;call light within reach;encouraged to call for assist;with nsg;side rails up x3  -VS          User Key  (r) = Recorded By, (t) = Taken By, (c) = Cosigned By    Initials Name Effective Dates    VS  Ericka Bishop, OTR 03/02/20 -          Occupational Therapy Education                 Title: PT OT SLP Therapies (In Progress)     Topic: Occupational Therapy (In Progress)     Point: ADL training (In Progress)     Description:   Instruct learner(s) on proper safety adaptation and remediation techniques during self care or transfers.   Instruct in proper use of assistive devices.              Learning Progress Summary           Patient Acceptance, E,TB,D, NR by SINDY at 2/6/2021 1530    Acceptance, E,D,H, VU by ONEL at 2/3/2021 1421    Comment: Lymphedema bandages wearing precaution, written/verbal  instruction and demonstration on wrapping LEs    Acceptance, E,D, VU by PATRICIO at 2/1/2021 1550    Comment: role of OT, plan of care, body mechanics                   Point: Precautions (In Progress)     Description:   Instruct learner(s) on prescribed precautions during self-care and functional transfers.              Learning Progress Summary           Patient Acceptance, E,TB,D, NR by SINDY at 2/6/2021 1530    Acceptance, E,D,H, VU by ONEL at 2/3/2021 1421    Comment: Lymphedema bandages wearing precaution, written/verbal  instruction and demonstration on wrapping LEs    Acceptance, E,D, VU by PATRICIO at 2/1/2021 1550    Comment: role of OT, plan of care, body mechanics                   Point: Body mechanics (In Progress)     Description:   Instruct learner(s) on proper positioning and spine alignment during self-care, functional mobility activities and/or exercises.              Learning Progress Summary           Patient Acceptance, E,TB,D, NR by SINDY at 2/6/2021 1530    Acceptance, E,D,H, VU by ONEL at 2/3/2021 1421    Comment: Lymphedema bandages wearing precaution, written/verbal  instruction and demonstration on wrapping LEs    Acceptance, E,D, VU by PATRICIO at 2/1/2021 1550    Comment: role of OT, plan of care, body mechanics                               User Key     Initials Effective Dates Name Provider Type Discipline    PATRICIO  06/08/18 -  Solange Dawkins, OTR Occupational Therapist OT    VS 03/02/20 -  Ericka Bishop, OTR Occupational Therapist OT    JM 03/07/18 -  Pau Hensley PTA Physical Therapy Assistant PT                  OT Recommendation and Plan     Plan of Care Review  Plan of Care Reviewed With: patient  Progress: improving  Outcome Summary: OT Lymph:  Pt. was O x 3 but at times seemed confused about situation.  Therapist doff/lore Lymph wraps to the LEs base of toes to knees, edema was min/mod with the (L) greater than the (R).  Pt. stated she was fatiqued and wanted to go back to bed.  Pt. was max (A) of one to complete a stand pivot transfer from the chair to the bed, Pt. was Max X 2  with sit to supine and with all other bed mobility skills.  Pt. continues to benfit from skilled OT.  Pt. wore a mask but remeoved at times due to SOA.  Theapist wore a mask, eye protection and wash her hands before and after the treatment.    Plan of Care Reviewed With: patient  Outcome Summary: OT Lymph:  Pt. was O x 3 but at times seemed confused about situation.  Therapist doff/lore Lymph wraps to the LEs base of toes to knees, edema was min/mod with the (L) greater than the (R).  Pt. stated she was fatiqued and wanted to go back to bed.  Pt. was max (A) of one to complete a stand pivot transfer from the chair to the bed, Pt. was Max X 2  with sit to supine and with all other bed mobility skills.  Pt. continues to benfit from skilled OT.  Pt. wore a mask but remeoved at times due to SOA.  Theapist wore a mask, eye protection and wash her hands before and after the treatment.      Outcome Measures     Row Name 02/08/21 1300 02/05/21 1500          How much help from another is currently needed...    Putting on and taking off regular lower body clothing?  1  -VS  1  -VS     Bathing (including washing, rinsing, and drying)  2  -VS  2  -VS     Toileting (which includes using toilet bed pan or urinal)  1  -VS  1  -VS     Putting on and taking  off regular upper body clothing  2  -VS  2  -VS     Taking care of personal grooming (such as brushing teeth)  3  -VS  3  -VS     Eating meals  3  -VS  3  -VS     AM-PAC 6 Clicks Score (OT)  12  -VS  12  -VS        Functional Assessment    Outcome Measure Options  AM-PAC 6 Clicks Daily Activity (OT)  -VS  AM-PAC 6 Clicks Daily Activity (OT)  -VS       User Key  (r) = Recorded By, (t) = Taken By, (c) = Cosigned By    Initials Name Provider Type    VS Ericka Bishop OTR Occupational Therapist          Time Calculation:   Time Calculation- OT     Row Name 02/08/21 1337             Time Calculation- OT    OT Start Time  1128  -VS      OT Stop Time  1208  -VS      OT Time Calculation (min)  40 min  -VS      Total Timed Code Minutes- OT  40 minute(s)  -VS      OT Received On  02/08/21  -VS      OT - Next Appointment  02/09/21  -VS        User Key  (r) = Recorded By, (t) = Taken By, (c) = Cosigned By    Initials Name Provider Type    VS Ericka Bishop OTR Occupational Therapist        Therapy Charges for Today     Code Description Service Date Service Provider Modifiers Qty    [id number]  OT MANUAL THERAPY EA 15 MIN 2/8/2021 Ericka Bishop OTR GO 2    [id number]  OT THER PROC GROUP 2/8/2021 Ericka Bishop OTR GO 1               ANNEMARIE Wiggins  2/8/2021

## 2021-02-08 NOTE — TELEPHONE ENCOUNTER
Patients daughter, Carmen called saying Miguelina was in the hospital and that you were going to do a valve replacement and they have no idea what is going on. They asked for you to call if you could at [phone].

## 2021-02-09 LAB
ALBUMIN SERPL-MCNC: 3.2 G/DL (ref 3.5–5.2)
ANION GAP SERPL CALCULATED.3IONS-SCNC: 10.2 MMOL/L (ref 5–15)
BUN SERPL-MCNC: 43 MG/DL (ref 8–23)
BUN/CREAT SERPL: 30.1 (ref 7–25)
CALCIUM SPEC-SCNC: 8.6 MG/DL (ref 8.6–10.5)
CHLORIDE SERPL-SCNC: 94 MMOL/L (ref 98–107)
CO2 SERPL-SCNC: 37.8 MMOL/L (ref 22–29)
CREAT SERPL-MCNC: 1.43 MG/DL (ref 0.57–1)
DEPRECATED RDW RBC AUTO: 44.9 FL (ref 37–54)
ERYTHROCYTE [DISTWIDTH] IN BLOOD BY AUTOMATED COUNT: 14.8 % (ref 12.3–15.4)
GFR SERPL CREATININE-BSD FRML MDRD: 36 ML/MIN/1.73
GLUCOSE BLDC GLUCOMTR-MCNC: 147 MG/DL (ref 70–130)
GLUCOSE BLDC GLUCOMTR-MCNC: 156 MG/DL (ref 70–130)
GLUCOSE BLDC GLUCOMTR-MCNC: 185 MG/DL (ref 70–130)
GLUCOSE BLDC GLUCOMTR-MCNC: 195 MG/DL (ref 70–130)
GLUCOSE SERPL-MCNC: 125 MG/DL (ref 65–99)
HCT VFR BLD AUTO: 33.5 % (ref 34–46.6)
HGB BLD-MCNC: 10.7 G/DL (ref 12–15.9)
MCH RBC QN AUTO: 26.6 PG (ref 26.6–33)
MCHC RBC AUTO-ENTMCNC: 31.9 G/DL (ref 31.5–35.7)
MCV RBC AUTO: 83.3 FL (ref 79–97)
PHOSPHATE SERPL-MCNC: 3.7 MG/DL (ref 2.5–4.5)
PLATELET # BLD AUTO: 236 10*3/MM3 (ref 140–450)
PMV BLD AUTO: 11 FL (ref 6–12)
POTASSIUM SERPL-SCNC: 3.7 MMOL/L (ref 3.5–5.2)
RBC # BLD AUTO: 4.02 10*6/MM3 (ref 3.77–5.28)
SODIUM SERPL-SCNC: 142 MMOL/L (ref 136–145)
WBC # BLD AUTO: 8.26 10*3/MM3 (ref 3.4–10.8)

## 2021-02-09 PROCEDURE — 94799 UNLISTED PULMONARY SVC/PX: CPT

## 2021-02-09 PROCEDURE — 80069 RENAL FUNCTION PANEL: CPT | Performed by: INTERNAL MEDICINE

## 2021-02-09 PROCEDURE — 97530 THERAPEUTIC ACTIVITIES: CPT

## 2021-02-09 PROCEDURE — 97140 MANUAL THERAPY 1/> REGIONS: CPT

## 2021-02-09 PROCEDURE — 85027 COMPLETE CBC AUTOMATED: CPT | Performed by: NURSE PRACTITIONER

## 2021-02-09 PROCEDURE — 63710000001 INSULIN LISPRO (HUMAN) PER 5 UNITS: Performed by: INTERNAL MEDICINE

## 2021-02-09 PROCEDURE — 82962 GLUCOSE BLOOD TEST: CPT

## 2021-02-09 PROCEDURE — 99232 SBSQ HOSP IP/OBS MODERATE 35: CPT | Performed by: INTERNAL MEDICINE

## 2021-02-09 RX ORDER — BUMETANIDE 2 MG/1
4 TABLET ORAL
Status: DISCONTINUED | OUTPATIENT
Start: 2021-02-09 | End: 2021-02-11 | Stop reason: HOSPADM

## 2021-02-09 RX ADMIN — CLOTRIMAZOLE AND BETAMETHASONE DIPROPIONATE: 10; .5 CREAM TOPICAL at 08:36

## 2021-02-09 RX ADMIN — GABAPENTIN 100 MG: 100 CAPSULE ORAL at 20:41

## 2021-02-09 RX ADMIN — CARVEDILOL 6.25 MG: 6.25 TABLET, FILM COATED ORAL at 08:35

## 2021-02-09 RX ADMIN — CLOTRIMAZOLE AND BETAMETHASONE DIPROPIONATE: 10; .5 CREAM TOPICAL at 20:45

## 2021-02-09 RX ADMIN — APIXABAN 5 MG: 5 TABLET, FILM COATED ORAL at 08:35

## 2021-02-09 RX ADMIN — GABAPENTIN 100 MG: 100 CAPSULE ORAL at 08:35

## 2021-02-09 RX ADMIN — BUMETANIDE 4 MG: 2 TABLET ORAL at 16:44

## 2021-02-09 RX ADMIN — SODIUM CHLORIDE, PRESERVATIVE FREE 3 ML: 5 INJECTION INTRAVENOUS at 20:45

## 2021-02-09 RX ADMIN — INSULIN LISPRO 2 UNITS: 100 INJECTION, SOLUTION INTRAVENOUS; SUBCUTANEOUS at 11:39

## 2021-02-09 RX ADMIN — NYSTATIN: 100000 CREAM TOPICAL at 22:50

## 2021-02-09 RX ADMIN — INSULIN LISPRO 2 UNITS: 100 INJECTION, SOLUTION INTRAVENOUS; SUBCUTANEOUS at 06:48

## 2021-02-09 RX ADMIN — VILAZODONE HYDROCHLORIDE 20 MG: 40 TABLET ORAL at 20:40

## 2021-02-09 RX ADMIN — Medication 1 CAPSULE: at 08:35

## 2021-02-09 RX ADMIN — LEVOTHYROXINE SODIUM 25 MCG: 0.03 TABLET ORAL at 06:48

## 2021-02-09 RX ADMIN — SODIUM CHLORIDE, PRESERVATIVE FREE 10 ML: 5 INJECTION INTRAVENOUS at 20:45

## 2021-02-09 RX ADMIN — AMLODIPINE BESYLATE 5 MG: 5 TABLET ORAL at 08:35

## 2021-02-09 RX ADMIN — ATORVASTATIN CALCIUM 20 MG: 20 TABLET, FILM COATED ORAL at 20:40

## 2021-02-09 RX ADMIN — BUDESONIDE AND FORMOTEROL FUMARATE DIHYDRATE 2 PUFF: 160; 4.5 AEROSOL RESPIRATORY (INHALATION) at 20:49

## 2021-02-09 RX ADMIN — CARVEDILOL 6.25 MG: 6.25 TABLET, FILM COATED ORAL at 16:44

## 2021-02-09 RX ADMIN — SALINE NASAL SPRAY 1 SPRAY: 1.5 SOLUTION NASAL at 22:50

## 2021-02-09 RX ADMIN — SALINE NASAL SPRAY 2 SPRAY: 1.5 SOLUTION NASAL at 11:40

## 2021-02-09 RX ADMIN — ALPRAZOLAM 1 MG: 0.5 TABLET ORAL at 08:44

## 2021-02-09 RX ADMIN — SODIUM CHLORIDE, PRESERVATIVE FREE 10 ML: 5 INJECTION INTRAVENOUS at 08:37

## 2021-02-09 RX ADMIN — NYSTATIN: 100000 CREAM TOPICAL at 08:36

## 2021-02-09 RX ADMIN — BUMETANIDE 4 MG: 2 TABLET ORAL at 08:35

## 2021-02-09 RX ADMIN — SALINE NASAL SPRAY 2 SPRAY: 1.5 SOLUTION NASAL at 06:49

## 2021-02-09 RX ADMIN — APIXABAN 5 MG: 5 TABLET, FILM COATED ORAL at 20:41

## 2021-02-09 RX ADMIN — BUDESONIDE AND FORMOTEROL FUMARATE DIHYDRATE 2 PUFF: 160; 4.5 AEROSOL RESPIRATORY (INHALATION) at 06:34

## 2021-02-09 RX ADMIN — PANTOPRAZOLE SODIUM 40 MG: 40 TABLET, DELAYED RELEASE ORAL at 06:48

## 2021-02-09 NOTE — THERAPY TREATMENT NOTE
Patient Name: Miguelina Lopez  : 1945    MRN: [id number]                              Today's Date: 2021       Admit Date: 2021    Visit Dx:     ICD-10-CM ICD-9-CM   1. Chest pain, unspecified type  R07.9 786.50   2. COPD exacerbation (CMS/HCC)  J44.1 491.21   3. Congestive heart failure, unspecified HF chronicity, unspecified heart failure type (CMS/HCC)  I50.9 428.0   4. Hypertension, unspecified type  I10 401.9   5. Abnormal chest x-ray  R93.89 793.2   6. Complete heart block (CMS/HCC)  I44.2 426.0     Patient Active Problem List   Diagnosis   • Anxiety disorder   • Arthritis of knee   • Asthma   • Chronic coronary artery disease   • CKD (chronic kidney disease) stage 3, GFR 30-59 ml/min (CMS/HCC)   • Depression   • Diabetic peripheral neuropathy (CMS/HCC)   • Gastroesophageal reflux disease   • Hyperlipidemia   • Insomnia   • Lower gastrointestinal hemorrhage   • Anemia   • OCHOA (obstructive sleep apnea)   • DM type 2 (diabetes mellitus, type 2) (CMS/HCC)   • Essential hypertension   • Hospital discharge follow-up   • Pulmonary hypertension due to sleep-disordered breathing (CMS/HCC)   • s/p MVR, TV-repair, CABG x2 16   • OLI (acute kidney injury) (CMS/HCC)   • Leukocytosis   • Atrial fibrillation (CMS/HCC)   • Nocturnal hypoxia   • Dermatitis   • Medicare annual wellness visit, initial   • Class 3 severe obesity due to excess calories with serious comorbidity and body mass index (BMI) of 50.0 to 59.9 in adult (CMS/[facility])   • Supplemental oxygen dependent   • Acute on chronic combined systolic and diastolic HF (heart failure) (CMS/[facility])   • Mitral regurgitation   • Aortic stenosis   • Medicare annual wellness visit, subsequent   • Localized edema   • Chronic right-sided congestive heart failure (CMS/[facility])   • Cellulitis of left lower extremity   • Proteinuria   • Bilateral lower extremity edema   • Subclinical hypothyroidism   • Chronic diastolic heart failure (CMS/[facility])   • Chronic respiratory  failure with hypoxia and hypercapnia (CMS/[facility])   • Acute on chronic respiratory failure with hypoxia and hypercapnia (CMS/[facility])   • AV block, 2nd degree   • 1st degree AV block   • Chest pain   • COPD (chronic obstructive pulmonary disease) (CMS/[facility])   • Complete heart block (CMS/[facility])   • UTI (urinary tract infection), bacterial     Past Medical History:   Diagnosis Date   • Acute on chronic respiratory failure with hypoxia and hypercapnia (CMS/[facility])    • OLI (acute kidney injury) (CMS/[facility])    • Anemia    • Anxiety    • Aortic valve stenosis    • Bilateral lower extremity edema    • CHF (congestive heart failure) (CMS/[facility])    • Chronic coronary artery disease    • Class 3 severe obesity due to excess calories in adult (CMS/[facility])    • COPD (chronic obstructive pulmonary disease) (CMS/HCC)    • Depression    • Diabetes mellitus (CMS/HCC)    • Elevated cholesterol    • GERD (gastroesophageal reflux disease)    • Heart murmur    • Hypertension    • Mitral valve insufficiency    • Pneumonia     1/2016   • Pulmonary hypertension (CMS/HCC)     due to sleep disordered breathing   • Sleep apnea     Uses CPAP or oxygen   • Stage 3 chronic kidney disease (CMS/[facility])    • Subclinical hypothyroidism    • Supplemental oxygen dependent    • Valvular heart disease      Past Surgical History:   Procedure Laterality Date   • CARDIAC CATHETERIZATION     • CARDIAC CATHETERIZATION N/A 6/10/2016    Procedure: Left Heart Cath;  Surgeon: Chace Johnson MD;  Location: [facility] CATH INVASIVE LOCATION;  Service:    • CARDIAC CATHETERIZATION N/A 6/10/2016    Procedure: Right Heart Cath;  Surgeon: Chace Johnson MD;  Location: [facility] CATH INVASIVE LOCATION;  Service:    • CARDIAC CATHETERIZATION N/A 2/5/2021    Procedure: RIGHT AND LEFT HEART CATH;  Surgeon: Antoine Ayers MD;  Location: [facility] CATH INVASIVE LOCATION;  Service: Cardiology;  Laterality: N/A;   • CARDIAC CATHETERIZATION N/A 2/5/2021    Procedure: Coronary  angiography;  Surgeon: Antoine Ayers MD;  Location[facility] CATH INVASIVE LOCATION;  Service: Cardiology;  Laterality: N/A;   • CARDIAC CATHETERIZATION N/A 2/5/2021    Procedure: Left ventriculography- pressures;  Surgeon: Antoine Ayers MD;  [facility] C[facility]VASIVE LOC[facility];  Service: Cardiology;  Laterality: N/A;   • CARDIAC ELECTROPHYSIOLOGY PROCEDURE N/A 2/2/2021    Procedure: Pacemaker DC new---[employer] MICRA;  Surgeon: Eliazar Bond MD;  [facility] C[facility]ASIVE LOC[facility];  Service: Cardiology;  Laterality: N/A;   • CARDIAC SURGERY     • CORONARY ARTERY BYPASS GRAFT      2 vessel   • CORONARY ARTERY BYPASS GRAFT WITH MITRAL VALVE REPAIR/REPLACEMENT N/A 6/13/2016    Procedure: INTRAOPERATIVE TARIQ, MIDLINE STERNOTOMY, CORONARY ARTERY BYPASS GRAFTING X  2 UTILIZING ENDOSCOPICALLY HARVESTED LEFT GREATER SAPHENOUS VEIN, MITRAL VALVE REPLACEMENT AND TRICUSPID VALVE REPAIR;  Surgeon: Eliecer Mistry MD;  Location: [facility] OR;  Service:    • CORONARY STENT PLACEMENT  2010    Approx. 6 yrs ago at [facility]   • HEMORRHOIDECTOMY     • HYSTERECTOMY     • MITRAL VALVE REPLACEMENT     • REPLACEMENT TOTAL KNEE Right    • THYROID SURGERY      Cyst removed from thyroid   • VASCULAR SURGERY       General Information     Row Name 02/09/21 1113          Physical Therapy Time and Intention    Document Type  therapy note (daily note)  -CH (r) BH (t) CH (c)     Mode of Treatment  individual therapy;physical therapy  -CH (r) BH (t) CH (c)     Row Name 02/09/21 1113          General Information    Existing Precautions/Restrictions  fall;oxygen therapy device and L/min  -CH (r) BH (t) CH (c)     Row Name 02/09/21 1113          Cognition    Orientation Status (Cognition)  oriented x 3  -CH (r) BH (t) CH (c)     Row Name 02/09/21 1113          Safety Issues, Functional Mobility    Impairments Affecting Function (Mobility)  balance;endurance/activity tolerance;coordination;postural/trunk control;shortness of  breath;strength  -CH (r) BH (t) CH (c)       User Key  (r) = Recorded By, (t) = Taken By, (c) = Cosigned By    Initials Name Provider Type    CH Precious Cisneros, PT Physical Therapist     Shira Wilson, PT Student PT Student        Mobility     Row Name 02/09/21 1114          Bed Mobility    Bed Mobility  sit-supine  -CH (r) BH (t) CH (c)     Supine-Sit [place] (Bed Mobility)  not tested Pt in chair  -CH (r) BH (t) CH (c)     Sit-Supine [place] (Bed Mobility)  verbal cues;nonverbal cues (demo/gesture);maximum assist (25% patient effort);2 person assist  -CH (r) BH (t) CH (c)     Assistive Device (Bed Mobility)  bed rails;draw sheet;head of bed elevated  -CH (r) BH (t) CH (c)     Row Name 02/09/21 1114          Sit-Stand Transfer    Sit-Stand [place] (Transfers)  verbal cues;nonverbal cues (demo/gesture);moderate assist (50% patient effort);2 person assist  -CH (r) BH (t) CH (c)     Assistive Device (Sit-Stand Transfers)  walker, front-wheeled  -CH (r) BH (t) CH (c)     Row Name 02/09/21 1114          Gait/Stairs (Locomotion)    [place] Level (Gait)  verbal cues;nonverbal cues (demo/gesture);minimum assist (75% patient effort);2 person assist  -CH (r) BH (t) CH (c)     Assistive Device (Gait)  walker, front-wheeled  -CH (r) BH (t) CH (c)     Distance in Feet (Gait)  30  -CH (r) BH (t) CH (c)     Deviations/Abnormal Patterns (Gait)  lina decreased;base of support, wide;festinating/shuffling;stride length decreased;weight shifting decreased  -CH (r) BH (t) CH (c)     Bilateral Gait Deviations  forward flexed posture  -CH (r) BH (t) CH (c)     Comment (Gait/Stairs)  Pt able to double gait distance today with fewer complaints of pain and fatigue.  -CH (r) BH (t) CH (c)       User Key  (r) = Recorded By, (t) = Taken By, (c) = Cosigned By    Initials Name Provider Type    Precious Torres, PT Physical Therapist    Shira Mejía, PT Student PT Student        Obj/Interventions     Row  Name 02/09/21 1117          Motor Skills    Therapeutic Exercise  -- 10 B AP and LAQ  -CH (r) BH (t) CH (c)       User Key  (r) = Recorded By, (t) = Taken By, (c) = Cosigned By    Initials Name Provider Type    Precious Torres, PT Physical Therapist    Shira Mejía, PT Student PT Student        Goals/Plan    No documentation.       Clinical Impression     Row Name 02/09/21 1119          Pain    Additional Documentation  Pain Scale: FACES Pre/Post-Treatment (Group)  -CH (r) BH (t) CH (c)     Row Name 02/09/21 1119          Pain Scale: Numbers Pre/Post-Treatment    Pain Intervention(s)  Repositioned  -CH (r) BH (t) CH (c)     Row Name 02/09/21 1119          Pain Scale: FACES Pre/Post-Treatment    Pain: FACES Scale, Pretreatment  2-->hurts little bit  -CH (r) BH (t) CH (c)     Posttreatment Pain Rating  2-->hurts little bit  -CH (r) BH (t) CH (c)     Pain Location - Side  Bilateral  -CH (r) BH (t) CH (c)     Pain Location  foot  -CH (r) BH (t) CH (c)     Pre/Posttreatment Pain Comment  Pt reports pain in B feet d/t sitting in the chair for too long.  -CH (r) BH (t) CH (c)     Row Name 02/09/21 1119          Plan of Care Review    Plan of Care Reviewed With  patient  -CH (r) BH (t) CH (c)     Progress  improving  -CH (r) BH (t) CH (c)     Outcome Summary  Pt was able to double her gait distance today with no complaints of pain or fatigue. Pt reports she prefers PT to come in the morning to get her up and moving because she feels better. Pt will continue to benefit from skilled PT to address endurance, strengh, and functional mobility deficits.  -CH (r) BH (t) CH (c)     Row Name 02/09/21 1119          Positioning and Restraints    Pre-Treatment Position  sitting in chair/recliner  -CH (r) BH (t) CH (c)     Post Treatment Position  bed  -CH (r) BH (t) CH (c)     In Bed  supine;call light within reach;encouraged to call for assist;exit alarm on  -CH (r) BH (t) CH (c)       User Key  (r) = Recorded By, (t) =  Taken By, (c) = Cosigned By    Initials Name Provider Type    Precious Torres, PT Physical Therapist    Shira Mejía, PT Student PT Student        Outcome Measures     Row Name 02/09/21 1122          How much help from another person do you currently need...    Turning from your back to your side while in flat bed without using bedrails?  2  -CH (r) BH (t) CH (c)     Moving from lying on back to sitting on the side of a flat bed without bedrails?  2  -CH (r) BH (t) CH (c)     Moving to and from a bed to a chair (including a wheelchair)?  3  -CH (r) BH (t) CH (c)     Standing up from a chair using your arms (e.g., wheelchair, bedside chair)?  2  -CH (r) BH (t) CH (c)     Climbing 3-5 steps with a railing?  1  -CH (r) BH (t) CH (c)     To walk in hospital room?  3  -CH (r) BH (t) CH (c)     AM-PAC 6 Clicks Score (PT)  13  -CH (r) BH (t)     Row Name 02/09/21 1122          Functional Assessment    Outcome Measure Options  AM-PAC 6 Clicks Basic Mobility (PT)  -CH (r) BH (t) CH (c)       User Key  (r) = Recorded By, (t) = Taken By, (c) = Cosigned By    Initials Name Provider Type    Precious Torres, PT Physical Therapist    Shira Mejía, PT Student PT Student        Physical Therapy Education                 Title: PT OT SLP Therapies (In Progress)     Topic: Physical Therapy (Done)     Point: Mobility training (Done)     Learning Progress Summary           Patient Acceptance, E,TB,D, VU,NR by  at 2/9/2021 1122    Acceptance, E,TB,D, VU,NR by  at 2/8/2021 1459    Acceptance, E,D, VU,DU,NR by  at 2/7/2021 0935    Acceptance, E,TB,D, NR by  at 2/6/2021 1530    Acceptance, E,TB,D, VU,NR by  at 2/4/2021 1539    Acceptance, E,TB,D, VU,NR by  at 2/3/2021 1055                   Point: Home exercise program (Done)     Learning Progress Summary           Patient Acceptance, E,VITO,ARTURO, PALAK,NR by  at 2/9/2021 1122    Acceptance, E,VITO,ARTURO, PALAK,NR by  at 2/8/2021 1459    Acceptance, E,ARTURO, PALAK,DU,NR by   at 2/7/2021 0935    Acceptance, E,TB,D, NR by  at 2/6/2021 1530    Acceptance, E,TB,D, VU,NR by  at 2/4/2021 1539                   Point: Body mechanics (Done)     Learning Progress Summary           Patient Acceptance, E,TB,D, VU,NR by  at 2/9/2021 1122    Acceptance, E,TB,D, VU,NR by  at 2/8/2021 1459    Acceptance, E,D, VU,DU,NR by  at 2/7/2021 0935    Acceptance, E,TB,D, NR by  at 2/6/2021 1530    Acceptance, E,TB,D, VU,NR by  at 2/4/2021 1539    Acceptance, E,TB,D, VU,NR by  at 2/3/2021 1055                   Point: Precautions (Done)     Learning Progress Summary           Patient Acceptance, E,TB,D, VU,NR by  at 2/9/2021 1122    Acceptance, E,TB,D, VU,NR by  at 2/8/2021 1459    Acceptance, E,D, VU,DU,NR by  at 2/7/2021 0935    Acceptance, E,TB,D, NR by  at 2/6/2021 1530    Acceptance, E,TB,D, VU,NR by  at 2/4/2021 1539    Acceptance, E,TB,D, VU,NR by  at 2/3/2021 1055                               User Key     Initials Effective Dates Name Provider Type Discipline     04/03/18 -  Precious Cisneros, PT Physical Therapist PT     03/07/18 -  Pau Hensley PTA Physical Therapy Assistant PT     07/02/20 -  Juan Alberto Caal, PT DPT Physical Therapist PT     02/01/21 -  Shira Wilson PT Student PT Student PT              PT Recommendation and Plan     Plan of Care Reviewed With: patient  Progress: improving  Outcome Summary: Pt was able to double her gait distance today with no complaints of pain or fatigue. Pt reports she prefers PT to come in the morning to get her up and moving because she feels better. Pt will continue to benefit from skilled PT to address endurance, strengh, and functional mobility deficits.     Time Calculation:   PT Charges     Row Name 02/09/21 1123             Time Calculation    Start Time  1010  -CH (r) BH (t) CH (c)      Stop Time  1030  -CH (r) BH (t) CH (c)      Time Calculation (min)  20 min  -CH (r) BH (t)      PT Received On  02/09/21  -CH  (r) RATNA (t) SOHAM (c)      PT - Next Appointment  02/10/21  -SOHAM (r) RATNA (t) CH (c)         Time Calculation- PT    Total Timed Code Minutes- PT  18 minute(s)  -CH (r) BH (t) CH (c)        User Key  (r) = Recorded By, (t) = Taken By, (c) = Cosigned By    Initials Name Provider Type     Precious Cisneros, PT Physical Therapist    Shira Mejía PT Student PT Student        Therapy Charges for Today     Code Description Service Date Service Provider Modifiers Qty    [id number]  PT THERAPEUTIC ACT EA 15 MIN 2/9/2021 Shira Wilson, PT Student GP 1          PT G-Codes  Outcome Measure Options: AM-PAC 6 Clicks Basic Mobility (PT)  AM-PAC 6 Clicks Score (PT): 13  AM-PAC 6 Clicks Score (OT): 12    Shira Wilson PT Student  2/9/2021

## 2021-02-09 NOTE — PLAN OF CARE
Goal Outcome Evaluation:  Plan of Care Reviewed With: patient  Progress: improving  Outcome Summary: OT:  Pt. was O x 3, pt. was supine in bed.  Pt's LE contiune to improve mild (R) and Moderate (L).  Therrapist doff and lore the lymph wraps, wrapping from base of toes to knees (B)ly.  Pt. conttinues to [facility] for skilled OT.  Pt. wore a mask.  Therapist wore a mask, eye protection and washed her hands before and affter the treatment.

## 2021-02-09 NOTE — DISCHARGE PLACEMENT REQUEST
"Miguelina Lopez (75 y.o. Female)     Date of Birth Social Security Number Address Home Phone MRN    1945  [id number]  [place] [id number] [phone] [id number]    [employer] Marital Status          None        Admission Date Admission Type Admitting Provider Attending Provider Department, Room/Bed    2/1/21 Emergency Salvador Guardado MD Snyder, Perry, MD [facility] CARDIOVASC UNIT, 2228/1    Discharge Date Discharge Disposition Discharge Destination                       Attending Provider: Kamlesh Marrero MD    Allergies: Coumadin [Warfarin Sodium], Bumex [Bumetanide], Erythromycin, Hctz [Hydrochlorothiazide], Zaroxolyn [Metolazone], Penicillins, Sulfa Antibiotics    Isolation: None   Infection: None   Code Status: CPR    Ht: 152.4 cm (60\")   Wt: 121 kg (267 lb 8 oz)    Admission Cmt: None   Principal Problem: Chest pain [R07.9]                 Active Insurance as of 2/1/2021     Primary Coverage     Payor Plan Insurance Group Employer/Plan Group    HUMANA MEDICARE REPLACEMENT HUMANA MEDICARE REPLACEMENT [id number]     Payor Plan Address Payor Plan Phone Number Payor Plan Fax Number Effective Dates    [address] [address] [phone]  1/1/2018 - None Entered    [facility] [id number]       Subscriber Name Subscriber Birth Date Member ID       MIGUELINA LOPEZ 1945 [id number]                 Emergency Contacts      (Rel.) Home Phone Work Phone Mobile Phone    SofieCarmen (Grandchild) [phone] -- --    CHRISTIANNE DOMINGUEZ (Son) [phone] -- [phone]    RamiroPuja (Daughter) [phone] -- [phone]            "

## 2021-02-09 NOTE — PROGRESS NOTES
Name: Miguelina Lopez ADMIT: 2021   : 1945  PCP: Arcelia Talbot APRN    MRN: [id number] LOS: 8 days   AGE/SEX: 75 y.o. female  ROOM:      Subjective   Subjective     Just finished working with physical therapy when I saw her. She was a little short of breath. Otherwise, doing well. No complaints.       Objective   Objective   Vital Signs  Temp:  [97.7 °F (36.5 °C)-98.2 °F (36.8 °C)] 98.1 °F (36.7 °C)  Heart Rate:  [79-84] 80  Resp:  [16-18] 18  BP: (117-139)/(55-64) 119/64  SpO2:  [93 %-97 %] 97 %  on  Flow (L/min):  [2] 2;   Device (Oxygen Therapy): nasal cannula  Body mass index is 52.24 kg/m².     Physical Exam  Vitals signs and nursing note reviewed.   Constitutional:       General: She is not in acute distress.     Appearance: She is obese. She is ill-appearing (chronically). She is not toxic-appearing.   Neck:      Musculoskeletal: Normal range of motion and neck supple.   Cardiovascular:      Rate and Rhythm: Regular rhythm. Normal rate present. Pacing on monitor.     Heart sounds: Murmur present.   Pulmonary:      Effort: Pulmonary effort is normal. No respiratory distress.      Comments: Diminished throughout. On 2 L nc.  Abdominal:      General: Bowel sounds are normal. There is no distension.      Palpations: Abdomen is soft.      Tenderness: There is no abdominal tenderness.   Musculoskeletal: Normal range of motion.         General: moderate Swelling present.   Skin:     General: Skin is warm and dry. Right radial cath site intact. No bruising.     Findings: No bruising.      Comments: BLE wrapped.  Neurological:      Mental Status: She is alert and oriented to person, place, and time.   Psychiatric:         Mood and Affect: Mood normal.         Behavior: Behavior normal.      Results Review:       I reviewed the patient's new clinical results.  Results from last 7 days   Lab Units 21  0404 21  0259 21  0501 21  0502   WBC 10*3/mm3 8.26 9.26 9.62 9.34    HEMOGLOBIN g/dL 10.7* 10.5* 10.5* 10.8*   PLATELETS 10*3/mm3 236 224 229 221     Results from last 7 days   Lab Units 02/09/21  0404 02/08/21 0259 02/07/21  1736 02/07/21  0501 02/06/21  0502   SODIUM mmol/L 142 142  --  143 144   POTASSIUM mmol/L 3.7 3.8 4.5 3.4* 3.8   CHLORIDE mmol/L 94* 94*  --  95* 97*   CO2 mmol/L 37.8* 35.3*  --  39.4* 34.5*   BUN mg/dL 43* 41*  --  38* 32*   CREATININE mg/dL 1.43* 1.44*  --  1.59* 1.69*   GLUCOSE mg/dL 125* 134*  --  119* 101*   Estimated Creatinine Clearance: 40 mL/min (A) (by C-G formula based on SCr of 1.43 mg/dL (H)).  Results from last 7 days   Lab Units 02/09/21 0404 02/08/21 0259 02/07/21  0501 02/05/21  1100 02/05/21  0628 02/04/21  0434   ALBUMIN g/dL 3.20* 3.20* 3.10* 3.10* 3.30* 3.10*   BILIRUBIN mg/dL  --  0.5  --  0.6 0.6 0.7   ALK PHOS U/L  --  262*  --  218* 230* 216*   AST (SGOT) U/L  --  29  --  25 24 28   ALT (SGPT) U/L  --  14  --  10 12 12     Results from last 7 days   Lab Units 02/09/21 0404 02/08/21 0259 02/07/21  0501 02/06/21  0502 02/05/21  1100  02/03/21  0419   CALCIUM mg/dL 8.6 8.7 8.3* 8.2* 8.5*   < > 8.9   ALBUMIN g/dL 3.20* 3.20* 3.10*  --  3.10*   < >  --    MAGNESIUM mg/dL  --   --   --  2.0  --   --  2.1   PHOSPHORUS mg/dL 3.7 3.7 3.8 5.0*  --   --  3.5    < > = values in this interval not displayed.       Glucose   Date/Time Value Ref Range Status   02/09/2021 1038 185 (H) 70 - 130 mg/dL Final   02/09/2021 0514 156 (H) 70 - 130 mg/dL Final   02/08/2021 2018 179 (H) 70 - 130 mg/dL Final   02/08/2021 1528 151 (H) 70 - 130 mg/dL Final   02/08/2021 1027 181 (H) 70 - 130 mg/dL Final   02/08/2021 0550 125 70 - 130 mg/dL Final   02/07/2021 2048 158 (H) 70 - 130 mg/dL Final       amLODIPine, 5 mg, Oral, Q24H  apixaban, 5 mg, Oral, Q12H  atorvastatin, 20 mg, Oral, Daily  budesonide-formoterol, 2 puff, Inhalation, BID - RT  bumetanide, 4 mg, Oral, BID  carvedilol, 6.25 mg, Oral, BID With Meals  clotrimazole-betamethasone, , Topical,  BID  fluticasone, 2 spray, Each Nare, Daily  gabapentin, 100 mg, Oral, Q12H  insulin lispro, 0-7 Units, Subcutaneous, TID AC  lactobacillus acidophilus, 1 capsule, Oral, Daily  levothyroxine, 25 mcg, Oral, Daily  nystatin, , Topical, BID  pantoprazole, 40 mg, Oral, QAM  polyethylene glycol, 17 g, Oral, Daily  senna-docusate sodium, 2 tablet, Oral, Nightly  sodium chloride, 10 mL, Intravenous, Q12H  sodium chloride, 3 mL, Intravenous, Q12H  sodium chloride, 2 spray, Each Nare, Q8H  vilazodone, 20 mg, Oral, Nightly       Diet Regular; Consistent Carbohydrate, Cardiac, Daily Fluid Restriction; 1500 mL Fluid Per Day       Assessment/Plan     Active Hospital Problems    Diagnosis  POA   • **Chest pain [R07.9]  Yes   • UTI (urinary tract infection), bacterial [N39.0, A49.9]  Yes   • Complete heart block (CMS/HCC) [I44.2]  Unknown   • COPD (chronic obstructive pulmonary disease) (CMS/HCC) [J44.9]  Yes   • Chronic respiratory failure with hypoxia and hypercapnia (CMS/HCC) [J96.11, J96.12]  Yes   • Chronic diastolic heart failure (CMS/HCC) [I50.32]  Yes   • Supplemental oxygen dependent [Z99.81]  Not Applicable   • s/p MVR, TV-repair, CABG x2 6/13/16 [Z95.2]  Not Applicable   • Pulmonary hypertension due to sleep-disordered breathing (CMS/HCC) [I27.29, G47.8]  Yes   • OCHOA (obstructive sleep apnea) [G47.33]  Yes   • DM type 2 (diabetes mellitus, type 2) (CMS/HCC) [E11.9]  Yes   • Diabetic peripheral neuropathy (CMS/HCC) [E11.42]  Yes   • Hyperlipidemia [E78.5]  Yes   • Essential hypertension [I10]  Yes   • CKD (chronic kidney disease) stage 3, GFR 30-59 ml/min (CMS/HCC) [N18.30]  Yes      Resolved Hospital Problems   No resolved problems to display.     75 y.o. female admitted with Chest pain.     Ms. Lopez is a 75 year old female who presented to the hospital with chest pain and dyspnea. Found to have diffuse opacities on CXR with elevated proBNP.     Chest pain/CAD with history of CABG/Severe AS/acute on chronic diastolic  heart failure/CHB s/p PPM/paroxysmal flutter  -on eliquis, coreg, bumex, atorvastatin  -needs to follow up with Dr. Pacheco of the structural heart team in about a month to decide about TAVR     COPD/chronic respiratory failure with hypoxia and hypercapnia/OCHOA  -using home Trilogy  -on 2L NC     DM2/neuropathy  -A1c 6.71%  -SSI as needed. Monitor ACHS. Hold oral medications.     HTN  -Controlled on amlodipine and coreg     CKD3  -creatinine stable at 1.43     Enterococcus faecalis UTI  -completed 3 days of rocephin     Lymphedema  Morbid obesity  MERLIN  Hypothyroidism      · Eliquis (home med) for DVT prophylaxis.  · Full code.  · Discussed with patient and nursing staff.   · Anticipate discharge TBD.  cleared by consultants for discharge, but pt really wants rehab. PT recs continued skilled care. Will ask [facility] to try to set up rehab.      ZOILA Powers  [facility]  02/09/21  15:13 EST

## 2021-02-09 NOTE — PLAN OF CARE
Goal Outcome Evaluation:  Plan of Care Reviewed With: (P) patient  Progress: (P) improving  Outcome Summary: (P) Pt was able to double her gait distance today with no complaints of pain or fatigue. Pt reports she prefers PT to come in the morning to get her up and moving because she feels better. Pt will continue to benefit from skilled PT to address endurance, strengh, and functional mobility deficits.

## 2021-02-09 NOTE — THERAPY TREATMENT NOTE
Acute Care - Occupational Therapy Treatment Note  [facility]     Patient Name: Miguelina Lopez  : 1945  MRN: [id number]  Today's Date: 2021             Admit Date: 2021       ICD-10-CM ICD-9-CM   1. Chest pain, unspecified type  R07.9 786.50   2. COPD exacerbation (CMS/HCC)  J44.1 491.21   3. Congestive heart failure, unspecified HF chronicity, unspecified heart failure type (CMS/HCC)  I50.9 428.0   4. Hypertension, unspecified type  I10 401.9   5. Abnormal chest x-ray  R93.89 793.2   6. Complete heart block (CMS/HCC)  I44.2 426.0     Patient Active Problem List   Diagnosis   • Anxiety disorder   • Arthritis of knee   • Asthma   • Chronic coronary artery disease   • CKD (chronic kidney disease) stage 3, GFR 30-59 ml/min (CMS/HCC)   • Depression   • Diabetic peripheral neuropathy (CMS/HCC)   • Gastroesophageal reflux disease   • Hyperlipidemia   • Insomnia   • Lower gastrointestinal hemorrhage   • Anemia   • OCHOA (obstructive sleep apnea)   • DM type 2 (diabetes mellitus, type 2) (CMS/HCC)   • Essential hypertension   • Hospital discharge follow-up   • Pulmonary hypertension due to sleep-disordered breathing (CMS/HCC)   • s/p MVR, TV-repair, CABG x2 16   • OLI (acute kidney injury) (CMS/HCC)   • Leukocytosis   • Atrial fibrillation (CMS/HCC)   • Nocturnal hypoxia   • Dermatitis   • Medicare annual wellness visit, initial   • Class 3 severe obesity due to excess calories with serious comorbidity and body mass index (BMI) of 50.0 to 59.9 in adult (CMS/[facility])   • Supplemental oxygen dependent   • Acute on chronic combined systolic and diastolic HF (heart failure) (CMS/[facility])   • Mitral regurgitation   • Aortic stenosis   • Medicare annual wellness visit, subsequent   • Localized edema   • Chronic right-sided congestive heart failure (CMS/[facility])   • Cellulitis of left lower extremity   • Proteinuria   • Bilateral lower extremity edema   • Subclinical hypothyroidism   • Chronic diastolic heart failure  (CMS/[facility])   • Chronic respiratory failure with hypoxia and hypercapnia (CMS/[facility])   • Acute on chronic respiratory failure with hypoxia and hypercapnia (CMS/[facility])   • AV block, 2nd degree   • 1st degree AV block   • Chest pain   • COPD (chronic obstructive pulmonary disease) (CMS/[facility])   • Complete heart block (CMS/[facility])   • UTI (urinary tract infection), bacterial     Past Medical History:   Diagnosis Date   • Acute on chronic respiratory failure with hypoxia and hypercapnia (CMS/[facility])    • OLI (acute kidney injury) (CMS/HCC)    • Anemia    • Anxiety    • Aortic valve stenosis    • Bilateral lower extremity edema    • CHF (congestive heart failure) (CMS/[facility])    • Chronic coronary artery disease    • Class 3 severe obesity due to excess calories in adult (CMS/HCC)    • COPD (chronic obstructive pulmonary disease) (CMS/HCC)    • Depression    • Diabetes mellitus (CMS/HCC)    • Elevated cholesterol    • GERD (gastroesophageal reflux disease)    • Heart murmur    • Hypertension    • Mitral valve insufficiency    • Pneumonia     1/2016   • Pulmonary hypertension (CMS/HCC)     due to sleep disordered breathing   • Sleep apnea     Uses CPAP or oxygen   • Stage 3 chronic kidney disease (CMS/[facility])    • Subclinical hypothyroidism    • Supplemental oxygen dependent    • Valvular heart disease      Past Surgical History:   Procedure Laterality Date   • CARDIAC CATHETERIZATION     • CARDIAC CATHETERIZATION N/A 6/10/2016    Procedure: Left Heart Cath;  Surgeon: Chace Johnson MD;  Location: [facility] INVASIVE LOCATION;  Service:    • CARDIAC CATHETERIZATION N/A 6/10/2016    Procedure: Right Heart Cath;  Surgeon: Chace Johnson MD;  Location: [facility] INVASIVE LOCATION;  Service:    • CARDIAC CATHETERIZATION N/A 2/5/2021    Procedure: RIGHT AND LEFT HEART CATH;  Surgeon: Antoine Ayers MD;  Location: [facility] INVASIVE LOCATION;  Service: Cardiology;  Laterality: N/A;   • CARDIAC CATHETERIZATION N/A 2/5/2021     Procedure: Coronary angiography;  Surgeon: Antoine Ayers MD;  [facility] CATH [facility]VASIVE LOCATION;  Service: Cardiology;  Laterality: N/A;   • CARDIAC CATHETERIZATION N/A 2/5/2021    Procedure: Left ventriculography- pressures;  Surgeon: Antoine Ayers MD;  [facility];  Service: Cardiology;  Laterality: N/A;   • CARDIAC ELECTROPHYSIOLOGY PROCEDURE N/A 2/2/2021    Procedure: Pacemaker DC new---[employer] MICRA;  Surgeon: Eliazar Bond MD;  [facility]ASIVE [facility];  Service: Cardiology;  Laterality: N/A;   • CARDIAC SURGERY     • CORONARY ARTERY BYPASS GRAFT      2 vessel   • CORONARY ARTERY BYPASS GRAFT WITH MITRAL VALVE REPAIR/REPLACEMENT N/A 6/13/2016    Procedure: INTRAOPERATIVE TARIQ, MIDLINE STERNOTOMY, CORONARY ARTERY BYPASS GRAFTING X  2 UTILIZING ENDOSCOPICALLY HARVESTED LEFT GREATER SAPHENOUS VEIN, MITRAL VALVE REPLACEMENT AND TRICUSPID VALVE REPAIR;  Surgeon: Eliecer Mistry MD;  Location: [facility] MAIN OR;  Service:    • CORONARY STENT PLACEMENT  2010    Approx. 6 yrs ago at [facility]   • HEMORRHOIDECTOMY     • HYSTERECTOMY     • MITRAL VALVE REPLACEMENT     • REPLACEMENT TOTAL KNEE Right    • THYROID SURGERY      Cyst removed from thyroid   • VASCULAR SURGERY         Lymphedema     Row Name 02/09/21 1406 02/08/21 1215          Lymphedema Assessment    Lymphedema Classification  RLE:;LLE:  -VS  RLE:;LLE:  -VS     Recorded by [VS] Ericka Bishop, OTR [VS] Ericka Bishop, OTR            Lymphedema Edema Assessment    Pitting Edema  Mild;Moderate (L) larger than (R)  -VS  Mild;Moderate greater in (L) than (R)  -VS     Recorded by [VS] Ericka Bishop, OTR [VS] Ericka Bishop, OTR            Skin Changes/Observations    Skin Observations Comment  --  -- Mepliex still in place   -VS     Recorded by  [VS] Ericka Bishop, WOODR            Compression/Skin Care    Compression/Skin Care  skin care;wrapping location;remove bandages  -VS  skin  "care;bandaging;remove bandages  -VS     Skin Care  washed/dried;lotion applied  -VS  washed/dried;lotion applied  -VS     Wrapping Location  lower extremity  -VS  lower extremity  -VS     Wrapping Location LE  bilateral:;foot to knee  -VS  bilateral:;foot to knee  -VS     Bandage Layers  cotton liner;padding/fluff layer;short-stretch bandages (comment size/quantity)  -VS  cotton liner;padding/fluff layer;short-stretch bandages (comment size/quantity)  -VS     Bandaging Comments  Bandages #8 & #10   -VS  Bandage #6 & #8 base of toes to knee   -VS     Bandaging Technique  light compression  -VS  light compression  -VS     Recorded by [VS] Ericka Bishop OTR [VS] Ericka Bishop OTR       User Key  (r) = Recorded By, (t) = Taken By, (c) = Cosigned By    Initials Name Effective Dates    VS Ericka Bishop OTR 03/02/20 -            OT ASSESSMENT FLOWSHEET (last 12 hours)      OT Evaluation and Treatment     Row Name 02/09/21 1876                   OT Time and Intention    Subjective Information  no complaints  -VS        Document Type  therapy note (daily note)  -VS        Mode of Treatment  occupational therapy  -VS        Patient Effort  adequate  -VS        Comment  \"I felt stoonger walking this morning\" pt. stated   -VS           General Information    General Observations of Patient  Pt. was supine in the bed  -VS        Prior Level of Function  -- per pt. she has a (A) with all ADLs and IADLs   -VS        Existing Precautions/Restrictions  fall;oxygen therapy device and L/min  -VS        Barriers to Rehab  medically complex;previous functional deficit  -VS           Pain Assessment    Additional Documentation  Pain Scale: Numbers Pre/Post-Treatment (Group)  -VS           Pain Scale: Numbers Pre/Post-Treatment    Pretreatment Pain Rating  0/10 - no pain  -VS        Posttreatment Pain Rating  0/10 - no pain  -VS           Activities of Daily Living    BADL Assessment/Intervention  bathing;lower body dressing  " -VS           Bathing Assessment/Intervention    [place] Level (Bathing)  bathing skills  -VS        Comment (Bathing)  Therapist bathe, dried and applied lotion to (B)LE toes to knees   -VS           Lower Body Dressing Assessment/Training    [place] Level (Lower Body Dressing)  lower body dressing skills;doff;don;socks  -VS        Comment (Lower Body Dressing)  Therapist doff/lore non slip socks (B)ly for the pt.   -VS           Edema Assessment & Management    Location (Edema)  lower extremity, left;lower extremity, right  -VS        Additional Documentation  Edema Symptoms/Interventions (Group);Location (Edema) (Row)  -VS           Edema Symptoms/Interventions    Description (Edema)  localized  -VS        Treatment Interventions (Edema)  compression bandaging, short stretch  -VS           Plan of Care Review    Plan of Care Reviewed With  patient  -VS        Progress  improving  -VS        Outcome Summary  OT:  Pt. was O x 3, pt. was supine in bed.  Pt's LE contiune to improve mild (R) and Moderate (L).  Therrapist doff and lore the lymph wraps, wrapping from base of toes to knees (B)ly.  Pt. conttinues to benfit for skilled OT.  Pt. wore a mask.  Therapist wore a mask, eye protection and washed her hands before and affter the treatment.    -VS          User Key  (r) = Recorded By, (t) = Taken By, (c) = Cosigned By    Initials Name Effective Dates    VS Ericka Bishop, OTR 03/02/20 -          Occupational Therapy Education                 Title: PT OT SLP Therapies (In Progress)     Topic: Occupational Therapy (In Progress)     Point: ADL training (In Progress)     Description:   Instruct learner(s) on proper safety adaptation and remediation techniques during self care or transfers.   Instruct in proper use of assistive devices.              Learning Progress Summary           Patient Acceptance, E,TB,D, NR by SINDY at 2/6/2021 1530    Acceptance, E,D,H, VU by ONEL at 2/3/2021 1421    Comment: Lymphedema  bandages wearing precaution, written/verbal  instruction and demonstration on wrapping LEs    Acceptance, E,D, VU by PATRICIO at 2/1/2021 1550    Comment: role of OT, plan of care, body mechanics                   Point: Precautions (In Progress)     Description:   Instruct learner(s) on prescribed precautions during self-care and functional transfers.              Learning Progress Summary           Patient Acceptance, E,TB,D, NR by SINDY at 2/6/2021 1530    Acceptance, E,D,H, VU by ONEL at 2/3/2021 1421    Comment: Lymphedema bandages wearing precaution, written/verbal  instruction and demonstration on wrapping LEs    Acceptance, E,D, VU by PATRICIO at 2/1/2021 1550    Comment: role of OT, plan of care, body mechanics                   Point: Body mechanics (In Progress)     Description:   Instruct learner(s) on proper positioning and spine alignment during self-care, functional mobility activities and/or exercises.              Learning Progress Summary           Patient Acceptance, E,TB,D, NR by SINDY at 2/6/2021 1530    Acceptance, E,D,H, VU by ONEL at 2/3/2021 1421    Comment: Lymphedema bandages wearing precaution, written/verbal  instruction and demonstration on wrapping LEs    Acceptance, E,D, VU by PATRICIO at 2/1/2021 1550    Comment: role of OT, plan of care, body mechanics                               User Key     Initials Effective Dates Name Provider Type Discipline    PATRICIO 06/08/18 -  Solange Dawkins, OTR Occupational Therapist OT     03/02/20 -  Ericka Bishop OTR Occupational Therapist OT     03/07/18 -  Pau Hensley PTA Physical Therapy Assistant PT                  OT Recommendation and Plan     Plan of Care Review  Plan of Care Reviewed With: patient  Progress: improving  Outcome Summary: OT:  Pt. was O x 3, pt. was supine in bed.  Pt's LE contiune to improve mild (R) and Moderate (L).  Therrapist doff and lore the lymph wraps, wrapping from base of toes to knees (B)ly.  Pt. conttinues to benfit for skilled OT.   Pt. wore a mask.  Therapist wore a mask, eye protection and washed her hands before and affter the treatment.    Plan of Care Reviewed With: patient  Outcome Summary: OT:  Pt. was O x 3, pt. was supine in bed.  Pt's LE contiune to improve mild (R) and Moderate (L).  Therrapist doff and lore the lymph wraps, wrapping from base of toes to knees (B)ly.  Pt. conttinues to [facility] for skilled OT.  Pt. wore a mask.  Therapist wore a mask, eye protection and washed her hands before and affter the treatment.      Outcome Measures     Row Name 02/09/21 1406 02/08/21 1300          How much help from another is currently needed...    Putting on and taking off regular lower body clothing?  1  -VS  1  -VS     Bathing (including washing, rinsing, and drying)  2  -VS  2  -VS     Toileting (which includes using toilet bed pan or urinal)  1  -VS  1  -VS     Putting on and taking off regular upper body clothing  2  -VS  2  -VS     Taking care of personal grooming (such as brushing teeth)  3  -VS  3  -VS     Eating meals  4  -VS  3  -VS     AM-PAC 6 Clicks Score (OT)  13  -VS  12  -VS        Functional Assessment    Outcome Measure Options  --  AM-PAC 6 Clicks Daily Activity (OT)  -VS       User Key  (r) = Recorded By, (t) = Taken By, (c) = Cosigned By    Initials Name Provider Type    VS Ericka Bishop, OTR Occupational Therapist          Time Calculation:   Time Calculation- OT     Row Name 02/09/21 1421             Time Calculation- OT    OT Start Time  1315  -VS      OT Stop Time  1400  -VS      OT Time Calculation (min)  45 min  -VS      Total Timed Code Minutes- OT  45 minute(s)  -VS      OT Received On  02/09/21  -VS      OT - Next Appointment  02/10/21  -VS        User Key  (r) = Recorded By, (t) = Taken By, (c) = Cosigned By    Initials Name Provider Type    VS Ericka Bishop, OTR Occupational Therapist        Therapy Charges for Today     Code Description Service Date Service Provider Modifiers Qty    67282575179  OT  MANUAL THERAPY EA 15 MIN 2/8/2021 Ericka Bishop, OTR GO 2    20296102420 HC OT THER PROC GROUP 2/8/2021 Ericka Bishop OTR GO 1    97158622511 HC OT MANUAL THERAPY EA 15 MIN 2/9/2021 Ericka Bishop, OTR GO 3               Ericka Bishop, OTR  2/9/2021

## 2021-02-09 NOTE — PROGRESS NOTES
"Miguelina Lopez  1945 75 y.o.  [id number]      Patient Care Team:  Arcelia Talbot APRN as PCP - General (Nurse Practitioner)  Robbie Wilson MD as Consulting Physician (Hematology and Oncology)  Chace Johnson MD as Consulting Physician (Cardiology)    CC: Severe aortic stenosis, diastolic heart failure, renal insufficiency, atrial fibrillation, pacemaker pulmonary hypertension, morbid obesity    Interval History: No significant change    Objective   Vital Signs  Temp:  [97.7 °F (36.5 °C)-98.7 °F (37.1 °C)] 97.7 °F (36.5 °C)  Heart Rate:  [79-84] 80  Resp:  [16-18] 18  BP: (117-139)/(55-64) 139/64    Intake/Output Summary (Last 24 hours) at 2/9/2021 1050  Last data filed at 2/9/2021 0727  Gross per 24 hour   Intake 1130 ml   Output 800 ml   Net 330 ml     Flowsheet Rows      First Filed Value   Admission Height  152.4 cm (60\") Documented at 02/01/2021 0315   Admission Weight  122 kg (270 lb) Documented at 02/01/2021 0759          Physical Exam:   General Appearance:    Alert,oriented, in no acute distress   Lungs:     Clear to auscultation,BS are equal    Heart:    Normal S1 and S2, RRR with late 3 out of 6 systolic ejection murmur, gallop or rub   HEENT:    Sclerae are clear, no JVD or adenopathy   Abdomen:     Normal bowel sounds, soft nontender, nondistended, no HSM   Extremities:   Moves all extremities well, no edema, no cyanosis, no             Redness, no rash     Medication Review:      amLODIPine, 5 mg, Oral, Q24H  apixaban, 5 mg, Oral, Q12H  atorvastatin, 20 mg, Oral, Daily  budesonide-formoterol, 2 puff, Inhalation, BID - RT  bumetanide, 4 mg, Oral, Daily  carvedilol, 6.25 mg, Oral, BID With Meals  clotrimazole-betamethasone, , Topical, BID  fluticasone, 2 spray, Each Nare, Daily  gabapentin, 100 mg, Oral, Q12H  insulin lispro, 0-7 Units, Subcutaneous, TID AC  lactobacillus acidophilus, 1 capsule, Oral, Daily  levothyroxine, 25 mcg, Oral, Daily  nystatin, , Topical, BID  pantoprazole, 40 " mg, Oral, QAM  polyethylene glycol, 17 g, Oral, Daily  senna-docusate sodium, 2 tablet, Oral, Nightly  sodium chloride, 10 mL, Intravenous, Q12H  sodium chloride, 3 mL, Intravenous, Q12H  sodium chloride, 2 spray, Each Nare, Q8H  vilazodone, 20 mg, Oral, Nightly             I reviewed the patient's new clinical results.  I personally viewed and interpreted the patient's EKG/Telemetry data    Assessment/Plan  Active Hospital Problems    Diagnosis  POA   • **Chest pain [R07.9]  Yes   • UTI (urinary tract infection), bacterial [N39.0, A49.9]  Yes   • Complete heart block (CMS/HCC) [I44.2]  Unknown   • COPD (chronic obstructive pulmonary disease) (CMS/[facility]) [J44.9]  Yes   • Chronic respiratory failure with hypoxia and hypercapnia (CMS/[facility]) [J96.11, J96.12]  Yes   • Chronic diastolic heart failure (CMS/[facility]) [I50.32]  Yes   • Supplemental oxygen dependent [Z99.81]  Not Applicable   • s/p MVR, TV-repair, CABG x2 6/13/16 [Z95.2]  Not Applicable   • Pulmonary hypertension due to sleep-disordered breathing (CMS/[facility]) [I27.29, G47.8]  Yes   • OCHOA (obstructive sleep apnea) [G47.33]  Yes   • DM type 2 (diabetes mellitus, type 2) (CMS/[facility]) [E11.9]  Yes   • Diabetic peripheral neuropathy (CMS/[facility]) [E11.42]  Yes   • Hyperlipidemia [E78.5]  Yes   • Essential hypertension [I10]  Yes   • CKD (chronic kidney disease) stage 3, GFR 30-59 ml/min (CMS/[facility]) [N18.30]  Yes      Resolved Hospital Problems   No resolved problems to display.       She has multiple medical problems does have severe aortic stenosis.  Renal functions been stable she is diuresed.  She has been evaluated by the structural heart team with Andre Pacheco and at this point I think she is pretty stable from a cardiac standpoint she can probably be discharged follow-up with him in about a month and we can decide about a TAVR.  She does not strike me is a good surgical candidate    Adalberto Quintana MD  02/09/21  10:50 EST

## 2021-02-09 NOTE — PROGRESS NOTES
"   LOS: 8 days    Patient Care Team:  Arcelia Talbot APRN as PCP - General (Nurse Practitioner)  Robbie Wilson MD as Consulting Physician (Hematology and Oncology)  Chace Johnson MD as Consulting Physician (Cardiology)    Chief Complaint:    Chief Complaint   Patient presents with   • Shortness of Breath   • Chest Pain     Follow UP OLI CKD3  Subjective     Interval History:   I/O 1.1/800+.  Wt same 267 lb   Dyspnea off & on, better overall    Objective     Vital Signs  Temp:  [97.7 °F (36.5 °C)-98.2 °F (36.8 °C)] 98.1 °F (36.7 °C)  Heart Rate:  [79-84] 80  Resp:  [16-18] 18  BP: (117-139)/(55-64) 119/64    Flowsheet Rows      First Filed Value   Admission Height  152.4 cm (60\") Documented at 02/01/2021 0315   Admission Weight  122 kg (270 lb) Documented at 02/01/2021 0759          I/O this shift:  In: 120 [P.O.:120]  Out: -   I/O last 3 completed shifts:  In: 1130 [P.O.:1130]  Out: 2300 [Urine:2300]    Intake/Output Summary (Last 24 hours) at 2/9/2021 1118  Last data filed at 2/9/2021 0727  Gross per 24 hour   Intake 1130 ml   Output 800 ml   Net 330 ml       Physical Exam:  General Appearance: frail WF in no distress, alert  Neck supple no JVD  Lungs dec BS no rales  CV RRR no m/g  Abd soft NT/ND  vasc 2+ BLE edema with legs wrapped      Results Review:    Results from last 7 days   Lab Units 02/09/21  0404 02/08/21  0259 02/07/21  1736 02/07/21  0501  02/05/21  1100 02/05/21  0628   SODIUM mmol/L 142 142  --  143   < > 145 146*   POTASSIUM mmol/L 3.7 3.8 4.5 3.4*   < > 3.5 3.5   CHLORIDE mmol/L 94* 94*  --  95*   < > 98 97*   CO2 mmol/L 37.8* 35.3*  --  39.4*   < > 37.7* 38.5*   BUN mg/dL 43* 41*  --  38*   < > 25* 26*   CREATININE mg/dL 1.43* 1.44*  --  1.59*   < > 1.59* 1.51*   CALCIUM mg/dL 8.6 8.7  --  8.3*   < > 8.5* 8.6   BILIRUBIN mg/dL  --  0.5  --   --   --  0.6 0.6   ALK PHOS U/L  --  262*  --   --   --  218* 230*   ALT (SGPT) U/L  --  14  --   --   --  10 12   AST (SGOT) U/L  --  29  --   " --   --  25 24   GLUCOSE mg/dL 125* 134*  --  119*   < > 119* 99    < > = values in this interval not displayed.       Estimated Creatinine Clearance: 40 mL/min (A) (by C-G formula based on SCr of 1.43 mg/dL (H)).    Results from last 7 days   Lab Units 02/09/21  0404 02/08/21  0259 02/07/21  0501 02/06/21  0502 02/03/21  0419   MAGNESIUM mg/dL  --   --   --  2.0 2.1   PHOSPHORUS mg/dL 3.7 3.7 3.8 5.0* 3.5             Results from last 7 days   Lab Units 02/09/21  0404 02/08/21  0259 02/07/21  0501 02/06/21  0502 02/05/21  0958  02/04/21  1124   WBC 10*3/mm3 8.26 9.26 9.62 9.34  --   --  11.44*   HEMOGLOBIN g/dL 10.7* 10.5* 10.5* 10.8*  --   --  11.1*   HEMOGLOBIN, POC g/dL  --   --   --   --  11.6*   < >  --    PLATELETS 10*3/mm3 236 224 229 221  --   --  226    < > = values in this interval not displayed.               Imaging Results (Last 24 Hours)     ** No results found for the last 24 hours. **        amLODIPine, 5 mg, Oral, Q24H  apixaban, 5 mg, Oral, Q12H  atorvastatin, 20 mg, Oral, Daily  budesonide-formoterol, 2 puff, Inhalation, BID - RT  bumetanide, 4 mg, Oral, Daily  carvedilol, 6.25 mg, Oral, BID With Meals  clotrimazole-betamethasone, , Topical, BID  fluticasone, 2 spray, Each Nare, Daily  gabapentin, 100 mg, Oral, Q12H  insulin lispro, 0-7 Units, Subcutaneous, TID AC  lactobacillus acidophilus, 1 capsule, Oral, Daily  levothyroxine, 25 mcg, Oral, Daily  nystatin, , Topical, BID  pantoprazole, 40 mg, Oral, QAM  polyethylene glycol, 17 g, Oral, Daily  senna-docusate sodium, 2 tablet, Oral, Nightly  sodium chloride, 10 mL, Intravenous, Q12H  sodium chloride, 3 mL, Intravenous, Q12H  sodium chloride, 2 spray, Each Nare, Q8H  vilazodone, 20 mg, Oral, Nightly           Medication Review:   Current Facility-Administered Medications   Medication Dose Route Frequency Provider Last Rate Last Admin   • acetaminophen (TYLENOL) tablet 650 mg  650 mg Oral Q4H PRN Antoine Ayers MD   650 mg at 02/02/21 0311     Or   • acetaminophen (TYLENOL) 160 MG/5ML solution 650 mg  650 mg Oral Q4H PRN Antoine Ayers MD        Or   • acetaminophen (TYLENOL) suppository 650 mg  650 mg Rectal Q4H PRN Antoine Ayers MD       • acetaminophen (TYLENOL) tablet 650 mg  650 mg Oral Q4H PRN Antoine Ayers MD   650 mg at 02/03/21 0459    Or   • acetaminophen (TYLENOL) suppository 650 mg  650 mg Rectal Q4H PRN Antoine Aeyrs MD       • acetaminophen (TYLENOL) tablet 650 mg  650 mg Oral Q4H PRN Antoine Ayers MD       • ALPRAZolam (XANAX) tablet 1 mg  1 mg Oral BID PRN Antoine Ayers MD   1 mg at 02/09/21 0844   • amLODIPine (NORVASC) tablet 5 mg  5 mg Oral Q24H Antoine Ayers MD   5 mg at 02/09/21 0835   • apixaban (ELIQUIS) tablet 5 mg  5 mg Oral Q12H Antoine Ayers MD   5 mg at 02/09/21 0835   • atorvastatin (LIPITOR) tablet 20 mg  20 mg Oral Daily Antoine Ayers MD   20 mg at 02/08/21 2135   • bisacodyl (DULCOLAX) EC tablet 5 mg  5 mg Oral Daily PRN Antoine Ayers MD       • budesonide-formoterol (SYMBICORT) 160-4.5 MCG/ACT inhaler 2 puff  2 puff Inhalation BID - RT Antoine Ayers MD   2 puff at 02/09/21 0634   • bumetanide (BUMEX) tablet 4 mg  4 mg Oral Daily Calvin Diamond MD   4 mg at 02/09/21 0835   • calcium carbonate (TUMS) chewable tablet 500 mg (200 mg elemental)  2 tablet Oral BID PRN Antoine Ayers MD       • carvedilol (COREG) tablet 6.25 mg  6.25 mg Oral BID With Meals Antoine Ayers MD   6.25 mg at 02/09/21 0835   • clotrimazole-betamethasone (LOTRISONE) 1-0.05 % cream   Topical BID Antoine Ayers MD   Given at 02/09/21 0836   • dextrose (D50W) 25 g/ 50mL Intravenous Solution 25 g  25 g Intravenous Q15 Min PRN Antoine Ayers MD       • dextrose (GLUTOSE) oral gel 15 g  15 g Oral Q15 Min PRN Antoine Ayers MD       • fluticasone (FLONASE) 50 MCG/ACT nasal spray 2 spray  2 spray Each Nare Daily Antoine Ayers MD   2 spray  at 02/06/21 0923   • gabapentin (NEURONTIN) capsule 100 mg  100 mg Oral Q12H Antoine Ayers MD   100 mg at 02/09/21 0835   • glucagon (human recombinant) (GLUCAGEN DIAGNOSTIC) injection 1 mg  1 mg Subcutaneous Q15 Min PRN Antoine Ayers MD       • hydrALAZINE (APRESOLINE) injection 10 mg  10 mg Intravenous Q6H PRN Antoine Ayers MD   10 mg at 02/02/21 2348   • HYDROcodone-acetaminophen (NORCO) 5-325 MG per tablet 1 tablet  1 tablet Oral Q4H PRN Antoine Ayers MD   1 tablet at 02/08/21 2134   • HYDROmorphone (DILAUDID) injection 0.5 mg  0.5 mg Intravenous Q2H PRN Antoine Ayers MD        And   • naloxone (NARCAN) injection 0.4 mg  0.4 mg Intravenous Q5 Min PRN Antoine Ayers MD       • hydrOXYzine (ATARAX) tablet 25 mg  25 mg Oral TID PRN Antoine Ayers MD   25 mg at 02/03/21 1652   • insulin lispro (humaLOG, ADMELOG) injection 0-7 Units  0-7 Units Subcutaneous TID AC Antoine Ayers MD   2 Units at 02/09/21 0648   • ipratropium-albuterol (DUO-NEB) nebulizer solution 3 mL  3 mL Nebulization Q6H PRN Antoine Ayers MD   3 mL at 02/01/21 1918   • lactobacillus acidophilus (RISAQUAD) capsule 1 capsule  1 capsule Oral Daily Antoine Ayers MD   1 capsule at 02/09/21 0835   • levothyroxine (SYNTHROID, LEVOTHROID) tablet 25 mcg  25 mcg Oral Daily Antoine Ayers MD   25 mcg at 02/09/21 0648   • nitroglycerin (NITROSTAT) SL tablet 0.4 mg  0.4 mg Sublingual Q5 Min PRN Antoine Ayers MD   0.4 mg at 02/02/21 0603   • nystatin (MYCOSTATIN) 280527 UNIT/GM cream   Topical BID Antoine Ayers MD   Given at 02/09/21 0836   • ondansetron (ZOFRAN) tablet 4 mg  4 mg Oral Q6H PRN Antoine Ayers MD        Or   • ondansetron (ZOFRAN) injection 4 mg  4 mg Intravenous Q6H PRN Antoine Ayers MD       • pantoprazole (PROTONIX) EC tablet 40 mg  40 mg Oral QAM Antoine Ayers MD   40 mg at 02/09/21 0648   • polyethylene glycol (MIRALAX) packet 17 g  17 g  Oral Daily Rina Green, ZOILA       • potassium chloride (K-DUR,KLOR-CON) ER tablet 40 mEq  40 mEq Oral PRN Antoine Ayers MD   40 mEq at 02/07/21 1358    Or   • potassium chloride (KLOR-CON) packet 40 mEq  40 mEq Oral PRN Antoine Ayers MD        Or   • potassium chloride 10 mEq in 100 mL IVPB  10 mEq Intravenous Q1H PRN Antoine Ayers MD       • sennosides-docusate (PERICOLACE) 8.6-50 MG per tablet 2 tablet  2 tablet Oral Nightly Rina Green APRN       • sodium chloride 0.9 % flush 10 mL  10 mL Intravenous PRN Antoine Ayers MD       • sodium chloride 0.9 % flush 10 mL  10 mL Intravenous Q12H Antoine Ayers MD   10 mL at 02/09/21 0837   • sodium chloride 0.9 % flush 10 mL  10 mL Intravenous PRN Antoine Ayers MD       • sodium chloride 0.9 % flush 10 mL  10 mL Intravenous PRN Antoine Ayers MD       • sodium chloride 0.9 % flush 3 mL  3 mL Intravenous Q12H Antoine Ayers MD   3 mL at 02/08/21 0223   • sodium chloride nasal spray 1 spray  1 spray Each Nare PRN Antoine Ayers MD       • sodium chloride nasal spray 2 spray  2 spray Each Nare Q8H Viji Paulson APRN   2 spray at 02/09/21 0649   • traMADol (ULTRAM) tablet 50 mg  50 mg Oral Q8H PRN Antoine Ayers MD   50 mg at 02/01/21 1803   • vilazodone (VIIBRYD) tablet 20 mg  20 mg Oral Nightly Antoine Ayers MD   20 mg at 02/08/21 2133       Assessment/Plan   1.  OLI on CKD3, cardiorenal syn, improved, Cr stable 1.4 (peak 1.7 and BL ~ 1.3 - 1.4).  Peripheral volume >> central volume excess; stable potassium and expected metabolic alkalosis due to diuresis.  Switched bumex drip to PO bumex 4mg BID yesterday; wt stable; mild dyspnea  2.  Acute on chronic diastolic heart failure with moderate right pulmonary HTN  Valvular heart disease: moderate AS, mild to moderate TR, mitral valve replacement, regurg.    -- note plan for further discussion later re: TAVR per cardiology   3.  Acute on chronic  respiratory failure, stable   4.  Anemia, iron deficiency.   5.  HTN.  Blood pressure controlled   6.  Lymphedema, legs wrapped   7.  Morbid obesity     Plan  - continue bumex 4mg PO BID   - ok with discharge from nephrology standpoint but patient reluctant and PT eval in progress to see if needs rehab       Chest pain    CKD (chronic kidney disease) stage 3, GFR 30-59 ml/min (CMS/[facility])    Diabetic peripheral neuropathy (CMS/[facility])    Hyperlipidemia    OCHOA (obstructive sleep apnea)    DM type 2 (diabetes mellitus, type 2) (CMS/[facility])    Essential hypertension    Pulmonary hypertension due to sleep-disordered breathing (CMS/[facility])    s/p MVR, TV-repair, 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

## 2021-02-10 LAB
ALBUMIN SERPL-MCNC: 3.1 G/DL (ref 3.5–5.2)
ANION GAP SERPL CALCULATED.3IONS-SCNC: 11.8 MMOL/L (ref 5–15)
BUN SERPL-MCNC: 42 MG/DL (ref 8–23)
BUN/CREAT SERPL: 29.6 (ref 7–25)
CALCIUM SPEC-SCNC: 8.8 MG/DL (ref 8.6–10.5)
CHLORIDE SERPL-SCNC: 93 MMOL/L (ref 98–107)
CO2 SERPL-SCNC: 35.2 MMOL/L (ref 22–29)
CREAT SERPL-MCNC: 1.42 MG/DL (ref 0.57–1)
GFR SERPL CREATININE-BSD FRML MDRD: 36 ML/MIN/1.73
GLUCOSE BLDC GLUCOMTR-MCNC: 138 MG/DL (ref 70–130)
GLUCOSE BLDC GLUCOMTR-MCNC: 147 MG/DL (ref 70–130)
GLUCOSE BLDC GLUCOMTR-MCNC: 194 MG/DL (ref 70–130)
GLUCOSE BLDC GLUCOMTR-MCNC: 207 MG/DL (ref 70–130)
GLUCOSE SERPL-MCNC: 126 MG/DL (ref 65–99)
PHOSPHATE SERPL-MCNC: 3.5 MG/DL (ref 2.5–4.5)
POTASSIUM SERPL-SCNC: 3.5 MMOL/L (ref 3.5–5.2)
SODIUM SERPL-SCNC: 140 MMOL/L (ref 136–145)

## 2021-02-10 PROCEDURE — 97530 THERAPEUTIC ACTIVITIES: CPT

## 2021-02-10 PROCEDURE — 63710000001 INSULIN LISPRO (HUMAN) PER 5 UNITS: Performed by: INTERNAL MEDICINE

## 2021-02-10 PROCEDURE — 99024 POSTOP FOLLOW-UP VISIT: CPT | Performed by: THORACIC SURGERY (CARDIOTHORACIC VASCULAR SURGERY)

## 2021-02-10 PROCEDURE — 94799 UNLISTED PULMONARY SVC/PX: CPT

## 2021-02-10 PROCEDURE — U0004 COV-19 TEST NON-CDC HGH THRU: HCPCS | Performed by: STUDENT IN AN ORGANIZED HEALTH CARE EDUCATION/TRAINING PROGRAM

## 2021-02-10 PROCEDURE — 97140 MANUAL THERAPY 1/> REGIONS: CPT

## 2021-02-10 PROCEDURE — 82962 GLUCOSE BLOOD TEST: CPT

## 2021-02-10 PROCEDURE — 80069 RENAL FUNCTION PANEL: CPT | Performed by: INTERNAL MEDICINE

## 2021-02-10 RX ADMIN — APIXABAN 5 MG: 5 TABLET, FILM COATED ORAL at 20:31

## 2021-02-10 RX ADMIN — INSULIN LISPRO 2 UNITS: 100 INJECTION, SOLUTION INTRAVENOUS; SUBCUTANEOUS at 12:27

## 2021-02-10 RX ADMIN — NYSTATIN: 100000 CREAM TOPICAL at 09:06

## 2021-02-10 RX ADMIN — POTASSIUM CHLORIDE 40 MEQ: 750 TABLET, EXTENDED RELEASE ORAL at 06:03

## 2021-02-10 RX ADMIN — NYSTATIN: 100000 CREAM TOPICAL at 20:31

## 2021-02-10 RX ADMIN — CLOTRIMAZOLE AND BETAMETHASONE DIPROPIONATE: 10; .5 CREAM TOPICAL at 09:06

## 2021-02-10 RX ADMIN — ATORVASTATIN CALCIUM 20 MG: 20 TABLET, FILM COATED ORAL at 20:33

## 2021-02-10 RX ADMIN — GABAPENTIN 100 MG: 100 CAPSULE ORAL at 09:09

## 2021-02-10 RX ADMIN — GABAPENTIN 100 MG: 100 CAPSULE ORAL at 20:32

## 2021-02-10 RX ADMIN — PANTOPRAZOLE SODIUM 40 MG: 40 TABLET, DELAYED RELEASE ORAL at 06:03

## 2021-02-10 RX ADMIN — SODIUM CHLORIDE, PRESERVATIVE FREE 3 ML: 5 INJECTION INTRAVENOUS at 20:37

## 2021-02-10 RX ADMIN — LEVOTHYROXINE SODIUM 25 MCG: 0.03 TABLET ORAL at 06:10

## 2021-02-10 RX ADMIN — CARVEDILOL 6.25 MG: 6.25 TABLET, FILM COATED ORAL at 09:05

## 2021-02-10 RX ADMIN — CLOTRIMAZOLE AND BETAMETHASONE DIPROPIONATE: 10; .5 CREAM TOPICAL at 20:31

## 2021-02-10 RX ADMIN — APIXABAN 5 MG: 5 TABLET, FILM COATED ORAL at 09:06

## 2021-02-10 RX ADMIN — BUDESONIDE AND FORMOTEROL FUMARATE DIHYDRATE 2 PUFF: 160; 4.5 AEROSOL RESPIRATORY (INHALATION) at 06:33

## 2021-02-10 RX ADMIN — VILAZODONE HYDROCHLORIDE 20 MG: 40 TABLET ORAL at 20:33

## 2021-02-10 RX ADMIN — BUDESONIDE AND FORMOTEROL FUMARATE DIHYDRATE 2 PUFF: 160; 4.5 AEROSOL RESPIRATORY (INHALATION) at 19:54

## 2021-02-10 RX ADMIN — Medication 1 CAPSULE: at 09:06

## 2021-02-10 RX ADMIN — ALPRAZOLAM 1 MG: 0.5 TABLET ORAL at 03:44

## 2021-02-10 RX ADMIN — CARVEDILOL 6.25 MG: 6.25 TABLET, FILM COATED ORAL at 18:34

## 2021-02-10 RX ADMIN — AMLODIPINE BESYLATE 5 MG: 5 TABLET ORAL at 09:06

## 2021-02-10 RX ADMIN — SODIUM CHLORIDE, PRESERVATIVE FREE 10 ML: 5 INJECTION INTRAVENOUS at 20:36

## 2021-02-10 RX ADMIN — BUMETANIDE 4 MG: 2 TABLET ORAL at 18:34

## 2021-02-10 RX ADMIN — SODIUM CHLORIDE, PRESERVATIVE FREE 10 ML: 5 INJECTION INTRAVENOUS at 09:07

## 2021-02-10 RX ADMIN — BUMETANIDE 4 MG: 2 TABLET ORAL at 09:06

## 2021-02-10 RX ADMIN — ALPRAZOLAM 1 MG: 0.5 TABLET ORAL at 20:33

## 2021-02-10 NOTE — THERAPY TREATMENT NOTE
Patient Name: Miguelina Lopez  : 1945    MRN: [id number]                              Today's Date: 2/10/2021       Admit Date: 2021    Visit Dx:     ICD-10-CM ICD-9-CM   1. Chest pain, unspecified type  R07.9 786.50   2. COPD exacerbation (CMS/HCC)  J44.1 491.21   3. Congestive heart failure, unspecified HF chronicity, unspecified heart failure type (CMS/HCC)  I50.9 428.0   4. Hypertension, unspecified type  I10 401.9   5. Abnormal chest x-ray  R93.89 793.2   6. Complete heart block (CMS/HCC)  I44.2 426.0     Patient Active Problem List   Diagnosis   • Anxiety disorder   • Arthritis of knee   • Asthma   • Chronic coronary artery disease   • CKD (chronic kidney disease) stage 3, GFR 30-59 ml/min (CMS/HCC)   • Depression   • Diabetic peripheral neuropathy (CMS/HCC)   • Gastroesophageal reflux disease   • Hyperlipidemia   • Insomnia   • Lower gastrointestinal hemorrhage   • Anemia   • OCHOA (obstructive sleep apnea)   • DM type 2 (diabetes mellitus, type 2) (CMS/HCC)   • Essential hypertension   • Hospital discharge follow-up   • Pulmonary hypertension due to sleep-disordered breathing (CMS/HCC)   • s/p MVR, TV-repair, CABG x2 16   • OLI (acute kidney injury) (CMS/HCC)   • Leukocytosis   • Atrial fibrillation (CMS/HCC)   • Nocturnal hypoxia   • Dermatitis   • Medicare annual wellness visit, initial   • Class 3 severe obesity due to excess calories with serious comorbidity and body mass index (BMI) of 50.0 to 59.9 in adult (CMS/[facility])   • Supplemental oxygen dependent   • Acute on chronic combined systolic and diastolic HF (heart failure) (CMS/[facility])   • Mitral regurgitation   • Aortic stenosis   • Medicare annual wellness visit, subsequent   • Localized edema   • Chronic right-sided congestive heart failure (CMS/[facility])   • Cellulitis of left lower extremity   • Proteinuria   • Bilateral lower extremity edema   • Subclinical hypothyroidism   • Chronic diastolic heart failure (CMS/[facility])   • Chronic respiratory  failure with hypoxia and hypercapnia (CMS/[facility])   • Acute on chronic respiratory failure with hypoxia and hypercapnia (CMS/[facility])   • AV block, 2nd degree   • 1st degree AV block   • Chest pain   • COPD (chronic obstructive pulmonary disease) (CMS/[facility])   • Complete heart block (CMS/[facility])   • UTI (urinary tract infection), bacterial     Past Medical History:   Diagnosis Date   • Acute on chronic respiratory failure with hypoxia and hypercapnia (CMS/[facility])    • OLI (acute kidney injury) (CMS/[facility])    • Anemia    • Anxiety    • Aortic valve stenosis    • Bilateral lower extremity edema    • CHF (congestive heart failure) (CMS/[facility])    • Chronic coronary artery disease    • Class 3 severe obesity due to excess calories in adult (CMS/[facility])    • COPD (chronic obstructive pulmonary disease) (CMS/HCC)    • Depression    • Diabetes mellitus (CMS/HCC)    • Elevated cholesterol    • GERD (gastroesophageal reflux disease)    • Heart murmur    • Hypertension    • Mitral valve insufficiency    • Pneumonia     1/2016   • Pulmonary hypertension (CMS/HCC)     due to sleep disordered breathing   • Sleep apnea     Uses CPAP or oxygen   • Stage 3 chronic kidney disease (CMS/[facility])    • Subclinical hypothyroidism    • Supplemental oxygen dependent    • Valvular heart disease      Past Surgical History:   Procedure Laterality Date   • CARDIAC CATHETERIZATION     • CARDIAC CATHETERIZATION N/A 6/10/2016    Procedure: Left Heart Cath;  Surgeon: Chace Johnson MD;  Location: [facility] CATH INVASIVE LOCATION;  Service:    • CARDIAC CATHETERIZATION N/A 6/10/2016    Procedure: Right Heart Cath;  Surgeon: Chace Johnson MD;  Location: [facility] CATH INVASIVE LOCATION;  Service:    • CARDIAC CATHETERIZATION N/A 2/5/2021    Procedure: RIGHT AND LEFT HEART CATH;  Surgeon: Antoine Ayers MD;  Location: [facility] CATH INVASIVE LOCATION;  Service: Cardiology;  Laterality: N/A;   • CARDIAC CATHETERIZATION N/A 2/5/2021    Procedure: Coronary  angiography;  Surgeon: Antoine Ayers MD;  Location: [facility] CATH INVASIVE LOCATION;  Service: Cardiology;  Laterality: N/A;   • CARDIAC CATHETERIZATION N/A 2/5/2021    Procedure: Left ventriculography- pressures;  Surgeon: Antoine Ayers MD;  Location: [facility] CATH INVASIVE LOCATION;  Service: Cardiology;  Laterality: N/A;   • CARDIAC ELECTROPHYSIOLOGY PROCEDURE N/A 2/2/2021    Procedure: Pacemaker DC new---[employer] MICRA;  Surgeon: Eliazar Bond MD;  Location: [facility] CATH INVASIVE LOCATION;  Service: Cardiology;  Laterality: N/A;   • CARDIAC SURGERY     • CORONARY ARTERY BYPASS GRAFT      2 vessel   • CORONARY ARTERY BYPASS GRAFT WITH MITRAL VALVE REPAIR/REPLACEMENT N/A 6/13/2016    Procedure: INTRAOPERATIVE TARIQ, MIDLINE STERNOTOMY, CORONARY ARTERY BYPASS GRAFTING X  2 UTILIZING ENDOSCOPICALLY HARVESTED LEFT GREATER SAPHENOUS VEIN, MITRAL VALVE REPLACEMENT AND TRICUSPID VALVE REPAIR;  Surgeon: Eliecer Mistry MD;  Location: [facility] OR;  Service:    • CORONARY STENT PLACEMENT  2010    Approx. 6 yrs ago at [facility]   • HEMORRHOIDECTOMY     • HYSTERECTOMY     • MITRAL VALVE REPLACEMENT     • REPLACEMENT TOTAL KNEE Right    • THYROID SURGERY      Cyst removed from thyroid   • VASCULAR SURGERY       General Information     Row Name 02/10/21 1154          Physical Therapy Time and Intention    Document Type  therapy note (daily note)  -     Mode of Treatment  individual therapy;physical therapy  -     Row Name 02/10/21 1154          General Information    Existing Precautions/Restrictions  fall;oxygen therapy device and L/min  -     Row Name 02/10/21 1154          Cognition    Orientation Status (Cognition)  oriented x 3  -     Row Name 02/10/21 1154          Safety Issues, Functional Mobility    Impairments Affecting Function (Mobility)  balance;endurance/activity tolerance;coordination;postural/trunk control;shortness of breath;strength  -       User Key  (r) = Recorded By, (t) = Taken By,  (c) = Cosigned By    Initials Name Provider Type     Precious Cisneros, PT Physical Therapist        Mobility     Row Name 02/10/21 1155          Bed Mobility    Bed Mobility  supine-sit;sit-supine  -     Supine-Sit [place] (Bed Mobility)  not tested  -     Sit-Supine [place] (Bed Mobility)  not tested  -     Comment (Bed Mobility)  sitting in chair  -     Row Name 02/10/21 1155          Sit-Stand Transfer    Sit-Stand [place] (Transfers)  verbal cues;nonverbal cues (demo/gesture);minimum assist (75% patient effort);2 person assist  -     Assistive Device (Sit-Stand Transfers)  walker, front-wheeled  -     Row Name 02/10/21 1155          Gait/Stairs (Locomotion)    [place] Level (Gait)  verbal cues;nonverbal cues (demo/gesture);minimum assist (75% patient effort);2 person assist  -     Assistive Device (Gait)  walker, front-wheeled  -     Distance in Feet (Gait)  30 ft + 5 ft  -     Deviations/Abnormal Patterns (Gait)  lina decreased;base of support, wide;festinating/shuffling;stride length decreased;weight shifting decreased  -     Bilateral Gait Deviations  forward flexed posture  -     Comment (Gait/Stairs)  pt walked in room then reported she needed to use the restroom after back in the chair  -       User Key  (r) = Recorded By, (t) = Taken By, (c) = Cosigned By    Initials Name Provider Type     Precious Cisneros, PT Physical Therapist        Obj/Interventions     Row Name 02/10/21 1157          Motor Skills    Therapeutic Exercise  -- 10 reps B LE AP, quad sets, LAQ, and seated marches  -       User Key  (r) = Recorded By, (t) = Taken By, (c) = Cosigned By    Initials Name Provider Type     Precious Cisneros, PT Physical Therapist        Goals/Plan     Row Name 02/10/21 1207          Bed Mobility Goal 1 (PT)    Activity/Assistive Device (Bed Mobility Goal 1, PT)  bed mobility activities, all  -     [place] Level/Cues Needed (Bed Mobility Goal 1,  PT)  contact guard assist  -CH     Time Frame (Bed Mobility Goal 1, PT)  1 week  -CH     Progress/Outcomes (Bed Mobility Goal 1, PT)  goal ongoing  -CH     Row Name 02/10/21 1207          Transfer Goal 1 (PT)    Activity/Assistive Device (Transfer Goal 1, PT)  transfers, all;walker, rolling  -CH     [place] Level/Cues Needed (Transfer Goal 1, PT)  contact guard assist  -CH     Time Frame (Transfer Goal 1, PT)  1 week  -CH     Progress/Outcome (Transfer Goal 1, PT)  goal ongoing  -CH     Row Name 02/10/21 1207          Gait Training Goal 1 (PT)    Activity/Assistive Device (Gait Training Goal 1, PT)  gait (walking locomotion);walker, rolling  -CH     [place] Level (Gait Training Goal 1, PT)  contact guard assist  -CH     Distance (Gait Training Goal 1, PT)  100  -CH     Time Frame (Gait Training Goal 1, PT)  1 week  -CH     Progress/Outcome (Gait Training Goal 1, PT)  goal ongoing  -       User Key  (r) = Recorded By, (t) = Taken By, (c) = Cosigned By    Initials Name Provider Type    CH Precious Cisneros, PT Physical Therapist        Clinical Impression     Row Name 02/10/21 1158          Pain Scale: FACES Pre/Post-Treatment    Pain: FACES Scale, Pretreatment  2-->hurts little bit  -CH     Posttreatment Pain Rating  2-->hurts little bit  -CH     Pain Location - Side  Left  -CH     Pain Location - Orientation  lower  -     Pain Location  extremity  -CH     Row Name 02/10/21 4177          Plan of Care Review    Plan of Care Reviewed With  patient  -CH     Outcome Summary  Pt continues to make steady progress with PT as she required less assistance with sit to stand and she was able to increase her gait distance slightly. PT will continue to follow to address strength, mobility, and gait.  -CH     Row Name 02/10/21 1157          Positioning and Restraints    Pre-Treatment Position  sitting in chair/recliner  -CH     Post Treatment Position  chair  -CH     In Chair  reclined;call light within  reach;encouraged to call for assist  -       User Key  (r) = Recorded By, (t) = Taken By, (c) = Cosigned By    Initials Name Provider Type    Precious Torres PT Physical Therapist        Outcome Measures     Row Name 02/10/21 1204          How much help from another person do you currently need...    Turning from your back to your side while in flat bed without using bedrails?  2  -CH     Moving from lying on back to sitting on the side of a flat bed without bedrails?  2  -CH     Moving to and from a bed to a chair (including a wheelchair)?  3  -CH     Standing up from a chair using your arms (e.g., wheelchair, bedside chair)?  3  -CH     Climbing 3-5 steps with a railing?  1  -CH     To walk in hospital room?  3  -CH     AM-PAC 6 Clicks Score (PT)  14  -     Row Name 02/10/21 1204          Functional Assessment    Outcome Measure Options  AM-PAC 6 Clicks Basic Mobility (PT)  -       User Key  (r) = Recorded By, (t) = Taken By, (c) = Cosigned By    Initials Name Provider Type    Precious Torres PT Physical Therapist        Physical Therapy Education                 Title: PT OT SLP Therapies (In Progress)     Topic: Physical Therapy (Done)     Point: Mobility training (Done)     Learning Progress Summary           Patient Acceptance, E,TB,D, VU,NR by  at 2/10/2021 1205    Acceptance, E,TB,D, VU,NR by  at 2/9/2021 1122    Acceptance, E,TB,D, VU,NR by  at 2/8/2021 1459    Acceptance, E,D, VU,DU,NR by  at 2/7/2021 0935    Acceptance, E,TB,D, NR by  at 2/6/2021 1530    Acceptance, E,TB,D, VU,NR by  at 2/4/2021 1539    Acceptance, E,TB,D, VU,NR by  at 2/3/2021 1055                   Point: Home exercise program (Done)     Learning Progress Summary           Patient Acceptance, E,TB,D, VU,NR by  at 2/10/2021 1205    Acceptance, E,TB,D, VU,NR by  at 2/9/2021 1122    Acceptance, E,TB,D, VU,NR by  at 2/8/2021 1459    Acceptance, ARTURO VIERA VU, DU,NR by LC at 2/7/2021 0935    Acceptance,  E,TB,D, NR by  at 2/6/2021 1530    Acceptance, E,TB,D, VU,NR by  at 2/4/2021 1539                   Point: Body mechanics (Done)     Learning Progress Summary           Patient Acceptance, E,TB,D, VU,NR by  at 2/10/2021 1205    Acceptance, E,TB,D, VU,NR by  at 2/9/2021 1122    Acceptance, E,TB,D, VU,NR by  at 2/8/2021 1459    Acceptance, E,D, VU,DU,NR by  at 2/7/2021 0935    Acceptance, E,TB,D, NR by  at 2/6/2021 1530    Acceptance, E,TB,D, VU,NR by  at 2/4/2021 1539    Acceptance, E,TB,D, VU,NR by  at 2/3/2021 1055                   Point: Precautions (Done)     Learning Progress Summary           Patient Acceptance, E,TB,D, VU,NR by  at 2/10/2021 1205    Acceptance, E,TB,D, VU,NR by  at 2/9/2021 1122    Acceptance, E,TB,D, VU,NR by  at 2/8/2021 1459    Acceptance, E,D, VU,DU,NR by  at 2/7/2021 0935    Acceptance, E,TB,D, NR by  at 2/6/2021 1530    Acceptance, E,TB,D, VU,NR by  at 2/4/2021 1539    Acceptance, E,TB,D, VU,NR by  at 2/3/2021 1055                               User Key     Initials Effective Dates Name Provider Type Discipline     04/03/18 -  Precious Cisneros, PT Physical Therapist PT     03/07/18 -  Pau Hensley PTA Physical Therapy Assistant PT     07/02/20 -  Juan Alberto Caal PT DPT Physical Therapist PT     02/01/21 -  Shira Wilson PT Student PT Student PT              PT Recommendation and Plan  Planned Therapy Interventions (PT): balance training, bed mobility training, gait training, home exercise program, patient/family education, strengthening, transfer training  Plan of Care Reviewed With: patient  Outcome Summary: Pt continues to make steady progress with PT as she required less assistance with sit to stand and she was able to increase her gait distance slightly. PT will continue to follow to address strength, mobility, and gait.     Time Calculation:   PT Charges     Row Name 02/10/21 2197             Time Calculation    Start Time  9128   -      Stop Time  1012  -      Time Calculation (min)  21 min  -      PT Received On  02/10/21  -      PT - Next Appointment  02/11/21  -      PT Goal Re-Cert Due Date  02/17/21  -         Time Calculation- PT    Total Timed Code Minutes- PT  18 minute(s)  -        User Key  (r) = Recorded By, (t) = Taken By, (c) = Cosigned By    Initials Name Provider Type     Precious Cisneros, PT Physical Therapist        Therapy Charges for Today     Code Description Service Date Service Provider Modifiers Qty    [id number]  PT THERAPEUTIC ACT EA 15 MIN 2/10/2021 Precious Cisneros, PT GP 1          PT G-Codes  Outcome Measure Options: AM-PAC 6 Clicks Basic Mobility (PT)  AM-PAC 6 Clicks Score (PT): 14  AM-PAC 6 Clicks Score (OT): 13    Precious Cisneros, CHARISMA  2/10/2021

## 2021-02-10 NOTE — PLAN OF CARE
Goal Outcome Evaluation:  Plan of Care Reviewed With: patient  Progress: improving  Outcome Summary: OT:  Pt. continues to tolerate and improve with the Lymph wraps to (B)LE, (R) is mild and (L) remains between mild/modeate.  Pt. does not complain of apin now with wrapping whihc is am improvement.  pt. contiunes to benefit from Skilled OT for LE wrapping.  Pt. wore a mask, OT wore a mask, eye protections and washed her hands before and after the session.

## 2021-02-10 NOTE — PROGRESS NOTES
Continued Stay Note  [facility]     Patient Name: Miguelina Lopez  MRN: [id number]  Today's Date: 2/10/2021    Admit Date: 2/1/2021    Discharge Plan     Row Name 02/10/21 1217       Plan    Plan  2/10 [facility], bed available Thursday.  Needs a repeat COVID test.    Plan Comments  [facility].  Bed is available Thursday.  Ruth advised Pt would need a repeat COVID test.  [facility] called Mel, A Coordinator to advise.  Pt will require EMS transport.  Packet on [facility] desk.  JAY PARKINSON/[facility]    Row Name 02/10/21 1026       Plan    Plan  2/10 [facility] approved; Bed available Thursday.  Will need EMS transport.    Plan Comments  Per Ruth/Torrie [facility], Pt is approved for SNF bed.  Pt will need EMS transport at d/c.        Discharge Codes    No documentation.       Expected Discharge Date and Time     Expected Discharge Date Expected Discharge Time    Feb 10, 2021             Malina Campos RN

## 2021-02-10 NOTE — PROGRESS NOTES
Patient seen and chart reviewed for completion of TAVR evaluation.    This a pleasant 75-year-old woman with multiple significant comorbidities, who is a patient of my  Dr. Mistry.  In 2016 she underwent a mitral valve replacement, tricuspid valve repair, and coronary bypass x3.  She was admitted recently with acute on chronic diastolic congestive heart failure.  She has responded to supportive measures.    Left heart catheterization on 2/5/2021 showed patent saphenous vein coronary bypasses to an OM branch and the distal right coronary system.  The LAD has a patent stent.  A mid circumflex stent is also patent.    A transthoracic echocardiogram performed on 2/1/2021 shows a mean aortic valve gradient of 42 mmHg, a V-max of 4.25 m/s, and an aortic valve area of less than 1 cm².  The mitral valve prosthesis, which was originally placed in a very densely calcified annulus still functions well; the mean gradient across his prosthesis is 10 mmHg consistent with its size.  There is some mild to moderate tricuspid valve regurgitation.  The right ventricular systolic blood pressure is estimated at 47 mmHg.  The left ventricular ejection fraction is well over 50%.  As expected, there is considerable left ventricular concentric hypertrophy.    I agree, given her redo status and other comorbidities, that she should be considered for TAVR rather than traditional redo sternotomy and aortic valve replacement surgery.

## 2021-02-10 NOTE — THERAPY TREATMENT NOTE
Acute Care - Occupational Therapy Lymphedema Treatment Note  [facility]     Patient Name: Miguelina Lopez  : 1945  MRN: [id number]  Today's Date: 2/10/2021             Admit Date: 2021       ICD-10-CM ICD-9-CM   1. Chest pain, unspecified type  R07.9 786.50   2. COPD exacerbation (CMS/HCC)  J44.1 491.21   3. Congestive heart failure, unspecified HF chronicity, unspecified heart failure type (CMS/HCC)  I50.9 428.0   4. Hypertension, unspecified type  I10 401.9   5. Abnormal chest x-ray  R93.89 793.2   6. Complete heart block (CMS/HCC)  I44.2 426.0     Patient Active Problem List   Diagnosis   • Anxiety disorder   • Arthritis of knee   • Asthma   • Chronic coronary artery disease   • CKD (chronic kidney disease) stage 3, GFR 30-59 ml/min (CMS/HCC)   • Depression   • Diabetic peripheral neuropathy (CMS/HCC)   • Gastroesophageal reflux disease   • Hyperlipidemia   • Insomnia   • Lower gastrointestinal hemorrhage   • Anemia   • OCHOA (obstructive sleep apnea)   • DM type 2 (diabetes mellitus, type 2) (CMS/HCC)   • Essential hypertension   • Hospital discharge follow-up   • Pulmonary hypertension due to sleep-disordered breathing (CMS/HCC)   • s/p MVR, TV-repair, CABG x2 16   • OLI (acute kidney injury) (CMS/HCC)   • Leukocytosis   • Atrial fibrillation (CMS/HCC)   • Nocturnal hypoxia   • Dermatitis   • Medicare annual wellness visit, initial   • Class 3 severe obesity due to excess calories with serious comorbidity and body mass index (BMI) of 50.0 to 59.9 in adult (CMS/[facility])   • Supplemental oxygen dependent   • Acute on chronic combined systolic and diastolic HF (heart failure) (CMS/[facility])   • Mitral regurgitation   • Aortic stenosis   • Medicare annual wellness visit, subsequent   • Localized edema   • Chronic right-sided congestive heart failure (CMS/[facility])   • Cellulitis of left lower extremity   • Proteinuria   • Bilateral lower extremity edema   • Subclinical hypothyroidism   • Chronic diastolic  heart failure (CMS/[facility])   • Chronic respiratory failure with hypoxia and hypercapnia (CMS/[facility])   • Acute on chronic respiratory failure with hypoxia and hypercapnia (CMS/[facility])   • AV block, 2nd degree   • 1st degree AV block   • Chest pain   • COPD (chronic obstructive pulmonary disease) (CMS/[facility])   • Complete heart block (CMS/[facility])   • UTI (urinary tract infection), bacterial     Past Medical History:   Diagnosis Date   • Acute on chronic respiratory failure with hypoxia and hypercapnia (CMS/[facility])    • OLI (acute kidney injury) (CMS/HCC)    • Anemia    • Anxiety    • Aortic valve stenosis    • Bilateral lower extremity edema    • CHF (congestive heart failure) (CMS/[facility])    • Chronic coronary artery disease    • Class 3 severe obesity due to excess calories in adult (CMS/[facility])    • COPD (chronic obstructive pulmonary disease) (CMS/HCC)    • Depression    • Diabetes mellitus (CMS/[facility])    • Elevated cholesterol    • GERD (gastroesophageal reflux disease)    • Heart murmur    • Hypertension    • Mitral valve insufficiency    • Pneumonia     1/2016   • Pulmonary hypertension (CMS/HCC)     due to sleep disordered breathing   • Sleep apnea     Uses CPAP or oxygen   • Stage 3 chronic kidney disease (CMS/[facility])    • Subclinical hypothyroidism    • Supplemental oxygen dependent    • Valvular heart disease      Past Surgical History:   Procedure Laterality Date   • CARDIAC CATHETERIZATION     • CARDIAC CATHETERIZATION N/A 6/10/2016    Procedure: Left Heart Cath;  Surgeon: Chace Johnson MD;  Location: [facility] INVASIVE LOCATION;  Service:    • CARDIAC CATHETERIZATION N/A 6/10/2016    Procedure: Right Heart Cath;  Surgeon: Chace Johnson MD;  Location: [facility] INVASIVE LOCATION;  Service:    • CARDIAC CATHETERIZATION N/A 2/5/2021    Procedure: RIGHT AND LEFT HEART CATH;  Surgeon: Antoine Ayers MD;  Location: [facility] INVASIVE LOCATION;  Service: Cardiology;  Laterality: N/A;   • CARDIAC CATHETERIZATION  N/A 2/5/2021    Procedure: Coronary angiography;  Surgeon: Antoine Ayers MD;  Location:  [facility] CATH INVASIVE LOCATION;  Service: Cardiology;  Laterality: N/A;   • CARDIAC CATHETERIZATION N/A 2/5/2021    Procedure: Left ventriculography- pressures;  Surgeon: Antoine Ayers MD;  Location:  [facility] CATH INVASIVE LOCATION;  Service: Cardiology;  Laterality: N/A;   • CARDIAC ELECTROPHYSIOLOGY PROCEDURE N/A 2/2/2021    Procedure: Pacemaker DC new---[employer] MICRA;  Surgeon: Eliazar Bond MD;  Location: [facility] CATH INVASIVE LOCATION;  Service: Cardiology;  Laterality: N/A;   • CARDIAC SURGERY     • CORONARY ARTERY BYPASS GRAFT      2 vessel   • CORONARY ARTERY BYPASS GRAFT WITH MITRAL VALVE REPAIR/REPLACEMENT N/A 6/13/2016    Procedure: INTRAOPERATIVE TARIQ, MIDLINE STERNOTOMY, CORONARY ARTERY BYPASS GRAFTING X  2 UTILIZING ENDOSCOPICALLY HARVESTED LEFT GREATER SAPHENOUS VEIN, MITRAL VALVE REPLACEMENT AND TRICUSPID VALVE REPAIR;  Surgeon: Eliecer Mistry MD;  Location: [facility];  Service:    • CORONARY STENT PLACEMENT  2010    Approx. 6 yrs ago at [facility]   • HEMORRHOIDECTOMY     • HYSTERECTOMY     • MITRAL VALVE REPLACEMENT     • REPLACEMENT TOTAL KNEE Right    • THYROID SURGERY      Cyst removed from thyroid   • VASCULAR SURGERY         Lymphedema     Row Name 02/10/21 1300 02/09/21 1406 02/08/21 1215       Lymphedema Assessment    Lymphedema Classification  RLE:;LLE:  -VS  RLE:;LLE:  -VS  RLE:;LLE:  -VS    Recorded by [VS] Aislinn Bishope, OTR [VS] Ericka Bishop, OTR [VS] Iesha Bishoporie, OTR       Lymphedema Edema Assessment    Pitting Edema  Mild;Moderate Mild (R). Mild/Moderate (L)   -VS  Mild;Moderate (L) larger than (R)  -VS  Mild;Moderate greater in (L) than (R)  -VS    Edema Assessment Comment  -- Written instruction for wrapping in the pt's room  -VS  --  --    Recorded by [VS] Dario Ericka, OTR [VS] Dario Ericka, OTR [VS] Dario Ericka, OTR       Skin Changes/Observations     "Skin Observations Comment  --  --  -- Mepliex still in place   -VS    Recorded by   [VS] Ericka Bishop OTR       Compression/Skin Care    Compression/Skin Care  skin care;wrapping location;bandaging;remove bandages  -VS  skin care;wrapping location;remove bandages  -VS  skin care;bandaging;remove bandages  -VS    Skin Care  washed/dried;lotion applied;moisturizing lotion applied  -VS  washed/dried;lotion applied  -VS  washed/dried;lotion applied  -VS    Wrapping Location  lower extremity  -VS  lower extremity  -VS  lower extremity  -VS    Wrapping Location LE  foot to knee  -VS  bilateral:;foot to knee  -VS  bilateral:;foot to knee  -VS    Bandage Layers  cotton liner;padding/fluff layer;short-stretch bandages (comment size/quantity)  -VS  cotton liner;padding/fluff layer;short-stretch bandages (comment size/quantity)  -VS  cotton liner;padding/fluff layer;short-stretch bandages (comment size/quantity)  -VS    Bandaging Comments  Bandages # 8 & #10 toes to knee (B)ly   -VS  Bandages #8 & #10   -VS  Bandage #6 & #8 base of toes to knee   -VS    Bandaging Technique  light compression  -VS  light compression  -VS  light compression  -VS    Recorded by [VS] Ericka Bishop OTR [VS] Ericka Bishop OTR [VS] Ericka Bishop OTR      User Key  (r) = Recorded By, (t) = Taken By, (c) = Cosigned By    Initials Name Effective Dates    VS Ericka Bishop OTR 03/02/20 -              OT ASSESSMENT FLOWSHEET (last 12 hours)      OT Evaluation and Treatment     Row Name 02/10/21 4087                   OT Time and Intention    Subjective Information  no complaints  -VS        Document Type  therapy note (daily note)  -VS        Mode of Treatment  occupational therapy  -VS        Comment  \"i am to leave and go to rehab tomorrow\" pt. stated   -VS           General Information    General Observations of Patient  Pt. was supine in bed   -VS        Existing Precautions/Restrictions  fall;oxygen therapy device and L/min  -VS   "      Barriers to Rehab  medically complex;previous functional deficit  -VS           Cognition    Orientation Status (Cognition)  oriented x 3  -VS        Follows Commands (Cognition)  WFL  -VS           Pain Assessment    Additional Documentation  Pain Scale: Numbers Pre/Post-Treatment (Group)  -VS           Pain Scale: Numbers Pre/Post-Treatment    Pretreatment Pain Rating  0/10 - no pain  -VS        Posttreatment Pain Rating  0/10 - no pain  -VS           Activities of Daily Living    BADL Assessment/Intervention  bathing;lower body dressing  -VS           Bathing Assessment/Intervention    Comment (Bathing)  Therapist bathed, dried and applied lotion toes to knees   -VS           Lower Body Dressing Assessment/Training    Comment (Lower Body Dressing)  Therapist doff/lore non-slip socks (B)ly   -VS           Edema Assessment & Management    Location (Edema)  lower extremity, left;lower extremity, right  -VS        Additional Documentation  Edema Symptoms/Interventions (Group);Location (Edema) (Row)  -VS           Lower Extremity Edema Measures, Left    Lower Extremity, Left (Edema)  mid-calf  -VS           Lower Extremity Edema Measures, Right    Lower Extremity, Right (Edema)  mid-calf  -VS           Edema Symptoms/Interventions    Description (Edema)  localized  -VS        Treatment Interventions (Edema)  compression bandaging, short stretch;compression wrapping/taping  -VS           Plan of Care Review    Plan of Care Reviewed With  patient  -VS        Outcome Summary  OT:  Pt. continues to tolerate and improve with the Lymph wraps to (B)LE, (R) is mild and (L) remains between mild/modeate.  Pt. does not complain of apin now with wrapping whihc is am improvement.  pt. contiunes to benefit from Skilled OT for LE wrapping.  Pt. wore a mask, OT wore a mask, eye protections and washed her hands before and after the session.    -VS          User Key  (r) = Recorded By, (t) = Taken By, (c) = Cosigned By    Initials  Name Effective Dates    ONEL Bishop ErickaANNEMARIE 03/02/20 -            Occupational Therapy Education                 Title: PT OT SLP Therapies (In Progress)     Topic: Occupational Therapy (In Progress)     Point: ADL training (In Progress)     Description:   Instruct learner(s) on proper safety adaptation and remediation techniques during self care or transfers.   Instruct in proper use of assistive devices.              Learning Progress Summary           Patient Acceptance, E,TB,D, NR by SINDY at 2/6/2021 1530    Acceptance, E,D,H, VU by ONEL at 2/3/2021 1421    Comment: Lymphedema bandages wearing precaution, written/verbal  instruction and demonstration on wrapping LEs    Acceptance, E,D, VU by PATRICIO at 2/1/2021 1550    Comment: role of OT, plan of care, body mechanics                   Point: Precautions (In Progress)     Description:   Instruct learner(s) on prescribed precautions during self-care and functional transfers.              Learning Progress Summary           Patient Acceptance, E,TB,D, NR by SINDY at 2/6/2021 1530    Acceptance, E,D,H, VU by ONEL at 2/3/2021 1421    Comment: Lymphedema bandages wearing precaution, written/verbal  instruction and demonstration on wrapping LEs    Acceptance, E,D, VU by PATRICIO at 2/1/2021 1550    Comment: role of OT, plan of care, body mechanics                   Point: Body mechanics (In Progress)     Description:   Instruct learner(s) on proper positioning and spine alignment during self-care, functional mobility activities and/or exercises.              Learning Progress Summary           Patient Acceptance, E,TB,D, NR by SINDY at 2/6/2021 1530    Acceptance, E,D,H, VU by ONEL at 2/3/2021 1421    Comment: Lymphedema bandages wearing precaution, written/verbal  instruction and demonstration on wrapping LEs    Acceptance, E,D, VU by PATRICIO at 2/1/2021 1550    Comment: role of OT, plan of care, body mechanics                               User Key     Initials Effective Dates Name Provider  Type Discipline    LE 06/08/18 -  Solange Dawkins, OTR Occupational Therapist OT    VS 03/02/20 -  Ericka Bishop OTR Occupational Therapist OT    JM 03/07/18 -  Pau Hensley PTA Physical Therapy Assistant PT                  OT Recommendation and Plan     Plan of Care Review  Plan of Care Reviewed With: patient  Progress: improving  Outcome Summary: OT:  Pt. continues to tolerate and improve with the Lymph wraps to (B)LE, (R) is mild and (L) remains between mild/modeate.  Pt. does not complain of apin now with wrapping whihc is am improvement.  pt. contiunes to benefit from Skilled OT for LE wrapping.  Pt. wore a mask, OT wore a mask, eye protections and washed her hands before and after the session.    Plan of Care Reviewed With: patient  Outcome Summary: OT:  Pt. continues to tolerate and improve with the Lymph wraps to (B)LE, (R) is mild and (L) remains between mild/modeate.  Pt. does not complain of apin now with wrapping whihc is am improvement.  pt. contiunes to benefit from Skilled OT for LE wrapping.  Pt. wore a mask, OT wore a mask, eye protections and washed her hands before and after the session.        Outcome Measures     Row Name 02/10/21 1400 02/09/21 1406 02/08/21 1300       How much help from another is currently needed...    Putting on and taking off regular lower body clothing?  1  -VS  1  -VS  1  -VS    Bathing (including washing, rinsing, and drying)  2  -VS  2  -VS  2  -VS    Toileting (which includes using toilet bed pan or urinal)  1  -VS  1  -VS  1  -VS    Putting on and taking off regular upper body clothing  2  -VS  2  -VS  2  -VS    Taking care of personal grooming (such as brushing teeth)  3  -VS  3  -VS  3  -VS    Eating meals  4  -VS  4  -VS  3  -VS    AM-PAC 6 Clicks Score (OT)  13  -VS  13  -VS  12  -VS       Functional Assessment    Outcome Measure Options  AM-PAC 6 Clicks Daily Activity (OT)  -VS  --  AM-PAC 6 Clicks Daily Activity (OT)  -VS      User Key  (r) = Recorded By,  (t) = Taken By, (c) = Cosigned By    Initials Name Provider Type    VS Ericka Bishop, OTR Occupational Therapist            Time Calculation:    Time Calculation- OT     Row Name 02/10/21 1401             Time Calculation- OT    OT Start Time  1251  -VS      OT Stop Time  1336  -VS      OT Time Calculation (min)  45 min  -VS      Total Timed Code Minutes- OT  45 minute(s)  -VS      OT Received On  02/10/21  -VS      OT - Next Appointment  02/11/21  -VS        User Key  (r) = Recorded By, (t) = Taken By, (c) = Cosigned By    Initials Name Provider Type    VS Ericka Bishop, OTR Occupational Therapist          Therapy Charges for Today     Code Description Service Date Service Provider Modifiers Qty    76790205510 HC OT MANUAL THERAPY EA 15 MIN 2/9/2021 Ericka Bishop, OTR GO 3    72511218363 HC OT MANUAL THERAPY EA 15 MIN 2/10/2021 Ericka Bishop, OTR GO 3                      Ericka Bishop OTR  2/10/2021

## 2021-02-10 NOTE — PROGRESS NOTES
Name: Miguelina Lopez ADMIT: 2021   : 1945  PCP: Arcelia Talbot APRN    MRN: [id number] LOS: 9 days   AGE/SEX: 75 y.o. female  ROOM: /     Subjective   Subjective     She is a little nervous about going to facility tomorrow but otherwise had no new complaints. Intake ok. + BM  Denies chest pain, shortness of breath, fever, chills     Objective   Objective   Vital Signs  Temp:  [98.1 °F (36.7 °C)-98.9 °F (37.2 °C)] 98.4 °F (36.9 °C)  Heart Rate:  [79-84] 81  Resp:  [17-18] 17  BP: (108-136)/(55-71) 125/62  SpO2:  [92 %-95 %] 94 %  on  Flow (L/min):  [2] 2;   Device (Oxygen Therapy): nasal cannula  Body mass index is 52.54 kg/m².     Physical Exam  Vitals signs and nursing note reviewed.   Constitutional:       General: She is not in acute distress.     Appearance: She is obese. She is ill-appearing (chronically). She is not toxic-appearing.   Neck:      Musculoskeletal: Normal range of motion and neck supple.   Cardiovascular:      Rate and Rhythm: Regular rhythm. Normal rate present. Pacing on monitor.     Heart sounds: Murmur present.   Pulmonary:      Effort: Pulmonary effort is normal. No respiratory distress.      Comments: Diminished throughout. On 2 L nc.  Abdominal:      General: Bowel sounds are normal. There is no distension.      Palpations: Abdomen is soft.      Tenderness: There is no abdominal tenderness.   Musculoskeletal: Normal range of motion.         General: moderate Swelling present.   Skin:     General: Skin is warm and dry. Right radial cath site intact. No bruising.     Findings: No bruising.      Comments: BLE wrapped.  Neurological:      Mental Status: She is alert and oriented to person, place, and time.   Psychiatric:         Mood and Affect: Mood normal.         Behavior: Behavior normal.      Results Review:       I reviewed the patient's new clinical results.  Results from last 7 days   Lab Units 21  0404 21  0259 21  0501 21  0502    WBC 10*3/mm3 8.26 9.26 9.62 9.34   HEMOGLOBIN g/dL 10.7* 10.5* 10.5* 10.8*   PLATELETS 10*3/mm3 236 224 229 221     Results from last 7 days   Lab Units 02/10/21  0332 02/09/21  0404 02/08/21  0259 02/07/21  1736 02/07/21  0501   SODIUM mmol/L 140 142 142  --  143   POTASSIUM mmol/L 3.5 3.7 3.8 4.5 3.4*   CHLORIDE mmol/L 93* 94* 94*  --  95*   CO2 mmol/L 35.2* 37.8* 35.3*  --  39.4*   BUN mg/dL 42* 43* 41*  --  38*   CREATININE mg/dL 1.42* 1.43* 1.44*  --  1.59*   GLUCOSE mg/dL 126* 125* 134*  --  119*   Estimated Creatinine Clearance: 40.5 mL/min (A) (by C-G formula based on SCr of 1.42 mg/dL (H)).  Results from last 7 days   Lab Units 02/10/21  0332 02/09/21  0404 02/08/21  0259 02/07/21  0501 02/05/21  1100 02/05/21  0628 02/04/21  0434   ALBUMIN g/dL 3.10* 3.20* 3.20* 3.10* 3.10* 3.30* 3.10*   BILIRUBIN mg/dL  --   --  0.5  --  0.6 0.6 0.7   ALK PHOS U/L  --   --  262*  --  218* 230* 216*   AST (SGOT) U/L  --   --  29  --  25 24 28   ALT (SGPT) U/L  --   --  14  --  10 12 12     Results from last 7 days   Lab Units 02/10/21  0332 02/09/21  0404 02/08/21  0259 02/07/21  0501 02/06/21  0502   CALCIUM mg/dL 8.8 8.6 8.7 8.3* 8.2*   ALBUMIN g/dL 3.10* 3.20* 3.20* 3.10*  --    MAGNESIUM mg/dL  --   --   --   --  2.0   PHOSPHORUS mg/dL 3.5 3.7 3.7 3.8 5.0*       Glucose   Date/Time Value Ref Range Status   02/10/2021 1057 194 (H) 70 - 130 mg/dL Final   02/10/2021 0631 147 (H) 70 - 130 mg/dL Final   02/09/2021 2047 195 (H) 70 - 130 mg/dL Final   02/09/2021 1553 147 (H) 70 - 130 mg/dL Final   02/09/2021 1038 185 (H) 70 - 130 mg/dL Final   02/09/2021 0514 156 (H) 70 - 130 mg/dL Final   02/08/2021 2018 179 (H) 70 - 130 mg/dL Final       amLODIPine, 5 mg, Oral, Q24H  apixaban, 5 mg, Oral, Q12H  atorvastatin, 20 mg, Oral, Daily  budesonide-formoterol, 2 puff, Inhalation, BID - RT  bumetanide, 4 mg, Oral, BID  carvedilol, 6.25 mg, Oral, BID With Meals  clotrimazole-betamethasone, , Topical, BID  fluticasone, 2 spray, Each  Nare, Daily  gabapentin, 100 mg, Oral, Q12H  insulin lispro, 0-7 Units, Subcutaneous, TID AC  lactobacillus acidophilus, 1 capsule, Oral, Daily  levothyroxine, 25 mcg, Oral, Daily  nystatin, , Topical, BID  pantoprazole, 40 mg, Oral, QAM  polyethylene glycol, 17 g, Oral, Daily  senna-docusate sodium, 2 tablet, Oral, Nightly  sodium chloride, 10 mL, Intravenous, Q12H  sodium chloride, 3 mL, Intravenous, Q12H  sodium chloride, 2 spray, Each Nare, Q8H  vilazodone, 20 mg, Oral, Nightly       Diet Regular; Consistent Carbohydrate, Cardiac, Daily Fluid Restriction; 1500 mL Fluid Per Day       Assessment/Plan     Active Hospital Problems    Diagnosis  POA   • **Chest pain [R07.9]  Yes   • UTI (urinary tract infection), bacterial [N39.0, A49.9]  Yes   • Complete heart block (CMS/HCC) [I44.2]  Unknown   • COPD (chronic obstructive pulmonary disease) (CMS/HCC) [J44.9]  Yes   • Chronic respiratory failure with hypoxia and hypercapnia (CMS/HCC) [J96.11, J96.12]  Yes   • Chronic diastolic heart failure (CMS/HCC) [I50.32]  Yes   • Supplemental oxygen dependent [Z99.81]  Not Applicable   • s/p MVR, TV-repair, CABG x2 6/13/16 [Z95.2]  Not Applicable   • Pulmonary hypertension due to sleep-disordered breathing (CMS/HCC) [I27.29, G47.8]  Yes   • OCHOA (obstructive sleep apnea) [G47.33]  Yes   • DM type 2 (diabetes mellitus, type 2) (CMS/HCC) [E11.9]  Yes   • Diabetic peripheral neuropathy (CMS/HCC) [E11.42]  Yes   • Hyperlipidemia [E78.5]  Yes   • Essential hypertension [I10]  Yes   • CKD (chronic kidney disease) stage 3, GFR 30-59 ml/min (CMS/HCC) [N18.30]  Yes      Resolved Hospital Problems   No resolved problems to display.     75 y.o. female admitted with Chest pain.     Ms. Lopez is a 75 year old female who presented to the hospital with chest pain and dyspnea. Found to have diffuse opacities on CXR with elevated proBNP.     Chest pain/CAD with history of CABG/Severe AS/acute on chronic diastolic heart failure/CHB s/p  PPM/paroxysmal flutter  -on eliquis, coreg, bumex, atorvastatin  -needs to follow up with Dr. Pahceco of the structural heart team in about a month to decide about TAVR     COPD/chronic respiratory failure with hypoxia and hypercapnia/OCHOA  -using home Trilogy  -on 2L NC     DM2/neuropathy  -A1c 6.71%  -SSI as needed. Monitor ACHS. Hold oral medications.     HTN  -Controlled on amlodipine and coreg     CKD3  -creatinine stable at 1.43  - renal ok with dc   Enterococcus faecalis UTI  -completed 3 days of rocephin     Lymphedema  Morbid obesity  MERLIN  Hypothyroidism      · Eliquis (home med) for DVT prophylaxis.  · Full code.  · Discussed with patient and nursing staff.   · Anticipate dc tmrw when rehab bed available.  cleared by consultants for discharge. Repeat COVID test per facility request      Colleen CUMMINGS  [facility]  02/10/21  14:55 EST

## 2021-02-10 NOTE — PROGRESS NOTES
"   LOS: 9 days    Patient Care Team:  Arcelia Talbot APRN as PCP - General (Nurse Practitioner)  Robbie Wilson MD as Consulting Physician (Hematology and Oncology)  Chace Johnson MD as Consulting Physician (Cardiology)    Chief Complaint:    Chief Complaint   Patient presents with   • Shortness of Breath   • Chest Pain     Follow UP CKD3  Subjective     Interval History:   Did not sleep well.  Eating. Bowels moving. uop 1100. Not weighed standing. Hoping to go to rehab today.     Review of Systems:   As noted above    Objective     Vital Signs  Temp:  [98.1 °F (36.7 °C)-98.9 °F (37.2 °C)] 98.9 °F (37.2 °C)  Heart Rate:  [79-84] 82  Resp:  [17-18] 17  BP: (108-136)/(55-71) 135/71    Flowsheet Rows      First Filed Value   Admission Height  152.4 cm (60\") Documented at 02/01/2021 0315   Admission Weight  122 kg (270 lb) Documented at 02/01/2021 0759          I/O this shift:  In: -   Out: 400 [Urine:400]  I/O last 3 completed shifts:  In: 1560 [P.O.:1560]  Out: 1900 [Urine:1900]    Intake/Output Summary (Last 24 hours) at 2/10/2021 0832  Last data filed at 2/10/2021 0735  Gross per 24 hour   Intake 1080 ml   Output 1500 ml   Net -420 ml       Physical Exam:  General Appearance: alert, oriented x 3, no acute distress. Sitting up in chair.    Skin: warm and dry  HEENT: pupils round and reactive to light, oral mucosa normal, nonicteric sclera. Mask applied.  Neck: supple, no JVD, trachea midline  Lungs: Clear to auscultation.   Heart: RRR,no s3, 3/6 syst m  Abdomen: soft, nontender, + bs, body wall edema.   Extremities:lower ext wrapped.  2+ edema. Tender.   Neuro: normal speech and mental status       Results Review:    Results from last 7 days   Lab Units 02/10/21  0332 02/09/21  0404 02/08/21  0259  02/05/21  1100 02/05/21  0628   SODIUM mmol/L 140 142 142   < > 145 146*   POTASSIUM mmol/L 3.5 3.7 3.8   < > 3.5 3.5   CHLORIDE mmol/L 93* 94* 94*   < > 98 97*   CO2 mmol/L 35.2* 37.8* 35.3*   < > 37.7* 38.5* "   BUN mg/dL 42* 43* 41*   < > 25* 26*   CREATININE mg/dL 1.42* 1.43* 1.44*   < > 1.59* 1.51*   CALCIUM mg/dL 8.8 8.6 8.7   < > 8.5* 8.6   BILIRUBIN mg/dL  --   --  0.5  --  0.6 0.6   ALK PHOS U/L  --   --  262*  --  218* 230*   ALT (SGPT) U/L  --   --  14  --  10 12   AST (SGOT) U/L  --   --  29  --  25 24   GLUCOSE mg/dL 126* 125* 134*   < > 119* 99    < > = values in this interval not displayed.       Estimated Creatinine Clearance: 41.1 mL/min (A) (by C-G formula based on SCr of 1.42 mg/dL (H)).    Results from last 7 days   Lab Units 02/10/21  0332 02/09/21  0404 02/08/21  0259  02/06/21  0502   MAGNESIUM mg/dL  --   --   --   --  2.0   PHOSPHORUS mg/dL 3.5 3.7 3.7   < > 5.0*    < > = values in this interval not displayed.             Results from last 7 days   Lab Units 02/09/21  0404 02/08/21  0259 02/07/21  0501 02/06/21  0502 02/05/21  0958  02/04/21  1124   WBC 10*3/mm3 8.26 9.26 9.62 9.34  --   --  11.44*   HEMOGLOBIN g/dL 10.7* 10.5* 10.5* 10.8*  --   --  11.1*   HEMOGLOBIN, POC g/dL  --   --   --   --  11.6*   < >  --    PLATELETS 10*3/mm3 236 224 229 221  --   --  226    < > = values in this interval not displayed.               Imaging Results (Last 24 Hours)     ** No results found for the last 24 hours. **        amLODIPine, 5 mg, Oral, Q24H  apixaban, 5 mg, Oral, Q12H  atorvastatin, 20 mg, Oral, Daily  budesonide-formoterol, 2 puff, Inhalation, BID - RT  bumetanide, 4 mg, Oral, BID  carvedilol, 6.25 mg, Oral, BID With Meals  clotrimazole-betamethasone, , Topical, BID  fluticasone, 2 spray, Each Nare, Daily  gabapentin, 100 mg, Oral, Q12H  insulin lispro, 0-7 Units, Subcutaneous, TID AC  lactobacillus acidophilus, 1 capsule, Oral, Daily  levothyroxine, 25 mcg, Oral, Daily  nystatin, , Topical, BID  pantoprazole, 40 mg, Oral, QAM  polyethylene glycol, 17 g, Oral, Daily  senna-docusate sodium, 2 tablet, Oral, Nightly  sodium chloride, 10 mL, Intravenous, Q12H  sodium chloride, 3 mL, Intravenous,  Q12H  sodium chloride, 2 spray, Each Nare, Q8H  vilazodone, 20 mg, Oral, Nightly           Medication Review:   Current Facility-Administered Medications   Medication Dose Route Frequency Provider Last Rate Last Admin   • acetaminophen (TYLENOL) tablet 650 mg  650 mg Oral Q4H PRN Antoine Ayers MD   650 mg at 02/02/21 0319    Or   • acetaminophen (TYLENOL) 160 MG/5ML solution 650 mg  650 mg Oral Q4H PRN Antoine Ayers MD        Or   • acetaminophen (TYLENOL) suppository 650 mg  650 mg Rectal Q4H PRN Antoine Ayers MD       • acetaminophen (TYLENOL) tablet 650 mg  650 mg Oral Q4H PRN Antoine Ayers MD   650 mg at 02/03/21 0459    Or   • acetaminophen (TYLENOL) suppository 650 mg  650 mg Rectal Q4H PRN Antoine Ayers MD       • acetaminophen (TYLENOL) tablet 650 mg  650 mg Oral Q4H PRN Antoine Ayers MD       • ALPRAZolam (XANAX) tablet 1 mg  1 mg Oral BID PRN Antoine Ayers MD   1 mg at 02/10/21 0344   • amLODIPine (NORVASC) tablet 5 mg  5 mg Oral Q24H Antoine Ayers MD   5 mg at 02/09/21 0835   • apixaban (ELIQUIS) tablet 5 mg  5 mg Oral Q12H Antoine Ayers MD   5 mg at 02/09/21 2041   • atorvastatin (LIPITOR) tablet 20 mg  20 mg Oral Daily Antoine Ayers MD   20 mg at 02/09/21 2040   • bisacodyl (DULCOLAX) EC tablet 5 mg  5 mg Oral Daily PRN Antoine Ayers MD       • budesonide-formoterol (SYMBICORT) 160-4.5 MCG/ACT inhaler 2 puff  2 puff Inhalation BID - RT Antoine Ayers MD   2 puff at 02/10/21 0633   • bumetanide (BUMEX) tablet 4 mg  4 mg Oral BID Geoff Stokes MD   4 mg at 02/09/21 1644   • calcium carbonate (TUMS) chewable tablet 500 mg (200 mg elemental)  2 tablet Oral BID PRN Antoine Ayers MD       • carvedilol (COREG) tablet 6.25 mg  6.25 mg Oral BID With Meals Antoine Ayers MD   6.25 mg at 02/09/21 1644   • clotrimazole-betamethasone (LOTRISONE) 1-0.05 % cream   Topical BID Antoine Ayers MD   Given at  02/09/21 2045   • dextrose (D50W) 25 g/ 50mL Intravenous Solution 25 g  25 g Intravenous Q15 Min PRN Antoine Ayers MD       • dextrose (GLUTOSE) oral gel 15 g  15 g Oral Q15 Min PRN Antoine Ayers MD       • fluticasone (FLONASE) 50 MCG/ACT nasal spray 2 spray  2 spray Each Nare Daily Antoine Ayers MD   2 spray at 02/06/21 0923   • gabapentin (NEURONTIN) capsule 100 mg  100 mg Oral Q12H Antoine Ayers MD   100 mg at 02/09/21 2041   • glucagon (human recombinant) (GLUCAGEN DIAGNOSTIC) injection 1 mg  1 mg Subcutaneous Q15 Min PRN Antoine Ayers MD       • hydrALAZINE (APRESOLINE) injection 10 mg  10 mg Intravenous Q6H PRN Antoine Ayers MD   10 mg at 02/02/21 2348   • hydrOXYzine (ATARAX) tablet 25 mg  25 mg Oral TID PRN Antoine Ayers MD   25 mg at 02/03/21 1652   • insulin lispro (humaLOG, ADMELOG) injection 0-7 Units  0-7 Units Subcutaneous TID AC Antoine Ayers MD   2 Units at 02/09/21 1139   • ipratropium-albuterol (DUO-NEB) nebulizer solution 3 mL  3 mL Nebulization Q6H PRN Antoine Ayers MD   3 mL at 02/01/21 1918   • lactobacillus acidophilus (RISAQUAD) capsule 1 capsule  1 capsule Oral Daily Antoine Ayers MD   1 capsule at 02/09/21 0835   • levothyroxine (SYNTHROID, LEVOTHROID) tablet 25 mcg  25 mcg Oral Daily Antoine Aeyrs MD   25 mcg at 02/10/21 0610   • naloxone (NARCAN) injection 0.4 mg  0.4 mg Intravenous Q5 Min PRN Antoine Ayers MD       • nitroglycerin (NITROSTAT) SL tablet 0.4 mg  0.4 mg Sublingual Q5 Min PRN Antoine Ayers MD   0.4 mg at 02/02/21 0603   • nystatin (MYCOSTATIN) 150268 UNIT/GM cream   Topical BID Antoine Ayers MD   Given at 02/09/21 2250   • ondansetron (ZOFRAN) tablet 4 mg  4 mg Oral Q6H PRN Antoine Ayers MD        Or   • ondansetron (ZOFRAN) injection 4 mg  4 mg Intravenous Q6H PRN Antoine Ayers MD       • pantoprazole (PROTONIX) EC tablet 40 mg  40 mg Oral QAM Antoine Ayers  MD JUSTYN   40 mg at 02/10/21 0603   • polyethylene glycol (MIRALAX) packet 17 g  17 g Oral Daily Rina Green APRN       • potassium chloride (K-DUR,KLOR-CON) ER tablet 40 mEq  40 mEq Oral PRN Antoine Ayers MD   40 mEq at 02/10/21 0603    Or   • potassium chloride (KLOR-CON) packet 40 mEq  40 mEq Oral PRN Antoine Ayers MD        Or   • potassium chloride 10 mEq in 100 mL IVPB  10 mEq Intravenous Q1H PRN Antoine Ayers MD       • sennosides-docusate (PERICOLACE) 8.6-50 MG per tablet 2 tablet  2 tablet Oral Nightly Rina Green APRN       • sodium chloride 0.9 % flush 10 mL  10 mL Intravenous PRN Antoine Ayers MD       • sodium chloride 0.9 % flush 10 mL  10 mL Intravenous Q12H Antoine Ayers MD   10 mL at 02/09/21 2045   • sodium chloride 0.9 % flush 10 mL  10 mL Intravenous PRN Antoine Ayers MD       • sodium chloride 0.9 % flush 10 mL  10 mL Intravenous PRN Antoine Ayers MD       • sodium chloride 0.9 % flush 3 mL  3 mL Intravenous Q12H Antoine Ayers MD   3 mL at 02/09/21 2045   • sodium chloride nasal spray 1 spray  1 spray Each Nare PRN Antoine Ayers MD   1 spray at 02/09/21 2250   • sodium chloride nasal spray 2 spray  2 spray Each Nare Q8H Viji Paulson APRN   2 spray at 02/09/21 1140   • traMADol (ULTRAM) tablet 50 mg  50 mg Oral Q8H PRN Antoine Ayers MD   50 mg at 02/01/21 1803   • vilazodone (VIIBRYD) tablet 20 mg  20 mg Oral Nightly Antoine Ayers MD   20 mg at 02/09/21 2040       Assessment/Plan   1. OLI on CKD3. Cardiorenal syndrome. Creatinine very stable.   2. Acute on chronic diastolic heart failure, Severe aortic stenosis. On po diuretic.   3. DM2  4. OCHOA  5. SP MVR, TV repair.       Ok with renal for dc      Chest pain    CKD (chronic kidney disease) stage 3, GFR 30-59 ml/min (CMS/[facility])    Diabetic peripheral neuropathy (CMS/[facility])    Hyperlipidemia    OCHOA (obstructive sleep apnea)    DM type 2 (diabetes mellitus, type 2)  (CMS/[facility])    Essential hypertension    Pulmonary hypertension due to sleep-disordered breathing (CMS/[facility])    s/p MVR, TV-repair, CABG x2 6/13/16    Supplemental oxygen dependent    Chronic diastolic heart failure (CMS/HCC)    Chronic respiratory failure with hypoxia and hypercapnia (CMS/[facility])    COPD (chronic obstructive pulmonary disease) (CMS/[facility])    Complete heart block (CMS/[facility])    UTI (urinary tract infection), bacterial              Raisa Rosa MD  02/10/21  08:32 EST

## 2021-02-10 NOTE — PLAN OF CARE
Goal Outcome Evaluation:         Pt rested well overnight while using trilogy cpap. Denies pain. C/O anxiety that is well controlled with PRN xanax. Denies SOB at rest. Decreased ADLs, required X2 assist to return from chair to bed. V paced on tele. Call light within reach, will monitor.

## 2021-02-10 NOTE — PLAN OF CARE
Goal Outcome Evaluation:  Plan of Care Reviewed With: patient     Outcome Summary: Pt continues to make steady progress with PT as she required less assistance with sit to stand and she was able to increase her gait distance slightly. PT will continue to follow to address strength, mobility, and gait.Patient was intermittently wearing a face mask during this therapy encounter. Therapist used appropriate personal protective equipment including eye protection, mask, and gloves.  Mask used was standard procedure mask. Appropriate PPE was worn during the entire therapy session. Hand hygiene was completed before and after therapy session. Patient is not in enhanced droplet precautions.

## 2021-02-10 NOTE — PLAN OF CARE
Goal Outcome Evaluation:  Plan of Care Reviewed With: patient, family  Progress: improving  Outcome Summary: Cleared to D/C by all on medical team. Pt remains v-paced on tele, with all other VSS. No c/o pain. On 2L O2, baseline. BLE wraps for lymphedema per OT. PRN xanax for anxiety. Pending d/c to rehab facility, bed available tomorrow per CCP note. Repeat covid pending. WCTM.

## 2021-02-11 VITALS
SYSTOLIC BLOOD PRESSURE: 128 MMHG | RESPIRATION RATE: 21 BRPM | DIASTOLIC BLOOD PRESSURE: 54 MMHG | HEIGHT: 60 IN | OXYGEN SATURATION: 93 % | TEMPERATURE: 98.4 F | HEART RATE: 79 BPM | BODY MASS INDEX: 53.45 KG/M2 | WEIGHT: 272.27 LBS

## 2021-02-11 PROBLEM — A49.9 UTI (URINARY TRACT INFECTION), BACTERIAL: Status: RESOLVED | Noted: 2021-02-03 | Resolved: 2021-02-11

## 2021-02-11 PROBLEM — N39.0 UTI (URINARY TRACT INFECTION), BACTERIAL: Status: RESOLVED | Noted: 2021-02-03 | Resolved: 2021-02-11

## 2021-02-11 LAB
ALBUMIN SERPL-MCNC: 3.2 G/DL (ref 3.5–5.2)
ANION GAP SERPL CALCULATED.3IONS-SCNC: 10.4 MMOL/L (ref 5–15)
BUN SERPL-MCNC: 40 MG/DL (ref 8–23)
BUN/CREAT SERPL: 30.5 (ref 7–25)
CALCIUM SPEC-SCNC: 9.4 MG/DL (ref 8.6–10.5)
CHLORIDE SERPL-SCNC: 93 MMOL/L (ref 98–107)
CO2 SERPL-SCNC: 35.6 MMOL/L (ref 22–29)
CREAT SERPL-MCNC: 1.31 MG/DL (ref 0.57–1)
GFR SERPL CREATININE-BSD FRML MDRD: 39 ML/MIN/1.73
GLUCOSE BLDC GLUCOMTR-MCNC: 139 MG/DL (ref 70–130)
GLUCOSE BLDC GLUCOMTR-MCNC: 175 MG/DL (ref 70–130)
GLUCOSE BLDC GLUCOMTR-MCNC: 194 MG/DL (ref 70–130)
GLUCOSE SERPL-MCNC: 139 MG/DL (ref 65–99)
PHOSPHATE SERPL-MCNC: 3.5 MG/DL (ref 2.5–4.5)
POTASSIUM SERPL-SCNC: 4.1 MMOL/L (ref 3.5–5.2)
SARS-COV-2 RNA RESP QL NAA+PROBE: NOT DETECTED
SODIUM SERPL-SCNC: 139 MMOL/L (ref 136–145)

## 2021-02-11 PROCEDURE — 97140 MANUAL THERAPY 1/> REGIONS: CPT

## 2021-02-11 PROCEDURE — 80069 RENAL FUNCTION PANEL: CPT | Performed by: INTERNAL MEDICINE

## 2021-02-11 PROCEDURE — 94799 UNLISTED PULMONARY SVC/PX: CPT

## 2021-02-11 PROCEDURE — 82962 GLUCOSE BLOOD TEST: CPT

## 2021-02-11 PROCEDURE — 63710000001 INSULIN LISPRO (HUMAN) PER 5 UNITS: Performed by: INTERNAL MEDICINE

## 2021-02-11 RX ORDER — ACETAMINOPHEN 325 MG/1
650 TABLET ORAL EVERY 4 HOURS PRN
Start: 2021-02-11

## 2021-02-11 RX ORDER — CARVEDILOL 6.25 MG/1
6.25 TABLET ORAL 2 TIMES DAILY WITH MEALS
Start: 2021-02-11 | End: 2021-03-20 | Stop reason: HOSPADM

## 2021-02-11 RX ORDER — POLYETHYLENE GLYCOL 3350 17 G/17G
17 POWDER, FOR SOLUTION ORAL DAILY
Start: 2021-02-11 | End: 2021-06-16 | Stop reason: ALTCHOICE

## 2021-02-11 RX ORDER — GABAPENTIN 100 MG/1
100 CAPSULE ORAL EVERY 12 HOURS
Qty: 10 CAPSULE | Refills: 0 | Status: SHIPPED | OUTPATIENT
Start: 2021-02-11 | End: 2021-03-15

## 2021-02-11 RX ORDER — BUMETANIDE 2 MG/1
4 TABLET ORAL 2 TIMES DAILY
Start: 2021-02-11 | End: 2021-02-25 | Stop reason: SDUPTHER

## 2021-02-11 RX ORDER — TRAMADOL HYDROCHLORIDE 50 MG/1
50 TABLET ORAL EVERY 8 HOURS PRN
Qty: 5 TABLET | Refills: 0 | Status: SHIPPED | OUTPATIENT
Start: 2021-02-11 | End: 2021-02-16

## 2021-02-11 RX ORDER — FLUTICASONE PROPIONATE 50 MCG
2 SPRAY, SUSPENSION (ML) NASAL DAILY
Start: 2021-02-11

## 2021-02-11 RX ORDER — ALPRAZOLAM 1 MG/1
1 TABLET ORAL 2 TIMES DAILY
Qty: 6 TABLET | Refills: 0 | Status: SHIPPED | OUTPATIENT
Start: 2021-02-11 | End: 2021-02-14

## 2021-02-11 RX ORDER — ECHINACEA PURPUREA EXTRACT 125 MG
2 TABLET ORAL AS NEEDED
Refills: 12
Start: 2021-02-11

## 2021-02-11 RX ORDER — POTASSIUM CHLORIDE 20 MEQ/1
20 TABLET, EXTENDED RELEASE ORAL EVERY OTHER DAY
Start: 2021-02-11 | End: 2021-03-22 | Stop reason: SDUPTHER

## 2021-02-11 RX ORDER — BISACODYL 5 MG/1
5 TABLET, DELAYED RELEASE ORAL DAILY PRN
Start: 2021-02-11 | End: 2021-06-16 | Stop reason: ALTCHOICE

## 2021-02-11 RX ORDER — AMLODIPINE BESYLATE 5 MG/1
5 TABLET ORAL
Start: 2021-02-11 | End: 2021-03-20 | Stop reason: HOSPADM

## 2021-02-11 RX ORDER — ONDANSETRON 4 MG/1
4 TABLET, FILM COATED ORAL EVERY 6 HOURS PRN
Start: 2021-02-11 | End: 2021-06-16 | Stop reason: ALTCHOICE

## 2021-02-11 RX ADMIN — PANTOPRAZOLE SODIUM 40 MG: 40 TABLET, DELAYED RELEASE ORAL at 06:37

## 2021-02-11 RX ADMIN — BUMETANIDE 4 MG: 2 TABLET ORAL at 10:12

## 2021-02-11 RX ADMIN — CLOTRIMAZOLE AND BETAMETHASONE DIPROPIONATE: 10; .5 CREAM TOPICAL at 10:16

## 2021-02-11 RX ADMIN — GABAPENTIN 100 MG: 100 CAPSULE ORAL at 10:03

## 2021-02-11 RX ADMIN — FLUTICASONE PROPIONATE 2 SPRAY: 50 SPRAY, METERED NASAL at 10:13

## 2021-02-11 RX ADMIN — TRAMADOL HYDROCHLORIDE 50 MG: 50 TABLET, FILM COATED ORAL at 03:49

## 2021-02-11 RX ADMIN — NYSTATIN: 100000 CREAM TOPICAL at 10:14

## 2021-02-11 RX ADMIN — LEVOTHYROXINE SODIUM 25 MCG: 0.03 TABLET ORAL at 06:37

## 2021-02-11 RX ADMIN — INSULIN LISPRO 2 UNITS: 100 INJECTION, SOLUTION INTRAVENOUS; SUBCUTANEOUS at 11:49

## 2021-02-11 RX ADMIN — BUDESONIDE AND FORMOTEROL FUMARATE DIHYDRATE 2 PUFF: 160; 4.5 AEROSOL RESPIRATORY (INHALATION) at 09:01

## 2021-02-11 RX ADMIN — APIXABAN 5 MG: 5 TABLET, FILM COATED ORAL at 10:13

## 2021-02-11 RX ADMIN — CARVEDILOL 6.25 MG: 6.25 TABLET, FILM COATED ORAL at 10:13

## 2021-02-11 RX ADMIN — AMLODIPINE BESYLATE 5 MG: 5 TABLET ORAL at 10:03

## 2021-02-11 NOTE — THERAPY DISCHARGE NOTE
Acute Care - Occupational Therapy Discharge Summary  [facility]     Patient Name: Miguelina Lopez  : 1945  MRN: [id number]    Today's Date: 2021                 Admit Date: 2021        OT Recommendation and Plan    Visit Dx:    ICD-10-CM ICD-9-CM   1. Chest pain, unspecified type  R07.9 786.50   2. COPD exacerbation (CMS/[facility])  J44.1 491.21   3. Congestive heart failure, unspecified HF chronicity, unspecified heart failure type (CMS/[facility])  I50.9 428.0   4. Hypertension, unspecified type  I10 401.9   5. Abnormal chest x-ray  R93.89 793.2   6. Complete heart block (CMS/[facility])  I44.2 426.0   7. Chronic pain of left knee  M25.562 719.46    G89.29 338.29   8. Polyneuropathy associated with underlying disease (CMS/[facility])  G63 357.4   9. Diabetic peripheral neuropathy (CMS/HCC)  E11.42 250.60     357.2   10. Generalized anxiety disorder  F41.1 300.02   11. Arthritis of knee  M17.10 716.96         Time Calculation- OT     Row Name 21 1020             Time Calculation- OT    OT Start Time  0929  -VS      OT Stop Time  1008  -VS      OT Time Calculation (min)  39 min  -VS      Total Timed Code Minutes- OT  39 minute(s)  -VS      OT Received On  21  -VS        User Key  (r) = Recorded By, (t) = Taken By, (c) = Cosigned By    Initials Name Provider Type    VS Ericka Bishop OTR Occupational Therapist            Rehab Goal Summary     Row Name 21 1020             Bed Mobility Goal 1 (OT)    Progress/Outcomes (Bed Mobility Goal 1, OT)  goal partially met  -VS         Toileting Goal 1 (OT)    Progress/Outcome (Toileting Goal 1, OT)  goal not met  -VS         Grooming Goal 1 (OT)    Progress/Outcome (Grooming Goal 1, OT)  goal met  -VS         Problem Specific Goal 1 (OT)    Progress/Outcome (Problem Specific Goal 1, OT)  goal met  -VS        User Key  (r) = Recorded By, (t) = Taken By, (c) = Cosigned By    Initials Name Provider Type Discipline    VS Ericka Bishop, WOODR Occupational Therapist  OT          Outcome Measures     Row Name 02/11/21 1200 02/10/21 1400 02/09/21 1406       How much help from another is currently needed...    Putting on and taking off regular lower body clothing?  1  -VS  1  -VS  1  -VS    Bathing (including washing, rinsing, and drying)  2  -VS  2  -VS  2  -VS    Toileting (which includes using toilet bed pan or urinal)  1  -VS  1  -VS  1  -VS    Putting on and taking off regular upper body clothing  2  -VS  2  -VS  2  -VS    Taking care of personal grooming (such as brushing teeth)  3  -VS  3  -VS  3  -VS    Eating meals  4  -VS  4  -VS  4  -VS    AM-PAC 6 Clicks Score (OT)  13  -VS  13  -VS  13  -VS       Functional Assessment    Outcome Measure Options  AM-PAC 6 Clicks Daily Activity (OT)  -VS  AM-PAC 6 Clicks Daily Activity (OT)  -VS  --    Row Name 02/08/21 1300             How much help from another is currently needed...    Putting on and taking off regular lower body clothing?  1  -VS      Bathing (including washing, rinsing, and drying)  2  -VS      Toileting (which includes using toilet bed pan or urinal)  1  -VS      Putting on and taking off regular upper body clothing  2  -VS      Taking care of personal grooming (such as brushing teeth)  3  -VS      Eating meals  3  -VS      AM-PAC 6 Clicks Score (OT)  12  -VS         Functional Assessment    Outcome Measure Options  AM-PAC 6 Clicks Daily Activity (OT)  -VS        User Key  (r) = Recorded By, (t) = Taken By, (c) = Cosigned By    Initials Name Provider Type    VS Ericka Bishop OTR Occupational Therapist              Therapy Charges for Today     Code Description Service Date Service Provider Modifiers Qty    49309406701 HC OT MANUAL THERAPY EA 15 MIN 2/10/2021 Ericka Bishop OTR GO 3    07598726603 HC OT MANUAL THERAPY EA 15 MIN 2/11/2021 Ericka Bishop OTR GO 3          OT Discharge Summary  Anticipated Discharge Disposition (OT): sub acute care setting  Outcomes Achieved: Able to achieve all goals within  established timeline  Discharge Destination: [facility]      Ericka Bishop OTFRANKIE  2/11/2021

## 2021-02-11 NOTE — PROGRESS NOTES
"Physicians Statement of Medical Necessity for  Ambulance Transportation    GENERAL INFORMATION     Name: Miguelina Lopez  YOB: 1945  Medicare #: [id number]  Transport Date: 2/11/2021 (Valid for round trips this date, or for scheduled repetitive trips for 60 days from the date signed below.)  Origin: [facility]    Destination: [facility] skilled rehab  Is the Patient's stay covered under Medicare Part A (PPS/DRG?)Yes  Closest appropriate facility? Yes  If this a hosp-hosp transfer? No  Is this a hospice patient? No    MEDICAL NECESSITY QUESTIONAIRE    Ambulance Transportation is medically necessary only if other means of transportation are contraindicated or would be potentially harmful to the patient.  To meet this requirement, the patient must be either \"bed confined\" or suffer from a condition such that transport by means other than an ambulance is contraindicated by the patient's condition.  The following questions must be answered by the healthcare professional signing below for this form to be valid:     1) Describe the MEDICAL CONDITION (physical and/or mental) of this patient AT THE TIME OF AMBULANCE TRANSPORT that requires the patient to be transported in an ambulance, and why transport by other means is contraindicated by the patient's condition: immobility weakness, lymphedema bilateral lower extremities  Past Medical History:   Diagnosis Date   • Acute on chronic respiratory failure with hypoxia and hypercapnia (CMS/HCC)    • LOI (acute kidney injury) (CMS/HCC)    • Anemia    • Anxiety    • Aortic valve stenosis    • Bilateral lower extremity edema    • CHF (congestive heart failure) (CMS/HCC)    • Chronic coronary artery disease    • Class 3 severe obesity due to excess calories in adult (CMS/HCC)    • COPD (chronic obstructive pulmonary disease) (CMS/HCC)    • Depression    • Diabetes mellitus (CMS/HCC)    • Elevated cholesterol    • GERD " (gastroesophageal reflux disease)    • Heart murmur    • Hypertension    • Mitral valve insufficiency    • Pneumonia     1/2016   • Pulmonary hypertension (CMS/HCC)     due to sleep disordered breathing   • Sleep apnea     Uses CPAP or oxygen   • Stage 3 chronic kidney disease (CMS/HCC)    • Subclinical hypothyroidism    • Supplemental oxygen dependent    • Valvular heart disease       Past Surgical History:   Procedure Laterality Date   • CARDIAC CATHETERIZATION     • CARDIAC CATHETERIZATION N/A 6/10/2016    Procedure: Left Heart Cath;  Surgeon: Chace Johnson MD;  Location: [facility] CATH INVASIVE LOCATION;  Service:    • CARDIAC CATHETERIZATION N/A 6/10/2016    Procedure: Right Heart Cath;  Surgeon: Chcae Johnson MD;  Location: [facility] CATH [facility]ASIVE LOC[facility];  Service:    • CARDIAC CATHETERIZATION N/A 2/5/2021    Procedure: RIGHT AND LEFT HEART CATH;  Surgeon: Antoine Ayers MD;  Location: [facility] CATH INVASIVE LOCATION;  Service: Cardiology;  Laterality: N/A;   • CARDIAC CATHETERIZATION N/A 2/5/2021    Procedure: Coronary angiography;  Surgeon: Antoine Ayers MD;  Location: [facility] CATH INVASIVE LOCATION;  Service: Cardiology;  Laterality: N/A;   • CARDIAC CATHETERIZATION N/A 2/5/2021    Procedure: Left ventriculography- pressures;  Surgeon: Antoine Ayers MD;  Location: [facility] CATH INVASIVE LOCATION;  Service: Cardiology;  Laterality: N/A;   • CARDIAC ELECTROPHYSIOLOGY PROCEDURE N/A 2/2/2021    Procedure: Pacemaker DC new---[employer] MICRA;  Surgeon: Eliazar Bond MD;  Location: [facility] CATH INVASIVE LOCATION;  Service: Cardiology;  Laterality: N/A;   • CARDIAC SURGERY     • CORONARY ARTERY BYPASS GRAFT      2 vessel   • CORONARY ARTERY BYPASS GRAFT WITH MITRAL VALVE REPAIR/REPLACEMENT N/A 6/13/2016    Procedure: INTRAOPERATIVE TARIQ, MIDLINE STERNOTOMY, CORONARY ARTERY BYPASS GRAFTING X  2 UTILIZING ENDOSCOPICALLY HARVESTED LEFT GREATER SAPHENOUS VEIN, MITRAL VALVE REPLACEMENT AND  "TRICUSPID VALVE REPAIR;  Surgeon: Eliecer Mistry MD;  Location: [facility];  Service:    • CORONARY STENT PLACEMENT  2010    Approx. 6 yrs ago at [facility]   • HEMORRHOIDECTOMY     • HYSTERECTOMY     • MITRAL VALVE REPLACEMENT     • REPLACEMENT TOTAL KNEE Right    • THYROID SURGERY      Cyst removed from thyroid   • VASCULAR SURGERY        2) Is this patient \"bed confined\" as defined below?No    To be \"bed confined\" the patient must satisfy all three of the following criteria:  (1) unable to get up from bed without assistance; AND (2) unable to ambulate;  AND (3) unable to sit in a chair or wheelchair.  3) Can this patient safely be transported by car or wheelchair van (I.e., may safely sit during transport, without an attendant or monitoring?)No   4. In addition to completing questions 1-3 above, please check any of the following conditions that apply*:          *Note: supporting documentation for any boxes checked must be maintained in the patient's medical records Requires oxygen - unable to self administer, Unable to tolerate seated position for time needed to transport and Unable to sit in a chair or wheelchair due to decubitus ulcers or other wounds      SIGNATURE OF PHYSICIAN OR OTHER AUTHORIZED HEALTHCARE PROFESSIONAL    I certify that the above information is true and correct based on my evaluation of this patient, and represent that the patient requires transport by ambulance and that other forms of transport are contraindicated.  I understand that this information will be used by the Centers for Medicare and Medicaid Services (CMS) to support the determiniation of medical necessity for ambulance services, and I represent that I have personal knowledge of the patient's condition at the time of transport.    x   If this box is checked, I also certify that the patient is physically or mentally incapable of signing the ambulance service's claim form and that the institution with which I am affiliated has " furnished care, services or assistance to the patient.  My signature below is made on behalf of the patient pursuant to 42 .36(b)(4). In accordance with 42 .37, the specific reason(s) that the patient is physically or mentally incapable of signing the claim for is as follows:     Signature of Physician or Healthcare Professional DAMI OROSCO RN/SEE   Date/Time:        (For Scheduled repetitive transport, this form is not valid for transports performed more than 60 days after this date).                                                                                                                                            --------------------------------------------------------------------------------------------  Printed Name and Credentials of Physician or Authorized Healthcare Professional     *Form must be signed by patient's attending physician for scheduled, repetitive transports,.  For non-repetitive ambulance transports, if unable to obtain the signature of the attending physician, any of the following may sign (please select below):     Physician  Clinical Nurse Specialist  Registered Nurse     Physician Assistant  Discharge Planner  Licensed Practical Nurse     Nurse Practitioner

## 2021-02-11 NOTE — THERAPY TREATMENT NOTE
Acute Care - Occupational Therapy Treatment Note  [facility]     Patient Name: Miguelina Lopez  : 1945  MRN: [id number]  Today's Date: 2021             Admit Date: 2021       ICD-10-CM ICD-9-CM   1. Chest pain, unspecified type  R07.9 786.50   2. COPD exacerbation (CMS/HCC)  J44.1 491.21   3. Congestive heart failure, unspecified HF chronicity, unspecified heart failure type (CMS/HCC)  I50.9 428.0   4. Hypertension, unspecified type  I10 401.9   5. Abnormal chest x-ray  R93.89 793.2   6. Complete heart block (CMS/HCC)  I44.2 426.0   7. Chronic pain of left knee  M25.562 719.46    G89.29 338.29   8. Polyneuropathy associated with underlying disease (CMS/HCC)  G63 357.4   9. Diabetic peripheral neuropathy (CMS/HCC)  E11.42 250.60     357.2   10. Generalized anxiety disorder  F41.1 300.02   11. Arthritis of knee  M17.10 716.96     Patient Active Problem List   Diagnosis   • Anxiety disorder   • Arthritis of knee   • Asthma   • Chronic coronary artery disease   • CKD (chronic kidney disease) stage 3, GFR 30-59 ml/min (CMS/HCC)   • Depression   • Diabetic peripheral neuropathy (CMS/HCC)   • Gastroesophageal reflux disease   • Hyperlipidemia   • Insomnia   • Lower gastrointestinal hemorrhage   • Anemia   • OCHOA (obstructive sleep apnea)   • DM type 2 (diabetes mellitus, type 2) (CMS/HCC)   • Essential hypertension   • Hospital discharge follow-up   • Pulmonary hypertension due to sleep-disordered breathing (CMS/HCC)   • s/p MVR, TV-repair, CABG x2 16   • OLI (acute kidney injury) (CMS/HCC)   • Leukocytosis   • Atrial fibrillation (CMS/HCC)   • Nocturnal hypoxia   • Dermatitis   • Medicare annual wellness visit, initial   • Class 3 severe obesity due to excess calories with serious comorbidity and body mass index (BMI) of 50.0 to 59.9 in adult (CMS/[facility])   • Supplemental oxygen dependent   • Acute on chronic combined systolic and diastolic HF (heart failure) (CMS/[facility])   • Mitral regurgitation   •  Aortic stenosis   • Medicare annual wellness visit, subsequent   • Localized edema   • Chronic right-sided congestive heart failure (CMS/[facility])   • Cellulitis of left lower extremity   • Proteinuria   • Bilateral lower extremity edema   • Subclinical hypothyroidism   • Chronic diastolic heart failure (CMS/[facility])   • Chronic respiratory failure with hypoxia and hypercapnia (CMS/[facility])   • Acute on chronic respiratory failure with hypoxia and hypercapnia (CMS/[facility])   • AV block, 2nd degree   • 1st degree AV block   • Chest pain   • COPD (chronic obstructive pulmonary disease) (CMS/[facility])   • Complete heart block (CMS/[facility])     Past Medical History:   Diagnosis Date   • Acute on chronic respiratory failure with hypoxia and hypercapnia (CMS/[facility])    • OLI (acute kidney injury) (CMS/[facility])    • Anemia    • Anxiety    • Aortic valve stenosis    • Bilateral lower extremity edema    • CHF (congestive heart failure) (CMS/[facility])    • Chronic coronary artery disease    • Class 3 severe obesity due to excess calories in adult (CMS/[facility])    • COPD (chronic obstructive pulmonary disease) (CMS/[facility])    • Depression    • Diabetes mellitus (CMS/[facility])    • Elevated cholesterol    • GERD (gastroesophageal reflux disease)    • Heart murmur    • Hypertension    • Mitral valve insufficiency    • Pneumonia     1/2016   • Pulmonary hypertension (CMS/[facility])     due to sleep disordered breathing   • Sleep apnea     Uses CPAP or oxygen   • Stage 3 chronic kidney disease (CMS/[facility])    • Subclinical hypothyroidism    • Supplemental oxygen dependent    • Valvular heart disease      Past Surgical History:   Procedure Laterality Date   • CARDIAC CATHETERIZATION     • CARDIAC CATHETERIZATION N/A 6/10/2016    Procedure: Left Heart Cath;  Surgeon: Chace Johnson MD;  Location: [facility] CATH INVASIVE LOCATION;  Service:    • CARDIAC CATHETERIZATION N/A 6/10/2016    Procedure: Right Heart Cath;  Surgeon: Chace Johnson MD;  Location: [facility] CATH INVASIVE  LOCATION;  Service:    • CARDIAC CATHETERIZATION N/A 2/5/2021    Procedure: RIGHT AND LEFT HEART CATH;  Surgeon: Antoine Ayers MD;  Location:  [facility] CATH INVASIVE LOCATION;  Service: Cardiology;  Laterality: N/A;   • CARDIAC CATHETERIZATION N/A 2/5/2021    Procedure: Coronary angiography;  Surgeon: Antoine Ayers MD;  Location: [facility] C[facility]VASIVE LOC[facility];  Service: Cardiology;  Laterality: N/A;   • CARDIAC CATHETERIZATION N/A 2/5/2021    Procedure: Left ventriculography- pressures;  Surgeon: Antoine Ayers MD;  [facility];  Service: Cardiology;  Laterality: N/A;   • CARDIAC ELECTROPHYSIOLOGY PROCEDURE N/A 2/2/2021    Procedure: Pacemaker DC new---[employer] MICRA;  Surgeon: Eliazar Bond MD;  Location: [facility] CATH INVASIVE LOCATION;  Service: Cardiology;  Laterality: N/A;   • CARDIAC SURGERY     • CORONARY ARTERY BYPASS GRAFT      2 vessel   • CORONARY ARTERY BYPASS GRAFT WITH MITRAL VALVE REPAIR/REPLACEMENT N/A 6/13/2016    Procedure: INTRAOPERATIVE TARIQ, MIDLINE STERNOTOMY, CORONARY ARTERY BYPASS GRAFTING X  2 UTILIZING ENDOSCOPICALLY HARVESTED LEFT GREATER SAPHENOUS VEIN, MITRAL VALVE REPLACEMENT AND TRICUSPID VALVE REPAIR;  Surgeon: Eliecer Mistry MD;  Location: [facility];  Service:    • CORONARY STENT PLACEMENT  2010    Approx. 6 yrs ago at [facility]   • HEMORRHOIDECTOMY     • HYSTERECTOMY     • MITRAL VALVE REPLACEMENT     • REPLACEMENT TOTAL KNEE Right    • THYROID SURGERY      Cyst removed from thyroid   • VASCULAR SURGERY         Lymphedema     Row Name 02/11/21 1020 02/10/21 1300 02/09/21 1406       Subjective Pain    Able to rate subjective pain?  yes  -VS  --  --    Pre-Treatment Pain Level  0  -VS  --  --    Post-Treatment Pain Level  0  -VS  --  --    Recorded by [VS] Ericka Bishop OTR         Lymphedema Assessment    Lymphedema Classification  RLE:;LLE:  -VS  RLE:;LLE:  -VS  RLE:;LLE:  -VS    Recorded by [VS] Ericka Bishop OTR [VS]  Ericka Bishop OTR [VS] Ericka Bishop, WOODR       Lymphedema Edema Assessment    Pitting Edema  Mild;Moderate (R) Mild, (L) mild/moderate   -VS  Mild;Moderate Mild (R). Mild/Moderate (L)   -VS  Mild;Moderate (L) larger than (R)  -VS    Edema Assessment Comment  --  -- Written instruction for wrapping in the pt's room  -VS  --    Recorded by [VS] Ericka Bishop OTR [VS] Ericka Bishop OTR [VS] Ericka Bishop, ANNEMARIE       Skin Changes/Observations    Skin Observations Comment  -- no skin issues Mepliex was removed by RN  -VS  --  --    Recorded by [VS] Ericka Bishop OTR         Compression/Skin Care    Compression/Skin Care  skin care;wrapping location;bandaging;remove bandages  -VS  skin care;wrapping location;bandaging;remove bandages  -VS  skin care;wrapping location;remove bandages  -VS    Skin Care  washed/dried;lotion applied;moisturizing lotion applied  -VS  washed/dried;lotion applied;moisturizing lotion applied  -VS  washed/dried;lotion applied  -VS    Wrapping Location  lower extremity  -VS  lower extremity  -VS  lower extremity  -VS    Wrapping Location UE  bilateral:  -VS  --  --    Wrapping Location LE  foot to knee  -VS  foot to knee  -VS  bilateral:;foot to knee  -VS    Wrapping Comments  Written instruction given to patient and she pasked in her suit case to take to Subacute facility   -VS  --  --    Bandage Layers  cotton liner;padding/fluff layer;short-stretch bandages (comment size/quantity)  -VS  cotton liner;padding/fluff layer;short-stretch bandages (comment size/quantity)  -VS  cotton liner;padding/fluff layer;short-stretch bandages (comment size/quantity)  -VS    Bandaging Comments  Bandages #8 & #10 wrapped from base of toes to Knee (B)ly   -VS  Bandages # 8 & #10 toes to knee (B)ly   -VS  Bandages #8 & #10   -VS    Bandaging Technique  light compression  -VS  light compression  -VS  light compression  -VS    Recorded by [VS] Ericka Bishop OTR [VS] Ericka Bishop, WOODR [VS]  "Ericka Bishop OTR      User Key  (r) = Recorded By, (t) = Taken By, (c) = Cosigned By    Initials Name Effective Dates    VS Ericka Bishop OTR 03/02/20 -            OT ASSESSMENT FLOWSHEET (last 12 hours)      OT Evaluation and Treatment     Row Name 02/11/21 1020                   OT Time and Intention    Subjective Information  no complaints  -VS        Document Type  discharge evaluation/summary;discharge treatment  -VS        Mode of Treatment  occupational therapy  -VS        Comment  \"I am all packed and ready to leve the hospital \" pt. stated   -VS           General Information    General Observations of Patient  Pt. wass sitting reclined in chair by the bed   -VS        Existing Precautions/Restrictions  fall;oxygen therapy device and L/min  -VS        Barriers to Rehab  medically complex  -VS           Cognition    Orientation Status (Cognition)  oriented x 3  -VS        Follows Commands (Cognition)  WFL  -VS           Pain Assessment    Additional Documentation  Pain Scale: Numbers Pre/Post-Treatment (Group)  -VS           Pain Scale: Numbers Pre/Post-Treatment    Pretreatment Pain Rating  0/10 - no pain  -VS        Posttreatment Pain Rating  0/10 - no pain  -VS           Activities of Daily Living    BADL Assessment/Intervention  bathing;lower body dressing  -VS           Bathing Assessment/Intervention    [place] Level (Bathing)  bathing skills  -VS        Comment (Bathing)  Therapist bathed, dried and applied lotion to LE toes to knees   -VS           Lower Body Dressing Assessment/Training    [place] Level (Lower Body Dressing)  lower body dressing skills;doff;don;socks  -VS        Comment (Lower Body Dressing)  Therapist doff/lore non-slip socks ([place]   -VS           Edema Assessment & Management    Location (Edema)  lower extremity, left;lower extremity, right  -VS        Additional Documentation  Edema Symptoms/Interventions (Group);Location (Edema) (Row)  -VS           Lower " Extremity Edema Measures, Left    Lower Extremity, Left (Edema)  mid-calf  -VS        Mid-Calf Circumference, Left (Edema)  Data entered in Lymph note   -VS           Lower Extremity Edema Measures, Right    Lower Extremity, Right (Edema)  mid-calf  -VS        Mid-Calf Circumference, Right (Edema)  Data entered in Lymph note   -VS           Edema Symptoms/Interventions    Description (Edema)  localized  -VS        Treatment Interventions (Edema)  compression wrapping/taping  -VS           Plan of Care Review    Plan of Care Reviewed With  patient  -VS        Progress  improving  -VS        Outcome Summary  OT:  Lymph:  Pt. continues to tolerate the LE wraps, (B)LE has reduced in size, (L) continues to be greater than (R). (R) is mild and (L) is mild/moderate in size . No pitting edema noted with either.  Therapist rewrapped the LE from the base of the toes to the knees (B)ly.  Pt. continues to benefit from LE Lymph wrapping.  PT. wore a mask, therapist wore a mask, eye protection and washed her hands before and after the treatment.     -VS           Bed Mobility Goal 1 (OT)    Progress/Outcomes (Bed Mobility Goal 1, OT)  goal partially met  -VS           Toileting Goal 1 (OT)    Progress/Outcome (Toileting Goal 1, OT)  goal not met  -VS           Grooming Goal 1 (OT)    Progress/Outcome (Grooming Goal 1, OT)  goal met  -VS           Problem Specific Goal 1 (OT)    Progress/Outcome (Problem Specific Goal 1, OT)  goal met  -VS           Positioning and Restraints    Pre-Treatment Position  sitting in chair/recliner  -VS        Post Treatment Position  chair  -VS        In Chair  reclined;sitting;call light within reach;encouraged to call for assist;with nsg  -VS          User Key  (r) = Recorded By, (t) = Taken By, (c) = Cosigned By    Initials Name Effective Dates    VS Ericka Bishop, OTR 03/02/20 -          Occupational Therapy Education                 Title: PT OT SLP Therapies (Done)     Topic: Occupational  Therapy (Done)     Point: ADL training (Done)     Description:   Instruct learner(s) on proper safety adaptation and remediation techniques during self care or transfers.   Instruct in proper use of assistive devices.              Learning Progress Summary           Patient Acceptance, E,D,H, VU by VS at 2/11/2021 1239    Comment: Written instuction provided to pt. pt. educated on wearing schedule and precaution with regards to LE wraps    Acceptance, E,TB,D, NR by SINDY at 2/6/2021 1530    Acceptance, E,D,H, VU by VS at 2/3/2021 1421    Comment: Lymphedema bandages wearing precaution, written/verbal  instruction and demonstration on wrapping LEs    Acceptance, E,D, VU by LE at 2/1/2021 1550    Comment: role of OT, plan of care, body mechanics                   Point: Precautions (Done)     Description:   Instruct learner(s) on prescribed precautions during self-care and functional transfers.              Learning Progress Summary           Patient Acceptance, E,D,H, VU by VS at 2/11/2021 1239    Comment: Written instuction provided to pt. pt. educated on wearing schedule and precaution with regards to LE wraps    Acceptance, E,TB,D, NR by SINDY at 2/6/2021 1530    Acceptance, E,D,H, VU by VS at 2/3/2021 1421    Comment: Lymphedema bandages wearing precaution, written/verbal  instruction and demonstration on wrapping LEs    Acceptance, E,D, VU by LE at 2/1/2021 1550    Comment: role of OT, plan of care, body mechanics                   Point: Body mechanics (Done)     Description:   Instruct learner(s) on proper positioning and spine alignment during self-care, functional mobility activities and/or exercises.              Learning Progress Summary           Patient Acceptance, E,D,H, VU by VS at 2/11/2021 1239    Comment: Written instuction provided to pt. pt. educated on wearing schedule and precaution with regards to LE wraps    Acceptance, E,TB,D, NR by SINDY at 2/6/2021 1530    Acceptance, E,D,H, VU by VS at 2/3/2021  1421    Comment: Lymphedema bandages wearing precaution, written/verbal  instruction and demonstration on wrapping LEs    Acceptance, E,D, VU by LE at 2/1/2021 3750    Comment: role of OT, plan of care, body mechanics                               User Key     Initials Effective Dates Name Provider Type Discipline    LE 06/08/18 -  Solange Dawkins, OTR Occupational Therapist OT    VS 03/02/20 -  Ericka Bishop OTR Occupational Therapist OT     03/07/18 -  Pau Hensley PTA Physical Therapy Assistant PT                  OT Recommendation and Plan     Plan of Care Review  Plan of Care Reviewed With: patient  Progress: improving  Outcome Summary: OT:  Lymph:  Pt. continues to tolerate the LE wraps, (B)LE has reduced in size, (L) continues to be greater than (R). (R) is mild and (L) is mild/moderate in size . No pitting edema noted with either.  Therapist rewrapped the LE from the base of the toes to the knees (B)ly.  Pt. continues to benefit from LE Lymph wrapping.  PT. wore a mask, therapist wore a mask, eye protection and washed her hands before and after the treatment.     Plan of Care Reviewed With: patient  Outcome Summary: OT:  Lymph:  Pt. continues to tolerate the LE wraps, (B)LE has reduced in size, (L) continues to be greater than (R). (R) is mild and (L) is mild/moderate in size . No pitting edema noted with either.  Therapist rewrapped the LE from the base of the toes to the knees (B)ly.  Pt. continues to benefit from LE Lymph wrapping.  PT. wore a mask, therapist wore a mask, eye protection and washed her hands before and after the treatment.       Outcome Measures     Row Name 02/11/21 1200 02/10/21 1400 02/09/21 1406       How much help from another is currently needed...    Putting on and taking off regular lower body clothing?  1  -VS  1  -VS  1  -VS    Bathing (including washing, rinsing, and drying)  2  -VS  2  -VS  2  -VS    Toileting (which includes using toilet bed pan or urinal)  1  -VS  1   -VS  1  -VS    Putting on and taking off regular upper body clothing  2  -VS  2  -VS  2  -VS    Taking care of personal grooming (such as brushing teeth)  3  -VS  3  -VS  3  -VS    Eating meals  4  -VS  4  -VS  4  -VS    AM-PAC 6 Clicks Score (OT)  13  -VS  13  -VS  13  -VS       Functional Assessment    Outcome Measure Options  AM-PAC 6 Clicks Daily Activity (OT)  -VS  AM-PAC 6 Clicks Daily Activity (OT)  -VS  --    Row Name 02/08/21 1300             How much help from another is currently needed...    Putting on and taking off regular lower body clothing?  1  -VS      Bathing (including washing, rinsing, and drying)  2  -VS      Toileting (which includes using toilet bed pan or urinal)  1  -VS      Putting on and taking off regular upper body clothing  2  -VS      Taking care of personal grooming (such as brushing teeth)  3  -VS      Eating meals  3  -VS      AM-PAC 6 Clicks Score (OT)  12  -VS         Functional Assessment    Outcome Measure Options  AM-PAC 6 Clicks Daily Activity (OT)  -VS        User Key  (r) = Recorded By, (t) = Taken By, (c) = Cosigned By    Initials Name Provider Type    VS Ericka Bishop OTR Occupational Therapist          Time Calculation:   Time Calculation- OT     Row Name 02/11/21 1020             Time Calculation- OT    OT Start Time  0929  -VS      OT Stop Time  1008  -VS      OT Time Calculation (min)  39 min  -VS      Total Timed Code Minutes- OT  39 minute(s)  -VS      OT Received On  02/11/21  -VS        User Key  (r) = Recorded By, (t) = Taken By, (c) = Cosigned By    Initials Name Provider Type    VS Ericka Bishop OTR Occupational Therapist        Therapy Charges for Today     Code Description Service Date Service Provider Modifiers Qty    91764231987 HC OT MANUAL THERAPY EA 15 MIN 2/10/2021 Eircka Bishop OTR GO 3    34019525677 HC OT MANUAL THERAPY EA 15 MIN 2/11/2021 Ericka Bishop OTR GO 3               Erickaajit Bishop OTR  2/11/2021

## 2021-02-11 NOTE — PROGRESS NOTES
"   LOS: 10 days    Patient Care Team:  Arcelia Talbot APRN as PCP - General (Nurse Practitioner)  Robbie Wilson MD as Consulting Physician (Hematology and Oncology)  Chace Johnson MD as Consulting Physician (Cardiology)    Chief Complaint:    Chief Complaint   Patient presents with   • Shortness of Breath   • Chest Pain     Follow UP CKD3  Subjective     Interval History:     No complaints. Eating. Bowels moving. uop 2000.  Edema better. Going to rehab today.     Review of Systems:   As noted above    Objective     Vital Signs  Temp:  [98.1 °F (36.7 °C)-98.6 °F (37 °C)] 98.1 °F (36.7 °C)  Heart Rate:  [79-80] 79  Resp:  [17-20] 20  BP: (121-138)/(63-71) 135/70    Flowsheet Rows      First Filed Value   Admission Height  152.4 cm (60\") Documented at 02/01/2021 0315   Admission Weight  122 kg (270 lb) Documented at 02/01/2021 0759          I/O this shift:  In: -   Out: 350 [Urine:350]  I/O last 3 completed shifts:  In: 1080 [P.O.:1080]  Out: 3100 [Urine:3100]    Intake/Output Summary (Last 24 hours) at 2/11/2021 1252  Last data filed at 2/11/2021 1058  Gross per 24 hour   Intake 480 ml   Output 1950 ml   Net -1470 ml       Physical Exam:  General Appearance: alert, oriented x 3, no acute distress. Sitting up in chair.    Skin: warm and dry  HEENT: pupils round and reactive to light, oral mucosa normal, nonicteric sclera. Mask applied.  Neck: supple, no JVD, trachea midline  Lungs: Clear to auscultation.   Heart: RRR,no s3, 3/6 syst m  Abdomen: soft, nontender, + bs, body wall edema.   Extremities:lower ext wrapped.  2+ edema. Tender.   Neuro: normal speech and mental status       Results Review:    Results from last 7 days   Lab Units 02/11/21  0643 02/10/21  0332 02/09/21  0404 02/08/21  0259  02/05/21  1100 02/05/21  0628   SODIUM mmol/L 139 140 142 142   < > 145 146*   POTASSIUM mmol/L 4.1 3.5 3.7 3.8   < > 3.5 3.5   CHLORIDE mmol/L 93* 93* 94* 94*   < > 98 97*   CO2 mmol/L 35.6* 35.2* 37.8* 35.3*   < " > 37.7* 38.5*   BUN mg/dL 40* 42* 43* 41*   < > 25* 26*   CREATININE mg/dL 1.31* 1.42* 1.43* 1.44*   < > 1.59* 1.51*   CALCIUM mg/dL 9.4 8.8 8.6 8.7   < > 8.5* 8.6   BILIRUBIN mg/dL  --   --   --  0.5  --  0.6 0.6   ALK PHOS U/L  --   --   --  262*  --  218* 230*   ALT (SGPT) U/L  --   --   --  14  --  10 12   AST (SGOT) U/L  --   --   --  29  --  25 24   GLUCOSE mg/dL 139* 126* 125* 134*   < > 119* 99    < > = values in this interval not displayed.       Estimated Creatinine Clearance: 44.4 mL/min (A) (by C-G formula based on SCr of 1.31 mg/dL (H)).    Results from last 7 days   Lab Units 02/11/21  0643 02/10/21  0332 02/09/21  0404  02/06/21  0502   MAGNESIUM mg/dL  --   --   --   --  2.0   PHOSPHORUS mg/dL 3.5 3.5 3.7   < > 5.0*    < > = values in this interval not displayed.             Results from last 7 days   Lab Units 02/09/21  0404 02/08/21  0259 02/07/21  0501 02/06/21  0502 02/05/21  0958   WBC 10*3/mm3 8.26 9.26 9.62 9.34  --    HEMOGLOBIN g/dL 10.7* 10.5* 10.5* 10.8*  --    HEMOGLOBIN, POC g/dL  --   --   --   --  11.6*   PLATELETS 10*3/mm3 236 224 229 221  --                Imaging Results (Last 24 Hours)     ** No results found for the last 24 hours. **        amLODIPine, 5 mg, Oral, Q24H  apixaban, 5 mg, Oral, Q12H  atorvastatin, 20 mg, Oral, Daily  budesonide-formoterol, 2 puff, Inhalation, BID - RT  bumetanide, 4 mg, Oral, BID  carvedilol, 6.25 mg, Oral, BID With Meals  clotrimazole-betamethasone, , Topical, BID  fluticasone, 2 spray, Each Nare, Daily  gabapentin, 100 mg, Oral, Q12H  insulin lispro, 0-7 Units, Subcutaneous, TID AC  levothyroxine, 25 mcg, Oral, Daily  nystatin, , Topical, BID  pantoprazole, 40 mg, Oral, QAM  polyethylene glycol, 17 g, Oral, Daily  senna-docusate sodium, 2 tablet, Oral, Nightly  sodium chloride, 2 spray, Each Nare, Q8H  vilazodone, 20 mg, Oral, Nightly           Medication Review:   Current Facility-Administered Medications   Medication Dose Route Frequency Provider  Last Rate Last Admin   • acetaminophen (TYLENOL) tablet 650 mg  650 mg Oral Q4H PRN Antoine Ayers MD   650 mg at 02/02/21 0319    Or   • acetaminophen (TYLENOL) 160 MG/5ML solution 650 mg  650 mg Oral Q4H PRN Antoine Ayers MD        Or   • acetaminophen (TYLENOL) suppository 650 mg  650 mg Rectal Q4H PRN Antoine Ayers MD       • acetaminophen (TYLENOL) tablet 650 mg  650 mg Oral Q4H PRN Antoine Ayers MD   650 mg at 02/03/21 0459    Or   • acetaminophen (TYLENOL) suppository 650 mg  650 mg Rectal Q4H PRN Antoine Ayers MD       • acetaminophen (TYLENOL) tablet 650 mg  650 mg Oral Q4H PRN Antoine Ayers MD       • ALPRAZolam (XANAX) tablet 1 mg  1 mg Oral BID PRN Antoine Ayers MD   1 mg at 02/10/21 2033   • amLODIPine (NORVASC) tablet 5 mg  5 mg Oral Q24H Antoine Ayers MD   5 mg at 02/11/21 1003   • apixaban (ELIQUIS) tablet 5 mg  5 mg Oral Q12H Antoine Ayers MD   5 mg at 02/11/21 1013   • atorvastatin (LIPITOR) tablet 20 mg  20 mg Oral Daily Antoine Ayers MD   20 mg at 02/10/21 2033   • bisacodyl (DULCOLAX) EC tablet 5 mg  5 mg Oral Daily PRN Antoine Ayers MD       • budesonide-formoterol (SYMBICORT) 160-4.5 MCG/ACT inhaler 2 puff  2 puff Inhalation BID - RT Antoine Ayers MD   2 puff at 02/11/21 0901   • bumetanide (BUMEX) tablet 4 mg  4 mg Oral BID Geoff Stokes MD   4 mg at 02/11/21 1012   • calcium carbonate (TUMS) chewable tablet 500 mg (200 mg elemental)  2 tablet Oral BID PRN Antoine Ayers MD       • carvedilol (COREG) tablet 6.25 mg  6.25 mg Oral BID With Meals Antoine Ayers MD   6.25 mg at 02/11/21 1013   • clotrimazole-betamethasone (LOTRISONE) 1-0.05 % cream   Topical BID Antoine Ayers MD   Given at 02/11/21 1016   • dextrose (D50W) 25 g/ 50mL Intravenous Solution 25 g  25 g Intravenous Q15 Min PRN Antoine Ayers MD       • dextrose (GLUTOSE) oral gel 15 g  15 g Oral Q15 Min PRN Armen  Antoine ALEJANDRA MD       • fluticasone (FLONASE) 50 MCG/ACT nasal spray 2 spray  2 spray Each Nare Daily Antoine Ayers MD   2 spray at 02/11/21 1013   • gabapentin (NEURONTIN) capsule 100 mg  100 mg Oral Q12H Antoine Ayers MD   100 mg at 02/11/21 1003   • glucagon (human recombinant) (GLUCAGEN DIAGNOSTIC) injection 1 mg  1 mg Subcutaneous Q15 Min PRN Antoine Ayers MD       • hydrALAZINE (APRESOLINE) injection 10 mg  10 mg Intravenous Q6H PRN Antoine Ayers MD   10 mg at 02/02/21 2348   • hydrOXYzine (ATARAX) tablet 25 mg  25 mg Oral TID PRN Antoine Ayers MD   25 mg at 02/03/21 1652   • insulin lispro (humaLOG, ADMELOG) injection 0-7 Units  0-7 Units Subcutaneous TID AC Antoine Ayers MD   2 Units at 02/11/21 1149   • ipratropium-albuterol (DUO-NEB) nebulizer solution 3 mL  3 mL Nebulization Q6H PRN Antoine Ayers MD   3 mL at 02/01/21 1918   • levothyroxine (SYNTHROID, LEVOTHROID) tablet 25 mcg  25 mcg Oral Daily Antoine Ayers MD   25 mcg at 02/11/21 0637   • naloxone (NARCAN) injection 0.4 mg  0.4 mg Intravenous Q5 Min PRN Antoine Ayers MD       • nitroglycerin (NITROSTAT) SL tablet 0.4 mg  0.4 mg Sublingual Q5 Min PRN Antoine Ayers MD   0.4 mg at 02/02/21 0603   • nystatin (MYCOSTATIN) 063983 UNIT/GM cream   Topical BID Antoine Ayers MD   Given at 02/11/21 1014   • ondansetron (ZOFRAN) tablet 4 mg  4 mg Oral Q6H PRN Antoine Ayers MD        Or   • ondansetron (ZOFRAN) injection 4 mg  4 mg Intravenous Q6H PRN Antoine Ayers MD       • pantoprazole (PROTONIX) EC tablet 40 mg  40 mg Oral QAM Antoine Ayers MD   40 mg at 02/11/21 0637   • polyethylene glycol (MIRALAX) packet 17 g  17 g Oral Daily Rina Green APRN       • potassium chloride (K-DUR,KLOR-CON) ER tablet 40 mEq  40 mEq Oral PRN Antoine Ayers MD   40 mEq at 02/10/21 0603    Or   • potassium chloride (KLOR-CON) packet 40 mEq  40 mEq Oral PRN Antoine Ayers  MD        Or   • potassium chloride 10 mEq in 100 mL IVPB  10 mEq Intravenous Q1H PRN Antoine Ayers MD       • sennosides-docusate (PERICOLACE) 8.6-50 MG per tablet 2 tablet  2 tablet Oral Nightly Rina Green, APRROCKY       • sodium chloride 0.9 % flush 10 mL  10 mL Intravenous PRN Antoine Ayers MD       • sodium chloride 0.9 % flush 10 mL  10 mL Intravenous PRN Antoine Ayers MD       • sodium chloride 0.9 % flush 10 mL  10 mL Intravenous PRN Antoine Ayers MD       • sodium chloride nasal spray 1 spray  1 spray Each Nare PRN Antoine Ayers MD   1 spray at 02/09/21 2250   • sodium chloride nasal spray 2 spray  2 spray Each Nare Q8H Viji Paulson APRN   2 spray at 02/09/21 1140   • traMADol (ULTRAM) tablet 50 mg  50 mg Oral Q8H PRN Antoine Ayers MD   50 mg at 02/11/21 0349   • vilazodone (VIIBRYD) tablet 20 mg  20 mg Oral Nightly Antoine Ayers MD   20 mg at 02/10/21 2033       Assessment/Plan   1. OLI on CKD3. Cardiorenal syndrome. Creatinine very stable.   2. Acute on chronic diastolic heart failure, Severe aortic stenosis. On po diuretic.   3. DM2  4. OCHOA  5. SP MVR, TV repair.       Ok with renal for dc.       Chest pain    CKD (chronic kidney disease) stage 3, GFR 30-59 ml/min (CMS/[facility])    Diabetic peripheral neuropathy (CMS/[facility])    Hyperlipidemia    OCHOA (obstructive sleep apnea)    DM type 2 (diabetes mellitus, type 2) (CMS/HCC)    Essential hypertension    Pulmonary hypertension due to sleep-disordered breathing (CMS/HCC)    s/p MVR, TV-repair, CABG x2 6/13/16    Supplemental oxygen dependent    Chronic diastolic heart failure (CMS/HCC)    Chronic respiratory failure with hypoxia and hypercapnia (CMS/HCC)    COPD (chronic obstructive pulmonary disease) (CMS/[facility])    Complete heart block (CMS/HCC)              aRisa Rosa MD  02/11/21  12:52 EST

## 2021-02-11 NOTE — DISCHARGE SUMMARY
Date of Admission: 2/1/2021  Date of Discharge:  2/11/2021  Primary Care Physician: Arcelia Talbot, APRN     Discharge Diagnosis:  Active Hospital Problems    Diagnosis  POA   • **Chest pain [R07.9]  Yes   • Complete heart block (CMS/[facility]) [I44.2]  Yes   • COPD (chronic obstructive pulmonary disease) (CMS/[facility]) [J44.9]  Yes   • Chronic respiratory failure with hypoxia and hypercapnia (CMS/[facility]) [J96.11, J96.12]  Yes   • Chronic diastolic heart failure (CMS/[facility]) [I50.32]  Yes   • Supplemental oxygen dependent [Z99.81]  Not Applicable   • s/p MVR, TV-repair, CABG x2 6/13/16 [Z95.2]  Not Applicable   • Pulmonary hypertension due to sleep-disordered breathing (CMS/[facility]) [I27.29, G47.8]  Yes   • OCHOA (obstructive sleep apnea) [G47.33]  Yes   • DM type 2 (diabetes mellitus, type 2) (CMS/[facility]) [E11.9]  Yes   • Diabetic peripheral neuropathy (CMS/[facility]) [E11.42]  Yes   • Hyperlipidemia [E78.5]  Yes   • Essential hypertension [I10]  Yes   • CKD (chronic kidney disease) stage 3, GFR 30-59 ml/min (CMS/[facility]) [N18.30]  Yes      Resolved Hospital Problems    Diagnosis Date Resolved POA   • UTI (urinary tract infection), bacterial [N39.0, A49.9] 02/11/2021 Yes       DETAILS OF HOSPITAL STAY     Pertinent Test Results and Procedures Performed      Hospital Course  75-year-old woman with history of chronic diastolic heart failure, paroxysmal A. fib/flutter, CKD, essential hypertension, CAD status post CABG, mitral valve replacement with bioprosthetic mitral valve and tricuspid repair, diabetes, COPD, OCHOA who was admitted with shortness of breath and chest discomfort and diagnosed with an acute on chronic diastolic heart failure exacerbation.  Cardiac catheterization this admission showed patent grafts, and transthoracic echocardiogram showed severe aortic stenosis. Her mitral valve prosthesis, which was originally placed in a very densely calcified annulus still functions well.  She was also found to have complete heart block this  admission, and a permanent pacemaker was placed.  She has been diuresed aggressively, and she will need to follow-up with Dr. Pacheco with the structural heart team/ Dr. Mejía in about a month to decide about TAVR.   Her course is also complicated by enterococcal faecalis urinary tract infection which was treated with 3 days of ceftriaxone.  Her COPD and chronic respiratory failure has remained stable using home trilogy on 2 L nasal cannula.  DM2 is stable with an A1c of 6.71, CKD 3 with creatinine stable at 1.43 and blood pressure well controlled on amlodipine and Coreg.  Her bilateral lower extremity swelling/lymphedema is stable with bilateral leg wraps. She has been cleared by all consultants for discharge to rehab facility today if weather permits transportation.       Physical Exam at Discharge:  General: No acute distress, AAOx3  HEENT: EOMI, PERRL  Cardiovascular: +s1 and s2, RRR, no JVD  Lungs: No rhonchi or wheezing  Abdomen: soft, nontender, mild body wall edema  BLE legs wrapped, pulses intact.     Consults:   Consults     Date and Time Order Name Status Description    2/1/2021 1421 Inpatient Pulmonology Consult Completed     2/1/2021 1112 Inpatient Nephrology Consult      2/1/2021 1058 Inpatient Cardiology Consult Completed     2/1/2021 0530 Inpatient Cardiology Consult Completed     2/1/2021 8139 [facility] (on-call MD unless specified) Details Completed             Condition on Discharge: Stable    Discharge Disposition  Skilled Nursing Facility (DC - External)    Discharge Medications     Discharge Medications      New Medications      Instructions Start Date   acetaminophen 325 MG tablet  Commonly known as: TYLENOL   650 mg, Oral, Every 4 Hours PRN      amLODIPine 5 MG tablet  Commonly known as: NORVASC   5 mg, Oral, Every 24 Hours Scheduled      apixaban 5 MG tablet tablet  Commonly known as: ELIQUIS   5 mg, Oral, Every 12 Hours Scheduled      bisacodyl 5 MG EC tablet  Commonly known as: DULCOLAX   5 mg,  Oral, Daily PRN      bumetanide 2 MG tablet  Commonly known as: BUMEX   4 mg, Oral, 2 Times Daily      carvedilol 6.25 MG tablet  Commonly known as: COREG   6.25 mg, Oral, 2 Times Daily With Meals      fluticasone 50 MCG/ACT nasal spray  Commonly known as: FLONASE   2 sprays, Each Nare, Daily      ondansetron 4 MG tablet  Commonly known as: ZOFRAN   4 mg, Oral, Every 6 Hours PRN      polyethylene glycol 17 g packet  Commonly known as: MIRALAX   17 g, Oral, Daily      potassium chloride 20 MEQ CR tablet  Commonly known as: K-DUR,KLOR-CON   20 mEq, Oral, Every Other Day      sodium chloride 0.65 % nasal spray   2 sprays, Nasal, As Needed         Changes to Medications      Instructions Start Date   ipratropium-albuterol 0.5-2.5 mg/3 ml nebulizer  Commonly known as: DUO-NEB  What changed:   · when to take this  · reasons to take this   3 mL, Nebulization, 4 Times Daily - RT      O2  Commonly known as: OXYGEN  What changed:   · additional instructions  · how fast to infuse this   4 L/min (4 L/min), Inhalation, Continuous, 2-4L/min      traMADol 50 MG tablet  Commonly known as: ULTRAM  What changed: See the new instructions.   50 mg, Oral, Every 8 Hours PRN         Continue These Medications      Instructions Start Date   ALPRAZolam 1 MG tablet  Commonly known as: XANAX   1 mg, Oral, 2 Times Daily      atorvastatin 20 MG tablet  Commonly known as: LIPITOR   TAKE ONE TABLET BY MOUTH DAILY      clotrimazole-betamethasone 1-0.05 % cream  Commonly known as: Lotrisone   Topical, 2 Times Daily      Commode Bedside misc   1 each, Does not apply, Daily      fluticasone-salmeterol 250-50 MCG/DOSE DISKUS  Commonly known as: Advair Diskus   1 puff, Inhalation, Daily PRN      gabapentin 100 MG capsule  Commonly known as: NEURONTIN   100 mg, Oral, Every 12 Hours      glimepiride 1 MG tablet  Commonly known as: AMARYL   TAKE ONE TABLET BY MOUTH EVERY MORNING BEFORE BREAKFAST      levothyroxine 25 MCG tablet  Commonly known as:  SYNTHROID, LEVOTHROID   25 mcg, Oral, Daily      nitroglycerin 0.4 MG SL tablet  Commonly known as: NITROSTAT   DISSOLVE 1 TAB UNDER TONGUE FOR CHEST PAIN - IF PAIN REMAINS AFTER 5 MIN, CALL 911 AND REPEAT DOSE. MAX 3 TABS IN 15 MINUTES      nystatin 129002 UNIT/GM cream  Commonly known as: MYCOSTATIN   Topical, 2 Times Daily, to affected area(s)      omeprazole 40 MG capsule  Commonly known as: priLOSEC   40 mg, Oral, Daily      PROBIOTIC PO   Oral      sennosides-docusate 8.6-50 MG per tablet  Commonly known as: PERICOLACE   1 tablet, Oral, Daily      Trulicity 0.75 MG/0.5ML solution pen-injector  Generic drug: Dulaglutide   INJECT 0.75 MG UNDER THE SKIN ONCE WEEKLY      vilazodone 20 MG tablet tablet  Commonly known as: VIIBRYD   20 mg, Oral, Nightly      vitamin D 1.25 MG (99970 UT) capsule capsule  Commonly known as: ERGOCALCIFEROL   TAKE ONE CAPSULE BY MOUTH ONCE WEEKLY         Stop These Medications    aspirin  MG tablet     furosemide 40 MG tablet  Commonly known as: LASIX     losartan 100 MG tablet  Commonly known as: COZAAR            Discharge Diet:     Activity at Discharge:     Follow-up Appointments  Future Appointments   Date Time Provider Department Center   4/14/2021  3:15 PM Arcelia Talbot APRN [facility]         Test Results Pending at Discharge  Pending Labs     Order Current Status    COVID PRE-OP / PRE-PROCEDURE SCREENING ORDER (NO ISOLATION) - Swab, Nasopharynx In process    COVID-19,BIOTAP, NP/OP SWAB IN TRANSPORT MEDIA OR SALINE 24-36 HR TAT - Swab, Nasopharynx In process          I have examined and discussed discharge planning with the patient today.    I wore full protective equipment throughout the patient encounter including eye protection and facemask.  Hand hygiene was performed before donning protective equipment and after removal when leaving the room.     ZOILA Ochoa  02/11/21  09:43 EST    Time: Discharge 35 min

## 2021-02-11 NOTE — THERAPY DISCHARGE NOTE
Acute Care - Occupational Therapy Lymphedema Treatment Note/Discharge  [facility]     Patient Name: Miguelina Lopez  : 1945  MRN: [id number]  Today's Date: 2021                Admit Date: 2021    Visit Dx:    ICD-10-CM ICD-9-CM   1. Chest pain, unspecified type  R07.9 786.50   2. COPD exacerbation (CMS/HCC)  J44.1 491.21   3. Congestive heart failure, unspecified HF chronicity, unspecified heart failure type (CMS/Former[facility])  I50.9 428.0   4. Hypertension, unspecified type  I10 401.9   5. Abnormal chest x-ray  R93.89 793.2   6. Complete heart block (CMS/HCC)  I44.2 426.0   7. Chronic pain of left knee  M25.562 719.46    G89.29 338.29   8. Polyneuropathy associated with underlying disease (CMS/HCC)  G63 357.4   9. Diabetic peripheral neuropathy (CMS/HCC)  E11.42 250.60     357.2   10. Generalized anxiety disorder  F41.1 300.02   11. Arthritis of knee  M17.10 716.96         Patient Active Problem List   Diagnosis   • Anxiety disorder   • Arthritis of knee   • Asthma   • Chronic coronary artery disease   • CKD (chronic kidney disease) stage 3, GFR 30-59 ml/min (CMS/HCC)   • Depression   • Diabetic peripheral neuropathy (CMS/HCC)   • Gastroesophageal reflux disease   • Hyperlipidemia   • Insomnia   • Lower gastrointestinal hemorrhage   • Anemia   • OCHOA (obstructive sleep apnea)   • DM type 2 (diabetes mellitus, type 2) (CMS/HCC)   • Essential hypertension   • Hospital discharge follow-up   • Pulmonary hypertension due to sleep-disordered breathing (CMS/HCC)   • s/p MVR, TV-repair, CABG x2 16   • OLI (acute kidney injury) (CMS/[facility])   • Leukocytosis   • Atrial fibrillation (CMS/HCC)   • Nocturnal hypoxia   • Dermatitis   • Medicare annual wellness visit, initial   • Class 3 severe obesity due to excess calories with serious comorbidity and body mass index (BMI) of 50.0 to 59.9 in adult (CMS/[facility])   • Supplemental oxygen dependent   • Acute on chronic combined systolic and diastolic HF (heart failure)  (CMS/Former[facility])   • Mitral regurgitation   • Aortic stenosis   • Medicare annual wellness visit, subsequent   • Localized edema   • Chronic right-sided congestive heart failure (CMS/HCC)   • Cellulitis of left lower extremity   • Proteinuria   • Bilateral lower extremity edema   • Subclinical hypothyroidism   • Chronic diastolic heart failure (CMS/HCC)   • Chronic respiratory failure with hypoxia and hypercapnia (CMS/HCC)   • Acute on chronic respiratory failure with hypoxia and hypercapnia (CMS/HCC)   • AV block, 2nd degree   • 1st degree AV block   • Chest pain   • COPD (chronic obstructive pulmonary disease) (CMS/HCC)   • Complete heart block (CMS/HCC)        Past Medical History:   Diagnosis Date   • Acute on chronic respiratory failure with hypoxia and hypercapnia (CMS/[facility])    • OLI (acute kidney injury) (CMS/[facility])    • Anemia    • Anxiety    • Aortic valve stenosis    • Bilateral lower extremity edema    • CHF (congestive heart failure) (CMS/[facility])    • Chronic coronary artery disease    • Class 3 severe obesity due to excess calories in adult (CMS/[facility])    • COPD (chronic obstructive pulmonary disease) (CMS/[facility])    • Depression    • Diabetes mellitus (CMS/[facility])    • Elevated cholesterol    • GERD (gastroesophageal reflux disease)    • Heart murmur    • Hypertension    • Mitral valve insufficiency    • Pneumonia     1/2016   • Pulmonary hypertension (CMS/[facility])     due to sleep disordered breathing   • Sleep apnea     Uses CPAP or oxygen   • Stage 3 chronic kidney disease (CMS/[facility])    • Subclinical hypothyroidism    • Supplemental oxygen dependent    • Valvular heart disease         Past Surgical History:   Procedure Laterality Date   • CARDIAC CATHETERIZATION     • CARDIAC CATHETERIZATION N/A 6/10/2016    Procedure: Left Heart Cath;  Surgeon: Chace Johnson MD;  Location: [facility] INVASIVE LOCATION;  Service:    • CARDIAC CATHETERIZATION N/A 6/10/2016    Procedure: Right Heart Cath;  Surgeon: Chace  MD Alex;  Location: [facility] CATH INVASIVE LOCATION;  Service:    • CARDIAC CATHETERIZATION N/A 2/5/2021    Procedure: RIGHT AND LEFT HEART CATH;  Surgeon: Antoine Ayers MD;  Location: [facility] CATH INVASIVE LOCATION;  Service: Cardiology;  Laterality: N/A;   • CARDIAC CATHETERIZATION N/A 2/5/2021    Procedure: Coronary angiography;  Surgeon: Antoine Ayers MD;  Location: [facility] CATH INVASIVE LOCATION;  Service: Cardiology;  Laterality: N/A;   • CARDIAC CATHETERIZATION N/A 2/5/2021    Procedure: Left ventriculography- pressures;  Surgeon: Antoine Ayers MD;  Location: [facility] CATH INVASIVE LOCATION;  Service: Cardiology;  Laterality: N/A;   • CARDIAC ELECTROPHYSIOLOGY PROCEDURE N/A 2/2/2021    Procedure: Pacemaker DC new---[employer] MICRA;  Surgeon: Eliazar Bond MD;  Location: [facility] CATH INVASIVE LOCATION;  Service: Cardiology;  Laterality: N/A;   • CARDIAC SURGERY     • CORONARY ARTERY BYPASS GRAFT      2 vessel   • CORONARY ARTERY BYPASS GRAFT WITH MITRAL VALVE REPAIR/REPLACEMENT N/A 6/13/2016    Procedure: INTRAOPERATIVE TARIQ, MIDLINE STERNOTOMY, CORONARY ARTERY BYPASS GRAFTING X  2 UTILIZING ENDOSCOPICALLY HARVESTED LEFT GREATER SAPHENOUS VEIN, MITRAL VALVE REPLACEMENT AND TRICUSPID VALVE REPAIR;  Surgeon: Eliecer Mistry MD;  Location: [facility] OR;  Service:    • CORONARY STENT PLACEMENT  2010    Approx. 6 yrs ago at [facility]   • HEMORRHOIDECTOMY     • HYSTERECTOMY     • MITRAL VALVE REPLACEMENT     • REPLACEMENT TOTAL KNEE Right    • THYROID SURGERY      Cyst removed from thyroid   • VASCULAR SURGERY           Occupational Therapy Education                 Title: PT OT SLP Therapies (Done)     Topic: Occupational Therapy (Done)     Point: ADL training (Done)     Description:   Instruct learner(s) on proper safety adaptation and remediation techniques during self care or transfers.   Instruct in proper use of assistive devices.              Learning Progress Summary            Patient Acceptance, E,D,H, VU by VS at 2/11/2021 1239    Comment: Written instuction provided to pt. pt. educated on wearing schedule and precaution with regards to LE wraps    Acceptance, E,TB,D, NR by SINDY at 2/6/2021 1530    Acceptance, E,D,H, VU by VS at 2/3/2021 1421    Comment: Lymphedema bandages wearing precaution, written/verbal  instruction and demonstration on wrapping LEs    Acceptance, E,D, VU by PATRICIO at 2/1/2021 1550    Comment: role of OT, plan of care, body mechanics                   Point: Precautions (Done)     Description:   Instruct learner(s) on prescribed precautions during self-care and functional transfers.              Learning Progress Summary           Patient Acceptance, E,D,H, VU by VS at 2/11/2021 1239    Comment: Written instuction provided to pt. pt. educated on wearing schedule and precaution with regards to LE wraps    Acceptance, E,TB,D, NR by SINDY at 2/6/2021 1530    Acceptance, E,D,H, VU by VS at 2/3/2021 1421    Comment: Lymphedema bandages wearing precaution, written/verbal  instruction and demonstration on wrapping LEs    Acceptance, E,D, VU by PATRICIO at 2/1/2021 1550    Comment: role of OT, plan of care, body mechanics                   Point: Body mechanics (Done)     Description:   Instruct learner(s) on proper positioning and spine alignment during self-care, functional mobility activities and/or exercises.              Learning Progress Summary           Patient Acceptance, E,D,H, VU by VS at 2/11/2021 1239    Comment: Written instuction provided to pt. pt. educated on wearing schedule and precaution with regards to LE wraps    Acceptance, E,TB,D, NR by SINDY at 2/6/2021 1530    Acceptance, E,D,H, VU by VS at 2/3/2021 1421    Comment: Lymphedema bandages wearing precaution, written/verbal  instruction and demonstration on wrapping LEs    Acceptance, E,D, VU by PATRICIO at 2/1/2021 1550    Comment: role of OT, plan of care, body mechanics                               User Key      Initials Effective Dates Name Provider Type Discipline    LE 06/08/18 -  Solange Dawkins OTR Occupational Therapist OT    VS 03/02/20 -  Ericka Bishop OTR Occupational Therapist OT    SINDY 03/07/18 -  Pau Hensley PTA Physical Therapy Assistant PT                  Lymphedema     Row Name 02/11/21 1020 02/10/21 1300 02/09/21 1406       Subjective Pain    Able to rate subjective pain?  yes  -VS  --  --    Pre-Treatment Pain Level  0  -VS  --  --    Post-Treatment Pain Level  0  -VS  --  --    Recorded by [VS] Ericka Bishop OTR         Lymphedema Assessment    Lymphedema Classification  RLE:;LLE:  -VS  RLE:;LLE:  -VS  RLE:;LLE:  -VS    Recorded by [VS] Ericka Bishop, ANNEMARIE [VS] Ericka Bishop, ANNEMARIE [VS] Dario, Ericka, WOODR       Lymphedema Edema Assessment    Pitting Edema  Mild;Moderate (R) Mild, (L) mild/moderate   -VS  Mild;Moderate Mild (R). Mild/Moderate (L)   -VS  Mild;Moderate (L) larger than (R)  -VS    Edema Assessment Comment  --  -- Written instruction for wrapping in the pt's room  -VS  --    Recorded by [VS] Ericka Bishop, ANNEMARIE [VS] Ericka Bishop, ANNEMARIE [VS] Ericka Bishop, ANNEMARIE       Skin Changes/Observations    Skin Observations Comment  -- no skin issues Mepliex was removed by RN  -VS  --  --    Recorded by [VS] Ericka Bishop OTR         Compression/Skin Care    Compression/Skin Care  skin care;wrapping location;bandaging;remove bandages  -VS  skin care;wrapping location;bandaging;remove bandages  -VS  skin care;wrapping location;remove bandages  -VS    Skin Care  washed/dried;lotion applied;moisturizing lotion applied  -VS  washed/dried;lotion applied;moisturizing lotion applied  -VS  washed/dried;lotion applied  -VS    Wrapping Location  lower extremity  -VS  lower extremity  -VS  lower extremity  -VS    Wrapping Location UE  bilateral:  -VS  --  --    Wrapping Location LE  foot to knee  -VS  foot to knee  -VS  bilateral:;foot to knee  -VS    Wrapping Comments  Written instruction  given to patient and she pasked in her suit case to take to Subacute facility   -VS  --  --    Bandage Layers  cotton liner;padding/fluff layer;short-stretch bandages (comment size/quantity)  -VS  cotton liner;padding/fluff layer;short-stretch bandages (comment size/quantity)  -VS  cotton liner;padding/fluff layer;short-stretch bandages (comment size/quantity)  -VS    Bandaging Comments  Bandages #8 & #10 wrapped from base of toes to Knee (B)ly   -VS  Bandages # 8 & #10 toes to knee (B)ly   -VS  Bandages #8 & #10   -VS    Bandaging Technique  light compression  -VS  light compression  -VS  light compression  -VS    Recorded by [VS] Ericka Bishop OTR [VS] Ericka Bishop OTR [VS] Ericka Bishop OTR      User Key  (r) = Recorded By, (t) = Taken By, (c) = Cosigned By    Initials Name Effective Dates    VS Ericka Bishop OTR 03/02/20 -         Rash 02/01/21 1152 Left medial abdomen (Active)   Distribution generalized 02/11/21 0830   Configuration/Shape asymmetric 02/11/21 0830   Borders indistinct 02/11/21 0830   Characteristics moist 02/11/21 0830   Color pink;red 02/11/21 0830   Care, Rash other (see comments);skin cleanser 02/10/21 2000         OT Recommendation and Plan  Equipment Needs Upon Discharge (OT): (uses walker)  Anticipated Discharge Disposition (OT): sub acute care setting  Planned Therapy Interventions (OT): activity tolerance training, adaptive equipment training, BADL retraining, occupation/activity based interventions, patient/caregiver education/training, transfer/mobility retraining, functional balance retraining  Therapy Frequency (OT): 5 times/wk  Plan of Care Review  Plan of Care Reviewed With: patient  Progress: improving  Outcome Summary: OT:  Lymph:  Pt. continues to tolerate the LE wraps, (B)LE has reduced in size, (L) continues to be greater than (R). (R) is mild and (L) is mild/moderate in size . No pitting edema noted with either.  Therapist rewrapped the LE from the base of  the toes to the knees (B)ly.  Pt. continues to benefit from LE Lymph wrapping.  PT. wore a mask, therapist wore a mask, eye protection and washed her hands before and after the treatment.         Rehab Goal Summary     Row Name 02/11/21 1020             Bed Mobility Goal 1 (OT)    Progress/Outcomes (Bed Mobility Goal 1, OT)  goal partially met  -VS         Toileting Goal 1 (OT)    Progress/Outcome (Toileting Goal 1, OT)  goal not met  -VS         Grooming Goal 1 (OT)    Progress/Outcome (Grooming Goal 1, OT)  goal met  -VS         Problem Specific Goal 1 (OT)    Progress/Outcome (Problem Specific Goal 1, OT)  goal met  -VS        User Key  (r) = Recorded By, (t) = Taken By, (c) = Cosigned By    Initials Name Provider Type Discipline    VS Ericka Bishop OTR Occupational Therapist OT          Outcome Measures     Row Name 02/11/21 1200 02/10/21 1400 02/09/21 1406       How much help from another is currently needed...    Putting on and taking off regular lower body clothing?  1  -VS  1  -VS  1  -VS    Bathing (including washing, rinsing, and drying)  2  -VS  2  -VS  2  -VS    Toileting (which includes using toilet bed pan or urinal)  1  -VS  1  -VS  1  -VS    Putting on and taking off regular upper body clothing  2  -VS  2  -VS  2  -VS    Taking care of personal grooming (such as brushing teeth)  3  -VS  3  -VS  3  -VS    Eating meals  4  -VS  4  -VS  4  -VS    AM-PAC 6 Clicks Score (OT)  13  -VS  13  -VS  13  -VS       Functional Assessment    Outcome Measure Options  AM-PAC 6 Clicks Daily Activity (OT)  -VS  AM-PAC 6 Clicks Daily Activity (OT)  -VS  --    Row Name 02/08/21 1300             How much help from another is currently needed...    Putting on and taking off regular lower body clothing?  1  -VS      Bathing (including washing, rinsing, and drying)  2  -VS      Toileting (which includes using toilet bed pan or urinal)  1  -VS      Putting on and taking off regular upper body clothing  2  -VS      Taking  care of personal grooming (such as brushing teeth)  3  -VS      Eating meals  3  -VS      AM-PAC 6 Clicks Score (OT)  12  -VS         Functional Assessment    Outcome Measure Options  AM-PAC 6 Clicks Daily Activity (OT)  -VS        User Key  (r) = Recorded By, (t) = Taken By, (c) = Cosigned By    Initials Name Provider Type    VS Ericka Bishop OTR Occupational Therapist              Time Calculation:   Time Calculation- OT     Row Name 02/11/21 1020             Time Calculation- OT    OT Start Time  0929  -VS      OT Stop Time  1008  -VS      OT Time Calculation (min)  39 min  -VS      Total Timed Code Minutes- OT  39 minute(s)  -VS      OT Received On  02/11/21  -VS        User Key  (r) = Recorded By, (t) = Taken By, (c) = Cosigned By    Initials Name Provider Type    VS Ericka Bishop, OTR Occupational Therapist          Therapy Charges for Today     Code Description Service Date Service Provider Modifiers Qty    62990677176 HC OT MANUAL THERAPY EA 15 MIN 2/10/2021 Ericka Bishop OTFRANKIE GO 3    [id number] HC OT MANUAL THERAPY EA 15 MIN 2/11/2021 Ericka Bishop OTFRANKIE GO 3                    OT Discharge Summary  Anticipated Discharge Disposition (OT): sub acute care setting  Outcomes Achieved: Able to achieve all goals within established timeline  Discharge Destination: SNF      ANNEMARIE Wiggins  2/11/2021

## 2021-02-11 NOTE — NURSING NOTE
Patients granddaughter (POA and hospital contact) was informed of discharge per patient. Grandson-in-law will carry belongings to rehab facility due to limited capacity on ambulance.

## 2021-02-11 NOTE — PLAN OF CARE
Goal Outcome Evaluation:  Plan of Care Reviewed With: patient  Progress: improving  Outcome Summary: OT:  Lymph:  Pt. continues to tolerate the LE wraps, (B)LE has reduced in size, (L) continues to be greater than (R). (R) is mild and (L) is mild/moderate in size . No pitting edema noted with either.  Therapist rewrapped the LE from the base of the toes to the knees (B)ly.  Pt. continues to benefit from LE Lymph wrapping.  PT. wore a mask, therapist wore a mask, eye protection and washed her hands before and after the treatment.

## 2021-02-11 NOTE — PROGRESS NOTES
Continued Stay Note  [facility]     Patient Name: Miguelina Lopez  MRN: [id number]  Today's Date: 2/11/2021    Admit Date: 2/1/2021    Discharge Plan     Row Name 02/11/21 1040       Plan    Plan  2/11 [facility] SN[facility] via [facility] at 5PM today;  Will need result of COVID before can D/C.    Plan Comments  Pt has d/c orders.  Pt COVID result is still pending.  CCP called [facility] and spoke with Julsisa to discuss transportation arrangements.  Julissa advised she would put Pt on schedule for 5:00PM however, if someone else is ready for d/c while we are still awaiting Pt COVID results, Pt 5PM slot will be given to someone who is ready for d/c.  CCP confirmed with Ruth[facility] that Pt bed is ready at [facility] as well as confirmed they do have O.T. on sight to wrap Pt legs.  RN will need to fax D/C Summary to [facility] (fax cover sheet is on packet).  RN will need to print out the ambulance necessity form SEE completed this morning and place it on the outside of the packet for [facility].  IGGY THOMPSON/CCP        Discharge Codes    No documentation.       Expected Discharge Date and Time     Expected Discharge Date Expected Discharge Time    Feb 11, 2021             Malina Campos RN

## 2021-02-12 NOTE — PROGRESS NOTES
Case Management Discharge Note      Final Note: Pt d/c to [facility] SNF via [facility] on 2/11/2021.....JAY PARKINSON/CCP    Provided Post Acute Provider List?: Yes  Post Acute Provider List: Nursing Home  N/A Provider List Comment: has used [facility] in the past and agreeable to using them again  Provided Post Acute Provider Quality & Resource List?: Yes  Delivered To: Patient, Support Person  Support Person: chun Morales  Method of Delivery: In person, Telephone    Selected Continued Care - Discharged on 2/11/2021 Admission date: 2/1/2021 - Discharge disposition: Skilled Nursing Facility (DC - External)    Destination Coordination complete    Service Provider Selected Services Address Phone Fax Patient Preferred    [facility]  Skilled Nursing [address] [address] [phone] --          Durable Medical Equipment    No services have been selected for the patient.              Dialysis/Infusion    No services have been selected for the patient.              Home Medical Care Coordination complete    Service Provider Selected Services Address Phone Fax Patient Preferred    [facility] [place]  Home Health Services [address] [address] [phone] --          Therapy    No services have been selected for the patient.              Community Resources    No services have been selected for the patient.                  Transportation Services  Ambulance: [facility]    Final Discharge Disposition Code: 03 - skilled nursing facility (SNF)

## 2021-02-18 ENCOUNTER — TELEPHONE (OUTPATIENT)
Dept: CARDIOLOGY | Facility: CLINIC | Age: 76
End: 2021-02-18

## 2021-02-18 RX ORDER — GLIMEPIRIDE 1 MG/1
1 TABLET ORAL
Qty: 90 TABLET | Refills: 1 | Status: SHIPPED | OUTPATIENT
Start: 2021-02-18 | End: 2021-03-20 | Stop reason: HOSPADM

## 2021-02-18 NOTE — TELEPHONE ENCOUNTER
"Pt granddaughter called in for advice on this pt.      She said she is currently in [facility] and her mother is not getting the care she needs.  She said she  Has been gaining wt daily and they are not doing anything about.  She said she is up at least 15lbs over the last 3 days. Her legs look like \"tree trunks\" and she is SOA.    I have suggested she bring her back into the ER for further evaluation.  Thanks  Solange Altman RN  Triage nurse      "

## 2021-02-19 ENCOUNTER — TELEPHONE (OUTPATIENT)
Dept: FAMILY MEDICINE CLINIC | Facility: CLINIC | Age: 76
End: 2021-02-19

## 2021-02-19 NOTE — TELEPHONE ENCOUNTER
Caller: Carmen Carrizales    Relationship to patient: Emergency Contact    Best call back number: [phone]    Patient is needing: PATIENT HAS BEEN SENT TO REHAB FROM THE HOSPITAL. SHE HAS BEEN IN REHAB FOR A WEEK AND SHE HAS ALREADY GAINED 7LBS OF FLUID.     THEY (THE REHAB FACILITY) SUGGESTED THAT THE FAMILY CALL THE PCP TO SEE IF THEY CAN UP HER WATER PILL TO GET HER SOME RELIEF.    PLEASE ADVISE.

## 2021-02-23 ENCOUNTER — TELEPHONE (OUTPATIENT)
Dept: CARDIOLOGY | Facility: CLINIC | Age: 76
End: 2021-02-23

## 2021-02-23 NOTE — TELEPHONE ENCOUNTER
"Pt is currently in a skilled facility at [facility] and rehab. Dr Solis at the facility saw her today.  Pt had a 7.5 lb wt gain over 2 days.  Dr Solis stated he could hear \"fluid in her lungs\" and suggested they call and get an apt at our office tomorrow.    Pt is being discharged from [facility] tomorrow around 1.  I have scheduled her to be seen at 1500 tomorrow for further evaluation.  I will ask medical records to get records.  Thanks  Solange Altman RN  Triage nurse    "

## 2021-02-24 ENCOUNTER — CLINICAL SUPPORT NO REQUIREMENTS (OUTPATIENT)
Dept: CARDIOLOGY | Facility: CLINIC | Age: 76
End: 2021-02-24

## 2021-02-24 ENCOUNTER — OFFICE VISIT (OUTPATIENT)
Dept: CARDIOLOGY | Facility: CLINIC | Age: 76
End: 2021-02-24

## 2021-02-24 ENCOUNTER — TELEPHONE (OUTPATIENT)
Dept: CARDIOLOGY | Facility: CLINIC | Age: 76
End: 2021-02-24

## 2021-02-24 VITALS
DIASTOLIC BLOOD PRESSURE: 60 MMHG | HEIGHT: 60 IN | HEART RATE: 83 BPM | SYSTOLIC BLOOD PRESSURE: 118 MMHG | BODY MASS INDEX: 53.17 KG/M2 | OXYGEN SATURATION: 100 %

## 2021-02-24 DIAGNOSIS — Z95.0 PRESENCE OF PERMANENT CARDIAC PACEMAKER: ICD-10-CM

## 2021-02-24 DIAGNOSIS — I50.32 CHRONIC DIASTOLIC HEART FAILURE (HCC): ICD-10-CM

## 2021-02-24 DIAGNOSIS — N18.32 STAGE 3B CHRONIC KIDNEY DISEASE (HCC): ICD-10-CM

## 2021-02-24 DIAGNOSIS — I34.0 NONRHEUMATIC MITRAL VALVE REGURGITATION: ICD-10-CM

## 2021-02-24 DIAGNOSIS — Z95.2 HISTORY OF MVR WITH CARDIOPULMONARY BYPASS: ICD-10-CM

## 2021-02-24 DIAGNOSIS — J44.9 CHRONIC OBSTRUCTIVE PULMONARY DISEASE, UNSPECIFIED COPD TYPE (HCC): ICD-10-CM

## 2021-02-24 DIAGNOSIS — I50.812 CHRONIC RIGHT-SIDED CONGESTIVE HEART FAILURE (HCC): Primary | ICD-10-CM

## 2021-02-24 DIAGNOSIS — G47.33 OSA (OBSTRUCTIVE SLEEP APNEA): ICD-10-CM

## 2021-02-24 DIAGNOSIS — G47.8 PULMONARY HYPERTENSION DUE TO SLEEP-DISORDERED BREATHING (HCC): ICD-10-CM

## 2021-02-24 DIAGNOSIS — I05.0 MITRAL VALVE STENOSIS, UNSPECIFIED ETIOLOGY: ICD-10-CM

## 2021-02-24 DIAGNOSIS — I27.29 PULMONARY HYPERTENSION DUE TO SLEEP-DISORDERED BREATHING (HCC): ICD-10-CM

## 2021-02-24 DIAGNOSIS — I44.2 COMPLETE HEART BLOCK (HCC): Primary | ICD-10-CM

## 2021-02-24 DIAGNOSIS — I35.0 NONRHEUMATIC AORTIC VALVE STENOSIS: ICD-10-CM

## 2021-02-24 DIAGNOSIS — D64.9 ANEMIA, UNSPECIFIED TYPE: ICD-10-CM

## 2021-02-24 DIAGNOSIS — I44.2 COMPLETE HEART BLOCK (HCC): ICD-10-CM

## 2021-02-24 DIAGNOSIS — N17.9 AKI (ACUTE KIDNEY INJURY) (HCC): ICD-10-CM

## 2021-02-24 DIAGNOSIS — E66.01 CLASS 3 SEVERE OBESITY DUE TO EXCESS CALORIES WITH SERIOUS COMORBIDITY AND BODY MASS INDEX (BMI) OF 50.0 TO 59.9 IN ADULT (HCC): ICD-10-CM

## 2021-02-24 PROCEDURE — 93000 ELECTROCARDIOGRAM COMPLETE: CPT | Performed by: NURSE PRACTITIONER

## 2021-02-24 PROCEDURE — 99215 OFFICE O/P EST HI 40 MIN: CPT | Performed by: NURSE PRACTITIONER

## 2021-02-24 PROCEDURE — 93279 PRGRMG DEV EVAL PM/LDLS PM: CPT | Performed by: INTERNAL MEDICINE

## 2021-02-24 NOTE — TELEPHONE ENCOUNTER
I believe the doc at [facility] has been adjusting diuretics   Thanks  Solange Altman RN  Triage nurse

## 2021-02-24 NOTE — TELEPHONE ENCOUNTER
Pt with CHB and newly implanted MICRA AV. Initial check by Kee Hollingsworth MDT rep today. Normal device function and testing. He did change mode from VVIR to VDD as you discussed. Did you want her to see you or Sara at all for f/u? She has no future appt with EP and it did not say on the discharge instructions.  NS

## 2021-02-24 NOTE — TELEPHONE ENCOUNTER
No they didn't take her to the ER.  I discussed it with her, she said the pts wt has been up and down and the Dr at the facility has been trying to manage it.  Thanks  Solange Altman RN  Triage nurse

## 2021-02-25 ENCOUNTER — TELEPHONE (OUTPATIENT)
Dept: SOCIAL WORK | Facility: HOSPITAL | Age: 76
End: 2021-02-25

## 2021-02-25 NOTE — TELEPHONE ENCOUNTER
Patient daughter is calling stating that patient needs a refill on her Bumex. Was seen here in office on 2/24/2021 and is completely out of this medication.     Please advise

## 2021-02-25 NOTE — TELEPHONE ENCOUNTER
Call received from pt's daughter Puja Rubio, [phone].  She was very upset by the care her mother received at [facility].  They have since taken Ms. Lopez home.  One prescription was provided by the MD at [facility] for Bumex but the pharmacy states it is illegible and would not fill it.  Family has since contacted Ms. Lopez's cardiologist who has provided the prescription.  CCP confirmed with the pharmacy that it is now filled and ready for pickup.  [facility] provided emotional support and [facility] in case she would like to speak with them.  Provided contact information for CCP for any further needs.      Addendum:  The pt's PCP Arcelia CUMMINGS was the provider who was also contacted and provided the prescription for the Bumex.

## 2021-02-26 ENCOUNTER — READMISSION MANAGEMENT (OUTPATIENT)
Dept: CALL CENTER | Facility: HOSPITAL | Age: 76
End: 2021-02-26

## 2021-02-26 RX ORDER — BUMETANIDE 2 MG/1
4 TABLET ORAL 2 TIMES DAILY
Start: 2021-02-26 | End: 2021-02-26 | Stop reason: SDUPTHER

## 2021-02-26 RX ORDER — BUMETANIDE 2 MG/1
4 TABLET ORAL 2 TIMES DAILY
Qty: 30 TABLET | Refills: 0
Start: 2021-02-26 | End: 2021-04-07 | Stop reason: SDUPTHER

## 2021-02-26 NOTE — OUTREACH NOTE
CHF Week 2 Survey      Responses   [facility] patient discharged from?  [facility]   Does the patient have one of the following disease processes/diagnoses(primary or secondary)?  CHF   Week 2 attempt successful?  Yes   Call start time  0739   Rescheduled  Rescheduled-pt requested [Patient says she is doing OK, not able to review instructions with patient. Attempted to reach emergency contacts, calls went to voicemail.]   Discharge diagnosis  COPD/chronic resp failure, CHF   Is patient permission given to speak with other caregiver?  Yes   List who call center can speak with  Carmen or Alin   Medication comments  Patient says she has all of her medications. Carmen puts meds in a planner.   What is the Home health agency?   [facility]   Home health comments  Home Health will visit today between 1:30pm and 2:00pm   DME comments  Wears O2 @2l/min. Has [employer]   Pulse Ox monitoring  Intermittent   Pulse Ox device source  Patient          Roseline Walsh RN

## 2021-02-26 NOTE — OUTREACH NOTE
Prep Survey      Responses   [facility] facility patient discharged from?  [place]   Is LACE score < 7 ?  No   Emergency Room discharge w/ pulse ox?  No   Eligibility  Readm Mgmt   Discharge diagnosis  COPD/chronic resp failure, CHF   Does the patient have one of the following disease processes/diagnoses(primary or secondary)?  CHF   Does the patient have Home health ordered?  No   Is there a DME ordered?  No   General alerts for this patient  Please see telephone note for CM   Prep survey completed?  Yes          Briana Escalante RN

## 2021-02-27 ENCOUNTER — READMISSION MANAGEMENT (OUTPATIENT)
Dept: CALL CENTER | Facility: HOSPITAL | Age: 76
End: 2021-02-27

## 2021-03-02 ENCOUNTER — TELEPHONE (OUTPATIENT)
Dept: FAMILY MEDICINE CLINIC | Facility: CLINIC | Age: 76
End: 2021-03-02

## 2021-03-02 DIAGNOSIS — Z23 IMMUNIZATION DUE: ICD-10-CM

## 2021-03-02 NOTE — TELEPHONE ENCOUNTER
Home health is calling to get verbal orders for pt. She has edema in her lower extremities and they would like to wrap legs in ACE bandages. They are also wanting to start the process for patient to get a hospital bed.

## 2021-03-03 ENCOUNTER — READMISSION MANAGEMENT (OUTPATIENT)
Dept: CALL CENTER | Facility: HOSPITAL | Age: 76
End: 2021-03-03

## 2021-03-03 NOTE — OUTREACH NOTE
"CHF Week 3 Survey      Responses   [facility] patient discharged from?  [place]   Does the patient have one of the following disease processes/diagnoses(primary or secondary)?  CHF   Week 3 attempt successful?  Yes   Call start time  1356   Call end time  1409   General alerts for this patient  Please see telephone note for CM   Discharge diagnosis  COPD/chronic resp failure, CHF   Meds reviewed with patient/caregiver?  Yes   Is the patient having any side effects they believe may be caused by any medication additions or changes?  No   Is the patient taking all medications as directed (includes completed medication regime)?  Yes   Comments regarding appointments  Appt with cardiology is on 2/24/21,  phone visit with cardiology is on 3/4/21,  appt with nephrology on 3/9/21   Does the patient have an appointment with their PCP within 7 days of discharge?  No   What is preventing the patient from scheduling follow up appointments within 7 days of discharge?  -- [unsure]   Nursing Interventions  Verified appointment date/time/provider   Has the patient kept scheduled appointments due by today?  Yes   Comments  Pt has had 1 COVID vaccine. 2nd one is scheduled on 3/17/21.   What is the Home health agency?   [facility]   Home health comments  HH visited on 3/2/21   DME comments  Wears O2 @2l/min. Has Trilogy   Pulse Ox monitoring  Intermittent   Pulse Ox device source  Patient   O2 Sat comments  \"it was good\"   Psychosocial comments  Pt lives with her son in law and granddaughter (31 years old)   Comments  Pt went to [facility] and reports, \"that place is a joke\". They gave her meds that caused nightmares. She states they put her in \"diapers\" so she could urinate in that during the night \"in case they were busy\". Pt reports she's never had to wear \"diapers\". Pt verbalized Malina (from [facility]) needs to know that place ([facility]) is not a 6 star place.    What is the patient's perception of their health " status since discharge?  Same [Pt fell at home yesterday while HH was present. She hurt her legs. 911 was called and they got her back to her chair. ]   Nursing interventions  Nurse provided patient education   Is the patient weighing daily?  Yes   Does the patient have scales?  No [Scale broke. Benigno is working on getting her a scale. ]   Is the patient able to teach back Heart Failure Zones?  Yes [Pt has CHF chart]   Is the patient able to teach back signs and symptoms of worsening condition? (i.e. weight gain, shortness of air, etc.)  Yes   CHF Week 3 call completed?  Yes   Wrap up additional comments  Granddaughter, Carmen, takes very good care of patient.           Poonam Hay RN

## 2021-03-04 NOTE — TELEPHONE ENCOUNTER
Did it. HH reports a fall at home due to leg weakness. No injuries happened. If insurance approves she may be approved for more visits. She would also like to request a hospital bed because she can't elevate her legs enough in the recliner to help with her edema.

## 2021-03-08 RX ORDER — ERGOCALCIFEROL 1.25 MG/1
CAPSULE ORAL
Qty: 12 CAPSULE | Refills: 0 | Status: SHIPPED | OUTPATIENT
Start: 2021-03-08 | End: 2021-04-05

## 2021-03-11 NOTE — TELEPHONE ENCOUNTER
Neal Barnes with [facility] is calling to check on the status on the order for the hospital bed for this patient. Please advise.     He can be reached at [phone].

## 2021-03-11 NOTE — TELEPHONE ENCOUNTER
Pt needed an appointment to gain proper documentation to gain the hospital bed. Pt has been scheduled for telehealth in order to do this.

## 2021-03-12 ENCOUNTER — READMISSION MANAGEMENT (OUTPATIENT)
Dept: CALL CENTER | Facility: HOSPITAL | Age: 76
End: 2021-03-12

## 2021-03-12 NOTE — OUTREACH NOTE
CHF Week 4 Survey      Responses   [facility] patient discharged from?  [place]   Does the patient have one of the following disease processes/diagnoses(primary or secondary)?  CHF   Week 4 attempt successful?  No          Elaine Oliver RN

## 2021-03-15 ENCOUNTER — APPOINTMENT (OUTPATIENT)
Dept: GENERAL RADIOLOGY | Facility: HOSPITAL | Age: 76
End: 2021-03-15

## 2021-03-15 ENCOUNTER — HOSPITAL ENCOUNTER (OUTPATIENT)
Facility: HOSPITAL | Age: 76
Setting detail: OBSERVATION
Discharge: HOME | End: 2021-03-20
Attending: EMERGENCY MEDICINE | Admitting: INTERNAL MEDICINE

## 2021-03-15 ENCOUNTER — APPOINTMENT (OUTPATIENT)
Dept: CT IMAGING | Facility: HOSPITAL | Age: 76
End: 2021-03-15

## 2021-03-15 DIAGNOSIS — I65.23 BILATERAL CAROTID ARTERY STENOSIS: ICD-10-CM

## 2021-03-15 DIAGNOSIS — E87.0 HYPERNATREMIA: ICD-10-CM

## 2021-03-15 DIAGNOSIS — G45.9 TIA (TRANSIENT ISCHEMIC ATTACK): Primary | ICD-10-CM

## 2021-03-15 DIAGNOSIS — R41.0 CONFUSION: ICD-10-CM

## 2021-03-15 DIAGNOSIS — K92.1 GASTROINTESTINAL HEMORRHAGE WITH MELENA: ICD-10-CM

## 2021-03-15 DIAGNOSIS — R53.1 WEAKNESS GENERALIZED: ICD-10-CM

## 2021-03-15 DIAGNOSIS — R60.0 LOCALIZED EDEMA: ICD-10-CM

## 2021-03-15 DIAGNOSIS — N17.9 AKI (ACUTE KIDNEY INJURY) (HCC): ICD-10-CM

## 2021-03-15 LAB
ALBUMIN SERPL-MCNC: 4.2 G/DL (ref 3.5–5.2)
ALBUMIN/GLOB SERPL: 1.5 G/DL
ALP SERPL-CCNC: 252 U/L (ref 39–117)
ALT SERPL W P-5'-P-CCNC: 9 U/L (ref 1–33)
ANION GAP SERPL CALCULATED.3IONS-SCNC: 13.9 MMOL/L (ref 5–15)
APTT PPP: 37.9 SECONDS (ref 22.7–35.4)
AST SERPL-CCNC: 18 U/L (ref 1–32)
ATMOSPHERIC PRESS: 753.3 MMHG
BASE EXCESS BLDV CALC-SCNC: 8 MMOL/L (ref -2–2)
BASOPHILS # BLD AUTO: 0.11 10*3/MM3 (ref 0–0.2)
BASOPHILS NFR BLD AUTO: 0.8 % (ref 0–1.5)
BDY SITE: ABNORMAL
BILIRUB SERPL-MCNC: 0.7 MG/DL (ref 0–1.2)
BILIRUB UR QL STRIP: NEGATIVE
BUN SERPL-MCNC: 77 MG/DL (ref 8–23)
BUN/CREAT SERPL: 48.1 (ref 7–25)
CALCIUM SPEC-SCNC: 8.7 MG/DL (ref 8.6–10.5)
CHLORIDE SERPL-SCNC: 100 MMOL/L (ref 98–107)
CLARITY UR: CLEAR
CO2 SERPL-SCNC: 32.1 MMOL/L (ref 22–29)
COLOR UR: YELLOW
CREAT SERPL-MCNC: 1.6 MG/DL (ref 0.57–1)
D-LACTATE SERPL-SCNC: 1.8 MMOL/L (ref 0.5–2)
DEPRECATED RDW RBC AUTO: 46.4 FL (ref 37–54)
EOSINOPHIL # BLD AUTO: 0.73 10*3/MM3 (ref 0–0.4)
EOSINOPHIL NFR BLD AUTO: 5.3 % (ref 0.3–6.2)
ERYTHROCYTE [DISTWIDTH] IN BLOOD BY AUTOMATED COUNT: 15.1 % (ref 12.3–15.4)
GAS FLOW AIRWAY: 2 LPM
GFR SERPL CREATININE-BSD FRML MDRD: 31 ML/MIN/1.73
GLOBULIN UR ELPH-MCNC: 2.8 GM/DL
GLUCOSE BLDC GLUCOMTR-MCNC: 107 MG/DL (ref 70–130)
GLUCOSE BLDC GLUCOMTR-MCNC: 118 MG/DL (ref 70–130)
GLUCOSE SERPL-MCNC: 128 MG/DL (ref 65–99)
GLUCOSE UR STRIP-MCNC: NEGATIVE MG/DL
HCO3 BLDV-SCNC: 33.1 MMOL/L (ref 22–28)
HCT VFR BLD AUTO: 26.9 % (ref 34–46.6)
HGB BLD-MCNC: 8.3 G/DL (ref 12–15.9)
HGB UR QL STRIP.AUTO: NEGATIVE
IMM GRANULOCYTES # BLD AUTO: 0.19 10*3/MM3 (ref 0–0.05)
IMM GRANULOCYTES NFR BLD AUTO: 1.4 % (ref 0–0.5)
INR PPP: 2.08 (ref 0.9–1.1)
KETONES UR QL STRIP: NEGATIVE
LEUKOCYTE ESTERASE UR QL STRIP.AUTO: NEGATIVE
LIPASE SERPL-CCNC: 30 U/L (ref 13–60)
LYMPHOCYTES # BLD AUTO: 1.72 10*3/MM3 (ref 0.7–3.1)
LYMPHOCYTES NFR BLD AUTO: 12.4 % (ref 19.6–45.3)
MCH RBC QN AUTO: 26.3 PG (ref 26.6–33)
MCHC RBC AUTO-ENTMCNC: 30.9 G/DL (ref 31.5–35.7)
MCV RBC AUTO: 85.4 FL (ref 79–97)
MODALITY: ABNORMAL
MONOCYTES # BLD AUTO: 0.64 10*3/MM3 (ref 0.1–0.9)
MONOCYTES NFR BLD AUTO: 4.6 % (ref 5–12)
NEUTROPHILS NFR BLD AUTO: 10.43 10*3/MM3 (ref 1.7–7)
NEUTROPHILS NFR BLD AUTO: 75.5 % (ref 42.7–76)
NITRITE UR QL STRIP: NEGATIVE
NRBC BLD AUTO-RTO: 0.1 /100 WBC (ref 0–0.2)
PCO2 BLDV: 49.7 MM HG (ref 41–51)
PH BLDV: 7.43 PH UNITS (ref 7.31–7.41)
PH UR STRIP.AUTO: 8 [PH] (ref 5–8)
PLATELET # BLD AUTO: 263 10*3/MM3 (ref 140–450)
PMV BLD AUTO: 10.8 FL (ref 6–12)
PO2 BLDV: 24.9 MM HG (ref 35–45)
POTASSIUM SERPL-SCNC: 3.8 MMOL/L (ref 3.5–5.2)
PROCALCITONIN SERPL-MCNC: 0.16 NG/ML (ref 0–0.25)
PROT SERPL-MCNC: 7 G/DL (ref 6–8.5)
PROT UR QL STRIP: NEGATIVE
PROTHROMBIN TIME: 23.1 SECONDS (ref 11.7–14.2)
QT INTERVAL: 478 MS
RBC # BLD AUTO: 3.15 10*6/MM3 (ref 3.77–5.28)
SAO2 % BLDCOA: 45.7 % (ref 92–99)
SARS-COV-2 ORF1AB RESP QL NAA+PROBE: NOT DETECTED
SODIUM SERPL-SCNC: 146 MMOL/L (ref 136–145)
SP GR UR STRIP: 1.01 (ref 1–1.03)
TOTAL RATE: 16 BREATHS/MINUTE
TROPONIN T SERPL-MCNC: 0.01 NG/ML (ref 0–0.03)
TROPONIN T SERPL-MCNC: <0.01 NG/ML (ref 0–0.03)
UROBILINOGEN UR QL STRIP: NORMAL
WBC # BLD AUTO: 13.82 10*3/MM3 (ref 3.4–10.8)

## 2021-03-15 PROCEDURE — 36415 COLL VENOUS BLD VENIPUNCTURE: CPT

## 2021-03-15 PROCEDURE — 85610 PROTHROMBIN TIME: CPT | Performed by: EMERGENCY MEDICINE

## 2021-03-15 PROCEDURE — 82803 BLOOD GASES ANY COMBINATION: CPT

## 2021-03-15 PROCEDURE — U0004 COV-19 TEST NON-CDC HGH THRU: HCPCS | Performed by: EMERGENCY MEDICINE

## 2021-03-15 PROCEDURE — 83690 ASSAY OF LIPASE: CPT | Performed by: EMERGENCY MEDICINE

## 2021-03-15 PROCEDURE — 87086 URINE CULTURE/COLONY COUNT: CPT | Performed by: EMERGENCY MEDICINE

## 2021-03-15 PROCEDURE — 80053 COMPREHEN METABOLIC PANEL: CPT | Performed by: EMERGENCY MEDICINE

## 2021-03-15 PROCEDURE — 93005 ELECTROCARDIOGRAM TRACING: CPT | Performed by: EMERGENCY MEDICINE

## 2021-03-15 PROCEDURE — 85730 THROMBOPLASTIN TIME PARTIAL: CPT | Performed by: EMERGENCY MEDICINE

## 2021-03-15 PROCEDURE — G0378 HOSPITAL OBSERVATION PER HR: HCPCS

## 2021-03-15 PROCEDURE — 71045 X-RAY EXAM CHEST 1 VIEW: CPT

## 2021-03-15 PROCEDURE — 81003 URINALYSIS AUTO W/O SCOPE: CPT | Performed by: EMERGENCY MEDICINE

## 2021-03-15 PROCEDURE — 85025 COMPLETE CBC W/AUTO DIFF WBC: CPT | Performed by: EMERGENCY MEDICINE

## 2021-03-15 PROCEDURE — 99285 EMERGENCY DEPT VISIT HI MDM: CPT

## 2021-03-15 PROCEDURE — P9612 CATHETERIZE FOR URINE SPEC: HCPCS

## 2021-03-15 PROCEDURE — 93010 ELECTROCARDIOGRAM REPORT: CPT | Performed by: INTERNAL MEDICINE

## 2021-03-15 PROCEDURE — 84484 ASSAY OF TROPONIN QUANT: CPT | Performed by: EMERGENCY MEDICINE

## 2021-03-15 PROCEDURE — 82962 GLUCOSE BLOOD TEST: CPT

## 2021-03-15 PROCEDURE — 36415 COLL VENOUS BLD VENIPUNCTURE: CPT | Performed by: EMERGENCY MEDICINE

## 2021-03-15 PROCEDURE — 83605 ASSAY OF LACTIC ACID: CPT | Performed by: EMERGENCY MEDICINE

## 2021-03-15 PROCEDURE — C9803 HOPD COVID-19 SPEC COLLECT: HCPCS

## 2021-03-15 PROCEDURE — 84145 PROCALCITONIN (PCT): CPT | Performed by: EMERGENCY MEDICINE

## 2021-03-15 PROCEDURE — 70450 CT HEAD/BRAIN W/O DYE: CPT

## 2021-03-15 RX ORDER — ATORVASTATIN CALCIUM 20 MG/1
20 TABLET, FILM COATED ORAL NIGHTLY
Status: DISCONTINUED | OUTPATIENT
Start: 2021-03-15 | End: 2021-03-16

## 2021-03-15 RX ORDER — BUMETANIDE 2 MG/1
4 TABLET ORAL 2 TIMES DAILY
Status: DISCONTINUED | OUTPATIENT
Start: 2021-03-15 | End: 2021-03-20 | Stop reason: HOSPADM

## 2021-03-15 RX ORDER — PANTOPRAZOLE SODIUM 40 MG/1
40 TABLET, DELAYED RELEASE ORAL EVERY MORNING
Refills: 0 | Status: DISCONTINUED | OUTPATIENT
Start: 2021-03-16 | End: 2021-03-19 | Stop reason: DRUGHIGH

## 2021-03-15 RX ORDER — ACETAMINOPHEN 325 MG/1
650 TABLET ORAL EVERY 4 HOURS PRN
Status: DISCONTINUED | OUTPATIENT
Start: 2021-03-15 | End: 2021-03-20 | Stop reason: HOSPADM

## 2021-03-15 RX ORDER — CARVEDILOL 6.25 MG/1
6.25 TABLET ORAL 2 TIMES DAILY WITH MEALS
Status: DISCONTINUED | OUTPATIENT
Start: 2021-03-15 | End: 2021-03-16

## 2021-03-15 RX ORDER — TRAMADOL HYDROCHLORIDE 50 MG/1
50 TABLET ORAL DAILY PRN
COMMUNITY
End: 2021-04-05 | Stop reason: SDUPTHER

## 2021-03-15 RX ORDER — GABAPENTIN 100 MG/1
100 CAPSULE ORAL 2 TIMES DAILY
Status: DISCONTINUED | OUTPATIENT
Start: 2021-03-15 | End: 2021-03-20 | Stop reason: HOSPADM

## 2021-03-15 RX ORDER — GABAPENTIN 100 MG/1
100 CAPSULE ORAL 2 TIMES DAILY
COMMUNITY
End: 2021-04-07

## 2021-03-15 RX ORDER — LEVOTHYROXINE SODIUM 0.03 MG/1
25 TABLET ORAL DAILY
Status: DISCONTINUED | OUTPATIENT
Start: 2021-03-15 | End: 2021-03-16

## 2021-03-15 RX ORDER — L.ACID,CASEI/B.ANIMAL/S.THERMO 16B CELL
1 CAPSULE ORAL DAILY
COMMUNITY

## 2021-03-15 RX ORDER — NICOTINE POLACRILEX 4 MG
15 LOZENGE BUCCAL
Status: DISCONTINUED | OUTPATIENT
Start: 2021-03-15 | End: 2021-03-20 | Stop reason: HOSPADM

## 2021-03-15 RX ORDER — AMOXICILLIN 250 MG
1 CAPSULE ORAL DAILY
Status: DISCONTINUED | OUTPATIENT
Start: 2021-03-15 | End: 2021-03-20 | Stop reason: HOSPADM

## 2021-03-15 RX ORDER — VILAZODONE HYDROCHLORIDE 40 MG/1
20 TABLET ORAL NIGHTLY
Status: DISCONTINUED | OUTPATIENT
Start: 2021-03-15 | End: 2021-03-20 | Stop reason: HOSPADM

## 2021-03-15 RX ORDER — POLYETHYLENE GLYCOL 3350 17 G/17G
17 POWDER, FOR SOLUTION ORAL DAILY
Status: DISCONTINUED | OUTPATIENT
Start: 2021-03-15 | End: 2021-03-20 | Stop reason: HOSPADM

## 2021-03-15 RX ORDER — INSULIN LISPRO 100 [IU]/ML
0-9 INJECTION, SOLUTION INTRAVENOUS; SUBCUTANEOUS
Status: DISCONTINUED | OUTPATIENT
Start: 2021-03-16 | End: 2021-03-20 | Stop reason: HOSPADM

## 2021-03-15 RX ORDER — SODIUM CHLORIDE 0.9 % (FLUSH) 0.9 %
10 SYRINGE (ML) INJECTION AS NEEDED
Status: DISCONTINUED | OUTPATIENT
Start: 2021-03-15 | End: 2021-03-20 | Stop reason: HOSPADM

## 2021-03-15 RX ORDER — ERGOCALCIFEROL 1.25 MG/1
50000 CAPSULE ORAL WEEKLY
Status: DISCONTINUED | OUTPATIENT
Start: 2021-03-21 | End: 2021-03-20 | Stop reason: HOSPADM

## 2021-03-15 RX ORDER — ALPRAZOLAM 1 MG/1
1 TABLET ORAL 2 TIMES DAILY PRN
Status: ON HOLD | COMMUNITY
End: 2022-01-01 | Stop reason: SDUPTHER

## 2021-03-15 RX ORDER — SODIUM CHLORIDE 0.9 % (FLUSH) 0.9 %
10 SYRINGE (ML) INJECTION EVERY 12 HOURS SCHEDULED
Status: DISCONTINUED | OUTPATIENT
Start: 2021-03-15 | End: 2021-03-20 | Stop reason: HOSPADM

## 2021-03-15 RX ORDER — AMLODIPINE BESYLATE 5 MG/1
5 TABLET ORAL
Status: DISCONTINUED | OUTPATIENT
Start: 2021-03-15 | End: 2021-03-16

## 2021-03-15 RX ORDER — NITROGLYCERIN 0.4 MG/1
0.4 TABLET SUBLINGUAL
Status: DISCONTINUED | OUTPATIENT
Start: 2021-03-15 | End: 2021-03-20 | Stop reason: HOSPADM

## 2021-03-15 RX ORDER — DEXTROSE MONOHYDRATE 25 G/50ML
25 INJECTION, SOLUTION INTRAVENOUS
Status: DISCONTINUED | OUTPATIENT
Start: 2021-03-15 | End: 2021-03-20 | Stop reason: HOSPADM

## 2021-03-15 RX ORDER — POTASSIUM CHLORIDE 750 MG/1
20 TABLET, FILM COATED, EXTENDED RELEASE ORAL EVERY OTHER DAY
Status: DISCONTINUED | OUTPATIENT
Start: 2021-03-15 | End: 2021-03-20 | Stop reason: HOSPADM

## 2021-03-15 RX ADMIN — AMLODIPINE BESYLATE 5 MG: 5 TABLET ORAL at 21:10

## 2021-03-15 RX ADMIN — DOCUSATE SODIUM 50MG AND SENNOSIDES 8.6MG 1 TABLET: 8.6; 5 TABLET, FILM COATED ORAL at 19:54

## 2021-03-15 RX ADMIN — POLYETHYLENE GLYCOL 3350 17 G: 17 POWDER, FOR SOLUTION ORAL at 19:54

## 2021-03-15 RX ADMIN — CARVEDILOL 6.25 MG: 6.25 TABLET, FILM COATED ORAL at 21:11

## 2021-03-15 RX ADMIN — SODIUM CHLORIDE, PRESERVATIVE FREE 10 ML: 5 INJECTION INTRAVENOUS at 21:12

## 2021-03-15 RX ADMIN — ATORVASTATIN CALCIUM 20 MG: 20 TABLET, FILM COATED ORAL at 20:40

## 2021-03-15 RX ADMIN — POTASSIUM CHLORIDE 20 MEQ: 750 TABLET, EXTENDED RELEASE ORAL at 19:54

## 2021-03-15 RX ADMIN — BUMETANIDE 4 MG: 2 TABLET ORAL at 21:10

## 2021-03-15 RX ADMIN — GABAPENTIN 100 MG: 100 CAPSULE ORAL at 20:40

## 2021-03-15 RX ADMIN — SODIUM CHLORIDE, POTASSIUM CHLORIDE, SODIUM LACTATE AND CALCIUM CHLORIDE 500 ML: 600; 310; 30; 20 INJECTION, SOLUTION INTRAVENOUS at 14:13

## 2021-03-15 RX ADMIN — VILAZODONE HYDROCHLORIDE 20 MG: 40 TABLET ORAL at 21:10

## 2021-03-15 RX ADMIN — APIXABAN 5 MG: 5 TABLET, FILM COATED ORAL at 21:10

## 2021-03-15 NOTE — ED TRIAGE NOTES
Family states episodes of confusion onset last PM    Mask placed on patient in triage. Triage staff wore appropriate PPE during interaction with patient.

## 2021-03-15 NOTE — ED PROVIDER NOTES
Subjective   75-year-old female with past medical history of acute on chronic respiratory failure, OLI, COPD, diabetes, hypertension here for chief complaint of confusion.  History was obtained from the patient.  The patient states that her son-in-law noted that she was confused.  This concerned them therefore they brought the patient to the ED.  The patient states that she has not been eating or drinking for the past 3 days.  She states that she has not had any appetite.  Patient denies any headaches, neck pain, falls, chest pain, shortness of breath, abdominal pain, nausea, vomiting, diarrhea, fevers, chills, exposure to Covid, runny nose, sore throat, or any other symptoms.  She denies any swelling her legs.          Review of Systems   All other systems reviewed and are negative.      Past Medical History:   Diagnosis Date   • Acute on chronic respiratory failure with hypoxia and hypercapnia (CMS/HCC)    • OLI (acute kidney injury) (CMS/HCC)    • Anemia    • Anxiety    • Aortic valve stenosis    • Bilateral lower extremity edema    • CHF (congestive heart failure) (CMS/HCC)    • Chronic coronary artery disease    • Class 3 severe obesity due to excess calories in adult (CMS/HCC)    • COPD (chronic obstructive pulmonary disease) (CMS/HCC)    • Depression    • Diabetes mellitus (CMS/HCC)    • Elevated cholesterol    • GERD (gastroesophageal reflux disease)    • Heart murmur    • Hypertension    • Mitral valve insufficiency    • Pneumonia     1/2016   • Pulmonary hypertension (CMS/HCC)     due to sleep disordered breathing   • Sleep apnea     Uses CPAP or oxygen   • Stage 3 chronic kidney disease (CMS/HCC)    • Subclinical hypothyroidism    • Supplemental oxygen dependent    • TIA (transient ischemic attack) 3/15/2021   • Valvular heart disease        Allergies   Allergen Reactions   • Coumadin [Warfarin Sodium] Diarrhea and Nausea Only   • Bumex [Bumetanide] Swelling     Tolerated Bumex drip February 2021   •  Erythromycin    • Hctz [Hydrochlorothiazide] Swelling   • Zaroxolyn [Metolazone] Diarrhea   • Penicillins Rash     Tolerated cephalosporins in the past    • Sulfa Antibiotics Rash       Past Surgical History:   Procedure Laterality Date   • CARDIAC CATHETERIZATION     • CARDIAC CATHETERIZATION N/A 6/10/2016    Procedure: Left Heart Cath;  Surgeon: Chace Johnson MD;  Location: [facility] CATH INVASIVE LOCATION;  Service:    • CARDIAC CATHETERIZATION N/A 6/10/2016    Procedure: Right Heart Cath;  Surgeon: Chace Johnson MD;  Location: [facility] CATH INVASIVE LOCATION;  Service:    • CARDIAC CATHETERIZATION N/A 2/5/2021    Procedure: RIGHT AND LEFT HEART CATH;  Surgeon: Antoine Ayers MD;  Location: [facility] CATH INVASIVE LOCATION;  Service: Cardiology;  Laterality: N/A;   • CARDIAC CATHETERIZATION N/A 2/5/2021    Procedure: Coronary angiography;  Surgeon: Antoine Ayers MD;  Location: [facility] CATH INVASIVE LOCATION;  Service: Cardiology;  Laterality: N/A;   • CARDIAC CATHETERIZATION N/A 2/5/2021    Procedure: Left ventriculography- pressures;  Surgeon: Antoine Ayers MD;  Location: [facility] CATH INVASIVE LOCATION;  Service: Cardiology;  Laterality: N/A;   • CARDIAC ELECTROPHYSIOLOGY PROCEDURE N/A 2/2/2021    Procedure: Pacemaker DC new---[employer] MICRA;  Surgeon: Eliazar Bond MD;  Location: [facility] CATH INVASIVE LOCATION;  Service: Cardiology;  Laterality: N/A;   • CARDIAC SURGERY     • CORONARY ARTERY BYPASS GRAFT      2 vessel   • CORONARY ARTERY BYPASS GRAFT WITH MITRAL VALVE REPAIR/REPLACEMENT N/A 6/13/2016    Procedure: INTRAOPERATIVE TARIQ, MIDLINE STERNOTOMY, CORONARY ARTERY BYPASS GRAFTING X  2 UTILIZING ENDOSCOPICALLY HARVESTED LEFT GREATER SAPHENOUS VEIN, MITRAL VALVE REPLACEMENT AND TRICUSPID VALVE REPAIR;  Surgeon: Eliecer Mistry MD;  Location: [facility] OR;  Service:    • CORONARY STENT PLACEMENT  2010    Approx. 6 yrs ago at [facility]   • HEMORRHOIDECTOMY     • HYSTERECTOMY     •  MITRAL VALVE REPLACEMENT     • REPLACEMENT TOTAL KNEE Right    • THYROID SURGERY      Cyst removed from thyroid   • VASCULAR SURGERY         Family History   Problem Relation Age of Onset   • Heart attack Father    • Heart disease Father        Social History     Socioeconomic History   • Marital status:      Spouse name: Not on file   • Number of children: Not on file   • Years of education: Not on file   • Highest education level: Not on file   Tobacco Use   • Smoking status: Never Smoker   • Smokeless tobacco: Never Used   • Tobacco comment: no caffeine    Substance and Sexual Activity   • Alcohol use: No   • Drug use: No   • Sexual activity: Defer           Objective   Physical Exam  Vitals reviewed.   Constitutional:       General: She is not in acute distress.     Appearance: She is not ill-appearing, toxic-appearing or diaphoretic.   HENT:      Head: Normocephalic and atraumatic.   Eyes:      General: No scleral icterus.     Extraocular Movements: Extraocular movements intact.      Right eye: Normal extraocular motion and no nystagmus.      Left eye: Normal extraocular motion and no nystagmus.      Pupils: Pupils are equal, round, and reactive to light. Pupils are equal.      Right eye: Pupil is round and reactive.      Left eye: Pupil is round and reactive.   Cardiovascular:      Rate and Rhythm: Normal rate and regular rhythm.      Heart sounds: Normal heart sounds. No murmur. No friction rub. No gallop.    Pulmonary:      Effort: Pulmonary effort is normal. No respiratory distress.      Breath sounds: Normal breath sounds. No stridor. No wheezing, rhonchi or rales.   Chest:      Chest wall: No tenderness.   Abdominal:      General: Bowel sounds are normal. There is no distension.      Palpations: Abdomen is soft. There is no mass.      Tenderness: There is no abdominal tenderness. There is no guarding.   Musculoskeletal:         General: No swelling or tenderness. Normal range of motion.       Cervical back: Normal range of motion and neck supple. No rigidity.   Skin:     General: Skin is warm and dry.      Capillary Refill: Capillary refill takes less than 2 seconds.      Coloration: Skin is not cyanotic or pale.      Findings: No erythema or rash.   Neurological:      Mental Status: She is alert and oriented to person, place, and time.      GCS: GCS eye subscore is 4. GCS verbal subscore is 5. GCS motor subscore is 6.      Cranial Nerves: No cranial nerve deficit or dysarthria.      Sensory: No sensory deficit.      Motor: No weakness.      Coordination: Coordination normal.      Deep Tendon Reflexes: Reflexes normal.   Psychiatric:         Mood and Affect: Mood normal.         Behavior: Behavior normal.         Procedures           ED Course  ED Course as of Mar 15 1435   Mon Mar 15, 2021   1329 I reviewed the EKG, paced rhythm, similar to previous.    [KS]   1349 INR(!): 2.08 [KS]   1350 Protime(!): 23.1 [KS]   1350 WBC(!): 13.82 [KS]   1350 Creatinine(!): 1.60 [KS]   1350 BUN(!): 77 [KS]   1350 Sodium(!): 146 [KS]      ED Course User Index  [KS] Pablito Guardado MD                                           MDM  Number of Diagnoses or Management Options     Amount and/or Complexity of Data Reviewed  Clinical lab tests: ordered and reviewed  Tests in the radiology section of CPT®: ordered and reviewed  Decide to obtain previous medical records or to obtain history from someone other than the patient: yes    Risk of Complications, Morbidity, and/or Mortality  General comments: When I first saw the patient, the patient appeared nontoxic.  Patient was oriented x3 for me.  IV was started and labs were obtained.  Labs remarkable for elevated INR she is on Eliquis.  She did have a mild increase in white count but she denies any fevers or chills or had no infectious symptoms for me.  Patient did have elevation in BUN and creatinine and this could be why she was mildly confused.  Patient's baseline is 1.3 and  she was up to 1.6 and a creatinine today.  I also added getting a head CT since there was concern for possible confusion.  This showed that the patient had calcifications in the bilateral carotids and vertebral arteries.  I also consulted neurology emergently prior to the head CT.  I spoke with Dr. Jefefry Kauffman.  He recommended that the patient be admitted and worked up for TIA versus metabolic causes of confusion.  At this point, I spoke to the hospitalist Dr. Guardado.  He has accepted the patient.  He is aware of the pending neurology recommendations as well as the final head CT results that I discussed with him.  I defer all further management of this patient to the care of the inpatient hospitalist and neurologist.        Final diagnoses:   TIA (transient ischemic attack)   Bilateral carotid artery stenosis   Confusion   OLI (acute kidney injury) (CMS/[facility])   Hypernatremia            Pablito Guardado MD  03/15/21 0371

## 2021-03-15 NOTE — ED NOTES
This RN in appropriate ppe while in pt room. Pt wearing mask.        Zaira Godinez, RN  03/15/21 2532

## 2021-03-15 NOTE — ED NOTES
Spoke to pt daughter updated on pt admission status. All questions answered.      Zaira Godinez, RN  03/15/21 [id number]

## 2021-03-15 NOTE — H&P
Patient Name:  Miguelina Lopez  YOB: 1945  MRN:  [id number]  Admit Date:  3/15/2021  Patient Care Team:  Arcelia Talbot APRN as PCP - General (Nurse Practitioner)  Robbie Wilson MD as Consulting Physician (Hematology and Oncology)  Chace Johnson MD as Consulting Physician (Cardiology)      Subjective   History Present Illness     Chief Complaint   Patient presents with   • Altered Mental Status       75-year-old female with a past medical history of chronic diastolic heart failure, paroxysmal atrial fib/flutter, CKD, essential hypertension, CAD status post CABG, complete heart block status post pacemaker placement mitral valve replacement and tricuspid repair, COPD diabetes admitted with altered mental status.    Patient is not quite sure why she is at the hospital.  From chart review, there was some concern by her son-in-law that she was confused.  Otherwise, for me her only complaint is malodorous urine.  She denies dysuria.  Work-up in the ER was significant for a CT head that did not show any acute intracranial abnormalities.  Atherosclerotic calcifications within the right intracranial vertebral artery and bilateral internal carotid arteries were identified.    Laboratory work-up showed a slightly elevated creatinine at 1.6, negative procalcitonin, negative troponin x2, and elevated WBC.     Of note, she does endorse having a fall about 2 to 3 weeks ago.  She denies any loss of consciousness or head trauma during this episode.    Edited by: Salvador Guardado MD at 3/15/2021 1431  History of Present Illness  Review of Systems   Constitutional: Positive for fatigue. Negative for chills.   HENT: Negative.    Respiratory: Negative for chest tightness and shortness of breath.    Cardiovascular: Positive for leg swelling. Negative for chest pain and palpitations.   Gastrointestinal: Negative for abdominal pain and constipation.   Genitourinary: Negative for dysuria.   Musculoskeletal:  Negative for arthralgias.   Skin: Negative for rash and wound.   Neurological: Negative for dizziness, speech difficulty and light-headedness.   Psychiatric/Behavioral: Negative for confusion. The patient is not nervous/anxious.         Personal History     Past Medical History:   Diagnosis Date   • Acute on chronic respiratory failure with hypoxia and hypercapnia (CMS/[facility])    • OLI (acute kidney injury) (CMS/[facility])    • Anemia    • Anxiety    • Aortic valve stenosis    • Bilateral lower extremity edema    • CHF (congestive heart failure) (CMS/[facility])    • Chronic coronary artery disease    • Class 3 severe obesity due to excess calories in adult (CMS/[facility])    • COPD (chronic obstructive pulmonary disease) (CMS/[facility])    • Depression    • Diabetes mellitus (CMS/[facility])    • Elevated cholesterol    • GERD (gastroesophageal reflux disease)    • Heart murmur    • Hypertension    • Mitral valve insufficiency    • Pneumonia     1/2016   • Pulmonary hypertension (CMS/[facility])     due to sleep disordered breathing   • Sleep apnea     Uses CPAP or oxygen   • Stage 3 chronic kidney disease (CMS/[facility])    • Subclinical hypothyroidism    • Supplemental oxygen dependent    • TIA (transient ischemic attack) 3/15/2021   • Valvular heart disease      Past Surgical History:   Procedure Laterality Date   • CARDIAC CATHETERIZATION     • CARDIAC CATHETERIZATION N/A 6/10/2016    Procedure: Left Heart Cath;  Surgeon: Chace Johnson MD;  Location: [facility] CATH INVASIVE LOCATION;  Service:    • CARDIAC CATHETERIZATION N/A 6/10/2016    Procedure: Right Heart Cath;  Surgeon: Chace Johnson MD;  Location: [facility] CATH INVASIVE LOCATION;  Service:    • CARDIAC CATHETERIZATION N/A 2/5/2021    Procedure: RIGHT AND LEFT HEART CATH;  Surgeon: Antoine Ayers MD;  Location: [facility] CATH INVASIVE LOCATION;  Service: Cardiology;  Laterality: N/A;   • CARDIAC CATHETERIZATION N/A 2/5/2021    Procedure: Coronary angiography;  Surgeon: Antoine Ayers  MD JUSTYN;  Location: [facility] CATH INVASIVE LOCATION;  Service: Cardiology;  Laterality: N/A;   • CARDIAC CATHETERIZATION N/A 2/5/2021    Procedure: Left ventriculography- pressures;  Surgeon: Antoine Ayers MD;  Location: [facility] CATH INVASIVE LOCATION;  Service: Cardiology;  Laterality: N/A;   • CARDIAC ELECTROPHYSIOLOGY PROCEDURE N/A 2/2/2021    Procedure: Pacemaker DC new---[employer] MICRA;  Surgeon: Eliazar Bond MD;  Location: [facility] CATH INVASIVE LOCATION;  Service: Cardiology;  Laterality: N/A;   • CARDIAC SURGERY     • CORONARY ARTERY BYPASS GRAFT      2 vessel   • CORONARY ARTERY BYPASS GRAFT WITH MITRAL VALVE REPAIR/REPLACEMENT N/A 6/13/2016    Procedure: INTRAOPERATIVE TARIQ, MIDLINE STERNOTOMY, CORONARY ARTERY BYPASS GRAFTING X  2 UTILIZING ENDOSCOPICALLY HARVESTED LEFT GREATER SAPHENOUS VEIN, MITRAL VALVE REPLACEMENT AND TRICUSPID VALVE REPAIR;  Surgeon: Eliecer Mistry MD;  Location: [facility] OR;  Service:    • CORONARY STENT PLACEMENT  2010    Approx. 6 yrs ago at [facility]   • HEMORRHOIDECTOMY     • HYSTERECTOMY     • MITRAL VALVE REPLACEMENT     • REPLACEMENT TOTAL KNEE Right    • THYROID SURGERY      Cyst removed from thyroid   • VASCULAR SURGERY       Family History   Problem Relation Age of Onset   • Heart attack Father    • Heart disease Father      Social History     Tobacco Use   • Smoking status: Never Smoker   • Smokeless tobacco: Never Used   • Tobacco comment: no caffeine    Substance Use Topics   • Alcohol use: No   • Drug use: No     No current facility-administered medications on file prior to encounter.     Current Outpatient Medications on File Prior to Encounter   Medication Sig Dispense Refill   • acetaminophen (TYLENOL) 325 MG tablet Take 2 tablets by mouth Every 4 (Four) Hours As Needed for Mild Pain .     • amLODIPine (NORVASC) 5 MG tablet Take 1 tablet by mouth Daily.     • apixaban (ELIQUIS) 5 MG tablet tablet Take 1 tablet by mouth Every 12 (Twelve) Hours. Indications:  Atrial Fibrillation 60 tablet    • atorvastatin (LIPITOR) 20 MG tablet TAKE ONE TABLET BY MOUTH DAILY 90 tablet 0   • bisacodyl (DULCOLAX) 5 MG EC tablet Take 1 tablet by mouth Daily As Needed for Constipation.     • bumetanide (BUMEX) 2 MG tablet Take 2 tablets by mouth 2 (Two) Times a Day. 30 tablet 0   • carvedilol (COREG) 6.25 MG tablet Take 1 tablet by mouth 2 (Two) Times a Day With Meals.     • clotrimazole-betamethasone (LOTRISONE) 1-0.05 % cream Apply  topically to the appropriate area as directed 2 (Two) Times a Day. 45 g 0   • fluticasone (FLONASE) 50 MCG/ACT nasal spray 2 sprays by Each Nare route Daily.     • fluticasone-salmeterol (ADVAIR DISKUS) 250-50 MCG/DOSE DISKUS Inhale 1 puff Daily As Needed (cough and wheezing). 60 each 0   • gabapentin (NEURONTIN) 100 MG capsule Take 1 capsule by mouth Every 12 (Twelve) Hours for 5 days. 10 capsule 0   • glimepiride (AMARYL) 1 MG tablet Take 1 tablet by mouth Every Morning Before Breakfast. 90 tablet 1   • ipratropium-albuterol (DUO-NEB) 0.5-2.5 mg/mL nebulizer Take 3 mL by nebulization 4 (four) times a day. (Patient taking differently: Take 3 mL by nebulization Daily As Needed.) 3 mL 5   • levothyroxine (SYNTHROID, LEVOTHROID) 25 MCG tablet Take 1 tablet by mouth Daily. 90 tablet 1   • Misc. Devices (COMMODE BEDSIDE) misc 1 each Daily. 1 each 0   • nitroglycerin (NITROSTAT) 0.4 MG SL tablet DISSOLVE 1 TAB UNDER TONGUE FOR CHEST PAIN - IF PAIN REMAINS AFTER 5 MIN, CALL 911 AND REPEAT DOSE. MAX 3 TABS IN 15 MINUTES 25 tablet 4   • nystatin (MYCOSTATIN) 293565 UNIT/GM cream Apply  topically to the appropriate area as directed 2 (Two) Times a Day. to affected area(s) 30 g 3   • O2 (OXYGEN) Inhale 4 L/min Continuous for 100 days. 2-4L/min     • omeprazole (priLOSEC) 40 MG capsule Take 1 capsule by mouth Daily. 90 capsule 0   • ondansetron (ZOFRAN) 4 MG tablet Take 1 tablet by mouth Every 6 (Six) Hours As Needed for Nausea or Vomiting.     • polyethylene glycol  (polyethylene glycol) 17 g packet Take 17 g by mouth Daily.     • potassium chloride (K-DUR,KLOR-CON) 20 MEQ CR tablet Take 1 tablet by mouth Every Other Day.     • Probiotic Product (PROBIOTIC PO) Take  by mouth.     • senna-docusate (PERICOLACE) 8.6-50 MG per tablet Take 1 tablet by mouth daily.     • sodium chloride 0.65 % nasal spray 2 sprays into the nostril(s) as directed by provider As Needed for Congestion.  12   • TRULICITY 0.75 MG/0.5ML solution pen-injector INJECT 0.75 MG UNDER THE SKIN ONCE WEEKLY 6 pen 5   • vilazodone (VIIBRYD) 20 MG tablet tablet Take 20 mg by mouth Every Night.     • vitamin D (ERGOCALCIFEROL) 1.25 MG (33348 UT) capsule capsule TAKE ONE CAPSULE BY MOUTH ONCE WEEKLY 12 capsule 0     Allergies   Allergen Reactions   • Coumadin [Warfarin Sodium] Diarrhea and Nausea Only   • Bumex [Bumetanide] Swelling     Tolerated Bumex drip February 2021   • Erythromycin    • Hctz [Hydrochlorothiazide] Swelling   • Zaroxolyn [Metolazone] Diarrhea   • Penicillins Rash     Tolerated cephalosporins in the past    • Sulfa Antibiotics Rash       Objective    Objective     Vital Signs  Temp:  [98.3 °F (36.8 °C)] 98.3 °F (36.8 °C)  Heart Rate:  [75-95] 75  Resp:  [18-20] 20  BP: (121-185)/(65-91) 152/79  SpO2:  [92 %-100 %] 97 %  on  Flow (L/min):  [3] 3;   Device (Oxygen Therapy): nasal cannula  There is no height or weight on file to calculate BMI.    Physical Exam  Constitutional:       Appearance: Normal appearance. She is obese.   HENT:      Head: Normocephalic and atraumatic.   Eyes:      Extraocular Movements: Extraocular movements intact.      Pupils: Pupils are equal, round, and reactive to light.   Cardiovascular:      Rate and Rhythm: Normal rate and regular rhythm.      Comments: + systolic murmur   Pulmonary:      Effort: No respiratory distress.      Breath sounds: Normal breath sounds.   Abdominal:      General: There is no distension.      Palpations: Abdomen is soft.      Tenderness: There  is no abdominal tenderness.   Musculoskeletal:      Right lower leg: Edema present.      Left lower leg: Edema present.      Comments: 2+ pitting edema   Neurological:      Mental Status: She is alert.   Psychiatric:         Mood and Affect: Mood normal.         Behavior: Behavior normal.         Results Review:  I reviewed the patient's new clinical results.  I reviewed the patient's new imaging results and agree with the interpretation.  I reviewed the patient's other test results and agree with the interpretation  I personally viewed and interpreted the patient's EKG/Telemetry data  Discussed with ED provider.    Lab Results (last 24 hours)     Procedure Component Value Units Date/Time    Urinalysis With Culture If Indicated - Urine, Catheter In/Out [[id number]]  (Normal) Collected: 03/15/21 1219    Specimen: Urine, Catheter In/Out Updated: 03/15/21 1246     Color, UA Yellow     Appearance, UA Clear     pH, UA 8.0     Specific Gravity, UA 1.009     Glucose, UA Negative     Ketones, UA Negative     Bilirubin, UA Negative     Blood, UA Negative     Protein, UA Negative     Leuk Esterase, UA Negative     Nitrite, UA Negative     Urobilinogen, UA 1.0 E.U./dL    Narrative:      Urine microscopic not indicated.    Urine Culture - Urine, Urine, Catheter In/Out [[id number]] Collected: 03/15/21 1219    Specimen: Urine, Catheter In/Out Updated: 03/15/21 1246    CBC & Differential [[id number]]  (Abnormal) Collected: 03/15/21 1302    Specimen: Blood Updated: 03/15/21 1314    Narrative:      The following orders were created for panel order CBC & Differential.  Procedure                               Abnormality         Status                     ---------                               -----------         ------                     CBC Auto Differential[[id number]]        Abnormal            Final result                 Please view results for these tests on the individual orders.    Comprehensive Metabolic Panel [[id number]]   (Abnormal) Collected: 03/15/21 1302    Specimen: Blood Updated: 03/15/21 1336     Glucose 128 mg/dL      BUN 77 mg/dL      Creatinine 1.60 mg/dL      Sodium 146 mmol/L      Potassium 3.8 mmol/L      Chloride 100 mmol/L      CO2 32.1 mmol/L      Calcium 8.7 mg/dL      Total Protein 7.0 g/dL      Albumin 4.20 g/dL      ALT (SGPT) 9 U/L      AST (SGOT) 18 U/L      Alkaline Phosphatase 252 U/L      Total Bilirubin 0.7 mg/dL      eGFR Non African Amer 31 mL/min/1.73      Globulin 2.8 gm/dL      A/G Ratio 1.5 g/dL      BUN/Creatinine Ratio 48.1     Anion Gap 13.9 mmol/L     Narrative:      GFR Normal >60  Chronic Kidney Disease <60  Kidney Failure <15      aPTT [[id number]]  (Abnormal) Collected: 03/15/21 1302    Specimen: Blood Updated: 03/15/21 1326     PTT 37.9 seconds     Protime-INR [[id number]]  (Abnormal) Collected: 03/15/21 1302    Specimen: Blood Updated: 03/15/21 1326     Protime 23.1 Seconds      INR 2.08    Lipase [[id number]]  (Normal) Collected: 03/15/21 1302    Specimen: Blood Updated: 03/15/21 1336     Lipase 30 U/L     Lactic Acid, Plasma [[id number]]  (Normal) Collected: 03/15/21 1302    Specimen: Blood Updated: 03/15/21 1329     Lactate 1.8 mmol/L     Procalcitonin [[id number]]  (Normal) Collected: 03/15/21 1302    Specimen: Blood Updated: 03/15/21 1342     Procalcitonin 0.16 ng/mL     Narrative:      As a Marker for Sepsis (Non-Neonates):   1. <0.5 ng/mL represents a low risk of severe sepsis and/or septic shock.  1. >2 ng/mL represents a high risk of severe sepsis and/or septic shock.    As a Marker for Lower Respiratory Tract Infections that require antibiotic therapy:  PCT on Admission     Antibiotic Therapy             6-12 Hrs later  > 0.5                Strongly Recommended            >0.25 - <0.5         Recommended  0.1 - 0.25           Discouraged                   Remeasure/reassess PCT  <0.1                 Strongly Discouraged          Remeasure/reassess PCT      As 28 day mortality risk marker:  "\"Change in Procalcitonin Result\" (> 80 % or <=80 %) if Day 0 (or Day 1) and Day 4 values are available. Refer to http://[email or web address]-pct-calculator.com/   Change in PCT <=80 %   A decrease of PCT levels below or equal to 80 % defines a positive change in PCT test result representing a higher risk for 28-day all-cause mortality of patients diagnosed with severe sepsis or septic shock.  Change in PCT > 80 %   A decrease of PCT levels of more than 80 % defines a negative change in PCT result representing a lower risk for 28-day all-cause mortality of patients diagnosed with severe sepsis or septic shock.                Results may be falsely decreased if patient taking Biotin.     CBC Auto Differential [[id number]]  (Abnormal) Collected: 03/15/21 1302    Specimen: Blood Updated: 03/15/21 1314     WBC 13.82 10*3/mm3      RBC 3.15 10*6/mm3      Hemoglobin 8.3 g/dL      Hematocrit 26.9 %      MCV 85.4 fL      MCH 26.3 pg      MCHC 30.9 g/dL      RDW 15.1 %      RDW-SD 46.4 fl      MPV 10.8 fL      Platelets 263 10*3/mm3      Neutrophil % 75.5 %      Lymphocyte % 12.4 %      Monocyte % 4.6 %      Eosinophil % 5.3 %      Basophil % 0.8 %      Immature Grans % 1.4 %      Neutrophils, Absolute 10.43 10*3/mm3      Lymphocytes, Absolute 1.72 10*3/mm3      Monocytes, Absolute 0.64 10*3/mm3      Eosinophils, Absolute 0.73 10*3/mm3      Basophils, Absolute 0.11 10*3/mm3      Immature Grans, Absolute 0.19 10*3/mm3      nRBC 0.1 /100 WBC     Troponin [[id number]]  (Normal) Collected: 03/15/21 1302    Specimen: Blood Updated: 03/15/21 1342     Troponin T <0.010 ng/mL     Narrative:      Troponin T Reference Range:  <= 0.03 ng/mL-   Negative for AMI  >0.03 ng/mL-     Abnormal for myocardial necrosis.  Clinicians would have to utilize clinical acumen, EKG, Troponin and serial changes to determine if it is an Acute Myocardial Infarction or myocardial injury due to an underlying chronic condition.       Results may be falsely decreased if " patient taking Biotin.      Blood Gas, Venous - [[id number]]  (Abnormal) Collected: 03/15/21 1339    Specimen: Venous Blood Updated: 03/15/21 1342     Site Arterial: right radial     pH, Venous 7.432 pH Units      pCO2, Venous 49.7 mm Hg      pO2, Venous 24.9 mm Hg      HCO3, Venous 33.1 mmol/L      Base Excess, Venous 8.0 mmol/L      O2 Saturation Calculated 45.7 %      Barometric Pressure for Blood Gas 753.3 mmHg      Modality Cannula     Flow Rate 2 lpm      Rate 16 Breaths/minute     COVID PRE-OP / PRE-PROCEDURE SCREENING ORDER (NO ISOLATION) - Swab, Nasopharynx [[id number]] Collected: 03/15/21 1347    Specimen: Swab from Nasopharynx Updated: 03/15/21 1403    Narrative:      The following orders were created for panel order COVID PRE-OP / PRE-PROCEDURE SCREENING ORDER (NO ISOLATION) - Swab, Nasopharynx.  Procedure                               Abnormality         Status                     ---------                               -----------         ------                     COVID-19,APTIMA PANTHER,...[[id number]]                      In process                   Please view results for these tests on the individual orders.    COVID-19,APTIMA PANTHER,BREA IN-HOUSE, NP/OP SWAB IN UTM/VTM/SALINE TRANSPORT MEDIA,24 HR TAT - Swab, Nasopharynx [[id number]] Collected: 03/15/21 1347    Specimen: Swab from Nasopharynx Updated: 03/15/21 1403          Imaging Results (Last 24 Hours)     Procedure Component Value Units Date/Time    XR Chest 1 View [[id number]] Collected: 03/15/21 1423     Updated: 03/15/21 1423    Narrative:      ONE VIEW PORTABLE CHEST     HISTORY: Confusion. Hypertension.     FINDINGS: There is cardiomegaly with sternal wires from previous cardiac  surgery. There is some minimal vascular prominence, unchanged from  02/01/2021. No new abnormality is seen.       CT Head Without Contrast [[id number]] Collected: 03/15/21 1317     Updated: 03/15/21 1327    Narrative:      CT HEAD     HISTORY:Altered status      TECHNIQUE: CT scan of the head was obtained with 3 mm axial images  without intravenous contrast.  Radiation dose reduction techniques were  utilized, including automated exposure control and exposure modulation  based on body size.     COMPARISON:Head CT 08/07/2018     FINDINGS:  There is no finding of acute infarct, hemorrhage, contusion or abnormal  extra-axial collection. No hydrocephalus is present. Thick  atherosclerotic calcification is present within the right intracranial  vertebral artery and bilateral supraclinoid and cavernous portions of  the internal carotid arteries. Old left cerebellar lacunar infarct, as  before.       Impression:      1.  No findings of acute intracranial abnormality. Atherosclerotic  calcification within the right intracranial vertebral artery and  bilateral internal carotid arteries which is incompletely evaluated  without intravenous contrast. Findings can be further characterized with  CTA head if clinically indicated.  2.  Left cerebellar lacunar infarct, as before.  3.  Other findings as above.        This report was finalized on 3/15/2021 1:24 PM by Dr. Manny Gomez M.D.             Results for orders placed during the hospital encounter of 02/01/21    Adult Transthoracic Echo Complete W/ Cont if Necessary Per Protocol    Interpretation Summary  · Left ventricular ejection fraction appears to be 66 - 70%.  · Left ventricular wall thickness is consistent with mild concentric hypertrophy  · The right atrial cavity is mildly dilated.  · The right ventricular cavity is moderately dilated. Normal right ventricular systolic function noted.  · The left atrial cavity is moderately dilated.  · Severe aortic valve stenosis is present  · Aortic valve maximum pressure gradient is 69 mmHg. Aortic valve mean pressure gradient is 42 mmHg.  · There is a tissue mitral valve replacement which appears to be well-seated.  · Gradients across the tissue mitral valve replacement are elevated  with a peak of 24 mmHg and a mean of 10 mmHg  · The tricuspid valve is status post repair  · Mild to moderate tricuspid valve regurgitation is present.  · Calculated right ventricular systolic pressure from tricuspid regurgitation is 47 mmHg.  · There is no evidence of pericardial effusion.      ECG 12 Lead   Final Result   HEART RATE= 75  bpm   RR Interval= 796  ms   VT Interval= 82  ms   P Horizontal Axis= 89  deg   P Front Axis= 0  deg   QRSD Interval= 169  ms   QT Interval= 478  ms   QRS Axis= 121  deg   T Wave Axis= 121  deg   - ABNORMAL ECG -   Ventricular-paced rhythm   NO SIGNIFICANT CHANGE FROM PREVIOUS ECG   Electronically Signed By: Chace Johnson ([facility]) 15-Mar-2021 14:20:20   Date and Time of Study: 2021-03-15 12:52:38           Assessment/Plan     Active Hospital Problems    Diagnosis  POA   • TIA (transient ischemic attack) [G45.9]  Yes      Resolved Hospital Problems   No resolved problems to display.     75-year-old female with a history of chronic diastolic heart failure, paroxysmal atrial fibrillation flutter, CKD, hypertension, CAD status post CABG, complete heart block status post pacemaker, type 2 diabetes admitted for altered mental status.  During my interview she is alert and oriented x3, and aside from not knowing why she was brought to the hospital, has no complaints.    1.  Altered mental status  There is a concern for TIA in the setting of CT head findings showing at atherosclerotic calcifications of the intracranial vertebral artery as well as bilateral ICAs.  Consult neurology  MRI brain no contrast    2. Chronic diastolic heart failure   - cont Coreg, Bumex   - Check BMP  3. CAD status post CABG   - continue statin, betablocker   4. third-degree heart block status post permanent pacemaker   - Ventricular paced rhythm on EKG   5. Mitral valve replacement  5. tricuspid valve repair   6.  Atrial fib/flutter, paroxysmal   - AC with Eliquis   - V-paced   7. Hypertension   - cont home  amlodipine     8.  Type 2 diabetes  Hold home glimepiride  Correctional sliding scale while inpatient.    9.  Hypothyroidism  Continue home levothyroxine    10. Depression  Continue home Viibryd    11.  CKD  Avoid nephrotoxins if possible  Monitor renal function          VTE Prophylaxis - Eliquis (home med).  Code Status - Full code.       Salvador Guardado MD  [facility]  03/15/21  15:26 EDT

## 2021-03-15 NOTE — PLAN OF CARE
A+0 to person, place and situation.  Off by one month but oriented to time.  NIH=1.  Up to bathroomx1.  Waiting on orders.  CTM and progress towards goals as tolerated.          Goal Outcome Evaluation:

## 2021-03-15 NOTE — ED NOTES
Nursing report ED to floor  Miguelina Lopez  75 y.o.  female    HPI (triage note):   Chief Complaint   Patient presents with   • Altered Mental Status       Admitting doctor:   Salvador Guardado MD    Admitting diagnosis:   The primary encounter diagnosis was TIA (transient ischemic attack). Diagnoses of Bilateral carotid artery stenosis, Confusion, OLI (acute kidney injury) (CMS/HCC), and Hypernatremia were also pertinent to this visit.    Code status:   Current Code Status     Date Active Code Status Order ID Comments User Context       Prior    Advance Care Planning Activity          Allergies:   Coumadin [warfarin sodium], Bumex [bumetanide], Erythromycin, Hctz [hydrochlorothiazide], Zaroxolyn [metolazone], Penicillins, and Sulfa antibiotics    Weight:   There were no vitals filed for this visit.    Most recent vitals:   Vitals:    03/15/21 1330 03/15/21 1400 03/15/21 1430 03/15/21 1459   BP: 174/88 (!) 185/91  152/79   Pulse: 75 75 75 75   Resp: 20 20  20   Temp:       TempSrc:       SpO2: 96% 98% 92% 97%       Active LDAs/IV Access:   Lines, Drains & Airways    Active LDAs     Name:   Placement date:   Placement time:   Site:   Days:    Peripheral IV 03/15/21 1215 Right Antecubital   03/15/21    1215    Antecubital   less than 1    External Urinary Catheter   02/01/21    0315    --   42                Labs (abnormal labs have a star):   Labs Reviewed   COMPREHENSIVE METABOLIC PANEL - Abnormal; Notable for the following components:       Result Value    Glucose 128 (*)     BUN 77 (*)     Creatinine 1.60 (*)     Sodium 146 (*)     CO2 32.1 (*)     Alkaline Phosphatase 252 (*)     eGFR Non African Amer 31 (*)     BUN/Creatinine Ratio 48.1 (*)     All other components within normal limits    Narrative:     GFR Normal >60  Chronic Kidney Disease <60  Kidney Failure <15     APTT - Abnormal; Notable for the following components:    PTT 37.9 (*)     All other components within normal limits   PROTIME-INR - Abnormal;  "Notable for the following components:    Protime 23.1 (*)     INR 2.08 (*)     All other components within normal limits   CBC WITH AUTO DIFFERENTIAL - Abnormal; Notable for the following components:    WBC 13.82 (*)     RBC 3.15 (*)     Hemoglobin 8.3 (*)     Hematocrit 26.9 (*)     MCH 26.3 (*)     MCHC 30.9 (*)     Lymphocyte % 12.4 (*)     Monocyte % 4.6 (*)     Immature Grans % 1.4 (*)     Neutrophils, Absolute 10.43 (*)     Eosinophils, Absolute 0.73 (*)     Immature Grans, Absolute 0.19 (*)     All other components within normal limits   BLOOD GAS, VENOUS - Abnormal; Notable for the following components:    pH, Venous 7.432 (*)     pO2, Venous 24.9 (*)     HCO3, Venous 33.1 (*)     Base Excess, Venous 8.0 (*)     O2 Saturation Calculated 45.7 (*)     All other components within normal limits   LIPASE - Normal   URINALYSIS W/ CULTURE IF INDICATED - Normal    Narrative:     Urine microscopic not indicated.   LACTIC ACID, PLASMA - Normal   PROCALCITONIN - Normal    Narrative:     As a Marker for Sepsis (Non-Neonates):   1. <0.5 ng/mL represents a low risk of severe sepsis and/or septic shock.  1. >2 ng/mL represents a high risk of severe sepsis and/or septic shock.    As a Marker for Lower Respiratory Tract Infections that require antibiotic therapy:  PCT on Admission     Antibiotic Therapy             6-12 Hrs later  > 0.5                Strongly Recommended            >0.25 - <0.5         Recommended  0.1 - 0.25           Discouraged                   Remeasure/reassess PCT  <0.1                 Strongly Discouraged          Remeasure/reassess PCT      As 28 day mortality risk marker: \"Change in Procalcitonin Result\" (> 80 % or <=80 %) if Day 0 (or Day 1) and Day 4 values are available. Refer to http://www.[employer]-pct-calculator.com/   Change in PCT <=80 %   A decrease of PCT levels below or equal to 80 % defines a positive change in PCT test result representing a higher risk for 28-day all-cause mortality of " patients diagnosed with severe sepsis or septic shock.  Change in PCT > 80 %   A decrease of PCT levels of more than 80 % defines a negative change in PCT result representing a lower risk for 28-day all-cause mortality of patients diagnosed with severe sepsis or septic shock.                Results may be falsely decreased if patient taking Biotin.    TROPONIN (IN-HOUSE) - Normal    Narrative:     Troponin T Reference Range:  <= 0.03 ng/mL-   Negative for AMI  >0.03 ng/mL-     Abnormal for myocardial necrosis.  Clinicians would have to utilize clinical acumen, EKG, Troponin and serial changes to determine if it is an Acute Myocardial Infarction or myocardial injury due to an underlying chronic condition.       Results may be falsely decreased if patient taking Biotin.     URINE CULTURE   COVID PRE-OP / PRE-PROCEDURE SCREENING ORDER (NO ISOLATION)    Narrative:     The following orders were created for panel order COVID PRE-OP / PRE-PROCEDURE SCREENING ORDER (NO ISOLATION) - Swab, Nasopharynx.  Procedure                               Abnormality         Status                     ---------                               -----------         ------                     COVID-19,APTIMA PANTHER,...[[id number]]                      In process                   Please view results for these tests on the individual orders.   COVID-19,APTIMA PANTHER,BREA IN-HOUSE,NP/OP SWAB IN UTM/VTM/SALINE TRANSPORT MEDIA,24 HR TAT   BLOOD GAS, ARTERIAL   TROPONIN (IN-HOUSE)   TROPONIN (IN-HOUSE)   CBC AND DIFFERENTIAL    Narrative:     The following orders were created for panel order CBC & Differential.  Procedure                               Abnormality         Status                     ---------                               -----------         ------                     CBC Auto Differential[[id number]]        Abnormal            Final result                 Please view results for these tests on the individual orders.       EKG:   ECG  12 Lead   Final Result   HEART RATE= 75  bpm   RR Interval= 796  ms   LA Interval= 82  ms   P Horizontal Axis= 89  deg   P Front Axis= 0  deg   QRSD Interval= 169  ms   QT Interval= 478  ms   QRS Axis= 121  deg   T Wave Axis= 121  deg   - ABNORMAL ECG -   Ventricular-paced rhythm   NO SIGNIFICANT CHANGE FROM PREVIOUS ECG   Electronically Signed By: Chace Johnson ([facility]) 15-Mar-2021 14:20:20   Date and Time of Study: 2021-03-15 12:52:38          Meds given in ED:   Medications   sodium chloride 0.9 % flush 10 mL (has no administration in time range)   lactated ringers bolus 500 mL (500 mL Intravenous New Bag 3/15/21 1413)       Imaging results:  CT Head Without Contrast    Result Date: 3/15/2021  1.  No findings of acute intracranial abnormality. Atherosclerotic calcification within the right intracranial vertebral artery and bilateral internal carotid arteries which is incompletely evaluated without intravenous contrast. Findings can be further characterized with CTA head if clinically indicated. 2.  Left cerebellar lacunar infarct, as before. 3.  Other findings as above.   This report was finalized on 3/15/2021 1:24 PM by Dr. Manny Gomez M.D.        Ambulatory status:   With assist    Social issues:   Social History     Socioeconomic History   • Marital status:      Spouse name: Not on file   • Number of children: Not on file   • Years of education: Not on file   • Highest education level: Not on file   Tobacco Use   • Smoking status: Never Smoker   • Smokeless tobacco: Never Used   • Tobacco comment: no caffeine    Substance and Sexual Activity   • Alcohol use: No   • Drug use: No   • Sexual activity: Defer    Nursing report ED to floor     Zaira Godinez RN  03/15/21 5827

## 2021-03-15 NOTE — PROGRESS NOTES
Clinical Pharmacy Services: Medication History    Miguelina Lopez is a 75 y.o. female presenting to [facility] for   Chief Complaint   Patient presents with   • Altered Mental Status       She  has a past medical history of Acute on chronic respiratory failure with hypoxia and hypercapnia (CMS/[facility]), OLI (acute kidney injury) (CMS/[facility]), Anemia, Anxiety, Aortic valve stenosis, Bilateral lower extremity edema, CHF (congestive heart failure) (CMS/[facility]), Chronic coronary artery disease, Class 3 severe obesity due to excess calories in adult (CMS/[facility]), COPD (chronic obstructive pulmonary disease) (CMS/[facility]), Depression, Diabetes mellitus (CMS/[facility]), Elevated cholesterol, GERD (gastroesophageal reflux disease), Heart murmur, Hypertension, Mitral valve insufficiency, Pneumonia, Pulmonary hypertension (CMS/[facility]), Sleep apnea, Stage 3 chronic kidney disease (CMS/[facility]), Subclinical hypothyroidism, Supplemental oxygen dependent, TIA (transient ischemic attack) (3/15/2021), and Valvular heart disease.    Allergies as of 03/15/2021 - Reviewed 03/15/2021   Allergen Reaction Noted   • Coumadin [warfarin sodium] Diarrhea and Nausea Only 06/12/2016   • Bumex [bumetanide] Swelling 08/28/2018   • Erythromycin  07/29/2016   • Hctz [hydrochlorothiazide] Swelling 08/28/2018   • Zaroxolyn [metolazone] Diarrhea 05/02/2019   • Penicillins Rash 02/18/2016   • Sulfa antibiotics Rash 02/18/2016       Medication information was obtained from: patient and granddaughter  Pharmacy and Phone Number:     Prior to Admission Medications     Prescriptions Last Dose Informant Patient Reported? Taking?    acetaminophen (TYLENOL) 325 MG tablet  Family Member No Yes    Take 2 tablets by mouth Every 4 (Four) Hours As Needed for Mild Pain .    ALPRAZolam (XANAX) 1 MG tablet  Family Member Yes Yes    Take 1 mg by mouth 2 (Two) Times a Day As Needed for Anxiety.    amLODIPine (NORVASC) 5 MG tablet  Family Member No Yes    Take 1 tablet by mouth  Daily.    apixaban (ELIQUIS) 5 MG tablet tablet  Family Member No Yes    Take 1 tablet by mouth Every 12 (Twelve) Hours. Indications: Atrial Fibrillation    atorvastatin (LIPITOR) 20 MG tablet  Family Member No Yes    TAKE ONE TABLET BY MOUTH DAILY    Patient taking differently:  Take 20 mg by mouth Every Night.    bisacodyl (DULCOLAX) 5 MG EC tablet  Family Member No Yes    Take 1 tablet by mouth Daily As Needed for Constipation.    bumetanide (BUMEX) 2 MG tablet  Family Member No Yes    Take 2 tablets by mouth 2 (Two) Times a Day.    carvedilol (COREG) 6.25 MG tablet  Family Member No Yes    Take 1 tablet by mouth 2 (Two) Times a Day With Meals.    clotrimazole-betamethasone (LOTRISONE) 1-0.05 % cream  Family Member No Yes    Apply  topically to the appropriate area as directed 2 (Two) Times a Day.    fluticasone (FLONASE) 50 MCG/ACT nasal spray  Family Member No Yes    2 sprays by Each Nare route Daily.    fluticasone-salmeterol (ADVAIR DISKUS) 250-50 MCG/DOSE DISKUS  Family Member No Yes    Inhale 1 puff Daily As Needed (cough and wheezing).    gabapentin (NEURONTIN) 100 MG capsule  Family Member Yes Yes    Take 100 mg by mouth 2 (Two) Times a Day.    glimepiride (AMARYL) 1 MG tablet  Family Member No Yes    Take 1 tablet by mouth Every Morning Before Breakfast.    ipratropium-albuterol (DUO-NEB) 0.5-2.5 mg/mL nebulizer  Family Member No Yes    Take 3 mL by nebulization 4 (four) times a day.    Patient taking differently:  Take 3 mL by nebulization 3 (Three) Times a Day As Needed.    levothyroxine (SYNTHROID, LEVOTHROID) 25 MCG tablet  Family Member No Yes    Take 1 tablet by mouth Daily.    nitroglycerin (NITROSTAT) 0.4 MG SL tablet  Family Member No Yes    DISSOLVE 1 TAB UNDER TONGUE FOR CHEST PAIN - IF PAIN REMAINS AFTER 5 MIN, CALL 911 AND REPEAT DOSE. MAX 3 TABS IN 15 MINUTES    Patient taking differently:  Place 0.4 mg under the tongue Every 5 (Five) Minutes As Needed.    nystatin (MYCOSTATIN) 567718  UNIT/GM cream  Family Member No Yes    Apply  topically to the appropriate area as directed 2 (Two) Times a Day. to affected area(s)    Patient taking differently:  Apply  topically to the appropriate area as directed 2 (Two) Times a Day.    omeprazole (priLOSEC) 40 MG capsule  Family Member No Yes    Take 1 capsule by mouth Daily.    ondansetron (ZOFRAN) 4 MG tablet  Family Member No Yes    Take 1 tablet by mouth Every 6 (Six) Hours As Needed for Nausea or Vomiting.    polyethylene glycol (polyethylene glycol) 17 g packet  Family Member No Yes    Take 17 g by mouth Daily.    Patient taking differently:  Take 17 g by mouth Daily As Needed.    potassium chloride (K-DUR,KLOR-CON) 20 MEQ CR tablet 3/15/2021 Family Member No Yes    Take 1 tablet by mouth Every Other Day.    Probiotic Product (Risaquad-2) capsule capsule  Family Member Yes Yes    Take 1 capsule by mouth Daily.    senna-docusate (PERICOLACE) 8.6-50 MG per tablet  Family Member Yes Yes    Take 1 tablet by mouth daily.    sodium chloride 0.65 % nasal spray  Family Member No Yes    2 sprays into the nostril(s) as directed by provider As Needed for Congestion.    traMADol (ULTRAM) 50 MG tablet  Family Member Yes Yes    Take 50 mg by mouth Daily As Needed for Moderate Pain .    TRULICITY 0.75 MG/0.5ML solution pen-injector  Family Member No Yes    INJECT 0.75 MG UNDER THE SKIN ONCE WEEKLY    Patient taking differently:  Inject 0.75 mg under the skin into the appropriate area as directed Every 7 (Seven) Days. Uses on Saturday    vilazodone (VIIBRYD) 20 MG tablet tablet  Family Member Yes Yes    Take 20 mg by mouth Every Night.    vitamin D (ERGOCALCIFEROL) 1.25 MG (93816 UT) capsule capsule  Family Member No Yes    TAKE ONE CAPSULE BY MOUTH ONCE WEEKLY    Patient taking differently:  Take 50,000 Units by mouth Every 7 (Seven) Days. Takes on Sundays            Medication notes:     This medication list is complete to the best of my knowledge as of  3/15/2021    Please call if questions.  Pau Deras [facility]  Medication History Technician  [phone]    3/15/2021 16:39 EDT

## 2021-03-16 ENCOUNTER — TELEPHONE (OUTPATIENT)
Dept: FAMILY MEDICINE CLINIC | Facility: CLINIC | Age: 76
End: 2021-03-16

## 2021-03-16 PROBLEM — D72.829 LEUKOCYTOSIS: Status: ACTIVE | Noted: 2021-03-16

## 2021-03-16 PROBLEM — E03.9 HYPOTHYROIDISM (ACQUIRED): Chronic | Status: ACTIVE | Noted: 2021-03-16

## 2021-03-16 PROBLEM — K92.1 GASTROINTESTINAL HEMORRHAGE WITH MELENA: Status: ACTIVE | Noted: 2021-03-15

## 2021-03-16 PROBLEM — N18.32 STAGE 3B CHRONIC KIDNEY DISEASE (HCC): Chronic | Status: ACTIVE | Noted: 2021-03-16

## 2021-03-16 PROBLEM — Z79.01 CHRONIC ANTICOAGULATION: Chronic | Status: ACTIVE | Noted: 2021-03-16

## 2021-03-16 PROBLEM — K92.2 GI BLEED: Status: ACTIVE | Noted: 2021-03-16

## 2021-03-16 LAB
ABO GROUP BLD: NORMAL
ALBUMIN SERPL-MCNC: 3.3 G/DL (ref 3.5–5.2)
ALBUMIN/GLOB SERPL: 1.5 G/DL
ALP SERPL-CCNC: 173 U/L (ref 39–117)
ALT SERPL W P-5'-P-CCNC: 7 U/L (ref 1–33)
ANION GAP SERPL CALCULATED.3IONS-SCNC: 12.6 MMOL/L (ref 5–15)
AST SERPL-CCNC: 13 U/L (ref 1–32)
BACTERIA SPEC AEROBE CULT: NO GROWTH
BILIRUB SERPL-MCNC: 0.4 MG/DL (ref 0–1.2)
BLD GP AB SCN SERPL QL: NEGATIVE
BUN SERPL-MCNC: 87 MG/DL (ref 8–23)
BUN/CREAT SERPL: 52.7 (ref 7–25)
CALCIUM SPEC-SCNC: 7.9 MG/DL (ref 8.6–10.5)
CHLORIDE SERPL-SCNC: 104 MMOL/L (ref 98–107)
CO2 SERPL-SCNC: 32.4 MMOL/L (ref 22–29)
CREAT SERPL-MCNC: 1.65 MG/DL (ref 0.57–1)
DEPRECATED RDW RBC AUTO: 44.1 FL (ref 37–54)
ERYTHROCYTE [DISTWIDTH] IN BLOOD BY AUTOMATED COUNT: 14.9 % (ref 12.3–15.4)
GFR SERPL CREATININE-BSD FRML MDRD: 30 ML/MIN/1.73
GLOBULIN UR ELPH-MCNC: 2.2 GM/DL
GLUCOSE BLDC GLUCOMTR-MCNC: 104 MG/DL (ref 70–130)
GLUCOSE BLDC GLUCOMTR-MCNC: 155 MG/DL (ref 70–130)
GLUCOSE BLDC GLUCOMTR-MCNC: 162 MG/DL (ref 70–130)
GLUCOSE BLDC GLUCOMTR-MCNC: 88 MG/DL (ref 70–130)
GLUCOSE SERPL-MCNC: 79 MG/DL (ref 65–99)
HCT VFR BLD AUTO: 18.1 % (ref 34–46.6)
HCT VFR BLD AUTO: 19.5 % (ref 34–46.6)
HCT VFR BLD AUTO: 20.5 % (ref 34–46.6)
HEMOCCULT STL QL: POSITIVE
HGB BLD-MCNC: 5.6 G/DL (ref 12–15.9)
HGB BLD-MCNC: 6.3 G/DL (ref 12–15.9)
HGB BLD-MCNC: 6.5 G/DL (ref 12–15.9)
MAGNESIUM SERPL-MCNC: 2.1 MG/DL (ref 1.6–2.4)
MCH RBC QN AUTO: 25.5 PG (ref 26.6–33)
MCHC RBC AUTO-ENTMCNC: 30.9 G/DL (ref 31.5–35.7)
MCV RBC AUTO: 82.3 FL (ref 79–97)
NT-PROBNP SERPL-MCNC: 8131 PG/ML (ref 0–1800)
PLATELET # BLD AUTO: 216 10*3/MM3 (ref 140–450)
PMV BLD AUTO: 10.9 FL (ref 6–12)
POTASSIUM SERPL-SCNC: 3.6 MMOL/L (ref 3.5–5.2)
PROT SERPL-MCNC: 5.5 G/DL (ref 6–8.5)
RBC # BLD AUTO: 2.2 10*6/MM3 (ref 3.77–5.28)
RH BLD: POSITIVE
SODIUM SERPL-SCNC: 149 MMOL/L (ref 136–145)
T&S EXPIRATION DATE: NORMAL
WBC # BLD AUTO: 12.93 10*3/MM3 (ref 3.4–10.8)

## 2021-03-16 PROCEDURE — 85018 HEMOGLOBIN: CPT | Performed by: HOSPITALIST

## 2021-03-16 PROCEDURE — 96375 TX/PRO/DX INJ NEW DRUG ADDON: CPT

## 2021-03-16 PROCEDURE — 83880 ASSAY OF NATRIURETIC PEPTIDE: CPT | Performed by: STUDENT IN AN ORGANIZED HEALTH CARE EDUCATION/TRAINING PROGRAM

## 2021-03-16 PROCEDURE — 25010000002 PROCHLORPERAZINE 10 MG/2ML SOLUTION: Performed by: NURSE PRACTITIONER

## 2021-03-16 PROCEDURE — 96374 THER/PROPH/DIAG INJ IV PUSH: CPT

## 2021-03-16 PROCEDURE — 86900 BLOOD TYPING SEROLOGIC ABO: CPT | Performed by: HOSPITALIST

## 2021-03-16 PROCEDURE — 63710000001 DIPHENHYDRAMINE PER 50 MG: Performed by: HOSPITALIST

## 2021-03-16 PROCEDURE — 85027 COMPLETE CBC AUTOMATED: CPT | Performed by: STUDENT IN AN ORGANIZED HEALTH CARE EDUCATION/TRAINING PROGRAM

## 2021-03-16 PROCEDURE — 85014 HEMATOCRIT: CPT | Performed by: HOSPITALIST

## 2021-03-16 PROCEDURE — 99222 1ST HOSP IP/OBS MODERATE 55: CPT | Performed by: NURSE PRACTITIONER

## 2021-03-16 PROCEDURE — 82272 OCCULT BLD FECES 1-3 TESTS: CPT | Performed by: HOSPITALIST

## 2021-03-16 PROCEDURE — 99222 1ST HOSP IP/OBS MODERATE 55: CPT | Performed by: INTERNAL MEDICINE

## 2021-03-16 PROCEDURE — 86900 BLOOD TYPING SEROLOGIC ABO: CPT

## 2021-03-16 PROCEDURE — P9016 RBC LEUKOCYTES REDUCED: HCPCS

## 2021-03-16 PROCEDURE — G0378 HOSPITAL OBSERVATION PER HR: HCPCS

## 2021-03-16 PROCEDURE — 99222 1ST HOSP IP/OBS MODERATE 55: CPT | Performed by: PSYCHIATRY & NEUROLOGY

## 2021-03-16 PROCEDURE — 36430 TRANSFUSION BLD/BLD COMPNT: CPT

## 2021-03-16 PROCEDURE — 86850 RBC ANTIBODY SCREEN: CPT | Performed by: HOSPITALIST

## 2021-03-16 PROCEDURE — 86923 COMPATIBILITY TEST ELECTRIC: CPT

## 2021-03-16 PROCEDURE — 80053 COMPREHEN METABOLIC PANEL: CPT | Performed by: HOSPITALIST

## 2021-03-16 PROCEDURE — 82962 GLUCOSE BLOOD TEST: CPT

## 2021-03-16 PROCEDURE — 63710000001 INSULIN LISPRO (HUMAN) PER 5 UNITS: Performed by: STUDENT IN AN ORGANIZED HEALTH CARE EDUCATION/TRAINING PROGRAM

## 2021-03-16 PROCEDURE — 25010000002 FUROSEMIDE PER 20 MG: Performed by: HOSPITALIST

## 2021-03-16 PROCEDURE — 86901 BLOOD TYPING SEROLOGIC RH(D): CPT | Performed by: HOSPITALIST

## 2021-03-16 PROCEDURE — 83735 ASSAY OF MAGNESIUM: CPT | Performed by: STUDENT IN AN ORGANIZED HEALTH CARE EDUCATION/TRAINING PROGRAM

## 2021-03-16 RX ORDER — SODIUM CHLORIDE 0.9 % (FLUSH) 0.9 %
10 SYRINGE (ML) INJECTION AS NEEDED
Status: DISCONTINUED | OUTPATIENT
Start: 2021-03-16 | End: 2021-03-20 | Stop reason: HOSPADM

## 2021-03-16 RX ORDER — CARVEDILOL 3.12 MG/1
3.12 TABLET ORAL 2 TIMES DAILY WITH MEALS
Status: DISCONTINUED | OUTPATIENT
Start: 2021-03-16 | End: 2021-03-20 | Stop reason: HOSPADM

## 2021-03-16 RX ORDER — PROCHLORPERAZINE EDISYLATE 5 MG/ML
5 INJECTION INTRAMUSCULAR; INTRAVENOUS ONCE
Status: COMPLETED | OUTPATIENT
Start: 2021-03-16 | End: 2021-03-16

## 2021-03-16 RX ORDER — DIPHENHYDRAMINE HYDROCHLORIDE 50 MG/ML
25 INJECTION INTRAMUSCULAR; INTRAVENOUS ONCE
Status: COMPLETED | OUTPATIENT
Start: 2021-03-16 | End: 2021-03-16

## 2021-03-16 RX ORDER — DIPHENHYDRAMINE HCL 25 MG
25 CAPSULE ORAL ONCE
Status: COMPLETED | OUTPATIENT
Start: 2021-03-16 | End: 2021-03-16

## 2021-03-16 RX ORDER — FUROSEMIDE 10 MG/ML
20 INJECTION INTRAMUSCULAR; INTRAVENOUS ONCE
Status: COMPLETED | OUTPATIENT
Start: 2021-03-16 | End: 2021-03-16

## 2021-03-16 RX ORDER — ACETAMINOPHEN 325 MG/1
650 TABLET ORAL ONCE
Status: COMPLETED | OUTPATIENT
Start: 2021-03-16 | End: 2021-03-16

## 2021-03-16 RX ORDER — NYSTATIN 100000 [USP'U]/G
POWDER TOPICAL EVERY 12 HOURS SCHEDULED
Status: DISCONTINUED | OUTPATIENT
Start: 2021-03-16 | End: 2021-03-20 | Stop reason: HOSPADM

## 2021-03-16 RX ORDER — ACETAMINOPHEN 160 MG/5ML
650 SOLUTION ORAL ONCE
Status: COMPLETED | OUTPATIENT
Start: 2021-03-16 | End: 2021-03-16

## 2021-03-16 RX ORDER — ASPIRIN 81 MG/1
81 TABLET, CHEWABLE ORAL DAILY
Status: DISCONTINUED | OUTPATIENT
Start: 2021-03-16 | End: 2021-03-16

## 2021-03-16 RX ORDER — ACETAMINOPHEN 650 MG/1
650 SUPPOSITORY RECTAL ONCE
Status: COMPLETED | OUTPATIENT
Start: 2021-03-16 | End: 2021-03-16

## 2021-03-16 RX ORDER — SODIUM CHLORIDE 0.9 % (FLUSH) 0.9 %
10 SYRINGE (ML) INJECTION EVERY 12 HOURS SCHEDULED
Status: DISCONTINUED | OUTPATIENT
Start: 2021-03-16 | End: 2021-03-20 | Stop reason: HOSPADM

## 2021-03-16 RX ORDER — ATORVASTATIN CALCIUM 20 MG/1
40 TABLET, FILM COATED ORAL NIGHTLY
Status: DISCONTINUED | OUTPATIENT
Start: 2021-03-16 | End: 2021-03-20 | Stop reason: HOSPADM

## 2021-03-16 RX ORDER — ASPIRIN 300 MG/1
300 SUPPOSITORY RECTAL DAILY
Status: DISCONTINUED | OUTPATIENT
Start: 2021-03-16 | End: 2021-03-16

## 2021-03-16 RX ORDER — LEVOTHYROXINE SODIUM 0.03 MG/1
25 TABLET ORAL
Status: DISCONTINUED | OUTPATIENT
Start: 2021-03-17 | End: 2021-03-20 | Stop reason: HOSPADM

## 2021-03-16 RX ADMIN — SODIUM CHLORIDE, PRESERVATIVE FREE 10 ML: 5 INJECTION INTRAVENOUS at 10:02

## 2021-03-16 RX ADMIN — CARVEDILOL 3.12 MG: 6.25 TABLET, FILM COATED ORAL at 17:57

## 2021-03-16 RX ADMIN — FUROSEMIDE 20 MG: 10 INJECTION, SOLUTION INTRAMUSCULAR; INTRAVENOUS at 17:58

## 2021-03-16 RX ADMIN — PROCHLORPERAZINE EDISYLATE 5 MG: 5 INJECTION INTRAMUSCULAR; INTRAVENOUS at 21:24

## 2021-03-16 RX ADMIN — GABAPENTIN 100 MG: 100 CAPSULE ORAL at 21:25

## 2021-03-16 RX ADMIN — ATORVASTATIN CALCIUM 40 MG: 20 TABLET, FILM COATED ORAL at 21:25

## 2021-03-16 RX ADMIN — VILAZODONE HYDROCHLORIDE 20 MG: 40 TABLET ORAL at 21:26

## 2021-03-16 RX ADMIN — DIPHENHYDRAMINE HYDROCHLORIDE 25 MG: 25 CAPSULE ORAL at 10:01

## 2021-03-16 RX ADMIN — BUMETANIDE 4 MG: 2 TABLET ORAL at 10:09

## 2021-03-16 RX ADMIN — SODIUM CHLORIDE, PRESERVATIVE FREE 10 ML: 5 INJECTION INTRAVENOUS at 21:28

## 2021-03-16 RX ADMIN — MINERAL OIL, PETROLATUM: 425; 573 OINTMENT OPHTHALMIC at 21:27

## 2021-03-16 RX ADMIN — GABAPENTIN 100 MG: 100 CAPSULE ORAL at 10:01

## 2021-03-16 RX ADMIN — BUMETANIDE 4 MG: 2 TABLET ORAL at 21:25

## 2021-03-16 RX ADMIN — INSULIN LISPRO 2 UNITS: 100 INJECTION, SOLUTION INTRAVENOUS; SUBCUTANEOUS at 12:38

## 2021-03-16 RX ADMIN — ACETAMINOPHEN 650 MG: 325 TABLET, FILM COATED ORAL at 10:01

## 2021-03-16 RX ADMIN — LEVOTHYROXINE SODIUM 25 MCG: 0.03 TABLET ORAL at 10:01

## 2021-03-16 RX ADMIN — PANTOPRAZOLE SODIUM 40 MG: 40 TABLET, DELAYED RELEASE ORAL at 10:01

## 2021-03-16 RX ADMIN — NYSTATIN: 100000 POWDER TOPICAL at 21:27

## 2021-03-16 NOTE — SIGNIFICANT NOTE
03/16/21 1004   OTHER   Discipline speech language pathologist   Rehab Time/Intention   Session Not Performed other (see comments)     SLP orders generated via stroke order set. Pt has passed swallow screen in ED, placed on regular diet per MD orders. Confirmed with pt and RN who endorse no dysphagia at this time. Skilled bedside swallow evaluation deferred at this time.     No initial focal complaints of speech, language, or oral motor difficulties. CT head negative for acute CVA, pending neurology workup and MRI completion. SLP will plan to follow to determine if cognitive linguistic evaluation is indicated.

## 2021-03-16 NOTE — PLAN OF CARE
VSS, 2L NC O2, ANXIETY INCREASED WHEN SHE REALIZED SHE WASN'T GOING HOME, TALKED WITH DAUGHTER, GRAND DAUGHTER, AND NEIGHBOR. PATIENT FINALLY SETTLED DOWN AND WENT TO SLEEP WITH BIPAP. NO OTHER ISSUES NOTED WILL CONTINUE TO MONITOR    Problem: Fall Injury Risk  Goal: Absence of Fall and Fall-Related Injury  Outcome: Ongoing, Progressing  Intervention: Identify and Manage Contributors to Fall Injury Risk  Recent Flowsheet Documentation  Taken 3/15/2021 1958 by Yomi León RN  Medication Review/Management: medications reviewed  Self-Care Promotion: independence encouraged  Intervention: Promote Injury-Free Environment  Recent Flowsheet Documentation  Taken 3/16/2021 0403 by Yomi León RN  Safety Promotion/Fall Prevention: safety round/check completed  Taken 3/16/2021 0200 by Yomi León RN  Safety Promotion/Fall Prevention: safety round/check completed  Taken 3/16/2021 0000 by Yomi León RN  Safety Promotion/Fall Prevention: safety round/check completed  Taken 3/15/2021 2200 by Yomi León RN  Safety Promotion/Fall Prevention: safety round/check completed  Taken 3/15/2021 1958 by Yomi León RN  Safety Promotion/Fall Prevention:   activity supervised   assistive device/personal items within reach   clutter free environment maintained   fall prevention program maintained   safety round/check completed     Problem: Adult Inpatient Plan of Care  Goal: Plan of Care Review  Outcome: Ongoing, Progressing  Flowsheets (Taken 3/16/2021 0405)  Plan of Care Reviewed With: patient  Goal: Patient-Specific Goal (Individualized)  Outcome: Ongoing, Progressing  Goal: Absence of Hospital-Acquired Illness or Injury  Outcome: Ongoing, Progressing  Intervention: Identify and Manage Fall Risk  Recent Flowsheet Documentation  Taken 3/16/2021 0403 by Yomi León RN  Safety Promotion/Fall Prevention: safety round/check completed  Taken 3/16/2021 0200 by Yomi León RN  Safety Promotion/Fall  Prevention: safety round/check completed  Taken 3/16/2021 0000 by Yomi León RN  Safety Promotion/Fall Prevention: safety round/check completed  Taken 3/15/2021 2200 by Yomi León RN  Safety Promotion/Fall Prevention: safety round/check completed  Taken 3/15/2021 1958 by Yomi León RN  Safety Promotion/Fall Prevention:   activity supervised   assistive device/personal items within reach   clutter free environment maintained   fall prevention program maintained   safety round/check completed  Intervention: Prevent Skin Injury  Recent Flowsheet Documentation  Taken 3/16/2021 0403 by Yomi León RN  Body Position: position changed independently  Taken 3/16/2021 0200 by Yomi León RN  Body Position: position changed independently  Taken 3/16/2021 0000 by Yomi León RN  Body Position: position changed independently  Taken 3/15/2021 2200 by Yomi León RN  Body Position: position changed independently  Taken 3/15/2021 1958 by Yomi León RN  Body Position:   position changed independently   weight shift assistance provided  Intervention: Prevent and Manage VTE (venous thromboembolism) Risk  Recent Flowsheet Documentation  Taken 3/15/2021 1958 by Yomi León RN  VTE Prevention/Management:   bilateral   dorsiflexion/plantar flexion performed  Intervention: Prevent Infection  Recent Flowsheet Documentation  Taken 3/16/2021 0403 by Yomi León RN  Infection Prevention: environmental surveillance performed  Taken 3/16/2021 0200 by Yomi León RN  Infection Prevention: environmental surveillance performed  Taken 3/16/2021 0000 by Yomi León RN  Infection Prevention: environmental surveillance performed  Taken 3/15/2021 2200 by Yomi León RN  Infection Prevention: environmental surveillance performed  Taken 3/15/2021 1958 by Yomi León RN  Infection Prevention:   environmental surveillance performed   hand hygiene promoted   personal protective  equipment utilized   rest/sleep promoted   single patient room provided  Goal: Optimal Comfort and Wellbeing  Outcome: Ongoing, Progressing  Intervention: Provide Person-Centered Care  Recent Flowsheet Documentation  Taken 3/15/2021 1958 by Yomi León RN  Trust Relationship/Rapport:   care explained   questions answered  Goal: Readiness for Transition of Care  Outcome: Ongoing, Progressing   Goal Outcome Evaluation:  Plan of Care Reviewed With: patient

## 2021-03-16 NOTE — PROGRESS NOTES
"    Name: Miguelina Lopez ADMIT: 3/15/2021   : 1945  PCP: Arcelai Talbot APRN    MRN: [id number] LOS: 1 days   AGE/SEX: 75 y.o. female  ROOM: [facility]     Subjective   Subjective   Feeling fine this AM. Not really sure why she is here. Says she was told that she was \"delirious\" by son-in-law. Only c/o this AM is that she feels she \"poops too much\". With further questioning there is no report of diarrhea/black stool/BRBPR. She says that she has a normal small BM after every meal. Also c/o malodorous urine, but no LUTS. Breathing is at baseline for her.    Review of Systems   Constitutional: Positive for fatigue. Negative for appetite change, chills, diaphoresis and fever.   HENT: Negative for congestion, ear pain, nosebleeds, rhinorrhea, sore throat, trouble swallowing and voice change.    Eyes: Negative for pain and visual disturbance.   Respiratory: Negative for cough, choking, chest tightness, shortness of breath and stridor.    Cardiovascular: Positive for leg swelling (chronic). Negative for chest pain and palpitations.   Gastrointestinal: Negative for abdominal pain, blood in stool, diarrhea, nausea and vomiting.   Endocrine: Negative for cold intolerance and heat intolerance.   Genitourinary: Negative for decreased urine volume, difficulty urinating, dysuria and hematuria.   Musculoskeletal: Negative for back pain and neck pain.   Skin: Negative for color change, pallor and rash.   Allergic/Immunologic: Negative for environmental allergies and food allergies.   Neurological: Negative for tremors, seizures, syncope, facial asymmetry, speech difficulty, weakness, light-headedness, numbness and headaches.   Hematological: Negative for adenopathy. Does not bruise/bleed easily.   Psychiatric/Behavioral: Negative for behavioral problems, confusion and hallucinations.        Objective   Objective   Vital Signs  Temp:  [97.6 °F (36.4 °C)-98.7 °F (37.1 °C)] 97.6 °F (36.4 °C)  Heart Rate:  [75-95] 75  Resp:  " [18-22] 22  BP: (109-185)/(49-94) 109/49  SpO2:  [92 %-100 %] 96 %  on  Flow (L/min):  [2-3] 2;   Device (Oxygen Therapy): CPAP  Body mass index is 51.49 kg/m².  Physical Exam  Vitals and nursing note reviewed. Exam conducted with a chaperone present.   Constitutional:       General: She is not in acute distress.     Appearance: She is obese. She is ill-appearing (chronically). She is not toxic-appearing or diaphoretic.   HENT:      Head: Normocephalic and atraumatic.      Right Ear: External ear normal.      Left Ear: External ear normal.      Nose: Nose normal.      Mouth/Throat:      Mouth: Mucous membranes are moist.      Pharynx: Oropharynx is clear.      Comments: Mucosa pale  Eyes:      General: No scleral icterus.        Right eye: No discharge.         Left eye: No discharge.      Extraocular Movements: Extraocular movements intact.      Conjunctiva/sclera: Conjunctivae normal.   Neck:      Comments: No JVD  Cardiovascular:      Rate and Rhythm: Normal rate and regular rhythm.      Pulses: Normal pulses.      Heart sounds: Murmur present.   Pulmonary:      Effort: No respiratory distress.      Breath sounds: Normal breath sounds. No wheezing or rales.   Abdominal:      General: Bowel sounds are normal. There is no distension.      Palpations: Abdomen is soft.      Tenderness: There is no abdominal tenderness.   Musculoskeletal:         General: Swelling (chronic in BLEs) present. No deformity.      Cervical back: Neck supple. No rigidity.   Lymphadenopathy:      Cervical: No cervical adenopathy.   Skin:     General: Skin is warm and dry.      Capillary Refill: Capillary refill takes more than 3 seconds.      Coloration: Skin is pale. Skin is not jaundiced.      Findings: No rash.   Neurological:      General: No focal deficit present.      Mental Status: She is alert and oriented to person, place, and time. Mental status is at baseline.      Cranial Nerves: No cranial nerve deficit.      Coordination:  Coordination normal.   Psychiatric:         Mood and Affect: Mood normal.         Behavior: Behavior normal.         Thought Content: Thought content normal.         Results Review     I reviewed the patient's new clinical results.  Results from last 7 days   Lab Units 03/16/21  0816 03/16/21  0547 03/15/21  1302   WBC 10*3/mm3  --  12.93* 13.82*   HEMOGLOBIN g/dL 6.3* 5.6* 8.3*   PLATELETS 10*3/mm3  --  216 263     Results from last 7 days   Lab Units 03/15/21  1302   SODIUM mmol/L 146*   POTASSIUM mmol/L 3.8   CHLORIDE mmol/L 100   CO2 mmol/L 32.1*   BUN mg/dL 77*   CREATININE mg/dL 1.60*   GLUCOSE mg/dL 128*   Estimated Creatinine Clearance: 35.6 mL/min (A) (by C-G formula based on SCr of 1.6 mg/dL (H)).  Results from last 7 days   Lab Units 03/15/21  1302   ALBUMIN g/dL 4.20   BILIRUBIN mg/dL 0.7   ALK PHOS U/L 252*   AST (SGOT) U/L 18   ALT (SGPT) U/L 9     Results from last 7 days   Lab Units 03/16/21  0547 03/15/21  1302   CALCIUM mg/dL  --  8.7   ALBUMIN g/dL  --  4.20   MAGNESIUM mg/dL 2.1  --      Results from last 7 days   Lab Units 03/15/21  1302   PROCALCITONIN ng/mL 0.16   LACTATE mmol/L 1.8     COVID19   Date Value Ref Range Status   03/15/2021 Not Detected Not Detected - Ref. Range Final     SARS-CoV-2 PCR   Date Value Ref Range Status   02/10/2021 Not Detected Not Detected Final     Comment:     Nucleic acid specific to SARS-CoV-2 (COVID-19) virus was not detected in  this sample by the TaqPath (TM) COVID-19 Combo Kit.          SARS-CoV-2 (COVID-19) nucleic acid testing performed using [employer] Aptima (R) SARS-CoV-2 Assay or [employer] TaqPath (TM)  COVID-19 Combo Kit as indicated above under Emergency Use Authorization (EUA) from the FDA. Aptima (R) and TaqPath (TM) test performance  characteristics verified by [employer] in accordance with the FDAs Guidance Document (Policy for Diagnostic Tests for Coronavirus Disease-2019  during the Public Health Emergency) issued on March 16,  2020. The laboratory is regulated under CLIA as qualified to perform high-complexity testing  Unless otherwise noted, all testing was performed at [facility], CLIA No. [id number], [place] State Licensee No. [id number]     Glucose   Date/Time Value Ref Range Status   03/16/2021 0755 88 70 - 130 mg/dL Final   03/15/2021 2039 107 70 - 130 mg/dL Final   03/15/2021 1812 118 70 - 130 mg/dL Final       XR Chest 1 View  ONE VIEW PORTABLE CHEST     HISTORY: Confusion. Hypertension.     FINDINGS: There is cardiomegaly with sternal wires from previous cardiac  surgery. There is some minimal vascular prominence, unchanged from  02/01/2021. No new abnormality is seen.     This report was finalized on 3/15/2021 4:21 PM by Dr. Joshua Rogel M.D.     CT Head Without Contrast  Narrative: CT HEAD     HISTORY:Altered status     TECHNIQUE: CT scan of the head was obtained with 3 mm axial images  without intravenous contrast.  Radiation dose reduction techniques were  utilized, including automated exposure control and exposure modulation  based on body size.     COMPARISON:Head CT 08/07/2018     FINDINGS:  There is no finding of acute infarct, hemorrhage, contusion or abnormal  extra-axial collection. No hydrocephalus is present. Thick  atherosclerotic calcification is present within the right intracranial  vertebral artery and bilateral supraclinoid and cavernous portions of  the internal carotid arteries. Old left cerebellar lacunar infarct, as  before.     Impression: 1.  No findings of acute intracranial abnormality. Atherosclerotic  calcification within the right intracranial vertebral artery and  bilateral internal carotid arteries which is incompletely evaluated  without intravenous contrast. Findings can be further characterized with  CTA head if clinically indicated.  2.  Left cerebellar lacunar infarct, as before.  3.  Other findings as above.        This report was finalized on 3/15/2021 1:24 PM by Dr. Manny Gomez M.D.        Scheduled Medications  acetaminophen, 650 mg, Oral, Once   Or  acetaminophen, 650 mg, Oral, Once   Or  acetaminophen, 650 mg, Rectal, Once  amLODIPine, 5 mg, Oral, Q24H  apixaban, 5 mg, Oral, Q12H  aspirin, 81 mg, Oral, Daily   Or  aspirin, 300 mg, Rectal, Daily  atorvastatin, 40 mg, Oral, Nightly  bumetanide, 4 mg, Oral, BID  carvedilol, 6.25 mg, Oral, BID With Meals  diphenhydrAMINE, 25 mg, Oral, Once   Or  diphenhydrAMINE, 25 mg, Intravenous, Once  furosemide, 20 mg, Intravenous, Once  gabapentin, 100 mg, Oral, BID  insulin lispro, 0-9 Units, Subcutaneous, TID AC  levothyroxine, 25 mcg, Oral, Daily  pantoprazole, 40 mg, Oral, QAM  polyethylene glycol, 17 g, Oral, Daily  potassium chloride, 20 mEq, Oral, Every Other Day  sennosides-docusate, 1 tablet, Oral, Daily  sodium chloride, 10 mL, Intravenous, Q12H  sodium chloride, 10 mL, Intravenous, Q12H  vilazodone, 20 mg, Oral, Nightly  [START ON 3/21/2021] vitamin D, 50,000 Units, Oral, Weekly    Infusions   Diet  NPO Diet       Assessment/Plan     Active Hospital Problems    Diagnosis  POA   • **TIA (transient ischemic attack) [G45.9]  Yes   • Hypothyroidism (acquired) [E03.9]  Yes   • Chronic anticoagulation [Z79.01]  Not Applicable   • Leukocytosis [D72.829]  Yes   • Stage 3b chronic kidney disease (CMS/HCC) [N18.32]  Yes   • Presence of permanent cardiac pacemaker [Z95.0]  Yes   • COPD (chronic obstructive pulmonary disease) (CMS/HCC) [J44.9]  Yes   • Chronic respiratory failure with hypoxia and hypercapnia (CMS/HCC) [J96.11, J96.12]  Yes   • Chronic diastolic heart failure (CMS/HCC) [I50.32]  Yes   • Bilateral lower extremity edema [R60.0]  Yes   • Chronic right-sided congestive heart failure (CMS/HCC) [I50.812]  Yes   • Supplemental oxygen dependent [Z99.81]  Not Applicable   • Atrial fibrillation (CMS/HCC) [I48.91]  Yes   • Pulmonary hypertension due to sleep-disordered breathing (CMS/HCC) [I27.29, G47.8]  Yes   • Chronic coronary artery disease [I25.10]   Yes   • Anemia [D64.9]  Yes   • OCHOA (obstructive sleep apnea) [G47.33]  Yes   • DM type 2 (diabetes mellitus, type 2) (CMS/HCC) [E11.9]  Yes   • Essential hypertension [I10]  Yes      Resolved Hospital Problems   No resolved problems to display.     75-year-old woman with a history of chronic diastolic heart failure, paroxysmal atrial fibrillation/flutter, chronic AC (Eliquis), CKD3b, hypertension, CAD status post CABG, complete heart block status post pacemaker, type 2 diabetes, COPD, OCHOA/pulm HTN, chronic resp failure with hypoxia and hypercapnia (Trilogy user), who presented to ER with report of confusion per son--she is admitted for TIA workup.    Confusion (TIA vs metabolic encephalopathy)  -workup in progress, Neuro to see, MRI brain ordered    Acute/chronic right-sided CHF, valvular heart disease  -BNP pretty elevated at 8k, but last BNP in Feb was 12k  -has appt with Card tomorrow to discuss TAVR, will have them see her here    Severe anemia in setting of chronic anticoagulation  -pretty low this AM, transfusing 2 units PRBCs with dose of Lasix in between  -checking hemoccult, stool pretty black this AM  -hold Eliquis for now    Leukocytosis  -no evidence of infection at present, PCT wnl, will follow    DM2  -OHG on hold, SSI in place, check A1c    PAF, chronic AC, 3rd deg heart block/PPM  -paced rhythm  -hold Eliquis given anemia  -Card to see    HTN  -home meds continued  -BPs okay this AM    HypoT4  -continue L-T4, check TSH    CKD3b  -Cr around 1.6  -continue to follow    COPD/Chronic resp failure with hypercapnia and hypoxia (on Trilogy vent at home and chronic nasal cannula O2)  -seems pretty stable at present on her baseline O2  -Trilogy in room    · SCDs for DVT prophylaxis.  · Full code.  · Discussed with patient and nursing staff.  · Anticipate discharge TBD, may return to her facility at [place]      Eliazar Bridges MD  [facility]  03/16/21  09:21 EDT

## 2021-03-16 NOTE — PROGRESS NOTES
Continued Stay Note  [facility]     Patient Name: Miguelina Lopez  MRN: [id number]  Today's Date: 3/16/2021    Admit Date: 3/15/2021    Discharge Plan     Row Name 03/16/21 1057       Plan    Plan  Home with family support and [facility]    Patient/Family in Agreement with Plan  yes    Plan Comments  IMM 3/16/2021. Met with patient at bedside. Face sheet reviewed, updated accordingly.  Prior to admission patient was living in her own apartment, her son-in-law Alin Ayala lives with her and assists her.   Also Carmen Carrizales [phone] will assist her with bathing, medication set and taking to MD appointments. Patient has a Trilogy, glucometer, home oxygen from [facility] and a walker.  Patient has [facility] and PT services, she would like to resume. Patient and family do not want patient to go to rehab. Patient uses the [facility] on [address] and [facility], no issues affording or taking her medications.  Patient will be using [facility] upon discharge.  Patient granted me permission to speak with Carmen Carrizales [phone], Carmen confirms the plan is for patient to return home.  Carmen states that she is patient’s POA.  Patient was scheduled to get her second COVID vaccine 3/17/2021, informed granddaughter that she will have to coordinate with facility where she got her first vaccine.  Family will transport. Will continue to monitor for new or changing discharge needs.        Discharge Codes    No documentation.             Naila Crowe RN

## 2021-03-16 NOTE — CONSULTS
Neurology Consult Note    Consult Date: 3/16/2021    Referring MD: Salvador Guardado MD    Reason for Consult I have been asked to see the patient in neurological consultation to render advice and opinion regarding confusion    Miguelina Lopez is a 75 y.o. female with past medical history of chronic kidney disease, CAD, CHF, COPD on home oxygen, hypertension.  She was sent to the hospital yesterday for confusion.  I obtained the history of primarily from her granddaughter who reported that she was up late Sunday night and Monday morning seemed disoriented and complained that she had not eaten in several days and made several delusional comments.  She is cared for at home by multiple family members and there had been nothing else out of the ordinary prior to the time before.  She was brought to the emergency department and found to have leukocytosis and anemia.  She was given a transfusion.  Her mental status has improved somewhat overnight although she still appears somewhat fatigued.  No reported unilateral weakness.  Patient denies any vision loss.  No recent similar spells.  No prior history of dementia    Past Medical/Surgical Hx:  Past Medical History:   Diagnosis Date   • Acute on chronic respiratory failure with hypoxia and hypercapnia (CMS/HCC)    • OLI (acute kidney injury) (CMS/HCC)    • Anemia    • Anxiety    • Aortic valve stenosis    • Bilateral lower extremity edema    • CHF (congestive heart failure) (CMS/HCC)    • Chronic coronary artery disease    • Class 3 severe obesity due to excess calories in adult (CMS/HCC)    • COPD (chronic obstructive pulmonary disease) (CMS/HCC)    • Depression    • Diabetes mellitus (CMS/HCC)    • Elevated cholesterol    • GERD (gastroesophageal reflux disease)    • Heart murmur    • Hypertension    • Mitral valve insufficiency    • Pneumonia     1/2016   • Pulmonary hypertension (CMS/HCC)     due to sleep disordered breathing   • Sleep apnea     Uses CPAP or oxygen   •  Stage 3 chronic kidney disease (CMS/HCC)    • Subclinical hypothyroidism    • Supplemental oxygen dependent    • TIA (transient ischemic attack) 3/15/2021   • Valvular heart disease      Past Surgical History:   Procedure Laterality Date   • CARDIAC CATHETERIZATION     • CARDIAC CATHETERIZATION N/A 6/10/2016    Procedure: Left Heart Cath;  Surgeon: Chace Johnson MD;  Location: [facility] CATH INVASIVE LOCATION;  Service:    • CARDIAC CATHETERIZATION N/A 6/10/2016    Procedure: Right Heart Cath;  Surgeon: Chace Johnson MD;  Location: [facility] CATH INVASIVE LOCATION;  Service:    • CARDIAC CATHETERIZATION N/A 2/5/2021    Procedure: RIGHT AND LEFT HEART CATH;  Surgeon: Antoine Ayers MD;  Location: [facility] CATH INVASIVE LOCATION;  Service: Cardiology;  Laterality: N/A;   • CARDIAC CATHETERIZATION N/A 2/5/2021    Procedure: Coronary angiography;  Surgeon: Antoine Ayers MD;  Location: [facility] CATH INVASIVE LOCATION;  Service: Cardiology;  Laterality: N/A;   • CARDIAC CATHETERIZATION N/A 2/5/2021    Procedure: Left ventriculography- pressures;  Surgeon: Antoine Ayers MD;  Location: [facility] CATH INVASIVE LOCATION;  Service: Cardiology;  Laterality: N/A;   • CARDIAC ELECTROPHYSIOLOGY PROCEDURE N/A 2/2/2021    Procedure: Pacemaker DC new---[employer] MICRA;  Surgeon: Eliazar Bond MD;  Location: [facility] CATH INVASIVE LOCATION;  Service: Cardiology;  Laterality: N/A;   • CARDIAC SURGERY     • CORONARY ARTERY BYPASS GRAFT      2 vessel   • CORONARY ARTERY BYPASS GRAFT WITH MITRAL VALVE REPAIR/REPLACEMENT N/A 6/13/2016    Procedure: INTRAOPERATIVE TARIQ, MIDLINE STERNOTOMY, CORONARY ARTERY BYPASS GRAFTING X  2 UTILIZING ENDOSCOPICALLY HARVESTED LEFT GREATER SAPHENOUS VEIN, MITRAL VALVE REPLACEMENT AND TRICUSPID VALVE REPAIR;  Surgeon: Eliecer Mistry MD;  Location: [facility] MAIN OR;  Service:    • CORONARY STENT PLACEMENT  2010    Approx. 6 yrs ago at [facility]   • HEMORRHOIDECTOMY     • HYSTERECTOMY      • MITRAL VALVE REPLACEMENT     • REPLACEMENT TOTAL KNEE Right    • THYROID SURGERY      Cyst removed from thyroid   • VASCULAR SURGERY         Medications On Admission  Medications Prior to Admission   Medication Sig Dispense Refill Last Dose   • acetaminophen (TYLENOL) 325 MG tablet Take 2 tablets by mouth Every 4 (Four) Hours As Needed for Mild Pain .      • ALPRAZolam (XANAX) 1 MG tablet Take 1 mg by mouth 2 (Two) Times a Day As Needed for Anxiety.      • amLODIPine (NORVASC) 5 MG tablet Take 1 tablet by mouth Daily.      • apixaban (ELIQUIS) 5 MG tablet tablet Take 1 tablet by mouth Every 12 (Twelve) Hours. Indications: Atrial Fibrillation 60 tablet     • atorvastatin (LIPITOR) 20 MG tablet TAKE ONE TABLET BY MOUTH DAILY (Patient taking differently: Take 20 mg by mouth Every Night.) 90 tablet 0    • bisacodyl (DULCOLAX) 5 MG EC tablet Take 1 tablet by mouth Daily As Needed for Constipation.      • bumetanide (BUMEX) 2 MG tablet Take 2 tablets by mouth 2 (Two) Times a Day. 30 tablet 0    • carvedilol (COREG) 6.25 MG tablet Take 1 tablet by mouth 2 (Two) Times a Day With Meals.      • clotrimazole-betamethasone (LOTRISONE) 1-0.05 % cream Apply  topically to the appropriate area as directed 2 (Two) Times a Day. 45 g 0    • fluticasone (FLONASE) 50 MCG/ACT nasal spray 2 sprays by Each Nare route Daily.      • fluticasone-salmeterol (ADVAIR DISKUS) 250-50 MCG/DOSE DISKUS Inhale 1 puff Daily As Needed (cough and wheezing). 60 each 0    • gabapentin (NEURONTIN) 100 MG capsule Take 100 mg by mouth 2 (Two) Times a Day.      • glimepiride (AMARYL) 1 MG tablet Take 1 tablet by mouth Every Morning Before Breakfast. 90 tablet 1    • ipratropium-albuterol (DUO-NEB) 0.5-2.5 mg/mL nebulizer Take 3 mL by nebulization 4 (four) times a day. (Patient taking differently: Take 3 mL by nebulization 3 (Three) Times a Day As Needed.) 3 mL 5    • levothyroxine (SYNTHROID, LEVOTHROID) 25 MCG tablet Take 1 tablet by mouth Daily. 90  tablet 1    • nitroglycerin (NITROSTAT) 0.4 MG SL tablet DISSOLVE 1 TAB UNDER TONGUE FOR CHEST PAIN - IF PAIN REMAINS AFTER 5 MIN, CALL 911 AND REPEAT DOSE. MAX 3 TABS IN 15 MINUTES (Patient taking differently: Place 0.4 mg under the tongue Every 5 (Five) Minutes As Needed.) 25 tablet 4    • nystatin (MYCOSTATIN) 956555 UNIT/GM cream Apply  topically to the appropriate area as directed 2 (Two) Times a Day. to affected area(s) (Patient taking differently: Apply  topically to the appropriate area as directed 2 (Two) Times a Day.) 30 g 3    • omeprazole (priLOSEC) 40 MG capsule Take 1 capsule by mouth Daily. 90 capsule 0    • ondansetron (ZOFRAN) 4 MG tablet Take 1 tablet by mouth Every 6 (Six) Hours As Needed for Nausea or Vomiting.      • polyethylene glycol (polyethylene glycol) 17 g packet Take 17 g by mouth Daily. (Patient taking differently: Take 17 g by mouth Daily As Needed.)      • potassium chloride (K-DUR,KLOR-CON) 20 MEQ CR tablet Take 1 tablet by mouth Every Other Day.   3/15/2021 at Unknown time   • Probiotic Product (Risaquad-2) capsule capsule Take 1 capsule by mouth Daily.      • senna-docusate (PERICOLACE) 8.6-50 MG per tablet Take 1 tablet by mouth daily.      • sodium chloride 0.65 % nasal spray 2 sprays into the nostril(s) as directed by provider As Needed for Congestion.  12    • traMADol (ULTRAM) 50 MG tablet Take 50 mg by mouth Daily As Needed for Moderate Pain .      • TRULICITY 0.75 MG/0.5ML solution pen-injector INJECT 0.75 MG UNDER THE SKIN ONCE WEEKLY (Patient taking differently: Inject 0.75 mg under the skin into the appropriate area as directed Every 7 (Seven) Days. Uses on Saturday) 6 pen 5    • vilazodone (VIIBRYD) 20 MG tablet tablet Take 20 mg by mouth Every Night.      • vitamin D (ERGOCALCIFEROL) 1.25 MG (49283 UT) capsule capsule TAKE ONE CAPSULE BY MOUTH ONCE WEEKLY (Patient taking differently: Take 50,000 Units by mouth Every 7 (Seven) Days. Takes on Sundays) 12 capsule 0   "      Allergies:  Allergies   Allergen Reactions   • Coumadin [Warfarin Sodium] Diarrhea and Nausea Only   • Bumex [Bumetanide] Swelling     Tolerated Bumex drip February 2021   • Erythromycin    • Hctz [Hydrochlorothiazide] Swelling   • Zaroxolyn [Metolazone] Diarrhea   • Penicillins Rash     Tolerated cephalosporins in the past    • Sulfa Antibiotics Rash       Social Hx:  Social History     Socioeconomic History   • Marital status:      Spouse name: Not on file   • Number of children: Not on file   • Years of education: Not on file   • Highest education level: Not on file   Tobacco Use   • Smoking status: Never Smoker   • Smokeless tobacco: Never Used   • Tobacco comment: no caffeine    Substance and Sexual Activity   • Alcohol use: No   • Drug use: No   • Sexual activity: Defer       Family Hx:  Family History   Problem Relation Age of Onset   • Heart attack Father    • Heart disease Father        Review of systems  Constitutional: [No fevers, chills]  Eye: [No recent visual problems, eye discharge]  Respiratory: [+ shortness of breath, no cough]  Cardiovascular: [No Chest pain, palpitations]  Neurologic: [No weakness, numbness, + confusion]  Psychiatric: [No anxiety, depression]    All other systems reviewed and are negative    Exam    BP (!) 99/39 (BP Location: Left arm, Patient Position: Lying)   Pulse 75   Temp 97.8 °F (36.6 °C) (Oral)   Resp 20   Ht 152.4 cm (60\")   Wt 120 kg (263 lb 10.7 oz)   SpO2 100%   BMI 51.49 kg/m²   gen: NAD, vitals reviewed  Eyes: fundus sharp with no papilledema or retinal hemorrhages  HEENT: no nuchal rigidity  CVS: RRR, S1, S2  MS: oriented x3, recent/remote memory intact, impaired attention/concentration, language intact, no neglect, normal fund of knowledge  CN: visual acuity grossly normal, visual fields full, PERRL, EOMI, facial sensation equal, no facial droop, hearing symmetric, palate elevates symmetrically, shoulder shrug equal, tongue midline  Motor: 4+ " /5 throughout upper and lower extremities, normal tone  Sensation: intact to vibration and temperature throughout  Reflexes: 2+ throughout upper and lower extremities, downgoing plantars  Coordination: no dysmetria with finger to nose bilaterally  Gait: Deferred at patient request    DATA:    Lab Results   Component Value Date    GLUCOSE 79 03/16/2021    CALCIUM 7.9 (L) 03/16/2021     (H) 03/16/2021    K 3.6 03/16/2021    CO2 32.4 (H) 03/16/2021     03/16/2021    BUN 87 (H) 03/16/2021    CREATININE 1.65 (H) 03/16/2021    EGFRIFAFRI 43 (L) 10/14/2020    EGFRIFNONA 30 (L) 03/16/2021    BCR 52.7 (H) 03/16/2021    ANIONGAP 12.6 03/16/2021     Lab Results   Component Value Date    WBC 12.93 (H) 03/16/2021    HGB 6.3 (C) 03/16/2021    HCT 19.5 (C) 03/16/2021    MCV 82.3 03/16/2021     03/16/2021     Lab Results   Component Value Date    LDL 79 10/14/2020    LDL 71 12/11/2019    LDL 73 07/11/2019     Lab Results   Component Value Date    HGBA1C 6.71 (H) 02/01/2021     Lab Results   Component Value Date    INR 2.08 (H) 03/15/2021    INR 1.17 (H) 06/14/2016    INR 1.37 (H) 06/13/2016    PROTIME 23.1 (H) 03/15/2021    PROTIME 14.8 (H) 06/14/2016    PROTIME 16.7 (H) 06/13/2016       Lab review: BUN 87, hemoglobin 6.3    Imaging review: I personally reviewed her head CT performed in the emergency department which shows no acute intracranial abnormality.  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  History from her granddaughter sounds like a metabolic or infectious encephalopathy.  No clear cause although there are multiple potentially related issues including elevated BUN in the setting of CKD, anemia, some medication changes recently.  Suspect her encephalopathy was probably multifactorial.  Anemia can  occasionally affect the mental status especially in patients with respiratory disease.  She has improved clinically.  I will check duplex and EEG to look for vascular or epileptic causes for her spell.    PLAN:  -We will defer MRI given her claustrophobia, I am not comfortable sedating her with her respiratory disease  -Bilateral carotid duplex (no CTA given CKD)  -Routine EEG    Recommendations discussed with patient, will follow

## 2021-03-16 NOTE — CONSULTS
Patient Name: Miguelina Lopez  Age/Sex: 75 y.o. female  : 1945  MRN: [id number]    Date of Admission: 3/15/2021  Date of Encounter Visit: 21  Encounter Provider: Loyda Chvaez RN  Place of Service: [facility] CARDIOLOGY      Referring Provider: Salvador Guardado MD  Patient Care Team:  Arcelia Talbot APRN as PCP - General (Nurse Practitioner)  Robbie Wilson MD as Consulting Physician (Hematology and Oncology)  Chace Johnson MD as Consulting Physician (Cardiology)    Subjective:   Consulted for: Valve disease, atrial fibrillation, anemia on anticoagulation    Chief Complaint: TIA    History of Present Illness:  Miguelina Lopez is a 75 y.o. female patient of Dr Ayers with a history of chronic diastolic heart failure, hypertension, CKD, coronary artery disease (s/p CABG x 2 ), valve disease (s/p tissue Medtronic Mcdermott MVR and tricuspid valve repair ), PAF (Eliquis), COPD, diabetes, and OCHOA (CPAP).      She was admitted in 2021 with SOA and chest pressure and found to have elevated BNP and LE edema. Her blood pressure was elevated and she was treated with IV diuretics and Bumex drip. She under went cardiac catheterization which showed patent grafts with severe aortic stenosis and severe pulmonary hypertension with PA pressures 67/42 and COREY of 0.8 cm 2. ECHO was repeated which showed EF 66-70% with mild LVH, severe aortic stenosis with peak gradient of 69 mm Hg and mean gradient of 42 mm Hg. Her gradients across her tissue mitral valve were also elevated with peak gradient of 24 mm Hg and mean of 10 mm Hg with mild to moderate TR and RVSP of 47 mm Hg. She was seen by Dr Pacheco and CV surgery for TAVR evaluation. She was als found to have complete heart block and Medtronic Micra leadless pacemaker was placed by Dr Bond. She was treated for UTI and discharged to rehab.      She was seen in the office for follow up on 2021  and was having weight gain and increased LE edema. After discussion with her nephrologist her Bumex was increased to 4 mg TID.     She presented to the ED yesterday with confusion and poor oral intake for 3 days prior. Her renal function was slightly worse with creatinine up to 1.6 from 1.3 baseline, BNP 8131. Hgb was noted to be 5.6 and will be transfused 2 PRBC. CT of head was done which showed no acute findings with previous lacunar infarct and calcification in the vertebral artery and carotid arteries.  Neuro has been consulted for possible TIA.     For the most part she just complains of fatigue.  She has little short of breath with activity.  She had a lot of confusion yesterday and does not remember why she initially came to the hospital except that her family told her she was confused.  She has noted that her stools have been dark.  She denies any chest discomfort.  She actually had an appointment with Dr. Ayers tomorrow.    Previous Cardiac Testing   Cardiac catheterization 2/5/2021  Coronary Findings    Diagnostic  Dominance: Right  Left Main   Normal   Left Anterior Descending   The left anterior descending coronary artery is a large vessel that appears to be free of significant disease. A previously deployed stent in the proximal extending into the mid segment is widely patent. The saphenous vein graft to the diagonal branch is widely patent. The diagonal is normal beyond the anastomosis.   Ramus Intermedius   A small ramus branch is present that is normal.   Left Circumflex   The left circumflex coronary artery appears to be normal in the proximal segment. There is a stent in the mid segment and its extending into the obtuse marginal branch that is widely patent. The obtuse marginal is normal. The terminal portion terminal marginal branch of the circumflex are also normal.   Right Coronary Artery   The right coronary artery is occluded. There is a saphenous vein graft to the posterior descending  branch that is widely patent. Retrograde filling of the posterior lateral segment artery and posterior lateral branches is noted. Moderate calcification in the proximal portion of the right coronary artery is noted.   Intervention    Hemodynamics    LV pressures (S/D/E) : 144/11/AO pressures (S/D/M) : 107/54/73 mmHg  RA pressures  (A/V/M) : 17/14/12 mmHg  RV pressures (S/D/E) : 68/8/PA pressures (S/D/M) : 67/30/42 mmHg  PCW pressures (A/V/M) 32/24 mmHg  Cardiac output (Alvaro method): 4.4 L/min  Cardiac index (Alvaro method): 2.07 L/min/m2  Aortic valve area: 0.85 cm2  Mitral valve area: 0.4 cm2       1.  Ischemic heart disease:  Etiology: Coronary atherosclerosis  Anatomy: Three-vessel coronary disease.  Patent AO-D1 SVG, patent AO-PDA SVG, patent mid LAD stent, patent LCx stent.  Physiology: Elevated troponin     2.  Aortic stenosis: Severe     3.  Pulmonary hypertension: Severe    ECHO 2/1/2021  · Left ventricular ejection fraction appears to be 66 - 70%.  · Left ventricular wall thickness is consistent with mild concentric hypertrophy  · The right atrial cavity is mildly dilated.  · The right ventricular cavity is moderately dilated. Normal right ventricular systolic function noted.  · The left atrial cavity is moderately dilated.  · Severe aortic valve stenosis is present  · Aortic valve maximum pressure gradient is 69 mmHg. Aortic valve mean pressure gradient is 42 mmHg.  · There is a tissue mitral valve replacement which appears to be well-seated.  · Gradients across the tissue mitral valve replacement are elevated with a peak of 24 mmHg and a mean of 10 mmHg  · The tricuspid valve is status post repair  · Mild to moderate tricuspid valve regurgitation is present.  · Calculated right ventricular systolic pressure from tricuspid regurgitation is 47 mmHg.  · There is no evidence of pericardial effusion.    Past Medical History:  Past Medical History:   Diagnosis Date   • Acute on chronic respiratory failure with  hypoxia and hypercapnia (CMS/[facility])    • OLI (acute kidney injury) (CMS/[facility])    • Anemia    • Anxiety    • Aortic valve stenosis    • Bilateral lower extremity edema    • CHF (congestive heart failure) (CMS/[facility])    • Chronic coronary artery disease    • Class 3 severe obesity due to excess calories in adult (CMS/[facility])    • COPD (chronic obstructive pulmonary disease) (CMS/[facility])    • Depression    • Diabetes mellitus (CMS/[facility])    • Elevated cholesterol    • GERD (gastroesophageal reflux disease)    • Heart murmur    • Hypertension    • Mitral valve insufficiency    • Pneumonia     1/2016   • Pulmonary hypertension (CMS/[facility])     due to sleep disordered breathing   • Sleep apnea     Uses CPAP or oxygen   • Stage 3 chronic kidney disease (CMS/[facility])    • Subclinical hypothyroidism    • Supplemental oxygen dependent    • TIA (transient ischemic attack) 3/15/2021   • Valvular heart disease        Past Surgical History:   Procedure Laterality Date   • CARDIAC CATHETERIZATION     • CARDIAC CATHETERIZATION N/A 6/10/2016    Procedure: Left Heart Cath;  Surgeon: Chace Johnson MD;  Location: [facility] CATH INVASIVE LOCATION;  Service:    • CARDIAC CATHETERIZATION N/A 6/10/2016    Procedure: Right Heart Cath;  Surgeon: Chace Johnson MD;  Location: [facility] CATH INVASIVE LOCATION;  Service:    • CARDIAC CATHETERIZATION N/A 2/5/2021    Procedure: RIGHT AND LEFT HEART CATH;  Surgeon: Antoine Ayers MD;  [facility];  Service: Cardiology;  Laterality: N/A;   • CARDIAC CATHETERIZATION N/A 2/5/2021    Procedure: Coronary angiography;  Surgeon: Antoine Ayers MD;  [facility];  Service: Cardiology;  Laterality: N/A;   • CARDIAC CATHETERIZATION N/A 2/5/2021    Procedure: Left ventriculography- pressures;  Surgeon: Antoine Ayers MD;  [facility];  Service: Cardiology;  Laterality: N/A;   • CARDIAC ELECTROPHYSIOLOGY PROCEDURE N/A  2/2/2021    Procedure: Pacemaker DC new---Medtronic MICRA;  Surgeon: Eliazar Bond MD;  Location: [facility] CATH INVASIVE LOCATION;  Service: Cardiology;  Laterality: N/A;   • CARDIAC SURGERY     • CORONARY ARTERY BYPASS GRAFT      2 vessel   • CORONARY ARTERY BYPASS GRAFT WITH MITRAL VALVE REPAIR/REPLACEMENT N/A 6/13/2016    Procedure: INTRAOPERATIVE TARIQ, MIDLINE STERNOTOMY, CORONARY ARTERY BYPASS GRAFTING X  2 UTILIZING ENDOSCOPICALLY HARVESTED LEFT GREATER SAPHENOUS VEIN, MITRAL VALVE REPLACEMENT AND TRICUSPID VALVE REPAIR;  Surgeon: Eliecer Mistry MD;  Location: [facility] OR;  Service:    • CORONARY STENT PLACEMENT  2010    Approx. 6 yrs ago at [facility]   • HEMORRHOIDECTOMY     • HYSTERECTOMY     • MITRAL VALVE REPLACEMENT     • REPLACEMENT TOTAL KNEE Right    • THYROID SURGERY      Cyst removed from thyroid   • VASCULAR SURGERY         Home Medications:   Medications Prior to Admission   Medication Sig Dispense Refill Last Dose   • acetaminophen (TYLENOL) 325 MG tablet Take 2 tablets by mouth Every 4 (Four) Hours As Needed for Mild Pain .      • ALPRAZolam (XANAX) 1 MG tablet Take 1 mg by mouth 2 (Two) Times a Day As Needed for Anxiety.      • amLODIPine (NORVASC) 5 MG tablet Take 1 tablet by mouth Daily.      • apixaban (ELIQUIS) 5 MG tablet tablet Take 1 tablet by mouth Every 12 (Twelve) Hours. Indications: Atrial Fibrillation 60 tablet     • atorvastatin (LIPITOR) 20 MG tablet TAKE ONE TABLET BY MOUTH DAILY (Patient taking differently: Take 20 mg by mouth Every Night.) 90 tablet 0    • bisacodyl (DULCOLAX) 5 MG EC tablet Take 1 tablet by mouth Daily As Needed for Constipation.      • bumetanide (BUMEX) 2 MG tablet Take 2 tablets by mouth 2 (Two) Times a Day. 30 tablet 0    • carvedilol (COREG) 6.25 MG tablet Take 1 tablet by mouth 2 (Two) Times a Day With Meals.      • clotrimazole-betamethasone (LOTRISONE) 1-0.05 % cream Apply  topically to the appropriate area as directed 2 (Two) Times a Day. 45 g 0     • fluticasone (FLONASE) 50 MCG/ACT nasal spray 2 sprays by Each Nare route Daily.      • fluticasone-salmeterol (ADVAIR DISKUS) 250-50 MCG/DOSE DISKUS Inhale 1 puff Daily As Needed (cough and wheezing). 60 each 0    • gabapentin (NEURONTIN) 100 MG capsule Take 100 mg by mouth 2 (Two) Times a Day.      • glimepiride (AMARYL) 1 MG tablet Take 1 tablet by mouth Every Morning Before Breakfast. 90 tablet 1    • ipratropium-albuterol (DUO-NEB) 0.5-2.5 mg/mL nebulizer Take 3 mL by nebulization 4 (four) times a day. (Patient taking differently: Take 3 mL by nebulization 3 (Three) Times a Day As Needed.) 3 mL 5    • levothyroxine (SYNTHROID, LEVOTHROID) 25 MCG tablet Take 1 tablet by mouth Daily. 90 tablet 1    • nitroglycerin (NITROSTAT) 0.4 MG SL tablet DISSOLVE 1 TAB UNDER TONGUE FOR CHEST PAIN - IF PAIN REMAINS AFTER 5 MIN, CALL 911 AND REPEAT DOSE. MAX 3 TABS IN 15 MINUTES (Patient taking differently: Place 0.4 mg under the tongue Every 5 (Five) Minutes As Needed.) 25 tablet 4    • nystatin (MYCOSTATIN) 842491 UNIT/GM cream Apply  topically to the appropriate area as directed 2 (Two) Times a Day. to affected area(s) (Patient taking differently: Apply  topically to the appropriate area as directed 2 (Two) Times a Day.) 30 g 3    • omeprazole (priLOSEC) 40 MG capsule Take 1 capsule by mouth Daily. 90 capsule 0    • ondansetron (ZOFRAN) 4 MG tablet Take 1 tablet by mouth Every 6 (Six) Hours As Needed for Nausea or Vomiting.      • polyethylene glycol (polyethylene glycol) 17 g packet Take 17 g by mouth Daily. (Patient taking differently: Take 17 g by mouth Daily As Needed.)      • potassium chloride (K-DUR,KLOR-CON) 20 MEQ CR tablet Take 1 tablet by mouth Every Other Day.   3/15/2021 at Unknown time   • Probiotic Product (Risaquad-2) capsule capsule Take 1 capsule by mouth Daily.      • senna-docusate (PERICOLACE) 8.6-50 MG per tablet Take 1 tablet by mouth daily.      • sodium chloride 0.65 % nasal spray 2 sprays into  the nostril(s) as directed by provider As Needed for Congestion.  12    • traMADol (ULTRAM) 50 MG tablet Take 50 mg by mouth Daily As Needed for Moderate Pain .      • TRULICITY 0.75 MG/0.5ML solution pen-injector INJECT 0.75 MG UNDER THE SKIN ONCE WEEKLY (Patient taking differently: Inject 0.75 mg under the skin into the appropriate area as directed Every 7 (Seven) Days. Uses on Saturday) 6 pen 5    • vilazodone (VIIBRYD) 20 MG tablet tablet Take 20 mg by mouth Every Night.      • vitamin D (ERGOCALCIFEROL) 1.25 MG (26029 UT) capsule capsule TAKE ONE CAPSULE BY MOUTH ONCE WEEKLY (Patient taking differently: Take 50,000 Units by mouth Every 7 (Seven) Days. Takes on Sundays) 12 capsule 0        Allergies:  Allergies   Allergen Reactions   • Coumadin [Warfarin Sodium] Diarrhea and Nausea Only   • Bumex [Bumetanide] Swelling     Tolerated Bumex drip February 2021   • Erythromycin    • Hctz [Hydrochlorothiazide] Swelling   • Zaroxolyn [Metolazone] Diarrhea   • Penicillins Rash     Tolerated cephalosporins in the past    • Sulfa Antibiotics Rash       Past Social History:  Social History     Socioeconomic History   • Marital status:      Spouse name: Not on file   • Number of children: Not on file   • Years of education: Not on file   • Highest education level: Not on file   Tobacco Use   • Smoking status: Never Smoker   • Smokeless tobacco: Never Used   • Tobacco comment: no caffeine    Substance and Sexual Activity   • Alcohol use: No   • Drug use: No   • Sexual activity: Defer        Past Family History:  Family History   Problem Relation Age of Onset   • Heart attack Father    • Heart disease Father             CONSTITUTIONAL:  No weight loss. No fever or chills. + weakness. + fatigue.                           HEENT:  Normocephalic. Symmetrical. Atraumatic.                               Eyes: No visual loss. No blurred vision. No double vision. No icterus.                               Ears: No hearing loss.  No drainage.                              Nose:  No sneezing. No congestion. No runny nose. No sore throat.           CARDIAC:         No chest pain, palpitations, syncope or near syncope             RESPIRATORY:  + shortness of breath. No hemoptysis. No cough or sputum production. No wheezes.   GASTROINTESTINAL:         No anorexia. No nausea. No vomiting. No diarrhea. No abdominal pain. No distention. No bleeding from rectum. No melena.         GENITOURINARY:  No burning on urination. No hematuria. No frequency.  MUSCULOSKELETAL: No myalgia. No stiffness. No gait disturbances.                              SKIN:  No rash. No itching. No pallor.           NEUROLOGICAL: No headache. No dizziness. No syncope. No paralysis. No ataxia. No numbness. No tingling. No change in bowel or bladder control. No speech difficulty.            HEMATOLOGIC: No bleeding. No bruising.                PSYCHIATRIC: No depression. No anxiety. No confusion. No sleep disturbances. No hallucinations.       ENDOCRINOLOGIC: No diaphoresis. No heat or cold intolerance. No polyuria or polydipsia.     Objective:     Objective:  Temp:  [97.6 °F (36.4 °C)-98.7 °F (37.1 °C)] 97.6 °F (36.4 °C)  Heart Rate:  [75-95] 75  Resp:  [18-22] 22  BP: (109-185)/(49-94) 109/49    Intake/Output Summary (Last 24 hours) at 3/16/2021 0949  Last data filed at 3/15/2021 1614  Gross per 24 hour   Intake 500 ml   Output 600 ml   Net -100 ml     Body mass index is 51.49 kg/m².      03/15/21  1756 03/16/21  0650   Weight: 117 kg (257 lb 11.5 oz) 120 kg (263 lb 10.7 oz)           Physical Exam:   Physical Exam    Physical Exam:   General Appearance:    Awake alert and oriented in no acute distress.   Color:  Skin:  Neuro:  HEENT:    Lungs:     Pale pink  Warm and dry  No focal, motor or sensory deficits  Neck supple, pupils equal, round and reactive. No JVD, No Bruit  Clear to auscultation,respirations regular, even and                  unlabored    Heart:    Irreg/irreg  rate and rhythm, S1 and S2, 3/6 systolic murmur, no gallop, no rub. No edema, DP/PT pulses are 2+   Chest Wall:    No abnormalities observed   Abdomen:     Normal bowel sounds, no masses, no organomegaly, soft        non-tender, non-distended, no guarding, no ascites noted   Extremities:   Moves all extremities well, no edema, no cyanosis, no redness       Lab Review:     Results from last 7 days   Lab Units 03/16/21  0547 03/15/21  1302   SODIUM mmol/L 149* 146*   POTASSIUM mmol/L 3.6 3.8   CHLORIDE mmol/L 104 100   CO2 mmol/L 32.4* 32.1*   BUN mg/dL 87* 77*   CREATININE mg/dL 1.65* 1.60*   GLUCOSE mg/dL 79 128*   CALCIUM mg/dL 7.9* 8.7       Results from last 7 days   Lab Units 03/15/21  1717 03/15/21  1302   TROPONIN T ng/mL 0.010 <0.010     Results from last 7 days   Lab Units 03/16/21  0816 03/16/21  0547   WBC 10*3/mm3  --  12.93*   HEMOGLOBIN g/dL 6.3* 5.6*   HEMATOCRIT % 19.5* 18.1*   PLATELETS 10*3/mm3  --  216     Results from last 7 days   Lab Units 03/15/21  1302   INR  2.08*   APTT seconds 37.9*         Results from last 7 days   Lab Units 03/16/21  0547   MAGNESIUM mg/dL 2.1         Results from last 7 days   Lab Units 03/16/21  0547   PROBNP pg/mL 8,131.0*               Imaging:      Imaging Results (Most Recent)     Procedure Component Value Units Date/Time    XR Chest 1 View [[id number]] Collected: 03/15/21 1423     Updated: 03/15/21 1624    Narrative:      ONE VIEW PORTABLE CHEST     HISTORY: Confusion. Hypertension.     FINDINGS: There is cardiomegaly with sternal wires from previous cardiac  surgery. There is some minimal vascular prominence, unchanged from  02/01/2021. No new abnormality is seen.     This report was finalized on 3/15/2021 4:21 PM by Dr. Joshua Rogel M.D.       CT Head Without Contrast [[id number]] Collected: 03/15/21 1317     Updated: 03/15/21 1327    Narrative:      CT HEAD     HISTORY:Altered status     TECHNIQUE: CT scan of the head was obtained with 3 mm axial  images  without intravenous contrast.  Radiation dose reduction techniques were  utilized, including automated exposure control and exposure modulation  based on body size.     COMPARISON:Head CT 08/07/2018     FINDINGS:  There is no finding of acute infarct, hemorrhage, contusion or abnormal  extra-axial collection. No hydrocephalus is present. Thick  atherosclerotic calcification is present within the right intracranial  vertebral artery and bilateral supraclinoid and cavernous portions of  the internal carotid arteries. Old left cerebellar lacunar infarct, as  before.       Impression:      1.  No findings of acute intracranial abnormality. Atherosclerotic  calcification within the right intracranial vertebral artery and  bilateral internal carotid arteries which is incompletely evaluated  without intravenous contrast. Findings can be further characterized with  CTA head if clinically indicated.  2.  Left cerebellar lacunar infarct, as before.  3.  Other findings as above.        This report was finalized on 3/15/2021 1:24 PM by Dr. Manny Gomez M.D.             Telemetry      EKG: this admission        Baseline:     I personally viewed and interpreted the patient's EKG/Telemetry data.    Assessment:   Assessment/Plan         TIA (transient ischemic attack)    Chronic coronary artery disease    Anemia    OCHOA (obstructive sleep apnea)    DM type 2 (diabetes mellitus, type 2) (CMS/HCC)    Essential hypertension    Pulmonary hypertension due to sleep-disordered breathing (CMS/HCC)    Atrial fibrillation (CMS/HCC)    Supplemental oxygen dependent    Chronic right-sided congestive heart failure (CMS/HCC)    Bilateral lower extremity edema    Chronic diastolic heart failure (CMS/HCC)    Chronic respiratory failure with hypoxia and hypercapnia (CMS/HCC)    COPD (chronic obstructive pulmonary disease) (CMS/HCC)    Presence of permanent cardiac pacemaker    Hypothyroidism (acquired)    Chronic anticoagulation     Leukocytosis    Stage 3b chronic kidney disease (CMS/HCC)    1.  Acute blood loss anemia with heme positive stool  2.  Severe aortic stenosis being evaluated for TAVR  3.  Coronary artery disease with previous CABG  4.  Essential hypertension  5.  Chronic kidney disease  6.  Type 2 diabetes mellitus  7.  Altered mental status          Plan:      Patient awake alert oriented today.  Her hemoglobin is low and she is getting 2 units of blood today.  Someone is also ordered IV diuretics between which I think is a good idea.  Her BNP is elevated at 8000 I suspect this could be multifactorial given her renal function, anemia as well as heart failure.  Hemodynamically she appears stable for now.  We will continue to hold anticoagulant until her anemia is improved and stable.    Thank you for allowing me to participate in the care of Miguelina Lopez. Feel free to contact me directly with any further questions or concerns.    Loyda Chavez RN  [facility]  03/16/21  09:49 EDT

## 2021-03-16 NOTE — DISCHARGE PLACEMENT REQUEST
"Miguelina Lopez (75 y.o. Female)     Date of Birth Social Security Number Address Home Phone MRN    1945  [id number]  [place] [id number] [phone] [id number]    Protestant Marital Status          None        Admission Date Admission Type Admitting Provider Attending Provider Department, Room/Bed    3/15/21 Emergency Salvador Guardado MD Benton, John B, MD [address], P583/1    Discharge Date Discharge Disposition Discharge Destination                       Attending Provider: Eliazar Bridges MD    Allergies: Coumadin [Warfarin Sodium], Bumex [Bumetanide], Erythromycin, Hctz [Hydrochlorothiazide], Zaroxolyn [Metolazone], Penicillins, Sulfa Antibiotics    Isolation: None   Infection: None   Code Status: CPR    Ht: 152.4 cm (60\")   Wt: 120 kg (263 lb 10.7 oz)    Admission Cmt: None   Principal Problem: TIA (transient ischemic attack) [G45.9]                 Active Insurance as of 3/15/2021     Primary Coverage     Payor Plan Insurance Group Employer/Plan Group    HU[employer]NA MEDICARE REPLACEMENT HU[employer]NA MEDICARE REPLACEMENT [id number]     Payor Plan Address Payor Plan Phone Number Payor Plan Fax Number Effective Dates    [address] [address] [phone]  1/1/2018 - None Entered    [facility] [id number]       Subscriber Name Subscriber Birth Date Member ID       MIGUELINA LOPEZ 1945 [id number]                 Emergency Contacts      (Rel.) Home Phone Work Phone Mobile Phone    SofieCarmen (Grandchild) [phone] -- --    CHRISTIANNE DOMINGUEZ (Son) [phone] -- [phone]    RamiroPuja (Daughter) [phone] -- [phone]              "

## 2021-03-16 NOTE — CONSULTS
Gastroenterology   Initial Inpatient Consult Note  Thank you for asking our opinion on this patient  Referring Provider: Eliazar Bridges MD    Reason for Consultation: Anemia and heme positive stool with melena    Subjective     History of present illness:    75 y.o. female Thank you for consulting us regarding this 75-year-old female. Patient has a history of coronary artery disease s/p CABG, mitral valve replacement with bioprosthetic mitral valve and tricuspid repair, atrial fibrillation, hypertension, hyperlipidemia, type 2 diabetes, COPD with obstructive sleep apnea, anxiety and urinary tract infections. Patient presented to the emergency department March 15, 2021 with complaints of confusion by her son-in-law. She was found to be anemic in the ER with a hemoglobin of 8.3 then 5.6 this morning. A review of her chart shows a previous hemoglobin February 9, 2021 of 10.7.    While in the patient's room, I discussed the findings with the patient and the nurse in the room and the nurse states that patient stool has been black and tarry though solid.  She has had multiple solid bowel movements with no diarrhea reported.  The patient thought initially her stools were a bit more red to pink but again the nursing staff states they are definitely black.    Past Medical History:  Past Medical History:   Diagnosis Date   • Acute on chronic respiratory failure with hypoxia and hypercapnia (CMS/[facility])    • OLI (acute kidney injury) (CMS/[facility])    • Anemia    • Anxiety    • Aortic valve stenosis    • Bilateral lower extremity edema    • CHF (congestive heart failure) (CMS/[facility])    • Chronic coronary artery disease    • Class 3 severe obesity due to excess calories in adult (CMS/[facility])    • COPD (chronic obstructive pulmonary disease) (CMS/[facility])    • Depression    • Diabetes mellitus (CMS/[facility])    • Elevated cholesterol    • GERD (gastroesophageal reflux disease)    • Heart murmur    • Hypertension    • Mitral valve insufficiency    •  Pneumonia     1/2016   • Pulmonary hypertension (CMS/HCC)     due to sleep disordered breathing   • Sleep apnea     Uses CPAP or oxygen   • Stage 3 chronic kidney disease (CMS/HCC)    • Subclinical hypothyroidism    • Supplemental oxygen dependent    • TIA (transient ischemic attack) 3/15/2021   • Valvular heart disease      Past Surgical History:  Past Surgical History:   Procedure Laterality Date   • CARDIAC CATHETERIZATION     • CARDIAC CATHETERIZATION N/A 6/10/2016    Procedure: Left Heart Cath;  Surgeon: Chace Johnson MD;  [facility];  Service:    • CARDIAC CATHETERIZATION N/A 6/10/2016    Procedure: Right Heart Cath;  Surgeon: Chace Johnson MD;  [facility];  Service:    • CARDIAC CATHETERIZATION N/A 2/5/2021    Procedure: RIGHT AND LEFT HEART CATH;  Surgeon: Antoine Ayers MD;  [facility];  Service: Cardiology;  Laterality: N/A;   • CARDIAC CATHETERIZATION N/A 2/5/2021    Procedure: Coronary angiography;  Surgeon: Antoine Ayers MD;  [facility];  Service: Cardiology;  Laterality: N/A;   • CARDIAC CATHETERIZATION N/A 2/5/2021    Procedure: Left ventriculography- pressures;  Surgeon: Antoine Ayers MD;  [facility];  Service: Cardiology;  Laterality: N/A;   • CARDIAC ELECTROPHYSIOLOGY PROCEDURE N/A 2/2/2021    Procedure: Pacemaker DC new---[employer] MICRA;  Surgeon: Eliazar Bond MD;  Location: [facility] CATH INVASIVE LOCATION;  Service: Cardiology;  Laterality: N/A;   • CARDIAC SURGERY     • CORONARY ARTERY BYPASS GRAFT      2 vessel   • CORONARY ARTERY BYPASS GRAFT WITH MITRAL VALVE REPAIR/REPLACEMENT N/A 6/13/2016    Procedure: INTRAOPERATIVE TARIQ, MIDLINE STERNOTOMY, CORONARY ARTERY BYPASS GRAFTING X  2 UTILIZING ENDOSCOPICALLY HARVESTED LEFT GREATER SAPHENOUS VEIN, MITRAL VALVE REPLACEMENT AND TRICUSPID VALVE REPAIR;  Surgeon: Eliecer  WOO Mistry MD;  Location: [facility] OR;  Service:    • CORONARY STENT PLACEMENT  2010    Approx. 6 yrs ago at [facility]   • HEMORRHOIDECTOMY     • HYSTERECTOMY     • MITRAL VALVE REPLACEMENT     • REPLACEMENT TOTAL KNEE Right    • THYROID SURGERY      Cyst removed from thyroid   • VASCULAR SURGERY        Social History:   Social History     Tobacco Use   • Smoking status: Never Smoker   • Smokeless tobacco: Never Used   • Tobacco comment: no caffeine    Substance Use Topics   • Alcohol use: No      Family History:  Family History   Problem Relation Age of Onset   • Heart attack Father    • Heart disease Father        Home Meds:  Medications Prior to Admission   Medication Sig Dispense Refill Last Dose   • acetaminophen (TYLENOL) 325 MG tablet Take 2 tablets by mouth Every 4 (Four) Hours As Needed for Mild Pain .      • ALPRAZolam (XANAX) 1 MG tablet Take 1 mg by mouth 2 (Two) Times a Day As Needed for Anxiety.      • amLODIPine (NORVASC) 5 MG tablet Take 1 tablet by mouth Daily.      • apixaban (ELIQUIS) 5 MG tablet tablet Take 1 tablet by mouth Every 12 (Twelve) Hours. Indications: Atrial Fibrillation 60 tablet     • atorvastatin (LIPITOR) 20 MG tablet TAKE ONE TABLET BY MOUTH DAILY (Patient taking differently: Take 20 mg by mouth Every Night.) 90 tablet 0    • bisacodyl (DULCOLAX) 5 MG EC tablet Take 1 tablet by mouth Daily As Needed for Constipation.      • bumetanide (BUMEX) 2 MG tablet Take 2 tablets by mouth 2 (Two) Times a Day. 30 tablet 0    • carvedilol (COREG) 6.25 MG tablet Take 1 tablet by mouth 2 (Two) Times a Day With Meals.      • clotrimazole-betamethasone (LOTRISONE) 1-0.05 % cream Apply  topically to the appropriate area as directed 2 (Two) Times a Day. 45 g 0    • fluticasone (FLONASE) 50 MCG/ACT nasal spray 2 sprays by Each Nare route Daily.      • fluticasone-salmeterol (ADVAIR DISKUS) 250-50 MCG/DOSE DISKUS Inhale 1 puff Daily As Needed (cough and wheezing). 60 each 0    • gabapentin  (NEURONTIN) 100 MG capsule Take 100 mg by mouth 2 (Two) Times a Day.      • glimepiride (AMARYL) 1 MG tablet Take 1 tablet by mouth Every Morning Before Breakfast. 90 tablet 1    • ipratropium-albuterol (DUO-NEB) 0.5-2.5 mg/mL nebulizer Take 3 mL by nebulization 4 (four) times a day. (Patient taking differently: Take 3 mL by nebulization 3 (Three) Times a Day As Needed.) 3 mL 5    • levothyroxine (SYNTHROID, LEVOTHROID) 25 MCG tablet Take 1 tablet by mouth Daily. 90 tablet 1    • nitroglycerin (NITROSTAT) 0.4 MG SL tablet DISSOLVE 1 TAB UNDER TONGUE FOR CHEST PAIN - IF PAIN REMAINS AFTER 5 MIN, CALL 911 AND REPEAT DOSE. MAX 3 TABS IN 15 MINUTES (Patient taking differently: Place 0.4 mg under the tongue Every 5 (Five) Minutes As Needed.) 25 tablet 4    • nystatin (MYCOSTATIN) 959374 UNIT/GM cream Apply  topically to the appropriate area as directed 2 (Two) Times a Day. to affected area(s) (Patient taking differently: Apply  topically to the appropriate area as directed 2 (Two) Times a Day.) 30 g 3    • omeprazole (priLOSEC) 40 MG capsule Take 1 capsule by mouth Daily. 90 capsule 0    • ondansetron (ZOFRAN) 4 MG tablet Take 1 tablet by mouth Every 6 (Six) Hours As Needed for Nausea or Vomiting.      • polyethylene glycol (polyethylene glycol) 17 g packet Take 17 g by mouth Daily. (Patient taking differently: Take 17 g by mouth Daily As Needed.)      • potassium chloride (K-DUR,KLOR-CON) 20 MEQ CR tablet Take 1 tablet by mouth Every Other Day.   3/15/2021 at Unknown time   • Probiotic Product (Risaquad-2) capsule capsule Take 1 capsule by mouth Daily.      • senna-docusate (PERICOLACE) 8.6-50 MG per tablet Take 1 tablet by mouth daily.      • sodium chloride 0.65 % nasal spray 2 sprays into the nostril(s) as directed by provider As Needed for Congestion.  12    • traMADol (ULTRAM) 50 MG tablet Take 50 mg by mouth Daily As Needed for Moderate Pain .      • TRULICITY 0.75 MG/0.5ML solution pen-injector INJECT 0.75 MG  UNDER THE SKIN ONCE WEEKLY (Patient taking differently: Inject 0.75 mg under the skin into the appropriate area as directed Every 7 (Seven) Days. Uses on Saturday) 6 pen 5    • vilazodone (VIIBRYD) 20 MG tablet tablet Take 20 mg by mouth Every Night.      • vitamin D (ERGOCALCIFEROL) 1.25 MG (57887 UT) capsule capsule TAKE ONE CAPSULE BY MOUTH ONCE WEEKLY (Patient taking differently: Take 50,000 Units by mouth Every 7 (Seven) Days. Takes on Sundays) 12 capsule 0      Current Meds:   atorvastatin, 40 mg, Oral, Nightly  bumetanide, 4 mg, Oral, BID  carvedilol, 3.125 mg, Oral, BID With Meals  furosemide, 20 mg, Intravenous, Once  gabapentin, 100 mg, Oral, BID  insulin lispro, 0-9 Units, Subcutaneous, TID AC  levothyroxine, 25 mcg, Oral, Daily  nystatin, , Topical, Q12H  pantoprazole, 40 mg, Oral, QAM  polyethylene glycol, 17 g, Oral, Daily  potassium chloride, 20 mEq, Oral, Every Other Day  sennosides-docusate, 1 tablet, Oral, Daily  sodium chloride, 10 mL, Intravenous, Q12H  sodium chloride, 10 mL, Intravenous, Q12H  vilazodone, 20 mg, Oral, Nightly  [START ON 3/21/2021] vitamin D, 50,000 Units, Oral, Weekly      Allergies:  Allergies   Allergen Reactions   • Coumadin [Warfarin Sodium] Diarrhea and Nausea Only   • Bumex [Bumetanide] Swelling     Tolerated Bumex drip February 2021   • Erythromycin    • Hctz [Hydrochlorothiazide] Swelling   • Zaroxolyn [Metolazone] Diarrhea   • Penicillins Rash     Tolerated cephalosporins in the past    • Sulfa Antibiotics Rash     Review of Systems  Pertinent items are noted in HPI, all other systems reviewed and negative    Objective     Vital Signs  Temp:  [97.6 °F (36.4 °C)-98.7 °F (37.1 °C)] 98.5 °F (36.9 °C)  Heart Rate:  [74-76] 74  Resp:  [18-22] 18  BP: ()/(39-94) 108/42    Physical Exam:  CONSTITUTIONAL:  today's vital signs reviewed  EARS NOSE THROAT: trachea midline and no deformity of the nares  EYES: no scleral icterus  GASTROINTESTINAL: abdomen is soft, nontender,  nondistended with normal active bowel sounds, no masses are appreciated  PSYCHIATRIC: appropriate mood and affect  RESPIRATORY: normal inspiratory effort with no increased work of breathing  NEUROLOGIC: patient is awake and alert  DERMATOLOGIC: skin is warm with no cyanosis  LYMPHATIC: no periumbilical lymphadenopathy     Results Review:   I reviewed the patient's new clinical results.    Results from last 7 days   Lab Units 03/16/21  0816 03/16/21  0547 03/15/21  1302   WBC 10*3/mm3  --  12.93* 13.82*   HEMOGLOBIN g/dL 6.3* 5.6* 8.3*   HEMATOCRIT % 19.5* 18.1* 26.9*   PLATELETS 10*3/mm3  --  216 263     Results from last 7 days   Lab Units 03/16/21  0547 03/15/21  1302   SODIUM mmol/L 149* 146*   POTASSIUM mmol/L 3.6 3.8   CHLORIDE mmol/L 104 100   CO2 mmol/L 32.4* 32.1*   BUN mg/dL 87* 77*   CREATININE mg/dL 1.65* 1.60*   CALCIUM mg/dL 7.9* 8.7   BILIRUBIN mg/dL 0.4 0.7   ALK PHOS U/L 173* 252*   ALT (SGPT) U/L 7 9   AST (SGOT) U/L 13 18   GLUCOSE mg/dL 79 128*     Results from last 7 days   Lab Units 03/15/21  1302   INR  2.08*     Lab Results   Lab Value Date/Time    LIPASE 30 03/15/2021 1302    LIPASE 66 (H) 08/07/2018 1858       Radiology:  XR Chest 1 View   Final Result      CT Head Without Contrast   Final Result   1.  No findings of acute intracranial abnormality. Atherosclerotic   calcification within the right intracranial vertebral artery and   bilateral internal carotid arteries which is incompletely evaluated   without intravenous contrast. Findings can be further characterized with   CTA head if clinically indicated.   2.  Left cerebellar lacunar infarct, as before.   3.  Other findings as above.           This report was finalized on 3/15/2021 1:24 PM by Dr. Manny Gomez M.D.              Assessment/Plan   Patient Active Problem List   Diagnosis   • Anxiety disorder   • Arthritis of knee   • Asthma   • Chronic coronary artery disease   • CKD (chronic kidney disease) stage 3, GFR 30-59 ml/min  (CMS/[facility])   • Depression   • Diabetic peripheral neuropathy (CMS/[facility])   • Gastroesophageal reflux disease   • Hyperlipidemia   • Insomnia   • Lower gastrointestinal hemorrhage   • Anemia   • OCHOA (obstructive sleep apnea)   • DM type 2 (diabetes mellitus, type 2) (CMS/[facility])   • Essential hypertension   • Hospital discharge follow-up   • Mitral stenosis   • Pulmonary hypertension due to sleep-disordered breathing (CMS/[facility])   • s/p MVR, TV-repair, CABG x2 6/13/16   • OLI (acute kidney injury) (CMS/HCC)   • Leukocytosis   • Atrial fibrillation (CMS/[facility])   • Nocturnal hypoxia   • Dermatitis   • Medicare annual wellness visit, initial   • Class 3 severe obesity due to excess calories with serious comorbidity and body mass index (BMI) of 50.0 to 59.9 in adult (CMS/[facility])   • Supplemental oxygen dependent   • Acute on chronic combined systolic and diastolic HF (heart failure) (CMS/[facility])   • Mitral regurgitation   • Aortic stenosis   • Medicare annual wellness visit, subsequent   • Localized edema   • Chronic right-sided congestive heart failure (CMS/[facility])   • Cellulitis of left lower extremity   • Proteinuria   • Bilateral lower extremity edema   • Subclinical hypothyroidism   • Chronic diastolic heart failure (CMS/[facility])   • Chronic respiratory failure with hypoxia and hypercapnia (CMS/[facility])   • Acute on chronic respiratory failure with hypoxia and hypercapnia (CMS/[facility])   • AV block, 2nd degree   • 1st degree AV block   • Chest pain   • COPD (chronic obstructive pulmonary disease) (CMS/[facility])   • Complete heart block (CMS/[facility])   • Presence of permanent cardiac pacemaker   • TIA (transient ischemic attack)   • Hypothyroidism (acquired)   • Chronic anticoagulation   • Leukocytosis   • Stage 3b chronic kidney disease (CMS/[facility])   • GI bleed       Assessment:  1. Severe anemia.  This is a new finding with a significant drop in hemoglobin from baseline.  Patient a colonoscopy many years ago apparently showing diverticulosis only.  Stool  is Hemoccult positive  2. Melenic stool.  Patient has had dark black stools reported per nursing with an elevated BUN to creatinine ratio possibly consistent with an upper GI source of blood loss.  3. Heme positive stool  4. COPD.  Chronic stable.  5. Stage IIIb chronic kidney disease.  Stable.  6. Chronic anticoagulation    Plan:  · Monitor H&H  · Plan EGD tomorrow due to melena with elevated BUN to creatinine ratio and heme positive stools  · If EGD is negative, consider colonoscopy for further evaluation.  Patient is eating a regular lunch today so we will not be able to prep her for colonoscopy tomorrow.  · Check coags in the morning      I discussed the patients findings and my recommendations with patient and nursing staff.    MD Elia Tena M.D.  [facility], [facility] [place] [address]  Office: [phone]

## 2021-03-16 NOTE — PLAN OF CARE
A+0x4.  2 units PRBCs today for critical Hgb of 6.3 (2nd unit currently transfusing r/t IV access issues).  Fecal occult +.  To have EGD tomorrow.  Consent and education signed in chart.  EEG and carotid ultrasounds tomorrow.  Frequently up to toilet.  Having formed, black, singular stools approx Q2 hours.  CTM and progress towards goals as tolerated.            Goal Outcome Evaluation:

## 2021-03-16 NOTE — CONSULTS
Acute rehab referral received via stroke order set. Patient with NIHSS 0. Will sign off.    Thank you!    Mumtaz Carrizales RN  p

## 2021-03-16 NOTE — TELEPHONE ENCOUNTER
Caller: [facility]    Relationship: HOME HEALTH CARE SERVICE    Best call back number: [phone]    What orders are you requesting (i.e. lab or imaging): REQUEST FOR RESUMING HOME HEALTH CARE FOR NURSING AND PHYSICAL THERAPY WHEN PATIENT RETURNS HOME FROM HOSPITAL    In what timeframe would the patient need to come in: WHEN PATIENT RETURNS HOME FROM HOSPITAL, DATE UNKNOWN     Where will you receive your lab/imaging services: IN HOME    Additional notes: PLEASE CONTACT [facility] TO GIVE APPROVAL

## 2021-03-16 NOTE — H&P (VIEW-ONLY)
Gastroenterology   Initial Inpatient Consult Note  Thank you for asking our opinion on this patient  Referring Provider: Eliazar Bridges MD    Reason for Consultation: Anemia and heme positive stool with melena    Subjective     History of present illness:    75 y.o. female Thank you for consulting us regarding this 75-year-old female. Patient has a history of coronary artery disease s/p CABG, mitral valve replacement with bioprosthetic mitral valve and tricuspid repair, atrial fibrillation, hypertension, hyperlipidemia, type 2 diabetes, COPD with obstructive sleep apnea, anxiety and urinary tract infections. Patient presented to the emergency department March 15, 2021 with complaints of confusion by her son-in-law. She was found to be anemic in the ER with a hemoglobin of 8.3 then 5.6 this morning. A review of her chart shows a previous hemoglobin February 9, 2021 of 10.7.    While in the patient's room, I discussed the findings with the patient and the nurse in the room and the nurse states that patient stool has been black and tarry though solid.  She has had multiple solid bowel movements with no diarrhea reported.  The patient thought initially her stools were a bit more red to pink but again the nursing staff states they are definitely black.    Past Medical History:  Past Medical History:   Diagnosis Date   • Acute on chronic respiratory failure with hypoxia and hypercapnia (CMS/[facility])    • OLI (acute kidney injury) (CMS/[facility])    • Anemia    • Anxiety    • Aortic valve stenosis    • Bilateral lower extremity edema    • CHF (congestive heart failure) (CMS/[facility])    • Chronic coronary artery disease    • Class 3 severe obesity due to excess calories in adult (CMS/[facility])    • COPD (chronic obstructive pulmonary disease) (CMS/[facility])    • Depression    • Diabetes mellitus (CMS/[facility])    • Elevated cholesterol    • GERD (gastroesophageal reflux disease)    • Heart murmur    • Hypertension    • Mitral valve insufficiency    •  Pneumonia     1/2016   • Pulmonary hypertension (CMS/[facility])     due to sleep disordered breathing   • Sleep apnea     Uses CPAP or oxygen   • Stage 3 chronic kidney disease (CMS/HCC)    • Subclinical hypothyroidism    • Supplemental oxygen dependent    • TIA (transient ischemic attack) 3/15/2021   • Valvular heart disease      Past Surgical History:  Past Surgical History:   Procedure Laterality Date   • CARDIAC CATHETERIZATION     • CARDIAC CATHETERIZATION N/A 6/10/2016    Procedure: Left Heart Cath;  Surgeon: Chace Johnson MD;  Location: [facility] CATH INVASIVE LOCATION;  Service:    • CARDIAC CATHETERIZATION N/A 6/10/2016    Procedure: Right Heart Cath;  Surgeon: Chace Johnson MD;  Location: [facility] CATH IN[facility]ASIVE LOC[facility];  Service:    • CARDIAC CATHETERIZATION N/A 2/5/2021    Procedure: RIGHT AND LEFT HEART CATH;  Surgeon: Antoine Ayers MD;  Location: [facility] CATH INVASIVE LOCATION;  Service: Cardiology;  Laterality: N/A;   • CARDIAC CATHETERIZATION N/A 2/5/2021    Procedure: Coronary angiography;  Surgeon: Antoine Ayers MD;  Location: [facility] CATH INVASIVE LOCATION;  Service: Cardiology;  Laterality: N/A;   • CARDIAC CATHETERIZATION N/A 2/5/2021    Procedure: Left ventriculography- pressures;  Surgeon: Antoine Ayers MD;  Location: [facility] CATH INVASIVE LOCATION;  Service: Cardiology;  Laterality: N/A;   • CARDIAC ELECTROPHYSIOLOGY PROCEDURE N/A 2/2/2021    Procedure: Pacemaker DC new---[employer] MICRA;  Surgeon: Eliazar Bond MD;  Location: [facility] C[facility] INVASIVE LOCATION;  Service: Cardiology;  Laterality: N/A;   • CARDIAC SURGERY     • CORONARY ARTERY BYPASS GRAFT      2 vessel   • CORONARY ARTERY BYPASS GRAFT WITH MITRAL VALVE REPAIR/REPLACEMENT N/A 6/13/2016    Procedure: INTRAOPERATIVE TARIQ, MIDLINE STERNOTOMY, CORONARY ARTERY BYPASS GRAFTING X  2 UTILIZING ENDOSCOPICALLY HARVESTED LEFT GREATER SAPHENOUS VEIN, MITRAL VALVE REPLACEMENT AND TRICUSPID VALVE REPAIR;  Surgeon: Eliecer  WOO Mistry MD;  Location: [facility] OR;  Service:    • CORONARY STENT PLACEMENT  2010    Approx. 6 yrs ago at [facility]   • HEMORRHOIDECTOMY     • HYSTERECTOMY     • MITRAL VALVE REPLACEMENT     • REPLACEMENT TOTAL KNEE Right    • THYROID SURGERY      Cyst removed from thyroid   • VASCULAR SURGERY        Social History:   Social History     Tobacco Use   • Smoking status: Never Smoker   • Smokeless tobacco: Never Used   • Tobacco comment: no caffeine    Substance Use Topics   • Alcohol use: No      Family History:  Family History   Problem Relation Age of Onset   • Heart attack Father    • Heart disease Father        Home Meds:  Medications Prior to Admission   Medication Sig Dispense Refill Last Dose   • acetaminophen (TYLENOL) 325 MG tablet Take 2 tablets by mouth Every 4 (Four) Hours As Needed for Mild Pain .      • ALPRAZolam (XANAX) 1 MG tablet Take 1 mg by mouth 2 (Two) Times a Day As Needed for Anxiety.      • amLODIPine (NORVASC) 5 MG tablet Take 1 tablet by mouth Daily.      • apixaban (ELIQUIS) 5 MG tablet tablet Take 1 tablet by mouth Every 12 (Twelve) Hours. Indications: Atrial Fibrillation 60 tablet     • atorvastatin (LIPITOR) 20 MG tablet TAKE ONE TABLET BY MOUTH DAILY (Patient taking differently: Take 20 mg by mouth Every Night.) 90 tablet 0    • bisacodyl (DULCOLAX) 5 MG EC tablet Take 1 tablet by mouth Daily As Needed for Constipation.      • bumetanide (BUMEX) 2 MG tablet Take 2 tablets by mouth 2 (Two) Times a Day. 30 tablet 0    • carvedilol (COREG) 6.25 MG tablet Take 1 tablet by mouth 2 (Two) Times a Day With Meals.      • clotrimazole-betamethasone (LOTRISONE) 1-0.05 % cream Apply  topically to the appropriate area as directed 2 (Two) Times a Day. 45 g 0    • fluticasone (FLONASE) 50 MCG/ACT nasal spray 2 sprays by Each Nare route Daily.      • fluticasone-salmeterol (ADVAIR DISKUS) 250-50 MCG/DOSE DISKUS Inhale 1 puff Daily As Needed (cough and wheezing). 60 each 0    • gabapentin  (NEURONTIN) 100 MG capsule Take 100 mg by mouth 2 (Two) Times a Day.      • glimepiride (AMARYL) 1 MG tablet Take 1 tablet by mouth Every Morning Before Breakfast. 90 tablet 1    • ipratropium-albuterol (DUO-NEB) 0.5-2.5 mg/mL nebulizer Take 3 mL by nebulization 4 (four) times a day. (Patient taking differently: Take 3 mL by nebulization 3 (Three) Times a Day As Needed.) 3 mL 5    • levothyroxine (SYNTHROID, LEVOTHROID) 25 MCG tablet Take 1 tablet by mouth Daily. 90 tablet 1    • nitroglycerin (NITROSTAT) 0.4 MG SL tablet DISSOLVE 1 TAB UNDER TONGUE FOR CHEST PAIN - IF PAIN REMAINS AFTER 5 MIN, CALL 911 AND REPEAT DOSE. MAX 3 TABS IN 15 MINUTES (Patient taking differently: Place 0.4 mg under the tongue Every 5 (Five) Minutes As Needed.) 25 tablet 4    • nystatin (MYCOSTATIN) 023439 UNIT/GM cream Apply  topically to the appropriate area as directed 2 (Two) Times a Day. to affected area(s) (Patient taking differently: Apply  topically to the appropriate area as directed 2 (Two) Times a Day.) 30 g 3    • omeprazole (priLOSEC) 40 MG capsule Take 1 capsule by mouth Daily. 90 capsule 0    • ondansetron (ZOFRAN) 4 MG tablet Take 1 tablet by mouth Every 6 (Six) Hours As Needed for Nausea or Vomiting.      • polyethylene glycol (polyethylene glycol) 17 g packet Take 17 g by mouth Daily. (Patient taking differently: Take 17 g by mouth Daily As Needed.)      • potassium chloride (K-DUR,KLOR-CON) 20 MEQ CR tablet Take 1 tablet by mouth Every Other Day.   3/15/2021 at Unknown time   • Probiotic Product (Risaquad-2) capsule capsule Take 1 capsule by mouth Daily.      • senna-docusate (PERICOLACE) 8.6-50 MG per tablet Take 1 tablet by mouth daily.      • sodium chloride 0.65 % nasal spray 2 sprays into the nostril(s) as directed by provider As Needed for Congestion.  12    • traMADol (ULTRAM) 50 MG tablet Take 50 mg by mouth Daily As Needed for Moderate Pain .      • TRULICITY 0.75 MG/0.5ML solution pen-injector INJECT 0.75 MG  UNDER THE SKIN ONCE WEEKLY (Patient taking differently: Inject 0.75 mg under the skin into the appropriate area as directed Every 7 (Seven) Days. Uses on Saturday) 6 pen 5    • vilazodone (VIIBRYD) 20 MG tablet tablet Take 20 mg by mouth Every Night.      • vitamin D (ERGOCALCIFEROL) 1.25 MG (68087 UT) capsule capsule TAKE ONE CAPSULE BY MOUTH ONCE WEEKLY (Patient taking differently: Take 50,000 Units by mouth Every 7 (Seven) Days. Takes on Sundays) 12 capsule 0      Current Meds:   atorvastatin, 40 mg, Oral, Nightly  bumetanide, 4 mg, Oral, BID  carvedilol, 3.125 mg, Oral, BID With Meals  furosemide, 20 mg, Intravenous, Once  gabapentin, 100 mg, Oral, BID  insulin lispro, 0-9 Units, Subcutaneous, TID AC  levothyroxine, 25 mcg, Oral, Daily  nystatin, , Topical, Q12H  pantoprazole, 40 mg, Oral, QAM  polyethylene glycol, 17 g, Oral, Daily  potassium chloride, 20 mEq, Oral, Every Other Day  sennosides-docusate, 1 tablet, Oral, Daily  sodium chloride, 10 mL, Intravenous, Q12H  sodium chloride, 10 mL, Intravenous, Q12H  vilazodone, 20 mg, Oral, Nightly  [START ON 3/21/2021] vitamin D, 50,000 Units, Oral, Weekly      Allergies:  Allergies   Allergen Reactions   • Coumadin [Warfarin Sodium] Diarrhea and Nausea Only   • Bumex [Bumetanide] Swelling     Tolerated Bumex drip February 2021   • Erythromycin    • Hctz [Hydrochlorothiazide] Swelling   • Zaroxolyn [Metolazone] Diarrhea   • Penicillins Rash     Tolerated cephalosporins in the past    • Sulfa Antibiotics Rash     Review of Systems  Pertinent items are noted in HPI, all other systems reviewed and negative    Objective     Vital Signs  Temp:  [97.6 °F (36.4 °C)-98.7 °F (37.1 °C)] 98.5 °F (36.9 °C)  Heart Rate:  [74-76] 74  Resp:  [18-22] 18  BP: ()/(39-94) 108/42    Physical Exam:  CONSTITUTIONAL:  today's vital signs reviewed  EARS NOSE THROAT: trachea midline and no deformity of the nares  EYES: no scleral icterus  GASTROINTESTINAL: abdomen is soft, nontender,  nondistended with normal active bowel sounds, no masses are appreciated  PSYCHIATRIC: appropriate mood and affect  RESPIRATORY: normal inspiratory effort with no increased work of breathing  NEUROLOGIC: patient is awake and alert  DERMATOLOGIC: skin is warm with no cyanosis  LYMPHATIC: no periumbilical lymphadenopathy     Results Review:   I reviewed the patient's new clinical results.    Results from last 7 days   Lab Units 03/16/21  0816 03/16/21  0547 03/15/21  1302   WBC 10*3/mm3  --  12.93* 13.82*   HEMOGLOBIN g/dL 6.3* 5.6* 8.3*   HEMATOCRIT % 19.5* 18.1* 26.9*   PLATELETS 10*3/mm3  --  216 263     Results from last 7 days   Lab Units 03/16/21  0547 03/15/21  1302   SODIUM mmol/L 149* 146*   POTASSIUM mmol/L 3.6 3.8   CHLORIDE mmol/L 104 100   CO2 mmol/L 32.4* 32.1*   BUN mg/dL 87* 77*   CREATININE mg/dL 1.65* 1.60*   CALCIUM mg/dL 7.9* 8.7   BILIRUBIN mg/dL 0.4 0.7   ALK PHOS U/L 173* 252*   ALT (SGPT) U/L 7 9   AST (SGOT) U/L 13 18   GLUCOSE mg/dL 79 128*     Results from last 7 days   Lab Units 03/15/21  1302   INR  2.08*     Lab Results   Lab Value Date/Time    LIPASE 30 03/15/2021 1302    LIPASE 66 (H) 08/07/2018 1858       Radiology:  XR Chest 1 View   Final Result      CT Head Without Contrast   Final Result   1.  No findings of acute intracranial abnormality. Atherosclerotic   calcification within the right intracranial vertebral artery and   bilateral internal carotid arteries which is incompletely evaluated   without intravenous contrast. Findings can be further characterized with   CTA head if clinically indicated.   2.  Left cerebellar lacunar infarct, as before.   3.  Other findings as above.           This report was finalized on 3/15/2021 1:24 PM by Dr. Manny Gomez M.D.              Assessment/Plan   Patient Active Problem List   Diagnosis   • Anxiety disorder   • Arthritis of knee   • Asthma   • Chronic coronary artery disease   • CKD (chronic kidney disease) stage 3, GFR 30-59 ml/min  (CMS/[facility])   • Depression   • Diabetic peripheral neuropathy (CMS/[facility])   • Gastroesophageal reflux disease   • Hyperlipidemia   • Insomnia   • Lower gastrointestinal hemorrhage   • Anemia   • OCHOA (obstructive sleep apnea)   • DM type 2 (diabetes mellitus, type 2) (CMS/[facility])   • Essential hypertension   • Hospital discharge follow-up   • Mitral stenosis   • Pulmonary hypertension due to sleep-disordered breathing (CMS/[facility])   • s/p MVR, TV-repair, CABG x2 6/13/16   • OLI (acute kidney injury) (CMS/HCC)   • Leukocytosis   • Atrial fibrillation (CMS/[facility])   • Nocturnal hypoxia   • Dermatitis   • Medicare annual wellness visit, initial   • Class 3 severe obesity due to excess calories with serious comorbidity and body mass index (BMI) of 50.0 to 59.9 in adult (CMS/[facility])   • Supplemental oxygen dependent   • Acute on chronic combined systolic and diastolic HF (heart failure) (CMS/[facility])   • Mitral regurgitation   • Aortic stenosis   • Medicare annual wellness visit, subsequent   • Localized edema   • Chronic right-sided congestive heart failure (CMS/[facility])   • Cellulitis of left lower extremity   • Proteinuria   • Bilateral lower extremity edema   • Subclinical hypothyroidism   • Chronic diastolic heart failure (CMS/[facility])   • Chronic respiratory failure with hypoxia and hypercapnia (CMS/[facility])   • Acute on chronic respiratory failure with hypoxia and hypercapnia (CMS/[facility])   • AV block, 2nd degree   • 1st degree AV block   • Chest pain   • COPD (chronic obstructive pulmonary disease) (CMS/[facility])   • Complete heart block (CMS/[facility])   • Presence of permanent cardiac pacemaker   • TIA (transient ischemic attack)   • Hypothyroidism (acquired)   • Chronic anticoagulation   • Leukocytosis   • Stage 3b chronic kidney disease (CMS/[facility])   • GI bleed       Assessment:  1. Severe anemia.  This is a new finding with a significant drop in hemoglobin from baseline.  Patient a colonoscopy many years ago apparently showing diverticulosis only.  Stool  is Hemoccult positive  2. Melenic stool.  Patient has had dark black stools reported per nursing with an elevated BUN to creatinine ratio possibly consistent with an upper GI source of blood loss.  3. Heme positive stool  4. COPD.  Chronic stable.  5. Stage IIIb chronic kidney disease.  Stable.  6. Chronic anticoagulation    Plan:  · Monitor H&H  · Plan EGD tomorrow due to melena with elevated BUN to creatinine ratio and heme positive stools  · If EGD is negative, consider colonoscopy for further evaluation.  Patient is eating a regular lunch today so we will not be able to prep her for colonoscopy tomorrow.  · Check coags in the morning      I discussed the patients findings and my recommendations with patient and nursing staff.    MD Elia Tena M.D.  [facility] [place] [address]  Office: [phone]

## 2021-03-17 ENCOUNTER — APPOINTMENT (OUTPATIENT)
Dept: NEUROLOGY | Facility: HOSPITAL | Age: 76
End: 2021-03-17

## 2021-03-17 ENCOUNTER — ANESTHESIA EVENT (OUTPATIENT)
Dept: GASTROENTEROLOGY | Facility: HOSPITAL | Age: 76
End: 2021-03-17

## 2021-03-17 ENCOUNTER — ANESTHESIA (OUTPATIENT)
Dept: GASTROENTEROLOGY | Facility: HOSPITAL | Age: 76
End: 2021-03-17

## 2021-03-17 ENCOUNTER — APPOINTMENT (OUTPATIENT)
Dept: CARDIOLOGY | Facility: HOSPITAL | Age: 76
End: 2021-03-17

## 2021-03-17 PROBLEM — E87.6 HYPOKALEMIA: Status: ACTIVE | Noted: 2021-03-17

## 2021-03-17 PROBLEM — E87.0 HYPERNATREMIA: Status: ACTIVE | Noted: 2021-03-17

## 2021-03-17 PROBLEM — G93.41 METABOLIC ENCEPHALOPATHY: Status: ACTIVE | Noted: 2021-03-17

## 2021-03-17 PROBLEM — D62 ACUTE BLOOD LOSS ANEMIA: Status: ACTIVE | Noted: 2021-03-17

## 2021-03-17 LAB
25(OH)D3 SERPL-MCNC: 51.9 NG/ML (ref 30–100)
ALBUMIN SERPL-MCNC: 3.4 G/DL (ref 3.5–5.2)
ALBUMIN/GLOB SERPL: 1.5 G/DL
ALP SERPL-CCNC: 167 U/L (ref 39–117)
ALT SERPL W P-5'-P-CCNC: 10 U/L (ref 1–33)
ANION GAP SERPL CALCULATED.3IONS-SCNC: 12.1 MMOL/L (ref 5–15)
AST SERPL-CCNC: 14 U/L (ref 1–32)
BASOPHILS # BLD AUTO: 0.09 10*3/MM3 (ref 0–0.2)
BASOPHILS NFR BLD AUTO: 0.7 % (ref 0–1.5)
BH BB BLOOD EXPIRATION DATE: NORMAL
BH BB BLOOD EXPIRATION DATE: NORMAL
BH BB BLOOD TYPE BARCODE: 7300
BH BB BLOOD TYPE BARCODE: 7300
BH BB DISPENSE STATUS: NORMAL
BH BB DISPENSE STATUS: NORMAL
BH BB PRODUCT CODE: NORMAL
BH BB PRODUCT CODE: NORMAL
BH BB UNIT NUMBER: NORMAL
BH BB UNIT NUMBER: NORMAL
BH CV XLRA MEAS LEFT CCA RATIO VEL: 111 CM/SEC
BH CV XLRA MEAS LEFT DIST CCA EDV: 19.3 CM/SEC
BH CV XLRA MEAS LEFT DIST CCA PSV: 111 CM/SEC
BH CV XLRA MEAS LEFT DIST ICA EDV: -35.4 CM/SEC
BH CV XLRA MEAS LEFT DIST ICA PSV: -119 CM/SEC
BH CV XLRA MEAS LEFT ICA RATIO VEL: -149 CM/SEC
BH CV XLRA MEAS LEFT ICA/CCA RATIO: -1.3
BH CV XLRA MEAS LEFT MID ICA EDV: -24.6 CM/SEC
BH CV XLRA MEAS LEFT MID ICA PSV: -123 CM/SEC
BH CV XLRA MEAS LEFT PROX CCA EDV: 16.4 CM/SEC
BH CV XLRA MEAS LEFT PROX CCA PSV: 82.1 CM/SEC
BH CV XLRA MEAS LEFT PROX ECA EDV: -17.7 CM/SEC
BH CV XLRA MEAS LEFT PROX ECA PSV: -141 CM/SEC
BH CV XLRA MEAS LEFT PROX ICA EDV: -33.4 CM/SEC
BH CV XLRA MEAS LEFT PROX ICA PSV: -149 CM/SEC
BH CV XLRA MEAS LEFT PROX SCLA PSV: 172 CM/SEC
BH CV XLRA MEAS LEFT VERTEBRAL A EDV: 5.9 CM/SEC
BH CV XLRA MEAS LEFT VERTEBRAL A PSV: 44 CM/SEC
BH CV XLRA MEAS RIGHT DIST CCA EDV: 11.1 CM/SEC
BH CV XLRA MEAS RIGHT DIST CCA PSV: 70.9 CM/SEC
BH CV XLRA MEAS RIGHT DIST ICA EDV: 30 CM/SEC
BH CV XLRA MEAS RIGHT DIST ICA PSV: 81 CM/SEC
BH CV XLRA MEAS RIGHT ICA/CCA RATIO: 1.43
BH CV XLRA MEAS RIGHT MID ICA EDV: 33 CM/SEC
BH CV XLRA MEAS RIGHT MID ICA PSV: 100 CM/SEC
BH CV XLRA MEAS RIGHT PROX CCA EDV: -14.7 CM/SEC
BH CV XLRA MEAS RIGHT PROX CCA PSV: -99.1 CM/SEC
BH CV XLRA MEAS RIGHT PROX ECA EDV: 10.2 CM/SEC
BH CV XLRA MEAS RIGHT PROX ECA PSV: 123 CM/SEC
BH CV XLRA MEAS RIGHT PROX ICA EDV: 25 CM/SEC
BH CV XLRA MEAS RIGHT PROX ICA PSV: 85 CM/SEC
BH CV XLRA MEAS RIGHT PROX SCLA PSV: 168 CM/SEC
BH CV XLRA MEAS RIGHT VERTEBRAL A EDV: 24.6 CM/SEC
BH CV XLRA MEAS RIGHT VERTEBRAL A PSV: 109 CM/SEC
BILIRUB SERPL-MCNC: 0.5 MG/DL (ref 0–1.2)
BUN SERPL-MCNC: 84 MG/DL (ref 8–23)
BUN/CREAT SERPL: 45.4 (ref 7–25)
CALCIUM SPEC-SCNC: 7.9 MG/DL (ref 8.6–10.5)
CHLORIDE SERPL-SCNC: 103 MMOL/L (ref 98–107)
CHOLEST SERPL-MCNC: 98 MG/DL (ref 0–200)
CO2 SERPL-SCNC: 30.9 MMOL/L (ref 22–29)
CREAT SERPL-MCNC: 1.85 MG/DL (ref 0.57–1)
CROSSMATCH INTERPRETATION: NORMAL
CROSSMATCH INTERPRETATION: NORMAL
DEPRECATED RDW RBC AUTO: 49.5 FL (ref 37–54)
EOSINOPHIL # BLD AUTO: 1.02 10*3/MM3 (ref 0–0.4)
EOSINOPHIL NFR BLD AUTO: 7.9 % (ref 0.3–6.2)
ERYTHROCYTE [DISTWIDTH] IN BLOOD BY AUTOMATED COUNT: 15.7 % (ref 12.3–15.4)
FERRITIN SERPL-MCNC: 83.8 NG/ML (ref 13–150)
FOLATE SERPL-MCNC: 14.1 NG/ML (ref 4.78–24.2)
GFR SERPL CREATININE-BSD FRML MDRD: 27 ML/MIN/1.73
GLOBULIN UR ELPH-MCNC: 2.2 GM/DL
GLUCOSE BLDC GLUCOMTR-MCNC: 100 MG/DL (ref 70–130)
GLUCOSE BLDC GLUCOMTR-MCNC: 116 MG/DL (ref 70–130)
GLUCOSE BLDC GLUCOMTR-MCNC: 68 MG/DL (ref 70–130)
GLUCOSE BLDC GLUCOMTR-MCNC: 73 MG/DL (ref 70–130)
GLUCOSE SERPL-MCNC: 50 MG/DL (ref 65–99)
HBA1C MFR BLD: 5.7 % (ref 4.8–5.6)
HCT VFR BLD AUTO: 22.6 % (ref 34–46.6)
HCT VFR BLD AUTO: 24.7 % (ref 34–46.6)
HCT VFR BLD AUTO: 27.9 % (ref 34–46.6)
HDLC SERPL-MCNC: 32 MG/DL (ref 40–60)
HGB BLD-MCNC: 7.1 G/DL (ref 12–15.9)
HGB BLD-MCNC: 7.8 G/DL (ref 12–15.9)
HGB BLD-MCNC: 8.9 G/DL (ref 12–15.9)
IMM GRANULOCYTES # BLD AUTO: 0.12 10*3/MM3 (ref 0–0.05)
IMM GRANULOCYTES NFR BLD AUTO: 0.9 % (ref 0–0.5)
INR PPP: 1.94 (ref 0.9–1.1)
IRON 24H UR-MRATE: 45 MCG/DL (ref 37–145)
IRON SATN MFR SERPL: 15 % (ref 20–50)
LDLC SERPL CALC-MCNC: 41 MG/DL (ref 0–100)
LDLC/HDLC SERPL: 1.18 {RATIO}
LEFT ARM BP: NORMAL MMHG
LYMPHOCYTES # BLD AUTO: 1.79 10*3/MM3 (ref 0.7–3.1)
LYMPHOCYTES NFR BLD AUTO: 13.8 % (ref 19.6–45.3)
MAGNESIUM SERPL-MCNC: 2.1 MG/DL (ref 1.6–2.4)
MCH RBC QN AUTO: 27.7 PG (ref 26.6–33)
MCHC RBC AUTO-ENTMCNC: 31.4 G/DL (ref 31.5–35.7)
MCV RBC AUTO: 88.3 FL (ref 79–97)
MONOCYTES # BLD AUTO: 0.73 10*3/MM3 (ref 0.1–0.9)
MONOCYTES NFR BLD AUTO: 5.6 % (ref 5–12)
NEUTROPHILS NFR BLD AUTO: 71.1 % (ref 42.7–76)
NEUTROPHILS NFR BLD AUTO: 9.24 10*3/MM3 (ref 1.7–7)
NRBC BLD AUTO-RTO: 0.2 /100 WBC (ref 0–0.2)
PLATELET # BLD AUTO: 194 10*3/MM3 (ref 140–450)
PMV BLD AUTO: 11.1 FL (ref 6–12)
POTASSIUM SERPL-SCNC: 3.3 MMOL/L (ref 3.5–5.2)
PROT SERPL-MCNC: 5.6 G/DL (ref 6–8.5)
PROTHROMBIN TIME: 21.9 SECONDS (ref 11.7–14.2)
RBC # BLD AUTO: 2.56 10*6/MM3 (ref 3.77–5.28)
RIGHT ARM BP: NORMAL MMHG
SODIUM SERPL-SCNC: 146 MMOL/L (ref 136–145)
TIBC SERPL-MCNC: 298 MCG/DL (ref 298–536)
TRANSFERRIN SERPL-MCNC: 200 MG/DL (ref 200–360)
TRIGL SERPL-MCNC: 141 MG/DL (ref 0–150)
TSH SERPL DL<=0.05 MIU/L-ACNC: 4.97 UIU/ML (ref 0.27–4.2)
UNIT  ABO: NORMAL
UNIT  ABO: NORMAL
UNIT  RH: NORMAL
UNIT  RH: NORMAL
VIT B12 BLD-MCNC: 576 PG/ML (ref 211–946)
VLDLC SERPL-MCNC: 25 MG/DL (ref 5–40)
WBC # BLD AUTO: 12.99 10*3/MM3 (ref 3.4–10.8)

## 2021-03-17 PROCEDURE — 83036 HEMOGLOBIN GLYCOSYLATED A1C: CPT | Performed by: PSYCHIATRY & NEUROLOGY

## 2021-03-17 PROCEDURE — 80061 LIPID PANEL: CPT | Performed by: PSYCHIATRY & NEUROLOGY

## 2021-03-17 PROCEDURE — 93880 EXTRACRANIAL BILAT STUDY: CPT

## 2021-03-17 PROCEDURE — 82607 VITAMIN B-12: CPT | Performed by: HOSPITALIST

## 2021-03-17 PROCEDURE — 95816 EEG AWAKE AND DROWSY: CPT | Performed by: PSYCHIATRY & NEUROLOGY

## 2021-03-17 PROCEDURE — 84466 ASSAY OF TRANSFERRIN: CPT | Performed by: HOSPITALIST

## 2021-03-17 PROCEDURE — 99233 SBSQ HOSP IP/OBS HIGH 50: CPT | Performed by: NURSE PRACTITIONER

## 2021-03-17 PROCEDURE — 97165 OT EVAL LOW COMPLEX 30 MIN: CPT

## 2021-03-17 PROCEDURE — 86900 BLOOD TYPING SEROLOGIC ABO: CPT

## 2021-03-17 PROCEDURE — 82746 ASSAY OF FOLIC ACID SERUM: CPT | Performed by: HOSPITALIST

## 2021-03-17 PROCEDURE — 25010000002 DIPHENHYDRAMINE PER 50 MG: Performed by: HOSPITALIST

## 2021-03-17 PROCEDURE — 95816 EEG AWAKE AND DROWSY: CPT

## 2021-03-17 PROCEDURE — 82728 ASSAY OF FERRITIN: CPT | Performed by: HOSPITALIST

## 2021-03-17 PROCEDURE — 85610 PROTHROMBIN TIME: CPT | Performed by: NURSE PRACTITIONER

## 2021-03-17 PROCEDURE — G0378 HOSPITAL OBSERVATION PER HR: HCPCS

## 2021-03-17 PROCEDURE — 36430 TRANSFUSION BLD/BLD COMPNT: CPT

## 2021-03-17 PROCEDURE — 84443 ASSAY THYROID STIM HORMONE: CPT | Performed by: HOSPITALIST

## 2021-03-17 PROCEDURE — 25010000002 GLUCAGON (HUMAN RECOMBINANT) 1 MG RECONSTITUTED SOLUTION: Performed by: STUDENT IN AN ORGANIZED HEALTH CARE EDUCATION/TRAINING PROGRAM

## 2021-03-17 PROCEDURE — 85014 HEMATOCRIT: CPT | Performed by: HOSPITALIST

## 2021-03-17 PROCEDURE — 85018 HEMOGLOBIN: CPT | Performed by: HOSPITALIST

## 2021-03-17 PROCEDURE — 96375 TX/PRO/DX INJ NEW DRUG ADDON: CPT

## 2021-03-17 PROCEDURE — 83540 ASSAY OF IRON: CPT | Performed by: HOSPITALIST

## 2021-03-17 PROCEDURE — 97535 SELF CARE MNGMENT TRAINING: CPT

## 2021-03-17 PROCEDURE — 85025 COMPLETE CBC W/AUTO DIFF WBC: CPT | Performed by: HOSPITALIST

## 2021-03-17 PROCEDURE — 82306 VITAMIN D 25 HYDROXY: CPT | Performed by: HOSPITALIST

## 2021-03-17 PROCEDURE — 82962 GLUCOSE BLOOD TEST: CPT

## 2021-03-17 PROCEDURE — 83735 ASSAY OF MAGNESIUM: CPT | Performed by: HOSPITALIST

## 2021-03-17 PROCEDURE — 25010000002 PROPOFOL 10 MG/ML EMULSION: Performed by: STUDENT IN AN ORGANIZED HEALTH CARE EDUCATION/TRAINING PROGRAM

## 2021-03-17 PROCEDURE — 43235 EGD DIAGNOSTIC BRUSH WASH: CPT | Performed by: INTERNAL MEDICINE

## 2021-03-17 PROCEDURE — P9016 RBC LEUKOCYTES REDUCED: HCPCS

## 2021-03-17 PROCEDURE — 80053 COMPREHEN METABOLIC PANEL: CPT | Performed by: HOSPITALIST

## 2021-03-17 RX ORDER — POTASSIUM CHLORIDE 750 MG/1
40 TABLET, FILM COATED, EXTENDED RELEASE ORAL AS NEEDED
Status: DISCONTINUED | OUTPATIENT
Start: 2021-03-17 | End: 2021-03-20 | Stop reason: HOSPADM

## 2021-03-17 RX ORDER — ACETAMINOPHEN 325 MG/1
650 TABLET ORAL ONCE
Status: COMPLETED | OUTPATIENT
Start: 2021-03-17 | End: 2021-03-17

## 2021-03-17 RX ORDER — DIPHENHYDRAMINE HCL 25 MG
25 CAPSULE ORAL ONCE
Status: COMPLETED | OUTPATIENT
Start: 2021-03-17 | End: 2021-03-17

## 2021-03-17 RX ORDER — ACETAMINOPHEN 650 MG/1
650 SUPPOSITORY RECTAL ONCE
Status: COMPLETED | OUTPATIENT
Start: 2021-03-17 | End: 2021-03-17

## 2021-03-17 RX ORDER — ACETAMINOPHEN 160 MG/5ML
650 SOLUTION ORAL ONCE
Status: COMPLETED | OUTPATIENT
Start: 2021-03-17 | End: 2021-03-17

## 2021-03-17 RX ORDER — SODIUM CHLORIDE 9 MG/ML
30 INJECTION, SOLUTION INTRAVENOUS CONTINUOUS
Status: DISCONTINUED | OUTPATIENT
Start: 2021-03-17 | End: 2021-03-19

## 2021-03-17 RX ORDER — POTASSIUM CHLORIDE 1.5 G/1.77G
40 POWDER, FOR SOLUTION ORAL AS NEEDED
Status: DISCONTINUED | OUTPATIENT
Start: 2021-03-17 | End: 2021-03-20 | Stop reason: HOSPADM

## 2021-03-17 RX ORDER — LIDOCAINE HYDROCHLORIDE 20 MG/ML
INJECTION, SOLUTION INFILTRATION; PERINEURAL AS NEEDED
Status: DISCONTINUED | OUTPATIENT
Start: 2021-03-17 | End: 2021-03-17 | Stop reason: SURG

## 2021-03-17 RX ORDER — PROPOFOL 10 MG/ML
VIAL (ML) INTRAVENOUS AS NEEDED
Status: DISCONTINUED | OUTPATIENT
Start: 2021-03-17 | End: 2021-03-17 | Stop reason: SURG

## 2021-03-17 RX ORDER — DIPHENHYDRAMINE HYDROCHLORIDE 50 MG/ML
25 INJECTION INTRAMUSCULAR; INTRAVENOUS ONCE
Status: COMPLETED | OUTPATIENT
Start: 2021-03-17 | End: 2021-03-17

## 2021-03-17 RX ADMIN — POTASSIUM CHLORIDE 40 MEQ: 750 TABLET, EXTENDED RELEASE ORAL at 16:19

## 2021-03-17 RX ADMIN — LIDOCAINE HYDROCHLORIDE 60 MG: 20 INJECTION, SOLUTION INFILTRATION; PERINEURAL at 13:37

## 2021-03-17 RX ADMIN — PROPOFOL 160 MCG/KG/MIN: 10 INJECTION, EMULSION INTRAVENOUS at 13:38

## 2021-03-17 RX ADMIN — NYSTATIN: 100000 POWDER TOPICAL at 10:29

## 2021-03-17 RX ADMIN — POTASSIUM CHLORIDE 40 MEQ: 750 TABLET, EXTENDED RELEASE ORAL at 21:46

## 2021-03-17 RX ADMIN — SODIUM CHLORIDE, PRESERVATIVE FREE 10 ML: 5 INJECTION INTRAVENOUS at 10:29

## 2021-03-17 RX ADMIN — ACETAMINOPHEN 650 MG: 650 SUPPOSITORY RECTAL at 11:46

## 2021-03-17 RX ADMIN — MINERAL OIL, PETROLATUM: 425; 573 OINTMENT OPHTHALMIC at 21:45

## 2021-03-17 RX ADMIN — PROPOFOL 50 MG: 10 INJECTION, EMULSION INTRAVENOUS at 13:37

## 2021-03-17 RX ADMIN — GLUCAGON HYDROCHLORIDE 1 MG: KIT at 08:35

## 2021-03-17 RX ADMIN — DIPHENHYDRAMINE HYDROCHLORIDE 25 MG: 50 INJECTION, SOLUTION INTRAMUSCULAR; INTRAVENOUS at 11:46

## 2021-03-17 RX ADMIN — BUMETANIDE 4 MG: 2 TABLET ORAL at 21:45

## 2021-03-17 RX ADMIN — SODIUM CHLORIDE, PRESERVATIVE FREE 10 ML: 5 INJECTION INTRAVENOUS at 21:47

## 2021-03-17 RX ADMIN — SODIUM CHLORIDE, PRESERVATIVE FREE 10 ML: 5 INJECTION INTRAVENOUS at 10:30

## 2021-03-17 RX ADMIN — VILAZODONE HYDROCHLORIDE 20 MG: 40 TABLET ORAL at 21:46

## 2021-03-17 RX ADMIN — CARVEDILOL 3.12 MG: 6.25 TABLET, FILM COATED ORAL at 18:09

## 2021-03-17 RX ADMIN — ATORVASTATIN CALCIUM 40 MG: 20 TABLET, FILM COATED ORAL at 21:46

## 2021-03-17 RX ADMIN — SODIUM CHLORIDE 30 ML/HR: 9 INJECTION, SOLUTION INTRAVENOUS at 16:22

## 2021-03-17 RX ADMIN — GABAPENTIN 100 MG: 100 CAPSULE ORAL at 21:46

## 2021-03-17 RX ADMIN — NYSTATIN: 100000 POWDER TOPICAL at 21:45

## 2021-03-17 NOTE — PLAN OF CARE
Goal Outcome Evaluation:  Plan of Care Reviewed With: patient  Progress: improving     Pt A+O x4. BP a bit low & pt needed to be on 2 L O2 NC & have it bled through with CPAP machine. Pt had her second unit of blood infusing start of nightshift and tolerated it well-no adverse reactions noted. H&H rechecked and improved slightly. Pt denies pain, although she had some nausea for dayshift that Compazine helped alleviate when I gave it to her. Nystatin powder put under her abdominal folds for noted excoriation. Purewick in place and pt voiding. She has needed to use BSC assist x2 in order to have BM. She has been NPO since midnight in anticipation for EGD. SCDs on throughout the night. Pt able to reposition herself in bed. She has been able to sleep some tonight. I will continue to monitor and progress towards goals as tolerated.

## 2021-03-17 NOTE — ADDENDUM NOTE
Addendum  created 03/17/21 1410 by Phu Damian MD    Review and Sign - Ready for Procedure, Review and Sign - Signed

## 2021-03-17 NOTE — SIGNIFICANT NOTE
03/17/21 0859   OTHER   Discipline speech language pathologist   Rehab Time/Intention   Session Not Performed other (see comments)     SLP continuing to follow for further evaluation needs. Note neurology workup states initial cause of AMS may be attributed to metabolic or infectious encephalopathy, or anemia with EEG pending. MRI deferred at this time.     Cognitive linguistic evaluation does not appear appropriate as pt deficits resolving and may be correlated to more temporary cause (I.e. encephalopathy). SLP to sign off at this time. Please re-consult via a new order if further evaluation is warranted with changes in condition or to aid in discharge planning.

## 2021-03-17 NOTE — ANESTHESIA POSTPROCEDURE EVALUATION
Patient: Miguelina Lopez    Procedure Summary     Date: 03/17/21 Room / Location:  [facility] /  [facility]    Anesthesia Start: 1329 Anesthesia Stop: 1352    Procedure: ESOPHAGOGASTRODUODENOSCOPY (N/A Esophagus) Diagnosis:       Gastrointestinal hemorrhage with melena      (Gastrointestinal hemorrhage with melena [K92.1])    Surgeons: Dixon Haas MD Provider: Phu Damian MD    Anesthesia Type: MAC ASA Status: 4 - Emergent          Anesthesia Type: MAC    Vitals  No vitals data found for the desired time range.          Post Anesthesia Care and Evaluation    Patient location during evaluation: bedside  Patient participation: complete - patient participated  Level of consciousness: awake and alert  Pain management: adequate  Airway patency: patent  Anesthetic complications: No anesthetic complications  PONV Status: controlled  Cardiovascular status: blood pressure returned to baseline and acceptable  Respiratory status: acceptable  Hydration status: acceptable

## 2021-03-17 NOTE — NURSING NOTE
Called with critical BG of 50 at 08:13 AM. Glucagon given at 08:35 AM to pt at EEG. Told by EEG tech that test was complete and patient would be back to the floor. Pt still off unit.     ARGELIA Pickens RN 09:52

## 2021-03-17 NOTE — PROGRESS NOTES
Name: Miguelina Lopez ADMIT: 3/15/2021   : 1945  PCP: Arcelia Talbot APRN    MRN: [id number] LOS: 2 days   AGE/SEX: 75 y.o. female  ROOM: [facility]     Subjective   Subjective   Feeling fine this AM. No complaints. Just back from studies downstairs.    Review of Systems   Constitutional: Positive for fatigue. Negative for appetite change, chills, diaphoresis and fever.   HENT: Negative for congestion, ear pain, nosebleeds, rhinorrhea, sore throat, trouble swallowing and voice change.    Eyes: Negative for pain and visual disturbance.   Respiratory: Negative for cough, choking, chest tightness, shortness of breath and stridor.    Cardiovascular: Positive for leg swelling (chronic). Negative for chest pain and palpitations.   Gastrointestinal: Negative for abdominal pain, blood in stool, diarrhea, nausea and vomiting.   Endocrine: Negative for cold intolerance and heat intolerance.   Genitourinary: Negative for decreased urine volume, difficulty urinating, dysuria and hematuria.   Musculoskeletal: Negative for back pain and neck pain.   Skin: Negative for color change, pallor and rash.   Allergic/Immunologic: Negative for environmental allergies and food allergies.   Neurological: Negative for tremors, seizures, syncope, facial asymmetry, speech difficulty, weakness, light-headedness, numbness and headaches.   Hematological: Negative for adenopathy. Does not bruise/bleed easily.   Psychiatric/Behavioral: Negative for behavioral problems, confusion and hallucinations.        Objective   Objective   Vital Signs  Temp:  [97.4 °F (36.3 °C)-98.5 °F (36.9 °C)] 97.4 °F (36.3 °C)  Heart Rate:  [74-76] 75  Resp:  [16-20] 19  BP: ()/(39-68) 100/48  SpO2:  [92 %-100 %] 92 %  on  Flow (L/min):  [2] 2;   Device (Oxygen Therapy): CPAP  Body mass index is 53.39 kg/m².  Physical Exam  Vitals and nursing note reviewed. Exam conducted with a chaperone present.   Constitutional:       General: She is not in acute  distress.     Appearance: She is obese. She is ill-appearing (chronically). She is not toxic-appearing or diaphoretic.      Comments: Color much better today   HENT:      Head: Normocephalic and atraumatic.      Right Ear: External ear normal.      Left Ear: External ear normal.      Nose: Nose normal.      Mouth/Throat:      Mouth: Mucous membranes are moist.      Pharynx: Oropharynx is clear.      Comments: Mucosa pale  Eyes:      General: No scleral icterus.        Right eye: No discharge.         Left eye: No discharge.      Extraocular Movements: Extraocular movements intact.      Conjunctiva/sclera: Conjunctivae normal.   Neck:      Comments: No JVD  Cardiovascular:      Rate and Rhythm: Normal rate and regular rhythm.      Pulses: Normal pulses.      Heart sounds: Murmur heard.     Pulmonary:      Effort: No respiratory distress.      Breath sounds: Normal breath sounds. No wheezing or rales.   Abdominal:      General: Bowel sounds are normal. There is no distension.      Palpations: Abdomen is soft.      Tenderness: There is no abdominal tenderness.   Musculoskeletal:         General: Swelling (chronic in BLEs) present. No deformity.      Cervical back: Neck supple. No rigidity.   Lymphadenopathy:      Cervical: No cervical adenopathy.   Skin:     General: Skin is warm and dry.      Capillary Refill: Capillary refill takes more than 3 seconds.      Coloration: Skin is pale. Skin is not jaundiced.      Findings: No rash.   Neurological:      General: No focal deficit present.      Mental Status: She is alert and oriented to person, place, and time. Mental status is at baseline.      Cranial Nerves: No cranial nerve deficit.      Coordination: Coordination normal.   Psychiatric:         Mood and Affect: Mood normal.         Behavior: Behavior normal.         Thought Content: Thought content normal.         Results Review     I reviewed the patient's new clinical results.  Results from last 7 days   Lab Units  03/17/21  0630 03/17/21  0000 03/16/21  1600 03/16/21  0816 03/16/21  0547 03/15/21  1302   WBC 10*3/mm3 12.99*  --   --   --  12.93* 13.82*   HEMOGLOBIN g/dL 7.1* 7.8* 6.5* 6.3* 5.6* 8.3*   PLATELETS 10*3/mm3 194  --   --   --  216 263     Results from last 7 days   Lab Units 03/17/21  0630 03/16/21  0547 03/15/21  1302   SODIUM mmol/L 146* 149* 146*   POTASSIUM mmol/L 3.3* 3.6 3.8   CHLORIDE mmol/L 103 104 100   CO2 mmol/L 30.9* 32.4* 32.1*   BUN mg/dL 84* 87* 77*   CREATININE mg/dL 1.85* 1.65* 1.60*   GLUCOSE mg/dL 50* 79 128*   Estimated Creatinine Clearance: 31.4 mL/min (A) (by C-G formula based on SCr of 1.85 mg/dL (H)).  Results from last 7 days   Lab Units 03/17/21  0630 03/16/21  0547 03/15/21  1302   ALBUMIN g/dL 3.40* 3.30* 4.20   BILIRUBIN mg/dL 0.5 0.4 0.7   ALK PHOS U/L 167* 173* 252*   AST (SGOT) U/L 14 13 18   ALT (SGPT) U/L 10 7 9     Results from last 7 days   Lab Units 03/17/21  0630 03/16/21  0547 03/15/21  1302   CALCIUM mg/dL 7.9* 7.9* 8.7   ALBUMIN g/dL 3.40* 3.30* 4.20   MAGNESIUM mg/dL 2.1 2.1  --      Results from last 7 days   Lab Units 03/15/21  1302   PROCALCITONIN ng/mL 0.16   LACTATE mmol/L 1.8     COVID19   Date Value Ref Range Status   03/15/2021 Not Detected Not Detected - Ref. Range Final     SARS-CoV-2 PCR   Date Value Ref Range Status   02/10/2021 Not Detected Not Detected Final     Comment:     Nucleic acid specific to SARS-CoV-2 (COVID-19) virus was not detected in  this sample by the TaqPath (TM) COVID-19 Combo Kit.          SARS-CoV-2 (COVID-19) nucleic acid testing performed using [employer] Aptima (R) SARS-CoV-2 Assay or [employer] TaqPath (TM)  COVID-19 Combo Kit as indicated above under Emergency Use Authorization (EUA) from the FDA. Aptima (R) and TaqPath (TM) test performance  characteristics verified by [employer] in accordance with the FDAs Guidance Document (Policy for Diagnostic Tests for Coronavirus Disease-2019  during the Public Health Emergency)  issued on March 16, 2020. The laboratory is regulated under CLIA as qualified to perform high-complexity testing  Unless otherwise noted, all testing was performed at [facility] [facility], CLIA No. [id number], [place] State Licensee No. [id number]     Hemoglobin A1C   Date/Time Value Ref Range Status   03/17/2021 0630 5.70 (H) 4.80 - 5.60 % Final     Glucose   Date/Time Value Ref Range Status   03/17/2021 0719 68 (L) 70 - 130 mg/dL Final   03/16/2021 2005 162 (H) 70 - 130 mg/dL Final   03/16/2021 1641 104 70 - 130 mg/dL Final   03/16/2021 1138 155 (H) 70 - 130 mg/dL Final   03/16/2021 0755 88 70 - 130 mg/dL Final   03/15/2021 2039 107 70 - 130 mg/dL Final   03/15/2021 1812 118 70 - 130 mg/dL Final       XR Chest 1 View  ONE VIEW PORTABLE CHEST     HISTORY: Confusion. Hypertension.     FINDINGS: There is cardiomegaly with sternal wires from previous cardiac  surgery. There is some minimal vascular prominence, unchanged from  02/01/2021. No new abnormality is seen.     This report was finalized on 3/15/2021 4:21 PM by Dr. Joshua Rogel M.D.     CT Head Without Contrast  Narrative: CT HEAD     HISTORY:Altered status     TECHNIQUE: CT scan of the head was obtained with 3 mm axial images  without intravenous contrast.  Radiation dose reduction techniques were  utilized, including automated exposure control and exposure modulation  based on body size.     COMPARISON:Head CT 08/07/2018     FINDINGS:  There is no finding of acute infarct, hemorrhage, contusion or abnormal  extra-axial collection. No hydrocephalus is present. Thick  atherosclerotic calcification is present within the right intracranial  vertebral artery and bilateral supraclinoid and cavernous portions of  the internal carotid arteries. Old left cerebellar lacunar infarct, as  before.     Impression: 1.  No findings of acute intracranial abnormality. Atherosclerotic  calcification within the right intracranial vertebral artery and  bilateral internal carotid arteries  which is incompletely evaluated  without intravenous contrast. Findings can be further characterized with  CTA head if clinically indicated.  2.  Left cerebellar lacunar infarct, as before.  3.  Other findings as above.        This report was finalized on 3/15/2021 1:24 PM by Dr. Manny Gomez M.D.       Scheduled Medications  atorvastatin, 40 mg, Oral, Nightly  bumetanide, 4 mg, Oral, BID  carvedilol, 3.125 mg, Oral, BID With Meals  gabapentin, 100 mg, Oral, BID  insulin lispro, 0-9 Units, Subcutaneous, TID AC  levothyroxine, 25 mcg, Oral, Q AM  nystatin, , Topical, Q12H  pantoprazole, 40 mg, Oral, QAM  polyethylene glycol, 17 g, Oral, Daily  potassium chloride, 20 mEq, Oral, Every Other Day  sennosides-docusate, 1 tablet, Oral, Daily  sodium chloride, 10 mL, Intravenous, Q12H  sodium chloride, 10 mL, Intravenous, Q12H  vilazodone, 20 mg, Oral, Nightly  [START ON 3/21/2021] vitamin D, 50,000 Units, Oral, Weekly    Infusions   Diet  NPO Diet       Assessment/Plan     Active Hospital Problems    Diagnosis  POA   • **Metabolic encephalopathy [G93.41]  Yes   • Acute blood loss anemia [D62]  Yes   • Hypernatremia [E87.0]  Yes   • Hypokalemia [E87.6]  No   • Hypothyroidism (acquired) [E03.9]  Yes   • Chronic anticoagulation [Z79.01]  Not Applicable   • Leukocytosis [D72.829]  Yes   • Stage 3b chronic kidney disease (CMS/HCC) [N18.32]  Yes   • Gastrointestinal hemorrhage with melena [K92.1]  Yes   • Presence of permanent cardiac pacemaker [Z95.0]  Yes   • COPD (chronic obstructive pulmonary disease) (CMS/HCC) [J44.9]  Yes   • Chronic respiratory failure with hypoxia and hypercapnia (CMS/HCC) [J96.11, J96.12]  Yes   • Chronic diastolic heart failure (CMS/HCC) [I50.32]  Yes   • Bilateral lower extremity edema [R60.0]  Yes   • Chronic right-sided congestive heart failure (CMS/HCC) [I50.812]  Yes   • Supplemental oxygen dependent [Z99.81]  Not Applicable   • Atrial fibrillation (CMS/HCC) [I48.91]  Yes   • Pulmonary  hypertension due to sleep-disordered breathing (CMS/HCC) [I27.29, G47.8]  Yes   • Chronic coronary artery disease [I25.10]  Yes   • OCHOA (obstructive sleep apnea) [G47.33]  Yes   • DM type 2 (diabetes mellitus, type 2) (CMS/HCC) [E11.9]  Yes   • Essential hypertension [I10]  Yes      Resolved Hospital Problems   No resolved problems to display.     75-year-old woman with a history of chronic diastolic heart failure, paroxysmal atrial fibrillation/flutter, chronic AC (Eliquis), CKD3b, hypertension, CAD status post CABG, complete heart block status post pacemaker, type 2 diabetes, COPD, OCHOA/pulm HTN, chronic resp failure with hypoxia and hypercapnia (Trilogy user), who presented to ER with report of confusion per son--she was admitted for TIA workup.    Confusion (TIA vs metabolic encephalopathy)  -Neuro feels this is metabolic encephalopathy  -No MRI due to pt claustrophobia  -EEG and carotid US done this AM, results pending  -SLP eval fine  -wonder if hypoglycemia could be contributing--she takes sulfonylurea at home and has been quite hypoglycemic here without any DM meds in place . . .     Acute/chronic right-sided CHF, valvular heart disease  -BNP pretty elevated at 8k on admission, but last BNP in Feb was 12k  -appreciate Card attention to pt, euvolemic today    Severe anemia in setting of chronic anticoagulation  -heme positive stool  -Eliquis on hold, serial Hgb  -GI consulted, EGD planned for today, ?C/S  -s/p 2 units PRBCs 3/16  -Hgb still only 7.1 today, will give another unit PRBCs today (no lasix after given that her Cr is up today)    Leukocytosis  -stable around 12, no evidence of infection at present, PCT wnl, will follow    DM2 with hypoglycemia  -Trulicity and Amaryl on hold, SSI in place, A1c 5.7  -sugars low, continue hypoglycemia protocol, feed when EGD is done  -would not continue sulfonylurea on dc in this chronically ill pt    PAF, chronic AC, 3rd deg heart block/PPM  -paced rhythm  -holding  Eliquis given anemia/GIB    HTN  -home meds continued  -BPs acceptable this AM    HypoT4  -continue L-T4, TSH okay    CKD3b  -baseline Cr around 1.4-1.5, Cr up to 1.85 today  -may improve with PRBCs today, no diuresis  -continue to follow    HypoK+/HyperNa+  -replace K+ with protocol, check Mg++  -Na+ improving as fluid balance improves    COPD/Chronic resp failure with hypercapnia and hypoxia (on Trilogy vent at home and chronic nasal cannula O2)  -seems pretty stable at present on her baseline O2  -Trilogy in room    · SCDs for DVT prophylaxis.  · Full code.  · Discussed with patient and nursing staff.  · Anticipate discharge TBD, pt's family do not want [facility] to [facility], [facility]-      Eliazar Bridges MD  [facility]  03/17/21  09:31 EDT

## 2021-03-17 NOTE — ANESTHESIA PREPROCEDURE EVALUATION
Anesthesia Evaluation     Patient summary reviewed and Nursing notes reviewed   no history of anesthetic complications:  NPO Solid Status: > 8 hours  NPO Liquid Status: > 2 hours           Airway   Mallampati: II  TM distance: >3 FB  Neck ROM: full  No difficulty expected  Dental - normal exam     Pulmonary     breath sounds clear to auscultation  (+) COPD, home oxygen, sleep apnea,   Cardiovascular   Exercise tolerance: good (4-7 METS)    ECG reviewed  Rhythm: regular  Rate: normal    (+) pacemaker pacemaker, hypertension, valvular problems/murmurs AS, CAD, CABG, dysrhythmias Atrial Fib, hyperlipidemia,     ROS comment: Echo, heart cath reviewed    Neuro/Psych  GI/Hepatic/Renal/Endo    (+) morbid obesity, GERD, GI bleeding , renal disease CRI, diabetes mellitus,     Musculoskeletal     Abdominal    Substance History      OB/GYN          Other                      Anesthesia Plan    ASA 4 - emergent     MAC     intravenous induction     Anesthetic plan, all risks, benefits, and alternatives have been provided, discussed and informed consent has been obtained with: patient.

## 2021-03-17 NOTE — THERAPY EVALUATION
Patient Name: Miguelina Lopez  : 1945    MRN: [id number]                              Today's Date: 3/17/2021       Admit Date: 3/15/2021    Visit Dx:     ICD-10-CM ICD-9-CM   1. TIA (transient ischemic attack)  G45.9 435.9   2. Bilateral carotid artery stenosis  I65.23 433.10     433.30   3. Confusion  R41.0 298.9   4. OLI (acute kidney injury) (CMS/[facility])  N17.9 584.9   5. Hypernatremia  E87.0 276.0   6. Gastrointestinal hemorrhage with melena  K92.1 578.1     Patient Active Problem List   Diagnosis   • Anxiety disorder   • Arthritis of knee   • Asthma   • Chronic coronary artery disease   • CKD (chronic kidney disease) stage 3, GFR 30-59 ml/min (CMS/[facility])   • Depression   • Diabetic peripheral neuropathy (CMS/[facility])   • Gastroesophageal reflux disease   • Hyperlipidemia   • Insomnia   • Lower gastrointestinal hemorrhage   • Anemia   • OCHOA (obstructive sleep apnea)   • DM type 2 (diabetes mellitus, type 2) (CMS/[facility])   • Essential hypertension   • Hospital discharge follow-up   • Mitral stenosis   • Pulmonary hypertension due to sleep-disordered breathing (CMS/[facility])   • s/p MVR, TV-repair, CABG x2 16   • OLI (acute kidney injury) (CMS/HCC)   • Leukocytosis   • Atrial fibrillation (CMS/[facility])   • Nocturnal hypoxia   • Dermatitis   • Medicare annual wellness visit, initial   • Class 3 severe obesity due to excess calories with serious comorbidity and body mass index (BMI) of 50.0 to 59.9 in adult (CMS/[facility])   • Supplemental oxygen dependent   • Acute on chronic combined systolic and diastolic HF (heart failure) (CMS/[facility])   • Mitral regurgitation   • Aortic stenosis   • Medicare annual wellness visit, subsequent   • Localized edema   • Chronic right-sided congestive heart failure (CMS/[facility])   • Cellulitis of left lower extremity   • Proteinuria   • Bilateral lower extremity edema   • Subclinical hypothyroidism   • Chronic diastolic heart failure (CMS/[facility])   • Chronic respiratory failure with hypoxia and  hypercapnia (CMS/HCC)   • Acute on chronic respiratory failure with hypoxia and hypercapnia (CMS/HCC)   • AV block, 2nd degree   • 1st degree AV block   • Chest pain   • COPD (chronic obstructive pulmonary disease) (CMS/HCC)   • Complete heart block (CMS/HCC)   • Presence of permanent cardiac pacemaker   • Hypothyroidism (acquired)   • Chronic anticoagulation   • Leukocytosis   • Stage 3b chronic kidney disease (CMS/HCC)   • Gastrointestinal hemorrhage with melena   • Acute blood loss anemia   • Metabolic encephalopathy   • Hypernatremia   • Hypokalemia     Past Medical History:   Diagnosis Date   • Acute on chronic respiratory failure with hypoxia and hypercapnia (CMS/HCC)    • OLI (acute kidney injury) (CMS/HCC)    • Anemia    • Anxiety    • Aortic valve stenosis    • Bilateral lower extremity edema    • CHF (congestive heart failure) (CMS/HCC)    • Chronic coronary artery disease    • Class 3 severe obesity due to excess calories in adult (CMS/HCC)    • COPD (chronic obstructive pulmonary disease) (CMS/HCC)    • Depression    • Diabetes mellitus (CMS/HCC)    • Elevated cholesterol    • GERD (gastroesophageal reflux disease)    • Heart murmur    • Hypertension    • Mitral valve insufficiency    • Pneumonia     1/2016   • Pulmonary hypertension (CMS/HCC)     due to sleep disordered breathing   • Sleep apnea     Uses CPAP or oxygen   • Stage 3 chronic kidney disease (CMS/HCC)    • Subclinical hypothyroidism    • Supplemental oxygen dependent    • TIA (transient ischemic attack) 3/15/2021   • Valvular heart disease      Past Surgical History:   Procedure Laterality Date   • CARDIAC CATHETERIZATION     • CARDIAC CATHETERIZATION N/A 6/10/2016    Procedure: Left Heart Cath;  Surgeon: Chace Johnson MD;  Location: [facility] CATH INVASIVE LOCATION;  Service:    • CARDIAC CATHETERIZATION N/A 6/10/2016    Procedure: Right Heart Cath;  Surgeon: Chace Johnson MD;  Location: [facility] INVASIVE LOCATION;  Service:     • CARDIAC CATHETERIZATION N/A 2/5/2021    Procedure: RIGHT AND LEFT HEART CATH;  Surgeon: Antoine Ayers MD;  Location: [facility] CATH INVASIVE LOCATION;  Service: Cardiology;  Laterality: N/A;   • CARDIAC CATHETERIZATION N/A 2/5/2021    Procedure: Coronary angiography;  Surgeon: Antoine Ayers MD;  Location: [facility] CATH INVASIVE LOCATION;  Service: Cardiology;  Laterality: N/A;   • CARDIAC CATHETERIZATION N/A 2/5/2021    Procedure: Left ventriculography- pressures;  Surgeon: Antoine Ayers MD;  Location: [facility] CATH INVASIVE LOCATION;  Service: Cardiology;  Laterality: N/A;   • CARDIAC ELECTROPHYSIOLOGY PROCEDURE N/A 2/2/2021    Procedure: Pacemaker DC new---[employer] MICRA;  Surgeon: Eliazar Bond MD;  Location: [facility] CATH INVASIVE LOCATION;  Service: Cardiology;  Laterality: N/A;   • CARDIAC SURGERY     • CORONARY ARTERY BYPASS GRAFT      2 vessel   • CORONARY ARTERY BYPASS GRAFT WITH MITRAL VALVE REPAIR/REPLACEMENT N/A 6/13/2016    Procedure: INTRAOPERATIVE TARIQ, MIDLINE STERNOTOMY, CORONARY ARTERY BYPASS GRAFTING X  2 UTILIZING ENDOSCOPICALLY HARVESTED LEFT GREATER SAPHENOUS VEIN, MITRAL VALVE REPLACEMENT AND TRICUSPID VALVE REPAIR;  Surgeon: Eliecer Mistry MD;  Location: [facility];  Service:    • CORONARY STENT PLACEMENT  2010    Approx. 6 yrs ago at [facility]   • HEMORRHOIDECTOMY     • HYSTERECTOMY     • MITRAL VALVE REPLACEMENT     • REPLACEMENT TOTAL KNEE Right    • THYROID SURGERY      Cyst removed from thyroid   • VASCULAR SURGERY       General Information     Row Name 03/17/21 1232          OT Time and Intention    Document Type  evaluation  -BT     Mode of Treatment  individual therapy;occupational therapy  -BT     Row Name 03/17/21 1232          General Information    Patient Profile Reviewed  yes  -BT     Prior Level of Function  independent:;ADL's;transfer uses walker  -BT     Existing Precautions/Restrictions  fall  -BT     Barriers to Rehab  none identified  -BT     Row  Name 03/17/21 1232          Living Environment    Lives With  grandchild(kamryn);child(kamryn), adult  -BT     Row Name 03/17/21 1232          Cognition    Orientation Status (Cognition)  oriented x 4  -BT     Row Name 03/17/21 1232          Safety Issues, Functional Mobility    Impairments Affecting Function (Mobility)  endurance/activity tolerance;strength  -BT       User Key  (r) = Recorded By, (t) = Taken By, (c) = Cosigned By    Initials Name Provider Type    BT Carol Ross OT Occupational Therapist          Mobility/ADL's     Row Name 03/17/21 1234          Bed Mobility    Comment (Bed Mobility)  up in chair upon arrival  -BT     Row Name 03/17/21 1234          Transfers    Transfers  sit-stand transfer  -BT     Sit-Stand [place] (Transfers)  contact guard  -BT     Row Name 03/17/21 1234          Sit-Stand Transfer    Assistive Device (Sit-Stand Transfers)  walker, front-wheeled  -BT     Row Name 03/17/21 1234          Activities of Daily Living    BADL Assessment/Intervention  lower body dressing  -BT     Row Name 03/17/21 1235 03/17/21 1234       Lower Body Dressing Assessment/Training    [place] Level (Lower Body Dressing)  lower body dressing skills;doff;don;socks  -BT  lower body dressing skills;doff;don;socks;dependent (less than 25% patient effort)  -BT    Position (Lower Body Dressing)  --  supported sitting  -BT      User Key  (r) = Recorded By, (t) = Taken By, (c) = Cosigned By    Initials Name Provider Type    BT Carol Ross OT Occupational Therapist        Obj/Interventions     Row Name 03/17/21 1235          Range of Motion Comprehensive    General Range of Motion  no range of motion deficits identified  -BT     Row Name 03/17/21 1235          Strength Comprehensive (MMT)    Comment, General Manual Muscle Testing (MMT) Assessment  generalized weakness in BUEs  -BT     Row Name 03/17/21 1235          Shoulder (Therapeutic Exercise)    Shoulder (Therapeutic Exercise)  AROM (active range of  motion)  -BT     Shoulder AROM (Therapeutic Exercise)  bilateral;flexion;extension;aBduction;aDduction;sitting;10 repetitions;2 sets  -BT     Row Name 03/17/21 1235          Elbow/Forearm (Therapeutic Exercise)    Elbow/Forearm (Therapeutic Exercise)  AROM (active range of motion)  -BT     Elbow/Forearm AROM (Therapeutic Exercise)  bilateral;flexion;extension;sitting;10 repetitions;2 sets  -BT     Row Name 03/17/21 1235          Therapeutic Exercise    Therapeutic Exercise  elbow/forearm;shoulder  -BT       User Key  (r) = Recorded By, (t) = Taken By, (c) = Cosigned By    Initials Name Provider Type    BT Carol Ross OT Occupational Therapist        Goals/Plan     Row Name 03/17/21 1242          Bathing Goal 1 (OT)    Activity/Device (Bathing Goal 1, OT)  bathing skills, all  -BT     [place] Level/Cues Needed (Bathing Goal 1, OT)  set-up required  -BT     Time Frame (Bathing Goal 1, OT)  short term goal (STG);2 weeks  -BT     Progress/Outcomes (Bathing Goal 1, OT)  goal ongoing  -BT     Row Name 03/17/21 1242          Dressing Goal 1 (OT)    Activity/Device (Dressing Goal 1, OT)  lower body dressing  -BT     [place]/Cues Needed (Dressing Goal 1, OT)  contact guard assist  -BT     Time Frame (Dressing Goal 1, OT)  short term goal (STG);2 weeks  -BT     Progress/Outcome (Dressing Goal 1, OT)  goal ongoing  -BT     Row Name 03/17/21 1242          Toileting Goal 1 (OT)    Activity/Device (Toileting Goal 1, OT)  toileting skills, all  -BT     [place] Level/Cues Needed (Toileting Goal 1, OT)  supervision required  -BT     Time Frame (Toileting Goal 1, OT)  short term goal (STG);2 weeks  -BT     Progress/Outcome (Toileting Goal 1, OT)  goal ongoing  -BT       User Key  (r) = Recorded By, (t) = Taken By, (c) = Cosigned By    Initials Name Provider Type    BT Carol Ross OT Occupational Therapist        Clinical Impression     Row Name 03/17/21 1236          Plan of Care Review    Plan of Care Reviewed  With  patient  -BT     Progress  improving  -BT     Outcome Summary  OT evaluation complete: Pt is a 75 year old female admitted to [facility] due to son in law having concerns of pt being confused. Pt's CT did not show any abnormalities, but pt reports feelings of fatigue and weakness upon OT assessment. Pt reports previously being independent with adls and functional transfers. On this date, pt presents with decreased strength and independence with adls and can benefit from skilled OT services to address these deficits. Anticipate HH upon dc.  -BT     Row Name 03/17/21 1236          Therapy Assessment/Plan (OT)    Rehab Potential (OT)  good, to achieve stated therapy goals  -BT     Criteria for Skilled Therapeutic Interventions Met (OT)  yes  -BT     Therapy Frequency (OT)  3 times/wk  -BT     Row Name 03/17/21 1236          Therapy Plan Review/Discharge Plan (OT)    Anticipated Discharge Disposition (OT)  home with home health  -BT     Row Name 03/17/21 1236          Positioning and Restraints    Pre-Treatment Position  in bed  -BT     Post Treatment Position  chair  -BT     In Chair  notified nsg;reclined;call light within reach;encouraged to call for assist;exit alarm on  -BT       User Key  (r) = Recorded By, (t) = Taken By, (c) = Cosigned By    Initials Name Provider Type    BT Carol Ross OT Occupational Therapist        Outcome Measures     Row Name 03/17/21 1243          How much help from another is currently needed...    Putting on and taking off regular lower body clothing?  3  -BT     Bathing (including washing, rinsing, and drying)  3  -BT     Toileting (which includes using toilet bed pan or urinal)  3  -BT     Putting on and taking off regular upper body clothing  3  -BT     Taking care of personal grooming (such as brushing teeth)  3  -BT     Eating meals  4  -BT     AM-PAC 6 Clicks Score (OT)  19  -BT     Row Name 03/17/21 1243          Modified [place] Scale    Modified [place] Scale  3 - Moderate  disability.  Requiring some help, but able to walk without assistance.  -BT     Row Name 03/17/21 1243          Functional Assessment    Outcome Measure Options  AM-PAC 6 Clicks Daily Activity (OT);[place]  -BT       User Key  (r) = Recorded By, (t) = Taken By, (c) = Cosigned By    Initials Name Provider Type    Carol Razo OT Occupational Therapist        Occupational Therapy Education                 Title: PT OT SLP Therapies (Done)     Topic: Occupational Therapy (Done)     Point: ADL training (Done)     Description:   Instruct learner(s) on proper safety adaptation and remediation techniques during self care or transfers.   Instruct in proper use of assistive devices.              Learning Progress Summary           Patient Acceptance, E, VU by BT at 3/17/2021 1243    Comment: purpose of OT, adl retraining, functional transfer training                   Point: Home exercise program (Done)     Description:   Instruct learner(s) on appropriate technique for monitoring, assisting and/or progressing therapeutic exercises/activities.              Learning Progress Summary           Patient Acceptance, E, VU by BT at 3/17/2021 1243    Comment: purpose of OT, adl retraining, functional transfer training                   Point: Precautions (Done)     Description:   Instruct learner(s) on prescribed precautions during self-care and functional transfers.              Learning Progress Summary           Patient Acceptance, E, VU by BT at 3/17/2021 1243    Comment: purpose of OT, adl retraining, functional transfer training                   Point: Body mechanics (Done)     Description:   Instruct learner(s) on proper positioning and spine alignment during self-care, functional mobility activities and/or exercises.              Learning Progress Summary           Patient Acceptance, E, VU by BT at 3/17/2021 1243    Comment: purpose of OT, adl retraining, functional transfer training                                User Key     Initials Effective Dates Name Provider Type Discipline    BT 11/16/20 -  Carol Ross OT Occupational Therapist OT              OT Recommendation and Plan  Therapy Frequency (OT): 3 times/wk  Plan of Care Review  Plan of Care Reviewed With: patient  Progress: improving  Outcome Summary: OT evaluation complete: Pt is a 75 year old female admitted to [facility] due to son in law having concerns of pt being confused. Pt's CT did not show any abnormalities, but pt reports feelings of fatigue and weakness upon OT assessment. Pt reports previously being independent with adls and functional transfers. On this date, pt presents with decreased strength and independence with adls and can benefit from skilled OT services to address these deficits. Anticipate HH upon dc.     Time Calculation:   Time Calculation- OT     Row Name 03/17/21 1245             Time Calculation- OT    OT Start Time  1130  -BT      OT Stop Time  1144  -BT      OT Time Calculation (min)  14 min  -BT      Total Timed Code Minutes- OT  14 minute(s)  -BT      OT Received On  03/17/21  -BT      OT - Next Appointment  03/19/21  -BT      OT Goal Re-Cert Due Date  03/31/21  -BT        User Key  (r) = Recorded By, (t) = Taken By, (c) = Cosigned By    Initials Name Provider Type    BT Carol Ross, OT Occupational Therapist        Therapy Charges for Today     Code Description Service Date Service Provider Modifiers Qty    86904251267 HC OT EVAL LOW COMPLEXITY 2 3/17/2021 Carol Ross OT GO 1    13831194281 HC OT SELF CARE/MGMT/TRAIN EA 15 MIN 3/17/2021 Carol Ross OT GO 1               Carol Ross OT  3/17/2021

## 2021-03-17 NOTE — NURSING NOTE
Patient arrived to unit from [facility]. Patient refused to go in room 662, due to the size of the room, and her claustrophobia. Patient to be moved back to previous room [address] 583, per [place], .

## 2021-03-17 NOTE — PROGRESS NOTES
"DOS: 3/17/2021  NAME: Miguelina Lopez   : 1945  PCP: Arcelia Talbot APRN  Chief Complaint   Patient presents with   • Altered Mental Status   Patient seen in follow-up today; new to me        Stroke    Subjective: No events overnight.  Patient denies any new complaints and or concerns on my exam.  She feels back to her baseline.  Sitting in chair on my evaluation.      No family at bedside    Objective:  Vital signs: /54 (BP Location: Left arm, Patient Position: Sitting)   Pulse 75   Temp 97.4 °F (36.3 °C) (Oral)   Resp 20   Ht 152.4 cm (60\")   Wt 124 kg (273 lb 5.9 oz)   SpO2 96%   BMI 53.39 kg/m²       HEENT: Normocephalic, atraumatic   COR: RRR  Resp: Even and unlabored  Extremities: Equal pulses, bilateral lower extremity 3+ pitting edema    Neurological:   MS: AO. Language normal. No neglect. Higher integrative function normal  CN: II-XII normal  Motor: 5/5, normal tone  Reflexes: Toes downgoing  Sensory: Intact to light touch  Coordination: Normal (finger-to-nose)    Laboratory results:  Lab Results   Component Value Date    GLUCOSE 50 (L) 2021    CALCIUM 7.9 (L) 2021     (H) 2021    K 3.3 (L) 2021    CO2 30.9 (H) 2021     2021    BUN 84 (H) 2021    CREATININE 1.85 (H) 2021    EGFRIFAFRI 43 (L) 10/14/2020    EGFRIFNONA 27 (L) 2021    BCR 45.4 (H) 2021    ANIONGAP 12.1 2021     Lab Results   Component Value Date    WBC 12.99 (H) 2021    HGB 7.1 (L) 2021    HCT 22.6 (L) 2021    MCV 88.3 2021     2021     Lab Results   Component Value Date    CHOL 98 2021    CHOL 214 (H) 2017    CHOL 162 2016     Lab Results   Component Value Date    HDL 32 (L) 2021    HDL 64 10/14/2020    HDL 59 2019     Lab Results   Component Value Date    LDL 41 2021    LDL 79 10/14/2020    LDL 71 2019     Lab Results   Component Value Date    TRIG 141 " 03/17/2021    TRIG 106 10/14/2020    TRIG 87 12/11/2019         Lab 03/17/21  0630   HEMOGLOBIN A1C 5.70*      Urine Culture   Date Value Ref Range Status   03/15/2021 No growth  Final         Review and interpretation of imaging:  Interpretation Summary    · Left ventricular ejection fraction appears to be 66 - 70%.  · Left ventricular wall thickness is consistent with mild concentric hypertrophy  · The right atrial cavity is mildly dilated.  · The right ventricular cavity is moderately dilated. Normal right ventricular systolic function noted.  · The left atrial cavity is moderately dilated.  · Severe aortic valve stenosis is present  · Aortic valve maximum pressure gradient is 69 mmHg. Aortic valve mean pressure gradient is 42 mmHg.  · There is a tissue mitral valve replacement which appears to be well-seated.  · Gradients across the tissue mitral valve replacement are elevated with a peak of 24 mmHg and a mean of 10 mmHg  · The tricuspid valve is status post repair  · Mild to moderate tricuspid valve regurgitation is present.  · Calculated right ventricular systolic pressure from tricuspid regurgitation is 47 mmHg.  · There is no evidence of pericardial effusion.        Narrative & Impression  CT HEAD     HISTORY:Altered status     TECHNIQUE: CT scan of the head was obtained with 3 mm axial images  without intravenous contrast.  Radiation dose reduction techniques were  utilized, including automated exposure control and exposure modulation  based on body size.     COMPARISON:Head CT 08/07/2018     FINDINGS:  There is no finding of acute infarct, hemorrhage, contusion or abnormal  extra-axial collection. No hydrocephalus is present. Thick  atherosclerotic calcification is present within the right intracranial  vertebral artery and bilateral supraclinoid and cavernous portions of  the internal carotid arteries. Old left cerebellar lacunar infarct, as  before.     IMPRESSION:  1.  No findings of acute intracranial  abnormality. Atherosclerotic  calcification within the right intracranial vertebral artery and  bilateral internal carotid arteries which is incompletely evaluated  without intravenous contrast. Findings can be further characterized with  CTA head if clinically indicated.  2.  Left cerebellar lacunar infarct, as before.  3.  Other findings as above.        This report was finalized on 3/15/2021 1:24 PM by Dr. Manny Gomez M.D.  ONE VIEW PORTABLE CHEST     HISTORY: Confusion. Hypertension.     FINDINGS: There is cardiomegaly with sternal wires from previous cardiac  surgery. There is some minimal vascular prominence, unchanged from  02/01/2021. No new abnormality is seen.     This report was finalized on 3/15/2021 4:21 PM by Dr. Joshua Rogel M.D.        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  Etiology of cephalopathy thought to be multifactorial. No further neurology workup indicated. We will sign off and see again upon request.      Plan:  · Follow-up with neurology as needed  · Recommend annual carotid duplex and statin based on duplex findings    Case reviewed with attending neurologist Dr. Rajiv Kauffman and he agrees with treatment plan above.    ZOILA Duran'

## 2021-03-17 NOTE — PLAN OF CARE
Goal Outcome Evaluation:  Plan of Care Reviewed With: patient  Progress: improving  Outcome Summary: OT evaluation complete: Pt is a 75 year old female admitted to [facility] due to son in law having concerns of pt being confused. Pt's CT did not show any abnormalities, but pt reports feelings of fatigue and weakness upon OT assessment. Pt reports previously being independent with adls and functional transfers. On this date, pt presents with decreased strength and independence with adls and can benefit from skilled OT services to address these deficits. Anticipate HH upon dc.    Pt wore face mask during this therapy encounter. Therapist wore appropriate PPE  including face mask, gloves, and eye protection. Hand hygiene completed before and after this therapy session. Pt not in enhanced precautions.

## 2021-03-18 LAB
ALBUMIN SERPL-MCNC: 3.2 G/DL (ref 3.5–5.2)
ALBUMIN/GLOB SERPL: 1.2 G/DL
ALP SERPL-CCNC: 161 U/L (ref 39–117)
ALT SERPL W P-5'-P-CCNC: 10 U/L (ref 1–33)
ANION GAP SERPL CALCULATED.3IONS-SCNC: 11.9 MMOL/L (ref 5–15)
AST SERPL-CCNC: 20 U/L (ref 1–32)
BASOPHILS # BLD AUTO: 0.06 10*3/MM3 (ref 0–0.2)
BASOPHILS NFR BLD AUTO: 0.6 % (ref 0–1.5)
BH BB BLOOD EXPIRATION DATE: NORMAL
BH BB BLOOD TYPE BARCODE: 7300
BH BB DISPENSE STATUS: NORMAL
BH BB PRODUCT CODE: NORMAL
BH BB UNIT NUMBER: NORMAL
BILIRUB SERPL-MCNC: 0.5 MG/DL (ref 0–1.2)
BUN SERPL-MCNC: 68 MG/DL (ref 8–23)
BUN/CREAT SERPL: 40.5 (ref 7–25)
CALCIUM SPEC-SCNC: 8 MG/DL (ref 8.6–10.5)
CHLORIDE SERPL-SCNC: 104 MMOL/L (ref 98–107)
CO2 SERPL-SCNC: 30.1 MMOL/L (ref 22–29)
CREAT SERPL-MCNC: 1.68 MG/DL (ref 0.57–1)
CROSSMATCH INTERPRETATION: NORMAL
DEPRECATED RDW RBC AUTO: 48.6 FL (ref 37–54)
EOSINOPHIL # BLD AUTO: 0.87 10*3/MM3 (ref 0–0.4)
EOSINOPHIL NFR BLD AUTO: 9.2 % (ref 0.3–6.2)
ERYTHROCYTE [DISTWIDTH] IN BLOOD BY AUTOMATED COUNT: 15.3 % (ref 12.3–15.4)
GFR SERPL CREATININE-BSD FRML MDRD: 30 ML/MIN/1.73
GLOBULIN UR ELPH-MCNC: 2.7 GM/DL
GLUCOSE BLDC GLUCOMTR-MCNC: 104 MG/DL (ref 70–130)
GLUCOSE BLDC GLUCOMTR-MCNC: 131 MG/DL (ref 70–130)
GLUCOSE BLDC GLUCOMTR-MCNC: 82 MG/DL (ref 70–130)
GLUCOSE BLDC GLUCOMTR-MCNC: 92 MG/DL (ref 70–130)
GLUCOSE SERPL-MCNC: 72 MG/DL (ref 65–99)
HCT VFR BLD AUTO: 25.4 % (ref 34–46.6)
HCT VFR BLD AUTO: 26.6 % (ref 34–46.6)
HCT VFR BLD AUTO: 28.6 % (ref 34–46.6)
HGB BLD-MCNC: 8.1 G/DL (ref 12–15.9)
HGB BLD-MCNC: 8.6 G/DL (ref 12–15.9)
HGB BLD-MCNC: 9.1 G/DL (ref 12–15.9)
IMM GRANULOCYTES # BLD AUTO: 0.06 10*3/MM3 (ref 0–0.05)
IMM GRANULOCYTES NFR BLD AUTO: 0.6 % (ref 0–0.5)
LYMPHOCYTES # BLD AUTO: 1.37 10*3/MM3 (ref 0.7–3.1)
LYMPHOCYTES NFR BLD AUTO: 14.5 % (ref 19.6–45.3)
MAGNESIUM SERPL-MCNC: 2.2 MG/DL (ref 1.6–2.4)
MCH RBC QN AUTO: 28 PG (ref 26.6–33)
MCHC RBC AUTO-ENTMCNC: 31.9 G/DL (ref 31.5–35.7)
MCV RBC AUTO: 87.9 FL (ref 79–97)
MONOCYTES # BLD AUTO: 0.55 10*3/MM3 (ref 0.1–0.9)
MONOCYTES NFR BLD AUTO: 5.8 % (ref 5–12)
NEUTROPHILS NFR BLD AUTO: 6.56 10*3/MM3 (ref 1.7–7)
NEUTROPHILS NFR BLD AUTO: 69.3 % (ref 42.7–76)
NRBC BLD AUTO-RTO: 0.1 /100 WBC (ref 0–0.2)
PLATELET # BLD AUTO: 216 10*3/MM3 (ref 140–450)
PMV BLD AUTO: 10.9 FL (ref 6–12)
POTASSIUM SERPL-SCNC: 3.9 MMOL/L (ref 3.5–5.2)
POTASSIUM SERPL-SCNC: 4.1 MMOL/L (ref 3.5–5.2)
PROT SERPL-MCNC: 5.9 G/DL (ref 6–8.5)
RBC # BLD AUTO: 2.89 10*6/MM3 (ref 3.77–5.28)
SODIUM SERPL-SCNC: 146 MMOL/L (ref 136–145)
UNIT  ABO: NORMAL
UNIT  RH: NORMAL
WBC # BLD AUTO: 9.47 10*3/MM3 (ref 3.4–10.8)

## 2021-03-18 PROCEDURE — 99232 SBSQ HOSP IP/OBS MODERATE 35: CPT | Performed by: INTERNAL MEDICINE

## 2021-03-18 PROCEDURE — 85014 HEMATOCRIT: CPT | Performed by: HOSPITALIST

## 2021-03-18 PROCEDURE — 80053 COMPREHEN METABOLIC PANEL: CPT | Performed by: HOSPITALIST

## 2021-03-18 PROCEDURE — 85025 COMPLETE CBC W/AUTO DIFF WBC: CPT | Performed by: HOSPITALIST

## 2021-03-18 PROCEDURE — 82962 GLUCOSE BLOOD TEST: CPT

## 2021-03-18 PROCEDURE — 85018 HEMOGLOBIN: CPT | Performed by: HOSPITALIST

## 2021-03-18 PROCEDURE — 87338 HPYLORI STOOL AG IA: CPT | Performed by: INTERNAL MEDICINE

## 2021-03-18 PROCEDURE — 97162 PT EVAL MOD COMPLEX 30 MIN: CPT

## 2021-03-18 PROCEDURE — G0378 HOSPITAL OBSERVATION PER HR: HCPCS

## 2021-03-18 PROCEDURE — 97110 THERAPEUTIC EXERCISES: CPT

## 2021-03-18 PROCEDURE — 97535 SELF CARE MNGMENT TRAINING: CPT

## 2021-03-18 PROCEDURE — 83735 ASSAY OF MAGNESIUM: CPT | Performed by: HOSPITALIST

## 2021-03-18 PROCEDURE — 84132 ASSAY OF SERUM POTASSIUM: CPT | Performed by: HOSPITALIST

## 2021-03-18 RX ADMIN — BUMETANIDE 4 MG: 2 TABLET ORAL at 09:13

## 2021-03-18 RX ADMIN — PANTOPRAZOLE SODIUM 40 MG: 40 TABLET, DELAYED RELEASE ORAL at 05:13

## 2021-03-18 RX ADMIN — VILAZODONE HYDROCHLORIDE 20 MG: 40 TABLET ORAL at 20:38

## 2021-03-18 RX ADMIN — GABAPENTIN 100 MG: 100 CAPSULE ORAL at 09:15

## 2021-03-18 RX ADMIN — SODIUM CHLORIDE, PRESERVATIVE FREE 10 ML: 5 INJECTION INTRAVENOUS at 09:14

## 2021-03-18 RX ADMIN — BUMETANIDE 4 MG: 2 TABLET ORAL at 20:39

## 2021-03-18 RX ADMIN — GABAPENTIN 100 MG: 100 CAPSULE ORAL at 20:39

## 2021-03-18 RX ADMIN — LEVOTHYROXINE SODIUM 25 MCG: 0.03 TABLET ORAL at 05:14

## 2021-03-18 RX ADMIN — DOCUSATE SODIUM 50MG AND SENNOSIDES 8.6MG 1 TABLET: 8.6; 5 TABLET, FILM COATED ORAL at 09:13

## 2021-03-18 RX ADMIN — NYSTATIN: 100000 POWDER TOPICAL at 20:38

## 2021-03-18 RX ADMIN — POLYETHYLENE GLYCOL 3350 17 G: 17 POWDER, FOR SOLUTION ORAL at 09:13

## 2021-03-18 RX ADMIN — CARVEDILOL 3.12 MG: 6.25 TABLET, FILM COATED ORAL at 17:49

## 2021-03-18 RX ADMIN — NYSTATIN: 100000 POWDER TOPICAL at 09:14

## 2021-03-18 RX ADMIN — SODIUM CHLORIDE, PRESERVATIVE FREE 10 ML: 5 INJECTION INTRAVENOUS at 09:15

## 2021-03-18 RX ADMIN — CARVEDILOL 3.12 MG: 6.25 TABLET, FILM COATED ORAL at 09:14

## 2021-03-18 RX ADMIN — SODIUM CHLORIDE, PRESERVATIVE FREE 10 ML: 5 INJECTION INTRAVENOUS at 20:41

## 2021-03-18 RX ADMIN — ATORVASTATIN CALCIUM 40 MG: 20 TABLET, FILM COATED ORAL at 20:38

## 2021-03-18 RX ADMIN — SODIUM CHLORIDE 30 ML/HR: 9 INJECTION, SOLUTION INTRAVENOUS at 09:29

## 2021-03-18 NOTE — PROGRESS NOTES
Patient Name: Miguelina Lopez  Patient : 1945        Date of Service:21  Provider of Service: Antoine Ayers MD  Place of Service: [facility]  Referral Provider: Salvador Guardado MD          Follow Up: Valvular heart disease, atrial fibrillation, anemia    Interval Hx: Patient states she is feeling better today.  Less short of breath.        OBJECTIVE  Temp:  [97 °F (36.1 °C)-98.7 °F (37.1 °C)] 98.7 °F (37.1 °C)  Heart Rate:  [74-76] 75  Resp:  [16-20] 18  BP: (111-132)/(50-79) 131/57     Intake/Output Summary (Last 24 hours) at 3/18/2021 1014  Last data filed at 3/18/2021 0502  Gross per 24 hour   Intake 740 ml   Output 975 ml   Net -235 ml     Body mass index is 52.53 kg/m².      21  0650 21  0536 21  0532   Weight: 120 kg (263 lb 10.7 oz) 124 kg (273 lb 5.9 oz) 122 kg (268 lb 15.4 oz)         Physical Exam:   Vitals reviewed.   Constitutional:       Appearance: Well-developed.   Eyes:      Pupils: Pupils are equal, round, and reactive to light.   HENT:      Head: Normocephalic.   Neck:      Thyroid: No thyromegaly.      Vascular: No carotid bruit or JVD.   Pulmonary:      Effort: Pulmonary effort is normal.      Breath sounds: Normal breath sounds.   Cardiovascular:      Normal rate. Regular rhythm. distant S1. distant S2.      Murmurs: There is a harsh midsystolic murmur at the URSB, radiating to the neck.      No gallop.   Pulses:     Intact distal pulses.   Edema:     Peripheral edema absent.   Skin:     General: Skin is warm and dry.      Findings: No erythema.   Neurological:      Mental Status: Alert and oriented to person, place, and time.           CURRENT MEDS    Scheduled Meds:atorvastatin, 40 mg, Oral, Nightly  bumetanide, 4 mg, Oral, BID  carvedilol, 3.125 mg, Oral, BID With Meals  gabapentin, 100 mg, Oral, BID  insulin lispro, 0-9 Units, Subcutaneous, TID AC  levothyroxine, 25 mcg, Oral, Q AM  nystatin, , Topical, Q12H  pantoprazole, 40 mg,  Oral, QAM  polyethylene glycol, 17 g, Oral, Daily  potassium chloride, 20 mEq, Oral, Every Other Day  sennosides-docusate, 1 tablet, Oral, Daily  sodium chloride, 10 mL, Intravenous, Q12H  sodium chloride, 10 mL, Intravenous, Q12H  vilazodone, 20 mg, Oral, Nightly  [START ON 3/21/2021] vitamin D, 50,000 Units, Oral, Weekly      Continuous Infusions:sodium chloride, 30 mL/hr, Last Rate: 30 mL/hr (03/18/21 0929)          Lab Review:   Results from last 7 days   Lab Units 03/18/21  0748 03/18/21  0138 03/17/21  0630   SODIUM mmol/L 146*  --  146*   POTASSIUM mmol/L 3.9 4.1 3.3*   CHLORIDE mmol/L 104  --  103   CO2 mmol/L 30.1*  --  30.9*   BUN mg/dL 68*  --  84*   CREATININE mg/dL 1.68*  --  1.85*   GLUCOSE mg/dL 72  --  50*   CALCIUM mg/dL 8.0*  --  7.9*   AST (SGOT) U/L 20  --  14   ALT (SGPT) U/L 10  --  10     Results from last 7 days   Lab Units 03/15/21  1717 03/15/21  1302   TROPONIN T ng/mL 0.010 <0.010     Results from last 7 days   Lab Units 03/18/21  0748 03/18/21  0002 03/17/21  0630   WBC 10*3/mm3 9.47  --  12.99*   HEMOGLOBIN g/dL 8.1* 8.6* 7.1*   HEMATOCRIT % 25.4* 26.6* 22.6*   PLATELETS 10*3/mm3 216  --  194     Results from last 7 days   Lab Units 03/17/21  0630 03/15/21  1302   INR  1.94* 2.08*   APTT seconds  --  37.9*     Results from last 7 days   Lab Units 03/18/21  0748 03/17/21  0630   MAGNESIUM mg/dL 2.2 2.1     Results from last 7 days   Lab Units 03/17/21  0630   CHOLESTEROL mg/dL 98   TRIGLYCERIDES mg/dL 141   HDL CHOL mg/dL 32*   LDL CHOL mg/dL 41     Results from last 7 days   Lab Units 03/16/21  0547   PROBNP pg/mL 8,131.0*     Results from last 7 days   Lab Units 03/17/21  0630   TSH uIU/mL 4.970*       I personally reviewed the patient's ECG and telemetry data    ASSESSMENT & PLAN    Metabolic encephalopathy    Chronic coronary artery disease    OCHOA (obstructive sleep apnea)    DM type 2 (diabetes mellitus, type 2) (CMS/[facility])    Essential hypertension    Pulmonary hypertension due to  sleep-disordered breathing (CMS/HCC)    Atrial fibrillation (CMS/HCC)    Supplemental oxygen dependent    Chronic right-sided congestive heart failure (CMS/HCC)    Bilateral lower extremity edema    Chronic diastolic heart failure (CMS/HCC)    Chronic respiratory failure with hypoxia and hypercapnia (CMS/HCC)    COPD (chronic obstructive pulmonary disease) (CMS/HCC)    Presence of permanent cardiac pacemaker    Hypothyroidism (acquired)    Chronic anticoagulation    Leukocytosis    Stage 3b chronic kidney disease (CMS/HCC)    Gastrointestinal hemorrhage with melena    Acute blood loss anemia    Hypernatremia    Hypokalemia    1.  Acute blood loss anemia: Hemoglobin improved.  She remains off anticoagulation  2.  Aortic stenosis: Ongoing TAVR evaluation  3.  Coronary artery disease: Status post CABG  4.  Mitral regurgitation: Stop status post mitral valve replacement with tissue prosthesis  5.  Tricuspid regurgitation: Status post repair  6.  Paroxysmal atrial fibrillation: We will withhold anticoagulation at this time.  Status post Micra pacemaker implant  7.  Chronic kidney disease  8.  Chronic diastolic heart failure continues on oral Bumex  9.  Morbid obesity    Antoine Ayers MD  03/18/21

## 2021-03-18 NOTE — PROGRESS NOTES
[facility]  Inpatient Progress Note    Reason for Follow Up:  Melena    Subjective     Interval History:   EGD yesterday with gastric ulcer.  No Gi complaints today.    Current Facility-Administered Medications:   •  acetaminophen (TYLENOL) tablet 650 mg, 650 mg, Oral, Q4H PRN, Salvador Guardado MD  •  artificial tears ophthalmic ointment, , Both Eyes, Q1H PRN, Antonia Nava APRN, Given at 03/17/21 2145  •  atorvastatin (LIPITOR) tablet 40 mg, 40 mg, Oral, Nightly, Rajiv Wild MD, 40 mg at 03/17/21 2146  •  bumetanide (BUMEX) tablet 4 mg, 4 mg, Oral, BID, Salvador Guardado MD, 4 mg at 03/18/21 0913  •  carvedilol (COREG) tablet 3.125 mg, 3.125 mg, Oral, BID With Meals, Eliazar Bridges MD, 3.125 mg at 03/18/21 0914  •  dextrose (D50W) 25 g/ 50mL Intravenous Solution 25 g, 25 g, Intravenous, Q15 Min PRN, Salvador Guardado MD  •  dextrose (GLUTOSE) oral gel 15 g, 15 g, Oral, Q15 Min PRN, Salvador Guardado MD  •  gabapentin (NEURONTIN) capsule 100 mg, 100 mg, Oral, BID, Salvador Guardado MD, 100 mg at 03/18/21 0915  •  glucagon (human recombinant) (GLUCAGEN DIAGNOSTIC) injection 1 mg, 1 mg, Subcutaneous, Q15 Min PRN, Salvador Guardado MD, 1 mg at 03/17/21 0835  •  insulin lispro (ADMELOG) injection 0-9 Units, 0-9 Units, Subcutaneous, TID AC, Salvador Guardado MD, Stopped at 03/17/21 0730  •  levothyroxine (SYNTHROID, LEVOTHROID) tablet 25 mcg, 25 mcg, Oral, Q AM, Antonia Nava APRN, 25 mcg at 03/18/21 0514  •  nitroglycerin (NITROSTAT) SL tablet 0.4 mg, 0.4 mg, Sublingual, Q5 Min PRN, Salvador Guardado MD  •  nystatin (MYCOSTATIN) powder, , Topical, Q12H, Eliazar Bridges MD, Given at 03/18/21 0914  •  pantoprazole (PROTONIX) EC tablet 40 mg, 40 mg, Oral, QAM, Salvador Guardado MD, 40 mg at 03/18/21 0513  •  polyethylene glycol (MIRALAX) packet 17 g, 17 g, Oral, Daily, Salvador Guardado MD, 17 g at 03/18/21 0913  •  potassium chloride (K-DUR,KLOR-CON) ER tablet 20 mEq, 20 mEq, Oral, Every Other Day, Salvador Guardado,  MD, 20 mEq at 03/15/21 1954  •  potassium chloride (K-DUR,KLOR-CON) ER tablet 40 mEq, 40 mEq, Oral, PRN, Eliazar Bridges MD, 40 mEq at 03/17/21 2146  •  potassium chloride (KLOR-CON) packet 40 mEq, 40 mEq, Oral, PRN, Eliazar Bridges MD  •  sennosides-docusate (PERICOLACE) 8.6-50 MG per tablet 1 tablet, 1 tablet, Oral, Daily, Salvador Guardado MD, 1 tablet at 03/18/21 0913  •  [COMPLETED] Insert peripheral IV, , , Once **AND** sodium chloride 0.9 % flush 10 mL, 10 mL, Intravenous, PRN, Pablito Guardado MD  •  sodium chloride 0.9 % flush 10 mL, 10 mL, Intravenous, Q12H, Salvador Guardado MD, 10 mL at 03/18/21 0914  •  sodium chloride 0.9 % flush 10 mL, 10 mL, Intravenous, PRN, Salvador Guardado MD  •  sodium chloride 0.9 % flush 10 mL, 10 mL, Intravenous, Q12H, Rajiv Wild MD, 10 mL at 03/18/21 0915  •  sodium chloride 0.9 % flush 10 mL, 10 mL, Intravenous, PRN, Rajiv Wild MD  •  sodium chloride 0.9 % infusion, 30 mL/hr, Intravenous, Continuous, [place], Dixon Perez MD, Last Rate: 30 mL/hr at 03/18/21 0929, 30 mL/hr at 03/18/21 0929  •  vilazodone (VIIBRYD) tablet 20 mg, 20 mg, Oral, Nightly, Salvador Guardado MD, 20 mg at 03/17/21 2146  •  [START ON 3/21/2021] vitamin D (ERGOCALCIFEROL) capsule 50,000 Units, 50,000 Units, Oral, Weekly, Salvador Guardado MD  Review of Systems:    The following systems were reviewed and negative;  gastrointestinal    Objective     Vital Signs  Temp:  [97.6 °F (36.4 °C)-98.7 °F (37.1 °C)] 97.8 °F (36.6 °C)  Heart Rate:  [75] 75  Resp:  [16-20] 16  BP: (111-131)/(52-79) 112/60  Body mass index is 52.53 kg/m².    Intake/Output Summary (Last 24 hours) at 3/18/2021 1445  Last data filed at 3/18/2021 0502  Gross per 24 hour   Intake 740 ml   Output 975 ml   Net -235 ml     No intake/output data recorded.     Physical Exam:   General: patient awake, alert and cooperative   Abdomen: soft, nontender, nondistended; normal bowel sounds   Rectal: deferred   Extremities: no rash or  edema   Psychiatric: Normal mood and behavior; memory intact     Results Review:     I reviewed the patient's new clinical results.    Results from last 7 days   Lab Units 03/18/21  0748 03/18/21  0002 03/17/21  1914 03/17/21  0630 03/16/21  0547   WBC 10*3/mm3 9.47  --   --  12.99* 12.93*   HEMOGLOBIN g/dL 8.1* 8.6* 8.9* 7.1* 5.6*   HEMATOCRIT % 25.4* 26.6* 27.9* 22.6* 18.1*   PLATELETS 10*3/mm3 216  --   --  194 216     Results from last 7 days   Lab Units 03/18/21  0748 03/18/21  0138 03/17/21  0630 03/16/21  0547   SODIUM mmol/L 146*  --  146* 149*   POTASSIUM mmol/L 3.9 4.1 3.3* 3.6   CHLORIDE mmol/L 104  --  103 104   CO2 mmol/L 30.1*  --  30.9* 32.4*   BUN mg/dL 68*  --  84* 87*   CREATININE mg/dL 1.68*  --  1.85* 1.65*   CALCIUM mg/dL 8.0*  --  7.9* 7.9*   BILIRUBIN mg/dL 0.5  --  0.5 0.4   ALK PHOS U/L 161*  --  167* 173*   ALT (SGPT) U/L 10  --  10 7   AST (SGOT) U/L 20  --  14 13   GLUCOSE mg/dL 72  --  50* 79     Results from last 7 days   Lab Units 03/17/21  0630 03/15/21  1302   INR  1.94* 2.08*     Lab Results   Lab Value Date/Time    LIPASE 30 03/15/2021 1302    LIPASE 66 (H) 08/07/2018 1858       Radiology:  [unfilled]      Assessment/Plan   Assessment:   1.  Melena  2. ABLA  3.  Gastric ulcer  4.  Afib on DOAC (held)    Plan:   Await h pylori stool Ag  BID PPI  Repeat EGD in 8-12 weeks, consider colonoscopy at same time  Continue to hold Eliquis, ideally for another 3-5 days at the least    I discussed the patients findings and my recommendations with patient.         Dixon Haas M.D.  [facility]  [address] [place] [address]  Office: [phone]

## 2021-03-18 NOTE — PLAN OF CARE
Goal Outcome Evaluation:  Plan of Care Reviewed With: patient     Outcome Summary: Pt. is a 75 year old Female admitted to the hospital with A.M.S./Metabolic Encephalopathy.  Pt. reports that prior to admission she was independent with functional mobility. Pt. currently presents with decreased strength, decreased balance, and decreased tolerance to functional activity.This AM, pt. able to ambulate 20 feet, CGA x1, with use of Rwx.  Pt. requires Min. assist x 1 for bed mobility and Min. assist x 1 for sit <-> stand transfers. Given pt.'s current status, pt. will benefit from skilled inpt. P.T. to address her functional deficits and to assist pt. in regaining her maximum level of independence with functional mobility. P.T. rec's home with HHPT upon discharge from hospital.    Patient was wearing a face mask during this therapy encounter. Therapist used appropriate personal protective equipment including eye protection, mask, and gloves.  Mask used was standard procedure mask. Appropriate PPE was worn during the entire therapy session. Hand hygiene was completed before and after therapy session. Patient is not in enhanced droplet precautions.

## 2021-03-18 NOTE — PLAN OF CARE
Goal Outcome Evaluation:  Plan of Care Reviewed With: patient  Progress: no change     Pt A+O x4, VSS. Pt frequently seeking out staff this shift. She slept with home CPAP with 3 L O2 NC bled through it. No complaints of pain. Voiding in Purewick. SCDs on throughout the night. Nystatin powder applied in between skin folds under abdomen and barrier cream placed on buttocks for blanchable redness. Pt is able to turn herself in bed. K+ replaced this shift and redraw was 4.1. Pending plan of care. I will continue to monitor and progress towards goals as tolerated.

## 2021-03-18 NOTE — THERAPY EVALUATION
Patient Name: Miguelina Lopez  : 1945    MRN: [id number]                              Today's Date: 3/18/2021       Admit Date: 3/15/2021    Visit Dx:     ICD-10-CM ICD-9-CM   1. TIA (transient ischemic attack)  G45.9 435.9   2. Bilateral carotid artery stenosis  I65.23 433.10     433.30   3. Confusion  R41.0 298.9   4. OLI (acute kidney injury) (CMS/[facility])  N17.9 584.9   5. Hypernatremia  E87.0 276.0   6. Gastrointestinal hemorrhage with melena  K92.1 578.1     Patient Active Problem List   Diagnosis   • Anxiety disorder   • Arthritis of knee   • Asthma   • Chronic coronary artery disease   • CKD (chronic kidney disease) stage 3, GFR 30-59 ml/min (CMS/[facility])   • Depression   • Diabetic peripheral neuropathy (CMS/[facility])   • Gastroesophageal reflux disease   • Hyperlipidemia   • Insomnia   • Lower gastrointestinal hemorrhage   • Anemia   • OCHOA (obstructive sleep apnea)   • DM type 2 (diabetes mellitus, type 2) (CMS/[facility])   • Essential hypertension   • Hospital discharge follow-up   • Mitral stenosis   • Pulmonary hypertension due to sleep-disordered breathing (CMS/[facility])   • s/p MVR, TV-repair, CABG x2 16   • OLI (acute kidney injury) (CMS/HCC)   • Leukocytosis   • Atrial fibrillation (CMS/[facility])   • Nocturnal hypoxia   • Dermatitis   • Medicare annual wellness visit, initial   • Class 3 severe obesity due to excess calories with serious comorbidity and body mass index (BMI) of 50.0 to 59.9 in adult (CMS/[facility])   • Supplemental oxygen dependent   • Acute on chronic combined systolic and diastolic HF (heart failure) (CMS/[facility])   • Mitral regurgitation   • Aortic stenosis   • Medicare annual wellness visit, subsequent   • Localized edema   • Chronic right-sided congestive heart failure (CMS/[facility])   • Cellulitis of left lower extremity   • Proteinuria   • Bilateral lower extremity edema   • Subclinical hypothyroidism   • Chronic diastolic heart failure (CMS/[facility])   • Chronic respiratory failure with hypoxia and  hypercapnia (CMS/HCC)   • Acute on chronic respiratory failure with hypoxia and hypercapnia (CMS/HCC)   • AV block, 2nd degree   • 1st degree AV block   • Chest pain   • COPD (chronic obstructive pulmonary disease) (CMS/HCC)   • Complete heart block (CMS/HCC)   • Presence of permanent cardiac pacemaker   • Hypothyroidism (acquired)   • Chronic anticoagulation   • Leukocytosis   • Stage 3b chronic kidney disease (CMS/HCC)   • Gastrointestinal hemorrhage with melena   • Acute blood loss anemia   • Metabolic encephalopathy   • Hypernatremia   • Hypokalemia     Past Medical History:   Diagnosis Date   • Acute on chronic respiratory failure with hypoxia and hypercapnia (CMS/HCC)    • OLI (acute kidney injury) (CMS/HCC)    • Anemia    • Anxiety    • Aortic valve stenosis    • Bilateral lower extremity edema    • CHF (congestive heart failure) (CMS/HCC)    • Chronic coronary artery disease    • Class 3 severe obesity due to excess calories in adult (CMS/HCC)    • COPD (chronic obstructive pulmonary disease) (CMS/HCC)    • Depression    • Diabetes mellitus (CMS/HCC)    • Elevated cholesterol    • GERD (gastroesophageal reflux disease)    • Heart murmur    • Hypertension    • Mitral valve insufficiency    • Pneumonia     1/2016   • Pulmonary hypertension (CMS/HCC)     due to sleep disordered breathing   • Sleep apnea     Uses CPAP or oxygen   • Stage 3 chronic kidney disease (CMS/HCC)    • Subclinical hypothyroidism    • Supplemental oxygen dependent    • TIA (transient ischemic attack) 3/15/2021   • Valvular heart disease      Past Surgical History:   Procedure Laterality Date   • CARDIAC CATHETERIZATION     • CARDIAC CATHETERIZATION N/A 6/10/2016    Procedure: Left Heart Cath;  Surgeon: Chace Johnson MD;  Location: [facility] CATH INVASIVE LOCATION;  Service:    • CARDIAC CATHETERIZATION N/A 6/10/2016    Procedure: Right Heart Cath;  Surgeon: Chace Johnson MD;  Location: [facility] INVASIVE LOCATION;  Service:     • CARDIAC CATHETERIZATION N/A 2/5/2021    Procedure: RIGHT AND LEFT HEART CATH;  Surgeon: Antoine Ayers MD;  Location: [facility] CATH INVASIVE LOCATION;  Service: Cardiology;  Laterality: N/A;   • CARDIAC CATHETERIZATION N/A 2/5/2021    Procedure: Coronary angiography;  Surgeon: Antoine Ayers MD;  Location: [facility] CATH INVASIVE LOCATION;  Service: Cardiology;  Laterality: N/A;   • CARDIAC CATHETERIZATION N/A 2/5/2021    Procedure: Left ventriculography- pressures;  Surgeon: Antoine Ayers MD;  Location: [facility] CATH INVASIVE LOCATION;  Service: Cardiology;  Laterality: N/A;   • CARDIAC ELECTROPHYSIOLOGY PROCEDURE N/A 2/2/2021    Procedure: Pacemaker DC new---[employer] MICRA;  Surgeon: Eliazar Bond MD;  Location: [facility] CATH INVASIVE LOCATION;  Service: Cardiology;  Laterality: N/A;   • CARDIAC SURGERY     • CORONARY ARTERY BYPASS GRAFT      2 vessel   • CORONARY ARTERY BYPASS GRAFT WITH MITRAL VALVE REPAIR/REPLACEMENT N/A 6/13/2016    Procedure: INTRAOPERATIVE TARIQ, MIDLINE STERNOTOMY, CORONARY ARTERY BYPASS GRAFTING X  2 UTILIZING ENDOSCOPICALLY HARVESTED LEFT GREATER SAPHENOUS VEIN, MITRAL VALVE REPLACEMENT AND TRICUSPID VALVE REPAIR;  Surgeon: Eliecer Mistry MD;  Location: [facility] OR;  Service:    • CORONARY STENT PLACEMENT  2010    Approx. 6 yrs ago at [facility]   • ENDOSCOPY N/A 3/17/2021    Procedure: ESOPHAGOGASTRODUODENOSCOPY;  Surgeon: Dixon Haas MD;  Location: [facility] ENDOSCOPY;  Service: Gastroenterology;  Laterality: N/A;  PRE: GI BLEED  POST: ANTRAL ULCER   • HEMORRHOIDECTOMY     • HYSTERECTOMY     • MITRAL VALVE REPLACEMENT     • REPLACEMENT TOTAL KNEE Right    • THYROID SURGERY      Cyst removed from thyroid   • VASCULAR SURGERY       General Information     Row Name 03/18/21 1016          Physical Therapy Time and Intention    Document Type  evaluation Pt. admitted with A.M.S/Metabolic Encephalopathy  -MS     Mode of Treatment  physical therapy;individual therapy   -MS     Row Name 03/18/21 1016          General Information    Patient Profile Reviewed  yes  -MS     Prior Level of Function  independent:  -MS     Existing Precautions/Restrictions  (S) fall Exit alarm  -MS     Barriers to Rehab  none identified  -MS     Row Name 03/18/21 1016          Cognition    Orientation Status (Cognition)  oriented to;person;place  -MS     Row Name 03/18/21 1016          Safety Issues, Functional Mobility    Comment, Safety Issues/Impairments (Mobility)  Gait belt used for safety.  -MS       User Key  (r) = Recorded By, (t) = Taken By, (c) = Cosigned By    Initials Name Provider Type    Soto Callejas, PT Physical Therapist        Mobility     Row Name 03/18/21 1017          Bed Mobility    Bed Mobility  supine-sit;sit-supine  -MS     Supine-Sit [place] (Bed Mobility)  minimum assist (75% patient effort)  -MS     Row Name 03/18/21 1017          Sit-Stand Transfer    Sit-Stand [place] (Transfers)  minimum assist (75% patient effort)  -MS     Assistive Device (Sit-Stand Transfers)  walker, front-wheeled  -MS     Row Name 03/18/21 1017          Gait/Stairs (Locomotion)    [place] Level (Gait)  contact guard  -MS     Assistive Device (Gait)  walker, front-wheeled  -MS     Distance in Feet (Gait)  20 feet  -MS     Deviations/Abnormal Patterns (Gait)  lina decreased  -MS     Bilateral Gait Deviations  forward flexed posture  -MS     Comment (Gait/Stairs)  Verbal/tactile cues for posture correction.  -MS       User Key  (r) = Recorded By, (t) = Taken By, (c) = Cosigned By    Initials Name Provider Type    Soto Callejas, PT Physical Therapist        Obj/Interventions     Row Name 03/18/21 1019          Range of Motion Comprehensive    Comment, General Range of Motion  BUE/LE (WFL's)  -MS     Row Name 03/18/21 1019          Strength Comprehensive (MMT)    Comment, General Manual Muscle Testing (MMT) Assessment  BUE/LE (3+/5)  -MS       User Key  (r) = Recorded By, (t)  = Taken By, (c) = Cosigned By    Initials Name Provider Type    Soto Calljeas JOEY, PT Physical Therapist        Goals/Plan     Row Name 03/18/21 1021          Bed Mobility Goal 1 (PT)    Activity/Assistive Device (Bed Mobility Goal 1, PT)  bed mobility activities, all  -MS     [place] Level/Cues Needed (Bed Mobility Goal 1, PT)  standby assist  -MS     Time Frame (Bed Mobility Goal 1, PT)  long term goal (LTG);1 week  -MS     Row Name 03/18/21 1021          Transfer Goal 1 (PT)    Activity/Assistive Device (Transfer Goal 1, PT)  transfers, all;walker, rolling  -MS     [place] Level/Cues Needed (Transfer Goal 1, PT)  standby assist  -MS     Time Frame (Transfer Goal 1, PT)  long term goal (LTG);1 week  -MS     Row Name 03/18/21 1021          Gait Training Goal 1 (PT)    Activity/Assistive Device (Gait Training Goal 1, PT)  gait (walking locomotion);walker, rolling  -MS     [place] Level (Gait Training Goal 1, PT)  standby assist  -MS     Distance (Gait Training Goal 1, PT)  80 feet  -MS     Time Frame (Gait Training Goal 1, PT)  long term goal (LTG);1 week  -MS       User Key  (r) = Recorded By, (t) = Taken By, (c) = Cosigned By    Initials Name Provider Type    Soto Callejas JOEY, PT Physical Therapist        Clinical Impression     Row Name 03/18/21 1020          Pain    Additional Documentation  Pain Scale: Numbers Pre/Post-Treatment (Group)  -MS     Row Name 03/18/21 1020          Pain Scale: Numbers Pre/Post-Treatment    Pretreatment Pain Rating  2/10  -MS     Posttreatment Pain Rating  2/10  -MS     Pain Location - Side  Bilateral  -MS     Pain Location  knee  -MS     Row Name 03/18/21 1020          Plan of Care Review    Plan of Care Reviewed With  patient  -MS     Row Name 03/18/21 1020          Therapy Assessment/Plan (PT)    Rehab Potential (PT)  good, to achieve stated therapy goals  -MS     Criteria for Skilled Interventions Met (PT)  skilled treatment is necessary  -MS     Row Name  03/18/21 1020          Positioning and Restraints    Pre-Treatment Position  in bed  -MS     Post Treatment Position  chair  -MS     In Chair  notified nsg;sitting;call light within reach;encouraged to call for assist;exit alarm on All lines intact.  -MS       User Key  (r) = Recorded By, (t) = Taken By, (c) = Cosigned By    Initials Name Provider Type    MS Soto Marrero PT Physical Therapist        Outcome Measures     Row Name 03/18/21 1022          How much help from another person do you currently need...    Turning from your back to your side while in flat bed without using bedrails?  3  -MS     Moving from lying on back to sitting on the side of a flat bed without bedrails?  3  -MS     Moving to and from a bed to a chair (including a wheelchair)?  3  -MS     Standing up from a chair using your arms (e.g., wheelchair, bedside chair)?  3  -MS     Climbing 3-5 steps with a railing?  2  -MS     To walk in hospital room?  3  -MS     AM-PAC 6 Clicks Score (PT)  17  -MS     Row Name 03/18/21 1022          Modified [place] Scale    Modified [place] Scale  4 - Moderately severe disability.  Unable to walk without assistance, and unable to attend to own bodily needs without assistance.  -MS     Row Name 03/18/21 1022          Functional Assessment    Outcome Measure Options  AM-PAC 6 Clicks Basic Mobility (PT)  -MS       User Key  (r) = Recorded By, (t) = Taken By, (c) = Cosigned By    Initials Name Provider Type    MS MarreroSoto, PT Physical Therapist        Physical Therapy Education                 Title: PT OT SLP Therapies (Done)     Topic: Physical Therapy (Done)     Point: Mobility training (Done)     Learning Progress Summary           Patient Acceptance, E,D, VU,NR by MS at 3/18/2021 1023                   Point: Home exercise program (Done)     Learning Progress Summary           Patient Acceptance, E,D, VU,NR by MS at 3/18/2021 1023                   Point: Body mechanics (Done)     Learning  Progress Summary           Patient Acceptance, E,D, VU,NR by MS at 3/18/2021 1023                   Point: Precautions (Done)     Learning Progress Summary           Patient Acceptance, E,D, VU,NR by MS at 3/18/2021 1023                               User Key     Initials Effective Dates Name Provider Type Discipline    MS 04/03/18 -  Soto Marrero, PT Physical Therapist PT              PT Recommendation and Plan  Planned Therapy Interventions (PT): balance training, bed mobility training, gait training, home exercise program, patient/family education, postural re-education, transfer training, strengthening  Plan of Care Reviewed With: patient  Outcome Summary: Pt. is a 75 year old Female admitted to the hospital with A.M.S./Metabolic Encephalopathy.  Pt. reports that prior to admission she was independent with functional mobility. Pt. currently presents with decreased strength, decreased balance, and decreased tolerance to functional activity.This AM, pt. able to ambulate 20 feet, CGA x1, with use of Rwx.  Pt. requires Min. assist x 1 for bed mobility and Min. assist x 1 for sit <-> stand transfers. Given pt.'s current status, pt. will benefit from skilled inpt. P.T. to address her functional deficits and to assist pt. in regaining her maximum level of independence with functional mobility. P.T. rec's home with HHPT upon discharge from hospital.     Time Calculation:   PT Charges     Row Name 03/18/21 1029             Time Calculation    Start Time  0825  -MS      Stop Time  0845  -MS      Time Calculation (min)  20 min  -MS      PT Received On  03/18/21  -MS      PT - Next Appointment  03/19/21  -MS      PT Goal Re-Cert Due Date  03/25/21  -MS         Time Calculation- PT    Total Timed Code Minutes- PT  19 minute(s)  -MS        User Key  (r) = Recorded By, (t) = Taken By, (c) = Cosigned By    Initials Name Provider Type    MS Soto Marrero, PT Physical Therapist        Therapy Charges for Today     Code  Description Service Date Service Provider Modifiers Qty    70472618350 HC PT EVAL MOD COMPLEXITY 2 3/18/2021 Soto Marrero, PT GP 1    20772386220 HC PT THER PROC EA 15 MIN 3/18/2021 Soto Marrero, PT GP 1          PT G-Codes  Outcome Measure Options: AM-PAC 6 Clicks Basic Mobility (PT)  AM-PAC 6 Clicks Score (PT): 17  AM-PAC 6 Clicks Score (OT): 19  Modified [place] Scale: 4 - Moderately severe disability.  Unable to walk without assistance, and unable to attend to own bodily needs without assistance.    Soto Marrero, PT  3/18/2021

## 2021-03-18 NOTE — PROGRESS NOTES
Name: Miguelina Lopez ADMIT: 3/15/2021   : 1945  PCP: Arcelia Talbot APRN    MRN: [id number] LOS: 3 days   AGE/SEX: 75 y.o. female  ROOM: [facility]     Subjective   Subjective    SOB is a bit better. No overt bleeding. She has lymphedema and normally has her legs wrapped.    Review of Systems   Constitutional: Positive for fatigue. Negative for appetite change, chills, diaphoresis and fever.   HENT: Negative for congestion, ear pain, nosebleeds, rhinorrhea, sore throat, trouble swallowing and voice change.    Eyes: Negative for pain and visual disturbance.   Respiratory: Negative for cough, choking, chest tightness, shortness of breath and stridor.    Cardiovascular: Positive for leg swelling (chronic). Negative for chest pain and palpitations.   Gastrointestinal: Negative for abdominal pain, blood in stool, diarrhea, nausea and vomiting.   Endocrine: Negative for cold intolerance and heat intolerance.   Genitourinary: Negative for decreased urine volume, difficulty urinating, dysuria and hematuria.   Musculoskeletal: Negative for back pain and neck pain.   Skin: Negative for color change, pallor and rash.   Allergic/Immunologic: Negative for environmental allergies and food allergies.   Neurological: Negative for tremors, seizures, syncope, facial asymmetry, speech difficulty, weakness, light-headedness, numbness and headaches.   Hematological: Negative for adenopathy. Does not bruise/bleed easily.   Psychiatric/Behavioral: Negative for behavioral problems, confusion and hallucinations.        Objective   Objective   Vital Signs  Temp:  [97.6 °F (36.4 °C)-98.7 °F (37.1 °C)] 97.8 °F (36.6 °C)  Heart Rate:  [75] 75  Resp:  [16-20] 16  BP: (111-131)/(52-79) 112/60  SpO2:  [97 %-100 %] 98 %  on  Flow (L/min):  [2-3] 2;   Device (Oxygen Therapy): nasal cannula  Body mass index is 52.53 kg/m².  Physical Exam  Vitals and nursing note reviewed. Exam conducted with a chaperone present.   Constitutional:        General: She is not in acute distress.     Appearance: She is obese. She is ill-appearing (chronically). She is not toxic-appearing or diaphoretic.   HENT:      Head: Normocephalic and atraumatic.      Right Ear: External ear normal.      Left Ear: External ear normal.      Nose: Nose normal.      Mouth/Throat:      Mouth: Mucous membranes are moist.      Pharynx: Oropharynx is clear.      Comments: Mucosa pale  Eyes:      General: No scleral icterus.        Right eye: No discharge.         Left eye: No discharge.      Extraocular Movements: Extraocular movements intact.      Conjunctiva/sclera: Conjunctivae normal.   Neck:      Comments: No JVD  Cardiovascular:      Rate and Rhythm: Normal rate and regular rhythm.      Pulses: Normal pulses.      Heart sounds: Murmur heard.     Pulmonary:      Effort: No respiratory distress.      Breath sounds: Normal breath sounds. No wheezing or rales.   Abdominal:      General: Bowel sounds are normal. There is no distension.      Palpations: Abdomen is soft.      Tenderness: There is no abdominal tenderness.   Musculoskeletal:         General: Swelling (chronic in BLEs) present. No deformity.      Cervical back: Neck supple. No rigidity.   Lymphadenopathy:      Cervical: No cervical adenopathy.   Skin:     General: Skin is warm and dry.      Capillary Refill: Capillary refill takes more than 3 seconds.      Coloration: Skin is pale. Skin is not jaundiced.      Findings: No rash.   Neurological:      General: No focal deficit present.      Mental Status: She is alert and oriented to person, place, and time. Mental status is at baseline.      Cranial Nerves: No cranial nerve deficit.      Coordination: Coordination normal.   Psychiatric:         Mood and Affect: Mood normal.         Behavior: Behavior normal.         Thought Content: Thought content normal.         Results Review     I reviewed the patient's new clinical results.  Results from last 7 days   Lab Units  03/18/21  0748 03/18/21  0002 03/17/21  1914 03/17/21  0630 03/16/21  0547 03/15/21  1302   WBC 10*3/mm3 9.47  --   --  12.99* 12.93* 13.82*   HEMOGLOBIN g/dL 8.1* 8.6* 8.9* 7.1* 5.6* 8.3*   PLATELETS 10*3/mm3 216  --   --  194 216 263     Results from last 7 days   Lab Units 03/18/21  0748 03/18/21  0138 03/17/21  0630 03/16/21  0547 03/15/21  1302   SODIUM mmol/L 146*  --  146* 149* 146*   POTASSIUM mmol/L 3.9 4.1 3.3* 3.6 3.8   CHLORIDE mmol/L 104  --  103 104 100   CO2 mmol/L 30.1*  --  30.9* 32.4* 32.1*   BUN mg/dL 68*  --  84* 87* 77*   CREATININE mg/dL 1.68*  --  1.85* 1.65* 1.60*   GLUCOSE mg/dL 72  --  50* 79 128*   Estimated Creatinine Clearance: 34.2 mL/min (A) (by C-G formula based on SCr of 1.68 mg/dL (H)).  Results from last 7 days   Lab Units 03/18/21  0748 03/17/21 0630 03/16/21  0547 03/15/21  1302   ALBUMIN g/dL 3.20* 3.40* 3.30* 4.20   BILIRUBIN mg/dL 0.5 0.5 0.4 0.7   ALK PHOS U/L 161* 167* 173* 252*   AST (SGOT) U/L 20 14 13 18   ALT (SGPT) U/L 10 10 7 9     Results from last 7 days   Lab Units 03/18/21  0748 03/17/21 0630 03/16/21  0547 03/15/21  1302   CALCIUM mg/dL 8.0* 7.9* 7.9* 8.7   ALBUMIN g/dL 3.20* 3.40* 3.30* 4.20   MAGNESIUM mg/dL 2.2 2.1 2.1  --      Results from last 7 days   Lab Units 03/15/21  1302   PROCALCITONIN ng/mL 0.16   LACTATE mmol/L 1.8     COVID19   Date Value Ref Range Status   03/15/2021 Not Detected Not Detected - Ref. Range Final     SARS-CoV-2 PCR   Date Value Ref Range Status   02/10/2021 Not Detected Not Detected Final     Comment:     Nucleic acid specific to SARS-CoV-2 (COVID-19) virus was not detected in  this sample by the TaqPath (TM) COVID-19 Combo Kit.          SARS-CoV-2 (COVID-19) nucleic acid testing performed using Stellarray Aptima (R) SARS-CoV-2 Assay or Popset TaqPath (TM)  COVID-19 Combo Kit as indicated above under Emergency Use Authorization (EUA) from the FDA. Aptima (R) and TaqPath (TM) test performance  characteristics verified  by [facility] in accordance with the FDAs Guidance Document (Policy for Diagnostic Tests for Coronavirus Disease-2019  during the Public Health Emergency) issued on March 16, 2020. The laboratory is regulated under CLIA as qualified to perform high-complexity testing  Unless otherwise noted, all testing was performed at [facility], CLIA No. [id number], [place] State Licensee No. [id number]     Hemoglobin A1C   Date/Time Value Ref Range Status   03/17/2021 0630 5.70 (H) 4.80 - 5.60 % Final     Glucose   Date/Time Value Ref Range Status   03/18/2021 1129 92 70 - 130 mg/dL Final   03/18/2021 0817 82 70 - 130 mg/dL Final   03/17/2021 2153 116 70 - 130 mg/dL Final   03/17/2021 1612 73 70 - 130 mg/dL Final   03/17/2021 1028 100 70 - 130 mg/dL Final   03/17/2021 0719 68 (L) 70 - 130 mg/dL Final   03/16/2021 2005 162 (H) 70 - 130 mg/dL Final       EEG  Date of onset: March 17, 2021 at 8:04 AM  Date of offset: March 17, 2021 at 8:30 AM    Indication: Confusion    Technical description:  This is a 21 channel digital EEG recording that   began on March 17, 2021 at 8:04 AM and ended on March 17, 2021 at 8:30 AM.    Benjamin and seizure detection software programs were used.    Background:  Up to 9 Hz alpha activity is present over the posterior head   regions that symmetric, well formed and reactive to eye closure.  The   patient enters the drowsy state, but does not sleep during the record.    There is no abnormal activation with photic stimulation.  Hyperventilation   was not performed.  There are no asymmetries between the two hemispheres.    No interictal activity is present.    The EKG monitor shows a heart rate that varies between 70 and 75 beats per   minute.    Clinical Interpretation:  This routine EEG recording is normal in the   awake and drowsy states.  No potentially epileptogenic activity, seizure   activity, or focal slowing is present.  Duplex Carotid Ultrasound CAR  · Proximal right internal carotid artery mild  stenosis.  · Proximal left internal carotid artery moderate stenosis.       Scheduled Medications  atorvastatin, 40 mg, Oral, Nightly  bumetanide, 4 mg, Oral, BID  carvedilol, 3.125 mg, Oral, BID With Meals  gabapentin, 100 mg, Oral, BID  insulin lispro, 0-9 Units, Subcutaneous, TID AC  levothyroxine, 25 mcg, Oral, Q AM  nystatin, , Topical, Q12H  pantoprazole, 40 mg, Oral, QAM  polyethylene glycol, 17 g, Oral, Daily  potassium chloride, 20 mEq, Oral, Every Other Day  sennosides-docusate, 1 tablet, Oral, Daily  sodium chloride, 10 mL, Intravenous, Q12H  sodium chloride, 10 mL, Intravenous, Q12H  vilazodone, 20 mg, Oral, Nightly  [START ON 3/21/2021] vitamin D, 50,000 Units, Oral, Weekly    Infusions  sodium chloride, 30 mL/hr, Last Rate: 30 mL/hr (03/18/21 0929)    Diet  Diet Regular; Cardiac, Consistent Carbohydrate       Assessment/Plan     Active Hospital Problems    Diagnosis  POA   • **Metabolic encephalopathy [G93.41]  Yes   • Acute blood loss anemia [D62]  Yes   • Hypernatremia [E87.0]  Yes   • Hypokalemia [E87.6]  No   • Hypothyroidism (acquired) [E03.9]  Yes   • Chronic anticoagulation [Z79.01]  Not Applicable   • Leukocytosis [D72.829]  Yes   • Stage 3b chronic kidney disease (CMS/HCC) [N18.32]  Yes   • Gastrointestinal hemorrhage with melena [K92.1]  Yes   • Presence of permanent cardiac pacemaker [Z95.0]  Yes   • COPD (chronic obstructive pulmonary disease) (CMS/HCC) [J44.9]  Yes   • Chronic respiratory failure with hypoxia and hypercapnia (CMS/HCC) [J96.11, J96.12]  Yes   • Chronic diastolic heart failure (CMS/HCC) [I50.32]  Yes   • Bilateral lower extremity edema [R60.0]  Yes   • Chronic right-sided congestive heart failure (CMS/HCC) [I50.812]  Yes   • Supplemental oxygen dependent [Z99.81]  Not Applicable   • Atrial fibrillation (CMS/HCC) [I48.91]  Yes   • Pulmonary hypertension due to sleep-disordered breathing (CMS/HCC) [I27.29, G47.8]  Yes   • Chronic coronary artery disease [I25.10]  Yes   • OCHOA  (obstructive sleep apnea) [G47.33]  Yes   • DM type 2 (diabetes mellitus, type 2) (CMS/HCC) [E11.9]  Yes   • Essential hypertension [I10]  Yes      Resolved Hospital Problems   No resolved problems to display.     75-year-old woman with a history of chronic diastolic heart failure, paroxysmal atrial fibrillation/flutter, chronic AC (Eliquis), CKD3b, hypertension, CAD status post CABG, complete heart block status post pacemaker, type 2 diabetes, COPD, OCHOA/pulm HTN, chronic resp failure with hypoxia and hypercapnia (Trilogy user), who presented to ER with report of confusion per son--she was admitted for TIA workup.    Confusion (TIA vs metabolic encephalopathy)  -Neuro feels this is metabolic encephalopathy  -No MRI due to pt claustrophobia  -EEG normal. Carotid US R mild stenosis, Lmoderate stenosis.   -SLP eval fine  -wonder if hypoglycemia could be contributing--she takes sulfonylurea at home and has been quite hypoglycemic here without any DM meds in place     Acute/chronic right-sided CHF, valvular heart disease  -BNP pretty elevated at 8k on admission, but last BNP in Feb was 12k  -appreciate Card attention to pt, euvolemic today    Severe anemia in setting of chronic anticoagulation  -heme positive stool  -Eliquis on hold, serial Hgb, hgb slow decline.   -GI performed EGD 3/17 non bleeding gastric ulcer. No biopsy d/t risk.   - Hpylori stool, no NSAIDs.   -s/p 2 units PRBCs 3/16, 1 unit yesterday but hgb declined to 8.1. repeat H&H now and follow up on result.      Leukocytosis  - resolved, stable around 12 now normal, no evidence of infection at present, PCT wnl, will follow    DM2 with hypoglycemia  -Trulicity and Amaryl on hold, SSI in place, A1c 5.7  -sugars low, continue hypoglycemia protocol, feed when EGD is done  -would not continue sulfonylurea on dc in this chronically ill pt    PAF, chronic AC, 3rd deg heart block/PPM  -paced rhythm  -holding Eliquis given anemia/GIB    HTN  -home meds  continued  -BPs acceptable this AM    HypoT4  -continue L-T4, TSH okay    CKD3b  -baseline Cr around 1.4-1.5, Cr a bit better 1.6 from 1.85   -may improve with PRBCs today, no diuresis  -continue to follow    HypoK+/HyperNa+  -replace K+ with protocol, check Mg++  -Na+ relatively stable.     COPD/Chronic resp failure with hypercapnia and hypoxia (on Trilogy vent at home and chronic nasal cannula O2)  -seems pretty stable at present on her baseline O2  -Trilogy in room    Lymphedema- OT consult     · SCDs for DVT prophylaxis.  · Full code.  · Discussed with patient and nursing staff.  · Anticipate discharge TBD, pt's family do not want her to go to a facility at [place], they plan home with [facility]-. D/w Dr. Cyrus Hensley, [facility]  03/18/21  14:27 EDT

## 2021-03-18 NOTE — THERAPY TREATMENT NOTE
Patient Name: Miguelina Lopez  : 1945    MRN: [id number]                              Today's Date: 3/18/2021       Admit Date: 3/15/2021    Visit Dx:     ICD-10-CM ICD-9-CM   1. TIA (transient ischemic attack)  G45.9 435.9   2. Bilateral carotid artery stenosis  I65.23 433.10     433.30   3. Confusion  R41.0 298.9   4. OLI (acute kidney injury) (CMS/[facility])  N17.9 584.9   5. Hypernatremia  E87.0 276.0   6. Gastrointestinal hemorrhage with melena  K92.1 578.1     Patient Active Problem List   Diagnosis   • Anxiety disorder   • Arthritis of knee   • Asthma   • Chronic coronary artery disease   • CKD (chronic kidney disease) stage 3, GFR 30-59 ml/min (CMS/[facility])   • Depression   • Diabetic peripheral neuropathy (CMS/[facility])   • Gastroesophageal reflux disease   • Hyperlipidemia   • Insomnia   • Lower gastrointestinal hemorrhage   • Anemia   • OCHOA (obstructive sleep apnea)   • DM type 2 (diabetes mellitus, type 2) (CMS/[facility])   • Essential hypertension   • Hospital discharge follow-up   • Mitral stenosis   • Pulmonary hypertension due to sleep-disordered breathing (CMS/[facility])   • s/p MVR, TV-repair, CABG x2 16   • OLI (acute kidney injury) (CMS/HCC)   • Leukocytosis   • Atrial fibrillation (CMS/[facility])   • Nocturnal hypoxia   • Dermatitis   • Medicare annual wellness visit, initial   • Class 3 severe obesity due to excess calories with serious comorbidity and body mass index (BMI) of 50.0 to 59.9 in adult (CMS/[facility])   • Supplemental oxygen dependent   • Acute on chronic combined systolic and diastolic HF (heart failure) (CMS/[facility])   • Mitral regurgitation   • Aortic stenosis   • Medicare annual wellness visit, subsequent   • Localized edema   • Chronic right-sided congestive heart failure (CMS/[facility])   • Cellulitis of left lower extremity   • Proteinuria   • Bilateral lower extremity edema   • Subclinical hypothyroidism   • Chronic diastolic heart failure (CMS/[facility])   • Chronic respiratory failure with hypoxia and  hypercapnia (CMS/HCC)   • Acute on chronic respiratory failure with hypoxia and hypercapnia (CMS/HCC)   • AV block, 2nd degree   • 1st degree AV block   • Chest pain   • COPD (chronic obstructive pulmonary disease) (CMS/HCC)   • Complete heart block (CMS/HCC)   • Presence of permanent cardiac pacemaker   • Hypothyroidism (acquired)   • Chronic anticoagulation   • Leukocytosis   • Stage 3b chronic kidney disease (CMS/HCC)   • Gastrointestinal hemorrhage with melena   • Acute blood loss anemia   • Metabolic encephalopathy   • Hypernatremia   • Hypokalemia     Past Medical History:   Diagnosis Date   • Acute on chronic respiratory failure with hypoxia and hypercapnia (CMS/HCC)    • OLI (acute kidney injury) (CMS/HCC)    • Anemia    • Anxiety    • Aortic valve stenosis    • Bilateral lower extremity edema    • CHF (congestive heart failure) (CMS/HCC)    • Chronic coronary artery disease    • Class 3 severe obesity due to excess calories in adult (CMS/HCC)    • COPD (chronic obstructive pulmonary disease) (CMS/HCC)    • Depression    • Diabetes mellitus (CMS/HCC)    • Elevated cholesterol    • GERD (gastroesophageal reflux disease)    • Heart murmur    • Hypertension    • Mitral valve insufficiency    • Pneumonia     1/2016   • Pulmonary hypertension (CMS/HCC)     due to sleep disordered breathing   • Sleep apnea     Uses CPAP or oxygen   • Stage 3 chronic kidney disease (CMS/HCC)    • Subclinical hypothyroidism    • Supplemental oxygen dependent    • TIA (transient ischemic attack) 3/15/2021   • Valvular heart disease      Past Surgical History:   Procedure Laterality Date   • CARDIAC CATHETERIZATION     • CARDIAC CATHETERIZATION N/A 6/10/2016    Procedure: Left Heart Cath;  Surgeon: Chace Johnson MD;  Location: [facility] CATH INVASIVE LOCATION;  Service:    • CARDIAC CATHETERIZATION N/A 6/10/2016    Procedure: Right Heart Cath;  Surgeon: Chace Johnson MD;  Location: [facility] INVASIVE LOCATION;  Service:     • CARDIAC CATHETERIZATION N/A 2/5/2021    Procedure: RIGHT AND LEFT HEART CATH;  Surgeon: Antoine Ayers MD;  Location: [facility] CATH INVASIVE LOCATION;  Service: Cardiology;  Laterality: N/A;   • CARDIAC CATHETERIZATION N/A 2/5/2021    Procedure: Coronary angiography;  Surgeon: Antoine Ayers MD;  Location: [facility] CATH INVASIVE LOCATION;  Service: Cardiology;  Laterality: N/A;   • CARDIAC CATHETERIZATION N/A 2/5/2021    Procedure: Left ventriculography- pressures;  Surgeon: Antoine Ayers MD;  Location: [facility] CATH INVASIVE LOCATION;  Service: Cardiology;  Laterality: N/A;   • CARDIAC ELECTROPHYSIOLOGY PROCEDURE N/A 2/2/2021    Procedure: Pacemaker DC new---[employer] MICRA;  Surgeon: Eliazar Bond MD;  Location: [facility] CATH INVASIVE LOCATION;  Service: Cardiology;  Laterality: N/A;   • CARDIAC SURGERY     • CORONARY ARTERY BYPASS GRAFT      2 vessel   • CORONARY ARTERY BYPASS GRAFT WITH MITRAL VALVE REPAIR/REPLACEMENT N/A 6/13/2016    Procedure: INTRAOPERATIVE TARIQ, MIDLINE STERNOTOMY, CORONARY ARTERY BYPASS GRAFTING X  2 UTILIZING ENDOSCOPICALLY HARVESTED LEFT GREATER SAPHENOUS VEIN, MITRAL VALVE REPLACEMENT AND TRICUSPID VALVE REPAIR;  Surgeon: Eliecer Mistry MD;  Location: [facility] OR;  Service:    • CORONARY STENT PLACEMENT  2010    Approx. 6 yrs ago at [facility]   • ENDOSCOPY N/A 3/17/2021    Procedure: ESOPHAGOGASTRODUODENOSCOPY;  Surgeon: Dixon Haas MD;  Location: [facility] ENDOSCOPY;  Service: Gastroenterology;  Laterality: N/A;  PRE: GI BLEED  POST: ANTRAL ULCER   • HEMORRHOIDECTOMY     • HYSTERECTOMY     • MITRAL VALVE REPLACEMENT     • REPLACEMENT TOTAL KNEE Right    • THYROID SURGERY      Cyst removed from thyroid   • VASCULAR SURGERY       General Information     Row Name 03/18/21 1500          OT Time and Intention    Document Type  therapy note (daily note)  -FRED     Mode of Treatment  individual therapy;occupational therapy  -FRED     Row Name 03/18/21 1500           General Information    Patient Profile Reviewed  yes  -FRED     Existing Precautions/Restrictions  fall  -     Row Name 03/18/21 1503          Cognition    Orientation Status (Cognition)  oriented to;person;place  -     Row Name 03/18/21 1503          Safety Issues, Functional Mobility    Impairments Affecting Function (Mobility)  endurance/activity tolerance;strength  -     Comment, Safety Issues/Impairments (Mobility)  gait belt and non skid socks used for safety  -       User Key  (r) = Recorded By, (t) = Taken By, (c) = Cosigned By    Initials Name Provider Type    FRED Aylin Brumfield OT Occupational Therapist          Mobility/ADL's     Row Name 03/18/21 1503          Bed Mobility    Comment (Bed Mobility)  NT; UIC  -[facility] Name 03/18/21 1503          Transfers    Transfers  sit-stand transfer  -     Sit-Stand [place] (Transfers)  minimum assist (75% patient effort);1 person assist  -[facility] Name 03/18/21 1503          Sit-Stand Transfer    Assistive Device (Sit-Stand Transfers)  walker, front-wheeled  -[facility] Name 03/18/21 1503          Functional Mobility    Functional Mobility- Ind. Level  not tested  -     Row Name 03/18/21 1503          Activities of Daily Living    BADL Assessment/Intervention  grooming;bathing;upper body dressing;lower body dressing  -[facility] Name 03/18/21 1503          Lower Body Dressing Assessment/Training    [place] Level (Lower Body Dressing)  don;doff;socks;maximum assist (25% patient effort)  -     Position (Lower Body Dressing)  supported sitting  -[facility] Name 03/18/21 1503          Grooming Assessment/Training    [place] Level (Grooming)  wash face, hands;oral care regimen;modified independence;set up  -     Position (Grooming)  supported sitting  -[facility] Name 03/18/21 1503          Bathing Assessment/Intervention    [place] Level (Bathing)  upper body;lower body;minimum assist (75% patient effort);moderate assist (50%  patient effort)  -FRED     Position (Bathing)  supported sitting  -FRED     Comment (Bathing)  Pt able to complete UB bathing with SBA-supervision, and requires min-mod A to reach for LB bathing past knee level.  -FRED     Row Name 03/18/21 1503          Upper Body Dressing Assessment/Training    [place] Level (Upper Body Dressing)  don;doff;front opening garment;minimum assist (75% patient effort)  -FRED     Position (Upper Body Dressing)  supported sitting  -FRED     Comment (Upper Body Dressing)  Min A required for line mgmt  -FRED       User Key  (r) = Recorded By, (t) = Taken By, (c) = Cosigned By    Initials Name Provider Type    Aylin Garrido OT Occupational Therapist        Obj/Interventions     Row Name 03/18/21 1505          Balance    Balance Assessment  sitting static balance;standing static balance  -FRED     Static Sitting Balance  WFL;unsupported;sitting in chair  -FRED     Static Standing Balance  WFL;supported;standing  -FRED       User Key  (r) = Recorded By, (t) = Taken By, (c) = Cosigned By    Initials Name Provider Type    Aylin Garrido OT Occupational Therapist        Goals/Plan     Row Name 03/18/21 1507          Therapy Assessment/Plan (OT)    Planned Therapy Interventions (OT)  activity tolerance training;functional balance retraining;occupation/activity based interventions;ROM/therapeutic exercise;strengthening exercise;transfer/mobility retraining;patient/caregiver education/training;cognitive/visual perception retraining;edema control/reduction;neuromuscular control/coordination retraining;BADL retraining;adaptive equipment training  -FRED       User Key  (r) = Recorded By, (t) = Taken By, (c) = Cosigned By    Initials Name Provider Type    Aylin Garrido OT Occupational Therapist        Clinical Impression     Row Name 03/18/21 1505          Pain Scale: Numbers Pre/Post-Treatment    Pretreatment Pain Rating  2/10  -FRED     Posttreatment Pain Rating  2/10  -FRED     Pain  Location  abdomen  -FRED     Row Name 03/18/21 1509          Plan of Care Review    Outcome Summary  Pt agreeable to treatment this date. Pt participated in total body ADL, requiring min-mod assist to reach past knee level for bathing and max A for LB dressing. Pt able to stand with min A x 1 for nancy care. Pt appears more alert and orientated to her surrounding, but remains limited with ADLs due to impaired balance, generalized weakness, impaired endurance and acuity of illness. OT recommends continued treatment at 3x/week and d/c to home with HH OT.  -FRED     Row Name 03/18/21 1503          Therapy Assessment/Plan (OT)    Rehab Potential (OT)  good, to achieve stated therapy goals  -FRED     Criteria for Skilled Therapeutic Interventions Met (OT)  yes  -FRED     Therapy Frequency (OT)  3 times/wk  -FRED     Row Name 03/18/21 1500          Therapy Plan Review/Discharge Plan (OT)    Anticipated Discharge Disposition (OT)  home with home health  -FRED     Row Name 03/18/21 1509          Vital Signs    Recovery Time  WFL. VSS  -FRED     Row Name 03/18/21 0387          Positioning and Restraints    Pre-Treatment Position  sitting in chair/recliner  -FRED     Post Treatment Position  chair  -FRED     In Chair  notified nsg;reclined;call light within reach;encouraged to call for assist;exit alarm on  -FRED       User Key  (r) = Recorded By, (t) = Taken By, (c) = Cosigned By    Initials Name Provider Type    Aylin Garrido, OT Occupational Therapist        Outcome Measures     Row Name 03/18/21 1656          How much help from another is currently needed...    Putting on and taking off regular lower body clothing?  2  -FRED     Bathing (including washing, rinsing, and drying)  2  -FRED     Toileting (which includes using toilet bed pan or urinal)  3  -FRED     Putting on and taking off regular upper body clothing  3  -FRED     Taking care of personal grooming (such as brushing teeth)  4  -FRED     Eating meals  4  -FRED     AM-PAC 6 Clicks  Score (OT)  18  -FRED     Row Name 03/18/21 1508          Modified [place] Scale    Modified [place] Scale  4 - Moderately severe disability.  Unable to walk without assistance, and unable to attend to own bodily needs without assistance.  -FRED     Row Name 03/18/21 1508          Functional Assessment    Outcome Measure Options  AM-PAC 6 Clicks Daily Activity (OT);Modified [place]  -FRED       User Key  (r) = Recorded By, (t) = Taken By, (c) = Cosigned By    Initials Name Provider Type    Aylin Garrido OT Occupational Therapist        Occupational Therapy Education                 Title: PT OT SLP Therapies (Done)     Topic: Occupational Therapy (Done)     Point: ADL training (Done)     Description:   Instruct learner(s) on proper safety adaptation and remediation techniques during self care or transfers.   Instruct in proper use of assistive devices.              Learning Progress Summary           Patient Acceptance, E, VU by BT at 3/17/2021 1243    Comment: purpose of OT, adl retraining, functional transfer training                   Point: Home exercise program (Done)     Description:   Instruct learner(s) on appropriate technique for monitoring, assisting and/or progressing therapeutic exercises/activities.              Learning Progress Summary           Patient Acceptance, E, VU by BT at 3/17/2021 1243    Comment: purpose of OT, adl retraining, functional transfer training                   Point: Precautions (Done)     Description:   Instruct learner(s) on prescribed precautions during self-care and functional transfers.              Learning Progress Summary           Patient Acceptance, E, VU by BT at 3/17/2021 1243    Comment: purpose of OT, adl retraining, functional transfer training                   Point: Body mechanics (Done)     Description:   Instruct learner(s) on proper positioning and spine alignment during self-care, functional mobility activities and/or exercises.              Learning  Progress Summary           Patient Acceptance, E, VU by  at 3/17/2021 1243    Comment: purpose of OT, adl retraining, functional transfer training                               User Key     Initials Effective Dates Name Provider Type Discipline     11/16/20 -  Carol Ross OT Occupational Therapist OT              OT Recommendation and Plan  Planned Therapy Interventions (OT): activity tolerance training, functional balance retraining, occupation/activity based interventions, ROM/therapeutic exercise, strengthening exercise, transfer/mobility retraining, patient/caregiver education/training, cognitive/visual perception retraining, edema control/reduction, neuromuscular control/coordination retraining, BADL retraining, adaptive equipment training  Therapy Frequency (OT): 3 times/wk  Plan of Care Review  Outcome Summary: Pt agreeable to treatment this date. Pt participated in total body ADL, requiring min-mod assist to reach past knee level for bathing and max A for LB dressing. Pt able to stand with min A x 1 for nancy care. Pt appears more alert and orientated to her surrounding, but remains limited with ADLs due to impaired balance, generalized weakness, impaired endurance and acuity of illness. OT recommends continued treatment at 3x/week and d/c to home with HH OT.     Time Calculation:   Time Calculation- OT     Row Name 03/18/21 1509             Time Calculation- OT    OT Start Time  0943  -FRED      OT Stop Time  1007  -FRED      OT Time Calculation (min)  24 min  -FRED      Total Timed Code Minutes- OT  24 minute(s)  -FRED      OT Received On  03/18/21  -FRED      OT - Next Appointment  03/19/21  -FRED        User Key  (r) = Recorded By, (t) = Taken By, (c) = Cosigned By    Initials Name Provider Type    Aylin Garrido OT Occupational Therapist        Therapy Charges for Today     Code Description Service Date Service Provider Modifiers Qty    [id number]  OT SELF CARE/MGMT/TRAIN EA 15 MIN 3/18/2021  Aylin Brumfield, OT GO 2               Aylin Brumfield, WOOD  3/18/2021

## 2021-03-18 NOTE — PLAN OF CARE
Problem: Fall Injury Risk  Goal: Absence of Fall and Fall-Related Injury  Outcome: Ongoing, Progressing  Intervention: Identify and Manage Contributors to Fall Injury Risk  Recent Flowsheet Documentation  Taken 3/18/2021 1600 by Chuck Mcclelland RN  Medication Review/Management: medications reviewed  Taken 3/18/2021 1000 by Chuck Mcclelland RN  Medication Review/Management: medications reviewed  Taken 3/18/2021 0810 by Chuck Mcclelland RN  Medication Review/Management: medications reviewed  Self-Care Promotion:   independence encouraged   BADL personal objects within reach  Intervention: Promote Injury-Free Environment  Recent Flowsheet Documentation  Taken 3/18/2021 1800 by Chuck Mcclelland RN  Safety Promotion/Fall Prevention:   activity supervised   assistive device/personal items within reach   clutter free environment maintained   fall prevention program maintained   gait belt   lighting adjusted   mobility aid in reach   muscle strengthening facilitated   room organization consistent   safety round/check completed   nonskid shoes/slippers when out of bed  Taken 3/18/2021 1600 by Chuck Mcclelland RN  Safety Promotion/Fall Prevention:   activity supervised   assistive device/personal items within reach   clutter free environment maintained   fall prevention program maintained   gait belt   lighting adjusted   mobility aid in reach   muscle strengthening facilitated   nonskid shoes/slippers when out of bed   room organization consistent   safety round/check completed  Taken 3/18/2021 1400 by Chuck Mcclelland RN  Safety Promotion/Fall Prevention:   activity supervised   assistive device/personal items within reach   clutter free environment maintained   fall prevention program maintained   gait belt   lighting adjusted   mobility aid in reach   muscle strengthening facilitated   nonskid shoes/slippers when out of bed   room organization consistent   safety round/check completed  Taken 3/18/2021 1200 by  Mcclelland, Chuck, RN  Safety Promotion/Fall Prevention:   activity supervised   assistive device/personal items within reach   clutter free environment maintained   fall prevention program maintained   gait belt   mobility aid in reach   lighting adjusted   muscle strengthening facilitated   nonskid shoes/slippers when out of bed   room organization consistent   safety round/check completed  Taken 3/18/2021 1000 by Chuck Mcclelland RN  Safety Promotion/Fall Prevention:   activity supervised   assistive device/personal items within reach   clutter free environment maintained   fall prevention program maintained   gait belt   lighting adjusted   mobility aid in reach   muscle strengthening facilitated   nonskid shoes/slippers when out of bed   room organization consistent   safety round/check completed  Taken 3/18/2021 0810 by Chuck Mcclelland RN  Safety Promotion/Fall Prevention:   activity supervised   assistive device/personal items within reach   clutter free environment maintained   fall prevention program maintained   gait belt   lighting adjusted   mobility aid in reach   muscle strengthening facilitated   nonskid shoes/slippers when out of bed   room organization consistent   safety round/check completed     Problem: Adult Inpatient Plan of Care  Goal: Plan of Care Review  Outcome: Ongoing, Progressing  Flowsheets (Taken 3/18/2021 1809)  Plan of Care Reviewed With: patient  Outcome Summary: VSS.  A&Ox4. No complaints of pain.  Assist x1. Up to chair and bedside commode. Had BM/Stool sample sent to lab. Purwick in use. Nystatin power to folds, cream to bottom.  Goal: Patient-Specific Goal (Individualized)  Outcome: Ongoing, Progressing  Goal: Absence of Hospital-Acquired Illness or Injury  Outcome: Ongoing, Progressing  Intervention: Identify and Manage Fall Risk  Recent Flowsheet Documentation  Taken 3/18/2021 1800 by Chuck Mcclelland RN  Safety Promotion/Fall Prevention:   activity supervised   assistive  device/personal items within reach   clutter free environment maintained   fall prevention program maintained   gait belt   lighting adjusted   mobility aid in reach   muscle strengthening facilitated   room organization consistent   safety round/check completed   nonskid shoes/slippers when out of bed  Taken 3/18/2021 1600 by Chuck Mcclelland RN  Safety Promotion/Fall Prevention:   activity supervised   assistive device/personal items within reach   clutter free environment maintained   fall prevention program maintained   gait belt   lighting adjusted   mobility aid in reach   muscle strengthening facilitated   nonskid shoes/slippers when out of bed   room organization consistent   safety round/check completed  Taken 3/18/2021 1400 by Chuck Mcclelland RN  Safety Promotion/Fall Prevention:   activity supervised   assistive device/personal items within reach   clutter free environment maintained   fall prevention program maintained   gait belt   lighting adjusted   mobility aid in reach   muscle strengthening facilitated   nonskid shoes/slippers when out of bed   room organization consistent   safety round/check completed  Taken 3/18/2021 1200 by Chuck Mcclelland RN  Safety Promotion/Fall Prevention:   activity supervised   assistive device/personal items within reach   clutter free environment maintained   fall prevention program maintained   gait belt   mobility aid in reach   lighting adjusted   muscle strengthening facilitated   nonskid shoes/slippers when out of bed   room organization consistent   safety round/check completed  Taken 3/18/2021 1000 by Chuck Mcclelland RN  Safety Promotion/Fall Prevention:   activity supervised   assistive device/personal items within reach   clutter free environment maintained   fall prevention program maintained   gait belt   lighting adjusted   mobility aid in reach   muscle strengthening facilitated   nonskid shoes/slippers when out of bed   room organization  consistent   safety round/check completed  Taken 3/18/2021 0810 by Chuck Mcclelland RN  Safety Promotion/Fall Prevention:   activity supervised   assistive device/personal items within reach   clutter free environment maintained   fall prevention program maintained   gait belt   lighting adjusted   mobility aid in reach   muscle strengthening facilitated   nonskid shoes/slippers when out of bed   room organization consistent   safety round/check completed  Intervention: Prevent Skin Injury  Recent Flowsheet Documentation  Taken 3/18/2021 0810 by Chuck Mcclelland RN  Body Position: supine  Goal: Optimal Comfort and Wellbeing  Outcome: Ongoing, Progressing  Intervention: Provide Person-Centered Care  Recent Flowsheet Documentation  Taken 3/18/2021 0810 by Chuck Mcclelland RN  Trust Relationship/Rapport:   care explained   choices provided   questions answered   questions encouraged  Goal: Readiness for Transition of Care  Outcome: Ongoing, Progressing     Problem: Anemia  Goal: Anemia Symptom Improvement  Outcome: Ongoing, Progressing     Problem: Adjustment to Illness (Gastrointestinal Bleeding)  Goal: Optimal Coping with Acute Illness  Outcome: Ongoing, Progressing  Intervention: Optimize Psychosocial Response to Unexpected Illness  Recent Flowsheet Documentation  Taken 3/18/2021 0810 by Chuck Mcclelland RN  Supportive Measures: active listening utilized     Problem: Bleeding (Gastrointestinal Bleeding)  Goal: Hemostasis  Outcome: Ongoing, Progressing  Intervention: Manage Gastrointestinal Bleeding  Recent Flowsheet Documentation  Taken 3/18/2021 0810 by Chuck Mcclelland RN  Environmental Support: calm environment promoted   Goal Outcome Evaluation:  Plan of Care Reviewed With: patient     Outcome Summary: VSS.  A&Ox4. No complaints of pain.  Assist x1. Up to chair and bedside commode. Had BM/Stool sample sent to lab. Purwick in use. Nystatin power to folds, cream to bottom.

## 2021-03-18 NOTE — PLAN OF CARE
Goal Outcome Evaluation:        Outcome Summary: Pt agreeable to treatment this date. Pt participated in total body ADL, requiring min-mod assist to reach past knee level for bathing and max A for LB dressing. Pt able to stand with min A x 1 for nancy care. Pt appears more alert and orientated to her surrounding, but remains limited with ADLs due to impaired balance, generalized weakness, impaired endurance and acuity of illness. OT recommends continued treatment at 3x/week and d/c to home with  OT.     Patient was wearing a face mask during this therapy encounter. Therapist used appropriate personal protective equipment including mask, gloves, and eye protection.  Mask used was standard procedure mask. Appropriate PPE was worn during the entire therapy session. Hand hygiene was completed before and after therapy session. Patient is not in enhanced droplet precautions.

## 2021-03-19 LAB
GLUCOSE BLDC GLUCOMTR-MCNC: 124 MG/DL (ref 70–130)
GLUCOSE BLDC GLUCOMTR-MCNC: 141 MG/DL (ref 70–130)
GLUCOSE BLDC GLUCOMTR-MCNC: 93 MG/DL (ref 70–130)
HCT VFR BLD AUTO: 25.7 % (ref 34–46.6)
HCT VFR BLD AUTO: 27.2 % (ref 34–46.6)
HCT VFR BLD AUTO: 30.4 % (ref 34–46.6)
HGB BLD-MCNC: 8.4 G/DL (ref 12–15.9)
HGB BLD-MCNC: 8.9 G/DL (ref 12–15.9)
HGB BLD-MCNC: 9.7 G/DL (ref 12–15.9)

## 2021-03-19 PROCEDURE — 97116 GAIT TRAINING THERAPY: CPT

## 2021-03-19 PROCEDURE — 99232 SBSQ HOSP IP/OBS MODERATE 35: CPT | Performed by: NURSE PRACTITIONER

## 2021-03-19 PROCEDURE — G0378 HOSPITAL OBSERVATION PER HR: HCPCS

## 2021-03-19 PROCEDURE — 85018 HEMOGLOBIN: CPT | Performed by: HOSPITALIST

## 2021-03-19 PROCEDURE — 85014 HEMATOCRIT: CPT | Performed by: HOSPITALIST

## 2021-03-19 PROCEDURE — 97140 MANUAL THERAPY 1/> REGIONS: CPT

## 2021-03-19 PROCEDURE — 82962 GLUCOSE BLOOD TEST: CPT

## 2021-03-19 PROCEDURE — 99232 SBSQ HOSP IP/OBS MODERATE 35: CPT | Performed by: INTERNAL MEDICINE

## 2021-03-19 RX ORDER — PANTOPRAZOLE SODIUM 40 MG/1
40 TABLET, DELAYED RELEASE ORAL
Status: DISCONTINUED | OUTPATIENT
Start: 2021-03-19 | End: 2021-03-20 | Stop reason: HOSPADM

## 2021-03-19 RX ORDER — TRAMADOL HYDROCHLORIDE 50 MG/1
50 TABLET ORAL EVERY 8 HOURS PRN
Qty: 30 TABLET | Refills: 0 | OUTPATIENT
Start: 2021-03-19

## 2021-03-19 RX ORDER — ALPRAZOLAM 0.5 MG/1
1 TABLET ORAL 2 TIMES DAILY PRN
Status: DISCONTINUED | OUTPATIENT
Start: 2021-03-19 | End: 2021-03-20 | Stop reason: HOSPADM

## 2021-03-19 RX ORDER — ALPRAZOLAM 0.5 MG/1
0.5 TABLET ORAL ONCE
Status: COMPLETED | OUTPATIENT
Start: 2021-03-19 | End: 2021-03-19

## 2021-03-19 RX ADMIN — BUMETANIDE 4 MG: 2 TABLET ORAL at 09:22

## 2021-03-19 RX ADMIN — CARVEDILOL 3.12 MG: 6.25 TABLET, FILM COATED ORAL at 17:37

## 2021-03-19 RX ADMIN — NYSTATIN: 100000 POWDER TOPICAL at 09:23

## 2021-03-19 RX ADMIN — ATORVASTATIN CALCIUM 40 MG: 20 TABLET, FILM COATED ORAL at 20:52

## 2021-03-19 RX ADMIN — GABAPENTIN 100 MG: 100 CAPSULE ORAL at 20:55

## 2021-03-19 RX ADMIN — VILAZODONE HYDROCHLORIDE 20 MG: 40 TABLET ORAL at 20:52

## 2021-03-19 RX ADMIN — PANTOPRAZOLE SODIUM 40 MG: 40 TABLET, DELAYED RELEASE ORAL at 06:19

## 2021-03-19 RX ADMIN — PANTOPRAZOLE SODIUM 40 MG: 40 TABLET, DELAYED RELEASE ORAL at 17:37

## 2021-03-19 RX ADMIN — BUMETANIDE 4 MG: 2 TABLET ORAL at 20:52

## 2021-03-19 RX ADMIN — DOCUSATE SODIUM 50MG AND SENNOSIDES 8.6MG 1 TABLET: 8.6; 5 TABLET, FILM COATED ORAL at 09:22

## 2021-03-19 RX ADMIN — SODIUM CHLORIDE, PRESERVATIVE FREE 10 ML: 5 INJECTION INTRAVENOUS at 20:56

## 2021-03-19 RX ADMIN — LEVOTHYROXINE SODIUM 25 MCG: 0.03 TABLET ORAL at 06:19

## 2021-03-19 RX ADMIN — GABAPENTIN 100 MG: 100 CAPSULE ORAL at 09:22

## 2021-03-19 RX ADMIN — ALPRAZOLAM 1 MG: 0.5 TABLET ORAL at 20:55

## 2021-03-19 RX ADMIN — POTASSIUM CHLORIDE 20 MEQ: 750 TABLET, EXTENDED RELEASE ORAL at 09:21

## 2021-03-19 RX ADMIN — ALPRAZOLAM 0.5 MG: 0.5 TABLET ORAL at 01:02

## 2021-03-19 RX ADMIN — CARVEDILOL 3.12 MG: 6.25 TABLET, FILM COATED ORAL at 09:22

## 2021-03-19 RX ADMIN — NYSTATIN: 100000 POWDER TOPICAL at 20:56

## 2021-03-19 NOTE — THERAPY TREATMENT NOTE
Patient Name: Miguelina Lopez  : 1945    MRN: [id number]                              Today's Date: 3/19/2021       Admit Date: 3/15/2021    Visit Dx:     ICD-10-CM ICD-9-CM   1. TIA (transient ischemic attack)  G45.9 435.9   2. Bilateral carotid artery stenosis  I65.23 433.10     433.30   3. Confusion  R41.0 298.9   4. OLI (acute kidney injury) (CMS/[facility])  N17.9 584.9   5. Hypernatremia  E87.0 276.0   6. Gastrointestinal hemorrhage with melena  K92.1 578.1     Patient Active Problem List   Diagnosis   • Anxiety disorder   • Arthritis of knee   • Asthma   • Chronic coronary artery disease   • CKD (chronic kidney disease) stage 3, GFR 30-59 ml/min (CMS/[facility])   • Depression   • Diabetic peripheral neuropathy (CMS/[facility])   • Gastroesophageal reflux disease   • Hyperlipidemia   • Insomnia   • Lower gastrointestinal hemorrhage   • Anemia   • OCHOA (obstructive sleep apnea)   • DM type 2 (diabetes mellitus, type 2) (CMS/[facility])   • Essential hypertension   • Hospital discharge follow-up   • Mitral stenosis   • Pulmonary hypertension due to sleep-disordered breathing (CMS/[facility])   • s/p MVR, TV-repair, CABG x2 16   • OLI (acute kidney injury) (CMS/HCC)   • Leukocytosis   • Atrial fibrillation (CMS/[facility])   • Nocturnal hypoxia   • Dermatitis   • Medicare annual wellness visit, initial   • Class 3 severe obesity due to excess calories with serious comorbidity and body mass index (BMI) of 50.0 to 59.9 in adult (CMS/[facility])   • Supplemental oxygen dependent   • Acute on chronic combined systolic and diastolic HF (heart failure) (CMS/[facility])   • Mitral regurgitation   • Aortic stenosis   • Medicare annual wellness visit, subsequent   • Localized edema   • Chronic right-sided congestive heart failure (CMS/[facility])   • Cellulitis of left lower extremity   • Proteinuria   • Bilateral lower extremity edema   • Subclinical hypothyroidism   • Chronic diastolic heart failure (CMS/[facility])   • Chronic respiratory failure with hypoxia and  hypercapnia (CMS/HCC)   • Acute on chronic respiratory failure with hypoxia and hypercapnia (CMS/HCC)   • AV block, 2nd degree   • 1st degree AV block   • Chest pain   • COPD (chronic obstructive pulmonary disease) (CMS/HCC)   • Complete heart block (CMS/HCC)   • Presence of permanent cardiac pacemaker   • Hypothyroidism (acquired)   • Chronic anticoagulation   • Leukocytosis   • Stage 3b chronic kidney disease (CMS/HCC)   • Gastrointestinal hemorrhage with melena   • Acute blood loss anemia   • Metabolic encephalopathy   • Hypernatremia   • Hypokalemia     Past Medical History:   Diagnosis Date   • Acute on chronic respiratory failure with hypoxia and hypercapnia (CMS/HCC)    • OLI (acute kidney injury) (CMS/HCC)    • Anemia    • Anxiety    • Aortic valve stenosis    • Bilateral lower extremity edema    • CHF (congestive heart failure) (CMS/HCC)    • Chronic coronary artery disease    • Class 3 severe obesity due to excess calories in adult (CMS/HCC)    • COPD (chronic obstructive pulmonary disease) (CMS/HCC)    • Depression    • Diabetes mellitus (CMS/HCC)    • Elevated cholesterol    • GERD (gastroesophageal reflux disease)    • Heart murmur    • Hypertension    • Mitral valve insufficiency    • Pneumonia     1/2016   • Pulmonary hypertension (CMS/HCC)     due to sleep disordered breathing   • Sleep apnea     Uses CPAP or oxygen   • Stage 3 chronic kidney disease (CMS/HCC)    • Subclinical hypothyroidism    • Supplemental oxygen dependent    • TIA (transient ischemic attack) 3/15/2021   • Valvular heart disease      Past Surgical History:   Procedure Laterality Date   • CARDIAC CATHETERIZATION     • CARDIAC CATHETERIZATION N/A 6/10/2016    Procedure: Left Heart Cath;  Surgeon: Chace Johnson MD;  Location: [facility] CATH INVASIVE LOCATION;  Service:    • CARDIAC CATHETERIZATION N/A 6/10/2016    Procedure: Right Heart Cath;  Surgeon: Chace Johnson MD;  Location: [facility] INVASIVE LOCATION;  Service:     • CARDIAC CATHETERIZATION N/A 2/5/2021    Procedure: RIGHT AND LEFT HEART CATH;  Surgeon: Antoine Ayers MD;  Location: [facility] CATH INVASIVE LOCATION;  Service: Cardiology;  Laterality: N/A;   • CARDIAC CATHETERIZATION N/A 2/5/2021    Procedure: Coronary angiography;  Surgeon: Antoine Ayers MD;  Location: [facility] CATH INVASIVE LOCATION;  Service: Cardiology;  Laterality: N/A;   • CARDIAC CATHETERIZATION N/A 2/5/2021    Procedure: Left ventriculography- pressures;  Surgeon: Antoine Ayers MD;  Location: [facility] CATH INVASIVE LOCATION;  Service: Cardiology;  Laterality: N/A;   • CARDIAC ELECTROPHYSIOLOGY PROCEDURE N/A 2/2/2021    Procedure: Pacemaker DC new---[employer] MICRA;  Surgeon: Eliazar Bond MD;  Location: [facility] CATH INVASIVE LOCATION;  Service: Cardiology;  Laterality: N/A;   • CARDIAC SURGERY     • CORONARY ARTERY BYPASS GRAFT      2 vessel   • CORONARY ARTERY BYPASS GRAFT WITH MITRAL VALVE REPAIR/REPLACEMENT N/A 6/13/2016    Procedure: INTRAOPERATIVE TARIQ, MIDLINE STERNOTOMY, CORONARY ARTERY BYPASS GRAFTING X  2 UTILIZING ENDOSCOPICALLY HARVESTED LEFT GREATER SAPHENOUS VEIN, MITRAL VALVE REPLACEMENT AND TRICUSPID VALVE REPAIR;  Surgeon: Eliecer Mistry MD;  Location: [facility] OR;  Service:    • CORONARY STENT PLACEMENT  2010    Approx. 6 yrs ago at [facility]   • ENDOSCOPY N/A 3/17/2021    Procedure: ESOPHAGOGASTRODUODENOSCOPY;  Surgeon: Dixon Haas MD;  Location: [facility] ENDOSCOPY;  Service: Gastroenterology;  Laterality: N/A;  PRE: GI BLEED  POST: ANTRAL ULCER   • HEMORRHOIDECTOMY     • HYSTERECTOMY     • MITRAL VALVE REPLACEMENT     • REPLACEMENT TOTAL KNEE Right    • THYROID SURGERY      Cyst removed from thyroid   • VASCULAR SURGERY       General Information     Row Name 03/19/21 2675          Physical Therapy Time and Intention    Document Type  therapy note (daily note)  (Pended)   -MG     Mode of Treatment  physical therapy;individual therapy  (Pended)   -MG     Row  Name 03/19/21 1609          General Information    Existing Precautions/Restrictions  fall  (Pended)   -MG     Row Name 03/19/21 1609          Cognition    Orientation Status (Cognition)  oriented to;person;place  (Pended)   -MG     Row Name 03/19/21 1609          Safety Issues, Functional Mobility    Impairments Affecting Function (Mobility)  endurance/activity tolerance;strength;balance  (Pended)   -MG     Comment, Safety Issues/Impairments (Mobility)  gait belt and non skid socks for safety.  (Pended)   -MG       User Key  (r) = Recorded By, (t) = Taken By, (c) = Cosigned By    Initials Name Provider Type    MG Leslie Bergman PTA Student PTA Student        Mobility     Row Name 03/19/21 1610          Bed Mobility    Comment (Bed Mobility)  NT UIC  (Pended)   -MG     Row Name 03/19/21 1610          Sit-Stand Transfer    Sit-Stand [place] (Transfers)  minimum assist (75% patient effort);1 person to manage equipment  (Pended)   -MG     Assistive Device (Sit-Stand Transfers)  walker, front-wheeled  (Pended)   -MG     Row Name 03/19/21 1610          Gait/Stairs (Locomotion)    [place] Level (Gait)  contact guard;verbal cues  (Pended)  vc's for upright posture  -MG     Assistive Device (Gait)  walker, front-wheeled  (Pended)   -MG     Distance in Feet (Gait)  25ft  (Pended)   -MG     Deviations/Abnormal Patterns (Gait)  lina decreased;gait speed decreased  (Pended)   -MG     Bilateral Gait Deviations  forward flexed posture  (Pended)   -MG       User Key  (r) = Recorded By, (t) = Taken By, (c) = Cosigned By    Initials Name Provider Type    Leslie Lee PTA Student PTA Student        Obj/Interventions     Row Name 03/19/21 1611          Motor Skills    Therapeutic Exercise  --  (Pended)  B LE exercise: LAQ, seated marches, AP, heel slides(x10)  -MG       User Key  (r) = Recorded By, (t) = Taken By, (c) = Cosigned By    Initials Name Provider Type    Leslie Lee PTA Student PTA Student         Goals/Plan    No documentation.       Clinical Impression     Row Name 03/19/21 1612          Plan of Care Review    Plan of Care Reviewed With  patient  (Pended)   -MG     Progress  improving  (Pended)   -MG     Outcome Summary  after encouragement pt agreeable to physcial therapy. pt performed STS transter with FWW and req's Tha. pt req'd vc's for upright posture and hand placement on walker during transfers. pt ambulated 25ft in the room with FWW and CGA. pt perfomrd B LE exercises in the chair. continue to progress pt as tolerated.  (Pended)   -MG     Row Name 03/19/21 1612          Positioning and Restraints    Pre-Treatment Position  sitting in chair/recliner  (Pended)   -MG     Post Treatment Position  chair  (Pended)   -MG     In Chair  reclined;call light within reach;encouraged to call for assist;exit alarm on  (Pended)   -MG       User Key  (r) = Recorded By, (t) = Taken By, (c) = Cosigned By    Initials Name Provider Type    Leslie Lee, PTA Student PTA Student        Outcome Measures     Row Name 03/19/21 1615          How much help from another person do you currently need...    Turning from your back to your side while in flat bed without using bedrails?  3  (Pended)   -MG     Moving from lying on back to sitting on the side of a flat bed without bedrails?  3  (Pended)   -MG     Moving to and from a bed to a chair (including a wheelchair)?  3  (Pended)   -MG     Standing up from a chair using your arms (e.g., wheelchair, bedside chair)?  3  (Pended)   -MG     Climbing 3-5 steps with a railing?  2  (Pended)   -MG     To walk in hospital room?  3  (Pended)   -MG     AM-PAC 6 Clicks Score (PT)  17  (Pended)   -MG       User Key  (r) = Recorded By, (t) = Taken By, (c) = Cosigned By    Initials Name Provider Type    Leslie Lee, PTA Student PTA Student        Physical Therapy Education                 Title: PT OT SLP Therapies (Done)     Topic: Physical Therapy (Done)     Point:  Mobility training (Done)     Learning Progress Summary           Patient Acceptance, E,D, VU,DU,NR by MG at 3/19/2021 1615    Acceptance, E,D, VU,NR by MS at 3/18/2021 1023                   Point: Home exercise program (Done)     Learning Progress Summary           Patient Acceptance, E,D, VU,DU,NR by MG at 3/19/2021 1615    Acceptance, E,D, VU,NR by MS at 3/18/2021 1023                   Point: Body mechanics (Done)     Learning Progress Summary           Patient Acceptance, E,D, VU,DU,NR by MG at 3/19/2021 1615    Acceptance, E,D, VU,NR by MS at 3/18/2021 1023                   Point: Precautions (Done)     Learning Progress Summary           Patient Acceptance, E,D, VU,DU,NR by MG at 3/19/2021 1615    Acceptance, E,D, VU,NR by MS at 3/18/2021 1023                               User Key     Initials Effective Dates Name Provider Type Discipline    MS 04/03/18 -  Soto Marrero, PT Physical Therapist PT     02/26/21 -  Leslie Bergman, PTA Student PTA Student PT              PT Recommendation and Plan     Plan of Care Reviewed With: (P) patient  Progress: (P) improving  Outcome Summary: (P) after encouragement pt agreeable to physcial therapy. pt performed STS transter with FWW and req's Tha. pt req'd vc's for upright posture and hand placement on walker during transfers. pt ambulated 25ft in the room with FWW and CGA. pt perfomrd B LE exercises in the chair. continue to progress pt as tolerated.     Time Calculation:   PT Charges     Row Name 03/19/21 1616             Time Calculation    Start Time  1502  (Pended)   -MG      Stop Time  1516  (Pended)   -MG      Time Calculation (min)  14 min  (Pended)   -MG      PT Received On  03/19/21  (Pended)   -MG      PT - Next Appointment  03/20/21  (Pended)   -MG        User Key  (r) = Recorded By, (t) = Taken By, (c) = Cosigned By    Initials Name Provider Type    MG Leslie Bergman, PTA Student PTA Student        Therapy Charges for Today     Code Description  Service Date Service Provider Modifiers Qty    [id number]  GAIT TRAINING EA 15 MIN 3/19/2021 Leslie Bergman, PTA Student GP 1          PT G-Codes  Outcome Measure Options: AM-PAC 6 Clicks Daily Activity (OT)  AM-PAC 6 Clicks Score (PT): (P) 17  AM-PAC 6 Clicks Score (OT): 18  Modified [place] Scale: 4 - Moderately severe disability.  Unable to walk without assistance, and unable to attend to own bodily needs without assistance.    Leslie Bergman PTA Student  3/19/2021

## 2021-03-19 NOTE — PROGRESS NOTES
Hospital Follow Up    LOS:  LOS: 4 days   Patient Name: Miguelina Lopez  Age/Sex: 75 y.o. female  : 1945  MRN: [id number]    Day of Service: 21   Length of Stay: 4  Encounter Provider: ZOILA Yousif  Place of Service: [facility] CARDIOLOGY  Patient Care Team:  Arcelia Talbot APRN as PCP - General (Nurse Practitioner)  Robbie Wilson MD as Consulting Physician (Hematology and Oncology)  Chace Johnson MD as Consulting Physician (Cardiology)    Subjective:     Chief Complaint: Aortic stenosis, atrial fibrillation, anemia    Interval History: No complaints today wants to go home.  A little dyspneic when she is up walking around but not at rest.    Objective:     Objective:  Temp:  [97.6 °F (36.4 °C)-98.7 °F (37.1 °C)] 97.6 °F (36.4 °C)  Heart Rate:  [75-79] 75  Resp:  [16-18] 16  BP: (112-128)/(56-66) 125/57     Intake/Output Summary (Last 24 hours) at 3/19/2021 1149  Last data filed at 3/19/2021 0620  Gross per 24 hour   Intake --   Output 600 ml   Net -600 ml     Body mass index is 51.67 kg/m².      21  0536 21  0532 21  0600   Weight: 124 kg (273 lb 5.9 oz) 122 kg (268 lb 15.4 oz) 120 kg (264 lb 8.8 oz)     Weight change: -2 kg (-4 lb 6.6 oz)    Physical Exam:   General Appearance:    Awake alert and oriented in no acute distress.   Color:  Skin:  Neuro:  HEENT:    Lungs:     Pink  Warm and dry  No focal, motor or sensory deficits  Neck supple, pupils equal, round and reactive. No JVD, No Bruit  Clear to auscultation,respirations regular, even and                  unlabored    Heart:   Irregularly irregular rate and rhythm, S1 and S2, 3 out of 6 systolic upper left sternal border murmur, no gallop, no rub. No edema, DP/PT pulses are 2+   Chest Wall:    No abnormalities observed   Abdomen:     Normal bowel sounds, no masses, no organomegaly, soft        non-tender, non-distended, no guarding, no ascites noted   Extremities:   Moves  all extremities well, no edema, no cyanosis, no redness       Lab Review:   Results from last 7 days   Lab Units 03/18/21  0748 03/18/21  0138 03/17/21  0630   SODIUM mmol/L 146*  --  146*   POTASSIUM mmol/L 3.9 4.1 3.3*   CHLORIDE mmol/L 104  --  103   CO2 mmol/L 30.1*  --  30.9*   BUN mg/dL 68*  --  84*   CREATININE mg/dL 1.68*  --  1.85*   GLUCOSE mg/dL 72  --  50*   CALCIUM mg/dL 8.0*  --  7.9*   AST (SGOT) U/L 20  --  14   ALT (SGPT) U/L 10  --  10     Results from last 7 days   Lab Units 03/15/21  1717 03/15/21  1302   TROPONIN T ng/mL 0.010 <0.010     Results from last 7 days   Lab Units 03/19/21  0750 03/18/21  2335 03/18/21  0748 03/17/21  0630   WBC 10*3/mm3  --   --  9.47 12.99*   HEMOGLOBIN g/dL 9.7* 8.4* 8.1* 7.1*   HEMATOCRIT % 30.4* 25.7* 25.4* 22.6*   PLATELETS 10*3/mm3  --   --  216 194     Results from last 7 days   Lab Units 03/17/21  0630 03/15/21  1302   INR  1.94* 2.08*   APTT seconds  --  37.9*     Results from last 7 days   Lab Units 03/18/21  0748 03/17/21  0630   MAGNESIUM mg/dL 2.2 2.1     Results from last 7 days   Lab Units 03/17/21  0630   CHOLESTEROL mg/dL 98   TRIGLYCERIDES mg/dL 141   HDL CHOL mg/dL 32*     Results from last 7 days   Lab Units 03/16/21  0547   PROBNP pg/mL 8,131.0*     Results from last 7 days   Lab Units 03/17/21  0630   TSH uIU/mL 4.970*     I reviewed the patient's new clinical results.  I personally viewed and interpreted the patient's EKG  Current Medications:   Scheduled Meds:atorvastatin, 40 mg, Oral, Nightly  bumetanide, 4 mg, Oral, BID  carvedilol, 3.125 mg, Oral, BID With Meals  gabapentin, 100 mg, Oral, BID  insulin lispro, 0-9 Units, Subcutaneous, TID AC  levothyroxine, 25 mcg, Oral, Q AM  nystatin, , Topical, Q12H  pantoprazole, 40 mg, Oral, BID AC  polyethylene glycol, 17 g, Oral, Daily  potassium chloride, 20 mEq, Oral, Every Other Day  sennosides-docusate, 1 tablet, Oral, Daily  sodium chloride, 10 mL, Intravenous, Q12H  sodium chloride, 10 mL,  Intravenous, Q12H  vilazodone, 20 mg, Oral, Nightly  [START ON 3/21/2021] vitamin D, 50,000 Units, Oral, Weekly      Continuous Infusions:sodium chloride, 30 mL/hr, Last Rate: 30 mL/hr (03/18/21 9648)        Allergies:  Allergies   Allergen Reactions   • Coumadin [Warfarin Sodium] Diarrhea and Nausea Only   • Bumex [Bumetanide] Swelling     Tolerated Bumex drip February 2021   • Erythromycin    • Hctz [Hydrochlorothiazide] Swelling   • Zaroxolyn [Metolazone] Diarrhea   • Penicillins Rash     Tolerated cephalosporins in the past    • Sulfa Antibiotics Rash       Assessment:       Metabolic encephalopathy    Chronic coronary artery disease    OCHOA (obstructive sleep apnea)    DM type 2 (diabetes mellitus, type 2) (CMS/[facility])    Essential hypertension    Pulmonary hypertension due to sleep-disordered breathing (CMS/[facility])    Atrial fibrillation (CMS/[facility])    Supplemental oxygen dependent    Chronic right-sided congestive heart failure (CMS/[facility])    Bilateral lower extremity edema    Chronic diastolic heart failure (CMS/[facility])    Chronic respiratory failure with hypoxia and hypercapnia (CMS/[facility])    COPD (chronic obstructive pulmonary disease) (CMS/[facility])    Presence of permanent cardiac pacemaker    Hypothyroidism (acquired)    Chronic anticoagulation    Leukocytosis    Stage 3b chronic kidney disease (CMS/[facility])    Gastrointestinal hemorrhage with melena    Acute blood loss anemia    Hypernatremia    Hypokalemia         1.  Acute blood loss anemia: Hemoglobin improved.  She remains off anticoagulation  2.  Aortic stenosis: Ongoing TAVR evaluation  3.  Coronary artery disease: Status post CABG  4.  Mitral regurgitation: Stop status post mitral valve replacement with tissue prosthesis  5.  Tricuspid regurgitation: Status post repair  6.  Paroxysmal atrial fibrillation: We will withhold anticoagulation at this time.  Status post Micra pacemaker implant  7.  Chronic kidney disease  8.  Chronic diastolic heart failure continues on oral  Bumex  9.  Morbid obesity  Plan:      GI recommend staying off anticoagulation for 3 to 5 days.  Heart rate well controlled.  Appears euvolemic on oral Bumex.    ZOILA Yousif  03/19/21  11:49 EDT  Electronically signed by ZOILA Yousif, 03/19/21, 11:49 AM EDT.

## 2021-03-19 NOTE — PLAN OF CARE
Goal Outcome Evaluation:        Outcome Summary: AOx4. VSS on 2L NC. Patient frequently calls out to staff. Stated she felt very nervous/anxious and inclosed in room. Patient is upset because home xanax wasn't restarted. Educated patient that it was not restarted due to confusion upon admission. Paged and spoke to on call NP. See orders. Assist x1 to BSC. Voids to the purewick. Applied Nystatin powder to folds and cream to bottom. Will continue to monitor patient's progress.

## 2021-03-19 NOTE — PROGRESS NOTES
Continued Stay Note  [facility]     Patient Name: Miguelina Lopez  MRN: [id number]  Today's Date: 3/19/2021    Admit Date: 3/15/2021    Discharge Plan     Row Name 03/19/21 1245       Plan    Plan  Home with family and [facility]    Patient/Family in Agreement with Plan  yes    Plan Comments  Met with patient and discussed DC plans. Patient planning to return home at DC and continue services with [facility]. Patient stated she has steps to enter the home. Note left for PT to work on steps prior to DC. CCP will follow. Jennifer Toro RN        Discharge Codes    No documentation.             Jennifer Toro RN

## 2021-03-19 NOTE — TELEPHONE ENCOUNTER
LOV 3/17/21  LR over 5 years ago   [facility] sent refill request for Tramadol 50mg. Pt  admitted in hospital. Please advise

## 2021-03-19 NOTE — PROGRESS NOTES
Clinical Pharmacy Services: Heart Failure Education    Miguelina Lopez was counseled over heart failure prior to discharge.     The patient was also counseled on all heart failure medications prior to discharge including name of medication, indication, and possible side effects.    Additional counseling points included but were not limited to:  1. Weigh yourself every morning after emptying your bladder and compare your weight to the day before  2. Keep the total amount of fluids you drink to only 6-8 glasses each day (48-64 ounces) or as otherwise directed by your physician  3. Take your medicine exactly as prescribed  4. Check for swelling in your feet, ankles, legs, and stomach  5. Eat foods that are low in salt or salt-free. Limit your sodium intake to <2000mg/day  6. Balance activity and rest periods  7. Do not smoke, and limit alcohol intake (No more than 2 drinks a day for men and 1 drink a day for women)    She was also instructed to contact her heart doctor immediately if she notices any of the following signs:  1. Weight gain of >2 pounds in 1 day or 5 pounds in 1 week  2. Vomiting and/or diarrhea that lasts more than 2 days  3. Feeling more shortness of breath than usual  4. Increased swelling in your feet, ankles, legs, or stomach  5. Development of a dry, hacking cough OR a productive cough with frothy sputum  6. Feeling more tired and having less energy to complete daily tasks  7. Feeling light-headed or dizzy  8. Having difficulty breathing when lying down flat     The patient was provided with educational materials for the above counseling points we reviewed to keep as a reference.     All other questions from the patient were answered at this time.   ?  Dion Chen RPH    • Was the patient wearing a mask?  yes  • Did I  have close contact (6 ft for at least 15 minutes or longer)? no  • What PPE you were wearing?  Face mask and goggles

## 2021-03-19 NOTE — THERAPY TREATMENT NOTE
Acute Care - Occupational Therapy Treatment Note  [facility]     Patient Name: Miguelina Lopez  : 1945  MRN: [id number]  Today's Date: 3/19/2021             Admit Date: 3/15/2021       ICD-10-CM ICD-9-CM   1. TIA (transient ischemic attack)  G45.9 435.9   2. Bilateral carotid artery stenosis  I65.23 433.10     433.30   3. Confusion  R41.0 298.9   4. OLI (acute kidney injury) (CMS/Formerly [facility])  N17.9 584.9   5. Hypernatremia  E87.0 276.0   6. Gastrointestinal hemorrhage with melena  K92.1 578.1     Patient Active Problem List   Diagnosis   • Anxiety disorder   • Arthritis of knee   • Asthma   • Chronic coronary artery disease   • CKD (chronic kidney disease) stage 3, GFR 30-59 ml/min (CMS/Formerly [facility])   • Depression   • Diabetic peripheral neuropathy (CMS/Formerly [facility])   • Gastroesophageal reflux disease   • Hyperlipidemia   • Insomnia   • Lower gastrointestinal hemorrhage   • Anemia   • OCHOA (obstructive sleep apnea)   • DM type 2 (diabetes mellitus, type 2) (CMS/Formerly [facility])   • Essential hypertension   • Hospital discharge follow-up   • Mitral stenosis   • Pulmonary hypertension due to sleep-disordered breathing (CMS/Formerly [facility])   • s/p MVR, TV-repair, CABG x2 16   • OLI (acute kidney injury) (CMS/Formerly [facility])   • Leukocytosis   • Atrial fibrillation (CMS/Formerly [facility])   • Nocturnal hypoxia   • Dermatitis   • Medicare annual wellness visit, initial   • Class 3 severe obesity due to excess calories with serious comorbidity and body mass index (BMI) of 50.0 to 59.9 in adult (CMS/Formerly [facility])   • Supplemental oxygen dependent   • Acute on chronic combined systolic and diastolic HF (heart failure) (CMS/Formerly [facility])   • Mitral regurgitation   • Aortic stenosis   • Medicare annual wellness visit, subsequent   • Localized edema   • Chronic right-sided congestive heart failure (CMS/Formerly [facility])   • Cellulitis of left lower extremity   • Proteinuria   • Bilateral lower extremity edema   • Subclinical hypothyroidism   • Chronic diastolic heart failure (CMS/Formerly [facility])   • Chronic respiratory  failure with hypoxia and hypercapnia (CMS/HCC)   • Acute on chronic respiratory failure with hypoxia and hypercapnia (CMS/HCC)   • AV block, 2nd degree   • 1st degree AV block   • Chest pain   • COPD (chronic obstructive pulmonary disease) (CMS/HCC)   • Complete heart block (CMS/HCC)   • Presence of permanent cardiac pacemaker   • Hypothyroidism (acquired)   • Chronic anticoagulation   • Leukocytosis   • Stage 3b chronic kidney disease (CMS/HCC)   • Gastrointestinal hemorrhage with melena   • Acute blood loss anemia   • Metabolic encephalopathy   • Hypernatremia   • Hypokalemia     Past Medical History:   Diagnosis Date   • Acute on chronic respiratory failure with hypoxia and hypercapnia (CMS/HCC)    • OLI (acute kidney injury) (CMS/HCC)    • Anemia    • Anxiety    • Aortic valve stenosis    • Bilateral lower extremity edema    • CHF (congestive heart failure) (CMS/HCC)    • Chronic coronary artery disease    • Class 3 severe obesity due to excess calories in adult (CMS/HCC)    • COPD (chronic obstructive pulmonary disease) (CMS/HCC)    • Depression    • Diabetes mellitus (CMS/HCC)    • Elevated cholesterol    • GERD (gastroesophageal reflux disease)    • Heart murmur    • Hypertension    • Mitral valve insufficiency    • Pneumonia     1/2016   • Pulmonary hypertension (CMS/HCC)     due to sleep disordered breathing   • Sleep apnea     Uses CPAP or oxygen   • Stage 3 chronic kidney disease (CMS/HCC)    • Subclinical hypothyroidism    • Supplemental oxygen dependent    • TIA (transient ischemic attack) 3/15/2021   • Valvular heart disease      Past Surgical History:   Procedure Laterality Date   • CARDIAC CATHETERIZATION     • CARDIAC CATHETERIZATION N/A 6/10/2016    Procedure: Left Heart Cath;  Surgeon: Chace Johnson MD;  Location: [facility] INVASIVE LOCATION;  Service:    • CARDIAC CATHETERIZATION N/A 6/10/2016    Procedure: Right Heart Cath;  Surgeon: Chace Johnson MD;  Location: [facility]  INVASIVE LOCATION;  Service:    • CARDIAC CATHETERIZATION N/A 2/5/2021    Procedure: RIGHT AND LEFT HEART CATH;  Surgeon: Antoine Ayers MD;  Location: [facility] CATH INVASIVE LOCATION;  Service: Cardiology;  Laterality: N/A;   • CARDIAC CATHETERIZATION N/A 2/5/2021    Procedure: Coronary angiography;  Surgeon: Antoine Ayers MD;  Location: [facility] CATH INVASIVE LOCATION;  Service: Cardiology;  Laterality: N/A;   • CARDIAC CATHETERIZATION N/A 2/5/2021    Procedure: Left ventriculography- pressures;  Surgeon: Antoine Ayers MD;  Location: [facility] CATH INVASIVE LOCATION;  Service: Cardiology;  Laterality: N/A;   • CARDIAC ELECTROPHYSIOLOGY PROCEDURE N/A 2/2/2021    Procedure: Pacemaker DC new---[employer] MICRA;  Surgeon: Eliazar Bond MD;  Location: [facility] CATH INVASIVE LOCATION;  Service: Cardiology;  Laterality: N/A;   • CARDIAC SURGERY     • CORONARY ARTERY BYPASS GRAFT      2 vessel   • CORONARY ARTERY BYPASS GRAFT WITH MITRAL VALVE REPAIR/REPLACEMENT N/A 6/13/2016    Procedure: INTRAOPERATIVE TARIQ, MIDLINE STERNOTOMY, CORONARY ARTERY BYPASS GRAFTING X  2 UTILIZING ENDOSCOPICALLY HARVESTED LEFT GREATER SAPHENOUS VEIN, MITRAL VALVE REPLACEMENT AND TRICUSPID VALVE REPAIR;  Surgeon: Eliecer Mistry MD;  Location: [facility] OR;  Service:    • CORONARY STENT PLACEMENT  2010    Approx. 6 yrs ago at [facility]   • ENDOSCOPY N/A 3/17/2021    Procedure: ESOPHAGOGASTRODUODENOSCOPY;  Surgeon: Dixon Haas MD;  Location: [facility] ENDOSCOPY;  Service: Gastroenterology;  Laterality: N/A;  PRE: GI BLEED  POST: ANTRAL ULCER   • HEMORRHOIDECTOMY     • HYSTERECTOMY     • MITRAL VALVE REPLACEMENT     • REPLACEMENT TOTAL KNEE Right    • THYROID SURGERY      Cyst removed from thyroid   • VASCULAR SURGERY         Lymphedema     Row Name 03/19/21 1400             Subjective Pain    Able to rate subjective pain?  yes  -VS      Pre-Treatment Pain Level  0  -VS      Post-Treatment Pain Level  0  -VS      Recorded by  "[VS] Ericka Bishop, WOODR              Lymphedema Edema Assessment    Ptting Edema Category  By severity  -VS      Pitting Edema  Mild  -VS      Stemmer Sign  bilateral:  -VS      Edema Assessment Comment  -- No skin issues noted, pt. tolerated the wraps to (B)LE   -VS      Recorded by [VS] Ericka Bishop OTR              Compression/Skin Care    Compression/Skin Care  skin care;wrapping location;bandaging  -VS      Skin Care  washed/dried;lotion applied  -VS      Wrapping Location  lower extremity  -VS      Wrapping Location LE  bilateral:;foot to knee  -VS      Bandage Layers  cotton liner;padding/fluff layer;short-stretch bandages (comment size/quantity)  -VS      Bandaging Comments  bandages 1 x 8 and 1 x 10 (B)ly   -VS      Bandaging Technique  light compression  -VS      Compression/Skin Care Comments  Pt. knows to ask for the wraps to be reoved if pain discomfort increase   -VS      Recorded by [VS] Ericka Bishop OTR        User Key  (r) = Recorded By, (t) = Taken By, (c) = Cosigned By    Initials Name Effective Dates    VS Ericka Bishop, WOODR 03/02/20 -            OT ASSESSMENT FLOWSHEET (last 12 hours)      OT Evaluation and Treatment     Row Name 03/19/21 1400 03/19/21 1150                OT Time and Intention    Subjective Information  no complaints  -VS  --       Document Type  therapy note (daily note)  -VS  --       Mode of Treatment  occupational therapy Lymphedema LE Wraps  -VS  --       Patient Effort  fair  -VS  --       Symptoms Noted During/After Treatment  -- Pt. has been seen my OT for LE wrapping several times   -VS  --       Comment  \"My legs have not been wrapped all week, My grand daughter still wraps them for me at home\" pt. stated   -VS  --          General Information    Patient Profile Reviewed  yes  -VS  --          Pain Scale: Numbers Pre/Post-Treatment    Pretreatment Pain Rating  0/10 - no pain  -VS  --       Posttreatment Pain Rating  0/10 - no pain  -VS  --          " Edema Assessment & Management    Location (Edema)  lower extremity, left;lower extremity, right  -VS  --       Additional Documentation  Edema Symptoms/Interventions (Group);Location (Edema) (Row)  -VS  --          Edema Symptoms/Interventions    Description (Edema)  -- See Lymph Note for aditional information  -VS  --          Plan of Care Review    Plan of Care Reviewed With  --  patient  -VS       Outcome Summary  --  OT Lymph:  Pt.is O x 3, Pt.'s LE edema is long standing, pt. has been treat several times for this condition in the past 5 years.  Pt. was wrapped from base of toes to knees, pt.'s Grand Daughter has been instructed in wrapping in the past and continues to assist her with the wraps at home.  Therapist will see the pt. 3 x week for LE wraps, OT will re-wrap the LEs on Monday,.  Pt. to continue with LE wraps at home with grand daughter assisting in her care.  OT wore a mask and eye protections washed her hands before and after the treatment, Pt. wore a mask.     -VS          OT Goals    Problem Specific Goal Selection (OT)  problem specific goal 1, OT  -VS  --          Problem Specific Goal 1 (OT)    Problem Specific Goal 1 (OT)  -- Pt. to tolerate LE Lymph wrapping, pt's LE edema to improve   -VS  --       Time Frame (Problem Specific Goal 1, OT)  short term goal (STG);2 weeks  -VS  --       Progress/Outcome (Problem Specific Goal 1, OT)  continuing progress toward goal  -VS  --          Positioning and Restraints    Pre-Treatment Position  --  sitting in chair/recliner  -VS       Post Treatment Position  --  chair  -VS       In Chair  --  notified nsg;reclined;call light within reach;encouraged to call for assist;exit alarm on non slip socks   -VS          Therapy Assessment/Plan (OT)    Therapy Frequency (OT)  --  3 times/wk LE Lymphedema wraps   -VS         User Key  (r) = Recorded By, (t) = Taken By, (c) = Cosigned By    Initials Name Effective Dates    VS Ericka Bishop, OTR 03/02/20 -           Occupational Therapy Education                 Title: PT OT SLP Therapies (Done)     Topic: Occupational Therapy (Done)     Point: ADL training (Done)     Description:   Instruct learner(s) on proper safety adaptation and remediation techniques during self care or transfers.   Instruct in proper use of assistive devices.              Learning Progress Summary           Patient Acceptance, E,D, VU by VS at 3/19/2021 1417    Comment: LE Lymphedema wraps, Pt's grand daughter has been instructed to (A) the pt. with her LE wraps at home.  OT completed this in past admissions.    Acceptance, E, VU by BT at 3/17/2021 1243    Comment: purpose of OT, adl retraining, functional transfer training                   Point: Home exercise program (Done)     Description:   Instruct learner(s) on appropriate technique for monitoring, assisting and/or progressing therapeutic exercises/activities.              Learning Progress Summary           Patient Acceptance, E,D, VU by VS at 3/19/2021 1417    Comment: LE Lymphedema wraps, Pt's grand daughter has been instructed to (A) the pt. with her LE wraps at home.  OT completed this in past admissions.    Acceptance, E, VU by BT at 3/17/2021 1243    Comment: purpose of OT, adl retraining, functional transfer training                   Point: Precautions (Done)     Description:   Instruct learner(s) on prescribed precautions during self-care and functional transfers.              Learning Progress Summary           Patient Acceptance, E,D, VU by VS at 3/19/2021 1417    Comment: LE Lymphedema wraps, Pt's grand daughter has been instructed to (A) the pt. with her LE wraps at home.  OT completed this in past admissions.    Acceptance, E, VU by BT at 3/17/2021 1243    Comment: purpose of OT, adl retraining, functional transfer training                   Point: Body mechanics (Done)     Description:   Instruct learner(s) on proper positioning and spine alignment during self-care, functional  mobility activities and/or exercises.              Learning Progress Summary           Patient Acceptance, E,D, VU by VS at 3/19/2021 1417    Comment: LE Lymphedema wraps, Pt's grand daughter has been instructed to (A) the pt. with her LE wraps at home.  OT completed this in past admissions.    Acceptance, E, VU by BT at 3/17/2021 1243    Comment: purpose of OT, adl retraining, functional transfer training                               User Key     Initials Effective Dates Name Provider Type Discipline    VS 03/02/20 -  Ericka Bishop OTR Occupational Therapist OT    BT 11/16/20 -  Carol Ross OT Occupational Therapist OT                  OT Recommendation and Plan  Therapy Frequency (OT): 3 times/wk (LE Lymphedema wraps )  Plan of Care Review  Plan of Care Reviewed With: patient  Outcome Summary: OT Lymph:  Pt.is O x 3, Pt.'s LE edema is long standing, pt. has been treat several times for this condition in the past 5 years.  Pt. was wrapped from base of toes to knees, pt.'s Grand Daughter has been instructed in wrapping in the past and continues to assist her with the wraps at home.  Therapist will see the pt. 3 x week for LE wraps, OT will re-wrap the LEs on Monday,.  Pt. to continue with LE wraps at home with grand daughter assisting in her care.  OT wore a mask and eye protections washed her hands before and after the treatment, Pt. wore a mask.     Plan of Care Reviewed With: patient  Outcome Summary: OT Lymph:  Pt.is O x 3, Pt.'s LE edema is long standing, pt. has been treat several times for this condition in the past 5 years.  Pt. was wrapped from base of toes to knees, pt.'s Grand Daughter has been instructed in wrapping in the past and continues to assist her with the wraps at home.  Therapist will see the pt. 3 x week for LE wraps, OT will re-wrap the LEs on Monday,.  Pt. to continue with LE wraps at home with grand daughter assisting in her care.  OT wore a mask and eye protections washed her hands  before and after the treatment, Pt. wore a mask.       Outcome Measures     Row Name 03/19/21 1400             How much help from another is currently needed...    Putting on and taking off regular lower body clothing?  2  -VS      Bathing (including washing, rinsing, and drying)  2  -VS      Toileting (which includes using toilet bed pan or urinal)  3  -VS      Putting on and taking off regular upper body clothing  3  -VS      Taking care of personal grooming (such as brushing teeth)  4  -VS      Eating meals  4  -VS      AM-PAC 6 Clicks Score (OT)  18  -VS         Functional Assessment    Outcome Measure Options  AM-PAC 6 Clicks Daily Activity (OT)  -VS        User Key  (r) = Recorded By, (t) = Taken By, (c) = Cosigned By    Initials Name Provider Type    VS Ericka Bishop OTR Occupational Therapist          Time Calculation:   Time Calculation- OT     Row Name 03/19/21 1421             Time Calculation- OT    OT Start Time  1321  -VS      OT Stop Time  1346  -VS      OT Time Calculation (min)  25 min  -VS      Total Timed Code Minutes- OT  25 minute(s)  -VS      OT Received On  03/19/21  -VS      OT - Next Appointment  03/22/21  -VS      OT Goal Re-Cert Due Date  04/02/21  -VS        User Key  (r) = Recorded By, (t) = Taken By, (c) = Cosigned By    Initials Name Provider Type    VS Ericka Bishop OTR Occupational Therapist        Therapy Charges for Today     Code Description Service Date Service Provider Modifiers Qty    [id number] HC OT MANUAL THERAPY EA 15 MIN 3/19/2021 Ericka Bishop OTR GO 2               ANNEMARIE Wiggins  3/19/2021

## 2021-03-19 NOTE — PLAN OF CARE
Goal Outcome Evaluation:  Plan of Care Reviewed With: (P) patient  Progress: (P) improving  Outcome Summary: (P) after encouragement pt agreeable to physcial therapy. pt performed STS transter with FWW and req's Tha. pt req'd vc's for upright posture and hand placement on walker during transfers. pt ambulated 25ft in the room with FWW and CGA. pt perfomrd B LE exercises in the chair. continue to progress pt as tolerated.  Pt did no wear mask. Therapist wore proper PPE an standard mask. Pt not an enhanced droplet precaution.

## 2021-03-19 NOTE — PROGRESS NOTES
Name: Miguelina Lopez ADMIT: 3/15/2021   : 1945  PCP: Arcelia Talbot APRN    MRN: [id number] LOS: 4 days   AGE/SEX: 75 y.o. female  ROOM: [facility]     Subjective   Subjective   CC: dyspnea  No acute events. Patient has no new complaints. Dyspnea is improving. No more bleeding. Taking PO. No CP/f/c/n/v/d.    Objective   Objective   Vital Signs  Temp:  [97 °F (36.1 °C)-98.2 °F (36.8 °C)] 97.6 °F (36.4 °C)  Heart Rate:  [74-75] 74  Resp:  [16-18] 16  BP: (117-129)/(49-69) 129/69  SpO2:  [94 %-100 %] 94 %  on  Flow (L/min):  [2-3] 2;   Device (Oxygen Therapy): CPAP  Body mass index is 51.67 kg/m².  Physical Exam  Vitals and nursing note reviewed. Exam conducted with a chaperone present.   Constitutional:       General: She is not in acute distress.     Appearance: She is obese. She is ill-appearing (chronically). She is not toxic-appearing or diaphoretic.   HENT:      Head: Normocephalic and atraumatic.      Right Ear: External ear normal.      Left Ear: External ear normal.      Nose: Nose normal.      Mouth/Throat:      Mouth: Mucous membranes are moist.      Pharynx: Oropharynx is clear.      Comments: Mucosa pale  Eyes:      General: No scleral icterus.        Right eye: No discharge.         Left eye: No discharge.      Extraocular Movements: Extraocular movements intact.      Conjunctiva/sclera: Conjunctivae normal.   Neck:      Comments: No JVD  Cardiovascular:      Rate and Rhythm: Normal rate and regular rhythm.      Pulses: Normal pulses.      Heart sounds: Murmur heard.     Pulmonary:      Effort: No respiratory distress.      Breath sounds: Normal breath sounds. No wheezing or rales.   Abdominal:      General: Bowel sounds are normal. There is no distension.      Palpations: Abdomen is soft.      Tenderness: There is no abdominal tenderness.   Musculoskeletal:         General: Swelling (chronic in BLEs) present. No deformity.      Cervical back: Neck supple. No rigidity.   Lymphadenopathy:       Cervical: No cervical adenopathy.   Skin:     General: Skin is warm and dry.      Capillary Refill: Capillary refill takes more than 3 seconds.      Coloration: Skin is pale. Skin is not jaundiced.      Findings: No rash.   Neurological:      General: No focal deficit present.      Mental Status: She is alert and oriented to person, place, and time. Mental status is at baseline.      Cranial Nerves: No cranial nerve deficit.      Coordination: Coordination normal.   Psychiatric:         Mood and Affect: Mood normal.         Behavior: Behavior normal.         Thought Content: Thought content normal.       Results Review     I reviewed the patient's new clinical results.  I reviewed the patient's telemetry.  Results from last 7 days   Lab Units 03/19/21  1626 03/19/21  0750 03/18/21  2335 03/18/21  1549 03/18/21  0748 03/17/21  0630 03/16/21  0547 03/15/21  1302   WBC 10*3/mm3  --   --   --   --  9.47 12.99* 12.93* 13.82*   HEMOGLOBIN g/dL 8.9* 9.7* 8.4* 9.1* 8.1* 7.1* 5.6* 8.3*   PLATELETS 10*3/mm3  --   --   --   --  216 194 216 263     Results from last 7 days   Lab Units 03/18/21  0748 03/18/21  0138 03/17/21  0630 03/16/21  0547 03/15/21  1302   SODIUM mmol/L 146*  --  146* 149* 146*   POTASSIUM mmol/L 3.9 4.1 3.3* 3.6 3.8   CHLORIDE mmol/L 104  --  103 104 100   CO2 mmol/L 30.1*  --  30.9* 32.4* 32.1*   BUN mg/dL 68*  --  84* 87* 77*   CREATININE mg/dL 1.68*  --  1.85* 1.65* 1.60*   GLUCOSE mg/dL 72  --  50* 79 128*   Estimated Creatinine Clearance: 33.9 mL/min (A) (by C-G formula based on SCr of 1.68 mg/dL (H)).  Results from last 7 days   Lab Units 03/18/21  0748 03/17/21  0630 03/16/21  0547 03/15/21  1302   ALBUMIN g/dL 3.20* 3.40* 3.30* 4.20   BILIRUBIN mg/dL 0.5 0.5 0.4 0.7   ALK PHOS U/L 161* 167* 173* 252*   AST (SGOT) U/L 20 14 13 18   ALT (SGPT) U/L 10 10 7 9     Results from last 7 days   Lab Units 03/18/21  0748 03/17/21  0630 03/16/21  0547 03/15/21  1302   CALCIUM mg/dL 8.0* 7.9* 7.9* 8.7    ALBUMIN g/dL 3.20* 3.40* 3.30* 4.20   MAGNESIUM mg/dL 2.2 2.1 2.1  --      Results from last 7 days   Lab Units 03/15/21  1302   PROCALCITONIN ng/mL 0.16   LACTATE mmol/L 1.8     COVID19   Date Value Ref Range Status   03/15/2021 Not Detected Not Detected - Ref. Range Final     SARS-CoV-2 PCR   Date Value Ref Range Status   02/10/2021 Not Detected Not Detected Final     Comment:     Nucleic acid specific to SARS-CoV-2 (COVID-19) virus was not detected in  this sample by the TaqPath (TM) COVID-19 Combo Kit.          SARS-CoV-2 (COVID-19) nucleic acid testing performed using [employer] Aptima (R) SARS-CoV-2 Assay or [employer] TaqPath (TM)  COVID-19 Combo Kit as indicated above under Emergency Use Authorization (EUA) from the FDA. Aptima (R) and TaqPath (TM) test performance  characteristics verified by [facility] in accordance with the FDAs Guidance Document (Policy for Diagnostic Tests for Coronavirus Disease-2019  during the Public Health Emergency) issued on March 16, 2020. The laboratory is regulated under CLIA as qualified to perform high-complexity testing  Unless otherwise noted, all testing was performed at [facility], CLIA No. [id number], [place] State Licensee No. [id number]     Hemoglobin A1C   Date/Time Value Ref Range Status   03/17/2021 0630 5.70 (H) 4.80 - 5.60 % Final     Glucose   Date/Time Value Ref Range Status   03/19/2021 1642 141 (H) 70 - 130 mg/dL Final   03/19/2021 1129 124 70 - 130 mg/dL Final   03/19/2021 0804 93 70 - 130 mg/dL Final   03/18/2021 2115 131 (H) 70 - 130 mg/dL Final   03/18/2021 1627 104 70 - 130 mg/dL Final   03/18/2021 1129 92 70 - 130 mg/dL Final   03/18/2021 0817 82 70 - 130 mg/dL Final       EEG  Date of onset: March 17, 2021 at 8:04 AM  Date of offset: March 17, 2021 at 8:30 AM    Indication: Confusion    Technical description:  This is a 21 channel digital EEG recording that   began on March 17, 2021 at 8:04 AM and ended on March 17, 2021 at 8:30 AM.    Benjamin  and seizure detection software programs were used.    Background:  Up to 9 Hz alpha activity is present over the posterior head   regions that symmetric, well formed and reactive to eye closure.  The   patient enters the drowsy state, but does not sleep during the record.    There is no abnormal activation with photic stimulation.  Hyperventilation   was not performed.  There are no asymmetries between the two hemispheres.    No interictal activity is present.    The EKG monitor shows a heart rate that varies between 70 and 75 beats per   minute.    Clinical Interpretation:  This routine EEG recording is normal in the   awake and drowsy states.  No potentially epileptogenic activity, seizure   activity, or focal slowing is present.  Duplex Carotid Ultrasound CAR  · Proximal right internal carotid artery mild stenosis.  · Proximal left internal carotid artery moderate stenosis.       Scheduled Medications  atorvastatin, 40 mg, Oral, Nightly  bumetanide, 4 mg, Oral, BID  carvedilol, 3.125 mg, Oral, BID With Meals  gabapentin, 100 mg, Oral, BID  insulin lispro, 0-9 Units, Subcutaneous, TID AC  levothyroxine, 25 mcg, Oral, Q AM  nystatin, , Topical, Q12H  pantoprazole, 40 mg, Oral, BID AC  polyethylene glycol, 17 g, Oral, Daily  potassium chloride, 20 mEq, Oral, Every Other Day  sennosides-docusate, 1 tablet, Oral, Daily  sodium chloride, 10 mL, Intravenous, Q12H  sodium chloride, 10 mL, Intravenous, Q12H  vilazodone, 20 mg, Oral, Nightly  [START ON 3/21/2021] vitamin D, 50,000 Units, Oral, Weekly    Infusions  sodium chloride, 30 mL/hr, Last Rate: 30 mL/hr (03/18/21 0929)    Diet  Diet Regular; Cardiac, Consistent Carbohydrate       Assessment/Plan     Active Hospital Problems    Diagnosis  POA   • **Metabolic encephalopathy [G93.41]  Yes   • Acute blood loss anemia [D62]  Yes   • Hypernatremia [E87.0]  Yes   • Hypokalemia [E87.6]  No   • Hypothyroidism (acquired) [E03.9]  Yes   • Chronic anticoagulation [Z79.01]  Not  Applicable   • Leukocytosis [D72.829]  Yes   • Stage 3b chronic kidney disease (CMS/HCC) [N18.32]  Yes   • Gastrointestinal hemorrhage with melena [K92.1]  Yes   • Presence of permanent cardiac pacemaker [Z95.0]  Yes   • COPD (chronic obstructive pulmonary disease) (CMS/HCC) [J44.9]  Yes   • Chronic respiratory failure with hypoxia and hypercapnia (CMS/HCC) [J96.11, J96.12]  Yes   • Chronic diastolic heart failure (CMS/HCC) [I50.32]  Yes   • Bilateral lower extremity edema [R60.0]  Yes   • Chronic right-sided congestive heart failure (CMS/HCC) [I50.812]  Yes   • Supplemental oxygen dependent [Z99.81]  Not Applicable   • Atrial fibrillation (CMS/HCC) [I48.91]  Yes   • Pulmonary hypertension due to sleep-disordered breathing (CMS/HCC) [I27.29, G47.8]  Yes   • Chronic coronary artery disease [I25.10]  Yes   • OCHOA (obstructive sleep apnea) [G47.33]  Yes   • DM type 2 (diabetes mellitus, type 2) (CMS/HCC) [E11.9]  Yes   • Essential hypertension [I10]  Yes      Resolved Hospital Problems   No resolved problems to display.     75-year-old woman with a history of chronic diastolic heart failure, paroxysmal atrial fibrillation/flutter, chronic AC (Eliquis), CKD3b, hypertension, CAD status post CABG, complete heart block status post pacemaker, type 2 diabetes, COPD, OCHOA/pulm HTN, chronic resp failure with hypoxia and hypercapnia (Trilogy user), who presented to ER with report of confusion per son--she was admitted for TIA workup.    Confusion (TIA vs metabolic encephalopathy)  - Neuro think this is more likely metabolic encephalopathy  - No MRI due to pt claustrophobia  - EEG normal. Carotid US R mild stenosis, Lmoderate stenosis.   - SLP eval okay  - possible hypoglycemia contributing-she has been relatively hypoglycemic here and was on a sulfonylurea at home    Acute/chronic right-sided CHF, valvular heart disease  - BNP pretty elevated at 8k on admission, but last BNP in Feb was 12k  - appreciate Card attention to pt,  appears euvolemic    Severe anemia in setting of chronic anticoagulation  - heme positive stool  - Eliquis on hold, serial Hgb, hgb slow decline.   - GI performed EGD 3/17 non bleeding gastric ulcer. No biopsy d/t risk.   - Hpylori stool pending, no NSAIDs.   - on BID PPI  - s/p 2 units PRBCs 3/16, 1 unit 3/17  - follow H&H and transfuse as needed     DM2 with hypoglycemia  - Trulicity and Amaryl on hold, SSI in place, A1c 5.7  - sugars low, continue hypoglycemia protocol, feed when EGD is done  - would not continue sulfonylurea on dc in this chronically ill pt    PAF, chronic AC, 3rd deg heart block/PPM  - paced rhythm  - holding Eliquis given anemia/GIB    HTN  - home meds continued  - BPs acceptable this AM    Hypothyroidism  - continue on levothyroxine    CKD3b  - baseline Cr around 1.4-1.5, Cr a bit better 1.6 from 1.85   - continue to follow    COPD/Chronic resp failure with hypercapnia and hypoxia (on Trilogy vent at home and chronic nasal cannula O2)  - stable on her baseline O2  - Trilogy in room      SCDs for DVT prophylaxis.  Full code.  Discussed with patient and nursing staff.  Anticipate discharge TBD, pt's family do not want her to go to a facility at [place], they plan to take her home      Soto Mccann MD  [facility]  03/19/21  18:57 EDT

## 2021-03-19 NOTE — PROGRESS NOTES
[facility]  Inpatient Progress Note    Reason for Follow Up: Melena    Subjective     Interval History:   Patient with a   No further bleeding    Current Facility-Administered Medications:   •  acetaminophen (TYLENOL) tablet 650 mg, 650 mg, Oral, Q4H PRN, Salvador Guardado MD  •  artificial tears ophthalmic ointment, , Both Eyes, Q1H PRN, Antonia Nava APRN, Given at 03/17/21 2145  •  atorvastatin (LIPITOR) tablet 40 mg, 40 mg, Oral, Nightly, Rajiv Wild MD, 40 mg at 03/18/21 2038  •  bumetanide (BUMEX) tablet 4 mg, 4 mg, Oral, BID, Salvador Guardado MD, 4 mg at 03/18/21 2039  •  carvedilol (COREG) tablet 3.125 mg, 3.125 mg, Oral, BID With Meals, Eliazar Bridges MD, 3.125 mg at 03/18/21 1749  •  dextrose (D50W) 25 g/ 50mL Intravenous Solution 25 g, 25 g, Intravenous, Q15 Min PRN, Salvador Guardado MD  •  dextrose (GLUTOSE) oral gel 15 g, 15 g, Oral, Q15 Min PRN, Salvador Guardado MD  •  gabapentin (NEURONTIN) capsule 100 mg, 100 mg, Oral, BID, Salvador Guardado MD, 100 mg at 03/18/21 2039  •  glucagon (human recombinant) (GLUCAGEN DIAGNOSTIC) injection 1 mg, 1 mg, Subcutaneous, Q15 Min PRN, Salvador Guardado MD, 1 mg at 03/17/21 0835  •  insulin lispro (ADMELOG) injection 0-9 Units, 0-9 Units, Subcutaneous, TID AC, Salvador Guardado MD, Stopped at 03/17/21 0730  •  levothyroxine (SYNTHROID, LEVOTHROID) tablet 25 mcg, 25 mcg, Oral, Q AM, Antonia Nava APRN, 25 mcg at 03/19/21 0619  •  nitroglycerin (NITROSTAT) SL tablet 0.4 mg, 0.4 mg, Sublingual, Q5 Min PRN, Salvador Guardado MD  •  nystatin (MYCOSTATIN) powder, , Topical, Q12H, Eliazar Bridges MD, Given at 03/18/21 2038  •  pantoprazole (PROTONIX) EC tablet 40 mg, 40 mg, Oral, QAM, Salvador Guardado MD, 40 mg at 03/19/21 0619  •  polyethylene glycol (MIRALAX) packet 17 g, 17 g, Oral, Daily, Salvador Guardado MD, 17 g at 03/18/21 0913  •  potassium chloride (K-DUR,KLOR-CON) ER tablet 20 mEq, 20 mEq, Oral, Every Other Day, Salvador Guardado MD, 20 mEq at  03/15/21 1954  •  potassium chloride (K-DUR,KLOR-CON) ER tablet 40 mEq, 40 mEq, Oral, PRN, Eliazar Bridges MD, 40 mEq at 03/17/21 2146  •  potassium chloride (KLOR-CON) packet 40 mEq, 40 mEq, Oral, PRN, Eliazar Bridges MD  •  sennosides-docusate (PERICOLACE) 8.6-50 MG per tablet 1 tablet, 1 tablet, Oral, Daily, Salvador Guardado MD, 1 tablet at 03/18/21 0913  •  [COMPLETED] Insert peripheral IV, , , Once **AND** sodium chloride 0.9 % flush 10 mL, 10 mL, Intravenous, PRN, Pablito Guardado MD  •  sodium chloride 0.9 % flush 10 mL, 10 mL, Intravenous, Q12H, Salvador Guardado MD, 10 mL at 03/18/21 0914  •  sodium chloride 0.9 % flush 10 mL, 10 mL, Intravenous, PRN, Salvador Guardado MD  •  sodium chloride 0.9 % flush 10 mL, 10 mL, Intravenous, Q12H, Rajiv Wild MD, 10 mL at 03/18/21 2041  •  sodium chloride 0.9 % flush 10 mL, 10 mL, Intravenous, PRN, Rajiv Wild MD  •  sodium chloride 0.9 % infusion, 30 mL/hr, Intravenous, Continuous, [place], Dixon Perez MD, Last Rate: 30 mL/hr at 03/18/21 0929, 30 mL/hr at 03/18/21 0929  •  vilazodone (VIIBRYD) tablet 20 mg, 20 mg, Oral, Nightly, Salvador Guardado MD, 20 mg at 03/18/21 2038  •  [START ON 3/21/2021] vitamin D (ERGOCALCIFEROL) capsule 50,000 Units, 50,000 Units, Oral, Weekly, Salvador Guardado MD  Review of Systems:    There is weakness of fatigue all other systems reviewed and negative    Objective     Vital Signs  Temp:  [97.8 °F (36.6 °C)-98.7 °F (37.1 °C)] 97.9 °F (36.6 °C)  Heart Rate:  [75-79] 75  Resp:  [16-18] 18  BP: (112-131)/(56-66) 117/56  Body mass index is 51.67 kg/m².    Intake/Output Summary (Last 24 hours) at 3/19/2021 0737  Last data filed at 3/19/2021 0620  Gross per 24 hour   Intake --   Output 600 ml   Net -600 ml     No intake/output data recorded.     Physical Exam:   General: patient awake, alert and cooperative   Eyes: Normal lids and lashes, no scleral icterus   Neck: supple, normal ROM   Skin: warm and dry, not  jaundiced   Cardiovascular: regular rhythm and rate, no murmurs auscultated   Pulm: clear to auscultation bilaterally, regular and unlabored   Abdomen: soft, nontender, nondistended; normal bowel sounds   Extremities: no rash or edema   Psychiatric: Normal mood and behavior; memory intact     Results Review:     I reviewed the patient's new clinical results.    Results from last 7 days   Lab Units 03/18/21  2335 03/18/21  1549 03/18/21  0748 03/17/21  0630 03/16/21  0547   WBC 10*3/mm3  --   --  9.47 12.99* 12.93*   HEMOGLOBIN g/dL 8.4* 9.1* 8.1* 7.1* 5.6*   HEMATOCRIT % 25.7* 28.6* 25.4* 22.6* 18.1*   PLATELETS 10*3/mm3  --   --  216 194 216     Results from last 7 days   Lab Units 03/18/21  0748 03/18/21  0138 03/17/21  0630 03/16/21  0547   SODIUM mmol/L 146*  --  146* 149*   POTASSIUM mmol/L 3.9 4.1 3.3* 3.6   CHLORIDE mmol/L 104  --  103 104   CO2 mmol/L 30.1*  --  30.9* 32.4*   BUN mg/dL 68*  --  84* 87*   CREATININE mg/dL 1.68*  --  1.85* 1.65*   CALCIUM mg/dL 8.0*  --  7.9* 7.9*   BILIRUBIN mg/dL 0.5  --  0.5 0.4   ALK PHOS U/L 161*  --  167* 173*   ALT (SGPT) U/L 10  --  10 7   AST (SGOT) U/L 20  --  14 13   GLUCOSE mg/dL 72  --  50* 79     Results from last 7 days   Lab Units 03/17/21  0630 03/15/21  1302   INR  1.94* 2.08*     Lab Results   Lab Value Date/Time    LIPASE 30 03/15/2021 1302    LIPASE 66 (H) 08/07/2018 1858       Radiology:  XR Chest 1 View   Final Result      CT Head Without Contrast   Final Result   1.  No findings of acute intracranial abnormality. Atherosclerotic   calcification within the right intracranial vertebral artery and   bilateral internal carotid arteries which is incompletely evaluated   without intravenous contrast. Findings can be further characterized with   CTA head if clinically indicated.   2.  Left cerebellar lacunar infarct, as before.   3.  Other findings as above.           This report was finalized on 3/15/2021 1:24 PM by Dr. Manny Gomez M.D.               Assessment/Plan     Patient Active Problem List   Diagnosis   • Anxiety disorder   • Arthritis of knee   • Asthma   • Chronic coronary artery disease   • CKD (chronic kidney disease) stage 3, GFR 30-59 ml/min (CMS/[facility])   • Depression   • Diabetic peripheral neuropathy (CMS/[facility])   • Gastroesophageal reflux disease   • Hyperlipidemia   • Insomnia   • Lower gastrointestinal hemorrhage   • Anemia   • OCHOA (obstructive sleep apnea)   • DM type 2 (diabetes mellitus, type 2) (CMS/[facility])   • Essential hypertension   • Hospital discharge follow-up   • Mitral stenosis   • Pulmonary hypertension due to sleep-disordered breathing (CMS/[facility])   • s/p MVR, TV-repair, CABG x2 6/13/16   • OLI (acute kidney injury) (CMS/HCC)   • Leukocytosis   • Atrial fibrillation (CMS/HCC)   • Nocturnal hypoxia   • Dermatitis   • Medicare annual wellness visit, initial   • Class 3 severe obesity due to excess calories with serious comorbidity and body mass index (BMI) of 50.0 to 59.9 in adult (CMS/[facility])   • Supplemental oxygen dependent   • Acute on chronic combined systolic and diastolic HF (heart failure) (CMS/HCC)   • Mitral regurgitation   • Aortic stenosis   • Medicare annual wellness visit, subsequent   • Localized edema   • Chronic right-sided congestive heart failure (CMS/[facility])   • Cellulitis of left lower extremity   • Proteinuria   • Bilateral lower extremity edema   • Subclinical hypothyroidism   • Chronic diastolic heart failure (CMS/[facility])   • Chronic respiratory failure with hypoxia and hypercapnia (CMS/[facility])   • Acute on chronic respiratory failure with hypoxia and hypercapnia (CMS/[facility])   • AV block, 2nd degree   • 1st degree AV block   • Chest pain   • COPD (chronic obstructive pulmonary disease) (CMS/[facility])   • Complete heart block (CMS/[facility])   • Presence of permanent cardiac pacemaker   • Hypothyroidism (acquired)   • Chronic anticoagulation   • Leukocytosis   • Stage 3b chronic kidney disease (CMS/[facility])   • Gastrointestinal hemorrhage  with melena   • Acute blood loss anemia   • Metabolic encephalopathy   • Hypernatremia   • Hypokalemia          Assessment   1.  Melena  2. ABLA  3.  Gastric ulcer  4.  Afib on DOAC (held)     Plan:   Await h pylori stool Ag  BID PPI, please increase the current dose  Hemoglobin equilibrating  Repeat EGD in 8-12 weeks, consider colonoscopy at same time  Continue to hold Eliquis, ideally for another 3-5 days at the least  Follow-up in GI office next available nurse practitioner's slot    I discussed the patients findings and my recommendations with patient and nursing staff.    Dixon Azevedo MD

## 2021-03-20 ENCOUNTER — READMISSION MANAGEMENT (OUTPATIENT)
Dept: CALL CENTER | Facility: HOSPITAL | Age: 76
End: 2021-03-20

## 2021-03-20 VITALS
OXYGEN SATURATION: 94 % | SYSTOLIC BLOOD PRESSURE: 141 MMHG | TEMPERATURE: 97.6 F | HEART RATE: 72 BPM | DIASTOLIC BLOOD PRESSURE: 65 MMHG | HEIGHT: 60 IN | RESPIRATION RATE: 18 BRPM | BODY MASS INDEX: 51.94 KG/M2 | WEIGHT: 264.55 LBS

## 2021-03-20 PROBLEM — G45.9 TIA (TRANSIENT ISCHEMIC ATTACK): Status: ACTIVE | Noted: 2021-03-20

## 2021-03-20 LAB
ANION GAP SERPL CALCULATED.3IONS-SCNC: 11.6 MMOL/L (ref 5–15)
BASOPHILS # BLD AUTO: 0.07 10*3/MM3 (ref 0–0.2)
BASOPHILS NFR BLD AUTO: 0.8 % (ref 0–1.5)
BUN SERPL-MCNC: 42 MG/DL (ref 8–23)
BUN/CREAT SERPL: 28.8 (ref 7–25)
CALCIUM SPEC-SCNC: 8.2 MG/DL (ref 8.6–10.5)
CHLORIDE SERPL-SCNC: 103 MMOL/L (ref 98–107)
CO2 SERPL-SCNC: 28.4 MMOL/L (ref 22–29)
CREAT SERPL-MCNC: 1.46 MG/DL (ref 0.57–1)
DEPRECATED RDW RBC AUTO: 48.9 FL (ref 37–54)
EOSINOPHIL # BLD AUTO: 0.74 10*3/MM3 (ref 0–0.4)
EOSINOPHIL NFR BLD AUTO: 8.1 % (ref 0.3–6.2)
ERYTHROCYTE [DISTWIDTH] IN BLOOD BY AUTOMATED COUNT: 15.6 % (ref 12.3–15.4)
GFR SERPL CREATININE-BSD FRML MDRD: 35 ML/MIN/1.73
GLUCOSE BLDC GLUCOMTR-MCNC: 133 MG/DL (ref 70–130)
GLUCOSE BLDC GLUCOMTR-MCNC: 139 MG/DL (ref 70–130)
GLUCOSE BLDC GLUCOMTR-MCNC: 151 MG/DL (ref 70–130)
GLUCOSE SERPL-MCNC: 138 MG/DL (ref 65–99)
H PYLORI AG STL QL IA: NEGATIVE
HCT VFR BLD AUTO: 25.4 % (ref 34–46.6)
HCT VFR BLD AUTO: 29 % (ref 34–46.6)
HGB BLD-MCNC: 8.2 G/DL (ref 12–15.9)
HGB BLD-MCNC: 9.4 G/DL (ref 12–15.9)
IMM GRANULOCYTES # BLD AUTO: 0.08 10*3/MM3 (ref 0–0.05)
IMM GRANULOCYTES NFR BLD AUTO: 0.9 % (ref 0–0.5)
LYMPHOCYTES # BLD AUTO: 1.16 10*3/MM3 (ref 0.7–3.1)
LYMPHOCYTES NFR BLD AUTO: 12.7 % (ref 19.6–45.3)
MCH RBC QN AUTO: 28.3 PG (ref 26.6–33)
MCHC RBC AUTO-ENTMCNC: 32.3 G/DL (ref 31.5–35.7)
MCV RBC AUTO: 87.6 FL (ref 79–97)
MONOCYTES # BLD AUTO: 0.65 10*3/MM3 (ref 0.1–0.9)
MONOCYTES NFR BLD AUTO: 7.1 % (ref 5–12)
NEUTROPHILS NFR BLD AUTO: 6.4 10*3/MM3 (ref 1.7–7)
NEUTROPHILS NFR BLD AUTO: 70.4 % (ref 42.7–76)
NRBC BLD AUTO-RTO: 0 /100 WBC (ref 0–0.2)
PLATELET # BLD AUTO: 257 10*3/MM3 (ref 140–450)
PMV BLD AUTO: 10.4 FL (ref 6–12)
POTASSIUM SERPL-SCNC: 3.8 MMOL/L (ref 3.5–5.2)
RBC # BLD AUTO: 2.9 10*6/MM3 (ref 3.77–5.28)
SODIUM SERPL-SCNC: 143 MMOL/L (ref 136–145)
WBC # BLD AUTO: 9.1 10*3/MM3 (ref 3.4–10.8)

## 2021-03-20 PROCEDURE — 85018 HEMOGLOBIN: CPT | Performed by: HOSPITALIST

## 2021-03-20 PROCEDURE — 85025 COMPLETE CBC W/AUTO DIFF WBC: CPT | Performed by: INTERNAL MEDICINE

## 2021-03-20 PROCEDURE — 80048 BASIC METABOLIC PNL TOTAL CA: CPT | Performed by: INTERNAL MEDICINE

## 2021-03-20 PROCEDURE — G0378 HOSPITAL OBSERVATION PER HR: HCPCS

## 2021-03-20 PROCEDURE — 82962 GLUCOSE BLOOD TEST: CPT

## 2021-03-20 PROCEDURE — 97110 THERAPEUTIC EXERCISES: CPT

## 2021-03-20 PROCEDURE — 85014 HEMATOCRIT: CPT | Performed by: HOSPITALIST

## 2021-03-20 RX ORDER — CARVEDILOL 3.12 MG/1
3.12 TABLET ORAL 2 TIMES DAILY WITH MEALS
Qty: 60 TABLET | Refills: 0 | Status: SHIPPED | OUTPATIENT
Start: 2021-03-20 | End: 2021-04-20 | Stop reason: SDUPTHER

## 2021-03-20 RX ORDER — OMEPRAZOLE 40 MG/1
40 CAPSULE, DELAYED RELEASE ORAL 2 TIMES DAILY
Qty: 90 CAPSULE | Refills: 0 | Status: SHIPPED | OUTPATIENT
Start: 2021-03-20 | End: 2021-05-14 | Stop reason: SDUPTHER

## 2021-03-20 RX ADMIN — BUMETANIDE 4 MG: 2 TABLET ORAL at 08:28

## 2021-03-20 RX ADMIN — SODIUM CHLORIDE, PRESERVATIVE FREE 10 ML: 5 INJECTION INTRAVENOUS at 08:31

## 2021-03-20 RX ADMIN — CARVEDILOL 3.12 MG: 6.25 TABLET, FILM COATED ORAL at 08:28

## 2021-03-20 RX ADMIN — CARVEDILOL 3.12 MG: 6.25 TABLET, FILM COATED ORAL at 17:15

## 2021-03-20 RX ADMIN — PANTOPRAZOLE SODIUM 40 MG: 40 TABLET, DELAYED RELEASE ORAL at 05:07

## 2021-03-20 RX ADMIN — GABAPENTIN 100 MG: 100 CAPSULE ORAL at 08:28

## 2021-03-20 RX ADMIN — SODIUM CHLORIDE, PRESERVATIVE FREE 10 ML: 5 INJECTION INTRAVENOUS at 08:30

## 2021-03-20 RX ADMIN — NYSTATIN: 100000 POWDER TOPICAL at 08:31

## 2021-03-20 RX ADMIN — LEVOTHYROXINE SODIUM 25 MCG: 0.03 TABLET ORAL at 06:44

## 2021-03-20 RX ADMIN — PANTOPRAZOLE SODIUM 40 MG: 40 TABLET, DELAYED RELEASE ORAL at 17:15

## 2021-03-20 NOTE — PROGRESS NOTES
Continued Stay Note  [facility]     Patient Name: Miguelina Lopez  MRN: [id number]  Today's Date: 3/20/2021    Admit Date: 3/15/2021    Discharge Plan     Row Name 03/20/21 1634       Plan    Plan Comments  Plans home.  Needs ambulance d/t stairs to enter home.  Discussed with [facility] who states they will be able to transport at 7:30pm.  FRANCOISE Gardner RN        Discharge Codes    No documentation.       Expected Discharge Date and Time     Expected Discharge Date Expected Discharge Time    Mar 20, 2021             Sheela Gardner RN

## 2021-03-20 NOTE — THERAPY TREATMENT NOTE
Patient Name: Miguelina Lopez  : 1945    MRN: [id number]                              Today's Date: 3/20/2021       Admit Date: 3/15/2021    Visit Dx:     ICD-10-CM ICD-9-CM   1. TIA (transient ischemic attack)  G45.9 435.9   2. Bilateral carotid artery stenosis  I65.23 433.10     433.30   3. Confusion  R41.0 298.9   4. OLI (acute kidney injury) (CMS/[facility])  N17.9 584.9   5. Hypernatremia  E87.0 276.0   6. Gastrointestinal hemorrhage with melena  K92.1 578.1   7. Localized edema  R60.0 782.3   8. Weakness generalized  R53.1 780.79     Patient Active Problem List   Diagnosis   • Anxiety disorder   • Arthritis of knee   • Asthma   • Chronic coronary artery disease   • CKD (chronic kidney disease) stage 3, GFR 30-59 ml/min (CMS/HCC)   • Depression   • Diabetic peripheral neuropathy (CMS/HCC)   • Gastroesophageal reflux disease   • Hyperlipidemia   • Insomnia   • Lower gastrointestinal hemorrhage   • Anemia   • OCHOA (obstructive sleep apnea)   • DM type 2 (diabetes mellitus, type 2) (CMS/[facility])   • Essential hypertension   • Hospital discharge follow-up   • Mitral stenosis   • Pulmonary hypertension due to sleep-disordered breathing (CMS/HCC)   • s/p MVR, TV-repair, CABG x2 16   • OLI (acute kidney injury) (CMS/HCC)   • Leukocytosis   • Paroxysmal atrial fibrillation (CMS/[facility])   • Nocturnal hypoxia   • Dermatitis   • Medicare annual wellness visit, initial   • Class 3 severe obesity due to excess calories with serious comorbidity and body mass index (BMI) of 50.0 to 59.9 in adult (CMS/[facility])   • Supplemental oxygen dependent   • Acute on chronic combined systolic and diastolic HF (heart failure) (CMS/[facility])   • Mitral regurgitation   • Aortic stenosis   • Medicare annual wellness visit, subsequent   • Localized edema   • Chronic right-sided congestive heart failure (CMS/[facility])   • Cellulitis of left lower extremity   • Proteinuria   • Bilateral lower extremity edema   • Subclinical hypothyroidism   • Chronic  diastolic heart failure (CMS/HCC)   • Chronic respiratory failure with hypoxia and hypercapnia (CMS/HCC)   • Acute on chronic respiratory failure with hypoxia and hypercapnia (CMS/HCC)   • AV block, 2nd degree   • 1st degree AV block   • Chest pain   • COPD (chronic obstructive pulmonary disease) (CMS/HCC)   • Complete heart block (CMS/HCC)   • Presence of permanent cardiac pacemaker   • Hypothyroidism (acquired)   • Chronic anticoagulation   • Leukocytosis   • Stage 3b chronic kidney disease (CMS/HCC)   • Gastrointestinal hemorrhage with melena   • Acute blood loss anemia   • Metabolic encephalopathy   • Hypernatremia   • Hypokalemia   • TIA (transient ischemic attack)     Past Medical History:   Diagnosis Date   • Acute on chronic respiratory failure with hypoxia and hypercapnia (CMS/HCC)    • OLI (acute kidney injury) (CMS/HCC)    • Anemia    • Anxiety    • Aortic valve stenosis    • Bilateral lower extremity edema    • CHF (congestive heart failure) (CMS/HCC)    • Chronic coronary artery disease    • Class 3 severe obesity due to excess calories in adult (CMS/HCC)    • COPD (chronic obstructive pulmonary disease) (CMS/HCC)    • Depression    • Diabetes mellitus (CMS/HCC)    • Elevated cholesterol    • GERD (gastroesophageal reflux disease)    • Heart murmur    • Hypertension    • Mitral valve insufficiency    • Pneumonia     1/2016   • Pulmonary hypertension (CMS/HCC)     due to sleep disordered breathing   • Sleep apnea     Uses CPAP or oxygen   • Stage 3 chronic kidney disease (CMS/HCC)    • Subclinical hypothyroidism    • Supplemental oxygen dependent    • TIA (transient ischemic attack) 3/15/2021   • Valvular heart disease      Past Surgical History:   Procedure Laterality Date   • CARDIAC CATHETERIZATION     • CARDIAC CATHETERIZATION N/A 6/10/2016    Procedure: Left Heart Cath;  Surgeon: Chace Johnson MD;  Location: [facility] CATH INVASIVE LOCATION;  Service:    • CARDIAC CATHETERIZATION N/A 6/10/2016     Procedure: Right Heart Cath;  Surgeon: Chace Johnson MD;  Location: [facility] CATH INVASIVE LOCATION;  Service:    • CARDIAC CATHETERIZATION N/A 2/5/2021    Procedure: RIGHT AND LEFT HEART CATH;  Surgeon: Antoine Ayers MD;  Location: [facility] CATH INVASIVE LOCATION;  Service: Cardiology;  Laterality: N/A;   • CARDIAC CATHETERIZATION N/A 2/5/2021    Procedure: Coronary angiography;  Surgeon: Antoine Ayers MD;  Location: [facility] CATH INVASIVE LOCATION;  Service: Cardiology;  Laterality: N/A;   • CARDIAC CATHETERIZATION N/A 2/5/2021    Procedure: Left ventriculography- pressures;  Surgeon: Antoine Ayers MD;  Location: [facility] CATH INVASIVE LOCATION;  Service: Cardiology;  Laterality: N/A;   • CARDIAC ELECTROPHYSIOLOGY PROCEDURE N/A 2/2/2021    Procedure: Pacemaker DC new---[employer]tr[employer] MICRA;  Surgeon: Eliazar Bond MD;  Location: [facility] CATH INVASIVE LOCATION;  Service: Cardiology;  Laterality: N/A;   • CARDIAC SURGERY     • CORONARY ARTERY BYPASS GRAFT      2 vessel   • CORONARY ARTERY BYPASS GRAFT WITH MITRAL VALVE REPAIR/REPLACEMENT N/A 6/13/2016    Procedure: INTRAOPERATIVE TARIQ, MIDLINE STERNOTOMY, CORONARY ARTERY BYPASS GRAFTING X  2 UTILIZING ENDOSCOPICALLY HARVESTED LEFT GREATER SAPHENOUS VEIN, MITRAL VALVE REPLACEMENT AND TRICUSPID VALVE REPAIR;  Surgeon: Eliecer Mistry MD;  Location: [facility] OR;  Service:    • CORONARY STENT PLACEMENT  2010    Approx. 6 yrs ago at [facility]   • ENDOSCOPY N/A 3/17/2021    Procedure: ESOPHAGOGASTRODUODENOSCOPY;  Surgeon: Dixon Haas MD;  Location: [facility] ENDOSCOPY;  Service: Gastroenterology;  Laterality: N/A;  PRE: GI BLEED  POST: ANTRAL ULCER   • HEMORRHOIDECTOMY     • HYSTERECTOMY     • MITRAL VALVE REPLACEMENT     • REPLACEMENT TOTAL KNEE Right    • THYROID SURGERY      Cyst removed from thyroid   • VASCULAR SURGERY       General Information     Row Name 03/20/21 1221          Physical Therapy Time and Intention    Document Type   therapy note (daily note)  -     Mode of Treatment  individual therapy;physical therapy  -     Row Name 03/20/21 1221          General Information    Patient Profile Reviewed  yes  -     Existing Precautions/Restrictions  fall;oxygen therapy device and L/min 3L, wears O2 at home  -     Barriers to Rehab  medically complex  -     Row Name 03/20/21 1221          Safety Issues, Functional Mobility    Impairments Affecting Function (Mobility)  endurance/activity tolerance;shortness of breath;strength;balance  -       User Key  (r) = Recorded By, (t) = Taken By, (c) = Cosigned By    Initials Name Provider Type    Pau Lopez PTA Physical Therapy Assistant        Mobility     Row Name 03/20/21 1225          Bed Mobility    Bed Mobility  scooting/bridging  -     Scooting/Bridging [place] (Bed Mobility)  moderate assist (50% patient effort) works hard, but swelling in LE s limits  -     Supine-Sit [place] (Bed Mobility)  minimum assist (75% patient effort);verbal cues;nonverbal cues (demo/gesture) used rail , reports she is wanting to get hospital bed soon  -     Sit-Supine [place] (Bed Mobility)  not tested  -     Assistive Device (Bed Mobility)  bed rails;head of bed elevated  -     Row Name 03/20/21 1225          Sit-Stand Transfer    Sit-Stand [place] (Transfers)  minimum assist (75% patient effort);verbal cues;nonverbal cues (demo/gesture) required 2 attempts, has to rock forw x3 for momentum to stand  -     Assistive Device (Sit-Stand Transfers)  walker, front-wheeled  -     Row Name 03/20/21 1225          Gait/Stairs (Locomotion)    [place] Level (Gait)  contact guard;verbal cues  -     Assistive Device (Gait)  walker, front-wheeled  -     Distance in Feet (Gait)  15ft x2; fatigued after stair training ; followed w/chair as pt also having incr SOA w/mobility  -     Deviations/Abnormal Patterns (Gait)  lina decreased;festinating/shuffling;weight  shifting decreased  -     Bilateral Gait Deviations  forward flexed posture  -     [place] Level (Stairs)  minimum assist (75% patient effort);moderate assist (50% patient effort) had to push pt from behind and cue to lift leg higher to clear step ascending(MOD A), pt descended stairs backward(min A)  -     Handrail Location (Stairs)  left side (ascending)  -     Number of Steps (Stairs)  2-but pt will have a flight to enter her home(~13 steps) ; strength , endurance , incr SOA limiting safety w/perf flight of stairs-suggest ambulance due to falls risk  -     Ascending Technique (Stairs)  step-to-step  -     Descending Technique (Stairs)  step-to-step  -     Stairs, Safety Issues  weight-shifting ability decreased  -     Stairs, Impairments  ROM decreased;strength decreased;impaired balance  -       User Key  (r) = Recorded By, (t) = Taken By, (c) = Cosigned By    Initials Name Provider Type    Pau Lopez, CHELSEY Physical Therapy Assistant        Obj/Interventions    No documentation.       Goals/Plan    No documentation.       Clinical Impression     Row Name 03/20/21 1233          Pain Scale: Numbers Pre/Post-Treatment    Pretreatment Pain Rating  0/10 - no pain  -     Posttreatment Pain Rating  0/10 - no pain  -     Row Name 03/20/21 1233          Plan of Care Review    Plan of Care Reviewed With  patient  -     Outcome Summary  Pt agreed to PT even though she had just returned to bed from chair; pt req min assist to come to sitting, but MOD A for scooting to EOB, educ on currently not having rail at home to assist OOB; pt perf stairs after rest break from amb and could only juni 2 steps w/MOD A (pushing from behind to even get up first step); pt willing to trial stair training, but incr SOA, LE swelling, decr ROM and strength all limiting her safety to perf stairs and pt has flight of stairs to enter home; feel pt would be safest home via ambulance , due to her being high falls  risk  -     Row Name 03/20/21 1233          Therapy Assessment/Plan (PT)    Rehab Potential (PT)  good, to achieve stated therapy goals  -     Criteria for Skilled Interventions Met (PT)  yes  -     Row Name 03/20/21 1239          Vital Signs    Pre SpO2 (%)  99  -JM     O2 Delivery Pre Treatment  supplemental O2  -JM     Intra SpO2 (%)  89  -JM     O2 Delivery Intra Treatment  supplemental O2  -JM     Post SpO2 (%)  91  -     O2 Delivery Post Treatment  supplemental O2  -JM     Row Name 03/20/21 1239          Positioning and Restraints    Pre-Treatment Position  in bed  -JM     Post Treatment Position  chair  -JM     In Chair  reclined;call light within reach;encouraged to call for assist;exit alarm on;notified nsg  -       User Key  (r) = Recorded By, (t) = Taken By, (c) = Cosigned By    Initials Name Provider Type    Pau Lopez PTA Physical Therapy Assistant        Outcome Measures     Row Name 03/20/21 1240          How much help from another person do you currently need...    Turning from your back to your side while in flat bed without using bedrails?  3  -JM     Moving from lying on back to sitting on the side of a flat bed without bedrails?  2  -JM     Moving to and from a bed to a chair (including a wheelchair)?  3  -JM     Standing up from a chair using your arms (e.g., wheelchair, bedside chair)?  3  -JM     Climbing 3-5 steps with a railing?  1  -JM     To walk in hospital room?  3  -JM     AM-PAC 6 Clicks Score (PT)  15  -       User Key  (r) = Recorded By, (t) = Taken By, (c) = Cosigned By    Initials Name Provider Type    Pau Lopez PTA Physical Therapy Assistant        Physical Therapy Education                 Title: PT OT SLP Therapies (Done)     Topic: Physical Therapy (Done)     Point: Mobility training (Done)     Learning Progress Summary           Patient Acceptance, E,TB,D, VU,NR by SINDY at 3/20/2021 1241    Acceptance, E,D, VU,DU,NR by  at 3/19/2021 1615     Acceptance, E,D, VU,NR by MS at 3/18/2021 1023                   Point: Home exercise program (Done)     Learning Progress Summary           Patient Acceptance, E,TB,D, VU,NR by  at 3/20/2021 1241    Acceptance, E,D, VU,DU,NR by MG at 3/19/2021 1615    Acceptance, E,D, VU,NR by MS at 3/18/2021 1023                   Point: Body mechanics (Done)     Learning Progress Summary           Patient Acceptance, E,TB,D, VU,NR by  at 3/20/2021 1241    Acceptance, E,D, VU,DU,NR by MG at 3/19/2021 1615    Acceptance, E,D, VU,NR by MS at 3/18/2021 1023                   Point: Precautions (Done)     Learning Progress Summary           Patient Acceptance, E,TB,D, VU,NR by  at 3/20/2021 1241    Acceptance, E,D, VU,DU,NR by MG at 3/19/2021 1615    Acceptance, E,D, VU,NR by MS at 3/18/2021 1023                               User Key     Initials Effective Dates Name Provider Type Discipline     03/07/18 -  Pau Hensley, PTA Physical Therapy Assistant PT    MS 04/03/18 -  Soto Marrero, PT Physical Therapist PT     02/26/21 -  Leslie Bergman, CHELSEY Student PTA Student PT              PT Recommendation and Plan     Plan of Care Reviewed With: patient  Outcome Summary: Pt agreed to PT even though she had just returned to bed from chair; pt req min assist to come to sitting, but MOD A for scooting to EOB, educ on currently not having rail at home to assist OOB; pt perf stairs after rest break from amb and could only juni 2 steps w/MOD A (pushing from behind to even get up first step); pt willing to trial stair training, but incr SOA, LE swelling, decr ROM and strength all limiting her safety to perf stairs and pt has flight of stairs to enter home; feel pt would be safest home via ambulance , due to her being high falls risk     Time Calculation:   PT Charges     Row Name 03/20/21 1216             Time Calculation    Start Time  1030  -JM      Stop Time  1126  -JM      Time Calculation (min)  56 min  -      PT  Received On  03/20/21  -SINDY      PT - Next Appointment  03/21/21  -SINDY        User Key  (r) = Recorded By, (t) = Taken By, (c) = Cosigned By    Initials Name Provider Type    Pau Lopez PTA Physical Therapy Assistant        Therapy Charges for Today     Code Description Service Date Service Provider Modifiers Qty    [id number] HC PT THER PROC EA 15 MIN 3/20/2021 Pau Hensley PTA GP 4          PT G-Codes  Outcome Measure Options: AM-PAC 6 Clicks Daily Activity (OT)  AM-PAC 6 Clicks Score (PT): 15  AM-PAC 6 Clicks Score (OT): 18  Modified [place]acken Scale: 4 - Moderately severe disability.  Unable to walk without assistance, and unable to attend to own bodily needs without assistance.    Pau Hensley PTA  3/20/2021

## 2021-03-20 NOTE — PLAN OF CARE
Goal Outcome Evaluation:     Progress: improving  Outcome Summary: AOx4. VSS on 2-3L NC. Assist x1 with walker. Voids to the purewick. Nystatin between folds. Barrier cream applied to bottom. PRN xanax given. Pt still anxious,but calmer than last night. Poss. d/c pending. Will continue to monitor patient's progress.

## 2021-03-20 NOTE — PLAN OF CARE
Patient Goal Outcome Evaluation:      Patient wanting to go home; initially told Physical therapist there was >10 steps to get to her apartment. Based of information provided by patient, ambulance transport arranged by CM for Monday. [facility] nurse spoke to granddaughter Carmen, who reported only 4 steps to their 1st floor apartment. Info provided to CM, discharge re-entered, transportation via EMS arranged by CM.

## 2021-03-20 NOTE — PROGRESS NOTES
"Physicians Statement of Medical Necessity for  Ambulance Transportation    GENERAL INFORMATION     Name: Miguelina Lopez  YOB: 1945  Medicare #: [place] Medicare Replacement [id number]  Transport Date: 3/20/2021 (Valid for round trips this date, or for scheduled repetitive trips for 60 days from the date signed below.)  Origin: [facility]  Destination: [facility] [address]  Is the Patient's stay covered under Medicare Part A (PPS/DRG?)Yes  Closest appropriate facility? Yes  If this a hosp-hosp transfer? No  Is this a hospice patient? No    MEDICAL NECESSITY QUESTIONAIRE    Ambulance Transportation is medically necessary only if other means of transportation are contraindicated or would be potentially harmful to the patient.  To meet this requirement, the patient must be either \"bed confined\" or suffer from a condition such that transport by means other than an ambulance is contraindicated by the patient's condition.  The following questions must be answered by the healthcare professional signing below for this form to be valid:     1) Describe the MEDICAL CONDITION (physical and/or mental) of this patient AT THE TIME OF AMBULANCE TRANSPORT that requires the patient to be transported in an ambulance, and why transport by other means is contraindicated by the patient's condition:   Past Medical History:   Diagnosis Date   • Acute on chronic respiratory failure with hypoxia and hypercapnia (CMS/HCC)    • OLI (acute kidney injury) (CMS/HCC)    • Anemia    • Anxiety    • Aortic valve stenosis    • Bilateral lower extremity edema    • CHF (congestive heart failure) (CMS/HCC)    • Chronic coronary artery disease    • Class 3 severe obesity due to excess calories in adult (CMS/HCC)    • COPD (chronic obstructive pulmonary disease) (CMS/HCC)    • Depression    • Diabetes mellitus (CMS/HCC)    • Elevated cholesterol    • GERD (gastroesophageal reflux disease)    • Heart murmur    • " Hypertension    • Mitral valve insufficiency    • Pneumonia     1/2016   • Pulmonary hypertension (CMS/HCC)     due to sleep disordered breathing   • Sleep apnea     Uses CPAP or oxygen   • Stage 3 chronic kidney disease (CMS/HCC)    • Subclinical hypothyroidism    • Supplemental oxygen dependent    • TIA (transient ischemic attack) 3/15/2021   • Valvular heart disease       Past Surgical History:   Procedure Laterality Date   • CARDIAC CATHETERIZATION     • CARDIAC CATHETERIZATION N/A 6/10/2016    Procedure: Left Heart Cath;  Surgeon: Chace Johnson MD;  Location: [facility] CATH INVASIVE LOCATION;  Service:    • CARDIAC CATHETERIZATION N/A 6/10/2016    Procedure: Right Heart Cath;  Surgeon: Chace Johnson MD;  [facility];  Service:    • CARDIAC CATHETERIZATION N/A 2/5/2021    Procedure: RIGHT AND LEFT HEART CATH;  Surgeon: Antoine Ayers MD;  [facility];  Service: Cardiology;  Laterality: N/A;   • CARDIAC CATHETERIZATION N/A 2/5/2021    Procedure: Coronary angiography;  Surgeon: Antoine Ayers MD;  Location: [facility] CATH INVASIVE LOCATION;  Service: Cardiology;  Laterality: N/A;   • CARDIAC CATHETERIZATION N/A 2/5/2021    Procedure: Left ventriculography- pressures;  Surgeon: Antoine Ayers MD;  [facility];  Service: Cardiology;  Laterality: N/A;   • CARDIAC ELECTROPHYSIOLOGY PROCEDURE N/A 2/2/2021    Procedure: Pacemaker DC new---[employer]tronic MICRA;  Surgeon: Eliazar Bond MD;  Location: [facility] CATH INVASIVE LOCATION;  Service: Cardiology;  Laterality: N/A;   • CARDIAC SURGERY     • CORONARY ARTERY BYPASS GRAFT      2 vessel   • CORONARY ARTERY BYPASS GRAFT WITH MITRAL VALVE REPAIR/REPLACEMENT N/A 6/13/2016    Procedure: INTRAOPERATIVE TARIQ, MIDLINE STERNOTOMY, CORONARY ARTERY BYPASS GRAFTING X  2 UTILIZING ENDOSCOPICALLY HARVESTED LEFT GREATER SAPHENOUS VEIN, MITRAL VALVE REPLACEMENT AND TRICUSPID  "VALVE REPAIR;  Surgeon: Eliecer Mistry MD;  Location: [facility] MAIN OR;  Service:    • CORONARY STENT PLACEMENT  2010    Approx. 6 yrs ago at [facility]   • ENDOSCOPY N/A 3/17/2021    Procedure: ESOPHAGOGASTRODUODENOSCOPY;  Surgeon: Dixon Haas MD;  Location: [facility] ENDOSCOPY;  Service: Gastroenterology;  Laterality: N/A;  PRE: GI BLEED  POST: ANTRAL ULCER   • HEMORRHOIDECTOMY     • HYSTERECTOMY     • MITRAL VALVE REPLACEMENT     • REPLACEMENT TOTAL KNEE Right    • THYROID SURGERY      Cyst removed from thyroid   • VASCULAR SURGERY        2) Is this patient \"bed confined\" as defined below?No    To be \"bed confined\" the patient must satisfy all three of the following criteria:  (1) unable to get up from bed without assistance; AND (2) unable to ambulate;  AND (3) unable to sit in a chair or wheelchair.  3) Can this patient safely be transported by car or wheelchair van (I.e., may safely sit during transport, without an attendant or monitoring?)No   4. In addition to completing questions 1-3 above, please check any of the following conditions that apply*:          *Note: supporting documentation for any boxes checked must be maintained in the patient's medical records Other Unable to navigate stairs in order to enter home      SIGNATURE OF PHYSICIAN OR OTHER AUTHORIZED HEALTHCARE PROFESSIONAL    I certify that the above information is true and correct based on my evaluation of this patient, and represent that the patient requires transport by ambulance and that other forms of transport are contraindicated.  I understand that this information will be used by the Centers for Medicare and Medicaid Services (CMS) to support the determiniation of medical necessity for ambulance services, and I represent that I have personal knowledge of the patient's condition at the time of transport.       If this box is checked, I also certify that the patient is physically or mentally incapable of signing the ambulance " service's claim form and that the institution with which I am affiliated has furnished care, services or assistance to the patient.  My signature below is made on behalf of the patient pursuant to 42 .36(b)(4). In accordance with 42 .37, the specific reason(s) that the patient is physically or mentally incapable of signing the claim for is as follows:     Signature of Physician or Healthcare Professional  Date/Time:        (For Scheduled repetitive transport, this form is not valid for transports performed more than 60 days after this date).                                                                                                                                            --------------------------------------------------------------------------------------------  Printed Name and Credentials of Physician or Authorized Healthcare Professional     *Form must be signed by patient's attending physician for scheduled, repetitive transports,.  For non-repetitive ambulance transports, if unable to obtain the signature of the attending physician, any of the following may sign (please select below):     Physician  Clinical Nurse Specialist  Registered Nurse     Physician Assistant  Discharge Planner  Licensed Practical Nurse     Nurse Practitioner

## 2021-03-20 NOTE — PLAN OF CARE
Goal Outcome Evaluation:  Plan of Care Reviewed With: patient     Outcome Summary: Pt agreed to PT even though she had just returned to bed from chair; pt req min assist to come to sitting, but MOD A for scooting to EOB, educ on currently not having rail at home to assist OOB; pt perf stairs after rest break from amb and could only juni 2 steps w/MOD A (pushing from behind to even get up first step); pt willing to trial stair training, but incr SOA, LE swelling, decr ROM and strength all limiting her safety to perf stairs and pt has flight of stairs to enter home; feel pt would be safest home via ambulance , due to her being high falls risk    Patient was wearing a face mask during this therapy encounter. Therapist used appropriate personal protective equipment including eye protection, mask, and gloves.  Mask used was standard procedure mask. Appropriate PPE was worn during the entire therapy session. Hand hygiene was completed before and after therapy session. Patient is not in enhanced droplet precautions.

## 2021-03-20 NOTE — DISCHARGE SUMMARY
Date of Admission: 3/15/2021  Date of Discharge:  3/20/2021  Primary Care Physician: Arcelia Talbot, APRN     Discharge Diagnosis:  Active Hospital Problems    Diagnosis  POA   • **Metabolic encephalopathy [G93.41]  Yes   • TIA (transient ischemic attack) [G45.9]  Yes   • Acute blood loss anemia [D62]  Yes   • Hypernatremia [E87.0]  Yes   • Hypokalemia [E87.6]  No   • Hypothyroidism (acquired) [E03.9]  Yes   • Chronic anticoagulation [Z79.01]  Not Applicable   • Leukocytosis [D72.829]  Yes   • Stage 3b chronic kidney disease (CMS/HCC) [N18.32]  Yes   • Gastrointestinal hemorrhage with melena [K92.1]  Yes   • Presence of permanent cardiac pacemaker [Z95.0]  Yes   • COPD (chronic obstructive pulmonary disease) (CMS/[facility]) [J44.9]  Yes   • Chronic respiratory failure with hypoxia and hypercapnia (CMS/[facility]) [J96.11, J96.12]  Yes   • Chronic diastolic heart failure (CMS/HCC) [I50.32]  Yes   • Bilateral lower extremity edema [R60.0]  Yes   • Chronic right-sided congestive heart failure (CMS/HCC) [I50.812]  Yes   • Supplemental oxygen dependent [Z99.81]  Not Applicable   • Paroxysmal atrial fibrillation (CMS/HCC) [I48.0]  Yes   • Pulmonary hypertension due to sleep-disordered breathing (CMS/HCC) [I27.29, G47.8]  Yes   • Chronic coronary artery disease [I25.10]  Yes   • OCHOA (obstructive sleep apnea) [G47.33]  Yes   • DM type 2 (diabetes mellitus, type 2) (CMS/HCC) [E11.9]  Yes   • Essential hypertension [I10]  Yes      Resolved Hospital Problems   No resolved problems to display.       Presenting Problem/History of Present Illness:  TIA (transient ischemic attack) [G45.9]  Confusion [R41.0]  Hypernatremia [E87.0]  Bilateral carotid artery stenosis [I65.23]  OLI (acute kidney injury) (CMS/HCC) [N17.9]     Hospital Course:  The patient is a 75 y.o. female with a history of HTN, Type 2 DM, PAF on eliquis, 3rd degree heart block s/p PPM, aortic stenosis with (TAVR evaluation ongoing), tricuspid regurgitation s/p repair,  "Hypothyroidism, CKD stage 3b, CAD with previous CABG, COPD with chronic hypoxic respiratory failure and OCHOA on 2L NC at home and [facility] who presented with confusion. Please see admission H&P for further details. She underwent neurology evaluation for possible TIA and had a negative head CT and moderate left carotid artery stenosis-ultimately neurology did not think she had a TIA or CVA and thought this to have been more of a metabolic encephalopathy. They did recommend a statin given her carotid ultrasound findings. She could not have an MRI due to severe claustrophobia and the risks of sedation were thought to outweigh the benefits. She did have some hypoglycemia during the admission even after her antihyperglycemics were stopped but this improved with her diet-her A1C is 5.7% but could be lower than her actual number due to anemia. In either case I think hypoglycemia could have played a role in her confusion and I have asked that she stop her diabetes medications for now and follow her BG at home and keep follow up with her PCP to determine if she needs anything added back.      She did have a mild iron deficiency anemia and underwent GI evaluation with EGD on 3/17/21 which showed a non-bleeding gastric ulcer which was not biopsied due to bleeding risk with recent eliquis use. She was started on BID PPI therapy. H. Pylori antigen was negative. She did have a total of 3 units of PRBCs with good response. She will need to keep follow up with GI as scheduled.    The patient is medically stable and will be discharged home. She is still fairly weak and is not doing well with stairs so she will need transport home. SNU for rehab was offered but the patient and her family did not want to do this and much prefer to go home with home health.     Exam Today:  Blood pressure 126/64, pulse 75, temperature 97.6 °F (36.4 °C), temperature source Oral, resp. rate 18, height 152.4 cm (60\"), weight 120 kg (264 lb 8.8 oz), " SpO2 96 %, not currently breastfeeding.  Vitals and nursing note reviewed. Exam conducted with a chaperone present.   Constitutional:       General: She is not in acute distress.     Appearance: She is obese. She appears chronically ill. She is not toxic-appearing or diaphoretic.   HENT:      Head: Normocephalic and atraumatic.      Right Ear: External ear normal.      Left Ear: External ear normal.      Nose: Nose normal.      Mouth/Throat:      Mouth: Mucous membranes are moist.      Pharynx: Oropharynx is clear.   Eyes:      General: No scleral icterus.        Right eye: No discharge.         Left eye: No discharge.      Extraocular Movements: Extraocular movements intact.      Conjunctiva/sclera: Conjunctivae normal.   Neck:      Comments: Supple, no JVD  Cardiovascular:      Rate and Rhythm: Normal rate and regular rhythm.      Pulses: Normal pulses.   Pulmonary:      Effort: No respiratory distress.      Breath sounds: Normal breath sounds. No wheezing or rales.   Abdominal:      General: Bowel sounds are normal. There is no distension.      Palpations: Abdomen is soft.      Tenderness: There is no abdominal tenderness.   Musculoskeletal:         General: 2-3+ BLE edema (chronic) present. No deformity.      Cervical back: Neck supple. No rigidity.   Lymphadenopathy:      Cervical: No cervical adenopathy.   Skin:     General: Skin is warm and dry.      Capillary Refill: Capillary refill takes less than 2 seconds     Findings: No rash.   Neurological:      General: No focal deficit present.      Mental Status: She is alert and oriented to person, place, and time. Mental status is at baseline.      Cranial Nerves: No cranial nerve deficit.      Coordination: Coordination normal.   Psychiatric:         Mood and Affect: Mood normal.         Behavior: Behavior normal.         Thought Content: Thought content normal.     Procedures Performed:  Procedure(s):  ESOPHAGOGASTRODUODENOSCOPY  03/17 1321 UPPER GI  ENDOSCOPY          Consults:   Consults     Date and Time Order Name Status Description    3/16/2021 11:59 AM Inpatient Gastroenterology Consult Completed     3/16/2021  8:59 AM Inpatient Cardiology Consult Completed     3/15/2021  6:46 PM Inpatient Neurology Consult Stroke Completed     3/15/2021  1:52 PM [facility] (on-call MD unless specified) Details Completed     3/15/2021  1:29 PM Inpatient Neurology Consult Stroke Completed            Discharge Disposition:  Home or Self Care    Discharge Medications:     Discharge Medications      Changes to Medications      Instructions Start Date   apixaban 5 MG tablet tablet  Commonly known as: ELIQUIS  What changed: These instructions start on March 25, 2021. If you are unsure what to do until then, ask your doctor or other care provider.   5 mg, Oral, Every 12 Hours Scheduled   Start Date: March 25, 2021     atorvastatin 20 MG tablet  Commonly known as: LIPITOR  What changed: when to take this   TAKE ONE TABLET BY MOUTH DAILY      carvedilol 3.125 MG tablet  Commonly known as: COREG  What changed:   · medication strength  · how much to take   3.125 mg, Oral, 2 Times Daily With Meals      ipratropium-albuterol 0.5-2.5 mg/3 ml nebulizer  Commonly known as: DUO-NEB  What changed:   · when to take this  · reasons to take this   3 mL, Nebulization, 4 Times Daily - RT      nitroglycerin 0.4 MG SL tablet  Commonly known as: NITROSTAT  What changed:   · when to take this  · reasons to take this  · additional instructions   DISSOLVE 1 TAB UNDER TONGUE FOR CHEST PAIN - IF PAIN REMAINS AFTER 5 MIN, CALL 911 AND REPEAT DOSE. MAX 3 TABS IN 15 MINUTES      nystatin 822847 UNIT/GM cream  Commonly known as: MYCOSTATIN  What changed: additional instructions   Topical, 2 Times Daily, to affected area(s)      omeprazole 40 MG capsule  Commonly known as: priLOSEC  What changed: when to take this   40 mg, Oral, 2 times daily      polyethylene glycol 17 g packet  Commonly known as: MIRALAX  What  changed:   · when to take this  · reasons to take this   17 g, Oral, Daily      vitamin D 1.25 MG (48467 UT) capsule capsule  Commonly known as: ERGOCALCIFEROL  What changed:   · when to take this  · additional instructions   TAKE ONE CAPSULE BY MOUTH ONCE WEEKLY         Continue These Medications      Instructions Start Date   acetaminophen 325 MG tablet  Commonly known as: TYLENOL   650 mg, Oral, Every 4 Hours PRN      ALPRAZolam 1 MG tablet  Commonly known as: XANAX   1 mg, Oral, 2 Times Daily PRN      bisacodyl 5 MG EC tablet  Commonly known as: DULCOLAX   5 mg, Oral, Daily PRN      bumetanide 2 MG tablet  Commonly known as: BUMEX   4 mg, Oral, 2 Times Daily      clotrimazole-betamethasone 1-0.05 % cream  Commonly known as: Lotrisone   Topical, 2 Times Daily      fluticasone 50 MCG/ACT nasal spray  Commonly known as: FLONASE   2 sprays, Each Nare, Daily      fluticasone-salmeterol 250-50 MCG/DOSE DISKUS  Commonly known as: Advair Diskus   1 puff, Inhalation, Daily PRN      gabapentin 100 MG capsule  Commonly known as: NEURONTIN   100 mg, Oral, 2 Times Daily      levothyroxine 25 MCG tablet  Commonly known as: SYNTHROID, LEVOTHROID   25 mcg, Oral, Daily      ondansetron 4 MG tablet  Commonly known as: ZOFRAN   4 mg, Oral, Every 6 Hours PRN      potassium chloride 20 MEQ CR tablet  Commonly known as: K-DUR,KLOR-CON   20 mEq, Oral, Every Other Day      Risaquad-2 capsule capsule   1 capsule, Oral, Daily      sennosides-docusate 8.6-50 MG per tablet  Commonly known as: PERICOLACE   1 tablet, Oral, Daily      sodium chloride 0.65 % nasal spray   2 sprays, Nasal, As Needed      traMADol 50 MG tablet  Commonly known as: ULTRAM   50 mg, Oral, Daily PRN      vilazodone 20 MG tablet tablet  Commonly known as: VIIBRYD   20 mg, Oral, Nightly         Stop These Medications    amLODIPine 5 MG tablet  Commonly known as: NORVASC     glimepiride 1 MG tablet  Commonly known as: AMARYL     Trulicity 0.75 MG/0.5ML solution  pen-injector  Generic drug: Dulaglutide            Discharge Diet:   Diet Instructions     Diet: Regular, Consistent Carbohydrate, Cardiac; Thin      Discharge Diet:  Regular  Consistent Carbohydrate  Cardiac       Fluid Consistency: Thin          Activity at Discharge:   Activity Instructions     Activity as Tolerated            Follow-up Appointments:  Future Appointments   Date Time Provider Department Center   4/14/2021  3:15 PM Arcelia Talbot APRN [facility]     Additional Instructions for the Follow-ups that You Need to Schedule     Ambulatory Referral to Home Health   As directed      Face to Face Visit Date: 3/20/2021    Follow-up provider for Plan of Care?: I treated the patient in an acute care facility and will not continue treatment after discharge.    Follow-up provider: ARCELIA TALBOT [[id number]]    Reason/Clinical Findings: generalized weakness/lymphedema    Describe mobility limitations that make leaving home difficult: generalized weakness/lymphedema    Nursing/Therapeutic Services Requested: Physical Therapy Skilled Nursing    Skilled nursing orders: Wound care dressing/changes    Instructions: BLE compression wraps    PT orders: Strengthening    Frequency: 1 Week 1         Discharge Follow-up with PCP   As directed       Currently Documented PCP:    Arcelia Talbot APRN    PCP Phone Number:    [phone]     Follow Up Details: 1 week         Discharge Follow-up with Specialty: Gastroenterology; 2 Months   As directed      Specialty: Gastroenterology    Follow Up: 2 Months                Soto Mccann MD  03/20/21  12:55 EDT    Time Spent on Discharge Activities: Greater than 30 minutes.

## 2021-03-20 NOTE — PROGRESS NOTES
Brief GI note  Patient has discharge orders today  H. pylori stool is negative  Continue PPI  We will arrange for outpatient office follow-up  Okay for home from GI standpoint.    Riana Milner MD  [facility]

## 2021-03-21 NOTE — OUTREACH NOTE
Prep Survey      Responses   [facility] patient discharged from?  [place]   Is LACE score < 7 ?  No   Emergency Room discharge w/ pulse ox?  No   Eligibility  [facility]   Date of Admission  03/15/21   Date of Discharge  03/20/21   Discharge Disposition  Home or Self Care   Discharge diagnosis  metabolic encephalopathy   Does the patient have one of the following disease processes/diagnoses(primary or secondary)?  Other   Does the patient have Home health ordered?  Yes   What is the Home health agency?    [facility] (continued services)   Is there a DME ordered?  No   Prep survey completed?  Yes          Fabiana Ballard RN

## 2021-03-22 ENCOUNTER — TELEPHONE (OUTPATIENT)
Dept: FAMILY MEDICINE CLINIC | Facility: CLINIC | Age: 76
End: 2021-03-22

## 2021-03-22 ENCOUNTER — TRANSITIONAL CARE MANAGEMENT TELEPHONE ENCOUNTER (OUTPATIENT)
Dept: CALL CENTER | Facility: HOSPITAL | Age: 76
End: 2021-03-22

## 2021-03-22 NOTE — OUTREACH NOTE
Call Center TCM Note      Responses   [facility] patient discharged from?  [place]   Does the patient have one of the following disease processes/diagnoses(primary or secondary)?  Other   TCM attempt successful?  Yes   Call start time  1237   Call end time  1250   Discharge diagnosis  metabolic encephalopathy   Meds reviewed with patient/caregiver?  Yes   Is the patient having any side effects they believe may be caused by any medication additions or changes?  No   Does the patient have all medications ordered at discharge?  Yes   Is the patient taking all medications as directed (includes completed medication regime)?  No   What is preventing the patient from taking all medications as directed?  Other [Pt doesn't have potassium. PCP called some in today. ]   Nursing Interventions  Nurse provided patient education   Does the patient have a primary care provider?   Yes   Does the patient have an appointment with their PCP within 7 days of discharge?  Yes   Comments regarding PCP  Pt has a hospital d/c f/u video visit on 3/24/21 1:00 pm    Has the patient kept scheduled appointments due by today?  N/A   Comments  2nd vaccine is on 3/23/21 at 4:00 pm    What is the Home health agency?   [facility] ([facility])   Has home health visited the patient within 72 hours of discharge?  Yes   Home health comments  HH visited on 3/21/21   DME comments  Wears O2 @ 3 L/min. Has Trilogy   Psychosocial issues?  No   Psychosocial comments  Pt lives with her son in law and granddaughter (31 years old)   Did the patient receive a copy of their discharge instructions?  Yes   Nursing interventions  Reviewed instructions with patient   What is the patient's perception of their health status since discharge?  Improving   Is the patient/caregiver able to teach back signs and symptoms related to disease process for when to call PCP?  Yes   Is the patient/caregiver able to teach back signs and symptoms related to disease process for  when to call 911?  Yes   Is the patient/caregiver able to teach back the hierarchy of who to call/visit for symptoms/problems? PCP, Specialist, Home health nurse, Urgent Care, ED, 911  Yes   TCM call completed?  Yes   Wrap up additional comments  Granddaughter, Carmen, takes very good care of patient.           Poonam Hay RN    3/22/2021, 12:52 EDT

## 2021-03-22 NOTE — PROGRESS NOTES
Case Management Discharge Note      Final Note: Patient DC'd home with [facility]    Provided Post Acute Provider List?: N/A  N/A Provider List Comment: Requests to resume [facility] Home health  Provided Post Acute Provider Quality & Resource List?: N/A  N/A Quality & Resource List Comment: Requests to resume [facility] Home Health    Selected Continued Care - Discharged on 3/20/2021 Admission date: 3/15/2021 - Discharge disposition: Home or Self Care    Destination    No services have been selected for the patient.              Durable Medical Equipment    No services have been selected for the patient.              Dialysis/Infusion    No services have been selected for the patient.              Home Medical Care     Service Provider Selected Services Address Phone Fax Patient Preferred    [facility] [address] [address] [phone] --          Therapy    No services have been selected for the patient.              Community Resources    No services have been selected for the patient.                Selected Continued Care - Prior Encounters Includes selections from prior encounters from 12/15/2020 to 3/20/2021    Discharged on 2/11/2021 Admission date: 2/1/2021 - Discharge disposition: Skilled Nursing Facility (DC - External)    Destination     Service Provider Selected Services Address Phone Fax Patient Preferred    [facility]  Skilled Nursing [address] [address] [phone] --          Home Medical Care     Service Provider Selected Services Address Phone Fax Patient Preferred    [facility] [address] [address] [phone] --                    Transportation Services  Ambulance: [facility]    Final Discharge Disposition Code: 06 - home with home health care

## 2021-03-22 NOTE — TELEPHONE ENCOUNTER
LEO THE PATIENTS GRANDDAUGHTER CALLED IN WANTING TO SCHEDULE A HOSPITAL VISIT AS A VIDEO VISIT?    PLEASE CALL BACK TO [facility]ISE @ [phone]

## 2021-03-22 NOTE — TELEPHONE ENCOUNTER
Caller: Miguelina Lopez    Relationship: Self    Best call back number: [phone]    Medication needed:   Requested Prescriptions     Pending Prescriptions Disp Refills   • potassium chloride (K-DUR,KLOR-CON) 20 MEQ CR tablet       Sig: Take 1 tablet by mouth Every Other Day.       When do you need the refill by: ASAP        Does the patient have less than a 3 day supply:  [x] Yes  [] No    What is the patient's preferred pharmacy: [address] [facility] [address] [phone] [phone]

## 2021-03-23 RX ORDER — POTASSIUM CHLORIDE 20 MEQ/1
20 TABLET, EXTENDED RELEASE ORAL EVERY OTHER DAY
Qty: 90 TABLET | Refills: 0
Start: 2021-03-23 | End: 2021-03-24 | Stop reason: SDUPTHER

## 2021-03-24 ENCOUNTER — TELEMEDICINE (OUTPATIENT)
Dept: FAMILY MEDICINE CLINIC | Facility: CLINIC | Age: 76
End: 2021-03-24

## 2021-03-24 DIAGNOSIS — D50.0 ANEMIA, BLOOD LOSS: ICD-10-CM

## 2021-03-24 DIAGNOSIS — I35.0 AORTIC VALVE STENOSIS, ETIOLOGY OF CARDIAC VALVE DISEASE UNSPECIFIED: ICD-10-CM

## 2021-03-24 DIAGNOSIS — M17.12 PRIMARY OSTEOARTHRITIS OF LEFT KNEE: ICD-10-CM

## 2021-03-24 DIAGNOSIS — R53.1 GENERALIZED WEAKNESS: ICD-10-CM

## 2021-03-24 DIAGNOSIS — R79.89 ELEVATED SERUM CREATININE: ICD-10-CM

## 2021-03-24 DIAGNOSIS — Z09 HOSPITAL DISCHARGE FOLLOW-UP: Primary | ICD-10-CM

## 2021-03-24 DIAGNOSIS — E87.6 HYPOKALEMIA: ICD-10-CM

## 2021-03-24 PROCEDURE — 1111F DSCHRG MED/CURRENT MED MERGE: CPT | Performed by: NURSE PRACTITIONER

## 2021-03-24 PROCEDURE — 99496 TRANSJ CARE MGMT HIGH F2F 7D: CPT | Performed by: NURSE PRACTITIONER

## 2021-03-24 RX ORDER — POTASSIUM CHLORIDE 20 MEQ/1
20 TABLET, EXTENDED RELEASE ORAL EVERY OTHER DAY
Qty: 90 TABLET | Refills: 0
Start: 2021-03-24 | End: 2021-03-24 | Stop reason: SDUPTHER

## 2021-03-24 RX ORDER — POTASSIUM CHLORIDE 20 MEQ/1
20 TABLET, EXTENDED RELEASE ORAL EVERY OTHER DAY
Qty: 90 TABLET | Refills: 0 | Status: SHIPPED | OUTPATIENT
Start: 2021-03-24 | End: 2021-09-22 | Stop reason: SDUPTHER

## 2021-03-24 NOTE — PROGRESS NOTES
Toi Lopez is a 75 y.o. female.     No chief complaint on file.     CC: hospital f/u    HPI  Video visit for hospital f/u.  Granddaughter who does much of her care is present for video visit.  Granddaughter reports that patient had a sudden onset of confusion and weakness on Monday and 911 was called and patient was transported to the hospital.  Her ultimate diagnosis was metabolic encephalopathy.  While there she was seen by cardiology and GI.  She was found to have a nonbleeding ulcer and she is incidentally on Eliquis which was continued.  She was given a couple units of PRBC and patient reports she felt much better after this.  She is on a PPI twice daily and has a follow-up with GI.  Her diabetic medications were stopped and family is monitoring her blood sugars at home.  They are currently reporting fasting blood sugars between 100 and 120.  They are also checking oxygen which has been good on home oxygen.  She is also having very good blood pressures and heart rate has been running 75-80 since she had her pacer placed.    She will follow up with cardiology as an outpatient.  Her bilateral leg edema and lymphedema has been improved since hospitalization and she is currently taking Bumex twice daily.  She was found to be hypokalemic and is now on potassium every other day.  She does have CKD.     Her appetite is good and her energy is good since leaving hospital.  She is having normal bowel movements.    It is still a struggle for her to get into bed despite physical therapy working with her.  Her bed is a little bit high and she is not able to safely get into it alone.  She has bilateral significant knee arthritis and is morbidly obese and has chronic lymphedema in both legs and needs to keep her legs elevated but is not able to get in bed at night to do this.  She also cannot manipulate her legs up onto pillows without assistance.  And also being on oxygen she cannot have her head down much  or she cannot breathe.    She received her second Covid vaccine while in the hospital.  Social History     Tobacco Use   • Smoking status: Never Smoker   • Smokeless tobacco: Never Used   • Tobacco comment: no caffeine    Substance Use Topics   • Alcohol use: No   • Drug use: No       The following portions of the patient's history were reviewed and updated as appropriate: allergies, current medications, past family history, past medical history, past social history, past surgical history and problem list.    Review of Systems   Constitutional: Negative for appetite change, chills, fatigue and fever.   HENT: Negative for sore throat and trouble swallowing.    Respiratory: Negative for cough, shortness of breath (At her baseline) and wheezing.    Cardiovascular: Negative for chest pain and palpitations.   Gastrointestinal: Negative for abdominal pain, blood in stool, constipation, diarrhea, nausea and vomiting.   Endocrine: Negative for polydipsia and polyuria.   Genitourinary: Negative for dysuria and hematuria.   Musculoskeletal: Positive for arthralgias, back pain, gait problem, joint swelling and myalgias.   Skin: Negative for rash and wound.   Neurological: Positive for weakness and numbness. Negative for dizziness and headaches.   Psychiatric/Behavioral: Negative for dysphoric mood and sleep disturbance. The patient is not nervous/anxious.        Objective   not currently breastfeeding.  There is no height or weight on file to calculate BMI.    Physical Exam   Limited by video visit.  She is well appearing and does not seem to be distressed.  She seems alert and oriented and her mood and affect are normal, good historian of medical history.  No cough or dyspnea appreciated, able to complete sentences without problem.  She is wearing nasal cannula for home oxygen.  Legs still appears, but cannot visualize well due to wraps,  swollen but not as bad as previously.  She does not appear weak or pale.        Assessment   Problem List Items Addressed This Visit        Cardiac and Vasculature    Aortic stenosis       Health Encounters    Hospital discharge follow-up - Primary      Other Visit Diagnoses     Primary osteoarthritis of left knee        Generalized weakness               Procedures           Impression and Plan: We reviewed her hospitalization and course as well as reconciled medications.  We reviewed blood work.  Her last BMP showed an elevated of creatinine of 1.46 which is about her baseline.  Calcium was low at 8.2.  Her hemoglobin was 8.2 but does not appear that she has had a post hospitalization CBC for monitoring.  Her hematocrit was also on the low side.  No f/u labs for monitoring.     meds for T2DM stopped and FM  will monitor this-goal of 100-120 FBS and if checking post prandial goal of 2 hr post prandial of 160 or less.  They will message these results with BP, HR, sats via [employer] in 2 weeks, sooner if problems noted.     Cannot get in and out of bed due to b/l leg swelling, weakness, b/l arthritis in both knees. Continues PT, making slow progress.     Hospital bed with no crank needed for home.  PT thinks this will be the only way she can get into bed-o/w sleeping in recliner which can cause her some back problems.      She will need labs in appr 1 week to f/u K, creatinine, and CBC.     Health Maintenance Due   Topic Date Due   • DXA SCAN  Never done   • TDAP/TD VACCINES (1 - Tdap) Never done   • ZOSTER VACCINE (2 of 2) 06/26/2012   • MAMMOGRAM  08/14/2019   • URINE MICROALBUMIN  12/16/2020   • DIABETIC EYE EXAM  03/04/2021      Patient gave consent today for a telehealth video visit as following recommendations of our governor and CDC during the COVID-19 pandemic.    20 min was spent in discussion with pt       EMR Dragon/Transcription disclaimer:   Much of this encounter note is an electronic transcription/translation of spoken language to printed text. The electronic translation of  spoken language may permit erroneous, or at times, nonsensical words or phrases to be inadvertently transcribed; Although I have reviewed the note for such errors, some may still exist.        Zoom platform used with good A/V quality.

## 2021-03-28 ENCOUNTER — READMISSION MANAGEMENT (OUTPATIENT)
Dept: CALL CENTER | Facility: HOSPITAL | Age: 76
End: 2021-03-28

## 2021-03-28 NOTE — OUTREACH NOTE
Medical Week 2 Survey      Responses   [facility] patient discharged from?  [place]   Does the patient have one of the following disease processes/diagnoses(primary or secondary)?  Other   Week 2 attempt successful?  Yes   Call start time  1332   Discharge diagnosis  metabolic encephalopathy   Call end time  1333   Meds reviewed with patient/caregiver?  Yes   Is the patient having any side effects they believe may be caused by any medication additions or changes?  No   Does the patient have all medications ordered at discharge?  Yes   Is the patient taking all medications as directed (includes completed medication regime)?  Yes   Does the patient have a primary care provider?   Yes   Does the patient have an appointment with their PCP within 7 days of discharge?  Yes   Has the patient kept scheduled appointments due by today?  Yes   Psychosocial issues?  No   Did the patient receive a copy of their discharge instructions?  Yes   Nursing interventions  Reviewed instructions with patient, Educated on MyChart   What is the patient's perception of their health status since discharge?  Improving   Is the patient/caregiver able to teach back signs and symptoms related to disease process for when to call PCP?  Yes   Is the patient/caregiver able to teach back signs and symptoms related to disease process for when to call 911?  Yes   Is the patient/caregiver able to teach back the hierarchy of who to call/visit for symptoms/problems? PCP, Specialist, Home health nurse, Urgent Care, ED, 911  Yes   If the patient is a current smoker, are they able to teach back resources for cessation?  Not a smoker   Week 2 Call Completed?  Yes          Emani Toussaint RN

## 2021-04-05 ENCOUNTER — READMISSION MANAGEMENT (OUTPATIENT)
Dept: CALL CENTER | Facility: HOSPITAL | Age: 76
End: 2021-04-05

## 2021-04-05 DIAGNOSIS — M79.604 BILATERAL LEG PAIN: ICD-10-CM

## 2021-04-05 DIAGNOSIS — M79.605 BILATERAL LEG PAIN: ICD-10-CM

## 2021-04-05 DIAGNOSIS — G89.29 CHRONIC PAIN OF LEFT KNEE: ICD-10-CM

## 2021-04-05 DIAGNOSIS — M17.12 PRIMARY OSTEOARTHRITIS OF LEFT KNEE: Primary | ICD-10-CM

## 2021-04-05 DIAGNOSIS — M25.562 CHRONIC PAIN OF LEFT KNEE: ICD-10-CM

## 2021-04-05 RX ORDER — ERGOCALCIFEROL 1.25 MG/1
50000 CAPSULE ORAL
Qty: 12 CAPSULE | Refills: 0 | Status: SHIPPED | OUTPATIENT
Start: 2021-04-05 | End: 2021-09-03 | Stop reason: SDUPTHER

## 2021-04-05 RX ORDER — TRAMADOL HYDROCHLORIDE 50 MG/1
50 TABLET ORAL DAILY PRN
Qty: 30 TABLET | Refills: 2 | Status: SHIPPED | OUTPATIENT
Start: 2021-04-05 | End: 2021-11-04 | Stop reason: SDUPTHER

## 2021-04-05 NOTE — OUTREACH NOTE
Prep Survey      Responses   [facility] patient discharged from?  [place]   Discharge diagnosis  metabolic encephalopathy   Does the patient have one of the following disease processes/diagnoses(primary or secondary)?  Other   What is the Home health agency?   [facility] (continued services)          Naila Madrigal RN

## 2021-04-05 NOTE — OUTREACH NOTE
Medical Week 3 Survey      Responses   [facility] patient discharged from?  [place]   Does the patient have one of the following disease processes/diagnoses(primary or secondary)?  Other   Week 3 attempt successful?  Yes   Call start time  1140   Call end time  1142   Discharge diagnosis  metabolic encephalopathy   Meds reviewed with patient/caregiver?  Yes   Is the patient having any side effects they believe may be caused by any medication additions or changes?  No   Does the patient have all medications ordered at discharge?  Yes   Is the patient taking all medications as directed (includes completed medication regime)?  Yes   Does the patient have a primary care provider?   Yes   Does the patient have an appointment with their PCP within 7 days of discharge?  Yes   Has the patient kept scheduled appointments due by today?  Yes   What is the Home health agency?   [facility] ([facility] services)   Has home health visited the patient within 72 hours of discharge?  Yes   Psychosocial issues?  No   Did the patient receive a copy of their discharge instructions?  Yes   Nursing interventions  Reviewed instructions with patient   What is the patient's perception of their health status since discharge?  Improving   Is the patient/caregiver able to teach back signs and symptoms related to disease process for when to call PCP?  Yes   Is the patient/caregiver able to teach back signs and symptoms related to disease process for when to call 911?  Yes   Is the patient/caregiver able to teach back the hierarchy of who to call/visit for symptoms/problems? PCP, Specialist, Home health nurse, Urgent Care, ED, 911  Yes   Week 3 Call Completed?  Yes          Naila Madrigal RN

## 2021-04-07 DIAGNOSIS — G63 POLYNEUROPATHY ASSOCIATED WITH UNDERLYING DISEASE (HCC): Primary | ICD-10-CM

## 2021-04-07 RX ORDER — BUMETANIDE 2 MG/1
4 TABLET ORAL 2 TIMES DAILY
Qty: 120 TABLET | Refills: 2 | Status: SHIPPED | OUTPATIENT
Start: 2021-04-07 | End: 2021-07-06

## 2021-04-07 RX ORDER — GABAPENTIN 100 MG/1
CAPSULE ORAL
Qty: 60 CAPSULE | Refills: 2 | Status: SHIPPED | OUTPATIENT
Start: 2021-04-07 | End: 2021-07-02

## 2021-04-09 ENCOUNTER — TELEPHONE (OUTPATIENT)
Dept: FAMILY MEDICINE CLINIC | Facility: CLINIC | Age: 76
End: 2021-04-09

## 2021-04-09 RX ORDER — CLOTRIMAZOLE AND BETAMETHASONE DIPROPIONATE 10; .64 MG/G; MG/G
CREAM TOPICAL 2 TIMES DAILY
Qty: 45 G | Refills: 2 | Status: SHIPPED | OUTPATIENT
Start: 2021-04-09 | End: 2022-01-01 | Stop reason: HOSPADM

## 2021-04-09 NOTE — TELEPHONE ENCOUNTER
Caller: Sofie Carmen    Relationship: Emergency Contact    Best call back number:[phone]    What medication are you requesting: CREAM FOR A RED RASH, HOT TO THE TOUCH    What are your current symptoms: RED HOT RASH    How long have you been experiencing symptoms: TWO MONTHS  Have you had these symptoms before:    [x] Yes  [] No    Have you been treated for these symptoms before:   [x] Yes  [] No    If a prescription is needed, what is your preferred pharmacy and phone number: [facility] [address] [address] [facility] [phone] [facility] [phone]      Additional notes: PATIENT'S GRANDDAUGHTER STATED THAT CARIN CARPENTERALEN HAS WROTE A COUPLE DIFFERENT CREAMS FOR THIS RASH AND IT HAS NOT HELPED AT ALL. SHE STATED THAT IT IS ALL UNDER HER BELLY AND IS RED, HOT TO THE TOUCH, EXTREMELY ITCHY. SHE WOULD LIKE CARIN FOSTER TO CALL SOMETHING IN FOR HER UNTIL HER APPOINTMENT ON 04-.    PLEASE ADVISE

## 2021-04-13 ENCOUNTER — READMISSION MANAGEMENT (OUTPATIENT)
Dept: CALL CENTER | Facility: HOSPITAL | Age: 76
End: 2021-04-13

## 2021-04-13 NOTE — OUTREACH NOTE
Medical Week 4 Survey      Responses   [facility] patient discharged from?  [place]   Does the patient have one of the following disease processes/diagnoses(primary or secondary)?  Other   Week 4 attempt successful?  Yes   Call start time  0816   Call end time  0819   Discharge diagnosis  metabolic encephalopathy   Meds reviewed with patient/caregiver?  Yes   Is the patient taking all medications as directed (includes completed medication regime)?  Yes   Has the patient kept scheduled appointments due by today?  Yes   Comments  Cardiology 04/21/2021   Is the patient still receiving Home Health Services?  Yes   Home health comments  SN    PT   DME comments  Wears O2 @ 3 L/min. Has Trilogy   What is the patient's perception of their health status since discharge?  Improving   Week 4 Call Completed?  Yes   Would the patient like one additional call?  Yes          Roseline Walsh RN

## 2021-04-20 ENCOUNTER — TELEPHONE (OUTPATIENT)
Dept: CARDIOLOGY | Facility: CLINIC | Age: 76
End: 2021-04-20

## 2021-04-20 NOTE — TELEPHONE ENCOUNTER
Caller: [facility] [address] [address] [facility] [phone] [facility] [phone] FX    Relationship: Pharmacy    Best call back number: [phone]  Medication needed:   Requested Prescriptions     Pending Prescriptions Disp Refills   • carvedilol (COREG) 3.125 MG tablet 60 tablet 0     Sig: Take 1 tablet by mouth 2 (Two) Times a Day With Meals.       When do you need the refill by: TODAY  What additional details did the patient provide when requesting the medication:     Does the patient have less than a 3 day supply:  [x] Yes  [] No    What is the patient's preferred pharmacy: [facility] [address] [facility] [phone] [facility] [phone] FX

## 2021-04-21 ENCOUNTER — READMISSION MANAGEMENT (OUTPATIENT)
Dept: CALL CENTER | Facility: HOSPITAL | Age: 76
End: 2021-04-21

## 2021-04-21 ENCOUNTER — EPISODE CHANGES (OUTPATIENT)
Dept: CASE MANAGEMENT | Facility: OTHER | Age: 76
End: 2021-04-21

## 2021-04-21 RX ORDER — CARVEDILOL 3.12 MG/1
3.12 TABLET ORAL 2 TIMES DAILY WITH MEALS
Qty: 60 TABLET | Refills: 3 | Status: SHIPPED | OUTPATIENT
Start: 2021-04-21 | End: 2021-04-21 | Stop reason: SDUPTHER

## 2021-04-21 RX ORDER — CARVEDILOL 3.12 MG/1
3.12 TABLET ORAL 2 TIMES DAILY WITH MEALS
Qty: 60 TABLET | Refills: 5 | Status: SHIPPED | OUTPATIENT
Start: 2021-04-21 | End: 2021-07-17 | Stop reason: HOSPADM

## 2021-04-21 NOTE — OUTREACH NOTE
Medical Week 5 Survey      Responses   [facility] patient discharged from?  [place]   Does the patient have one of the following disease processes/diagnoses(primary or secondary)?  Other   Week 5 attempt successful?  Yes   Call start time  0718   Call end time  0726   Discharge diagnosis  metabolic encephalopathy   Meds reviewed with patient/caregiver?  Yes   Is the patient taking all medications as directed (includes completed medication regime)?  Yes   Has the patient kept scheduled appointments due by today?  Yes   Comments  Telehealth appt only   Is the patient still receiving Home Health Services?  Yes   Home health comments  one more visit   What is the patient's perception of their health status since discharge?  Improving   Graduated  Yes   Is the patient interested in additional calls from an ambulatory ?  NOTE:  applies to high risk patients requiring additional follow-up.  Yes   Did the patient feel the follow up calls were helpful during their recovery period?  Yes   Was the number of calls appropriate?  Yes   Wrap up additional comments  Today her heart pills run out,  Pharmacy has sent notice and he has not responded back.  Will send notice for her.           Puja Schwartz RN

## 2021-04-29 NOTE — TELEPHONE ENCOUNTER
Pharmacy calling for refill.  I called them back, told them we have the refill info, however, RL is currently in clinic, and he will take care of the Eliquis refill.    Pt is out of Eliquis.    Thank you,    Nelia Walters, CMA

## 2021-05-03 ENCOUNTER — PATIENT OUTREACH (OUTPATIENT)
Dept: CASE MANAGEMENT | Facility: OTHER | Age: 76
End: 2021-05-03

## 2021-05-03 NOTE — OUTREACH NOTE
Care Plan Note      Responses   Lifestyle Goals  Routine follow-up with doctor(s), Routine eye exam, Routine foot care, Self monitor blood pressure, Self monitor blood sugar, Medication management, Record weight daily, Fewer exacerbations, Eat a healthy diet, Have more energy   Barriers  Disease education   Self Management  Home BP Monitoring, Home Glucose Monitoring, Daily Journaling, Check Weight Daily, Medication Adherence   Suggested Appointments  Other (See Comment)   Annual Wellness Visit:   Patient Has Completed   Specific Disease Process Teaching  -- [falls prevention: have walker present when exiting electric recliner,  edema prevention: take Bumex as prescribed, control water intake and ensure not exceeding 8 glasses per day, keep legs elevated as much as possible. ]   Does patient have depression diagnosis?  Yes   Ed Visits past 12 months:  None   Hospitalizations past 12 months  2 or 3   Discharge destination:  Home   Medication Adherence  Medications understood   Goal Progress  Making Progress Toward Goal(s)   Readiness Scale  6   Confidence Scale  6   Health Literacy  Good        The main concerns and/or symptoms the patient would like to address are: Spoke with patient's daughter regarding patient's health and wellness and Ambulatory Care Management post Call Center Graduation. Daughter states patient appreciates the outreach calls and she would like them to continue. The only continuing problem for the patient is her dependent edema. Patient has an appointment with cardiology next month daughter plans on reviewing the edema with the cardiologist. They will also be reviewing the TAVR procedure during that appointment.    Education/instruction provided by Care Coordinator: Introduced self, explained ACM RN role and provided contact information. Reviewed methods to assist with dependent edema: control water intake, take Bumex as prescribed, elevate legs when possible. Patient has an electric recliner  and keeps her feet elevated. Patient has walker and keeps it with her as well to prevent falls when ambulating and exiting electric recliner. No further needs or concerns at this time. Follow up call to review Ambulatory Care Assessment next week.     Follow Up Outreach Due: 1 week     Shania Severino RN  Ambulatory     5/3/2021, 16:13 EDT

## 2021-05-10 ENCOUNTER — PATIENT OUTREACH (OUTPATIENT)
Dept: CASE MANAGEMENT | Facility: OTHER | Age: 76
End: 2021-05-10

## 2021-05-10 NOTE — OUTREACH NOTE
Patient Outreach Note    Introduced self, explained ACM RN role. Spoke with granddaughter regarding patient's health and wellness. No needs at this time. Granddaughter states everything is going okay. Patient lives with son-in-law and has the support of her children and grandchildren. Granddaughter fills her medication planner weekly. Patient has been using walker and has not had any recent falls. No problems with medications, except refills take time due to communication barriers such as requests being sent to rehab physicians instead of PCP. Granddaughter states she prepares for refill delays and requests refills early. No needs regarding transportation, food, or finances reported. Patient has living will per granddaughter. ACM recommended a copy be brought to Antione's office to be entered into Epic. Patient has COVID vaccine up to date. Follow up scheduled in 1 week to review care gaps and other needs.     Shania Severino RN  Ambulatory     5/10/2021, 13:12 EDT

## 2021-05-10 NOTE — OUTREACH NOTE
Care Coordination Assessment    Documented/Reviewed By: Shania Severino RN Date/time: 5/10/2021  1:09 PM   Assessment completed with:  (Comment: GRANDCHILD)  Enrolled in care management program: Yes  Living arrangement: children (Comment: Lives with son-in-law )  Support system: family  Type of residence: apartment  Home care services: No  Equipment used at home: walker (Comment: electric recliner)  Bed or wheelchair confined: No  Inadequate nutrition: No  Medication adherence problem: No  Experiencing side effects from current medications: No  History of fall(s) in last 6 months: No (Comment: [facility] states patient is doing well recently and hasn't fallen. )  Difficulty keeping appointments: No (Comment: Children transport as needed to appointments. )  Family aware of the patient's advance care planning wishes: Yes (Comment: Instructed family to provide Living Will to PCP to be entered into EPIC. )  Scientology or spiritual beliefs that impact treatment: No  Chronic pain: No

## 2021-05-14 ENCOUNTER — TELEPHONE (OUTPATIENT)
Dept: FAMILY MEDICINE CLINIC | Facility: CLINIC | Age: 76
End: 2021-05-14

## 2021-05-14 RX ORDER — OMEPRAZOLE 40 MG/1
40 CAPSULE, DELAYED RELEASE ORAL 2 TIMES DAILY
Qty: 60 CAPSULE | Refills: 0 | Status: SHIPPED | OUTPATIENT
Start: 2021-05-14 | End: 2021-06-23

## 2021-05-14 RX ORDER — OMEPRAZOLE 40 MG/1
40 CAPSULE, DELAYED RELEASE ORAL 2 TIMES DAILY
Qty: 180 CAPSULE | Refills: 0 | Status: CANCELLED | OUTPATIENT
Start: 2021-05-14

## 2021-05-14 NOTE — TELEPHONE ENCOUNTER
Caller: Carmen Carrizales    Relationship: Emergency Contact    Best call back number:  [phone]   Medication needed:   Requested Prescriptions     Pending Prescriptions Disp Refills   • omeprazole (priLOSEC) 40 MG capsule 90 capsule 0     Sig: Take 1 capsule by mouth 2 (two) times a day.       When do you need the refill by: ASAP     What additional details did the patient provide when requesting the medication: PATIENT HAS BEEN OUT FOR A WEEK NOW .   Does the patient have less than a 3 day supply:  [x] Yes  [] No    What is the patient's preferred pharmacy: [facility] [address] [facility] [phone] [facility] [phone]

## 2021-05-25 PROCEDURE — 93296 REM INTERROG EVL PM/IDS: CPT | Performed by: INTERNAL MEDICINE

## 2021-05-25 PROCEDURE — 93294 REM INTERROG EVL PM/LDLS PM: CPT | Performed by: INTERNAL MEDICINE

## 2021-05-27 RX ORDER — APIXABAN 5 MG/1
TABLET, FILM COATED ORAL
Qty: 60 TABLET | Refills: 0 | Status: SHIPPED | OUTPATIENT
Start: 2021-05-27 | End: 2021-06-28

## 2021-06-04 ENCOUNTER — PATIENT OUTREACH (OUTPATIENT)
Dept: CASE MANAGEMENT | Facility: OTHER | Age: 76
End: 2021-06-04

## 2021-06-04 NOTE — OUTREACH NOTE
Care Coordination Note     Attempted follow up call x 4 with no success with outreach attempts to review patient's needs and care gaps. Patient being transitioned to monitoring status. Follow up scheduled in 1 month. PCP updated of status change.     Shania Severino RN  Ambulatory     6/4/2021, 16:08 EDT

## 2021-06-09 ENCOUNTER — TELEPHONE (OUTPATIENT)
Dept: FAMILY MEDICINE CLINIC | Facility: CLINIC | Age: 76
End: 2021-06-09

## 2021-06-09 NOTE — TELEPHONE ENCOUNTER
Caller: Carmen Carrizales    Relationship: Emergency Contact    Best call back number: [phone]    Medication needed:   Requested Prescriptions     Pending Prescriptions Disp Refills   • vilazodone (VIIBRYD) 20 MG tablet tablet 7 tablet      Sig: Take 1 tablet by mouth Every Night.       When do you need the refill by: ASAP    What additional details did the patient provide when requesting the medication:     Does the patient have less than a 3 day supply:  [x] Yes  [] No    What is the patient's preferred pharmacy: [id number] [place] [address] [facility] [phone] [facility] [phone]

## 2021-06-10 RX ORDER — VILAZODONE HYDROCHLORIDE 20 MG/1
20 TABLET ORAL NIGHTLY
Qty: 30 TABLET | Refills: 0 | Status: SHIPPED | OUTPATIENT
Start: 2021-06-10 | End: 2021-07-08

## 2021-06-16 ENCOUNTER — OFFICE VISIT (OUTPATIENT)
Dept: CARDIOLOGY | Facility: CLINIC | Age: 76
End: 2021-06-16

## 2021-06-16 VITALS
OXYGEN SATURATION: 99 % | HEART RATE: 75 BPM | WEIGHT: 278 LBS | BODY MASS INDEX: 54.58 KG/M2 | DIASTOLIC BLOOD PRESSURE: 70 MMHG | HEIGHT: 60 IN | SYSTOLIC BLOOD PRESSURE: 140 MMHG

## 2021-06-16 DIAGNOSIS — I44.2 COMPLETE HEART BLOCK (HCC): ICD-10-CM

## 2021-06-16 DIAGNOSIS — Z95.2 HISTORY OF MVR WITH CARDIOPULMONARY BYPASS: ICD-10-CM

## 2021-06-16 DIAGNOSIS — E66.01 CLASS 3 SEVERE OBESITY DUE TO EXCESS CALORIES WITH SERIOUS COMORBIDITY AND BODY MASS INDEX (BMI) OF 50.0 TO 59.9 IN ADULT (HCC): ICD-10-CM

## 2021-06-16 DIAGNOSIS — I50.812 CHRONIC RIGHT-SIDED CONGESTIVE HEART FAILURE (HCC): ICD-10-CM

## 2021-06-16 DIAGNOSIS — I35.0 NONRHEUMATIC AORTIC VALVE STENOSIS: ICD-10-CM

## 2021-06-16 DIAGNOSIS — I35.0 AORTIC STENOSIS, SEVERE: Primary | ICD-10-CM

## 2021-06-16 PROCEDURE — 99214 OFFICE O/P EST MOD 30 MIN: CPT | Performed by: INTERNAL MEDICINE

## 2021-06-16 PROCEDURE — 93000 ELECTROCARDIOGRAM COMPLETE: CPT | Performed by: INTERNAL MEDICINE

## 2021-06-16 NOTE — PROGRESS NOTES
Miguelina Lopez  1945  Date of Office Visit: 06/16/21  Encounter Provider: Ayan Pacheco MD  Place of Service: [facility] CARDIOLOGY      CHIEF COMPLAINT:   Severe degenerative aortic valve stenosis  Chronic diastolic heart failure  Chronic hypoxemic respiratory failure.    HISTORY OF PRESENT ILLNESS:  75-year-old female patient of Dr. Ayers with a medical history of chronic diastolic heart failure, paroxysmal atrial fibrillation/flutter, chronic kidney disease, essential hypertension, coronary disease with prior CABG, mitral valve replacement with bioprosthetic mitral valve and tricuspid valve repair.  She also has a history of diabetes mellitus and COPD.  She does have COPD with a FEV1 of just slightly over 1.  She is on a trilogy at night and nasal cannula during the day.  She is also pretty immobile and uses a wheelchair frequently.  She has been in with volume overload and altered mental status which on my review is more of a metabolic encephalopathy than anything concerning for TIA.  She has been staying out of the hospital and her renal function on last check as of March was a creatinine of 1.46.  She still complains of moderate in intensity shortness of breath with any activity.  She does not have orthopnea secondary to using a trilogy at night.  She has been evaluated by the cardiac surgery team and felt to have severe symptomatic aortic valve stenosis and to be a poor reoperation candidate.  She has not had a CTA      Review of Systems   Constitutional: Negative for fever and malaise/fatigue.   HENT: Negative for nosebleeds and sore throat.    Eyes: Negative for blurred vision and double vision.   Cardiovascular: Positive for dyspnea on exertion. Negative for chest pain, claudication, palpitations and syncope.   Respiratory: Negative for cough, shortness of breath and snoring.    Endocrine: Negative for cold intolerance, heat intolerance and polydipsia.    Skin: Negative for itching, poor wound healing and rash.   Musculoskeletal: Negative for joint pain, joint swelling, muscle weakness and myalgias.   Gastrointestinal: Negative for abdominal pain, melena, nausea and vomiting.   Neurological: Negative for light-headedness, loss of balance, seizures, vertigo and weakness.   Psychiatric/Behavioral: Negative for altered mental status and depression.          Past Medical History:   Diagnosis Date   • Acute on chronic respiratory failure with hypoxia and hypercapnia (CMS/[facility])    • OLI (acute kidney injury) (CMS/[facility])    • Anemia    • Anxiety    • Aortic valve stenosis    • Bilateral lower extremity edema    • CHF (congestive heart failure) (CMS/[facility])    • Chronic coronary artery disease    • Class 3 severe obesity due to excess calories in adult (CMS/[facility])    • COPD (chronic obstructive pulmonary disease) (CMS/[facility])    • Depression    • Diabetes mellitus (CMS/[facility])    • Elevated cholesterol    • GERD (gastroesophageal reflux disease)    • Heart murmur    • Hypertension    • Mitral valve insufficiency    • Pneumonia     1/2016   • Pulmonary hypertension (CMS/[facility])     due to sleep disordered breathing   • Sleep apnea     Uses CPAP or oxygen   • Stage 3 chronic kidney disease (CMS/[facility])    • Subclinical hypothyroidism    • Supplemental oxygen dependent    • TIA (transient ischemic attack) 3/15/2021   • Valvular heart disease        The following portions of the patient's history were reviewed and updated as appropriate: Social history , Family history and Surgical history     Current Outpatient Medications on File Prior to Visit   Medication Sig Dispense Refill   • acetaminophen (TYLENOL) 325 MG tablet Take 2 tablets by mouth Every 4 (Four) Hours As Needed for Mild Pain .     • ALPRAZolam (XANAX) 1 MG tablet Take 1 mg by mouth 2 (Two) Times a Day As Needed for Anxiety.     • atorvastatin (LIPITOR) 20 MG tablet TAKE ONE TABLET BY MOUTH DAILY (Patient taking differently: Take 20  mg by mouth Every Night.) 90 tablet 0   • bumetanide (BUMEX) 2 MG tablet Take 2 tablets by mouth 2 (Two) Times a Day. 120 tablet 2   • carvedilol (COREG) 3.125 MG tablet Take 1 tablet by mouth 2 (Two) Times a Day With Meals. 60 tablet 5   • clotrimazole-betamethasone (Lotrisone) 1-0.05 % cream Apply  topically to the appropriate area as directed 2 (Two) Times a Day. 45 g 2   • Eliquis 5 MG tablet tablet TAKE ONE TABLET BY MOUTH TWICE A DAY 60 tablet 0   • fluticasone (FLONASE) 50 MCG/ACT nasal spray 2 sprays by Each Nare route Daily.     • fluticasone-salmeterol (ADVAIR DISKUS) 250-50 MCG/DOSE DISKUS Inhale 1 puff Daily As Needed (cough and wheezing). 60 each 0   • gabapentin (NEURONTIN) 100 MG capsule TAKE ONE CAPSULE BY MOUTH EVERY 12 HOURS 60 capsule 2   • ipratropium-albuterol (DUO-NEB) 0.5-2.5 mg/mL nebulizer Take 3 mL by nebulization 4 (four) times a day. (Patient taking differently: Take 3 mL by nebulization 3 (Three) Times a Day As Needed.) 3 mL 5   • levothyroxine (SYNTHROID, LEVOTHROID) 25 MCG tablet Take 1 tablet by mouth Daily. 90 tablet 1   • nitroglycerin (NITROSTAT) 0.4 MG SL tablet DISSOLVE 1 TAB UNDER TONGUE FOR CHEST PAIN - IF PAIN REMAINS AFTER 5 MIN, CALL 911 AND REPEAT DOSE. MAX 3 TABS IN 15 MINUTES (Patient taking differently: Place 0.4 mg under the tongue Every 5 (Five) Minutes As Needed.) 25 tablet 4   • nystatin (MYCOSTATIN) 168212 UNIT/GM cream Apply  topically to the appropriate area as directed 2 (Two) Times a Day. to affected area(s) (Patient taking differently: Apply  topically to the appropriate area as directed 2 (Two) Times a Day.) 30 g 3   • omeprazole (priLOSEC) 40 MG capsule Take 1 capsule by mouth 2 (two) times a day. 60 capsule 0   • potassium chloride (K-DUR,KLOR-CON) 20 MEQ CR tablet Take 1 tablet by mouth Every Other Day. 90 tablet 0   • Probiotic Product (Risaquad-2) capsule capsule Take 1 capsule by mouth Daily.     • senna-docusate (PERICOLACE) 8.6-50 MG per tablet Take 1  "tablet by mouth daily.     • sodium chloride 0.65 % nasal spray 2 sprays into the nostril(s) as directed by provider As Needed for Congestion.  12   • traMADol (ULTRAM) 50 MG tablet Take 1 tablet by mouth Daily As Needed for Moderate Pain . 30 tablet 2   • vilazodone (VIIBRYD) 20 MG tablet tablet Take 1 tablet by mouth Every Night. 30 tablet 0   • vitamin D (ERGOCALCIFEROL) 1.25 MG (31800 UT) capsule capsule Take 1 capsule by mouth Every 7 (Seven) Days. Takes on Sundays 12 capsule 0   • [DISCONTINUED] bisacodyl (DULCOLAX) 5 MG EC tablet Take 1 tablet by mouth Daily As Needed for Constipation.     • [DISCONTINUED] ondansetron (ZOFRAN) 4 MG tablet Take 1 tablet by mouth Every 6 (Six) Hours As Needed for Nausea or Vomiting.     • [DISCONTINUED] polyethylene glycol (polyethylene glycol) 17 g packet Take 17 g by mouth Daily. (Patient taking differently: Take 17 g by mouth Daily As Needed.)       No current facility-administered medications on file prior to visit.       Allergies   Allergen Reactions   • Coumadin [Warfarin Sodium] Diarrhea and Nausea Only   • Bumex [Bumetanide] Swelling     Tolerated Bumex drip February 2021   • Erythromycin    • Hctz [Hydrochlorothiazide] Swelling   • Zaroxolyn [Metolazone] Diarrhea   • Penicillins Rash     Tolerated cephalosporins in the past    • Sulfa Antibiotics Rash       Vitals:    06/16/21 1107   BP: 140/70   Pulse: 75   SpO2: 99%   Weight: 126 kg (278 lb)   Height: 152.4 cm (60\")     Body mass index is 54.29 kg/m².   Constitutional:       Appearance: Well-developed.      Comments: In wheelchair. Obese     Eyes:      General: No scleral icterus.     Conjunctiva/sclera: Conjunctivae normal.   HENT:      Head: Normocephalic and atraumatic.   Neck:      Thyroid: No thyromegaly.      Vascular: Normal carotid pulses. No carotid bruit, hepatojugular reflux or JVD.      Trachea: No tracheal deviation.   Pulmonary:      Effort: No respiratory distress.      Breath sounds: Normal breath " sounds. No decreased breath sounds. No wheezing. No rhonchi. No rales.   Chest:      Chest wall: Not tender to palpatation.   Cardiovascular:      Normal rate. Regular rhythm.      No gallop.   Pulses:     Carotid: 2+ bilaterally.     Radial: 2+ bilaterally.     Femoral: 2+ bilaterally.     Dorsalis pedis: 2+ bilaterally.     Posterior tibial: 2+ bilaterally.  Edema:     Peripheral edema absent.   Abdominal:      General: Bowel sounds are normal. There is no distension.      Palpations: Abdomen is soft.      Tenderness: There is no abdominal tenderness.   Musculoskeletal:         General: No deformity.      Cervical back: Normal range of motion and neck supple. Skin:     Findings: No erythema or rash.   Neurological:      Mental Status: Alert and oriented to person, place, and time.      Sensory: No sensory deficit.   Psychiatric:         Behavior: Behavior normal.            Lab Results   Component Value Date    WBC 9.10 03/20/2021    HGB 8.2 (L) 03/20/2021    HCT 25.4 (L) 03/20/2021    MCV 87.6 03/20/2021     03/20/2021       Lab Results   Component Value Date    GLUCOSE 138 (H) 03/20/2021    BUN 42 (H) 03/20/2021    CREATININE 1.46 (H) 03/20/2021    EGFRIFNONA 35 (L) 03/20/2021    EGFRIFAFRI 43 (L) 10/14/2020    BCR 28.8 (H) 03/20/2021    K 3.8 03/20/2021    CO2 28.4 03/20/2021    CALCIUM 8.2 (L) 03/20/2021    PROTENTOTREF 7.3 10/14/2020    ALBUMIN 3.20 (L) 03/18/2021    LABIL2 1.4 10/14/2020    AST 20 03/18/2021    ALT 10 03/18/2021       Lab Results   Component Value Date    GLUCOSE 138 (H) 03/20/2021    CALCIUM 8.2 (L) 03/20/2021     03/20/2021    K 3.8 03/20/2021    CO2 28.4 03/20/2021     03/20/2021    BUN 42 (H) 03/20/2021    CREATININE 1.46 (H) 03/20/2021    EGFRIFAFRI 43 (L) 10/14/2020    EGFRIFNONA 35 (L) 03/20/2021    BCR 28.8 (H) 03/20/2021    ANIONGAP 11.6 03/20/2021       Lab Results   Component Value Date    CHOL 98 03/17/2021    CHLPL 162 10/14/2020    TRIG 141 03/17/2021    HDL  32 (L) 03/17/2021    LDL 41 03/17/2021         ECG 12 Lead    Date/Time: 6/16/2021 12:06 PM  Performed by: Ayan Pacheco MD  Authorized by: Ayan Pacheco MD   Comparison: compared with previous ECG from 3/15/2021  Similar to previous ECG  Rhythm: paced    Clinical impression: abnormal EKG             2/5/2021  1.  Ischemic heart disease:  Etiology: Coronary atherosclerosis  Anatomy: Three-vessel coronary disease.  Patent AO-D1 SVG, patent AO-PDA SVG, patent mid LAD stent, patent LCx stent.  Physiology: Elevated troponin     2.  Aortic stenosis: Severe     3.  Pulmonary hypertension: Severe    Results for orders placed during the hospital encounter of 02/01/21    Adult Transthoracic Echo Complete W/ Cont if Necessary Per Protocol    Interpretation Summary  · Left ventricular ejection fraction appears to be 66 - 70%.  · Left ventricular wall thickness is consistent with mild concentric hypertrophy  · The right atrial cavity is mildly dilated.  · The right ventricular cavity is moderately dilated. Normal right ventricular systolic function noted.  · The left atrial cavity is moderately dilated.  · Severe aortic valve stenosis is present  · Aortic valve maximum pressure gradient is 69 mmHg. Aortic valve mean pressure gradient is 42 mmHg.  · There is a tissue mitral valve replacement which appears to be well-seated.  · Gradients across the tissue mitral valve replacement are elevated with a peak of 24 mmHg and a mean of 10 mmHg  · The tricuspid valve is status post repair  · Mild to moderate tricuspid valve regurgitation is present.  · Calculated right ventricular systolic pressure from tricuspid regurgitation is 47 mmHg.  · There is no evidence of pericardial effusion.      DISCUSSION/SUMMARY 75-year-old female with a medical history of severe degenerative aortic valve stenosis, chronic diastolic heart failure, complete heart block, Micra pacemaker, severe COPD, chronic hypoxic respiratory failure on  nasal cannula during the day and Micra at night, coronary disease with prior CABG, who presents back to me in follow-up.  She has underwent evaluation with the cardiac surgery team and felt to be a better candidate for transcatheter aortic valve replacement.  She does have severe lung disease and I do worry that although we can fix her aortic valve stenosis that she will still have significant dyspnea.  That being said I do not see any contraindication at this time.    1.  Severe degenerative aortic valve stenosis: Symptomatic  -I have recommended transcatheter aortic valve replacement CTA.  This will be ordered to see if she has adequate percutaneous access.  Obviously if surgical access is indicated we may have to reconsider this as I think she would be high risk for most of that.    2.  Coronary disease with prior CABG: Catheterization in February as above.  Grafts are patent.  No angina.  Continue atorvastatin at current dose along with carvedilol.  Tolerating this well.  No myalgias on atorvastatin.  No significant fatigue with carvedilol.    3.  Chronic hypoxic respiratory failure: Continue nasal cannula.  Secondary to COPD and obesity.  Trilogy at night.    4.  Essential hypertension: Well-controlled.  Continue current medical regimen which does include carvedilol therapy.    5.  Paroxysmal atrial fibrillation: Continue Eliquis therapy.  No significant bleeding complications.  Continue carvedilol.

## 2021-06-23 ENCOUNTER — OFFICE VISIT (OUTPATIENT)
Dept: FAMILY MEDICINE CLINIC | Facility: CLINIC | Age: 76
End: 2021-06-23

## 2021-06-23 VITALS
DIASTOLIC BLOOD PRESSURE: 85 MMHG | SYSTOLIC BLOOD PRESSURE: 148 MMHG | HEART RATE: 77 BPM | HEIGHT: 60 IN | BODY MASS INDEX: 54.97 KG/M2 | OXYGEN SATURATION: 100 % | WEIGHT: 280 LBS | TEMPERATURE: 97.5 F

## 2021-06-23 DIAGNOSIS — E11.42 DIABETIC PERIPHERAL NEUROPATHY (HCC): ICD-10-CM

## 2021-06-23 DIAGNOSIS — E03.9 HYPOTHYROIDISM (ACQUIRED): ICD-10-CM

## 2021-06-23 DIAGNOSIS — N18.30 STAGE 3 CHRONIC KIDNEY DISEASE, UNSPECIFIED WHETHER STAGE 3A OR 3B CKD (HCC): ICD-10-CM

## 2021-06-23 DIAGNOSIS — I10 ESSENTIAL HYPERTENSION: Primary | ICD-10-CM

## 2021-06-23 PROCEDURE — 99214 OFFICE O/P EST MOD 30 MIN: CPT | Performed by: NURSE PRACTITIONER

## 2021-06-23 RX ORDER — OMEPRAZOLE 40 MG/1
40 CAPSULE, DELAYED RELEASE ORAL DAILY
Qty: 90 CAPSULE | Refills: 0 | Status: SHIPPED | OUTPATIENT
Start: 2021-06-23 | End: 2021-09-03 | Stop reason: SDUPTHER

## 2021-06-23 NOTE — PROGRESS NOTES
"Chief Complaint  Hyperlipidemia (f/u cholesterol )    Subjective          Miguelina Lopez presents to [facility]  History of Present Illness  Here with grand daughter for f/u on HLD, hypothyroidism, CHF.      She has been overall doing pretty well.  She has been eval by cardiology and will have further w/u for possible TAVR.  She is continuing her statin and her carvedilol and doing well with this.  She is also on Eliquis and denies any signs or symptoms of bleeding.  Denies melena.    Chronic shortness of breath remains.  She continues on nasal cannula O2 during the day and has a trilogy at night.  No recent COPD exacerbations.  Denies any change to cough or breathing currently.    Denies any recent falls.  However does have urinary urgency and tries to make it to the bathroom urgently.  Does not have a bedside commode.  No signs or symptoms of UTI.    Has been using Bumex for bilateral leg swelling and CHF management.  Seems to be working well.  Legs seem to be pretty stable.  When they swell up more family knows how to wrap per lymphedema clinic.  She denies any chest pain, dyspnea above baseline, no palpitations.    Hypothyroidism: She is taking and tolerating her levothyroxine.  She denies any signs or symptoms of hypo or hyper or thyroid.    She is on PPI for prophylaxis secondary to past GI bleed on Eliquis.  She denies abdominal pain, nausea, vomiting, diarrhea.  Appetite is normal.    No longer doing PT at home secondary to bilateral leg weakness.  Did not find it very beneficial and declined any further physical therapy.    Objective   Vital Signs:   /85   Pulse 77   Temp 97.5 °F (36.4 °C)   Ht 152.4 cm (60\")   Wt 127 kg (280 lb)   SpO2 100%   BMI 54.68 kg/m²     Physical Exam  Vitals and nursing note reviewed.   Constitutional:       General: She is not in acute distress.     Appearance: She is well-developed. She is obese. She is not ill-appearing or " diaphoretic.   HENT:      Head: Normocephalic and atraumatic.   Eyes:      General:         Right eye: No discharge.         Left eye: No discharge.      Conjunctiva/sclera: Conjunctivae normal.   Cardiovascular:      Rate and Rhythm: Normal rate and regular rhythm.      Heart sounds: Murmur heard.     Pulmonary:      Effort: Pulmonary effort is normal.      Breath sounds: Normal breath sounds. No wheezing or rhonchi.      Comments: O2 per NC  Abdominal:      General: Bowel sounds are normal.      Palpations: Abdomen is soft.      Tenderness: There is no abdominal tenderness.   Musculoskeletal:         General: No deformity.      Right lower leg: Edema present.      Left lower leg: Edema present.      Comments: Gait smooth and steady   Skin:     General: Skin is warm and dry.   Neurological:      General: No focal deficit present.      Mental Status: She is alert and oriented to person, place, and time.   Psychiatric:         Mood and Affect: Mood normal.         Behavior: Behavior normal.        Result Review :                 Assessment and Plan    Diagnoses and all orders for this visit:    1. Essential hypertension (Primary)  -     CBC & Differential  -     Basic Metabolic Panel    2. Hypothyroidism (acquired)  -     TSH    3. Stage 3 chronic kidney disease, unspecified whether stage 3a or 3b CKD (CMS/HCC)  -     Basic Metabolic Panel    4. Diabetic peripheral neuropathy (CMS/HCC)  -     Hemoglobin A1c        BP is little bit high but family reports it is at goal at home.  Recommend call if blood pressure starts running consistently above 130/80 or on the low side with any orthostasis.  Fall could be detrimental for patient which we discussed.    We will get a TSH today for hypothyroidism and for now continue current medication.    She needs an A1c and will get that.  She is diet controlled currently.  Granddaughter helps with meal prep and trying to make sure she gets fresh fruits and vegetables and lower  carbs.  She does random fingersticks for glucose and those have been just above 100 fasting.    CKD: We will check a BMP today.  Discussed PPI use can increase creatinine.  Making sure to stay well-hydrated discussed.  Avoid any NSAIDs.    Discussed monitoring for anticoagulation.  We will get a CBC today    Reviewed cardiology note and upcoming plan for work-up for TAVR.  Patient feels like this would really improve her quality of life as continued shortness of breath makes it really difficult for her to do much of anything and makes it hard to lose any weight.    No recent COPD exacerbations and will continue her current management and medications.  Discussed avoiding being outside during air quality alert days and continue current inhalers, increase albuterol use for any dyspnea, cough, wheeze.    We will see if we can get a bedside commode for her so that she is not rushing to get to the bathroom with urinary urgency.  I do not think adding another medication right now would be indicated.  But also do not want her to fall try to get to the bathroom in a hurry.      Follow Up   Return in about 3 months (around 9/23/2021) for Recheck.  Patient was given instructions and counseling regarding her condition or for health maintenance advice. Please see specific information pulled into the AVS if appropriate.

## 2021-06-24 LAB
BASOPHILS # BLD AUTO: 0.08 10*3/MM3 (ref 0–0.2)
BASOPHILS NFR BLD AUTO: 0.8 % (ref 0–1.5)
BUN SERPL-MCNC: 26 MG/DL (ref 8–23)
BUN/CREAT SERPL: 17.4 (ref 7–25)
CALCIUM SERPL-MCNC: 8.7 MG/DL (ref 8.6–10.5)
CHLORIDE SERPL-SCNC: 98 MMOL/L (ref 98–107)
CO2 SERPL-SCNC: 34.7 MMOL/L (ref 22–29)
CREAT SERPL-MCNC: 1.49 MG/DL (ref 0.57–1)
EOSINOPHIL # BLD AUTO: 0.3 10*3/MM3 (ref 0–0.4)
EOSINOPHIL NFR BLD AUTO: 3.1 % (ref 0.3–6.2)
ERYTHROCYTE [DISTWIDTH] IN BLOOD BY AUTOMATED COUNT: 15.3 % (ref 12.3–15.4)
GLUCOSE SERPL-MCNC: 173 MG/DL (ref 65–99)
HBA1C MFR BLD: 8.8 % (ref 4.8–5.6)
HCT VFR BLD AUTO: 31.4 % (ref 34–46.6)
HGB BLD-MCNC: 9.2 G/DL (ref 12–15.9)
IMM GRANULOCYTES # BLD AUTO: 0.05 10*3/MM3 (ref 0–0.05)
IMM GRANULOCYTES NFR BLD AUTO: 0.5 % (ref 0–0.5)
LYMPHOCYTES # BLD AUTO: 0.78 10*3/MM3 (ref 0.7–3.1)
LYMPHOCYTES NFR BLD AUTO: 8.2 % (ref 19.6–45.3)
MCH RBC QN AUTO: 23.6 PG (ref 26.6–33)
MCHC RBC AUTO-ENTMCNC: 29.3 G/DL (ref 31.5–35.7)
MCV RBC AUTO: 80.5 FL (ref 79–97)
MONOCYTES # BLD AUTO: 0.48 10*3/MM3 (ref 0.1–0.9)
MONOCYTES NFR BLD AUTO: 5 % (ref 5–12)
NEUTROPHILS # BLD AUTO: 7.86 10*3/MM3 (ref 1.7–7)
NEUTROPHILS NFR BLD AUTO: 82.4 % (ref 42.7–76)
NRBC BLD AUTO-RTO: 0 /100 WBC (ref 0–0.2)
PLATELET # BLD AUTO: 242 10*3/MM3 (ref 140–450)
POTASSIUM SERPL-SCNC: 4.8 MMOL/L (ref 3.5–5.2)
RBC # BLD AUTO: 3.9 10*6/MM3 (ref 3.77–5.28)
SODIUM SERPL-SCNC: 142 MMOL/L (ref 136–145)
TSH SERPL DL<=0.005 MIU/L-ACNC: 5.37 UIU/ML (ref 0.27–4.2)
WBC # BLD AUTO: 9.55 10*3/MM3 (ref 3.4–10.8)

## 2021-06-24 RX ORDER — LEVOTHYROXINE SODIUM 0.03 MG/1
TABLET ORAL
Qty: 40 TABLET | Refills: 1 | Status: SHIPPED | OUTPATIENT
Start: 2021-06-24 | End: 2021-08-23

## 2021-06-27 VITALS
DIASTOLIC BLOOD PRESSURE: 98 MMHG | SYSTOLIC BLOOD PRESSURE: 168 MMHG | TEMPERATURE: 98.5 F | RESPIRATION RATE: 18 BRPM | OXYGEN SATURATION: 98 % | HEART RATE: 97 BPM

## 2021-06-27 PROCEDURE — 99211 OFF/OP EST MAY X REQ PHY/QHP: CPT

## 2021-06-28 ENCOUNTER — HOSPITAL ENCOUNTER (EMERGENCY)
Facility: HOSPITAL | Age: 76
Discharge: LEFT BEFORE DISCHARGE | End: 2021-06-28

## 2021-06-28 RX ORDER — APIXABAN 5 MG/1
TABLET, FILM COATED ORAL
Qty: 60 TABLET | Refills: 0 | Status: SHIPPED | OUTPATIENT
Start: 2021-06-28 | End: 2021-08-02

## 2021-06-28 NOTE — ED NOTES
Pt in triage bay declined bedpan and bedside commode. Pt placed on purwick.      Daya Sosa RN  06/27/21 [id number]

## 2021-06-28 NOTE — ED NOTES
Spouse and pt requested to leave, did not wish to continue to wait. Assisted to remove purewick and placed in WC and taken to car. Paperwork completed and signed     Riana Cox RN  06/28/21 0142

## 2021-06-28 NOTE — ED TRIAGE NOTES
.Pt masked on arrival, staff masked    Pt from home via ems, pt called for bleeding from the mouth, ems reports no active bleeding at this time, on Eliquis

## 2021-06-30 ENCOUNTER — HOSPITAL ENCOUNTER (OUTPATIENT)
Dept: CT IMAGING | Facility: HOSPITAL | Age: 76
Discharge: HOME | End: 2021-06-30

## 2021-06-30 ENCOUNTER — TRANSCRIBE ORDERS (OUTPATIENT)
Dept: CARDIOLOGY | Facility: HOSPITAL | Age: 76
End: 2021-06-30

## 2021-06-30 ENCOUNTER — HOSPITAL ENCOUNTER (OUTPATIENT)
Dept: CARDIOLOGY | Facility: HOSPITAL | Age: 76
Discharge: HOME | End: 2021-06-30

## 2021-06-30 DIAGNOSIS — M17.10 ARTHRITIS OF KNEE: ICD-10-CM

## 2021-06-30 DIAGNOSIS — L03.116 CELLULITIS OF LEFT LOWER EXTREMITY: Primary | ICD-10-CM

## 2021-06-30 DIAGNOSIS — N18.32 STAGE 3B CHRONIC KIDNEY DISEASE (HCC): Primary | ICD-10-CM

## 2021-06-30 DIAGNOSIS — I35.0 NONRHEUMATIC AORTIC VALVE STENOSIS: ICD-10-CM

## 2021-06-30 DIAGNOSIS — J96.21 ACUTE ON CHRONIC RESPIRATORY FAILURE WITH HYPOXIA AND HYPERCAPNIA (HCC): ICD-10-CM

## 2021-06-30 DIAGNOSIS — I35.0 AORTIC STENOSIS, SEVERE: ICD-10-CM

## 2021-06-30 DIAGNOSIS — R60.0 BILATERAL LOWER EXTREMITY EDEMA: ICD-10-CM

## 2021-06-30 DIAGNOSIS — Z01.811 PRE-OP CHEST EXAM: Primary | ICD-10-CM

## 2021-06-30 DIAGNOSIS — J96.22 ACUTE ON CHRONIC RESPIRATORY FAILURE WITH HYPOXIA AND HYPERCAPNIA (HCC): ICD-10-CM

## 2021-06-30 LAB — QT INTERVAL: 450 MS

## 2021-06-30 PROCEDURE — 0 IOPAMIDOL PER 1 ML: Performed by: INTERNAL MEDICINE

## 2021-06-30 PROCEDURE — 71275 CT ANGIOGRAPHY CHEST: CPT

## 2021-06-30 PROCEDURE — 74174 CTA ABD&PLVS W/CONTRAST: CPT

## 2021-06-30 PROCEDURE — 82565 ASSAY OF CREATININE: CPT

## 2021-06-30 PROCEDURE — 93005 ELECTROCARDIOGRAM TRACING: CPT

## 2021-06-30 PROCEDURE — 96360 HYDRATION IV INFUSION INIT: CPT

## 2021-06-30 PROCEDURE — 96361 HYDRATE IV INFUSION ADD-ON: CPT

## 2021-06-30 PROCEDURE — 93010 ELECTROCARDIOGRAM REPORT: CPT | Performed by: INTERNAL MEDICINE

## 2021-06-30 RX ORDER — SODIUM CHLORIDE 9 MG/ML
100 INJECTION, SOLUTION INTRAVENOUS CONTINUOUS
Status: DISCONTINUED | OUTPATIENT
Start: 2021-06-30 | End: 2021-07-01 | Stop reason: HOSPADM

## 2021-06-30 RX ADMIN — SODIUM CHLORIDE 100 ML/HR: 9 INJECTION, SOLUTION INTRAVENOUS at 13:52

## 2021-06-30 RX ADMIN — IOPAMIDOL 100 ML: 755 INJECTION, SOLUTION INTRAVENOUS at 14:55

## 2021-07-01 ENCOUNTER — PATIENT OUTREACH (OUTPATIENT)
Dept: CASE MANAGEMENT | Facility: OTHER | Age: 76
End: 2021-07-01

## 2021-07-01 LAB — CREAT BLDA-MCNC: 1.7 MG/DL (ref 0.6–1.3)

## 2021-07-01 NOTE — OUTREACH NOTE
Ambulatory Case Management Note    Patient Outreach  Talked with patient's grand daughter Carmen. Discussed 6/28/21  ED visit regarding bleeding from mouth. Carmen states patient was unable to stay to be seen and bleeding had stopped. She states patient has had no difficulty with bleeding since that time and will be following up with PCP.  Carmen states patient had 6/30/21 CT scan; EKG and awaiting call from cardiology with recommendations. She states patient is  compliant with medical appointments and medications. Patient lives with son and receives assistance as needed from family. Carmen states patient has episodes of SOB; swelling to lower extremities and no difficulty with chest pain; appetite or sleeping.Reviewed with patient;s grand daughter ED AVS  Recommendations; education regarding lymphdema; elevation of lower extremities; compression stockings;  diet; bleeding precautions;  24/7 Nurse Line Telephone number; ACM contact information and  Advance Directives (completed). Carmen  verbalized understanding and states to appreciate phone call.  No further questions or concerns voiced at this time.     Tanya Ontiveros RN  Ambulatory Case Management    7/1/2021, 10:08 EDT

## 2021-07-02 DIAGNOSIS — G63 POLYNEUROPATHY ASSOCIATED WITH UNDERLYING DISEASE (HCC): ICD-10-CM

## 2021-07-02 RX ORDER — GABAPENTIN 100 MG/1
CAPSULE ORAL
Qty: 60 CAPSULE | Refills: 1 | Status: SHIPPED | OUTPATIENT
Start: 2021-07-02 | End: 2021-09-07

## 2021-07-06 DIAGNOSIS — I35.0 NONRHEUMATIC AORTIC VALVE STENOSIS: Primary | ICD-10-CM

## 2021-07-06 RX ORDER — BUMETANIDE 2 MG/1
TABLET ORAL
Qty: 120 TABLET | Refills: 1 | Status: SHIPPED | OUTPATIENT
Start: 2021-07-06 | End: 2021-09-07 | Stop reason: SDUPTHER

## 2021-07-07 ENCOUNTER — HOSPITAL ENCOUNTER (OUTPATIENT)
Facility: HOSPITAL | Age: 76
Setting detail: SURGERY ADMIT
End: 2021-07-07
Attending: THORACIC SURGERY (CARDIOTHORACIC VASCULAR SURGERY) | Admitting: THORACIC SURGERY (CARDIOTHORACIC VASCULAR SURGERY)

## 2021-07-07 ENCOUNTER — PREP FOR SURGERY (OUTPATIENT)
Dept: OTHER | Facility: HOSPITAL | Age: 76
End: 2021-07-07

## 2021-07-07 DIAGNOSIS — I35.0 AORTIC VALVE STENOSIS, ETIOLOGY OF CARDIAC VALVE DISEASE UNSPECIFIED: Primary | ICD-10-CM

## 2021-07-07 DIAGNOSIS — E11.9 TYPE 2 DIABETES MELLITUS WITHOUT COMPLICATIONS (HCC): ICD-10-CM

## 2021-07-07 DIAGNOSIS — I11.0 HYPERTENSIVE HEART DISEASE WITH HEART FAILURE (HCC): ICD-10-CM

## 2021-07-07 DIAGNOSIS — R79.1 ABNORMAL COAGULATION PROFILE: ICD-10-CM

## 2021-07-07 DIAGNOSIS — I35.0 NONRHEUMATIC AORTIC VALVE STENOSIS: ICD-10-CM

## 2021-07-07 RX ORDER — CHLORHEXIDINE GLUCONATE 0.12 MG/ML
15 RINSE ORAL EVERY 12 HOURS
Status: CANCELLED | OUTPATIENT
Start: 2021-07-07 | End: 2021-07-08

## 2021-07-07 RX ORDER — CHLORHEXIDINE GLUCONATE 0.12 MG/ML
15 RINSE ORAL ONCE
Status: CANCELLED | OUTPATIENT
Start: 2021-07-13 | End: 2021-07-07

## 2021-07-08 ENCOUNTER — TRANSCRIBE ORDERS (OUTPATIENT)
Dept: PREADMISSION TESTING | Facility: HOSPITAL | Age: 76
End: 2021-07-08

## 2021-07-08 ENCOUNTER — DOCUMENTATION (OUTPATIENT)
Dept: CARDIOLOGY | Facility: HOSPITAL | Age: 76
End: 2021-07-08

## 2021-07-08 DIAGNOSIS — Z01.818 OTHER SPECIFIED PRE-OPERATIVE EXAMINATION: Primary | ICD-10-CM

## 2021-07-08 RX ORDER — VILAZODONE HYDROCHLORIDE 20 MG/1
TABLET ORAL
Qty: 30 TABLET | Refills: 3 | Status: SHIPPED | OUTPATIENT
Start: 2021-07-08 | End: 2021-11-15

## 2021-07-08 NOTE — NURSING NOTE
Called and spoke with the patient's granddaughter, Carmen.  Patient to stop taking Eliquis today for TAVR procedure scheduled on 7/13. Carmen verbalized understanding and questions answered

## 2021-07-09 ENCOUNTER — ANESTHESIA EVENT (OUTPATIENT)
Dept: PERIOP | Facility: HOSPITAL | Age: 76
End: 2021-07-09

## 2021-07-09 ENCOUNTER — HOSPITAL ENCOUNTER (OUTPATIENT)
Dept: GENERAL RADIOLOGY | Facility: HOSPITAL | Age: 76
Discharge: HOME | End: 2021-07-09

## 2021-07-09 ENCOUNTER — PRE-ADMISSION TESTING (OUTPATIENT)
Dept: PREADMISSION TESTING | Facility: HOSPITAL | Age: 76
End: 2021-07-09

## 2021-07-09 VITALS — HEIGHT: 60 IN | BODY MASS INDEX: 54.97 KG/M2 | WEIGHT: 280 LBS

## 2021-07-09 DIAGNOSIS — E11.9 TYPE 2 DIABETES MELLITUS WITHOUT COMPLICATIONS (HCC): ICD-10-CM

## 2021-07-09 DIAGNOSIS — I35.0 AORTIC VALVE STENOSIS, ETIOLOGY OF CARDIAC VALVE DISEASE UNSPECIFIED: ICD-10-CM

## 2021-07-09 DIAGNOSIS — I11.0 HYPERTENSIVE HEART DISEASE WITH HEART FAILURE (HCC): ICD-10-CM

## 2021-07-09 DIAGNOSIS — R79.1 ABNORMAL COAGULATION PROFILE: ICD-10-CM

## 2021-07-09 LAB
ABO GROUP BLD: NORMAL
ALBUMIN SERPL-MCNC: 3.4 G/DL (ref 3.5–5.2)
ALBUMIN/GLOB SERPL: 1.1 G/DL
ALP SERPL-CCNC: 243 U/L (ref 39–117)
ALT SERPL W P-5'-P-CCNC: 11 U/L (ref 1–33)
ANION GAP SERPL CALCULATED.3IONS-SCNC: 11.3 MMOL/L (ref 5–15)
APTT PPP: 31.7 SECONDS (ref 22.7–35.4)
AST SERPL-CCNC: 19 U/L (ref 1–32)
BACTERIA UR QL AUTO: ABNORMAL /HPF
BASOPHILS # BLD AUTO: 0.08 10*3/MM3 (ref 0–0.2)
BASOPHILS NFR BLD AUTO: 0.8 % (ref 0–1.5)
BILIRUB SERPL-MCNC: 0.4 MG/DL (ref 0–1.2)
BILIRUB UR QL STRIP: NEGATIVE
BLD GP AB SCN SERPL QL: NEGATIVE
BUN SERPL-MCNC: 23 MG/DL (ref 8–23)
BUN/CREAT SERPL: 15 (ref 7–25)
CALCIUM SPEC-SCNC: 8.7 MG/DL (ref 8.6–10.5)
CHLORIDE SERPL-SCNC: 98 MMOL/L (ref 98–107)
CLARITY UR: ABNORMAL
CLOSE TME COLL+ADP + EPINEP PNL BLD: 91 % (ref 86–100)
CO2 SERPL-SCNC: 34.7 MMOL/L (ref 22–29)
COLOR UR: YELLOW
CREAT SERPL-MCNC: 1.53 MG/DL (ref 0.57–1)
DEPRECATED RDW RBC AUTO: 45.9 FL (ref 37–54)
EOSINOPHIL # BLD AUTO: 0.49 10*3/MM3 (ref 0–0.4)
EOSINOPHIL NFR BLD AUTO: 4.9 % (ref 0.3–6.2)
ERYTHROCYTE [DISTWIDTH] IN BLOOD BY AUTOMATED COUNT: 15.5 % (ref 12.3–15.4)
GFR SERPL CREATININE-BSD FRML MDRD: 33 ML/MIN/1.73
GLOBULIN UR ELPH-MCNC: 3.1 GM/DL
GLUCOSE SERPL-MCNC: 178 MG/DL (ref 65–99)
GLUCOSE UR STRIP-MCNC: NEGATIVE MG/DL
HBA1C MFR BLD: 8.42 % (ref 4.8–5.6)
HCT VFR BLD AUTO: 33.7 % (ref 34–46.6)
HGB BLD-MCNC: 9.9 G/DL (ref 12–15.9)
HGB UR QL STRIP.AUTO: NEGATIVE
HYALINE CASTS UR QL AUTO: ABNORMAL /LPF
IMM GRANULOCYTES # BLD AUTO: 0.07 10*3/MM3 (ref 0–0.05)
IMM GRANULOCYTES NFR BLD AUTO: 0.7 % (ref 0–0.5)
INR PPP: 1.36 (ref 0.9–1.1)
KETONES UR QL STRIP: NEGATIVE
LEUKOCYTE ESTERASE UR QL STRIP.AUTO: ABNORMAL
LYMPHOCYTES # BLD AUTO: 0.8 10*3/MM3 (ref 0.7–3.1)
LYMPHOCYTES NFR BLD AUTO: 8 % (ref 19.6–45.3)
MCH RBC QN AUTO: 23.8 PG (ref 26.6–33)
MCHC RBC AUTO-ENTMCNC: 29.4 G/DL (ref 31.5–35.7)
MCV RBC AUTO: 81 FL (ref 79–97)
MONOCYTES # BLD AUTO: 0.64 10*3/MM3 (ref 0.1–0.9)
MONOCYTES NFR BLD AUTO: 6.4 % (ref 5–12)
NEUTROPHILS NFR BLD AUTO: 7.95 10*3/MM3 (ref 1.7–7)
NEUTROPHILS NFR BLD AUTO: 79.2 % (ref 42.7–76)
NITRITE UR QL STRIP: NEGATIVE
NRBC BLD AUTO-RTO: 0 /100 WBC (ref 0–0.2)
NT-PROBNP SERPL-MCNC: ABNORMAL PG/ML (ref 0–1800)
PH UR STRIP.AUTO: 6.5 [PH] (ref 5–8)
PLATELET # BLD AUTO: 255 10*3/MM3 (ref 140–450)
PMV BLD AUTO: 9.9 FL (ref 6–12)
POTASSIUM SERPL-SCNC: 4.6 MMOL/L (ref 3.5–5.2)
PROT SERPL-MCNC: 6.5 G/DL (ref 6–8.5)
PROT UR QL STRIP: ABNORMAL
PROTHROMBIN TIME: 16.6 SECONDS (ref 11.7–14.2)
QT INTERVAL: 474 MS
RBC # BLD AUTO: 4.16 10*6/MM3 (ref 3.77–5.28)
RBC # UR: ABNORMAL /HPF
REF LAB TEST METHOD: ABNORMAL
RH BLD: POSITIVE
SODIUM SERPL-SCNC: 144 MMOL/L (ref 136–145)
SP GR UR STRIP: 1.02 (ref 1–1.03)
SQUAMOUS #/AREA URNS HPF: ABNORMAL /HPF
T&S EXPIRATION DATE: NORMAL
UROBILINOGEN UR QL STRIP: ABNORMAL
WBC # BLD AUTO: 10.03 10*3/MM3 (ref 3.4–10.8)
WBC UR QL AUTO: ABNORMAL /HPF

## 2021-07-09 PROCEDURE — 83036 HEMOGLOBIN GLYCOSYLATED A1C: CPT

## 2021-07-09 PROCEDURE — 85576 BLOOD PLATELET AGGREGATION: CPT

## 2021-07-09 PROCEDURE — 93005 ELECTROCARDIOGRAM TRACING: CPT

## 2021-07-09 PROCEDURE — 36415 COLL VENOUS BLD VENIPUNCTURE: CPT

## 2021-07-09 PROCEDURE — 87086 URINE CULTURE/COLONY COUNT: CPT

## 2021-07-09 PROCEDURE — 85610 PROTHROMBIN TIME: CPT

## 2021-07-09 PROCEDURE — 87186 SC STD MICRODIL/AGAR DIL: CPT

## 2021-07-09 PROCEDURE — 93010 ELECTROCARDIOGRAM REPORT: CPT | Performed by: INTERNAL MEDICINE

## 2021-07-09 PROCEDURE — 81001 URINALYSIS AUTO W/SCOPE: CPT

## 2021-07-09 PROCEDURE — 85025 COMPLETE CBC W/AUTO DIFF WBC: CPT

## 2021-07-09 PROCEDURE — 86900 BLOOD TYPING SEROLOGIC ABO: CPT

## 2021-07-09 PROCEDURE — 71046 X-RAY EXAM CHEST 2 VIEWS: CPT

## 2021-07-09 PROCEDURE — 86901 BLOOD TYPING SEROLOGIC RH(D): CPT

## 2021-07-09 PROCEDURE — 83880 ASSAY OF NATRIURETIC PEPTIDE: CPT

## 2021-07-09 PROCEDURE — 85730 THROMBOPLASTIN TIME PARTIAL: CPT

## 2021-07-09 PROCEDURE — 80053 COMPREHEN METABOLIC PANEL: CPT

## 2021-07-09 PROCEDURE — 86850 RBC ANTIBODY SCREEN: CPT

## 2021-07-09 RX ORDER — SODIUM CHLORIDE 0.9 % (FLUSH) 0.9 %
3-10 SYRINGE (ML) INJECTION AS NEEDED
Status: CANCELLED | OUTPATIENT
Start: 2021-07-13

## 2021-07-09 RX ORDER — MIDAZOLAM HYDROCHLORIDE 1 MG/ML
0.5 INJECTION INTRAMUSCULAR; INTRAVENOUS
Status: CANCELLED | OUTPATIENT
Start: 2021-07-13

## 2021-07-09 RX ORDER — SODIUM CHLORIDE, SODIUM LACTATE, POTASSIUM CHLORIDE, CALCIUM CHLORIDE 600; 310; 30; 20 MG/100ML; MG/100ML; MG/100ML; MG/100ML
9 INJECTION, SOLUTION INTRAVENOUS CONTINUOUS
Status: CANCELLED | OUTPATIENT
Start: 2021-07-13

## 2021-07-09 RX ORDER — LIDOCAINE HYDROCHLORIDE 10 MG/ML
0.5 INJECTION, SOLUTION EPIDURAL; INFILTRATION; INTRACAUDAL; PERINEURAL ONCE AS NEEDED
Status: CANCELLED | OUTPATIENT
Start: 2021-07-13

## 2021-07-09 RX ORDER — NITROFURANTOIN 25; 75 MG/1; MG/1
100 CAPSULE ORAL 2 TIMES DAILY
Qty: 10 CAPSULE | Refills: 0 | Status: SHIPPED | OUTPATIENT
Start: 2021-07-09 | End: 2021-07-17 | Stop reason: HOSPADM

## 2021-07-09 RX ORDER — CHLORHEXIDINE GLUCONATE 0.12 MG/ML
15 RINSE ORAL EVERY 12 HOURS
Status: DISPENSED | OUTPATIENT
Start: 2021-07-09 | End: 2021-07-10

## 2021-07-09 RX ORDER — SODIUM CHLORIDE 0.9 % (FLUSH) 0.9 %
3 SYRINGE (ML) INJECTION EVERY 12 HOURS SCHEDULED
Status: CANCELLED | OUTPATIENT
Start: 2021-07-13

## 2021-07-09 RX ORDER — FAMOTIDINE 10 MG/ML
20 INJECTION, SOLUTION INTRAVENOUS ONCE
Status: CANCELLED | OUTPATIENT
Start: 2021-07-13 | End: 2021-07-09

## 2021-07-09 RX ORDER — FENTANYL CITRATE 50 UG/ML
50 INJECTION, SOLUTION INTRAMUSCULAR; INTRAVENOUS
Status: CANCELLED | OUTPATIENT
Start: 2021-07-13

## 2021-07-09 NOTE — ANESTHESIA PREPROCEDURE EVALUATION
Anesthesia Evaluation     Patient summary reviewed and Nursing notes reviewed   no history of anesthetic complications:  NPO Solid Status: > 6 hours  NPO Liquid Status: > 2 hours           Airway   Mallampati: III  TM distance: >3 FB  Neck ROM: full  Large neck circumference and No difficulty expected  Dental - normal exam     Pulmonary - normal exam   (+) COPD, home oxygen, sleep apnea on CPAP,   (-) not a smoker  Cardiovascular   Exercise tolerance: poor (<4 METS)    ECG reviewed  PT is on anticoagulation therapy  Rhythm: irregular    (+) hypertension, valvular problems/murmurs AS, CAD, CABG, dysrhythmias Atrial Fib, CHF Diastolic >=55%,     ROS comment: S/p MV replacement and TV repair with CABG in 2016    Neuro/Psych  (+) TIA, psychiatric history Anxiety,     GI/Hepatic/Renal/Endo    (+) morbid obesity,  renal disease CRI, diabetes mellitus,     Musculoskeletal     Abdominal    Substance History      OB/GYN          Other   blood dyscrasia (Hb 9.9) anemia,                   Anesthesia Plan    ASA 4     MAC       Anesthetic plan, all risks, benefits, and alternatives have been provided, discussed and informed consent has been obtained with: patient.

## 2021-07-10 ENCOUNTER — LAB (OUTPATIENT)
Dept: LAB | Facility: HOSPITAL | Age: 76
End: 2021-07-10

## 2021-07-10 DIAGNOSIS — Z01.818 OTHER SPECIFIED PRE-OPERATIVE EXAMINATION: ICD-10-CM

## 2021-07-10 LAB — SARS-COV-2 ORF1AB RESP QL NAA+PROBE: NOT DETECTED

## 2021-07-10 PROCEDURE — U0004 COV-19 TEST NON-CDC HGH THRU: HCPCS

## 2021-07-10 PROCEDURE — C9803 HOPD COVID-19 SPEC COLLECT: HCPCS

## 2021-07-11 LAB — BACTERIA SPEC AEROBE CULT: ABNORMAL

## 2021-07-12 ENCOUNTER — HOSPITAL ENCOUNTER (INPATIENT)
Facility: HOSPITAL | Age: 76
LOS: 5 days | Discharge: HOME HEALTH | End: 2021-07-17
Attending: THORACIC SURGERY (CARDIOTHORACIC VASCULAR SURGERY) | Admitting: THORACIC SURGERY (CARDIOTHORACIC VASCULAR SURGERY)

## 2021-07-12 ENCOUNTER — APPOINTMENT (OUTPATIENT)
Dept: GENERAL RADIOLOGY | Facility: HOSPITAL | Age: 76
End: 2021-07-12

## 2021-07-12 DIAGNOSIS — I50.33 ACUTE ON CHRONIC DIASTOLIC HEART FAILURE (HCC): ICD-10-CM

## 2021-07-12 DIAGNOSIS — I35.0 NONRHEUMATIC AORTIC VALVE STENOSIS: ICD-10-CM

## 2021-07-12 DIAGNOSIS — E66.01 CLASS 3 SEVERE OBESITY DUE TO EXCESS CALORIES WITH SERIOUS COMORBIDITY AND BODY MASS INDEX (BMI) OF 50.0 TO 59.9 IN ADULT (HCC): Primary | ICD-10-CM

## 2021-07-12 DIAGNOSIS — I35.0 AORTIC VALVE STENOSIS, ETIOLOGY OF CARDIAC VALVE DISEASE UNSPECIFIED: ICD-10-CM

## 2021-07-12 PROBLEM — I50.9 ACUTE ON CHRONIC HEART FAILURE (HCC): Status: ACTIVE | Noted: 2021-07-12

## 2021-07-12 LAB
GLUCOSE BLDC GLUCOMTR-MCNC: 224 MG/DL (ref 70–130)
GLUCOSE BLDC GLUCOMTR-MCNC: 257 MG/DL (ref 70–130)

## 2021-07-12 PROCEDURE — S0260 H&P FOR SURGERY: HCPCS | Performed by: THORACIC SURGERY (CARDIOTHORACIC VASCULAR SURGERY)

## 2021-07-12 PROCEDURE — 71045 X-RAY EXAM CHEST 1 VIEW: CPT

## 2021-07-12 PROCEDURE — 82962 GLUCOSE BLOOD TEST: CPT

## 2021-07-12 PROCEDURE — 63710000001 INSULIN LISPRO (HUMAN) PER 5 UNITS: Performed by: NURSE PRACTITIONER

## 2021-07-12 RX ORDER — MIDAZOLAM HYDROCHLORIDE 1 MG/ML
0.5 INJECTION INTRAMUSCULAR; INTRAVENOUS
Status: DISCONTINUED | OUTPATIENT
Start: 2021-07-12 | End: 2021-07-17 | Stop reason: HOSPADM

## 2021-07-12 RX ORDER — BUDESONIDE AND FORMOTEROL FUMARATE DIHYDRATE 160; 4.5 UG/1; UG/1
2 AEROSOL RESPIRATORY (INHALATION)
Refills: 0 | Status: DISCONTINUED | OUTPATIENT
Start: 2021-07-12 | End: 2021-07-17 | Stop reason: HOSPADM

## 2021-07-12 RX ORDER — INSULIN LISPRO 100 [IU]/ML
0-9 INJECTION, SOLUTION INTRAVENOUS; SUBCUTANEOUS
Status: DISCONTINUED | OUTPATIENT
Start: 2021-07-12 | End: 2021-07-17 | Stop reason: HOSPADM

## 2021-07-12 RX ORDER — CHLORHEXIDINE GLUCONATE 0.12 MG/ML
15 RINSE ORAL ONCE
Status: DISCONTINUED | OUTPATIENT
Start: 2021-07-12 | End: 2021-07-12

## 2021-07-12 RX ORDER — DEXTROSE MONOHYDRATE 25 G/50ML
25 INJECTION, SOLUTION INTRAVENOUS
Status: DISCONTINUED | OUTPATIENT
Start: 2021-07-12 | End: 2021-07-17 | Stop reason: HOSPADM

## 2021-07-12 RX ORDER — NICOTINE POLACRILEX 4 MG
15 LOZENGE BUCCAL
Status: DISCONTINUED | OUTPATIENT
Start: 2021-07-12 | End: 2021-07-17 | Stop reason: HOSPADM

## 2021-07-12 RX ORDER — SODIUM CHLORIDE, SODIUM LACTATE, POTASSIUM CHLORIDE, CALCIUM CHLORIDE 600; 310; 30; 20 MG/100ML; MG/100ML; MG/100ML; MG/100ML
9 INJECTION, SOLUTION INTRAVENOUS CONTINUOUS
Status: DISCONTINUED | OUTPATIENT
Start: 2021-07-12 | End: 2021-07-12

## 2021-07-12 RX ORDER — LEVOTHYROXINE SODIUM 0.05 MG/1
50 TABLET ORAL
Status: DISCONTINUED | OUTPATIENT
Start: 2021-07-13 | End: 2021-07-17 | Stop reason: HOSPADM

## 2021-07-12 RX ORDER — ACETAMINOPHEN 325 MG/1
650 TABLET ORAL EVERY 4 HOURS PRN
Status: DISCONTINUED | OUTPATIENT
Start: 2021-07-12 | End: 2021-07-13 | Stop reason: SDUPTHER

## 2021-07-12 RX ORDER — GABAPENTIN 100 MG/1
100 CAPSULE ORAL EVERY 12 HOURS
Status: DISCONTINUED | OUTPATIENT
Start: 2021-07-12 | End: 2021-07-17 | Stop reason: HOSPADM

## 2021-07-12 RX ORDER — IPRATROPIUM BROMIDE AND ALBUTEROL SULFATE 2.5; .5 MG/3ML; MG/3ML
3 SOLUTION RESPIRATORY (INHALATION) EVERY 4 HOURS PRN
Status: DISCONTINUED | OUTPATIENT
Start: 2021-07-12 | End: 2021-07-17 | Stop reason: HOSPADM

## 2021-07-12 RX ORDER — FENTANYL CITRATE 50 UG/ML
50 INJECTION, SOLUTION INTRAMUSCULAR; INTRAVENOUS
Status: DISCONTINUED | OUTPATIENT
Start: 2021-07-12 | End: 2021-07-17 | Stop reason: HOSPADM

## 2021-07-12 RX ORDER — BUMETANIDE 0.25 MG/ML
2 INJECTION INTRAMUSCULAR; INTRAVENOUS ONCE
Status: COMPLETED | OUTPATIENT
Start: 2021-07-12 | End: 2021-07-12

## 2021-07-12 RX ORDER — TRAMADOL HYDROCHLORIDE 50 MG/1
50 TABLET ORAL DAILY PRN
Status: DISCONTINUED | OUTPATIENT
Start: 2021-07-12 | End: 2021-07-17 | Stop reason: HOSPADM

## 2021-07-12 RX ORDER — CHLORHEXIDINE GLUCONATE 0.12 MG/ML
15 RINSE ORAL ONCE
Status: COMPLETED | OUTPATIENT
Start: 2021-07-12 | End: 2021-07-12

## 2021-07-12 RX ORDER — SODIUM CHLORIDE 0.9 % (FLUSH) 0.9 %
3-10 SYRINGE (ML) INJECTION AS NEEDED
Status: DISCONTINUED | OUTPATIENT
Start: 2021-07-12 | End: 2021-07-17 | Stop reason: HOSPADM

## 2021-07-12 RX ORDER — AMOXICILLIN 250 MG
1 CAPSULE ORAL DAILY
Status: DISCONTINUED | OUTPATIENT
Start: 2021-07-12 | End: 2021-07-17 | Stop reason: HOSPADM

## 2021-07-12 RX ORDER — NITROGLYCERIN 0.4 MG/1
0.4 TABLET SUBLINGUAL
Status: DISCONTINUED | OUTPATIENT
Start: 2021-07-12 | End: 2021-07-17 | Stop reason: HOSPADM

## 2021-07-12 RX ORDER — POTASSIUM CHLORIDE 750 MG/1
20 TABLET, FILM COATED, EXTENDED RELEASE ORAL ONCE
Status: COMPLETED | OUTPATIENT
Start: 2021-07-12 | End: 2021-07-12

## 2021-07-12 RX ORDER — ECHINACEA PURPUREA EXTRACT 125 MG
2 TABLET ORAL AS NEEDED
Status: DISCONTINUED | OUTPATIENT
Start: 2021-07-12 | End: 2021-07-17 | Stop reason: HOSPADM

## 2021-07-12 RX ORDER — ALPRAZOLAM 0.5 MG/1
1 TABLET ORAL 2 TIMES DAILY PRN
Status: DISCONTINUED | OUTPATIENT
Start: 2021-07-12 | End: 2021-07-17 | Stop reason: HOSPADM

## 2021-07-12 RX ORDER — CEFAZOLIN SODIUM IN 0.9 % NACL 3 G/100 ML
3 INTRAVENOUS SOLUTION, PIGGYBACK (ML) INTRAVENOUS
Status: COMPLETED | OUTPATIENT
Start: 2021-07-13 | End: 2021-07-13

## 2021-07-12 RX ORDER — PANTOPRAZOLE SODIUM 40 MG/1
40 TABLET, DELAYED RELEASE ORAL EVERY MORNING
Refills: 0 | Status: DISCONTINUED | OUTPATIENT
Start: 2021-07-13 | End: 2021-07-17 | Stop reason: HOSPADM

## 2021-07-12 RX ORDER — FAMOTIDINE 10 MG/ML
20 INJECTION, SOLUTION INTRAVENOUS ONCE
Status: DISCONTINUED | OUTPATIENT
Start: 2021-07-12 | End: 2021-07-17 | Stop reason: HOSPADM

## 2021-07-12 RX ORDER — SODIUM CHLORIDE 0.9 % (FLUSH) 0.9 %
3 SYRINGE (ML) INJECTION EVERY 12 HOURS SCHEDULED
Status: DISCONTINUED | OUTPATIENT
Start: 2021-07-12 | End: 2021-07-17 | Stop reason: HOSPADM

## 2021-07-12 RX ORDER — CHLORHEXIDINE GLUCONATE 0.12 MG/ML
15 RINSE ORAL EVERY 12 HOURS SCHEDULED
Status: DISCONTINUED | OUTPATIENT
Start: 2021-07-12 | End: 2021-07-13

## 2021-07-12 RX ORDER — ATORVASTATIN CALCIUM 20 MG/1
20 TABLET, FILM COATED ORAL DAILY
Status: DISCONTINUED | OUTPATIENT
Start: 2021-07-12 | End: 2021-07-17 | Stop reason: HOSPADM

## 2021-07-12 RX ORDER — FLUTICASONE PROPIONATE 50 MCG
2 SPRAY, SUSPENSION (ML) NASAL DAILY
Status: DISCONTINUED | OUTPATIENT
Start: 2021-07-12 | End: 2021-07-17 | Stop reason: HOSPADM

## 2021-07-12 RX ORDER — NITROFURANTOIN 25; 75 MG/1; MG/1
100 CAPSULE ORAL 2 TIMES DAILY
Status: COMPLETED | OUTPATIENT
Start: 2021-07-12 | End: 2021-07-14

## 2021-07-12 RX ORDER — CARVEDILOL 3.12 MG/1
3.12 TABLET ORAL 2 TIMES DAILY WITH MEALS
Status: DISCONTINUED | OUTPATIENT
Start: 2021-07-12 | End: 2021-07-14

## 2021-07-12 RX ORDER — LIDOCAINE HYDROCHLORIDE 10 MG/ML
0.5 INJECTION, SOLUTION EPIDURAL; INFILTRATION; INTRACAUDAL; PERINEURAL ONCE AS NEEDED
Status: DISCONTINUED | OUTPATIENT
Start: 2021-07-12 | End: 2021-07-17 | Stop reason: HOSPADM

## 2021-07-12 RX ORDER — VILAZODONE HYDROCHLORIDE 40 MG/1
20 TABLET ORAL NIGHTLY
Status: DISCONTINUED | OUTPATIENT
Start: 2021-07-12 | End: 2021-07-17 | Stop reason: HOSPADM

## 2021-07-12 RX ADMIN — POTASSIUM CHLORIDE 20 MEQ: 750 TABLET, EXTENDED RELEASE ORAL at 18:19

## 2021-07-12 RX ADMIN — CARVEDILOL 3.12 MG: 3.12 TABLET, FILM COATED ORAL at 18:19

## 2021-07-12 RX ADMIN — VILAZODONE HYDROCHLORIDE 20 MG: 40 TABLET ORAL at 20:33

## 2021-07-12 RX ADMIN — CHLORHEXIDINE GLUCONATE 15 ML: 1.2 RINSE ORAL at 18:19

## 2021-07-12 RX ADMIN — GABAPENTIN 100 MG: 100 CAPSULE ORAL at 18:19

## 2021-07-12 RX ADMIN — MUPIROCIN 1 APPLICATION: 20 OINTMENT TOPICAL at 18:19

## 2021-07-12 RX ADMIN — SODIUM CHLORIDE, PRESERVATIVE FREE 3 ML: 5 INJECTION INTRAVENOUS at 20:40

## 2021-07-12 RX ADMIN — DOCUSATE SODIUM 50MG AND SENNOSIDES 8.6MG 1 TABLET: 8.6; 5 TABLET, FILM COATED ORAL at 18:19

## 2021-07-12 RX ADMIN — INSULIN LISPRO 6 UNITS: 100 INJECTION, SOLUTION INTRAVENOUS; SUBCUTANEOUS at 20:34

## 2021-07-12 RX ADMIN — ALPRAZOLAM 1 MG: 0.5 TABLET ORAL at 20:39

## 2021-07-12 RX ADMIN — NITROFURANTOIN MONOHYDRATE/MACROCRYSTALLINE 100 MG: 25; 75 CAPSULE ORAL at 20:33

## 2021-07-12 RX ADMIN — ATORVASTATIN CALCIUM 20 MG: 20 TABLET, FILM COATED ORAL at 20:33

## 2021-07-12 RX ADMIN — BUMETANIDE 2 MG: 0.25 INJECTION, SOLUTION INTRAMUSCULAR; INTRAVENOUS at 18:19

## 2021-07-13 ENCOUNTER — ANESTHESIA (OUTPATIENT)
Dept: PERIOP | Facility: HOSPITAL | Age: 76
End: 2021-07-13

## 2021-07-13 ENCOUNTER — APPOINTMENT (OUTPATIENT)
Dept: GENERAL RADIOLOGY | Facility: HOSPITAL | Age: 76
End: 2021-07-13

## 2021-07-13 DIAGNOSIS — I50.43 ACUTE ON CHRONIC COMBINED SYSTOLIC AND DIASTOLIC HF (HEART FAILURE) (HCC): Primary | ICD-10-CM

## 2021-07-13 DIAGNOSIS — I35.0 NONRHEUMATIC AORTIC VALVE STENOSIS: ICD-10-CM

## 2021-07-13 LAB
ALBUMIN SERPL-MCNC: 3.1 G/DL (ref 3.5–5.2)
ALBUMIN/GLOB SERPL: 1.3 G/DL
ALP SERPL-CCNC: 180 U/L (ref 39–117)
ALT SERPL W P-5'-P-CCNC: 9 U/L (ref 1–33)
ANION GAP SERPL CALCULATED.3IONS-SCNC: 10.3 MMOL/L (ref 5–15)
AST SERPL-CCNC: 15 U/L (ref 1–32)
BILIRUB SERPL-MCNC: 0.4 MG/DL (ref 0–1.2)
BUN SERPL-MCNC: 26 MG/DL (ref 8–23)
BUN/CREAT SERPL: 19.5 (ref 7–25)
CALCIUM SPEC-SCNC: 8.3 MG/DL (ref 8.6–10.5)
CHLORIDE SERPL-SCNC: 100 MMOL/L (ref 98–107)
CO2 SERPL-SCNC: 32.7 MMOL/L (ref 22–29)
CREAT SERPL-MCNC: 1.33 MG/DL (ref 0.57–1)
DEPRECATED RDW RBC AUTO: 41.6 FL (ref 37–54)
ERYTHROCYTE [DISTWIDTH] IN BLOOD BY AUTOMATED COUNT: 15.3 % (ref 12.3–15.4)
GFR SERPL CREATININE-BSD FRML MDRD: 39 ML/MIN/1.73
GLOBULIN UR ELPH-MCNC: 2.4 GM/DL
GLUCOSE BLDC GLUCOMTR-MCNC: 134 MG/DL (ref 70–130)
GLUCOSE BLDC GLUCOMTR-MCNC: 138 MG/DL (ref 70–130)
GLUCOSE BLDC GLUCOMTR-MCNC: 138 MG/DL (ref 70–130)
GLUCOSE BLDC GLUCOMTR-MCNC: 156 MG/DL (ref 70–130)
GLUCOSE SERPL-MCNC: 138 MG/DL (ref 65–99)
HCT VFR BLD AUTO: 28.3 % (ref 34–46.6)
HGB BLD-MCNC: 8.6 G/DL (ref 12–15.9)
MCH RBC QN AUTO: 23.3 PG (ref 26.6–33)
MCHC RBC AUTO-ENTMCNC: 30.4 G/DL (ref 31.5–35.7)
MCV RBC AUTO: 76.7 FL (ref 79–97)
PLATELET # BLD AUTO: 244 10*3/MM3 (ref 140–450)
PMV BLD AUTO: 10.6 FL (ref 6–12)
POTASSIUM SERPL-SCNC: 4 MMOL/L (ref 3.5–5.2)
PROT SERPL-MCNC: 5.5 G/DL (ref 6–8.5)
QT INTERVAL: 474 MS
RBC # BLD AUTO: 3.69 10*6/MM3 (ref 3.77–5.28)
SODIUM SERPL-SCNC: 143 MMOL/L (ref 136–145)
WBC # BLD AUTO: 9.09 10*3/MM3 (ref 3.4–10.8)

## 2021-07-13 PROCEDURE — 85347 COAGULATION TIME ACTIVATED: CPT

## 2021-07-13 PROCEDURE — C1760 CLOSURE DEV, VASC: HCPCS | Performed by: THORACIC SURGERY (CARDIOTHORACIC VASCULAR SURGERY)

## 2021-07-13 PROCEDURE — 94664 DEMO&/EVAL PT USE INHALER: CPT

## 2021-07-13 PROCEDURE — 25010000002 MIDAZOLAM PER 1 MG: Performed by: NURSE PRACTITIONER

## 2021-07-13 PROCEDURE — C1769 GUIDE WIRE: HCPCS | Performed by: THORACIC SURGERY (CARDIOTHORACIC VASCULAR SURGERY)

## 2021-07-13 PROCEDURE — 94799 UNLISTED PULMONARY SVC/PX: CPT

## 2021-07-13 PROCEDURE — 25010000002 PROTAMINE SULFATE PER 10 MG: Performed by: ANESTHESIOLOGY

## 2021-07-13 PROCEDURE — C1751 CATH, INF, PER/CENT/MIDLINE: HCPCS | Performed by: ANESTHESIOLOGY

## 2021-07-13 PROCEDURE — 25010000002 HEPARIN (PORCINE) PER 1000 UNITS: Performed by: ANESTHESIOLOGY

## 2021-07-13 PROCEDURE — C1894 INTRO/SHEATH, NON-LASER: HCPCS | Performed by: THORACIC SURGERY (CARDIOTHORACIC VASCULAR SURGERY)

## 2021-07-13 PROCEDURE — 82947 ASSAY GLUCOSE BLOOD QUANT: CPT

## 2021-07-13 PROCEDURE — 02HV33Z INSERTION OF INFUSION DEVICE INTO SUPERIOR VENA CAVA, PERCUTANEOUS APPROACH: ICD-10-PCS | Performed by: INTERNAL MEDICINE

## 2021-07-13 PROCEDURE — 80053 COMPREHEN METABOLIC PANEL: CPT | Performed by: THORACIC SURGERY (CARDIOTHORACIC VASCULAR SURGERY)

## 2021-07-13 PROCEDURE — 85014 HEMATOCRIT: CPT

## 2021-07-13 PROCEDURE — 25010000002 FENTANYL CITRATE (PF) 50 MCG/ML SOLUTION: Performed by: ANESTHESIOLOGY

## 2021-07-13 PROCEDURE — 33361 REPLACE AORTIC VALVE PERQ: CPT | Performed by: INTERNAL MEDICINE

## 2021-07-13 PROCEDURE — 82962 GLUCOSE BLOOD TEST: CPT

## 2021-07-13 PROCEDURE — 02RF38Z REPLACEMENT OF AORTIC VALVE WITH ZOOPLASTIC TISSUE, PERCUTANEOUS APPROACH: ICD-10-PCS | Performed by: THORACIC SURGERY (CARDIOTHORACIC VASCULAR SURGERY)

## 2021-07-13 PROCEDURE — 25010000002 MAGNESIUM SULFATE PER 500 MG OF MAGNESIUM: Performed by: ANESTHESIOLOGY

## 2021-07-13 PROCEDURE — 0 IOPAMIDOL PER 1 ML: Performed by: THORACIC SURGERY (CARDIOTHORACIC VASCULAR SURGERY)

## 2021-07-13 PROCEDURE — 85027 COMPLETE CBC AUTOMATED: CPT | Performed by: THORACIC SURGERY (CARDIOTHORACIC VASCULAR SURGERY)

## 2021-07-13 PROCEDURE — 94660 CPAP INITIATION&MGMT: CPT

## 2021-07-13 PROCEDURE — 63710000001 INSULIN LISPRO (HUMAN) PER 5 UNITS: Performed by: NURSE PRACTITIONER

## 2021-07-13 PROCEDURE — 93005 ELECTROCARDIOGRAM TRACING: CPT | Performed by: INTERNAL MEDICINE

## 2021-07-13 PROCEDURE — 85018 HEMOGLOBIN: CPT

## 2021-07-13 PROCEDURE — B41D1ZZ FLUOROSCOPY OF AORTA AND BILATERAL LOWER EXTREMITY ARTERIES USING LOW OSMOLAR CONTRAST: ICD-10-PCS | Performed by: INTERNAL MEDICINE

## 2021-07-13 PROCEDURE — 25010000002 MIDAZOLAM PER 1 MG: Performed by: ANESTHESIOLOGY

## 2021-07-13 PROCEDURE — 93010 ELECTROCARDIOGRAM REPORT: CPT | Performed by: INTERNAL MEDICINE

## 2021-07-13 PROCEDURE — 25010000002 PROPOFOL 10 MG/ML EMULSION: Performed by: ANESTHESIOLOGY

## 2021-07-13 PROCEDURE — 94640 AIRWAY INHALATION TREATMENT: CPT

## 2021-07-13 PROCEDURE — 82803 BLOOD GASES ANY COMBINATION: CPT

## 2021-07-13 PROCEDURE — 33361 REPLACE AORTIC VALVE PERQ: CPT | Performed by: THORACIC SURGERY (CARDIOTHORACIC VASCULAR SURGERY)

## 2021-07-13 DEVICE — VLV HEART TRNSCATH SAPIEN3 23MM: Type: IMPLANTABLE DEVICE | Site: HEART | Status: FUNCTIONAL

## 2021-07-13 RX ORDER — ONDANSETRON 4 MG/1
4 TABLET, FILM COATED ORAL EVERY 6 HOURS PRN
Status: DISCONTINUED | OUTPATIENT
Start: 2021-07-13 | End: 2021-07-17 | Stop reason: HOSPADM

## 2021-07-13 RX ORDER — PROPOFOL 10 MG/ML
VIAL (ML) INTRAVENOUS AS NEEDED
Status: DISCONTINUED | OUTPATIENT
Start: 2021-07-13 | End: 2021-07-13 | Stop reason: SURG

## 2021-07-13 RX ORDER — MAGNESIUM SULFATE HEPTAHYDRATE 500 MG/ML
INJECTION, SOLUTION INTRAMUSCULAR; INTRAVENOUS AS NEEDED
Status: DISCONTINUED | OUTPATIENT
Start: 2021-07-13 | End: 2021-07-13 | Stop reason: SURG

## 2021-07-13 RX ORDER — SODIUM CHLORIDE 9 MG/ML
INJECTION, SOLUTION INTRAVENOUS CONTINUOUS PRN
Status: DISCONTINUED | OUTPATIENT
Start: 2021-07-13 | End: 2021-07-13 | Stop reason: SURG

## 2021-07-13 RX ORDER — PROPOFOL 10 MG/ML
VIAL (ML) INTRAVENOUS CONTINUOUS PRN
Status: DISCONTINUED | OUTPATIENT
Start: 2021-07-13 | End: 2021-07-13 | Stop reason: SURG

## 2021-07-13 RX ORDER — DEXMEDETOMIDINE HYDROCHLORIDE 4 UG/ML
INJECTION INTRAVENOUS CONTINUOUS PRN
Status: DISCONTINUED | OUTPATIENT
Start: 2021-07-13 | End: 2021-07-13 | Stop reason: SURG

## 2021-07-13 RX ORDER — FAMOTIDINE 10 MG/ML
20 INJECTION, SOLUTION INTRAVENOUS ONCE
Status: COMPLETED | OUTPATIENT
Start: 2021-07-13 | End: 2021-07-13

## 2021-07-13 RX ORDER — LIDOCAINE HYDROCHLORIDE 20 MG/ML
INJECTION, SOLUTION INFILTRATION; PERINEURAL AS NEEDED
Status: DISCONTINUED | OUTPATIENT
Start: 2021-07-13 | End: 2021-07-13 | Stop reason: HOSPADM

## 2021-07-13 RX ORDER — HEPARIN SODIUM 1000 [USP'U]/ML
INJECTION, SOLUTION INTRAVENOUS; SUBCUTANEOUS AS NEEDED
Status: DISCONTINUED | OUTPATIENT
Start: 2021-07-13 | End: 2021-07-13 | Stop reason: SURG

## 2021-07-13 RX ORDER — FENTANYL CITRATE 50 UG/ML
50 INJECTION, SOLUTION INTRAMUSCULAR; INTRAVENOUS
Status: DISCONTINUED | OUTPATIENT
Start: 2021-07-13 | End: 2021-07-13 | Stop reason: HOSPADM

## 2021-07-13 RX ORDER — ACETAMINOPHEN 325 MG/1
650 TABLET ORAL EVERY 4 HOURS PRN
Status: DISCONTINUED | OUTPATIENT
Start: 2021-07-13 | End: 2021-07-17 | Stop reason: HOSPADM

## 2021-07-13 RX ORDER — MIDAZOLAM HYDROCHLORIDE 1 MG/ML
0.5 INJECTION INTRAMUSCULAR; INTRAVENOUS
Status: DISCONTINUED | OUTPATIENT
Start: 2021-07-13 | End: 2021-07-13 | Stop reason: HOSPADM

## 2021-07-13 RX ORDER — SODIUM CHLORIDE 0.9 % (FLUSH) 0.9 %
3-10 SYRINGE (ML) INJECTION AS NEEDED
Status: DISCONTINUED | OUTPATIENT
Start: 2021-07-13 | End: 2021-07-13 | Stop reason: HOSPADM

## 2021-07-13 RX ORDER — ONDANSETRON 2 MG/ML
4 INJECTION INTRAMUSCULAR; INTRAVENOUS EVERY 6 HOURS PRN
Status: DISCONTINUED | OUTPATIENT
Start: 2021-07-13 | End: 2021-07-17 | Stop reason: HOSPADM

## 2021-07-13 RX ORDER — CLOPIDOGREL BISULFATE 75 MG/1
75 TABLET ORAL DAILY
Status: DISCONTINUED | OUTPATIENT
Start: 2021-07-14 | End: 2021-07-14

## 2021-07-13 RX ORDER — ASPIRIN 81 MG/1
81 TABLET ORAL DAILY
Status: DISCONTINUED | OUTPATIENT
Start: 2021-07-13 | End: 2021-07-17 | Stop reason: HOSPADM

## 2021-07-13 RX ORDER — PROTAMINE SULFATE 10 MG/ML
INJECTION, SOLUTION INTRAVENOUS AS NEEDED
Status: DISCONTINUED | OUTPATIENT
Start: 2021-07-13 | End: 2021-07-13 | Stop reason: SURG

## 2021-07-13 RX ORDER — NOREPINEPHRINE BITARTRATE 1 MG/ML
INJECTION, SOLUTION INTRAVENOUS CONTINUOUS PRN
Status: DISCONTINUED | OUTPATIENT
Start: 2021-07-13 | End: 2021-07-13 | Stop reason: SURG

## 2021-07-13 RX ORDER — SODIUM CHLORIDE 9 MG/ML
50 INJECTION, SOLUTION INTRAVENOUS CONTINUOUS
Status: ACTIVE | OUTPATIENT
Start: 2021-07-13 | End: 2021-07-13

## 2021-07-13 RX ORDER — LIDOCAINE HYDROCHLORIDE 10 MG/ML
0.5 INJECTION, SOLUTION EPIDURAL; INFILTRATION; INTRACAUDAL; PERINEURAL ONCE AS NEEDED
Status: DISCONTINUED | OUTPATIENT
Start: 2021-07-13 | End: 2021-07-13 | Stop reason: HOSPADM

## 2021-07-13 RX ORDER — SODIUM CHLORIDE 0.9 % (FLUSH) 0.9 %
3 SYRINGE (ML) INJECTION EVERY 12 HOURS SCHEDULED
Status: DISCONTINUED | OUTPATIENT
Start: 2021-07-13 | End: 2021-07-13 | Stop reason: HOSPADM

## 2021-07-13 RX ORDER — SODIUM CHLORIDE, SODIUM LACTATE, POTASSIUM CHLORIDE, CALCIUM CHLORIDE 600; 310; 30; 20 MG/100ML; MG/100ML; MG/100ML; MG/100ML
9 INJECTION, SOLUTION INTRAVENOUS CONTINUOUS
Status: DISCONTINUED | OUTPATIENT
Start: 2021-07-13 | End: 2021-07-15

## 2021-07-13 RX ADMIN — MIDAZOLAM 1 MG: 1 INJECTION INTRAMUSCULAR; INTRAVENOUS at 09:21

## 2021-07-13 RX ADMIN — SODIUM CHLORIDE, PRESERVATIVE FREE 3 ML: 5 INJECTION INTRAVENOUS at 21:52

## 2021-07-13 RX ADMIN — FENTANYL CITRATE 50 MCG: 50 INJECTION, SOLUTION INTRAMUSCULAR; INTRAVENOUS at 09:30

## 2021-07-13 RX ADMIN — NOREPINEPHRINE BITARTRATE 0.03 MCG/KG/MIN: 1 INJECTION, SOLUTION, CONCENTRATE INTRAVENOUS at 10:53

## 2021-07-13 RX ADMIN — NITROFURANTOIN MONOHYDRATE/MACROCRYSTALLINE 100 MG: 25; 75 CAPSULE ORAL at 08:15

## 2021-07-13 RX ADMIN — MAGNESIUM SULFATE HEPTAHYDRATE 1 G: 500 INJECTION, SOLUTION INTRAMUSCULAR; INTRAVENOUS at 10:42

## 2021-07-13 RX ADMIN — PROPOFOL 15 MCG/KG/MIN: 10 INJECTION, EMULSION INTRAVENOUS at 10:46

## 2021-07-13 RX ADMIN — PROTAMINE SULFATE 50 MG: 10 INJECTION, SOLUTION INTRAVENOUS at 11:44

## 2021-07-13 RX ADMIN — LEVOTHYROXINE SODIUM 50 MCG: 0.05 TABLET ORAL at 06:27

## 2021-07-13 RX ADMIN — NITROFURANTOIN MONOHYDRATE/MACROCRYSTALLINE 100 MG: 25; 75 CAPSULE ORAL at 23:16

## 2021-07-13 RX ADMIN — BUDESONIDE AND FORMOTEROL FUMARATE DIHYDRATE 2 PUFF: 160; 4.5 AEROSOL RESPIRATORY (INHALATION) at 23:43

## 2021-07-13 RX ADMIN — GABAPENTIN 100 MG: 100 CAPSULE ORAL at 21:50

## 2021-07-13 RX ADMIN — FAMOTIDINE 20 MG: 10 INJECTION INTRAVENOUS at 09:29

## 2021-07-13 RX ADMIN — ALPRAZOLAM 1 MG: 0.5 TABLET ORAL at 23:16

## 2021-07-13 RX ADMIN — DEXMEDETOMIDINE HYDROCHLORIDE 1 MCG/KG/HR: 4 INJECTION INTRAVENOUS at 10:11

## 2021-07-13 RX ADMIN — SODIUM CHLORIDE: 900 INJECTION, SOLUTION INTRAVENOUS at 10:14

## 2021-07-13 RX ADMIN — VILAZODONE HYDROCHLORIDE 20 MG: 40 TABLET ORAL at 23:16

## 2021-07-13 RX ADMIN — ATORVASTATIN CALCIUM 20 MG: 20 TABLET, FILM COATED ORAL at 21:51

## 2021-07-13 RX ADMIN — MAGNESIUM SULFATE HEPTAHYDRATE 1 G: 500 INJECTION, SOLUTION INTRAMUSCULAR; INTRAVENOUS at 10:43

## 2021-07-13 RX ADMIN — SODIUM CHLORIDE 50 ML/HR: 9 INJECTION, SOLUTION INTRAVENOUS at 13:53

## 2021-07-13 RX ADMIN — INSULIN LISPRO 2 UNITS: 100 INJECTION, SOLUTION INTRAVENOUS; SUBCUTANEOUS at 06:24

## 2021-07-13 RX ADMIN — HEPARIN SODIUM 3000 UNITS: 1000 INJECTION INTRAVENOUS; SUBCUTANEOUS at 11:38

## 2021-07-13 RX ADMIN — ASPIRIN 81 MG: 81 TABLET, COATED ORAL at 21:51

## 2021-07-13 RX ADMIN — MIDAZOLAM 1 MG: 1 INJECTION INTRAMUSCULAR; INTRAVENOUS at 09:40

## 2021-07-13 RX ADMIN — PROPOFOL 10 MG: 10 INJECTION, EMULSION INTRAVENOUS at 10:46

## 2021-07-13 RX ADMIN — PROPOFOL 30 MG: 10 INJECTION, EMULSION INTRAVENOUS at 10:16

## 2021-07-13 RX ADMIN — CARVEDILOL 3.12 MG: 3.12 TABLET, FILM COATED ORAL at 08:08

## 2021-07-13 RX ADMIN — HEPARIN SODIUM 15000 UNITS: 1000 INJECTION INTRAVENOUS; SUBCUTANEOUS at 11:27

## 2021-07-13 RX ADMIN — PANTOPRAZOLE SODIUM 40 MG: 40 TABLET, DELAYED RELEASE ORAL at 06:24

## 2021-07-13 RX ADMIN — CEFAZOLIN 3 G: 1 INJECTION, POWDER, FOR SOLUTION INTRAMUSCULAR; INTRAVENOUS; PARENTERAL at 10:28

## 2021-07-13 RX ADMIN — SODIUM CHLORIDE: 0.9 INJECTION, SOLUTION INTRAVENOUS at 10:11

## 2021-07-13 RX ADMIN — GABAPENTIN 100 MG: 100 CAPSULE ORAL at 06:24

## 2021-07-13 RX ADMIN — TRAMADOL HYDROCHLORIDE 50 MG: 50 TABLET, FILM COATED ORAL at 23:17

## 2021-07-13 RX ADMIN — BUDESONIDE AND FORMOTEROL FUMARATE DIHYDRATE 2 PUFF: 160; 4.5 AEROSOL RESPIRATORY (INHALATION) at 08:02

## 2021-07-13 RX ADMIN — CARVEDILOL 3.12 MG: 3.12 TABLET, FILM COATED ORAL at 21:51

## 2021-07-13 NOTE — ANESTHESIA PROCEDURE NOTES
Arterial Line      Patient reassessed immediately prior to procedure    Patient location during procedure: holding area  Start time: 7/13/2021 9:18 AM  Stop Time:7/13/2021 9:36 AM       Line placed for hemodynamic monitoring.  Performed By   Anesthesiologist: Nicolás Shin MD  Preanesthetic Checklist  Completed: patient identified and risks and benefits discussed  Arterial Line Prep   Sterile Tech: gloves  Prep: ChloraPrep  Patient monitoring: blood pressure monitoring, continuous pulse oximetry and EKG  Arterial Line Procedure   Laterality:left  Location:  radial artery  Catheter size: 20 G   Guidance: ultrasound guided  PROCEDURE NOTE/ULTRASOUND INTERPRETATION.  Using ultrasound guidance the potential vascular sites for insertion of the catheter were visualized to determine the patency of the vessel to be used for vascular access.  After selecting the appropriate site for insertion, the needle was visualized under ultrasound being inserted into the radial artery, followed by ultrasound confirmation of wire and catheter placement. There were no abnormalities seen on ultrasound; an image was taken; and the patient tolerated the procedure with no complications.   Successful placement: yes  Post Assessment   Dressing Type: occlusive dressing applied and secured with tape.   Complications no  Patient Tolerance: patient tolerated the procedure well with no apparent complications  Additional Notes  Using ultrasound guidance the potential vascular sites for insertion of the catheter were visualized to determine the patency of the vessel to be used for vascular access.  After selecting the appropriate site for insertion, the needle was visualized under ultrasound being inserted into the radial artery, followed by ultrasound confirmation of wire and catheter placement.  There were no abnormalities seen on ultrasound; an image was taken/ and the patient tolerated the procedure with no complications.

## 2021-07-13 NOTE — ANESTHESIA PROCEDURE NOTES
Central Line      Patient reassessed immediately prior to procedure    Patient location during procedure: holding area  Start time: 7/13/2021 9:44 AM  Stop Time:7/13/2021 9:54 AM  Indications: vascular access and central pressure monitoring  Staff  Anesthesiologist: Nicolás Shin MD  Preanesthetic Checklist  Completed: patient identified and risks and benefits discussed  Central Line Prep  Sterile Tech:cap, gloves, gown, mask and sterile barriers  Prep: chloraprep  Patient monitoring: blood pressure monitoring, continuous pulse oximetry and EKG  Central Line Procedure  Laterality:right  Location:internal jugular  Catheter Type:Cordis  Catheter Size:6 Fr  Guidance:ultrasound guided  PROCEDURE NOTE/ULTRASOUND INTERPRETATION.  Using ultrasound guidance the potential vascular sites for insertion of the catheter were visualized to determine the patency of the vessel to be used for vascular access.  After selecting the appropriate site for insertion, the needle was visualized under ultrasound being inserted into the internal jugular vein, followed by ultrasound confirmation of wire and catheter placement. There were no abnormalities seen on ultrasound; an image was taken; and the patient tolerated the procedure with no complications. Images: still images obtained, printed/placed on chart  Assessment  Post procedure:biopatch applied, line sutured, occlusive dressing applied and secured with tape  Assessement:blood return through all ports and chest x-ray ordered  Complications:no  Patient Tolerance:patient tolerated the procedure well with no apparent complications  Additional Notes  Ultrasound Interpretation:  Using ultrasound guidance the potential vascular sites for insertion of the catheter were visualized to determine the patency of the vessel to be used for vascular access.  After selecting the appropriate site for insertion, the needle was visualized under ultrasound being inserted into the vessel, followed by  ultrasound confirmation of wire and catheter placement.  There were no abnormalities seen on ultrasound; an image was taken/ and the patient tolerated the procedure with no complications.

## 2021-07-13 NOTE — ANESTHESIA POSTPROCEDURE EVALUATION
"Patient: Miguelina Lopez    Procedure Summary     Date: 07/13/21 Room / Location: [facility] OR [facility]V [facility]    Anesthesia Start: 1011 Anesthesia Stop: 1219    Procedures:       TTE TRANSFEMORAL TRANSCATHETER AORTIC VALVE REPLACEMENT PERCUTANEOUS APPROACH (N/A Chest)      Transfemoral Transcatheter Aortic Valve Replacement with intra-op tte and possible open surgical rescue (N/A ) Diagnosis:       Nonrheumatic aortic valve stenosis      (Nonrheumatic aortic valve stenosis [I35.0])    Surgeons: Sharlene Mejía MD; Ayan Pacheco MD Provider: Nicolás Shin MD    Anesthesia Type: MAC ASA Status: 4          Anesthesia Type: MAC    Vitals  Vitals Value Taken Time   /68 07/13/21 1536   Temp     Pulse 75 07/13/21 1553   Resp 18 07/13/21 1505   SpO2 99 % 07/13/21 1553   Vitals shown include unvalidated device data.        Post Anesthesia Care and Evaluation    Patient location during evaluation: bedside  Patient participation: complete - patient participated  Level of consciousness: awake and alert  Pain management: adequate  Airway patency: patent  Anesthetic complications: No anesthetic complications    Cardiovascular status: acceptable  Respiratory status: acceptable  Hydration status: acceptable    Comments: /79   Pulse 75   Temp 36.7 °C (98 °F) (Oral)   Resp 18   Ht 152 cm (59.84\")   Wt 128 kg (282 lb 3 oz)   SpO2 97%   BMI 55.40 kg/m²       "

## 2021-07-14 ENCOUNTER — APPOINTMENT (OUTPATIENT)
Dept: CARDIOLOGY | Facility: HOSPITAL | Age: 76
End: 2021-07-14

## 2021-07-14 LAB
ACT BLD: 125 SECONDS (ref 82–152)
ACT BLD: 180 SECONDS (ref 82–152)
ACT BLD: 246 SECONDS (ref 82–152)
ANION GAP SERPL CALCULATED.3IONS-SCNC: 8.2 MMOL/L (ref 5–15)
BASE EXCESS BLDA CALC-SCNC: 12 MMOL/L (ref -5–5)
BUN SERPL-MCNC: 24 MG/DL (ref 8–23)
BUN/CREAT SERPL: 20.3 (ref 7–25)
CA-I BLDA-SCNC: ABNORMAL MMOL/L
CALCIUM SPEC-SCNC: 8.2 MG/DL (ref 8.6–10.5)
CHLORIDE SERPL-SCNC: 101 MMOL/L (ref 98–107)
CO2 BLDA-SCNC: 39 MMOL/L (ref 24–29)
CO2 SERPL-SCNC: 33.8 MMOL/L (ref 22–29)
CREAT SERPL-MCNC: 1.18 MG/DL (ref 0.57–1)
DEPRECATED RDW RBC AUTO: 44.8 FL (ref 37–54)
ERYTHROCYTE [DISTWIDTH] IN BLOOD BY AUTOMATED COUNT: 15.5 % (ref 12.3–15.4)
GFR SERPL CREATININE-BSD FRML MDRD: 45 ML/MIN/1.73
GLUCOSE BLDC GLUCOMTR-MCNC: 135 MG/DL (ref 70–130)
GLUCOSE BLDC GLUCOMTR-MCNC: 160 MG/DL (ref 70–130)
GLUCOSE BLDC GLUCOMTR-MCNC: 220 MG/DL (ref 70–130)
GLUCOSE BLDC GLUCOMTR-MCNC: 222 MG/DL (ref 70–130)
GLUCOSE BLDC GLUCOMTR-MCNC: 236 MG/DL (ref 70–130)
GLUCOSE SERPL-MCNC: 134 MG/DL (ref 65–99)
HCO3 BLDA-SCNC: 37.3 MMOL/L (ref 22–26)
HCT VFR BLD AUTO: 30.4 % (ref 34–46.6)
HCT VFR BLDA CALC: 28 % (ref 38–51)
HGB BLD-MCNC: 9.1 G/DL (ref 12–15.9)
HGB BLDA-MCNC: 9.5 G/DL (ref 12–17)
MCH RBC QN AUTO: 23.7 PG (ref 26.6–33)
MCHC RBC AUTO-ENTMCNC: 29.9 G/DL (ref 31.5–35.7)
MCV RBC AUTO: 79.2 FL (ref 79–97)
PCO2 BLDA: 67.2 MM HG (ref 35–45)
PH BLDA: 7.35 PH UNITS (ref 7.35–7.6)
PLATELET # BLD AUTO: 216 10*3/MM3 (ref 140–450)
PMV BLD AUTO: 10.4 FL (ref 6–12)
PO2 BLDA: 92 MMHG (ref 80–105)
POTASSIUM BLDA-SCNC: 3.8 MMOL/L (ref 3.5–4.9)
POTASSIUM SERPL-SCNC: 4.1 MMOL/L (ref 3.5–5.2)
RBC # BLD AUTO: 3.84 10*6/MM3 (ref 3.77–5.28)
SAO2 % BLDA: 96 % (ref 95–98)
SODIUM SERPL-SCNC: 143 MMOL/L (ref 136–145)
WBC # BLD AUTO: 11.01 10*3/MM3 (ref 3.4–10.8)

## 2021-07-14 PROCEDURE — 25010000002 HYDRALAZINE PER 20 MG: Performed by: NURSE PRACTITIONER

## 2021-07-14 PROCEDURE — 94799 UNLISTED PULMONARY SVC/PX: CPT

## 2021-07-14 PROCEDURE — 93308 TTE F-UP OR LMTD: CPT | Performed by: INTERNAL MEDICINE

## 2021-07-14 PROCEDURE — 93325 DOPPLER ECHO COLOR FLOW MAPG: CPT

## 2021-07-14 PROCEDURE — 85027 COMPLETE CBC AUTOMATED: CPT | Performed by: INTERNAL MEDICINE

## 2021-07-14 PROCEDURE — 63710000001 INSULIN LISPRO (HUMAN) PER 5 UNITS: Performed by: INTERNAL MEDICINE

## 2021-07-14 PROCEDURE — 25010000002 PERFLUTREN (DEFINITY) 8.476 MG IN SODIUM CHLORIDE (PF) 0.9 % 10 ML INJECTION: Performed by: INTERNAL MEDICINE

## 2021-07-14 PROCEDURE — 82962 GLUCOSE BLOOD TEST: CPT

## 2021-07-14 PROCEDURE — 93005 ELECTROCARDIOGRAM TRACING: CPT | Performed by: INTERNAL MEDICINE

## 2021-07-14 PROCEDURE — 93308 TTE F-UP OR LMTD: CPT

## 2021-07-14 PROCEDURE — 93010 ELECTROCARDIOGRAM REPORT: CPT | Performed by: INTERNAL MEDICINE

## 2021-07-14 PROCEDURE — 94640 AIRWAY INHALATION TREATMENT: CPT

## 2021-07-14 PROCEDURE — 99232 SBSQ HOSP IP/OBS MODERATE 35: CPT | Performed by: INTERNAL MEDICINE

## 2021-07-14 PROCEDURE — 94660 CPAP INITIATION&MGMT: CPT

## 2021-07-14 PROCEDURE — 93325 DOPPLER ECHO COLOR FLOW MAPG: CPT | Performed by: INTERNAL MEDICINE

## 2021-07-14 PROCEDURE — 80048 BASIC METABOLIC PNL TOTAL CA: CPT | Performed by: INTERNAL MEDICINE

## 2021-07-14 PROCEDURE — 93321 DOPPLER ECHO F-UP/LMTD STD: CPT

## 2021-07-14 PROCEDURE — 93321 DOPPLER ECHO F-UP/LMTD STD: CPT | Performed by: INTERNAL MEDICINE

## 2021-07-14 RX ORDER — NITROFURANTOIN 25; 75 MG/1; MG/1
100 CAPSULE ORAL 2 TIMES DAILY
Status: DISCONTINUED | OUTPATIENT
Start: 2021-07-14 | End: 2021-07-17 | Stop reason: HOSPADM

## 2021-07-14 RX ORDER — HYDRALAZINE HYDROCHLORIDE 20 MG/ML
10 INJECTION INTRAMUSCULAR; INTRAVENOUS EVERY 6 HOURS PRN
Status: DISCONTINUED | OUTPATIENT
Start: 2021-07-14 | End: 2021-07-17 | Stop reason: HOSPADM

## 2021-07-14 RX ORDER — CARVEDILOL 3.12 MG/1
3.12 TABLET ORAL ONCE
Status: COMPLETED | OUTPATIENT
Start: 2021-07-14 | End: 2021-07-14

## 2021-07-14 RX ORDER — CARVEDILOL 6.25 MG/1
6.25 TABLET ORAL 2 TIMES DAILY WITH MEALS
Status: DISCONTINUED | OUTPATIENT
Start: 2021-07-14 | End: 2021-07-17 | Stop reason: HOSPADM

## 2021-07-14 RX ORDER — BUMETANIDE 0.25 MG/ML
2 INJECTION INTRAMUSCULAR; INTRAVENOUS EVERY 12 HOURS
Status: DISCONTINUED | OUTPATIENT
Start: 2021-07-14 | End: 2021-07-16

## 2021-07-14 RX ADMIN — GABAPENTIN 100 MG: 100 CAPSULE ORAL at 08:56

## 2021-07-14 RX ADMIN — CLOPIDOGREL 75 MG: 75 TABLET, FILM COATED ORAL at 08:56

## 2021-07-14 RX ADMIN — VILAZODONE HYDROCHLORIDE 20 MG: 40 TABLET ORAL at 20:35

## 2021-07-14 RX ADMIN — LEVOTHYROXINE SODIUM 50 MCG: 0.05 TABLET ORAL at 07:08

## 2021-07-14 RX ADMIN — PERFLUTREN 2 ML: 6.52 INJECTION, SUSPENSION INTRAVENOUS at 08:49

## 2021-07-14 RX ADMIN — PANTOPRAZOLE SODIUM 40 MG: 40 TABLET, DELAYED RELEASE ORAL at 07:08

## 2021-07-14 RX ADMIN — GABAPENTIN 100 MG: 100 CAPSULE ORAL at 17:09

## 2021-07-14 RX ADMIN — SODIUM CHLORIDE, PRESERVATIVE FREE 3 ML: 5 INJECTION INTRAVENOUS at 20:37

## 2021-07-14 RX ADMIN — TRAMADOL HYDROCHLORIDE 50 MG: 50 TABLET, FILM COATED ORAL at 17:09

## 2021-07-14 RX ADMIN — HYDRALAZINE HYDROCHLORIDE 10 MG: 20 INJECTION, SOLUTION INTRAMUSCULAR; INTRAVENOUS at 22:17

## 2021-07-14 RX ADMIN — ATORVASTATIN CALCIUM 20 MG: 20 TABLET, FILM COATED ORAL at 20:36

## 2021-07-14 RX ADMIN — BUMETANIDE 2 MG: 0.25 INJECTION, SOLUTION INTRAMUSCULAR; INTRAVENOUS at 11:22

## 2021-07-14 RX ADMIN — CARVEDILOL 3.12 MG: 3.12 TABLET, FILM COATED ORAL at 08:56

## 2021-07-14 RX ADMIN — NITROFURANTOIN MONOHYDRATE/MACROCRYSTALLINE 100 MG: 25; 75 CAPSULE ORAL at 20:35

## 2021-07-14 RX ADMIN — NITROFURANTOIN MONOHYDRATE/MACROCRYSTALLINE 100 MG: 25; 75 CAPSULE ORAL at 08:56

## 2021-07-14 RX ADMIN — BUDESONIDE AND FORMOTEROL FUMARATE DIHYDRATE 2 PUFF: 160; 4.5 AEROSOL RESPIRATORY (INHALATION) at 19:26

## 2021-07-14 RX ADMIN — HYDRALAZINE HYDROCHLORIDE 10 MG: 20 INJECTION, SOLUTION INTRAMUSCULAR; INTRAVENOUS at 09:20

## 2021-07-14 RX ADMIN — INSULIN LISPRO 4 UNITS: 100 INJECTION, SOLUTION INTRAVENOUS; SUBCUTANEOUS at 11:26

## 2021-07-14 RX ADMIN — INSULIN LISPRO 4 UNITS: 100 INJECTION, SOLUTION INTRAVENOUS; SUBCUTANEOUS at 17:10

## 2021-07-14 RX ADMIN — SODIUM CHLORIDE, PRESERVATIVE FREE 3 ML: 5 INJECTION INTRAVENOUS at 09:00

## 2021-07-14 RX ADMIN — ASPIRIN 81 MG: 81 TABLET, COATED ORAL at 08:56

## 2021-07-14 RX ADMIN — BUMETANIDE 2 MG: 0.25 INJECTION, SOLUTION INTRAMUSCULAR; INTRAVENOUS at 22:17

## 2021-07-14 RX ADMIN — CARVEDILOL 6.25 MG: 6.25 TABLET, FILM COATED ORAL at 17:10

## 2021-07-14 RX ADMIN — CARVEDILOL 3.12 MG: 3.12 TABLET, FILM COATED ORAL at 09:20

## 2021-07-14 RX ADMIN — ALPRAZOLAM 1 MG: 0.5 TABLET ORAL at 22:17

## 2021-07-14 RX ADMIN — ACETAMINOPHEN 650 MG: 325 TABLET, FILM COATED ORAL at 09:28

## 2021-07-14 RX ADMIN — BUDESONIDE AND FORMOTEROL FUMARATE DIHYDRATE 2 PUFF: 160; 4.5 AEROSOL RESPIRATORY (INHALATION) at 07:01

## 2021-07-15 LAB
AORTIC DIMENSIONLESS INDEX: 0.5 (DI)
BH CV ECHO MEAS - AO MAX PG (FULL): 16.7 MMHG
BH CV ECHO MEAS - AO MAX PG: 20.8 MMHG
BH CV ECHO MEAS - AO MEAN PG (FULL): 8 MMHG
BH CV ECHO MEAS - AO MEAN PG: 10 MMHG
BH CV ECHO MEAS - AO V2 MAX: 228 CM/SEC
BH CV ECHO MEAS - AO V2 MEAN: 142 CM/SEC
BH CV ECHO MEAS - AO V2 VTI: 38.3 CM
BH CV ECHO MEAS - AVA(I,A): 1.5 CM^2
BH CV ECHO MEAS - AVA(I,D): 1.5 CM^2
BH CV ECHO MEAS - AVA(V,A): 1.3 CM^2
BH CV ECHO MEAS - AVA(V,D): 1.3 CM^2
BH CV ECHO MEAS - BSA(HAYCOCK): 2.4 M^2
BH CV ECHO MEAS - BSA: 2.1 M^2
BH CV ECHO MEAS - BZI_BMI: 57 KILOGRAMS/M^2
BH CV ECHO MEAS - BZI_METRIC_HEIGHT: 149.9 CM
BH CV ECHO MEAS - BZI_METRIC_WEIGHT: 127.9 KG
BH CV ECHO MEAS - CONTRAST EF 4CH: 41 CM2
BH CV ECHO MEAS - EDV(MOD-SP2): 99 ML
BH CV ECHO MEAS - EDV(MOD-SP4): 86 ML
BH CV ECHO MEAS - EF(MOD-BP): 40.8 %
BH CV ECHO MEAS - EF(MOD-SP2): 41.4 %
BH CV ECHO MEAS - EF(MOD-SP4): 39.5 %
BH CV ECHO MEAS - ESV(MOD-SP2): 58 ML
BH CV ECHO MEAS - ESV(MOD-SP4): 52 ML
BH CV ECHO MEAS - LV DIASTOLIC VOL/BSA (35-75): 40.3 ML/M^2
BH CV ECHO MEAS - LV MAX PG: 4.1 MMHG
BH CV ECHO MEAS - LV MEAN PG: 2 MMHG
BH CV ECHO MEAS - LV SYSTOLIC VOL/BSA (12-30): 24.4 ML/M^2
BH CV ECHO MEAS - LV V1 MAX: 101 CM/SEC
BH CV ECHO MEAS - LV V1 MEAN: 66.6 CM/SEC
BH CV ECHO MEAS - LV V1 VTI: 20.2 CM
BH CV ECHO MEAS - LVLD AP2: 8.1 CM
BH CV ECHO MEAS - LVLD AP4: 8.2 CM
BH CV ECHO MEAS - LVLS AP2: 7.3 CM
BH CV ECHO MEAS - LVLS AP4: 7.7 CM
BH CV ECHO MEAS - LVOT AREA (M): 2.8 CM^2
BH CV ECHO MEAS - LVOT AREA: 2.8 CM^2
BH CV ECHO MEAS - LVOT DIAM: 1.9 CM
BH CV ECHO MEAS - RAP SYSTOLE: 8 MMHG
BH CV ECHO MEAS - RVSP: 57 MMHG
BH CV ECHO MEAS - SI(LVOT): 26.8 ML/M^2
BH CV ECHO MEAS - SI(MOD-SP2): 19.2 ML/M^2
BH CV ECHO MEAS - SI(MOD-SP4): 15.9 ML/M^2
BH CV ECHO MEAS - SV(LVOT): 57.3 ML
BH CV ECHO MEAS - SV(MOD-SP2): 41 ML
BH CV ECHO MEAS - SV(MOD-SP4): 34 ML
BH CV ECHO MEAS - TR MAX VEL: 349 CM/SEC
BH CV VAS BP RIGHT ARM: NORMAL MMHG
GLUCOSE BLDC GLUCOMTR-MCNC: 160 MG/DL (ref 70–130)
GLUCOSE BLDC GLUCOMTR-MCNC: 173 MG/DL (ref 70–130)
GLUCOSE BLDC GLUCOMTR-MCNC: 219 MG/DL (ref 70–130)
GLUCOSE BLDC GLUCOMTR-MCNC: 222 MG/DL (ref 70–130)
LEFT ATRIUM VOLUME INDEX: 36 ML/M2
LV EF 2D ECHO EST: 41 %
MAXIMAL PREDICTED HEART RATE: 144 BPM
QT INTERVAL: 480 MS
STRESS TARGET HR: 122 BPM

## 2021-07-15 PROCEDURE — 94660 CPAP INITIATION&MGMT: CPT

## 2021-07-15 PROCEDURE — 82962 GLUCOSE BLOOD TEST: CPT

## 2021-07-15 PROCEDURE — 97530 THERAPEUTIC ACTIVITIES: CPT

## 2021-07-15 PROCEDURE — 94799 UNLISTED PULMONARY SVC/PX: CPT

## 2021-07-15 PROCEDURE — 63710000001 INSULIN LISPRO (HUMAN) PER 5 UNITS: Performed by: INTERNAL MEDICINE

## 2021-07-15 PROCEDURE — 25010000002 HYDRALAZINE PER 20 MG: Performed by: NURSE PRACTITIONER

## 2021-07-15 PROCEDURE — 97162 PT EVAL MOD COMPLEX 30 MIN: CPT

## 2021-07-15 PROCEDURE — 99232 SBSQ HOSP IP/OBS MODERATE 35: CPT | Performed by: INTERNAL MEDICINE

## 2021-07-15 RX ORDER — METOLAZONE 5 MG/1
5 TABLET ORAL ONCE
Status: COMPLETED | OUTPATIENT
Start: 2021-07-15 | End: 2021-07-15

## 2021-07-15 RX ORDER — LISINOPRIL 5 MG/1
5 TABLET ORAL
Status: DISCONTINUED | OUTPATIENT
Start: 2021-07-15 | End: 2021-07-16

## 2021-07-15 RX ADMIN — DOCUSATE SODIUM 50MG AND SENNOSIDES 8.6MG 1 TABLET: 8.6; 5 TABLET, FILM COATED ORAL at 10:02

## 2021-07-15 RX ADMIN — BUMETANIDE 2 MG: 0.25 INJECTION, SOLUTION INTRAMUSCULAR; INTRAVENOUS at 10:03

## 2021-07-15 RX ADMIN — GABAPENTIN 100 MG: 100 CAPSULE ORAL at 06:34

## 2021-07-15 RX ADMIN — NITROFURANTOIN MONOHYDRATE/MACROCRYSTALLINE 100 MG: 25; 75 CAPSULE ORAL at 20:43

## 2021-07-15 RX ADMIN — INSULIN LISPRO 4 UNITS: 100 INJECTION, SOLUTION INTRAVENOUS; SUBCUTANEOUS at 11:52

## 2021-07-15 RX ADMIN — CARVEDILOL 6.25 MG: 6.25 TABLET, FILM COATED ORAL at 10:03

## 2021-07-15 RX ADMIN — ATORVASTATIN CALCIUM 20 MG: 20 TABLET, FILM COATED ORAL at 20:43

## 2021-07-15 RX ADMIN — INSULIN LISPRO 2 UNITS: 100 INJECTION, SOLUTION INTRAVENOUS; SUBCUTANEOUS at 08:09

## 2021-07-15 RX ADMIN — SODIUM CHLORIDE, PRESERVATIVE FREE 3 ML: 5 INJECTION INTRAVENOUS at 10:04

## 2021-07-15 RX ADMIN — VILAZODONE HYDROCHLORIDE 20 MG: 40 TABLET ORAL at 20:43

## 2021-07-15 RX ADMIN — BUDESONIDE AND FORMOTEROL FUMARATE DIHYDRATE 2 PUFF: 160; 4.5 AEROSOL RESPIRATORY (INHALATION) at 07:37

## 2021-07-15 RX ADMIN — ASPIRIN 81 MG: 81 TABLET, COATED ORAL at 10:02

## 2021-07-15 RX ADMIN — CARVEDILOL 6.25 MG: 6.25 TABLET, FILM COATED ORAL at 17:28

## 2021-07-15 RX ADMIN — LISINOPRIL 5 MG: 5 TABLET ORAL at 19:30

## 2021-07-15 RX ADMIN — METOLAZONE 5 MG: 5 TABLET ORAL at 19:30

## 2021-07-15 RX ADMIN — GABAPENTIN 100 MG: 100 CAPSULE ORAL at 17:28

## 2021-07-15 RX ADMIN — HYDRALAZINE HYDROCHLORIDE 10 MG: 20 INJECTION, SOLUTION INTRAMUSCULAR; INTRAVENOUS at 17:28

## 2021-07-15 RX ADMIN — NITROFURANTOIN MONOHYDRATE/MACROCRYSTALLINE 100 MG: 25; 75 CAPSULE ORAL at 10:03

## 2021-07-15 RX ADMIN — SODIUM CHLORIDE, PRESERVATIVE FREE 3 ML: 5 INJECTION INTRAVENOUS at 21:09

## 2021-07-15 RX ADMIN — INSULIN LISPRO 2 UNITS: 100 INJECTION, SOLUTION INTRAVENOUS; SUBCUTANEOUS at 17:29

## 2021-07-15 RX ADMIN — BUMETANIDE 2 MG: 0.25 INJECTION, SOLUTION INTRAMUSCULAR; INTRAVENOUS at 21:35

## 2021-07-15 RX ADMIN — HYDRALAZINE HYDROCHLORIDE 10 MG: 20 INJECTION, SOLUTION INTRAMUSCULAR; INTRAVENOUS at 06:34

## 2021-07-15 RX ADMIN — LEVOTHYROXINE SODIUM 50 MCG: 0.05 TABLET ORAL at 06:34

## 2021-07-15 RX ADMIN — BUDESONIDE AND FORMOTEROL FUMARATE DIHYDRATE 2 PUFF: 160; 4.5 AEROSOL RESPIRATORY (INHALATION) at 20:04

## 2021-07-15 RX ADMIN — PANTOPRAZOLE SODIUM 40 MG: 40 TABLET, DELAYED RELEASE ORAL at 06:34

## 2021-07-15 RX ADMIN — FLUTICASONE PROPIONATE 2 SPRAY: 50 SPRAY, METERED NASAL at 10:02

## 2021-07-16 ENCOUNTER — APPOINTMENT (OUTPATIENT)
Dept: GENERAL RADIOLOGY | Facility: HOSPITAL | Age: 76
End: 2021-07-16

## 2021-07-16 LAB
ANION GAP SERPL CALCULATED.3IONS-SCNC: 8.2 MMOL/L (ref 5–15)
BASOPHILS # BLD AUTO: 0.08 10*3/MM3 (ref 0–0.2)
BASOPHILS NFR BLD AUTO: 0.7 % (ref 0–1.5)
BUN SERPL-MCNC: 25 MG/DL (ref 8–23)
BUN/CREAT SERPL: 21 (ref 7–25)
CALCIUM SPEC-SCNC: 8.7 MG/DL (ref 8.6–10.5)
CHLORIDE SERPL-SCNC: 98 MMOL/L (ref 98–107)
CO2 SERPL-SCNC: 33.8 MMOL/L (ref 22–29)
CREAT SERPL-MCNC: 1.19 MG/DL (ref 0.57–1)
DEPRECATED RDW RBC AUTO: 44 FL (ref 37–54)
EOSINOPHIL # BLD AUTO: 0.34 10*3/MM3 (ref 0–0.4)
EOSINOPHIL NFR BLD AUTO: 2.9 % (ref 0.3–6.2)
ERYTHROCYTE [DISTWIDTH] IN BLOOD BY AUTOMATED COUNT: 15.5 % (ref 12.3–15.4)
GFR SERPL CREATININE-BSD FRML MDRD: 44 ML/MIN/1.73
GLUCOSE BLDC GLUCOMTR-MCNC: 157 MG/DL (ref 70–130)
GLUCOSE BLDC GLUCOMTR-MCNC: 214 MG/DL (ref 70–130)
GLUCOSE BLDC GLUCOMTR-MCNC: 214 MG/DL (ref 70–130)
GLUCOSE BLDC GLUCOMTR-MCNC: 247 MG/DL (ref 70–130)
GLUCOSE SERPL-MCNC: 141 MG/DL (ref 65–99)
HCT VFR BLD AUTO: 30.4 % (ref 34–46.6)
HGB BLD-MCNC: 9.1 G/DL (ref 12–15.9)
IMM GRANULOCYTES # BLD AUTO: 0.09 10*3/MM3 (ref 0–0.05)
IMM GRANULOCYTES NFR BLD AUTO: 0.8 % (ref 0–0.5)
LYMPHOCYTES # BLD AUTO: 1.03 10*3/MM3 (ref 0.7–3.1)
LYMPHOCYTES NFR BLD AUTO: 8.8 % (ref 19.6–45.3)
MCH RBC QN AUTO: 23.5 PG (ref 26.6–33)
MCHC RBC AUTO-ENTMCNC: 29.9 G/DL (ref 31.5–35.7)
MCV RBC AUTO: 78.4 FL (ref 79–97)
MONOCYTES # BLD AUTO: 0.88 10*3/MM3 (ref 0.1–0.9)
MONOCYTES NFR BLD AUTO: 7.6 % (ref 5–12)
NEUTROPHILS NFR BLD AUTO: 79.2 % (ref 42.7–76)
NEUTROPHILS NFR BLD AUTO: 9.23 10*3/MM3 (ref 1.7–7)
NRBC BLD AUTO-RTO: 0 /100 WBC (ref 0–0.2)
PLATELET # BLD AUTO: 217 10*3/MM3 (ref 140–450)
PMV BLD AUTO: 11 FL (ref 6–12)
POTASSIUM SERPL-SCNC: 4.2 MMOL/L (ref 3.5–5.2)
RBC # BLD AUTO: 3.88 10*6/MM3 (ref 3.77–5.28)
SODIUM SERPL-SCNC: 140 MMOL/L (ref 136–145)
WBC # BLD AUTO: 11.65 10*3/MM3 (ref 3.4–10.8)

## 2021-07-16 PROCEDURE — 99232 SBSQ HOSP IP/OBS MODERATE 35: CPT | Performed by: INTERNAL MEDICINE

## 2021-07-16 PROCEDURE — 94799 UNLISTED PULMONARY SVC/PX: CPT

## 2021-07-16 PROCEDURE — 25010000002 HYDRALAZINE PER 20 MG: Performed by: NURSE PRACTITIONER

## 2021-07-16 PROCEDURE — 97530 THERAPEUTIC ACTIVITIES: CPT

## 2021-07-16 PROCEDURE — 82962 GLUCOSE BLOOD TEST: CPT

## 2021-07-16 PROCEDURE — 97166 OT EVAL MOD COMPLEX 45 MIN: CPT

## 2021-07-16 PROCEDURE — 97535 SELF CARE MNGMENT TRAINING: CPT

## 2021-07-16 PROCEDURE — 80048 BASIC METABOLIC PNL TOTAL CA: CPT | Performed by: NURSE PRACTITIONER

## 2021-07-16 PROCEDURE — 85025 COMPLETE CBC W/AUTO DIFF WBC: CPT | Performed by: NURSE PRACTITIONER

## 2021-07-16 PROCEDURE — 63710000001 INSULIN LISPRO (HUMAN) PER 5 UNITS: Performed by: INTERNAL MEDICINE

## 2021-07-16 PROCEDURE — 71045 X-RAY EXAM CHEST 1 VIEW: CPT

## 2021-07-16 RX ORDER — METOLAZONE 5 MG/1
5 TABLET ORAL ONCE
Status: COMPLETED | OUTPATIENT
Start: 2021-07-16 | End: 2021-07-16

## 2021-07-16 RX ORDER — LISINOPRIL 10 MG/1
10 TABLET ORAL
Status: DISCONTINUED | OUTPATIENT
Start: 2021-07-16 | End: 2021-07-17 | Stop reason: HOSPADM

## 2021-07-16 RX ORDER — BUMETANIDE 0.25 MG/ML
2 INJECTION INTRAMUSCULAR; INTRAVENOUS ONCE
Status: COMPLETED | OUTPATIENT
Start: 2021-07-16 | End: 2021-07-16

## 2021-07-16 RX ADMIN — BUDESONIDE AND FORMOTEROL FUMARATE DIHYDRATE 2 PUFF: 160; 4.5 AEROSOL RESPIRATORY (INHALATION) at 07:01

## 2021-07-16 RX ADMIN — CARVEDILOL 6.25 MG: 6.25 TABLET, FILM COATED ORAL at 17:59

## 2021-07-16 RX ADMIN — LEVOTHYROXINE SODIUM 50 MCG: 0.05 TABLET ORAL at 06:38

## 2021-07-16 RX ADMIN — FLUTICASONE PROPIONATE 2 SPRAY: 50 SPRAY, METERED NASAL at 09:00

## 2021-07-16 RX ADMIN — SODIUM CHLORIDE, PRESERVATIVE FREE 3 ML: 5 INJECTION INTRAVENOUS at 20:54

## 2021-07-16 RX ADMIN — ALPRAZOLAM 1 MG: 0.5 TABLET ORAL at 21:03

## 2021-07-16 RX ADMIN — HYDRALAZINE HYDROCHLORIDE 10 MG: 20 INJECTION, SOLUTION INTRAMUSCULAR; INTRAVENOUS at 20:50

## 2021-07-16 RX ADMIN — METOLAZONE 5 MG: 5 TABLET ORAL at 09:28

## 2021-07-16 RX ADMIN — ASPIRIN 81 MG: 81 TABLET, COATED ORAL at 09:28

## 2021-07-16 RX ADMIN — CARVEDILOL 6.25 MG: 6.25 TABLET, FILM COATED ORAL at 09:28

## 2021-07-16 RX ADMIN — NITROFURANTOIN MONOHYDRATE/MACROCRYSTALLINE 100 MG: 25; 75 CAPSULE ORAL at 20:50

## 2021-07-16 RX ADMIN — LISINOPRIL 10 MG: 10 TABLET ORAL at 09:28

## 2021-07-16 RX ADMIN — INSULIN LISPRO 4 UNITS: 100 INJECTION, SOLUTION INTRAVENOUS; SUBCUTANEOUS at 17:59

## 2021-07-16 RX ADMIN — BUDESONIDE AND FORMOTEROL FUMARATE DIHYDRATE 2 PUFF: 160; 4.5 AEROSOL RESPIRATORY (INHALATION) at 22:41

## 2021-07-16 RX ADMIN — INSULIN LISPRO 4 UNITS: 100 INJECTION, SOLUTION INTRAVENOUS; SUBCUTANEOUS at 12:12

## 2021-07-16 RX ADMIN — GABAPENTIN 100 MG: 100 CAPSULE ORAL at 17:58

## 2021-07-16 RX ADMIN — ATORVASTATIN CALCIUM 20 MG: 20 TABLET, FILM COATED ORAL at 20:49

## 2021-07-16 RX ADMIN — ALPRAZOLAM 1 MG: 0.5 TABLET ORAL at 09:50

## 2021-07-16 RX ADMIN — BUMETANIDE 2 MG: 0.25 INJECTION INTRAMUSCULAR; INTRAVENOUS at 09:28

## 2021-07-16 RX ADMIN — APIXABAN 5 MG: 5 TABLET, FILM COATED ORAL at 20:50

## 2021-07-16 RX ADMIN — PANTOPRAZOLE SODIUM 40 MG: 40 TABLET, DELAYED RELEASE ORAL at 06:38

## 2021-07-16 RX ADMIN — VILAZODONE HYDROCHLORIDE 20 MG: 40 TABLET ORAL at 20:50

## 2021-07-16 RX ADMIN — GABAPENTIN 100 MG: 100 CAPSULE ORAL at 06:38

## 2021-07-16 RX ADMIN — DOCUSATE SODIUM 50MG AND SENNOSIDES 8.6MG 1 TABLET: 8.6; 5 TABLET, FILM COATED ORAL at 09:28

## 2021-07-16 RX ADMIN — HYDRALAZINE HYDROCHLORIDE 10 MG: 20 INJECTION, SOLUTION INTRAMUSCULAR; INTRAVENOUS at 05:32

## 2021-07-16 RX ADMIN — INSULIN LISPRO 2 UNITS: 100 INJECTION, SOLUTION INTRAVENOUS; SUBCUTANEOUS at 06:38

## 2021-07-16 RX ADMIN — APIXABAN 5 MG: 5 TABLET, FILM COATED ORAL at 09:28

## 2021-07-16 RX ADMIN — NITROFURANTOIN MONOHYDRATE/MACROCRYSTALLINE 100 MG: 25; 75 CAPSULE ORAL at 09:28

## 2021-07-16 RX ADMIN — SODIUM CHLORIDE, PRESERVATIVE FREE 3 ML: 5 INJECTION INTRAVENOUS at 11:12

## 2021-07-17 ENCOUNTER — HOME HEALTH ADMISSION (OUTPATIENT)
Dept: HOME HEALTH SERVICES | Facility: HOME HEALTHCARE | Age: 76
End: 2021-07-17

## 2021-07-17 ENCOUNTER — READMISSION MANAGEMENT (OUTPATIENT)
Dept: CALL CENTER | Facility: HOSPITAL | Age: 76
End: 2021-07-17

## 2021-07-17 VITALS
BODY MASS INDEX: 55.36 KG/M2 | DIASTOLIC BLOOD PRESSURE: 65 MMHG | SYSTOLIC BLOOD PRESSURE: 142 MMHG | RESPIRATION RATE: 18 BRPM | WEIGHT: 282 LBS | TEMPERATURE: 98 F | HEIGHT: 60 IN | OXYGEN SATURATION: 93 % | HEART RATE: 75 BPM

## 2021-07-17 LAB
GLUCOSE BLDC GLUCOMTR-MCNC: 171 MG/DL (ref 70–130)
GLUCOSE BLDC GLUCOMTR-MCNC: 324 MG/DL (ref 70–130)

## 2021-07-17 PROCEDURE — 82962 GLUCOSE BLOOD TEST: CPT

## 2021-07-17 PROCEDURE — 94660 CPAP INITIATION&MGMT: CPT

## 2021-07-17 PROCEDURE — 94799 UNLISTED PULMONARY SVC/PX: CPT

## 2021-07-17 PROCEDURE — 63710000001 INSULIN LISPRO (HUMAN) PER 5 UNITS: Performed by: INTERNAL MEDICINE

## 2021-07-17 RX ORDER — CARVEDILOL 6.25 MG/1
6.25 TABLET ORAL 2 TIMES DAILY WITH MEALS
Qty: 60 TABLET | Refills: 0 | Status: SHIPPED | OUTPATIENT
Start: 2021-07-17 | End: 2021-08-16 | Stop reason: SDUPTHER

## 2021-07-17 RX ORDER — ASPIRIN 81 MG/1
81 TABLET ORAL DAILY
Qty: 30 TABLET | Refills: 0 | Status: SHIPPED | OUTPATIENT
Start: 2021-07-17 | End: 2021-10-19 | Stop reason: HOSPADM

## 2021-07-17 RX ADMIN — DOCUSATE SODIUM 50MG AND SENNOSIDES 8.6MG 1 TABLET: 8.6; 5 TABLET, FILM COATED ORAL at 09:04

## 2021-07-17 RX ADMIN — INSULIN LISPRO 7 UNITS: 100 INJECTION, SOLUTION INTRAVENOUS; SUBCUTANEOUS at 12:18

## 2021-07-17 RX ADMIN — ASPIRIN 81 MG: 81 TABLET, COATED ORAL at 09:04

## 2021-07-17 RX ADMIN — NITROFURANTOIN MONOHYDRATE/MACROCRYSTALLINE 100 MG: 25; 75 CAPSULE ORAL at 09:04

## 2021-07-17 RX ADMIN — APIXABAN 5 MG: 5 TABLET, FILM COATED ORAL at 09:04

## 2021-07-17 RX ADMIN — LEVOTHYROXINE SODIUM 50 MCG: 0.05 TABLET ORAL at 05:56

## 2021-07-17 RX ADMIN — ACETAMINOPHEN 650 MG: 325 TABLET, FILM COATED ORAL at 00:08

## 2021-07-17 RX ADMIN — PANTOPRAZOLE SODIUM 40 MG: 40 TABLET, DELAYED RELEASE ORAL at 05:56

## 2021-07-17 RX ADMIN — INSULIN LISPRO 2 UNITS: 100 INJECTION, SOLUTION INTRAVENOUS; SUBCUTANEOUS at 06:55

## 2021-07-17 RX ADMIN — GABAPENTIN 100 MG: 100 CAPSULE ORAL at 05:56

## 2021-07-17 RX ADMIN — BUDESONIDE AND FORMOTEROL FUMARATE DIHYDRATE 2 PUFF: 160; 4.5 AEROSOL RESPIRATORY (INHALATION) at 09:22

## 2021-07-17 RX ADMIN — LISINOPRIL 10 MG: 10 TABLET ORAL at 09:04

## 2021-07-17 RX ADMIN — FLUTICASONE PROPIONATE 2 SPRAY: 50 SPRAY, METERED NASAL at 09:05

## 2021-07-17 RX ADMIN — CARVEDILOL 6.25 MG: 6.25 TABLET, FILM COATED ORAL at 09:04

## 2021-07-17 RX ADMIN — SALINE NASAL SPRAY 2 SPRAY: 1.5 SOLUTION NASAL at 00:08

## 2021-07-17 NOTE — OUTREACH NOTE
Prep Survey      Responses   [facility] facility patient discharged from?  [place]   Is LACE score < 7 ?  No   Emergency Room discharge w/ pulse ox?  No   Eligibility  [facility]   Date of Admission  07/12/21   Date of Discharge  07/17/21   Discharge Disposition  Home or Self Care   Discharge diagnosis  Nonrheumatic aortic valve stenosis-TTE arotic valve replacement   Does the patient have one of the following disease processes/diagnoses(primary or secondary)?  Other   Does the patient have Home health ordered?  Yes   What is the Home health agency?   [facility]   Is there a DME ordered?  No   Prep survey completed?  Yes          Deborah Fuller RN

## 2021-07-19 ENCOUNTER — TRANSITIONAL CARE MANAGEMENT TELEPHONE ENCOUNTER (OUTPATIENT)
Dept: CALL CENTER | Facility: HOSPITAL | Age: 76
End: 2021-07-19

## 2021-07-19 ENCOUNTER — HOME CARE VISIT (OUTPATIENT)
Dept: HOME HEALTH SERVICES | Facility: HOME HEALTHCARE | Age: 76
End: 2021-07-19

## 2021-07-19 PROCEDURE — G0151 HHCP-SERV OF PT,EA 15 MIN: HCPCS

## 2021-07-19 NOTE — OUTREACH NOTE
Call Center TCM Note      Responses   [facility] patient discharged from?  [place]   Does the patient have one of the following disease processes/diagnoses(primary or secondary)?  Other   TCM attempt successful?  Yes   Call start time  1221   Call end time  1226   Discharge diagnosis  Nonrheumatic aortic valve stenosis-TTE arotic valve replacement   Person spoke with today (if not patient) and relationship  Granddaughter   Meds reviewed with patient/caregiver?  Yes   Is the patient having any side effects they believe may be caused by any medication additions or changes?  No   Does the patient have all medications ordered at discharge?  Yes   Is the patient taking all medications as directed (includes completed medication regime)?  Yes   Does the patient have a primary care provider?   Yes   Does the patient have an appointment with their PCP within 7 days of discharge?  No   What is preventing the patient from scheduling follow up appointments within 7 days of discharge?  Haven't had time   Nursing Interventions  Educated patient on importance of making appointment, Advised patient to make appointment   Urgent appointment interventions  Facilitated patient appointment   Comments  Appt made for 7/21 @ 2:15.  Wants this as a televisit.  Will notify office    What is the Home health agency?   [facility]   Has home health visited the patient within 72 hours of discharge?  Call prior to 72 hours   Psychosocial issues?  No   Did the patient receive a copy of their discharge instructions?  Yes   Nursing interventions  Reviewed instructions with patient   What is the patient's perception of their health status since discharge?  Improving   Is the patient/caregiver able to teach back signs and symptoms related to disease process for when to call PCP?  Yes   Is the patient/caregiver able to teach back signs and symptoms related to disease process for when to call 911?  Yes   Is the patient/caregiver able to teach back the  "hierarchy of who to call/visit for symptoms/problems? PCP, Specialist, Home health nurse, Urgent Care, ED, 911  Yes   If the patient is a current smoker, are they able to teach back resources for cessation?  Not a smoker   Additional teach back comments  States she is doing \"ok\".  They told her they want her to start back on her diabetic medication.  She has a follow up Tues with her cardiologist.  She is weighing daily.   TCM call completed?  Yes   Wrap up additional comments  Will message office regarding appt to be a televist.          Raisa Varghese LPN    7/19/2021, 12:29 EDT      "

## 2021-07-20 ENCOUNTER — TELEPHONE (OUTPATIENT)
Dept: CARDIAC REHAB | Facility: HOSPITAL | Age: 76
End: 2021-07-20

## 2021-07-20 VITALS
HEIGHT: 72 IN | OXYGEN SATURATION: 95 % | TEMPERATURE: 97.7 F | DIASTOLIC BLOOD PRESSURE: 79 MMHG | WEIGHT: 285 LBS | BODY MASS INDEX: 38.6 KG/M2 | RESPIRATION RATE: 20 BRPM | HEART RATE: 76 BPM | SYSTOLIC BLOOD PRESSURE: 166 MMHG

## 2021-07-20 NOTE — HOME HEALTH
76 y/o female presents for post op management with weakness, poor endurance, and impaired funtional mobility. She was admitted to [facility] 7/12/21 to 7/17/21. Patient was diagnosed with acute exacerbation of diastolic CHF, and severe aortic valve stenosis and underwent a TAVR procedure.  PMH: CAD s/p previous CABG in 2016, COPD on 3L cont oxygen, and OCHOA with Triology, hypothyrodism, stage 3 CKD, PAF and CHB s/p micra pacemaker placement, hyperlipidemia, Type 2 DM, peripheral neuropathy, morbid obesity, UTI with + ESBL and completed course of Macrobid x 10 days, Leukocytosis, chronic anemia.  Surgery: TAVR 7/13/21 Transfemoral Transcatheter Aortic Valve Replacement    Prior level of function: Patient has been fairly sedentary since CABG in 2016. Grand daughter helps with medication, shower, and dressing. Son in law works from home and does the cooking. Patient does not drive but she was able to get out or MD appointments. She was able to negotiate 5 steps along sidewalk path from apartment to parking area using one railing and min assist. Pt has needed help to get legs in bed and in/out of car. She reports 3 falls in the last year with the last one about 1 mo ago, when she fell while using walker.     MD appointments: Follow up with cardiolgy 7/20 and with PCP on 7/21.    Note: Patients diabetic medications were discontinued in the hospital and she was instructed to follow up with her PCP to discuss. She has teleheath visit with Dr Talbot on 7/21/21 but currently has no diabetic meds at home. She did not  have discharge med list at home and did not have all of her medications because grand-daughter keeps them with her. Recommended RN consult to review medications due to complexity but patient denies the need and will follow up with Dr Talbot prn.    Wounds: bilateral groin wounds dry and healing well. She has some bruising in groin area and left forearm. Also noted, patient is keeping a folded towel in abdominal roll to  help with perspiration and to prevent rash.

## 2021-07-22 ENCOUNTER — HOME CARE VISIT (OUTPATIENT)
Dept: HOME HEALTH SERVICES | Facility: HOME HEALTHCARE | Age: 76
End: 2021-07-22

## 2021-07-22 NOTE — CASE COMMUNICATION
ADDING SKILLED NURSING FOR MED ED, DM ED, AND TO DRAW LABS- BMP AND A1C NEXT WEEK (WEEK OF 7/26-7/29) W/ RESULTS TO CARIN CUMMINGS.      NOT SURE WHICH RN WILL SEE SO I AM SENDING MESSAGE TO TEAM/POD.

## 2021-07-23 ENCOUNTER — HOME CARE VISIT (OUTPATIENT)
Dept: HOME HEALTH SERVICES | Facility: HOME HEALTHCARE | Age: 76
End: 2021-07-23

## 2021-07-25 ENCOUNTER — READMISSION MANAGEMENT (OUTPATIENT)
Dept: CALL CENTER | Facility: HOSPITAL | Age: 76
End: 2021-07-25

## 2021-07-25 NOTE — OUTREACH NOTE
Medical Week 2 Survey      Responses   [facility] patient discharged from?  [place]   Does the patient have one of the following disease processes/diagnoses(primary or secondary)?  Other   Week 2 attempt successful?  Yes   Call start time  0939   Call end time  0943   Meds reviewed with patient/caregiver?  Yes   Is the patient taking all medications as directed (includes completed medication regime)?  Yes   Medication comments  started on Metformin 500 mg  and will be increased to 1000 mg daily on Tuesday   Has the patient kept scheduled appointments due by today?  Yes   Comments  has seen provider, problems with cardiology scheduled appointment, has called, suggested to try MY Chart, had to cancel the appointment when scheduled   What is the patient's perception of their health status since discharge?  Improving   Week 2 Call Completed?  Yes   Wrap up additional comments  granddaughter feels she is doing well          Crystal Monzon RN

## 2021-07-27 ENCOUNTER — LAB REQUISITION (OUTPATIENT)
Dept: LAB | Facility: HOSPITAL | Age: 76
End: 2021-07-27

## 2021-07-27 ENCOUNTER — HOME CARE VISIT (OUTPATIENT)
Dept: HOME HEALTH SERVICES | Facility: HOME HEALTHCARE | Age: 76
End: 2021-07-27

## 2021-07-27 DIAGNOSIS — Z48.812 ENCOUNTER FOR SURGICAL AFTERCARE FOLLOWING SURGERY ON THE CIRCULATORY SYSTEM: ICD-10-CM

## 2021-07-27 DIAGNOSIS — E11.65 TYPE 2 DIABETES MELLITUS WITH HYPERGLYCEMIA: ICD-10-CM

## 2021-07-27 LAB
ANION GAP SERPL CALCULATED.3IONS-SCNC: 9.4 MMOL/L (ref 5–15)
BUN SERPL-MCNC: 25 MG/DL (ref 8–23)
BUN/CREAT SERPL: 16.4 (ref 7–25)
CALCIUM SPEC-SCNC: 8.6 MG/DL (ref 8.6–10.5)
CHLORIDE SERPL-SCNC: 98 MMOL/L (ref 98–107)
CO2 SERPL-SCNC: 35.6 MMOL/L (ref 22–29)
CREAT SERPL-MCNC: 1.52 MG/DL (ref 0.57–1)
GFR SERPL CREATININE-BSD FRML MDRD: 33 ML/MIN/1.73
GLUCOSE SERPL-MCNC: 137 MG/DL (ref 65–99)
HBA1C MFR BLD: 7.91 % (ref 4.8–5.6)
POTASSIUM SERPL-SCNC: 3.9 MMOL/L (ref 3.5–5.2)
SODIUM SERPL-SCNC: 143 MMOL/L (ref 136–145)

## 2021-07-27 PROCEDURE — 80048 BASIC METABOLIC PNL TOTAL CA: CPT

## 2021-07-27 PROCEDURE — 83036 HEMOGLOBIN GLYCOSYLATED A1C: CPT

## 2021-07-27 PROCEDURE — G0299 HHS/HOSPICE OF RN EA 15 MIN: HCPCS

## 2021-07-28 ENCOUNTER — HOME CARE VISIT (OUTPATIENT)
Dept: HOME HEALTH SERVICES | Facility: HOME HEALTHCARE | Age: 76
End: 2021-07-28

## 2021-07-28 VITALS
DIASTOLIC BLOOD PRESSURE: 74 MMHG | HEART RATE: 76 BPM | SYSTOLIC BLOOD PRESSURE: 126 MMHG | RESPIRATION RATE: 18 BRPM | TEMPERATURE: 97.9 F | OXYGEN SATURATION: 96 %

## 2021-07-28 RX ORDER — LEVOTHYROXINE SODIUM 0.03 MG/1
TABLET ORAL
Qty: 90 TABLET | Refills: 0 | OUTPATIENT
Start: 2021-07-28

## 2021-07-31 PROCEDURE — G0180 MD CERTIFICATION HHA PATIENT: HCPCS | Performed by: THORACIC SURGERY (CARDIOTHORACIC VASCULAR SURGERY)

## 2021-08-02 RX ORDER — APIXABAN 5 MG/1
TABLET, FILM COATED ORAL
Qty: 60 TABLET | Refills: 5 | Status: SHIPPED | OUTPATIENT
Start: 2021-08-02 | End: 2021-10-19 | Stop reason: HOSPADM

## 2021-08-02 NOTE — TELEPHONE ENCOUNTER
SURINDER:    Ms. Crystal's granddaughter called to report that since the middle of last week, the patient has had copius amounts of clear fluid coming from her Right groin sheath site. She had a TAVR on 7/13/21.  She has been placing towels in the area and they are getting soaked and needing to be changed multiple times during the day and evening.    The granddaughter inspected the site and denies any redness, drainage other than clear fluid, no heat, itching or soreness at the site. There was bruising initially but none now.    I offered her a site check appointment and when the granddaughter spoke to her she stated that she woke up today and the towel was dry. She's been up moving around for a few hours and still hasn't had any drainage.    They have an appointment with grecia next week so they decided to wait on coming in for the site check until then. Or they will call if the drainage begins again.    Thank you,  Pita Dasilva RN  [facility]  Triage

## 2021-08-03 ENCOUNTER — OFFICE VISIT (OUTPATIENT)
Dept: CARDIOLOGY | Facility: CLINIC | Age: 76
End: 2021-08-03

## 2021-08-03 ENCOUNTER — TELEPHONE (OUTPATIENT)
Dept: CARDIOLOGY | Facility: CLINIC | Age: 76
End: 2021-08-03

## 2021-08-03 DIAGNOSIS — Z95.2 S/P TAVR (TRANSCATHETER AORTIC VALVE REPLACEMENT): ICD-10-CM

## 2021-08-03 DIAGNOSIS — I50.43 ACUTE ON CHRONIC COMBINED SYSTOLIC AND DIASTOLIC HF (HEART FAILURE) (HCC): ICD-10-CM

## 2021-08-03 DIAGNOSIS — I97.622 POSTPROCEDURAL SEROMA OF A CIRCULATORY SYSTEM ORGAN OR STRUCTURE FOLLOWING OTHER PROCEDURE: ICD-10-CM

## 2021-08-03 DIAGNOSIS — T14.8XXA WOUND DISCHARGE: Primary | ICD-10-CM

## 2021-08-03 PROCEDURE — 99213 OFFICE O/P EST LOW 20 MIN: CPT | Performed by: NURSE PRACTITIONER

## 2021-08-03 NOTE — TELEPHONE ENCOUNTER
"pts granddaughter is calling in to let us know that since Thursday her cath site has been leaking \"water\" I have scheduled her for a site check today with Aileen.  Thanks  Solange Altman RN  Triage nurse    "

## 2021-08-03 NOTE — PROGRESS NOTES
"Date of Office Visit: 2021  Encounter Provider: ZOILA Perkins  Place of Service: [facility] CARDIOLOGY  Patient Name: Miguelina Lopez  :1945      Patient ID:  Miguelina Lopez is a 75 y.o. female is here for a wound check post TAVR.           History of Present Illness    She underwent TAVR 2021 with Dr. Sykes and Dr. Singer.  She did well for several weeks.  Unfortunately, she started having serous drainage from her right groin site a couple of weeks ago.  They reported as a \"waterfall\" that constantly saturates towels that she rolls up in her groin.  There is no blood.  It is not yellow or pink-tinged.  It is clear water-like drainage.    She is here today for wound assessment.  Her site is soft.  There is no redness, heat, tenderness, or bulging.  She has a towel wrapped in her groin that is completely saturated, and when I touch her site, a clear water-like drainage poured from her groin site.  I had Dr. Quintana come in to assess the wound as well.  It is very concerning for a seroma.  We contacted the cardiothoracic team and they have requested a full arterial duplex of the right groin and a scan to see if there is a collection of fluid in that area.  Otherwise she is doing okay.  She does not feel a difference in her breathing, but her granddaughter who is with her does.  She does not weigh daily and we have encouraged her to do so to monitor her fluid status.  She has 2-3+ pitting edema on her lower extremities.  She has a history of lymphedema.  She is not had any chest pain or pressure.  Has not had any lightheadedness, dizziness, syncope, or near syncope.  I was able to easily Doppler bilateral DP and PT signals.      Past Medical History:   Diagnosis Date   • Acute on chronic respiratory failure with hypoxia and hypercapnia (CMS/HCC)    • OLI (acute kidney injury) (CMS/HCC)    • Anemia    • Anxiety    • Aortic valve stenosis    • Arthritis     KNEES "   • Bilateral lower extremity edema    • CHF (congestive heart failure) (CMS/[facility])    • Chronic coronary artery disease    • Class 3 severe obesity due to excess calories in adult (CMS/[facility])    • COPD (chronic obstructive pulmonary disease) (CMS/[facility])    • Depression    • Diabetes mellitus (CMS/[facility])    • Dry skin    • Elevated cholesterol    • GERD (gastroesophageal reflux disease)    • Heart murmur    • Hypertension    • Mitral valve insufficiency    • Pneumonia     1/2016   • Pulmonary hypertension (CMS/[facility])     due to sleep disordered breathing   • Sleep apnea     Uses CPAP or oxygen   • Stage 3 chronic kidney disease (CMS/[facility])    • Subclinical hypothyroidism    • Supplemental oxygen dependent     3 LITERS   • TIA (transient ischemic attack) 3/15/2021   • Valvular heart disease          Past Surgical History:   Procedure Laterality Date   • AORTIC VALVE REPAIR/REPLACEMENT N/A 7/13/2021    Procedure: TTE TRANSFEMORAL TRANSCATHETER AORTIC VALVE REPLACEMENT PERCUTANEOUS APPROACH;  Surgeon: Sharlene Mejía MD;  [facility];  Service: Cardiothoracic;  Laterality: N/A;   • AORTIC VALVE REPAIR/REPLACEMENT N/A 7/13/2021    Procedure: Transfemoral Transcatheter Aortic Valve Replacement with intra-op tte and possible open surgical rescue;  Surgeon: Ayan Pacheco MD;  [facility];  Service: Cardiovascular;  Laterality: N/A;   • CARDIAC CATHETERIZATION     • CARDIAC CATHETERIZATION N/A 6/10/2016    Procedure: Left Heart Cath;  Surgeon: Chace Johnson MD;  Location: [facility] INVASIVE LOCATION;  Service:    • CARDIAC CATHETERIZATION N/A 6/10/2016    Procedure: Right Heart Cath;  Surgeon: Chace Johnson MD;  Location: [facility] CATH INVASIVE LOCATION;  Service:    • CARDIAC CATHETERIZATION N/A 2/5/2021    Procedure: RIGHT AND LEFT HEART CATH;  Surgeon: Antoine Ayers MD;  Location: [facility] CATH INVASIVE LOCATION;  Service: Cardiology;  Laterality: N/A;   •  CARDIAC CATHETERIZATION N/A 2/5/2021    Procedure: Coronary angiography;  Surgeon: Antoine Ayers MD;  Location: [facility] CATH INVASIVE LOCATION;  Service: Cardiology;  Laterality: N/A;   • CARDIAC CATHETERIZATION N/A 2/5/2021    Procedure: Left ventriculography- pressures;  Surgeon: Antoine Ayers MD;  Location: [facility] CATH INVASIVE LOCATION;  Service: Cardiology;  Laterality: N/A;   • CARDIAC ELECTROPHYSIOLOGY PROCEDURE N/A 2/2/2021    Procedure: Pacemaker DC new---[employer] MICRA;  Surgeon: Eliazar Bond MD;  Location: [facility] CATH INVASIVE LOCATION;  Service: Cardiology;  Laterality: N/A;   • CARDIAC SURGERY     • CORONARY ARTERY BYPASS GRAFT      2 vessel   • CORONARY ARTERY BYPASS GRAFT WITH MITRAL VALVE REPAIR/REPLACEMENT N/A 6/13/2016    Procedure: INTRAOPERATIVE TARIQ, MIDLINE STERNOTOMY, CORONARY ARTERY BYPASS GRAFTING X  2 UTILIZING ENDOSCOPICALLY HARVESTED LEFT GREATER SAPHENOUS VEIN, MITRAL VALVE REPLACEMENT AND TRICUSPID VALVE REPAIR;  Surgeon: Eliecer Mistry MD;  Location: [facility] OR;  Service:    • CORONARY STENT PLACEMENT  2010    Approx. 6 yrs ago at [facility]   • ENDOSCOPY N/A 3/17/2021    Procedure: ESOPHAGOGASTRODUODENOSCOPY;  Surgeon: Dixon Haas MD;  Location: [facility] ENDOSCOPY;  Service: Gastroenterology;  Laterality: N/A;  PRE: GI BLEED  POST: ANTRAL ULCER   • HEMORRHOIDECTOMY     • HYSTERECTOMY     • MITRAL VALVE REPLACEMENT     • REPLACEMENT TOTAL KNEE Right    • THYROID SURGERY      Cyst removed from thyroid   • VASCULAR SURGERY         Current Outpatient Medications on File Prior to Visit   Medication Sig Dispense Refill   • acetaminophen (TYLENOL) 325 MG tablet Take 2 tablets by mouth Every 4 (Four) Hours As Needed for Mild Pain .     • ALPRAZolam (XANAX) 1 MG tablet Take 1 mg by mouth 2 (Two) Times a Day As Needed for Anxiety.     • aspirin 81 MG EC tablet Take 1 tablet by mouth Daily. 30 tablet 0   • atorvastatin (LIPITOR) 20 MG tablet TAKE ONE TABLET BY MOUTH  DAILY (Patient taking differently: Take 20 mg by mouth Every Night.) 90 tablet 0   • bumetanide (BUMEX) 2 MG tablet TAKE TWO TABLETS BY MOUTH TWICE A  tablet 1   • carvedilol (COREG) 6.25 MG tablet Take 1 tablet by mouth 2 (Two) Times a Day With Meals. 60 tablet 0   • clotrimazole-betamethasone (Lotrisone) 1-0.05 % cream Apply  topically to the appropriate area as directed 2 (Two) Times a Day. 45 g 2   • Dulaglutide (Trulicity) 0.75 MG/0.5ML solution pen-injector Inject 0.75 mg under the skin into the appropriate area as directed 1 (One) Time Per Week. 3 pen 0   • Eliquis 5 MG tablet tablet TAKE 1 TABLET BY MOUTH TWO TIMES A DAY 60 tablet 5   • fluticasone (FLONASE) 50 MCG/ACT nasal spray 2 sprays by Each Nare route Daily.     • fluticasone-salmeterol (ADVAIR DISKUS) 250-50 MCG/DOSE DISKUS Inhale 1 puff Daily As Needed (cough and wheezing). 60 each 0   • gabapentin (NEURONTIN) 100 MG capsule TAKE ONE CAPSULE BY MOUTH EVERY 12 HOURS 60 capsule 1   • ipratropium-albuterol (DUO-NEB) 0.5-2.5 mg/mL nebulizer Take 3 mL by nebulization 4 (four) times a day. (Patient taking differently: Take 3 mL by nebulization 3 (Three) Times a Day As Needed.) 3 mL 5   • levothyroxine (SYNTHROID, LEVOTHROID) 25 MCG tablet Take 2 tabs (50mcg) on Mon, Wed, Fri, and take 1 tab (25 mcg) on Tues, Thurs, Sat, Sun 40 tablet 1   • metFORMIN (GLUCOPHAGE) 500 MG tablet Take 1 tablet by mouth Daily With Breakfast for 7 days, THEN 1 tablet 2 (Two) Times a Day With Meals for 7 days. 90 tablet 1   • nitroglycerin (NITROSTAT) 0.4 MG SL tablet DISSOLVE 1 TAB UNDER TONGUE FOR CHEST PAIN - IF PAIN REMAINS AFTER 5 MIN, CALL 911 AND REPEAT DOSE. MAX 3 TABS IN 15 MINUTES (Patient taking differently: Place 0.4 mg under the tongue Every 5 (Five) Minutes As Needed.) 25 tablet 4   • nystatin (MYCOSTATIN) 424255 UNIT/GM cream Apply  topically to the appropriate area as directed 2 (Two) Times a Day. to affected area(s) (Patient taking differently: Apply   topically to the appropriate area as directed 2 (Two) Times a Day.) 30 g 3   • omeprazole (priLOSEC) 40 MG capsule Take 1 capsule by mouth Daily. 90 capsule 0   • potassium chloride (K-DUR,KLOR-CON) 20 MEQ CR tablet Take 1 tablet by mouth Every Other Day. 90 tablet 0   • Probiotic Product (Risaquad-2) capsule capsule Take 1 capsule by mouth Daily.     • senna-docusate (PERICOLACE) 8.6-50 MG per tablet Take 1 tablet by mouth Daily. Takes every other day     • sodium chloride 0.65 % nasal spray 2 sprays into the nostril(s) as directed by provider As Needed for Congestion.  12   • traMADol (ULTRAM) 50 MG tablet Take 1 tablet by mouth Daily As Needed for Moderate Pain . 30 tablet 2   • Viibryd 20 MG tablet tablet TAKE ONE TABLET BY MOUTH ONCE NIGHTLY 30 tablet 3   • vitamin D (ERGOCALCIFEROL) 1.25 MG (01760 UT) capsule capsule Take 1 capsule by mouth Every 7 (Seven) Days. Takes on Sundays 12 capsule 0     No current facility-administered medications on file prior to visit.       Social History     Socioeconomic History   • Marital status:      Spouse name: Not on file   • Number of children: Not on file   • Years of education: Not on file   • Highest education level: Not on file   Tobacco Use   • Smoking status: Never Smoker   • Smokeless tobacco: Never Used   • Tobacco comment: no caffeine    Vaping Use   • Vaping Use: Never used   Substance and Sexual Activity   • Alcohol use: No   • Drug use: No   • Sexual activity: Defer           Review of Systems   Constitutional: Negative for chills, decreased appetite, diaphoresis, fever and malaise/fatigue.   HENT: Negative for nosebleeds.    Eyes: Negative for blurred vision.   Cardiovascular: Positive for dyspnea on exertion and leg swelling. Negative for chest pain, claudication, cyanosis, irregular heartbeat, near-syncope, orthopnea, palpitations, paroxysmal nocturnal dyspnea and syncope.   Respiratory: Positive for shortness of breath. Negative for cough,  hemoptysis, sleep disturbances due to breathing, snoring and wheezing.    Hematologic/Lymphatic: Negative for bleeding problem. Does not bruise/bleed easily.   Musculoskeletal: Negative for falls.   Gastrointestinal: Negative for heartburn.   Neurological: Positive for weakness. Negative for excessive daytime sleepiness, dizziness, headaches, light-headedness and numbness.       Procedures    ECG 12 Lead    Date/Time: 8/3/2021 4:53 PM  Performed by: Aileen June APRN  Authorized by: Aileen June APRN                   Objective:    There were no vitals filed for this visit.  There is no height or weight on file to calculate BMI.    Vitals reviewed.   Constitutional:       Appearance: Healthy appearance. Not in distress. Morbidly obese.   Eyes:      Conjunctiva/sclera: Conjunctivae normal.   Neck:      Vascular: No carotid bruit. JVD normal.   Pulmonary:      Effort: Pulmonary effort is normal.      Breath sounds: Normal breath sounds.   Cardiovascular:      PMI at left midclavicular line. Normal rate. Regular rhythm.      Murmurs: There is no murmur.   Pulses:     Intact distal pulses.   Edema:     Peripheral edema present.     Pretibial: bilateral 3+ pitting edema of the pretibial area.     Ankle: bilateral 3+ pitting edema of the ankle.     Feet: bilateral 3+ pitting edema of the feet.  Abdominal:      Palpations: Abdomen is soft.      Tenderness: There is no abdominal tenderness.   Skin:     General: Skin is warm and dry.   Neurological:      Mental Status: Alert and oriented to person, place and time.   Psychiatric:         Attention and Perception: Attention and perception normal.         Behavior: Behavior is cooperative.         Lab Review:       Assessment:      Diagnosis Plan   1. Wound discharge  Duplex Lower Extremity Art / Grafts - Right CAR   2. Postprocedural seroma of a circulatory system organ or structure following other procedure   Duplex Lower Extremity Art / Grafts - Right CAR   3.  "Acute on chronic combined systolic and diastolic HF (heart failure) (CMS/HCC)     4. S/P TAVR (transcatheter aortic valve replacement)         1. Wound check-patient complains of clear serous fluid pouring \"like a waterfall\" from her right groin site.  It started about 2 to 3 weeks after her TAVR procedure.  She is soaking multiple towels a day.      Plan:       Follow-up with cardiothoracic surgery.  Full arterial duplex of the right groin.  Keep follow-up with Zuri Marrero, APRN 8/10/2021.  "

## 2021-08-04 ENCOUNTER — READMISSION MANAGEMENT (OUTPATIENT)
Dept: CALL CENTER | Facility: HOSPITAL | Age: 76
End: 2021-08-04

## 2021-08-04 NOTE — OUTREACH NOTE
Medical Week 3 Survey      Responses   [facility] patient discharged from?  [place]   Does the patient have one of the following disease processes/diagnoses(primary or secondary)?  Other   Week 3 attempt successful?  Yes   Call start time  0835   Call end time  0848   Discharge diagnosis  Nonrheumatic aortic valve stenosis-TTE arotic valve replacement   Meds reviewed with patient/caregiver?  Yes   Is the patient taking all medications as directed (includes completed medication regime)?  Yes   Has the patient kept scheduled appointments due by today?  Yes   Comments  Surgeon yesterday 08/04/2021   Home health comments  PT came and she told him not to come back. HH note reviewed and discharged due to patient refusal   DME comments  Wears O2 most of the time 2-3l/min   Comments  Having drainage from right groin surgical site, MD aware, going to order CT scan.   What is the patient's perception of their health status since discharge?  Improving   Week 3 Call Completed?  Yes          Roseline Walsh RN

## 2021-08-10 ENCOUNTER — HOSPITAL ENCOUNTER (OUTPATIENT)
Dept: CARDIOLOGY | Facility: HOSPITAL | Age: 76
Discharge: HOME | End: 2021-08-10
Admitting: NURSE PRACTITIONER

## 2021-08-10 ENCOUNTER — OFFICE VISIT (OUTPATIENT)
Dept: CARDIOLOGY | Facility: CLINIC | Age: 76
End: 2021-08-10

## 2021-08-10 ENCOUNTER — TELEPHONE (OUTPATIENT)
Dept: CARDIOLOGY | Facility: CLINIC | Age: 76
End: 2021-08-10

## 2021-08-10 VITALS
HEART RATE: 75 BPM | HEIGHT: 59 IN | BODY MASS INDEX: 54.23 KG/M2 | DIASTOLIC BLOOD PRESSURE: 80 MMHG | WEIGHT: 269 LBS | SYSTOLIC BLOOD PRESSURE: 132 MMHG

## 2021-08-10 DIAGNOSIS — R09.89 OTHER SPECIFIED SYMPTOMS AND SIGNS INVOLVING THE CIRCULATORY AND RESPIRATORY SYSTEMS: ICD-10-CM

## 2021-08-10 DIAGNOSIS — I10 ESSENTIAL HYPERTENSION: ICD-10-CM

## 2021-08-10 DIAGNOSIS — I35.0 NONRHEUMATIC AORTIC VALVE STENOSIS: Primary | ICD-10-CM

## 2021-08-10 DIAGNOSIS — Z95.2 S/P TAVR (TRANSCATHETER AORTIC VALVE REPLACEMENT): ICD-10-CM

## 2021-08-10 DIAGNOSIS — I50.43 ACUTE ON CHRONIC COMBINED SYSTOLIC AND DIASTOLIC HF (HEART FAILURE) (HCC): ICD-10-CM

## 2021-08-10 LAB
BH CV RIGHT GROIN PSA PROCEDURE SCRIPTING LRR: 1
BH CV XLRA MEAS EXT ILIAC A PSV RIGHT: 93.8 CM/SEC
PROX PFA PSV RIGHT: 65.4 CM/SEC
PROX SFA PSV RIGHT: 53 CM/SEC
RIGHT GROIN CFA SYS: 81.8 CM/SEC

## 2021-08-10 PROCEDURE — 93000 ELECTROCARDIOGRAM COMPLETE: CPT | Performed by: NURSE PRACTITIONER

## 2021-08-10 PROCEDURE — 99214 OFFICE O/P EST MOD 30 MIN: CPT | Performed by: NURSE PRACTITIONER

## 2021-08-10 PROCEDURE — 93926 LOWER EXTREMITY STUDY: CPT

## 2021-08-10 NOTE — TELEPHONE ENCOUNTER
I reviewed the arterial Doppler scan results with Dr. Quintana.  He would like for Dr. Rich to review.  I have spoken with Nguyen CUMMINGS with cardiac surgery.  She will discuss with Dr. Mejía and get back with me.    I spoke with Carmen the patient's granddaughter to report this information.

## 2021-08-10 NOTE — PROGRESS NOTES
Date of Office Visit: 08/10/2021  Encounter Provider: ZOILA Loera  Place of Service: [facility] CARDIOLOGY  Patient Name: Miguelina Lopez  :1945    Chief Complaint   Patient presents with   • Aortic Stenosis   :     HPI: Miguelina Lopez is a 75 y.o. female who presents today for follow-up.  Old records have been obtained and reviewed by me.  She is a patient of Dr. Cardoza with a past medical history significant for chronic diastolic CHF, hypertension, paroxysmal atrial fibrillation, coronary artery disease (status post CABG), aortic stenosis, mitral regurgitation (status post mitral valve repair), tricuspid regurgitation (status post tricuspid valve repair), complete heart block status post Micra pacemaker, pulmonary hypertension, COPD on continuous oxygen, ABDs, CKD stage III, and morbid obesity.  She is immobile and uses a wheelchair most of the day.  Earlier this year, she was hospitalized with volume overload and altered mental status felt to be related to metabolic encephalopathy.  Due to severe aortic stenosis, she was evaluated by cardiac surgery and felt to be a poor reoperation candidate.  Ultimately, she is referred for TAVR.  On 2021, she underwent successful deployment of a 23 mm sapient ultra transcatheter aortic heart valve.  The following day, echocardiogram revealed an EF of 41%, a well-seated TAVR valve with peak and mean gradients within defined limits, a bioprosthetic mitral valve with elevated peak and mean gradients and significant annular calcification, mild to moderate tricuspid regurgitation of a previous tricuspid valve repair, and markedly elevated RVSP of 57 mmHg.  She was diuresed with IV Bumex for 2 days.  On 2021, she was felt stable for discharge.  She is here today for follow-up.   On 8/3/2021, she was seen by ZOILA Terry for a wound check.  Evidently she was having serous drainage from her right groin for a  couple of weeks, enough to saturate multiple towels throughout the day.  Dr. Quintana assessed the site as well.  There was concern for a seroma and the cardiothoracic surgery team was contacted.  They requested a full arterial duplex of the right groin as well as a scan to see if there is a collection of fluid.   Overall she feels well.  Evidently the right groin stopped using completely on Saturday.  Prior to that, she was switching out her hand clots every couple of hours due to being completely saturated.  She is still mildly short of breath on exertion but denies any PND or orthopnea.  Her weight and swelling are steadily improving.  She denies any palpitations, dizziness, syncope, bleeding difficulties or melena.    Past Medical History:   Diagnosis Date   • Acute on chronic respiratory failure with hypoxia and hypercapnia (CMS/[facility])    • OLI (acute kidney injury) (CMS/HCC)    • Anemia    • Anxiety    • Aortic valve stenosis    • Arthritis     KNEES   • Bilateral lower extremity edema    • CHF (congestive heart failure) (CMS/HCC)    • Chronic coronary artery disease    • Class 3 severe obesity due to excess calories in adult (CMS/HCC)    • COPD (chronic obstructive pulmonary disease) (CMS/HCC)    • Depression    • Diabetes mellitus (CMS/HCC)    • Dry skin    • Elevated cholesterol    • GERD (gastroesophageal reflux disease)    • Heart murmur    • Hypertension    • Mitral valve insufficiency    • Pneumonia     1/2016   • Pulmonary hypertension (CMS/HCC)     due to sleep disordered breathing   • Sleep apnea     Uses CPAP or oxygen   • Stage 3 chronic kidney disease (CMS/HCC)    • Subclinical hypothyroidism    • Supplemental oxygen dependent     3 LITERS   • TIA (transient ischemic attack) 3/15/2021   • Valvular heart disease        Past Surgical History:   Procedure Laterality Date   • AORTIC VALVE REPAIR/REPLACEMENT N/A 7/13/2021    Procedure: TTE TRANSFEMORAL TRANSCATHETER AORTIC VALVE REPLACEMENT PERCUTANEOUS  APPROACH;  Surgeon: Sharlene Mejía MD;  Location: [facility] OR 18/19;  Service: Cardiothoracic;  Laterality: N/A;   • AORTIC VALVE REPAIR/REPLACEMENT N/A 7/13/2021    Procedure: Transfemoral Transcatheter Aortic Valve Replacement with intra-op tte and possible open surgical rescue;  Surgeon: Ayan Pacheco MD;  Location: [facility] OR 18/19;  Service: Cardiovascular;  Laterality: N/A;   • CARDIAC CATHETERIZATION     • CARDIAC CATHETERIZATION N/A 6/10/2016    Procedure: Left Heart Cath;  Surgeon: Chace Johnson MD;  Location: [facility] CATH INVASIVE LOCATION;  Service:    • CARDIAC CATHETERIZATION N/A 6/10/2016    Procedure: Right Heart Cath;  Surgeon: Chace Johnson MD;  Location: [facility] CATH INVASIVE LOCATION;  Service:    • CARDIAC CATHETERIZATION N/A 2/5/2021    Procedure: RIGHT AND LEFT HEART CATH;  Surgeon: Antoine Ayers MD;  Location: [facility] CATH INVASIVE LOCATION;  Service: Cardiology;  Laterality: N/A;   • CARDIAC CATHETERIZATION N/A 2/5/2021    Procedure: Coronary angiography;  Surgeon: Antoine Ayers MD;  Location: [facility] CATH INVASIVE LOCATION;  Service: Cardiology;  Laterality: N/A;   • CARDIAC CATHETERIZATION N/A 2/5/2021    Procedure: Left ventriculography- pressures;  Surgeon: Antoine Ayers MD;  Location: [facility] CATH INVASIVE LOCATION;  Service: Cardiology;  Laterality: N/A;   • CARDIAC ELECTROPHYSIOLOGY PROCEDURE N/A 2/2/2021    Procedure: Pacemaker DC new---[employer] MICRA;  Surgeon: Eliazar Bond MD;  Location: [facility] CATH INVASIVE LOCATION;  Service: Cardiology;  Laterality: N/A;   • CARDIAC SURGERY     • CORONARY ARTERY BYPASS GRAFT      2 vessel   • CORONARY ARTERY BYPASS GRAFT WITH MITRAL VALVE REPAIR/REPLACEMENT N/A 6/13/2016    Procedure: INTRAOPERATIVE TARIQ, MIDLINE STERNOTOMY, CORONARY ARTERY BYPASS GRAFTING X  2 UTILIZING ENDOSCOPICALLY HARVESTED LEFT GREATER SAPHENOUS VEIN, MITRAL VALVE REPLACEMENT AND TRICUSPID VALVE REPAIR;  Surgeon:  Eliecer Mistry MD;  Location: [facility] MAIN OR;  Service:    • CORONARY STENT PLACEMENT  2010    Approx. 6 yrs ago at [facility]   • ENDOSCOPY N/A 3/17/2021    Procedure: ESOPHAGOGASTRODUODENOSCOPY;  Surgeon: Dixon Haas MD;  Location: [facility] ENDOSCOPY;  Service: Gastroenterology;  Laterality: N/A;  PRE: GI BLEED  POST: ANTRAL ULCER   • HEMORRHOIDECTOMY     • HYSTERECTOMY     • MITRAL VALVE REPLACEMENT     • REPLACEMENT TOTAL KNEE Right    • THYROID SURGERY      Cyst removed from thyroid   • VASCULAR SURGERY         Social History     Socioeconomic History   • Marital status:      Spouse name: Not on file   • Number of children: Not on file   • Years of education: Not on file   • Highest education level: Not on file   Tobacco Use   • Smoking status: Never Smoker   • Smokeless tobacco: Never Used   • Tobacco comment: no caffeine    Vaping Use   • Vaping Use: Never used   Substance and Sexual Activity   • Alcohol use: No   • Drug use: No   • Sexual activity: Defer       Family History   Problem Relation Age of Onset   • Heart attack Father    • Heart disease Father    • Malig Hyperthermia Neg Hx        Review of Systems   Constitutional: Negative.   Cardiovascular: Positive for dyspnea on exertion. Negative for chest pain, leg swelling, orthopnea, paroxysmal nocturnal dyspnea and syncope.   Respiratory: Negative.    Hematologic/Lymphatic: Negative for bleeding problem.   Musculoskeletal: Negative for falls.   Gastrointestinal: Negative for melena.   Neurological: Negative for dizziness and light-headedness.       Allergies   Allergen Reactions   • Coumadin [Warfarin Sodium] Diarrhea and Nausea Only   • Erythromycin    • Hctz [Hydrochlorothiazide] Swelling   • Zaroxolyn [Metolazone] Diarrhea   • Penicillins Rash     Tolerated cephalosporins in the past    • Sulfa Antibiotics Rash         Current Outpatient Medications:   •  acetaminophen (TYLENOL) 325 MG tablet, Take 2 tablets by mouth Every 4 (Four)  Hours As Needed for Mild Pain ., Disp:  , Rfl:   •  ALPRAZolam (XANAX) 1 MG tablet, Take 1 mg by mouth 2 (Two) Times a Day As Needed for Anxiety., Disp: , Rfl:   •  aspirin 81 MG EC tablet, Take 1 tablet by mouth Daily., Disp: 30 tablet, Rfl: 0  •  atorvastatin (LIPITOR) 20 MG tablet, TAKE ONE TABLET BY MOUTH DAILY (Patient taking differently: Take 20 mg by mouth Every Night.), Disp: 90 tablet, Rfl: 0  •  bumetanide (BUMEX) 2 MG tablet, TAKE TWO TABLETS BY MOUTH TWICE A DAY, Disp: 120 tablet, Rfl: 1  •  carvedilol (COREG) 6.25 MG tablet, Take 1 tablet by mouth 2 (Two) Times a Day With Meals., Disp: 60 tablet, Rfl: 0  •  clotrimazole-betamethasone (Lotrisone) 1-0.05 % cream, Apply  topically to the appropriate area as directed 2 (Two) Times a Day., Disp: 45 g, Rfl: 2  •  Dulaglutide (Trulicity) 0.75 MG/0.5ML solution pen-injector, Inject 0.75 mg under the skin into the appropriate area as directed 1 (One) Time Per Week., Disp: 3 pen, Rfl: 0  •  Eliquis 5 MG tablet tablet, TAKE 1 TABLET BY MOUTH TWO TIMES A DAY, Disp: 60 tablet, Rfl: 5  •  fluticasone (FLONASE) 50 MCG/ACT nasal spray, 2 sprays by Each Nare route Daily., Disp:  , Rfl:   •  fluticasone-salmeterol (ADVAIR DISKUS) 250-50 MCG/DOSE DISKUS, Inhale 1 puff Daily As Needed (cough and wheezing)., Disp: 60 each, Rfl: 0  •  gabapentin (NEURONTIN) 100 MG capsule, TAKE ONE CAPSULE BY MOUTH EVERY 12 HOURS, Disp: 60 capsule, Rfl: 1  •  ipratropium-albuterol (DUO-NEB) 0.5-2.5 mg/mL nebulizer, Take 3 mL by nebulization 4 (four) times a day. (Patient taking differently: Take 3 mL by nebulization 3 (Three) Times a Day As Needed.), Disp: 3 mL, Rfl: 5  •  levothyroxine (SYNTHROID, LEVOTHROID) 25 MCG tablet, Take 2 tabs (50mcg) on Mon, Wed, Fri, and take 1 tab (25 mcg) on Tues, Thurs, Sat, Sun, Disp: 40 tablet, Rfl: 1  •  nitroglycerin (NITROSTAT) 0.4 MG SL tablet, DISSOLVE 1 TAB UNDER TONGUE FOR CHEST PAIN - IF PAIN REMAINS AFTER 5 MIN, CALL 911 AND REPEAT DOSE. MAX 3 TABS  "IN 15 MINUTES (Patient taking differently: Place 0.4 mg under the tongue Every 5 (Five) Minutes As Needed.), Disp: 25 tablet, Rfl: 4  •  nystatin (MYCOSTATIN) 602826 UNIT/GM cream, Apply  topically to the appropriate area as directed 2 (Two) Times a Day. to affected area(s) (Patient taking differently: Apply  topically to the appropriate area as directed 2 (Two) Times a Day.), Disp: 30 g, Rfl: 3  •  omeprazole (priLOSEC) 40 MG capsule, Take 1 capsule by mouth Daily., Disp: 90 capsule, Rfl: 0  •  potassium chloride (K-DUR,KLOR-CON) 20 MEQ CR tablet, Take 1 tablet by mouth Every Other Day., Disp: 90 tablet, Rfl: 0  •  Probiotic Product (Risaquad-2) capsule capsule, Take 1 capsule by mouth Daily., Disp: , Rfl:   •  senna-docusate (PERICOLACE) 8.6-50 MG per tablet, Take 1 tablet by mouth Daily. Takes every other day, Disp: , Rfl:   •  sodium chloride 0.65 % nasal spray, 2 sprays into the nostril(s) as directed by provider As Needed for Congestion., Disp: , Rfl: 12  •  traMADol (ULTRAM) 50 MG tablet, Take 1 tablet by mouth Daily As Needed for Moderate Pain ., Disp: 30 tablet, Rfl: 2  •  Viibryd 20 MG tablet tablet, TAKE ONE TABLET BY MOUTH ONCE NIGHTLY, Disp: 30 tablet, Rfl: 3  •  vitamin D (ERGOCALCIFEROL) 1.25 MG (71804 UT) capsule capsule, Take 1 capsule by mouth Every 7 (Seven) Days. Takes on Sundays, Disp: 12 capsule, Rfl: 0  •  metFORMIN (GLUCOPHAGE) 500 MG tablet, Take 1 tablet by mouth Daily With Breakfast for 7 days, THEN 1 tablet 2 (Two) Times a Day With Meals for 7 days., Disp: 90 tablet, Rfl: 1      Objective:     Vitals:    08/10/21 1021   BP: 132/80   Pulse: 75   Weight: 122 kg (269 lb)   Height: 149.9 cm (59\")     Body mass index is 54.33 kg/m².    PHYSICAL EXAM:    Neck:      Vascular: No JVD.   Pulmonary:      Effort: Pulmonary effort is normal.      Breath sounds: Decreased breath sounds present.   Cardiovascular:      Normal rate. Regular rhythm.      Murmurs: There is no murmur.      No gallop. No " click. No rub.   Pulses:     Intact distal pulses.   Skin:     Comments: Right groin clean, dry, and intact with no erythema or edema.           ECG 12 Lead    Date/Time: 8/10/2021 10:31 AM  Performed by: Zuri Marrero APRN  Authorized by: Zuri Marrero APRN   Comparison: compared with previous ECG from 7/14/2021  Similar to previous ECG  Rhythm: paced    Clinical impression: abnormal EKG  Comments: Indication: Status post TAVR              Assessment:       Diagnosis Plan   1. Nonrheumatic aortic valve stenosis     2. S/P TAVR (transcatheter aortic valve replacement)  Duplex Groin Pseudoaneurysm unilateral CAR    ECG 12 Lead   3. Acute on chronic combined systolic and diastolic HF (heart failure) (CMS/HCC)     4. Essential hypertension     5. Other specified symptoms and signs involving the circulatory and respiratory systems   Duplex Groin Pseudoaneurysm unilateral CAR     Orders Placed This Encounter   Procedures   • ECG 12 Lead     This order was created via procedure documentation     Order Specific Question:   Release to patient     Answer:   Immediate          Plan:       1.  Aortic stenosis.  Status post TAVR.  Post procedure echocardiogram revealed a well-seated TAVR valve with peak and mean gradients within defined limits.  She is on aspirin in addition to her Eliquis.  Fortunately, her right groin has stopped using it.  Since she is here, I think it would be a good idea to go ahead and assess for a seroma.  Will order a stat arterial duplex/ultrasound of the right groin and leg to be performed today prior to leaving.      2.  Acute on chronic systolic and diastolic CHF.  EF 40%.  She is on carvedilol.  She was on lisinopril during the hospitalization which was not continued at discharge.  Due to her kidney function, I will hold off.  I am going to ensure she is scheduled for follow-up with nephrology.      3.  Paroxysmal atrial fibrillation.  She is rate controlled on carvedilol and  anticoagulated on Eliquis.      4.  Hypertension.  Her blood pressure is stable.  Continue current therapy.      Overall I think she is stable and doing well.  She denies any symptoms of angina or heart failure.  Further recommendations will be made pending the results of her ultrasound today.  Otherwise, she will follow-up with Dr. Pacheco on 8/26/2021.      As always, it has been a pleasure to participate in your patient's care.      Sincerely,         ZOILA Olsen

## 2021-08-10 NOTE — PROGRESS NOTES
Vascular lab preliminary    Right groin duplex ultrasound is complete    Right groin no active pseudoaneurysm visualized    Right groin no DVT or SVT        Spoke with Zuri CUMMINGS office at 12:15pm, okay to send patient home    Final report to follow

## 2021-08-11 ENCOUNTER — TELEPHONE (OUTPATIENT)
Dept: CARDIOLOGY | Facility: CLINIC | Age: 76
End: 2021-08-11

## 2021-08-11 NOTE — TELEPHONE ENCOUNTER
I spoke with ZOILA Whyte with cardiac surgery.  She spoke with Dr. Mejía.  Per Dr. Mejía, there is no intervention needed.      I spoke with the granddaughter Carmen to inform her.  She verbalized understanding.

## 2021-08-11 NOTE — TELEPHONE ENCOUNTER
"----- Message from ZOILA Fermin sent at 8/10/2021  3:40 PM EDT -----  Dr. Mejía,    Dr. Quintana discussed this patient with you last week regarding her right groin site following TAVR.  It was seeping drainage at the time and there was concern for a seroma.  I saw her today.  It isn't seeping anymore, but I scanned her anyway.  These are the results.  Dr. Quintana wanted me to send to you.     Also discussed with Nguyen.    ----- Message -----  From: Adalberto Quintana MD  Sent: 8/10/2021   3:39 PM EDT  To: ZOILA Fermin    Yes send to Dr Mejía  ----- Message -----  From: Zuri Marrero APRN  Sent: 8/10/2021   1:34 PM EDT  To: Adalberto Quintana MD    It was read as \"possibly a seroma\"    So should we assume that it is?  Should she just see the surgeons?  ----- Message -----  From: Chris Matthews MD  Sent: 8/10/2021   1:27 PM EDT  To: ZOILA Fermin          "

## 2021-08-16 RX ORDER — CARVEDILOL 6.25 MG/1
6.25 TABLET ORAL 2 TIMES DAILY WITH MEALS
Qty: 60 TABLET | Refills: 0 | Status: SHIPPED | OUTPATIENT
Start: 2021-08-16 | End: 2021-09-13

## 2021-08-23 RX ORDER — LEVOTHYROXINE SODIUM 0.03 MG/1
TABLET ORAL
Qty: 40 TABLET | Refills: 0 | Status: SHIPPED | OUTPATIENT
Start: 2021-08-23 | End: 2021-09-17

## 2021-08-28 ENCOUNTER — PATIENT OUTREACH (OUTPATIENT)
Dept: CASE MANAGEMENT | Facility: OTHER | Age: 76
End: 2021-08-28

## 2021-08-28 NOTE — OUTREACH NOTE
Ambulatory Case Management Note    Patient Outreach    Spoke with Carmen. Patient reported to be doing well. No needs at this time. Reminded Carmen to review vaccines, DXA, and Urine Microalbumin with Arcelia CUMMINGS during patient's next appointment in September. Follow up monitoring scheduled in 1 month.     There are no recently modified care plans to display for this patient.      Shania Severino RN  Ambulatory Case Management    8/28/2021, 17:30 EDT

## 2021-08-30 RX ORDER — GLIMEPIRIDE 1 MG/1
TABLET ORAL
Qty: 90 TABLET | Refills: 1 | OUTPATIENT
Start: 2021-08-30

## 2021-09-03 RX ORDER — ERGOCALCIFEROL 1.25 MG/1
CAPSULE ORAL
Qty: 4 CAPSULE | OUTPATIENT
Start: 2021-09-03

## 2021-09-03 RX ORDER — GLIMEPIRIDE 1 MG/1
TABLET ORAL
Qty: 90 TABLET | Refills: 1 | OUTPATIENT
Start: 2021-09-03

## 2021-09-03 RX ORDER — OMEPRAZOLE 40 MG/1
CAPSULE, DELAYED RELEASE ORAL
Qty: 60 CAPSULE | OUTPATIENT
Start: 2021-09-03

## 2021-09-03 RX ORDER — ERGOCALCIFEROL 1.25 MG/1
50000 CAPSULE ORAL
Qty: 12 CAPSULE | Refills: 0 | Status: SHIPPED | OUTPATIENT
Start: 2021-09-03 | End: 2021-09-07 | Stop reason: SDUPTHER

## 2021-09-03 RX ORDER — OMEPRAZOLE 40 MG/1
40 CAPSULE, DELAYED RELEASE ORAL DAILY
Qty: 90 CAPSULE | Refills: 0 | Status: SHIPPED | OUTPATIENT
Start: 2021-09-03 | End: 2021-09-07 | Stop reason: SDUPTHER

## 2021-09-07 DIAGNOSIS — G63 POLYNEUROPATHY ASSOCIATED WITH UNDERLYING DISEASE (HCC): ICD-10-CM

## 2021-09-07 DIAGNOSIS — E11.65 UNCONTROLLED TYPE 2 DIABETES MELLITUS WITH HYPERGLYCEMIA (HCC): ICD-10-CM

## 2021-09-07 RX ORDER — OMEPRAZOLE 40 MG/1
40 CAPSULE, DELAYED RELEASE ORAL DAILY
Qty: 90 CAPSULE | Refills: 0 | Status: SHIPPED | OUTPATIENT
Start: 2021-09-07 | End: 2022-01-01 | Stop reason: SDUPTHER

## 2021-09-07 RX ORDER — GABAPENTIN 100 MG/1
CAPSULE ORAL
Qty: 60 CAPSULE | Refills: 0 | Status: SHIPPED | OUTPATIENT
Start: 2021-09-07 | End: 2021-11-17 | Stop reason: SDUPTHER

## 2021-09-07 RX ORDER — BUMETANIDE 2 MG/1
4 TABLET ORAL 2 TIMES DAILY
Qty: 120 TABLET | Refills: 0 | Status: SHIPPED | OUTPATIENT
Start: 2021-09-07 | End: 2021-10-19 | Stop reason: HOSPADM

## 2021-09-07 RX ORDER — ERGOCALCIFEROL 1.25 MG/1
50000 CAPSULE ORAL
Qty: 12 CAPSULE | Refills: 0 | Status: SHIPPED | OUTPATIENT
Start: 2021-09-07 | End: 2022-01-01

## 2021-09-13 RX ORDER — CARVEDILOL 6.25 MG/1
TABLET ORAL
Qty: 60 TABLET | Refills: 0 | Status: SHIPPED | OUTPATIENT
Start: 2021-09-13 | End: 2021-09-15 | Stop reason: SDUPTHER

## 2021-09-15 RX ORDER — CARVEDILOL 6.25 MG/1
6.25 TABLET ORAL 2 TIMES DAILY WITH MEALS
Qty: 180 TABLET | Refills: 3 | Status: ON HOLD | OUTPATIENT
Start: 2021-09-15 | End: 2021-10-12

## 2021-09-17 RX ORDER — LEVOTHYROXINE SODIUM 0.03 MG/1
TABLET ORAL
Qty: 40 TABLET | Refills: 0 | Status: ON HOLD | OUTPATIENT
Start: 2021-09-17 | End: 2021-10-18

## 2021-09-20 RX ORDER — ATORVASTATIN CALCIUM 20 MG/1
TABLET, FILM COATED ORAL
Qty: 90 TABLET | Refills: 0 | Status: SHIPPED | OUTPATIENT
Start: 2021-09-20 | End: 2021-01-01

## 2021-09-22 RX ORDER — POTASSIUM CHLORIDE 20 MEQ/1
20 TABLET, EXTENDED RELEASE ORAL EVERY OTHER DAY
Qty: 90 TABLET | Refills: 0 | Status: SHIPPED | OUTPATIENT
Start: 2021-09-22 | End: 2022-01-01 | Stop reason: SDUPTHER

## 2021-10-04 ENCOUNTER — TELEPHONE (OUTPATIENT)
Dept: FAMILY MEDICINE CLINIC | Facility: CLINIC | Age: 76
End: 2021-10-04

## 2021-10-04 NOTE — TELEPHONE ENCOUNTER
Caller: Carmen Carrizales (ON  [facility])    Relationship: GRANDDAUGHTER    Best call back number: [phone]*    What is your medical concern? BILATERAL LEG SWELLING FROM THE KNEE DOWN.    How long has this issue been going on? PATIENT'S GRANDDAUGHTER STATES THE PATIENT'S LEGS NORMALLY DO SWELL, BUT FOR THE PAST 2-3 WEEKS THE SWELLING HAS BEEN WORSE. THE PATIENT HAS BEEN ELEVATING, TAKING HER WATER PILLS, AND NOTHING SEEMS TO BE HELPING.     PATIENT'S GRANDDAUGHTER REQUESTING A CALLBACK TO DISCUSS AND ALSO TO GET ADVICE ON WHAT TO DO FOR THE PATIENT'S SWELLING.

## 2021-10-06 ENCOUNTER — HOSPITAL ENCOUNTER (INPATIENT)
Facility: HOSPITAL | Age: 76
LOS: 11 days | Discharge: HOME HEALTH | End: 2021-10-19
Attending: EMERGENCY MEDICINE | Admitting: INTERNAL MEDICINE

## 2021-10-06 ENCOUNTER — APPOINTMENT (OUTPATIENT)
Dept: GENERAL RADIOLOGY | Facility: HOSPITAL | Age: 76
End: 2021-10-06

## 2021-10-06 DIAGNOSIS — N18.32 STAGE 3B CHRONIC KIDNEY DISEASE (HCC): Chronic | ICD-10-CM

## 2021-10-06 DIAGNOSIS — I50.41 ACUTE COMBINED SYSTOLIC AND DIASTOLIC CONGESTIVE HEART FAILURE (HCC): ICD-10-CM

## 2021-10-06 DIAGNOSIS — J44.9 CHRONIC OBSTRUCTIVE PULMONARY DISEASE, UNSPECIFIED COPD TYPE (HCC): ICD-10-CM

## 2021-10-06 DIAGNOSIS — I50.9 ACUTE CONGESTIVE HEART FAILURE, UNSPECIFIED HEART FAILURE TYPE (HCC): ICD-10-CM

## 2021-10-06 DIAGNOSIS — K92.1 GASTROINTESTINAL HEMORRHAGE WITH MELENA: Primary | ICD-10-CM

## 2021-10-06 DIAGNOSIS — D62 ACUTE BLOOD LOSS ANEMIA: ICD-10-CM

## 2021-10-06 LAB
ALBUMIN SERPL-MCNC: 3.3 G/DL (ref 3.5–5.2)
ALBUMIN/GLOB SERPL: 1.3 G/DL
ALP SERPL-CCNC: 264 U/L (ref 39–117)
ALT SERPL W P-5'-P-CCNC: 16 U/L (ref 1–33)
ANION GAP SERPL CALCULATED.3IONS-SCNC: 10.1 MMOL/L (ref 5–15)
ANION GAP SERPL CALCULATED.3IONS-SCNC: 11.1 MMOL/L (ref 5–15)
AST SERPL-CCNC: 27 U/L (ref 1–32)
BASOPHILS # BLD AUTO: 0.06 10*3/MM3 (ref 0–0.2)
BASOPHILS NFR BLD AUTO: 0.6 % (ref 0–1.5)
BILIRUB SERPL-MCNC: 0.2 MG/DL (ref 0–1.2)
BUN SERPL-MCNC: 27 MG/DL (ref 8–23)
BUN SERPL-MCNC: 28 MG/DL (ref 8–23)
BUN/CREAT SERPL: 17.3 (ref 7–25)
BUN/CREAT SERPL: 18.8 (ref 7–25)
CALCIUM SPEC-SCNC: 8.4 MG/DL (ref 8.6–10.5)
CALCIUM SPEC-SCNC: 8.4 MG/DL (ref 8.6–10.5)
CHLORIDE SERPL-SCNC: 98 MMOL/L (ref 98–107)
CHLORIDE SERPL-SCNC: 99 MMOL/L (ref 98–107)
CO2 SERPL-SCNC: 34.9 MMOL/L (ref 22–29)
CO2 SERPL-SCNC: 34.9 MMOL/L (ref 22–29)
CREAT SERPL-MCNC: 1.49 MG/DL (ref 0.57–1)
CREAT SERPL-MCNC: 1.56 MG/DL (ref 0.57–1)
DEPRECATED RDW RBC AUTO: 45.4 FL (ref 37–54)
EOSINOPHIL # BLD AUTO: 0.53 10*3/MM3 (ref 0–0.4)
EOSINOPHIL NFR BLD AUTO: 5.1 % (ref 0.3–6.2)
ERYTHROCYTE [DISTWIDTH] IN BLOOD BY AUTOMATED COUNT: 15.8 % (ref 12.3–15.4)
GFR SERPL CREATININE-BSD FRML MDRD: 32 ML/MIN/1.73
GFR SERPL CREATININE-BSD FRML MDRD: 34 ML/MIN/1.73
GLOBULIN UR ELPH-MCNC: 2.6 GM/DL
GLUCOSE BLDC GLUCOMTR-MCNC: 125 MG/DL (ref 70–130)
GLUCOSE SERPL-MCNC: 121 MG/DL (ref 65–99)
GLUCOSE SERPL-MCNC: 122 MG/DL (ref 65–99)
HCT VFR BLD AUTO: 28.8 % (ref 34–46.6)
HGB BLD-MCNC: 8.2 G/DL (ref 12–15.9)
IMM GRANULOCYTES # BLD AUTO: 0.04 10*3/MM3 (ref 0–0.05)
IMM GRANULOCYTES NFR BLD AUTO: 0.4 % (ref 0–0.5)
LYMPHOCYTES # BLD AUTO: 0.76 10*3/MM3 (ref 0.7–3.1)
LYMPHOCYTES NFR BLD AUTO: 7.3 % (ref 19.6–45.3)
MCH RBC QN AUTO: 22.2 PG (ref 26.6–33)
MCHC RBC AUTO-ENTMCNC: 28.5 G/DL (ref 31.5–35.7)
MCV RBC AUTO: 77.8 FL (ref 79–97)
MONOCYTES # BLD AUTO: 0.69 10*3/MM3 (ref 0.1–0.9)
MONOCYTES NFR BLD AUTO: 6.7 % (ref 5–12)
NEUTROPHILS NFR BLD AUTO: 79.9 % (ref 42.7–76)
NEUTROPHILS NFR BLD AUTO: 8.29 10*3/MM3 (ref 1.7–7)
NRBC BLD AUTO-RTO: 0 /100 WBC (ref 0–0.2)
NT-PROBNP SERPL-MCNC: ABNORMAL PG/ML (ref 0–1800)
PLATELET # BLD AUTO: 237 10*3/MM3 (ref 140–450)
PMV BLD AUTO: 10 FL (ref 6–12)
POTASSIUM SERPL-SCNC: 4.6 MMOL/L (ref 3.5–5.2)
POTASSIUM SERPL-SCNC: 4.7 MMOL/L (ref 3.5–5.2)
PROT SERPL-MCNC: 5.9 G/DL (ref 6–8.5)
QT INTERVAL: 463 MS
RBC # BLD AUTO: 3.7 10*6/MM3 (ref 3.77–5.28)
SARS-COV-2 ORF1AB RESP QL NAA+PROBE: NOT DETECTED
SODIUM SERPL-SCNC: 144 MMOL/L (ref 136–145)
SODIUM SERPL-SCNC: 144 MMOL/L (ref 136–145)
TROPONIN T SERPL-MCNC: <0.01 NG/ML (ref 0–0.03)
WBC # BLD AUTO: 10.37 10*3/MM3 (ref 3.4–10.8)

## 2021-10-06 PROCEDURE — 82962 GLUCOSE BLOOD TEST: CPT

## 2021-10-06 PROCEDURE — 83880 ASSAY OF NATRIURETIC PEPTIDE: CPT | Performed by: EMERGENCY MEDICINE

## 2021-10-06 PROCEDURE — 80053 COMPREHEN METABOLIC PANEL: CPT | Performed by: EMERGENCY MEDICINE

## 2021-10-06 PROCEDURE — 25010000002 FUROSEMIDE PER 20 MG: Performed by: NURSE PRACTITIONER

## 2021-10-06 PROCEDURE — 99220 PR INITIAL OBSERVATION CARE/DAY 70 MINUTES: CPT | Performed by: NURSE PRACTITIONER

## 2021-10-06 PROCEDURE — 36415 COLL VENOUS BLD VENIPUNCTURE: CPT | Performed by: NURSE PRACTITIONER

## 2021-10-06 PROCEDURE — 85025 COMPLETE CBC W/AUTO DIFF WBC: CPT | Performed by: EMERGENCY MEDICINE

## 2021-10-06 PROCEDURE — U0004 COV-19 TEST NON-CDC HGH THRU: HCPCS | Performed by: EMERGENCY MEDICINE

## 2021-10-06 PROCEDURE — G0378 HOSPITAL OBSERVATION PER HR: HCPCS

## 2021-10-06 PROCEDURE — 71045 X-RAY EXAM CHEST 1 VIEW: CPT

## 2021-10-06 PROCEDURE — 25010000002 FUROSEMIDE PER 20 MG: Performed by: EMERGENCY MEDICINE

## 2021-10-06 PROCEDURE — 84484 ASSAY OF TROPONIN QUANT: CPT | Performed by: EMERGENCY MEDICINE

## 2021-10-06 PROCEDURE — 99284 EMERGENCY DEPT VISIT MOD MDM: CPT

## 2021-10-06 PROCEDURE — 93005 ELECTROCARDIOGRAM TRACING: CPT | Performed by: EMERGENCY MEDICINE

## 2021-10-06 PROCEDURE — 93010 ELECTROCARDIOGRAM REPORT: CPT | Performed by: INTERNAL MEDICINE

## 2021-10-06 RX ORDER — GABAPENTIN 100 MG/1
100 CAPSULE ORAL EVERY 12 HOURS SCHEDULED
Status: DISCONTINUED | OUTPATIENT
Start: 2021-10-06 | End: 2021-10-19 | Stop reason: HOSPADM

## 2021-10-06 RX ORDER — LEVOTHYROXINE SODIUM 0.03 MG/1
25 TABLET ORAL
Status: DISCONTINUED | OUTPATIENT
Start: 2021-10-07 | End: 2021-10-19 | Stop reason: HOSPADM

## 2021-10-06 RX ORDER — PANTOPRAZOLE SODIUM 40 MG/1
40 TABLET, DELAYED RELEASE ORAL EVERY MORNING
Refills: 0 | Status: DISCONTINUED | OUTPATIENT
Start: 2021-10-07 | End: 2021-10-19 | Stop reason: HOSPADM

## 2021-10-06 RX ORDER — ECHINACEA PURPUREA EXTRACT 125 MG
2 TABLET ORAL AS NEEDED
Status: DISCONTINUED | OUTPATIENT
Start: 2021-10-06 | End: 2021-10-19 | Stop reason: HOSPADM

## 2021-10-06 RX ORDER — DEXTROSE MONOHYDRATE 25 G/50ML
25 INJECTION, SOLUTION INTRAVENOUS
Status: DISCONTINUED | OUTPATIENT
Start: 2021-10-06 | End: 2021-10-19 | Stop reason: HOSPADM

## 2021-10-06 RX ORDER — SODIUM CHLORIDE 0.9 % (FLUSH) 0.9 %
10 SYRINGE (ML) INJECTION EVERY 12 HOURS SCHEDULED
Status: DISCONTINUED | OUTPATIENT
Start: 2021-10-06 | End: 2021-10-19 | Stop reason: HOSPADM

## 2021-10-06 RX ORDER — ALPRAZOLAM 0.5 MG/1
1 TABLET ORAL 2 TIMES DAILY PRN
Status: DISPENSED | OUTPATIENT
Start: 2021-10-06 | End: 2021-10-13

## 2021-10-06 RX ORDER — FLUTICASONE PROPIONATE 50 MCG
2 SPRAY, SUSPENSION (ML) NASAL DAILY
Status: DISCONTINUED | OUTPATIENT
Start: 2021-10-06 | End: 2021-10-19 | Stop reason: HOSPADM

## 2021-10-06 RX ORDER — BUDESONIDE AND FORMOTEROL FUMARATE DIHYDRATE 160; 4.5 UG/1; UG/1
2 AEROSOL RESPIRATORY (INHALATION)
Refills: 0 | Status: DISCONTINUED | OUTPATIENT
Start: 2021-10-06 | End: 2021-10-19 | Stop reason: HOSPADM

## 2021-10-06 RX ORDER — CARVEDILOL 6.25 MG/1
6.25 TABLET ORAL 2 TIMES DAILY WITH MEALS
Status: DISCONTINUED | OUTPATIENT
Start: 2021-10-06 | End: 2021-10-07

## 2021-10-06 RX ORDER — INSULIN LISPRO 100 [IU]/ML
0-9 INJECTION, SOLUTION INTRAVENOUS; SUBCUTANEOUS
Status: DISCONTINUED | OUTPATIENT
Start: 2021-10-06 | End: 2021-10-19 | Stop reason: HOSPADM

## 2021-10-06 RX ORDER — ACETAMINOPHEN 325 MG/1
650 TABLET ORAL EVERY 4 HOURS PRN
Status: DISCONTINUED | OUTPATIENT
Start: 2021-10-06 | End: 2021-10-19 | Stop reason: HOSPADM

## 2021-10-06 RX ORDER — NICOTINE POLACRILEX 4 MG
15 LOZENGE BUCCAL
Status: DISCONTINUED | OUTPATIENT
Start: 2021-10-06 | End: 2021-10-19 | Stop reason: HOSPADM

## 2021-10-06 RX ORDER — POTASSIUM CHLORIDE 750 MG/1
20 TABLET, FILM COATED, EXTENDED RELEASE ORAL DAILY
Status: DISCONTINUED | OUTPATIENT
Start: 2021-10-06 | End: 2021-10-14

## 2021-10-06 RX ORDER — IPRATROPIUM BROMIDE AND ALBUTEROL SULFATE 2.5; .5 MG/3ML; MG/3ML
3 SOLUTION RESPIRATORY (INHALATION) 3 TIMES DAILY PRN
Status: DISCONTINUED | OUTPATIENT
Start: 2021-10-06 | End: 2021-10-19 | Stop reason: HOSPADM

## 2021-10-06 RX ORDER — FUROSEMIDE 10 MG/ML
80 INJECTION INTRAMUSCULAR; INTRAVENOUS ONCE
Status: COMPLETED | OUTPATIENT
Start: 2021-10-06 | End: 2021-10-06

## 2021-10-06 RX ORDER — NITROGLYCERIN 0.4 MG/1
0.4 TABLET SUBLINGUAL
Status: DISCONTINUED | OUTPATIENT
Start: 2021-10-06 | End: 2021-10-19 | Stop reason: HOSPADM

## 2021-10-06 RX ORDER — SODIUM CHLORIDE 0.9 % (FLUSH) 0.9 %
10 SYRINGE (ML) INJECTION AS NEEDED
Status: DISCONTINUED | OUTPATIENT
Start: 2021-10-06 | End: 2021-10-19 | Stop reason: HOSPADM

## 2021-10-06 RX ORDER — TRAMADOL HYDROCHLORIDE 50 MG/1
50 TABLET ORAL EVERY 12 HOURS PRN
Status: DISPENSED | OUTPATIENT
Start: 2021-10-06 | End: 2021-10-13

## 2021-10-06 RX ORDER — CLOTRIMAZOLE AND BETAMETHASONE DIPROPIONATE 10; .64 MG/G; MG/G
CREAM TOPICAL 2 TIMES DAILY
Status: DISCONTINUED | OUTPATIENT
Start: 2021-10-06 | End: 2021-10-19 | Stop reason: HOSPADM

## 2021-10-06 RX ORDER — FUROSEMIDE 10 MG/ML
80 INJECTION INTRAMUSCULAR; INTRAVENOUS
Status: DISCONTINUED | OUTPATIENT
Start: 2021-10-06 | End: 2021-10-08

## 2021-10-06 RX ORDER — ASPIRIN 81 MG/1
81 TABLET ORAL DAILY
Status: DISCONTINUED | OUTPATIENT
Start: 2021-10-06 | End: 2021-10-11

## 2021-10-06 RX ORDER — AMOXICILLIN 250 MG
1 CAPSULE ORAL DAILY
Status: DISCONTINUED | OUTPATIENT
Start: 2021-10-06 | End: 2021-10-19 | Stop reason: HOSPADM

## 2021-10-06 RX ORDER — NYSTATIN 100000 U/G
CREAM TOPICAL 2 TIMES DAILY
Status: DISCONTINUED | OUTPATIENT
Start: 2021-10-06 | End: 2021-10-19 | Stop reason: HOSPADM

## 2021-10-06 RX ORDER — VILAZODONE HYDROCHLORIDE 40 MG/1
20 TABLET ORAL NIGHTLY
Status: DISCONTINUED | OUTPATIENT
Start: 2021-10-06 | End: 2021-10-19 | Stop reason: HOSPADM

## 2021-10-06 RX ORDER — ATORVASTATIN CALCIUM 20 MG/1
20 TABLET, FILM COATED ORAL NIGHTLY
Status: DISCONTINUED | OUTPATIENT
Start: 2021-10-06 | End: 2021-10-19 | Stop reason: HOSPADM

## 2021-10-06 RX ADMIN — GABAPENTIN 100 MG: 100 CAPSULE ORAL at 21:46

## 2021-10-06 RX ADMIN — APIXABAN 5 MG: 5 TABLET, FILM COATED ORAL at 23:33

## 2021-10-06 RX ADMIN — SODIUM CHLORIDE, PRESERVATIVE FREE 10 ML: 5 INJECTION INTRAVENOUS at 18:48

## 2021-10-06 RX ADMIN — ATORVASTATIN CALCIUM 20 MG: 20 TABLET, FILM COATED ORAL at 21:46

## 2021-10-06 RX ADMIN — NYSTATIN: 100000 CREAM TOPICAL at 21:46

## 2021-10-06 RX ADMIN — VILAZODONE HYDROCHLORIDE 20 MG: 40 TABLET ORAL at 23:32

## 2021-10-06 RX ADMIN — FUROSEMIDE 80 MG: 10 INJECTION, SOLUTION INTRAMUSCULAR; INTRAVENOUS at 18:48

## 2021-10-06 RX ADMIN — SODIUM CHLORIDE, PRESERVATIVE FREE 10 ML: 5 INJECTION INTRAVENOUS at 21:46

## 2021-10-06 RX ADMIN — POTASSIUM CHLORIDE 20 MEQ: 750 TABLET, EXTENDED RELEASE ORAL at 18:48

## 2021-10-06 RX ADMIN — CARVEDILOL 6.25 MG: 6.25 TABLET, FILM COATED ORAL at 18:48

## 2021-10-06 RX ADMIN — CLOTRIMAZOLE AND BETAMETHASONE DIPROPIONATE 1 APPLICATION: 10; .5 CREAM TOPICAL at 21:46

## 2021-10-06 RX ADMIN — ALPRAZOLAM 1 MG: 0.5 TABLET ORAL at 18:56

## 2021-10-06 RX ADMIN — ACETAMINOPHEN 650 MG: 325 TABLET, FILM COATED ORAL at 18:48

## 2021-10-06 RX ADMIN — FUROSEMIDE 80 MG: 20 INJECTION, SOLUTION INTRAMUSCULAR; INTRAVENOUS at 14:12

## 2021-10-07 ENCOUNTER — HOME HEALTH ADMISSION (OUTPATIENT)
Dept: HOME HEALTH SERVICES | Facility: HOME HEALTHCARE | Age: 76
End: 2021-10-07

## 2021-10-07 ENCOUNTER — APPOINTMENT (OUTPATIENT)
Dept: CARDIOLOGY | Facility: HOSPITAL | Age: 76
End: 2021-10-07

## 2021-10-07 LAB
ANION GAP SERPL CALCULATED.3IONS-SCNC: 12.4 MMOL/L (ref 5–15)
AORTIC DIMENSIONLESS INDEX: 0.4 (DI)
BH CV ECHO AV AORTIC VALVE AT ACCEL TIME CALCULATED: 95 MSEC
BH CV ECHO MEAS - AO ACC TIME: 0.1 SEC
BH CV ECHO MEAS - AO MAX PG (FULL): 25.9 MMHG
BH CV ECHO MEAS - AO MAX PG: 30.7 MMHG
BH CV ECHO MEAS - AO MEAN PG (FULL): 12 MMHG
BH CV ECHO MEAS - AO MEAN PG: 14 MMHG
BH CV ECHO MEAS - AO V2 MAX: 277 CM/SEC
BH CV ECHO MEAS - AO V2 MEAN: 167 CM/SEC
BH CV ECHO MEAS - AO V2 VTI: 49.4 CM
BH CV ECHO MEAS - AT: 0.1 SEC
BH CV ECHO MEAS - AVA(I,A): 1.1 CM^2
BH CV ECHO MEAS - AVA(I,D): 1.1 CM^2
BH CV ECHO MEAS - AVA(V,A): 1 CM^2
BH CV ECHO MEAS - AVA(V,D): 1 CM^2
BH CV ECHO MEAS - BSA(HAYCOCK): 2.4 M^2
BH CV ECHO MEAS - BSA: 2.2 M^2
BH CV ECHO MEAS - BZI_BMI: 56.1 KILOGRAMS/M^2
BH CV ECHO MEAS - BZI_METRIC_HEIGHT: 152.4 CM
BH CV ECHO MEAS - BZI_METRIC_WEIGHT: 130.2 KG
BH CV ECHO MEAS - CONTRAST EF 4CH: 35 CM2
BH CV ECHO MEAS - EDV(CUBED): 97.3 ML
BH CV ECHO MEAS - EDV(MOD-SP2): 102 ML
BH CV ECHO MEAS - EDV(MOD-SP4): 113 ML
BH CV ECHO MEAS - EDV(TEICH): 97.3 ML
BH CV ECHO MEAS - EF(CUBED): 59.6 %
BH CV ECHO MEAS - EF(MOD-BP): 34.8 %
BH CV ECHO MEAS - EF(MOD-SP2): 30.4 %
BH CV ECHO MEAS - EF(MOD-SP4): 36.3 %
BH CV ECHO MEAS - EF(TEICH): 51.3 %
BH CV ECHO MEAS - ESV(CUBED): 39.3 ML
BH CV ECHO MEAS - ESV(MOD-SP2): 71 ML
BH CV ECHO MEAS - ESV(MOD-SP4): 72 ML
BH CV ECHO MEAS - ESV(TEICH): 47.4 ML
BH CV ECHO MEAS - FS: 26.1 %
BH CV ECHO MEAS - IVS/LVPW: 1.1
BH CV ECHO MEAS - IVSD: 1.5 CM
BH CV ECHO MEAS - LAT PEAK E' VEL: 8 CM/SEC
BH CV ECHO MEAS - LV DIASTOLIC VOL/BSA (35-75): 51.9 ML/M^2
BH CV ECHO MEAS - LV MASS(C)D: 270.6 GRAMS
BH CV ECHO MEAS - LV MASS(C)DI: 124.3 GRAMS/M^2
BH CV ECHO MEAS - LV MAX PG: 4.8 MMHG
BH CV ECHO MEAS - LV MEAN PG: 2 MMHG
BH CV ECHO MEAS - LV SYSTOLIC VOL/BSA (12-30): 33.1 ML/M^2
BH CV ECHO MEAS - LV V1 MAX: 110 CM/SEC
BH CV ECHO MEAS - LV V1 MEAN: 69.3 CM/SEC
BH CV ECHO MEAS - LV V1 VTI: 21.5 CM
BH CV ECHO MEAS - LVIDD: 4.6 CM
BH CV ECHO MEAS - LVIDS: 3.4 CM
BH CV ECHO MEAS - LVLD AP2: 7.8 CM
BH CV ECHO MEAS - LVLD AP4: 8.4 CM
BH CV ECHO MEAS - LVLS AP2: 7.4 CM
BH CV ECHO MEAS - LVLS AP4: 7.8 CM
BH CV ECHO MEAS - LVOT AREA (M): 2.5 CM^2
BH CV ECHO MEAS - LVOT AREA: 2.5 CM^2
BH CV ECHO MEAS - LVOT DIAM: 1.8 CM
BH CV ECHO MEAS - LVPWD: 1.4 CM
BH CV ECHO MEAS - MV A DUR: 0.12 SEC
BH CV ECHO MEAS - MV A MAX VEL: 92.7 CM/SEC
BH CV ECHO MEAS - MV DEC SLOPE: 624 CM/SEC^2
BH CV ECHO MEAS - MV DEC TIME: 260 SEC
BH CV ECHO MEAS - MV E MAX VEL: 138.3 CM/SEC
BH CV ECHO MEAS - MV E/A: 1.5
BH CV ECHO MEAS - MV MAX PG: 16.4 MMHG
BH CV ECHO MEAS - MV MEAN PG: 9 MMHG
BH CV ECHO MEAS - MV P1/2T MAX VEL: 187 CM/SEC
BH CV ECHO MEAS - MV P1/2T: 87.8 MSEC
BH CV ECHO MEAS - MV V2 MAX: 202 CM/SEC
BH CV ECHO MEAS - MV V2 MEAN: 149 CM/SEC
BH CV ECHO MEAS - MV V2 VTI: 52.4 CM
BH CV ECHO MEAS - MVA P1/2T LCG: 1.2 CM^2
BH CV ECHO MEAS - MVA(P1/2T): 2.5 CM^2
BH CV ECHO MEAS - MVA(VTI): 1 CM^2
BH CV ECHO MEAS - PA ACC TIME: 0.1 SEC
BH CV ECHO MEAS - PA MAX PG (FULL): 2 MMHG
BH CV ECHO MEAS - PA MAX PG: 3.6 MMHG
BH CV ECHO MEAS - PA PR(ACCEL): 34.9 MMHG
BH CV ECHO MEAS - PA V2 MAX: 95 CM/SEC
BH CV ECHO MEAS - PI END-D VEL: 153 CM/SEC
BH CV ECHO MEAS - PVA(V,A): 4.1 CM^2
BH CV ECHO MEAS - PVA(V,D): 4.1 CM^2
BH CV ECHO MEAS - QP/QS: 1.5
BH CV ECHO MEAS - RAP SYSTOLE: 8 MMHG
BH CV ECHO MEAS - RV MAX PG: 1.6 MMHG
BH CV ECHO MEAS - RV MEAN PG: 1 MMHG
BH CV ECHO MEAS - RV V1 MAX: 62.7 CM/SEC
BH CV ECHO MEAS - RV V1 MEAN: 41.1 CM/SEC
BH CV ECHO MEAS - RV V1 VTI: 13.3 CM
BH CV ECHO MEAS - RVOT AREA: 6.2 CM^2
BH CV ECHO MEAS - RVOT DIAM: 2.8 CM
BH CV ECHO MEAS - RVSP: 61 MMHG
BH CV ECHO MEAS - SI(CUBED): 26.7 ML/M^2
BH CV ECHO MEAS - SI(LVOT): 25.1 ML/M^2
BH CV ECHO MEAS - SI(MOD-SP2): 14.2 ML/M^2
BH CV ECHO MEAS - SI(MOD-SP4): 18.8 ML/M^2
BH CV ECHO MEAS - SI(TEICH): 22.9 ML/M^2
BH CV ECHO MEAS - SV(CUBED): 58 ML
BH CV ECHO MEAS - SV(LVOT): 54.7 ML
BH CV ECHO MEAS - SV(MOD-SP2): 31 ML
BH CV ECHO MEAS - SV(MOD-SP4): 41 ML
BH CV ECHO MEAS - SV(RVOT): 81.9 ML
BH CV ECHO MEAS - SV(TEICH): 49.9 ML
BH CV ECHO MEAS - TAPSE (>1.6): 0.8 CM
BH CV ECHO MEAS - TR MAX VEL: 364 CM/SEC
BH CV ECHO MEAS - TV MAX PG: 11.6 MMHG
BH CV ECHO MEAS - TV V2 MAX: 170 CM/SEC
BH CV VAS BP RIGHT ARM: NORMAL MMHG
BH CV XLRA - RV BASE: 3.8 CM
BH CV XLRA - RV LENGTH: 9.2 CM
BH CV XLRA - RV MID: 3.2 CM
BH CV XLRA - TDI S': 4.1 CM/SEC
BUN SERPL-MCNC: 28 MG/DL (ref 8–23)
BUN/CREAT SERPL: 19.9 (ref 7–25)
CALCIUM SPEC-SCNC: 8.6 MG/DL (ref 8.6–10.5)
CHLORIDE SERPL-SCNC: 97 MMOL/L (ref 98–107)
CO2 SERPL-SCNC: 31.6 MMOL/L (ref 22–29)
CREAT SERPL-MCNC: 1.41 MG/DL (ref 0.57–1)
DEPRECATED RDW RBC AUTO: 44.4 FL (ref 37–54)
ERYTHROCYTE [DISTWIDTH] IN BLOOD BY AUTOMATED COUNT: 15.8 % (ref 12.3–15.4)
GFR SERPL CREATININE-BSD FRML MDRD: 36 ML/MIN/1.73
GLUCOSE BLDC GLUCOMTR-MCNC: 128 MG/DL (ref 70–130)
GLUCOSE BLDC GLUCOMTR-MCNC: 129 MG/DL (ref 70–130)
GLUCOSE BLDC GLUCOMTR-MCNC: 138 MG/DL (ref 70–130)
GLUCOSE BLDC GLUCOMTR-MCNC: 169 MG/DL (ref 70–130)
GLUCOSE SERPL-MCNC: 102 MG/DL (ref 65–99)
HCT VFR BLD AUTO: 30 % (ref 34–46.6)
HGB BLD-MCNC: 8.6 G/DL (ref 12–15.9)
LEFT ATRIUM VOLUME INDEX: 37 ML/M2
MAXIMAL PREDICTED HEART RATE: 144 BPM
MCH RBC QN AUTO: 22.4 PG (ref 26.6–33)
MCHC RBC AUTO-ENTMCNC: 28.7 G/DL (ref 31.5–35.7)
MCV RBC AUTO: 78.1 FL (ref 79–97)
PLATELET # BLD AUTO: 252 10*3/MM3 (ref 140–450)
PMV BLD AUTO: 10 FL (ref 6–12)
POTASSIUM SERPL-SCNC: 4.6 MMOL/L (ref 3.5–5.2)
RBC # BLD AUTO: 3.84 10*6/MM3 (ref 3.77–5.28)
SODIUM SERPL-SCNC: 141 MMOL/L (ref 136–145)
STRESS TARGET HR: 122 BPM
WBC # BLD AUTO: 9.57 10*3/MM3 (ref 3.4–10.8)

## 2021-10-07 PROCEDURE — G0378 HOSPITAL OBSERVATION PER HR: HCPCS

## 2021-10-07 PROCEDURE — 85027 COMPLETE CBC AUTOMATED: CPT | Performed by: NURSE PRACTITIONER

## 2021-10-07 PROCEDURE — 25010000002 PERFLUTREN (DEFINITY) 8.476 MG IN SODIUM CHLORIDE (PF) 0.9 % 10 ML INJECTION: Performed by: NURSE PRACTITIONER

## 2021-10-07 PROCEDURE — 94664 DEMO&/EVAL PT USE INHALER: CPT

## 2021-10-07 PROCEDURE — 99226 PR SBSQ OBSERVATION CARE/DAY 35 MINUTES: CPT | Performed by: INTERNAL MEDICINE

## 2021-10-07 PROCEDURE — 94640 AIRWAY INHALATION TREATMENT: CPT

## 2021-10-07 PROCEDURE — 97162 PT EVAL MOD COMPLEX 30 MIN: CPT

## 2021-10-07 PROCEDURE — G0008 ADMIN INFLUENZA VIRUS VAC: HCPCS | Performed by: INTERNAL MEDICINE

## 2021-10-07 PROCEDURE — 94799 UNLISTED PULMONARY SVC/PX: CPT

## 2021-10-07 PROCEDURE — 82962 GLUCOSE BLOOD TEST: CPT

## 2021-10-07 PROCEDURE — 93306 TTE W/DOPPLER COMPLETE: CPT | Performed by: INTERNAL MEDICINE

## 2021-10-07 PROCEDURE — 90686 IIV4 VACC NO PRSV 0.5 ML IM: CPT | Performed by: INTERNAL MEDICINE

## 2021-10-07 PROCEDURE — 25010000002 FUROSEMIDE PER 20 MG: Performed by: NURSE PRACTITIONER

## 2021-10-07 PROCEDURE — 97530 THERAPEUTIC ACTIVITIES: CPT

## 2021-10-07 PROCEDURE — 25010000002 INFLUENZA VAC SPLIT QUAD 0.5 ML SUSPENSION PREFILLED SYRINGE: Performed by: INTERNAL MEDICINE

## 2021-10-07 PROCEDURE — 80048 BASIC METABOLIC PNL TOTAL CA: CPT | Performed by: NURSE PRACTITIONER

## 2021-10-07 PROCEDURE — 93306 TTE W/DOPPLER COMPLETE: CPT

## 2021-10-07 RX ORDER — CARVEDILOL 12.5 MG/1
12.5 TABLET ORAL 2 TIMES DAILY WITH MEALS
Status: DISCONTINUED | OUTPATIENT
Start: 2021-10-07 | End: 2021-10-19 | Stop reason: HOSPADM

## 2021-10-07 RX ADMIN — VILAZODONE HYDROCHLORIDE 20 MG: 40 TABLET ORAL at 20:11

## 2021-10-07 RX ADMIN — PERFLUTREN 2 ML: 6.52 INJECTION, SUSPENSION INTRAVENOUS at 11:03

## 2021-10-07 RX ADMIN — LEVOTHYROXINE SODIUM 25 MCG: 0.03 TABLET ORAL at 05:11

## 2021-10-07 RX ADMIN — SALINE NASAL SPRAY 2 SPRAY: 1.5 SOLUTION NASAL at 08:48

## 2021-10-07 RX ADMIN — ASPIRIN 81 MG: 81 TABLET, COATED ORAL at 08:46

## 2021-10-07 RX ADMIN — FLUTICASONE PROPIONATE 2 SPRAY: 50 SPRAY, METERED NASAL at 11:34

## 2021-10-07 RX ADMIN — BUDESONIDE AND FORMOTEROL FUMARATE DIHYDRATE 2 PUFF: 160; 4.5 AEROSOL RESPIRATORY (INHALATION) at 20:34

## 2021-10-07 RX ADMIN — CLOTRIMAZOLE AND BETAMETHASONE DIPROPIONATE: 10; .5 CREAM TOPICAL at 08:48

## 2021-10-07 RX ADMIN — FUROSEMIDE 80 MG: 10 INJECTION, SOLUTION INTRAMUSCULAR; INTRAVENOUS at 08:46

## 2021-10-07 RX ADMIN — APIXABAN 5 MG: 5 TABLET, FILM COATED ORAL at 08:46

## 2021-10-07 RX ADMIN — INFLUENZA VIRUS VACCINE 0.5 ML: 15; 15; 15; 15 SUSPENSION INTRAMUSCULAR at 13:14

## 2021-10-07 RX ADMIN — TRAMADOL HYDROCHLORIDE 50 MG: 50 TABLET ORAL at 08:46

## 2021-10-07 RX ADMIN — NYSTATIN: 100000 CREAM TOPICAL at 08:48

## 2021-10-07 RX ADMIN — ATORVASTATIN CALCIUM 20 MG: 20 TABLET, FILM COATED ORAL at 20:11

## 2021-10-07 RX ADMIN — BUDESONIDE AND FORMOTEROL FUMARATE DIHYDRATE 2 PUFF: 160; 4.5 AEROSOL RESPIRATORY (INHALATION) at 07:40

## 2021-10-07 RX ADMIN — SODIUM CHLORIDE, PRESERVATIVE FREE 10 ML: 5 INJECTION INTRAVENOUS at 08:48

## 2021-10-07 RX ADMIN — SODIUM CHLORIDE, PRESERVATIVE FREE 10 ML: 5 INJECTION INTRAVENOUS at 20:12

## 2021-10-07 RX ADMIN — BUDESONIDE AND FORMOTEROL FUMARATE DIHYDRATE 2 PUFF: 160; 4.5 AEROSOL RESPIRATORY (INHALATION) at 00:14

## 2021-10-07 RX ADMIN — FUROSEMIDE 80 MG: 10 INJECTION, SOLUTION INTRAMUSCULAR; INTRAVENOUS at 17:28

## 2021-10-07 RX ADMIN — CLOTRIMAZOLE AND BETAMETHASONE DIPROPIONATE: 10; .5 CREAM TOPICAL at 20:11

## 2021-10-07 RX ADMIN — POTASSIUM CHLORIDE 20 MEQ: 750 TABLET, EXTENDED RELEASE ORAL at 08:46

## 2021-10-07 RX ADMIN — CARVEDILOL 6.25 MG: 6.25 TABLET, FILM COATED ORAL at 08:46

## 2021-10-07 RX ADMIN — NYSTATIN: 100000 CREAM TOPICAL at 20:13

## 2021-10-07 RX ADMIN — CARVEDILOL 12.5 MG: 12.5 TABLET, FILM COATED ORAL at 17:28

## 2021-10-07 RX ADMIN — GABAPENTIN 100 MG: 100 CAPSULE ORAL at 08:46

## 2021-10-07 RX ADMIN — PANTOPRAZOLE SODIUM 40 MG: 40 TABLET, DELAYED RELEASE ORAL at 08:46

## 2021-10-07 RX ADMIN — GABAPENTIN 100 MG: 100 CAPSULE ORAL at 20:11

## 2021-10-07 RX ADMIN — ACETAMINOPHEN 650 MG: 325 TABLET, FILM COATED ORAL at 05:11

## 2021-10-07 RX ADMIN — DOCUSATE SODIUM 50MG AND SENNOSIDES 8.6MG 1 TABLET: 8.6; 5 TABLET, FILM COATED ORAL at 08:47

## 2021-10-07 RX ADMIN — APIXABAN 5 MG: 5 TABLET, FILM COATED ORAL at 20:11

## 2021-10-08 LAB
ANION GAP SERPL CALCULATED.3IONS-SCNC: 9.9 MMOL/L (ref 5–15)
BUN SERPL-MCNC: 31 MG/DL (ref 8–23)
BUN/CREAT SERPL: 20.4 (ref 7–25)
CALCIUM SPEC-SCNC: 8.4 MG/DL (ref 8.6–10.5)
CHLORIDE SERPL-SCNC: 97 MMOL/L (ref 98–107)
CO2 SERPL-SCNC: 35.1 MMOL/L (ref 22–29)
CREAT SERPL-MCNC: 1.52 MG/DL (ref 0.57–1)
GFR SERPL CREATININE-BSD FRML MDRD: 33 ML/MIN/1.73
GLUCOSE BLDC GLUCOMTR-MCNC: 124 MG/DL (ref 70–130)
GLUCOSE BLDC GLUCOMTR-MCNC: 152 MG/DL (ref 70–130)
GLUCOSE BLDC GLUCOMTR-MCNC: 163 MG/DL (ref 70–130)
GLUCOSE BLDC GLUCOMTR-MCNC: 164 MG/DL (ref 70–130)
GLUCOSE SERPL-MCNC: 129 MG/DL (ref 65–99)
POTASSIUM SERPL-SCNC: 4.4 MMOL/L (ref 3.5–5.2)
SODIUM SERPL-SCNC: 142 MMOL/L (ref 136–145)

## 2021-10-08 PROCEDURE — 94799 UNLISTED PULMONARY SVC/PX: CPT

## 2021-10-08 PROCEDURE — 82962 GLUCOSE BLOOD TEST: CPT

## 2021-10-08 PROCEDURE — 99226 PR SBSQ OBSERVATION CARE/DAY 35 MINUTES: CPT | Performed by: INTERNAL MEDICINE

## 2021-10-08 PROCEDURE — 25010000002 FUROSEMIDE PER 20 MG: Performed by: NURSE PRACTITIONER

## 2021-10-08 PROCEDURE — 36415 COLL VENOUS BLD VENIPUNCTURE: CPT | Performed by: NURSE PRACTITIONER

## 2021-10-08 PROCEDURE — 80048 BASIC METABOLIC PNL TOTAL CA: CPT | Performed by: NURSE PRACTITIONER

## 2021-10-08 PROCEDURE — 25010000002 FUROSEMIDE PER 20 MG: Performed by: INTERNAL MEDICINE

## 2021-10-08 RX ORDER — FUROSEMIDE 10 MG/ML
80 INJECTION INTRAMUSCULAR; INTRAVENOUS EVERY 8 HOURS
Status: DISCONTINUED | OUTPATIENT
Start: 2021-10-08 | End: 2021-10-09

## 2021-10-08 RX ADMIN — VILAZODONE HYDROCHLORIDE 20 MG: 40 TABLET ORAL at 21:45

## 2021-10-08 RX ADMIN — PANTOPRAZOLE SODIUM 40 MG: 40 TABLET, DELAYED RELEASE ORAL at 05:56

## 2021-10-08 RX ADMIN — LEVOTHYROXINE SODIUM 25 MCG: 0.03 TABLET ORAL at 05:56

## 2021-10-08 RX ADMIN — ATORVASTATIN CALCIUM 20 MG: 20 TABLET, FILM COATED ORAL at 21:45

## 2021-10-08 RX ADMIN — CARVEDILOL 12.5 MG: 12.5 TABLET, FILM COATED ORAL at 08:50

## 2021-10-08 RX ADMIN — FUROSEMIDE 80 MG: 10 INJECTION, SOLUTION INTRAMUSCULAR; INTRAVENOUS at 16:26

## 2021-10-08 RX ADMIN — NYSTATIN: 100000 CREAM TOPICAL at 21:45

## 2021-10-08 RX ADMIN — CLOTRIMAZOLE AND BETAMETHASONE DIPROPIONATE: 10; .5 CREAM TOPICAL at 21:45

## 2021-10-08 RX ADMIN — FUROSEMIDE 80 MG: 10 INJECTION, SOLUTION INTRAMUSCULAR; INTRAVENOUS at 08:50

## 2021-10-08 RX ADMIN — BUDESONIDE AND FORMOTEROL FUMARATE DIHYDRATE 2 PUFF: 160; 4.5 AEROSOL RESPIRATORY (INHALATION) at 21:20

## 2021-10-08 RX ADMIN — TRAMADOL HYDROCHLORIDE 50 MG: 50 TABLET ORAL at 21:45

## 2021-10-08 RX ADMIN — SODIUM CHLORIDE, PRESERVATIVE FREE 10 ML: 5 INJECTION INTRAVENOUS at 08:51

## 2021-10-08 RX ADMIN — GABAPENTIN 100 MG: 100 CAPSULE ORAL at 08:50

## 2021-10-08 RX ADMIN — FLUTICASONE PROPIONATE 2 SPRAY: 50 SPRAY, METERED NASAL at 08:51

## 2021-10-08 RX ADMIN — ALPRAZOLAM 1 MG: 0.5 TABLET ORAL at 14:30

## 2021-10-08 RX ADMIN — CARVEDILOL 12.5 MG: 12.5 TABLET, FILM COATED ORAL at 17:39

## 2021-10-08 RX ADMIN — POTASSIUM CHLORIDE 20 MEQ: 750 TABLET, EXTENDED RELEASE ORAL at 08:50

## 2021-10-08 RX ADMIN — CLOTRIMAZOLE AND BETAMETHASONE DIPROPIONATE: 10; .5 CREAM TOPICAL at 08:51

## 2021-10-08 RX ADMIN — GABAPENTIN 100 MG: 100 CAPSULE ORAL at 21:45

## 2021-10-08 RX ADMIN — APIXABAN 5 MG: 5 TABLET, FILM COATED ORAL at 21:45

## 2021-10-08 RX ADMIN — ASPIRIN 81 MG: 81 TABLET, COATED ORAL at 08:50

## 2021-10-08 RX ADMIN — NYSTATIN: 100000 CREAM TOPICAL at 08:51

## 2021-10-08 RX ADMIN — APIXABAN 5 MG: 5 TABLET, FILM COATED ORAL at 08:50

## 2021-10-08 RX ADMIN — DOCUSATE SODIUM 50MG AND SENNOSIDES 8.6MG 1 TABLET: 8.6; 5 TABLET, FILM COATED ORAL at 08:50

## 2021-10-08 RX ADMIN — BUDESONIDE AND FORMOTEROL FUMARATE DIHYDRATE 2 PUFF: 160; 4.5 AEROSOL RESPIRATORY (INHALATION) at 07:48

## 2021-10-09 LAB
ALBUMIN SERPL-MCNC: 3.1 G/DL (ref 3.5–5.2)
ANION GAP SERPL CALCULATED.3IONS-SCNC: 9.8 MMOL/L (ref 5–15)
BUN SERPL-MCNC: 31 MG/DL (ref 8–23)
BUN/CREAT SERPL: 27.4 (ref 7–25)
CALCIUM SPEC-SCNC: 8.4 MG/DL (ref 8.6–10.5)
CHLORIDE SERPL-SCNC: 98 MMOL/L (ref 98–107)
CO2 SERPL-SCNC: 37.2 MMOL/L (ref 22–29)
CREAT SERPL-MCNC: 1.13 MG/DL (ref 0.57–1)
GFR SERPL CREATININE-BSD FRML MDRD: 47 ML/MIN/1.73
GLUCOSE BLDC GLUCOMTR-MCNC: 148 MG/DL (ref 70–130)
GLUCOSE BLDC GLUCOMTR-MCNC: 149 MG/DL (ref 70–130)
GLUCOSE BLDC GLUCOMTR-MCNC: 151 MG/DL (ref 70–130)
GLUCOSE BLDC GLUCOMTR-MCNC: 163 MG/DL (ref 70–130)
GLUCOSE SERPL-MCNC: 109 MG/DL (ref 65–99)
MAGNESIUM SERPL-MCNC: 2.2 MG/DL (ref 1.6–2.4)
PHOSPHATE SERPL-MCNC: 4 MG/DL (ref 2.5–4.5)
POTASSIUM SERPL-SCNC: 3.8 MMOL/L (ref 3.5–5.2)
SODIUM SERPL-SCNC: 145 MMOL/L (ref 136–145)

## 2021-10-09 PROCEDURE — 99232 SBSQ HOSP IP/OBS MODERATE 35: CPT | Performed by: NURSE PRACTITIONER

## 2021-10-09 PROCEDURE — 80069 RENAL FUNCTION PANEL: CPT | Performed by: INTERNAL MEDICINE

## 2021-10-09 PROCEDURE — 25010000002 FUROSEMIDE PER 20 MG: Performed by: INTERNAL MEDICINE

## 2021-10-09 PROCEDURE — 94799 UNLISTED PULMONARY SVC/PX: CPT

## 2021-10-09 PROCEDURE — 82962 GLUCOSE BLOOD TEST: CPT

## 2021-10-09 PROCEDURE — 83735 ASSAY OF MAGNESIUM: CPT | Performed by: INTERNAL MEDICINE

## 2021-10-09 RX ORDER — FUROSEMIDE 10 MG/ML
80 INJECTION INTRAMUSCULAR; INTRAVENOUS
Status: DISCONTINUED | OUTPATIENT
Start: 2021-10-09 | End: 2021-10-11

## 2021-10-09 RX ADMIN — SODIUM CHLORIDE, PRESERVATIVE FREE 10 ML: 5 INJECTION INTRAVENOUS at 10:26

## 2021-10-09 RX ADMIN — NYSTATIN: 100000 CREAM TOPICAL at 10:27

## 2021-10-09 RX ADMIN — ASPIRIN 81 MG: 81 TABLET, COATED ORAL at 10:25

## 2021-10-09 RX ADMIN — BUDESONIDE AND FORMOTEROL FUMARATE DIHYDRATE 2 PUFF: 160; 4.5 AEROSOL RESPIRATORY (INHALATION) at 21:48

## 2021-10-09 RX ADMIN — APIXABAN 5 MG: 5 TABLET, FILM COATED ORAL at 10:26

## 2021-10-09 RX ADMIN — POTASSIUM CHLORIDE 20 MEQ: 750 TABLET, EXTENDED RELEASE ORAL at 10:26

## 2021-10-09 RX ADMIN — GABAPENTIN 100 MG: 100 CAPSULE ORAL at 10:26

## 2021-10-09 RX ADMIN — PANTOPRAZOLE SODIUM 40 MG: 40 TABLET, DELAYED RELEASE ORAL at 10:26

## 2021-10-09 RX ADMIN — LEVOTHYROXINE SODIUM 25 MCG: 0.03 TABLET ORAL at 10:25

## 2021-10-09 RX ADMIN — CLOTRIMAZOLE AND BETAMETHASONE DIPROPIONATE: 10; .5 CREAM TOPICAL at 22:19

## 2021-10-09 RX ADMIN — BUDESONIDE AND FORMOTEROL FUMARATE DIHYDRATE 2 PUFF: 160; 4.5 AEROSOL RESPIRATORY (INHALATION) at 07:45

## 2021-10-09 RX ADMIN — VILAZODONE HYDROCHLORIDE 20 MG: 40 TABLET ORAL at 22:19

## 2021-10-09 RX ADMIN — NYSTATIN: 100000 CREAM TOPICAL at 22:19

## 2021-10-09 RX ADMIN — FUROSEMIDE 80 MG: 10 INJECTION, SOLUTION INTRAMUSCULAR; INTRAVENOUS at 00:14

## 2021-10-09 RX ADMIN — FUROSEMIDE 80 MG: 10 INJECTION, SOLUTION INTRAMUSCULAR; INTRAVENOUS at 17:40

## 2021-10-09 RX ADMIN — GABAPENTIN 100 MG: 100 CAPSULE ORAL at 22:18

## 2021-10-09 RX ADMIN — ATORVASTATIN CALCIUM 20 MG: 20 TABLET, FILM COATED ORAL at 22:19

## 2021-10-09 RX ADMIN — APIXABAN 5 MG: 5 TABLET, FILM COATED ORAL at 22:19

## 2021-10-09 RX ADMIN — DOCUSATE SODIUM 50MG AND SENNOSIDES 8.6MG 1 TABLET: 8.6; 5 TABLET, FILM COATED ORAL at 10:26

## 2021-10-09 RX ADMIN — CARVEDILOL 12.5 MG: 12.5 TABLET, FILM COATED ORAL at 10:26

## 2021-10-09 RX ADMIN — CLOTRIMAZOLE AND BETAMETHASONE DIPROPIONATE: 10; .5 CREAM TOPICAL at 10:27

## 2021-10-09 RX ADMIN — CARVEDILOL 12.5 MG: 12.5 TABLET, FILM COATED ORAL at 17:40

## 2021-10-09 RX ADMIN — FLUTICASONE PROPIONATE 2 SPRAY: 50 SPRAY, METERED NASAL at 10:27

## 2021-10-09 RX ADMIN — FUROSEMIDE 80 MG: 10 INJECTION, SOLUTION INTRAMUSCULAR; INTRAVENOUS at 10:25

## 2021-10-09 RX ADMIN — TRAMADOL HYDROCHLORIDE 50 MG: 50 TABLET ORAL at 12:57

## 2021-10-09 RX ADMIN — ALPRAZOLAM 1 MG: 0.5 TABLET ORAL at 12:54

## 2021-10-10 LAB
ANION GAP SERPL CALCULATED.3IONS-SCNC: 11.4 MMOL/L (ref 5–15)
BUN SERPL-MCNC: 30 MG/DL (ref 8–23)
BUN/CREAT SERPL: 21.4 (ref 7–25)
CALCIUM SPEC-SCNC: 8.3 MG/DL (ref 8.6–10.5)
CHLORIDE SERPL-SCNC: 96 MMOL/L (ref 98–107)
CO2 SERPL-SCNC: 33.6 MMOL/L (ref 22–29)
CREAT SERPL-MCNC: 1.4 MG/DL (ref 0.57–1)
DEPRECATED RDW RBC AUTO: 44.2 FL (ref 37–54)
ERYTHROCYTE [DISTWIDTH] IN BLOOD BY AUTOMATED COUNT: 15.9 % (ref 12.3–15.4)
GFR SERPL CREATININE-BSD FRML MDRD: 37 ML/MIN/1.73
GLUCOSE BLDC GLUCOMTR-MCNC: 140 MG/DL (ref 70–130)
GLUCOSE BLDC GLUCOMTR-MCNC: 145 MG/DL (ref 70–130)
GLUCOSE BLDC GLUCOMTR-MCNC: 156 MG/DL (ref 70–130)
GLUCOSE BLDC GLUCOMTR-MCNC: 166 MG/DL (ref 70–130)
GLUCOSE SERPL-MCNC: 109 MG/DL (ref 65–99)
HCT VFR BLD AUTO: 27.3 % (ref 34–46.6)
HGB BLD-MCNC: 8.1 G/DL (ref 12–15.9)
MCH RBC QN AUTO: 22.7 PG (ref 26.6–33)
MCHC RBC AUTO-ENTMCNC: 29.7 G/DL (ref 31.5–35.7)
MCV RBC AUTO: 76.5 FL (ref 79–97)
PLATELET # BLD AUTO: 263 10*3/MM3 (ref 140–450)
PMV BLD AUTO: 10.2 FL (ref 6–12)
POTASSIUM SERPL-SCNC: 4 MMOL/L (ref 3.5–5.2)
RBC # BLD AUTO: 3.57 10*6/MM3 (ref 3.77–5.28)
SODIUM SERPL-SCNC: 141 MMOL/L (ref 136–145)
WBC # BLD AUTO: 9.13 10*3/MM3 (ref 3.4–10.8)

## 2021-10-10 PROCEDURE — 82962 GLUCOSE BLOOD TEST: CPT

## 2021-10-10 PROCEDURE — 80048 BASIC METABOLIC PNL TOTAL CA: CPT | Performed by: INTERNAL MEDICINE

## 2021-10-10 PROCEDURE — 94660 CPAP INITIATION&MGMT: CPT

## 2021-10-10 PROCEDURE — 25010000002 FUROSEMIDE PER 20 MG: Performed by: INTERNAL MEDICINE

## 2021-10-10 PROCEDURE — 85027 COMPLETE CBC AUTOMATED: CPT | Performed by: INTERNAL MEDICINE

## 2021-10-10 PROCEDURE — 99231 SBSQ HOSP IP/OBS SF/LOW 25: CPT | Performed by: NURSE PRACTITIONER

## 2021-10-10 PROCEDURE — 94799 UNLISTED PULMONARY SVC/PX: CPT

## 2021-10-10 RX ADMIN — BUDESONIDE AND FORMOTEROL FUMARATE DIHYDRATE 2 PUFF: 160; 4.5 AEROSOL RESPIRATORY (INHALATION) at 07:21

## 2021-10-10 RX ADMIN — FUROSEMIDE 80 MG: 10 INJECTION, SOLUTION INTRAMUSCULAR; INTRAVENOUS at 17:22

## 2021-10-10 RX ADMIN — NYSTATIN: 100000 CREAM TOPICAL at 20:49

## 2021-10-10 RX ADMIN — NYSTATIN: 100000 CREAM TOPICAL at 08:31

## 2021-10-10 RX ADMIN — GABAPENTIN 100 MG: 100 CAPSULE ORAL at 20:49

## 2021-10-10 RX ADMIN — ATORVASTATIN CALCIUM 20 MG: 20 TABLET, FILM COATED ORAL at 20:49

## 2021-10-10 RX ADMIN — APIXABAN 5 MG: 5 TABLET, FILM COATED ORAL at 08:31

## 2021-10-10 RX ADMIN — CARVEDILOL 12.5 MG: 12.5 TABLET, FILM COATED ORAL at 17:22

## 2021-10-10 RX ADMIN — GABAPENTIN 100 MG: 100 CAPSULE ORAL at 08:31

## 2021-10-10 RX ADMIN — PANTOPRAZOLE SODIUM 40 MG: 40 TABLET, DELAYED RELEASE ORAL at 08:31

## 2021-10-10 RX ADMIN — POTASSIUM CHLORIDE 20 MEQ: 750 TABLET, EXTENDED RELEASE ORAL at 08:31

## 2021-10-10 RX ADMIN — TRAMADOL HYDROCHLORIDE 50 MG: 50 TABLET ORAL at 20:49

## 2021-10-10 RX ADMIN — FUROSEMIDE 80 MG: 10 INJECTION, SOLUTION INTRAMUSCULAR; INTRAVENOUS at 08:31

## 2021-10-10 RX ADMIN — ALPRAZOLAM 1 MG: 0.5 TABLET ORAL at 22:50

## 2021-10-10 RX ADMIN — FLUTICASONE PROPIONATE 2 SPRAY: 50 SPRAY, METERED NASAL at 08:31

## 2021-10-10 RX ADMIN — VILAZODONE HYDROCHLORIDE 20 MG: 40 TABLET ORAL at 20:49

## 2021-10-10 RX ADMIN — BUDESONIDE AND FORMOTEROL FUMARATE DIHYDRATE 2 PUFF: 160; 4.5 AEROSOL RESPIRATORY (INHALATION) at 21:05

## 2021-10-10 RX ADMIN — CARVEDILOL 12.5 MG: 12.5 TABLET, FILM COATED ORAL at 08:31

## 2021-10-10 RX ADMIN — ALPRAZOLAM 1 MG: 0.5 TABLET ORAL at 01:01

## 2021-10-10 RX ADMIN — CLOTRIMAZOLE AND BETAMETHASONE DIPROPIONATE: 10; .5 CREAM TOPICAL at 20:49

## 2021-10-10 RX ADMIN — SODIUM CHLORIDE, PRESERVATIVE FREE 10 ML: 5 INJECTION INTRAVENOUS at 08:31

## 2021-10-10 RX ADMIN — APIXABAN 5 MG: 5 TABLET, FILM COATED ORAL at 20:49

## 2021-10-10 RX ADMIN — LEVOTHYROXINE SODIUM 25 MCG: 0.03 TABLET ORAL at 06:45

## 2021-10-10 RX ADMIN — ASPIRIN 81 MG: 81 TABLET, COATED ORAL at 08:31

## 2021-10-10 RX ADMIN — CLOTRIMAZOLE AND BETAMETHASONE DIPROPIONATE: 10; .5 CREAM TOPICAL at 08:31

## 2021-10-10 RX ADMIN — ALPRAZOLAM 1 MG: 0.5 TABLET ORAL at 12:02

## 2021-10-11 LAB
ANION GAP SERPL CALCULATED.3IONS-SCNC: 10.2 MMOL/L (ref 5–15)
BUN SERPL-MCNC: 32 MG/DL (ref 8–23)
BUN/CREAT SERPL: 23.5 (ref 7–25)
CALCIUM SPEC-SCNC: 8 MG/DL (ref 8.6–10.5)
CHLORIDE SERPL-SCNC: 95 MMOL/L (ref 98–107)
CO2 SERPL-SCNC: 35.8 MMOL/L (ref 22–29)
CREAT SERPL-MCNC: 1.36 MG/DL (ref 0.57–1)
DEPRECATED RDW RBC AUTO: 42.1 FL (ref 37–54)
ERYTHROCYTE [DISTWIDTH] IN BLOOD BY AUTOMATED COUNT: 15.7 % (ref 12.3–15.4)
GFR SERPL CREATININE-BSD FRML MDRD: 38 ML/MIN/1.73
GLUCOSE BLDC GLUCOMTR-MCNC: 140 MG/DL (ref 70–130)
GLUCOSE BLDC GLUCOMTR-MCNC: 155 MG/DL (ref 70–130)
GLUCOSE BLDC GLUCOMTR-MCNC: 174 MG/DL (ref 70–130)
GLUCOSE BLDC GLUCOMTR-MCNC: 213 MG/DL (ref 70–130)
GLUCOSE SERPL-MCNC: 125 MG/DL (ref 65–99)
HCT VFR BLD AUTO: 25.8 % (ref 34–46.6)
HEMOCCULT STL QL: POSITIVE
HGB BLD-MCNC: 7.7 G/DL (ref 12–15.9)
MCH RBC QN AUTO: 22.2 PG (ref 26.6–33)
MCHC RBC AUTO-ENTMCNC: 29.8 G/DL (ref 31.5–35.7)
MCV RBC AUTO: 74.4 FL (ref 79–97)
PLATELET # BLD AUTO: 231 10*3/MM3 (ref 140–450)
PMV BLD AUTO: 9.9 FL (ref 6–12)
POTASSIUM SERPL-SCNC: 4.1 MMOL/L (ref 3.5–5.2)
RBC # BLD AUTO: 3.47 10*6/MM3 (ref 3.77–5.28)
SODIUM SERPL-SCNC: 141 MMOL/L (ref 136–145)
WBC # BLD AUTO: 9.77 10*3/MM3 (ref 3.4–10.8)

## 2021-10-11 PROCEDURE — 25010000002 FUROSEMIDE PER 20 MG: Performed by: INTERNAL MEDICINE

## 2021-10-11 PROCEDURE — 99233 SBSQ HOSP IP/OBS HIGH 50: CPT | Performed by: INTERNAL MEDICINE

## 2021-10-11 PROCEDURE — 94799 UNLISTED PULMONARY SVC/PX: CPT

## 2021-10-11 PROCEDURE — 82272 OCCULT BLD FECES 1-3 TESTS: CPT | Performed by: INTERNAL MEDICINE

## 2021-10-11 PROCEDURE — 94660 CPAP INITIATION&MGMT: CPT

## 2021-10-11 PROCEDURE — 85027 COMPLETE CBC AUTOMATED: CPT | Performed by: INTERNAL MEDICINE

## 2021-10-11 PROCEDURE — 82962 GLUCOSE BLOOD TEST: CPT

## 2021-10-11 PROCEDURE — 63710000001 INSULIN LISPRO (HUMAN) PER 5 UNITS: Performed by: NURSE PRACTITIONER

## 2021-10-11 PROCEDURE — 80048 BASIC METABOLIC PNL TOTAL CA: CPT | Performed by: INTERNAL MEDICINE

## 2021-10-11 RX ORDER — FUROSEMIDE 10 MG/ML
80 INJECTION INTRAMUSCULAR; INTRAVENOUS 3 TIMES DAILY
Status: DISCONTINUED | OUTPATIENT
Start: 2021-10-11 | End: 2021-10-16

## 2021-10-11 RX ADMIN — BUDESONIDE AND FORMOTEROL FUMARATE DIHYDRATE 2 PUFF: 160; 4.5 AEROSOL RESPIRATORY (INHALATION) at 07:23

## 2021-10-11 RX ADMIN — LEVOTHYROXINE SODIUM 25 MCG: 0.03 TABLET ORAL at 04:47

## 2021-10-11 RX ADMIN — NYSTATIN: 100000 CREAM TOPICAL at 20:03

## 2021-10-11 RX ADMIN — CLOTRIMAZOLE AND BETAMETHASONE DIPROPIONATE: 10; .5 CREAM TOPICAL at 20:04

## 2021-10-11 RX ADMIN — APIXABAN 5 MG: 5 TABLET, FILM COATED ORAL at 09:37

## 2021-10-11 RX ADMIN — ASPIRIN 81 MG: 81 TABLET, COATED ORAL at 09:37

## 2021-10-11 RX ADMIN — ALPRAZOLAM 1 MG: 0.5 TABLET ORAL at 09:45

## 2021-10-11 RX ADMIN — FUROSEMIDE 80 MG: 10 INJECTION, SOLUTION INTRAMUSCULAR; INTRAVENOUS at 20:07

## 2021-10-11 RX ADMIN — SODIUM CHLORIDE, PRESERVATIVE FREE 10 ML: 5 INJECTION INTRAVENOUS at 20:04

## 2021-10-11 RX ADMIN — INSULIN LISPRO 4 UNITS: 100 INJECTION, SOLUTION INTRAVENOUS; SUBCUTANEOUS at 17:23

## 2021-10-11 RX ADMIN — VILAZODONE HYDROCHLORIDE 20 MG: 40 TABLET ORAL at 20:08

## 2021-10-11 RX ADMIN — PANTOPRAZOLE SODIUM 40 MG: 40 TABLET, DELAYED RELEASE ORAL at 04:48

## 2021-10-11 RX ADMIN — ATORVASTATIN CALCIUM 20 MG: 20 TABLET, FILM COATED ORAL at 20:09

## 2021-10-11 RX ADMIN — POTASSIUM CHLORIDE 20 MEQ: 750 TABLET, EXTENDED RELEASE ORAL at 09:37

## 2021-10-11 RX ADMIN — ACETAMINOPHEN 650 MG: 325 TABLET, FILM COATED ORAL at 09:37

## 2021-10-11 RX ADMIN — GABAPENTIN 100 MG: 100 CAPSULE ORAL at 09:37

## 2021-10-11 RX ADMIN — ALPRAZOLAM 1 MG: 0.5 TABLET ORAL at 21:49

## 2021-10-11 RX ADMIN — CARVEDILOL 12.5 MG: 12.5 TABLET, FILM COATED ORAL at 09:37

## 2021-10-11 RX ADMIN — CLOTRIMAZOLE AND BETAMETHASONE DIPROPIONATE: 10; .5 CREAM TOPICAL at 09:37

## 2021-10-11 RX ADMIN — GABAPENTIN 100 MG: 100 CAPSULE ORAL at 20:09

## 2021-10-11 RX ADMIN — ACETAMINOPHEN 650 MG: 325 TABLET, FILM COATED ORAL at 20:09

## 2021-10-11 RX ADMIN — FUROSEMIDE 80 MG: 10 INJECTION, SOLUTION INTRAMUSCULAR; INTRAVENOUS at 16:21

## 2021-10-11 RX ADMIN — CARVEDILOL 12.5 MG: 12.5 TABLET, FILM COATED ORAL at 17:24

## 2021-10-11 RX ADMIN — SODIUM CHLORIDE, PRESERVATIVE FREE 10 ML: 5 INJECTION INTRAVENOUS at 09:38

## 2021-10-11 RX ADMIN — INSULIN LISPRO 2 UNITS: 100 INJECTION, SOLUTION INTRAVENOUS; SUBCUTANEOUS at 12:33

## 2021-10-11 RX ADMIN — BUDESONIDE AND FORMOTEROL FUMARATE DIHYDRATE 2 PUFF: 160; 4.5 AEROSOL RESPIRATORY (INHALATION) at 19:02

## 2021-10-11 RX ADMIN — NYSTATIN: 100000 CREAM TOPICAL at 09:38

## 2021-10-11 RX ADMIN — FUROSEMIDE 80 MG: 10 INJECTION, SOLUTION INTRAMUSCULAR; INTRAVENOUS at 09:36

## 2021-10-12 LAB
ANION GAP SERPL CALCULATED.3IONS-SCNC: 7.7 MMOL/L (ref 5–15)
BUN SERPL-MCNC: 33 MG/DL (ref 8–23)
BUN/CREAT SERPL: 26.8 (ref 7–25)
CALCIUM SPEC-SCNC: 8 MG/DL (ref 8.6–10.5)
CHLORIDE SERPL-SCNC: 96 MMOL/L (ref 98–107)
CO2 SERPL-SCNC: 37.3 MMOL/L (ref 22–29)
CREAT SERPL-MCNC: 1.23 MG/DL (ref 0.57–1)
DEPRECATED RDW RBC AUTO: 42.7 FL (ref 37–54)
ERYTHROCYTE [DISTWIDTH] IN BLOOD BY AUTOMATED COUNT: 15.7 % (ref 12.3–15.4)
GFR SERPL CREATININE-BSD FRML MDRD: 42 ML/MIN/1.73
GLUCOSE BLDC GLUCOMTR-MCNC: 117 MG/DL (ref 70–130)
GLUCOSE BLDC GLUCOMTR-MCNC: 134 MG/DL (ref 70–130)
GLUCOSE BLDC GLUCOMTR-MCNC: 161 MG/DL (ref 70–130)
GLUCOSE BLDC GLUCOMTR-MCNC: 176 MG/DL (ref 70–130)
GLUCOSE SERPL-MCNC: 101 MG/DL (ref 65–99)
HCT VFR BLD AUTO: 25.5 % (ref 34–46.6)
HGB BLD-MCNC: 7.5 G/DL (ref 12–15.9)
MCH RBC QN AUTO: 22.1 PG (ref 26.6–33)
MCHC RBC AUTO-ENTMCNC: 29.4 G/DL (ref 31.5–35.7)
MCV RBC AUTO: 75.2 FL (ref 79–97)
PLATELET # BLD AUTO: 222 10*3/MM3 (ref 140–450)
PMV BLD AUTO: 10 FL (ref 6–12)
POTASSIUM SERPL-SCNC: 4 MMOL/L (ref 3.5–5.2)
RBC # BLD AUTO: 3.39 10*6/MM3 (ref 3.77–5.28)
SARS-COV-2 RNA RESP QL NAA+PROBE: NOT DETECTED
SODIUM SERPL-SCNC: 141 MMOL/L (ref 136–145)
TSH SERPL DL<=0.05 MIU/L-ACNC: 3.64 UIU/ML (ref 0.27–4.2)
WBC # BLD AUTO: 8.54 10*3/MM3 (ref 3.4–10.8)

## 2021-10-12 PROCEDURE — 85027 COMPLETE CBC AUTOMATED: CPT | Performed by: INTERNAL MEDICINE

## 2021-10-12 PROCEDURE — 84443 ASSAY THYROID STIM HORMONE: CPT | Performed by: INTERNAL MEDICINE

## 2021-10-12 PROCEDURE — 25010000002 FUROSEMIDE PER 20 MG: Performed by: INTERNAL MEDICINE

## 2021-10-12 PROCEDURE — 63710000001 INSULIN LISPRO (HUMAN) PER 5 UNITS: Performed by: NURSE PRACTITIONER

## 2021-10-12 PROCEDURE — 99233 SBSQ HOSP IP/OBS HIGH 50: CPT | Performed by: INTERNAL MEDICINE

## 2021-10-12 PROCEDURE — 82962 GLUCOSE BLOOD TEST: CPT

## 2021-10-12 PROCEDURE — U0003 INFECTIOUS AGENT DETECTION BY NUCLEIC ACID (DNA OR RNA); SEVERE ACUTE RESPIRATORY SYNDROME CORONAVIRUS 2 (SARS-COV-2) (CORONAVIRUS DISEASE [COVID-19]), AMPLIFIED PROBE TECHNIQUE, MAKING USE OF HIGH THROUGHPUT TECHNOLOGIES AS DESCRIBED BY CMS-2020-01-R: HCPCS | Performed by: INTERNAL MEDICINE

## 2021-10-12 PROCEDURE — 97110 THERAPEUTIC EXERCISES: CPT

## 2021-10-12 PROCEDURE — 94799 UNLISTED PULMONARY SVC/PX: CPT

## 2021-10-12 PROCEDURE — 99222 1ST HOSP IP/OBS MODERATE 55: CPT | Performed by: INTERNAL MEDICINE

## 2021-10-12 PROCEDURE — 80048 BASIC METABOLIC PNL TOTAL CA: CPT | Performed by: INTERNAL MEDICINE

## 2021-10-12 RX ORDER — CARVEDILOL 6.25 MG/1
TABLET ORAL
Qty: 90 TABLET | Refills: 2 | OUTPATIENT
Start: 2021-10-12 | End: 2021-10-19 | Stop reason: HOSPADM

## 2021-10-12 RX ORDER — GUAIFENESIN 200 MG/10ML
200 LIQUID ORAL EVERY 4 HOURS PRN
Status: DISCONTINUED | OUTPATIENT
Start: 2021-10-12 | End: 2021-10-19 | Stop reason: HOSPADM

## 2021-10-12 RX ADMIN — INSULIN LISPRO 2 UNITS: 100 INJECTION, SOLUTION INTRAVENOUS; SUBCUTANEOUS at 11:54

## 2021-10-12 RX ADMIN — GABAPENTIN 100 MG: 100 CAPSULE ORAL at 21:09

## 2021-10-12 RX ADMIN — BUDESONIDE AND FORMOTEROL FUMARATE DIHYDRATE 2 PUFF: 160; 4.5 AEROSOL RESPIRATORY (INHALATION) at 10:30

## 2021-10-12 RX ADMIN — CLOTRIMAZOLE AND BETAMETHASONE DIPROPIONATE: 10; .5 CREAM TOPICAL at 08:23

## 2021-10-12 RX ADMIN — POTASSIUM CHLORIDE 20 MEQ: 750 TABLET, EXTENDED RELEASE ORAL at 08:23

## 2021-10-12 RX ADMIN — ALPRAZOLAM 1 MG: 0.5 TABLET ORAL at 21:47

## 2021-10-12 RX ADMIN — FUROSEMIDE 80 MG: 10 INJECTION, SOLUTION INTRAMUSCULAR; INTRAVENOUS at 08:23

## 2021-10-12 RX ADMIN — NYSTATIN: 100000 CREAM TOPICAL at 08:23

## 2021-10-12 RX ADMIN — VILAZODONE HYDROCHLORIDE 20 MG: 40 TABLET ORAL at 21:09

## 2021-10-12 RX ADMIN — GUAIFENESIN 200 MG: 100 SOLUTION ORAL at 16:49

## 2021-10-12 RX ADMIN — CARVEDILOL 12.5 MG: 12.5 TABLET, FILM COATED ORAL at 17:18

## 2021-10-12 RX ADMIN — CLOTRIMAZOLE AND BETAMETHASONE DIPROPIONATE: 10; .5 CREAM TOPICAL at 21:10

## 2021-10-12 RX ADMIN — FUROSEMIDE 80 MG: 10 INJECTION, SOLUTION INTRAMUSCULAR; INTRAVENOUS at 16:49

## 2021-10-12 RX ADMIN — PANTOPRAZOLE SODIUM 40 MG: 40 TABLET, DELAYED RELEASE ORAL at 06:08

## 2021-10-12 RX ADMIN — ALPRAZOLAM 1 MG: 0.5 TABLET ORAL at 12:04

## 2021-10-12 RX ADMIN — BUDESONIDE AND FORMOTEROL FUMARATE DIHYDRATE 2 PUFF: 160; 4.5 AEROSOL RESPIRATORY (INHALATION) at 22:40

## 2021-10-12 RX ADMIN — NYSTATIN: 100000 CREAM TOPICAL at 21:10

## 2021-10-12 RX ADMIN — GABAPENTIN 100 MG: 100 CAPSULE ORAL at 08:23

## 2021-10-12 RX ADMIN — LEVOTHYROXINE SODIUM 25 MCG: 0.03 TABLET ORAL at 06:08

## 2021-10-12 RX ADMIN — FUROSEMIDE 80 MG: 10 INJECTION, SOLUTION INTRAMUSCULAR; INTRAVENOUS at 21:10

## 2021-10-12 RX ADMIN — DOCUSATE SODIUM 50MG AND SENNOSIDES 8.6MG 1 TABLET: 8.6; 5 TABLET, FILM COATED ORAL at 08:23

## 2021-10-12 RX ADMIN — CARVEDILOL 12.5 MG: 12.5 TABLET, FILM COATED ORAL at 08:23

## 2021-10-12 RX ADMIN — ATORVASTATIN CALCIUM 20 MG: 20 TABLET, FILM COATED ORAL at 21:09

## 2021-10-12 RX ADMIN — SODIUM CHLORIDE, PRESERVATIVE FREE 10 ML: 5 INJECTION INTRAVENOUS at 21:10

## 2021-10-12 RX ADMIN — SODIUM CHLORIDE, PRESERVATIVE FREE 10 ML: 5 INJECTION INTRAVENOUS at 08:28

## 2021-10-13 ENCOUNTER — ANESTHESIA EVENT (OUTPATIENT)
Dept: GASTROENTEROLOGY | Facility: HOSPITAL | Age: 76
End: 2021-10-13

## 2021-10-13 ENCOUNTER — ANESTHESIA (OUTPATIENT)
Dept: GASTROENTEROLOGY | Facility: HOSPITAL | Age: 76
End: 2021-10-13

## 2021-10-13 LAB
ALBUMIN SERPL-MCNC: 3.1 G/DL (ref 3.5–5.2)
ALBUMIN/GLOB SERPL: 1.1 G/DL
ALP SERPL-CCNC: 247 U/L (ref 39–117)
ALT SERPL W P-5'-P-CCNC: 12 U/L (ref 1–33)
ANION GAP SERPL CALCULATED.3IONS-SCNC: 8.4 MMOL/L (ref 5–15)
AST SERPL-CCNC: 18 U/L (ref 1–32)
BASOPHILS # BLD AUTO: 0.05 10*3/MM3 (ref 0–0.2)
BASOPHILS NFR BLD AUTO: 0.6 % (ref 0–1.5)
BILIRUB SERPL-MCNC: 0.3 MG/DL (ref 0–1.2)
BUN SERPL-MCNC: 29 MG/DL (ref 8–23)
BUN/CREAT SERPL: 22.7 (ref 7–25)
CALCIUM SPEC-SCNC: 8.5 MG/DL (ref 8.6–10.5)
CHLORIDE SERPL-SCNC: 95 MMOL/L (ref 98–107)
CO2 SERPL-SCNC: 37.6 MMOL/L (ref 22–29)
CREAT SERPL-MCNC: 1.28 MG/DL (ref 0.57–1)
DEPRECATED RDW RBC AUTO: 41.6 FL (ref 37–54)
EOSINOPHIL # BLD AUTO: 0.28 10*3/MM3 (ref 0–0.4)
EOSINOPHIL NFR BLD AUTO: 3.3 % (ref 0.3–6.2)
ERYTHROCYTE [DISTWIDTH] IN BLOOD BY AUTOMATED COUNT: 15.4 % (ref 12.3–15.4)
GFR SERPL CREATININE-BSD FRML MDRD: 41 ML/MIN/1.73
GLOBULIN UR ELPH-MCNC: 2.9 GM/DL
GLUCOSE BLDC GLUCOMTR-MCNC: 163 MG/DL (ref 70–130)
GLUCOSE BLDC GLUCOMTR-MCNC: 195 MG/DL (ref 70–130)
GLUCOSE SERPL-MCNC: 104 MG/DL (ref 65–99)
HCT VFR BLD AUTO: 26.9 % (ref 34–46.6)
HGB BLD-MCNC: 8 G/DL (ref 12–15.9)
INR PPP: 1.3 (ref 0.9–1.1)
LYMPHOCYTES # BLD AUTO: 0.77 10*3/MM3 (ref 0.7–3.1)
LYMPHOCYTES NFR BLD AUTO: 9 % (ref 19.6–45.3)
MCH RBC QN AUTO: 22.2 PG (ref 26.6–33)
MCHC RBC AUTO-ENTMCNC: 29.7 G/DL (ref 31.5–35.7)
MCV RBC AUTO: 74.5 FL (ref 79–97)
MONOCYTES # BLD AUTO: 0.55 10*3/MM3 (ref 0.1–0.9)
MONOCYTES NFR BLD AUTO: 6.4 % (ref 5–12)
NEUTROPHILS NFR BLD AUTO: 6.88 10*3/MM3 (ref 1.7–7)
NEUTROPHILS NFR BLD AUTO: 80.2 % (ref 42.7–76)
PLATELET # BLD AUTO: 281 10*3/MM3 (ref 140–450)
PMV BLD AUTO: 10.4 FL (ref 6–12)
POTASSIUM SERPL-SCNC: 3.9 MMOL/L (ref 3.5–5.2)
PROT SERPL-MCNC: 6 G/DL (ref 6–8.5)
PROTHROMBIN TIME: 16 SECONDS (ref 11.7–14.2)
RBC # BLD AUTO: 3.61 10*6/MM3 (ref 3.77–5.28)
SODIUM SERPL-SCNC: 141 MMOL/L (ref 136–145)
WBC # BLD AUTO: 8.57 10*3/MM3 (ref 3.4–10.8)

## 2021-10-13 PROCEDURE — 99232 SBSQ HOSP IP/OBS MODERATE 35: CPT | Performed by: NURSE PRACTITIONER

## 2021-10-13 PROCEDURE — 25010000002 PROPOFOL 10 MG/ML EMULSION: Performed by: ANESTHESIOLOGY

## 2021-10-13 PROCEDURE — 25010000002 FUROSEMIDE PER 20 MG: Performed by: INTERNAL MEDICINE

## 2021-10-13 PROCEDURE — 88305 TISSUE EXAM BY PATHOLOGIST: CPT | Performed by: INTERNAL MEDICINE

## 2021-10-13 PROCEDURE — 94799 UNLISTED PULMONARY SVC/PX: CPT

## 2021-10-13 PROCEDURE — 85025 COMPLETE CBC W/AUTO DIFF WBC: CPT | Performed by: INTERNAL MEDICINE

## 2021-10-13 PROCEDURE — 80053 COMPREHEN METABOLIC PANEL: CPT | Performed by: INTERNAL MEDICINE

## 2021-10-13 PROCEDURE — 82962 GLUCOSE BLOOD TEST: CPT

## 2021-10-13 PROCEDURE — 0DB68ZX EXCISION OF STOMACH, VIA NATURAL OR ARTIFICIAL OPENING ENDOSCOPIC, DIAGNOSTIC: ICD-10-PCS | Performed by: INTERNAL MEDICINE

## 2021-10-13 PROCEDURE — 85610 PROTHROMBIN TIME: CPT | Performed by: INTERNAL MEDICINE

## 2021-10-13 PROCEDURE — 94660 CPAP INITIATION&MGMT: CPT

## 2021-10-13 PROCEDURE — 63710000001 INSULIN LISPRO (HUMAN) PER 5 UNITS: Performed by: INTERNAL MEDICINE

## 2021-10-13 PROCEDURE — 43239 EGD BIOPSY SINGLE/MULTIPLE: CPT | Performed by: INTERNAL MEDICINE

## 2021-10-13 RX ORDER — ALPRAZOLAM 0.5 MG/1
1 TABLET ORAL 2 TIMES DAILY PRN
Status: DISPENSED | OUTPATIENT
Start: 2021-10-13 | End: 2021-10-14

## 2021-10-13 RX ORDER — PROPOFOL 10 MG/ML
VIAL (ML) INTRAVENOUS CONTINUOUS PRN
Status: DISCONTINUED | OUTPATIENT
Start: 2021-10-13 | End: 2021-10-13 | Stop reason: SURG

## 2021-10-13 RX ORDER — PROPOFOL 10 MG/ML
VIAL (ML) INTRAVENOUS AS NEEDED
Status: DISCONTINUED | OUTPATIENT
Start: 2021-10-13 | End: 2021-10-13 | Stop reason: SURG

## 2021-10-13 RX ORDER — TRAMADOL HYDROCHLORIDE 50 MG/1
50 TABLET ORAL EVERY 12 HOURS PRN
Status: ACTIVE | OUTPATIENT
Start: 2021-10-13 | End: 2021-10-14

## 2021-10-13 RX ORDER — SODIUM CHLORIDE 9 MG/ML
30 INJECTION, SOLUTION INTRAVENOUS CONTINUOUS PRN
Status: DISCONTINUED | OUTPATIENT
Start: 2021-10-13 | End: 2021-10-19 | Stop reason: HOSPADM

## 2021-10-13 RX ORDER — LIDOCAINE HYDROCHLORIDE 20 MG/ML
INJECTION, SOLUTION INFILTRATION; PERINEURAL AS NEEDED
Status: DISCONTINUED | OUTPATIENT
Start: 2021-10-13 | End: 2021-10-13 | Stop reason: SURG

## 2021-10-13 RX ADMIN — FUROSEMIDE 80 MG: 10 INJECTION, SOLUTION INTRAMUSCULAR; INTRAVENOUS at 21:22

## 2021-10-13 RX ADMIN — GABAPENTIN 100 MG: 100 CAPSULE ORAL at 21:21

## 2021-10-13 RX ADMIN — ALPRAZOLAM 1 MG: 0.5 TABLET ORAL at 14:16

## 2021-10-13 RX ADMIN — SODIUM CHLORIDE 30 ML/HR: 9 INJECTION, SOLUTION INTRAVENOUS at 11:47

## 2021-10-13 RX ADMIN — ALPRAZOLAM 1 MG: 0.5 TABLET ORAL at 22:18

## 2021-10-13 RX ADMIN — LIDOCAINE HYDROCHLORIDE 60 MG: 20 INJECTION, SOLUTION INFILTRATION; PERINEURAL at 11:53

## 2021-10-13 RX ADMIN — SODIUM CHLORIDE: 9 INJECTION, SOLUTION INTRAVENOUS at 11:39

## 2021-10-13 RX ADMIN — ATORVASTATIN CALCIUM 20 MG: 20 TABLET, FILM COATED ORAL at 21:20

## 2021-10-13 RX ADMIN — PROPOFOL 120 MCG/KG/MIN: 10 INJECTION, EMULSION INTRAVENOUS at 12:39

## 2021-10-13 RX ADMIN — Medication 60 MG: at 12:37

## 2021-10-13 RX ADMIN — GABAPENTIN 100 MG: 100 CAPSULE ORAL at 08:25

## 2021-10-13 RX ADMIN — CLOTRIMAZOLE AND BETAMETHASONE DIPROPIONATE: 10; .5 CREAM TOPICAL at 08:25

## 2021-10-13 RX ADMIN — NYSTATIN: 100000 CREAM TOPICAL at 21:27

## 2021-10-13 RX ADMIN — SODIUM CHLORIDE, PRESERVATIVE FREE 10 ML: 5 INJECTION INTRAVENOUS at 21:21

## 2021-10-13 RX ADMIN — POLYETHYLENE GLYCOL 3350, SODIUM SULFATE ANHYDROUS, SODIUM BICARBONATE, SODIUM CHLORIDE, POTASSIUM CHLORIDE 2000 ML: 236; 22.74; 6.74; 5.86; 2.97 POWDER, FOR SOLUTION ORAL at 21:21

## 2021-10-13 RX ADMIN — BUDESONIDE AND FORMOTEROL FUMARATE DIHYDRATE 2 PUFF: 160; 4.5 AEROSOL RESPIRATORY (INHALATION) at 21:17

## 2021-10-13 RX ADMIN — CARVEDILOL 12.5 MG: 12.5 TABLET, FILM COATED ORAL at 17:22

## 2021-10-13 RX ADMIN — FUROSEMIDE 80 MG: 10 INJECTION, SOLUTION INTRAMUSCULAR; INTRAVENOUS at 08:25

## 2021-10-13 RX ADMIN — CARVEDILOL 12.5 MG: 12.5 TABLET, FILM COATED ORAL at 08:25

## 2021-10-13 RX ADMIN — INSULIN LISPRO 2 UNITS: 100 INJECTION, SOLUTION INTRAVENOUS; SUBCUTANEOUS at 17:22

## 2021-10-13 RX ADMIN — CLOTRIMAZOLE AND BETAMETHASONE DIPROPIONATE: 10; .5 CREAM TOPICAL at 21:22

## 2021-10-13 RX ADMIN — FUROSEMIDE 80 MG: 10 INJECTION, SOLUTION INTRAMUSCULAR; INTRAVENOUS at 16:15

## 2021-10-13 RX ADMIN — VILAZODONE HYDROCHLORIDE 20 MG: 40 TABLET ORAL at 21:20

## 2021-10-13 RX ADMIN — BUDESONIDE AND FORMOTEROL FUMARATE DIHYDRATE 2 PUFF: 160; 4.5 AEROSOL RESPIRATORY (INHALATION) at 07:46

## 2021-10-13 RX ADMIN — NYSTATIN: 100000 CREAM TOPICAL at 08:25

## 2021-10-13 RX ADMIN — SODIUM CHLORIDE, PRESERVATIVE FREE 10 ML: 5 INJECTION INTRAVENOUS at 08:24

## 2021-10-13 RX ADMIN — POTASSIUM CHLORIDE 20 MEQ: 750 TABLET, EXTENDED RELEASE ORAL at 08:24

## 2021-10-13 NOTE — ANESTHESIA POSTPROCEDURE EVALUATION
"Patient: Miguelina Lopez    Procedure Summary     Date: 10/13/21 Room / Location:  [facility]    Anesthesia Start: 1229 Anesthesia Stop: 1302    Procedure: ESOPHAGOGASTRODUODENOSCOPY WITH BIOPSIES (N/A Esophagus) Diagnosis:       Acute blood loss anemia      (Acute blood loss anemia [D62])    Surgeons: Riana Milner MD Provider: Jeffery Saunders MD    Anesthesia Type: MAC ASA Status: 4          Anesthesia Type: MAC    Vitals  Vitals Value Taken Time   /52 10/13/21 1309   Temp     Pulse 75 10/13/21 1309   Resp 16 10/13/21 1309   SpO2 96 % 10/13/21 1309           Post Anesthesia Care and Evaluation    Patient location during evaluation: bedside  Patient participation: complete - patient participated  Level of consciousness: awake and alert  Pain score: 0  Pain management: adequate  Airway patency: patent  Anesthetic complications: No anesthetic complications    Cardiovascular status: acceptable  Respiratory status: acceptable  Hydration status: acceptable    Comments: /52 (BP Location: Left arm, Patient Position: Lying)   Pulse 75   Temp 36.7 °C (98 °F) (Oral)   Resp 16   Ht 152.4 cm (60\")   Wt 128 kg (282 lb 9 oz)   SpO2 96%   BMI 55.18 kg/m²       "

## 2021-10-14 ENCOUNTER — ANESTHESIA (OUTPATIENT)
Dept: GASTROENTEROLOGY | Facility: HOSPITAL | Age: 76
End: 2021-10-14

## 2021-10-14 ENCOUNTER — ANESTHESIA EVENT (OUTPATIENT)
Dept: GASTROENTEROLOGY | Facility: HOSPITAL | Age: 76
End: 2021-10-14

## 2021-10-14 LAB
ALBUMIN SERPL-MCNC: 3.3 G/DL (ref 3.5–5.2)
ANION GAP SERPL CALCULATED.3IONS-SCNC: 9.9 MMOL/L (ref 5–15)
BUN SERPL-MCNC: 27 MG/DL (ref 8–23)
BUN/CREAT SERPL: 23.5 (ref 7–25)
CALCIUM SPEC-SCNC: 8.5 MG/DL (ref 8.6–10.5)
CHLORIDE SERPL-SCNC: 96 MMOL/L (ref 98–107)
CO2 SERPL-SCNC: 36.1 MMOL/L (ref 22–29)
CREAT SERPL-MCNC: 1.15 MG/DL (ref 0.57–1)
GFR SERPL CREATININE-BSD FRML MDRD: 46 ML/MIN/1.73
GLUCOSE BLDC GLUCOMTR-MCNC: 120 MG/DL (ref 70–130)
GLUCOSE BLDC GLUCOMTR-MCNC: 164 MG/DL (ref 70–130)
GLUCOSE BLDC GLUCOMTR-MCNC: 188 MG/DL (ref 70–130)
GLUCOSE BLDC GLUCOMTR-MCNC: 208 MG/DL (ref 70–130)
GLUCOSE SERPL-MCNC: 117 MG/DL (ref 65–99)
LAB AP CASE REPORT: NORMAL
PATH REPORT.FINAL DX SPEC: NORMAL
PATH REPORT.GROSS SPEC: NORMAL
PHOSPHATE SERPL-MCNC: 3.9 MG/DL (ref 2.5–4.5)
POTASSIUM SERPL-SCNC: 3.5 MMOL/L (ref 3.5–5.2)
SODIUM SERPL-SCNC: 142 MMOL/L (ref 136–145)

## 2021-10-14 PROCEDURE — 0DBK8ZX EXCISION OF ASCENDING COLON, VIA NATURAL OR ARTIFICIAL OPENING ENDOSCOPIC, DIAGNOSTIC: ICD-10-PCS | Performed by: INTERNAL MEDICINE

## 2021-10-14 PROCEDURE — 63710000001 INSULIN LISPRO (HUMAN) PER 5 UNITS: Performed by: INTERNAL MEDICINE

## 2021-10-14 PROCEDURE — 80069 RENAL FUNCTION PANEL: CPT | Performed by: INTERNAL MEDICINE

## 2021-10-14 PROCEDURE — 25010000002 FUROSEMIDE PER 20 MG: Performed by: INTERNAL MEDICINE

## 2021-10-14 PROCEDURE — 94799 UNLISTED PULMONARY SVC/PX: CPT

## 2021-10-14 PROCEDURE — 45385 COLONOSCOPY W/LESION REMOVAL: CPT | Performed by: INTERNAL MEDICINE

## 2021-10-14 PROCEDURE — 88305 TISSUE EXAM BY PATHOLOGIST: CPT | Performed by: INTERNAL MEDICINE

## 2021-10-14 PROCEDURE — 94664 DEMO&/EVAL PT USE INHALER: CPT

## 2021-10-14 PROCEDURE — 25010000002 PROPOFOL 10 MG/ML EMULSION: Performed by: NURSE ANESTHETIST, CERTIFIED REGISTERED

## 2021-10-14 PROCEDURE — 82962 GLUCOSE BLOOD TEST: CPT

## 2021-10-14 PROCEDURE — 25010000002 PHENYLEPHRINE 10 MG/ML SOLUTION: Performed by: NURSE ANESTHETIST, CERTIFIED REGISTERED

## 2021-10-14 PROCEDURE — 0DBL8ZX EXCISION OF TRANSVERSE COLON, VIA NATURAL OR ARTIFICIAL OPENING ENDOSCOPIC, DIAGNOSTIC: ICD-10-PCS | Performed by: INTERNAL MEDICINE

## 2021-10-14 RX ORDER — SODIUM CHLORIDE, SODIUM LACTATE, POTASSIUM CHLORIDE, CALCIUM CHLORIDE 600; 310; 30; 20 MG/100ML; MG/100ML; MG/100ML; MG/100ML
INJECTION, SOLUTION INTRAVENOUS CONTINUOUS PRN
Status: DISCONTINUED | OUTPATIENT
Start: 2021-10-14 | End: 2021-10-14 | Stop reason: SURG

## 2021-10-14 RX ORDER — POTASSIUM CHLORIDE 750 MG/1
40 TABLET, FILM COATED, EXTENDED RELEASE ORAL DAILY
Status: DISCONTINUED | OUTPATIENT
Start: 2021-10-15 | End: 2021-10-16

## 2021-10-14 RX ORDER — LIDOCAINE HYDROCHLORIDE 20 MG/ML
INJECTION, SOLUTION INFILTRATION; PERINEURAL AS NEEDED
Status: DISCONTINUED | OUTPATIENT
Start: 2021-10-14 | End: 2021-10-14 | Stop reason: SURG

## 2021-10-14 RX ORDER — PHENYLEPHRINE HYDROCHLORIDE 10 MG/ML
INJECTION INTRAVENOUS AS NEEDED
Status: DISCONTINUED | OUTPATIENT
Start: 2021-10-14 | End: 2021-10-14 | Stop reason: SURG

## 2021-10-14 RX ORDER — SODIUM CHLORIDE 9 MG/ML
30 INJECTION, SOLUTION INTRAVENOUS CONTINUOUS PRN
Status: DISCONTINUED | OUTPATIENT
Start: 2021-10-14 | End: 2021-10-19 | Stop reason: HOSPADM

## 2021-10-14 RX ORDER — POTASSIUM CHLORIDE 750 MG/1
20 TABLET, FILM COATED, EXTENDED RELEASE ORAL ONCE
Status: COMPLETED | OUTPATIENT
Start: 2021-10-14 | End: 2021-10-14

## 2021-10-14 RX ORDER — PROPOFOL 10 MG/ML
VIAL (ML) INTRAVENOUS CONTINUOUS PRN
Status: DISCONTINUED | OUTPATIENT
Start: 2021-10-14 | End: 2021-10-14 | Stop reason: SURG

## 2021-10-14 RX ORDER — PROPOFOL 10 MG/ML
VIAL (ML) INTRAVENOUS AS NEEDED
Status: DISCONTINUED | OUTPATIENT
Start: 2021-10-14 | End: 2021-10-14 | Stop reason: SURG

## 2021-10-14 RX ADMIN — PROPOFOL 120 MCG/KG/MIN: 10 INJECTION, EMULSION INTRAVENOUS at 10:44

## 2021-10-14 RX ADMIN — NYSTATIN: 100000 CREAM TOPICAL at 08:59

## 2021-10-14 RX ADMIN — VILAZODONE HYDROCHLORIDE 20 MG: 40 TABLET ORAL at 21:05

## 2021-10-14 RX ADMIN — INSULIN LISPRO 4 UNITS: 100 INJECTION, SOLUTION INTRAVENOUS; SUBCUTANEOUS at 16:35

## 2021-10-14 RX ADMIN — PANTOPRAZOLE SODIUM 40 MG: 40 TABLET, DELAYED RELEASE ORAL at 06:26

## 2021-10-14 RX ADMIN — CARVEDILOL 12.5 MG: 12.5 TABLET, FILM COATED ORAL at 12:22

## 2021-10-14 RX ADMIN — GABAPENTIN 100 MG: 100 CAPSULE ORAL at 12:22

## 2021-10-14 RX ADMIN — FUROSEMIDE 80 MG: 10 INJECTION, SOLUTION INTRAMUSCULAR; INTRAVENOUS at 21:06

## 2021-10-14 RX ADMIN — CARVEDILOL 12.5 MG: 12.5 TABLET, FILM COATED ORAL at 18:09

## 2021-10-14 RX ADMIN — GABAPENTIN 100 MG: 100 CAPSULE ORAL at 21:05

## 2021-10-14 RX ADMIN — FUROSEMIDE 80 MG: 10 INJECTION, SOLUTION INTRAMUSCULAR; INTRAVENOUS at 16:35

## 2021-10-14 RX ADMIN — LEVOTHYROXINE SODIUM 25 MCG: 0.03 TABLET ORAL at 05:09

## 2021-10-14 RX ADMIN — Medication 50 MG: at 10:44

## 2021-10-14 RX ADMIN — BUDESONIDE AND FORMOTEROL FUMARATE DIHYDRATE 2 PUFF: 160; 4.5 AEROSOL RESPIRATORY (INHALATION) at 08:45

## 2021-10-14 RX ADMIN — CLOTRIMAZOLE AND BETAMETHASONE DIPROPIONATE: 10; .5 CREAM TOPICAL at 08:59

## 2021-10-14 RX ADMIN — SODIUM CHLORIDE, PRESERVATIVE FREE 10 ML: 5 INJECTION INTRAVENOUS at 08:58

## 2021-10-14 RX ADMIN — FUROSEMIDE 80 MG: 10 INJECTION, SOLUTION INTRAMUSCULAR; INTRAVENOUS at 08:58

## 2021-10-14 RX ADMIN — LIDOCAINE HYDROCHLORIDE 60 MG: 20 INJECTION, SOLUTION INFILTRATION; PERINEURAL at 10:46

## 2021-10-14 RX ADMIN — ATORVASTATIN CALCIUM 20 MG: 20 TABLET, FILM COATED ORAL at 21:05

## 2021-10-14 RX ADMIN — SODIUM CHLORIDE, POTASSIUM CHLORIDE, SODIUM LACTATE AND CALCIUM CHLORIDE: 600; 310; 30; 20 INJECTION, SOLUTION INTRAVENOUS at 10:37

## 2021-10-14 RX ADMIN — CLOTRIMAZOLE AND BETAMETHASONE DIPROPIONATE: 10; .5 CREAM TOPICAL at 21:06

## 2021-10-14 RX ADMIN — BUDESONIDE AND FORMOTEROL FUMARATE DIHYDRATE 2 PUFF: 160; 4.5 AEROSOL RESPIRATORY (INHALATION) at 21:00

## 2021-10-14 RX ADMIN — FLUTICASONE PROPIONATE 2 SPRAY: 50 SPRAY, METERED NASAL at 08:59

## 2021-10-14 RX ADMIN — POLYETHYLENE GLYCOL 3350, SODIUM SULFATE ANHYDROUS, SODIUM BICARBONATE, SODIUM CHLORIDE, POTASSIUM CHLORIDE 2000 ML: 236; 22.74; 6.74; 5.86; 2.97 POWDER, FOR SOLUTION ORAL at 06:26

## 2021-10-14 RX ADMIN — PHENYLEPHRINE HYDROCHLORIDE 150 MCG: 10 INJECTION INTRAVENOUS at 10:45

## 2021-10-14 RX ADMIN — SODIUM CHLORIDE, PRESERVATIVE FREE 10 ML: 5 INJECTION INTRAVENOUS at 21:05

## 2021-10-14 RX ADMIN — POTASSIUM CHLORIDE 20 MEQ: 750 TABLET, EXTENDED RELEASE ORAL at 12:23

## 2021-10-14 RX ADMIN — NYSTATIN: 100000 CREAM TOPICAL at 21:06

## 2021-10-14 RX ADMIN — ALPRAZOLAM 1 MG: 0.5 TABLET ORAL at 16:42

## 2021-10-14 NOTE — ANESTHESIA POSTPROCEDURE EVALUATION
"Patient: Miguelina Abdullahioccjose    Procedure Summary     Date: 10/14/21 Room / Location:  [facility]    Anesthesia Start: 1036 Anesthesia Stop: 1100    Procedure: COLONOSCOPY to cecum and TI  with cold snare polypectomies (N/A ) Diagnosis:       Gastrointestinal hemorrhage with melena      (Gastrointestinal hemorrhage with melena [K92.1])    Surgeons: Dixon Azevedo MD Provider: Chris Jalloh MD    Anesthesia Type: MAC ASA Status: 4          Anesthesia Type: MAC    Vitals  Vitals Value Taken Time   /55 10/14/21 1117   Temp     Pulse 75 10/14/21 1117   Resp 16 10/14/21 1117   SpO2 92 % 10/14/21 1117           Post Anesthesia Care and Evaluation    Patient location during evaluation: PACU  Patient participation: complete - patient participated  Level of consciousness: awake  Pain score: 0  Pain management: adequate  Airway patency: patent  Anesthetic complications: No anesthetic complications  PONV Status: none  Cardiovascular status: acceptable  Respiratory status: acceptable  Hydration status: acceptable    Comments: /55 (BP Location: Left arm, Patient Position: Lying)   Pulse 75   Temp 36.8 °C (98.3 °F) (Oral)   Resp 16   Ht 152.4 cm (60\")   Wt 127 kg (280 lb 6.4 oz)   SpO2 92%   BMI 54.76 kg/m²       "

## 2021-10-14 NOTE — ANESTHESIA PREPROCEDURE EVALUATION
Anesthesia Evaluation     Patient summary reviewed and Nursing notes reviewed                Airway   Mallampati: I  TM distance: >3 FB  Neck ROM: full  No difficulty expected  Dental - normal exam     Pulmonary - normal exam   (+) pneumonia , COPD, asthma,home oxygen, sleep apnea,   Cardiovascular - normal exam    (+) pacemaker pacemaker, hypertension, valvular problems/murmurs, CAD, CABG, cardiac stents dysrhythmias, CHF , hyperlipidemia,     ROS comment: S/p TAVR    Neuro/Psych  (+) TIA, numbness, psychiatric history Anxiety and Depression,     GI/Hepatic/Renal/Endo    (+) obesity, morbid obesity, GERD, GI bleeding , renal disease CRI, diabetes mellitus type 2,     Musculoskeletal     Abdominal  - normal exam    Bowel sounds: normal.   Substance History - negative use     OB/GYN negative ob/gyn ROS         Other   arthritis,                    Anesthesia Plan    ASA 4     MAC       Anesthetic plan, all risks, benefits, and alternatives have been provided, discussed and informed consent has been obtained with: patient.

## 2021-10-15 LAB
ALBUMIN SERPL-MCNC: 3.2 G/DL (ref 3.5–5.2)
ANION GAP SERPL CALCULATED.3IONS-SCNC: 8.4 MMOL/L (ref 5–15)
BASOPHILS # BLD AUTO: 0.06 10*3/MM3 (ref 0–0.2)
BASOPHILS NFR BLD AUTO: 0.8 % (ref 0–1.5)
BUN SERPL-MCNC: 25 MG/DL (ref 8–23)
BUN/CREAT SERPL: 22.3 (ref 7–25)
CALCIUM SPEC-SCNC: 8.1 MG/DL (ref 8.6–10.5)
CHLORIDE SERPL-SCNC: 97 MMOL/L (ref 98–107)
CO2 SERPL-SCNC: 36.6 MMOL/L (ref 22–29)
CREAT SERPL-MCNC: 1.12 MG/DL (ref 0.57–1)
DEPRECATED RDW RBC AUTO: 42.8 FL (ref 37–54)
EOSINOPHIL # BLD AUTO: 0.23 10*3/MM3 (ref 0–0.4)
EOSINOPHIL NFR BLD AUTO: 2.9 % (ref 0.3–6.2)
ERYTHROCYTE [DISTWIDTH] IN BLOOD BY AUTOMATED COUNT: 15.7 % (ref 12.3–15.4)
GFR SERPL CREATININE-BSD FRML MDRD: 47 ML/MIN/1.73
GLUCOSE BLDC GLUCOMTR-MCNC: 136 MG/DL (ref 70–130)
GLUCOSE BLDC GLUCOMTR-MCNC: 188 MG/DL (ref 70–130)
GLUCOSE BLDC GLUCOMTR-MCNC: 193 MG/DL (ref 70–130)
GLUCOSE BLDC GLUCOMTR-MCNC: 198 MG/DL (ref 70–130)
GLUCOSE SERPL-MCNC: 133 MG/DL (ref 65–99)
HCT VFR BLD AUTO: 27.3 % (ref 34–46.6)
HGB BLD-MCNC: 8.3 G/DL (ref 12–15.9)
IMM GRANULOCYTES # BLD AUTO: 0.03 10*3/MM3 (ref 0–0.05)
IMM GRANULOCYTES NFR BLD AUTO: 0.4 % (ref 0–0.5)
LAB AP CASE REPORT: NORMAL
LYMPHOCYTES # BLD AUTO: 0.79 10*3/MM3 (ref 0.7–3.1)
LYMPHOCYTES NFR BLD AUTO: 9.9 % (ref 19.6–45.3)
MCH RBC QN AUTO: 22.9 PG (ref 26.6–33)
MCHC RBC AUTO-ENTMCNC: 30.4 G/DL (ref 31.5–35.7)
MCV RBC AUTO: 75.4 FL (ref 79–97)
MONOCYTES # BLD AUTO: 0.63 10*3/MM3 (ref 0.1–0.9)
MONOCYTES NFR BLD AUTO: 7.9 % (ref 5–12)
NEUTROPHILS NFR BLD AUTO: 6.26 10*3/MM3 (ref 1.7–7)
NEUTROPHILS NFR BLD AUTO: 78.1 % (ref 42.7–76)
NRBC BLD AUTO-RTO: 0 /100 WBC (ref 0–0.2)
PATH REPORT.FINAL DX SPEC: NORMAL
PATH REPORT.GROSS SPEC: NORMAL
PHOSPHATE SERPL-MCNC: 3.9 MG/DL (ref 2.5–4.5)
PLATELET # BLD AUTO: 243 10*3/MM3 (ref 140–450)
PMV BLD AUTO: 10 FL (ref 6–12)
POTASSIUM SERPL-SCNC: 3.5 MMOL/L (ref 3.5–5.2)
RBC # BLD AUTO: 3.62 10*6/MM3 (ref 3.77–5.28)
SODIUM SERPL-SCNC: 142 MMOL/L (ref 136–145)
WBC # BLD AUTO: 8 10*3/MM3 (ref 3.4–10.8)

## 2021-10-15 PROCEDURE — 63710000001 INSULIN LISPRO (HUMAN) PER 5 UNITS: Performed by: INTERNAL MEDICINE

## 2021-10-15 PROCEDURE — 85025 COMPLETE CBC W/AUTO DIFF WBC: CPT | Performed by: INTERNAL MEDICINE

## 2021-10-15 PROCEDURE — 99232 SBSQ HOSP IP/OBS MODERATE 35: CPT | Performed by: NURSE PRACTITIONER

## 2021-10-15 PROCEDURE — 94664 DEMO&/EVAL PT USE INHALER: CPT

## 2021-10-15 PROCEDURE — 97116 GAIT TRAINING THERAPY: CPT

## 2021-10-15 PROCEDURE — 97110 THERAPEUTIC EXERCISES: CPT

## 2021-10-15 PROCEDURE — 25010000002 FUROSEMIDE PER 20 MG: Performed by: INTERNAL MEDICINE

## 2021-10-15 PROCEDURE — 94799 UNLISTED PULMONARY SVC/PX: CPT

## 2021-10-15 PROCEDURE — 99232 SBSQ HOSP IP/OBS MODERATE 35: CPT | Performed by: INTERNAL MEDICINE

## 2021-10-15 PROCEDURE — 82962 GLUCOSE BLOOD TEST: CPT

## 2021-10-15 PROCEDURE — 80069 RENAL FUNCTION PANEL: CPT | Performed by: INTERNAL MEDICINE

## 2021-10-15 RX ORDER — ALPRAZOLAM 0.5 MG/1
1 TABLET ORAL 2 TIMES DAILY PRN
Status: DISCONTINUED | OUTPATIENT
Start: 2021-10-15 | End: 2021-10-19 | Stop reason: HOSPADM

## 2021-10-15 RX ADMIN — BUDESONIDE AND FORMOTEROL FUMARATE DIHYDRATE 2 PUFF: 160; 4.5 AEROSOL RESPIRATORY (INHALATION) at 22:54

## 2021-10-15 RX ADMIN — POTASSIUM CHLORIDE 40 MEQ: 750 TABLET, EXTENDED RELEASE ORAL at 08:16

## 2021-10-15 RX ADMIN — LEVOTHYROXINE SODIUM 25 MCG: 0.03 TABLET ORAL at 08:16

## 2021-10-15 RX ADMIN — SODIUM CHLORIDE, PRESERVATIVE FREE 10 ML: 5 INJECTION INTRAVENOUS at 20:38

## 2021-10-15 RX ADMIN — GABAPENTIN 100 MG: 100 CAPSULE ORAL at 08:16

## 2021-10-15 RX ADMIN — NYSTATIN: 100000 CREAM TOPICAL at 08:17

## 2021-10-15 RX ADMIN — GABAPENTIN 100 MG: 100 CAPSULE ORAL at 20:38

## 2021-10-15 RX ADMIN — INSULIN LISPRO 2 UNITS: 100 INJECTION, SOLUTION INTRAVENOUS; SUBCUTANEOUS at 11:53

## 2021-10-15 RX ADMIN — SODIUM CHLORIDE, PRESERVATIVE FREE 10 ML: 5 INJECTION INTRAVENOUS at 08:17

## 2021-10-15 RX ADMIN — CLOTRIMAZOLE AND BETAMETHASONE DIPROPIONATE: 10; .5 CREAM TOPICAL at 08:17

## 2021-10-15 RX ADMIN — INSULIN LISPRO 2 UNITS: 100 INJECTION, SOLUTION INTRAVENOUS; SUBCUTANEOUS at 16:40

## 2021-10-15 RX ADMIN — ATORVASTATIN CALCIUM 20 MG: 20 TABLET, FILM COATED ORAL at 20:38

## 2021-10-15 RX ADMIN — FUROSEMIDE 80 MG: 10 INJECTION, SOLUTION INTRAMUSCULAR; INTRAVENOUS at 20:38

## 2021-10-15 RX ADMIN — CARVEDILOL 12.5 MG: 12.5 TABLET, FILM COATED ORAL at 08:17

## 2021-10-15 RX ADMIN — CARVEDILOL 12.5 MG: 12.5 TABLET, FILM COATED ORAL at 16:40

## 2021-10-15 RX ADMIN — ALPRAZOLAM 1 MG: 0.5 TABLET ORAL at 16:40

## 2021-10-15 RX ADMIN — FUROSEMIDE 80 MG: 10 INJECTION, SOLUTION INTRAMUSCULAR; INTRAVENOUS at 08:16

## 2021-10-15 RX ADMIN — PANTOPRAZOLE SODIUM 40 MG: 40 TABLET, DELAYED RELEASE ORAL at 08:16

## 2021-10-15 RX ADMIN — VILAZODONE HYDROCHLORIDE 20 MG: 40 TABLET ORAL at 20:38

## 2021-10-15 RX ADMIN — NYSTATIN: 100000 CREAM TOPICAL at 20:39

## 2021-10-15 RX ADMIN — FLUTICASONE PROPIONATE 2 SPRAY: 50 SPRAY, METERED NASAL at 08:17

## 2021-10-15 RX ADMIN — FUROSEMIDE 80 MG: 10 INJECTION, SOLUTION INTRAMUSCULAR; INTRAVENOUS at 16:40

## 2021-10-15 RX ADMIN — INSULIN LISPRO 2 UNITS: 100 INJECTION, SOLUTION INTRAVENOUS; SUBCUTANEOUS at 08:17

## 2021-10-15 RX ADMIN — ACETAMINOPHEN 650 MG: 325 TABLET, FILM COATED ORAL at 04:28

## 2021-10-15 RX ADMIN — CLOTRIMAZOLE AND BETAMETHASONE DIPROPIONATE: 10; .5 CREAM TOPICAL at 20:38

## 2021-10-15 RX ADMIN — BUDESONIDE AND FORMOTEROL FUMARATE DIHYDRATE 2 PUFF: 160; 4.5 AEROSOL RESPIRATORY (INHALATION) at 10:22

## 2021-10-16 LAB
ALBUMIN SERPL-MCNC: 3.4 G/DL (ref 3.5–5.2)
ALBUMIN/GLOB SERPL: 1.3 G/DL
ALP SERPL-CCNC: 226 U/L (ref 39–117)
ALT SERPL W P-5'-P-CCNC: 9 U/L (ref 1–33)
ANION GAP SERPL CALCULATED.3IONS-SCNC: 7.7 MMOL/L (ref 5–15)
ANISOCYTOSIS BLD QL: ABNORMAL
AST SERPL-CCNC: 15 U/L (ref 1–32)
BASOPHILS # BLD MANUAL: 0.16 10*3/MM3 (ref 0–0.2)
BASOPHILS NFR BLD AUTO: 2 % (ref 0–1.5)
BILIRUB SERPL-MCNC: 0.3 MG/DL (ref 0–1.2)
BUN SERPL-MCNC: 25 MG/DL (ref 8–23)
BUN/CREAT SERPL: 18 (ref 7–25)
CALCIUM SPEC-SCNC: 8.4 MG/DL (ref 8.6–10.5)
CHLORIDE SERPL-SCNC: 97 MMOL/L (ref 98–107)
CO2 SERPL-SCNC: 38.3 MMOL/L (ref 22–29)
CREAT SERPL-MCNC: 1.39 MG/DL (ref 0.57–1)
DEPRECATED RDW RBC AUTO: 42.1 FL (ref 37–54)
EOSINOPHIL # BLD MANUAL: 0.23 10*3/MM3 (ref 0–0.4)
EOSINOPHIL NFR BLD MANUAL: 3 % (ref 0.3–6.2)
ERYTHROCYTE [DISTWIDTH] IN BLOOD BY AUTOMATED COUNT: 15.8 % (ref 12.3–15.4)
GFR SERPL CREATININE-BSD FRML MDRD: 37 ML/MIN/1.73
GLOBULIN UR ELPH-MCNC: 2.7 GM/DL
GLUCOSE BLDC GLUCOMTR-MCNC: 119 MG/DL (ref 70–130)
GLUCOSE BLDC GLUCOMTR-MCNC: 159 MG/DL (ref 70–130)
GLUCOSE BLDC GLUCOMTR-MCNC: 187 MG/DL (ref 70–130)
GLUCOSE BLDC GLUCOMTR-MCNC: 193 MG/DL (ref 70–130)
GLUCOSE SERPL-MCNC: 135 MG/DL (ref 65–99)
HCT VFR BLD AUTO: 27.7 % (ref 34–46.6)
HGB BLD-MCNC: 8.3 G/DL (ref 12–15.9)
HYPOCHROMIA BLD QL: ABNORMAL
INR PPP: 1.16 (ref 0.9–1.1)
LYMPHOCYTES # BLD MANUAL: 0.7 10*3/MM3 (ref 0.7–3.1)
LYMPHOCYTES NFR BLD MANUAL: 8 % (ref 5–12)
LYMPHOCYTES NFR BLD MANUAL: 9 % (ref 19.6–45.3)
MCH RBC QN AUTO: 22.3 PG (ref 26.6–33)
MCHC RBC AUTO-ENTMCNC: 30 G/DL (ref 31.5–35.7)
MCV RBC AUTO: 74.3 FL (ref 79–97)
MONOCYTES # BLD AUTO: 0.62 10*3/MM3 (ref 0.1–0.9)
NEUTROPHILS # BLD AUTO: 6.08 10*3/MM3 (ref 1.7–7)
NEUTROPHILS NFR BLD MANUAL: 78 % (ref 42.7–76)
PLAT MORPH BLD: NORMAL
PLATELET # BLD AUTO: 256 10*3/MM3 (ref 140–450)
PMV BLD AUTO: 9.9 FL (ref 6–12)
POTASSIUM SERPL-SCNC: 3.6 MMOL/L (ref 3.5–5.2)
PROT SERPL-MCNC: 6.1 G/DL (ref 6–8.5)
PROTHROMBIN TIME: 14.7 SECONDS (ref 11.7–14.2)
RBC # BLD AUTO: 3.73 10*6/MM3 (ref 3.77–5.28)
SODIUM SERPL-SCNC: 143 MMOL/L (ref 136–145)
WBC # BLD AUTO: 7.8 10*3/MM3 (ref 3.4–10.8)
WBC MORPH BLD: NORMAL

## 2021-10-16 PROCEDURE — 99232 SBSQ HOSP IP/OBS MODERATE 35: CPT | Performed by: INTERNAL MEDICINE

## 2021-10-16 PROCEDURE — 94799 UNLISTED PULMONARY SVC/PX: CPT

## 2021-10-16 PROCEDURE — 85007 BL SMEAR W/DIFF WBC COUNT: CPT | Performed by: INTERNAL MEDICINE

## 2021-10-16 PROCEDURE — 97530 THERAPEUTIC ACTIVITIES: CPT

## 2021-10-16 PROCEDURE — 63710000001 INSULIN LISPRO (HUMAN) PER 5 UNITS: Performed by: INTERNAL MEDICINE

## 2021-10-16 PROCEDURE — 82962 GLUCOSE BLOOD TEST: CPT

## 2021-10-16 PROCEDURE — 94660 CPAP INITIATION&MGMT: CPT

## 2021-10-16 PROCEDURE — 85610 PROTHROMBIN TIME: CPT | Performed by: INTERNAL MEDICINE

## 2021-10-16 PROCEDURE — 85025 COMPLETE CBC W/AUTO DIFF WBC: CPT | Performed by: INTERNAL MEDICINE

## 2021-10-16 PROCEDURE — 80053 COMPREHEN METABOLIC PANEL: CPT | Performed by: INTERNAL MEDICINE

## 2021-10-16 PROCEDURE — 25010000002 FUROSEMIDE PER 20 MG: Performed by: INTERNAL MEDICINE

## 2021-10-16 PROCEDURE — 99232 SBSQ HOSP IP/OBS MODERATE 35: CPT | Performed by: NURSE PRACTITIONER

## 2021-10-16 PROCEDURE — 25010000002 ACETAZOLAMIDE PER 500 MG: Performed by: INTERNAL MEDICINE

## 2021-10-16 RX ORDER — TORSEMIDE 100 MG/1
100 TABLET ORAL
Status: DISCONTINUED | OUTPATIENT
Start: 2021-10-16 | End: 2021-10-19 | Stop reason: HOSPADM

## 2021-10-16 RX ORDER — SPIRONOLACTONE 25 MG/1
25 TABLET ORAL DAILY
Status: DISCONTINUED | OUTPATIENT
Start: 2021-10-17 | End: 2021-10-19 | Stop reason: HOSPADM

## 2021-10-16 RX ORDER — POTASSIUM CHLORIDE 750 MG/1
20 TABLET, FILM COATED, EXTENDED RELEASE ORAL DAILY
Status: DISCONTINUED | OUTPATIENT
Start: 2021-10-17 | End: 2021-10-17

## 2021-10-16 RX ORDER — ACETAZOLAMIDE 500 MG/5ML
500 INJECTION, POWDER, LYOPHILIZED, FOR SOLUTION INTRAVENOUS ONCE
Status: COMPLETED | OUTPATIENT
Start: 2021-10-16 | End: 2021-10-16

## 2021-10-16 RX ADMIN — ACETAZOLAMIDE SODIUM 500 MG: 500 INJECTION, POWDER, LYOPHILIZED, FOR SOLUTION INTRAVENOUS at 18:24

## 2021-10-16 RX ADMIN — TORSEMIDE 100 MG: 100 TABLET ORAL at 17:09

## 2021-10-16 RX ADMIN — LEVOTHYROXINE SODIUM 25 MCG: 0.03 TABLET ORAL at 06:24

## 2021-10-16 RX ADMIN — BUDESONIDE AND FORMOTEROL FUMARATE DIHYDRATE 2 PUFF: 160; 4.5 AEROSOL RESPIRATORY (INHALATION) at 19:09

## 2021-10-16 RX ADMIN — CARVEDILOL 12.5 MG: 12.5 TABLET, FILM COATED ORAL at 17:09

## 2021-10-16 RX ADMIN — ATORVASTATIN CALCIUM 20 MG: 20 TABLET, FILM COATED ORAL at 20:20

## 2021-10-16 RX ADMIN — ALPRAZOLAM 1 MG: 0.5 TABLET ORAL at 08:09

## 2021-10-16 RX ADMIN — CLOTRIMAZOLE AND BETAMETHASONE DIPROPIONATE: 10; .5 CREAM TOPICAL at 20:21

## 2021-10-16 RX ADMIN — NYSTATIN: 100000 CREAM TOPICAL at 20:21

## 2021-10-16 RX ADMIN — ALPRAZOLAM 1 MG: 0.5 TABLET ORAL at 20:21

## 2021-10-16 RX ADMIN — GABAPENTIN 100 MG: 100 CAPSULE ORAL at 20:21

## 2021-10-16 RX ADMIN — INSULIN LISPRO 2 UNITS: 100 INJECTION, SOLUTION INTRAVENOUS; SUBCUTANEOUS at 17:09

## 2021-10-16 RX ADMIN — BUDESONIDE AND FORMOTEROL FUMARATE DIHYDRATE 2 PUFF: 160; 4.5 AEROSOL RESPIRATORY (INHALATION) at 08:41

## 2021-10-16 RX ADMIN — SALINE NASAL SPRAY 2 SPRAY: 1.5 SOLUTION NASAL at 08:05

## 2021-10-16 RX ADMIN — POTASSIUM CHLORIDE 40 MEQ: 750 TABLET, EXTENDED RELEASE ORAL at 08:05

## 2021-10-16 RX ADMIN — CARVEDILOL 12.5 MG: 12.5 TABLET, FILM COATED ORAL at 08:05

## 2021-10-16 RX ADMIN — PANTOPRAZOLE SODIUM 40 MG: 40 TABLET, DELAYED RELEASE ORAL at 06:25

## 2021-10-16 RX ADMIN — FLUTICASONE PROPIONATE 2 SPRAY: 50 SPRAY, METERED NASAL at 08:05

## 2021-10-16 RX ADMIN — VILAZODONE HYDROCHLORIDE 20 MG: 40 TABLET ORAL at 20:20

## 2021-10-16 RX ADMIN — DOCUSATE SODIUM 50MG AND SENNOSIDES 8.6MG 1 TABLET: 8.6; 5 TABLET, FILM COATED ORAL at 10:19

## 2021-10-16 RX ADMIN — NYSTATIN: 100000 CREAM TOPICAL at 08:05

## 2021-10-16 RX ADMIN — GABAPENTIN 100 MG: 100 CAPSULE ORAL at 08:05

## 2021-10-16 RX ADMIN — SODIUM CHLORIDE, PRESERVATIVE FREE 10 ML: 5 INJECTION INTRAVENOUS at 20:21

## 2021-10-16 RX ADMIN — CLOTRIMAZOLE AND BETAMETHASONE DIPROPIONATE: 10; .5 CREAM TOPICAL at 08:05

## 2021-10-16 RX ADMIN — FUROSEMIDE 80 MG: 10 INJECTION, SOLUTION INTRAMUSCULAR; INTRAVENOUS at 08:05

## 2021-10-16 RX ADMIN — SODIUM CHLORIDE, PRESERVATIVE FREE 10 ML: 5 INJECTION INTRAVENOUS at 08:13

## 2021-10-17 LAB
ALBUMIN SERPL-MCNC: 3.3 G/DL (ref 3.5–5.2)
ANION GAP SERPL CALCULATED.3IONS-SCNC: 8.8 MMOL/L (ref 5–15)
BASOPHILS # BLD AUTO: 0.07 10*3/MM3 (ref 0–0.2)
BASOPHILS NFR BLD AUTO: 0.8 % (ref 0–1.5)
BUN SERPL-MCNC: 27 MG/DL (ref 8–23)
BUN/CREAT SERPL: 19.7 (ref 7–25)
CALCIUM SPEC-SCNC: 8.2 MG/DL (ref 8.6–10.5)
CHLORIDE SERPL-SCNC: 98 MMOL/L (ref 98–107)
CO2 SERPL-SCNC: 36.2 MMOL/L (ref 22–29)
CREAT SERPL-MCNC: 1.37 MG/DL (ref 0.57–1)
DEPRECATED RDW RBC AUTO: 44.5 FL (ref 37–54)
EOSINOPHIL # BLD AUTO: 0.34 10*3/MM3 (ref 0–0.4)
EOSINOPHIL NFR BLD AUTO: 3.9 % (ref 0.3–6.2)
ERYTHROCYTE [DISTWIDTH] IN BLOOD BY AUTOMATED COUNT: 15.8 % (ref 12.3–15.4)
GFR SERPL CREATININE-BSD FRML MDRD: 37 ML/MIN/1.73
GLUCOSE BLDC GLUCOMTR-MCNC: 138 MG/DL (ref 70–130)
GLUCOSE BLDC GLUCOMTR-MCNC: 158 MG/DL (ref 70–130)
GLUCOSE BLDC GLUCOMTR-MCNC: 164 MG/DL (ref 70–130)
GLUCOSE BLDC GLUCOMTR-MCNC: 209 MG/DL (ref 70–130)
GLUCOSE SERPL-MCNC: 132 MG/DL (ref 65–99)
HCT VFR BLD AUTO: 27.9 % (ref 34–46.6)
HGB BLD-MCNC: 8 G/DL (ref 12–15.9)
IMM GRANULOCYTES # BLD AUTO: 0.03 10*3/MM3 (ref 0–0.05)
IMM GRANULOCYTES NFR BLD AUTO: 0.3 % (ref 0–0.5)
LYMPHOCYTES # BLD AUTO: 0.7 10*3/MM3 (ref 0.7–3.1)
LYMPHOCYTES NFR BLD AUTO: 8.1 % (ref 19.6–45.3)
MCH RBC QN AUTO: 22.3 PG (ref 26.6–33)
MCHC RBC AUTO-ENTMCNC: 28.7 G/DL (ref 31.5–35.7)
MCV RBC AUTO: 77.7 FL (ref 79–97)
MONOCYTES # BLD AUTO: 0.6 10*3/MM3 (ref 0.1–0.9)
MONOCYTES NFR BLD AUTO: 6.9 % (ref 5–12)
NEUTROPHILS NFR BLD AUTO: 6.92 10*3/MM3 (ref 1.7–7)
NEUTROPHILS NFR BLD AUTO: 80 % (ref 42.7–76)
NRBC BLD AUTO-RTO: 0 /100 WBC (ref 0–0.2)
PHOSPHATE SERPL-MCNC: 4.4 MG/DL (ref 2.5–4.5)
PLATELET # BLD AUTO: 253 10*3/MM3 (ref 140–450)
PMV BLD AUTO: 10.2 FL (ref 6–12)
POTASSIUM SERPL-SCNC: 3.4 MMOL/L (ref 3.5–5.2)
RBC # BLD AUTO: 3.59 10*6/MM3 (ref 3.77–5.28)
SODIUM SERPL-SCNC: 143 MMOL/L (ref 136–145)
WBC # BLD AUTO: 8.66 10*3/MM3 (ref 3.4–10.8)

## 2021-10-17 PROCEDURE — 63710000001 INSULIN LISPRO (HUMAN) PER 5 UNITS: Performed by: INTERNAL MEDICINE

## 2021-10-17 PROCEDURE — 97530 THERAPEUTIC ACTIVITIES: CPT

## 2021-10-17 PROCEDURE — 82962 GLUCOSE BLOOD TEST: CPT

## 2021-10-17 PROCEDURE — 85025 COMPLETE CBC W/AUTO DIFF WBC: CPT | Performed by: INTERNAL MEDICINE

## 2021-10-17 PROCEDURE — 99232 SBSQ HOSP IP/OBS MODERATE 35: CPT | Performed by: INTERNAL MEDICINE

## 2021-10-17 PROCEDURE — 80069 RENAL FUNCTION PANEL: CPT | Performed by: INTERNAL MEDICINE

## 2021-10-17 PROCEDURE — 94799 UNLISTED PULMONARY SVC/PX: CPT

## 2021-10-17 PROCEDURE — 97110 THERAPEUTIC EXERCISES: CPT

## 2021-10-17 PROCEDURE — 99232 SBSQ HOSP IP/OBS MODERATE 35: CPT | Performed by: NURSE PRACTITIONER

## 2021-10-17 RX ORDER — POTASSIUM CHLORIDE 750 MG/1
20 TABLET, FILM COATED, EXTENDED RELEASE ORAL 2 TIMES DAILY WITH MEALS
Status: DISCONTINUED | OUTPATIENT
Start: 2021-10-17 | End: 2021-10-19 | Stop reason: HOSPADM

## 2021-10-17 RX ADMIN — CARVEDILOL 12.5 MG: 12.5 TABLET, FILM COATED ORAL at 17:00

## 2021-10-17 RX ADMIN — POTASSIUM CHLORIDE 20 MEQ: 750 TABLET, EXTENDED RELEASE ORAL at 17:00

## 2021-10-17 RX ADMIN — BUDESONIDE AND FORMOTEROL FUMARATE DIHYDRATE 2 PUFF: 160; 4.5 AEROSOL RESPIRATORY (INHALATION) at 19:06

## 2021-10-17 RX ADMIN — GABAPENTIN 100 MG: 100 CAPSULE ORAL at 08:53

## 2021-10-17 RX ADMIN — VILAZODONE HYDROCHLORIDE 20 MG: 40 TABLET ORAL at 21:07

## 2021-10-17 RX ADMIN — DOCUSATE SODIUM 50MG AND SENNOSIDES 8.6MG 1 TABLET: 8.6; 5 TABLET, FILM COATED ORAL at 08:53

## 2021-10-17 RX ADMIN — FLUTICASONE PROPIONATE 2 SPRAY: 50 SPRAY, METERED NASAL at 08:53

## 2021-10-17 RX ADMIN — ATORVASTATIN CALCIUM 20 MG: 20 TABLET, FILM COATED ORAL at 21:07

## 2021-10-17 RX ADMIN — NYSTATIN: 100000 CREAM TOPICAL at 08:53

## 2021-10-17 RX ADMIN — BUDESONIDE AND FORMOTEROL FUMARATE DIHYDRATE 2 PUFF: 160; 4.5 AEROSOL RESPIRATORY (INHALATION) at 08:33

## 2021-10-17 RX ADMIN — NYSTATIN: 100000 CREAM TOPICAL at 21:08

## 2021-10-17 RX ADMIN — GABAPENTIN 100 MG: 100 CAPSULE ORAL at 21:07

## 2021-10-17 RX ADMIN — TORSEMIDE 100 MG: 100 TABLET ORAL at 08:53

## 2021-10-17 RX ADMIN — CLOTRIMAZOLE AND BETAMETHASONE DIPROPIONATE: 10; .5 CREAM TOPICAL at 08:53

## 2021-10-17 RX ADMIN — SPIRONOLACTONE 25 MG: 25 TABLET ORAL at 08:53

## 2021-10-17 RX ADMIN — SODIUM CHLORIDE, PRESERVATIVE FREE 10 ML: 5 INJECTION INTRAVENOUS at 08:55

## 2021-10-17 RX ADMIN — PANTOPRAZOLE SODIUM 40 MG: 40 TABLET, DELAYED RELEASE ORAL at 06:19

## 2021-10-17 RX ADMIN — ALPRAZOLAM 1 MG: 0.5 TABLET ORAL at 21:08

## 2021-10-17 RX ADMIN — ALPRAZOLAM 1 MG: 0.5 TABLET ORAL at 08:53

## 2021-10-17 RX ADMIN — TORSEMIDE 100 MG: 100 TABLET ORAL at 17:00

## 2021-10-17 RX ADMIN — CLOTRIMAZOLE AND BETAMETHASONE DIPROPIONATE: 10; .5 CREAM TOPICAL at 21:08

## 2021-10-17 RX ADMIN — LEVOTHYROXINE SODIUM 25 MCG: 0.03 TABLET ORAL at 06:18

## 2021-10-17 RX ADMIN — INSULIN LISPRO 2 UNITS: 100 INJECTION, SOLUTION INTRAVENOUS; SUBCUTANEOUS at 17:00

## 2021-10-17 RX ADMIN — POTASSIUM CHLORIDE 20 MEQ: 750 TABLET, EXTENDED RELEASE ORAL at 08:53

## 2021-10-17 RX ADMIN — CARVEDILOL 12.5 MG: 12.5 TABLET, FILM COATED ORAL at 08:53

## 2021-10-18 ENCOUNTER — TELEPHONE (OUTPATIENT)
Dept: GASTROENTEROLOGY | Facility: CLINIC | Age: 76
End: 2021-10-18

## 2021-10-18 LAB
ALBUMIN SERPL-MCNC: 3.3 G/DL (ref 3.5–5.2)
ANION GAP SERPL CALCULATED.3IONS-SCNC: 1.6 MMOL/L (ref 5–15)
BASOPHILS # BLD AUTO: 0.07 10*3/MM3 (ref 0–0.2)
BASOPHILS NFR BLD AUTO: 0.8 % (ref 0–1.5)
BUN SERPL-MCNC: 29 MG/DL (ref 8–23)
BUN/CREAT SERPL: 20.6 (ref 7–25)
CALCIUM SPEC-SCNC: 8.5 MG/DL (ref 8.6–10.5)
CHLORIDE SERPL-SCNC: 99 MMOL/L (ref 98–107)
CO2 SERPL-SCNC: 41.4 MMOL/L (ref 22–29)
CREAT SERPL-MCNC: 1.41 MG/DL (ref 0.57–1)
DEPRECATED RDW RBC AUTO: 43.4 FL (ref 37–54)
EOSINOPHIL # BLD AUTO: 0.32 10*3/MM3 (ref 0–0.4)
EOSINOPHIL NFR BLD AUTO: 3.7 % (ref 0.3–6.2)
ERYTHROCYTE [DISTWIDTH] IN BLOOD BY AUTOMATED COUNT: 15.9 % (ref 12.3–15.4)
GFR SERPL CREATININE-BSD FRML MDRD: 36 ML/MIN/1.73
GLUCOSE BLDC GLUCOMTR-MCNC: 126 MG/DL (ref 70–130)
GLUCOSE BLDC GLUCOMTR-MCNC: 127 MG/DL (ref 70–130)
GLUCOSE BLDC GLUCOMTR-MCNC: 164 MG/DL (ref 70–130)
GLUCOSE BLDC GLUCOMTR-MCNC: 220 MG/DL (ref 70–130)
GLUCOSE SERPL-MCNC: 131 MG/DL (ref 65–99)
HCT VFR BLD AUTO: 26.6 % (ref 34–46.6)
HGB BLD-MCNC: 8 G/DL (ref 12–15.9)
IMM GRANULOCYTES # BLD AUTO: 0.03 10*3/MM3 (ref 0–0.05)
IMM GRANULOCYTES NFR BLD AUTO: 0.3 % (ref 0–0.5)
LYMPHOCYTES # BLD AUTO: 0.79 10*3/MM3 (ref 0.7–3.1)
LYMPHOCYTES NFR BLD AUTO: 9.1 % (ref 19.6–45.3)
MCH RBC QN AUTO: 22.8 PG (ref 26.6–33)
MCHC RBC AUTO-ENTMCNC: 30.1 G/DL (ref 31.5–35.7)
MCV RBC AUTO: 75.8 FL (ref 79–97)
MONOCYTES # BLD AUTO: 0.64 10*3/MM3 (ref 0.1–0.9)
MONOCYTES NFR BLD AUTO: 7.4 % (ref 5–12)
NEUTROPHILS NFR BLD AUTO: 6.82 10*3/MM3 (ref 1.7–7)
NEUTROPHILS NFR BLD AUTO: 78.7 % (ref 42.7–76)
NRBC BLD AUTO-RTO: 0 /100 WBC (ref 0–0.2)
PHOSPHATE SERPL-MCNC: 4.4 MG/DL (ref 2.5–4.5)
PLATELET # BLD AUTO: 263 10*3/MM3 (ref 140–450)
PMV BLD AUTO: 10.2 FL (ref 6–12)
POTASSIUM SERPL-SCNC: 3.6 MMOL/L (ref 3.5–5.2)
RBC # BLD AUTO: 3.51 10*6/MM3 (ref 3.77–5.28)
SODIUM SERPL-SCNC: 142 MMOL/L (ref 136–145)
WBC # BLD AUTO: 8.67 10*3/MM3 (ref 3.4–10.8)

## 2021-10-18 PROCEDURE — 63710000001 INSULIN LISPRO (HUMAN) PER 5 UNITS: Performed by: INTERNAL MEDICINE

## 2021-10-18 PROCEDURE — 94799 UNLISTED PULMONARY SVC/PX: CPT

## 2021-10-18 PROCEDURE — 99231 SBSQ HOSP IP/OBS SF/LOW 25: CPT | Performed by: PHYSICIAN ASSISTANT

## 2021-10-18 PROCEDURE — 82962 GLUCOSE BLOOD TEST: CPT

## 2021-10-18 PROCEDURE — 80069 RENAL FUNCTION PANEL: CPT | Performed by: INTERNAL MEDICINE

## 2021-10-18 PROCEDURE — 85025 COMPLETE CBC W/AUTO DIFF WBC: CPT | Performed by: INTERNAL MEDICINE

## 2021-10-18 PROCEDURE — 99232 SBSQ HOSP IP/OBS MODERATE 35: CPT | Performed by: NURSE PRACTITIONER

## 2021-10-18 RX ORDER — LEVOTHYROXINE SODIUM 0.03 MG/1
TABLET ORAL
Qty: 40 TABLET | Refills: 0 | Status: SHIPPED | OUTPATIENT
Start: 2021-10-18 | End: 2022-01-01 | Stop reason: SDUPTHER

## 2021-10-18 RX ADMIN — ALPRAZOLAM 1 MG: 0.5 TABLET ORAL at 10:30

## 2021-10-18 RX ADMIN — ATORVASTATIN CALCIUM 20 MG: 20 TABLET, FILM COATED ORAL at 21:43

## 2021-10-18 RX ADMIN — SODIUM CHLORIDE, PRESERVATIVE FREE 10 ML: 5 INJECTION INTRAVENOUS at 08:35

## 2021-10-18 RX ADMIN — GABAPENTIN 100 MG: 100 CAPSULE ORAL at 21:43

## 2021-10-18 RX ADMIN — PANTOPRAZOLE SODIUM 40 MG: 40 TABLET, DELAYED RELEASE ORAL at 08:34

## 2021-10-18 RX ADMIN — ALPRAZOLAM 1 MG: 0.5 TABLET ORAL at 21:42

## 2021-10-18 RX ADMIN — INSULIN LISPRO 4 UNITS: 100 INJECTION, SOLUTION INTRAVENOUS; SUBCUTANEOUS at 13:04

## 2021-10-18 RX ADMIN — BUDESONIDE AND FORMOTEROL FUMARATE DIHYDRATE 2 PUFF: 160; 4.5 AEROSOL RESPIRATORY (INHALATION) at 19:26

## 2021-10-18 RX ADMIN — TORSEMIDE 100 MG: 100 TABLET ORAL at 18:04

## 2021-10-18 RX ADMIN — BUDESONIDE AND FORMOTEROL FUMARATE DIHYDRATE 2 PUFF: 160; 4.5 AEROSOL RESPIRATORY (INHALATION) at 07:43

## 2021-10-18 RX ADMIN — SODIUM CHLORIDE, PRESERVATIVE FREE 10 ML: 5 INJECTION INTRAVENOUS at 21:43

## 2021-10-18 RX ADMIN — POTASSIUM CHLORIDE 20 MEQ: 750 TABLET, EXTENDED RELEASE ORAL at 18:04

## 2021-10-18 RX ADMIN — CARVEDILOL 12.5 MG: 12.5 TABLET, FILM COATED ORAL at 08:34

## 2021-10-18 RX ADMIN — LEVOTHYROXINE SODIUM 25 MCG: 0.03 TABLET ORAL at 08:34

## 2021-10-18 RX ADMIN — GABAPENTIN 100 MG: 100 CAPSULE ORAL at 08:34

## 2021-10-18 RX ADMIN — CLOTRIMAZOLE AND BETAMETHASONE DIPROPIONATE: 10; .5 CREAM TOPICAL at 08:35

## 2021-10-18 RX ADMIN — SPIRONOLACTONE 25 MG: 25 TABLET ORAL at 08:34

## 2021-10-18 RX ADMIN — FLUTICASONE PROPIONATE 2 SPRAY: 50 SPRAY, METERED NASAL at 08:35

## 2021-10-18 RX ADMIN — TORSEMIDE 100 MG: 100 TABLET ORAL at 08:33

## 2021-10-18 RX ADMIN — NYSTATIN 1 APPLICATION: 100000 CREAM TOPICAL at 08:35

## 2021-10-18 RX ADMIN — DOCUSATE SODIUM 50MG AND SENNOSIDES 8.6MG 1 TABLET: 8.6; 5 TABLET, FILM COATED ORAL at 08:34

## 2021-10-18 RX ADMIN — CARVEDILOL 12.5 MG: 12.5 TABLET, FILM COATED ORAL at 18:04

## 2021-10-18 RX ADMIN — VILAZODONE HYDROCHLORIDE 20 MG: 40 TABLET ORAL at 21:43

## 2021-10-18 RX ADMIN — POTASSIUM CHLORIDE 20 MEQ: 750 TABLET, EXTENDED RELEASE ORAL at 08:38

## 2021-10-18 NOTE — TELEPHONE ENCOUNTER
----- Message from Dixon Azevedo MD sent at 10/18/2021  9:13 AM EDT -----  Tubular adenoma colon polyps  Colonoscopy recall 5 yrs

## 2021-10-19 ENCOUNTER — READMISSION MANAGEMENT (OUTPATIENT)
Dept: CALL CENTER | Facility: HOSPITAL | Age: 76
End: 2021-10-19

## 2021-10-19 VITALS
SYSTOLIC BLOOD PRESSURE: 107 MMHG | WEIGHT: 268 LBS | OXYGEN SATURATION: 99 % | HEIGHT: 60 IN | RESPIRATION RATE: 16 BRPM | BODY MASS INDEX: 52.61 KG/M2 | HEART RATE: 75 BPM | DIASTOLIC BLOOD PRESSURE: 46 MMHG | TEMPERATURE: 97.8 F

## 2021-10-19 DIAGNOSIS — E11.65 UNCONTROLLED TYPE 2 DIABETES MELLITUS WITH HYPERGLYCEMIA (HCC): ICD-10-CM

## 2021-10-19 LAB
ALBUMIN SERPL-MCNC: 3.4 G/DL (ref 3.5–5.2)
ANION GAP SERPL CALCULATED.3IONS-SCNC: 9.6 MMOL/L (ref 5–15)
BASOPHILS # BLD AUTO: 0.05 10*3/MM3 (ref 0–0.2)
BASOPHILS NFR BLD AUTO: 0.5 % (ref 0–1.5)
BUN SERPL-MCNC: 30 MG/DL (ref 8–23)
BUN/CREAT SERPL: 17.5 (ref 7–25)
CALCIUM SPEC-SCNC: 8.2 MG/DL (ref 8.6–10.5)
CHLORIDE SERPL-SCNC: 98 MMOL/L (ref 98–107)
CO2 SERPL-SCNC: 33.4 MMOL/L (ref 22–29)
CREAT SERPL-MCNC: 1.71 MG/DL (ref 0.57–1)
DEPRECATED RDW RBC AUTO: 44 FL (ref 37–54)
EOSINOPHIL # BLD AUTO: 0.31 10*3/MM3 (ref 0–0.4)
EOSINOPHIL NFR BLD AUTO: 3.3 % (ref 0.3–6.2)
ERYTHROCYTE [DISTWIDTH] IN BLOOD BY AUTOMATED COUNT: 15.8 % (ref 12.3–15.4)
GFR SERPL CREATININE-BSD FRML MDRD: 29 ML/MIN/1.73
GLUCOSE BLDC GLUCOMTR-MCNC: 129 MG/DL (ref 70–130)
GLUCOSE BLDC GLUCOMTR-MCNC: 213 MG/DL (ref 70–130)
GLUCOSE SERPL-MCNC: 166 MG/DL (ref 65–99)
HCT VFR BLD AUTO: 27.8 % (ref 34–46.6)
HGB BLD-MCNC: 8 G/DL (ref 12–15.9)
IMM GRANULOCYTES # BLD AUTO: 0.04 10*3/MM3 (ref 0–0.05)
IMM GRANULOCYTES NFR BLD AUTO: 0.4 % (ref 0–0.5)
LYMPHOCYTES # BLD AUTO: 0.81 10*3/MM3 (ref 0.7–3.1)
LYMPHOCYTES NFR BLD AUTO: 8.5 % (ref 19.6–45.3)
MAGNESIUM SERPL-MCNC: 2.2 MG/DL (ref 1.6–2.4)
MCH RBC QN AUTO: 22 PG (ref 26.6–33)
MCHC RBC AUTO-ENTMCNC: 28.8 G/DL (ref 31.5–35.7)
MCV RBC AUTO: 76.6 FL (ref 79–97)
MONOCYTES # BLD AUTO: 0.57 10*3/MM3 (ref 0.1–0.9)
MONOCYTES NFR BLD AUTO: 6 % (ref 5–12)
NEUTROPHILS NFR BLD AUTO: 7.72 10*3/MM3 (ref 1.7–7)
NEUTROPHILS NFR BLD AUTO: 81.3 % (ref 42.7–76)
NRBC BLD AUTO-RTO: 0 /100 WBC (ref 0–0.2)
PHOSPHATE SERPL-MCNC: 4.6 MG/DL (ref 2.5–4.5)
PLATELET # BLD AUTO: 261 10*3/MM3 (ref 140–450)
PMV BLD AUTO: 10.2 FL (ref 6–12)
POTASSIUM SERPL-SCNC: 4.3 MMOL/L (ref 3.5–5.2)
RBC # BLD AUTO: 3.63 10*6/MM3 (ref 3.77–5.28)
SODIUM SERPL-SCNC: 141 MMOL/L (ref 136–145)
WBC # BLD AUTO: 9.5 10*3/MM3 (ref 3.4–10.8)

## 2021-10-19 PROCEDURE — 82962 GLUCOSE BLOOD TEST: CPT

## 2021-10-19 PROCEDURE — 94660 CPAP INITIATION&MGMT: CPT

## 2021-10-19 PROCEDURE — 94799 UNLISTED PULMONARY SVC/PX: CPT

## 2021-10-19 PROCEDURE — 99231 SBSQ HOSP IP/OBS SF/LOW 25: CPT | Performed by: PHYSICIAN ASSISTANT

## 2021-10-19 PROCEDURE — 63710000001 INSULIN LISPRO (HUMAN) PER 5 UNITS: Performed by: INTERNAL MEDICINE

## 2021-10-19 PROCEDURE — 80069 RENAL FUNCTION PANEL: CPT | Performed by: INTERNAL MEDICINE

## 2021-10-19 PROCEDURE — 85025 COMPLETE CBC W/AUTO DIFF WBC: CPT | Performed by: INTERNAL MEDICINE

## 2021-10-19 PROCEDURE — 99239 HOSP IP/OBS DSCHRG MGMT >30: CPT | Performed by: NURSE PRACTITIONER

## 2021-10-19 PROCEDURE — 83735 ASSAY OF MAGNESIUM: CPT | Performed by: INTERNAL MEDICINE

## 2021-10-19 RX ORDER — CETIRIZINE HYDROCHLORIDE, PSEUDOEPHEDRINE HYDROCHLORIDE 5; 120 MG/1; MG/1
1 TABLET, FILM COATED, EXTENDED RELEASE ORAL ONCE
Status: COMPLETED | OUTPATIENT
Start: 2021-10-19 | End: 2021-10-19

## 2021-10-19 RX ORDER — CARVEDILOL 12.5 MG/1
12.5 TABLET ORAL 2 TIMES DAILY WITH MEALS
Qty: 60 TABLET | Refills: 11 | Status: SHIPPED | OUTPATIENT
Start: 2021-10-19

## 2021-10-19 RX ORDER — SPIRONOLACTONE 25 MG/1
25 TABLET ORAL DAILY
Qty: 30 TABLET | Refills: 11 | Status: SHIPPED | OUTPATIENT
Start: 2021-10-20 | End: 2022-01-01 | Stop reason: HOSPADM

## 2021-10-19 RX ORDER — TORSEMIDE 100 MG/1
100 TABLET ORAL 2 TIMES DAILY
Qty: 60 TABLET | Refills: 11 | Status: SHIPPED | OUTPATIENT
Start: 2021-10-19 | End: 2022-01-01 | Stop reason: HOSPADM

## 2021-10-19 RX ADMIN — CLOTRIMAZOLE AND BETAMETHASONE DIPROPIONATE: 10; .5 CREAM TOPICAL at 10:05

## 2021-10-19 RX ADMIN — LEVOTHYROXINE SODIUM 25 MCG: 0.03 TABLET ORAL at 07:37

## 2021-10-19 RX ADMIN — NYSTATIN: 100000 CREAM TOPICAL at 10:05

## 2021-10-19 RX ADMIN — GABAPENTIN 100 MG: 100 CAPSULE ORAL at 10:04

## 2021-10-19 RX ADMIN — POTASSIUM CHLORIDE 20 MEQ: 750 TABLET, EXTENDED RELEASE ORAL at 10:04

## 2021-10-19 RX ADMIN — INSULIN LISPRO 4 UNITS: 100 INJECTION, SOLUTION INTRAVENOUS; SUBCUTANEOUS at 12:13

## 2021-10-19 RX ADMIN — SODIUM CHLORIDE, PRESERVATIVE FREE 10 ML: 5 INJECTION INTRAVENOUS at 10:05

## 2021-10-19 RX ADMIN — CARVEDILOL 12.5 MG: 12.5 TABLET, FILM COATED ORAL at 10:03

## 2021-10-19 RX ADMIN — ACETAMINOPHEN 650 MG: 325 TABLET, FILM COATED ORAL at 16:10

## 2021-10-19 RX ADMIN — ALPRAZOLAM 1 MG: 0.5 TABLET ORAL at 10:04

## 2021-10-19 RX ADMIN — CETIRIZINE HYDROCHLORIDE, PSEUDOEPHEDRINE HYDROCHLORIDE 1 TABLET: 5; 120 TABLET, FILM COATED, EXTENDED RELEASE ORAL at 10:26

## 2021-10-19 RX ADMIN — FLUTICASONE PROPIONATE 2 SPRAY: 50 SPRAY, METERED NASAL at 10:05

## 2021-10-19 RX ADMIN — PANTOPRAZOLE SODIUM 40 MG: 40 TABLET, DELAYED RELEASE ORAL at 10:26

## 2021-10-19 RX ADMIN — TORSEMIDE 100 MG: 100 TABLET ORAL at 10:03

## 2021-10-19 RX ADMIN — SPIRONOLACTONE 25 MG: 25 TABLET ORAL at 10:04

## 2021-10-19 RX ADMIN — BUDESONIDE AND FORMOTEROL FUMARATE DIHYDRATE 2 PUFF: 160; 4.5 AEROSOL RESPIRATORY (INHALATION) at 08:05

## 2021-10-19 NOTE — OUTREACH NOTE
Prep Survey      Responses   [facility] patient discharged from? [place]   Is LACE score < 7 ? No   Emergency Room discharge w/ pulse ox? No   Eligibility [facility]   Date of Admission 10/06/21   Date of Discharge 10/19/21   Discharge Disposition Home or Self Care   Discharge diagnosis Acute congestive heart failure    Does the patient have one of the following disease processes/diagnoses(primary or secondary)? CHF   Does the patient have Home health ordered? Yes   What is the Home health agency?  [facility]     Is there a DME ordered? No   Prep survey completed? Yes          Fabiana Ballard RN

## 2021-10-19 NOTE — TELEPHONE ENCOUNTER
Rx Refill Note  Requested Prescriptions     Pending Prescriptions Disp Refills   • metFORMIN (GLUCOPHAGE) 500 MG tablet [Pharmacy Med Name: metFORMIN  MG TABLET] 90 tablet 0     Sig: TAKE ONE TABLET BY MOUTH DAILY WITH BREAKFAST FOR 7 DAYS, THEN TAKE ONE TABLET BY MOUTH TWICE A DAY WITH A MEAL      Last office visit with prescribing clinician: 06/23/2021      Next office visit with prescribing clinician: Visit date not found            Eduadra Simmons Rep  10/19/21, 12:01 EDT

## 2021-10-20 ENCOUNTER — TRANSITIONAL CARE MANAGEMENT TELEPHONE ENCOUNTER (OUTPATIENT)
Dept: CALL CENTER | Facility: HOSPITAL | Age: 76
End: 2021-10-20

## 2021-10-20 NOTE — OUTREACH NOTE
Call Center TCM Note      Responses   [facility] patient discharged from? [place]   Does the patient have one of the following disease processes/diagnoses(primary or secondary)? CHF   TCM attempt successful? Yes   Call start time 1534   Call end time 1537   Discharge diagnosis Acute congestive heart failure    Meds reviewed with patient/caregiver? Yes   Is the patient having any side effects they believe may be caused by any medication additions or changes? No   Does the patient have all medications ordered at discharge? Yes   Is the patient taking all medications as directed (includes completed medication regime)? Yes   Does the patient have a primary care provider?  Yes   Does the patient have an appointment with their PCP within 7 days of discharge? No   Comments regarding PCP patient will make her own f/u appts   Nursing Interventions Advised patient to make appointment   Has the patient kept scheduled appointments due by today? N/A   What is the Home health agency?  [facility]     Has home health visited the patient within 72 hours of discharge? Yes   Psychosocial issues? No   Did the patient receive a copy of their discharge instructions? Yes   Nursing interventions Reviewed instructions with patient   What is the patient's perception of their health status since discharge? Improving   Nursing interventions Nurse provided patient education   Is the patient weighing daily? Yes   Does the patient have scales? Yes   Daily weight interventions Education provided on importance of daily weight   Is the patient able to teach back signs and symptoms of worsening condition? (i.e. weight gain, shortness of air, etc.) Yes   Is the patient/caregiver able to teach back the hierarchy of who to call/visit for symptoms/problems? PCP, Specialist, Home health nurse, Urgent Care, ED, 911 Yes   TCM call completed? Yes   Wrap up additional comments Says she is doing well, doing her daily weight checks, says the home  health nurse was there earlier, she will be calling to make her own f/u appts.           Nallely Godinez RN    10/20/2021, 15:37 EDT

## 2021-10-24 DIAGNOSIS — G89.29 CHRONIC PAIN OF LEFT KNEE: ICD-10-CM

## 2021-10-24 DIAGNOSIS — M17.12 PRIMARY OSTEOARTHRITIS OF LEFT KNEE: ICD-10-CM

## 2021-10-24 DIAGNOSIS — M25.562 CHRONIC PAIN OF LEFT KNEE: ICD-10-CM

## 2021-10-24 DIAGNOSIS — M79.604 BILATERAL LEG PAIN: ICD-10-CM

## 2021-10-24 DIAGNOSIS — M79.605 BILATERAL LEG PAIN: ICD-10-CM

## 2021-10-25 RX ORDER — TRAMADOL HYDROCHLORIDE 50 MG/1
TABLET ORAL
Qty: 30 TABLET | OUTPATIENT
Start: 2021-10-25

## 2021-10-27 ENCOUNTER — TELEPHONE (OUTPATIENT)
Dept: FAMILY MEDICINE CLINIC | Facility: CLINIC | Age: 76
End: 2021-10-27

## 2021-10-27 ENCOUNTER — TELEPHONE (OUTPATIENT)
Dept: GASTROENTEROLOGY | Facility: CLINIC | Age: 76
End: 2021-10-27

## 2021-10-27 NOTE — TELEPHONE ENCOUNTER
Patient has not read her my chart note.     Dr. Azevedo has signed off on your pathology report and the following is from him:     No colon cancer.   Your colon polyps were tubular adenomas. These are not cancer but are considered potentially pre-cancer and have been removed.   You will need to repeat your colonoscopy in 5 years and we will send you a reminder

## 2021-10-27 NOTE — TELEPHONE ENCOUNTER
Hub staff attempted to follow warm transfer process and was unsuccessful     Caller: Carmen Carrizales    Relationship to patient: Emergency Contact    Best call back number: [phone]     Patient is needing: HOSPITAL FOLLOW UP. DISCHARGED ON 10/19/21 FROM [facility] AFTER A TWO WEEK STAY.    NO FURTHER DETAILS.

## 2021-10-27 NOTE — TELEPHONE ENCOUNTER
Patient called. Spoke with grand-daughter Carmen Carrizales, who is listed on DELIA.   Advised as per Dr. Azevedo's note. She verb understanding and will relay message to the patient.

## 2021-10-28 ENCOUNTER — READMISSION MANAGEMENT (OUTPATIENT)
Dept: CALL CENTER | Facility: HOSPITAL | Age: 76
End: 2021-10-28

## 2021-10-28 NOTE — OUTREACH NOTE
CHF Week 2 Survey      Responses   [facility] patient discharged from? [place]   Does the patient have one of the following disease processes/diagnoses(primary or secondary)? CHF   Week 2 attempt successful? Yes   Call start time 1444   Call end time 1448   Discharge diagnosis Acute congestive heart failure    Meds reviewed with patient/caregiver? Yes   Is the patient taking all medications as directed (includes completed medication regime)? Yes   Has the patient kept scheduled appointments due by today? Yes   Home health comments HH has only been in once. Wrapping every legs every Tuesday   Pulse Ox monitoring Intermittent   Pulse Ox device source Patient   O2 Sat comments running 97%, on oxygen at 3L   O2 Sat: education provided Sat levels,  When to seek care   Psychosocial issues? No   What is the patient's perception of their health status since discharge? Improving   Nursing interventions Nurse provided patient education   Is the patient weighing daily? Yes   Is the patient able to teach back Heart Failure diet management? Yes   Is the patient able to teach back signs and symptoms of worsening condition? (i.e. weight gain, shortness of air, etc.) Yes   Additional teach back comments Pt states she feels like she is doing better. She will call  to see when they are coming back to see her.    CHF Week 2 call completed? Yes          Cristina Pedraza RN

## 2021-11-03 ENCOUNTER — OFFICE VISIT (OUTPATIENT)
Dept: FAMILY MEDICINE CLINIC | Facility: CLINIC | Age: 76
End: 2021-11-03

## 2021-11-03 DIAGNOSIS — F41.1 GENERALIZED ANXIETY DISORDER: ICD-10-CM

## 2021-11-03 DIAGNOSIS — E03.9 HYPOTHYROIDISM (ACQUIRED): Chronic | ICD-10-CM

## 2021-11-03 DIAGNOSIS — I10 ESSENTIAL HYPERTENSION: ICD-10-CM

## 2021-11-03 DIAGNOSIS — Z09 HOSPITAL DISCHARGE FOLLOW-UP: Primary | ICD-10-CM

## 2021-11-03 DIAGNOSIS — F33.41 RECURRENT MAJOR DEPRESSIVE DISORDER, IN PARTIAL REMISSION (HCC): ICD-10-CM

## 2021-11-03 DIAGNOSIS — I50.32 CHRONIC DIASTOLIC HEART FAILURE (HCC): ICD-10-CM

## 2021-11-03 DIAGNOSIS — E11.42 DIABETIC PERIPHERAL NEUROPATHY (HCC): ICD-10-CM

## 2021-11-03 PROBLEM — I38 HEART VALVE DISORDER: Status: ACTIVE | Noted: 2021-10-28

## 2021-11-03 PROBLEM — I48.92 PAROXYSMAL ATRIAL FLUTTER (HCC): Status: ACTIVE | Noted: 2021-10-28

## 2021-11-03 PROBLEM — I27.20 PULMONARY HYPERTENSION (HCC): Status: ACTIVE | Noted: 2021-10-28

## 2021-11-03 PROBLEM — G62.9 PERIPHERAL NEUROPATHY: Status: ACTIVE | Noted: 2021-10-28

## 2021-11-03 PROCEDURE — 99214 OFFICE O/P EST MOD 30 MIN: CPT | Performed by: NURSE PRACTITIONER

## 2021-11-03 NOTE — PROGRESS NOTES
Chief Complaint  Hospital Follow Up Visit (hosp f/u edema)    Subjective          Miguelina Lopez presents to [facility]  History of Present Illness pt is here for f/u since most recent hospitalization.      PTA she was steadily gaining weight and developed worsening b/l leg swelling with weeping blisters.   Had almost 30 lb weight gain in short time and was referred to ER and was kept there x appr 2 weeks.  She was diuresed and meds adjusted.  While there she had some melena and required EGD and c scope.  Had some polyps removed, EGD was clear.      Is followed by nephrology and has been taken off metformin.  Is taking Trulicity weeky without side effects.  Following appr 1200ml fluid restriction.  She denies any signs or symptoms of hypo or hyperglycemia.    [facility] for PT and nsg for twice weekly drsg changes, although have only been there once.  Deyanira who is PCG knows how to wrap her legs.  Legs seem to be doing well.  No new weeping reported.  No signs or symptoms of complications.    Breathing is improved and not using O2 much.  Eating well-mostly veges.  No fast foods.     Weighs daily and is currently stable.  Denies chest pain, palpitations, dizziness, cough.    Nephrology has OKd pts current meds.  Creatinine has been stable.     Continues gabapentin BID for b/l leg neuropathy.  Feels it is controlled.    Taking PPI for reflux and controlled.  No current melena, hematochezia.    She is continued her thyroid medicine as directed and denies any signs or symptoms of hypo or hyperthyroidism.      Objective   Vital Signs:   There were no vitals taken for this visit.    Physical Exam  Vitals and nursing note reviewed.   Constitutional:       General: She is not in acute distress.     Appearance: She is well-developed. She is obese. She is not ill-appearing or diaphoretic.   HENT:      Head: Normocephalic and atraumatic.   Eyes:      General:         Right eye: No discharge.          Left eye: No discharge.      Conjunctiva/sclera: Conjunctivae normal.   Cardiovascular:      Rate and Rhythm: Normal rate and regular rhythm.   Pulmonary:      Effort: Pulmonary effort is normal.      Breath sounds: Normal breath sounds.   Abdominal:      General: Bowel sounds are normal.      Palpations: Abdomen is soft.      Tenderness: There is no abdominal tenderness.   Musculoskeletal:         General: No deformity.      Right lower leg: Edema present.      Left lower leg: Edema present.      Comments: Patient is in wheelchair  Bilateral legs wrapped with Ace   Skin:     General: Skin is warm and dry.   Neurological:      Mental Status: She is alert and oriented to person, place, and time.   Psychiatric:         Mood and Affect: Mood normal.         Behavior: Behavior normal.        Result Review :                 Assessment and Plan    Diagnoses and all orders for this visit:    1. Hospital discharge follow-up (Primary)    2. Essential hypertension    3. Chronic diastolic heart failure (HCC)    4. Hypothyroidism (acquired)    5. Diabetic peripheral neuropathy (HCC)    6. Recurrent major depressive disorder, in partial remission (HCC)    7. Generalized anxiety disorder      Family has been monitoring her closely at home.  She is getting home health care as well.  She is taking and tolerating her medications without any side effects.  Granddaughter is helping to manage medications well.  Patient is following closely with daily weights.  Has upcoming follow-up with cardiology not for several months since she she is stable.  She is not using any oxygen today and uses it as needed at home and especially at nighttime.  Her diuretics have been changed and patient reports addition of Aldactone has really increased her urination.    Recommend gradually increasing her activity and tolerance.  She is planning to get her Covid booster soon.  Family has been very careful about making sure everyone around her is  vaccinated.    Diabetic neuropathy seems to be stable on current medications and will continue.    She continues the Trulicity for type 2 diabetes.  Her Metformin has been stopped due to elevated creatinine by nephrology.  Her most recent creatinine was at goal.  We will recheck this in 3 months.  Working on diet.    Hypothyroidism seems to be controlled with current medications.  She had a TSH that was therapeutic done hospital we will continue current medications.    Anxiety and depression seem to be controlled with her Viibryd and will continue this.  Her mood and affect were very good today.    We will see him back in 3 months for follow-up.  Sooner if any problems.  Hospital records, nephrology note, labs, imaging, discharge summary are reviewed and discussed with patient.        Follow Up   No follow-ups on file.  Patient was given instructions and counseling regarding her condition or for health maintenance advice. Please see specific information pulled into the AVS if appropriate.

## 2021-11-04 ENCOUNTER — READMISSION MANAGEMENT (OUTPATIENT)
Dept: CALL CENTER | Facility: HOSPITAL | Age: 76
End: 2021-11-04

## 2021-11-04 DIAGNOSIS — M79.604 BILATERAL LEG PAIN: ICD-10-CM

## 2021-11-04 DIAGNOSIS — M17.12 PRIMARY OSTEOARTHRITIS OF LEFT KNEE: ICD-10-CM

## 2021-11-04 DIAGNOSIS — M25.562 CHRONIC PAIN OF LEFT KNEE: ICD-10-CM

## 2021-11-04 DIAGNOSIS — M79.605 BILATERAL LEG PAIN: ICD-10-CM

## 2021-11-04 DIAGNOSIS — G89.29 CHRONIC PAIN OF LEFT KNEE: ICD-10-CM

## 2021-11-04 NOTE — TELEPHONE ENCOUNTER
Rx Refill Note  Requested Prescriptions     Pending Prescriptions Disp Refills   • traMADol (ULTRAM) 50 MG tablet 30 tablet 2     Sig: Take 1 tablet by mouth Daily As Needed for Moderate Pain .      Last office visit with prescribing clinician: 11/3/2021      Next office visit with prescribing clinician: Visit date not found            Isabel Lawrence MA  11/04/21, 11:38 EDT

## 2021-11-04 NOTE — OUTREACH NOTE
CHF Week 3 Survey      Responses   [facility] patient discharged from? [place]   Does the patient have one of the following disease processes/diagnoses(primary or secondary)? CHF   Week 3 attempt successful? Yes   Call start time 1523   Call end time 1526   Discharge diagnosis Acute congestive heart failure    Meds reviewed with patient/caregiver? Yes   Is the patient taking all medications as directed (includes completed medication regime)? Yes   Medication comments Off Metformin, taking Trulicity   Has the patient kept scheduled appointments due by today? Yes   Comments renal Tuesday and PCP Wednesday   Home health comments HH has only been in once. Wrapping every legs every Tuesday   Pulse Ox monitoring Intermittent   Pulse Ox device source Patient   O2 Sat comments running 97%, on oxygen at 3L   O2 Sat: education provided Sat levels,  When to seek care   What is the patient's perception of their health status since discharge? Improving   Is the patient weighing daily? Yes   Is the patient able to teach back Heart Failure Zones? Yes  [green zone, no wt changes]   Is the patient able to teach back signs and symptoms of worsening condition? (i.e. weight gain, shortness of air, etc.) Yes   Is the patient/caregiver able to teach back the hierarchy of who to call/visit for symptoms/problems? PCP, Specialist, Home health nurse, Urgent Care, ED, 911 Yes   CHF Week 3 call completed? Yes          Cristina Pedraza RN

## 2021-11-05 RX ORDER — TRAMADOL HYDROCHLORIDE 50 MG/1
50 TABLET ORAL DAILY PRN
Qty: 30 TABLET | Refills: 2 | Status: SHIPPED | OUTPATIENT
Start: 2021-11-05 | End: 2022-01-01 | Stop reason: SDUPTHER

## 2021-11-09 ENCOUNTER — APPOINTMENT (OUTPATIENT)
Dept: VACCINE CLINIC | Facility: HOSPITAL | Age: 76
End: 2021-11-09

## 2021-11-09 RX ORDER — CARVEDILOL 3.12 MG/1
TABLET ORAL
Qty: 180 TABLET | Refills: 2 | OUTPATIENT
Start: 2021-11-09

## 2021-11-09 NOTE — TELEPHONE ENCOUNTER
LOV   -   8/10/21 Zuri Souza   -   5/26/22 Semder  Last Labs   -   10/26/21 Scanned Labs         Please advise on correct dose she should be taking.

## 2021-11-11 ENCOUNTER — READMISSION MANAGEMENT (OUTPATIENT)
Dept: CALL CENTER | Facility: HOSPITAL | Age: 76
End: 2021-11-11

## 2021-11-11 NOTE — OUTREACH NOTE
CHF Week 4 Survey      Responses   [facility] patient discharged from? [place]   Does the patient have one of the following disease processes/diagnoses(primary or secondary)? CHF   Week 4 attempt successful? No          Loretta Rosado LPN

## 2021-11-13 ENCOUNTER — IMMUNIZATION (OUTPATIENT)
Dept: VACCINE CLINIC | Facility: HOSPITAL | Age: 76
End: 2021-11-13

## 2021-11-13 PROCEDURE — 91300 HC SARSCOV02 VAC 30MCG/0.3ML IM: CPT | Performed by: INTERNAL MEDICINE

## 2021-11-13 PROCEDURE — 0004A ADM SARSCOV2 30MCG/0.3ML BOOSTER: CPT | Performed by: INTERNAL MEDICINE

## 2021-11-15 RX ORDER — VILAZODONE HYDROCHLORIDE 20 MG/1
TABLET ORAL
Qty: 30 TABLET | Refills: 3 | Status: SHIPPED | OUTPATIENT
Start: 2021-11-15 | End: 2022-01-01

## 2021-11-17 DIAGNOSIS — G63 POLYNEUROPATHY ASSOCIATED WITH UNDERLYING DISEASE (HCC): ICD-10-CM

## 2021-11-18 NOTE — TELEPHONE ENCOUNTER
Caller: Carmen Carrizales    Relationship to patient: Emergency Contact    Best call back number: [phone] (H)    Patient is needing: PATIENTS GRANDDAUGHTER CALLED IN TO CHECK ON MEDICATION REQUEST. STATES PATIENT WILL BE OUT OF MEDICATION TOMORROW. PLEASE ADVISE. AKIRAK YOU.

## 2021-11-19 RX ORDER — GABAPENTIN 100 MG/1
100 CAPSULE ORAL EVERY 12 HOURS
Qty: 60 CAPSULE | Refills: 2 | Status: SHIPPED | OUTPATIENT
Start: 2021-11-19 | End: 2021-01-01 | Stop reason: SDUPTHER

## 2021-11-19 NOTE — TELEPHONE ENCOUNTER
Caller: Miguelina Lopez    Relationship: Self    Best call back number: [phone]    Requested Prescriptions:   Requested Prescriptions     Pending Prescriptions Disp Refills   • gabapentin (NEURONTIN) 100 MG capsule 60 capsule 0     Sig: Take 1 capsule by mouth Every 12 (Twelve) Hours.        Pharmacy where request should be sent:  [facility] [address] [address] [facility] AT [place] [employer] [facility] [phone] [facility] [phone]   [phone]    Additional details provided by patient: PATIENT STATES SHE IS OUT.     Does the patient have less than a 3 day supply:  [x] Yes  [] No    Eduarda Villagomez Rep   11/19/21 12:00 EST

## 2021-12-08 NOTE — TELEPHONE ENCOUNTER
PATIENT'S GRANDDAUGHTER LEO, ON  VERBAL, CALLED IN REGARDS TO MEDICATION REFILL OF     Dulaglutide (Trulicity) 0.75 MG/0.5ML solution pen-injector    PHARMACY IS NEEDING A PRE AUTH.    SHE HAS ONE PEN LEFT FOR NEXT Sunday.     LEO'S CALL BACK NUMBER [phone]

## 2021-12-20 NOTE — TELEPHONE ENCOUNTER
I dont think she needs prescription strength anymore and should be able to supplement with [phone] without toxicity-Medicare does not pay for vitamin D labs anymore-SW

## 2021-12-20 NOTE — TELEPHONE ENCOUNTER
Rx Refill Note  Requested Prescriptions     Pending Prescriptions Disp Refills   • vitamin D (ERGOCALCIFEROL) 1.25 MG (67076 UT) capsule capsule [Pharmacy Med Name: VIT D2 (ERGOCAL) 1.25MG(50,000U) CP] 12 capsule 0     Sig: TAKE ONE CAPSULE BY MOUTH EVERY 7 DAYS . TAKES ON SUNDAY      Last office visit with prescribing clinician: 11/3/2021      Next office visit with prescribing clinician: Visit date not found            Chaitanya Phelps MA  12/20/21, 09:42 EST

## 2021-12-29 NOTE — TELEPHONE ENCOUNTER
[facility]  NEEDS CURRENT USE AND BENEFIT NOTES FOR USE OF THE VENT WITHIN THE LAST 6 MONTHS.  RENEWAL AUTHORIZATION OF PATIENTS VENT    CALL BACK #: [phone]  FAX #: [phone]

## 2022-01-01 ENCOUNTER — HOME HEALTH ADMISSION (OUTPATIENT)
Dept: HOME HEALTH SERVICES | Facility: HOME HEALTHCARE | Age: 77
End: 2022-01-01

## 2022-01-01 ENCOUNTER — HOSPITAL ENCOUNTER (EMERGENCY)
Facility: HOSPITAL | Age: 77
Discharge: HOME | End: 2022-09-22
Attending: EMERGENCY MEDICINE | Admitting: EMERGENCY MEDICINE

## 2022-01-01 ENCOUNTER — OFFICE VISIT (OUTPATIENT)
Dept: FAMILY MEDICINE CLINIC | Facility: CLINIC | Age: 77
End: 2022-01-01

## 2022-01-01 ENCOUNTER — APPOINTMENT (OUTPATIENT)
Dept: CARDIOLOGY | Facility: HOSPITAL | Age: 77
End: 2022-01-01

## 2022-01-01 ENCOUNTER — HOSPITAL ENCOUNTER (INPATIENT)
Facility: HOSPITAL | Age: 77
LOS: 4 days | Discharge: NURSING, REHAB OR LONG-TERM CARE | End: 2022-11-08
Attending: EMERGENCY MEDICINE | Admitting: INTERNAL MEDICINE

## 2022-01-01 ENCOUNTER — APPOINTMENT (OUTPATIENT)
Dept: GENERAL RADIOLOGY | Facility: HOSPITAL | Age: 77
End: 2022-01-01

## 2022-01-01 ENCOUNTER — APPOINTMENT (OUTPATIENT)
Dept: CT IMAGING | Facility: HOSPITAL | Age: 77
End: 2022-01-01

## 2022-01-01 ENCOUNTER — TELEPHONE (OUTPATIENT)
Dept: FAMILY MEDICINE CLINIC | Facility: CLINIC | Age: 77
End: 2022-01-01

## 2022-01-01 ENCOUNTER — APPOINTMENT (OUTPATIENT)
Dept: ULTRASOUND IMAGING | Facility: HOSPITAL | Age: 77
End: 2022-01-01

## 2022-01-01 ENCOUNTER — HOSPITAL ENCOUNTER (INPATIENT)
Facility: HOSPITAL | Age: 77
LOS: 8 days | Discharge: NURSING, REHAB OR LONG-TERM CARE | End: 2022-07-23
Attending: EMERGENCY MEDICINE

## 2022-01-01 ENCOUNTER — HOSPITAL ENCOUNTER (OUTPATIENT)
Facility: HOSPITAL | Age: 77
Setting detail: OBSERVATION
LOS: 6 days | Discharge: NURSING, REHAB OR LONG-TERM CARE | End: 2022-10-18
Attending: EMERGENCY MEDICINE | Admitting: EMERGENCY MEDICINE

## 2022-01-01 VITALS
TEMPERATURE: 97.6 F | BODY MASS INDEX: 53.18 KG/M2 | WEIGHT: 263.8 LBS | HEART RATE: 75 BPM | SYSTOLIC BLOOD PRESSURE: 114 MMHG | HEIGHT: 59 IN | RESPIRATION RATE: 18 BRPM | OXYGEN SATURATION: 98 % | DIASTOLIC BLOOD PRESSURE: 55 MMHG

## 2022-01-01 VITALS
HEIGHT: 59 IN | TEMPERATURE: 98.2 F | DIASTOLIC BLOOD PRESSURE: 71 MMHG | WEIGHT: 256.2 LBS | HEART RATE: 75 BPM | OXYGEN SATURATION: 94 % | BODY MASS INDEX: 51.65 KG/M2 | RESPIRATION RATE: 18 BRPM | SYSTOLIC BLOOD PRESSURE: 148 MMHG

## 2022-01-01 VITALS
OXYGEN SATURATION: 100 % | SYSTOLIC BLOOD PRESSURE: 144 MMHG | HEIGHT: 60 IN | BODY MASS INDEX: 52.61 KG/M2 | DIASTOLIC BLOOD PRESSURE: 72 MMHG | WEIGHT: 268 LBS | TEMPERATURE: 97.8 F | HEART RATE: 57 BPM

## 2022-01-01 VITALS
WEIGHT: 266.32 LBS | RESPIRATION RATE: 18 BRPM | TEMPERATURE: 97.2 F | HEART RATE: 78 BPM | DIASTOLIC BLOOD PRESSURE: 67 MMHG | OXYGEN SATURATION: 94 % | HEIGHT: 59 IN | BODY MASS INDEX: 53.69 KG/M2 | SYSTOLIC BLOOD PRESSURE: 112 MMHG

## 2022-01-01 VITALS
DIASTOLIC BLOOD PRESSURE: 60 MMHG | OXYGEN SATURATION: 91 % | BODY MASS INDEX: 53.02 KG/M2 | HEIGHT: 59 IN | HEART RATE: 75 BPM | WEIGHT: 263 LBS | TEMPERATURE: 98.4 F | RESPIRATION RATE: 20 BRPM | SYSTOLIC BLOOD PRESSURE: 124 MMHG

## 2022-01-01 DIAGNOSIS — M17.12 PRIMARY OSTEOARTHRITIS OF LEFT KNEE: Chronic | ICD-10-CM

## 2022-01-01 DIAGNOSIS — E78.2 MIXED HYPERLIPIDEMIA: Chronic | ICD-10-CM

## 2022-01-01 DIAGNOSIS — I10 ESSENTIAL HYPERTENSION: Chronic | ICD-10-CM

## 2022-01-01 DIAGNOSIS — I48.92 PAROXYSMAL ATRIAL FLUTTER: ICD-10-CM

## 2022-01-01 DIAGNOSIS — E11.65 INADEQUATELY CONTROLLED DIABETES MELLITUS: Chronic | ICD-10-CM

## 2022-01-01 DIAGNOSIS — M25.562 CHRONIC PAIN OF LEFT KNEE: ICD-10-CM

## 2022-01-01 DIAGNOSIS — D64.9 ANEMIA, UNSPECIFIED TYPE: ICD-10-CM

## 2022-01-01 DIAGNOSIS — G89.29 CHRONIC PAIN OF LEFT KNEE: ICD-10-CM

## 2022-01-01 DIAGNOSIS — N17.9 AKI (ACUTE KIDNEY INJURY): ICD-10-CM

## 2022-01-01 DIAGNOSIS — G63 POLYNEUROPATHY ASSOCIATED WITH UNDERLYING DISEASE: ICD-10-CM

## 2022-01-01 DIAGNOSIS — J44.9 CHRONIC OBSTRUCTIVE PULMONARY DISEASE, UNSPECIFIED COPD TYPE: Chronic | ICD-10-CM

## 2022-01-01 DIAGNOSIS — E11.65 UNCONTROLLED TYPE 2 DIABETES MELLITUS WITH HYPERGLYCEMIA: ICD-10-CM

## 2022-01-01 DIAGNOSIS — M79.604 BILATERAL LEG PAIN: ICD-10-CM

## 2022-01-01 DIAGNOSIS — E78.2 MIXED HYPERLIPIDEMIA: ICD-10-CM

## 2022-01-01 DIAGNOSIS — N18.9 CHRONIC KIDNEY DISEASE, UNSPECIFIED CKD STAGE: ICD-10-CM

## 2022-01-01 DIAGNOSIS — G47.33 OSA (OBSTRUCTIVE SLEEP APNEA): ICD-10-CM

## 2022-01-01 DIAGNOSIS — I35.0 NONRHEUMATIC AORTIC VALVE STENOSIS: ICD-10-CM

## 2022-01-01 DIAGNOSIS — I27.20 PULMONARY HYPERTENSION: ICD-10-CM

## 2022-01-01 DIAGNOSIS — M79.605 BILATERAL LEG PAIN: ICD-10-CM

## 2022-01-01 DIAGNOSIS — I50.43 ACUTE ON CHRONIC COMBINED SYSTOLIC AND DIASTOLIC CONGESTIVE HEART FAILURE: ICD-10-CM

## 2022-01-01 DIAGNOSIS — R60.0 BILATERAL LOWER EXTREMITY EDEMA: ICD-10-CM

## 2022-01-01 DIAGNOSIS — I44.2 COMPLETE HEART BLOCK: ICD-10-CM

## 2022-01-01 DIAGNOSIS — E78.2 MIXED HYPERLIPIDEMIA: Primary | ICD-10-CM

## 2022-01-01 DIAGNOSIS — Z95.0 PRESENCE OF PERMANENT CARDIAC PACEMAKER: ICD-10-CM

## 2022-01-01 DIAGNOSIS — I50.9 CONGESTIVE HEART FAILURE, UNSPECIFIED HF CHRONICITY, UNSPECIFIED HEART FAILURE TYPE: ICD-10-CM

## 2022-01-01 DIAGNOSIS — I10 HYPERTENSION, UNSPECIFIED TYPE: ICD-10-CM

## 2022-01-01 DIAGNOSIS — E03.9 ACQUIRED HYPOTHYROIDISM: ICD-10-CM

## 2022-01-01 DIAGNOSIS — S80.01XA CONTUSION OF RIGHT KNEE, INITIAL ENCOUNTER: ICD-10-CM

## 2022-01-01 DIAGNOSIS — I25.10 CHRONIC CORONARY ARTERY DISEASE: ICD-10-CM

## 2022-01-01 DIAGNOSIS — M17.12 PRIMARY OSTEOARTHRITIS OF LEFT KNEE: ICD-10-CM

## 2022-01-01 DIAGNOSIS — Z00.00 ENCOUNTER FOR ANNUAL WELLNESS VISIT (AWV) IN MEDICARE PATIENT: Primary | ICD-10-CM

## 2022-01-01 DIAGNOSIS — I50.33 ACUTE ON CHRONIC DIASTOLIC HEART FAILURE: ICD-10-CM

## 2022-01-01 DIAGNOSIS — R04.0 EPISTAXIS: Primary | ICD-10-CM

## 2022-01-01 DIAGNOSIS — E78.5 HYPERLIPIDEMIA, UNSPECIFIED HYPERLIPIDEMIA TYPE: ICD-10-CM

## 2022-01-01 DIAGNOSIS — R26.2 INABILITY TO AMBULATE DUE TO KNEE: ICD-10-CM

## 2022-01-01 DIAGNOSIS — I50.32 CHRONIC DIASTOLIC HEART FAILURE: Chronic | ICD-10-CM

## 2022-01-01 DIAGNOSIS — R07.89 OTHER CHEST PAIN: Primary | ICD-10-CM

## 2022-01-01 DIAGNOSIS — S22.31XA CLOSED FRACTURE OF ONE RIB OF RIGHT SIDE, INITIAL ENCOUNTER: ICD-10-CM

## 2022-01-01 DIAGNOSIS — I50.9 ACUTE CONGESTIVE HEART FAILURE, UNSPECIFIED HEART FAILURE TYPE: Primary | ICD-10-CM

## 2022-01-01 DIAGNOSIS — R73.9 HYPERGLYCEMIA: ICD-10-CM

## 2022-01-01 DIAGNOSIS — I34.0 NONRHEUMATIC MITRAL VALVE REGURGITATION: ICD-10-CM

## 2022-01-01 DIAGNOSIS — R53.83 OTHER FATIGUE: ICD-10-CM

## 2022-01-01 DIAGNOSIS — N17.9 AKI (ACUTE KIDNEY INJURY): Primary | ICD-10-CM

## 2022-01-01 DIAGNOSIS — I38 HEART VALVE DISORDER: ICD-10-CM

## 2022-01-01 DIAGNOSIS — Z95.2 S/P TAVR (TRANSCATHETER AORTIC VALVE REPLACEMENT): ICD-10-CM

## 2022-01-01 DIAGNOSIS — E11.9 TYPE 2 DIABETES MELLITUS WITHOUT COMPLICATION, UNSPECIFIED WHETHER LONG TERM INSULIN USE: ICD-10-CM

## 2022-01-01 LAB
25(OH)D3 SERPL-MCNC: 58.3 NG/ML (ref 30–100)
ALBUMIN SERPL-MCNC: 2.6 G/DL (ref 3.5–5.2)
ALBUMIN SERPL-MCNC: 2.7 G/DL (ref 3.5–5.2)
ALBUMIN SERPL-MCNC: 2.8 G/DL (ref 3.5–5.2)
ALBUMIN SERPL-MCNC: 2.9 G/DL (ref 3.5–5.2)
ALBUMIN SERPL-MCNC: 2.9 G/DL (ref 3.5–5.2)
ALBUMIN SERPL-MCNC: 3 G/DL (ref 3.5–5.2)
ALBUMIN SERPL-MCNC: 3.1 G/DL (ref 3.5–5.2)
ALBUMIN SERPL-MCNC: 3.2 G/DL (ref 3.5–5.2)
ALBUMIN SERPL-MCNC: 3.3 G/DL (ref 3.5–5.2)
ALBUMIN SERPL-MCNC: 3.3 G/DL (ref 3.5–5.2)
ALBUMIN SERPL-MCNC: 3.4 G/DL (ref 3.5–5.2)
ALBUMIN SERPL-MCNC: 3.8 G/DL (ref 3.5–5.2)
ALBUMIN SERPL-MCNC: 3.8 G/DL (ref 3.5–5.2)
ALBUMIN SERPL-MCNC: 3.8 G/DL (ref 3.7–4.7)
ALBUMIN SERPL-MCNC: 4.2 G/DL (ref 3.5–5.2)
ALBUMIN/GLOB SERPL: 1 G/DL
ALBUMIN/GLOB SERPL: 1.1 G/DL
ALBUMIN/GLOB SERPL: 1.2 G/DL
ALBUMIN/GLOB SERPL: 1.2 G/DL
ALBUMIN/GLOB SERPL: 1.2 {RATIO} (ref 1.2–2.2)
ALBUMIN/GLOB SERPL: 1.6 G/DL
ALP SERPL-CCNC: 164 U/L (ref 39–117)
ALP SERPL-CCNC: 171 U/L (ref 39–117)
ALP SERPL-CCNC: 173 IU/L (ref 44–121)
ALP SERPL-CCNC: 177 U/L (ref 39–117)
ALP SERPL-CCNC: 177 U/L (ref 39–117)
ALP SERPL-CCNC: 182 U/L (ref 39–117)
ALT SERPL W P-5'-P-CCNC: 11 U/L (ref 1–33)
ALT SERPL W P-5'-P-CCNC: 18 U/L (ref 1–33)
ALT SERPL W P-5'-P-CCNC: 7 U/L (ref 1–33)
ALT SERPL W P-5'-P-CCNC: 8 U/L (ref 1–33)
ALT SERPL W P-5'-P-CCNC: 9 U/L (ref 1–33)
ALT SERPL-CCNC: 10 IU/L (ref 0–32)
ANION GAP SERPL CALCULATED.3IONS-SCNC: 10 MMOL/L (ref 5–15)
ANION GAP SERPL CALCULATED.3IONS-SCNC: 10.3 MMOL/L (ref 5–15)
ANION GAP SERPL CALCULATED.3IONS-SCNC: 10.5 MMOL/L (ref 5–15)
ANION GAP SERPL CALCULATED.3IONS-SCNC: 11 MMOL/L (ref 5–15)
ANION GAP SERPL CALCULATED.3IONS-SCNC: 11 MMOL/L (ref 5–15)
ANION GAP SERPL CALCULATED.3IONS-SCNC: 11.2 MMOL/L (ref 5–15)
ANION GAP SERPL CALCULATED.3IONS-SCNC: 11.9 MMOL/L (ref 5–15)
ANION GAP SERPL CALCULATED.3IONS-SCNC: 12.1 MMOL/L (ref 5–15)
ANION GAP SERPL CALCULATED.3IONS-SCNC: 12.2 MMOL/L (ref 5–15)
ANION GAP SERPL CALCULATED.3IONS-SCNC: 12.6 MMOL/L (ref 5–15)
ANION GAP SERPL CALCULATED.3IONS-SCNC: 12.8 MMOL/L (ref 5–15)
ANION GAP SERPL CALCULATED.3IONS-SCNC: 13 MMOL/L (ref 5–15)
ANION GAP SERPL CALCULATED.3IONS-SCNC: 13.2 MMOL/L (ref 5–15)
ANION GAP SERPL CALCULATED.3IONS-SCNC: 13.7 MMOL/L (ref 5–15)
ANION GAP SERPL CALCULATED.3IONS-SCNC: 13.8 MMOL/L (ref 5–15)
ANION GAP SERPL CALCULATED.3IONS-SCNC: 14 MMOL/L (ref 5–15)
ANION GAP SERPL CALCULATED.3IONS-SCNC: 14.4 MMOL/L (ref 5–15)
ANION GAP SERPL CALCULATED.3IONS-SCNC: 14.4 MMOL/L (ref 5–15)
ANION GAP SERPL CALCULATED.3IONS-SCNC: 15.5 MMOL/L (ref 5–15)
ANION GAP SERPL CALCULATED.3IONS-SCNC: 8.6 MMOL/L (ref 5–15)
ANION GAP SERPL CALCULATED.3IONS-SCNC: 9.1 MMOL/L (ref 5–15)
ANION GAP SERPL CALCULATED.3IONS-SCNC: 9.6 MMOL/L (ref 5–15)
ANION GAP SERPL CALCULATED.3IONS-SCNC: 9.7 MMOL/L (ref 5–15)
AORTIC ARCH: 2.2 CM
AORTIC DIMENSIONLESS INDEX: 0.3 (DI)
AORTIC DIMENSIONLESS INDEX: 0.4 (DI)
APTT PPP: 28.6 SECONDS (ref 22.7–35.4)
ARTERIAL PATENCY WRIST A: ABNORMAL
ARTERIAL PATENCY WRIST A: ABNORMAL
ARTERIAL PATENCY WRIST A: POSITIVE
ASCENDING AORTA: 3.1 CM
AST SERPL-CCNC: 11 U/L (ref 1–32)
AST SERPL-CCNC: 14 U/L (ref 1–32)
AST SERPL-CCNC: 15 U/L (ref 1–32)
AST SERPL-CCNC: 16 IU/L (ref 0–40)
AST SERPL-CCNC: 18 U/L (ref 1–32)
AST SERPL-CCNC: 20 U/L (ref 1–32)
ATMOSPHERIC PRESS: 744.9 MMHG
ATMOSPHERIC PRESS: 754.7 MMHG
ATMOSPHERIC PRESS: 755.3 MMHG
B PARAPERT DNA SPEC QL NAA+PROBE: NOT DETECTED
B PERT DNA SPEC QL NAA+PROBE: NOT DETECTED
BASE EXCESS BLDA CALC-SCNC: 2.5 MMOL/L (ref 0–2)
BASE EXCESS BLDA CALC-SCNC: 3.9 MMOL/L (ref 0–2)
BASE EXCESS BLDA CALC-SCNC: 4.5 MMOL/L (ref 0–2)
BASOPHILS # BLD AUTO: 0.03 10*3/MM3 (ref 0–0.2)
BASOPHILS # BLD AUTO: 0.04 10*3/MM3 (ref 0–0.2)
BASOPHILS # BLD AUTO: 0.05 10*3/MM3 (ref 0–0.2)
BASOPHILS # BLD AUTO: 0.06 10*3/MM3 (ref 0–0.2)
BASOPHILS # BLD AUTO: 0.06 10*3/MM3 (ref 0–0.2)
BASOPHILS # BLD AUTO: 0.1 X10E3/UL (ref 0–0.2)
BASOPHILS NFR BLD AUTO: 0.3 % (ref 0–1.5)
BASOPHILS NFR BLD AUTO: 0.4 % (ref 0–1.5)
BASOPHILS NFR BLD AUTO: 0.5 % (ref 0–1.5)
BASOPHILS NFR BLD AUTO: 0.5 % (ref 0–1.5)
BASOPHILS NFR BLD AUTO: 0.7 % (ref 0–1.5)
BASOPHILS NFR BLD AUTO: 0.7 % (ref 0–1.5)
BASOPHILS NFR BLD AUTO: 0.9 % (ref 0–1.5)
BASOPHILS NFR BLD AUTO: 1 %
BDY SITE: ABNORMAL
BH CV ECHO MEAS - ACS: 0.92 CM
BH CV ECHO MEAS - AO MAX PG: 32.4 MMHG
BH CV ECHO MEAS - AO MAX PG: 34.9 MMHG
BH CV ECHO MEAS - AO MEAN PG: 15.8 MMHG
BH CV ECHO MEAS - AO MEAN PG: 17 MMHG
BH CV ECHO MEAS - AO ROOT DIAM: 2.7 CM
BH CV ECHO MEAS - AO V2 MAX: 284.5 CM/SEC
BH CV ECHO MEAS - AO V2 MAX: 295.3 CM/SEC
BH CV ECHO MEAS - AO V2 VTI: 61.7 CM
BH CV ECHO MEAS - AO V2 VTI: 62.1 CM
BH CV ECHO MEAS - AVA(I,D): 0.84 CM2
BH CV ECHO MEAS - AVA(I,D): 0.95 CM2
BH CV ECHO MEAS - EDV(CUBED): 62.7 ML
BH CV ECHO MEAS - EDV(CUBED): 94.2 ML
BH CV ECHO MEAS - EDV(MOD-SP2): 100 ML
BH CV ECHO MEAS - EDV(MOD-SP2): 113 ML
BH CV ECHO MEAS - EDV(MOD-SP4): 66 ML
BH CV ECHO MEAS - EDV(MOD-SP4): 98 ML
BH CV ECHO MEAS - EF(MOD-BP): 54.8 %
BH CV ECHO MEAS - EF(MOD-BP): 63.9 %
BH CV ECHO MEAS - EF(MOD-SP2): 57.5 %
BH CV ECHO MEAS - EF(MOD-SP2): 63 %
BH CV ECHO MEAS - EF(MOD-SP4): 49 %
BH CV ECHO MEAS - EF(MOD-SP4): 60.6 %
BH CV ECHO MEAS - EF_3D-VOL: 62 %
BH CV ECHO MEAS - ESV(CUBED): 18.1 ML
BH CV ECHO MEAS - ESV(CUBED): 21.7 ML
BH CV ECHO MEAS - ESV(MOD-SP2): 37 ML
BH CV ECHO MEAS - ESV(MOD-SP2): 48 ML
BH CV ECHO MEAS - ESV(MOD-SP4): 26 ML
BH CV ECHO MEAS - ESV(MOD-SP4): 50 ML
BH CV ECHO MEAS - FS: 33.9 %
BH CV ECHO MEAS - FS: 38.7 %
BH CV ECHO MEAS - IVS/LVPW: 1.01 CM
BH CV ECHO MEAS - IVS/LVPW: 1.07 CM
BH CV ECHO MEAS - IVSD: 1.18 CM
BH CV ECHO MEAS - IVSD: 1.5 CM
BH CV ECHO MEAS - LA 3D VOL INDEX: 50
BH CV ECHO MEAS - LAT PEAK E' VEL: 5.2 CM/SEC
BH CV ECHO MEAS - LAT PEAK E' VEL: 6.2 CM/SEC
BH CV ECHO MEAS - LV DIASTOLIC VOL/BSA (35-75): 31.8 CM2
BH CV ECHO MEAS - LV DIASTOLIC VOL/BSA (35-75): 47.3 CM2
BH CV ECHO MEAS - LV MASS(C)D: 157.6 GRAMS
BH CV ECHO MEAS - LV MASS(C)D: 266.3 GRAMS
BH CV ECHO MEAS - LV MAX PG: 3.3 MMHG
BH CV ECHO MEAS - LV MAX PG: 4.9 MMHG
BH CV ECHO MEAS - LV MEAN PG: 1.59 MMHG
BH CV ECHO MEAS - LV MEAN PG: 2.39 MMHG
BH CV ECHO MEAS - LV SYSTOLIC VOL/BSA (12-30): 12.5 CM2
BH CV ECHO MEAS - LV SYSTOLIC VOL/BSA (12-30): 24.1 CM2
BH CV ECHO MEAS - LV V1 MAX: 111.2 CM/SEC
BH CV ECHO MEAS - LV V1 MAX: 90.7 CM/SEC
BH CV ECHO MEAS - LV V1 VTI: 17.3 CM
BH CV ECHO MEAS - LV V1 VTI: 23.6 CM
BH CV ECHO MEAS - LVIDD: 4 CM
BH CV ECHO MEAS - LVIDD: 4.5 CM
BH CV ECHO MEAS - LVIDS: 2.6 CM
BH CV ECHO MEAS - LVIDS: 2.8 CM
BH CV ECHO MEAS - LVOT AREA: 2.49 CM2
BH CV ECHO MEAS - LVOT AREA: 3 CM2
BH CV ECHO MEAS - LVOT DIAM: 1.78 CM
BH CV ECHO MEAS - LVOT DIAM: 1.95 CM
BH CV ECHO MEAS - LVPWD: 1.16 CM
BH CV ECHO MEAS - LVPWD: 1.4 CM
BH CV ECHO MEAS - MED PEAK E' VEL: 4.7 CM/SEC
BH CV ECHO MEAS - MED PEAK E' VEL: 6.6 CM/SEC
BH CV ECHO MEAS - MR MAX PG: 116.8 MMHG
BH CV ECHO MEAS - MR MAX VEL: 540.5 CM/SEC
BH CV ECHO MEAS - MR MEAN PG: 53.8 MMHG
BH CV ECHO MEAS - MR MEAN VEL: 344.5 CM/SEC
BH CV ECHO MEAS - MR VTI: 114.6 CM
BH CV ECHO MEAS - MV A DUR: 0.11 SEC
BH CV ECHO MEAS - MV A DUR: 0.13 SEC
BH CV ECHO MEAS - MV A MAX VEL: 149.7 CM/SEC
BH CV ECHO MEAS - MV A MAX VEL: 93.8 CM/SEC
BH CV ECHO MEAS - MV DEC SLOPE: 1095 CM/SEC2
BH CV ECHO MEAS - MV DEC SLOPE: 578.7 CM/SEC2
BH CV ECHO MEAS - MV DEC TIME: 0.25 MSEC
BH CV ECHO MEAS - MV DEC TIME: 170 MSEC
BH CV ECHO MEAS - MV E MAX VEL: 227 CM/SEC
BH CV ECHO MEAS - MV E MAX VEL: 229 CM/SEC
BH CV ECHO MEAS - MV E/A: 1.53
BH CV ECHO MEAS - MV E/A: 2.42
BH CV ECHO MEAS - MV MAX PG: 17.5 MMHG
BH CV ECHO MEAS - MV MAX PG: 31.3 MMHG
BH CV ECHO MEAS - MV MEAN PG: 15.4 MMHG
BH CV ECHO MEAS - MV MEAN PG: 6.9 MMHG
BH CV ECHO MEAS - MV P1/2T: 104.4 MSEC
BH CV ECHO MEAS - MV P1/2T: 71.5 MSEC
BH CV ECHO MEAS - MV V2 VTI: 51.8 CM
BH CV ECHO MEAS - MV V2 VTI: 75.2 CM
BH CV ECHO MEAS - MVA(P1/2T): 2.11 CM2
BH CV ECHO MEAS - MVA(P1/2T): 3.1 CM2
BH CV ECHO MEAS - MVA(VTI): 0.69 CM2
BH CV ECHO MEAS - MVA(VTI): 1.14 CM2
BH CV ECHO MEAS - PA ACC TIME: 0.11 SEC
BH CV ECHO MEAS - PA PR(ACCEL): 27.9 MMHG
BH CV ECHO MEAS - PA V2 MAX: 89.1 CM/SEC
BH CV ECHO MEAS - PI END-D VEL: 165.4 CM/SEC
BH CV ECHO MEAS - PULM A REVS DUR: 0.12 SEC
BH CV ECHO MEAS - PULM A REVS VEL: 19.3 CM/SEC
BH CV ECHO MEAS - PULM DIAS VEL: 23.6 CM/SEC
BH CV ECHO MEAS - PULM DIAS VEL: 52.3 CM/SEC
BH CV ECHO MEAS - PULM S/D: 1.54
BH CV ECHO MEAS - PULM S/D: 1.59
BH CV ECHO MEAS - PULM SYS VEL: 37.5 CM/SEC
BH CV ECHO MEAS - PULM SYS VEL: 80.3 CM/SEC
BH CV ECHO MEAS - RAP SYSTOLE: 3 MMHG
BH CV ECHO MEAS - RAP SYSTOLE: 8 MMHG
BH CV ECHO MEAS - RV MAX PG: 1.06 MMHG
BH CV ECHO MEAS - RV MAX PG: 6.4 MMHG
BH CV ECHO MEAS - RV V1 MAX: 126.2 CM/SEC
BH CV ECHO MEAS - RV V1 MAX: 51.4 CM/SEC
BH CV ECHO MEAS - RV V1 VTI: 11.6 CM
BH CV ECHO MEAS - RV V1 VTI: 29.6 CM
BH CV ECHO MEAS - RVSP: 57 MMHG
BH CV ECHO MEAS - RVSP: 86.5 MMHG
BH CV ECHO MEAS - SI(MOD-SP2): 30.4 ML/M2
BH CV ECHO MEAS - SI(MOD-SP2): 31.4 ML/M2
BH CV ECHO MEAS - SI(MOD-SP4): 19.3 ML/M2
BH CV ECHO MEAS - SI(MOD-SP4): 23.2 ML/M2
BH CV ECHO MEAS - SUP REN AO DIAM: 1.8 CM
BH CV ECHO MEAS - SV(LVOT): 51.9 ML
BH CV ECHO MEAS - SV(LVOT): 58.8 ML
BH CV ECHO MEAS - SV(MOD-SP2): 63 ML
BH CV ECHO MEAS - SV(MOD-SP2): 65 ML
BH CV ECHO MEAS - SV(MOD-SP4): 40 ML
BH CV ECHO MEAS - SV(MOD-SP4): 48 ML
BH CV ECHO MEAS - TAPSE (>1.6): 0.97 CM
BH CV ECHO MEAS - TAPSE (>1.6): 1 CM
BH CV ECHO MEAS - TR MAX PG: 54.1 MMHG
BH CV ECHO MEAS - TR MAX PG: 78.5 MMHG
BH CV ECHO MEAS - TR MAX VEL: 367.7 CM/SEC
BH CV ECHO MEAS - TR MAX VEL: 443 CM/SEC
BH CV ECHO MEASUREMENTS AVERAGE E/E' RATIO: 35.78
BH CV ECHO MEASUREMENTS AVERAGE E/E' RATIO: 45.86
BH CV XLRA - RV BASE: 3.9 CM
BH CV XLRA - RV BASE: 4 CM
BH CV XLRA - RV LENGTH: 7.5 CM
BH CV XLRA - RV LENGTH: 9 CM
BH CV XLRA - RV MID: 2.7 CM
BH CV XLRA - RV MID: 4 CM
BH CV XLRA - TDI S': 4.6 CM/SEC
BH CV XLRA - TDI S': 5.3 CM/SEC
BILIRUB SERPL-MCNC: 0.4 MG/DL (ref 0–1.2)
BILIRUB SERPL-MCNC: 0.5 MG/DL (ref 0–1.2)
BUN SERPL-MCNC: 53 MG/DL (ref 8–23)
BUN SERPL-MCNC: 54 MG/DL (ref 8–27)
BUN SERPL-MCNC: 55 MG/DL (ref 8–23)
BUN SERPL-MCNC: 56 MG/DL (ref 8–23)
BUN SERPL-MCNC: 59 MG/DL (ref 8–23)
BUN SERPL-MCNC: 60 MG/DL (ref 8–23)
BUN SERPL-MCNC: 60 MG/DL (ref 8–23)
BUN SERPL-MCNC: 61 MG/DL (ref 8–23)
BUN SERPL-MCNC: 63 MG/DL (ref 8–23)
BUN SERPL-MCNC: 65 MG/DL (ref 8–23)
BUN SERPL-MCNC: 66 MG/DL (ref 8–23)
BUN SERPL-MCNC: 66 MG/DL (ref 8–23)
BUN SERPL-MCNC: 67 MG/DL (ref 8–23)
BUN SERPL-MCNC: 67 MG/DL (ref 8–23)
BUN SERPL-MCNC: 68 MG/DL (ref 8–23)
BUN SERPL-MCNC: 70 MG/DL (ref 8–23)
BUN SERPL-MCNC: 71 MG/DL (ref 8–23)
BUN SERPL-MCNC: 71 MG/DL (ref 8–23)
BUN SERPL-MCNC: 74 MG/DL (ref 8–23)
BUN SERPL-MCNC: 77 MG/DL (ref 8–23)
BUN SERPL-MCNC: 80 MG/DL (ref 8–23)
BUN SERPL-MCNC: 82 MG/DL (ref 8–23)
BUN SERPL-MCNC: 84 MG/DL (ref 8–23)
BUN SERPL-MCNC: 89 MG/DL (ref 8–23)
BUN/CREAT SERPL: 20.8 (ref 7–25)
BUN/CREAT SERPL: 21.1 (ref 7–25)
BUN/CREAT SERPL: 22.2 (ref 7–25)
BUN/CREAT SERPL: 22.3 (ref 7–25)
BUN/CREAT SERPL: 22.4 (ref 7–25)
BUN/CREAT SERPL: 22.5 (ref 7–25)
BUN/CREAT SERPL: 23.1 (ref 7–25)
BUN/CREAT SERPL: 23.2 (ref 7–25)
BUN/CREAT SERPL: 23.9 (ref 7–25)
BUN/CREAT SERPL: 24.5 (ref 7–25)
BUN/CREAT SERPL: 26 (ref 12–28)
BUN/CREAT SERPL: 27.4 (ref 7–25)
BUN/CREAT SERPL: 28 (ref 7–25)
BUN/CREAT SERPL: 28 (ref 7–25)
BUN/CREAT SERPL: 28.1 (ref 7–25)
BUN/CREAT SERPL: 28.3 (ref 7–25)
BUN/CREAT SERPL: 29.6 (ref 7–25)
BUN/CREAT SERPL: 30 (ref 7–25)
BUN/CREAT SERPL: 30 (ref 7–25)
BUN/CREAT SERPL: 30.2 (ref 7–25)
BUN/CREAT SERPL: 30.8 (ref 7–25)
BUN/CREAT SERPL: 31 (ref 7–25)
BUN/CREAT SERPL: 31.2 (ref 7–25)
BUN/CREAT SERPL: 31.6 (ref 7–25)
BUN/CREAT SERPL: 32.1 (ref 7–25)
BUN/CREAT SERPL: 32.2 (ref 7–25)
BUN/CREAT SERPL: 37.1 (ref 7–25)
BUN/CREAT SERPL: 37.2 (ref 7–25)
BUN/CREAT SERPL: 38.7 (ref 7–25)
C PNEUM DNA NPH QL NAA+NON-PROBE: NOT DETECTED
CALCIUM SERPL-MCNC: 8.7 MG/DL (ref 8.7–10.3)
CALCIUM SPEC-SCNC: 7.6 MG/DL (ref 8.6–10.5)
CALCIUM SPEC-SCNC: 7.7 MG/DL (ref 8.6–10.5)
CALCIUM SPEC-SCNC: 7.8 MG/DL (ref 8.6–10.5)
CALCIUM SPEC-SCNC: 8 MG/DL (ref 8.6–10.5)
CALCIUM SPEC-SCNC: 8.2 MG/DL (ref 8.6–10.5)
CALCIUM SPEC-SCNC: 8.3 MG/DL (ref 8.6–10.5)
CALCIUM SPEC-SCNC: 8.4 MG/DL (ref 8.6–10.5)
CALCIUM SPEC-SCNC: 8.6 MG/DL (ref 8.6–10.5)
CALCIUM SPEC-SCNC: 8.7 MG/DL (ref 8.6–10.5)
CALCIUM SPEC-SCNC: 8.8 MG/DL (ref 8.6–10.5)
CALCIUM SPEC-SCNC: 8.8 MG/DL (ref 8.6–10.5)
CALCIUM SPEC-SCNC: 8.9 MG/DL (ref 8.6–10.5)
CALCIUM SPEC-SCNC: 9 MG/DL (ref 8.6–10.5)
CALCIUM SPEC-SCNC: 9 MG/DL (ref 8.6–10.5)
CALCIUM SPEC-SCNC: 9.1 MG/DL (ref 8.6–10.5)
CALCIUM SPEC-SCNC: 9.2 MG/DL (ref 8.6–10.5)
CHLORIDE SERPL-SCNC: 100 MMOL/L (ref 98–107)
CHLORIDE SERPL-SCNC: 88 MMOL/L (ref 98–107)
CHLORIDE SERPL-SCNC: 88 MMOL/L (ref 98–107)
CHLORIDE SERPL-SCNC: 91 MMOL/L (ref 98–107)
CHLORIDE SERPL-SCNC: 92 MMOL/L (ref 98–107)
CHLORIDE SERPL-SCNC: 93 MMOL/L (ref 98–107)
CHLORIDE SERPL-SCNC: 94 MMOL/L (ref 96–106)
CHLORIDE SERPL-SCNC: 94 MMOL/L (ref 98–107)
CHLORIDE SERPL-SCNC: 95 MMOL/L (ref 98–107)
CHLORIDE SERPL-SCNC: 96 MMOL/L (ref 98–107)
CHLORIDE SERPL-SCNC: 97 MMOL/L (ref 98–107)
CHLORIDE SERPL-SCNC: 98 MMOL/L (ref 98–107)
CHLORIDE SERPL-SCNC: 99 MMOL/L (ref 98–107)
CHLORIDE SERPL-SCNC: 99 MMOL/L (ref 98–107)
CHLORIDE UR-SCNC: 25 MMOL/L
CHLORIDE UR-SCNC: 34 MMOL/L
CHOLEST SERPL-MCNC: 171 MG/DL (ref 100–199)
CO2 SERPL-SCNC: 26 MMOL/L (ref 22–29)
CO2 SERPL-SCNC: 27 MMOL/L (ref 22–29)
CO2 SERPL-SCNC: 27.3 MMOL/L (ref 22–29)
CO2 SERPL-SCNC: 28 MMOL/L (ref 22–29)
CO2 SERPL-SCNC: 28 MMOL/L (ref 22–29)
CO2 SERPL-SCNC: 28.4 MMOL/L (ref 22–29)
CO2 SERPL-SCNC: 28.6 MMOL/L (ref 22–29)
CO2 SERPL-SCNC: 28.8 MMOL/L (ref 22–29)
CO2 SERPL-SCNC: 28.9 MMOL/L (ref 22–29)
CO2 SERPL-SCNC: 29 MMOL/L (ref 22–29)
CO2 SERPL-SCNC: 29 MMOL/L (ref 22–29)
CO2 SERPL-SCNC: 29.4 MMOL/L (ref 22–29)
CO2 SERPL-SCNC: 29.8 MMOL/L (ref 22–29)
CO2 SERPL-SCNC: 30 MMOL/L (ref 20–29)
CO2 SERPL-SCNC: 30 MMOL/L (ref 22–29)
CO2 SERPL-SCNC: 30.1 MMOL/L (ref 22–29)
CO2 SERPL-SCNC: 30.6 MMOL/L (ref 22–29)
CO2 SERPL-SCNC: 30.9 MMOL/L (ref 22–29)
CO2 SERPL-SCNC: 31 MMOL/L (ref 22–29)
CO2 SERPL-SCNC: 31.4 MMOL/L (ref 22–29)
CO2 SERPL-SCNC: 31.5 MMOL/L (ref 22–29)
CO2 SERPL-SCNC: 31.7 MMOL/L (ref 22–29)
CO2 SERPL-SCNC: 32 MMOL/L (ref 22–29)
CO2 SERPL-SCNC: 32.2 MMOL/L (ref 22–29)
CO2 SERPL-SCNC: 32.5 MMOL/L (ref 22–29)
CO2 SERPL-SCNC: 33.2 MMOL/L (ref 22–29)
CO2 SERPL-SCNC: 33.3 MMOL/L (ref 22–29)
CO2 SERPL-SCNC: 33.8 MMOL/L (ref 22–29)
CO2 SERPL-SCNC: 35 MMOL/L (ref 22–29)
CREAT SERPL-MCNC: 1.91 MG/DL (ref 0.57–1)
CREAT SERPL-MCNC: 1.99 MG/DL (ref 0.57–1)
CREAT SERPL-MCNC: 2.07 MG/DL (ref 0.57–1)
CREAT SERPL-MCNC: 2.08 MG/DL (ref 0.57–1)
CREAT SERPL-MCNC: 2.1 MG/DL (ref 0.57–1)
CREAT SERPL-MCNC: 2.18 MG/DL (ref 0.57–1)
CREAT SERPL-MCNC: 2.18 MG/DL (ref 0.57–1)
CREAT SERPL-MCNC: 2.2 MG/DL (ref 0.57–1)
CREAT SERPL-MCNC: 2.21 MG/DL (ref 0.57–1)
CREAT SERPL-MCNC: 2.25 MG/DL (ref 0.57–1)
CREAT SERPL-MCNC: 2.29 MG/DL (ref 0.57–1)
CREAT SERPL-MCNC: 2.39 MG/DL (ref 0.57–1)
CREAT SERPL-MCNC: 2.39 MG/DL (ref 0.57–1)
CREAT SERPL-MCNC: 2.4 MG/DL (ref 0.57–1)
CREAT SERPL-MCNC: 2.42 MG/DL (ref 0.57–1)
CREAT SERPL-MCNC: 2.43 MG/DL (ref 0.57–1)
CREAT SERPL-MCNC: 2.47 MG/DL (ref 0.57–1)
CREAT SERPL-MCNC: 2.55 MG/DL (ref 0.57–1)
CREAT SERPL-MCNC: 2.57 MG/DL (ref 0.57–1)
CREAT SERPL-MCNC: 2.6 MG/DL (ref 0.57–1)
CREAT SERPL-MCNC: 2.61 MG/DL (ref 0.57–1)
CREAT SERPL-MCNC: 2.62 MG/DL (ref 0.57–1)
CREAT SERPL-MCNC: 2.72 MG/DL (ref 0.57–1)
CREAT SERPL-MCNC: 2.81 MG/DL (ref 0.57–1)
CREAT SERPL-MCNC: 2.89 MG/DL (ref 0.57–1)
CREAT SERPL-MCNC: 2.92 MG/DL (ref 0.57–1)
CREAT SERPL-MCNC: 2.93 MG/DL (ref 0.57–1)
CREAT SERPL-MCNC: 3.17 MG/DL (ref 0.57–1)
CREAT SERPL-MCNC: 3.18 MG/DL (ref 0.57–1)
CREAT UR-MCNC: 107.1 MG/DL
CREAT UR-MCNC: 64 MG/DL
CREAT UR-MCNC: 84.4 MG/DL
DEPRECATED RDW RBC AUTO: 44 FL (ref 37–54)
DEPRECATED RDW RBC AUTO: 44.4 FL (ref 37–54)
DEPRECATED RDW RBC AUTO: 44.9 FL (ref 37–54)
DEPRECATED RDW RBC AUTO: 45.1 FL (ref 37–54)
DEPRECATED RDW RBC AUTO: 45.2 FL (ref 37–54)
DEPRECATED RDW RBC AUTO: 45.3 FL (ref 37–54)
DEPRECATED RDW RBC AUTO: 45.5 FL (ref 37–54)
DEPRECATED RDW RBC AUTO: 45.5 FL (ref 37–54)
DEPRECATED RDW RBC AUTO: 45.8 FL (ref 37–54)
DEPRECATED RDW RBC AUTO: 45.9 FL (ref 37–54)
DEPRECATED RDW RBC AUTO: 46 FL (ref 37–54)
DEPRECATED RDW RBC AUTO: 46.3 FL (ref 37–54)
DEPRECATED RDW RBC AUTO: 46.3 FL (ref 37–54)
DEPRECATED RDW RBC AUTO: 46.4 FL (ref 37–54)
DEPRECATED RDW RBC AUTO: 46.4 FL (ref 37–54)
DEPRECATED RDW RBC AUTO: 46.5 FL (ref 37–54)
DEPRECATED RDW RBC AUTO: 46.6 FL (ref 37–54)
DEPRECATED RDW RBC AUTO: 46.7 FL (ref 37–54)
DEPRECATED RDW RBC AUTO: 46.9 FL (ref 37–54)
DEPRECATED RDW RBC AUTO: 47.1 FL (ref 37–54)
DEPRECATED RDW RBC AUTO: 48.9 FL (ref 37–54)
DEPRECATED RDW RBC AUTO: 49 FL (ref 37–54)
DEPRECATED RDW RBC AUTO: 49 FL (ref 37–54)
EGFRCR SERPLBLD CKD-EPI 2021: 14.5 ML/MIN/1.73
EGFRCR SERPLBLD CKD-EPI 2021: 14.6 ML/MIN/1.73
EGFRCR SERPLBLD CKD-EPI 2021: 16 ML/MIN/1.73
EGFRCR SERPLBLD CKD-EPI 2021: 16.1 ML/MIN/1.73
EGFRCR SERPLBLD CKD-EPI 2021: 16.3 ML/MIN/1.73
EGFRCR SERPLBLD CKD-EPI 2021: 16.8 ML/MIN/1.73
EGFRCR SERPLBLD CKD-EPI 2021: 17.5 ML/MIN/1.73
EGFRCR SERPLBLD CKD-EPI 2021: 18.3 ML/MIN/1.73
EGFRCR SERPLBLD CKD-EPI 2021: 18.4 ML/MIN/1.73
EGFRCR SERPLBLD CKD-EPI 2021: 18.5 ML/MIN/1.73
EGFRCR SERPLBLD CKD-EPI 2021: 18.7 ML/MIN/1.73
EGFRCR SERPLBLD CKD-EPI 2021: 18.9 ML/MIN/1.73
EGFRCR SERPLBLD CKD-EPI 2021: 19.6 ML/MIN/1.73
EGFRCR SERPLBLD CKD-EPI 2021: 20 ML/MIN/1.73
EGFRCR SERPLBLD CKD-EPI 2021: 20.1 ML/MIN/1.73
EGFRCR SERPLBLD CKD-EPI 2021: 20.3 ML/MIN/1.73
EGFRCR SERPLBLD CKD-EPI 2021: 20.4 ML/MIN/1.73
EGFRCR SERPLBLD CKD-EPI 2021: 20.4 ML/MIN/1.73
EGFRCR SERPLBLD CKD-EPI 2021: 21.5 ML/MIN/1.73
EGFRCR SERPLBLD CKD-EPI 2021: 22 ML/MIN/1.73
EGFRCR SERPLBLD CKD-EPI 2021: 22.5 ML/MIN/1.73
EGFRCR SERPLBLD CKD-EPI 2021: 22.6 ML/MIN/1.73
EGFRCR SERPLBLD CKD-EPI 2021: 22.8 ML/MIN/1.73
EGFRCR SERPLBLD CKD-EPI 2021: 22.8 ML/MIN/1.73
EGFRCR SERPLBLD CKD-EPI 2021: 23.9 ML/MIN/1.73
EGFRCR SERPLBLD CKD-EPI 2021: 24 ML/MIN/1.73
EGFRCR SERPLBLD CKD-EPI 2021: 24.3 ML/MIN/1.73
EGFRCR SERPLBLD CKD-EPI 2021: 25.5 ML/MIN/1.73
EGFRCR SERPLBLD CKD-EPI 2021: 26.7 ML/MIN/1.73
EOSINOPHIL # BLD AUTO: 0.03 10*3/MM3 (ref 0–0.4)
EOSINOPHIL # BLD AUTO: 0.05 10*3/MM3 (ref 0–0.4)
EOSINOPHIL # BLD AUTO: 0.05 10*3/MM3 (ref 0–0.4)
EOSINOPHIL # BLD AUTO: 0.07 10*3/MM3 (ref 0–0.4)
EOSINOPHIL # BLD AUTO: 0.12 10*3/MM3 (ref 0–0.4)
EOSINOPHIL # BLD AUTO: 0.14 10*3/MM3 (ref 0–0.4)
EOSINOPHIL # BLD AUTO: 0.14 10*3/MM3 (ref 0–0.4)
EOSINOPHIL # BLD AUTO: 0.15 10*3/MM3 (ref 0–0.4)
EOSINOPHIL # BLD AUTO: 0.17 10*3/MM3 (ref 0–0.4)
EOSINOPHIL # BLD AUTO: 0.18 10*3/MM3 (ref 0–0.4)
EOSINOPHIL # BLD AUTO: 0.18 10*3/MM3 (ref 0–0.4)
EOSINOPHIL # BLD AUTO: 0.19 10*3/MM3 (ref 0–0.4)
EOSINOPHIL # BLD AUTO: 0.19 10*3/MM3 (ref 0–0.4)
EOSINOPHIL # BLD AUTO: 0.2 X10E3/UL (ref 0–0.4)
EOSINOPHIL NFR BLD AUTO: 0.4 % (ref 0.3–6.2)
EOSINOPHIL NFR BLD AUTO: 0.4 % (ref 0.3–6.2)
EOSINOPHIL NFR BLD AUTO: 0.7 % (ref 0.3–6.2)
EOSINOPHIL NFR BLD AUTO: 0.9 % (ref 0.3–6.2)
EOSINOPHIL NFR BLD AUTO: 1.2 % (ref 0.3–6.2)
EOSINOPHIL NFR BLD AUTO: 1.3 % (ref 0.3–6.2)
EOSINOPHIL NFR BLD AUTO: 1.6 % (ref 0.3–6.2)
EOSINOPHIL NFR BLD AUTO: 1.6 % (ref 0.3–6.2)
EOSINOPHIL NFR BLD AUTO: 1.7 % (ref 0.3–6.2)
EOSINOPHIL NFR BLD AUTO: 1.9 % (ref 0.3–6.2)
EOSINOPHIL NFR BLD AUTO: 1.9 % (ref 0.3–6.2)
EOSINOPHIL NFR BLD AUTO: 2 %
EOSINOPHIL SPEC QL MICRO: 0 % EOS/100 CELLS (ref 0–0)
ERYTHROCYTE [DISTWIDTH] IN BLOOD BY AUTOMATED COUNT: 14.8 % (ref 12.3–15.4)
ERYTHROCYTE [DISTWIDTH] IN BLOOD BY AUTOMATED COUNT: 14.9 % (ref 12.3–15.4)
ERYTHROCYTE [DISTWIDTH] IN BLOOD BY AUTOMATED COUNT: 15.1 % (ref 12.3–15.4)
ERYTHROCYTE [DISTWIDTH] IN BLOOD BY AUTOMATED COUNT: 15.1 % (ref 12.3–15.4)
ERYTHROCYTE [DISTWIDTH] IN BLOOD BY AUTOMATED COUNT: 15.2 % (ref 12.3–15.4)
ERYTHROCYTE [DISTWIDTH] IN BLOOD BY AUTOMATED COUNT: 15.4 % (ref 12.3–15.4)
ERYTHROCYTE [DISTWIDTH] IN BLOOD BY AUTOMATED COUNT: 15.5 % (ref 12.3–15.4)
ERYTHROCYTE [DISTWIDTH] IN BLOOD BY AUTOMATED COUNT: 15.6 % (ref 12.3–15.4)
ERYTHROCYTE [DISTWIDTH] IN BLOOD BY AUTOMATED COUNT: 15.6 % (ref 12.3–15.4)
ERYTHROCYTE [DISTWIDTH] IN BLOOD BY AUTOMATED COUNT: 15.8 % (ref 12.3–15.4)
ERYTHROCYTE [DISTWIDTH] IN BLOOD BY AUTOMATED COUNT: 15.9 % (ref 11.7–15.4)
ERYTHROCYTE [DISTWIDTH] IN BLOOD BY AUTOMATED COUNT: 15.9 % (ref 12.3–15.4)
ERYTHROCYTE [DISTWIDTH] IN BLOOD BY AUTOMATED COUNT: 16.1 % (ref 12.3–15.4)
ERYTHROCYTE [DISTWIDTH] IN BLOOD BY AUTOMATED COUNT: 16.1 % (ref 12.3–15.4)
ERYTHROCYTE [DISTWIDTH] IN BLOOD BY AUTOMATED COUNT: 16.3 % (ref 12.3–15.4)
ERYTHROCYTE [DISTWIDTH] IN BLOOD BY AUTOMATED COUNT: 16.7 % (ref 12.3–15.4)
ERYTHROCYTE [DISTWIDTH] IN BLOOD BY AUTOMATED COUNT: 16.9 % (ref 12.3–15.4)
FERRITIN SERPL-MCNC: 167 NG/ML (ref 13–150)
FERRITIN SERPL-MCNC: 52.6 NG/ML (ref 13–150)
FLUAV SUBTYP SPEC NAA+PROBE: NOT DETECTED
FLUBV RNA ISLT QL NAA+PROBE: NOT DETECTED
GAS FLOW AIRWAY: 2 LPM
GAS FLOW AIRWAY: 4 LPM
GAS FLOW AIRWAY: 6 LPM
GLOBULIN SER CALC-MCNC: 3.3 G/DL (ref 1.5–4.5)
GLOBULIN UR ELPH-MCNC: 2.6 GM/DL
GLOBULIN UR ELPH-MCNC: 2.9 GM/DL
GLOBULIN UR ELPH-MCNC: 3.1 GM/DL
GLOBULIN UR ELPH-MCNC: 3.1 GM/DL
GLOBULIN UR ELPH-MCNC: 3.5 GM/DL
GLUCOSE BLDC GLUCOMTR-MCNC: 140 MG/DL (ref 70–130)
GLUCOSE BLDC GLUCOMTR-MCNC: 142 MG/DL (ref 70–130)
GLUCOSE BLDC GLUCOMTR-MCNC: 143 MG/DL (ref 70–130)
GLUCOSE BLDC GLUCOMTR-MCNC: 144 MG/DL (ref 70–130)
GLUCOSE BLDC GLUCOMTR-MCNC: 151 MG/DL (ref 70–130)
GLUCOSE BLDC GLUCOMTR-MCNC: 152 MG/DL (ref 70–130)
GLUCOSE BLDC GLUCOMTR-MCNC: 154 MG/DL (ref 70–130)
GLUCOSE BLDC GLUCOMTR-MCNC: 155 MG/DL (ref 70–130)
GLUCOSE BLDC GLUCOMTR-MCNC: 158 MG/DL (ref 70–130)
GLUCOSE BLDC GLUCOMTR-MCNC: 158 MG/DL (ref 70–130)
GLUCOSE BLDC GLUCOMTR-MCNC: 161 MG/DL (ref 70–130)
GLUCOSE BLDC GLUCOMTR-MCNC: 163 MG/DL (ref 70–130)
GLUCOSE BLDC GLUCOMTR-MCNC: 168 MG/DL (ref 70–130)
GLUCOSE BLDC GLUCOMTR-MCNC: 169 MG/DL (ref 70–130)
GLUCOSE BLDC GLUCOMTR-MCNC: 171 MG/DL (ref 70–130)
GLUCOSE BLDC GLUCOMTR-MCNC: 173 MG/DL (ref 70–130)
GLUCOSE BLDC GLUCOMTR-MCNC: 175 MG/DL (ref 70–130)
GLUCOSE BLDC GLUCOMTR-MCNC: 177 MG/DL (ref 70–130)
GLUCOSE BLDC GLUCOMTR-MCNC: 178 MG/DL (ref 70–130)
GLUCOSE BLDC GLUCOMTR-MCNC: 179 MG/DL (ref 70–130)
GLUCOSE BLDC GLUCOMTR-MCNC: 180 MG/DL (ref 70–130)
GLUCOSE BLDC GLUCOMTR-MCNC: 183 MG/DL (ref 70–130)
GLUCOSE BLDC GLUCOMTR-MCNC: 184 MG/DL (ref 70–130)
GLUCOSE BLDC GLUCOMTR-MCNC: 188 MG/DL (ref 70–130)
GLUCOSE BLDC GLUCOMTR-MCNC: 195 MG/DL (ref 70–130)
GLUCOSE BLDC GLUCOMTR-MCNC: 195 MG/DL (ref 70–130)
GLUCOSE BLDC GLUCOMTR-MCNC: 197 MG/DL (ref 70–130)
GLUCOSE BLDC GLUCOMTR-MCNC: 198 MG/DL (ref 70–130)
GLUCOSE BLDC GLUCOMTR-MCNC: 201 MG/DL (ref 70–130)
GLUCOSE BLDC GLUCOMTR-MCNC: 204 MG/DL (ref 70–130)
GLUCOSE BLDC GLUCOMTR-MCNC: 204 MG/DL (ref 70–130)
GLUCOSE BLDC GLUCOMTR-MCNC: 206 MG/DL (ref 70–130)
GLUCOSE BLDC GLUCOMTR-MCNC: 209 MG/DL (ref 70–130)
GLUCOSE BLDC GLUCOMTR-MCNC: 211 MG/DL (ref 70–130)
GLUCOSE BLDC GLUCOMTR-MCNC: 213 MG/DL (ref 70–130)
GLUCOSE BLDC GLUCOMTR-MCNC: 214 MG/DL (ref 70–130)
GLUCOSE BLDC GLUCOMTR-MCNC: 215 MG/DL (ref 70–130)
GLUCOSE BLDC GLUCOMTR-MCNC: 216 MG/DL (ref 70–130)
GLUCOSE BLDC GLUCOMTR-MCNC: 216 MG/DL (ref 70–130)
GLUCOSE BLDC GLUCOMTR-MCNC: 217 MG/DL (ref 70–130)
GLUCOSE BLDC GLUCOMTR-MCNC: 217 MG/DL (ref 70–130)
GLUCOSE BLDC GLUCOMTR-MCNC: 218 MG/DL (ref 70–130)
GLUCOSE BLDC GLUCOMTR-MCNC: 219 MG/DL (ref 70–130)
GLUCOSE BLDC GLUCOMTR-MCNC: 220 MG/DL (ref 70–130)
GLUCOSE BLDC GLUCOMTR-MCNC: 221 MG/DL (ref 70–130)
GLUCOSE BLDC GLUCOMTR-MCNC: 222 MG/DL (ref 70–130)
GLUCOSE BLDC GLUCOMTR-MCNC: 222 MG/DL (ref 70–130)
GLUCOSE BLDC GLUCOMTR-MCNC: 225 MG/DL (ref 70–130)
GLUCOSE BLDC GLUCOMTR-MCNC: 225 MG/DL (ref 70–130)
GLUCOSE BLDC GLUCOMTR-MCNC: 227 MG/DL (ref 70–130)
GLUCOSE BLDC GLUCOMTR-MCNC: 232 MG/DL (ref 70–130)
GLUCOSE BLDC GLUCOMTR-MCNC: 232 MG/DL (ref 70–130)
GLUCOSE BLDC GLUCOMTR-MCNC: 237 MG/DL (ref 70–130)
GLUCOSE BLDC GLUCOMTR-MCNC: 240 MG/DL (ref 70–130)
GLUCOSE BLDC GLUCOMTR-MCNC: 245 MG/DL (ref 70–130)
GLUCOSE BLDC GLUCOMTR-MCNC: 245 MG/DL (ref 70–130)
GLUCOSE BLDC GLUCOMTR-MCNC: 249 MG/DL (ref 70–130)
GLUCOSE BLDC GLUCOMTR-MCNC: 252 MG/DL (ref 70–130)
GLUCOSE BLDC GLUCOMTR-MCNC: 252 MG/DL (ref 70–130)
GLUCOSE BLDC GLUCOMTR-MCNC: 253 MG/DL (ref 70–130)
GLUCOSE BLDC GLUCOMTR-MCNC: 254 MG/DL (ref 70–130)
GLUCOSE BLDC GLUCOMTR-MCNC: 255 MG/DL (ref 70–130)
GLUCOSE BLDC GLUCOMTR-MCNC: 258 MG/DL (ref 70–130)
GLUCOSE BLDC GLUCOMTR-MCNC: 261 MG/DL (ref 70–130)
GLUCOSE BLDC GLUCOMTR-MCNC: 262 MG/DL (ref 70–130)
GLUCOSE BLDC GLUCOMTR-MCNC: 264 MG/DL (ref 70–130)
GLUCOSE BLDC GLUCOMTR-MCNC: 266 MG/DL (ref 70–130)
GLUCOSE BLDC GLUCOMTR-MCNC: 273 MG/DL (ref 70–130)
GLUCOSE BLDC GLUCOMTR-MCNC: 274 MG/DL (ref 70–130)
GLUCOSE BLDC GLUCOMTR-MCNC: 283 MG/DL (ref 70–130)
GLUCOSE BLDC GLUCOMTR-MCNC: 284 MG/DL (ref 70–130)
GLUCOSE BLDC GLUCOMTR-MCNC: 288 MG/DL (ref 70–130)
GLUCOSE BLDC GLUCOMTR-MCNC: 290 MG/DL (ref 70–130)
GLUCOSE BLDC GLUCOMTR-MCNC: 291 MG/DL (ref 70–130)
GLUCOSE BLDC GLUCOMTR-MCNC: 291 MG/DL (ref 70–130)
GLUCOSE BLDC GLUCOMTR-MCNC: 292 MG/DL (ref 70–130)
GLUCOSE BLDC GLUCOMTR-MCNC: 297 MG/DL (ref 70–130)
GLUCOSE BLDC GLUCOMTR-MCNC: 323 MG/DL (ref 70–130)
GLUCOSE BLDC GLUCOMTR-MCNC: 331 MG/DL (ref 70–130)
GLUCOSE BLDC GLUCOMTR-MCNC: 336 MG/DL (ref 70–130)
GLUCOSE BLDC GLUCOMTR-MCNC: 339 MG/DL (ref 70–130)
GLUCOSE BLDC GLUCOMTR-MCNC: 349 MG/DL (ref 70–130)
GLUCOSE BLDC GLUCOMTR-MCNC: 358 MG/DL (ref 70–130)
GLUCOSE BLDC GLUCOMTR-MCNC: 359 MG/DL (ref 70–130)
GLUCOSE BLDC GLUCOMTR-MCNC: 371 MG/DL (ref 70–130)
GLUCOSE BLDC GLUCOMTR-MCNC: 376 MG/DL (ref 70–130)
GLUCOSE BLDC GLUCOMTR-MCNC: 383 MG/DL (ref 70–130)
GLUCOSE BLDC GLUCOMTR-MCNC: 383 MG/DL (ref 70–130)
GLUCOSE BLDC GLUCOMTR-MCNC: 384 MG/DL (ref 70–130)
GLUCOSE BLDC GLUCOMTR-MCNC: 384 MG/DL (ref 70–130)
GLUCOSE BLDC GLUCOMTR-MCNC: 390 MG/DL (ref 70–130)
GLUCOSE BLDC GLUCOMTR-MCNC: 399 MG/DL (ref 70–130)
GLUCOSE BLDC GLUCOMTR-MCNC: 407 MG/DL (ref 70–130)
GLUCOSE SERPL-MCNC: 119 MG/DL (ref 65–99)
GLUCOSE SERPL-MCNC: 126 MG/DL (ref 65–99)
GLUCOSE SERPL-MCNC: 127 MG/DL (ref 65–99)
GLUCOSE SERPL-MCNC: 133 MG/DL (ref 65–99)
GLUCOSE SERPL-MCNC: 134 MG/DL (ref 65–99)
GLUCOSE SERPL-MCNC: 142 MG/DL (ref 65–99)
GLUCOSE SERPL-MCNC: 144 MG/DL (ref 65–99)
GLUCOSE SERPL-MCNC: 144 MG/DL (ref 65–99)
GLUCOSE SERPL-MCNC: 152 MG/DL (ref 65–99)
GLUCOSE SERPL-MCNC: 153 MG/DL (ref 65–99)
GLUCOSE SERPL-MCNC: 154 MG/DL (ref 65–99)
GLUCOSE SERPL-MCNC: 157 MG/DL (ref 65–99)
GLUCOSE SERPL-MCNC: 160 MG/DL (ref 65–99)
GLUCOSE SERPL-MCNC: 164 MG/DL (ref 65–99)
GLUCOSE SERPL-MCNC: 170 MG/DL (ref 65–99)
GLUCOSE SERPL-MCNC: 173 MG/DL (ref 65–99)
GLUCOSE SERPL-MCNC: 180 MG/DL (ref 65–99)
GLUCOSE SERPL-MCNC: 181 MG/DL (ref 65–99)
GLUCOSE SERPL-MCNC: 188 MG/DL (ref 65–99)
GLUCOSE SERPL-MCNC: 196 MG/DL (ref 65–99)
GLUCOSE SERPL-MCNC: 215 MG/DL (ref 65–99)
GLUCOSE SERPL-MCNC: 221 MG/DL (ref 65–99)
GLUCOSE SERPL-MCNC: 241 MG/DL (ref 65–99)
GLUCOSE SERPL-MCNC: 244 MG/DL (ref 65–99)
GLUCOSE SERPL-MCNC: 246 MG/DL (ref 65–99)
GLUCOSE SERPL-MCNC: 282 MG/DL (ref 65–99)
GLUCOSE SERPL-MCNC: 307 MG/DL (ref 65–99)
GLUCOSE SERPL-MCNC: 332 MG/DL (ref 65–99)
GLUCOSE SERPL-MCNC: 467 MG/DL (ref 65–99)
HADV DNA SPEC NAA+PROBE: NOT DETECTED
HBA1C MFR BLD: 10.2 % (ref 4.8–5.6)
HBA1C MFR BLD: 10.3 % (ref 4.8–5.6)
HBA1C MFR BLD: 8.8 % (ref 4.8–5.6)
HBA1C MFR BLD: 9.9 % (ref 4.8–5.6)
HCO3 BLDA-SCNC: 29 MMOL/L (ref 22–28)
HCO3 BLDA-SCNC: 30.1 MMOL/L (ref 22–28)
HCO3 BLDA-SCNC: 31.2 MMOL/L (ref 22–28)
HCOV 229E RNA SPEC QL NAA+PROBE: NOT DETECTED
HCOV HKU1 RNA SPEC QL NAA+PROBE: NOT DETECTED
HCOV NL63 RNA SPEC QL NAA+PROBE: NOT DETECTED
HCOV OC43 RNA SPEC QL NAA+PROBE: NOT DETECTED
HCT VFR BLD AUTO: 25.7 % (ref 34–46.6)
HCT VFR BLD AUTO: 25.7 % (ref 34–46.6)
HCT VFR BLD AUTO: 26.1 % (ref 34–46.6)
HCT VFR BLD AUTO: 26.8 % (ref 34–46.6)
HCT VFR BLD AUTO: 26.9 % (ref 34–46.6)
HCT VFR BLD AUTO: 27 % (ref 34–46.6)
HCT VFR BLD AUTO: 27.1 % (ref 34–46.6)
HCT VFR BLD AUTO: 27.2 % (ref 34–46.6)
HCT VFR BLD AUTO: 27.3 % (ref 34–46.6)
HCT VFR BLD AUTO: 27.3 % (ref 34–46.6)
HCT VFR BLD AUTO: 27.5 % (ref 34–46.6)
HCT VFR BLD AUTO: 27.7 % (ref 34–46.6)
HCT VFR BLD AUTO: 28 % (ref 34–46.6)
HCT VFR BLD AUTO: 28 % (ref 34–46.6)
HCT VFR BLD AUTO: 28.2 % (ref 34–46.6)
HCT VFR BLD AUTO: 28.2 % (ref 34–46.6)
HCT VFR BLD AUTO: 28.6 % (ref 34–46.6)
HCT VFR BLD AUTO: 28.7 % (ref 34–46.6)
HCT VFR BLD AUTO: 28.9 % (ref 34–46.6)
HCT VFR BLD AUTO: 29 % (ref 34–46.6)
HCT VFR BLD AUTO: 29.6 % (ref 34–46.6)
HCT VFR BLD AUTO: 29.9 % (ref 34–46.6)
HDLC SERPL-MCNC: 46 MG/DL
HGB BLD-MCNC: 8.1 G/DL (ref 12–15.9)
HGB BLD-MCNC: 8.1 G/DL (ref 12–15.9)
HGB BLD-MCNC: 8.2 G/DL (ref 12–15.9)
HGB BLD-MCNC: 8.3 G/DL (ref 12–15.9)
HGB BLD-MCNC: 8.4 G/DL (ref 12–15.9)
HGB BLD-MCNC: 8.5 G/DL (ref 12–15.9)
HGB BLD-MCNC: 8.6 G/DL (ref 12–15.9)
HGB BLD-MCNC: 8.7 G/DL (ref 12–15.9)
HGB BLD-MCNC: 8.8 G/DL (ref 12–15.9)
HGB BLD-MCNC: 8.9 G/DL (ref 11.1–15.9)
HGB BLD-MCNC: 8.9 G/DL (ref 12–15.9)
HGB BLD-MCNC: 8.9 G/DL (ref 12–15.9)
HGB BLD-MCNC: 9 G/DL (ref 12–15.9)
HGB BLD-MCNC: 9.1 G/DL (ref 12–15.9)
HGB BLD-MCNC: 9.3 G/DL (ref 12–15.9)
HMPV RNA NPH QL NAA+NON-PROBE: NOT DETECTED
HOLD SPECIMEN: NORMAL
HPIV1 RNA ISLT QL NAA+PROBE: NOT DETECTED
HPIV2 RNA SPEC QL NAA+PROBE: NOT DETECTED
HPIV3 RNA NPH QL NAA+PROBE: NOT DETECTED
HPIV4 P GENE NPH QL NAA+PROBE: NOT DETECTED
IMM GRANULOCYTES # BLD AUTO: 0.04 10*3/MM3 (ref 0–0.05)
IMM GRANULOCYTES # BLD AUTO: 0.05 10*3/MM3 (ref 0–0.05)
IMM GRANULOCYTES # BLD AUTO: 0.09 10*3/MM3 (ref 0–0.05)
IMM GRANULOCYTES # BLD AUTO: 0.1 10*3/MM3 (ref 0–0.05)
IMM GRANULOCYTES # BLD AUTO: 0.1 X10E3/UL (ref 0–0.1)
IMM GRANULOCYTES # BLD AUTO: 0.11 10*3/MM3 (ref 0–0.05)
IMM GRANULOCYTES # BLD AUTO: 0.12 10*3/MM3 (ref 0–0.05)
IMM GRANULOCYTES # BLD AUTO: 0.15 10*3/MM3 (ref 0–0.05)
IMM GRANULOCYTES # BLD AUTO: 0.17 10*3/MM3 (ref 0–0.05)
IMM GRANULOCYTES # BLD AUTO: 0.18 10*3/MM3 (ref 0–0.05)
IMM GRANULOCYTES # BLD AUTO: 0.19 10*3/MM3 (ref 0–0.05)
IMM GRANULOCYTES # BLD AUTO: 0.19 10*3/MM3 (ref 0–0.05)
IMM GRANULOCYTES NFR BLD AUTO: 0.4 % (ref 0–0.5)
IMM GRANULOCYTES NFR BLD AUTO: 0.5 % (ref 0–0.5)
IMM GRANULOCYTES NFR BLD AUTO: 0.8 % (ref 0–0.5)
IMM GRANULOCYTES NFR BLD AUTO: 0.9 % (ref 0–0.5)
IMM GRANULOCYTES NFR BLD AUTO: 1 %
IMM GRANULOCYTES NFR BLD AUTO: 1 % (ref 0–0.5)
IMM GRANULOCYTES NFR BLD AUTO: 1.2 % (ref 0–0.5)
IMM GRANULOCYTES NFR BLD AUTO: 1.3 % (ref 0–0.5)
IMM GRANULOCYTES NFR BLD AUTO: 1.6 % (ref 0–0.5)
IMM GRANULOCYTES NFR BLD AUTO: 1.6 % (ref 0–0.5)
IMM GRANULOCYTES NFR BLD AUTO: 1.7 % (ref 0–0.5)
IMM GRANULOCYTES NFR BLD AUTO: 1.8 % (ref 0–0.5)
IMM GRANULOCYTES NFR BLD AUTO: 2.2 % (ref 0–0.5)
IMM GRANULOCYTES NFR BLD AUTO: 2.5 % (ref 0–0.5)
INR PPP: 1.21 (ref 0.9–1.1)
INR PPP: 1.24 (ref 0.9–1.1)
INR PPP: 1.27 (ref 0.9–1.1)
IRON 24H UR-MRATE: 28 MCG/DL (ref 37–145)
IRON 24H UR-MRATE: 29 MCG/DL (ref 37–145)
IRON SATN MFR SERPL: 10 % (ref 20–50)
IRON SATN MFR SERPL: 7 % (ref 20–50)
LDLC SERPL CALC-MCNC: 103 MG/DL (ref 0–99)
LDLC/HDLC SERPL: 2.2 RATIO (ref 0–3.2)
LEFT ATRIUM VOLUME INDEX: 34.2 ML/M2
LEFT ATRIUM VOLUME INDEX: 50.7 ML/M2
LV EF 2D ECHO EST: 50 %
LYMPHOCYTES # BLD AUTO: 0.32 10*3/MM3 (ref 0.7–3.1)
LYMPHOCYTES # BLD AUTO: 0.47 10*3/MM3 (ref 0.7–3.1)
LYMPHOCYTES # BLD AUTO: 0.48 10*3/MM3 (ref 0.7–3.1)
LYMPHOCYTES # BLD AUTO: 0.6 X10E3/UL (ref 0.7–3.1)
LYMPHOCYTES # BLD AUTO: 0.62 10*3/MM3 (ref 0.7–3.1)
LYMPHOCYTES # BLD AUTO: 0.67 10*3/MM3 (ref 0.7–3.1)
LYMPHOCYTES # BLD AUTO: 0.71 10*3/MM3 (ref 0.7–3.1)
LYMPHOCYTES # BLD AUTO: 0.76 10*3/MM3 (ref 0.7–3.1)
LYMPHOCYTES # BLD AUTO: 0.78 10*3/MM3 (ref 0.7–3.1)
LYMPHOCYTES # BLD AUTO: 0.84 10*3/MM3 (ref 0.7–3.1)
LYMPHOCYTES # BLD AUTO: 0.84 10*3/MM3 (ref 0.7–3.1)
LYMPHOCYTES # BLD AUTO: 0.96 10*3/MM3 (ref 0.7–3.1)
LYMPHOCYTES # BLD AUTO: 1 10*3/MM3 (ref 0.7–3.1)
LYMPHOCYTES # BLD AUTO: 1.03 10*3/MM3 (ref 0.7–3.1)
LYMPHOCYTES NFR BLD AUTO: 10.3 % (ref 19.6–45.3)
LYMPHOCYTES NFR BLD AUTO: 3.9 % (ref 19.6–45.3)
LYMPHOCYTES NFR BLD AUTO: 5.1 % (ref 19.6–45.3)
LYMPHOCYTES NFR BLD AUTO: 5.8 % (ref 19.6–45.3)
LYMPHOCYTES NFR BLD AUTO: 6 %
LYMPHOCYTES NFR BLD AUTO: 6.2 % (ref 19.6–45.3)
LYMPHOCYTES NFR BLD AUTO: 6.6 % (ref 19.6–45.3)
LYMPHOCYTES NFR BLD AUTO: 6.8 % (ref 19.6–45.3)
LYMPHOCYTES NFR BLD AUTO: 7 % (ref 19.6–45.3)
LYMPHOCYTES NFR BLD AUTO: 7.8 % (ref 19.6–45.3)
LYMPHOCYTES NFR BLD AUTO: 7.8 % (ref 19.6–45.3)
LYMPHOCYTES NFR BLD AUTO: 8 % (ref 19.6–45.3)
LYMPHOCYTES NFR BLD AUTO: 8 % (ref 19.6–45.3)
LYMPHOCYTES NFR BLD AUTO: 9.3 % (ref 19.6–45.3)
M PNEUMO IGG SER IA-ACNC: NOT DETECTED
MAGNESIUM SERPL-MCNC: 1.8 MG/DL (ref 1.6–2.4)
MAGNESIUM SERPL-MCNC: 1.8 MG/DL (ref 1.6–2.4)
MAGNESIUM SERPL-MCNC: 1.9 MG/DL (ref 1.6–2.4)
MAGNESIUM SERPL-MCNC: 2 MG/DL (ref 1.6–2.4)
MAGNESIUM SERPL-MCNC: 2 MG/DL (ref 1.6–2.4)
MAGNESIUM SERPL-MCNC: 2.1 MG/DL (ref 1.6–2.4)
MAXIMAL PREDICTED HEART RATE: 143 BPM
MAXIMAL PREDICTED HEART RATE: 143 BPM
MCH RBC QN AUTO: 23.4 PG (ref 26.6–33)
MCH RBC QN AUTO: 24.4 PG (ref 26.6–33)
MCH RBC QN AUTO: 24.6 PG (ref 26.6–33)
MCH RBC QN AUTO: 24.6 PG (ref 26.6–33)
MCH RBC QN AUTO: 24.7 PG (ref 26.6–33)
MCH RBC QN AUTO: 24.7 PG (ref 26.6–33)
MCH RBC QN AUTO: 24.8 PG (ref 26.6–33)
MCH RBC QN AUTO: 24.9 PG (ref 26.6–33)
MCH RBC QN AUTO: 25.2 PG (ref 26.6–33)
MCH RBC QN AUTO: 25.6 PG (ref 26.6–33)
MCH RBC QN AUTO: 26 PG (ref 26.6–33)
MCH RBC QN AUTO: 26 PG (ref 26.6–33)
MCH RBC QN AUTO: 26.1 PG (ref 26.6–33)
MCH RBC QN AUTO: 26.2 PG (ref 26.6–33)
MCH RBC QN AUTO: 26.3 PG (ref 26.6–33)
MCH RBC QN AUTO: 26.3 PG (ref 26.6–33)
MCH RBC QN AUTO: 26.5 PG (ref 26.6–33)
MCH RBC QN AUTO: 26.6 PG (ref 26.6–33)
MCH RBC QN AUTO: 26.7 PG (ref 26.6–33)
MCH RBC QN AUTO: 26.7 PG (ref 26.6–33)
MCH RBC QN AUTO: 26.9 PG (ref 26.6–33)
MCH RBC QN AUTO: 26.9 PG (ref 26.6–33)
MCHC RBC AUTO-ENTMCNC: 29.8 G/DL (ref 31.5–35.7)
MCHC RBC AUTO-ENTMCNC: 29.8 G/DL (ref 31.5–35.7)
MCHC RBC AUTO-ENTMCNC: 30.2 G/DL (ref 31.5–35.7)
MCHC RBC AUTO-ENTMCNC: 30.7 G/DL (ref 31.5–35.7)
MCHC RBC AUTO-ENTMCNC: 30.7 G/DL (ref 31.5–35.7)
MCHC RBC AUTO-ENTMCNC: 30.9 G/DL (ref 31.5–35.7)
MCHC RBC AUTO-ENTMCNC: 31 G/DL (ref 31.5–35.7)
MCHC RBC AUTO-ENTMCNC: 31.2 G/DL (ref 31.5–35.7)
MCHC RBC AUTO-ENTMCNC: 31.3 G/DL (ref 31.5–35.7)
MCHC RBC AUTO-ENTMCNC: 31.5 G/DL (ref 31.5–35.7)
MCHC RBC AUTO-ENTMCNC: 31.5 G/DL (ref 31.5–35.7)
MCHC RBC AUTO-ENTMCNC: 31.7 G/DL (ref 31.5–35.7)
MCHC RBC AUTO-ENTMCNC: 31.7 G/DL (ref 31.5–35.7)
MCHC RBC AUTO-ENTMCNC: 31.8 G/DL (ref 31.5–35.7)
MCHC RBC AUTO-ENTMCNC: 31.8 G/DL (ref 31.5–35.7)
MCHC RBC AUTO-ENTMCNC: 31.9 G/DL (ref 31.5–35.7)
MCHC RBC AUTO-ENTMCNC: 31.9 G/DL (ref 31.5–35.7)
MCHC RBC AUTO-ENTMCNC: 32.1 G/DL (ref 31.5–35.7)
MCHC RBC AUTO-ENTMCNC: 32.2 G/DL (ref 31.5–35.7)
MCHC RBC AUTO-ENTMCNC: 32.2 G/DL (ref 31.5–35.7)
MCHC RBC AUTO-ENTMCNC: 32.3 G/DL (ref 31.5–35.7)
MCHC RBC AUTO-ENTMCNC: 32.5 G/DL (ref 31.5–35.7)
MCHC RBC AUTO-ENTMCNC: 32.6 G/DL (ref 31.5–35.7)
MCHC RBC AUTO-ENTMCNC: 32.7 G/DL (ref 31.5–35.7)
MCV RBC AUTO: 77.3 FL (ref 79–97)
MCV RBC AUTO: 78.1 FL (ref 79–97)
MCV RBC AUTO: 78.3 FL (ref 79–97)
MCV RBC AUTO: 78.4 FL (ref 79–97)
MCV RBC AUTO: 78.8 FL (ref 79–97)
MCV RBC AUTO: 79 FL (ref 79–97)
MCV RBC AUTO: 79.4 FL (ref 79–97)
MCV RBC AUTO: 80.2 FL (ref 79–97)
MCV RBC AUTO: 81 FL (ref 79–97)
MCV RBC AUTO: 81.6 FL (ref 79–97)
MCV RBC AUTO: 82 FL (ref 79–97)
MCV RBC AUTO: 82.6 FL (ref 79–97)
MCV RBC AUTO: 82.7 FL (ref 79–97)
MCV RBC AUTO: 82.9 FL (ref 79–97)
MCV RBC AUTO: 83.1 FL (ref 79–97)
MCV RBC AUTO: 83.3 FL (ref 79–97)
MCV RBC AUTO: 83.3 FL (ref 79–97)
MCV RBC AUTO: 83.4 FL (ref 79–97)
MCV RBC AUTO: 83.5 FL (ref 79–97)
MCV RBC AUTO: 83.5 FL (ref 79–97)
MCV RBC AUTO: 83.6 FL (ref 79–97)
MCV RBC AUTO: 84.1 FL (ref 79–97)
MCV RBC AUTO: 84.6 FL (ref 79–97)
MCV RBC AUTO: 85.2 FL (ref 79–97)
MODALITY: ABNORMAL
MONOCYTES # BLD AUTO: 0.33 10*3/MM3 (ref 0.1–0.9)
MONOCYTES # BLD AUTO: 0.38 10*3/MM3 (ref 0.1–0.9)
MONOCYTES # BLD AUTO: 0.4 10*3/MM3 (ref 0.1–0.9)
MONOCYTES # BLD AUTO: 0.41 10*3/MM3 (ref 0.1–0.9)
MONOCYTES # BLD AUTO: 0.44 10*3/MM3 (ref 0.1–0.9)
MONOCYTES # BLD AUTO: 0.5 X10E3/UL (ref 0.1–0.9)
MONOCYTES # BLD AUTO: 0.58 10*3/MM3 (ref 0.1–0.9)
MONOCYTES # BLD AUTO: 0.59 10*3/MM3 (ref 0.1–0.9)
MONOCYTES # BLD AUTO: 0.65 10*3/MM3 (ref 0.1–0.9)
MONOCYTES # BLD AUTO: 0.66 10*3/MM3 (ref 0.1–0.9)
MONOCYTES # BLD AUTO: 0.83 10*3/MM3 (ref 0.1–0.9)
MONOCYTES # BLD AUTO: 0.86 10*3/MM3 (ref 0.1–0.9)
MONOCYTES # BLD AUTO: 0.86 10*3/MM3 (ref 0.1–0.9)
MONOCYTES # BLD AUTO: 1.02 10*3/MM3 (ref 0.1–0.9)
MONOCYTES NFR BLD AUTO: 3.8 % (ref 5–12)
MONOCYTES NFR BLD AUTO: 4.1 % (ref 5–12)
MONOCYTES NFR BLD AUTO: 4.8 % (ref 5–12)
MONOCYTES NFR BLD AUTO: 5 %
MONOCYTES NFR BLD AUTO: 5 % (ref 5–12)
MONOCYTES NFR BLD AUTO: 5.3 % (ref 5–12)
MONOCYTES NFR BLD AUTO: 5.4 % (ref 5–12)
MONOCYTES NFR BLD AUTO: 5.9 % (ref 5–12)
MONOCYTES NFR BLD AUTO: 6.2 % (ref 5–12)
MONOCYTES NFR BLD AUTO: 6.6 % (ref 5–12)
MONOCYTES NFR BLD AUTO: 7.2 % (ref 5–12)
MONOCYTES NFR BLD AUTO: 7.3 % (ref 5–12)
MONOCYTES NFR BLD AUTO: 7.5 % (ref 5–12)
MONOCYTES NFR BLD AUTO: 8 % (ref 5–12)
NEUTROPHILS # BLD AUTO: 9.3 X10E3/UL (ref 1.4–7)
NEUTROPHILS NFR BLD AUTO: 10.16 10*3/MM3 (ref 1.7–7)
NEUTROPHILS NFR BLD AUTO: 12.06 10*3/MM3 (ref 1.7–7)
NEUTROPHILS NFR BLD AUTO: 5.78 10*3/MM3 (ref 1.7–7)
NEUTROPHILS NFR BLD AUTO: 6.39 10*3/MM3 (ref 1.7–7)
NEUTROPHILS NFR BLD AUTO: 7.25 10*3/MM3 (ref 1.7–7)
NEUTROPHILS NFR BLD AUTO: 79 % (ref 42.7–76)
NEUTROPHILS NFR BLD AUTO: 8.07 10*3/MM3 (ref 1.7–7)
NEUTROPHILS NFR BLD AUTO: 8.12 10*3/MM3 (ref 1.7–7)
NEUTROPHILS NFR BLD AUTO: 8.28 10*3/MM3 (ref 1.7–7)
NEUTROPHILS NFR BLD AUTO: 8.55 10*3/MM3 (ref 1.7–7)
NEUTROPHILS NFR BLD AUTO: 8.78 10*3/MM3 (ref 1.7–7)
NEUTROPHILS NFR BLD AUTO: 80.4 % (ref 42.7–76)
NEUTROPHILS NFR BLD AUTO: 81.6 % (ref 42.7–76)
NEUTROPHILS NFR BLD AUTO: 82.7 % (ref 42.7–76)
NEUTROPHILS NFR BLD AUTO: 83 % (ref 42.7–76)
NEUTROPHILS NFR BLD AUTO: 83.1 % (ref 42.7–76)
NEUTROPHILS NFR BLD AUTO: 83.7 % (ref 42.7–76)
NEUTROPHILS NFR BLD AUTO: 84.4 % (ref 42.7–76)
NEUTROPHILS NFR BLD AUTO: 84.8 % (ref 42.7–76)
NEUTROPHILS NFR BLD AUTO: 85 %
NEUTROPHILS NFR BLD AUTO: 85.7 % (ref 42.7–76)
NEUTROPHILS NFR BLD AUTO: 86.6 % (ref 42.7–76)
NEUTROPHILS NFR BLD AUTO: 87.6 % (ref 42.7–76)
NEUTROPHILS NFR BLD AUTO: 87.8 % (ref 42.7–76)
NEUTROPHILS NFR BLD AUTO: 9.08 10*3/MM3 (ref 1.7–7)
NEUTROPHILS NFR BLD AUTO: 9.21 10*3/MM3 (ref 1.7–7)
NEUTROPHILS NFR BLD AUTO: 9.78 10*3/MM3 (ref 1.7–7)
NRBC BLD AUTO-RTO: 0 /100 WBC (ref 0–0.2)
NRBC BLD AUTO-RTO: 0.1 /100 WBC (ref 0–0.2)
NT-PROBNP SERPL-MCNC: 4115 PG/ML (ref 0–1800)
NT-PROBNP SERPL-MCNC: ABNORMAL PG/ML (ref 0–1800)
NT-PROBNP SERPL-MCNC: ABNORMAL PG/ML (ref 0–1800)
PCO2 BLDA: 50.7 MM HG (ref 35–45)
PCO2 BLDA: 52.6 MM HG (ref 35–45)
PCO2 BLDA: 53 MM HG (ref 35–45)
PH BLDA: 7.37 PH UNITS (ref 7.35–7.45)
PH BLDA: 7.37 PH UNITS (ref 7.35–7.45)
PH BLDA: 7.38 PH UNITS (ref 7.35–7.45)
PHOSPHATE SERPL-MCNC: 3.3 MG/DL (ref 2.5–4.5)
PHOSPHATE SERPL-MCNC: 3.3 MG/DL (ref 2.5–4.5)
PHOSPHATE SERPL-MCNC: 3.7 MG/DL (ref 2.5–4.5)
PHOSPHATE SERPL-MCNC: 3.8 MG/DL (ref 2.5–4.5)
PHOSPHATE SERPL-MCNC: 4 MG/DL (ref 2.5–4.5)
PHOSPHATE SERPL-MCNC: 4.1 MG/DL (ref 2.5–4.5)
PHOSPHATE SERPL-MCNC: 4.2 MG/DL (ref 2.5–4.5)
PHOSPHATE SERPL-MCNC: 4.8 MG/DL (ref 2.5–4.5)
PHOSPHATE SERPL-MCNC: 4.9 MG/DL (ref 2.5–4.5)
PHOSPHATE SERPL-MCNC: 4.9 MG/DL (ref 2.5–4.5)
PHOSPHATE SERPL-MCNC: 5 MG/DL (ref 2.5–4.5)
PHOSPHATE SERPL-MCNC: 5.2 MG/DL (ref 2.5–4.5)
PLATELET # BLD AUTO: 143 10*3/MM3 (ref 140–450)
PLATELET # BLD AUTO: 171 10*3/MM3 (ref 140–450)
PLATELET # BLD AUTO: 177 10*3/MM3 (ref 140–450)
PLATELET # BLD AUTO: 178 10*3/MM3 (ref 140–450)
PLATELET # BLD AUTO: 179 10*3/MM3 (ref 140–450)
PLATELET # BLD AUTO: 182 10*3/MM3 (ref 140–450)
PLATELET # BLD AUTO: 183 10*3/MM3 (ref 140–450)
PLATELET # BLD AUTO: 187 10*3/MM3 (ref 140–450)
PLATELET # BLD AUTO: 187 10*3/MM3 (ref 140–450)
PLATELET # BLD AUTO: 191 10*3/MM3 (ref 140–450)
PLATELET # BLD AUTO: 193 10*3/MM3 (ref 140–450)
PLATELET # BLD AUTO: 196 10*3/MM3 (ref 140–450)
PLATELET # BLD AUTO: 196 10*3/MM3 (ref 140–450)
PLATELET # BLD AUTO: 197 10*3/MM3 (ref 140–450)
PLATELET # BLD AUTO: 199 10*3/MM3 (ref 140–450)
PLATELET # BLD AUTO: 216 10*3/MM3 (ref 140–450)
PLATELET # BLD AUTO: 216 10*3/MM3 (ref 140–450)
PLATELET # BLD AUTO: 217 10*3/MM3 (ref 140–450)
PLATELET # BLD AUTO: 221 10*3/MM3 (ref 140–450)
PLATELET # BLD AUTO: 223 10*3/MM3 (ref 140–450)
PLATELET # BLD AUTO: 225 10*3/MM3 (ref 140–450)
PLATELET # BLD AUTO: 227 10*3/MM3 (ref 140–450)
PLATELET # BLD AUTO: 232 10*3/MM3 (ref 140–450)
PLATELET # BLD AUTO: 242 X10E3/UL (ref 150–450)
PMV BLD AUTO: 10.1 FL (ref 6–12)
PMV BLD AUTO: 10.3 FL (ref 6–12)
PMV BLD AUTO: 10.4 FL (ref 6–12)
PMV BLD AUTO: 10.6 FL (ref 6–12)
PMV BLD AUTO: 10.7 FL (ref 6–12)
PMV BLD AUTO: 10.9 FL (ref 6–12)
PMV BLD AUTO: 11 FL (ref 6–12)
PMV BLD AUTO: 11.1 FL (ref 6–12)
PMV BLD AUTO: 11.1 FL (ref 6–12)
PMV BLD AUTO: 11.3 FL (ref 6–12)
PMV BLD AUTO: 11.5 FL (ref 6–12)
PMV BLD AUTO: 11.7 FL (ref 6–12)
PMV BLD AUTO: 12 FL (ref 6–12)
PMV BLD AUTO: 9.8 FL (ref 6–12)
PO2 BLDA: 70.1 MM HG (ref 80–100)
PO2 BLDA: 87.9 MM HG (ref 80–100)
PO2 BLDA: 95.3 MM HG (ref 80–100)
POTASSIUM SERPL-SCNC: 3.7 MMOL/L (ref 3.5–5.2)
POTASSIUM SERPL-SCNC: 3.8 MMOL/L (ref 3.5–5.2)
POTASSIUM SERPL-SCNC: 4 MMOL/L (ref 3.5–5.2)
POTASSIUM SERPL-SCNC: 4.2 MMOL/L (ref 3.5–5.2)
POTASSIUM SERPL-SCNC: 4.3 MMOL/L (ref 3.5–5.2)
POTASSIUM SERPL-SCNC: 4.4 MMOL/L (ref 3.5–5.2)
POTASSIUM SERPL-SCNC: 4.5 MMOL/L (ref 3.5–5.2)
POTASSIUM SERPL-SCNC: 4.5 MMOL/L (ref 3.5–5.2)
POTASSIUM SERPL-SCNC: 4.6 MMOL/L (ref 3.5–5.2)
POTASSIUM SERPL-SCNC: 4.6 MMOL/L (ref 3.5–5.2)
POTASSIUM SERPL-SCNC: 4.7 MMOL/L (ref 3.5–5.2)
POTASSIUM SERPL-SCNC: 4.7 MMOL/L (ref 3.5–5.2)
POTASSIUM SERPL-SCNC: 4.8 MMOL/L (ref 3.5–5.2)
POTASSIUM SERPL-SCNC: 4.9 MMOL/L (ref 3.5–5.2)
POTASSIUM SERPL-SCNC: 4.9 MMOL/L (ref 3.5–5.2)
POTASSIUM SERPL-SCNC: 5 MMOL/L (ref 3.5–5.2)
POTASSIUM SERPL-SCNC: 5.3 MMOL/L (ref 3.5–5.2)
POTASSIUM SERPL-SCNC: 5.6 MMOL/L (ref 3.5–5.2)
POTASSIUM SERPL-SCNC: 5.7 MMOL/L (ref 3.5–5.2)
PROCALCITONIN SERPL-MCNC: 0.32 NG/ML (ref 0–0.25)
PROCALCITONIN SERPL-MCNC: 0.39 NG/ML (ref 0–0.25)
PROT ?TM UR-MCNC: 6.7 MG/DL
PROT ?TM UR-MCNC: 7.3 MG/DL
PROT SERPL-MCNC: 6.3 G/DL (ref 6–8.5)
PROT SERPL-MCNC: 6.3 G/DL (ref 6–8.5)
PROT SERPL-MCNC: 6.8 G/DL (ref 6–8.5)
PROT SERPL-MCNC: 6.9 G/DL (ref 6–8.5)
PROT SERPL-MCNC: 7.1 G/DL (ref 6–8.5)
PROT SERPL-MCNC: 7.3 G/DL (ref 6–8.5)
PROT/CREAT UR: 104.7 MG/G CREA (ref 0–200)
PROT/CREAT UR: 86.5 MG/G CREA (ref 0–200)
PROTHROMBIN TIME: 15.5 SECONDS (ref 11.7–14.2)
PROTHROMBIN TIME: 15.8 SECONDS (ref 11.7–14.2)
PROTHROMBIN TIME: 15.8 SECONDS (ref 11.7–14.2)
QT INTERVAL: 453 MS
QT INTERVAL: 479 MS
QT INTERVAL: 482 MS
QT INTERVAL: 482 MS
RBC # BLD AUTO: 3.16 10*6/MM3 (ref 3.77–5.28)
RBC # BLD AUTO: 3.23 10*6/MM3 (ref 3.77–5.28)
RBC # BLD AUTO: 3.24 10*6/MM3 (ref 3.77–5.28)
RBC # BLD AUTO: 3.24 10*6/MM3 (ref 3.77–5.28)
RBC # BLD AUTO: 3.26 10*6/MM3 (ref 3.77–5.28)
RBC # BLD AUTO: 3.27 10*6/MM3 (ref 3.77–5.28)
RBC # BLD AUTO: 3.28 10*6/MM3 (ref 3.77–5.28)
RBC # BLD AUTO: 3.28 10*6/MM3 (ref 3.77–5.28)
RBC # BLD AUTO: 3.31 10*6/MM3 (ref 3.77–5.28)
RBC # BLD AUTO: 3.33 10*6/MM3 (ref 3.77–5.28)
RBC # BLD AUTO: 3.34 10*6/MM3 (ref 3.77–5.28)
RBC # BLD AUTO: 3.37 10*6/MM3 (ref 3.77–5.28)
RBC # BLD AUTO: 3.37 10*6/MM3 (ref 3.77–5.28)
RBC # BLD AUTO: 3.42 10*6/MM3 (ref 3.77–5.28)
RBC # BLD AUTO: 3.43 10*6/MM3 (ref 3.77–5.28)
RBC # BLD AUTO: 3.45 10*6/MM3 (ref 3.77–5.28)
RBC # BLD AUTO: 3.46 10*6/MM3 (ref 3.77–5.28)
RBC # BLD AUTO: 3.47 10*6/MM3 (ref 3.77–5.28)
RBC # BLD AUTO: 3.5 10*6/MM3 (ref 3.77–5.28)
RBC # BLD AUTO: 3.51 10*6/MM3 (ref 3.77–5.28)
RBC # BLD AUTO: 3.52 10*6/MM3 (ref 3.77–5.28)
RBC # BLD AUTO: 3.55 10*6/MM3 (ref 3.77–5.28)
RBC # BLD AUTO: 3.62 10*6/MM3 (ref 3.77–5.28)
RBC # BLD AUTO: 3.81 X10E6/UL (ref 3.77–5.28)
RHINOVIRUS RNA SPEC NAA+PROBE: NOT DETECTED
RSV RNA NPH QL NAA+NON-PROBE: NOT DETECTED
SAO2 % BLDCOA: 92.9 % (ref 92–99)
SAO2 % BLDCOA: 96.3 % (ref 92–99)
SAO2 % BLDCOA: 97 % (ref 92–99)
SARS-COV-2 ORF1AB RESP QL NAA+PROBE: NOT DETECTED
SARS-COV-2 ORF1AB RESP QL NAA+PROBE: NOT DETECTED
SARS-COV-2 RNA NPH QL NAA+NON-PROBE: NOT DETECTED
SINUS: 2.7 CM
SODIUM SERPL-SCNC: 129 MMOL/L (ref 136–145)
SODIUM SERPL-SCNC: 130 MMOL/L (ref 136–145)
SODIUM SERPL-SCNC: 133 MMOL/L (ref 136–145)
SODIUM SERPL-SCNC: 133 MMOL/L (ref 136–145)
SODIUM SERPL-SCNC: 134 MMOL/L (ref 136–145)
SODIUM SERPL-SCNC: 135 MMOL/L (ref 136–145)
SODIUM SERPL-SCNC: 135 MMOL/L (ref 136–145)
SODIUM SERPL-SCNC: 136 MMOL/L (ref 136–145)
SODIUM SERPL-SCNC: 137 MMOL/L (ref 136–145)
SODIUM SERPL-SCNC: 138 MMOL/L (ref 136–145)
SODIUM SERPL-SCNC: 139 MMOL/L (ref 136–145)
SODIUM SERPL-SCNC: 140 MMOL/L (ref 136–145)
SODIUM SERPL-SCNC: 141 MMOL/L (ref 136–145)
SODIUM SERPL-SCNC: 142 MMOL/L (ref 134–144)
SODIUM SERPL-SCNC: 143 MMOL/L (ref 136–145)
SODIUM UR-SCNC: 22 MMOL/L
SODIUM UR-SCNC: 31 MMOL/L
SODIUM UR-SCNC: 44 MMOL/L
STJ: 2.8 CM
STRESS TARGET HR: 122 BPM
STRESS TARGET HR: 122 BPM
T4 FREE SERPL-MCNC: 1.36 NG/DL (ref 0.93–1.7)
TIBC SERPL-MCNC: 288 MCG/DL (ref 298–536)
TIBC SERPL-MCNC: 413 MCG/DL (ref 298–536)
TOTAL RATE: 16 BREATHS/MINUTE
TOTAL RATE: 20 BREATHS/MINUTE
TOTAL RATE: 20 BREATHS/MINUTE
TRANSFERRIN SERPL-MCNC: 193 MG/DL (ref 200–360)
TRANSFERRIN SERPL-MCNC: 277 MG/DL (ref 200–360)
TRIGL SERPL-MCNC: 124 MG/DL (ref 0–149)
TROPONIN T SERPL-MCNC: 0.01 NG/ML (ref 0–0.03)
TROPONIN T SERPL-MCNC: 0.02 NG/ML (ref 0–0.03)
TROPONIN T SERPL-MCNC: 0.03 NG/ML (ref 0–0.03)
TROPONIN T SERPL-MCNC: 0.03 NG/ML (ref 0–0.03)
TSH SERPL DL<=0.005 MIU/L-ACNC: 4.3 UIU/ML (ref 0.45–4.5)
TSH SERPL DL<=0.05 MIU/L-ACNC: 5.53 UIU/ML (ref 0.27–4.2)
URATE SERPL-MCNC: 10.7 MG/DL (ref 2.4–5.7)
URATE SERPL-MCNC: 11.3 MG/DL (ref 2.4–5.7)
URATE SERPL-MCNC: 11.3 MG/DL (ref 2.4–5.7)
URATE SERPL-MCNC: 11.8 MG/DL (ref 2.4–5.7)
URATE SERPL-MCNC: 11.8 MG/DL (ref 2.4–5.7)
URATE SERPL-MCNC: 12.4 MG/DL (ref 2.4–5.7)
URATE SERPL-MCNC: 12.8 MG/DL (ref 2.4–5.7)
URATE SERPL-MCNC: 13.1 MG/DL (ref 2.4–5.7)
URATE SERPL-MCNC: 13.6 MG/DL (ref 2.4–5.7)
URATE SERPL-MCNC: 3.1 MG/DL (ref 2.4–5.7)
UUN 24H UR-MCNC: 604 MG/DL
VIT B12 SERPL-MCNC: 760 PG/ML (ref 232–1245)
VLDLC SERPL CALC-MCNC: 22 MG/DL (ref 5–40)
WBC # BLD AUTO: 10.7 X10E3/UL (ref 3.4–10.8)
WBC NRBC COR # BLD: 10.03 10*3/MM3 (ref 3.4–10.8)
WBC NRBC COR # BLD: 10.28 10*3/MM3 (ref 3.4–10.8)
WBC NRBC COR # BLD: 10.49 10*3/MM3 (ref 3.4–10.8)
WBC NRBC COR # BLD: 10.5 10*3/MM3 (ref 3.4–10.8)
WBC NRBC COR # BLD: 10.56 10*3/MM3 (ref 3.4–10.8)
WBC NRBC COR # BLD: 10.8 10*3/MM3 (ref 3.4–10.8)
WBC NRBC COR # BLD: 10.83 10*3/MM3 (ref 3.4–10.8)
WBC NRBC COR # BLD: 11.13 10*3/MM3 (ref 3.4–10.8)
WBC NRBC COR # BLD: 11.6 10*3/MM3 (ref 3.4–10.8)
WBC NRBC COR # BLD: 11.98 10*3/MM3 (ref 3.4–10.8)
WBC NRBC COR # BLD: 13.5 10*3/MM3 (ref 3.4–10.8)
WBC NRBC COR # BLD: 14.05 10*3/MM3 (ref 3.4–10.8)
WBC NRBC COR # BLD: 6.82 10*3/MM3 (ref 3.4–10.8)
WBC NRBC COR # BLD: 7.57 10*3/MM3 (ref 3.4–10.8)
WBC NRBC COR # BLD: 7.77 10*3/MM3 (ref 3.4–10.8)
WBC NRBC COR # BLD: 8.06 10*3/MM3 (ref 3.4–10.8)
WBC NRBC COR # BLD: 8.25 10*3/MM3 (ref 3.4–10.8)
WBC NRBC COR # BLD: 8.34 10*3/MM3 (ref 3.4–10.8)
WBC NRBC COR # BLD: 8.71 10*3/MM3 (ref 3.4–10.8)
WBC NRBC COR # BLD: 9.38 10*3/MM3 (ref 3.4–10.8)
WBC NRBC COR # BLD: 9.83 10*3/MM3 (ref 3.4–10.8)
WBC NRBC COR # BLD: 9.86 10*3/MM3 (ref 3.4–10.8)
WBC NRBC COR # BLD: 9.98 10*3/MM3 (ref 3.4–10.8)
WHOLE BLOOD HOLD COAG: NORMAL
WHOLE BLOOD HOLD COAG: NORMAL
WHOLE BLOOD HOLD SPECIMEN: NORMAL
WHOLE BLOOD HOLD SPECIMEN: NORMAL

## 2022-01-01 PROCEDURE — 83735 ASSAY OF MAGNESIUM: CPT | Performed by: INTERNAL MEDICINE

## 2022-01-01 PROCEDURE — G0378 HOSPITAL OBSERVATION PER HR: HCPCS

## 2022-01-01 PROCEDURE — 25010000002 HYDROMORPHONE PER 4 MG: Performed by: EMERGENCY MEDICINE

## 2022-01-01 PROCEDURE — 29581 APPL MULTLAYER CMPRN SYS LEG: CPT

## 2022-01-01 PROCEDURE — 94799 UNLISTED PULMONARY SVC/PX: CPT

## 2022-01-01 PROCEDURE — 51798 US URINE CAPACITY MEASURE: CPT

## 2022-01-01 PROCEDURE — 85610 PROTHROMBIN TIME: CPT | Performed by: PHYSICIAN ASSISTANT

## 2022-01-01 PROCEDURE — 94664 DEMO&/EVAL PT USE INHALER: CPT

## 2022-01-01 PROCEDURE — 76775 US EXAM ABDO BACK WALL LIM: CPT

## 2022-01-01 PROCEDURE — 25010000002 ENOXAPARIN PER 10 MG: Performed by: NURSE PRACTITIONER

## 2022-01-01 PROCEDURE — 97110 THERAPEUTIC EXERCISES: CPT

## 2022-01-01 PROCEDURE — 63710000001 INSULIN LISPRO (HUMAN) PER 5 UNITS: Performed by: PHYSICIAN ASSISTANT

## 2022-01-01 PROCEDURE — 97530 THERAPEUTIC ACTIVITIES: CPT

## 2022-01-01 PROCEDURE — 25010000002 ENOXAPARIN PER 10 MG: Performed by: INTERNAL MEDICINE

## 2022-01-01 PROCEDURE — 99285 EMERGENCY DEPT VISIT HI MDM: CPT

## 2022-01-01 PROCEDURE — 63710000001 DIPHENHYDRAMINE PER 50 MG: Performed by: INTERNAL MEDICINE

## 2022-01-01 PROCEDURE — 93010 ELECTROCARDIOGRAM REPORT: CPT | Performed by: INTERNAL MEDICINE

## 2022-01-01 PROCEDURE — 80048 BASIC METABOLIC PNL TOTAL CA: CPT | Performed by: NURSE PRACTITIONER

## 2022-01-01 PROCEDURE — 25010000002 HEPARIN (PORCINE) PER 1000 UNITS: Performed by: HOSPITALIST

## 2022-01-01 PROCEDURE — 93296 REM INTERROG EVL PM/IDS: CPT | Performed by: INTERNAL MEDICINE

## 2022-01-01 PROCEDURE — 85027 COMPLETE CBC AUTOMATED: CPT | Performed by: STUDENT IN AN ORGANIZED HEALTH CARE EDUCATION/TRAINING PROGRAM

## 2022-01-01 PROCEDURE — 80048 BASIC METABOLIC PNL TOTAL CA: CPT | Performed by: STUDENT IN AN ORGANIZED HEALTH CARE EDUCATION/TRAINING PROGRAM

## 2022-01-01 PROCEDURE — 82962 GLUCOSE BLOOD TEST: CPT

## 2022-01-01 PROCEDURE — 96374 THER/PROPH/DIAG INJ IV PUSH: CPT

## 2022-01-01 PROCEDURE — 96372 THER/PROPH/DIAG INJ SC/IM: CPT

## 2022-01-01 PROCEDURE — 25010000002 NA FERRIC GLUC CPLX PER 12.5 MG: Performed by: INTERNAL MEDICINE

## 2022-01-01 PROCEDURE — 82803 BLOOD GASES ANY COMBINATION: CPT

## 2022-01-01 PROCEDURE — 36415 COLL VENOUS BLD VENIPUNCTURE: CPT

## 2022-01-01 PROCEDURE — 93005 ELECTROCARDIOGRAM TRACING: CPT | Performed by: INTERNAL MEDICINE

## 2022-01-01 PROCEDURE — 0202U NFCT DS 22 TRGT SARS-COV-2: CPT | Performed by: PHYSICIAN ASSISTANT

## 2022-01-01 PROCEDURE — 84466 ASSAY OF TRANSFERRIN: CPT | Performed by: INTERNAL MEDICINE

## 2022-01-01 PROCEDURE — 97162 PT EVAL MOD COMPLEX 30 MIN: CPT

## 2022-01-01 PROCEDURE — 85730 THROMBOPLASTIN TIME PARTIAL: CPT | Performed by: EMERGENCY MEDICINE

## 2022-01-01 PROCEDURE — 82728 ASSAY OF FERRITIN: CPT | Performed by: INTERNAL MEDICINE

## 2022-01-01 PROCEDURE — 80069 RENAL FUNCTION PANEL: CPT | Performed by: INTERNAL MEDICINE

## 2022-01-01 PROCEDURE — 63710000001 INSULIN LISPRO (HUMAN) PER 5 UNITS: Performed by: HOSPITALIST

## 2022-01-01 PROCEDURE — 84439 ASSAY OF FREE THYROXINE: CPT | Performed by: INTERNAL MEDICINE

## 2022-01-01 PROCEDURE — 97165 OT EVAL LOW COMPLEX 30 MIN: CPT

## 2022-01-01 PROCEDURE — 73560 X-RAY EXAM OF KNEE 1 OR 2: CPT

## 2022-01-01 PROCEDURE — 96376 TX/PRO/DX INJ SAME DRUG ADON: CPT

## 2022-01-01 PROCEDURE — 25010000002 FUROSEMIDE PER 20 MG: Performed by: INTERNAL MEDICINE

## 2022-01-01 PROCEDURE — 94761 N-INVAS EAR/PLS OXIMETRY MLT: CPT

## 2022-01-01 PROCEDURE — 36600 WITHDRAWAL OF ARTERIAL BLOOD: CPT

## 2022-01-01 PROCEDURE — 80048 BASIC METABOLIC PNL TOTAL CA: CPT | Performed by: HOSPITALIST

## 2022-01-01 PROCEDURE — 93010 ELECTROCARDIOGRAM REPORT: CPT | Performed by: STUDENT IN AN ORGANIZED HEALTH CARE EDUCATION/TRAINING PROGRAM

## 2022-01-01 PROCEDURE — 63710000001 INSULIN LISPRO (HUMAN) PER 5 UNITS: Performed by: STUDENT IN AN ORGANIZED HEALTH CARE EDUCATION/TRAINING PROGRAM

## 2022-01-01 PROCEDURE — 85025 COMPLETE CBC W/AUTO DIFF WBC: CPT | Performed by: INTERNAL MEDICINE

## 2022-01-01 PROCEDURE — 93306 TTE W/DOPPLER COMPLETE: CPT | Performed by: INTERNAL MEDICINE

## 2022-01-01 PROCEDURE — 82570 ASSAY OF URINE CREATININE: CPT | Performed by: INTERNAL MEDICINE

## 2022-01-01 PROCEDURE — G0008 ADMIN INFLUENZA VIRUS VAC: HCPCS | Performed by: HOSPITALIST

## 2022-01-01 PROCEDURE — 36415 COLL VENOUS BLD VENIPUNCTURE: CPT | Performed by: INTERNAL MEDICINE

## 2022-01-01 PROCEDURE — 82570 ASSAY OF URINE CREATININE: CPT | Performed by: HOSPITALIST

## 2022-01-01 PROCEDURE — 80053 COMPREHEN METABOLIC PANEL: CPT | Performed by: NURSE PRACTITIONER

## 2022-01-01 PROCEDURE — 85025 COMPLETE CBC W/AUTO DIFF WBC: CPT | Performed by: EMERGENCY MEDICINE

## 2022-01-01 PROCEDURE — 83036 HEMOGLOBIN GLYCOSYLATED A1C: CPT | Performed by: NURSE PRACTITIONER

## 2022-01-01 PROCEDURE — 63710000001 INSULIN LISPRO (HUMAN) PER 5 UNITS: Performed by: INTERNAL MEDICINE

## 2022-01-01 PROCEDURE — 84550 ASSAY OF BLOOD/URIC ACID: CPT | Performed by: INTERNAL MEDICINE

## 2022-01-01 PROCEDURE — 63710000001 PREDNISONE PER 1 MG: Performed by: STUDENT IN AN ORGANIZED HEALTH CARE EDUCATION/TRAINING PROGRAM

## 2022-01-01 PROCEDURE — 63710000001 INSULIN REGULAR HUMAN PER 5 UNITS: Performed by: EMERGENCY MEDICINE

## 2022-01-01 PROCEDURE — 71045 X-RAY EXAM CHEST 1 VIEW: CPT

## 2022-01-01 PROCEDURE — 87205 SMEAR GRAM STAIN: CPT | Performed by: HOSPITALIST

## 2022-01-01 PROCEDURE — 25010000002 CEFTRIAXONE PER 250 MG: Performed by: INTERNAL MEDICINE

## 2022-01-01 PROCEDURE — 63710000001 INSULIN LISPRO (HUMAN) PER 5 UNITS: Performed by: NURSE PRACTITIONER

## 2022-01-01 PROCEDURE — 80048 BASIC METABOLIC PNL TOTAL CA: CPT | Performed by: INTERNAL MEDICINE

## 2022-01-01 PROCEDURE — 94760 N-INVAS EAR/PLS OXIMETRY 1: CPT

## 2022-01-01 PROCEDURE — 84443 ASSAY THYROID STIM HORMONE: CPT | Performed by: INTERNAL MEDICINE

## 2022-01-01 PROCEDURE — 85025 COMPLETE CBC W/AUTO DIFF WBC: CPT | Performed by: NURSE PRACTITIONER

## 2022-01-01 PROCEDURE — 96375 TX/PRO/DX INJ NEW DRUG ADDON: CPT

## 2022-01-01 PROCEDURE — U0004 COV-19 TEST NON-CDC HGH THRU: HCPCS | Performed by: HOSPITALIST

## 2022-01-01 PROCEDURE — 84484 ASSAY OF TROPONIN QUANT: CPT | Performed by: INTERNAL MEDICINE

## 2022-01-01 PROCEDURE — 84156 ASSAY OF PROTEIN URINE: CPT | Performed by: INTERNAL MEDICINE

## 2022-01-01 PROCEDURE — 97166 OT EVAL MOD COMPLEX 45 MIN: CPT

## 2022-01-01 PROCEDURE — 99232 SBSQ HOSP IP/OBS MODERATE 35: CPT | Performed by: NURSE PRACTITIONER

## 2022-01-01 PROCEDURE — 84540 ASSAY OF URINE/UREA-N: CPT | Performed by: HOSPITALIST

## 2022-01-01 PROCEDURE — 99283 EMERGENCY DEPT VISIT LOW MDM: CPT

## 2022-01-01 PROCEDURE — 0202U NFCT DS 22 TRGT SARS-COV-2: CPT | Performed by: HOSPITALIST

## 2022-01-01 PROCEDURE — 85027 COMPLETE CBC AUTOMATED: CPT | Performed by: NURSE PRACTITIONER

## 2022-01-01 PROCEDURE — 25010000002 INFLUENZA VAC HIGH-DOSE QUAD 0.7 ML SUSPENSION PREFILLED SYRINGE: Performed by: HOSPITALIST

## 2022-01-01 PROCEDURE — 93306 TTE W/DOPPLER COMPLETE: CPT

## 2022-01-01 PROCEDURE — 83735 ASSAY OF MAGNESIUM: CPT | Performed by: NURSE PRACTITIONER

## 2022-01-01 PROCEDURE — 82306 VITAMIN D 25 HYDROXY: CPT | Performed by: NURSE PRACTITIONER

## 2022-01-01 PROCEDURE — 93294 REM INTERROG EVL PM/LDLS PM: CPT | Performed by: INTERNAL MEDICINE

## 2022-01-01 PROCEDURE — U0004 COV-19 TEST NON-CDC HGH THRU: HCPCS | Performed by: NURSE PRACTITIONER

## 2022-01-01 PROCEDURE — 84300 ASSAY OF URINE SODIUM: CPT | Performed by: HOSPITALIST

## 2022-01-01 PROCEDURE — 85025 COMPLETE CBC W/AUTO DIFF WBC: CPT | Performed by: HOSPITALIST

## 2022-01-01 PROCEDURE — 99232 SBSQ HOSP IP/OBS MODERATE 35: CPT | Performed by: STUDENT IN AN ORGANIZED HEALTH CARE EDUCATION/TRAINING PROGRAM

## 2022-01-01 PROCEDURE — 80053 COMPREHEN METABOLIC PANEL: CPT | Performed by: INTERNAL MEDICINE

## 2022-01-01 PROCEDURE — 83735 ASSAY OF MAGNESIUM: CPT | Performed by: EMERGENCY MEDICINE

## 2022-01-01 PROCEDURE — 83735 ASSAY OF MAGNESIUM: CPT | Performed by: STUDENT IN AN ORGANIZED HEALTH CARE EDUCATION/TRAINING PROGRAM

## 2022-01-01 PROCEDURE — 84484 ASSAY OF TROPONIN QUANT: CPT | Performed by: PHYSICIAN ASSISTANT

## 2022-01-01 PROCEDURE — 83036 HEMOGLOBIN GLYCOSYLATED A1C: CPT | Performed by: INTERNAL MEDICINE

## 2022-01-01 PROCEDURE — 83880 ASSAY OF NATRIURETIC PEPTIDE: CPT | Performed by: EMERGENCY MEDICINE

## 2022-01-01 PROCEDURE — 99232 SBSQ HOSP IP/OBS MODERATE 35: CPT | Performed by: INTERNAL MEDICINE

## 2022-01-01 PROCEDURE — 84484 ASSAY OF TROPONIN QUANT: CPT | Performed by: EMERGENCY MEDICINE

## 2022-01-01 PROCEDURE — 25010000002 MORPHINE PER 10 MG: Performed by: EMERGENCY MEDICINE

## 2022-01-01 PROCEDURE — 85610 PROTHROMBIN TIME: CPT | Performed by: EMERGENCY MEDICINE

## 2022-01-01 PROCEDURE — 84300 ASSAY OF URINE SODIUM: CPT | Performed by: INTERNAL MEDICINE

## 2022-01-01 PROCEDURE — 73130 X-RAY EXAM OF HAND: CPT

## 2022-01-01 PROCEDURE — 99222 1ST HOSP IP/OBS MODERATE 55: CPT | Performed by: INTERNAL MEDICINE

## 2022-01-01 PROCEDURE — 93005 ELECTROCARDIOGRAM TRACING: CPT

## 2022-01-01 PROCEDURE — 94640 AIRWAY INHALATION TREATMENT: CPT

## 2022-01-01 PROCEDURE — 84484 ASSAY OF TROPONIN QUANT: CPT | Performed by: NURSE PRACTITIONER

## 2022-01-01 PROCEDURE — 83540 ASSAY OF IRON: CPT | Performed by: INTERNAL MEDICINE

## 2022-01-01 PROCEDURE — 80053 COMPREHEN METABOLIC PANEL: CPT | Performed by: EMERGENCY MEDICINE

## 2022-01-01 PROCEDURE — 25010000002 FUROSEMIDE PER 20 MG: Performed by: NURSE PRACTITIONER

## 2022-01-01 PROCEDURE — 84145 PROCALCITONIN (PCT): CPT | Performed by: NURSE PRACTITIONER

## 2022-01-01 PROCEDURE — 82436 ASSAY OF URINE CHLORIDE: CPT | Performed by: INTERNAL MEDICINE

## 2022-01-01 PROCEDURE — 96361 HYDRATE IV INFUSION ADD-ON: CPT

## 2022-01-01 PROCEDURE — 83880 ASSAY OF NATRIURETIC PEPTIDE: CPT | Performed by: NURSE PRACTITIONER

## 2022-01-01 PROCEDURE — 85610 PROTHROMBIN TIME: CPT | Performed by: NURSE PRACTITIONER

## 2022-01-01 PROCEDURE — 25010000002 ONDANSETRON PER 1 MG: Performed by: NURSE PRACTITIONER

## 2022-01-01 PROCEDURE — 93005 ELECTROCARDIOGRAM TRACING: CPT | Performed by: EMERGENCY MEDICINE

## 2022-01-01 PROCEDURE — 25010000002 ONDANSETRON PER 1 MG: Performed by: EMERGENCY MEDICINE

## 2022-01-01 PROCEDURE — 99222 1ST HOSP IP/OBS MODERATE 55: CPT | Performed by: NURSE PRACTITIONER

## 2022-01-01 PROCEDURE — 63710000001 PREDNISONE PER 5 MG: Performed by: STUDENT IN AN ORGANIZED HEALTH CARE EDUCATION/TRAINING PROGRAM

## 2022-01-01 PROCEDURE — 99397 PER PM REEVAL EST PAT 65+ YR: CPT | Performed by: NURSE PRACTITIONER

## 2022-01-01 PROCEDURE — 0202U NFCT DS 22 TRGT SARS-COV-2: CPT | Performed by: EMERGENCY MEDICINE

## 2022-01-01 PROCEDURE — 99284 EMERGENCY DEPT VISIT MOD MDM: CPT

## 2022-01-01 PROCEDURE — 71046 X-RAY EXAM CHEST 2 VIEWS: CPT

## 2022-01-01 PROCEDURE — 99214 OFFICE O/P EST MOD 30 MIN: CPT | Performed by: INTERNAL MEDICINE

## 2022-01-01 PROCEDURE — 84550 ASSAY OF BLOOD/URIC ACID: CPT | Performed by: HOSPITALIST

## 2022-01-01 PROCEDURE — 99214 OFFICE O/P EST MOD 30 MIN: CPT | Performed by: NURSE PRACTITIONER

## 2022-01-01 PROCEDURE — 71250 CT THORAX DX C-: CPT

## 2022-01-01 PROCEDURE — 63710000001 INSULIN REGULAR HUMAN PER 5 UNITS: Performed by: NURSE PRACTITIONER

## 2022-01-01 PROCEDURE — 85025 COMPLETE CBC W/AUTO DIFF WBC: CPT | Performed by: PHYSICIAN ASSISTANT

## 2022-01-01 PROCEDURE — 85027 COMPLETE CBC AUTOMATED: CPT | Performed by: INTERNAL MEDICINE

## 2022-01-01 PROCEDURE — 36415 COLL VENOUS BLD VENIPUNCTURE: CPT | Performed by: NURSE PRACTITIONER

## 2022-01-01 PROCEDURE — 25010000002 FUROSEMIDE PER 20 MG: Performed by: EMERGENCY MEDICINE

## 2022-01-01 PROCEDURE — 90662 IIV NO PRSV INCREASED AG IM: CPT | Performed by: HOSPITALIST

## 2022-01-01 PROCEDURE — 84145 PROCALCITONIN (PCT): CPT | Performed by: INTERNAL MEDICINE

## 2022-01-01 PROCEDURE — 83036 HEMOGLOBIN GLYCOSYLATED A1C: CPT | Performed by: HOSPITALIST

## 2022-01-01 PROCEDURE — 80053 COMPREHEN METABOLIC PANEL: CPT | Performed by: PHYSICIAN ASSISTANT

## 2022-01-01 RX ORDER — LACTULOSE 10 G/15ML
10 SOLUTION ORAL DAILY
Status: DISCONTINUED | OUTPATIENT
Start: 2022-01-01 | End: 2022-01-01 | Stop reason: HOSPADM

## 2022-01-01 RX ORDER — FUROSEMIDE 10 MG/ML
80 INJECTION INTRAMUSCULAR; INTRAVENOUS ONCE
Status: COMPLETED | OUTPATIENT
Start: 2022-01-01 | End: 2022-01-01

## 2022-01-01 RX ORDER — DULAGLUTIDE 1.5 MG/.5ML
INJECTION, SOLUTION SUBCUTANEOUS
Qty: 2 ML | Refills: 0 | Status: SHIPPED | OUTPATIENT
Start: 2022-01-01 | End: 2022-01-01

## 2022-01-01 RX ORDER — HYDROCODONE BITARTRATE AND ACETAMINOPHEN 5; 325 MG/1; MG/1
1 TABLET ORAL EVERY 6 HOURS PRN
Qty: 10 TABLET | Refills: 0 | Status: SHIPPED | OUTPATIENT
Start: 2022-01-01 | End: 2022-01-01

## 2022-01-01 RX ORDER — PREDNISONE 20 MG/1
20 TABLET ORAL
Status: DISCONTINUED | OUTPATIENT
Start: 2022-01-01 | End: 2022-01-01

## 2022-01-01 RX ORDER — ATORVASTATIN CALCIUM 40 MG/1
40 TABLET, FILM COATED ORAL DAILY
Qty: 90 TABLET | Refills: 0 | Status: SHIPPED | OUTPATIENT
Start: 2022-01-01 | End: 2022-01-01

## 2022-01-01 RX ORDER — LEVOTHYROXINE SODIUM 0.03 MG/1
TABLET ORAL
Qty: 40 TABLET | Refills: 3 | Status: SHIPPED | OUTPATIENT
Start: 2022-01-01 | End: 2022-01-01 | Stop reason: SDUPTHER

## 2022-01-01 RX ORDER — TRAMADOL HYDROCHLORIDE 50 MG/1
50 TABLET ORAL DAILY PRN
Status: DISCONTINUED | OUTPATIENT
Start: 2022-01-01 | End: 2022-01-01 | Stop reason: HOSPADM

## 2022-01-01 RX ORDER — BUMETANIDE 0.25 MG/ML
4 INJECTION INTRAMUSCULAR; INTRAVENOUS EVERY 8 HOURS
Status: COMPLETED | OUTPATIENT
Start: 2022-01-01 | End: 2022-01-01

## 2022-01-01 RX ORDER — GUAIFENESIN 600 MG/1
600 TABLET, EXTENDED RELEASE ORAL EVERY 12 HOURS SCHEDULED
Status: DISCONTINUED | OUTPATIENT
Start: 2022-01-01 | End: 2022-01-01

## 2022-01-01 RX ORDER — VILAZODONE HYDROCHLORIDE 40 MG/1
20 TABLET ORAL NIGHTLY
Status: DISCONTINUED | OUTPATIENT
Start: 2022-01-01 | End: 2022-01-01 | Stop reason: HOSPADM

## 2022-01-01 RX ORDER — ACETAMINOPHEN 325 MG/1
650 TABLET ORAL EVERY 24 HOURS
Status: DISCONTINUED | OUTPATIENT
Start: 2022-01-01 | End: 2022-01-01

## 2022-01-01 RX ORDER — NITROGLYCERIN 0.4 MG/1
0.4 TABLET SUBLINGUAL
Status: DISCONTINUED | OUTPATIENT
Start: 2022-01-01 | End: 2022-01-01 | Stop reason: HOSPADM

## 2022-01-01 RX ORDER — UREA 10 %
3 LOTION (ML) TOPICAL NIGHTLY PRN
Status: DISCONTINUED | OUTPATIENT
Start: 2022-01-01 | End: 2022-01-01 | Stop reason: HOSPADM

## 2022-01-01 RX ORDER — OMEPRAZOLE 40 MG/1
40 CAPSULE, DELAYED RELEASE ORAL DAILY
Qty: 90 CAPSULE | Refills: 0 | Status: SHIPPED | OUTPATIENT
Start: 2022-01-01 | End: 2022-01-01 | Stop reason: SDUPTHER

## 2022-01-01 RX ORDER — ALLOPURINOL 100 MG/1
100 TABLET ORAL DAILY
Status: DISCONTINUED | OUTPATIENT
Start: 2022-01-01 | End: 2022-01-01 | Stop reason: HOSPADM

## 2022-01-01 RX ORDER — BENZONATATE 100 MG/1
100 CAPSULE ORAL 3 TIMES DAILY
Status: DISCONTINUED | OUTPATIENT
Start: 2022-01-01 | End: 2022-01-01 | Stop reason: HOSPADM

## 2022-01-01 RX ORDER — ALPRAZOLAM 0.5 MG/1
0.5 TABLET ORAL 2 TIMES DAILY PRN
Status: DISCONTINUED | OUTPATIENT
Start: 2022-01-01 | End: 2022-01-01

## 2022-01-01 RX ORDER — LIDOCAINE 50 MG/G
1 PATCH TOPICAL
Status: DISCONTINUED | OUTPATIENT
Start: 2022-01-01 | End: 2022-01-01 | Stop reason: HOSPADM

## 2022-01-01 RX ORDER — FUROSEMIDE 10 MG/ML
40 INJECTION INTRAMUSCULAR; INTRAVENOUS EVERY 12 HOURS
Status: DISCONTINUED | OUTPATIENT
Start: 2022-01-01 | End: 2022-01-01

## 2022-01-01 RX ORDER — SODIUM CHLORIDE 0.9 % (FLUSH) 0.9 %
10 SYRINGE (ML) INJECTION AS NEEDED
Status: DISCONTINUED | OUTPATIENT
Start: 2022-01-01 | End: 2022-01-01 | Stop reason: HOSPADM

## 2022-01-01 RX ORDER — GABAPENTIN 100 MG/1
100 CAPSULE ORAL EVERY 12 HOURS
Qty: 60 CAPSULE | Refills: 0 | Status: SHIPPED | OUTPATIENT
Start: 2022-01-01 | End: 2022-01-01 | Stop reason: SDUPTHER

## 2022-01-01 RX ORDER — GABAPENTIN 100 MG/1
100 CAPSULE ORAL EVERY 12 HOURS
Qty: 60 CAPSULE | Refills: 2 | Status: ON HOLD | OUTPATIENT
Start: 2022-01-01 | End: 2022-01-01 | Stop reason: SDUPTHER

## 2022-01-01 RX ORDER — BUDESONIDE AND FORMOTEROL FUMARATE DIHYDRATE 160; 4.5 UG/1; UG/1
2 AEROSOL RESPIRATORY (INHALATION)
Status: DISCONTINUED | OUTPATIENT
Start: 2022-01-01 | End: 2022-01-01 | Stop reason: HOSPADM

## 2022-01-01 RX ORDER — PREDNISONE 20 MG/1
40 TABLET ORAL
Status: COMPLETED | OUTPATIENT
Start: 2022-01-01 | End: 2022-01-01

## 2022-01-01 RX ORDER — GUAIFENESIN 600 MG/1
1200 TABLET, EXTENDED RELEASE ORAL EVERY 12 HOURS SCHEDULED
Status: DISCONTINUED | OUTPATIENT
Start: 2022-01-01 | End: 2022-01-01 | Stop reason: HOSPADM

## 2022-01-01 RX ORDER — INSULIN LISPRO 100 [IU]/ML
0-9 INJECTION, SOLUTION INTRAVENOUS; SUBCUTANEOUS
Status: DISCONTINUED | OUTPATIENT
Start: 2022-01-01 | End: 2022-01-01

## 2022-01-01 RX ORDER — PANTOPRAZOLE SODIUM 40 MG/1
40 TABLET, DELAYED RELEASE ORAL EVERY MORNING
Status: DISCONTINUED | OUTPATIENT
Start: 2022-01-01 | End: 2022-01-01 | Stop reason: HOSPADM

## 2022-01-01 RX ORDER — AMOXICILLIN 250 MG
1 CAPSULE ORAL 2 TIMES DAILY
Status: DISCONTINUED | OUTPATIENT
Start: 2022-01-01 | End: 2022-01-01 | Stop reason: HOSPADM

## 2022-01-01 RX ORDER — HYDROXYZINE HYDROCHLORIDE 25 MG/1
25 TABLET, FILM COATED ORAL 3 TIMES DAILY PRN
Status: DISCONTINUED | OUTPATIENT
Start: 2022-01-01 | End: 2022-01-01 | Stop reason: HOSPADM

## 2022-01-01 RX ORDER — POLYETHYLENE GLYCOL 3350 17 G/17G
17 POWDER, FOR SOLUTION ORAL DAILY
Status: DISCONTINUED | OUTPATIENT
Start: 2022-01-01 | End: 2022-01-01 | Stop reason: HOSPADM

## 2022-01-01 RX ORDER — FLUTICASONE PROPIONATE 50 MCG
2 SPRAY, SUSPENSION (ML) NASAL DAILY
Status: DISCONTINUED | OUTPATIENT
Start: 2022-01-01 | End: 2022-01-01 | Stop reason: HOSPADM

## 2022-01-01 RX ORDER — FLUTICASONE PROPIONATE 50 MCG
2 SPRAY, SUSPENSION (ML) NASAL DAILY
Status: DISCONTINUED | OUTPATIENT
Start: 2022-01-01 | End: 2022-01-01

## 2022-01-01 RX ORDER — INSULIN LISPRO 100 [IU]/ML
0-14 INJECTION, SOLUTION INTRAVENOUS; SUBCUTANEOUS
Status: DISCONTINUED | OUTPATIENT
Start: 2022-01-01 | End: 2022-01-01 | Stop reason: HOSPADM

## 2022-01-01 RX ORDER — ALBUTEROL SULFATE 2.5 MG/3ML
2.5 SOLUTION RESPIRATORY (INHALATION) EVERY 6 HOURS PRN
Status: DISCONTINUED | OUTPATIENT
Start: 2022-01-01 | End: 2022-01-01 | Stop reason: HOSPADM

## 2022-01-01 RX ORDER — GABAPENTIN 100 MG/1
100 CAPSULE ORAL EVERY 12 HOURS
Qty: 10 CAPSULE | Refills: 2 | Status: ON HOLD | OUTPATIENT
Start: 2022-01-01 | End: 2022-01-01 | Stop reason: SDUPTHER

## 2022-01-01 RX ORDER — AMOXICILLIN 250 MG
2 CAPSULE ORAL NIGHTLY
Status: DISCONTINUED | OUTPATIENT
Start: 2022-01-01 | End: 2022-01-01 | Stop reason: HOSPADM

## 2022-01-01 RX ORDER — POTASSIUM CHLORIDE 750 MG/1
20 TABLET, FILM COATED, EXTENDED RELEASE ORAL EVERY OTHER DAY
Status: DISCONTINUED | OUTPATIENT
Start: 2022-01-01 | End: 2022-01-01 | Stop reason: HOSPADM

## 2022-01-01 RX ORDER — ACETAMINOPHEN 325 MG/1
650 TABLET ORAL EVERY 4 HOURS PRN
Status: DISCONTINUED | OUTPATIENT
Start: 2022-01-01 | End: 2022-01-01 | Stop reason: HOSPADM

## 2022-01-01 RX ORDER — ALPRAZOLAM 1 MG/1
1 TABLET ORAL 2 TIMES DAILY
Status: ON HOLD | COMMUNITY
End: 2022-01-01 | Stop reason: SDUPTHER

## 2022-01-01 RX ORDER — POTASSIUM CHLORIDE 750 MG/1
20 TABLET, FILM COATED, EXTENDED RELEASE ORAL DAILY
Status: DISCONTINUED | OUTPATIENT
Start: 2022-01-01 | End: 2022-01-01 | Stop reason: HOSPADM

## 2022-01-01 RX ORDER — ENOXAPARIN SODIUM 100 MG/ML
30 INJECTION SUBCUTANEOUS EVERY 24 HOURS
Status: DISCONTINUED | OUTPATIENT
Start: 2022-01-01 | End: 2022-01-01 | Stop reason: HOSPADM

## 2022-01-01 RX ORDER — METOLAZONE 5 MG/1
5 TABLET ORAL ONCE
Status: COMPLETED | OUTPATIENT
Start: 2022-01-01 | End: 2022-01-01

## 2022-01-01 RX ORDER — OMEPRAZOLE 40 MG/1
CAPSULE, DELAYED RELEASE ORAL
Qty: 90 CAPSULE | Refills: 0 | Status: SHIPPED | OUTPATIENT
Start: 2022-01-01 | End: 2022-01-01 | Stop reason: SDUPTHER

## 2022-01-01 RX ORDER — HYDROCODONE BITARTRATE AND ACETAMINOPHEN 5; 325 MG/1; MG/1
1 TABLET ORAL EVERY 6 HOURS PRN
Status: DISCONTINUED | OUTPATIENT
Start: 2022-01-01 | End: 2022-01-01 | Stop reason: HOSPADM

## 2022-01-01 RX ORDER — TRAMADOL HYDROCHLORIDE 50 MG/1
50 TABLET ORAL DAILY PRN
Qty: 10 TABLET | Refills: 0 | Status: ON HOLD | OUTPATIENT
Start: 2022-01-01 | End: 2022-01-01 | Stop reason: SDUPTHER

## 2022-01-01 RX ORDER — HEPARIN SODIUM 5000 [USP'U]/ML
5000 INJECTION, SOLUTION INTRAVENOUS; SUBCUTANEOUS EVERY 12 HOURS SCHEDULED
Status: DISCONTINUED | OUTPATIENT
Start: 2022-01-01 | End: 2022-01-01 | Stop reason: HOSPADM

## 2022-01-01 RX ORDER — POLYETHYLENE GLYCOL 3350 17 G/17G
17 POWDER, FOR SOLUTION ORAL DAILY
Status: DISCONTINUED | OUTPATIENT
Start: 2022-01-01 | End: 2022-01-01

## 2022-01-01 RX ORDER — DIPHENHYDRAMINE HCL 25 MG
50 CAPSULE ORAL ONCE
Status: DISCONTINUED | OUTPATIENT
Start: 2022-01-01 | End: 2022-01-01 | Stop reason: CLARIF

## 2022-01-01 RX ORDER — ONDANSETRON 2 MG/ML
4 INJECTION INTRAMUSCULAR; INTRAVENOUS EVERY 6 HOURS PRN
Status: DISCONTINUED | OUTPATIENT
Start: 2022-01-01 | End: 2022-01-01 | Stop reason: HOSPADM

## 2022-01-01 RX ORDER — DEXTROMETHORPHAN POLISTIREX 30 MG/5ML
60 SUSPENSION ORAL EVERY 12 HOURS SCHEDULED
Status: DISCONTINUED | OUTPATIENT
Start: 2022-01-01 | End: 2022-01-01 | Stop reason: HOSPADM

## 2022-01-01 RX ORDER — ATORVASTATIN CALCIUM 40 MG/1
TABLET, FILM COATED ORAL
Qty: 90 TABLET | Refills: 0 | Status: SHIPPED | OUTPATIENT
Start: 2022-01-01 | End: 2022-01-01

## 2022-01-01 RX ORDER — DULAGLUTIDE 0.75 MG/.5ML
0.75 INJECTION, SOLUTION SUBCUTANEOUS WEEKLY
Qty: 4 PEN | Refills: 0 | Status: SHIPPED | OUTPATIENT
Start: 2022-01-01 | End: 2022-01-01 | Stop reason: SDUPTHER

## 2022-01-01 RX ORDER — LANOLIN ALCOHOL/MO/W.PET/CERES
3 CREAM (GRAM) TOPICAL NIGHTLY PRN
Qty: 30 TABLET
Start: 2022-01-01

## 2022-01-01 RX ORDER — ECHINACEA PURPUREA EXTRACT 125 MG
2 TABLET ORAL 3 TIMES DAILY
Status: DISCONTINUED | OUTPATIENT
Start: 2022-01-01 | End: 2022-01-01 | Stop reason: HOSPADM

## 2022-01-01 RX ORDER — POLYETHYLENE GLYCOL 3350 17 G/17G
17 POWDER, FOR SOLUTION ORAL 2 TIMES DAILY
Status: DISCONTINUED | OUTPATIENT
Start: 2022-01-01 | End: 2022-01-01 | Stop reason: HOSPADM

## 2022-01-01 RX ORDER — DULAGLUTIDE 1.5 MG/.5ML
INJECTION, SOLUTION SUBCUTANEOUS
Qty: 2 ML | Refills: 0 | Status: SHIPPED | OUTPATIENT
Start: 2022-01-01

## 2022-01-01 RX ORDER — GABAPENTIN 100 MG/1
100 CAPSULE ORAL EVERY 8 HOURS SCHEDULED
Status: DISCONTINUED | OUTPATIENT
Start: 2022-01-01 | End: 2022-01-01 | Stop reason: HOSPADM

## 2022-01-01 RX ORDER — BENZONATATE 100 MG/1
200 CAPSULE ORAL 3 TIMES DAILY PRN
Status: DISCONTINUED | OUTPATIENT
Start: 2022-01-01 | End: 2022-01-01 | Stop reason: HOSPADM

## 2022-01-01 RX ORDER — NICOTINE POLACRILEX 4 MG
15 LOZENGE BUCCAL
Status: DISCONTINUED | OUTPATIENT
Start: 2022-01-01 | End: 2022-01-01 | Stop reason: HOSPADM

## 2022-01-01 RX ORDER — PREDNISONE 20 MG/1
20 TABLET ORAL
Qty: 2 TABLET | Refills: 0
Start: 2022-01-01 | End: 2022-01-01

## 2022-01-01 RX ORDER — ALLOPURINOL 100 MG/1
100 TABLET ORAL DAILY
Start: 2022-01-01

## 2022-01-01 RX ORDER — GABAPENTIN 100 MG/1
100 CAPSULE ORAL EVERY 8 HOURS SCHEDULED
Status: DISCONTINUED | OUTPATIENT
Start: 2022-01-01 | End: 2022-01-01

## 2022-01-01 RX ORDER — FERROUS SULFATE 325(65) MG
325 TABLET ORAL
Start: 2022-01-01

## 2022-01-01 RX ORDER — DULAGLUTIDE 1.5 MG/.5ML
1.5 INJECTION, SOLUTION SUBCUTANEOUS WEEKLY
Qty: 2 ML | Refills: 0 | Status: SHIPPED | OUTPATIENT
Start: 2022-01-01 | End: 2022-01-01

## 2022-01-01 RX ORDER — ALPRAZOLAM 0.5 MG/1
1 TABLET ORAL 2 TIMES DAILY PRN
Status: DISCONTINUED | OUTPATIENT
Start: 2022-01-01 | End: 2022-01-01 | Stop reason: HOSPADM

## 2022-01-01 RX ORDER — DOCUSATE SODIUM 100 MG/1
100 CAPSULE, LIQUID FILLED ORAL 2 TIMES DAILY
Status: DISCONTINUED | OUTPATIENT
Start: 2022-01-01 | End: 2022-01-01

## 2022-01-01 RX ORDER — ACETAMINOPHEN 500 MG
1000 TABLET ORAL ONCE
Status: COMPLETED | OUTPATIENT
Start: 2022-01-01 | End: 2022-01-01

## 2022-01-01 RX ORDER — LEVOTHYROXINE SODIUM 0.03 MG/1
TABLET ORAL
Qty: 40 TABLET | Refills: 0 | Status: SHIPPED | OUTPATIENT
Start: 2022-01-01 | End: 2022-01-01

## 2022-01-01 RX ORDER — MORPHINE SULFATE 2 MG/ML
4 INJECTION, SOLUTION INTRAMUSCULAR; INTRAVENOUS ONCE
Status: COMPLETED | OUTPATIENT
Start: 2022-01-01 | End: 2022-01-01

## 2022-01-01 RX ORDER — PREDNISONE 20 MG/1
20 TABLET ORAL
Status: DISCONTINUED | OUTPATIENT
Start: 2022-01-01 | End: 2022-01-01 | Stop reason: HOSPADM

## 2022-01-01 RX ORDER — GUAIFENESIN 600 MG/1
1200 TABLET, EXTENDED RELEASE ORAL EVERY 12 HOURS SCHEDULED
Start: 2022-01-01

## 2022-01-01 RX ORDER — HYDROXYZINE HYDROCHLORIDE 10 MG/1
10 TABLET, FILM COATED ORAL 3 TIMES DAILY PRN
Status: DISCONTINUED | OUTPATIENT
Start: 2022-01-01 | End: 2022-01-01 | Stop reason: HOSPADM

## 2022-01-01 RX ORDER — DEXTROMETHORPHAN POLISTIREX 30 MG/5ML
60 SUSPENSION ORAL EVERY 12 HOURS SCHEDULED
Qty: 89 ML | Refills: 0 | Status: SHIPPED | OUTPATIENT
Start: 2022-01-01 | End: 2022-01-01

## 2022-01-01 RX ORDER — ACETAMINOPHEN 325 MG/1
650 TABLET ORAL ONCE
Status: DISCONTINUED | OUTPATIENT
Start: 2022-01-01 | End: 2022-01-01 | Stop reason: CLARIF

## 2022-01-01 RX ORDER — GABAPENTIN 100 MG/1
100 CAPSULE ORAL EVERY 12 HOURS
Qty: 10 CAPSULE | Refills: 0 | Status: SHIPPED | OUTPATIENT
Start: 2022-01-01

## 2022-01-01 RX ORDER — CARVEDILOL 12.5 MG/1
12.5 TABLET ORAL EVERY 12 HOURS SCHEDULED
Status: DISCONTINUED | OUTPATIENT
Start: 2022-01-01 | End: 2022-01-01

## 2022-01-01 RX ORDER — ACETAMINOPHEN 325 MG/1
650 TABLET ORAL EVERY 8 HOURS PRN
Status: DISCONTINUED | OUTPATIENT
Start: 2022-01-01 | End: 2022-01-01 | Stop reason: HOSPADM

## 2022-01-01 RX ORDER — ECHINACEA PURPUREA EXTRACT 125 MG
2 TABLET ORAL AS NEEDED
Status: DISCONTINUED | OUTPATIENT
Start: 2022-01-01 | End: 2022-01-01 | Stop reason: HOSPADM

## 2022-01-01 RX ORDER — TORSEMIDE 100 MG/1
100 TABLET ORAL DAILY
Start: 2022-01-01

## 2022-01-01 RX ORDER — VILAZODONE HYDROCHLORIDE 20 MG/1
20 TABLET ORAL NIGHTLY
Qty: 30 TABLET | Refills: 3 | Status: SHIPPED | OUTPATIENT
Start: 2022-01-01

## 2022-01-01 RX ORDER — PREDNISONE 10 MG/1
30 TABLET ORAL
Qty: 3 TABLET | Refills: 0
Start: 2022-01-01 | End: 2022-01-01

## 2022-01-01 RX ORDER — TRAMADOL HYDROCHLORIDE 50 MG/1
50 TABLET ORAL DAILY PRN
Qty: 30 TABLET | Refills: 2 | OUTPATIENT
Start: 2022-01-01

## 2022-01-01 RX ORDER — VILAZODONE HYDROCHLORIDE 20 MG/1
TABLET ORAL
Qty: 30 TABLET | Refills: 3 | Status: SHIPPED | OUTPATIENT
Start: 2022-01-01 | End: 2022-01-01 | Stop reason: SDUPTHER

## 2022-01-01 RX ORDER — FLUTICASONE PROPIONATE AND SALMETEROL 250; 50 UG/1; UG/1
1 POWDER RESPIRATORY (INHALATION)
Qty: 60 EACH | Refills: 2 | Status: SHIPPED | OUTPATIENT
Start: 2022-01-01

## 2022-01-01 RX ORDER — ACETAMINOPHEN 325 MG/1
650 TABLET ORAL ONCE
Status: COMPLETED | OUTPATIENT
Start: 2022-01-01 | End: 2022-01-01

## 2022-01-01 RX ORDER — GABAPENTIN 100 MG/1
100 CAPSULE ORAL 2 TIMES DAILY
Status: DISCONTINUED | OUTPATIENT
Start: 2022-01-01 | End: 2022-01-01 | Stop reason: HOSPADM

## 2022-01-01 RX ORDER — ACETAMINOPHEN 160 MG/5ML
650 SOLUTION ORAL EVERY 4 HOURS PRN
Status: DISCONTINUED | OUTPATIENT
Start: 2022-01-01 | End: 2022-01-01 | Stop reason: HOSPADM

## 2022-01-01 RX ORDER — ALPRAZOLAM 1 MG/1
1 TABLET ORAL 2 TIMES DAILY PRN
Qty: 10 TABLET | Refills: 0 | Status: ON HOLD | OUTPATIENT
Start: 2022-01-01 | End: 2022-01-01 | Stop reason: SDUPTHER

## 2022-01-01 RX ORDER — DULAGLUTIDE 0.75 MG/.5ML
0.75 INJECTION, SOLUTION SUBCUTANEOUS WEEKLY
Qty: 4 PEN | Refills: 0 | Status: SHIPPED | OUTPATIENT
Start: 2022-01-01 | End: 2022-01-01 | Stop reason: DRUGHIGH

## 2022-01-01 RX ORDER — TORSEMIDE 20 MG/1
40 TABLET ORAL
Status: DISCONTINUED | OUTPATIENT
Start: 2022-01-01 | End: 2022-01-01 | Stop reason: HOSPADM

## 2022-01-01 RX ORDER — FLUTICASONE PROPIONATE 50 MCG
2 SPRAY, SUSPENSION (ML) NASAL NIGHTLY
Status: DISCONTINUED | OUTPATIENT
Start: 2022-01-01 | End: 2022-01-01 | Stop reason: HOSPADM

## 2022-01-01 RX ORDER — CARVEDILOL 12.5 MG/1
12.5 TABLET ORAL 2 TIMES DAILY WITH MEALS
Status: DISCONTINUED | OUTPATIENT
Start: 2022-01-01 | End: 2022-01-01 | Stop reason: HOSPADM

## 2022-01-01 RX ORDER — ATORVASTATIN CALCIUM 20 MG/1
TABLET, FILM COATED ORAL
Qty: 90 TABLET | OUTPATIENT
Start: 2022-01-01

## 2022-01-01 RX ORDER — PREDNISONE 10 MG/1
10 TABLET ORAL
Status: DISCONTINUED | OUTPATIENT
Start: 2022-01-01 | End: 2022-01-01

## 2022-01-01 RX ORDER — FERROUS SULFATE 325(65) MG
325 TABLET ORAL
Status: DISCONTINUED | OUTPATIENT
Start: 2022-01-01 | End: 2022-01-01 | Stop reason: HOSPADM

## 2022-01-01 RX ORDER — GABAPENTIN 100 MG/1
100 CAPSULE ORAL EVERY 12 HOURS
Qty: 6 CAPSULE | Refills: 0 | Status: ON HOLD | OUTPATIENT
Start: 2022-01-01 | End: 2022-01-01

## 2022-01-01 RX ORDER — GABAPENTIN 100 MG/1
100 CAPSULE ORAL EVERY 12 HOURS
Qty: 60 CAPSULE | Refills: 2 | Status: SHIPPED | OUTPATIENT
Start: 2022-01-01 | End: 2022-01-01 | Stop reason: SDUPTHER

## 2022-01-01 RX ORDER — PREDNISONE 10 MG/1
10 TABLET ORAL
Qty: 2 TABLET | Refills: 0
Start: 2022-01-01 | End: 2022-01-01

## 2022-01-01 RX ORDER — HYDROMORPHONE HYDROCHLORIDE 1 MG/ML
0.5 INJECTION, SOLUTION INTRAMUSCULAR; INTRAVENOUS; SUBCUTANEOUS ONCE
Status: COMPLETED | OUTPATIENT
Start: 2022-01-01 | End: 2022-01-01

## 2022-01-01 RX ORDER — DEXTROSE MONOHYDRATE 25 G/50ML
25 INJECTION, SOLUTION INTRAVENOUS
Status: DISCONTINUED | OUTPATIENT
Start: 2022-01-01 | End: 2022-01-01 | Stop reason: HOSPADM

## 2022-01-01 RX ORDER — SPIRONOLACTONE 25 MG/1
25 TABLET ORAL
COMMUNITY
Start: 2022-01-01 | End: 2022-01-01 | Stop reason: HOSPADM

## 2022-01-01 RX ORDER — FUROSEMIDE 40 MG/1
40 TABLET ORAL ONCE
Status: COMPLETED | OUTPATIENT
Start: 2022-01-01 | End: 2022-01-01

## 2022-01-01 RX ORDER — OMEPRAZOLE 40 MG/1
40 CAPSULE, DELAYED RELEASE ORAL DAILY
Qty: 90 CAPSULE | Refills: 0 | Status: SHIPPED | OUTPATIENT
Start: 2022-01-01 | End: 2022-01-01

## 2022-01-01 RX ORDER — L.ACID,PARA/B.BIFIDUM/S.THERM 8B CELL
1 CAPSULE ORAL DAILY
Status: DISCONTINUED | OUTPATIENT
Start: 2022-01-01 | End: 2022-01-01 | Stop reason: HOSPADM

## 2022-01-01 RX ORDER — AMOXICILLIN 250 MG
2 CAPSULE ORAL 2 TIMES DAILY
Status: DISCONTINUED | OUTPATIENT
Start: 2022-01-01 | End: 2022-01-01 | Stop reason: HOSPADM

## 2022-01-01 RX ORDER — FUROSEMIDE 10 MG/ML
80 INJECTION INTRAMUSCULAR; INTRAVENOUS
Status: DISCONTINUED | OUTPATIENT
Start: 2022-01-01 | End: 2022-01-01

## 2022-01-01 RX ORDER — LACTULOSE 10 G/15ML
20 SOLUTION ORAL ONCE
Status: COMPLETED | OUTPATIENT
Start: 2022-01-01 | End: 2022-01-01

## 2022-01-01 RX ORDER — LEVOTHYROXINE SODIUM 0.03 MG/1
25 TABLET ORAL
Status: DISCONTINUED | OUTPATIENT
Start: 2022-01-01 | End: 2022-01-01 | Stop reason: HOSPADM

## 2022-01-01 RX ORDER — TORSEMIDE 20 MG/1
40 TABLET ORAL DAILY
Status: DISCONTINUED | OUTPATIENT
Start: 2022-01-01 | End: 2022-01-01

## 2022-01-01 RX ORDER — FUROSEMIDE 10 MG/ML
60 INJECTION INTRAMUSCULAR; INTRAVENOUS EVERY 8 HOURS
Status: DISCONTINUED | OUTPATIENT
Start: 2022-01-01 | End: 2022-01-01

## 2022-01-01 RX ORDER — ONDANSETRON 2 MG/ML
4 INJECTION INTRAMUSCULAR; INTRAVENOUS ONCE
Status: COMPLETED | OUTPATIENT
Start: 2022-01-01 | End: 2022-01-01

## 2022-01-01 RX ORDER — BENZONATATE 100 MG/1
100 CAPSULE ORAL 3 TIMES DAILY
Start: 2022-01-01 | End: 2022-01-01

## 2022-01-01 RX ORDER — LEVOTHYROXINE SODIUM 0.1 MG/1
100 TABLET ORAL
Status: DISCONTINUED | OUTPATIENT
Start: 2022-01-01 | End: 2022-01-01 | Stop reason: HOSPADM

## 2022-01-01 RX ORDER — SODIUM CHLORIDE 9 MG/ML
100 INJECTION, SOLUTION INTRAVENOUS CONTINUOUS
Status: DISCONTINUED | OUTPATIENT
Start: 2022-01-01 | End: 2022-01-01

## 2022-01-01 RX ORDER — TORSEMIDE 100 MG/1
100 TABLET ORAL DAILY
Status: DISCONTINUED | OUTPATIENT
Start: 2022-01-01 | End: 2022-01-01

## 2022-01-01 RX ORDER — ALPRAZOLAM 1 MG/1
1 TABLET ORAL 2 TIMES DAILY PRN
Qty: 10 TABLET | Refills: 0 | Status: SHIPPED | OUTPATIENT
Start: 2022-01-01

## 2022-01-01 RX ORDER — POLYETHYLENE GLYCOL 3350 17 G/17G
17 POWDER, FOR SOLUTION ORAL DAILY
Start: 2022-01-01

## 2022-01-01 RX ORDER — ATORVASTATIN CALCIUM 20 MG/1
40 TABLET, FILM COATED ORAL NIGHTLY
Status: DISCONTINUED | OUTPATIENT
Start: 2022-01-01 | End: 2022-01-01 | Stop reason: HOSPADM

## 2022-01-01 RX ORDER — GABAPENTIN 100 MG/1
100 CAPSULE ORAL EVERY 12 HOURS
Status: DISCONTINUED | OUTPATIENT
Start: 2022-01-01 | End: 2022-01-01

## 2022-01-01 RX ORDER — LIDOCAINE 50 MG/G
1 PATCH TOPICAL
Start: 2022-01-01

## 2022-01-01 RX ORDER — TRAMADOL HYDROCHLORIDE 50 MG/1
50 TABLET ORAL DAILY PRN
Qty: 3 TABLET | Refills: 0 | Status: SHIPPED | OUTPATIENT
Start: 2022-01-01 | End: 2022-01-01

## 2022-01-01 RX ORDER — DULAGLUTIDE 1.5 MG/.5ML
INJECTION, SOLUTION SUBCUTANEOUS
Qty: 2 ML | Refills: 0 | Status: SHIPPED | OUTPATIENT
Start: 2022-01-01 | End: 2022-01-01 | Stop reason: SDUPTHER

## 2022-01-01 RX ORDER — DIPHENHYDRAMINE HCL 25 MG
50 CAPSULE ORAL EVERY 24 HOURS
Status: DISCONTINUED | OUTPATIENT
Start: 2022-01-01 | End: 2022-01-01

## 2022-01-01 RX ORDER — OMEPRAZOLE 40 MG/1
40 CAPSULE, DELAYED RELEASE ORAL DAILY
Qty: 90 CAPSULE | Refills: 0 | Status: SHIPPED | OUTPATIENT
Start: 2022-01-01

## 2022-01-01 RX ORDER — TRAMADOL HYDROCHLORIDE 50 MG/1
50 TABLET ORAL DAILY PRN
Qty: 30 TABLET | Refills: 2 | Status: ON HOLD | OUTPATIENT
Start: 2022-01-01 | End: 2022-01-01 | Stop reason: SDUPTHER

## 2022-01-01 RX ORDER — ATORVASTATIN CALCIUM 40 MG/1
TABLET, FILM COATED ORAL
Qty: 90 TABLET | Refills: 0 | Status: SHIPPED | OUTPATIENT
Start: 2022-01-01

## 2022-01-01 RX ORDER — GABAPENTIN 100 MG/1
CAPSULE ORAL
Qty: 60 CAPSULE | Refills: 2 | Status: SHIPPED | OUTPATIENT
Start: 2022-01-01 | End: 2022-01-01 | Stop reason: SDUPTHER

## 2022-01-01 RX ORDER — ONDANSETRON 4 MG/1
4 TABLET, FILM COATED ORAL EVERY 6 HOURS PRN
Status: DISCONTINUED | OUTPATIENT
Start: 2022-01-01 | End: 2022-01-01 | Stop reason: HOSPADM

## 2022-01-01 RX ORDER — LEVOTHYROXINE SODIUM 0.03 MG/1
TABLET ORAL
Qty: 120 TABLET | Refills: 0 | Status: SHIPPED | OUTPATIENT
Start: 2022-01-01

## 2022-01-01 RX ORDER — ALPRAZOLAM 0.5 MG/1
0.5 TABLET ORAL 2 TIMES DAILY PRN
Status: DISCONTINUED | OUTPATIENT
Start: 2022-01-01 | End: 2022-01-01 | Stop reason: HOSPADM

## 2022-01-01 RX ORDER — DULAGLUTIDE 1.5 MG/.5ML
INJECTION, SOLUTION SUBCUTANEOUS
Qty: 2 ML | Refills: 0 | OUTPATIENT
Start: 2022-01-01

## 2022-01-01 RX ORDER — ATORVASTATIN CALCIUM 20 MG/1
40 TABLET, FILM COATED ORAL DAILY
Status: DISCONTINUED | OUTPATIENT
Start: 2022-01-01 | End: 2022-01-01 | Stop reason: HOSPADM

## 2022-01-01 RX ORDER — PREDNISONE 10 MG/1
10 TABLET ORAL
Status: DISCONTINUED | OUTPATIENT
Start: 2022-01-01 | End: 2022-01-01 | Stop reason: HOSPADM

## 2022-01-01 RX ORDER — LEVOTHYROXINE SODIUM 0.03 MG/1
TABLET ORAL
Qty: 120 TABLET | Refills: 1 | Status: SHIPPED | OUTPATIENT
Start: 2022-01-01 | End: 2022-01-01

## 2022-01-01 RX ORDER — ALPRAZOLAM 1 MG/1
1 TABLET ORAL 2 TIMES DAILY PRN
Qty: 6 TABLET | Refills: 0 | Status: SHIPPED | OUTPATIENT
Start: 2022-01-01 | End: 2022-01-01

## 2022-01-01 RX ORDER — DIPHENHYDRAMINE HCL 25 MG
50 CAPSULE ORAL ONCE
Status: COMPLETED | OUTPATIENT
Start: 2022-01-01 | End: 2022-01-01

## 2022-01-01 RX ORDER — INSULIN LISPRO 100 [IU]/ML
0-14 INJECTION, SOLUTION INTRAVENOUS; SUBCUTANEOUS
Refills: 12
Start: 2022-01-01

## 2022-01-01 RX ORDER — CARVEDILOL 12.5 MG/1
12.5 TABLET ORAL EVERY 12 HOURS SCHEDULED
Status: DISCONTINUED | OUTPATIENT
Start: 2022-01-01 | End: 2022-01-01 | Stop reason: HOSPADM

## 2022-01-01 RX ORDER — ALPRAZOLAM 1 MG/1
1 TABLET ORAL 2 TIMES DAILY PRN
Status: DISCONTINUED | OUTPATIENT
Start: 2022-01-01 | End: 2022-01-01 | Stop reason: HOSPADM

## 2022-01-01 RX ORDER — ENOXAPARIN SODIUM 100 MG/ML
40 INJECTION SUBCUTANEOUS EVERY 24 HOURS
Status: DISCONTINUED | OUTPATIENT
Start: 2022-01-01 | End: 2022-01-01 | Stop reason: HOSPADM

## 2022-01-01 RX ORDER — DOXYCYCLINE 100 MG/1
100 CAPSULE ORAL EVERY 12 HOURS SCHEDULED
Qty: 7 CAPSULE | Refills: 0 | Status: SHIPPED | OUTPATIENT
Start: 2022-01-01 | End: 2022-01-01

## 2022-01-01 RX ORDER — POTASSIUM CHLORIDE 20 MEQ/1
20 TABLET, EXTENDED RELEASE ORAL EVERY OTHER DAY
Qty: 90 TABLET | Refills: 0 | Status: SHIPPED | OUTPATIENT
Start: 2022-01-01

## 2022-01-01 RX ORDER — GUAIFENESIN 200 MG/10ML
200 LIQUID ORAL EVERY 6 HOURS PRN
Status: DISCONTINUED | OUTPATIENT
Start: 2022-01-01 | End: 2022-01-01

## 2022-01-01 RX ORDER — AMOXICILLIN 250 MG
1 CAPSULE ORAL EVERY OTHER DAY
Status: DISCONTINUED | OUTPATIENT
Start: 2022-01-01 | End: 2022-01-01 | Stop reason: DRUGHIGH

## 2022-01-01 RX ORDER — TORSEMIDE 100 MG/1
100 TABLET ORAL DAILY
Status: DISCONTINUED | OUTPATIENT
Start: 2022-01-01 | End: 2022-01-01 | Stop reason: HOSPADM

## 2022-01-01 RX ORDER — DOXYCYCLINE 100 MG/1
100 CAPSULE ORAL EVERY 12 HOURS SCHEDULED
Status: DISCONTINUED | OUTPATIENT
Start: 2022-01-01 | End: 2022-01-01 | Stop reason: HOSPADM

## 2022-01-01 RX ORDER — BISACODYL 10 MG
10 SUPPOSITORY, RECTAL RECTAL DAILY PRN
Status: DISCONTINUED | OUTPATIENT
Start: 2022-01-01 | End: 2022-01-01 | Stop reason: HOSPADM

## 2022-01-01 RX ORDER — TORSEMIDE 20 MG/1
40 TABLET ORAL DAILY
Status: DISCONTINUED | OUTPATIENT
Start: 2022-01-01 | End: 2022-01-01 | Stop reason: HOSPADM

## 2022-01-01 RX ORDER — SIMETHICONE 80 MG
80 TABLET,CHEWABLE ORAL 4 TIMES DAILY PRN
Status: DISCONTINUED | OUTPATIENT
Start: 2022-01-01 | End: 2022-01-01 | Stop reason: HOSPADM

## 2022-01-01 RX ORDER — SODIUM CHLORIDE 0.9 % (FLUSH) 0.9 %
10 SYRINGE (ML) INJECTION EVERY 12 HOURS SCHEDULED
Status: DISCONTINUED | OUTPATIENT
Start: 2022-01-01 | End: 2022-01-01 | Stop reason: HOSPADM

## 2022-01-01 RX ORDER — TORSEMIDE 20 MG/1
40 TABLET ORAL ONCE
Status: COMPLETED | OUTPATIENT
Start: 2022-01-01 | End: 2022-01-01

## 2022-01-01 RX ORDER — DOCUSATE SODIUM 100 MG/1
100 CAPSULE, LIQUID FILLED ORAL 2 TIMES DAILY
Status: DISCONTINUED | OUTPATIENT
Start: 2022-01-01 | End: 2022-01-01 | Stop reason: HOSPADM

## 2022-01-01 RX ADMIN — FLUTICASONE PROPIONATE 2 SPRAY: 50 SPRAY, METERED NASAL at 08:42

## 2022-01-01 RX ADMIN — CARVEDILOL 12.5 MG: 12.5 TABLET, FILM COATED ORAL at 08:42

## 2022-01-01 RX ADMIN — GABAPENTIN 100 MG: 100 CAPSULE ORAL at 06:16

## 2022-01-01 RX ADMIN — LEVOTHYROXINE SODIUM 100 MCG: 0.1 TABLET ORAL at 06:24

## 2022-01-01 RX ADMIN — GUAIFENESIN 1200 MG: 600 TABLET, EXTENDED RELEASE ORAL at 21:20

## 2022-01-01 RX ADMIN — INSULIN LISPRO 5 UNITS: 100 INJECTION, SOLUTION INTRAVENOUS; SUBCUTANEOUS at 12:11

## 2022-01-01 RX ADMIN — BUDESONIDE AND FORMOTEROL FUMARATE DIHYDRATE 2 PUFF: 160; 4.5 AEROSOL RESPIRATORY (INHALATION) at 20:35

## 2022-01-01 RX ADMIN — HEPARIN SODIUM 5000 UNITS: 5000 INJECTION INTRAVENOUS; SUBCUTANEOUS at 20:48

## 2022-01-01 RX ADMIN — POLYETHYLENE GLYCOL 3350 17 G: 17 POWDER, FOR SOLUTION ORAL at 09:06

## 2022-01-01 RX ADMIN — CARVEDILOL 12.5 MG: 12.5 TABLET, FILM COATED ORAL at 08:26

## 2022-01-01 RX ADMIN — ALPRAZOLAM 0.5 MG: 0.5 TABLET ORAL at 20:40

## 2022-01-01 RX ADMIN — FUROSEMIDE 60 MG: 10 INJECTION, SOLUTION INTRAMUSCULAR; INTRAVENOUS at 12:24

## 2022-01-01 RX ADMIN — INSULIN LISPRO 3 UNITS: 100 INJECTION, SOLUTION INTRAVENOUS; SUBCUTANEOUS at 08:38

## 2022-01-01 RX ADMIN — PANTOPRAZOLE SODIUM 40 MG: 40 TABLET, DELAYED RELEASE ORAL at 06:03

## 2022-01-01 RX ADMIN — LEVOTHYROXINE SODIUM 25 MCG: 0.03 TABLET ORAL at 14:43

## 2022-01-01 RX ADMIN — DOCUSATE SODIUM 100 MG: 100 CAPSULE, LIQUID FILLED ORAL at 08:27

## 2022-01-01 RX ADMIN — TORSEMIDE 100 MG: 100 TABLET ORAL at 14:12

## 2022-01-01 RX ADMIN — GUAIFENESIN 1200 MG: 600 TABLET, EXTENDED RELEASE ORAL at 20:11

## 2022-01-01 RX ADMIN — FUROSEMIDE 40 MG: 10 INJECTION, SOLUTION INTRAMUSCULAR; INTRAVENOUS at 08:26

## 2022-01-01 RX ADMIN — Medication 10 ML: at 21:10

## 2022-01-01 RX ADMIN — ATORVASTATIN CALCIUM 40 MG: 20 TABLET, FILM COATED ORAL at 08:26

## 2022-01-01 RX ADMIN — VILAZODONE HYDROCHLORIDE 20 MG: 40 TABLET, FILM COATED ORAL at 04:18

## 2022-01-01 RX ADMIN — DIPHENHYDRAMINE HYDROCHLORIDE 50 MG: 25 CAPSULE ORAL at 12:42

## 2022-01-01 RX ADMIN — PANTOPRAZOLE SODIUM 40 MG: 40 TABLET, DELAYED RELEASE ORAL at 06:52

## 2022-01-01 RX ADMIN — BUDESONIDE AND FORMOTEROL FUMARATE DIHYDRATE 2 PUFF: 160; 4.5 AEROSOL RESPIRATORY (INHALATION) at 08:17

## 2022-01-01 RX ADMIN — GABAPENTIN 100 MG: 100 CAPSULE ORAL at 23:37

## 2022-01-01 RX ADMIN — GUAIFENESIN 1200 MG: 600 TABLET, EXTENDED RELEASE ORAL at 20:30

## 2022-01-01 RX ADMIN — FUROSEMIDE 80 MG: 10 INJECTION, SOLUTION INTRAMUSCULAR; INTRAVENOUS at 19:17

## 2022-01-01 RX ADMIN — GABAPENTIN 100 MG: 100 CAPSULE ORAL at 00:46

## 2022-01-01 RX ADMIN — GUAIFENESIN 600 MG: 600 TABLET, EXTENDED RELEASE ORAL at 20:21

## 2022-01-01 RX ADMIN — VILAZODONE HYDROCHLORIDE 20 MG: 40 TABLET ORAL at 21:19

## 2022-01-01 RX ADMIN — TORSEMIDE 40 MG: 20 TABLET ORAL at 09:05

## 2022-01-01 RX ADMIN — Medication 10 ML: at 08:10

## 2022-01-01 RX ADMIN — LIDOCAINE 1 PATCH: 700 PATCH TOPICAL at 09:00

## 2022-01-01 RX ADMIN — TRAMADOL HYDROCHLORIDE 50 MG: 50 TABLET, COATED ORAL at 04:56

## 2022-01-01 RX ADMIN — CARVEDILOL 12.5 MG: 12.5 TABLET, FILM COATED ORAL at 18:27

## 2022-01-01 RX ADMIN — ACETAMINOPHEN 650 MG: 325 TABLET, FILM COATED ORAL at 08:53

## 2022-01-01 RX ADMIN — FUROSEMIDE 160 MG: 10 INJECTION, SOLUTION INTRAMUSCULAR; INTRAVENOUS at 16:40

## 2022-01-01 RX ADMIN — BENZONATATE 100 MG: 100 CAPSULE ORAL at 12:24

## 2022-01-01 RX ADMIN — CARBAMIDE PEROXIDE 6.5% 5 DROP: 6.5 LIQUID AURICULAR (OTIC) at 09:00

## 2022-01-01 RX ADMIN — FERROUS SULFATE TAB 325 MG (65 MG ELEMENTAL FE) 325 MG: 325 (65 FE) TAB at 09:51

## 2022-01-01 RX ADMIN — ALPRAZOLAM 0.5 MG: 0.5 TABLET ORAL at 13:04

## 2022-01-01 RX ADMIN — LIDOCAINE 1 PATCH: 50 PATCH CUTANEOUS at 09:08

## 2022-01-01 RX ADMIN — INSULIN LISPRO 14 UNITS: 100 INJECTION, SOLUTION INTRAVENOUS; SUBCUTANEOUS at 17:46

## 2022-01-01 RX ADMIN — TRAMADOL HYDROCHLORIDE 50 MG: 50 TABLET, COATED ORAL at 08:15

## 2022-01-01 RX ADMIN — ACETAMINOPHEN 650 MG: 325 TABLET, FILM COATED ORAL at 08:44

## 2022-01-01 RX ADMIN — GUAIFENESIN 1200 MG: 600 TABLET, EXTENDED RELEASE ORAL at 08:49

## 2022-01-01 RX ADMIN — GABAPENTIN 100 MG: 100 CAPSULE ORAL at 06:37

## 2022-01-01 RX ADMIN — ACETAMINOPHEN 650 MG: 325 TABLET, FILM COATED ORAL at 14:44

## 2022-01-01 RX ADMIN — ALPRAZOLAM 0.5 MG: 0.5 TABLET ORAL at 20:21

## 2022-01-01 RX ADMIN — FUROSEMIDE 100 MG: 100 INJECTION, SOLUTION INTRAMUSCULAR; INTRAVENOUS at 20:51

## 2022-01-01 RX ADMIN — BUDESONIDE AND FORMOTEROL FUMARATE DIHYDRATE 2 PUFF: 160; 4.5 AEROSOL RESPIRATORY (INHALATION) at 20:03

## 2022-01-01 RX ADMIN — FLUTICASONE PROPIONATE 2 SPRAY: 50 SPRAY, METERED NASAL at 21:03

## 2022-01-01 RX ADMIN — GABAPENTIN 100 MG: 100 CAPSULE ORAL at 20:38

## 2022-01-01 RX ADMIN — INSULIN LISPRO 7 UNITS: 100 INJECTION, SOLUTION INTRAVENOUS; SUBCUTANEOUS at 17:27

## 2022-01-01 RX ADMIN — PREDNISONE 40 MG: 20 TABLET ORAL at 08:53

## 2022-01-01 RX ADMIN — VILAZODONE HYDROCHLORIDE 20 MG: 40 TABLET ORAL at 21:00

## 2022-01-01 RX ADMIN — ATORVASTATIN CALCIUM 40 MG: 20 TABLET, FILM COATED ORAL at 20:46

## 2022-01-01 RX ADMIN — FUROSEMIDE 100 MG: 100 INJECTION, SOLUTION INTRAMUSCULAR; INTRAVENOUS at 20:47

## 2022-01-01 RX ADMIN — ALPRAZOLAM 1 MG: 0.5 TABLET ORAL at 23:43

## 2022-01-01 RX ADMIN — FLUTICASONE PROPIONATE 2 SPRAY: 50 SPRAY, METERED NASAL at 18:31

## 2022-01-01 RX ADMIN — LIDOCAINE 1 PATCH: 700 PATCH TOPICAL at 08:11

## 2022-01-01 RX ADMIN — GABAPENTIN 100 MG: 100 CAPSULE ORAL at 21:20

## 2022-01-01 RX ADMIN — LEVOTHYROXINE SODIUM 25 MCG: 0.03 TABLET ORAL at 06:00

## 2022-01-01 RX ADMIN — SODIUM CHLORIDE 250 MG: 9 INJECTION, SOLUTION INTRAVENOUS at 11:56

## 2022-01-01 RX ADMIN — GUAIFENESIN 600 MG: 600 TABLET, EXTENDED RELEASE ORAL at 21:09

## 2022-01-01 RX ADMIN — METOLAZONE 5 MG: 5 TABLET ORAL at 12:36

## 2022-01-01 RX ADMIN — FUROSEMIDE 60 MG: 10 INJECTION, SOLUTION INTRAMUSCULAR; INTRAVENOUS at 06:03

## 2022-01-01 RX ADMIN — SIMETHICONE 80 MG: 80 TABLET, CHEWABLE ORAL at 09:31

## 2022-01-01 RX ADMIN — SALINE NASAL SPRAY 2 SPRAY: 1.5 SOLUTION NASAL at 20:45

## 2022-01-01 RX ADMIN — FUROSEMIDE 80 MG: 10 INJECTION, SOLUTION INTRAMUSCULAR; INTRAVENOUS at 18:17

## 2022-01-01 RX ADMIN — PANTOPRAZOLE SODIUM 40 MG: 40 TABLET, DELAYED RELEASE ORAL at 06:36

## 2022-01-01 RX ADMIN — LEVOTHYROXINE SODIUM 100 MCG: 0.1 TABLET ORAL at 06:28

## 2022-01-01 RX ADMIN — CARVEDILOL 12.5 MG: 12.5 TABLET, FILM COATED ORAL at 09:31

## 2022-01-01 RX ADMIN — DOCUSATE SODIUM 100 MG: 100 CAPSULE, LIQUID FILLED ORAL at 08:54

## 2022-01-01 RX ADMIN — GABAPENTIN 100 MG: 100 CAPSULE ORAL at 14:44

## 2022-01-01 RX ADMIN — LACTULOSE 10 G: 10 SOLUTION ORAL at 09:16

## 2022-01-01 RX ADMIN — PANTOPRAZOLE SODIUM 40 MG: 40 TABLET, DELAYED RELEASE ORAL at 06:22

## 2022-01-01 RX ADMIN — GABAPENTIN 100 MG: 100 CAPSULE ORAL at 08:11

## 2022-01-01 RX ADMIN — LEVOTHYROXINE SODIUM 25 MCG: 0.03 TABLET ORAL at 05:41

## 2022-01-01 RX ADMIN — ATORVASTATIN CALCIUM 40 MG: 20 TABLET, FILM COATED ORAL at 20:56

## 2022-01-01 RX ADMIN — BENZONATATE 100 MG: 100 CAPSULE ORAL at 17:36

## 2022-01-01 RX ADMIN — BUMETANIDE 4 MG: 0.25 INJECTION, SOLUTION INTRAMUSCULAR; INTRAVENOUS at 12:43

## 2022-01-01 RX ADMIN — ACETAMINOPHEN 650 MG: 325 TABLET, FILM COATED ORAL at 12:42

## 2022-01-01 RX ADMIN — LIDOCAINE 1 PATCH: 50 PATCH CUTANEOUS at 09:32

## 2022-01-01 RX ADMIN — CARVEDILOL 12.5 MG: 12.5 TABLET, FILM COATED ORAL at 09:16

## 2022-01-01 RX ADMIN — CARVEDILOL 12.5 MG: 12.5 TABLET, FILM COATED ORAL at 22:54

## 2022-01-01 RX ADMIN — CARVEDILOL 12.5 MG: 12.5 TABLET, FILM COATED ORAL at 22:24

## 2022-01-01 RX ADMIN — BUDESONIDE AND FORMOTEROL FUMARATE DIHYDRATE 2 PUFF: 160; 4.5 AEROSOL RESPIRATORY (INHALATION) at 19:49

## 2022-01-01 RX ADMIN — HEPARIN SODIUM 5000 UNITS: 5000 INJECTION INTRAVENOUS; SUBCUTANEOUS at 21:09

## 2022-01-01 RX ADMIN — INSULIN LISPRO 3 UNITS: 100 INJECTION, SOLUTION INTRAVENOUS; SUBCUTANEOUS at 08:26

## 2022-01-01 RX ADMIN — INSULIN LISPRO 5 UNITS: 100 INJECTION, SOLUTION INTRAVENOUS; SUBCUTANEOUS at 08:40

## 2022-01-01 RX ADMIN — INSULIN LISPRO 3 UNITS: 100 INJECTION, SOLUTION INTRAVENOUS; SUBCUTANEOUS at 08:39

## 2022-01-01 RX ADMIN — INSULIN LISPRO 2 UNITS: 100 INJECTION, SOLUTION INTRAVENOUS; SUBCUTANEOUS at 12:04

## 2022-01-01 RX ADMIN — TRAMADOL HYDROCHLORIDE 50 MG: 50 TABLET, COATED ORAL at 03:09

## 2022-01-01 RX ADMIN — SODIUM CHLORIDE 250 MG: 9 INJECTION, SOLUTION INTRAVENOUS at 08:44

## 2022-01-01 RX ADMIN — INSULIN GLARGINE-YFGN 30 UNITS: 100 INJECTION, SOLUTION SUBCUTANEOUS at 20:35

## 2022-01-01 RX ADMIN — ENOXAPARIN SODIUM 40 MG: 100 INJECTION SUBCUTANEOUS at 16:20

## 2022-01-01 RX ADMIN — DOCUSATE SODIUM 100 MG: 100 CAPSULE, LIQUID FILLED ORAL at 21:51

## 2022-01-01 RX ADMIN — GUAIFENESIN 1200 MG: 600 TABLET, EXTENDED RELEASE ORAL at 20:38

## 2022-01-01 RX ADMIN — INSULIN LISPRO 6 UNITS: 100 INJECTION, SOLUTION INTRAVENOUS; SUBCUTANEOUS at 11:29

## 2022-01-01 RX ADMIN — ALPRAZOLAM 1 MG: 0.5 TABLET ORAL at 21:46

## 2022-01-01 RX ADMIN — GUAIFENESIN 600 MG: 600 TABLET, EXTENDED RELEASE ORAL at 09:16

## 2022-01-01 RX ADMIN — FLUTICASONE PROPIONATE 2 SPRAY: 50 SPRAY, METERED NASAL at 08:49

## 2022-01-01 RX ADMIN — POTASSIUM CHLORIDE 20 MEQ: 750 TABLET, EXTENDED RELEASE ORAL at 08:50

## 2022-01-01 RX ADMIN — CARVEDILOL 12.5 MG: 12.5 TABLET, FILM COATED ORAL at 17:17

## 2022-01-01 RX ADMIN — SODIUM CHLORIDE 100 ML/HR: 9 INJECTION, SOLUTION INTRAVENOUS at 14:14

## 2022-01-01 RX ADMIN — DIPHENHYDRAMINE HYDROCHLORIDE 50 MG: 25 CAPSULE ORAL at 11:23

## 2022-01-01 RX ADMIN — INSULIN GLARGINE-YFGN 20 UNITS: 100 INJECTION, SOLUTION SUBCUTANEOUS at 21:28

## 2022-01-01 RX ADMIN — Medication 10 ML: at 08:11

## 2022-01-01 RX ADMIN — FLUTICASONE PROPIONATE 2 SPRAY: 50 SPRAY, METERED NASAL at 12:24

## 2022-01-01 RX ADMIN — ALLOPURINOL 100 MG: 100 TABLET ORAL at 08:42

## 2022-01-01 RX ADMIN — CARVEDILOL 12.5 MG: 12.5 TABLET, FILM COATED ORAL at 08:53

## 2022-01-01 RX ADMIN — CARVEDILOL 12.5 MG: 12.5 TABLET, FILM COATED ORAL at 08:40

## 2022-01-01 RX ADMIN — CARBAMIDE PEROXIDE 6.5% 5 DROP: 6.5 LIQUID AURICULAR (OTIC) at 09:33

## 2022-01-01 RX ADMIN — FUROSEMIDE 60 MG: 10 INJECTION, SOLUTION INTRAMUSCULAR; INTRAVENOUS at 21:50

## 2022-01-01 RX ADMIN — FUROSEMIDE 100 MG: 100 INJECTION, SOLUTION INTRAMUSCULAR; INTRAVENOUS at 12:37

## 2022-01-01 RX ADMIN — LIDOCAINE 1 PATCH: 50 PATCH CUTANEOUS at 09:16

## 2022-01-01 RX ADMIN — INSULIN LISPRO 5 UNITS: 100 INJECTION, SOLUTION INTRAVENOUS; SUBCUTANEOUS at 17:44

## 2022-01-01 RX ADMIN — INSULIN LISPRO 8 UNITS: 100 INJECTION, SOLUTION INTRAVENOUS; SUBCUTANEOUS at 13:07

## 2022-01-01 RX ADMIN — ATORVASTATIN CALCIUM 40 MG: 20 TABLET, FILM COATED ORAL at 21:49

## 2022-01-01 RX ADMIN — INSULIN LISPRO 8 UNITS: 100 INJECTION, SOLUTION INTRAVENOUS; SUBCUTANEOUS at 12:26

## 2022-01-01 RX ADMIN — Medication 10 ML: at 09:32

## 2022-01-01 RX ADMIN — GUAIFENESIN 1200 MG: 600 TABLET, EXTENDED RELEASE ORAL at 21:02

## 2022-01-01 RX ADMIN — INSULIN LISPRO 3 UNITS: 100 INJECTION, SOLUTION INTRAVENOUS; SUBCUTANEOUS at 09:06

## 2022-01-01 RX ADMIN — VILAZODONE HYDROCHLORIDE 20 MG: 40 TABLET, FILM COATED ORAL at 22:24

## 2022-01-01 RX ADMIN — GABAPENTIN 100 MG: 100 CAPSULE ORAL at 10:05

## 2022-01-01 RX ADMIN — LIDOCAINE 1 PATCH: 50 PATCH CUTANEOUS at 16:28

## 2022-01-01 RX ADMIN — TORSEMIDE 100 MG: 100 TABLET ORAL at 14:13

## 2022-01-01 RX ADMIN — LACTULOSE 10 G: 10 SOLUTION ORAL at 08:38

## 2022-01-01 RX ADMIN — GUAIFENESIN 1200 MG: 600 TABLET, EXTENDED RELEASE ORAL at 08:59

## 2022-01-01 RX ADMIN — GUAIFENESIN 1200 MG: 600 TABLET, EXTENDED RELEASE ORAL at 20:23

## 2022-01-01 RX ADMIN — INSULIN LISPRO 5 UNITS: 100 INJECTION, SOLUTION INTRAVENOUS; SUBCUTANEOUS at 12:56

## 2022-01-01 RX ADMIN — POTASSIUM CHLORIDE 20 MEQ: 10 TABLET, EXTENDED RELEASE ORAL at 23:37

## 2022-01-01 RX ADMIN — FUROSEMIDE 80 MG: 10 INJECTION, SOLUTION INTRAMUSCULAR; INTRAVENOUS at 21:03

## 2022-01-01 RX ADMIN — HEPARIN SODIUM 5000 UNITS: 5000 INJECTION INTRAVENOUS; SUBCUTANEOUS at 10:10

## 2022-01-01 RX ADMIN — GUAIFENESIN 1200 MG: 600 TABLET, EXTENDED RELEASE ORAL at 08:34

## 2022-01-01 RX ADMIN — VILAZODONE HYDROCHLORIDE 20 MG: 40 TABLET, FILM COATED ORAL at 20:25

## 2022-01-01 RX ADMIN — ALPRAZOLAM 1 MG: 1 TABLET ORAL at 20:41

## 2022-01-01 RX ADMIN — ALPRAZOLAM 1 MG: 1 TABLET ORAL at 22:49

## 2022-01-01 RX ADMIN — PANTOPRAZOLE SODIUM 40 MG: 40 TABLET, DELAYED RELEASE ORAL at 06:28

## 2022-01-01 RX ADMIN — BUDESONIDE AND FORMOTEROL FUMARATE DIHYDRATE 2 PUFF: 160; 4.5 AEROSOL RESPIRATORY (INHALATION) at 08:18

## 2022-01-01 RX ADMIN — ATORVASTATIN CALCIUM 40 MG: 20 TABLET, FILM COATED ORAL at 09:19

## 2022-01-01 RX ADMIN — BUDESONIDE AND FORMOTEROL FUMARATE DIHYDRATE 2 PUFF: 160; 4.5 AEROSOL RESPIRATORY (INHALATION) at 21:27

## 2022-01-01 RX ADMIN — VILAZODONE HYDROCHLORIDE 20 MG: 40 TABLET, FILM COATED ORAL at 20:54

## 2022-01-01 RX ADMIN — GABAPENTIN 100 MG: 100 CAPSULE ORAL at 21:02

## 2022-01-01 RX ADMIN — BUDESONIDE AND FORMOTEROL FUMARATE DIHYDRATE 2 PUFF: 160; 4.5 AEROSOL RESPIRATORY (INHALATION) at 19:22

## 2022-01-01 RX ADMIN — FUROSEMIDE 100 MG: 100 INJECTION, SOLUTION INTRAMUSCULAR; INTRAVENOUS at 20:53

## 2022-01-01 RX ADMIN — DIPHENHYDRAMINE HYDROCHLORIDE 50 MG: 25 CAPSULE ORAL at 08:41

## 2022-01-01 RX ADMIN — INSULIN LISPRO 6 UNITS: 100 INJECTION, SOLUTION INTRAVENOUS; SUBCUTANEOUS at 12:25

## 2022-01-01 RX ADMIN — ALLOPURINOL 100 MG: 100 TABLET ORAL at 09:16

## 2022-01-01 RX ADMIN — DEXTROMETHORPHAN POLISTIREX 60 MG: 30 SUSPENSION ORAL at 20:43

## 2022-01-01 RX ADMIN — CARVEDILOL 12.5 MG: 12.5 TABLET, FILM COATED ORAL at 07:56

## 2022-01-01 RX ADMIN — ALPRAZOLAM 1 MG: 0.5 TABLET ORAL at 21:29

## 2022-01-01 RX ADMIN — GABAPENTIN 100 MG: 100 CAPSULE ORAL at 16:39

## 2022-01-01 RX ADMIN — FUROSEMIDE 80 MG: 10 INJECTION, SOLUTION INTRAMUSCULAR; INTRAVENOUS at 11:06

## 2022-01-01 RX ADMIN — CARVEDILOL 12.5 MG: 12.5 TABLET, FILM COATED ORAL at 08:33

## 2022-01-01 RX ADMIN — POTASSIUM CHLORIDE 20 MEQ: 750 TABLET, EXTENDED RELEASE ORAL at 08:40

## 2022-01-01 RX ADMIN — FUROSEMIDE 160 MG: 10 INJECTION, SOLUTION INTRAMUSCULAR; INTRAVENOUS at 21:02

## 2022-01-01 RX ADMIN — GABAPENTIN 100 MG: 100 CAPSULE ORAL at 08:50

## 2022-01-01 RX ADMIN — GABAPENTIN 100 MG: 100 CAPSULE ORAL at 09:00

## 2022-01-01 RX ADMIN — BUDESONIDE AND FORMOTEROL FUMARATE DIHYDRATE 2 PUFF: 160; 4.5 AEROSOL RESPIRATORY (INHALATION) at 09:28

## 2022-01-01 RX ADMIN — INSULIN LISPRO 3 UNITS: 100 INJECTION, SOLUTION INTRAVENOUS; SUBCUTANEOUS at 08:50

## 2022-01-01 RX ADMIN — GUAIFENESIN 1200 MG: 600 TABLET, EXTENDED RELEASE ORAL at 20:53

## 2022-01-01 RX ADMIN — FLUTICASONE PROPIONATE 2 SPRAY: 50 SPRAY, METERED NASAL at 09:19

## 2022-01-01 RX ADMIN — DOXYCYCLINE 100 MG: 100 CAPSULE ORAL at 08:50

## 2022-01-01 RX ADMIN — INSULIN LISPRO 2 UNITS: 100 INJECTION, SOLUTION INTRAVENOUS; SUBCUTANEOUS at 17:02

## 2022-01-01 RX ADMIN — INSULIN LISPRO 5 UNITS: 100 INJECTION, SOLUTION INTRAVENOUS; SUBCUTANEOUS at 17:11

## 2022-01-01 RX ADMIN — PANTOPRAZOLE SODIUM 40 MG: 40 TABLET, DELAYED RELEASE ORAL at 06:25

## 2022-01-01 RX ADMIN — BENZONATATE 100 MG: 100 CAPSULE ORAL at 08:49

## 2022-01-01 RX ADMIN — INSULIN LISPRO 4 UNITS: 100 INJECTION, SOLUTION INTRAVENOUS; SUBCUTANEOUS at 18:28

## 2022-01-01 RX ADMIN — CARVEDILOL 12.5 MG: 12.5 TABLET, FILM COATED ORAL at 21:09

## 2022-01-01 RX ADMIN — BUMETANIDE 4 MG: 0.25 INJECTION, SOLUTION INTRAMUSCULAR; INTRAVENOUS at 05:11

## 2022-01-01 RX ADMIN — LEVOTHYROXINE SODIUM 25 MCG: 0.03 TABLET ORAL at 06:14

## 2022-01-01 RX ADMIN — ATORVASTATIN CALCIUM 40 MG: 20 TABLET, FILM COATED ORAL at 09:00

## 2022-01-01 RX ADMIN — PANTOPRAZOLE SODIUM 40 MG: 40 TABLET, DELAYED RELEASE ORAL at 06:00

## 2022-01-01 RX ADMIN — INSULIN LISPRO 5 UNITS: 100 INJECTION, SOLUTION INTRAVENOUS; SUBCUTANEOUS at 12:43

## 2022-01-01 RX ADMIN — SODIUM ZIRCONIUM CYCLOSILICATE 5 G: 5 POWDER, FOR SUSPENSION ORAL at 22:31

## 2022-01-01 RX ADMIN — INSULIN LISPRO 8 UNITS: 100 INJECTION, SOLUTION INTRAVENOUS; SUBCUTANEOUS at 11:59

## 2022-01-01 RX ADMIN — PANTOPRAZOLE SODIUM 40 MG: 40 TABLET, DELAYED RELEASE ORAL at 06:14

## 2022-01-01 RX ADMIN — INSULIN LISPRO 8 UNITS: 100 INJECTION, SOLUTION INTRAVENOUS; SUBCUTANEOUS at 18:17

## 2022-01-01 RX ADMIN — BUDESONIDE AND FORMOTEROL FUMARATE DIHYDRATE 2 PUFF: 160; 4.5 AEROSOL RESPIRATORY (INHALATION) at 07:21

## 2022-01-01 RX ADMIN — BUMETANIDE 4 MG: 0.25 INJECTION, SOLUTION INTRAMUSCULAR; INTRAVENOUS at 18:00

## 2022-01-01 RX ADMIN — Medication 10 ML: at 20:36

## 2022-01-01 RX ADMIN — DEXTROMETHORPHAN POLISTIREX 60 MG: 30 SUSPENSION ORAL at 09:23

## 2022-01-01 RX ADMIN — PANTOPRAZOLE SODIUM 40 MG: 40 TABLET, DELAYED RELEASE ORAL at 08:11

## 2022-01-01 RX ADMIN — LEVOTHYROXINE SODIUM 25 MCG: 0.03 TABLET ORAL at 05:46

## 2022-01-01 RX ADMIN — ALLOPURINOL 100 MG: 100 TABLET ORAL at 22:24

## 2022-01-01 RX ADMIN — HYDROXYZINE HYDROCHLORIDE 25 MG: 25 TABLET ORAL at 03:33

## 2022-01-01 RX ADMIN — TRAMADOL HYDROCHLORIDE 50 MG: 50 TABLET, COATED ORAL at 20:51

## 2022-01-01 RX ADMIN — DEXTROMETHORPHAN POLISTIREX 60 MG: 30 SUSPENSION ORAL at 22:41

## 2022-01-01 RX ADMIN — GABAPENTIN 100 MG: 100 CAPSULE ORAL at 16:20

## 2022-01-01 RX ADMIN — VILAZODONE HYDROCHLORIDE 20 MG: 40 TABLET, FILM COATED ORAL at 21:50

## 2022-01-01 RX ADMIN — POTASSIUM CHLORIDE 20 MEQ: 10 TABLET, EXTENDED RELEASE ORAL at 08:40

## 2022-01-01 RX ADMIN — LEVOTHYROXINE SODIUM 25 MCG: 0.03 TABLET ORAL at 06:30

## 2022-01-01 RX ADMIN — POTASSIUM CHLORIDE 20 MEQ: 10 TABLET, EXTENDED RELEASE ORAL at 08:54

## 2022-01-01 RX ADMIN — SIMETHICONE 80 MG: 80 TABLET, CHEWABLE ORAL at 09:24

## 2022-01-01 RX ADMIN — GABAPENTIN 100 MG: 100 CAPSULE ORAL at 08:42

## 2022-01-01 RX ADMIN — CARVEDILOL 12.5 MG: 12.5 TABLET, FILM COATED ORAL at 21:28

## 2022-01-01 RX ADMIN — ALLOPURINOL 100 MG: 100 TABLET ORAL at 08:59

## 2022-01-01 RX ADMIN — INSULIN LISPRO 3 UNITS: 100 INJECTION, SOLUTION INTRAVENOUS; SUBCUTANEOUS at 09:23

## 2022-01-01 RX ADMIN — BUDESONIDE AND FORMOTEROL FUMARATE DIHYDRATE 2 PUFF: 160; 4.5 AEROSOL RESPIRATORY (INHALATION) at 19:38

## 2022-01-01 RX ADMIN — GABAPENTIN 100 MG: 100 CAPSULE ORAL at 09:24

## 2022-01-01 RX ADMIN — HYDROCODONE BITARTRATE AND ACETAMINOPHEN 1 TABLET: 5; 325 TABLET ORAL at 16:24

## 2022-01-01 RX ADMIN — FLUTICASONE PROPIONATE 2 SPRAY: 50 SPRAY, METERED NASAL at 08:59

## 2022-01-01 RX ADMIN — ATORVASTATIN CALCIUM 40 MG: 20 TABLET, FILM COATED ORAL at 09:23

## 2022-01-01 RX ADMIN — ALPRAZOLAM 1 MG: 0.5 TABLET ORAL at 21:09

## 2022-01-01 RX ADMIN — SODIUM ZIRCONIUM CYCLOSILICATE 5 G: 5 POWDER, FOR SUSPENSION ORAL at 11:17

## 2022-01-01 RX ADMIN — ATORVASTATIN CALCIUM 40 MG: 20 TABLET, FILM COATED ORAL at 20:21

## 2022-01-01 RX ADMIN — INSULIN LISPRO 5 UNITS: 100 INJECTION, SOLUTION INTRAVENOUS; SUBCUTANEOUS at 17:18

## 2022-01-01 RX ADMIN — LEVOTHYROXINE SODIUM 25 MCG: 0.03 TABLET ORAL at 06:35

## 2022-01-01 RX ADMIN — GABAPENTIN 100 MG: 100 CAPSULE ORAL at 20:12

## 2022-01-01 RX ADMIN — ATORVASTATIN CALCIUM 40 MG: 20 TABLET, FILM COATED ORAL at 08:44

## 2022-01-01 RX ADMIN — GUAIFENESIN 1200 MG: 600 TABLET, EXTENDED RELEASE ORAL at 12:24

## 2022-01-01 RX ADMIN — INSULIN LISPRO 8 UNITS: 100 INJECTION, SOLUTION INTRAVENOUS; SUBCUTANEOUS at 17:39

## 2022-01-01 RX ADMIN — TORSEMIDE 40 MG: 20 TABLET ORAL at 17:36

## 2022-01-01 RX ADMIN — CARBAMIDE PEROXIDE 6.5% 5 DROP: 6.5 LIQUID AURICULAR (OTIC) at 08:57

## 2022-01-01 RX ADMIN — BUDESONIDE AND FORMOTEROL FUMARATE DIHYDRATE 2 PUFF: 160; 4.5 AEROSOL RESPIRATORY (INHALATION) at 20:17

## 2022-01-01 RX ADMIN — GUAIFENESIN 600 MG: 600 TABLET, EXTENDED RELEASE ORAL at 09:31

## 2022-01-01 RX ADMIN — FUROSEMIDE 60 MG: 10 INJECTION, SOLUTION INTRAMUSCULAR; INTRAVENOUS at 12:04

## 2022-01-01 RX ADMIN — INSULIN LISPRO 6 UNITS: 100 INJECTION, SOLUTION INTRAVENOUS; SUBCUTANEOUS at 08:27

## 2022-01-01 RX ADMIN — ALPRAZOLAM 1 MG: 0.5 TABLET ORAL at 20:19

## 2022-01-01 RX ADMIN — GUAIFENESIN 600 MG: 600 TABLET, EXTENDED RELEASE ORAL at 20:56

## 2022-01-01 RX ADMIN — VILAZODONE HYDROCHLORIDE 20 MG: 40 TABLET, FILM COATED ORAL at 21:28

## 2022-01-01 RX ADMIN — INSULIN LISPRO 4 UNITS: 100 INJECTION, SOLUTION INTRAVENOUS; SUBCUTANEOUS at 08:55

## 2022-01-01 RX ADMIN — Medication 1 CAPSULE: at 08:42

## 2022-01-01 RX ADMIN — BUDESONIDE AND FORMOTEROL FUMARATE DIHYDRATE 2 PUFF: 160; 4.5 AEROSOL RESPIRATORY (INHALATION) at 06:10

## 2022-01-01 RX ADMIN — ACETAMINOPHEN 650 MG: 325 TABLET, FILM COATED ORAL at 08:41

## 2022-01-01 RX ADMIN — GABAPENTIN 100 MG: 100 CAPSULE ORAL at 06:25

## 2022-01-01 RX ADMIN — VILAZODONE HYDROCHLORIDE 20 MG: 40 TABLET, FILM COATED ORAL at 00:46

## 2022-01-01 RX ADMIN — INSULIN LISPRO 7 UNITS: 100 INJECTION, SOLUTION INTRAVENOUS; SUBCUTANEOUS at 12:47

## 2022-01-01 RX ADMIN — TORSEMIDE 50 MG: 20 TABLET ORAL at 10:53

## 2022-01-01 RX ADMIN — POLYETHYLENE GLYCOL 3350 17 G: 17 POWDER, FOR SOLUTION ORAL at 08:38

## 2022-01-01 RX ADMIN — BUDESONIDE AND FORMOTEROL FUMARATE DIHYDRATE 2 PUFF: 160; 4.5 AEROSOL RESPIRATORY (INHALATION) at 06:17

## 2022-01-01 RX ADMIN — ENOXAPARIN SODIUM 40 MG: 100 INJECTION SUBCUTANEOUS at 14:12

## 2022-01-01 RX ADMIN — GUAIFENESIN 600 MG: 600 TABLET, EXTENDED RELEASE ORAL at 08:07

## 2022-01-01 RX ADMIN — ATORVASTATIN CALCIUM 40 MG: 20 TABLET, FILM COATED ORAL at 21:28

## 2022-01-01 RX ADMIN — CARVEDILOL 12.5 MG: 12.5 TABLET, FILM COATED ORAL at 18:41

## 2022-01-01 RX ADMIN — TRAMADOL HYDROCHLORIDE 50 MG: 50 TABLET, COATED ORAL at 03:36

## 2022-01-01 RX ADMIN — FUROSEMIDE 160 MG: 10 INJECTION, SOLUTION INTRAMUSCULAR; INTRAVENOUS at 03:50

## 2022-01-01 RX ADMIN — DOXYCYCLINE 100 MG: 100 CAPSULE ORAL at 13:15

## 2022-01-01 RX ADMIN — SALINE NASAL SPRAY 2 SPRAY: 1.5 SOLUTION NASAL at 17:37

## 2022-01-01 RX ADMIN — BUDESONIDE AND FORMOTEROL FUMARATE DIHYDRATE 2 PUFF: 160; 4.5 AEROSOL RESPIRATORY (INHALATION) at 21:00

## 2022-01-01 RX ADMIN — ENOXAPARIN SODIUM 30 MG: 30 INJECTION SUBCUTANEOUS at 17:44

## 2022-01-01 RX ADMIN — VILAZODONE HYDROCHLORIDE 20 MG: 40 TABLET, FILM COATED ORAL at 22:40

## 2022-01-01 RX ADMIN — BUDESONIDE AND FORMOTEROL FUMARATE DIHYDRATE 2 PUFF: 160; 4.5 AEROSOL RESPIRATORY (INHALATION) at 20:21

## 2022-01-01 RX ADMIN — GUAIFENESIN 1200 MG: 600 TABLET, EXTENDED RELEASE ORAL at 20:48

## 2022-01-01 RX ADMIN — Medication 3 MG: at 22:41

## 2022-01-01 RX ADMIN — CARVEDILOL 12.5 MG: 12.5 TABLET, FILM COATED ORAL at 20:46

## 2022-01-01 RX ADMIN — HEPARIN SODIUM 5000 UNITS: 5000 INJECTION INTRAVENOUS; SUBCUTANEOUS at 09:17

## 2022-01-01 RX ADMIN — ALPRAZOLAM 1 MG: 1 TABLET ORAL at 22:42

## 2022-01-01 RX ADMIN — CARVEDILOL 12.5 MG: 12.5 TABLET, FILM COATED ORAL at 17:39

## 2022-01-01 RX ADMIN — ALPRAZOLAM 1 MG: 0.5 TABLET ORAL at 22:29

## 2022-01-01 RX ADMIN — BUDESONIDE AND FORMOTEROL FUMARATE DIHYDRATE 2 PUFF: 160; 4.5 AEROSOL RESPIRATORY (INHALATION) at 07:10

## 2022-01-01 RX ADMIN — LEVOTHYROXINE SODIUM 25 MCG: 0.03 TABLET ORAL at 05:00

## 2022-01-01 RX ADMIN — ALLOPURINOL 100 MG: 100 TABLET ORAL at 09:01

## 2022-01-01 RX ADMIN — GABAPENTIN 100 MG: 100 CAPSULE ORAL at 08:26

## 2022-01-01 RX ADMIN — POTASSIUM CHLORIDE 20 MEQ: 750 TABLET, EXTENDED RELEASE ORAL at 09:24

## 2022-01-01 RX ADMIN — FLUTICASONE PROPIONATE 2 SPRAY: 50 SPRAY, METERED NASAL at 20:34

## 2022-01-01 RX ADMIN — VILAZODONE HYDROCHLORIDE 20 MG: 40 TABLET, FILM COATED ORAL at 21:09

## 2022-01-01 RX ADMIN — INSULIN GLARGINE-YFGN 30 UNITS: 100 INJECTION, SOLUTION SUBCUTANEOUS at 21:53

## 2022-01-01 RX ADMIN — INSULIN LISPRO 2 UNITS: 100 INJECTION, SOLUTION INTRAVENOUS; SUBCUTANEOUS at 12:44

## 2022-01-01 RX ADMIN — INSULIN LISPRO 6 UNITS: 100 INJECTION, SOLUTION INTRAVENOUS; SUBCUTANEOUS at 06:11

## 2022-01-01 RX ADMIN — PANTOPRAZOLE SODIUM 40 MG: 40 TABLET, DELAYED RELEASE ORAL at 12:58

## 2022-01-01 RX ADMIN — LACTULOSE 10 G: 10 SOLUTION ORAL at 10:11

## 2022-01-01 RX ADMIN — ACETAMINOPHEN 650 MG: 325 TABLET, FILM COATED ORAL at 11:35

## 2022-01-01 RX ADMIN — FUROSEMIDE 100 MG: 100 INJECTION, SOLUTION INTRAMUSCULAR; INTRAVENOUS at 12:41

## 2022-01-01 RX ADMIN — BUDESONIDE AND FORMOTEROL FUMARATE DIHYDRATE 2 PUFF: 160; 4.5 AEROSOL RESPIRATORY (INHALATION) at 08:04

## 2022-01-01 RX ADMIN — INSULIN GLARGINE-YFGN 15 UNITS: 100 INJECTION, SOLUTION SUBCUTANEOUS at 21:19

## 2022-01-01 RX ADMIN — Medication 3 MG: at 00:46

## 2022-01-01 RX ADMIN — DEXTROMETHORPHAN POLISTIREX 60 MG: 30 SUSPENSION ORAL at 06:29

## 2022-01-01 RX ADMIN — GUAIFENESIN 1200 MG: 600 TABLET, EXTENDED RELEASE ORAL at 21:01

## 2022-01-01 RX ADMIN — POLYETHYLENE GLYCOL 3350 17 G: 17 POWDER, FOR SOLUTION ORAL at 08:57

## 2022-01-01 RX ADMIN — BUDESONIDE AND FORMOTEROL FUMARATE DIHYDRATE 2 PUFF: 160; 4.5 AEROSOL RESPIRATORY (INHALATION) at 08:47

## 2022-01-01 RX ADMIN — DOCUSATE SODIUM 50MG AND SENNOSIDES 8.6MG 2 TABLET: 8.6; 5 TABLET, FILM COATED ORAL at 08:59

## 2022-01-01 RX ADMIN — POTASSIUM CHLORIDE 20 MEQ: 750 TABLET, EXTENDED RELEASE ORAL at 08:11

## 2022-01-01 RX ADMIN — BENZONATATE 100 MG: 100 CAPSULE ORAL at 20:15

## 2022-01-01 RX ADMIN — GUAIFENESIN 1200 MG: 600 TABLET, EXTENDED RELEASE ORAL at 08:40

## 2022-01-01 RX ADMIN — GABAPENTIN 100 MG: 100 CAPSULE ORAL at 16:19

## 2022-01-01 RX ADMIN — CARBAMIDE PEROXIDE 6.5% 5 DROP: 6.5 LIQUID AURICULAR (OTIC) at 21:51

## 2022-01-01 RX ADMIN — FERROUS SULFATE TAB 325 MG (65 MG ELEMENTAL FE) 325 MG: 325 (65 FE) TAB at 09:23

## 2022-01-01 RX ADMIN — Medication 3 MG: at 20:38

## 2022-01-01 RX ADMIN — BUDESONIDE AND FORMOTEROL FUMARATE DIHYDRATE 2 PUFF: 160; 4.5 AEROSOL RESPIRATORY (INHALATION) at 19:53

## 2022-01-01 RX ADMIN — GUAIFENESIN 600 MG: 600 TABLET, EXTENDED RELEASE ORAL at 10:10

## 2022-01-01 RX ADMIN — DEXTROMETHORPHAN POLISTIREX 60 MG: 30 SUSPENSION ORAL at 08:11

## 2022-01-01 RX ADMIN — FERROUS SULFATE TAB 325 MG (65 MG ELEMENTAL FE) 325 MG: 325 (65 FE) TAB at 08:11

## 2022-01-01 RX ADMIN — LEVOTHYROXINE SODIUM 25 MCG: 0.03 TABLET ORAL at 06:37

## 2022-01-01 RX ADMIN — CARBAMIDE PEROXIDE 6.5% 5 DROP: 6.5 LIQUID AURICULAR (OTIC) at 20:49

## 2022-01-01 RX ADMIN — DOCUSATE SODIUM 50MG AND SENNOSIDES 8.6MG 1 TABLET: 8.6; 5 TABLET, FILM COATED ORAL at 09:23

## 2022-01-01 RX ADMIN — INSULIN LISPRO 6 UNITS: 100 INJECTION, SOLUTION INTRAVENOUS; SUBCUTANEOUS at 18:27

## 2022-01-01 RX ADMIN — Medication 10 ML: at 10:11

## 2022-01-01 RX ADMIN — ACETAMINOPHEN 650 MG: 325 TABLET, FILM COATED ORAL at 05:32

## 2022-01-01 RX ADMIN — HEPARIN SODIUM 5000 UNITS: 5000 INJECTION INTRAVENOUS; SUBCUTANEOUS at 21:00

## 2022-01-01 RX ADMIN — GUAIFENESIN 1200 MG: 600 TABLET, EXTENDED RELEASE ORAL at 21:47

## 2022-01-01 RX ADMIN — BUMETANIDE 4 MG: 0.25 INJECTION, SOLUTION INTRAMUSCULAR; INTRAVENOUS at 12:36

## 2022-01-01 RX ADMIN — DOCUSATE SODIUM 100 MG: 100 CAPSULE, LIQUID FILLED ORAL at 09:06

## 2022-01-01 RX ADMIN — SODIUM CHLORIDE 125 MG: 9 INJECTION, SOLUTION INTRAVENOUS at 20:12

## 2022-01-01 RX ADMIN — HEPARIN SODIUM 5000 UNITS: 5000 INJECTION INTRAVENOUS; SUBCUTANEOUS at 20:35

## 2022-01-01 RX ADMIN — Medication 1 CAPSULE: at 08:26

## 2022-01-01 RX ADMIN — Medication 10 ML: at 08:37

## 2022-01-01 RX ADMIN — BUMETANIDE 4 MG: 0.25 INJECTION, SOLUTION INTRAMUSCULAR; INTRAVENOUS at 22:41

## 2022-01-01 RX ADMIN — ENOXAPARIN SODIUM 40 MG: 100 INJECTION SUBCUTANEOUS at 15:43

## 2022-01-01 RX ADMIN — PANTOPRAZOLE SODIUM 40 MG: 40 TABLET, DELAYED RELEASE ORAL at 06:35

## 2022-01-01 RX ADMIN — INSULIN LISPRO 8 UNITS: 100 INJECTION, SOLUTION INTRAVENOUS; SUBCUTANEOUS at 16:40

## 2022-01-01 RX ADMIN — Medication 10 ML: at 22:26

## 2022-01-01 RX ADMIN — ALLOPURINOL 100 MG: 100 TABLET ORAL at 08:26

## 2022-01-01 RX ADMIN — DOCUSATE SODIUM 50MG AND SENNOSIDES 8.6MG 1 TABLET: 8.6; 5 TABLET, FILM COATED ORAL at 08:50

## 2022-01-01 RX ADMIN — HEPARIN SODIUM 5000 UNITS: 5000 INJECTION INTRAVENOUS; SUBCUTANEOUS at 21:27

## 2022-01-01 RX ADMIN — LACTULOSE 10 G: 10 SOLUTION ORAL at 09:32

## 2022-01-01 RX ADMIN — BUDESONIDE AND FORMOTEROL FUMARATE DIHYDRATE 2 PUFF: 160; 4.5 AEROSOL RESPIRATORY (INHALATION) at 07:44

## 2022-01-01 RX ADMIN — FLUTICASONE PROPIONATE 2 SPRAY: 50 SPRAY, METERED NASAL at 08:57

## 2022-01-01 RX ADMIN — Medication 1 CAPSULE: at 09:24

## 2022-01-01 RX ADMIN — ENOXAPARIN SODIUM 30 MG: 30 INJECTION SUBCUTANEOUS at 17:37

## 2022-01-01 RX ADMIN — VILAZODONE HYDROCHLORIDE 20 MG: 40 TABLET, FILM COATED ORAL at 20:37

## 2022-01-01 RX ADMIN — DOCUSATE SODIUM 100 MG: 100 CAPSULE, LIQUID FILLED ORAL at 21:27

## 2022-01-01 RX ADMIN — CEFTRIAXONE SODIUM 1 G: 1 INJECTION, POWDER, FOR SOLUTION INTRAMUSCULAR; INTRAVENOUS at 13:00

## 2022-01-01 RX ADMIN — HYDROXYZINE HYDROCHLORIDE 25 MG: 25 TABLET ORAL at 04:55

## 2022-01-01 RX ADMIN — GUAIFENESIN 1200 MG: 600 TABLET, EXTENDED RELEASE ORAL at 20:15

## 2022-01-01 RX ADMIN — CARVEDILOL 12.5 MG: 12.5 TABLET, FILM COATED ORAL at 20:48

## 2022-01-01 RX ADMIN — BUMETANIDE 4 MG: 0.25 INJECTION, SOLUTION INTRAMUSCULAR; INTRAVENOUS at 11:35

## 2022-01-01 RX ADMIN — Medication 1 CAPSULE: at 08:49

## 2022-01-01 RX ADMIN — DEXTROMETHORPHAN POLISTIREX 60 MG: 30 SUSPENSION ORAL at 20:15

## 2022-01-01 RX ADMIN — INSULIN LISPRO 2 UNITS: 100 INJECTION, SOLUTION INTRAVENOUS; SUBCUTANEOUS at 08:07

## 2022-01-01 RX ADMIN — INSULIN LISPRO 6 UNITS: 100 INJECTION, SOLUTION INTRAVENOUS; SUBCUTANEOUS at 08:26

## 2022-01-01 RX ADMIN — CARBAMIDE PEROXIDE 6.5% 5 DROP: 6.5 LIQUID AURICULAR (OTIC) at 08:38

## 2022-01-01 RX ADMIN — PANTOPRAZOLE SODIUM 40 MG: 40 TABLET, DELAYED RELEASE ORAL at 06:11

## 2022-01-01 RX ADMIN — VILAZODONE HYDROCHLORIDE 20 MG: 40 TABLET, FILM COATED ORAL at 20:46

## 2022-01-01 RX ADMIN — CARVEDILOL 12.5 MG: 12.5 TABLET, FILM COATED ORAL at 23:37

## 2022-01-01 RX ADMIN — INSULIN LISPRO 4 UNITS: 100 INJECTION, SOLUTION INTRAVENOUS; SUBCUTANEOUS at 16:56

## 2022-01-01 RX ADMIN — PANTOPRAZOLE SODIUM 40 MG: 40 TABLET, DELAYED RELEASE ORAL at 06:16

## 2022-01-01 RX ADMIN — HEPARIN SODIUM 5000 UNITS: 5000 INJECTION INTRAVENOUS; SUBCUTANEOUS at 08:53

## 2022-01-01 RX ADMIN — BUDESONIDE AND FORMOTEROL FUMARATE DIHYDRATE 2 PUFF: 160; 4.5 AEROSOL RESPIRATORY (INHALATION) at 18:38

## 2022-01-01 RX ADMIN — INSULIN LISPRO 3 UNITS: 100 INJECTION, SOLUTION INTRAVENOUS; SUBCUTANEOUS at 12:24

## 2022-01-01 RX ADMIN — CARVEDILOL 12.5 MG: 12.5 TABLET, FILM COATED ORAL at 08:11

## 2022-01-01 RX ADMIN — ENOXAPARIN SODIUM 40 MG: 100 INJECTION SUBCUTANEOUS at 16:13

## 2022-01-01 RX ADMIN — INSULIN LISPRO 4 UNITS: 100 INJECTION, SOLUTION INTRAVENOUS; SUBCUTANEOUS at 17:44

## 2022-01-01 RX ADMIN — LIDOCAINE 1 PATCH: 700 PATCH TOPICAL at 09:24

## 2022-01-01 RX ADMIN — BUMETANIDE 4 MG: 0.25 INJECTION, SOLUTION INTRAMUSCULAR; INTRAVENOUS at 17:13

## 2022-01-01 RX ADMIN — POTASSIUM CHLORIDE 20 MEQ: 750 TABLET, EXTENDED RELEASE ORAL at 09:00

## 2022-01-01 RX ADMIN — FUROSEMIDE 100 MG: 100 INJECTION, SOLUTION INTRAMUSCULAR; INTRAVENOUS at 05:41

## 2022-01-01 RX ADMIN — GUAIFENESIN 1200 MG: 600 TABLET, EXTENDED RELEASE ORAL at 20:46

## 2022-01-01 RX ADMIN — CARVEDILOL 12.5 MG: 12.5 TABLET, FILM COATED ORAL at 18:20

## 2022-01-01 RX ADMIN — BUDESONIDE AND FORMOTEROL FUMARATE DIHYDRATE 2 PUFF: 160; 4.5 AEROSOL RESPIRATORY (INHALATION) at 19:35

## 2022-01-01 RX ADMIN — TORSEMIDE 100 MG: 100 TABLET ORAL at 08:54

## 2022-01-01 RX ADMIN — DOCUSATE SODIUM 50MG AND SENNOSIDES 8.6MG 2 TABLET: 8.6; 5 TABLET, FILM COATED ORAL at 15:22

## 2022-01-01 RX ADMIN — ALPRAZOLAM 1 MG: 0.5 TABLET ORAL at 22:45

## 2022-01-01 RX ADMIN — PANTOPRAZOLE SODIUM 40 MG: 40 TABLET, DELAYED RELEASE ORAL at 06:30

## 2022-01-01 RX ADMIN — CARVEDILOL 12.5 MG: 12.5 TABLET, FILM COATED ORAL at 17:12

## 2022-01-01 RX ADMIN — ATORVASTATIN CALCIUM 40 MG: 20 TABLET, FILM COATED ORAL at 08:54

## 2022-01-01 RX ADMIN — ALLOPURINOL 100 MG: 100 TABLET ORAL at 08:49

## 2022-01-01 RX ADMIN — FUROSEMIDE 60 MG: 10 INJECTION, SOLUTION INTRAMUSCULAR; INTRAVENOUS at 12:44

## 2022-01-01 RX ADMIN — GABAPENTIN 100 MG: 100 CAPSULE ORAL at 21:47

## 2022-01-01 RX ADMIN — Medication 10 ML: at 20:22

## 2022-01-01 RX ADMIN — GABAPENTIN 100 MG: 100 CAPSULE ORAL at 22:40

## 2022-01-01 RX ADMIN — INSULIN LISPRO 4 UNITS: 100 INJECTION, SOLUTION INTRAVENOUS; SUBCUTANEOUS at 07:57

## 2022-01-01 RX ADMIN — POLYETHYLENE GLYCOL 3350 17 G: 17 POWDER, FOR SOLUTION ORAL at 15:22

## 2022-01-01 RX ADMIN — CARVEDILOL 12.5 MG: 12.5 TABLET, FILM COATED ORAL at 20:24

## 2022-01-01 RX ADMIN — BUDESONIDE AND FORMOTEROL FUMARATE DIHYDRATE 2 PUFF: 160; 4.5 AEROSOL RESPIRATORY (INHALATION) at 09:09

## 2022-01-01 RX ADMIN — ATORVASTATIN CALCIUM 40 MG: 20 TABLET, FILM COATED ORAL at 11:05

## 2022-01-01 RX ADMIN — DEXTROMETHORPHAN POLISTIREX 60 MG: 30 SUSPENSION ORAL at 20:38

## 2022-01-01 RX ADMIN — VILAZODONE HYDROCHLORIDE 20 MG: 40 TABLET, FILM COATED ORAL at 20:40

## 2022-01-01 RX ADMIN — HEPARIN SODIUM 5000 UNITS: 5000 INJECTION INTRAVENOUS; SUBCUTANEOUS at 09:32

## 2022-01-01 RX ADMIN — DEXTROMETHORPHAN POLISTIREX 60 MG: 30 SUSPENSION ORAL at 21:20

## 2022-01-01 RX ADMIN — SODIUM CHLORIDE 250 MG: 9 INJECTION, SOLUTION INTRAVENOUS at 09:01

## 2022-01-01 RX ADMIN — GUAIFENESIN 1200 MG: 600 TABLET, EXTENDED RELEASE ORAL at 08:47

## 2022-01-01 RX ADMIN — GABAPENTIN 100 MG: 100 CAPSULE ORAL at 15:08

## 2022-01-01 RX ADMIN — BUMETANIDE 4 MG: 0.25 INJECTION, SOLUTION INTRAMUSCULAR; INTRAVENOUS at 02:40

## 2022-01-01 RX ADMIN — ALLOPURINOL 100 MG: 100 TABLET ORAL at 08:53

## 2022-01-01 RX ADMIN — ATORVASTATIN CALCIUM 40 MG: 20 TABLET, FILM COATED ORAL at 20:54

## 2022-01-01 RX ADMIN — CARBAMIDE PEROXIDE 6.5% 5 DROP: 6.5 LIQUID AURICULAR (OTIC) at 20:56

## 2022-01-01 RX ADMIN — Medication 10 ML: at 21:51

## 2022-01-01 RX ADMIN — SALINE NASAL SPRAY 2 SPRAY: 1.5 SOLUTION NASAL at 12:24

## 2022-01-01 RX ADMIN — ATORVASTATIN CALCIUM 40 MG: 20 TABLET, FILM COATED ORAL at 09:50

## 2022-01-01 RX ADMIN — ATORVASTATIN CALCIUM 40 MG: 20 TABLET, FILM COATED ORAL at 20:48

## 2022-01-01 RX ADMIN — TORSEMIDE 40 MG: 20 TABLET ORAL at 10:09

## 2022-01-01 RX ADMIN — LACTULOSE 10 G: 10 SOLUTION ORAL at 08:35

## 2022-01-01 RX ADMIN — INSULIN LISPRO 2 UNITS: 100 INJECTION, SOLUTION INTRAVENOUS; SUBCUTANEOUS at 08:44

## 2022-01-01 RX ADMIN — TORSEMIDE 40 MG: 20 TABLET ORAL at 10:10

## 2022-01-01 RX ADMIN — HEPARIN SODIUM 5000 UNITS: 5000 INJECTION INTRAVENOUS; SUBCUTANEOUS at 21:50

## 2022-01-01 RX ADMIN — GUAIFENESIN 1200 MG: 600 TABLET, EXTENDED RELEASE ORAL at 21:50

## 2022-01-01 RX ADMIN — TORSEMIDE 40 MG: 20 TABLET ORAL at 08:49

## 2022-01-01 RX ADMIN — CARVEDILOL 12.5 MG: 12.5 TABLET, FILM COATED ORAL at 20:19

## 2022-01-01 RX ADMIN — FLUTICASONE PROPIONATE 2 SPRAY: 50 SPRAY, METERED NASAL at 03:09

## 2022-01-01 RX ADMIN — INSULIN LISPRO 8 UNITS: 100 INJECTION, SOLUTION INTRAVENOUS; SUBCUTANEOUS at 17:18

## 2022-01-01 RX ADMIN — Medication 10 ML: at 09:16

## 2022-01-01 RX ADMIN — DOCUSATE SODIUM 50MG AND SENNOSIDES 8.6MG 2 TABLET: 8.6; 5 TABLET, FILM COATED ORAL at 20:46

## 2022-01-01 RX ADMIN — ALPRAZOLAM 0.5 MG: 0.5 TABLET ORAL at 21:00

## 2022-01-01 RX ADMIN — INSULIN LISPRO 5 UNITS: 100 INJECTION, SOLUTION INTRAVENOUS; SUBCUTANEOUS at 18:00

## 2022-01-01 RX ADMIN — FUROSEMIDE 100 MG: 100 INJECTION, SOLUTION INTRAMUSCULAR; INTRAVENOUS at 03:48

## 2022-01-01 RX ADMIN — Medication 10 ML: at 08:27

## 2022-01-01 RX ADMIN — ATORVASTATIN CALCIUM 40 MG: 20 TABLET, FILM COATED ORAL at 08:11

## 2022-01-01 RX ADMIN — FERROUS SULFATE TAB 325 MG (65 MG ELEMENTAL FE) 325 MG: 325 (65 FE) TAB at 09:01

## 2022-01-01 RX ADMIN — VILAZODONE HYDROCHLORIDE 20 MG: 40 TABLET ORAL at 20:12

## 2022-01-01 RX ADMIN — FLUTICASONE PROPIONATE 2 SPRAY: 50 SPRAY, METERED NASAL at 21:02

## 2022-01-01 RX ADMIN — BUDESONIDE AND FORMOTEROL FUMARATE DIHYDRATE 2 PUFF: 160; 4.5 AEROSOL RESPIRATORY (INHALATION) at 09:55

## 2022-01-01 RX ADMIN — ENOXAPARIN SODIUM 40 MG: 100 INJECTION SUBCUTANEOUS at 14:44

## 2022-01-01 RX ADMIN — TORSEMIDE 40 MG: 20 TABLET ORAL at 10:38

## 2022-01-01 RX ADMIN — LIDOCAINE 1 PATCH: 700 PATCH TOPICAL at 09:47

## 2022-01-01 RX ADMIN — PANTOPRAZOLE SODIUM 40 MG: 40 TABLET, DELAYED RELEASE ORAL at 06:19

## 2022-01-01 RX ADMIN — INSULIN LISPRO 4 UNITS: 100 INJECTION, SOLUTION INTRAVENOUS; SUBCUTANEOUS at 16:31

## 2022-01-01 RX ADMIN — LEVOTHYROXINE SODIUM 100 MCG: 0.1 TABLET ORAL at 05:11

## 2022-01-01 RX ADMIN — BUDESONIDE AND FORMOTEROL FUMARATE DIHYDRATE 2 PUFF: 160; 4.5 AEROSOL RESPIRATORY (INHALATION) at 08:10

## 2022-01-01 RX ADMIN — CARVEDILOL 12.5 MG: 12.5 TABLET, FILM COATED ORAL at 08:45

## 2022-01-01 RX ADMIN — CARVEDILOL 12.5 MG: 12.5 TABLET, FILM COATED ORAL at 09:05

## 2022-01-01 RX ADMIN — GABAPENTIN 100 MG: 100 CAPSULE ORAL at 20:15

## 2022-01-01 RX ADMIN — LIDOCAINE 1 PATCH: 700 PATCH TOPICAL at 08:50

## 2022-01-01 RX ADMIN — ATORVASTATIN CALCIUM 40 MG: 20 TABLET, FILM COATED ORAL at 08:49

## 2022-01-01 RX ADMIN — Medication 1 CAPSULE: at 09:15

## 2022-01-01 RX ADMIN — VILAZODONE HYDROCHLORIDE 20 MG: 40 TABLET ORAL at 20:30

## 2022-01-01 RX ADMIN — INFLUENZA A VIRUS A/VICTORIA/2570/2019 IVR-215 (H1N1) ANTIGEN (FORMALDEHYDE INACTIVATED), INFLUENZA A VIRUS A/DARWIN/9/2021 SAN-010 (H3N2) ANTIGEN (FORMALDEHYDE INACTIVATED), INFLUENZA B VIRUS B/PHUKET/3073/2013 ANTIGEN (FORMALDEHYDE INACTIVATED), AND INFLUENZA B VIRUS B/MICHIGAN/01/2021 ANTIGEN (FORMALDEHYDE INACTIVATED) 0.7 ML: 60; 60; 60; 60 INJECTION, SUSPENSION INTRAMUSCULAR at 15:33

## 2022-01-01 RX ADMIN — SODIUM CHLORIDE 100 ML/HR: 9 INJECTION, SOLUTION INTRAVENOUS at 06:33

## 2022-01-01 RX ADMIN — MORPHINE SULFATE 4 MG: 2 INJECTION, SOLUTION INTRAMUSCULAR; INTRAVENOUS at 03:08

## 2022-01-01 RX ADMIN — CARVEDILOL 12.5 MG: 12.5 TABLET, FILM COATED ORAL at 08:48

## 2022-01-01 RX ADMIN — DOCUSATE SODIUM 50MG AND SENNOSIDES 8.6MG 1 TABLET: 8.6; 5 TABLET, FILM COATED ORAL at 13:25

## 2022-01-01 RX ADMIN — POTASSIUM CHLORIDE 20 MEQ: 750 TABLET, EXTENDED RELEASE ORAL at 10:37

## 2022-01-01 RX ADMIN — GUAIFENESIN 600 MG: 600 TABLET, EXTENDED RELEASE ORAL at 20:34

## 2022-01-01 RX ADMIN — LACTULOSE 20 G: 20 SOLUTION ORAL at 11:29

## 2022-01-01 RX ADMIN — VILAZODONE HYDROCHLORIDE 20 MG: 40 TABLET, FILM COATED ORAL at 21:24

## 2022-01-01 RX ADMIN — ALLOPURINOL 100 MG: 100 TABLET ORAL at 10:10

## 2022-01-01 RX ADMIN — ATORVASTATIN CALCIUM 40 MG: 20 TABLET, FILM COATED ORAL at 08:40

## 2022-01-01 RX ADMIN — CARVEDILOL 12.5 MG: 12.5 TABLET, FILM COATED ORAL at 17:36

## 2022-01-01 RX ADMIN — LACTULOSE 10 G: 10 SOLUTION ORAL at 08:59

## 2022-01-01 RX ADMIN — INSULIN LISPRO 3 UNITS: 100 INJECTION, SOLUTION INTRAVENOUS; SUBCUTANEOUS at 10:37

## 2022-01-01 RX ADMIN — CARVEDILOL 12.5 MG: 12.5 TABLET, FILM COATED ORAL at 08:59

## 2022-01-01 RX ADMIN — VILAZODONE HYDROCHLORIDE 20 MG: 40 TABLET ORAL at 21:47

## 2022-01-01 RX ADMIN — VILAZODONE HYDROCHLORIDE 20 MG: 40 TABLET ORAL at 23:37

## 2022-01-01 RX ADMIN — HEPARIN SODIUM 5000 UNITS: 5000 INJECTION INTRAVENOUS; SUBCUTANEOUS at 20:54

## 2022-01-01 RX ADMIN — HEPARIN SODIUM 5000 UNITS: 5000 INJECTION INTRAVENOUS; SUBCUTANEOUS at 08:38

## 2022-01-01 RX ADMIN — ACETAMINOPHEN 650 MG: 325 TABLET, FILM COATED ORAL at 01:03

## 2022-01-01 RX ADMIN — SALINE NASAL SPRAY 2 SPRAY: 1.5 SOLUTION NASAL at 20:15

## 2022-01-01 RX ADMIN — SODIUM CHLORIDE 250 MG: 9 INJECTION, SOLUTION INTRAVENOUS at 12:24

## 2022-01-01 RX ADMIN — DOCUSATE SODIUM 50MG AND SENNOSIDES 8.6MG 1 TABLET: 8.6; 5 TABLET, FILM COATED ORAL at 08:26

## 2022-01-01 RX ADMIN — FUROSEMIDE 100 MG: 100 INJECTION, SOLUTION INTRAMUSCULAR; INTRAVENOUS at 04:57

## 2022-01-01 RX ADMIN — ENOXAPARIN SODIUM 40 MG: 100 INJECTION SUBCUTANEOUS at 15:29

## 2022-01-01 RX ADMIN — DOCUSATE SODIUM 50MG AND SENNOSIDES 8.6MG 2 TABLET: 8.6; 5 TABLET, FILM COATED ORAL at 08:53

## 2022-01-01 RX ADMIN — LEVOTHYROXINE SODIUM 100 MCG: 0.1 TABLET ORAL at 08:11

## 2022-01-01 RX ADMIN — DEXTROMETHORPHAN POLISTIREX 60 MG: 30 SUSPENSION ORAL at 21:24

## 2022-01-01 RX ADMIN — GUAIFENESIN 1200 MG: 600 TABLET, EXTENDED RELEASE ORAL at 08:26

## 2022-01-01 RX ADMIN — SODIUM CHLORIDE 125 MG: 9 INJECTION, SOLUTION INTRAVENOUS at 12:50

## 2022-01-01 RX ADMIN — INSULIN HUMAN 5 UNITS: 100 INJECTION, SOLUTION PARENTERAL at 21:13

## 2022-01-01 RX ADMIN — ATORVASTATIN CALCIUM 40 MG: 20 TABLET, FILM COATED ORAL at 09:05

## 2022-01-01 RX ADMIN — DOCUSATE SODIUM 50MG AND SENNOSIDES 8.6MG 2 TABLET: 8.6; 5 TABLET, FILM COATED ORAL at 20:48

## 2022-01-01 RX ADMIN — INSULIN LISPRO 6 UNITS: 100 INJECTION, SOLUTION INTRAVENOUS; SUBCUTANEOUS at 12:11

## 2022-01-01 RX ADMIN — ACETAMINOPHEN 650 MG: 325 TABLET, FILM COATED ORAL at 18:51

## 2022-01-01 RX ADMIN — FLUTICASONE PROPIONATE 2 SPRAY: 50 SPRAY, METERED NASAL at 01:19

## 2022-01-01 RX ADMIN — INSULIN LISPRO 8 UNITS: 100 INJECTION, SOLUTION INTRAVENOUS; SUBCUTANEOUS at 17:36

## 2022-01-01 RX ADMIN — TORSEMIDE 40 MG: 20 TABLET ORAL at 10:45

## 2022-01-01 RX ADMIN — CARVEDILOL 12.5 MG: 12.5 TABLET, FILM COATED ORAL at 17:46

## 2022-01-01 RX ADMIN — BUDESONIDE AND FORMOTEROL FUMARATE DIHYDRATE 2 PUFF: 160; 4.5 AEROSOL RESPIRATORY (INHALATION) at 20:00

## 2022-01-01 RX ADMIN — LEVOTHYROXINE SODIUM 100 MCG: 0.1 TABLET ORAL at 06:36

## 2022-01-01 RX ADMIN — INSULIN LISPRO 2 UNITS: 100 INJECTION, SOLUTION INTRAVENOUS; SUBCUTANEOUS at 12:24

## 2022-01-01 RX ADMIN — INSULIN LISPRO 5 UNITS: 100 INJECTION, SOLUTION INTRAVENOUS; SUBCUTANEOUS at 18:37

## 2022-01-01 RX ADMIN — PANTOPRAZOLE SODIUM 40 MG: 40 TABLET, DELAYED RELEASE ORAL at 06:37

## 2022-01-01 RX ADMIN — CARVEDILOL 12.5 MG: 12.5 TABLET, FILM COATED ORAL at 17:44

## 2022-01-01 RX ADMIN — INSULIN LISPRO 5 UNITS: 100 INJECTION, SOLUTION INTRAVENOUS; SUBCUTANEOUS at 12:45

## 2022-01-01 RX ADMIN — TORSEMIDE 50 MG: 20 TABLET ORAL at 08:11

## 2022-01-01 RX ADMIN — PANTOPRAZOLE SODIUM 40 MG: 40 TABLET, DELAYED RELEASE ORAL at 06:20

## 2022-01-01 RX ADMIN — DEXTROMETHORPHAN POLISTIREX 60 MG: 30 SUSPENSION ORAL at 08:42

## 2022-01-01 RX ADMIN — VILAZODONE HYDROCHLORIDE 20 MG: 40 TABLET ORAL at 20:23

## 2022-01-01 RX ADMIN — Medication 1 CAPSULE: at 09:51

## 2022-01-01 RX ADMIN — LEVOTHYROXINE SODIUM 25 MCG: 0.03 TABLET ORAL at 06:20

## 2022-01-01 RX ADMIN — GUAIFENESIN 1200 MG: 600 TABLET, EXTENDED RELEASE ORAL at 08:38

## 2022-01-01 RX ADMIN — FERROUS SULFATE TAB 325 MG (65 MG ELEMENTAL FE) 325 MG: 325 (65 FE) TAB at 08:42

## 2022-01-01 RX ADMIN — GABAPENTIN 100 MG: 100 CAPSULE ORAL at 05:46

## 2022-01-01 RX ADMIN — GABAPENTIN 100 MG: 100 CAPSULE ORAL at 06:22

## 2022-01-01 RX ADMIN — GABAPENTIN 100 MG: 100 CAPSULE ORAL at 10:43

## 2022-01-01 RX ADMIN — ONDANSETRON 4 MG: 2 INJECTION INTRAMUSCULAR; INTRAVENOUS at 11:17

## 2022-01-01 RX ADMIN — DIPHENHYDRAMINE HYDROCHLORIDE 50 MG: 25 CAPSULE ORAL at 18:51

## 2022-01-01 RX ADMIN — CARVEDILOL 12.5 MG: 12.5 TABLET, FILM COATED ORAL at 21:49

## 2022-01-01 RX ADMIN — INSULIN LISPRO 5 UNITS: 100 INJECTION, SOLUTION INTRAVENOUS; SUBCUTANEOUS at 08:49

## 2022-01-01 RX ADMIN — LIDOCAINE 1 PATCH: 50 PATCH CUTANEOUS at 08:38

## 2022-01-01 RX ADMIN — ENOXAPARIN SODIUM 30 MG: 30 INJECTION SUBCUTANEOUS at 17:38

## 2022-01-01 RX ADMIN — GUAIFENESIN 1200 MG: 600 TABLET, EXTENDED RELEASE ORAL at 09:05

## 2022-01-01 RX ADMIN — DOCUSATE SODIUM 100 MG: 100 CAPSULE, LIQUID FILLED ORAL at 08:35

## 2022-01-01 RX ADMIN — BUDESONIDE AND FORMOTEROL FUMARATE DIHYDRATE 2 PUFF: 160; 4.5 AEROSOL RESPIRATORY (INHALATION) at 22:01

## 2022-01-01 RX ADMIN — BUDESONIDE AND FORMOTEROL FUMARATE DIHYDRATE 2 PUFF: 160; 4.5 AEROSOL RESPIRATORY (INHALATION) at 07:20

## 2022-01-01 RX ADMIN — HEPARIN SODIUM 5000 UNITS: 5000 INJECTION INTRAVENOUS; SUBCUTANEOUS at 20:56

## 2022-01-01 RX ADMIN — GUAIFENESIN 1200 MG: 600 TABLET, EXTENDED RELEASE ORAL at 09:51

## 2022-01-01 RX ADMIN — DOCUSATE SODIUM 50MG AND SENNOSIDES 8.6MG 1 TABLET: 8.6; 5 TABLET, FILM COATED ORAL at 08:11

## 2022-01-01 RX ADMIN — BUDESONIDE AND FORMOTEROL FUMARATE DIHYDRATE 2 PUFF: 160; 4.5 AEROSOL RESPIRATORY (INHALATION) at 06:53

## 2022-01-01 RX ADMIN — ENOXAPARIN SODIUM 40 MG: 100 INJECTION SUBCUTANEOUS at 15:08

## 2022-01-01 RX ADMIN — FUROSEMIDE 80 MG: 10 INJECTION, SOLUTION INTRAMUSCULAR; INTRAVENOUS at 09:19

## 2022-01-01 RX ADMIN — FUROSEMIDE 60 MG: 10 INJECTION, SOLUTION INTRAMUSCULAR; INTRAVENOUS at 06:30

## 2022-01-01 RX ADMIN — DEXTROMETHORPHAN POLISTIREX 60 MG: 30 SUSPENSION ORAL at 09:48

## 2022-01-01 RX ADMIN — ACETAMINOPHEN 650 MG: 325 TABLET, FILM COATED ORAL at 13:00

## 2022-01-01 RX ADMIN — VILAZODONE HYDROCHLORIDE 20 MG: 40 TABLET ORAL at 21:02

## 2022-01-01 RX ADMIN — LIDOCAINE 1 PATCH: 700 PATCH TOPICAL at 08:38

## 2022-01-01 RX ADMIN — POTASSIUM CHLORIDE 20 MEQ: 750 TABLET, EXTENDED RELEASE ORAL at 08:26

## 2022-01-01 RX ADMIN — ALPRAZOLAM 1 MG: 0.5 TABLET ORAL at 22:17

## 2022-01-01 RX ADMIN — BUMETANIDE 4 MG: 0.25 INJECTION, SOLUTION INTRAMUSCULAR; INTRAVENOUS at 01:03

## 2022-01-01 RX ADMIN — Medication 10 ML: at 09:00

## 2022-01-01 RX ADMIN — TRAMADOL HYDROCHLORIDE 50 MG: 50 TABLET, COATED ORAL at 11:29

## 2022-01-01 RX ADMIN — INSULIN LISPRO 5 UNITS: 100 INJECTION, SOLUTION INTRAVENOUS; SUBCUTANEOUS at 12:37

## 2022-01-01 RX ADMIN — INSULIN LISPRO 6 UNITS: 100 INJECTION, SOLUTION INTRAVENOUS; SUBCUTANEOUS at 12:43

## 2022-01-01 RX ADMIN — LIDOCAINE 1 PATCH: 50 PATCH CUTANEOUS at 08:53

## 2022-01-01 RX ADMIN — LEVOTHYROXINE SODIUM 25 MCG: 0.03 TABLET ORAL at 06:52

## 2022-01-01 RX ADMIN — CARBAMIDE PEROXIDE 6.5% 5 DROP: 6.5 LIQUID AURICULAR (OTIC) at 21:28

## 2022-01-01 RX ADMIN — GUAIFENESIN 600 MG: 600 TABLET, EXTENDED RELEASE ORAL at 21:27

## 2022-01-01 RX ADMIN — BENZONATATE 100 MG: 100 CAPSULE ORAL at 09:51

## 2022-01-01 RX ADMIN — LEVOTHYROXINE SODIUM 25 MCG: 0.03 TABLET ORAL at 06:03

## 2022-01-01 RX ADMIN — GUAIFENESIN 1200 MG: 600 TABLET, EXTENDED RELEASE ORAL at 08:36

## 2022-01-01 RX ADMIN — CARVEDILOL 12.5 MG: 12.5 TABLET, FILM COATED ORAL at 08:54

## 2022-01-01 RX ADMIN — CARVEDILOL 12.5 MG: 12.5 TABLET, FILM COATED ORAL at 10:10

## 2022-01-01 RX ADMIN — HEPARIN SODIUM 5000 UNITS: 5000 INJECTION INTRAVENOUS; SUBCUTANEOUS at 08:07

## 2022-01-01 RX ADMIN — CARVEDILOL 12.5 MG: 12.5 TABLET, FILM COATED ORAL at 17:59

## 2022-01-01 RX ADMIN — Medication 10 ML: at 09:48

## 2022-01-01 RX ADMIN — CARVEDILOL 12.5 MG: 12.5 TABLET, FILM COATED ORAL at 08:07

## 2022-01-01 RX ADMIN — Medication 3 MG: at 20:37

## 2022-01-01 RX ADMIN — CARVEDILOL 12.5 MG: 12.5 TABLET, FILM COATED ORAL at 14:06

## 2022-01-01 RX ADMIN — ALPRAZOLAM 0.5 MG: 0.5 TABLET ORAL at 00:47

## 2022-01-01 RX ADMIN — HEPARIN SODIUM 5000 UNITS: 5000 INJECTION INTRAVENOUS; SUBCUTANEOUS at 08:35

## 2022-01-01 RX ADMIN — ENOXAPARIN SODIUM 40 MG: 100 INJECTION SUBCUTANEOUS at 15:23

## 2022-01-01 RX ADMIN — SALINE NASAL SPRAY 2 SPRAY: 1.5 SOLUTION NASAL at 09:48

## 2022-01-01 RX ADMIN — FLUTICASONE PROPIONATE 2 SPRAY: 50 SPRAY, METERED NASAL at 21:20

## 2022-01-01 RX ADMIN — Medication 10 ML: at 08:57

## 2022-01-01 RX ADMIN — CARBAMIDE PEROXIDE 6.5% 5 DROP: 6.5 LIQUID AURICULAR (OTIC) at 08:42

## 2022-01-01 RX ADMIN — GABAPENTIN 100 MG: 100 CAPSULE ORAL at 21:37

## 2022-01-01 RX ADMIN — DOCUSATE SODIUM 100 MG: 100 CAPSULE, LIQUID FILLED ORAL at 08:40

## 2022-01-01 RX ADMIN — INSULIN LISPRO 3 UNITS: 100 INJECTION, SOLUTION INTRAVENOUS; SUBCUTANEOUS at 18:21

## 2022-01-01 RX ADMIN — BUDESONIDE AND FORMOTEROL FUMARATE DIHYDRATE 2 PUFF: 160; 4.5 AEROSOL RESPIRATORY (INHALATION) at 19:17

## 2022-01-01 RX ADMIN — ATORVASTATIN CALCIUM 40 MG: 20 TABLET, FILM COATED ORAL at 08:48

## 2022-01-01 RX ADMIN — HYDROMORPHONE HYDROCHLORIDE 0.5 MG: 1 INJECTION, SOLUTION INTRAMUSCULAR; INTRAVENOUS; SUBCUTANEOUS at 06:04

## 2022-01-01 RX ADMIN — FUROSEMIDE 60 MG: 10 INJECTION, SOLUTION INTRAMUSCULAR; INTRAVENOUS at 12:23

## 2022-01-01 RX ADMIN — FLUTICASONE PROPIONATE 2 SPRAY: 50 SPRAY, METERED NASAL at 09:48

## 2022-01-01 RX ADMIN — BUDESONIDE AND FORMOTEROL FUMARATE DIHYDRATE 2 PUFF: 160; 4.5 AEROSOL RESPIRATORY (INHALATION) at 07:07

## 2022-01-01 RX ADMIN — DIPHENHYDRAMINE HYDROCHLORIDE 50 MG: 25 CAPSULE ORAL at 12:47

## 2022-01-01 RX ADMIN — GUAIFENESIN 1200 MG: 600 TABLET, EXTENDED RELEASE ORAL at 08:45

## 2022-01-01 RX ADMIN — ACETAMINOPHEN 650 MG: 325 TABLET, FILM COATED ORAL at 11:17

## 2022-01-01 RX ADMIN — DOCUSATE SODIUM 50MG AND SENNOSIDES 8.6MG 1 TABLET: 8.6; 5 TABLET, FILM COATED ORAL at 20:41

## 2022-01-01 RX ADMIN — TORSEMIDE 40 MG: 20 TABLET ORAL at 11:29

## 2022-01-01 RX ADMIN — BUDESONIDE AND FORMOTEROL FUMARATE DIHYDRATE 2 PUFF: 160; 4.5 AEROSOL RESPIRATORY (INHALATION) at 19:43

## 2022-01-01 RX ADMIN — Medication 10 ML: at 20:52

## 2022-01-01 RX ADMIN — FUROSEMIDE 60 MG: 10 INJECTION, SOLUTION INTRAMUSCULAR; INTRAVENOUS at 03:34

## 2022-01-01 RX ADMIN — GABAPENTIN 100 MG: 100 CAPSULE ORAL at 21:24

## 2022-01-01 RX ADMIN — HYDROXYZINE HYDROCHLORIDE 25 MG: 25 TABLET ORAL at 09:26

## 2022-01-01 RX ADMIN — Medication 10 ML: at 20:48

## 2022-01-01 RX ADMIN — DEXTROMETHORPHAN POLISTIREX 60 MG: 30 SUSPENSION ORAL at 08:49

## 2022-01-01 RX ADMIN — ATORVASTATIN CALCIUM 40 MG: 20 TABLET, FILM COATED ORAL at 21:09

## 2022-01-01 RX ADMIN — GUAIFENESIN 1200 MG: 600 TABLET, EXTENDED RELEASE ORAL at 08:54

## 2022-01-01 RX ADMIN — SODIUM CHLORIDE 250 MG: 9 INJECTION, SOLUTION INTRAVENOUS at 12:43

## 2022-01-01 RX ADMIN — BENZONATATE 100 MG: 100 CAPSULE ORAL at 20:41

## 2022-01-01 RX ADMIN — INSULIN LISPRO 6 UNITS: 100 INJECTION, SOLUTION INTRAVENOUS; SUBCUTANEOUS at 12:23

## 2022-01-01 RX ADMIN — ATORVASTATIN CALCIUM 40 MG: 20 TABLET, FILM COATED ORAL at 20:34

## 2022-01-01 RX ADMIN — SALINE NASAL SPRAY 2 SPRAY: 1.5 SOLUTION NASAL at 08:49

## 2022-01-01 RX ADMIN — CARVEDILOL 12.5 MG: 12.5 TABLET, FILM COATED ORAL at 20:34

## 2022-01-01 RX ADMIN — DEXTROMETHORPHAN POLISTIREX 60 MG: 30 SUSPENSION ORAL at 09:01

## 2022-01-01 RX ADMIN — INSULIN LISPRO 3 UNITS: 100 INJECTION, SOLUTION INTRAVENOUS; SUBCUTANEOUS at 09:00

## 2022-01-01 RX ADMIN — FUROSEMIDE 40 MG: 40 TABLET ORAL at 15:29

## 2022-01-01 RX ADMIN — FUROSEMIDE 80 MG: 10 INJECTION, SOLUTION INTRAMUSCULAR; INTRAVENOUS at 08:34

## 2022-01-01 RX ADMIN — CARVEDILOL 12.5 MG: 12.5 TABLET, FILM COATED ORAL at 20:53

## 2022-01-01 RX ADMIN — BUDESONIDE AND FORMOTEROL FUMARATE DIHYDRATE 2 PUFF: 160; 4.5 AEROSOL RESPIRATORY (INHALATION) at 07:57

## 2022-01-01 RX ADMIN — FLUTICASONE PROPIONATE 2 SPRAY: 50 SPRAY, METERED NASAL at 09:33

## 2022-01-01 RX ADMIN — CARVEDILOL 12.5 MG: 12.5 TABLET, FILM COATED ORAL at 09:01

## 2022-01-01 RX ADMIN — PANTOPRAZOLE SODIUM 40 MG: 40 TABLET, DELAYED RELEASE ORAL at 05:00

## 2022-01-01 RX ADMIN — PANTOPRAZOLE SODIUM 40 MG: 40 TABLET, DELAYED RELEASE ORAL at 06:24

## 2022-01-01 RX ADMIN — GABAPENTIN 100 MG: 100 CAPSULE ORAL at 13:00

## 2022-01-01 RX ADMIN — BUDESONIDE AND FORMOTEROL FUMARATE DIHYDRATE 2 PUFF: 160; 4.5 AEROSOL RESPIRATORY (INHALATION) at 07:08

## 2022-01-01 RX ADMIN — FLUTICASONE PROPIONATE 2 SPRAY: 50 SPRAY, METERED NASAL at 21:48

## 2022-01-01 RX ADMIN — CARVEDILOL 12.5 MG: 12.5 TABLET, FILM COATED ORAL at 08:36

## 2022-01-01 RX ADMIN — INSULIN GLARGINE-YFGN 20 UNITS: 100 INJECTION, SOLUTION SUBCUTANEOUS at 21:05

## 2022-01-01 RX ADMIN — POTASSIUM CHLORIDE 20 MEQ: 10 TABLET, EXTENDED RELEASE ORAL at 09:19

## 2022-01-01 RX ADMIN — ALPRAZOLAM 1 MG: 0.5 TABLET ORAL at 21:37

## 2022-01-01 RX ADMIN — CARVEDILOL 12.5 MG: 12.5 TABLET, FILM COATED ORAL at 18:00

## 2022-01-01 RX ADMIN — TRAMADOL HYDROCHLORIDE 50 MG: 50 TABLET, COATED ORAL at 02:25

## 2022-01-01 RX ADMIN — Medication 10 ML: at 20:54

## 2022-01-01 RX ADMIN — PREDNISONE 30 MG: 20 TABLET ORAL at 08:37

## 2022-01-01 RX ADMIN — POLYETHYLENE GLYCOL 3350 17 G: 17 POWDER, FOR SOLUTION ORAL at 08:59

## 2022-01-01 RX ADMIN — INSULIN GLARGINE-YFGN 8 UNITS: 100 INJECTION, SOLUTION SUBCUTANEOUS at 21:01

## 2022-01-01 RX ADMIN — TORSEMIDE 100 MG: 100 TABLET ORAL at 08:44

## 2022-01-01 RX ADMIN — ALLOPURINOL 100 MG: 100 TABLET ORAL at 09:31

## 2022-01-01 RX ADMIN — ACETAMINOPHEN 650 MG: 325 TABLET, FILM COATED ORAL at 10:53

## 2022-01-01 RX ADMIN — LEVOTHYROXINE SODIUM 25 MCG: 0.03 TABLET ORAL at 04:55

## 2022-01-01 RX ADMIN — GABAPENTIN 100 MG: 100 CAPSULE ORAL at 20:24

## 2022-01-01 RX ADMIN — DOXYCYCLINE 100 MG: 100 CAPSULE ORAL at 20:41

## 2022-01-01 RX ADMIN — POLYETHYLENE GLYCOL 3350 17 G: 17 POWDER, FOR SOLUTION ORAL at 08:26

## 2022-01-01 RX ADMIN — ALLOPURINOL 100 MG: 100 TABLET ORAL at 08:36

## 2022-01-01 RX ADMIN — INSULIN LISPRO 8 UNITS: 100 INJECTION, SOLUTION INTRAVENOUS; SUBCUTANEOUS at 12:43

## 2022-01-01 RX ADMIN — LEVOTHYROXINE SODIUM 25 MCG: 0.03 TABLET ORAL at 06:22

## 2022-01-01 RX ADMIN — INSULIN LISPRO 4 UNITS: 100 INJECTION, SOLUTION INTRAVENOUS; SUBCUTANEOUS at 17:37

## 2022-01-01 RX ADMIN — GUAIFENESIN 1200 MG: 600 TABLET, EXTENDED RELEASE ORAL at 08:53

## 2022-01-01 RX ADMIN — ENOXAPARIN SODIUM 40 MG: 100 INJECTION SUBCUTANEOUS at 16:39

## 2022-01-01 RX ADMIN — CARVEDILOL 12.5 MG: 12.5 TABLET, FILM COATED ORAL at 08:49

## 2022-01-01 RX ADMIN — FUROSEMIDE 160 MG: 10 INJECTION, SOLUTION INTRAMUSCULAR; INTRAVENOUS at 11:22

## 2022-01-01 RX ADMIN — INSULIN LISPRO 6 UNITS: 100 INJECTION, SOLUTION INTRAVENOUS; SUBCUTANEOUS at 12:58

## 2022-01-01 RX ADMIN — LIDOCAINE 1 PATCH: 50 PATCH CUTANEOUS at 08:36

## 2022-01-01 RX ADMIN — INSULIN LISPRO 4 UNITS: 100 INJECTION, SOLUTION INTRAVENOUS; SUBCUTANEOUS at 18:54

## 2022-01-01 RX ADMIN — INSULIN HUMAN 5 UNITS: 100 INJECTION, SOLUTION PARENTERAL at 23:18

## 2022-01-01 RX ADMIN — PANTOPRAZOLE SODIUM 40 MG: 40 TABLET, DELAYED RELEASE ORAL at 06:09

## 2022-01-01 RX ADMIN — HYDROXYZINE HYDROCHLORIDE 10 MG: 10 TABLET ORAL at 20:40

## 2022-01-01 RX ADMIN — VILAZODONE HYDROCHLORIDE 20 MG: 40 TABLET, FILM COATED ORAL at 21:20

## 2022-01-01 RX ADMIN — INSULIN LISPRO 2 UNITS: 100 INJECTION, SOLUTION INTRAVENOUS; SUBCUTANEOUS at 08:53

## 2022-01-01 RX ADMIN — ALLOPURINOL 100 MG: 100 TABLET ORAL at 08:11

## 2022-01-01 RX ADMIN — ONDANSETRON 4 MG: 2 INJECTION INTRAMUSCULAR; INTRAVENOUS at 03:07

## 2022-01-01 RX ADMIN — BUDESONIDE AND FORMOTEROL FUMARATE DIHYDRATE 2 PUFF: 160; 4.5 AEROSOL RESPIRATORY (INHALATION) at 07:13

## 2022-01-01 RX ADMIN — GABAPENTIN 100 MG: 100 CAPSULE ORAL at 11:06

## 2022-01-01 RX ADMIN — ALLOPURINOL 100 MG: 100 TABLET ORAL at 08:38

## 2022-01-01 RX ADMIN — BUDESONIDE AND FORMOTEROL FUMARATE DIHYDRATE 2 PUFF: 160; 4.5 AEROSOL RESPIRATORY (INHALATION) at 08:39

## 2022-01-01 RX ADMIN — BUDESONIDE AND FORMOTEROL FUMARATE DIHYDRATE 2 PUFF: 160; 4.5 AEROSOL RESPIRATORY (INHALATION) at 09:39

## 2022-01-01 RX ADMIN — Medication 10 ML: at 08:42

## 2022-01-01 RX ADMIN — GABAPENTIN 100 MG: 100 CAPSULE ORAL at 21:46

## 2022-01-01 RX ADMIN — FUROSEMIDE 80 MG: 10 INJECTION, SOLUTION INTRAMUSCULAR; INTRAVENOUS at 18:00

## 2022-01-01 RX ADMIN — VILAZODONE HYDROCHLORIDE 20 MG: 40 TABLET, FILM COATED ORAL at 20:48

## 2022-01-01 RX ADMIN — INSULIN LISPRO 2 UNITS: 100 INJECTION, SOLUTION INTRAVENOUS; SUBCUTANEOUS at 08:36

## 2022-01-01 RX ADMIN — DOCUSATE SODIUM 100 MG: 100 CAPSULE, LIQUID FILLED ORAL at 21:20

## 2022-01-01 RX ADMIN — ACETAMINOPHEN 650 MG: 325 TABLET, FILM COATED ORAL at 04:12

## 2022-01-01 RX ADMIN — CARVEDILOL 12.5 MG: 12.5 TABLET, FILM COATED ORAL at 09:00

## 2022-01-01 RX ADMIN — LEVOTHYROXINE SODIUM 25 MCG: 0.03 TABLET ORAL at 05:33

## 2022-01-01 RX ADMIN — GABAPENTIN 100 MG: 100 CAPSULE ORAL at 05:33

## 2022-01-01 RX ADMIN — POTASSIUM CHLORIDE 20 MEQ: 10 TABLET, EXTENDED RELEASE ORAL at 08:49

## 2022-01-01 RX ADMIN — CARVEDILOL 12.5 MG: 12.5 TABLET, FILM COATED ORAL at 00:46

## 2022-01-01 RX ADMIN — VILAZODONE HYDROCHLORIDE 20 MG: 40 TABLET, FILM COATED ORAL at 20:34

## 2022-01-01 RX ADMIN — CARVEDILOL 12.5 MG: 12.5 TABLET, FILM COATED ORAL at 08:38

## 2022-01-01 RX ADMIN — DOCUSATE SODIUM 100 MG: 100 CAPSULE, LIQUID FILLED ORAL at 09:33

## 2022-01-01 RX ADMIN — BUDESONIDE AND FORMOTEROL FUMARATE DIHYDRATE 2 PUFF: 160; 4.5 AEROSOL RESPIRATORY (INHALATION) at 20:10

## 2022-01-01 RX ADMIN — ATORVASTATIN CALCIUM 40 MG: 20 TABLET, FILM COATED ORAL at 08:33

## 2022-01-01 RX ADMIN — GABAPENTIN 100 MG: 100 CAPSULE ORAL at 14:13

## 2022-01-01 RX ADMIN — FLUTICASONE PROPIONATE 2 SPRAY: 50 SPRAY, METERED NASAL at 08:37

## 2022-01-01 RX ADMIN — FUROSEMIDE 60 MG: 10 INJECTION, SOLUTION INTRAMUSCULAR; INTRAVENOUS at 20:34

## 2022-01-01 RX ADMIN — INSULIN LISPRO 8 UNITS: 100 INJECTION, SOLUTION INTRAVENOUS; SUBCUTANEOUS at 13:00

## 2022-01-01 RX ADMIN — LIDOCAINE 1 PATCH: 700 PATCH TOPICAL at 08:27

## 2022-01-01 RX ADMIN — LEVOTHYROXINE SODIUM 25 MCG: 0.03 TABLET ORAL at 06:25

## 2022-01-01 RX ADMIN — HYDROCODONE BITARTRATE AND ACETAMINOPHEN 1 TABLET: 5; 325 TABLET ORAL at 19:25

## 2022-01-01 RX ADMIN — BUDESONIDE AND FORMOTEROL FUMARATE DIHYDRATE 2 PUFF: 160; 4.5 AEROSOL RESPIRATORY (INHALATION) at 19:51

## 2022-01-01 RX ADMIN — LEVOTHYROXINE SODIUM 25 MCG: 0.03 TABLET ORAL at 06:16

## 2022-01-01 RX ADMIN — PREDNISONE 40 MG: 20 TABLET ORAL at 08:59

## 2022-01-01 RX ADMIN — LEVOTHYROXINE SODIUM 100 MCG: 0.1 TABLET ORAL at 05:33

## 2022-01-01 RX ADMIN — PHENYLEPHRINE HYDROCHLORIDE 2 SPRAY: 0.5 SPRAY NASAL at 03:12

## 2022-01-01 RX ADMIN — HEPARIN SODIUM 5000 UNITS: 5000 INJECTION INTRAVENOUS; SUBCUTANEOUS at 08:58

## 2022-01-01 RX ADMIN — CARVEDILOL 12.5 MG: 12.5 TABLET, FILM COATED ORAL at 17:27

## 2022-01-01 RX ADMIN — GABAPENTIN 100 MG: 100 CAPSULE ORAL at 20:43

## 2022-01-01 RX ADMIN — DOCUSATE SODIUM 100 MG: 100 CAPSULE, LIQUID FILLED ORAL at 08:45

## 2022-01-01 RX ADMIN — INSULIN LISPRO 6 UNITS: 100 INJECTION, SOLUTION INTRAVENOUS; SUBCUTANEOUS at 11:17

## 2022-01-01 RX ADMIN — ACETAMINOPHEN 1000 MG: 500 TABLET ORAL at 16:56

## 2022-01-01 RX ADMIN — Medication 1 CAPSULE: at 08:11

## 2022-01-01 RX ADMIN — PANTOPRAZOLE SODIUM 40 MG: 40 TABLET, DELAYED RELEASE ORAL at 06:55

## 2022-01-01 RX ADMIN — GUAIFENESIN 600 MG: 600 TABLET, EXTENDED RELEASE ORAL at 08:26

## 2022-01-01 RX ADMIN — ACETAMINOPHEN 650 MG: 325 TABLET, FILM COATED ORAL at 04:24

## 2022-01-01 RX ADMIN — ALLOPURINOL 100 MG: 100 TABLET ORAL at 09:51

## 2022-01-01 RX ADMIN — LACTULOSE 10 G: 10 SOLUTION ORAL at 08:53

## 2022-01-01 RX ADMIN — INSULIN LISPRO 4 UNITS: 100 INJECTION, SOLUTION INTRAVENOUS; SUBCUTANEOUS at 08:59

## 2022-01-01 RX ADMIN — ALLOPURINOL 100 MG: 100 TABLET ORAL at 09:23

## 2022-01-01 RX ADMIN — INSULIN LISPRO 4 UNITS: 100 INJECTION, SOLUTION INTRAVENOUS; SUBCUTANEOUS at 17:17

## 2022-01-01 RX ADMIN — FUROSEMIDE 60 MG: 10 INJECTION, SOLUTION INTRAMUSCULAR; INTRAVENOUS at 21:27

## 2022-01-01 RX ADMIN — Medication 10 ML: at 21:28

## 2022-01-01 RX ADMIN — GABAPENTIN 100 MG: 100 CAPSULE ORAL at 09:51

## 2022-01-01 RX ADMIN — INSULIN LISPRO 3 UNITS: 100 INJECTION, SOLUTION INTRAVENOUS; SUBCUTANEOUS at 08:10

## 2022-01-01 RX ADMIN — FERROUS SULFATE TAB 325 MG (65 MG ELEMENTAL FE) 325 MG: 325 (65 FE) TAB at 08:49

## 2022-01-01 RX ADMIN — ACETAMINOPHEN 650 MG: 325 TABLET, FILM COATED ORAL at 08:49

## 2022-01-01 RX ADMIN — CARVEDILOL 12.5 MG: 12.5 TABLET, FILM COATED ORAL at 09:23

## 2022-01-01 RX ADMIN — DOCUSATE SODIUM 50MG AND SENNOSIDES 8.6MG 2 TABLET: 8.6; 5 TABLET, FILM COATED ORAL at 08:38

## 2022-01-01 RX ADMIN — ACETAMINOPHEN 650 MG: 325 TABLET, FILM COATED ORAL at 04:04

## 2022-01-01 RX ADMIN — DOCUSATE SODIUM 100 MG: 100 CAPSULE, LIQUID FILLED ORAL at 21:09

## 2022-01-01 RX ADMIN — BUDESONIDE AND FORMOTEROL FUMARATE DIHYDRATE 2 PUFF: 160; 4.5 AEROSOL RESPIRATORY (INHALATION) at 07:38

## 2022-01-01 RX ADMIN — ALPRAZOLAM 0.5 MG: 0.5 TABLET ORAL at 03:33

## 2022-01-01 RX ADMIN — INSULIN LISPRO 5 UNITS: 100 INJECTION, SOLUTION INTRAVENOUS; SUBCUTANEOUS at 17:59

## 2022-01-05 NOTE — TELEPHONE ENCOUNTER
[facility]    Best call back number: [phone]    What form or medical record are you requesting: LAST OFFICE NOTES FOR NON INVASIVE VENTILATOR     Who is requesting this form or medical record from you: [facility]     How would you like to receive the form or medical records (pick-up, mail, fax): FAX   If fax, what is the fax number: [phone]    Timeframe paperwork needed: ASAP

## 2022-01-10 NOTE — TELEPHONE ENCOUNTER
Spoke w/ someone at [facility] and informed them that we were unaware that pt was using this and and do not have documents to support the use of this device.

## 2022-01-13 NOTE — TELEPHONE ENCOUNTER
Rx Refill Note  Requested Prescriptions     Pending Prescriptions Disp Refills   • levothyroxine (SYNTHROID, LEVOTHROID) 25 MCG tablet 40 tablet 0     Sig: TAKE 2 TABLETS BY MOUTH ON MON, WED, AND FRI AND TAKE ONE TABLET ON TUESDAY, THURSDAY, SATURDAY, AND SUNDAY      Last office visit with prescribing clinician: 11/3/2021      Next office visit with prescribing clinician: Visit date not found            Eduarda Simmons Rep  01/13/22, 10:15 EST

## 2022-01-31 NOTE — TELEPHONE ENCOUNTER
Rx Refill Note  Requested Prescriptions     Pending Prescriptions Disp Refills   • levothyroxine (SYNTHROID, LEVOTHROID) 25 MCG tablet [Pharmacy Med Name: LEVOTHYROXINE 25 MCG TABLET] 40 tablet 0     Sig: TAKE 2 TABLETS BY MOUTH ON MONDAY, WEDNESDAY AND FRIDAY AND TAKE ONE TABLET ON TUESDAY, THURSDAY, SATURDAY NAD SUNDAY      Last office visit with prescribing clinician: 11/3/2021      Next office visit with prescribing clinician: Visit date not found            Lisbeth Leyva MA  01/31/22, 10:09 EST

## 2022-02-14 NOTE — TELEPHONE ENCOUNTER
Rx Refill Note  Requested Prescriptions     Pending Prescriptions Disp Refills   • gabapentin (NEURONTIN) 100 MG capsule [Pharmacy Med Name: GABAPENTIN 100 MG CAPSULE] 60 capsule      Sig: TAKE 1 CAPSULE BY MOUTH EVERY 12 HOURS      Last office visit with prescribing clinician: 11/3/2021      Next office visit with prescribing clinician: Visit date not found            Lisbeth Leyva MA  02/14/22, 09:20 EST

## 2022-02-21 NOTE — TELEPHONE ENCOUNTER
Rx Refill Note  Requested Prescriptions     Pending Prescriptions Disp Refills   • Dulaglutide (Trulicity) 0.75 MG/0.5ML solution pen-injector 3 pen 2     Sig: Inject 0.75 mg under the skin into the appropriate area as directed 1 (One) Time Per Week.      Last office visit with prescribing clinician: 11/3/2021      Next office visit with prescribing clinician: Visit date not found            Lisbeth Leyva MA  02/21/22, 11:54 EST

## 2022-03-01 NOTE — TELEPHONE ENCOUNTER
Rx Refill Note  Requested Prescriptions     Pending Prescriptions Disp Refills   • levothyroxine (SYNTHROID, LEVOTHROID) 25 MCG tablet [Pharmacy Med Name: LEVOTHYROXINE 25 MCG TABLET] 40 tablet 0     Sig: TAKE 2 TABLETS BY MOUTH ON MONDAY, WEDNESDAY AND FRIDAY AND TAKE ONE TABLET ON TUESDAY, THURSDAY, SATURDAY AND SUNDAY      Last office visit with prescribing clinician: 11/3/2021      Next office visit with prescribing clinician: Visit date not found            Lisbeth Leyva MA  03/01/22, 13:42 EST

## 2022-03-14 NOTE — TELEPHONE ENCOUNTER
Rx Refill Note  Requested Prescriptions     Pending Prescriptions Disp Refills   • Viibryd 20 MG tablet tablet [Pharmacy Med Name: VIIBRYD 20 MG TABLET] 30 tablet 3     Sig: TAKE ONE TABLET BY MOUTH ONCE NIGHTLY      Last office visit with prescribing clinician: 11/3/2021      Next office visit with prescribing clinician: Visit date not found            Geetha Finley MA  03/14/22, 13:08 EDT

## 2022-04-18 NOTE — TELEPHONE ENCOUNTER
Rx Refill Note  Requested Prescriptions     Pending Prescriptions Disp Refills   • potassium chloride (K-DUR,KLOR-CON) 20 MEQ CR tablet 90 tablet 0     Sig: Take 1 tablet by mouth Every Other Day.   • gabapentin (NEURONTIN) 100 MG capsule 60 capsule 0     Sig: Take 1 capsule by mouth Every 12 (Twelve) Hours.      Last office visit with prescribing clinician: 11/3/2021      Next office visit with prescribing clinician: 5/4/2022       {TIP  Please add Last Relevant Lab Date if appropriate: 10/19/21    Chaitanya Phelps MA  04/18/22, 13:32 EDT     Needs contract

## 2022-04-18 NOTE — TELEPHONE ENCOUNTER
Rx Refill Note  Requested Prescriptions     Pending Prescriptions Disp Refills   • omeprazole (priLOSEC) 40 MG capsule 90 capsule 0     Sig: Take 1 capsule by mouth Daily.      Last office visit with prescribing clinician:  11/03/2021    Next office visit with prescribing clinician:   05/04/2022           Eduarda Simmons Rep  04/18/22, 14:20 EDT

## 2022-04-18 NOTE — TELEPHONE ENCOUNTER
Rx Refill Note  Requested Prescriptions     Pending Prescriptions Disp Refills   • traMADol (ULTRAM) 50 MG tablet 30 tablet 2     Sig: Take 1 tablet by mouth Daily As Needed for Moderate Pain .      Last office visit with prescribing clinician: 11/3/2021      Next office visit with prescribing clinician: 4/16/2022       {TIP  Please add Last Relevant Lab Date if appropriate:     Chaitanya Phelps MA  04/18/22, 13:27 EDT

## 2022-05-04 NOTE — PROGRESS NOTES
"Chief Complaint  Annual Exam (AWV)    Subjective     {Problem List  Visit Diagnosis   Encounters  Notes  Medications  Labs  Result Review Imaging  Media :23}     Miguelina Lopez presents to [facility]  History of Present Illness    Objective   Vital Signs:   /72   Pulse 57   Temp 97.8 °F (36.6 °C)   Ht 152.4 cm (60\")   Wt 122 kg (268 lb)   SpO2 100%   BMI 52.34 kg/m²     Physical Exam   Result Review :{Labs  Result Review  Imaging  Med Tab  Media  Procedures :23}   {The following data was reviewed by (Optional):72679}  {Ambulatory Labs (Optional):80138}  {Data reviewed (Optional):04544:::1}          Assessment and Plan {CC Problem List  Visit Diagnosis   ROS  Review (Popup)  Health Maintenance  Quality  BestPractice  Medications  SmartSets  SnapShot Encounters  Media :23}   There are no diagnoses linked to this encounter.  {Class 3 Severe Obesity (BMI >= 40). (Optional):7118210330}       {Time Spent (Optional):84750}  Follow Up {Instructions Charge Capture  Follow-up Communications :23}  No follow-ups on file.  Patient was given instructions and counseling regarding her condition or for health maintenance advice. Please see specific information pulled into the AVS if appropriate.       "

## 2022-05-04 NOTE — PROGRESS NOTES
The ABCs of the Annual Wellness Visit  Subsequent Medicare Wellness Visit    Chief Complaint   Patient presents with   • Annual Exam     AWV      Subjective    History of Present Illness:  Miguelina Lopez is a 76 y.o. female who presents for a Subsequent Medicare Wellness Visit.      Here with granddaughter who assists with patient care.      Doing pretty well overall.  Still not mobile and health goal would be to be more active.  Enjoys swimming.  She is using walker to ambulate around home.  No falls.  Has generalized weakness.  Completed PT.     T2DM:  Not checking sugars regularly.  She is using Trulicity without side effects.  No hypo/hyperglycemic sx.  Admits she eats a lot of fast food and sandwiches as she is not able to cook and relies on others for assistance with meal prep.      COPD:  She is not using Advair regularly.  Sometimes feels a little dyspneic with exertion.  She has not been using albuterol much.  No wheeze or cough.     Hypothyroid:  Has been taking levothyroxine and has never noticed any change in energy.  No longer struggling with constipation.  She has no s/s hyperthyroid.     Continues gabapentin BID for neuropathic leg pain and takes tramadol for arthritic pain b/l knees appr 2 x week.  Finds both helpful and denies side effects.  Would like to continue current. Granddaughter continues to wrap legs close to daily and reports they are looking good.     Feels well since AVR.  She has f/u upcoming with CTS.  She has no recent exacerbations CHF.     Still has quite a bit of GERD and indigestion.  Not sure if it is related to diet.  Still taking daily PPI.       The following portions of the patient's history were reviewed and   updated as appropriate: allergies, current medications, past family history, past medical history, past social history, past surgical history and problem list.    Compared to one year ago, the patient feels her physical   health is better.    Compared to one year  ago, the patient feels her mental   health is better.    Recent Hospitalizations:  This patient has had a [facility] admission record on file within the last 365 days.    Current Medical Providers:  Patient Care Team:  Arcelia Talbot APRN as PCP - General (Nurse Practitioner)  Robbie Wilson MD as Consulting Physician (Hematology and Oncology)  Chace Johnson MD as Consulting Physician (Cardiology)  Duarte Cuevas MD as Consulting Physician (Pulmonary Disease)    Outpatient Medications Prior to Visit   Medication Sig Dispense Refill   • acetaminophen (TYLENOL) 325 MG tablet Take 2 tablets by mouth Every 4 (Four) Hours As Needed for Mild Pain .     • ALPRAZolam (XANAX) 1 MG tablet Take 1 mg by mouth 2 (Two) Times a Day As Needed for Anxiety.     • carvedilol (COREG) 12.5 MG tablet Take 1 tablet by mouth 2 (Two) Times a Day With Meals. 60 tablet 11   • clotrimazole-betamethasone (Lotrisone) 1-0.05 % cream Apply  topically to the appropriate area as directed 2 (Two) Times a Day. 45 g 2   • fluticasone (FLONASE) 50 MCG/ACT nasal spray 2 sprays by Each Nare route Daily.     • gabapentin (NEURONTIN) 100 MG capsule Take 1 capsule by mouth Every 12 (Twelve) Hours. 60 capsule 0   • ipratropium-albuterol (DUO-NEB) 0.5-2.5 mg/mL nebulizer Take 3 mL by nebulization 4 (four) times a day. (Patient taking differently: Take 3 mL by nebulization 3 (Three) Times a Day As Needed.) 3 mL 5   • nitroglycerin (NITROSTAT) 0.4 MG SL tablet DISSOLVE 1 TAB UNDER TONGUE FOR CHEST PAIN - IF PAIN REMAINS AFTER 5 MIN, CALL 911 AND REPEAT DOSE. MAX 3 TABS IN 15 MINUTES (Patient taking differently: Place 0.4 mg under the tongue Every 5 (Five) Minutes As Needed.) 25 tablet 4   • nystatin (MYCOSTATIN) 501697 UNIT/GM cream Apply  topically to the appropriate area as directed 2 (Two) Times a Day. to affected area(s) (Patient taking differently: Apply  topically to the appropriate area as directed 2 (Two) Times a Day.) 30 g 3   •  omeprazole (priLOSEC) 40 MG capsule Take 1 capsule by mouth Daily. 90 capsule 0   • potassium chloride (K-DUR,KLOR-CON) 20 MEQ CR tablet Take 1 tablet by mouth Every Other Day. 90 tablet 0   • Probiotic Product (Risaquad-2) capsule capsule Take 1 capsule by mouth Daily.     • senna-docusate (PERICOLACE) 8.6-50 MG per tablet Take 1 tablet by mouth Daily. Takes every other day     • sodium chloride 0.65 % nasal spray 2 sprays into the nostril(s) as directed by provider As Needed for Congestion.  12   • spironolactone (ALDACTONE) 25 MG tablet Take 1 tablet by mouth Daily. 30 tablet 11   • torsemide (DEMADEX) 100 MG tablet Take 1 tablet by mouth 2 (two) times a day. 60 tablet 11   • Viibryd 20 MG tablet tablet TAKE ONE TABLET BY MOUTH ONCE NIGHTLY 30 tablet 3   • vitamin D (ERGOCALCIFEROL) 1.25 MG (97822 UT) capsule capsule Take 1 capsule by mouth Every 7 (Seven) Days. Takes on Sundays 12 capsule 0   • atorvastatin (LIPITOR) 20 MG tablet TAKE ONE TABLET BY MOUTH DAILY 90 tablet 0   • Dulaglutide (Trulicity) 0.75 MG/0.5ML solution pen-injector Inject 0.75 mg under the skin into the appropriate area as directed 1 (One) Time Per Week. 4 pen 0   • fluticasone-salmeterol (ADVAIR DISKUS) 250-50 MCG/DOSE DISKUS Inhale 1 puff Daily As Needed (cough and wheezing). 60 each 0   • levothyroxine (SYNTHROID, LEVOTHROID) 25 MCG tablet TAKE 2 TABLETS BY MOUTH ON MONDAY, WEDNESDAY AND FRIDAY AND TAKE ONE TABLET ON TUESDAY, THURSDAY, SATURDAY AND SUNDAY 40 tablet 3   • traMADol (ULTRAM) 50 MG tablet Take 1 tablet by mouth Daily As Needed for Moderate Pain . 30 tablet 2     No facility-administered medications prior to visit.       Opioid medication/s are on active medication list.  and I have evaluated her active treatment plan and pain score trends (see table).  There were no vitals filed for this visit.  I have reviewed the chart for potential of high risk medication and harmful drug interactions in the elderly.            Aspirin is  not on active medication list.  Aspirin use is indicated based on review of current medical condition/s. Pros and cons of this therapy have been discussed with this patient. Benefits of this medication outweigh potential harm.  Patient has been instructed to start taking this medication..    Patient Active Problem List   Diagnosis   • Anxiety disorder   • Arthritis of knee   • Asthma   • Chronic coronary artery disease   • CKD (chronic kidney disease) stage 3, GFR 30-59 ml/min ([facility])   • Depression   • Diabetic peripheral neuropathy ([facility])   • Gastroesophageal reflux disease   • Hyperlipidemia   • Insomnia   • Lower gastrointestinal hemorrhage   • Anemia   • OCHOA (obstructive sleep apnea)   • Diabetes mellitus ([facility])   • Essential hypertension   • Hospital discharge follow-up   • Mitral stenosis   • Pulmonary hypertension due to sleep-disordered breathing (CMS/[facility])   • s/p MVR, TV-repair, CABG x2 6/13/16   • OLI (acute kidney injury) ([facility])   • Leukocytosis   • Paroxysmal atrial fibrillation ([facility])   • Nocturnal hypoxia   • Dermatitis   • Medicare annual wellness visit, initial   • Class 3 severe obesity due to excess calories with serious comorbidity and body mass index (BMI) of 50.0 to 59.9 in adult ([facility])   • Supplemental oxygen dependent   • Acute on chronic combined systolic and diastolic HF (heart failure) ([facility])   • Mitral regurgitation   • Aortic stenosis   • Medicare annual wellness visit, subsequent   • Localized edema   • Chronic right-sided congestive heart failure ([facility])   • Cellulitis of left lower extremity   • Proteinuria   • Bilateral lower extremity edema   • Subclinical hypothyroidism   • Chronic diastolic heart failure ([facility])   • Chronic respiratory failure with hypoxia and hypercapnia ([facility])   • Acute on chronic respiratory failure with hypoxia and hypercapnia ([facility])   • AV block, 2nd degree   • 1st degree AV block   • Chest pain   • COPD (chronic obstructive pulmonary disease) ([facility])   • Complete heart  "block (HCC)   • Presence of permanent cardiac pacemaker   • Hypothyroidism (acquired)   • Chronic anticoagulation   • Leukocytosis   • Stage 3b chronic kidney disease (HCC)   • Gastrointestinal hemorrhage with melena   • Acute blood loss anemia   • Metabolic encephalopathy   • Hypernatremia   • Hypokalemia   • TIA (transient ischemic attack)   • Nonrheumatic aortic valve stenosis   • Acute on chronic heart failure ([facility])   • S/P TAVR (transcatheter aortic valve replacement)   • CHF (congestive heart failure) ([facility])   • Heart valve disorder   • Paroxysmal atrial flutter (HCC)   • Peripheral neuropathy   • Pulmonary hypertension ([facility])   • Dry skin     Advance Care Planning  Advance Directive is on file.  ACP discussion was held with the patient during this visit. Patient has an advance directive in EMR which is still valid.     Review of Systems   Constitutional: Positive for fatigue. Negative for activity change (trying to increase) and appetite change.   HENT: Negative for ear pain and trouble swallowing.    Respiratory: Positive for shortness of breath. Negative for cough.    Cardiovascular: Positive for leg swelling. Negative for chest pain and palpitations.   Gastrointestinal: Negative for abdominal pain and constipation.   Genitourinary: Negative for difficulty urinating and hematuria.   Neurological: Positive for weakness.        Objective    Vitals:    05/04/22 1322   BP: 144/72   Pulse: 57   Temp: 97.8 °F (36.6 °C)   SpO2: 100%   Weight: 122 kg (268 lb)   Height: 152.4 cm (60\")     BMI Readings from Last 1 Encounters:   05/04/22 52.34 kg/m²   BMI is above normal parameters. Recommendations include: nutrition counseling and pharmacological intervention    Does the patient have evidence of cognitive impairment? No    Physical Exam  Vitals and nursing note reviewed.   Constitutional:       General: She is not in acute distress.     Appearance: She is well-developed. She is obese. She is ill-appearing " (chronically).   HENT:      Head: Normocephalic and atraumatic.      Right Ear: Tympanic membrane, ear canal and external ear normal.      Left Ear: Tympanic membrane, ear canal and external ear normal.      Mouth/Throat:      Mouth: Mucous membranes are moist.      Pharynx: Uvula midline. No posterior oropharyngeal erythema.   Eyes:      General: No scleral icterus.        Right eye: No discharge.         Left eye: No discharge.      Conjunctiva/sclera: Conjunctivae normal.      Pupils: Pupils are equal, round, and reactive to light.   Neck:      Thyroid: No thyromegaly.   Cardiovascular:      Rate and Rhythm: Normal rate and regular rhythm.      Heart sounds: No murmur heard.  Pulmonary:      Effort: Pulmonary effort is normal.      Breath sounds: Normal breath sounds.   Abdominal:      General: Bowel sounds are normal.      Palpations: Abdomen is soft.      Tenderness: There is no abdominal tenderness.   Musculoskeletal:         General: No deformity.      Cervical back: Neck supple.      Comments: Generalized weakness-wheelchair   Lymphadenopathy:      Cervical: No cervical adenopathy.   Skin:     General: Skin is warm and dry.   Neurological:      General: No focal deficit present.      Mental Status: She is alert and oriented to person, place, and time.   Psychiatric:         Attention and Perception: Attention and perception normal.         Mood and Affect: Mood normal.         Speech: Speech normal.         Behavior: Behavior normal. Behavior is cooperative.         Thought Content: Thought content normal.         Cognition and Memory: Cognition normal.      Comments: Pleasant and conversant       Lab Results   Component Value Date    CHLPL 171 05/04/2022    TRIG 124 05/04/2022    HDL 46 05/04/2022     (H) 05/04/2022    VLDL 22 05/04/2022    HGBA1C 9.9 (H) 05/04/2022            HEALTH RISK ASSESSMENT    Smoking Status:  Social History     Tobacco Use   Smoking Status Never Smoker   Smokeless Tobacco  Never Used   Tobacco Comment    no caffeine      Alcohol Consumption:  Social History     Substance and Sexual Activity   Alcohol Use No     Fall Risk Screen:    HOLLIE Fall Risk Assessment was completed, and patient is at LOW risk for falls.Assessment completed on:5/4/2022    Depression Screening:  PHQ-2/PHQ-9 Depression Screening 5/4/2022   Retired Total Score -   Little Interest or Pleasure in Doing Things 1-->several days   Feeling Down, Depressed or Hopeless 1-->several days   Trouble Falling or Staying Asleep, or Sleeping Too Much 2-->more than half the days   Feeling Tired or Having Little Energy 1-->several days   Poor Appetite or Overeating 1-->several days   Feeling Bad about Yourself - or that You are a Failure or Have Let Yourself or Your Family Down 2-->more than half the days   Trouble Concentrating on Things, Such as Reading the Newspaper or Watching Television 0-->not at all   Moving or Speaking So Slowly that Other People Could Have Noticed? Or the Opposite - Being So Fidgety 0-->not at all   Thoughts that You Would be Better Off Dead or of Hurting Yourself in Some Way 0-->not at all   PHQ-9: Brief Depression Severity Measure Score 8   If You Checked Off Any Problems, How Difficult Have These Problems Made It For You to Do Your Work, Take Care of Things at Home, or Get Along with Other People? not difficult at all       Health Habits and Functional and Cognitive Screening:  Functional & Cognitive Status 5/4/2022   Do you have difficulty preparing food and eating? No   Do you have difficulty bathing yourself, getting dressed or grooming yourself? Yes   Do you have difficulty using the toilet? No   Do you have difficulty moving around from place to place? Yes   Do you have trouble with steps or getting out of a bed or a chair? Yes   Current Diet Limited Junk Food   Dental Exam Not up to date   Eye Exam Not up to date   Exercise (times per week) 0 times per week   Current Exercises Include No Regular  Exercise   Current Exercise Activities Include -   Do you need help using the phone?  No   Are you deaf or do you have serious difficulty hearing?  No   Do you need help with transportation? No   Do you need help shopping? Yes   Do you need help preparing meals?  No   Do you need help with housework?  No   Do you need help with laundry? No   Do you need help taking your medications? Yes   Do you need help managing money? No   Do you ever drive or ride in a car without wearing a seat belt? No   Have you felt unusual stress, anger or loneliness in the last month? Yes   Who do you live with? Child   If you need help, do you have trouble finding someone available to you? No   Have you been bothered in the last four weeks by sexual problems? -   Do you have difficulty concentrating, remembering or making decisions? No       Age-appropriate Screening Schedule:  Refer to the list below for future screening recommendations based on patient's age, sex and/or medical conditions. Orders for these recommended tests are listed in the plan section. The patient has been provided with a written plan.    Health Maintenance   Topic Date Due   • DXA SCAN  Never done   • TDAP/TD VACCINES (1 - Tdap) Never done   • MAMMOGRAM  08/14/2019   • URINE MICROALBUMIN  12/16/2020   • DIABETIC EYE EXAM  03/04/2021   • HEMOGLOBIN A1C  11/04/2022   • LIPID PANEL  05/04/2023   • ZOSTER VACCINE  Completed   • INFLUENZA VACCINE  Discontinued              Assessment/Plan   CMS Preventative Services Quick Reference  Risk Factors Identified During Encounter  Cardiovascular Disease  Chronic Pain   Inactivity/Sedentary  Obesity/Overweight   The above risks/problems have been discussed with the patient.  Follow up actions/plans if indicated are seen below in the Assessment/Plan Section.  Pertinent information has been shared with the patient in the After Visit Summary.    Diagnoses and all orders for this visit:    1. Encounter for annual wellness visit  (AWV) in Medicare patient (Primary)    2. Inadequately controlled diabetes mellitus (HCC)  -     Hemoglobin A1c    3. Essential hypertension  -     CBC & Differential  -     Comprehensive Metabolic Panel    4. Chronic diastolic heart failure (HCC)    5. Nonrheumatic aortic valve stenosis    6. Mixed hyperlipidemia  -     Lipid Panel With LDL / HDL Ratio    7. Other fatigue  -     Vitamin B12  -     Comprehensive Metabolic Panel    8. Acquired hypothyroidism  -     TSH    9. Primary osteoarthritis of left knee  -     traMADol (ULTRAM) 50 MG tablet; Take 1 tablet by mouth Daily As Needed for Moderate Pain .  Dispense: 30 tablet; Refill: 2    10. Chronic pain of left knee  -     traMADol (ULTRAM) 50 MG tablet; Take 1 tablet by mouth Daily As Needed for Moderate Pain .  Dispense: 30 tablet; Refill: 2    11. Bilateral leg pain  -     traMADol (ULTRAM) 50 MG tablet; Take 1 tablet by mouth Daily As Needed for Moderate Pain .  Dispense: 30 tablet; Refill: 2    12. Chronic obstructive pulmonary disease, unspecified COPD type (HCC)  -     Fluticasone-Salmeterol (ADVAIR) 250-50 MCG/ACT DISKUS; Inhale 1 puff 2 (Two) Times a Day.  Dispense: 60 each; Refill: 2     As part of wellness and prevention, the following topics were discussed with the patient:   Nutrition-diet high in fresh/fozen veges, lower in carbs, unsaturated fats, and higher in lean proteins, adequate hydration   Physical activity/regular exercise-150 mins per week of moderate activity that increases HR and is enjoyable to lower stress and improve CVD     Healthy weight-above goal BMI discussed    Injury prevention-fall precautions   Dental health-recommend twice per year dental cleans and daily flossing to lower inflammation in body   Mental health-stress mgmt and sleep hygiene   Immunization-recommended vaccines discussed  Discussed dexascan and mammo-pt would like to push out until later in yr.     T2DM:  A1C today.  Will increase Trulicity to help with weight  loss and diabetic control.  Diet and mvmt encouraged.  Will refer to diabetic educator.     Hypothyroid:  Check TSH and adjust based on lab results.      COPD:  Recommend BID use Advair, when to use prns.  Deep breathing encouraged.     GERD:  On PPI still not controlled.  Will check renal fx.  May need to see GI.     CHF and AVR:  Doing well, continues to follow with CTS and cardiology.     Neuropathic and arthritic pain controlled well with low dose medications.  karime has been appropriate, will get updated substance agreement.  Not able to urinate today.     HLD:  Continues statin.  Will check lipid today and adjust as needed. Multiple CVD risks.     HTN:  Up a little today after multiple checks-recommend home monitoring.     Follow Up:   Return in about 3 months (around 8/4/2022) for Recheck.     An After Visit Summary and PPPS were made available to the patient.

## 2022-05-16 NOTE — TELEPHONE ENCOUNTER
Rx Refill Note  Requested Prescriptions     Pending Prescriptions Disp Refills   • gabapentin (NEURONTIN) 100 MG capsule 60 capsule 0     Sig: Take 1 capsule by mouth Every 12 (Twelve) Hours.      Last office visit with prescribing clinician: 5/4/2022      Next office visit with prescribing clinician: 8/3/2022            Lisbeth Leyva MA  05/16/22, 16:34 EDT

## 2022-05-31 NOTE — TELEPHONE ENCOUNTER
Patient notified and expressed understanding.   Griseofulvin Counseling:  I discussed with the patient the risks of griseofulvin including but not limited to photosensitivity, cytopenia, liver damage, nausea/vomiting and severe allergy.  The patient understands that this medication is best absorbed when taken with a fatty meal (e.g., ice cream or french fries).

## 2022-06-13 NOTE — TELEPHONE ENCOUNTER
Rx Refill Note  Requested Prescriptions     Pending Prescriptions Disp Refills   • gabapentin (NEURONTIN) 100 MG capsule 60 capsule 2     Sig: Take 1 capsule by mouth Every 12 (Twelve) Hours.      Last office visit with prescribing clinician: 5/4/2022      Next office visit with prescribing clinician: 8/3/2022       {TIP  Please add Last Relevant Lab Date if appropriate: 5/5/2022    Lashell Bronson, PCT  06/13/22, 16:49 EDT

## 2022-06-27 NOTE — TELEPHONE ENCOUNTER
Rx Refill Note  Requested Prescriptions     Pending Prescriptions Disp Refills   • vilazodone (Viibryd) 20 MG tablet tablet 30 tablet 3     Sig: Take 1 tablet by mouth Every Night.   • Dulaglutide (Trulicity) 1.5 MG/0.5ML solution pen-injector 2 mL 0     Sig: Inject 1.5 mg under the skin into the appropriate area as directed 1 (One) Time Per Week.      Last office visit with prescribing clinician: 5/4/2022      Next office visit with prescribing clinician: 8/3/2022            Lisbeth Leyva MA  06/27/22, 15:49 EDT

## 2022-07-11 NOTE — TELEPHONE ENCOUNTER
Rx Refill Note  Requested Prescriptions     Pending Prescriptions Disp Refills   • omeprazole (priLOSEC) 40 MG capsule 90 capsule 0     Sig: Take 1 capsule by mouth Daily.   • gabapentin (NEURONTIN) 100 MG capsule 60 capsule 2     Sig: Take 1 capsule by mouth Every 12 (Twelve) Hours.      Last office visit with prescribing clinician: 5/4/2022      Next office visit with prescribing clinician: 8/3/2022            Carmen Ramos MA  07/11/22, 11:44 EDT

## 2022-07-26 NOTE — TELEPHONE ENCOUNTER
Rx Refill Note  Requested Prescriptions     Pending Prescriptions Disp Refills   • Trulicity 1.5 MG/0.5ML solution pen-injector [Pharmacy Med Name: TRULICITY 1.5 MG/0.5 ML PEN] 2 mL 0     Sig: INJECT 1.5 MG UNDER THE SKIN ONCE WEEKLY      Last office visit with prescribing clinician: 5/4/2022      Next office visit with prescribing clinician: 8/3/2022       {TIP  Please add Last Relevant Lab Date if appropriate: 7/23/22    TRICIA Mcbride  07/26/22, 11:03 EDT

## 2022-07-28 NOTE — TELEPHONE ENCOUNTER
Rx Refill Note  Requested Prescriptions     Pending Prescriptions Disp Refills   • omeprazole (priLOSEC) 40 MG capsule [Pharmacy Med Name: OMEPRAZOLE DR 40 MG CAPSULE] 90 capsule 0     Sig: TAKE ONE CAPSULE BY MOUTH DAILY      Last office visit with prescribing clinician: 5/4/2022      Next office visit with prescribing clinician: 7/28/2022            Lisbeth Leyva MA  07/28/22, 16:20 EDT

## 2022-08-01 NOTE — TELEPHONE ENCOUNTER
Rx Refill Note  Requested Prescriptions     Pending Prescriptions Disp Refills   • atorvastatin (LIPITOR) 40 MG tablet [Pharmacy Med Name: ATORVASTATIN 40 MG TABLET] 90 tablet 0     Sig: TAKE ONE TABLET BY MOUTH DAILY      Last office visit with prescribing clinician: 5/4/2022      Next office visit with prescribing clinician: 8/3/2022            Lisbeth Leyva MA  08/01/22, 12:19 EDT

## 2022-08-01 NOTE — TELEPHONE ENCOUNTER
Rx Refill Note  Requested Prescriptions     Pending Prescriptions Disp Refills   • omeprazole (priLOSEC) 40 MG capsule 90 capsule 0     Sig: Take 1 capsule by mouth Daily.      Last office visit with prescribing clinician: 5/4/2022      Next office visit with prescribing clinician: 8/1/2022            Lisbeth Leyva MA  08/01/22, 12:19 EDT

## 2022-08-23 NOTE — TELEPHONE ENCOUNTER
Rx Refill Note  Requested Prescriptions     Pending Prescriptions Disp Refills   • Trulicity 1.5 MG/0.5ML solution pen-injector [Pharmacy Med Name: TRULICITY 1.5 MG/0.5 ML PEN] 2 mL 0     Sig: INJECT 1.5 MG UNDER THE SKIN ONCE WEEKLY      Last office visit with prescribing clinician: 5/4/2022      Next office visit with prescribing clinician: Visit date not found            Lisbeth Leyva MA  08/23/22, 12:38 EDT

## 2022-09-22 NOTE — ED PROVIDER NOTES
EMERGENCY DEPARTMENT ENCOUNTER    Room Number:  02/02  Date of encounter:  9/22/2022  PCP: Arcelia Talbot APRN  Historian: Patient      HPI:  Chief Complaint: Nosebleed  A complete HPI/ROS/PMH/PSH/SH/FH are unobtainable due to: N/A    Context: Miguelina Lopez is a 76 y.o. female with past medical history of T2DM, HTN, CAD/CHF, CKD, GERD, HLD, and hypothyroidism who presents to the ED c/o nosebleed.  Patient states that she started having nosebleed at around noon yesterday after sneezing.  Denies any other trauma or injury.  Patient states that she does get nosebleeds occasionally but is not currently taking any blood thinners.  Patient states that she has had some slight headache, but denies any fever, sinus pain, chest pain, shortness of breath, nausea or vomiting.      PAST MEDICAL HISTORY  Active Ambulatory Problems     Diagnosis Date Noted   • Anxiety disorder 02/13/2016   • Arthritis of knee 02/13/2016   • Asthma 02/13/2016   • Chronic coronary artery disease 02/13/2016   • CKD (chronic kidney disease) stage 3, GFR 30-59 ml/min ([facility]) 02/13/2016   • Depression 02/13/2016   • Diabetic peripheral neuropathy ([facility]) 02/13/2016   • Gastroesophageal reflux disease 02/13/2016   • Hyperlipidemia 02/13/2016   • Insomnia 02/13/2016   • Lower gastrointestinal hemorrhage 02/13/2016   • Anemia, chronic disease 02/13/2016   • OCHOA (obstructive sleep apnea) 02/13/2016   • Type 2 diabetes mellitus with kidney complication, without long-term current use of insulin ([facility]) 02/13/2016   • Essential hypertension 02/13/2016   • Hospital discharge follow-up 02/18/2016   • Mitral stenosis 02/24/2016   • Pulmonary hypertension due to sleep-disordered breathing (CMS/[facility]) 06/10/2016   • s/p MVR, TV-repair, CABG x2 6/13/16 06/18/2016   • OLI (acute kidney injury) ([facility]) 06/18/2016   • Leukocytosis 06/18/2016   • Paroxysmal atrial fibrillation ([facility]) 06/20/2016   • Nocturnal hypoxia 01/30/2017   • Dermatitis 05/01/2017   • Medicare  annual wellness visit, initial 08/01/2017   • Class 3 severe obesity due to excess calories with serious comorbidity and body mass index (BMI) of 50.0 to 59.9 in adult ([facility]) 02/22/2018   • Supplemental oxygen dependent 07/12/2018   • Acute on chronic combined systolic and diastolic HF (heart failure) ([facility]) 01/02/2019   • Mitral regurgitation 01/02/2019   • Aortic stenosis 01/02/2019   • Medicare annual wellness visit, subsequent 04/29/2019   • Localized edema 06/10/2019   • Chronic right-sided congestive heart failure ([facility]) 06/10/2019   • Cellulitis of left lower extremity 09/27/2019   • Proteinuria 12/15/2019   • Bilateral lower extremity edema 12/15/2019   • Subclinical hypothyroidism 12/16/2019   • Chronic diastolic heart failure ([facility]) 12/17/2019   • Chronic respiratory failure with hypoxia and hypercapnia ([facility]) 12/19/2019   • Acute on chronic respiratory failure with hypoxia and hypercapnia ([facility]) 12/23/2019   • AV block, 2nd degree 03/04/2020   • 1st degree AV block 03/04/2020   • Chest pain 02/01/2021   • COPD (chronic obstructive pulmonary disease) ([facility])    • Complete heart block ([facility]) 02/01/2021   • Presence of permanent cardiac pacemaker 02/24/2021   • Hypothyroidism (acquired) 03/16/2021   • Chronic anticoagulation 03/16/2021   • Leukocytosis 03/16/2021   • Stage 3b chronic kidney disease ([facility]) 03/16/2021   • Gastrointestinal hemorrhage with melena 03/15/2021   • Acute blood loss anemia 03/17/2021   • Metabolic encephalopathy 03/17/2021   • Hypernatremia 03/17/2021   • Hypokalemia 03/17/2021   • TIA (transient ischemic attack) 03/20/2021   • Nonrheumatic aortic valve stenosis 07/07/2021   • Acute on chronic heart failure ([facility]) 07/12/2021   • S/P TAVR (transcatheter aortic valve replacement) 08/03/2021   • CHF (congestive heart failure) ([facility]) 10/06/2021   • Heart valve disorder 10/28/2021   • Paroxysmal atrial flutter ([facility]) 10/28/2021   • Peripheral neuropathy 10/28/2021   • Pulmonary hypertension ([facility])  10/28/2021   • Dry skin      Resolved Ambulatory Problems     Diagnosis Date Noted   • SOB (shortness of breath) 02/24/2016   • Chest pain 06/08/2016   • Wound infection 07/29/2016   • Cough with sputum 04/10/2018   • UTI (urinary tract infection), bacterial 02/03/2021     Past Medical History:   Diagnosis Date   • Anemia    • Anxiety    • Aortic valve stenosis    • Arthritis    • Class 3 severe obesity due to excess calories in adult ([facility])    • Diabetes mellitus ([facility])    • Elevated cholesterol    • GERD (gastroesophageal reflux disease)    • Heart murmur    • Hypertension    • Mitral valve insufficiency    • Pneumonia    • Sleep apnea    • Stage 3 chronic kidney disease ([facility])    • Valvular heart disease          PAST SURGICAL HISTORY  Past Surgical History:   Procedure Laterality Date   • AORTIC VALVE REPAIR/REPLACEMENT N/A 7/13/2021    Procedure: TTE TRANSFEMORAL TRANSCATHETER AORTIC VALVE REPLACEMENT PERCUTANEOUS APPROACH;  Surgeon: Sharlene Mejía MD;  Location: [facility] OR 18/19;  Service: Cardiothoracic;  Laterality: N/A;   • AORTIC VALVE REPAIR/REPLACEMENT N/A 7/13/2021    Procedure: Transfemoral Transcatheter Aortic Valve Replacement with intra-op tte and possible open surgical rescue;  Surgeon: Ayan Pacheco MD;  Location: [facility] OR 18/19;  Service: Cardiovascular;  Laterality: N/A;   • CARDIAC CATHETERIZATION     • CARDIAC CATHETERIZATION N/A 6/10/2016    Procedure: Left Heart Cath;  Surgeon: Chace Johnson MD;  Location: [facility] CATH INVASIVE LOCATION;  Service:    • CARDIAC CATHETERIZATION N/A 6/10/2016    Procedure: Right Heart Cath;  Surgeon: Chace Johnson MD;  Location: [facility] CATH INVASIVE LOCATION;  Service:    • CARDIAC CATHETERIZATION N/A 2/5/2021    Procedure: RIGHT AND LEFT HEART CATH;  Surgeon: Antoine Ayers MD;  Location: [facility] CATH INVASIVE LOCATION;  Service: Cardiology;  Laterality: N/A;   • CARDIAC CATHETERIZATION N/A 2/5/2021    Procedure:  Coronary angiography;  Surgeon: Antoine Ayers MD;  Location: [facility] CATH INVASIVE LOCATION;  Service: Cardiology;  Laterality: N/A;   • CARDIAC CATHETERIZATION N/A 2/5/2021    Procedure: Left ventriculography- pressures;  Surgeon: Antoine Ayers MD;  Location: [facility] CATH INVASIVE LOCATION;  Service: Cardiology;  Laterality: N/A;   • CARDIAC ELECTROPHYSIOLOGY PROCEDURE N/A 2/2/2021    Procedure: Pacemaker DC new---[employer] MICRA;  Surgeon: Eliazar Bond MD;  Location: [facility] CATH INVASIVE LOCATION;  Service: Cardiology;  Laterality: N/A;   • CARDIAC SURGERY     • COLONOSCOPY N/A 10/14/2021    Procedure: COLONOSCOPY to cecum and TI  with cold snare polypectomies;  Surgeon: Dixon Azevedo MD;  Location: [facility] ENDOSCOPY;  Service: Gastroenterology;  Laterality: N/A;  pre: melena  post: polyps, diverticulosis   • CORONARY ARTERY BYPASS GRAFT      2 vessel   • CORONARY ARTERY BYPASS GRAFT WITH MITRAL VALVE REPAIR/REPLACEMENT N/A 6/13/2016    Procedure: INTRAOPERATIVE TARIQ, MIDLINE STERNOTOMY, CORONARY ARTERY BYPASS GRAFTING X  2 UTILIZING ENDOSCOPICALLY HARVESTED LEFT GREATER SAPHENOUS VEIN, MITRAL VALVE REPLACEMENT AND TRICUSPID VALVE REPAIR;  Surgeon: Eliecer Mistry MD;  Location: [facility] MAIN OR;  Service:    • CORONARY STENT PLACEMENT  2010    Approx. 6 yrs ago at [facility]   • ENDOSCOPY N/A 3/17/2021    Procedure: ESOPHAGOGASTRODUODENOSCOPY;  Surgeon: Dixon Haas MD;  Location: [facility] ENDOSCOPY;  Service: Gastroenterology;  Laterality: N/A;  PRE: GI BLEED  POST: ANTRAL ULCER   • ENDOSCOPY N/A 10/13/2021    Procedure: ESOPHAGOGASTRODUODENOSCOPY WITH BIOPSIES;  Surgeon: Riana Milner MD;  Location: [facility] ENDOSCOPY;  Service: Gastroenterology;  Laterality: N/A;  pre-melena, anemia   post- mild gastritis, prepyloric erosion, duodenal lymphangiectasia    • HEMORRHOIDECTOMY     • HYSTERECTOMY     • MITRAL VALVE REPLACEMENT     • REPLACEMENT TOTAL KNEE Right    • THYROID SURGERY      Cyst  removed from thyroid   • VASCULAR SURGERY           FAMILY HISTORY  Family History   Problem Relation Age of Onset   • Heart attack Father    • Heart disease Father    • Malig Hyperthermia Neg Hx          SOCIAL HISTORY  Social History     Socioeconomic History   • Marital status:    Tobacco Use   • Smoking status: Never Smoker   • Smokeless tobacco: Never Used   • Tobacco comment: no caffeine    Vaping Use   • Vaping Use: Never used   Substance and Sexual Activity   • Alcohol use: No   • Drug use: No   • Sexual activity: Defer         ALLERGIES  Coumadin [warfarin sodium], Erythromycin, Hctz [hydrochlorothiazide], Zaroxolyn [metolazone], Penicillins, and Sulfa antibiotics        REVIEW OF SYSTEMS  Review of Systems     All systems reviewed and negative except for those discussed in HPI.       PHYSICAL EXAM    I have reviewed the triage vital signs and nursing notes.    ED Triage Vitals [09/22/22 0209]   Temp Heart Rate Resp BP SpO2   98.4 °F (36.9 °C) 75 20 158/67 93 %      Temp src Heart Rate Source Patient Position BP Location FiO2 (%)   Oral -- -- -- --       Physical Exam  GENERAL: Alert and orient x3, obese, not distressed  HENT: TMs clear, no pharyngeal erythema, there is blood in the posterior pharynx as well as blood in the bilateral nares, no active bleeding seen, there is a perforation of the nasal septum  EYES: no scleral icterus, PERRL, EOMI  CV: regular rhythm, regular rate, 4/6 systolic murmur  RESPIRATORY: normal effort  ABDOMEN: soft/nontender  MUSCULOSKELETAL: no deformity  NEURO: alert, moves all extremities, follows commands  SKIN: warm, dry, what appears to be a gouty tophi present in the tip of the right finger      LAB RESULTS  Recent Results (from the past 24 hour(s))   Comprehensive Metabolic Panel    Collection Time: 09/22/22  3:09 AM    Specimen: Blood   Result Value Ref Range    Glucose 241 (H) 65 - 99 mg/dL    BUN 61 (H) 8 - 23 mg/dL    Creatinine 2.55 (H) 0.57 - 1.00 mg/dL     Sodium 141 136 - 145 mmol/L    Potassium 4.8 3.5 - 5.2 mmol/L    Chloride 99 98 - 107 mmol/L    CO2 30.1 (H) 22.0 - 29.0 mmol/L    Calcium 8.3 (L) 8.6 - 10.5 mg/dL    Total Protein 6.9 6.0 - 8.5 g/dL    Albumin 3.80 3.50 - 5.20 g/dL    ALT (SGPT) 8 1 - 33 U/L    AST (SGOT) 11 1 - 32 U/L    Alkaline Phosphatase 177 (H) 39 - 117 U/L    Total Bilirubin 0.5 0.0 - 1.2 mg/dL    Globulin 3.1 gm/dL    A/G Ratio 1.2 g/dL    BUN/Creatinine Ratio 23.9 7.0 - 25.0    Anion Gap 11.9 5.0 - 15.0 mmol/L    eGFR 18.9 (L) >60.0 mL/min/1.73   Protime-INR    Collection Time: 09/22/22  3:09 AM    Specimen: Blood   Result Value Ref Range    Protime 15.8 (H) 11.7 - 14.2 Seconds    INR 1.27 (H) 0.90 - 1.10   CBC Auto Differential    Collection Time: 09/22/22  3:09 AM    Specimen: Blood   Result Value Ref Range    WBC 10.83 (H) 3.40 - 10.80 10*3/mm3    RBC 3.47 (L) 3.77 - 5.28 10*6/mm3    Hemoglobin 9.1 (L) 12.0 - 15.9 g/dL    Hematocrit 28.7 (L) 34.0 - 46.6 %    MCV 82.7 79.0 - 97.0 fL    MCH 26.2 (L) 26.6 - 33.0 pg    MCHC 31.7 31.5 - 35.7 g/dL    RDW 15.5 (H) 12.3 - 15.4 %    RDW-SD 47.1 37.0 - 54.0 fl    MPV 11.1 6.0 - 12.0 fL    Platelets 183 140 - 450 10*3/mm3    Neutrophil % 83.7 (H) 42.7 - 76.0 %    Lymphocyte % 7.8 (L) 19.6 - 45.3 %    Monocyte % 5.4 5.0 - 12.0 %    Eosinophil % 1.7 0.3 - 6.2 %    Basophil % 0.5 0.0 - 1.5 %    Immature Grans % 0.9 (H) 0.0 - 0.5 %    Neutrophils, Absolute 9.08 (H) 1.70 - 7.00 10*3/mm3    Lymphocytes, Absolute 0.84 0.70 - 3.10 10*3/mm3    Monocytes, Absolute 0.58 0.10 - 0.90 10*3/mm3    Eosinophils, Absolute 0.18 0.00 - 0.40 10*3/mm3    Basophils, Absolute 0.05 0.00 - 0.20 10*3/mm3    Immature Grans, Absolute 0.10 (H) 0.00 - 0.05 10*3/mm3    nRBC 0.0 0.0 - 0.2 /100 WBC       Ordered the above labs and independently reviewed the results.        RADIOLOGY  No Radiology Exams Resulted Within Past 24 Hours    I ordered the above noted radiological studies. Reviewed by me and discussed with radiologist.   See dictation for official radiology interpretation.      PROCEDURES    Procedures      MEDICATIONS GIVEN IN ER    Medications   phenylephrine (LEXI-SYNEPHRINE) 0.5 % nasal spray 2 spray (2 sprays Each Nare Given 9/22/22 0312)         PROGRESS, DATA ANALYSIS, CONSULTS, AND MEDICAL DECISION MAKING    All labs have been independently reviewed by me.  All radiology studies have been reviewed by me and discussed with radiologist dictating the report.   EKG's independently viewed and interpreted by me.  Discussion below represents my analysis of pertinent findings related to patient's condition, differential diagnosis, treatment plan and final disposition.    DDx: Includes but limited to epistaxis, anemia    ED Course as of 09/22/22 0556   Thu Sep 22, 2022   0344 WBC(!): 10.83 [FRED]   0344 Hemoglobin(!): 9.1  Hemoglobin is stable [FRED]   0344 Hematocrit(!): 28.7 [FRED]   0344 Platelets: 183 [FRED]   0344 Glucose(!): 241 [FRED]   0344 BUN(!): 61 [FRED]   0344 Creatinine(!): 2.55  Creatinine at baseline. [FRED]   0344 Sodium: 141 [FRED]   0344 Potassium: 4.8 [FRED]   0344 Chloride: 99 [FRED]   0344 CO2(!): 30.1 [FRED]   0410 INR(!): 1.27 [FRED]      ED Course User Index  [FRED] Eliazar Harrell PA       MDM: Patient's blood work is stable and bleeding is controlled, have given patient ENT follow-up as well as dispensed the Lexi-Synephrine nasal spray with instructions on how to use and strict return to ER precautions.    PPE: The patient wore a surgical mask throughout the entire patient encounter. I wore an N95.    AS OF 05:56 EDT VITALS:    BP - 124/60  HR - 75  TEMP - 98.4 °F (36.9 °C) (Oral)  O2 SATS - 91%        DIAGNOSIS  Final diagnoses:   Epistaxis   Anemia, unspecified type   Chronic kidney disease, unspecified CKD stage   Congestive heart failure, unspecified HF chronicity, unspecified heart failure type (HCC)   Hypertension, unspecified type   Hyperlipidemia, unspecified hyperlipidemia type   Type 2 diabetes mellitus without  complication, unspecified whether long term insulin use ([facility])         DISPOSITION  DISCHARGE    Patient discharged in stable condition.    Reviewed implications of results, diagnosis, meds, responsibility to follow up, warning signs and symptoms of possible worsening, potential complications and reasons to return to ER.    Patient/Family voiced understanding of above instructions.    Discussed plan for discharge, as there is no emergent indication for admission. Patient referred to primary care provider for BP management due to today's BP. Pt/family is agreeable and understands need for follow up and repeat testing.  Pt is aware that discharge does not mean that nothing is wrong but it indicates no emergency is present that requires admission and they must continue care with follow-up as given below or physician of their choice.     FOLLOW-UP  Abiodun Hdz MD  [address] [facility] [address]  [facility] [address]  [place] [address]  [phone]    Schedule an appointment as soon as possible for a visit       Shun Curiel MD  [address]  [facility] [address]  [place] [address]  [phone]    Schedule an appointment as soon as possible for a visit            Medication List      Changed    fluticasone 50 MCG/ACT nasal spray  Commonly known as: FLONASE  2 sprays by Each Nare route Daily.  What changed:   · when to take this  · reasons to take this     levothyroxine 25 MCG tablet  Commonly known as: SYNTHROID, LEVOTHROID  TAKE 2 TABLETS BY MOUTH ON MONDAY, WEDNESDAY AND FRIDAY AND TAKE ONE TABLET ON TUESDAY, THURSDAY, SATURDAY AND SUNDAY  What changed:   · how to take this  · when to take this     nitroglycerin 0.4 MG SL tablet  Commonly known as: NITROSTAT  DISSOLVE 1 TAB UNDER TONGUE FOR CHEST PAIN - IF PAIN REMAINS AFTER 5 MIN, CALL 911 AND REPEAT DOSE. MAX 3 TABS IN 15 MINUTES  What changed:   · when to take this  · reasons to take this  · additional instructions              Note Disclaimer: At [facility], we believe that  sharing information builds trust and better relationships. You are receiving this note because you recently visited [facility]. It is possible you will see health information before a provider has talked with you about it. This kind of information can be easy to misunderstand. To help you fully understand what it means for your health, we urge you to discuss this note with your provider.       Eliazar Harrell PA  09/22/22 0557

## 2022-09-22 NOTE — ED NOTES
Nose clamp present on arrival per EMS, pt nose not bleeding around clamp, pt not vomiting blood, A&Ox4

## 2022-09-22 NOTE — ED NOTES
Patient reports she has called EMS x3 in the last 24hrs for nose bleed, complains she hasnt been able to stop the bleeding with nose clamp and afrin spray at home, denies taking blood thinners, states her nose is chronically dry from wearing o2 via NC

## 2022-09-22 NOTE — ED PROVIDER NOTES
MD ATTESTATION NOTE    The KO and I have discussed this patient's history, physical exam, and treatment plan.  I have reviewed the documentation and personally had a face to face interaction with the patient. I affirm the documentation and agree with the treatment and plan.  The attached note describes my personal findings.      I provided a substantive portion of the care of the patient.  I personally performed the physical exam in its entirety, and below are my findings.  For this patient encounter, the patient wore surgical mask, I wore full protective PPE including N95 and eye protection.      Brief HPI: This patient is a 76-year-old female with a past medical history of coronary artery disease and type 2 diabetes presenting to the emergency room today for nosebleed that began yesterday afternoon after sneezing.  The patient denies being on any blood thinning medication.    PHYSICAL EXAM  ED Triage Vitals [09/22/22 0209]   Temp Heart Rate Resp BP SpO2   98.4 °F (36.9 °C) 75 20 158/67 93 %      Temp src Heart Rate Source Patient Position BP Location FiO2 (%)   Oral -- -- -- --         GENERAL: Resting comfortably and in no acute distress, nontoxic in appearance  HENT: nares patent, mild active bleeding seen from an anterior source of the left nostril  EYES: no scleral icterus  CV: regular rhythm, normal rate, no M/R/G  RESPIRATORY: normal effort, lungs clear bilaterally  MUSCULOSKELETAL: no deformity, no edema  NEURO: alert, moves all extremities, follows commands  PSYCH:  calm, cooperative  SKIN: warm, dry    Vital signs and nursing notes reviewed.        Plan: We will obtain labs in the emergency room as well as apply Jamin-Synephrine and a nasal clamp.  We will reassess for cessation of bleeding following.       Alcon Hensley MD  09/22/22 0603

## 2022-09-22 NOTE — DISCHARGE INSTRUCTIONS
Home, rest, use Jamin-Synephrine spray as instructed, keep nose moistened with Vaseline, do not vigorously blow your nose.  Follow-up with ENT for recheck.  Home medicine as prescribed, follow up with PCP for recheck. Return to care with further concerns.

## 2022-09-22 NOTE — NURSING NOTE
Pt arrives to ER by EMS. C/o nosebleed for >24hrs. EMS crew states that multiple calls for nose bleed. Clots present in sputum, Pt denies thinners despite hx of afib. Vss, pt on rma, normally wears 02 for hx of copd, none in last 24hrs.

## 2022-10-09 PROBLEM — Z74.09 IMPAIRED MOBILITY AND ACTIVITIES OF DAILY LIVING: Status: ACTIVE | Noted: 2022-01-01

## 2022-10-09 PROBLEM — S22.39XA CLOSED FRACTURE OF ONE RIB: Status: ACTIVE | Noted: 2022-01-01

## 2022-10-09 PROBLEM — Z78.9 IMPAIRED MOBILITY AND ACTIVITIES OF DAILY LIVING: Status: ACTIVE | Noted: 2022-01-01

## 2022-10-18 PROBLEM — N17.9 AKI (ACUTE KIDNEY INJURY) (HCC): Status: ACTIVE | Noted: 2022-01-01

## 2022-11-01 PROBLEM — I50.9 ACUTE CHF (CONGESTIVE HEART FAILURE) (HCC): Status: ACTIVE | Noted: 2022-01-01

## 2022-11-01 PROBLEM — I50.9 ACUTE CONGESTIVE HEART FAILURE, UNSPECIFIED HEART FAILURE TYPE (HCC): Status: ACTIVE | Noted: 2022-01-01

## 2022-11-01 NOTE — TELEPHONE ENCOUNTER
Rx Refill Note  Requested Prescriptions     Pending Prescriptions Disp Refills   • atorvastatin (LIPITOR) 40 MG tablet [Pharmacy Med Name: ATORVASTATIN 40 MG TABLET] 90 tablet 0     Sig: TAKE ONE TABLET BY MOUTH DAILY      Last office visit with prescribing clinician: 5/4/2022      Next office visit with prescribing clinician: 11/9/2022            Lisbeth Leyva MA  11/01/22, 16:05 EDT

## 2022-11-01 NOTE — ED NOTES
Pt via [facility] with c/o increasingly SOA and worsening cough for the past week.  Hx of COPD (2L NC at all time)    All triage performed with this RN wearing appropriate PPE.  Pt placed in mask upon arrival to ED.

## 2022-11-01 NOTE — ED PROVIDER NOTES
EMERGENCY DEPARTMENT ENCOUNTER    Room Number:  16/16  PCP: Arcelia Talbot APRN  Historian: Patient  History Limited By: Nothing      HPI  Chief Complaint: Cough congestion shortness of breath  Context: Miguelina Lopez is a 76 y.o. female who presents to the ED c/o cough and congestion.  Patient was admitted to the hospital from October 9 through October 18 for fall with rib fracture.  Patient went to rehab.  She got out of rehab yesterday.  Patient states she has had cough and nasal congestion.  Has had some mild shortness of breath.  Has history of COPD and is on oxygen chronically.  Has had no chest pain.  Has had no lower extremity swelling.  Has had no fevers or chills.      Location: Shortness of breath  Radiation: N/A  Character: Cough and congestion  Duration: 1 to 2 days  Severity: Moderate  Progression: Improved  Aggravating Factors: Nothing  Alleviating Factors: Nothing        MEDICAL RECORD REVIEW    Patient admitted from October 9 through October 18          PAST MEDICAL HISTORY  Active Ambulatory Problems     Diagnosis Date Noted   • Anxiety disorder 02/13/2016   • Arthritis of knee 02/13/2016   • Asthma 02/13/2016   • Chronic coronary artery disease 02/13/2016   • CKD (chronic kidney disease) stage 3, GFR 30-59 ml/min ([facility]) 02/13/2016   • Depression 02/13/2016   • Diabetic peripheral neuropathy ([facility]) 02/13/2016   • Gastroesophageal reflux disease 02/13/2016   • Hyperlipidemia 02/13/2016   • Insomnia 02/13/2016   • Lower gastrointestinal hemorrhage 02/13/2016   • Anemia, chronic disease 02/13/2016   • OCHOA (obstructive sleep apnea) 02/13/2016   • Type 2 diabetes mellitus with kidney complication, without long-term current use of insulin ([facility]) 02/13/2016   • Essential hypertension 02/13/2016   • Hospital discharge follow-up 02/18/2016   • Mitral stenosis 02/24/2016   • Pulmonary hypertension due to sleep-disordered breathing (CMS/[facility]) 06/10/2016   • s/p MVR, TV-repair, CABG x2 6/13/16 06/18/2016    • Leukocytosis 06/18/2016   • Paroxysmal atrial fibrillation (HCC) 06/20/2016   • Nocturnal hypoxia 01/30/2017   • Dermatitis 05/01/2017   • Medicare annual wellness visit, initial 08/01/2017   • Class 3 severe obesity due to excess calories with serious comorbidity and body mass index (BMI) of 50.0 to 59.9 in adult ([facility]) 02/22/2018   • Supplemental oxygen dependent 07/12/2018   • Acute on chronic combined systolic and diastolic HF (heart failure) ([facility]) 01/02/2019   • Mitral regurgitation 01/02/2019   • Aortic stenosis 01/02/2019   • Medicare annual wellness visit, subsequent 04/29/2019   • Localized edema 06/10/2019   • Chronic right-sided congestive heart failure ([facility]) 06/10/2019   • Cellulitis of left lower extremity 09/27/2019   • Proteinuria 12/15/2019   • Bilateral lower extremity edema 12/15/2019   • Subclinical hypothyroidism 12/16/2019   • Chronic diastolic heart failure ([facility]) 12/17/2019   • Chronic respiratory failure with hypoxia and hypercapnia ([facility]) 12/19/2019   • Acute on chronic respiratory failure with hypoxia and hypercapnia ([facility]) 12/23/2019   • AV block, 2nd degree 03/04/2020   • 1st degree AV block 03/04/2020   • Chest pain 02/01/2021   • COPD (chronic obstructive pulmonary disease) ([facility])    • Complete heart block ([facility]) 02/01/2021   • Presence of permanent cardiac pacemaker 02/24/2021   • Hypothyroidism (acquired) 03/16/2021   • Chronic anticoagulation 03/16/2021   • Leukocytosis 03/16/2021   • Stage 3b chronic kidney disease ([facility]) 03/16/2021   • Gastrointestinal hemorrhage with melena 03/15/2021   • Acute blood loss anemia 03/17/2021   • Metabolic encephalopathy 03/17/2021   • Hypernatremia 03/17/2021   • Hypokalemia 03/17/2021   • TIA (transient ischemic attack) 03/20/2021   • Nonrheumatic aortic valve stenosis 07/07/2021   • Acute on chronic heart failure ([facility]) 07/12/2021   • S/P TAVR (transcatheter aortic valve replacement) 08/03/2021   • CHF (congestive heart failure) ([facility]) 10/06/2021   •  Heart valve disorder 10/28/2021   • Paroxysmal atrial flutter ([facility]) 10/28/2021   • Peripheral neuropathy 10/28/2021   • Pulmonary hypertension ([facility]) 10/28/2021   • Dry skin    • Closed fracture of one rib 10/09/2022   • Impaired mobility and activities of daily living 10/09/2022   • OLI (acute kidney injury) ([facility]) 10/18/2022     Resolved Ambulatory Problems     Diagnosis Date Noted   • SOB (shortness of breath) 02/24/2016   • Chest pain 06/08/2016   • OLI (acute kidney injury) ([facility]) 06/18/2016   • Wound infection 07/29/2016   • Cough with sputum 04/10/2018   • UTI (urinary tract infection), bacterial 02/03/2021     Past Medical History:   Diagnosis Date   • Anemia    • Anxiety    • Aortic valve stenosis    • Arthritis    • Class 3 severe obesity due to excess calories in adult ([facility])    • Diabetes mellitus ([facility])    • Elevated cholesterol    • GERD (gastroesophageal reflux disease)    • Heart murmur    • Hypertension    • Mitral valve insufficiency    • Pneumonia    • Sleep apnea    • Stage 3 chronic kidney disease ([facility])    • Valvular heart disease          PAST SURGICAL HISTORY  Past Surgical History:   Procedure Laterality Date   • AORTIC VALVE REPAIR/REPLACEMENT N/A 7/13/2021    Procedure: TTE TRANSFEMORAL TRANSCATHETER AORTIC VALVE REPLACEMENT PERCUTANEOUS APPROACH;  Surgeon: Sharlene Mejía MD;  Location: [facility] OR 18/19;  Service: Cardiothoracic;  Laterality: N/A;   • AORTIC VALVE REPAIR/REPLACEMENT N/A 7/13/2021    Procedure: Transfemoral Transcatheter Aortic Valve Replacement with intra-op tte and possible open surgical rescue;  Surgeon: Ayan Pacheco MD;  Location: [facility] OR 18/19;  Service: Cardiovascular;  Laterality: N/A;   • CARDIAC CATHETERIZATION     • CARDIAC CATHETERIZATION N/A 6/10/2016    Procedure: Left Heart Cath;  Surgeon: Chace Johnson MD;  Location: [facility] CATH INVASIVE LOCATION;  Service:    • CARDIAC CATHETERIZATION N/A 6/10/2016    Procedure: Right Heart  Cath;  Surgeon: Chace Johnson MD;  Location: [facility] CATH INVASIVE LOCATION;  Service:    • CARDIAC CATHETERIZATION N/A 2/5/2021    Procedure: RIGHT AND LEFT HEART CATH;  Surgeon: Antoine Ayers MD;  Location: [facility] CATH INVASIVE LOCATION;  Service: Cardiology;  Laterality: N/A;   • CARDIAC CATHETERIZATION N/A 2/5/2021    Procedure: Coronary angiography;  Surgeon: Antoine Ayers MD;  Location: [facility] CATH INVASIVE LOCATION;  Service: Cardiology;  Laterality: N/A;   • CARDIAC CATHETERIZATION N/A 2/5/2021    Procedure: Left ventriculography- pressures;  Surgeon: Antoine Ayers MD;  Location: [facility] CATH INVASIVE LOCATION;  Service: Cardiology;  Laterality: N/A;   • CARDIAC ELECTROPHYSIOLOGY PROCEDURE N/A 2/2/2021    Procedure: Pacemaker DC new---[employer] MICRA;  Surgeon: Eliazar Bond MD;  Location: [facility] CATH INVASIVE LOCATION;  Service: Cardiology;  Laterality: N/A;   • CARDIAC SURGERY     • COLONOSCOPY N/A 10/14/2021    Procedure: COLONOSCOPY to cecum and TI  with cold snare polypectomies;  Surgeon: Dixon Azevedo MD;  Location: [facility] ENDOSCOPY;  Service: Gastroenterology;  Laterality: N/A;  pre: melena  post: polyps, diverticulosis   • CORONARY ARTERY BYPASS GRAFT      2 vessel   • CORONARY ARTERY BYPASS GRAFT WITH MITRAL VALVE REPAIR/REPLACEMENT N/A 6/13/2016    Procedure: INTRAOPERATIVE TARIQ, MIDLINE STERNOTOMY, CORONARY ARTERY BYPASS GRAFTING X  2 UTILIZING ENDOSCOPICALLY HARVESTED LEFT GREATER SAPHENOUS VEIN, MITRAL VALVE REPLACEMENT AND TRICUSPID VALVE REPAIR;  Surgeon: Eliecer Mistry MD;  Location: [facility] MAIN OR;  Service:    • CORONARY STENT PLACEMENT  2010    Approx. 6 yrs ago at [facility]   • ENDOSCOPY N/A 3/17/2021    Procedure: ESOPHAGOGASTRODUODENOSCOPY;  Surgeon: Dixon Haas MD;  Location: [facility] ENDOSCOPY;  Service: Gastroenterology;  Laterality: N/A;  PRE: GI BLEED  POST: ANTRAL ULCER   • ENDOSCOPY N/A 10/13/2021    Procedure: ESOPHAGOGASTRODUODENOSCOPY  WITH BIOPSIES;  Surgeon: Riana Milner MD;  Location: [facility] ENDOSCOPY;  Service: Gastroenterology;  Laterality: N/A;  pre-melena, anemia   post- mild gastritis, prepyloric erosion, duodenal lymphangiectasia    • HEMORRHOIDECTOMY     • HYSTERECTOMY     • MITRAL VALVE REPLACEMENT     • REPLACEMENT TOTAL KNEE Right    • THYROID SURGERY      Cyst removed from thyroid   • VASCULAR SURGERY           FAMILY HISTORY  Family History   Problem Relation Age of Onset   • Heart attack Father    • Heart disease Father    • Malig Hyperthermia Neg Hx          SOCIAL HISTORY  Social History     Socioeconomic History   • Marital status:    Tobacco Use   • Smoking status: Never   • Smokeless tobacco: Never   • Tobacco comments:     no caffeine    Vaping Use   • Vaping Use: Never used   Substance and Sexual Activity   • Alcohol use: No   • Drug use: No   • Sexual activity: Defer         ALLERGIES  Coumadin [warfarin sodium], Erythromycin, Hctz [hydrochlorothiazide], Zaroxolyn [metolazone], Penicillins, and Sulfa antibiotics        REVIEW OF SYSTEMS  Review of Systems   Constitutional: Negative for fever.   HENT: Positive for congestion. Negative for sore throat.    Eyes: Negative.    Respiratory: Positive for cough and shortness of breath.    Cardiovascular: Negative for chest pain.   Gastrointestinal: Negative for abdominal pain, diarrhea and vomiting.   Genitourinary: Negative for dysuria.   Musculoskeletal: Negative for neck pain.   Skin: Negative for rash.   Allergic/Immunologic: Negative.    Neurological: Negative for weakness, numbness and headaches.   Hematological: Negative.    Psychiatric/Behavioral: Negative.    All other systems reviewed and are negative.           PHYSICAL EXAM  ED Triage Vitals [11/01/22 1638]   Temp Heart Rate Resp BP SpO2   98.2 °F (36.8 °C) 75 20 135/88 94 %      Temp src Heart Rate Source Patient Position BP Location FiO2 (%)   -- -- -- -- --       Physical Exam  Vitals and nursing note  reviewed.   Constitutional:       General: She is not in acute distress.  HENT:      Head: Normocephalic and atraumatic.   Eyes:      Extraocular Movements: EOM normal.      Pupils: Pupils are equal, round, and reactive to light.   Cardiovascular:      Rate and Rhythm: Normal rate and regular rhythm.      Heart sounds: Normal heart sounds.   Pulmonary:      Effort: Pulmonary effort is normal. No respiratory distress.      Breath sounds: Normal breath sounds.   Abdominal:      Palpations: Abdomen is soft.      Tenderness: There is no abdominal tenderness. There is no guarding or rebound.   Musculoskeletal:         General: No edema. Normal range of motion.      Cervical back: Normal range of motion and neck supple.   Skin:     General: Skin is warm and dry.      Findings: No rash.   Neurological:      Mental Status: She is alert and oriented to person, place, and time.      Sensory: Sensation is intact.      Motor: Motor strength is normal. No weakness.   Psychiatric:         Mood and Affect: Mood and affect normal.       Patient was wearing a face mask when I entered the room and they continued to wear a mask throughout their stay in the ED.  I wore PPE, including gloves, face mask with shield or face mask with goggles whenever I was in the room with patient.       LAB RESULTS  Recent Results (from the past 24 hour(s))   ECG 12 Lead ED Triage Standing Order; SOA    Collection Time: 11/01/22  4:56 PM   Result Value Ref Range    QT Interval 453 ms   Comprehensive Metabolic Panel    Collection Time: 11/01/22  5:46 PM    Specimen: Blood   Result Value Ref Range    Glucose 467 (C) 65 - 99 mg/dL    BUN 71 (H) 8 - 23 mg/dL    Creatinine 2.40 (H) 0.57 - 1.00 mg/dL    Sodium 135 (L) 136 - 145 mmol/L    Potassium 4.4 3.5 - 5.2 mmol/L    Chloride 92 (L) 98 - 107 mmol/L    CO2 28.6 22.0 - 29.0 mmol/L    Calcium 8.7 8.6 - 10.5 mg/dL    Total Protein 6.3 6.0 - 8.5 g/dL    Albumin 3.40 (L) 3.50 - 5.20 g/dL    ALT (SGPT) 11 1 - 33  U/L    AST (SGOT) 18 1 - 32 U/L    Alkaline Phosphatase 171 (H) 39 - 117 U/L    Total Bilirubin 0.5 0.0 - 1.2 mg/dL    Globulin 2.9 gm/dL    A/G Ratio 1.2 g/dL    BUN/Creatinine Ratio 29.6 (H) 7.0 - 25.0    Anion Gap 14.4 5.0 - 15.0 mmol/L    eGFR 20.3 (L) >60.0 mL/min/1.73   BNP    Collection Time: 11/01/22  5:46 PM    Specimen: Blood   Result Value Ref Range    proBNP 10,416.0 (H) 0.0 - 1,800.0 pg/mL   Troponin    Collection Time: 11/01/22  5:46 PM    Specimen: Blood   Result Value Ref Range    Troponin T 0.030 0.000 - 0.030 ng/mL   Green Top (Gel)    Collection Time: 11/01/22  5:46 PM   Result Value Ref Range    Extra Tube Hold for add-ons.    Lavender Top    Collection Time: 11/01/22  5:46 PM   Result Value Ref Range    Extra Tube hold for add-on    Gold Top - SST    Collection Time: 11/01/22  5:46 PM   Result Value Ref Range    Extra Tube Hold for add-ons.    Light Blue Top    Collection Time: 11/01/22  5:46 PM   Result Value Ref Range    Extra Tube Hold for add-ons.    CBC Auto Differential    Collection Time: 11/01/22  5:46 PM    Specimen: Blood   Result Value Ref Range    WBC 8.25 3.40 - 10.80 10*3/mm3    RBC 3.31 (L) 3.77 - 5.28 10*6/mm3    Hemoglobin 8.7 (L) 12.0 - 15.9 g/dL    Hematocrit 28.2 (L) 34.0 - 46.6 %    MCV 85.2 79.0 - 97.0 fL    MCH 26.3 (L) 26.6 - 33.0 pg    MCHC 30.9 (L) 31.5 - 35.7 g/dL    RDW 14.9 12.3 - 15.4 %    RDW-SD 45.9 37.0 - 54.0 fl    MPV 11.7 6.0 - 12.0 fL    Platelets 199 140 - 450 10*3/mm3    Neutrophil % 87.8 (H) 42.7 - 76.0 %    Lymphocyte % 3.9 (L) 19.6 - 45.3 %    Monocyte % 5.0 5.0 - 12.0 %    Eosinophil % 0.4 0.3 - 6.2 %    Basophil % 0.7 0.0 - 1.5 %    Immature Grans % 2.2 (H) 0.0 - 0.5 %    Neutrophils, Absolute 7.25 (H) 1.70 - 7.00 10*3/mm3    Lymphocytes, Absolute 0.32 (L) 0.70 - 3.10 10*3/mm3    Monocytes, Absolute 0.41 0.10 - 0.90 10*3/mm3    Eosinophils, Absolute 0.03 0.00 - 0.40 10*3/mm3    Basophils, Absolute 0.06 0.00 - 0.20 10*3/mm3    Immature Grans, Absolute  0.18 (H) 0.00 - 0.05 10*3/mm3    nRBC 0.0 0.0 - 0.2 /100 WBC   Respiratory Panel PCR w/COVID-19(SARS-CoV-2) BREA/KAROLYN/ELMO/PAD/COR/MAD/JARET In-House, NP Swab in UTM/VTM, 3-4 HR TAT - Swab, Nasopharynx    Collection Time: 11/01/22  5:47 PM    Specimen: Nasopharynx; Swab   Result Value Ref Range    ADENOVIRUS, PCR Not Detected Not Detected    Coronavirus 229E Not Detected Not Detected    Coronavirus HKU1 Not Detected Not Detected    Coronavirus NL63 Not Detected Not Detected    Coronavirus OC43 Not Detected Not Detected    COVID19 Not Detected Not Detected - Ref. Range    Human Metapneumovirus Not Detected Not Detected    Human Rhinovirus/Enterovirus Not Detected Not Detected    Influenza A PCR Not Detected Not Detected    Influenza B PCR Not Detected Not Detected    Parainfluenza Virus 1 Not Detected Not Detected    Parainfluenza Virus 2 Not Detected Not Detected    Parainfluenza Virus 3 Not Detected Not Detected    Parainfluenza Virus 4 Not Detected Not Detected    RSV, PCR Not Detected Not Detected    Bordetella pertussis pcr Not Detected Not Detected    Bordetella parapertussis PCR Not Detected Not Detected    Chlamydophila pneumoniae PCR Not Detected Not Detected    Mycoplasma pneumo by PCR Not Detected Not Detected       Ordered the above labs and reviewed the results.        RADIOLOGY  XR Chest 2 View   Final Result   No focal pulmonary consolidation. Cardiomegaly. Mild   prominence of vascular and interstitial markings.       This report was finalized on 11/1/2022 6:27 PM by Dr. Mayur Bishop M.D.               Ordered the above noted radiological studies. Reviewed by me in PACS.         PROCEDURES  Procedures      EKG:          EKG time: 1656  Rhythm/Rate: Paced rhythm rate 75  Interpreted Contemporaneously by me, independently viewed  No prior        MEDICATIONS GIVEN IN ER  Medications   sodium chloride 0.9 % flush 10 mL (has no administration in time range)   furosemide (LASIX) injection 80 mg (has no  administration in time range)   insulin regular (humuLIN R,novoLIN R) injection 5 Units (has no administration in time range)   nitroglycerin (NITROSTAT) SL tablet 0.4 mg (has no administration in time range)   acetaminophen (TYLENOL) tablet 650 mg (has no administration in time range)   ondansetron (ZOFRAN) tablet 4 mg (has no administration in time range)     Or   ondansetron (ZOFRAN) injection 4 mg (has no administration in time range)   melatonin tablet 3 mg (has no administration in time range)             PROGRESS AND CONSULTS  ED Course as of 11/01/22 2015 Tue Nov 01, 2022 1931 19:31 EDT  Patient presents for increasing shortness of breath.  Patient does appear to be in congestive heart failure again.  Patient's glucose was elevated as well.  Patient's BUN and creatinine are gradually getting worse again.  Will be given some Lasix as well as some insulin.  Patient has been discussed with Dr. Antunez who will admit. [SL]      ED Course User Index  [SL] Shawn Montez MD           MEDICAL DECISION MAKING      MDM  Number of Diagnoses or Management Options     Amount and/or Complexity of Data Reviewed  Clinical lab tests: reviewed and ordered (Glucose 467)  Tests in the radiology section of CPT®: reviewed and ordered (Chest x-ray negative)  Decide to obtain previous medical records or to obtain history from someone other than the patient: yes  Discuss the patient with other providers: yes (Discussed with Dr. Antunez who will admit)               DIAGNOSIS  Final diagnoses:   Acute congestive heart failure, unspecified heart failure type (HCC)   OLI (acute kidney injury) (HCC)   Hyperglycemia           DISPOSITION  admit        Latest Documented Vital Signs:  As of 20:15 EDT  BP- 120/48 HR- 75 Temp- 98.2 °F (36.8 °C) O2 sat- 98%                       Shawn Montez MD  11/01/22 2017

## 2022-11-02 NOTE — PROGRESS NOTES
Name: Miguelina Lopez ADMIT: 2022   : 1945  PCP: Arcelia Tlabot APRN    MRN: [id number] LOS: 1 days   AGE/SEX: 76 y.o. female  ROOM: [facility]     Subjective   Subjective   Patient appears morbidly obese, generally weak, relatively comfortable, no apparent distress.  Voices no new concerns.  States breathing about the same.  Tolerating diet.    Review of Systems   Constitutional: Negative for chills and fever.   Respiratory: Positive for shortness of breath. Negative for cough.    Cardiovascular: Positive for leg swelling. Negative for chest pain.   Gastrointestinal: Negative for abdominal pain, constipation, diarrhea, nausea and vomiting.   Genitourinary: Negative for difficulty urinating and dysuria.   Musculoskeletal: Positive for gait problem (Due to generalized weakness). Negative for myalgias.        Objective   Objective   Vital Signs  Temp:  [97.6 °F (36.4 °C)-98.2 °F (36.8 °C)] 98.1 °F (36.7 °C)  Heart Rate:  [72-95] 78  Resp:  [16-20] 20  BP: (103-146)/(36-88) 108/50  SpO2:  [93 %-100 %] 93 %  on  Flow (L/min):  [2-3] 3;   Device (Oxygen Therapy): nasal cannula  Body mass index is 54.06 kg/m².     Physical Exam  Constitutional:       General: She is not in acute distress.     Appearance: She is obese. She is not toxic-appearing.      Comments: Generally weak   Cardiovascular:      Rate and Rhythm: Normal rate.      Heart sounds: Murmur heard.      Comments: Paced  Pulmonary:      Effort: Pulmonary effort is normal.      Comments: Diminished on expiration throughout lung fields  Abdominal:      General: Bowel sounds are normal. There is no distension.      Palpations: Abdomen is soft.      Tenderness: There is no abdominal tenderness. There is no guarding.   Musculoskeletal:      Right lower leg: Edema present.      Left lower leg: Edema present.   Skin:     General: Skin is warm and dry.      Coloration: Skin is pale.   Neurological:      Mental Status: She is alert.       Results  Review     I reviewed the patient's new clinical results.  Results from last 7 days   Lab Units 11/02/22  0429 11/01/22  1746   WBC 10*3/mm3 7.77 8.25   HEMOGLOBIN g/dL 8.6* 8.7*   PLATELETS 10*3/mm3 182 199     Results from last 7 days   Lab Units 11/02/22  0429 11/01/22  1746   SODIUM mmol/L 138 135*   POTASSIUM mmol/L 3.8 4.4   CHLORIDE mmol/L 96* 92*   CO2 mmol/L 29.8* 28.6   BUN mg/dL 71* 71*   CREATININE mg/dL 2.25* 2.40*   GLUCOSE mg/dL 282* 467*   EGFR mL/min/1.73 22.0* 20.3*     Results from last 7 days   Lab Units 11/01/22  1746   ALBUMIN g/dL 3.40*   BILIRUBIN mg/dL 0.5   ALK PHOS U/L 171*   AST (SGOT) U/L 18   ALT (SGPT) U/L 11     Results from last 7 days   Lab Units 11/02/22  0429 11/01/22  1746   CALCIUM mg/dL 9.0 8.7   ALBUMIN g/dL  --  3.40*       Hemoglobin A1C   Date/Time Value Ref Range Status   11/02/2022 0429 10.30 (H) 4.80 - 5.60 % Final     Glucose   Date/Time Value Ref Range Status   11/02/2022 1143 284 (H) 70 - 130 mg/dL Final     Comment:     Meter: [id number] : [id number] Dao Crystal SALLY   11/02/2022 0631 291 (H) 70 - 130 mg/dL Final     Comment:     Meter: [id number] : [id number] Pradeep ZAVALA   11/01/2022 2310 288 (H) 70 - 130 mg/dL Final     Comment:     Meter: [id number] : [id number] Pradeep ZAVALA       XR Chest 2 View    Result Date: 11/1/2022  No focal pulmonary consolidation. Cardiomegaly. Mild prominence of vascular and interstitial markings.  This report was finalized on 11/1/2022 6:27 PM by Dr. Mayur Bishop M.D.      Scheduled Medications  allopurinol, 100 mg, Oral, Daily  atorvastatin, 40 mg, Oral, Daily  budesonide-formoterol, 2 puff, Inhalation, BID - RT  bumetanide, 4 mg, Intravenous, Q8H  carvedilol, 12.5 mg, Oral, BID With Meals  dextromethorphan polistirex ER, 60 mg, Oral, Q12H  gabapentin, 100 mg, Oral, BID  insulin lispro, 0-9 Units, Subcutaneous, TID With Meals  lactobacillus acidophilus, 1 capsule, Oral, Daily  levothyroxine, 100 mcg,  Oral, Q AM  lidocaine, 1 patch, Transdermal, Q24H  pantoprazole, 40 mg, Oral, QAM  polyethylene glycol, 17 g, Oral, Daily  potassium chloride, 20 mEq, Oral, Daily  sennosides-docusate, 1 tablet, Oral, Every Other Day  vilazodone, 20 mg, Oral, Nightly    Infusions   Diet  Diet Regular; Cardiac, Low Sodium; 2,000 mg Na       Assessment/Plan     Active Hospital Problems    Diagnosis  POA   • **Acute on chronic combined systolic and diastolic HF (heart failure) ([facility]) [I50.43]  Yes   • S/P TAVR (transcatheter aortic valve replacement) [Z95.2]  Not Applicable   • Stage 3b chronic kidney disease ([facility]) [N18.32]  Yes   • Hypothyroidism (acquired) [E03.9]  Yes   • COPD (chronic obstructive pulmonary disease) ([facility]) [J44.9]  Yes   • Chronic respiratory failure with hypoxia and hypercapnia ([facility]) [J96.11, J96.12]  Yes   • Paroxysmal atrial fibrillation ([facility]) [I48.0]  Yes   • s/p MVR, TV-repair, CABG x2 6/13/16 [Z95.2]  Not Applicable   • Type 2 diabetes mellitus with kidney complication, without long-term current use of insulin ([facility]) [E11.29]  Yes   • OCHOA (obstructive sleep apnea) [G47.33]  Yes   • Anemia, chronic disease [D63.8]  Yes   • Essential hypertension [I10]  Yes      Resolved Hospital Problems   No resolved problems to display.       76 y.o. female admitted with Acute on chronic combined systolic and diastolic HF (heart failure) ([facility]).    Appreciate input from all consultants.    Cardiology following HFpEF.  Recommend nephrology manage diuretics.  Continue cardiac medications per home dose regimen.  Heart failure clinic at discharge.  Beta-blocker continued on admission.    Currently tolerating 3 L oxygen (baseline requirements) with O2 saturation 93%.    Nephrology managing OLI over CKD and diuretics for acute on chronic combined systolic and diastolic congestive heart failure.  2 g sodium restricted diet.    BP soft acutely.  See plan above.    DM type II: Glucose greater than optimal albeit improved from prior.   Increase moderate dose to moderate-high correctional sliding scale for now.  NCS diet--tolerated.    Anemia: Serum hemoglobin stable with trend.  No overt signs of active bleeding.  Most likely due to above.  Contributing to shortness of breath.  PPI.  Ferrous sulfate daily.    Debility: Complicated by all.  PT/OT following recommend SNU discharge.  Continue bowel regimen.    Frequent hospital admission trend prompting palliative consult for goals of care discussion.      · SCDs for DVT prophylaxis.  · CPR  · Discussed with patient & CCP.  · Anticipate discharge pending clinical course / most likely SNU / CCP following      ZOILA Barton  [facility]  11/02/22  16:32 EDT

## 2022-11-02 NOTE — CONSULTS
Patient Name: Miguelina Lopez  :1945  76 y.o.    Date of Admission: 2022  Date of Consultation:  22  Encounter Provider: ZOILA Arias  Place of Service: [facility] CARDIOLOGY  Referring Provider: Pat Antunez MD  Patient Care Team:  Arcelia Talbot APRN as PCP - General (Nurse Practitioner)  Robbie Wilson MD as Consulting Physician (Hematology and Oncology)  Chace Johnson MD as Consulting Physician (Cardiology)  Duarte Cuevas MD as Consulting Physician (Pulmonary Disease)  Shania Severino, JAY as Ambulatory  ([facility])      Chief complaint: shortness of breath, cough    History of Present Illness: Ms. Lopez is a 76 year old woman with paroxysmal atrial fibrillation, complete heart block status post micra permanent pacemaker, and coronary artery disease with prior CABG/MVR. She has hypertension, COPD on home oxygen, and chronic kidney disease.     She had a cardiac catheterization on 21 demonstrating ischemic heart disease with patent stents and patent graft stents (SVG to first diagonal, SVG to PDA, patent mid LAD stent, patent left circumflex stent).  She had severe aortic stenosis and on 21, she underwent TAVR with successful deployment of a 23mm sapient ultra transcatheter aortic valve.     She was recently admitted in October after a mechanical fall resulting in rib fracture.  echocardiogram obtained at that time demonstrated normal LV and EF 50%, moderate MR/TR and elevated RVSP of 87 mmHg. She hd a TAVR valve presented with a maximum pressure gradient of 32 mmHg, mean pressure gradient of 16 mmHg, and dimensionless index of 0.4. she was discharged to rehab on 10/18. She spent one week there and was discharged home. Before leaving rehab, she was not feeling well. She had cough and congestion and felt short of breath. She continued to feel worse and ultimately came to the ER on .     CXR  noted cardiomegaly with vascular congestion.     Serial troponins 0.030/0.032 with creatinine 2.40. proBNP 10,416. Hgb 8.7.     SHe was diuresed with IV Lasix. Nephrology saw her.     Previous Cardiac Testing:    Echocardiogram 10/9/22  •  Estimated left ventricular EF = 50% Left ventricular systolic function is normal.  •  Left ventricular wall thickness is consistent with moderate concentric hypertrophy.  •  Left ventricular diastolic function was indeterminate.  •  Moderately reduced right ventricular systolic function noted.  •  The right ventricular cavity is mildly dilated.  •  Aortic valve area is 1 cm2.  •  Peak velocity of the flow distal to the aortic valve is 284.5 cm/s. Aortic valve maximum pressure gradient is 32 mmHg. Aortic valve mean pressure gradient is 16 mmHg. Aortic valve dimensionless index is 0.4 .  •  There is a TAVR valve present.  •  Moderate mitral valve regurgitation is present.  •  There is a bioprosthetic mitral valve present. The prosthetic mitral valve peak and mean gradients are elevated.  •  Moderate tricuspid valve regurgitation is present.  •  Estimated right ventricular systolic pressure from tricuspid regurgitation is markedly elevated (>55 mmHg). Calculated right ventricular systolic pressure from tricuspid regurgitation is 87 mmHg.  •  Moderate pulmonic valve regurgitation is present.      Cardiac Catheterization 7/13/21  CONCLUSIONS:  Successful deployment of a 23 mm [employer] Ultra transcatheter aortic heart valve.    Stress Test 7/15/19  • Myocardial perfusion imaging indicates a normal myocardial perfusion study with no evidence of ischemia.  • Left ventricular ejection fraction is normal (Calculated EF = 55%).  • GI artifact is present.      Past Medical History:   Diagnosis Date   • Acute on chronic respiratory failure with hypoxia and hypercapnia ([facility])    • OLI (acute kidney injury) ([facility])    • Anemia    • Anxiety    • Aortic valve stenosis    • Arthritis     KNEES   •  Bilateral lower extremity edema    • CHF (congestive heart failure) ([facility])    • Chronic coronary artery disease    • Class 3 severe obesity due to excess calories in adult ([facility])    • COPD (chronic obstructive pulmonary disease) ([facility])    • Depression    • Diabetes mellitus ([facility])    • Dry skin    • Elevated cholesterol    • GERD (gastroesophageal reflux disease)    • Heart murmur    • Hypertension    • Mitral valve insufficiency    • Pneumonia     1/2016   • Pulmonary hypertension ([facility])     due to sleep disordered breathing   • Sleep apnea     Uses CPAP or oxygen   • Stage 3 chronic kidney disease ([facility])    • Subclinical hypothyroidism    • Supplemental oxygen dependent     3 LITERS   • TIA (transient ischemic attack) 3/15/2021   • Valvular heart disease        Past Surgical History:   Procedure Laterality Date   • AORTIC VALVE REPAIR/REPLACEMENT N/A 7/13/2021    Procedure: TTE TRANSFEMORAL TRANSCATHETER AORTIC VALVE REPLACEMENT PERCUTANEOUS APPROACH;  Surgeon: Sharlene Mejía MD;  Location: [facility] OR 18/19;  Service: Cardiothoracic;  Laterality: N/A;   • AORTIC VALVE REPAIR/REPLACEMENT N/A 7/13/2021    Procedure: Transfemoral Transcatheter Aortic Valve Replacement with intra-op tte and possible open surgical rescue;  Surgeon: Ayan Pacheco MD;  Location: [facility] OR 18/19;  Service: Cardiovascular;  Laterality: N/A;   • CARDIAC CATHETERIZATION     • CARDIAC CATHETERIZATION N/A 6/10/2016    Procedure: Left Heart Cath;  Surgeon: Chace Johnson MD;  Location: [facility] INVASIVE LOCATION;  Service:    • CARDIAC CATHETERIZATION N/A 6/10/2016    Procedure: Right Heart Cath;  Surgeon: Chace Johnson MD;  Location: [facility] INVASIVE LOCATION;  Service:    • CARDIAC CATHETERIZATION N/A 2/5/2021    Procedure: RIGHT AND LEFT HEART CATH;  Surgeon: Antoine Ayers MD;  Location: [facility] CATH INVASIVE LOCATION;  Service: Cardiology;  Laterality: N/A;   • CARDIAC CATHETERIZATION N/A  2/5/2021    Procedure: Coronary angiography;  Surgeon: Antoine Ayers MD;  Location: [facility] CATH INVASIVE LOCATION;  Service: Cardiology;  Laterality: N/A;   • CARDIAC CATHETERIZATION N/A 2/5/2021    Procedure: Left ventriculography- pressures;  Surgeon: Antoine Ayers MD;  Location: [facility] CATH INVASIVE LOCATION;  Service: Cardiology;  Laterality: N/A;   • CARDIAC ELECTROPHYSIOLOGY PROCEDURE N/A 2/2/2021    Procedure: Pacemaker DC new---[employer] MICRA;  Surgeon: Eliazar Bond MD;  Location: [facility] CATH INVASIVE LOCATION;  Service: Cardiology;  Laterality: N/A;   • CARDIAC SURGERY     • COLONOSCOPY N/A 10/14/2021    Procedure: COLONOSCOPY to cecum and TI  with cold snare polypectomies;  Surgeon: Dixon Azevedo MD;  Location: [facility] ENDOSCOPY;  Service: Gastroenterology;  Laterality: N/A;  pre: melena  post: polyps, diverticulosis   • CORONARY ARTERY BYPASS GRAFT      2 vessel   • CORONARY ARTERY BYPASS GRAFT WITH MITRAL VALVE REPAIR/REPLACEMENT N/A 6/13/2016    Procedure: INTRAOPERATIVE TARIQ, MIDLINE STERNOTOMY, CORONARY ARTERY BYPASS GRAFTING X  2 UTILIZING ENDOSCOPICALLY HARVESTED LEFT GREATER SAPHENOUS VEIN, MITRAL VALVE REPLACEMENT AND TRICUSPID VALVE REPAIR;  Surgeon: Eliecer Mistry MD;  Location: [facility] OR;  Service:    • CORONARY STENT PLACEMENT  2010    Approx. 6 yrs ago at [facility]   • ENDOSCOPY N/A 3/17/2021    Procedure: ESOPHAGOGASTRODUODENOSCOPY;  Surgeon: Dixon Haas MD;  Location: [facility] ENDOSCOPY;  Service: Gastroenterology;  Laterality: N/A;  PRE: GI BLEED  POST: ANTRAL ULCER   • ENDOSCOPY N/A 10/13/2021    Procedure: ESOPHAGOGASTRODUODENOSCOPY WITH BIOPSIES;  Surgeon: Riana Milner MD;  Location: [facility] ENDOSCOPY;  Service: Gastroenterology;  Laterality: N/A;  pre-melena, anemia   post- mild gastritis, prepyloric erosion, duodenal lymphangiectasia    • HEMORRHOIDECTOMY     • HYSTERECTOMY     • MITRAL VALVE REPLACEMENT     • REPLACEMENT TOTAL KNEE Right    •  THYROID SURGERY      Cyst removed from thyroid   • VASCULAR SURGERY           Prior to Admission medications    Medication Sig Start Date End Date Taking? Authorizing Provider   acetaminophen (TYLENOL) 325 MG tablet Take 2 tablets by mouth Every 4 (Four) Hours As Needed for Mild Pain . 2/11/21   Colleen Hensley APRN   allopurinol (ZYLOPRIM) 100 MG tablet Take 1 tablet by mouth Daily. 10/19/22   Kamlesh Marrero MD   atorvastatin (LIPITOR) 40 MG tablet TAKE ONE TABLET BY MOUTH DAILY 11/1/22   Arcelia Talbot APRN   carvedilol (COREG) 12.5 MG tablet Take 1 tablet by mouth 2 (Two) Times a Day With Meals. 10/19/21   Viji Paulson APRN   fluticasone (FLONASE) 50 MCG/ACT nasal spray 2 sprays by Each Nare route Daily.  Patient taking differently: 2 sprays by Each Nare route Daily As Needed. 2/11/21   Colleen Hensley APRN   Fluticasone-Salmeterol (ADVAIR) 250-50 MCG/ACT DISKUS Inhale 1 puff 2 (Two) Times a Day. 5/5/22   Arcelia Talbot APRN   gabapentin (NEURONTIN) 100 MG capsule Take 1 capsule by mouth Every 12 (Twelve) Hours for 3 days. 10/18/22 10/21/22  Kamlesh Marrero MD   levothyroxine (SYNTHROID, LEVOTHROID) 25 MCG tablet TAKE 2 TABLETS BY MOUTH ON MONDAY, WEDNESDAY AND FRIDAY AND TAKE ONE TABLET ON TUESDAY, THURSDAY, SATURDAY AND SUNDAY  Patient taking differently: Take  by mouth Daily. TAKE 2 TABLETS BY MOUTH ON MONDAY, WEDNESDAY AND FRIDAY AND TAKE ONE TABLET ON TUESDAY, THURSDAY, SATURDAY AND SUNDAY 5/5/22   Arcelia Talbot APRN   lidocaine (LIDODERM) 5 % Place 1 patch on the skin as directed by provider Daily. Remove & Discard patch within 12 hours or as directed by MD 10/19/22   Kamlesh Marrero MD   nitroglycerin (NITROSTAT) 0.4 MG SL tablet DISSOLVE 1 TAB UNDER TONGUE FOR CHEST PAIN - IF PAIN REMAINS AFTER 5 MIN, CALL 911 AND REPEAT DOSE. MAX 3 TABS IN 15 MINUTES  Patient taking differently: Place 1 tablet under the tongue Every 5 (Five) Minutes As Needed. 9/18/20   Antoine Ayers MD    omeprazole (priLOSEC) 40 MG capsule Take 1 capsule by mouth Daily. 8/3/22   Arcelia Talbot APRN   polyethylene glycol (MIRALAX) 17 g packet Take 17 g by mouth Daily. 10/19/22   Kamlesh Marrero MD   potassium chloride (K-DUR,KLOR-CON) 20 MEQ CR tablet Take 1 tablet by mouth Every Other Day. 4/18/22   Arcelia Talbot APRN   Probiotic Product (Risaquad-2) capsule capsule Take 1 capsule by mouth Daily.    Provider, MD Pito   senna-docusate (PERICOLACE) 8.6-50 MG per tablet Take 1 tablet by mouth Every Other Day.    ProviderPito MD   sodium chloride 0.65 % nasal spray 2 sprays into the nostril(s) as directed by provider As Needed for Congestion. 2/11/21   Colleen Hensley APRN   spironolactone (ALDACTONE) 25 MG tablet Take 1 tablet by mouth. 10/27/22 1/25/23  Pito Argueta MD   torsemide (DEMADEX) 100 MG tablet Take 1 tablet by mouth Daily. 10/18/22   Kamlesh Marrero MD   Trulicity 1.5 MG/0.5ML solution pen-injector INJECT 1.5 MG UNDER THE SKIN ONCE WEEKLY 9/6/22   Dwayne Thomas MD   vilazodone (Viibryd) 20 MG tablet tablet Take 1 tablet by mouth Every Night. 6/27/22   Arcelia Talbot APRN       Allergies   Allergen Reactions   • Coumadin [Warfarin Sodium] Diarrhea and Nausea Only   • Erythromycin    • Hctz [Hydrochlorothiazide] Swelling   • Zaroxolyn [Metolazone] Diarrhea   • Penicillins Rash     Tolerated cephalosporins in the past    • Sulfa Antibiotics Rash       Social History     Socioeconomic History   • Marital status:    Tobacco Use   • Smoking status: Never   • Smokeless tobacco: Never   • Tobacco comments:     no caffeine    Vaping Use   • Vaping Use: Never used   Substance and Sexual Activity   • Alcohol use: No   • Drug use: No   • Sexual activity: Defer       Family History   Problem Relation Age of Onset   • Heart attack Father    • Heart disease Father    • Malig Hyperthermia Neg Hx        REVIEW OF SYSTEMS:   All systems reviewed.  Pertinent positives  identified in HPI.  All other systems are negative.    I have reviewed and confirmed the accuracy of the ROS as documented by the MA/LPN/RN ZOILA Arias    Constitutional: She is oriented to person, place, and time. She appears well-developed. She does not appear ill.   HENT:   Head: Normocephalic and atraumatic. Head is without contusion.   Right Ear: Hearing normal. No drainage.   Left Ear: Hearing normal. No drainage.   Nose: No nasal deformity. No epistaxis.   Eyes: Lids are normal. Right eye exhibits no exudate. Left eye exhibits no exudate.  Neck: No JVD present. Carotid bruit is not present. No tracheal deviation present. No thyroid mass and no thyromegaly present.   Cardiovascular: Normal rate, regular rhythm and 2/6 systolic murmur.    Pulses:       Posterior tibial pulses are 2+ on the right side, and 2+ on the left side.   Pulmonary/Chest: Effort normal and breath sounds normal.   Abdominal: Soft. Normal appearance and bowel sounds are normal. There is no tenderness.   Musculoskeletal: Normal range of motion.        Right shoulder: She exhibits no deformity.        Left shoulder: She exhibits no deformity.   Neurological: She is alert and oriented to person, place, and time. She has normal strength.   Skin: Skin is warm, dry and intact. No rash noted.   Psychiatric: She has a normal mood and affect. Her behavior is normal. Thought content normal.   Vitals reviewed      Objective:     Vitals:    11/01/22 2319 11/02/22 0600 11/02/22 0750 11/02/22 1333   BP: 126/57  119/46 108/50   BP Location: Right arm  Right arm    Patient Position: Lying  Lying    Pulse: 74  75    Resp: 18  18    Temp: 97.6 °F (36.4 °C)      TempSrc: Oral      SpO2: 94%  94%    Weight:  121 kg (267 lb 10.2 oz)     Height:         Body mass index is 54.06 kg/m².    Lab Review:     Results from last 7 days   Lab Units 11/02/22  0429 11/01/22  1746   SODIUM mmol/L 138 135*   POTASSIUM mmol/L 3.8 4.4   CHLORIDE mmol/L 96* 92*   CO2  "mmol/L 29.8* 28.6   BUN mg/dL 71* 71*   CREATININE mg/dL 2.25* 2.40*   CALCIUM mg/dL 9.0 8.7   BILIRUBIN mg/dL  --  0.5   ALK PHOS U/L  --  171*   ALT (SGPT) U/L  --  11   AST (SGOT) U/L  --  18   GLUCOSE mg/dL 282* 467*     Results from last 7 days   Lab Units 11/01/22  2113 11/01/22  1746   TROPONIN T ng/mL 0.032* 0.030     Results from last 7 days   Lab Units 11/02/22  0429   WBC 10*3/mm3 7.77   HEMOGLOBIN g/dL 8.6*   HEMATOCRIT % 27.1*   PLATELETS 10*3/mm3 182                         EKG 11/1/22      Previous EKG 10/12/22            Assessment and Plan:   1. HFpEF - recent echocardiogram with preserved LV systolic function. EF 50%.   2. Severe aortic stenosis status post TAVR 7/14/2021  3. Coronary artery disease status post CABG , coronary angiography July 2021 showed patent stents and prior grafts. SVG to first diagonal, SVG to PDA, patent mid LAD stent, patent left circumflex stent.  4. Chronic kidney disease - nephrology has been consulted to manage diuretics.   5. Mitral valve regurgitation status post replacement  6. COPD on home oxygen  7. Complete heart block status post Micra pacemaker  8. Moderate to severe pulmonary hypertension, likely multifactorial  9. Chronic lymphedema - legs are usually wrapped.   10. Elevated troponin, do not suspect ACS. Likely due to renal insufficiency, demand.  11. Paroxysmal atrial fibrillation - she was on apixaban in the past. This was stopped during an admission in October 2021 where she was noted to be anemic. \"EGD showed some mild gastritis and some nonbleeding gastric erosions.  Colonoscopy showed some small grade 1 hemorrhoids and diverticulosis.  No overt cause of bleeding was identified but her hemoglobin remains at 8.  Her anticoagulant will be held\" defer anticoagulation to her primary cardiologist.     Nephrology is management diuretics.   She does not complain of any anginal symptoms.   She feels her weight is up ~10 lbs.     Gracie Tse, " APRN  11/02/22  13:37 EDT

## 2022-11-02 NOTE — H&P
HISTORY AND PHYSICAL   [facility]        Date of Admission: 2022  Patient Identification:  Name: Miguelina Lopez  Age: 76 y.o.  Sex: female  :  1945  MRN: [id number]                     Primary Care Physician: Arcelia Talbot APRN    Chief Complaint:  76 year old female who presented to the emergency room with a cough, congestion and shortness of breath which started two days ago; she was hospitalized last month after a fall and had rib fractures; she was sent to rehab and just left there two days ago; she has had subjective fever and chills; denies chest pain    History of Present Illness:   As above    Past Medical History:  Past Medical History:   Diagnosis Date   • Acute on chronic respiratory failure with hypoxia and hypercapnia ([facility])    • OLI (acute kidney injury) ([facility])    • Anemia    • Anxiety    • Aortic valve stenosis    • Arthritis     KNEES   • Bilateral lower extremity edema    • CHF (congestive heart failure) ([facility])    • Chronic coronary artery disease    • Class 3 severe obesity due to excess calories in adult ([facility])    • COPD (chronic obstructive pulmonary disease) ([facility])    • Depression    • Diabetes mellitus ([facility])    • Dry skin    • Elevated cholesterol    • GERD (gastroesophageal reflux disease)    • Heart murmur    • Hypertension    • Mitral valve insufficiency    • Pneumonia     2016   • Pulmonary hypertension ([facility])     due to sleep disordered breathing   • Sleep apnea     Uses CPAP or oxygen   • Stage 3 chronic kidney disease ([facility])    • Subclinical hypothyroidism    • Supplemental oxygen dependent     3 LITERS   • TIA (transient ischemic attack) 3/15/2021   • Valvular heart disease      Past Surgical History:  Past Surgical History:   Procedure Laterality Date   • AORTIC VALVE REPAIR/REPLACEMENT N/A 2021    Procedure: TTE TRANSFEMORAL TRANSCATHETER AORTIC VALVE REPLACEMENT PERCUTANEOUS APPROACH;  Surgeon: Sharlene Mejía MD;  Location: [facility]  18/19;  Service: Cardiothoracic;  Laterality: N/A;   • AORTIC VALVE REPAIR/REPLACEMENT N/A 7/13/2021    Procedure: Transfemoral Transcatheter Aortic Valve Replacement with intra-op tte and possible open surgical rescue;  Surgeon: Ayan Pacheco MD;  Location: [facility] HYBRID OR 18/19;  Service: Cardiovascular;  Laterality: N/A;   • CARDIAC CATHETERIZATION     • CARDIAC CATHETERIZATION N/A 6/10/2016    Procedure: Left Heart Cath;  Surgeon: Chace Johnson MD;  Location: [facility] CATH INVASIVE LOCATION;  Service:    • CARDIAC CATHETERIZATION N/A 6/10/2016    Procedure: Right Heart Cath;  Surgeon: Chace Johnson MD;  Location: [facility] CATH INVASIVE LOCATION;  Service:    • CARDIAC CATHETERIZATION N/A 2/5/2021    Procedure: RIGHT AND LEFT HEART CATH;  Surgeon: Antoine Ayers MD;  Location: [facility] CATH INVASIVE LOCATION;  Service: Cardiology;  Laterality: N/A;   • CARDIAC CATHETERIZATION N/A 2/5/2021    Procedure: Coronary angiography;  Surgeon: Antoine Ayers MD;  Location: [facility] CATH INVASIVE LOCATION;  Service: Cardiology;  Laterality: N/A;   • CARDIAC CATHETERIZATION N/A 2/5/2021    Procedure: Left ventriculography- pressures;  Surgeon: Antoine Ayers MD;  Location: [facility] CATH INVASIVE LOCATION;  Service: Cardiology;  Laterality: N/A;   • CARDIAC ELECTROPHYSIOLOGY PROCEDURE N/A 2/2/2021    Procedure: Pacemaker DC new---[employer]tr[employer] MICRA;  Surgeon: Eliazar Bond MD;  Location: [facility] CATH INVASIVE LOCATION;  Service: Cardiology;  Laterality: N/A;   • CARDIAC SURGERY     • COLONOSCOPY N/A 10/14/2021    Procedure: COLONOSCOPY to cecum and TI  with cold snare polypectomies;  Surgeon: Dixon Azevedo MD;  Location: [facility] ENDOSCOPY;  Service: Gastroenterology;  Laterality: N/A;  pre: melena  post: polyps, diverticulosis   • CORONARY ARTERY BYPASS GRAFT      2 vessel   • CORONARY ARTERY BYPASS GRAFT WITH MITRAL VALVE REPAIR/REPLACEMENT N/A 6/13/2016    Procedure: INTRAOPERATIVE TARIQ,  MIDLINE STERNOTOMY, CORONARY ARTERY BYPASS GRAFTING X  2 UTILIZING ENDOSCOPICALLY HARVESTED LEFT GREATER SAPHENOUS VEIN, MITRAL VALVE REPLACEMENT AND TRICUSPID VALVE REPAIR;  Surgeon: Eliecer Mistry MD;  Location: [facility] MAIN OR;  Service:    • CORONARY STENT PLACEMENT  2010    Approx. 6 yrs ago at [facility]   • ENDOSCOPY N/A 3/17/2021    Procedure: ESOPHAGOGASTRODUODENOSCOPY;  Surgeon: Dixon Haas MD;  Location: [facility] ENDOSCOPY;  Service: Gastroenterology;  Laterality: N/A;  PRE: GI BLEED  POST: ANTRAL ULCER   • ENDOSCOPY N/A 10/13/2021    Procedure: ESOPHAGOGASTRODUODENOSCOPY WITH BIOPSIES;  Surgeon: Riana Milner MD;  Location: [facility] ENDOSCOPY;  Service: Gastroenterology;  Laterality: N/A;  pre-melena, anemia   post- mild gastritis, prepyloric erosion, duodenal lymphangiectasia    • HEMORRHOIDECTOMY     • HYSTERECTOMY     • MITRAL VALVE REPLACEMENT     • REPLACEMENT TOTAL KNEE Right    • THYROID SURGERY      Cyst removed from thyroid   • VASCULAR SURGERY        Home Meds:  reviewed      Allergies:  Allergies   Allergen Reactions   • Coumadin [Warfarin Sodium] Diarrhea and Nausea Only   • Erythromycin    • Hctz [Hydrochlorothiazide] Swelling   • Zaroxolyn [Metolazone] Diarrhea   • Penicillins Rash     Tolerated cephalosporins in the past    • Sulfa Antibiotics Rash     Immunizations:  Immunization History   Administered Date(s) Administered   • COVID-19 (PFIZER) PURPLE CAP 02/24/2021, 03/23/2021, 11/13/2021   • Covid-19 (Pfizer) Gray Cap 04/20/2022   • FLUAD TRI 65YR+ 12/11/2019   • FluLaval/Fluzone >6mos 10/07/2021   • Fluad Quad 65+ 12/11/2019, 10/14/2020   • Fluzone High Dose =>65 Years ([employer] ONLY) 11/02/2015, 12/27/2016, 09/22/2018, 12/11/2019   • Fluzone High-Dose 65+yrs 10/18/2022   • Influenza TIV (IM) 11/20/2015   • Influenza, Unspecified 12/11/2019   • Pneumococcal Conjugate 13-Valent (PCV13) 11/02/2015, 02/18/2019   • Pneumococcal Polysaccharide (PPSV23) 10/14/2020   • Shingrix  "2022     Social History:   Social History     Social History Narrative   • Not on file     Social History     Socioeconomic History   • Marital status:    Tobacco Use   • Smoking status: Never   • Smokeless tobacco: Never   • Tobacco comments:     no caffeine    Vaping Use   • Vaping Use: Never used   Substance and Sexual Activity   • Alcohol use: No   • Drug use: No   • Sexual activity: Defer       Family History:  Family History   Problem Relation Age of Onset   • Heart attack Father    • Heart disease Father    • Malig Hyperthermia Neg Hx         Review of Systems  See history of present illness and past medical history.  Patient denies headache, dizziness, syncope, falls, trauma, change in vision, change in hearing, change in taste, changes in weight, changes in appetite, focal weakness, numbness, or paresthesia.  Patient denies chest pain,  sinus pressure, rhinorrhea, epistaxis, hemoptysis, nausea, vomiting,hematemesis, diarrhea, constipation or hematchezia.  Denies cold or heat intolerance, polydipsia, polyuria, polyphagia. Denies hematuria, pyuria, dysuria, hesitancy, frequency or urgency. Denies consumption of raw and under cooked meats foods or change in water source.  Denies  sweats, night sweats.  Denies missing any routine medications. Remainder of ROS is negative.    Objective:  T Max 24 hrs: Temp (24hrs), Av.2 °F (36.8 °C), Min:98.2 °F (36.8 °C), Max:98.2 °F (36.8 °C)    Vitals Ranges:   Temp:  [98.2 °F (36.8 °C)] 98.2 °F (36.8 °C)  Heart Rate:  [75-95] 75  Resp:  [16-20] 18  BP: (120-146)/(48-88) 120/48      Exam:  /48 (BP Location: Right arm, Patient Position: Lying)   Pulse 75   Temp 98.2 °F (36.8 °C)   Resp 18   Ht 149.9 cm (59\")   Wt 116 kg (256 lb)   SpO2 98%   BMI 51.71 kg/m²     General Appearance:    Alert, cooperative, no distress, appears stated age   Head:    Normocephalic, without obvious abnormality, atraumatic   Eyes:    PERRL, conjunctivae/corneas clear, " EOM's intact, both eyes   Ears:    Normal external ear canals, both ears   Nose:   Nares normal, septum midline, mucosa normal, no drainage    or sinus tenderness   Throat:   Lips, mucosa, and tongue normal   Neck:   Supple, symmetrical, trachea midline, no adenopathy;     thyroid:  no enlargement/tenderness/nodules; no carotid    bruit or JVD   Back:     Symmetric, no curvature, ROM normal, no CVA tenderness   Lungs:     Decreased breath sounds bilaterally, respirations unlabored   Chest Wall:    No tenderness or deformity    Heart:    Regular rate and rhythm, S1 and S2 normal, no murmur, rub   or gallop   Abdomen:     Soft, nontender, bowel sounds active all four quadrants,     no masses, no hepatomegaly, no splenomegaly   Extremities:   Extremities normal, atraumatic, no cyanosis or edema   Pulses:   2+ and symmetric all extremities   Skin:   Skin color, texture, turgor normal, no rashes or lesions   Lymph nodes:   Cervical, supraclavicular, and axillary nodes normal   Neurologic:   CNII-XII intact, normal strength, sensation intact throughout      .    Data Review:  Labs in chart were reviewed.  WBC   Date Value Ref Range Status   11/01/2022 8.25 3.40 - 10.80 10*3/mm3 Final     Hemoglobin   Date Value Ref Range Status   11/01/2022 8.7 (L) 12.0 - 15.9 g/dL Final     Hematocrit   Date Value Ref Range Status   11/01/2022 28.2 (L) 34.0 - 46.6 % Final     Platelets   Date Value Ref Range Status   11/01/2022 199 140 - 450 10*3/mm3 Final     Sodium   Date Value Ref Range Status   11/01/2022 135 (L) 136 - 145 mmol/L Final     Potassium   Date Value Ref Range Status   11/01/2022 4.4 3.5 - 5.2 mmol/L Final     Comment:     Slight hemolysis detected by analyzer. Results may be affected.     Chloride   Date Value Ref Range Status   11/01/2022 92 (L) 98 - 107 mmol/L Final     CO2   Date Value Ref Range Status   11/01/2022 28.6 22.0 - 29.0 mmol/L Final     BUN   Date Value Ref Range Status   11/01/2022 71 (H) 8 - 23 mg/dL  Final     Creatinine   Date Value Ref Range Status   11/01/2022 2.40 (H) 0.57 - 1.00 mg/dL Final     Glucose   Date Value Ref Range Status   11/01/2022 467 (C) 65 - 99 mg/dL Final     Calcium   Date Value Ref Range Status   11/01/2022 8.7 8.6 - 10.5 mg/dL Final     AST (SGOT)   Date Value Ref Range Status   11/01/2022 18 1 - 32 U/L Final     Comment:     Slight hemolysis detected by analyzer. Results may be affected.     ALT (SGPT)   Date Value Ref Range Status   11/01/2022 11 1 - 33 U/L Final     Alkaline Phosphatase   Date Value Ref Range Status   11/01/2022 171 (H) 39 - 117 U/L Final                Imaging Results (All)     Procedure Component Value Units Date/Time    XR Chest 2 View [[id number]] Collected: 11/01/22 1825     Updated: 11/01/22 1830    Narrative:      XR CHEST 2 VW-     HISTORY: Female who is 76 years-old,  short of breath     TECHNIQUE: Frontal and lateral views of the chest     COMPARISON: 10/09/2022     FINDINGS: The heart is enlarged. Aorta is calcified. Sternotomy wires,  cardiac valve markers are present. Mild prominence of vascular and  interstitial markings. No focal pulmonary consolidation, pleural  effusion, or pneumothorax. No acute osseous process.       Impression:      No focal pulmonary consolidation. Cardiomegaly. Mild  prominence of vascular and interstitial markings.     This report was finalized on 11/1/2022 6:27 PM by Dr. Mayur Bishop M.D.               Assessment:  Active Hospital Problems    Diagnosis  POA   • **Acute CHF (congestive heart failure) ([facility]) [I50.9]  Yes   • Acute congestive heart failure, unspecified heart failure type ([facility]) [I50.9]  Yes      Resolved Hospital Problems   No resolved problems to display.   chronic hypoxic respiratory failure  Sleep apnea  Obesity  Copd  Diabetes  Weakness  Hypertension  Anemia  ckd4    Plan:  Continue diuresis  Monitor on telemetry  Will probably need more rehab  Insulin sliding scale, accu sofi Becerra patient, family and ED  provider    Pat Antunez MD  11/1/2022  20:38 EDT

## 2022-11-02 NOTE — PROGRESS NOTES
[facility] Clinical Pharmacy Services: Medication Reconciliation     Miguelina Lopez is a 76 y.o. female presenting to [facility] for Hyperglycemia [R73.9]  Acute CHF (congestive heart failure) ([facility]) [I50.9]  OLI (acute kidney injury) ([facility]) [N17.9]  Acute congestive heart failure, unspecified heart failure type ([facility]) [I50.9]       She  has a past medical history of Acute on chronic respiratory failure with hypoxia and hypercapnia ([facility]), OLI (acute kidney injury) ([facility]), Anemia, Anxiety, Aortic valve stenosis, Arthritis, Bilateral lower extremity edema, CHF (congestive heart failure) ([facility]), Chronic coronary artery disease, Class 3 severe obesity due to excess calories in adult ([facility]), COPD (chronic obstructive pulmonary disease) ([facility]), Depression, Diabetes mellitus ([facility]), Dry skin, Elevated cholesterol, GERD (gastroesophageal reflux disease), Heart murmur, Hypertension, Mitral valve insufficiency, Pneumonia, Pulmonary hypertension ([facility]), Sleep apnea, Stage 3 chronic kidney disease ([facility]), Subclinical hypothyroidism, Supplemental oxygen dependent, TIA (transient ischemic attack) (3/15/2021), and Valvular heart disease.       Allergies as of 11/01/2022 - Reviewed 11/01/2022   Allergen Reaction Noted   • Coumadin [warfarin sodium] Diarrhea and Nausea Only 06/12/2016   • Erythromycin  07/29/2016   • Hctz [hydrochlorothiazide] Swelling 08/28/2018   • Zaroxolyn [metolazone] Diarrhea 05/02/2019   • Penicillins Rash 02/18/2016   • Sulfa antibiotics Rash 02/18/2016          Medication information was obtained from: Pharmacy   Preferred Pharmacy:  [facility] [id number] - [place], [place] - [address] - [phone]  - [phone] FX  [address] [id number]  Phone: [phone] Fax: [phone]    [facility]  [address] [facility] [address]  Phone: [phone] Fax: [phone]    [facility] [place]  [place], [place] - [address] - [phone]  - [phone] FX  [address]  [facility] [address]  Phone: [phone] Fax: [phone]    [facility] - [place], [place] - [address] - [phone]  - [phone] FX  [address]  [facility] [address]  Phone: [phone] Fax: [phone]       Prior to Admission Medications     Prescriptions Last Dose Informant Patient Reported? Taking?    acetaminophen (TYLENOL) 325 MG tablet  Family Member No No    Take 2 tablets by mouth Every 4 (Four) Hours As Needed for Mild Pain .    allopurinol (ZYLOPRIM) 100 MG tablet   No No    Take 1 tablet by mouth Daily.    ALPRAZolam (XANAX) 1 MG tablet 11/1/2022 Pharmacy Yes Yes    Take 1 tablet by mouth 2 (Two) Times a Day.    atorvastatin (LIPITOR) 40 MG tablet   No No    TAKE ONE TABLET BY MOUTH DAILY    carvedilol (COREG) 12.5 MG tablet  Family Member No No    Take 1 tablet by mouth 2 (Two) Times a Day With Meals.    fluticasone (FLONASE) 50 MCG/ACT nasal spray  Family Member No No    2 sprays by Each Nare route Daily.    Patient taking differently:  2 sprays by Each Nare route Daily As Needed.    Fluticasone-Salmeterol (ADVAIR) 250-50 MCG/ACT DISKUS  Family Member No No    Inhale 1 puff 2 (Two) Times a Day.    gabapentin (NEURONTIN) 100 MG capsule   No No    Take 1 capsule by mouth Every 12 (Twelve) Hours for 3 days.    levothyroxine (SYNTHROID, LEVOTHROID) 25 MCG tablet  Family Member No No    TAKE 2 TABLETS BY MOUTH ON MONDAY, WEDNESDAY AND FRIDAY AND TAKE ONE TABLET ON TUESDAY, THURSDAY, SATURDAY AND SUNDAY    Patient taking differently:  Take  by mouth Daily. TAKE 2 TABLETS BY MOUTH ON MONDAY, WEDNESDAY AND FRIDAY AND TAKE ONE TABLET ON TUESDAY, THURSDAY, SATURDAY AND SUNDAY    lidocaine (LIDODERM) 5 %   No No    Place 1 patch on the skin as directed by provider Daily. Remove & Discard patch within 12 hours or as directed by MD    nitroglycerin (NITROSTAT) 0.4 MG SL tablet   Family Member No No    DISSOLVE 1 TAB UNDER TONGUE FOR CHEST PAIN - IF PAIN REMAINS AFTER 5 MIN, CALL 911 AND REPEAT DOSE. MAX 3 TABS IN 15 MINUTES    Patient taking differently:  Place 1 tablet under the tongue Every 5 (Five) Minutes As Needed.    omeprazole (priLOSEC) 40 MG capsule   No No    Take 1 capsule by mouth Daily.    polyethylene glycol (MIRALAX) 17 g packet   No No    Take 17 g by mouth Daily.    potassium chloride (K-DUR,KLOR-CON) 20 MEQ CR tablet  Family Member No No    Take 1 tablet by mouth Every Other Day.    Probiotic Product (Risaquad-2) capsule capsule  Family Member Yes No    Take 1 capsule by mouth Daily.    senna-docusate (PERICOLACE) 8.6-50 MG per tablet  Family Member Yes No    Take 1 tablet by mouth Every Other Day.    sodium chloride 0.65 % nasal spray  Family Member No No    2 sprays into the nostril(s) as directed by provider As Needed for Congestion.    spironolactone (ALDACTONE) 25 MG tablet   Yes No    Take 1 tablet by mouth.    torsemide (DEMADEX) 100 MG tablet   No No    Take 1 tablet by mouth Daily.    Trulicity 1.5 MG/0.5ML solution pen-injector   No No    INJECT 1.5 MG UNDER THE SKIN ONCE WEEKLY    vilazodone (Viibryd) 20 MG tablet tablet  Family Member No No    Take 1 tablet by mouth Every Night.         Medication notes: Med rec reviewed by [facility].  Concern for omission of xanax on DAQUAN. Confirmed filled by [facility] med rec has been updated       This medication list is complete to the best of my knowledge as of 11/2/2022       Please call if questions.     Dion Chen, [facility]  Clinical Pharmacist

## 2022-11-02 NOTE — CASE MANAGEMENT/SOCIAL WORK
Discharge Planning Assessment  [facility]     Patient Name: Miguelina Lopez  MRN: [id number]  Today's Date: 11/2/2022    Admit Date: 11/1/2022    Plan: Return home with 24/7 family support and caretenders    Discharge Needs Assessment     Row Name 11/02/22 1255       Living Environment    People in Home child(kamryn), adult;grandchild(kamryn)    Name(s) of People in Home granddaughter Carmen and son in law Alin    Current Living Arrangements home    Primary Care Provided by self    Provides Primary Care For no one, unable/limited ability to care for self    Family Caregiver if Needed grandchild(kamryn), adult    Family Caregiver Names granddaafrica Morales    Quality of Family Relationships helpful;involved       Resource/Environmental Concerns    Resource/Environmental Concerns none       Transition Planning    Patient/Family Anticipates Transition to home with family;inpatient rehabilitation facility;home with help/services    Patient/Family Anticipated Services at Transition home health care;rehabilitation services    Transportation Anticipated family or friend will provide;health plan transportation       Discharge Needs Assessment    Equipment Currently Used at Home oxygen;walker, rolling;shower chair;nebulizer;glucometer;pulse ox    Concerns to be Addressed discharge planning    Discharge Facility/Level of Care Needs nursing facility, skilled;home with home health               Discharge Plan     Row Name 11/02/22 1310       Plan    Plan Return home with 24/7 family support and caretenders     Patient/Family in Agreement with Plan yes    Plan Comments CCP met with patient at bedside. Introduced self and explained role. Patient lives with her granddaughter Carmen and son in law. They live in a first floor apartment with 4 steps to enter. Patient relies on her family for assistance with all ADLs and transportation as she does not drive. Patient sees Arcelia Talbot for PCP and fills prescriptions at [facility]. Patient states she does have AD/LW documents and was encouraged to bring a copy to the hospital to place on file. For DME patient has 3L continuous o2 from [employer], walker, shower bench, nebulizer, glucometer, and pulse ox. Patient is current with [facility] and has used [facility] in the past. She has been to [facility] and [facility] for SNF previously. At discharge she hopes to return home with family assist and [facility]. If SNF is recommended by PT, she is agreeable to referral to [facility]. PT consulted, referrals pending. Bety DAVID RN/CCP              Continued Care and Services - Admitted Since 11/1/2022     Destination     Service Provider Request Status Selected Services Address Phone Fax Patient Preferred    [facility] HOME Pending - No Request Sent N/A [address] [address] [phone] --          Home Medical Care     Service Provider Request Status Selected Services Address Phone Fax Patient Preferred    [facility] P[facility] - Request Sent N/A [address], [address], [facility] [id number] [phone] --            Selected Continued Care - Episodes Includes continued care and service providers with selected services from the active episodes listed below    High Risk Care Management Episode start date: 7/25/2022 (Paused)   There are no active outsourced providers for this episode.             Selected Continued Care - Prior Encounters Includes continued care and service providers with selected services from prior encounters from 8/3/2022 to 11/2/2022    Discharged on 10/18/2022 Admission date: 10/9/2022 - Discharge disposition: Skilled Nursing Facility (DC - External)    Destination     Service Provider Selected Services Address Phone Fax Patient Preferred    [facility] HOME Skilled Nursing [address] [address] [phone]        Internal Comment last updated  by Aracelis Boyd RN 10/11/2022 1455    S/w [facility] will accept pending cert .Aracelis Boyd RN                       Home Medical Care     Service Provider Selected Services Address Phone Fax Patient Preferred    [facility] [address] [place] [address] [phone] --                       Demographic Summary     Row Name 11/02/22 1254       General Information    Admission Type inpatient    Arrived From home    Required Notices Provided Important Message from Medicare    Referral Source admission list    Reason for Consult discharge planning    Preferred Language English               Functional Status     Row Name 11/02/22 1254       Functional Status    Usual Activity Tolerance fair    Current Activity Tolerance fair       Functional Status, IADL    Medications assistive person    Meal Preparation assistive person    Housekeeping assistive person    Laundry assistive person    Shopping assistive person               Psychosocial    No documentation.                Abuse/Neglect    No documentation.                Legal    No documentation.                Substance Abuse    No documentation.                Patient Forms    No documentation.                   Riana Phillips

## 2022-11-02 NOTE — THERAPY EVALUATION
Patient Name: Miguelina Lopez  : 1945    MRN: [id number]                              Today's Date: 2022       Admit Date: 2022    Visit Dx:     ICD-10-CM ICD-9-CM   1. Acute congestive heart failure, unspecified heart failure type ([facility])  I50.9 428.0   2. OLI (acute kidney injury) ([facility])  N17.9 584.9   3. Hyperglycemia  R73.9 790.29     Patient Active Problem List   Diagnosis   • Anxiety disorder   • Arthritis of knee   • Asthma   • Chronic coronary artery disease   • CKD (chronic kidney disease) stage 3, GFR 30-59 ml/min ([facility])   • Depression   • Diabetic peripheral neuropathy ([facility])   • Gastroesophageal reflux disease   • Hyperlipidemia   • Insomnia   • Lower gastrointestinal hemorrhage   • Anemia, chronic disease   • OCHOA (obstructive sleep apnea)   • Type 2 diabetes mellitus with kidney complication, without long-term current use of insulin ([facility])   • Essential hypertension   • Hospital discharge follow-up   • Mitral stenosis   • Pulmonary hypertension due to sleep-disordered breathing (CMS/[facility])   • s/p MVR, TV-repair, CABG x2 16   • Leukocytosis   • Paroxysmal atrial fibrillation ([facility])   • Nocturnal hypoxia   • Dermatitis   • Medicare annual wellness visit, initial   • Class 3 severe obesity due to excess calories with serious comorbidity and body mass index (BMI) of 50.0 to 59.9 in adult ([facility])   • Supplemental oxygen dependent   • Acute on chronic combined systolic and diastolic HF (heart failure) ([facility])   • Mitral regurgitation   • Aortic stenosis   • Medicare annual wellness visit, subsequent   • Localized edema   • Chronic right-sided congestive heart failure ([facility])   • Cellulitis of left lower extremity   • Proteinuria   • Bilateral lower extremity edema   • Subclinical hypothyroidism   • Chronic diastolic heart failure ([facility])   • Chronic respiratory failure with hypoxia and hypercapnia ([facility])   • Acute on chronic respiratory failure with hypoxia and hypercapnia ([facility])   • AV block, 2nd  degree   • 1st degree AV block   • Chest pain   • COPD (chronic obstructive pulmonary disease) (HCC)   • Complete heart block (HCC)   • Presence of permanent cardiac pacemaker   • Hypothyroidism (acquired)   • Chronic anticoagulation   • Leukocytosis   • Stage 3b chronic kidney disease (HCC)   • Gastrointestinal hemorrhage with melena   • Acute blood loss anemia   • Metabolic encephalopathy   • Hypernatremia   • Hypokalemia   • TIA (transient ischemic attack)   • Nonrheumatic aortic valve stenosis   • Acute on chronic heart failure ([facility])   • S/P TAVR (transcatheter aortic valve replacement)   • CHF (congestive heart failure) ([facility])   • Heart valve disorder   • Paroxysmal atrial flutter (HCC)   • Peripheral neuropathy   • Pulmonary hypertension (HCC)   • Dry skin   • Closed fracture of one rib   • Impaired mobility and activities of daily living   • OLI (acute kidney injury) ([facility])     Past Medical History:   Diagnosis Date   • Acute on chronic respiratory failure with hypoxia and hypercapnia ([facility])    • OLI (acute kidney injury) ([facility])    • Anemia    • Anxiety    • Aortic valve stenosis    • Arthritis     KNEES   • Bilateral lower extremity edema    • CHF (congestive heart failure) ([facility])    • Chronic coronary artery disease    • Class 3 severe obesity due to excess calories in adult ([facility])    • COPD (chronic obstructive pulmonary disease) (HCC)    • Depression    • Diabetes mellitus (HCC)    • Dry skin    • Elevated cholesterol    • GERD (gastroesophageal reflux disease)    • Heart murmur    • Hypertension    • Mitral valve insufficiency    • Pneumonia     1/2016   • Pulmonary hypertension ([facility])     due to sleep disordered breathing   • Sleep apnea     Uses CPAP or oxygen   • Stage 3 chronic kidney disease (HCC)    • Subclinical hypothyroidism    • Supplemental oxygen dependent     3 LITERS   • TIA (transient ischemic attack) 3/15/2021   • Valvular heart disease      Past Surgical History:   Procedure Laterality Date   •  AORTIC VALVE REPAIR/REPLACEMENT N/A 7/13/2021    Procedure: TTE TRANSFEMORAL TRANSCATHETER AORTIC VALVE REPLACEMENT PERCUTANEOUS APPROACH;  Surgeon: Sharlene Mejía MD;  Location: [facility] OR 18/19;  Service: Cardiothoracic;  Laterality: N/A;   • AORTIC VALVE REPAIR/REPLACEMENT N/A 7/13/2021    Procedure: Transfemoral Transcatheter Aortic Valve Replacement with intra-op tte and possible open surgical rescue;  Surgeon: Ayan Pacheco MD;  Location: [facility] OR 18/19;  Service: Cardiovascular;  Laterality: N/A;   • CARDIAC CATHETERIZATION     • CARDIAC CATHETERIZATION N/A 6/10/2016    Procedure: Left Heart Cath;  Surgeon: Chace Johnson MD;  Location: [facility] CATH INVASIVE LOCATION;  Service:    • CARDIAC CATHETERIZATION N/A 6/10/2016    Procedure: Right Heart Cath;  Surgeon: Chace Johnson MD;  Location: [facility] CATH INVASIVE LOCATION;  Service:    • CARDIAC CATHETERIZATION N/A 2/5/2021    Procedure: RIGHT AND LEFT HEART CATH;  Surgeon: Antoine Ayers MD;  Location: [facility] CATH INVASIVE LOCATION;  Service: Cardiology;  Laterality: N/A;   • CARDIAC CATHETERIZATION N/A 2/5/2021    Procedure: Coronary angiography;  Surgeon: Antoine Ayers MD;  Location: [facility] CATH INVASIVE LOCATION;  Service: Cardiology;  Laterality: N/A;   • CARDIAC CATHETERIZATION N/A 2/5/2021    Procedure: Left ventriculography- pressures;  Surgeon: Antoine Ayers MD;  Location: [facility] CATH INVASIVE LOCATION;  Service: Cardiology;  Laterality: N/A;   • CARDIAC ELECTROPHYSIOLOGY PROCEDURE N/A 2/2/2021    Procedure: Pacemaker DC new---[employer] MICRA;  Surgeon: Eliazar Bond MD;  Location: [facility] CATH INVASIVE LOCATION;  Service: Cardiology;  Laterality: N/A;   • CARDIAC SURGERY     • COLONOSCOPY N/A 10/14/2021    Procedure: COLONOSCOPY to cecum and TI  with cold snare polypectomies;  Surgeon: Dixon Azevedo MD;  Location: [facility] ENDOSCOPY;  Service: Gastroenterology;  Laterality: N/A;  pre:  melena  post: polyps, diverticulosis   • CORONARY ARTERY BYPASS GRAFT      2 vessel   • CORONARY ARTERY BYPASS GRAFT WITH MITRAL VALVE REPAIR/REPLACEMENT N/A 6/13/2016    Procedure: INTRAOPERATIVE TARIQ, MIDLINE STERNOTOMY, CORONARY ARTERY BYPASS GRAFTING X  2 UTILIZING ENDOSCOPICALLY HARVESTED LEFT GREATER SAPHENOUS VEIN, MITRAL VALVE REPLACEMENT AND TRICUSPID VALVE REPAIR;  Surgeon: Eliecer Mistry MD;  Location: [facility] MAIN OR;  Service:    • CORONARY STENT PLACEMENT  2010    Approx. 6 yrs ago at [facility]   • ENDOSCOPY N/A 3/17/2021    Procedure: ESOPHAGOGASTRODUODENOSCOPY;  Surgeon: Dixon Haas MD;  Location: [facility] ENDOSCOPY;  Service: Gastroenterology;  Laterality: N/A;  PRE: GI BLEED  POST: ANTRAL ULCER   • ENDOSCOPY N/A 10/13/2021    Procedure: ESOPHAGOGASTRODUODENOSCOPY WITH BIOPSIES;  Surgeon: Riana Milner MD;  Location: [facility] ENDOSCOPY;  Service: Gastroenterology;  Laterality: N/A;  pre-melena, anemia   post- mild gastritis, prepyloric erosion, duodenal lymphangiectasia    • HEMORRHOIDECTOMY     • HYSTERECTOMY     • MITRAL VALVE REPLACEMENT     • REPLACEMENT TOTAL KNEE Right    • THYROID SURGERY      Cyst removed from thyroid   • VASCULAR SURGERY        General Information     Row Name 11/02/22 1201          OT Time and Intention    Document Type evaluation  -KN     Mode of Treatment individual therapy;occupational therapy  -KN     Row Name 11/02/22 1201          General Information    Patient Profile Reviewed yes  -KN     Prior Level of Function min assist:;mod assist:;ADL's;all household mobility  Has needed some help since coming home from rehab  -KN     Existing Precautions/Restrictions fall  -KN     Barriers to Rehab medically complex  -KN     Row Name 11/02/22 1201          Living Environment    People in Home child(kamryn), adult;grandchild(kamryn)  -KN     Row Name 11/02/22 1201          Home Main Entrance    Number of Stairs, Main Entrance four  -KN     Row Name 11/02/22 1201           Stairs Within Home, Primary    Number of Stairs, Within Home, Primary none  -     Row Name 11/02/22 1201          Cognition    Orientation Status (Cognition) oriented x 4  -KN     Row Name 11/02/22 1201          Safety Issues, Functional Mobility    Impairments Affecting Function (Mobility) balance;coordination;endurance/activity tolerance;pain;strength;shortness of breath  -           User Key  (r) = Recorded By, (t) = Taken By, (c) = Cosigned By    Initials Name Provider Type    Sai Beal OT Occupational Therapist                 Mobility/ADL's     Row Name 11/02/22 1202          Bed Mobility    Bed Mobility sit-supine;supine-sit  -KN     Supine-Sit [place] (Bed Mobility) maximum assist (25% patient effort);1 person assist  -     Sit-Supine [place] (Bed Mobility) maximum assist (25% patient effort);1 person assist  -     Row Name 11/02/22 1202          Transfers    Comment, (Transfers) pt refused to stand at this time. Pt agreeable that when PT comes in she will get up and walk if they come later on  -     Row Name 11/02/22 1202          Activities of Daily Living    BADL Assessment/Intervention grooming  -     Row Name 11/02/22 1202          Grooming Assessment/Training    [place] Level (Grooming) wash face, hands;hair care, combing/brushing;set up  -KN     Position (Grooming) --  supine in bed with HOB elevated  -KN           User Key  (r) = Recorded By, (t) = Taken By, (c) = Cosigned By    Initials Name Provider Type    Sai Beal OT Occupational Therapist               Obj/Interventions     Row Name 11/02/22 1205          Sensory Assessment (Somatosensory)    Sensory Assessment (Somatosensory) UE sensation intact  -     Row Name 11/02/22 1205          Vision Assessment/Intervention    Visual Impairment/Limitations WFL  -     Row Name 11/02/22 1205          Range of Motion Comprehensive    General Range of Motion no range of motion deficits identified  -[facility]  Name 11/02/22 1205          Strength Comprehensive (MMT)    Comment, General Manual Muscle Testing (MMT) Assessment 3/5 BUE shoulder/elbow strength  -     Row Name 11/02/22 1205          Motor Skills    Motor Skills coordination;functional endurance  -KN     Coordination WFL  -KN     Functional Endurance p  -KN           User Key  (r) = Recorded By, (t) = Taken By, (c) = Cosigned By    Initials Name Provider Type    Sai Beal, OT Occupational Therapist               Goals/Plan     Row Name 11/02/22 1212          Bed Mobility Goal 1 (OT)    Activity/Assistive Device (Bed Mobility Goal 1, OT) bed mobility activities, all  -KN     [place] Level/Cues Needed (Bed Mobility Goal 1, OT) modified independence  -KN     Time Frame (Bed Mobility Goal 1, OT) short term goal (STG);2 weeks  -     Row Name 11/02/22 1212          Transfer Goal 1 (OT)    Activity/Assistive Device (Transfer Goal 1, OT) transfers, all  -KN     [place] Level/Cues Needed (Transfer Goal 1, OT) modified independence  -KN     Time Frame (Transfer Goal 1, OT) short term goal (STG);2 weeks  -     Row Name 11/02/22 1212          Dressing Goal 1 (OT)    Activity/Device (Dressing Goal 1, OT) dressing skills, all  -KN     [place]/Cues Needed (Dressing Goal 1, OT) modified independence  -KN     Time Frame (Dressing Goal 1, OT) short term goal (STG);2 weeks  -     Row Name 11/02/22 1212          Toileting Goal 1 (OT)    Activity/Device (Toileting Goal 1, OT) toileting skills, all  -KN     [place] Level/Cues Needed (Toileting Goal 1, OT) modified independence  -KN     Time Frame (Toileting Goal 1, OT) short term goal (STG);2 weeks  -     Row Name 11/02/22 1212          Therapy Assessment/Plan (OT)    Planned Therapy Interventions (OT) activity tolerance training;BADL retraining;functional balance retraining;occupation/activity based interventions;patient/caregiver education/training;transfer/mobility retraining;strengthening  exercise  -           User Key  (r) = Recorded By, (t) = Taken By, (c) = Cosigned By    Initials Name Provider Type    Sai Beal, WOOD Occupational Therapist               Clinical Impression     Row Name 11/02/22 1206          Pain Assessment    Pretreatment Pain Rating 4/10  -KN     Posttreatment Pain Rating 4/10  -KN     Pain Location generalized  -     Pain Intervention(s) Therapeutic presence  -     Row Name 11/02/22 1206          Plan of Care Review    Plan of Care Reviewed With patient  -     Outcome Evaluation Pt is a 76 year old female admitted to [facility] on 11/1 with feeling SOA. Pt has a past medical history of OLI, anxiety, CHF, COPD, HBP, GERD, and a TIA. Pt currently lives with her son in law and her grandchild. Pt reports that she was in the hospital a month ago after she fell in the shower. Pt spent a few days in the hospital and then went to IP rehab and was D/c after a week. Pt feels that she was not ready to go home because she still felt very weak. Pt is now back in the hospital after feeling SOA. Pt has many concerns about going home in the current state she is in becuase of her weakness. Pt demonstrates decreased strength, endurance, and balance and will benefit from skilled OT services. Recommend pt to go back to IRF/SNF.  -     Row Name 11/02/22 1206          Therapy Assessment/Plan (OT)    Rehab Potential (OT) good, to achieve stated therapy goals  -     Criteria for Skilled Therapeutic Interventions Met (OT) yes;meets criteria;skilled treatment is necessary  -     Therapy Frequency (OT) 5 times/wk  -     Row Name 11/02/22 1206          Therapy Plan Review/Discharge Plan (OT)    Anticipated Discharge Disposition (OT) inpatient rehabilitation facility;skilled nursing facility  -     Row Name 11/02/22 1206          Vital Signs    Pre Patient Position Supine  -     Intra Patient Position Sitting  -     Post Patient Position Supine  -     Row Name 11/02/22 1206           Positioning and Restraints    Pre-Treatment Position in bed  -KN     Post Treatment Position bed  -KN     In Bed notified nsg;call light within reach;encouraged to call for assist;exit alarm on  -KN           User Key  (r) = Recorded By, (t) = Taken By, (c) = Cosigned By    Initials Name Provider Type    Sai Beal OT Occupational Therapist               Outcome Measures     Row Name 11/02/22 1212          How much help from another is currently needed...    Putting on and taking off regular lower body clothing? 2  -KN     Bathing (including washing, rinsing, and drying) 2  -KN     Toileting (which includes using toilet bed pan or urinal) 2  -KN     Putting on and taking off regular upper body clothing 3  -KN     Taking care of personal grooming (such as brushing teeth) 3  -KN     Eating meals 4  -KN     AM-PAC 6 Clicks Score (OT) 16  -KN     Row Name 11/02/22 1212          Modified Cici Scale    Modified Cici Scale 4 - Moderately severe disability.  Unable to walk without assistance, and unable to attend to own bodily needs without assistance.  -KN     Row Name 11/02/22 1212          Functional Assessment    Outcome Measure Options AM-PAC 6 Clicks Daily Activity (OT);Modified [place]  -           User Key  (r) = Recorded By, (t) = Taken By, (c) = Cosigned By    Initials Name Provider Type    Sai Beal OT Occupational Therapist                Occupational Therapy Education     Title: PT OT SLP Therapies (Done)     Topic: Occupational Therapy (Done)     Point: ADL training (Done)     Description:   Instruct learner(s) on proper safety adaptation and remediation techniques during self care or transfers.   Instruct in proper use of assistive devices.              Learning Progress Summary           Patient Acceptance, E, VU by CINTHIA at 11/2/2022 1213                   Point: Home exercise program (Done)     Description:   Instruct learner(s) on appropriate technique for monitoring, assisting and/or  progressing therapeutic exercises/activities.              Learning Progress Summary           Patient Acceptance, E, VU by CINTHIA at 11/2/2022 1213                   Point: Precautions (Done)     Description:   Instruct learner(s) on prescribed precautions during self-care and functional transfers.              Learning Progress Summary           Patient Acceptance, E, VU by CINTHIA at 11/2/2022 1213                   Point: Body mechanics (Done)     Description:   Instruct learner(s) on proper positioning and spine alignment during self-care, functional mobility activities and/or exercises.              Learning Progress Summary           Patient Acceptance, E, VU by CINTHIA at 11/2/2022 1213                               User Key     Initials Effective Dates Name Provider Type Discipline    CINTHIA 08/02/22 -  Sai Rowley, OT Occupational Therapist OT              OT Recommendation and Plan  Planned Therapy Interventions (OT): activity tolerance training, BADL retraining, functional balance retraining, occupation/activity based interventions, patient/caregiver education/training, transfer/mobility retraining, strengthening exercise  Therapy Frequency (OT): 5 times/wk  Plan of Care Review  Plan of Care Reviewed With: patient  Outcome Evaluation: Pt is a 76 year old female admitted to [facility] on 11/1 with feeling SOA. Pt has a past medical history of OLI, anxiety, CHF, COPD, HBP, GERD, and a TIA. Pt currently lives with her son in law and her grandchild. Pt reports that she was in the hospital a month ago after she fell in the shower. Pt spent a few days in the hospital and then went to  rehab and was D/c after a week. Pt feels that she was not ready to go home because she still felt very weak. Pt is now back in the hospital after feeling SOA. Pt has many concerns about going home in the current state she is in becuase of her weakness. Pt demonstrates decreased strength, endurance, and balance and will benefit from skilled OT  services. Recommend pt to go back to IRF/SNF.     Time Calculation:    Time Calculation- OT     Row Name 11/02/22 1213             Time Calculation- OT    OT Start Time 0913  -KN      OT Stop Time 0928  -KN      OT Time Calculation (min) 15 min  -KN      Total Timed Code Minutes- OT 8 minute(s)  -KN      OT Received On 11/02/22  -KN      OT - Next Appointment 11/03/22  -KN      OT Goal Re-Cert Due Date 11/16/22  -KN         Timed Charges    51688 - OT Therapeutic Activity Minutes 8  -KN         Untimed Charges    OT Eval/Re-eval Minutes 7  -KN         Total Minutes    Timed Charges Total Minutes 8  -KN      Untimed Charges Total Minutes 7  -KN       Total Minutes 15  -KN            User Key  (r) = Recorded By, (t) = Taken By, (c) = Cosigned By    Initials Name Provider Type    Sai Beal OT Occupational Therapist              Therapy Charges for Today     Code Description Service Date Service Provider Modifiers Qty    79909471268 HC OT THERAPEUTIC ACT EA 15 MIN 11/2/2022 Sai Rowley OT GO 1    35970496977 HC OT EVAL MOD COMPLEXITY 2 11/2/2022 Sai Rowley OT GO 1               Sai Rowley OT  11/2/2022

## 2022-11-02 NOTE — PLAN OF CARE
Goal Outcome Evaluation:  Plan of Care Reviewed With: patient           Outcome Evaluation: pt is a 75 yo female presenting wtih SOB. Pt was recently d/c from rehab [facility]. Pt reported using RWx at home and in the community. Pt sat EOB with mod-max assist x1 and required mod-max assist to maintain sitting position. Pt deferred standing and ambulating due to being tired and feeling weak. Pt appeared to be falling asleep at times while sitting EOB with PT. At this time, PT is recommending d/c to SNU, pending medical status changes. Pt requires skilled therapy at this time to increase activity tolerance, functional mobility, and general strength to facilitate return to PLOF safely.

## 2022-11-02 NOTE — THERAPY EVALUATION
Patient Name: Miguelina Lopez  : 1945    MRN: [id number]                              Today's Date: 2022       Admit Date: 2022    Visit Dx:     ICD-10-CM ICD-9-CM   1. Acute congestive heart failure, unspecified heart failure type ([facility])  I50.9 428.0   2. OLI (acute kidney injury) ([facility])  N17.9 584.9   3. Hyperglycemia  R73.9 790.29   4. Pulmonary hypertension ([facility])  I27.20 416.8   5. Paroxysmal atrial flutter ([facility])  I48.92 427.32   6. Heart valve disorder  I38 424.90   7. Acute on chronic combined systolic and diastolic congestive heart failure ([facility])  I50.43 428.43     428.0   8. S/P TAVR (transcatheter aortic valve replacement)  Z95.2 V43.3   9. Acute on chronic diastolic heart failure ([facility])  I50.33 428.33   10. Nonrheumatic aortic valve stenosis  I35.0 424.1   11. Complete heart block ([facility])  I44.2 426.0   12. Presence of permanent cardiac pacemaker  Z95.0 V45.01   13. Chronic coronary artery disease  I25.10 414.00   14. Nonrheumatic mitral valve regurgitation  I34.0 424.0   15. Bilateral lower extremity edema  R60.0 782.3     Patient Active Problem List   Diagnosis   • Anxiety disorder   • Arthritis of knee   • Asthma   • Chronic coronary artery disease   • CKD (chronic kidney disease) stage 3, GFR 30-59 ml/min ([facility])   • Depression   • Diabetic peripheral neuropathy ([facility])   • Gastroesophageal reflux disease   • Hyperlipidemia   • Insomnia   • Lower gastrointestinal hemorrhage   • Anemia, chronic disease   • OCHOA (obstructive sleep apnea)   • Type 2 diabetes mellitus with kidney complication, without long-term current use of insulin ([facility])   • Essential hypertension   • Hospital discharge follow-up   • Mitral stenosis   • Pulmonary hypertension due to sleep-disordered breathing (CMS/[facility])   • s/p MVR, TV-repair, CABG x2 16   • Leukocytosis   • Paroxysmal atrial fibrillation ([facility])   • Nocturnal hypoxia   • Dermatitis   • Medicare annual wellness visit, initial   • Class 3 severe obesity due  to excess calories with serious comorbidity and body mass index (BMI) of 50.0 to 59.9 in adult ([facility])   • Supplemental oxygen dependent   • Acute on chronic combined systolic and diastolic HF (heart failure) ([facility])   • Mitral regurgitation   • Aortic stenosis   • Medicare annual wellness visit, subsequent   • Localized edema   • Chronic right-sided congestive heart failure (HCC)   • Cellulitis of left lower extremity   • Proteinuria   • Bilateral lower extremity edema   • Subclinical hypothyroidism   • Chronic diastolic heart failure (HCC)   • Chronic respiratory failure with hypoxia and hypercapnia ([facility])   • Acute on chronic respiratory failure with hypoxia and hypercapnia ([facility])   • AV block, 2nd degree   • 1st degree AV block   • Chest pain   • COPD (chronic obstructive pulmonary disease) ([facility])   • Complete heart block ([facility])   • Presence of permanent cardiac pacemaker   • Hypothyroidism (acquired)   • Chronic anticoagulation   • Leukocytosis   • Stage 3b chronic kidney disease ([facility])   • Gastrointestinal hemorrhage with melena   • Acute blood loss anemia   • Metabolic encephalopathy   • Hypernatremia   • Hypokalemia   • TIA (transient ischemic attack)   • Nonrheumatic aortic valve stenosis   • Acute on chronic heart failure ([facility])   • S/P TAVR (transcatheter aortic valve replacement)   • CHF (congestive heart failure) ([facility])   • Heart valve disorder   • Paroxysmal atrial flutter ([facility])   • Peripheral neuropathy   • Pulmonary hypertension ([facility])   • Dry skin   • Closed fracture of one rib   • Impaired mobility and activities of daily living   • OIL (acute kidney injury) ([facility])     Past Medical History:   Diagnosis Date   • Acute on chronic respiratory failure with hypoxia and hypercapnia ([facility])    • OLI (acute kidney injury) ([facility])    • Anemia    • Anxiety    • Aortic valve stenosis    • Arthritis     KNEES   • Bilateral lower extremity edema    • CHF (congestive heart failure) ([facility])    • Chronic coronary artery disease    •  Class 3 severe obesity due to excess calories in adult ([facility])    • COPD (chronic obstructive pulmonary disease) ([facility])    • Depression    • Diabetes mellitus ([facility])    • Dry skin    • Elevated cholesterol    • GERD (gastroesophageal reflux disease)    • Heart murmur    • Hypertension    • Mitral valve insufficiency    • Pneumonia     1/2016   • Pulmonary hypertension ([facility])     due to sleep disordered breathing   • Sleep apnea     Uses CPAP or oxygen   • Stage 3 chronic kidney disease ([facility])    • Subclinical hypothyroidism    • Supplemental oxygen dependent     3 LITERS   • TIA (transient ischemic attack) 3/15/2021   • Valvular heart disease      Past Surgical History:   Procedure Laterality Date   • AORTIC VALVE REPAIR/REPLACEMENT N/A 7/13/2021    Procedure: TTE TRANSFEMORAL TRANSCATHETER AORTIC VALVE REPLACEMENT PERCUTANEOUS APPROACH;  Surgeon: Sharlene Mejía MD;  Location: [facility] OR 18/19;  Service: Cardiothoracic;  Laterality: N/A;   • AORTIC VALVE REPAIR/REPLACEMENT N/A 7/13/2021    Procedure: Transfemoral Transcatheter Aortic Valve Replacement with intra-op tte and possible open surgical rescue;  Surgeon: Ayan Pacheco MD;  Location: [facility] OR 18/19;  Service: Cardiovascular;  Laterality: N/A;   • CARDIAC CATHETERIZATION     • CARDIAC CATHETERIZATION N/A 6/10/2016    Procedure: Left Heart Cath;  Surgeon: Chace Johnson MD;  Location: [facility] INVASIVE LOCATION;  Service:    • CARDIAC CATHETERIZATION N/A 6/10/2016    Procedure: Right Heart Cath;  Surgeon: Chace Johnson MD;  Location: [facility] INVASIVE LOCATION;  Service:    • CARDIAC CATHETERIZATION N/A 2/5/2021    Procedure: RIGHT AND LEFT HEART CATH;  Surgeon: Antoine Ayers MD;  Location: [facility] INVASIVE LOCATION;  Service: Cardiology;  Laterality: N/A;   • CARDIAC CATHETERIZATION N/A 2/5/2021    Procedure: Coronary angiography;  Surgeon: Antoine Ayers MD;  Location: [facility] INVASIVE LOCATION;   Service: Cardiology;  Laterality: N/A;   • CARDIAC CATHETERIZATION N/A 2/5/2021    Procedure: Left ventriculography- pressures;  Surgeon: Antoine Ayers MD;  Location: [facility] CATH INVASIVE LOCATION;  Service: Cardiology;  Laterality: N/A;   • CARDIAC ELECTROPHYSIOLOGY PROCEDURE N/A 2/2/2021    Procedure: Pacemaker DC new---[employer] MICRA;  Surgeon: Eliazar Bond MD;  Location: [facility] CATH INVASIVE LOCATION;  Service: Cardiology;  Laterality: N/A;   • CARDIAC SURGERY     • COLONOSCOPY N/A 10/14/2021    Procedure: COLONOSCOPY to cecum and TI  with cold snare polypectomies;  Surgeon: Dixon Azevedo MD;  Location: [facility] ENDOSCOPY;  Service: Gastroenterology;  Laterality: N/A;  pre: melena  post: polyps, diverticulosis   • CORONARY ARTERY BYPASS GRAFT      2 vessel   • CORONARY ARTERY BYPASS GRAFT WITH MITRAL VALVE REPAIR/REPLACEMENT N/A 6/13/2016    Procedure: INTRAOPERATIVE TARIQ, MIDLINE STERNOTOMY, CORONARY ARTERY BYPASS GRAFTING X  2 UTILIZING ENDOSCOPICALLY HARVESTED LEFT GREATER SAPHENOUS VEIN, MITRAL VALVE REPLACEMENT AND TRICUSPID VALVE REPAIR;  Surgeon: Eliecer Mistry MD;  Location: [facility] MAIN OR;  Service:    • CORONARY STENT PLACEMENT  2010    Approx. 6 yrs ago at [facility]   • ENDOSCOPY N/A 3/17/2021    Procedure: ESOPHAGOGASTRODUODENOSCOPY;  Surgeon: Dixon Haas MD;  Location: [facility] ENDOSCOPY;  Service: Gastroenterology;  Laterality: N/A;  PRE: GI BLEED  POST: ANTRAL ULCER   • ENDOSCOPY N/A 10/13/2021    Procedure: ESOPHAGOGASTRODUODENOSCOPY WITH BIOPSIES;  Surgeon: Riana Milner MD;  Location: [facility] ENDOSCOPY;  Service: Gastroenterology;  Laterality: N/A;  pre-melena, anemia   post- mild gastritis, prepyloric erosion, duodenal lymphangiectasia    • HEMORRHOIDECTOMY     • HYSTERECTOMY     • MITRAL VALVE REPLACEMENT     • REPLACEMENT TOTAL KNEE Right    • THYROID SURGERY      Cyst removed from thyroid   • VASCULAR SURGERY        General Information     Row Name 11/02/22 4965           Physical Therapy Time and Intention    Document Type evaluation  -MS (r) ND (t) MS (c)     Mode of Treatment physical therapy  -MS (r) ND (t) MS (c)     Row Name 11/02/22 1354          General Information    Patient Profile Reviewed yes  -MS (r) ND (t) MS (c)     Prior Level of Function mod assist:  recently d/c from rehab  -MS (r) ND (t) MS (c)     Existing Precautions/Restrictions fall  -MS (r) ND (t) MS (c)     Barriers to Rehab medically complex;physical barrier;previous functional deficit  -MS (r) ND (t) MS (c)     Row Name 11/02/22 1358          Living Environment    People in Home child(kamryn), adult;grandchild(kamryn)  -MS (r) ND (t) MS (c)     Row Name 11/02/22 1358          Home Main Entrance    Number of Stairs, Main Entrance none  -MS (r) ND (t) MS (c)     Row Name 11/02/22 1358          Cognition    Orientation Status (Cognition) oriented x 3  -MS (r) ND (t) MS (c)     Row Name 11/02/22 1358          Safety Issues, Functional Mobility    Safety Issues Affecting Function (Mobility) ability to follow commands  -MS (r) ND (t) MS (c)     Impairments Affecting Function (Mobility) endurance/activity tolerance;strength;shortness of breath  -MS (r) ND (t) MS (c)           User Key  (r) = Recorded By, (t) = Taken By, (c) = Cosigned By    Initials Name Provider Type    Sylvie Ventura, PT Physical Therapist    ND Senait Mayes, CHARISMA Student PT Student               Mobility     Row Name 11/02/22 3039          Bed Mobility    Bed Mobility rolling left;supine-sit  -MS (r) ND (t) MS (c)     Rolling Left [place] (Bed Mobility) moderate assist (50% patient effort);maximum assist (25% patient effort)  -MS (r) ND (t) MS (c)     Supine-Sit [place] (Bed Mobility) moderate assist (50% patient effort);maximum assist (25% patient effort)  -MS (r) ND (t) MS (c)     Sit-Supine [place] (Bed Mobility) moderate assist (50% patient effort);maximum assist (25% patient effort)  -MS (r) ND (t) MS (c)      Assistive Device (Bed Mobility) bed rails;draw sheet  -MS (r) ND (t) MS (c)     Row Name 11/02/22 1402          Transfers    Comment, (Transfers) pt deferred standing/transfers/ambulating this date  -MS (r) ND (t) MS (c)     Row Name 11/02/22 1402          Bed-Chair Transfer    Bed-Chair [place] (Transfers) not tested  -MS (r) ND (t) MS (c)     Row Name 11/02/22 1402          Sit-Stand Transfer    Sit-Stand [place] (Transfers) not tested  -MS (r) ND (t) MS (c)     Row Name 11/02/22 1402          Gait/Stairs (Locomotion)    [place] Level (Gait) not tested  -MS (r) ND (t) MS (c)     [place] Level (Stairs) not tested  -MS (r) ND (t) MS (c)           User Key  (r) = Recorded By, (t) = Taken By, (c) = Cosigned By    Initials Name Provider Type    Sylvie Ventura, PT Physical Therapist    Senait Randle, PT Student PT Student               Obj/Interventions     Row Name 11/02/22 1403          Range of Motion Comprehensive    Comment, General Range of Motion unable to assess ROM due to pt deferring standing/ambulating  -MS (r) ND (t) MS (c)     Row Name 11/02/22 1403          Strength Comprehensive (MMT)    Comment, General Manual Muscle Testing (MMT) Assessment unable to assess strength due to pt deferring standing/ambulating  -MS (r) ND (t) MS (c)     Row Name 11/02/22 1403          Balance    Balance Assessment sitting static balance  -MS (r) ND (t) MS (c)     Static Sitting Balance moderate assist;maximum assist;1-person assist  -MS (r) ND (t) MS (c)     Row Name 11/02/22 1403          Sensory Assessment (Somatosensory)    Sensory Assessment (Somatosensory) not tested  -MS (r) ND (t) MS (c)           User Key  (r) = Recorded By, (t) = Taken By, (c) = Cosigned By    Initials Name Provider Type    Sylvie eVntura, PT Physical Therapist    Senait Randle, PT Student PT Student               Goals/Plan     Row Name 11/02/22 1411          Bed Mobility Goal 1 (PT)     Activity/Assistive Device (Bed Mobility Goal 1, PT) bed mobility activities, all  -MS (r) ND (t) MS (c)     [place] Level/Cues Needed (Bed Mobility Goal 1, PT) minimum assist (75% or more patient effort)  -MS (r) ND (t) MS (c)     Time Frame (Bed Mobility Goal 1, PT) 1 week  -MS (r) ND (t) MS (c)     Row Name 11/02/22 1411          Transfer Goal 1 (PT)    Activity/Assistive Device (Transfer Goal 1, PT) transfers, all  -MS (r) ND (t) MS (c)     [place] Level/Cues Needed (Transfer Goal 1, PT) minimum assist (75% or more patient effort)  -MS (r) ND (t) MS (c)     Time Frame (Transfer Goal 1, PT) 1 week  -MS (r) ND (t) MS (c)     Row Name 11/02/22 1411          Gait Training Goal 1 (PT)    Activity/Assistive Device (Gait Training Goal 1, PT) gait (walking locomotion);assistive device use  -MS (r) ND (t) MS (c)     [place] Level (Gait Training Goal 1, PT) minimum assist (75% or more patient effort)  -MS (r) ND (t) MS (c)     Time Frame (Gait Training Goal 1, PT) 1 week  -MS (r) ND (t) MS (c)     Row Name 11/02/22 1411          Therapy Assessment/Plan (PT)    Planned Therapy Interventions (PT) balance training;bed mobility training;gait training;patient/family education;home exercise program;strengthening  -MS (r) ND (t) MS (c)           User Key  (r) = Recorded By, (t) = Taken By, (c) = Cosigned By    Initials Name Provider Type    Sylvie Ventura, PT Physical Therapist    Senait Randle, PT Student PT Student               Clinical Impression     Row Name 11/02/22 1406          Pain    Pretreatment Pain Rating 0/10 - no pain  -MS (r) ND (t) MS (c)     Posttreatment Pain Rating 0/10 - no pain  -MS (r) ND (t) MS (c)     Row Name 11/02/22 1408          Plan of Care Review    Plan of Care Reviewed With patient  -MS (r) ND (t) MS (c)     Outcome Evaluation pt is a 77 yo female presenting wtih SOB. Pt was recently d/c from rehab before being admitted to [facility]. Pt reported using RWx at home and in  the community. Pt sat EOB with mod-max assist x1 and required mod-max assist to maintain sitting position. Pt deferred standing and ambulating due to being tired and feeling weak. Pt appeared to be falling asleep at times while sitting EOB with PT. At this time, PT is recommending d/c to SNU, pending medical status changes. Pt requires skilled therapy at this time to increase activity tolerance, functional mobility, and general strength to facilitate return to PLOF safely.  -MS (r) ND (t) MS (c)     Row Name 11/02/22 1405          Therapy Assessment/Plan (PT)    Criteria for Skilled Interventions Met (PT) yes;meets criteria;skilled treatment is necessary  -MS (r) ND (t) MS (c)     Therapy Frequency (PT) 5 times/wk  -MS (r) ND (t) MS (c)     Row Name 11/02/22 1409          Vital Signs    O2 Delivery Pre Treatment supplemental O2  -MS (r) ND (t) MS (c)     O2 Delivery Intra Treatment supplemental O2  -MS (r) ND (t) MS (c)     O2 Delivery Post Treatment supplemental O2  -MS (r) ND (t) MS (c)     Pre Patient Position Supine  -MS (r) ND (t) MS (c)     Intra Patient Position Sitting  -MS (r) ND (t) MS (c)     Post Patient Position Supine  -MS (r) ND (t) MS (c)     Row Name 11/02/22 1406          Positioning and Restraints    Pre-Treatment Position in bed  -MS (r) ND (t) MS (c)     Post Treatment Position bed  -MS (r) ND (t) MS (c)     In Bed supine;call light within reach;encouraged to call for assist;side rails up x2;exit alarm on  -MS (r) ND (t) MS (c)           User Key  (r) = Recorded By, (t) = Taken By, (c) = Cosigned By    Initials Name Provider Type    Sylvie Ventura, PT Physical Therapist    Senait Randle, CHARISMA Student PT Student               Outcome Measures     Row Name 11/02/22 1412          How much help from another person do you currently need...    Turning from your back to your side while in flat bed without using bedrails? 2  -MS (r) ND (t) MS (c)     Moving from lying on back to sitting on  the side of a flat bed without bedrails? 2  -MS (r) ND (t) MS (c)     Moving to and from a bed to a chair (including a wheelchair)? 2  -MS (r) ND (t) MS (c)     Standing up from a chair using your arms (e.g., wheelchair, bedside chair)? 2  -MS (r) ND (t) MS (c)     Climbing 3-5 steps with a railing? 1  -MS (r) ND (t) MS (c)     To walk in hospital room? 2  -MS (r) ND (t) MS (c)     AM-PAC 6 Clicks Score (PT) 11  -MS (r) ND (t)     Highest level of mobility 4 --> Transferred to chair/commode  -MS (r) ND (t)     Row Name 11/02/22 1212          Modified [place] Scale    Modified [place] Scale 4 - Moderately severe disability.  Unable to walk without assistance, and unable to attend to own bodily needs without assistance.  -CINTHIA     Row Name 11/02/22 1412 11/02/22 1212       Functional Assessment    Outcome Measure Options AM-PAC 6 Clicks Basic Mobility (PT)  -MS (r) ND (t) MS (c) AM-PAC 6 Clicks Daily Activity (OT);Modified [place]  -KN          User Key  (r) = Recorded By, (t) = Taken By, (c) = Cosigned By    Initials Name Provider Type    MS Sylvie Neil CHAZ, PT Physical Therapist    Sai Beal, OT Occupational Therapist    Senait Randle, PT Student PT Student                             Physical Therapy Education     Title: PT OT SLP Therapies (In Progress)     Topic: Physical Therapy (In Progress)     Point: Mobility training (Not Started)     Learner Progress:  Not documented in this visit.          Point: Home exercise program (Not Started)     Learner Progress:  Not documented in this visit.          Point: Body mechanics (Not Started)     Learner Progress:  Not documented in this visit.          Point: Precautions (In Progress)     Learning Progress Summary           Patient Acceptance, E, NR by ND at 11/2/2022 1413                               User Key     Initials Effective Dates Name Provider Type Discipline    ND 10/24/22 -  Senait Mayes, PT Student PT Student PT              PT  Recommendation and Plan  Planned Therapy Interventions (PT): balance training, bed mobility training, gait training, patient/family education, home exercise program, strengthening  Plan of Care Reviewed With: patient  Outcome Evaluation: pt is a 77 yo female presenting wtih SOB. Pt was recently d/c from rehab before being admitted to [facility]. Pt reported using RWx at home and in the community. Pt sat EOB with mod-max assist x1 and required mod-max assist to maintain sitting position. Pt deferred standing and ambulating due to being tired and feeling weak. Pt appeared to be falling asleep at times while sitting EOB with PT. At this time, PT is recommending d/c to SNU, pending medical status changes. Pt requires skilled therapy at this time to increase activity tolerance, functional mobility, and general strength to facilitate return to PLOF safely.     Time Calculation:    PT Charges     Row Name 11/02/22 1358             Time Calculation    Start Time 1318  -MS (r) ND (t) MS (c)      Stop Time 1328  -MS (r) ND (t) MS (c)      Time Calculation (min) 10 min  -MS (r) ND (t)      PT Received On 11/02/22  -MS (r) ND (t) MS (c)      PT - Next Appointment 11/03/22  -MS (r) ND (t) MS (c)      PT Goal Re-Cert Due Date 11/09/22  -MS (r) ND (t) MS (c)            User Key  (r) = Recorded By, (t) = Taken By, (c) = Cosigned By    Initials Name Provider Type    MS RiossSylvie, PT Physical Therapist    Senait Randle, PT Student PT Student              Therapy Charges for Today     Code Description Service Date Service Provider Modifiers Qty    [id number]  PT THER PROC EA 15 MIN 11/2/2022 Senait Mayes, PT Student GP 1    [id number] HC PT EVAL MOD COMPLEXITY 2 11/2/2022 Senait Mayes, PT Student GP 1          PT G-Codes  Outcome Measure Options: AM-PAC 6 Clicks Basic Mobility (PT)  AM-PAC 6 Clicks Score (PT): 11  AM-PAC 6 Clicks Score (OT): 16  Modified [place] Scale: 4 - Moderately severe disability.   Unable to walk without assistance, and unable to attend to own bodily needs without assistance.    Senait Mayes, PT Student  11/2/2022

## 2022-11-02 NOTE — ED NOTES
"Nursing report ED to floor  Miguelina Lopez  76 y.o.  female    HPI :   Chief Complaint   Patient presents with    Shortness of Breath    Cough       Admitting doctor:   Pat Antunez MD    Admitting diagnosis:   The primary encounter diagnosis was Acute congestive heart failure, unspecified heart failure type (HCC). Diagnoses of OLI (acute kidney injury) (HCC) and Hyperglycemia were also pertinent to this visit.    Code status:   Current Code Status       Date Active Code Status Order ID Comments User Context       11/1/2022 1932 CPR (Attempt to Resuscitate) [id number]  Pat Antunez MD ED        Question Answer    Code Status (Patient has no pulse and is not breathing) CPR (Attempt to Resuscitate)    Medical Interventions (Patient has pulse or is breathing) Full                    Allergies:   Coumadin [warfarin sodium], Erythromycin, Hctz [hydrochlorothiazide], Zaroxolyn [metolazone], Penicillins, and Sulfa antibiotics    Isolation:   No active isolations    Intake and Output  No intake or output data in the 24 hours ending 11/01/22 2204    Weight:       11/01/22 1736   Weight: 116 kg (256 lb)       Most recent vitals:   Vitals:    11/01/22 1638 11/01/22 1736 11/01/22 1832   BP: 135/88 146/69 120/48   BP Location:  Right arm Right arm   Patient Position:  Lying Lying   Pulse: 75 95 75   Resp: 20 16 18   Temp: 98.2 °F (36.8 °C)     SpO2: 94% 100% 98%   Weight:  116 kg (256 lb)    Height:  149.9 cm (59\")        Active LDAs/IV Access:   Lines, Drains & Airways       Active LDAs       Name Placement date Placement time Site Days    Peripheral IV 11/01/22 1638 Right Hand 11/01/22  1638  Hand  less than 1    External Urinary Catheter 10/09/22  0500  --  23    External Urinary Catheter 11/01/22 2110  --  less than 1                    Labs (abnormal labs have a star):   Labs Reviewed   COMPREHENSIVE METABOLIC PANEL - Abnormal; Notable for the following components:       Result Value    Glucose 467 " (*)     BUN 71 (*)     Creatinine 2.40 (*)     Sodium 135 (*)     Chloride 92 (*)     Albumin 3.40 (*)     Alkaline Phosphatase 171 (*)     BUN/Creatinine Ratio 29.6 (*)     eGFR 20.3 (*)     All other components within normal limits    Narrative:     GFR Normal >60  Chronic Kidney Disease <60  Kidney Failure <15    The GFR formula is only valid for adults with stable renal function between ages 18 and 70.   BNP (IN-HOUSE) - Abnormal; Notable for the following components:    proBNP 10,416.0 (*)     All other components within normal limits    Narrative:     Among patients with dyspnea, NT-proBNP is highly sensitive for the detection of acute congestive heart failure. In addition NT-proBNP of <300 pg/ml effectively rules out acute congestive heart failure with 99% negative predictive value.    Results may be falsely decreased if patient taking Biotin.     CBC WITH AUTO DIFFERENTIAL - Abnormal; Notable for the following components:    RBC 3.31 (*)     Hemoglobin 8.7 (*)     Hematocrit 28.2 (*)     MCH 26.3 (*)     MCHC 30.9 (*)     Neutrophil % 87.8 (*)     Lymphocyte % 3.9 (*)     Immature Grans % 2.2 (*)     Neutrophils, Absolute 7.25 (*)     Lymphocytes, Absolute 0.32 (*)     Immature Grans, Absolute 0.18 (*)     All other components within normal limits   RESPIRATORY PANEL PCR W/ COVID-19 (SARS-COV-2) BREA/KAROLYN/ELMO/PAD/COR/MAD/JARET IN-HOUSE, NP SWAB IN UT/VTP, 3-4 HR TAT - Normal    Narrative:     In the setting of a positive respiratory panel with a viral infection PLUS a negative procalcitonin without other underlying concern for bacterial infection, consider observing off antibiotics or discontinuation of antibiotics and continue supportive care. If the respiratory panel is positive for atypical bacterial infection (Bordetella pertussis, Chlamydophila pneumoniae, or Mycoplasma pneumoniae), consider antibiotic de-escalation to target atypical bacterial infection.   TROPONIN (IN-HOUSE) - Normal    Narrative:      Troponin T Reference Range:  <= 0.03 ng/mL-   Negative for AMI  >0.03 ng/mL-     Abnormal for myocardial necrosis.  Clinicians would have to utilize clinical acumen, EKG, Troponin and serial changes to determine if it is an Acute Myocardial Infarction or myocardial injury due to an underlying chronic condition.       Results may be falsely decreased if patient taking Biotin.     RAINBOW DRAW    Narrative:     The following orders were created for panel order [place] Draw.  Procedure                               Abnormality         Status                     ---------                               -----------         ------                     Green Top (Gel)[[id number]]                                  Final result               Lavender Top[[id number]]                                     Final result               Gold Top - SST[[id number]]                                   Final result               Light Blue Top[[id number]]                                   Final result                 Please view results for these tests on the individual orders.   TROPONIN (IN-HOUSE)   POCT GLUCOSE FINGERSTICK   POCT GLUCOSE FINGERSTICK   POCT GLUCOSE FINGERSTICK   CBC AND DIFFERENTIAL    Narrative:     The following orders were created for panel order CBC & Differential.  Procedure                               Abnormality         Status                     ---------                               -----------         ------                     CBC Auto Differential[[id number]]        Abnormal            Final result                 Please view results for these tests on the individual orders.   GREEN TOP   LAVENDER TOP   GOLD TOP - SST   LIGHT BLUE TOP       EKG:   ECG 12 Lead ED Triage Standing Order; SOA   Preliminary Result   HEART RATE= 75  bpm   RR Interval= 800  ms   KS Interval=   ms   P Horizontal Axis=   deg   P Front Axis=   deg   QRSD Interval= 164  ms   QT Interval= 453  ms   QRS Axis= 139  deg   T Wave Axis= -7  deg    - ABNORMAL ECG -   Accelerated junctional rhythm   Anterolateral infarct, old   Prolonged QT interval   Electronically Signed By:    Date and Time of Study: 2022-11-01 16:56:33          Meds given in ED:   Medications   sodium chloride 0.9 % flush 10 mL (has no administration in time range)   nitroglycerin (NITROSTAT) SL tablet 0.4 mg (has no administration in time range)   acetaminophen (TYLENOL) tablet 650 mg (has no administration in time range)   ondansetron (ZOFRAN) tablet 4 mg (has no administration in time range)     Or   ondansetron (ZOFRAN) injection 4 mg (has no administration in time range)   melatonin tablet 3 mg (has no administration in time range)   furosemide (LASIX) injection 40 mg (has no administration in time range)   dextrose (GLUTOSE) oral gel 15 g (has no administration in time range)   dextrose (D50W) (25 g/50 mL) IV injection 25 g (has no administration in time range)   glucagon (human recombinant) (GLUCAGEN DIAGNOSTIC) injection 1 mg (has no administration in time range)   insulin lispro (ADMELOG) injection 0-9 Units (has no administration in time range)   furosemide (LASIX) injection 80 mg (80 mg Intravenous Given 11/1/22 2103)   insulin regular (humuLIN R,novoLIN R) injection 5 Units (5 Units Intravenous Given 11/1/22 2113)       Imaging results:  XR Chest 2 View    Result Date: 11/1/2022  No focal pulmonary consolidation. Cardiomegaly. Mild prominence of vascular and interstitial markings.  This report was finalized on 11/1/2022 6:27 PM by Dr. Mayur Bishop M.D.       Ambulatory status:   - bed bound    Social issues:   Social History     Socioeconomic History    Marital status:    Tobacco Use    Smoking status: Never    Smokeless tobacco: Never    Tobacco comments:     no caffeine    Vaping Use    Vaping Use: Never used   Substance and Sexual Activity    Alcohol use: No    Drug use: No    Sexual activity: Defer       NIH Stroke Scale:         Jessica Ortega RN  11/01/22  22:04 EDT

## 2022-11-02 NOTE — PLAN OF CARE
Goal Outcome Evaluation:  Plan of Care Reviewed With: patient        Progress: no change     Diuretic in Use: IV bumex 4mg Q8  Response to Diuretics (Output greater than intake): yes  Daily Weight (up or down): unable to check due to admission yesterday  O2 Requirements: 3L NC, baseline   Functional Status (Activity level, tolerance and respiratory symptoms): pt very weak and gets tired easily when trying to work with PT and OT, pt maintains sats on 3L NC, pt has a productive cough  Discharge Plans:  Rehab

## 2022-11-02 NOTE — CONSULTS
[facility] Consult Note      Patient Name: Miguelina Lopez  : 1945  MRN: [id number]  Primary Care Physician:  Arcelia Talbot APRN  Referring Physician: Pat Antunez MD  Date of admission: 2022    Subjective     Reason for Consult: Acute on chronic kidney    HPI:   Miguelina Lopez is a 76 y.o. female was admitted on 2022 with progressive shortness of breath with orthopnea and PND for the past 48 hours.  Noted to have worsening creatinine hence nephrology consult was requested  Patient has history of chronic kidney disease stage IV associate with diabetic and hypertensive glomerulosclerosis she has history of diabetic neuropathy also history of pacemaker, COPD and pulmonary hypertension with chronic lymphedema.  She had CABG also history of aortic stenosis with previous TAVR she had mitral valve replacement and tricuspid repair.  In  her creatinine was in the 1.1-1.6 range and early  was in the 2 range.  She was hospitalized in early 2022 and her creatinine when she was discharged was 1.99.  On admission creatinine was 2.4 and this morning 2.25.    Patient is having orthopnea and PND, no chest pain, no nausea or vomiting, no abdominal pain, no dysuria or gross hematuria she has a pure wick system and the urine appears to be very cloudy in the bag.  She had chronic lower extremity edema.  Review of Systems:   14 point review of systems is otherwise negative except for mentioned above on HPI    Personal History     Past Medical History:   Diagnosis Date   • Acute on chronic respiratory failure with hypoxia and hypercapnia ([facility])    • LOI (acute kidney injury) ([facility])    • Anemia    • Anxiety    • Aortic valve stenosis    • Arthritis     KNEES   • Bilateral lower extremity edema    • CHF (congestive heart failure) ([facility])    • Chronic coronary artery disease    • Class 3 severe obesity due to excess calories in adult ([facility])    • COPD (chronic  obstructive pulmonary disease) ([facility])    • Depression    • Diabetes mellitus (HCC)    • Dry skin    • Elevated cholesterol    • GERD (gastroesophageal reflux disease)    • Heart murmur    • Hypertension    • Mitral valve insufficiency    • Pneumonia     1/2016   • Pulmonary hypertension (HCC)     due to sleep disordered breathing   • Sleep apnea     Uses CPAP or oxygen   • Stage 3 chronic kidney disease (HCC)    • Subclinical hypothyroidism    • Supplemental oxygen dependent     3 LITERS   • TIA (transient ischemic attack) 3/15/2021   • Valvular heart disease        Past Surgical History:   Procedure Laterality Date   • AORTIC VALVE REPAIR/REPLACEMENT N/A 7/13/2021    Procedure: TTE TRANSFEMORAL TRANSCATHETER AORTIC VALVE REPLACEMENT PERCUTANEOUS APPROACH;  Surgeon: Sharlene Mejía MD;  Location: [facility] OR 18/19;  Service: Cardiothoracic;  Laterality: N/A;   • AORTIC VALVE REPAIR/REPLACEMENT N/A 7/13/2021    Procedure: Transfemoral Transcatheter Aortic Valve Replacement with intra-op tte and possible open surgical rescue;  Surgeon: Ayan Pacheco MD;  Location: [facility] OR 18/19;  Service: Cardiovascular;  Laterality: N/A;   • CARDIAC CATHETERIZATION     • CARDIAC CATHETERIZATION N/A 6/10/2016    Procedure: Left Heart Cath;  Surgeon: Chace Johnson MD;  Location: [facility] CATH INVASIVE LOCATION;  Service:    • CARDIAC CATHETERIZATION N/A 6/10/2016    Procedure: Right Heart Cath;  Surgeon: Chace Johnson MD;  Location: [facility] CATH INVASIVE LOCATION;  Service:    • CARDIAC CATHETERIZATION N/A 2/5/2021    Procedure: RIGHT AND LEFT HEART CATH;  Surgeon: Antoine Ayers MD;  Location: [facility] CATH INVASIVE LOCATION;  Service: Cardiology;  Laterality: N/A;   • CARDIAC CATHETERIZATION N/A 2/5/2021    Procedure: Coronary angiography;  Surgeon: Antoine Ayers MD;  Location: [facility] CATH INVASIVE LOCATION;  Service: Cardiology;  Laterality: N/A;   • CARDIAC CATHETERIZATION N/A  2/5/2021    Procedure: Left ventriculography- pressures;  Surgeon: Antoine Ayers MD;  Location: [facility] CATH INVASIVE LOCATION;  Service: Cardiology;  Laterality: N/A;   • CARDIAC ELECTROPHYSIOLOGY PROCEDURE N/A 2/2/2021    Procedure: Pacemaker DC new---[employer] MICRA;  Surgeon: Eliazar Bond MD;  Location: [facility] CATH INVASIVE LOCATION;  Service: Cardiology;  Laterality: N/A;   • CARDIAC SURGERY     • COLONOSCOPY N/A 10/14/2021    Procedure: COLONOSCOPY to cecum and TI  with cold snare polypectomies;  Surgeon: Dixon Azevedo MD;  Location: [facility] ENDOSCOPY;  Service: Gastroenterology;  Laterality: N/A;  pre: melena  post: polyps, diverticulosis   • CORONARY ARTERY BYPASS GRAFT      2 vessel   • CORONARY ARTERY BYPASS GRAFT WITH MITRAL VALVE REPAIR/REPLACEMENT N/A 6/13/2016    Procedure: INTRAOPERATIVE TARIQ, MIDLINE STERNOTOMY, CORONARY ARTERY BYPASS GRAFTING X  2 UTILIZING ENDOSCOPICALLY HARVESTED LEFT GREATER SAPHENOUS VEIN, MITRAL VALVE REPLACEMENT AND TRICUSPID VALVE REPAIR;  Surgeon: Eliecer Mistry MD;  Location: [facility] MAIN OR;  Service:    • CORONARY STENT PLACEMENT  2010    Approx. 6 yrs ago at [facility]   • ENDOSCOPY N/A 3/17/2021    Procedure: ESOPHAGOGASTRODUODENOSCOPY;  Surgeon: Dixon Haas MD;  Location: [facility] ENDOSCOPY;  Service: Gastroenterology;  Laterality: N/A;  PRE: GI BLEED  POST: ANTRAL ULCER   • ENDOSCOPY N/A 10/13/2021    Procedure: ESOPHAGOGASTRODUODENOSCOPY WITH BIOPSIES;  Surgeon: Riana Milner MD;  Location: [facility] ENDOSCOPY;  Service: Gastroenterology;  Laterality: N/A;  pre-melena, anemia   post- mild gastritis, prepyloric erosion, duodenal lymphangiectasia    • HEMORRHOIDECTOMY     • HYSTERECTOMY     • MITRAL VALVE REPLACEMENT     • REPLACEMENT TOTAL KNEE Right    • THYROID SURGERY      Cyst removed from thyroid   • VASCULAR SURGERY         Family History: family history includes Heart attack in her father; Heart disease in her father.    Social History:   reports that she has never smoked. She has never used smokeless tobacco. She reports that she does not drink alcohol and does not use drugs.    Home Medications:  Prior to Admission medications    Medication Sig Start Date End Date Taking? Authorizing Provider   acetaminophen (TYLENOL) 325 MG tablet Take 2 tablets by mouth Every 4 (Four) Hours As Needed for Mild Pain . 2/11/21   Colleen Hensley APRN   allopurinol (ZYLOPRIM) 100 MG tablet Take 1 tablet by mouth Daily. 10/19/22   Kamlesh Marrero MD   atorvastatin (LIPITOR) 40 MG tablet TAKE ONE TABLET BY MOUTH DAILY 11/1/22   Arcelia Talbot APRN   carvedilol (COREG) 12.5 MG tablet Take 1 tablet by mouth 2 (Two) Times a Day With Meals. 10/19/21   Viji Paulson APRN   fluticasone (FLONASE) 50 MCG/ACT nasal spray 2 sprays by Each Nare route Daily.  Patient taking differently: 2 sprays by Each Nare route Daily As Needed. 2/11/21   Colleen Hensley APRN   Fluticasone-Salmeterol (ADVAIR) 250-50 MCG/ACT DISKUS Inhale 1 puff 2 (Two) Times a Day. 5/5/22   Arcelia Talbot APRN   gabapentin (NEURONTIN) 100 MG capsule Take 1 capsule by mouth Every 12 (Twelve) Hours for 3 days. 10/18/22 10/21/22  Kamlesh Marrero MD   levothyroxine (SYNTHROID, LEVOTHROID) 25 MCG tablet TAKE 2 TABLETS BY MOUTH ON MONDAY, WEDNESDAY AND FRIDAY AND TAKE ONE TABLET ON TUESDAY, THURSDAY, SATURDAY AND SUNDAY  Patient taking differently: Take  by mouth Daily. TAKE 2 TABLETS BY MOUTH ON MONDAY, WEDNESDAY AND FRIDAY AND TAKE ONE TABLET ON TUESDAY, THURSDAY, SATURDAY AND SUNDAY 5/5/22   Arcelia Talbot APRN   lidocaine (LIDODERM) 5 % Place 1 patch on the skin as directed by provider Daily. Remove & Discard patch within 12 hours or as directed by MD 10/19/22   Kamlesh Marrero MD   nitroglycerin (NITROSTAT) 0.4 MG SL tablet DISSOLVE 1 TAB UNDER TONGUE FOR CHEST PAIN - IF PAIN REMAINS AFTER 5 MIN, CALL 911 AND REPEAT DOSE. MAX 3 TABS IN 15 MINUTES  Patient taking differently: Place 1 tablet  under the tongue Every 5 (Five) Minutes As Needed. 9/18/20   Antoine Ayers MD   omeprazole (priLOSEC) 40 MG capsule Take 1 capsule by mouth Daily. 8/3/22   Arcelia Talbot APRN   polyethylene glycol (MIRALAX) 17 g packet Take 17 g by mouth Daily. 10/19/22   Kamlesh Marrero MD   potassium chloride (K-DUR,KLOR-CON) 20 MEQ CR tablet Take 1 tablet by mouth Every Other Day. 4/18/22   Arcelia Talbot APRN   Probiotic Product (Risaquad-2) capsule capsule Take 1 capsule by mouth Daily.    ProviderPito MD   senna-docusate (PERICOLACE) 8.6-50 MG per tablet Take 1 tablet by mouth Every Other Day.    Provider, MD Pito   sodium chloride 0.65 % nasal spray 2 sprays into the nostril(s) as directed by provider As Needed for Congestion. 2/11/21   Colleen Hensley APRN   spironolactone (ALDACTONE) 25 MG tablet Take 1 tablet by mouth. 10/27/22 1/25/23  Pito Argueta MD   torsemide (DEMADEX) 100 MG tablet Take 1 tablet by mouth Daily. 10/18/22   Kamlesh Marrero MD   Trulicity 1.5 MG/0.5ML solution pen-injector INJECT 1.5 MG UNDER THE SKIN ONCE WEEKLY 9/6/22   Dwayne Thomas MD   vilazodone (Viibryd) 20 MG tablet tablet Take 1 tablet by mouth Every Night. 6/27/22   Arcelia Talbot APRN       Allergies:  Allergies   Allergen Reactions   • Coumadin [Warfarin Sodium] Diarrhea and Nausea Only   • Erythromycin    • Hctz [Hydrochlorothiazide] Swelling   • Zaroxolyn [Metolazone] Diarrhea   • Penicillins Rash     Tolerated cephalosporins in the past    • Sulfa Antibiotics Rash       Objective     Vitals:   Temp:  [97.6 °F (36.4 °C)-98.2 °F (36.8 °C)] 97.6 °F (36.4 °C)  Heart Rate:  [72-95] 75  Resp:  [16-20] 18  BP: (113-146)/(40-88) 119/46  Flow (L/min):  [2-3] 3    Intake/Output Summary (Last 24 hours) at 11/2/2022 0850  Last data filed at 11/2/2022 0804  Gross per 24 hour   Intake --   Output 500 ml   Net -500 ml       Physical Exam:   Constitutional: Awake, alert, no acute distress.  Morbidly  obese and chronically ill  HEENT: Sclera anicteric, no conjunctival injection  Neck: Supple, no thyromegaly, no lymphadenopathy, trachea at midline, increased JVD  Respiratory: Bilateral rhonchi, nonlabored respiration  Cardiovascular: RRR, no murmurs, no rubs or gallops, no carotid bruit  Gastrointestinal: Positive bowel sounds, abdomen is soft, nontender and nondistended  : No palpable bladder, pure wick system  Musculoskeletal: 2+ lower extremity edema, no clubbing or cyanosis  Psychiatric: Appropriate affect, cooperative  Neurologic: Oriented x3, moving all extremities, normal speech and mental status  Skin: Warm and dry       Scheduled Meds:     allopurinol, 100 mg, Oral, Daily  atorvastatin, 40 mg, Oral, Daily  budesonide-formoterol, 2 puff, Inhalation, BID - RT  carvedilol, 12.5 mg, Oral, BID With Meals  dextromethorphan polistirex ER, 60 mg, Oral, Q12H  furosemide, 40 mg, Intravenous, Q12H  gabapentin, 100 mg, Oral, BID  insulin lispro, 0-9 Units, Subcutaneous, TID With Meals  lactobacillus acidophilus, 1 capsule, Oral, Daily  levothyroxine, 100 mcg, Oral, Q AM  lidocaine, 1 patch, Transdermal, Q24H  pantoprazole, 40 mg, Oral, QAM  polyethylene glycol, 17 g, Oral, Daily  potassium chloride, 20 mEq, Oral, Daily  sennosides-docusate, 1 tablet, Oral, Every Other Day  vilazodone, 20 mg, Oral, Nightly      IV Meds:        Results Reviewed:   I have personally reviewed the results from the time of this admission to 11/2/2022 08:50 EDT     Lab Results   Component Value Date    GLUCOSE 282 (H) 11/02/2022    CALCIUM 9.0 11/02/2022     11/02/2022    K 3.8 11/02/2022    CO2 29.8 (H) 11/02/2022    CL 96 (L) 11/02/2022    BUN 71 (H) 11/02/2022    CREATININE 2.25 (H) 11/02/2022    EGFRIFAFRI 41 (L) 06/23/2021    EGFRIFNONA 29 (L) 10/19/2021    BCR 31.6 (H) 11/02/2022    ANIONGAP 12.2 11/02/2022      Lab Results   Component Value Date    MG 1.8 10/15/2022    PHOS 4.8 (H) 10/12/2022    ALBUMIN 3.40 (L) 11/01/2022            Assessment / Plan     ASSESSMENT:  -Acute kidney injury on chronic kidney disease, slightly improved since admission, associated with diabetic and hypertensive glomerulosclerosis  -Fluid overload and congestive heart failure  -Coronary artery disease with prior CABG  -Aortic stenosis prior TAVR  -Prior mitral valve replacement tricuspid valve repair  -COPD with pulmonary hypertension  -Morbid obesity  -Chronic anemia, hemoglobin today 8.6, patient has evidence of iron deficiency as noted in July 2022.  -History of gout, treated with allopurinol and appears to be stable    PLAN:  -Recheck iron stores  -Check random urine for protein to creatinine ratio also for sodium and chloride  -Restrict sodium intake to 2 g daily  -Diurese with IV Bumex 4 mg every 8 hours x 3 doses, reassess in the next 24 hours to adjust the diuretics further  -Surveillance labs    Thank you for involving us in the care of Miguelina Lopez.  Please feel free to call with any questions.    Dionicio Krishna MD  11/02/22  08:50 EDT    [facility]  [phone]      Much of this encounter note is an electronic transcription/translation of spoken language to printed text. The electronic translation of spoken language may permit erroneous, or at times, nonsensical words or phrases to be inadvertently transcribed; Although I have reviewed the note for such errors, some may still exist.

## 2022-11-02 NOTE — PLAN OF CARE
Goal Outcome Evaluation:  Plan of Care Reviewed With: patient           Outcome Evaluation: Pt is a 76 year old female admitted to [facility] on 11/1 with feeling SOA. Pt has a past medical history of OLI, anxiety, CHF, COPD, HBP, GERD, and a TIA. Pt currently lives with her son in law and her grandchild. Pt reports that she was in the hospital a month ago after she fell in the shower. Pt spent a few days in the hospital and then went to IP rehab and was D/c after a week. Pt feels that she was not ready to go home because she still felt very weak. Pt is now back in the hospital after feeling SOA. Pt has many concerns about going home in the current state she is in becuase of her weakness. Pt demonstrates decreased strength, endurance, and balance and will benefit from skilled OT services. Recommend pt to go back to IRF/SNF.

## 2022-11-02 NOTE — DISCHARGE PLACEMENT REQUEST
"Miguelina Lopez (76 y.o. Female)     Date of Birth   1945    Social Security Number       Address   [address] [place] [id number]    Home Phone   [phone]    MRN   [id number]       [employer]    Marital Status                               Admission Date   11/1/22    Admission Type   Emergency    Admitting Provider   Pat Antunez MD    Attending Provider   Eliazar Bridges MD    Department, Room/Bed   [address], [address]1       Discharge Date       Discharge Disposition       Discharge Destination                               Attending Provider: Eliazar Bridges MD    Allergies: Coumadin [Warfarin Sodium], Erythromycin, Hctz [Hydrochlorothiazide], Zaroxolyn [Metolazone], Penicillins, Sulfa Antibiotics    Isolation: None   Infection: COVID (rule out) (11/01/22)   Code Status: CPR    Ht: 149.9 cm (59\")   Wt: 121 kg (267 lb 10.2 oz)    Admission Cmt: None   Principal Problem: Acute on chronic combined systolic and diastolic HF (heart failure) (HCC) [I50.43]                 Active Insurance as of 11/1/2022     Primary Coverage     Payor Plan Insurance Group Employer/Plan Group    HUMANA MEDICARE REPLACEMENT HUMANA MEDICARE REPLACEMENT [id number]     Payor Plan Address Payor Plan Phone Number Payor Plan Fax Number Effective Dates    [address] [address] [phone]  4/1/2022 - None Entered    Formerly [facility] [phone]       Subscriber Name Subscriber Birth Date Member ID       MIGUELINA LOPEZ 1945 [id number]                 Emergency Contacts      (Rel.) Home Phone Work Phone Mobile Phone    SofieCarmen (Grandchild) [phone] -- --    CHRISTIANNE DOMINGUEZ (son in law) (Relative) [phone] -- [phone]    RamiroPuja (Daughter) -- -- [phone]          "

## 2022-11-03 NOTE — PROGRESS NOTES
Name: Miguelina Lopez ADMIT: 2022   : 1945  PCP: Arcelia Talbot APRN    MRN: [id number] LOS: 2 days   AGE/SEX: 76 y.o. female  ROOM: [facility]     Subjective   Subjective   Patient appears morbidly obese, generally weak, relatively comfortable, no apparent distress.  Voices no new concerns.  BM x4 on 2022.  States breathing about the same.  Decreased intake / appetite.    Review of Systems   Constitutional: Negative for chills and fever.   Respiratory: Positive for shortness of breath. Negative for cough.    Cardiovascular: Positive for leg swelling. Negative for chest pain.   Gastrointestinal: Positive for diarrhea (increased frequency on 2022). Negative for abdominal pain, constipation, nausea and vomiting.   Genitourinary: Negative for difficulty urinating and dysuria.   Musculoskeletal: Positive for gait problem (Due to generalized weakness). Negative for myalgias.        Objective   Objective   Vital Signs  Temp:  [98.1 °F (36.7 °C)-98.7 °F (37.1 °C)] 98.5 °F (36.9 °C)  Heart Rate:  [68-78] 75  Resp:  [18-20] 18  BP: (102-116)/(36-59) 102/46  SpO2:  [93 %-97 %] 94 %  on  Flow (L/min):  [3] 3;   Device (Oxygen Therapy): nasal cannula  Body mass index is 53.18 kg/m².     Physical Exam  Constitutional:       General: She is not in acute distress.     Appearance: She is obese. She is not toxic-appearing.      Comments: Generally weak   Cardiovascular:      Rate and Rhythm: Normal rate.      Heart sounds: Murmur heard.      Comments: Paced  Pulmonary:      Effort: Pulmonary effort is normal.      Comments: Diminished on expiration throughout lung fields  Abdominal:      General: Bowel sounds are normal. There is no distension.      Palpations: Abdomen is soft.      Tenderness: There is no abdominal tenderness. There is no guarding.   Musculoskeletal:      Right lower leg: Edema present.      Left lower leg: Edema present.   Skin:     General: Skin is warm and dry.      Coloration:  Skin is pale.   Neurological:      Mental Status: She is alert.     *(no change in physical exam on 11/3/2022)  Results Review     I reviewed the patient's new clinical results.  Results from last 7 days   Lab Units 11/03/22 0421 11/02/22 0429 11/01/22  1746   WBC 10*3/mm3 7.57 7.77 8.25   HEMOGLOBIN g/dL 8.6* 8.6* 8.7*   PLATELETS 10*3/mm3 171 182 199     Results from last 7 days   Lab Units 11/03/22 0421 11/02/22 0429 11/01/22  1746   SODIUM mmol/L 137 138 135*   POTASSIUM mmol/L 4.4 3.8 4.4   CHLORIDE mmol/L 97* 96* 92*   CO2 mmol/L 31.4* 29.8* 28.6   BUN mg/dL 68* 71* 71*   CREATININE mg/dL 2.18* 2.25* 2.40*   GLUCOSE mg/dL 180* 282* 467*   EGFR mL/min/1.73 22.8* 22.0* 20.3*     Results from last 7 days   Lab Units 11/03/22 0421 11/01/22  1746   ALBUMIN g/dL 2.70* 3.40*   BILIRUBIN mg/dL  --  0.5   ALK PHOS U/L  --  171*   AST (SGOT) U/L  --  18   ALT (SGPT) U/L  --  11     Results from last 7 days   Lab Units 11/03/22 0421 11/02/22 0429 11/01/22  1746   CALCIUM mg/dL 8.7 9.0 8.7   ALBUMIN g/dL 2.70*  --  3.40*   MAGNESIUM mg/dL 1.8  --   --    PHOSPHORUS mg/dL 3.3  --   --        Hemoglobin A1C   Date/Time Value Ref Range Status   11/02/2022 0429 10.30 (H) 4.80 - 5.60 % Final     Glucose   Date/Time Value Ref Range Status   11/03/2022 1051 291 (H) 70 - 130 mg/dL Final     Comment:     Meter: [id number] : [id number] Yanci Roland   11/03/2022 0619 198 (H) 70 - 130 mg/dL Final     Comment:     Meter: [id number] : [id number] Prakash ZAVALA   11/02/2022 2019 252 (H) 70 - 130 mg/dL Final     Comment:     Meter: [id number] : [id number] Prakash Schmitz    11/02/2022 1652 216 (H) 70 - 130 mg/dL Final     Comment:     Meter: [id number] : [id number] Dao Crystal    11/02/2022 1143 284 (H) 70 - 130 mg/dL Final     Comment:     Meter: [id number] : [id number] Dao Rojas    11/02/2022 0631 291 (H) 70 - 130 mg/dL Final     Comment:     Meter: [id number] : [id number]  Pradeep ZAVALA   11/01/2022 2310 288 (H) 70 - 130 mg/dL Final     Comment:     Meter: [id number] : [id number] Pradeep ZAVALA       XR Chest 2 View    Result Date: 11/1/2022  No focal pulmonary consolidation. Cardiomegaly. Mild prominence of vascular and interstitial markings.  This report was finalized on 11/1/2022 6:27 PM by Dr. Mayur Bishop M.D.      Scheduled Medications  allopurinol, 100 mg, Oral, Daily  atorvastatin, 40 mg, Oral, Daily  budesonide-formoterol, 2 puff, Inhalation, BID - RT  bumetanide, 4 mg, Intravenous, Q8H  carvedilol, 12.5 mg, Oral, BID With Meals  dextromethorphan polistirex ER, 60 mg, Oral, Q12H  ferric gluconate, 250 mg, Intravenous, Daily  ferrous sulfate, 325 mg, Oral, Daily With Breakfast  gabapentin, 100 mg, Oral, BID  insulin lispro, 0-14 Units, Subcutaneous, TID AC  lactobacillus acidophilus, 1 capsule, Oral, Daily  levothyroxine, 100 mcg, Oral, Q AM  lidocaine, 1 patch, Transdermal, Q24H  pantoprazole, 40 mg, Oral, QAM  potassium chloride, 20 mEq, Oral, Daily  sennosides-docusate, 1 tablet, Oral, Every Other Day  vilazodone, 20 mg, Oral, Nightly    Infusions   Diet  Diet Regular; Cardiac, Low Sodium; 2,000 mg Na       Assessment/Plan     Active Hospital Problems    Diagnosis  POA   • **Acute on chronic combined systolic and diastolic HF (heart failure) ([facility]) [I50.43]  Yes   • S/P TAVR (transcatheter aortic valve replacement) [Z95.2]  Not Applicable   • Stage 3b chronic kidney disease ([facility]) [N18.32]  Yes   • Hypothyroidism (acquired) [E03.9]  Yes   • COPD (chronic obstructive pulmonary disease) ([facility]) [J44.9]  Yes   • Chronic respiratory failure with hypoxia and hypercapnia ([facility]) [J96.11, J96.12]  Yes   • Paroxysmal atrial fibrillation ([facility]) [I48.0]  Yes   • s/p MVR, TV-repair, CABG x2 6/13/16 [Z95.2]  Not Applicable   • Type 2 diabetes mellitus with kidney complication, without long-term current use of insulin (HCC) [E11.29]  Yes   • OCHOA (obstructive sleep apnea)  [G47.33]  Yes   • Anemia, chronic disease [D63.8]  Yes   • Essential hypertension [I10]  Yes      Resolved Hospital Problems   No resolved problems to display.       76 y.o. female admitted with Acute on chronic combined systolic and diastolic HF (heart failure) ([facility]).    Appreciate input from all consultants.    Cardiology following HFpEF-->recommend nephrology manage diuretics.  Continue cardiac medications per home dose regimen.  Heart failure clinic at discharge.  Beta-blocker continued on admission.    Currently tolerating 3 L oxygen (baseline requirements) with O2 saturation 92%.    Nephrology managing OLI over CKD and diuretics for acute on chronic combined systolic and diastolic congestive heart failure.  2 g sodium restricted diet.    BP soft acutely.  See plan above.    DM type II: Glucose better today.  Continue moderate-high correctional sliding scale for now.  NCS diet--tolerated peaches for breakfast & requested fruit tray for lunch (encouraged protein as well).    Anemia: Serum hemoglobin stable with trend.  No overt signs of active bleeding.  Most likely due to above.  Contributing to shortness of breath.  PPI.  IV ferric gluconate.    Debility: Complicated by all.  PT/OT following recommend SNU discharge.  Minimize bowel regimen for increase BM on 11/2/2022.    Frequent hospital admission trend prompting palliative consult for goals of care discussion.      · SCDs for DVT prophylaxis.  · CPR  · Discussed with patient & CCP.  · Anticipate discharge pending clinical course / most likely SNU / CCP following      [facility]  11/03/22  11:03 EDT

## 2022-11-03 NOTE — PLAN OF CARE
Goal Outcome Evaluation:               Diuretic in Use: BUMEX  Response to Diuretics (Output greater than intake): OUTPUT>IN  Daily Weight (up or down):   O2 Requirements: 3L  Functional Status (Activity level, tolerance and respiratory symptoms): ASSIST IMMOBILE  Discharge Plans:  SNF

## 2022-11-03 NOTE — PROGRESS NOTES
[facility] Progress Note      Patient Name: Miguelina Lopez  : 1945  MRN: [id number]  Primary Care Physician:  Arcelia Talbot APRN  Date of admission: 2022    Subjective     Interval History:   Follow-up acute kidney injury on chronic kidney disease    Patient continues to have cough, she has shortness of breath with orthopnea and PND, no chest pain, no nausea or vomiting, no dysuria or gross hematuria.    Review of Systems:   As noted above    Objective     Vitals:   Temp:  [98.1 °F (36.7 °C)-98.7 °F (37.1 °C)] 98.5 °F (36.9 °C)  Heart Rate:  [68-78] 75  Resp:  [18-20] 18  BP: (102-119)/(36-59) 102/46  Flow (L/min):  [3] 3    Intake/Output Summary (Last 24 hours) at 11/3/2022 0732  Last data filed at 11/3/2022 0509  Gross per 24 hour   Intake 1128 ml   Output 1300 ml   Net -172 ml       Physical Exam:    General Appearance: alert, awake, no acute distress, morbidly obese and chronically  Skin: warm and dry  HEENT: oral mucosa normal, nonicteric sclera  Neck: Mild JVD  Lungs: Bilateral rhonchi and crackles, breathing effort not labored  Heart: RRR, normal S1 and S2, no rub  Abdomen: soft, nontender, nondistended, normoactive bowel  : no palpable bladder pure wick system is in place  Extremities: 2+ lower extremity edema.  Neuro: normal speech and mental status     Scheduled Meds:     allopurinol, 100 mg, Oral, Daily  atorvastatin, 40 mg, Oral, Daily  budesonide-formoterol, 2 puff, Inhalation, BID - RT  carvedilol, 12.5 mg, Oral, BID With Meals  dextromethorphan polistirex ER, 60 mg, Oral, Q12H  ferrous sulfate, 325 mg, Oral, Daily With Breakfast  gabapentin, 100 mg, Oral, BID  insulin lispro, 0-14 Units, Subcutaneous, TID AC  lactobacillus acidophilus, 1 capsule, Oral, Daily  levothyroxine, 100 mcg, Oral, Q AM  lidocaine, 1 patch, Transdermal, Q24H  pantoprazole, 40 mg, Oral, QAM  polyethylene glycol, 17 g, Oral, Daily  potassium chloride, 20 mEq, Oral,  Daily  sennosides-docusate, 1 tablet, Oral, Every Other Day  vilazodone, 20 mg, Oral, Nightly      IV Meds:        Results Reviewed:   I have personally reviewed the results from the time of this admission to 11/3/2022 07:32 EDT     Results from last 7 days   Lab Units 11/03/22 0421 11/02/22 0429 11/01/22  1746   SODIUM mmol/L 137 138 135*   POTASSIUM mmol/L 4.4 3.8 4.4   CHLORIDE mmol/L 97* 96* 92*   CO2 mmol/L 31.4* 29.8* 28.6   BUN mg/dL 68* 71* 71*   CREATININE mg/dL 2.18* 2.25* 2.40*   CALCIUM mg/dL 8.7 9.0 8.7   BILIRUBIN mg/dL  --   --  0.5   ALK PHOS U/L  --   --  171*   ALT (SGPT) U/L  --   --  11   AST (SGOT) U/L  --   --  18   GLUCOSE mg/dL 180* 282* 467*       Estimated Creatinine Clearance: 26.2 mL/min (A) (by C-G formula based on SCr of 2.18 mg/dL (H)).    Results from last 7 days   Lab Units 11/03/22 0421   MAGNESIUM mg/dL 1.8   PHOSPHORUS mg/dL 3.3       Results from last 7 days   Lab Units 11/03/22  0421   URIC ACID mg/dL 11.3*       Results from last 7 days   Lab Units 11/03/22 0421 11/02/22 0429 11/01/22  1746   WBC 10*3/mm3 7.57 7.77 8.25   HEMOGLOBIN g/dL 8.6* 8.6* 8.7*   PLATELETS 10*3/mm3 171 182 199             Assessment / Plan     ASSESSMENT:  -Acute kidney injury on chronic kidney disease, slightly improved since admission, associated with diabetic and hypertensive glomerulosclerosis, creatinine slightly improved down to 2.18, electrolyte within acceptable  -Fluid overload and congestive heart failure  -Coronary artery disease with prior CABG  -Aortic stenosis prior TAVR  -Prior mitral valve replacement tricuspid valve repair  -COPD with pulmonary hypertension  -Morbid obesity  -Chronic anemia, hemoglobin today 8.6, patient has evidence of iron deficiency as noted in July 2022.  Iron stores yesterday revealed iron saturation 10% and the ferritin 167.  -History of gout, treated with allopurinol and appears to be stable, uric acid today 11 point    PLAN:  -Diurese again with IV Bumex  today  -IV iron, Venofer 300 mg daily x3 doses  -Surveillance labs    Thank you for involving us in the care of Miguelina Lopez.  Please feel free to call with any questions.    Dionicio Krishna MD  11/03/22  07:32 EDT    [facility]  [phone]      Much of this encounter note is an electronic transcription/translation of spoken language to printed text. The electronic translation of spoken language may permit erroneous, or at times, nonsensical words or phrases to be inadvertently transcribed; Although I have reviewed the note for such errors, some may still exist.

## 2022-11-03 NOTE — CONSULTS
Purpose of the visit was to evaluate for: goals of care/advanced care planning, support for patient/family, hospice referral/discussion, pain/symptom management, withdrawal of interventions, transfer to comfort care bed/unit and comfort care. Spoke with MD and RN as well as patient and family and discussed goals of care, care options, resuscitation status and discharge options.       Assessment:  Patient is palliative care appropriate for community based services with [facility] or SNF with palliative care given: COPD, CKD, DM, frequent falls with general debility.  Upon assessment patient is alert and oriented, endorses generalized weakness as well as SOA without exertion on 3L NC.  PPS: 40%     Recommendations/Plan: Change CODE status to DNR/DNI. Consult [facility].      Other Comments: I met with this patient at bedside to offer support and to discuss her goals of care. She is knowledgeable about her complex medical conditions and prognosis and shares that she is getting progressively weaker. She had fallen several times prior to her last admission, spent a week in rehab, then was home for one day before being readmitted to the hospital with an acute exacerbation of CHF. She says that she doesn't feel that she is strong enough to return home and would like to discharge to LTC (CCP notified) for a higher level of supportive care.  We discussed life saving and life prolonging measures, and the patient states that she does not wish to have CPR or intubation performed. She states that she does have a living will (documenation requested) and that she has [place] her debra [place] [facility] [place] [facility] [place] as the alternate. We discussed end-of-life care planning and while she wishes to pursue medical management of acute and chronic conditions, she would like to avoid recurrent hospital admissions. We discussed [facility] as a supportive resource for facility discharge and she is agreeable to learning more about their  cardiac support program (Consult placed).    All questions answered and no other needs identified. I advised her of the ongoing availability of the palliative care consult team, should she need further support with decision making.     We will sign off at this time, please re consult if needed.     Nallely Shah RN

## 2022-11-03 NOTE — PLAN OF CARE
Goal Outcome Evaluation:  Plan of Care Reviewed With: patient        Progress: no change  Outcome Evaluation: Admitted on 11/1 for CHF. Code Status updated. IV bumex continued. Productive cough. Blood glucose montiored. IV in right hand infiltrated, see orders. Areas marked on hand and IV removed. No complaints of pain. q2 turn. on 3L-NC, home oxygen.3.    Diuretic in Use: 4mg IV bumex.   Response to Diuretics (Output greater than intake): JOSEPH  Daily Weight (up or down): down, 4lbs   O2 Requirements: 3L-NC, home requirement   Functional Status (Activity level, tolerance and respiratory symptoms): Max ast of 2  Discharge Plans: Return home with family care.

## 2022-11-03 NOTE — PROGRESS NOTES
LOS: 2 days   Patient Care Team:  Arcelia Talbot APRN as PCP - General (Nurse Practitioner)  Robbie Wilson MD as Consulting Physician (Hematology and Oncology)  Chace Johnson MD as Consulting Physician (Cardiology)  Duarte Cuevas MD as Consulting Physician (Pulmonary Disease)  Shania Severino, RN as Ambulatory  ([facility])    Chief Complaint:  Follow-up valvular heart disease, HFpEF    Interval History:   States her main complaint is a cough today.  Some shortness of breath but improving.  She is hopeful to have her cough be improved.      Objective   Vital Signs  Temp:  [98.2 °F (36.8 °C)-98.7 °F (37.1 °C)] 98.6 °F (37 °C)  Heart Rate:  [68-76] 75  Resp:  [18-20] 18  BP: (102-119)/(46-61) 119/61    Intake/Output Summary (Last 24 hours) at 11/3/2022 1704  Last data filed at 11/3/2022 1427  Gross per 24 hour   Intake 890 ml   Output 1100 ml   Net -210 ml       Comfortable NAD, resting in bed  PERRL, conjunctivae clear  Neck supple, JVD noted to mandible, although somewhat limited due to habitus  S1/S2 RRR, 3 out of 6 systolic ejection murmur, high-pitched, at apex  Crackles to bases bilaterally, normal effort  Abdomen S/NT/ND (+) BS, no HSM appreciated  Extremities warm, no clubbing, cyanosis, 2+ BLE edema  Moves all 4 extremities well without deficits  No visible or palpable skin lesions  Alert and oriented, mood and affect appropriate    Results Review:      Results from last 7 days   Lab Units 11/03/22  0421 11/02/22 0429 11/01/22  1746   SODIUM mmol/L 137 138 135*   POTASSIUM mmol/L 4.4 3.8 4.4   CHLORIDE mmol/L 97* 96* 92*   CO2 mmol/L 31.4* 29.8* 28.6   BUN mg/dL 68* 71* 71*   CREATININE mg/dL 2.18* 2.25* 2.40*   GLUCOSE mg/dL 180* 282* 467*   CALCIUM mg/dL 8.7 9.0 8.7     Results from last 7 days   Lab Units 11/01/22 2113 11/01/22  1746   TROPONIN T ng/mL 0.032* 0.030     Results from last 7 days   Lab Units 11/03/22  0421 11/02/22  0429 11/01/22  1746   WBC 10*3/mm3 7.57  7.77 8.25   HEMOGLOBIN g/dL 8.6* 8.6* 8.7*   HEMATOCRIT % 27.3* 27.1* 28.2*   PLATELETS 10*3/mm3 171 182 199             Results from last 7 days   Lab Units 11/03/22  0421   MAGNESIUM mg/dL 1.8           I reviewed the patient's new clinical results.  I personally viewed and interpreted the patient's EKG/Telemetry data        Medication Review:   allopurinol, 100 mg, Oral, Daily  atorvastatin, 40 mg, Oral, Daily  budesonide-formoterol, 2 puff, Inhalation, BID - RT  bumetanide, 4 mg, Intravenous, Q8H  carvedilol, 12.5 mg, Oral, BID With Meals  dextromethorphan polistirex ER, 60 mg, Oral, Q12H  ferric gluconate, 250 mg, Intravenous, Daily  ferrous sulfate, 325 mg, Oral, Daily With Breakfast  gabapentin, 100 mg, Oral, BID  insulin lispro, 0-14 Units, Subcutaneous, TID AC  lactobacillus acidophilus, 1 capsule, Oral, Daily  levothyroxine, 100 mcg, Oral, Q AM  lidocaine, 1 patch, Transdermal, Q24H  pantoprazole, 40 mg, Oral, QAM  potassium chloride, 20 mEq, Oral, Daily  sennosides-docusate, 1 tablet, Oral, Every Other Day  vilazodone, 20 mg, Oral, Nightly             Assessment & Plan       Acute on chronic combined systolic and diastolic HF (heart failure) ([facility])    Anemia, chronic disease    OCHOA (obstructive sleep apnea)    Type 2 diabetes mellitus with kidney complication, without long-term current use of insulin ([facility])    Essential hypertension    s/p MVR, TV-repair, CABG x2 6/13/16    Paroxysmal atrial fibrillation (HCC)    Chronic respiratory failure with hypoxia and hypercapnia (HCC)    COPD (chronic obstructive pulmonary disease) ([facility])    Hypothyroidism (acquired)    Stage 3b chronic kidney disease ([facility])    S/P TAVR (transcatheter aortic valve replacement)    1. HFpEF - recent echocardiogram with preserved LV systolic function. EF 50%.   2. Severe aortic stenosis status post TAVR 7/14/2021.  Bioprosthetic mitral valve replacement remotely, with at least moderate regurgitant valve disease  3. Coronary artery disease  "status post CABG , coronary angiography July 2021 showed patent stents and prior grafts. SVG to first diagonal, SVG to PDA, patent mid LAD stent, patent left circumflex stent.  4. Chronic kidney disease - nephrology has been consulted to manage diuretics.   5. Mitral valve regurgitation status post replacement  6. COPD on home oxygen  7. Complete heart block status post Micra pacemaker  8. Moderate to severe pulmonary hypertension, likely multifactorial  9. Chronic lymphedema - legs are usually wrapped.   10. Elevated troponin, do not suspect ACS. Likely due to renal insufficiency, demand.  11. Paroxysmal atrial fibrillation - she was on apixaban in the past. This was stopped during an admission in October 2021 where she was noted to be anemic. \"EGD showed some mild gastritis and some nonbleeding gastric erosions.  Colonoscopy showed some small grade 1 hemorrhoids and diverticulosis.  No overt cause of bleeding was identified but her hemoglobin remains at 8.  Her anticoagulant will be held\" defer anticoagulation to her primary cardiologist.      Appreciate nephrology management of diuretics.     Her cardiac history is rather complex, although she has a functioning TAVR valve she does have a degenerating prosthetic mitral valve with at least moderate regurgitation.  She would not tolerate a surgery to fix this.  At this point, symptomatic relief with diuresis is the best plan.  I agree with palliative consultation because at this point with her debility she would not be able to tolerate additional surgery, nor with advanced her pulmonary hypertension is that a percutaneous mitral valve replacement would be worthwhile either.    Continue aggressive diuresis.  Her therapeutic anticoagulation is still held due to previous significant bleeding.  I will start aspirin 81 given her prosthetic valves.    Jr Meyer MD  11/03/22  17:04 EDT      **Julio Disclaimer:   Much of this encounter note is an electronic " transcription/translation of spoken language to printed text. The electronic translation of spoken language may permit erroneous, or at times, nonsensical words or phrases to be inadvertently transcribed. Although I have reviewed the note for such errors, some may still exist.

## 2022-11-04 PROBLEM — I50.9 ACUTE CONGESTIVE HEART FAILURE, UNSPECIFIED HEART FAILURE TYPE (HCC): Status: ACTIVE | Noted: 2022-01-01

## 2022-11-04 NOTE — PROGRESS NOTES
[facility]\"" Progress Note      Patient Name: Miguelina Lopez  : 1945  MRN: [id number]  Primary Care Physician:  Arcelia Talbot APRN  Date of admission: 2022    Subjective     Interval History:   Follow-up acute kidney injury on chronic kidney disease    Patient continues to have cough, she has shortness of breath with orthopnea and PND, no chest pain, no nausea or vomiting, no dysuria or gross hematuria.  Despite large dose of bumetanide urine output remains low.    Review of Systems:   As noted above    Objective     Vitals:   Temp:  [97.4 °F (36.3 °C)-98.6 °F (37 °C)] 97.4 °F (36.3 °C)  Heart Rate:  [73-75] 75  Resp:  [18-20] 20  BP: (103-132)/(50-69) 103/50  Flow (L/min):  [3] 3    Intake/Output Summary (Last 24 hours) at 2022 1039  Last data filed at 2022 0930  Gross per 24 hour   Intake 240 ml   Output 1100 ml   Net -860 ml       Physical Exam:    General Appearance: alert, awake, no acute distress, morbidly obese and chronically  Skin: warm and dry  HEENT: oral mucosa normal, nonicteric sclera  Neck: Mild JVD  Lungs: Bilateral rhonchi and crackles, breathing effort not labored  Heart: RRR, normal S1 and S2, no rub  Abdomen: soft, nontender, nondistended, normoactive bowel  : no palpable bladder pure wick system is in place  Extremities: 2+ lower extremity edema.  Neuro: normal speech and mental status     Scheduled Meds:     allopurinol, 100 mg, Oral, Daily  atorvastatin, 40 mg, Oral, Daily  budesonide-formoterol, 2 puff, Inhalation, BID - RT  carvedilol, 12.5 mg, Oral, BID With Meals  dextromethorphan polistirex ER, 60 mg, Oral, Q12H  ferric gluconate, 250 mg, Intravenous, Daily  ferrous sulfate, 325 mg, Oral, Daily With Breakfast  gabapentin, 100 mg, Oral, BID  insulin lispro, 0-14 Units, Subcutaneous, TID AC  lactobacillus acidophilus, 1 capsule, Oral, Daily  levothyroxine, 100 mcg, Oral, Q AM  lidocaine, 1 patch, Transdermal, Q24H  pantoprazole,  40 mg, Oral, QAM  potassium chloride, 20 mEq, Oral, Daily  sennosides-docusate, 1 tablet, Oral, Every Other Day  vilazodone, 20 mg, Oral, Nightly      IV Meds:        Results Reviewed:   I have personally reviewed the results from the time of this admission to 11/4/2022 10:39 EDT     Results from last 7 days   Lab Units 11/04/22 0443 11/03/22 0421 11/02/22 0429 11/01/22  1746   SODIUM mmol/L 134* 137 138 135*   POTASSIUM mmol/L 4.4 4.4 3.8 4.4   CHLORIDE mmol/L 94* 97* 96* 92*   CO2 mmol/L 30.9* 31.4* 29.8* 28.6   BUN mg/dL 65* 68* 71* 71*   CREATININE mg/dL 2.10* 2.18* 2.25* 2.40*   CALCIUM mg/dL 8.6 8.7 9.0 8.7   BILIRUBIN mg/dL  --   --   --  0.5   ALK PHOS U/L  --   --   --  171*   ALT (SGPT) U/L  --   --   --  11   AST (SGOT) U/L  --   --   --  18   GLUCOSE mg/dL 154* 180* 282* 467*       Estimated Creatinine Clearance: 27.2 mL/min (A) (by C-G formula based on SCr of 2.1 mg/dL (H)).    Results from last 7 days   Lab Units 11/04/22 0443 11/03/22 0421   MAGNESIUM mg/dL 1.9 1.8   PHOSPHORUS mg/dL 3.3 3.3       Results from last 7 days   Lab Units 11/04/22 0443 11/03/22 0421   URIC ACID mg/dL 10.7* 11.3*       Results from last 7 days   Lab Units 11/04/22 0443 11/03/22 0421 11/02/22 0429 11/01/22  1746   WBC 10*3/mm3 6.82 7.57 7.77 8.25   HEMOGLOBIN g/dL 9.0* 8.6* 8.6* 8.7*   PLATELETS 10*3/mm3 179 171 182 199             Assessment / Plan     ASSESSMENT:  -Acute kidney injury on chronic kidney disease, slightly improved since admission, associated with diabetic and hypertensive glomerulosclerosis, creatinine slightly improved down to 2.1, electrolyte within acceptable, sodium is 134  -Fluid overload and congestive heart failure  -Coronary artery disease with prior CABG  -Aortic stenosis prior TAVR  -Prior mitral valve replacement tricuspid valve repair  -COPD with pulmonary hypertension  -Morbid obesity  -Chronic anemia, hemoglobin today 9, patient has evidence of iron deficiency as noted in July 2022.   Iron stores yesterday revealed iron saturation 10% and the ferritin 167.  Patient receiving 3 doses of IV iron  -History of gout, treated with allopurinol and appears to be stable, uric acid today 10.7    PLAN:  -Diurese again with IV Bumex today and add metolazone  -Surveillance labs      Thank you for involving us in the care of Miguelina Lopez.  Please feel free to call with any questions.    Dionicio Krishna MD  11/04/22  10:39 EDT    [facility]  [phone]      Much of this encounter note is an electronic transcription/translation of spoken language to printed text. The electronic translation of spoken language may permit erroneous, or at times, nonsensical words or phrases to be inadvertently transcribed; Although I have reviewed the note for such errors, some may still exist.

## 2022-11-04 NOTE — PLAN OF CARE
Goal Outcome Evaluation:              Outcome Evaluation: Pt continue to require assist x2 for bed mobility and transfers.  Pt required max encouragement but was willing to attempt standing this date.  Pt required VC for hip and trunk extension however pt still unable to come to full upright standing.  PT continues to recommend SNU following d/c as pt is unsafe to return home at this time.

## 2022-11-04 NOTE — PLAN OF CARE
Goal Outcome Evaluation:  Plan of Care Reviewed With: patient           Outcome Evaluation: Pt seen for UE AROM and then assist of 2 to move to sit EOB, stand with assist of 2 and not fully upright.  Pt with fear of falling and encouragement for tasks.  Pt plans SNU at d/c.  Pt full assist with lower body ADL tasks and toileting.

## 2022-11-04 NOTE — PLAN OF CARE
Goal Outcome Evaluation:  Plan of Care Reviewed With: patient        Progress: no change  Outcome Evaluation: Patient has been alert and oriented with intermittent confusion and forgetfullness throughout the shift. No complaints of pain noted. Tolerated IV ferrous gluconate well. Multiple small bms this shift. Appetite has been fair. Patient has been sleeping off and on throughout the day. Agreeable to work with therapy this shift, but minimal progress. Stood at bedside x2 for brief moment each time.  Diuretic in Use: New order for Bumex 4mg q8 x3 doses and Zaroxolyn 5mg x1 dose per neph  Response to Diuretics (Output greater than intake): , out 1000ml   Daily Weight (up or down): Slightly down  O2 Requirements: remains on 3L via NC  Functional Status (Activity level, tolerance and respiratory symptoms): max x2 assist   Discharge Plans: to be determined at this time.

## 2022-11-04 NOTE — PROGRESS NOTES
Name: Miguelina Lopez ADMIT: 2022   : 1945  PCP: Arcelia Talbot APRN    MRN: [id number] LOS: 3 days   AGE/SEX: 76 y.o. female  ROOM: [facility]   Subjective   Chief Complaint   Patient presents with   • Shortness of Breath   • Cough     Dyspnea fair  Still with a lot of swelling  +orthopnea  Had been on IV diuretics    ROS  No f/c  No n/v  No cp/palp  +soa/cough    Objective   Vital Signs  Temp:  [97.4 °F (36.3 °C)-99.4 °F (37.4 °C)] 99.4 °F (37.4 °C)  Heart Rate:  [73-75] 75  Resp:  [20] 20  BP: (103-132)/(50-69) 114/53  SpO2:  [89 %-100 %] 96 %  on  Flow (L/min):  [3] 3;   Device (Oxygen Therapy): nasal cannula  Body mass index is 53.14 kg/m².    Physical Exam  Constitutional:       General: She is not in acute distress.     Appearance: She is obese. She is ill-appearing.   HENT:      Head: Normocephalic and atraumatic.   Eyes:      General: No scleral icterus.  Cardiovascular:      Rate and Rhythm: Regular rhythm.      Heart sounds: Murmur heard.   Pulmonary:      Effort: Respiratory distress present.      Breath sounds: Rales present.   Abdominal:      General: There is no distension.      Palpations: Abdomen is soft.   Musculoskeletal:      Cervical back: Neck supple.      Right lower leg: Edema present.      Left lower leg: Edema present.   Skin:     Coloration: Skin is pale.   Neurological:      Mental Status: She is alert.   Psychiatric:         Behavior: Behavior normal.         Results Review:       I reviewed the patient's new clinical results.  Results from last 7 days   Lab Units 22  1746   WBC 10*3/mm3 6.82 7.57 7.77 8.25   HEMOGLOBIN g/dL 9.0* 8.6* 8.6* 8.7*   PLATELETS 10*3/mm3 179 171 182 199     Results from last 7 days   Lab Units 22  1746   SODIUM mmol/L 134* 137 138 135*   POTASSIUM mmol/L 4.4 4.4 3.8 4.4   CHLORIDE mmol/L 94* 97* 96* 92*   CO2 mmol/L 30.9* 31.4* 29.8* 28.6    BUN mg/dL 65* 68* 71* 71*   CREATININE mg/dL 2.10* 2.18* 2.25* 2.40*   GLUCOSE mg/dL 154* 180* 282* 467*   Estimated Creatinine Clearance: 27.2 mL/min (A) (by C-G formula based on SCr of 2.1 mg/dL (H)).  Results from last 7 days   Lab Units 11/04/22 0443 11/03/22 0421 11/01/22  1746   ALBUMIN g/dL 2.90* 2.70* 3.40*   BILIRUBIN mg/dL  --   --  0.5   ALK PHOS U/L  --   --  171*   AST (SGOT) U/L  --   --  18   ALT (SGPT) U/L  --   --  11     Results from last 7 days   Lab Units 11/04/22 0443 11/03/22 0421 11/02/22 0429 11/01/22  1746   CALCIUM mg/dL 8.6 8.7 9.0 8.7   ALBUMIN g/dL 2.90* 2.70*  --  3.40*   MAGNESIUM mg/dL 1.9 1.8  --   --    PHOSPHORUS mg/dL 3.3 3.3  --   --          Coag     HbA1C   Lab Results   Component Value Date    HGBA1C 10.30 (H) 11/02/2022    HGBA1C 8.80 (H) 10/09/2022    HGBA1C 10.20 (H) 07/16/2022     Infection     Radiology(recent) No radiology results for the last day  Troponin T   Date Value Ref Range Status   11/01/2022 0.032 (C) 0.000 - 0.030 ng/mL Final     No components found for: TSH;2    allopurinol, 100 mg, Oral, Daily  atorvastatin, 40 mg, Oral, Daily  budesonide-formoterol, 2 puff, Inhalation, BID - RT  bumetanide, 4 mg, Intravenous, Q8H  carvedilol, 12.5 mg, Oral, BID With Meals  dextromethorphan polistirex ER, 60 mg, Oral, Q12H  ferric gluconate, 250 mg, Intravenous, Daily  ferrous sulfate, 325 mg, Oral, Daily With Breakfast  gabapentin, 100 mg, Oral, BID  insulin lispro, 0-14 Units, Subcutaneous, TID AC  lactobacillus acidophilus, 1 capsule, Oral, Daily  levothyroxine, 100 mcg, Oral, Q AM  lidocaine, 1 patch, Transdermal, Q24H  pantoprazole, 40 mg, Oral, QAM  potassium chloride, 20 mEq, Oral, Daily  sennosides-docusate, 1 tablet, Oral, Every Other Day  vilazodone, 20 mg, Oral, Nightly       Diet Regular; Cardiac, Low Sodium; 2,000 mg Na      Assessment & Plan      Active Hospital Problems    Diagnosis  POA   • **Acute on chronic combined systolic and diastolic HF (heart  failure) ([facility]) [I50.43]  Yes   • S/P TAVR (transcatheter aortic valve replacement) [Z95.2]  Not Applicable   • Stage 3b chronic kidney disease ([facility]) [N18.32]  Yes   • Hypothyroidism (acquired) [E03.9]  Yes   • COPD (chronic obstructive pulmonary disease) ([facility]) [J44.9]  Yes   • Chronic respiratory failure with hypoxia and hypercapnia ([facility]) [J96.11, J96.12]  Yes   • Paroxysmal atrial fibrillation ([facility]) [I48.0]  Yes   • s/p MVR, TV-repair, CABG x2 6/13/16 [Z95.2]  Not Applicable   • Type 2 diabetes mellitus with kidney complication, without long-term current use of insulin ([facility]) [E11.29]  Yes   • OCHOA (obstructive sleep apnea) [G47.33]  Yes   • Anemia, chronic disease [D63.8]  Yes   • Essential hypertension [I10]  Yes      Resolved Hospital Problems   No resolved problems to display.       · IV diuertics as directed by Nephrology and Cardiology  · Monitor renal fx, weight, volume status  · On insulin, monitor BG  · Monitor anemia  · Agents for CHF/HTN, monitor BG    Thanks to specialists     Poor ongoing prognosis with her CHF and volume issues and not candidate for valve surgery.     YAHIR RN      Sukhwinder Mauro MD  [facility]  11/04/22  14:36 EDT

## 2022-11-04 NOTE — THERAPY TREATMENT NOTE
Patient Name: Miguelina Lopez  : 1945    MRN: [id number]                              Today's Date: 2022       Admit Date: 2022    Visit Dx:     ICD-10-CM ICD-9-CM   1. Acute congestive heart failure, unspecified heart failure type ([facility])  I50.9 428.0   2. OLI (acute kidney injury) ([facility])  N17.9 584.9   3. Hyperglycemia  R73.9 790.29   4. Pulmonary hypertension ([facility])  I27.20 416.8   5. Paroxysmal atrial flutter ([facility])  I48.92 427.32   6. Heart valve disorder  I38 424.90   7. Acute on chronic combined systolic and diastolic congestive heart failure ([facility])  I50.43 428.43     428.0   8. S/P TAVR (transcatheter aortic valve replacement)  Z95.2 V43.3   9. Acute on chronic diastolic heart failure ([facility])  I50.33 428.33   10. Nonrheumatic aortic valve stenosis  I35.0 424.1   11. Complete heart block ([facility])  I44.2 426.0   12. Presence of permanent cardiac pacemaker  Z95.0 V45.01   13. Chronic coronary artery disease  I25.10 414.00   14. Nonrheumatic mitral valve regurgitation  I34.0 424.0   15. Bilateral lower extremity edema  R60.0 782.3     Patient Active Problem List   Diagnosis   • Anxiety disorder   • Arthritis of knee   • Asthma   • Chronic coronary artery disease   • CKD (chronic kidney disease) stage 3, GFR 30-59 ml/min ([facility])   • Depression   • Diabetic peripheral neuropathy ([facility])   • Gastroesophageal reflux disease   • Hyperlipidemia   • Insomnia   • Lower gastrointestinal hemorrhage   • Anemia, chronic disease   • OCHOA (obstructive sleep apnea)   • Type 2 diabetes mellitus with kidney complication, without long-term current use of insulin ([facility])   • Essential hypertension   • Hospital discharge follow-up   • Mitral stenosis   • Pulmonary hypertension due to sleep-disordered breathing (CMS/[facility])   • s/p MVR, TV-repair, CABG x2 16   • Leukocytosis   • Paroxysmal atrial fibrillation ([facility])   • Nocturnal hypoxia   • Dermatitis   • Medicare annual wellness visit, initial   • Class 3 severe obesity due  to excess calories with serious comorbidity and body mass index (BMI) of 50.0 to 59.9 in adult ([facility])   • Supplemental oxygen dependent   • Acute on chronic combined systolic and diastolic HF (heart failure) ([facility])   • Mitral regurgitation   • Aortic stenosis   • Medicare annual wellness visit, subsequent   • Localized edema   • Chronic right-sided congestive heart failure ([facility])   • Cellulitis of left lower extremity   • Proteinuria   • Bilateral lower extremity edema   • Subclinical hypothyroidism   • Chronic diastolic heart failure ([facility])   • Chronic respiratory failure with hypoxia and hypercapnia ([facility])   • Acute on chronic respiratory failure with hypoxia and hypercapnia ([facility])   • AV block, 2nd degree   • 1st degree AV block   • Chest pain   • COPD (chronic obstructive pulmonary disease) ([facility])   • Complete heart block ([facility])   • Presence of permanent cardiac pacemaker   • Hypothyroidism (acquired)   • Chronic anticoagulation   • Leukocytosis   • Stage 3b chronic kidney disease ([facility])   • Gastrointestinal hemorrhage with melena   • Acute blood loss anemia   • Metabolic encephalopathy   • Hypernatremia   • Hypokalemia   • TIA (transient ischemic attack)   • Nonrheumatic aortic valve stenosis   • Acute on chronic heart failure ([facility])   • S/P TAVR (transcatheter aortic valve replacement)   • CHF (congestive heart failure) ([facility])   • Heart valve disorder   • Paroxysmal atrial flutter ([facility])   • Peripheral neuropathy   • Pulmonary hypertension ([facility])   • Dry skin   • Closed fracture of one rib   • Impaired mobility and activities of daily living   • OLI (acute kidney injury) ([facility])     Past Medical History:   Diagnosis Date   • Acute on chronic respiratory failure with hypoxia and hypercapnia ([facility])    • OLI (acute kidney injury) ([facility])    • Anemia    • Anxiety    • Aortic valve stenosis    • Arthritis     KNEES   • Bilateral lower extremity edema    • CHF (congestive heart failure) ([facility])    • Chronic coronary artery disease    •  Class 3 severe obesity due to excess calories in adult ([facility])    • COPD (chronic obstructive pulmonary disease) ([facility])    • Depression    • Diabetes mellitus ([facility])    • Dry skin    • Elevated cholesterol    • GERD (gastroesophageal reflux disease)    • Heart murmur    • Hypertension    • Mitral valve insufficiency    • Pneumonia     1/2016   • Pulmonary hypertension ([facility])     due to sleep disordered breathing   • Sleep apnea     Uses CPAP or oxygen   • Stage 3 chronic kidney disease ([facility])    • Subclinical hypothyroidism    • Supplemental oxygen dependent     3 LITERS   • TIA (transient ischemic attack) 3/15/2021   • Valvular heart disease      Past Surgical History:   Procedure Laterality Date   • AORTIC VALVE REPAIR/REPLACEMENT N/A 7/13/2021    Procedure: TTE TRANSFEMORAL TRANSCATHETER AORTIC VALVE REPLACEMENT PERCUTANEOUS APPROACH;  Surgeon: Sharlene Mejía MD;  Location: [facility] OR 18/19;  Service: Cardiothoracic;  Laterality: N/A;   • AORTIC VALVE REPAIR/REPLACEMENT N/A 7/13/2021    Procedure: Transfemoral Transcatheter Aortic Valve Replacement with intra-op tte and possible open surgical rescue;  Surgeon: Ayan Pacheco MD;  Location: [facility] OR 18/19;  Service: Cardiovascular;  Laterality: N/A;   • CARDIAC CATHETERIZATION     • CARDIAC CATHETERIZATION N/A 6/10/2016    Procedure: Left Heart Cath;  Surgeon: Chace Johnson MD;  Location: [facility] INVASIVE LOCATION;  Service:    • CARDIAC CATHETERIZATION N/A 6/10/2016    Procedure: Right Heart Cath;  Surgeon: Chace Johnson MD;  Location: [facility] INVASIVE LOCATION;  Service:    • CARDIAC CATHETERIZATION N/A 2/5/2021    Procedure: RIGHT AND LEFT HEART CATH;  Surgeon: Antoine Ayers MD;  Location: [facility] INVASIVE LOCATION;  Service: Cardiology;  Laterality: N/A;   • CARDIAC CATHETERIZATION N/A 2/5/2021    Procedure: Coronary angiography;  Surgeon: Antoine Ayers MD;  Location: [facility] INVASIVE LOCATION;   Service: Cardiology;  Laterality: N/A;   • CARDIAC CATHETERIZATION N/A 2/5/2021    Procedure: Left ventriculography- pressures;  Surgeon: Antoine Ayers MD;  Location: [facility] CATH INVASIVE LOCATION;  Service: Cardiology;  Laterality: N/A;   • CARDIAC ELECTROPHYSIOLOGY PROCEDURE N/A 2/2/2021    Procedure: Pacemaker DC new---[employer] MICRA;  Surgeon: Eliazar Bond MD;  Location: [facility] CATH INVASIVE LOCATION;  Service: Cardiology;  Laterality: N/A;   • CARDIAC SURGERY     • COLONOSCOPY N/A 10/14/2021    Procedure: COLONOSCOPY to cecum and TI  with cold snare polypectomies;  Surgeon: Dixon Azevedo MD;  Location: [facility] ENDOSCOPY;  Service: Gastroenterology;  Laterality: N/A;  pre: melena  post: polyps, diverticulosis   • CORONARY ARTERY BYPASS GRAFT      2 vessel   • CORONARY ARTERY BYPASS GRAFT WITH MITRAL VALVE REPAIR/REPLACEMENT N/A 6/13/2016    Procedure: INTRAOPERATIVE TARIQ, MIDLINE STERNOTOMY, CORONARY ARTERY BYPASS GRAFTING X  2 UTILIZING ENDOSCOPICALLY HARVESTED LEFT GREATER SAPHENOUS VEIN, MITRAL VALVE REPLACEMENT AND TRICUSPID VALVE REPAIR;  Surgeon: Eliecer Mistry MD;  Location: [facility] MAIN OR;  Service:    • CORONARY STENT PLACEMENT  2010    Approx. 6 yrs ago at [facility]   • ENDOSCOPY N/A 3/17/2021    Procedure: ESOPHAGOGASTRODUODENOSCOPY;  Surgeon: Dixon Haas MD;  Location: [facility] ENDOSCOPY;  Service: Gastroenterology;  Laterality: N/A;  PRE: GI BLEED  POST: ANTRAL ULCER   • ENDOSCOPY N/A 10/13/2021    Procedure: ESOPHAGOGASTRODUODENOSCOPY WITH BIOPSIES;  Surgeon: Riana Milner MD;  Location: [facility] ENDOSCOPY;  Service: Gastroenterology;  Laterality: N/A;  pre-melena, anemia   post- mild gastritis, prepyloric erosion, duodenal lymphangiectasia    • HEMORRHOIDECTOMY     • HYSTERECTOMY     • MITRAL VALVE REPLACEMENT     • REPLACEMENT TOTAL KNEE Right    • THYROID SURGERY      Cyst removed from thyroid   • VASCULAR SURGERY        General Information     Row Name 11/04/22 0940           Physical Therapy Time and Intention    Document Type therapy note (daily note)  -MD     Mode of Treatment occupational therapy;physical therapy  -MD     Row Name 11/04/22 0940          General Information    Patient Profile Reviewed yes  -MD     Existing Precautions/Restrictions fall  -MD     Row Name 11/04/22 0940          Cognition    Orientation Status (Cognition) oriented x 3  -MD           User Key  (r) = Recorded By, (t) = Taken By, (c) = Cosigned By    Initials Name Provider Type    Elaine Roberts, PT Physical Therapist               Mobility     Row Name 11/04/22 0940          Bed Mobility    Supine-Sit [place] (Bed Mobility) moderate assist (50% patient effort);maximum assist (25% patient effort);2 person assist  -MD     Sit-Supine [place] (Bed Mobility) moderate assist (50% patient effort);maximum assist (25% patient effort);2 person assist  -MD     Assistive Device (Bed Mobility) bed rails;draw sheet  -MD     Row Name 11/04/22 0940          Sit-Stand Transfer    Sit-Stand [place] (Transfers) maximum assist (25% patient effort);nonverbal cues (demo/gesture);verbal cues;2 person assist  -MD     Comment, (Sit-Stand Transfer) HHA.  Pt stood x2 w/o being able to achieve upright standing both times.  Max VC and encouragement to attempt standing.  Pt expresses fear w standing  -MD           User Key  (r) = Recorded By, (t) = Taken By, (c) = Cosigned By    Initials Name Provider Type    Elaine Roberts, PT Physical Therapist               Obj/Interventions    No documentation.                Goals/Plan    No documentation.                Clinical Impression     Row Name 11/04/22 0944          Pain    Pre/Posttreatment Pain Comment Pt states chest pain related to coughing.  RN present and aware.  -MD     Row Name 11/04/22 0944          Plan of Care Review    Outcome Evaluation Pt continue to require assist x2 for bed mobility and transfers.  Pt required max encouragement but was willing to attempt  standing this date.  Pt required VC for hip and trunk extension however pt still unable to come to full upright standing.  PT continues to recommend SNU following d/c as pt is unsafe to return home at this time.  -MD     Row Name 11/04/22 0944          Positioning and Restraints    Pre-Treatment Position in bed  -MD     Post Treatment Position bed  -MD     In Bed supine;call light within reach;exit alarm on  -MD           User Key  (r) = Recorded By, (t) = Taken By, (c) = Cosigned By    Initials Name Provider Type    Elaine Roberts, PT Physical Therapist               Outcome Measures     Row Name 11/04/22 0947          How much help from another person do you currently need...    Turning from your back to your side while in flat bed without using bedrails? 2  -MD     Moving from lying on back to sitting on the side of a flat bed without bedrails? 2  -MD     Moving to and from a bed to a chair (including a wheelchair)? 2  -MD     Standing up from a chair using your arms (e.g., wheelchair, bedside chair)? 2  -MD     Climbing 3-5 steps with a railing? 1  -MD     To walk in hospital room? 2  -MD     AM-PAC 6 Clicks Score (PT) 11  -MD     Highest level of mobility 4 --> Transferred to chair/commode  -MD     Row Name 11/04/22 0947 11/04/22 0943       Functional Assessment    Outcome Measure Options AM-PAC 6 Clicks Basic Mobility (PT)  -MD AM-PAC 6 Clicks Daily Activity (OT)  -PATRICIO          User Key  (r) = Recorded By, (t) = Taken By, (c) = Cosigned By    Initials Name Provider Type    Solange Cedeno OTR Occupational Therapist    Elaine Roberts, PT Physical Therapist                             Physical Therapy Education     Title: PT OT SLP Therapies (In Progress)     Topic: Physical Therapy (In Progress)     Point: Mobility training (Not Started)     Learner Progress:  Not documented in this visit.          Point: Home exercise program (Not Started)     Learner Progress:  Not documented in this visit.          Point: Body  mechanics (Not Started)     Learner Progress:  Not documented in this visit.          Point: Precautions (Done)     Learning Progress Summary           Patient Acceptance, E, VU by MD at 11/4/2022 0948    Acceptance, E, NR by ND at 11/2/2022 1413                               User Key     Initials Effective Dates Name Provider Type Discipline    MD 06/16/21 -  Elaine Navarro, PT Physical Therapist PT    ND 10/24/22 -  Senait Mayes PT Student PT Student PT              PT Recommendation and Plan     Outcome Evaluation: Pt continue to require assist x2 for bed mobility and transfers.  Pt required max encouragement but was willing to attempt standing this date.  Pt required VC for hip and trunk extension however pt still unable to come to full upright standing.  PT continues to recommend SNU following d/c as pt is unsafe to return home at this time.     Time Calculation:    PT Charges     Row Name 11/04/22 0939             Time Calculation    Start Time 0909  -MD      Stop Time 0923  -MD      Time Calculation (min) 14 min  -MD      PT Received On 11/04/22  -MD      PT - Next Appointment 11/07/22  -MD            User Key  (r) = Recorded By, (t) = Taken By, (c) = Cosigned By    Initials Name Provider Type    Elaine Roberts, PT Physical Therapist              Therapy Charges for Today     Code Description Service Date Service Provider Modifiers Qty    97024752385 HC PT THERAPEUTIC ACT EA 15 MIN 11/4/2022 Ealine Navarro, PT GP 1    38329425295 HC PT THER SUPP EA 15 MIN 11/4/2022 Elaine Navarro, PT GP 1        Patient was not wearing a face mask during this therapy encounter. Therapist used appropriate personal protective equipment including mask and gloves.  Mask used was standard procedure mask. Appropriate PPE was worn during the entire therapy session. Hand hygiene was completed before and after therapy session. Patient is not in enhanced droplet precautions.       PT G-Codes  Outcome Measure Options: AM-PAC 6 Clicks Basic  Mobility (PT)  AM-PAC 6 Clicks Score (PT): 11  AM-PAC 6 Clicks Score (OT): 12  Modified Dukes Scale: 4 - Moderately severe disability.  Unable to walk without assistance, and unable to attend to own bodily needs without assistance.    Elaine Navarro, PT  11/4/2022

## 2022-11-04 NOTE — THERAPY TREATMENT NOTE
Patient Name: Miguelina Lopez  : 1945    MRN: [id number]                              Today's Date: 2022       Admit Date: 2022    Visit Dx:     ICD-10-CM ICD-9-CM   1. Acute congestive heart failure, unspecified heart failure type ([facility])  I50.9 428.0   2. OLI (acute kidney injury) ([facility])  N17.9 584.9   3. Hyperglycemia  R73.9 790.29   4. Pulmonary hypertension ([facility])  I27.20 416.8   5. Paroxysmal atrial flutter ([facility])  I48.92 427.32   6. Heart valve disorder  I38 424.90   7. Acute on chronic combined systolic and diastolic congestive heart failure ([facility])  I50.43 428.43     428.0   8. S/P TAVR (transcatheter aortic valve replacement)  Z95.2 V43.3   9. Acute on chronic diastolic heart failure ([facility])  I50.33 428.33   10. Nonrheumatic aortic valve stenosis  I35.0 424.1   11. Complete heart block ([facility])  I44.2 426.0   12. Presence of permanent cardiac pacemaker  Z95.0 V45.01   13. Chronic coronary artery disease  I25.10 414.00   14. Nonrheumatic mitral valve regurgitation  I34.0 424.0   15. Bilateral lower extremity edema  R60.0 782.3     Patient Active Problem List   Diagnosis   • Anxiety disorder   • Arthritis of knee   • Asthma   • Chronic coronary artery disease   • CKD (chronic kidney disease) stage 3, GFR 30-59 ml/min ([facility])   • Depression   • Diabetic peripheral neuropathy ([facility])   • Gastroesophageal reflux disease   • Hyperlipidemia   • Insomnia   • Lower gastrointestinal hemorrhage   • Anemia, chronic disease   • OCHOA (obstructive sleep apnea)   • Type 2 diabetes mellitus with kidney complication, without long-term current use of insulin ([facility])   • Essential hypertension   • Hospital discharge follow-up   • Mitral stenosis   • Pulmonary hypertension due to sleep-disordered breathing (CMS/[facility])   • s/p MVR, TV-repair, CABG x2 16   • Leukocytosis   • Paroxysmal atrial fibrillation ([facility])   • Nocturnal hypoxia   • Dermatitis   • Medicare annual wellness visit, initial   • Class 3 severe obesity due  to excess calories with serious comorbidity and body mass index (BMI) of 50.0 to 59.9 in adult ([facility])   • Supplemental oxygen dependent   • Acute on chronic combined systolic and diastolic HF (heart failure) ([facility])   • Mitral regurgitation   • Aortic stenosis   • Medicare annual wellness visit, subsequent   • Localized edema   • Chronic right-sided congestive heart failure (HCC)   • Cellulitis of left lower extremity   • Proteinuria   • Bilateral lower extremity edema   • Subclinical hypothyroidism   • Chronic diastolic heart failure (HCC)   • Chronic respiratory failure with hypoxia and hypercapnia ([facility])   • Acute on chronic respiratory failure with hypoxia and hypercapnia ([facility])   • AV block, 2nd degree   • 1st degree AV block   • Chest pain   • COPD (chronic obstructive pulmonary disease) ([facility])   • Complete heart block ([facility])   • Presence of permanent cardiac pacemaker   • Hypothyroidism (acquired)   • Chronic anticoagulation   • Leukocytosis   • Stage 3b chronic kidney disease ([facility])   • Gastrointestinal hemorrhage with melena   • Acute blood loss anemia   • Metabolic encephalopathy   • Hypernatremia   • Hypokalemia   • TIA (transient ischemic attack)   • Nonrheumatic aortic valve stenosis   • Acute on chronic heart failure ([facility])   • S/P TAVR (transcatheter aortic valve replacement)   • CHF (congestive heart failure) ([facility])   • Heart valve disorder   • Paroxysmal atrial flutter ([facility])   • Peripheral neuropathy   • Pulmonary hypertension ([facility])   • Dry skin   • Closed fracture of one rib   • Impaired mobility and activities of daily living   • OLI (acute kidney injury) ([facility])     Past Medical History:   Diagnosis Date   • Acute on chronic respiratory failure with hypoxia and hypercapnia ([facility])    • OLI (acute kidney injury) ([facility])    • Anemia    • Anxiety    • Aortic valve stenosis    • Arthritis     KNEES   • Bilateral lower extremity edema    • CHF (congestive heart failure) ([facility])    • Chronic coronary artery disease    •  Class 3 severe obesity due to excess calories in adult ([facility])    • COPD (chronic obstructive pulmonary disease) ([facility])    • Depression    • Diabetes mellitus ([facility])    • Dry skin    • Elevated cholesterol    • GERD (gastroesophageal reflux disease)    • Heart murmur    • Hypertension    • Mitral valve insufficiency    • Pneumonia     1/2016   • Pulmonary hypertension ([facility])     due to sleep disordered breathing   • Sleep apnea     Uses CPAP or oxygen   • Stage 3 chronic kidney disease ([facility])    • Subclinical hypothyroidism    • Supplemental oxygen dependent     3 LITERS   • TIA (transient ischemic attack) 3/15/2021   • Valvular heart disease      Past Surgical History:   Procedure Laterality Date   • AORTIC VALVE REPAIR/REPLACEMENT N/A 7/13/2021    Procedure: TTE TRANSFEMORAL TRANSCATHETER AORTIC VALVE REPLACEMENT PERCUTANEOUS APPROACH;  Surgeon: Sharlene Mejía MD;  Location: [facility] OR 18/19;  Service: Cardiothoracic;  Laterality: N/A;   • AORTIC VALVE REPAIR/REPLACEMENT N/A 7/13/2021    Procedure: Transfemoral Transcatheter Aortic Valve Replacement with intra-op tte and possible open surgical rescue;  Surgeon: Ayan Pacheco MD;  Location: [facility] OR 18/19;  Service: Cardiovascular;  Laterality: N/A;   • CARDIAC CATHETERIZATION     • CARDIAC CATHETERIZATION N/A 6/10/2016    Procedure: Left Heart Cath;  Surgeon: Chace Johnson MD;  Location: [facility] INVASIVE LOCATION;  Service:    • CARDIAC CATHETERIZATION N/A 6/10/2016    Procedure: Right Heart Cath;  Surgeon: Chace Johnson MD;  Location: [facility] INVASIVE LOCATION;  Service:    • CARDIAC CATHETERIZATION N/A 2/5/2021    Procedure: RIGHT AND LEFT HEART CATH;  Surgeon: Antoine Ayers MD;  Location: [facility] INVASIVE LOCATION;  Service: Cardiology;  Laterality: N/A;   • CARDIAC CATHETERIZATION N/A 2/5/2021    Procedure: Coronary angiography;  Surgeon: Antoine Ayers MD;  Location: [facility] INVASIVE LOCATION;   Service: Cardiology;  Laterality: N/A;   • CARDIAC CATHETERIZATION N/A 2/5/2021    Procedure: Left ventriculography- pressures;  Surgeon: Antoine Ayers MD;  Location: [facility] CATH INVASIVE LOCATION;  Service: Cardiology;  Laterality: N/A;   • CARDIAC ELECTROPHYSIOLOGY PROCEDURE N/A 2/2/2021    Procedure: Pacemaker DC new---[employer] MICRA;  Surgeon: Eliazar Bond MD;  Location: [facility] CATH INVASIVE LOCATION;  Service: Cardiology;  Laterality: N/A;   • CARDIAC SURGERY     • COLONOSCOPY N/A 10/14/2021    Procedure: COLONOSCOPY to cecum and TI  with cold snare polypectomies;  Surgeon: Dixon Azevedo MD;  Location: [facility] ENDOSCOPY;  Service: Gastroenterology;  Laterality: N/A;  pre: melena  post: polyps, diverticulosis   • CORONARY ARTERY BYPASS GRAFT      2 vessel   • CORONARY ARTERY BYPASS GRAFT WITH MITRAL VALVE REPAIR/REPLACEMENT N/A 6/13/2016    Procedure: INTRAOPERATIVE TARIQ, MIDLINE STERNOTOMY, CORONARY ARTERY BYPASS GRAFTING X  2 UTILIZING ENDOSCOPICALLY HARVESTED LEFT GREATER SAPHENOUS VEIN, MITRAL VALVE REPLACEMENT AND TRICUSPID VALVE REPAIR;  Surgeon: Eliecer Mistry MD;  Location: [facility] MAIN OR;  Service:    • CORONARY STENT PLACEMENT  2010    Approx. 6 yrs ago at [facility]   • ENDOSCOPY N/A 3/17/2021    Procedure: ESOPHAGOGASTRODUODENOSCOPY;  Surgeon: Dixon Haas MD;  Location: [facility] ENDOSCOPY;  Service: Gastroenterology;  Laterality: N/A;  PRE: GI BLEED  POST: ANTRAL ULCER   • ENDOSCOPY N/A 10/13/2021    Procedure: ESOPHAGOGASTRODUODENOSCOPY WITH BIOPSIES;  Surgeon: Riana Milner MD;  Location: [facility] ENDOSCOPY;  Service: Gastroenterology;  Laterality: N/A;  pre-melena, anemia   post- mild gastritis, prepyloric erosion, duodenal lymphangiectasia    • HEMORRHOIDECTOMY     • HYSTERECTOMY     • MITRAL VALVE REPLACEMENT     • REPLACEMENT TOTAL KNEE Right    • THYROID SURGERY      Cyst removed from thyroid   • VASCULAR SURGERY        General Information     Row Name 11/04/22 0935           OT Time and Intention    Document Type therapy note (daily note)  -LE     Mode of Treatment occupational therapy;physical therapy  co treat due level of assist, activity tolerance  -LE     Row Name 11/04/22 0935          General Information    Existing Precautions/Restrictions fall  -LE     Row Name 11/04/22 0935          Cognition    Orientation Status (Cognition) oriented x 3  -LE     Row Name 11/04/22 0935          Safety Issues, Functional Mobility    Comment, Safety Issues/Impairments (Mobility) non skid socks and gait belt.  -LE           User Key  (r) = Recorded By, (t) = Taken By, (c) = Cosigned By    Initials Name Provider Type    LE Solange Dawkins OTR Occupational Therapist                 Mobility/ADL's     Row Name 11/04/22 0938          Bed Mobility    Bed Mobility scooting/bridging;sit-supine;supine-sit;rolling left  -LE     Rolling Left [place] (Bed Mobility) moderate assist (50% patient effort);maximum assist (25% patient effort);2 person assist;verbal cues;nonverbal cues (demo/gesture)  -LE     Scooting/Bridging [place] (Bed Mobility) dependent (less than 25% patient effort);2 person assist;nonverbal cues (demo/gesture);verbal cues  3rd person since RN present.  -LE     Supine-Sit [place] (Bed Mobility) maximum assist (25% patient effort);2 person assist  Rn also present to assist.  -LE     Sit-Supine [place] (Bed Mobility) minimum assist (75% patient effort);moderate assist (50% patient effort);2 person assist;verbal cues;nonverbal cues (demo/gesture)  -LE     Bed Mobility, Safety Issues decreased use of legs for bridging/pushing;impaired trunk control for bed mobility  -LE     Assistive Device (Bed Mobility) bed rails;draw sheet;head of bed elevated  -LE     Row Name 11/04/22 0938          Transfers    Transfers bed-chair transfer;sit-stand transfer;stand-sit transfer  -LE     Row Name 11/04/22 0938          Sit-Stand Transfer    Sit-Stand [place] (Transfers) maximum  assist (25% patient effort);nonverbal cues (demo/gesture);verbal cues;2 person assist;moderate assist (50% patient effort)  -PATRICIO     Comment, (Sit-Stand Transfer) stood twice, not fully upright. encouragement to stand second time  -LE     Row Name 11/04/22 0938          Functional Mobility    Functional Mobility- Comment NT  -LE     Row Name 11/04/22 0938          Activities of Daily Living    BADL Assessment/Intervention toileting;grooming;lower body dressing;upper body dressing;bathing  -[facility] 11/04/22 0938          Toileting Assessment/Training    [place] (Toileting) dependent (less than 25% patient effort)  -PATRICIO     Comment, (Toileting) purwick and brief. full assist with nsg prior to  -LE     Row Name 11/04/22 0938          Upper Body Dressing Assessment/Training    Comment, (Upper Body Dressing) min A to adjust gown.  -LE     Row Name 11/04/22 0938          Bathing Assessment/Intervention    Comment, (Bathing) max with bath per aid. supine level.  -PATRICIO           User Key  (r) = Recorded By, (t) = Taken By, (c) = Cosigned By    Initials Name Provider Type    Solange Cedeno, OTR Occupational Therapist               Obj/Interventions     [facility] Name 11/04/22 0941          Range of Motion Comprehensive    Comment, General Range of Motion less than 1/2 AROM B Shld.  -[facility] 11/04/22 0941          Shoulder (Therapeutic Exercise)    Shoulder (Therapeutic Exercise) AROM (active range of motion)  -PATRICIO     Shoulder AROM (Therapeutic Exercise) 10 repetitions;supine  -LE     Row Name 11/04/22 0941          Motor Skills    Therapeutic Exercise shoulder  -           User Key  (r) = Recorded By, (t) = Taken By, (c) = Cosigned By    Initials Name Provider Type    Solange Cedeno, OTR Occupational Therapist               Goals/Plan    No documentation.                Clinical Impression     [facility] Name 11/04/22 0931          Pain Assessment    Pre/Posttreatment Pain Comment chest pain related to  coughing. RN present and aware.  -     Row Name 11/04/22 0931          Plan of Care Review    Plan of Care Reviewed With patient  -LE     Outcome Evaluation Pt seen for UE AROM and then assist of 2 to move to sit EOB, stand with assist of 2 and not fully upright.  Pt with fear of falling and encouragement for tasks.  Pt plans SNU at d/c.  Pt full assist with lower body ADL tasks and toileting.  -     Row Name 11/04/22 0931          Vital Signs    Pre Patient Position Supine  -LE     Intra Patient Position Standing  -LE     Post Patient Position Supine  -LE     Row Name 11/04/22 0931          Positioning and Restraints    Pre-Treatment Position in bed  -LE     Post Treatment Position bed  -LE     In Bed notified nsg;supine;call light within reach;encouraged to call for assist;exit alarm on;with nsg  with RN  -LE           User Key  (r) = Recorded By, (t) = Taken By, (c) = Cosigned By    Initials Name Provider Type    Solange Cedeno OTR Occupational Therapist               Outcome Measures     Row Name 11/04/22 0943          How much help from another is currently needed...    Putting on and taking off regular lower body clothing? 1  -LE     Bathing (including washing, rinsing, and drying) 2  -LE     Toileting (which includes using toilet bed pan or urinal) 1  -LE     Putting on and taking off regular upper body clothing 2  -LE     Taking care of personal grooming (such as brushing teeth) 2  -LE     Eating meals 4  -LE     AM-PAC 6 Clicks Score (OT) 12  -     Row Name 11/04/22 0943          Functional Assessment    Outcome Measure Options AM-PAC 6 Clicks Daily Activity (OT)  -LE           User Key  (r) = Recorded By, (t) = Taken By, (c) = Cosigned By    Initials Name Provider Type    Solange Cedeno OTR Occupational Therapist                Occupational Therapy Education     Title: PT OT SLP Therapies (In Progress)     Topic: Occupational Therapy (Done)     Point: ADL training (Done)     Description:    Instruct learner(s) on proper safety adaptation and remediation techniques during self care or transfers.   Instruct in proper use of assistive devices.              Learning Progress Summary           Patient Acceptance, E, VU by CINTHIA at 11/2/2022 1213                   Point: Home exercise program (Done)     Description:   Instruct learner(s) on appropriate technique for monitoring, assisting and/or progressing therapeutic exercises/activities.              Learning Progress Summary           Patient Acceptance, E, VU by CINTHIA at 11/2/2022 1213                   Point: Precautions (Done)     Description:   Instruct learner(s) on prescribed precautions during self-care and functional transfers.              Learning Progress Summary           Patient Acceptance, E, VU by CINTHIA at 11/2/2022 1213                   Point: Body mechanics (Done)     Description:   Instruct learner(s) on proper positioning and spine alignment during self-care, functional mobility activities and/or exercises.              Learning Progress Summary           Patient Acceptance, E, VU by  at 11/2/2022 1213                               User Key     Initials Effective Dates Name Provider Type Discipline     08/02/22 -  Sai Rowley OT Occupational Therapist OT              OT Recommendation and Plan     Plan of Care Review  Plan of Care Reviewed With: patient  Outcome Evaluation: Pt seen for UE AROM and then assist of 2 to move to sit EOB, stand with assist of 2 and not fully upright.  Pt with fear of falling and encouragement for tasks.  Pt plans SNU at d/c.  Pt full assist with lower body ADL tasks and toileting.     Time Calculation:    Time Calculation- OT     Row Name 11/04/22 0943             Time Calculation- OT    OT Start Time 0909  -LE      OT Stop Time 0923  -LE      OT Time Calculation (min) 14 min  -LE      Total Timed Code Minutes- OT 14 minute(s)  -LE      OT Received On 11/04/22  -LE      OT - Next Appointment 11/07/22  -LE       OT Goal Re-Cert Due Date 11/18/22  -LE         Timed Charges    20804 - OT Therapeutic Activity Minutes 14  -LE         Total Minutes    Timed Charges Total Minutes 14  -LE       Total Minutes 14  -LE            User Key  (r) = Recorded By, (t) = Taken By, (c) = Cosigned By    Initials Name Provider Type    Solange Cedeno OTR Occupational Therapist              Therapy Charges for Today     Code Description Service Date Service Provider Modifiers Qty    [id number]  OT THERAPEUTIC ACT EA 15 MIN 11/4/2022 Solange Dawkins OTR GO 1               ANNEMARIE Rey  11/4/2022

## 2022-11-04 NOTE — CASE MANAGEMENT/SOCIAL WORK
Continued Stay Note  [facility]     Patient Name: Miguelina Lopez  MRN: [id number]  Today's Date: 11/4/2022    Admit Date: 11/1/2022    Plan: [facility], possible LTC   Discharge Plan     Row Name 11/04/22 1642       Plan    Plan [facility], probable LTC    Patient/Family in Agreement with Plan yes    Plan Comments Inbound call from [facility] who states they are aware patient may need LTC and are able to accept at [facility]. Patient has been clinically accepted and facility is following to start pre cert. Packet in [facility]. Bety DAVID RN/CCP               Discharge Codes    No documentation.               Expected Discharge Date and Time     Expected Discharge Date Expected Discharge Time    Nov 5, 2022             Riana Phillips

## 2022-11-04 NOTE — PROGRESS NOTES
"    Patient Name: Miguelina Lopez  :1945  76 y.o.      Patient Care Team:  Arcelia Talbot APRN as PCP - General (Nurse Practitioner)  Robbie Wilson MD as Consulting Physician (Hematology and Oncology)  Chace Johnson MD as Consulting Physician (Cardiology)  Duarte Cuevas MD as Consulting Physician (Pulmonary Disease)  Shania Severino, JAY as Ambulatory  ([facility])    Chief Complaint:     Interval History:        Objective   Vital Signs  Temp:  [97.4 °F (36.3 °C)-99.4 °F (37.4 °C)] 99.4 °F (37.4 °C)  Heart Rate:  [73-75] 75  Resp:  [20] 20  BP: (103-132)/(50-69) 114/53    Intake/Output Summary (Last 24 hours) at 2022 1503  Last data filed at 2022 0930  Gross per 24 hour   Intake --   Output 1500 ml   Net -1500 ml     Flowsheet Rows    Flowsheet Row First Filed Value   Admission Height 149.9 cm (59\") Documented at 2022 1736   Admission Weight 116 kg (256 lb) Documented at 2022 1736          Physical Exam:   General Appearance:    Alert, cooperative, in no acute distress   Lungs:     Clear to auscultation.  Normal respiratory effort and rate.      Heart:    Regular rhythm and normal rate, normal S1 and S2, no murmurs, gallops or rubs.     Chest Wall:    No abnormalities observed   Abdomen:     Soft, nontender, positive bowel sounds.     Extremities:   no cyanosis, clubbing or edema.  No marked joint deformities.  Adequate musculoskeletal strength.       Results Review:    Results from last 7 days   Lab Units 22  0443   SODIUM mmol/L 134*   POTASSIUM mmol/L 4.4   CHLORIDE mmol/L 94*   CO2 mmol/L 30.9*   BUN mg/dL 65*   CREATININE mg/dL 2.10*   GLUCOSE mg/dL 154*   CALCIUM mg/dL 8.6     Results from last 7 days   Lab Units 223 22  1746   TROPONIN T ng/mL 0.032* 0.030     Results from last 7 days   Lab Units 22  0443   WBC 10*3/mm3 6.82   HEMOGLOBIN g/dL 9.0*   HEMATOCRIT % 28.0*   PLATELETS 10*3/mm3 179         Results from " last 7 days   Lab Units 11/04/22  0443   MAGNESIUM mg/dL 1.9                   Medication Review:   allopurinol, 100 mg, Oral, Daily  atorvastatin, 40 mg, Oral, Daily  budesonide-formoterol, 2 puff, Inhalation, BID - RT  bumetanide, 4 mg, Intravenous, Q8H  carvedilol, 12.5 mg, Oral, BID With Meals  dextromethorphan polistirex ER, 60 mg, Oral, Q12H  ferric gluconate, 250 mg, Intravenous, Daily  ferrous sulfate, 325 mg, Oral, Daily With Breakfast  gabapentin, 100 mg, Oral, BID  insulin lispro, 0-14 Units, Subcutaneous, TID AC  lactobacillus acidophilus, 1 capsule, Oral, Daily  levothyroxine, 100 mcg, Oral, Q AM  lidocaine, 1 patch, Transdermal, Q24H  pantoprazole, 40 mg, Oral, QAM  potassium chloride, 20 mEq, Oral, Daily  sennosides-docusate, 1 tablet, Oral, Every Other Day  vilazodone, 20 mg, Oral, Nightly              Assessment & Plan   1. HFpEF - recent echocardiogram with preserved LV systolic function. EF 50%.   2. Severe aortic stenosis status post TAVR 7/14/2021  3. Coronary artery disease status post CABG , coronary angiography July 2021 showed patent stents and prior grafts. SVG to first diagonal, SVG to PDA, patent mid LAD stent, patent left circumflex stent.  4. Chronic kidney disease - nephrology is managing diuretics.   5. Mitral valve regurgitation status post replacement, at least moderate regurgitation with severe degenerating prosthetic valve disease. Not an interventional candidate.   6. COPD on home oxygen  7. Complete heart block status post Micra pacemaker  8. Moderate to severe pulmonary hypertension, likely multifactorial  9. Chronic lymphedema - legs are usually wrapped.   10. Elevated troponin, do not suspect ACS. Likely due to renal insufficiency, demand.  11. Paroxysmal atrial fibrillation - anticoagulation held due to significant bleeding in the past. ASA added this admission.     Continue to maximize medical therapy. Unfortunately there are not a lot of options for her complex cardiac  conditions. Nephrology is managing diuretics.   Will see again Monday. Please call with any questions over the weekend.     ZOILA Arias  [facility]  11/04/22  15:03 EDT

## 2022-11-05 NOTE — PLAN OF CARE
Goal Outcome Evaluation:  Plan of Care Reviewed With: patient, daughter        Progress: no change  Outcome Evaluation: Patient has been alert and oriented with confusion intermittently throughout the day. Started on Torsemide with improvement to funmilayo hands much less swollen as the day progresses. Patient stated she was unable to use funmilayo hands this am. Assistance with feeding breakfast and taking in fluids. Encouraged and educated on importance of maintaining independence with adls. Turned and repositioned q2 per staff. Edcuation provided on elevating heels. Elevated for short time then yelling out to request to remove. SCD's in place. IS at bedside and encouraged to use.  Diuretic in Use: Torsemide 50mg given today-titrate slowly per MD orders  Response to Diuretics (Output greater than intake): In 240, out: 800ml  Daily Weight (up or down): weight is up 120.475kg vs yesterday 119.341kg  O2 Requirements: remains on 3L via NC  Functional Status (Activity level, tolerance and respiratory symptoms): Patient requires mod to max assist with all adls this shift. Education on importance of attempting to try and remain independence.    Discharge Plans: To be determined at this time. Patient states she is going to LTC, family here (daughter she does not live with) who stated patient could come stay with her after rehab if need to. Patient states she feels like she is a burden to the son in law she currently lives with.

## 2022-11-05 NOTE — PROGRESS NOTES
Name: Miguelina Lopez ADMIT: 2022   : 1945  PCP: Arcelia Talbot APRN    MRN: [id number] LOS: 3 days   AGE/SEX: 76 y.o. female  ROOM: [facility]   Subjective   Chief Complaint   Patient presents with   • Shortness of Breath   • Cough     Dyspnea fair  +edema  +orthopnea  Had been on IV diuretics  Seen by Nephrology this AM  gen weakness    ROS  No f/c  No n/v  No cp/palp  +soa/cough    Objective   Vital Signs  Temp:  [98 °F (36.7 °C)-99.4 °F (37.4 °C)] 98.5 °F (36.9 °C)  Heart Rate:  [74-79] 75  Resp:  [18-20] 18  BP: (102-120)/(50-67) 120/61  SpO2:  [96 %-98 %] 96 %  on  Flow (L/min):  [3] 3;   Device (Oxygen Therapy): nasal cannula  Body mass index is 53.64 kg/m².    Physical Exam  Constitutional:       General: She is not in acute distress.     Appearance: She is obese. She is ill-appearing.   HENT:      Head: Normocephalic and atraumatic.   Eyes:      General: No scleral icterus.  Cardiovascular:      Rate and Rhythm: Regular rhythm.      Heart sounds: Murmur heard.   Pulmonary:      Effort: Respiratory distress present.      Breath sounds: Rales present.   Abdominal:      General: There is no distension.      Palpations: Abdomen is soft.   Musculoskeletal:      Cervical back: Neck supple.      Right lower leg: Edema present.      Left lower leg: Edema present.   Skin:     Coloration: Skin is pale.   Neurological:      Mental Status: She is alert.   Psychiatric:         Behavior: Behavior normal.     similar exam to yesterday, edema may be a little better    Results Review:       I reviewed the patient's new clinical results.  Results from last 7 days   Lab Units 22  04222  04222  1746   WBC 10*3/mm3 6.82 7.57 7.77 8.25   HEMOGLOBIN g/dL 9.0* 8.6* 8.6* 8.7*   PLATELETS 10*3/mm3 179 171 182 199     Results from last 7 days   Lab Units 22  0524 22  0443 22  0421 22  0429   SODIUM mmol/L 139 134* 137 138   POTASSIUM mmol/L 3.8 4.4 4.4  3.8   CHLORIDE mmol/L 96* 94* 97* 96*   CO2 mmol/L 32.5* 30.9* 31.4* 29.8*   BUN mg/dL 67* 65* 68* 71*   CREATININE mg/dL 2.39* 2.10* 2.18* 2.25*   GLUCOSE mg/dL 142* 154* 180* 282*   Estimated Creatinine Clearance: 24.1 mL/min (A) (by C-G formula based on SCr of 2.39 mg/dL (H)).  Results from last 7 days   Lab Units 11/05/22 0524 11/04/22 0443 11/03/22 0421 11/01/22  1746   ALBUMIN g/dL 2.90* 2.90* 2.70* 3.40*   BILIRUBIN mg/dL  --   --   --  0.5   ALK PHOS U/L  --   --   --  171*   AST (SGOT) U/L  --   --   --  18   ALT (SGPT) U/L  --   --   --  11     Results from last 7 days   Lab Units 11/05/22 0524 11/04/22 0443 11/03/22 0421 11/02/22 0429 11/01/22  1746   CALCIUM mg/dL 8.4* 8.6 8.7 9.0 8.7   ALBUMIN g/dL 2.90* 2.90* 2.70*  --  3.40*   MAGNESIUM mg/dL 1.9 1.9 1.8  --   --    PHOSPHORUS mg/dL 3.7 3.3 3.3  --   --          Coag     HbA1C   Lab Results   Component Value Date    HGBA1C 10.30 (H) 11/02/2022    HGBA1C 8.80 (H) 10/09/2022    HGBA1C 10.20 (H) 07/16/2022     Infection     Radiology(recent) No radiology results for the last day  No results found for: TROPONINT, TROPONINI, BNP  No components found for: TSH;2    allopurinol, 100 mg, Oral, Daily  atorvastatin, 40 mg, Oral, Daily  budesonide-formoterol, 2 puff, Inhalation, BID - RT  carvedilol, 12.5 mg, Oral, BID With Meals  dextromethorphan polistirex ER, 60 mg, Oral, Q12H  ferrous sulfate, 325 mg, Oral, Daily With Breakfast  gabapentin, 100 mg, Oral, BID  insulin lispro, 0-14 Units, Subcutaneous, TID AC  lactobacillus acidophilus, 1 capsule, Oral, Daily  levothyroxine, 100 mcg, Oral, Q AM  lidocaine, 1 patch, Transdermal, Q24H  pantoprazole, 40 mg, Oral, QAM  potassium chloride, 20 mEq, Oral, Daily  sennosides-docusate, 1 tablet, Oral, Every Other Day  torsemide, 50 mg, Oral, Daily  vilazodone, 20 mg, Oral, Nightly       Diet Regular; Cardiac, Low Sodium; 2,000 mg Na      Assessment & Plan      Active Hospital Problems    Diagnosis  POA   • **Acute  on chronic combined systolic and diastolic HF (heart failure) ([facility]) [I50.43]  Yes   • Acute congestive heart failure, unspecified heart failure type ([facility]) [I50.9]  Yes   • S/P TAVR (transcatheter aortic valve replacement) [Z95.2]  Not Applicable   • Stage 3b chronic kidney disease ([facility]) [N18.32]  Yes   • Hypothyroidism (acquired) [E03.9]  Yes   • COPD (chronic obstructive pulmonary disease) ([facility]) [J44.9]  Yes   • Chronic respiratory failure with hypoxia and hypercapnia ([facility]) [J96.11, J96.12]  Yes   • Paroxysmal atrial fibrillation ([facility]) [I48.0]  Yes   • s/p MVR, TV-repair, CABG x2 6/13/16 [Z95.2]  Not Applicable   • Type 2 diabetes mellitus with kidney complication, without long-term current use of insulin ([facility]) [E11.29]  Yes   • OCHOA (obstructive sleep apnea) [G47.33]  Yes   • Anemia, chronic disease [D63.8]  Yes   • Essential hypertension [I10]  Yes      Resolved Hospital Problems   No resolved problems to display.       · diuertics as directed by Nephrology and Cardiology- adjusted today   · Monitor renal fx, weight, volume status  · On insulin, monitor BG  · Monitor anemia  · Agents for CHF/HTN, monitor BG    Thanks to specialists     Poor ongoing prognosis with her CHF and volume issues and not candidate for valve surgery.     YAHIR RN    Dispo- to SNF, likely on 11/7    Sukhwinder Mauro MD  [facility]  11/05/22  14:36 EDT

## 2022-11-05 NOTE — PROGRESS NOTES
[facility] Progress Note      Patient Name: Miguelina Lopez  : 1945  MRN: [id number]  Primary Care Physician:  Arcelia Talbot APRN  Date of admission: 2022    Subjective     Interval History:   Follow-up acute kidney injury on chronic kidney disease    Patient feeling little better this morning.  She still has dyspnea but improving.  Feeling very tired.  Denies any chest pain, nausea or vomiting    Review of Systems:   As noted above    Objective     Vitals:   Temp:  [98 °F (36.7 °C)-99.4 °F (37.4 °C)] 98.5 °F (36.9 °C)  Heart Rate:  [74-79] 75  Resp:  [20] 20  BP: (102-120)/(50-67) 120/61  Flow (L/min):  [3] 3    Intake/Output Summary (Last 24 hours) at 2022 0929  Last data filed at 2022 0901  Gross per 24 hour   Intake 480 ml   Output 1500 ml   Net -1020 ml       Physical Exam:    General Appearance: alert, awake, no acute distress, morbidly obese and chronically  Skin: warm and dry  HEENT: oral mucosa normal, nonicteric sclera  Neck: Mild JVD  Lungs: Bilateral rhonchi and crackles, breathing effort not labored  Heart: RRR, normal S1 and S2, no rub  Abdomen: soft, nontender, nondistended, normoactive bowel  : no palpable bladder pure wick system is in place  Extremities: 1-2+ lower extremity edema.  Neuro: normal speech and mental status     Scheduled Meds:     allopurinol, 100 mg, Oral, Daily  atorvastatin, 40 mg, Oral, Daily  budesonide-formoterol, 2 puff, Inhalation, BID - RT  carvedilol, 12.5 mg, Oral, BID With Meals  dextromethorphan polistirex ER, 60 mg, Oral, Q12H  ferric gluconate, 250 mg, Intravenous, Daily  ferrous sulfate, 325 mg, Oral, Daily With Breakfast  gabapentin, 100 mg, Oral, BID  insulin lispro, 0-14 Units, Subcutaneous, TID AC  lactobacillus acidophilus, 1 capsule, Oral, Daily  levothyroxine, 100 mcg, Oral, Q AM  lidocaine, 1 patch, Transdermal, Q24H  pantoprazole, 40 mg, Oral, QAM  potassium chloride, 20 mEq, Oral,  Daily  sennosides-docusate, 1 tablet, Oral, Every Other Day  vilazodone, 20 mg, Oral, Nightly      IV Meds:        Results Reviewed:   I have personally reviewed the results from the time of this admission to 11/5/2022 09:29 EDT     Results from last 7 days   Lab Units 11/05/22 0524 11/04/22 0443 11/03/22 0421 11/02/22 0429 11/01/22  1746   SODIUM mmol/L 139 134* 137   < > 135*   POTASSIUM mmol/L 3.8 4.4 4.4   < > 4.4   CHLORIDE mmol/L 96* 94* 97*   < > 92*   CO2 mmol/L 32.5* 30.9* 31.4*   < > 28.6   BUN mg/dL 67* 65* 68*   < > 71*   CREATININE mg/dL 2.39* 2.10* 2.18*   < > 2.40*   CALCIUM mg/dL 8.4* 8.6 8.7   < > 8.7   BILIRUBIN mg/dL  --   --   --   --  0.5   ALK PHOS U/L  --   --   --   --  171*   ALT (SGPT) U/L  --   --   --   --  11   AST (SGOT) U/L  --   --   --   --  18   GLUCOSE mg/dL 142* 154* 180*   < > 467*    < > = values in this interval not displayed.       Estimated Creatinine Clearance: 24.1 mL/min (A) (by C-G formula based on SCr of 2.39 mg/dL (H)).    Results from last 7 days   Lab Units 11/05/22 0524 11/04/22 0443 11/03/22 0421   MAGNESIUM mg/dL 1.9 1.9 1.8   PHOSPHORUS mg/dL 3.7 3.3 3.3       Results from last 7 days   Lab Units 11/05/22 0524 11/04/22 0443 11/03/22 0421   URIC ACID mg/dL 11.3* 10.7* 11.3*       Results from last 7 days   Lab Units 11/04/22 0443 11/03/22 0421 11/02/22 0429 11/01/22  1746   WBC 10*3/mm3 6.82 7.57 7.77 8.25   HEMOGLOBIN g/dL 9.0* 8.6* 8.6* 8.7*   PLATELETS 10*3/mm3 179 171 182 199             Assessment / Plan     ASSESSMENT:  -Acute kidney injury on chronic kidney disease, associated with diabetic and hypertensive glomerulosclerosis, creatinine slightly increased to 2.39 Mg/DL with diuresis  -Fluid overload and congestive heart failure  -Coronary artery disease with prior CABG  -Aortic stenosis prior TAVR  -Prior mitral valve replacement tricuspid valve repair  -COPD with pulmonary hypertension  -Morbid obesity  -Chronic anemia, hemoglobin today 9,  patient has evidence of iron deficiency as noted in July 2022.  Iron stores revealed iron saturation 10% and the ferritin 167.  Patient receiving 3 doses of IV iron  -History of gout, treated with allopurinol and appears to be stable,    PLAN:  -Patient received IV Bumex and metolazone yesterday  -Start oral torsemide and titrate dose slowly      Thank you for involving us in the care of Miguelina Lopez.  Please feel free to call with any questions.    Gómez Lucas MD  11/05/22  09:29 EDT    [facility]  [phone]      Much of this encounter note is an electronic transcription/translation of spoken language to printed text. The electronic translation of spoken language may permit erroneous, or at times, nonsensical words or phrases to be inadvertently transcribed; Although I have reviewed the note for such errors, some may still exist.

## 2022-11-06 NOTE — PLAN OF CARE
Goal Outcome Evaluation:  Plan of Care Reviewed With: patient        Progress: no change  Outcome Evaluation: Patient has been alert and oriented with forgetfullness. Mood has been pleasant. Most of the swelling in hands and arms have subsided. Pt continues to complain of pain (arthritic in nature) to funmilayo hands resulting in inability to use call light. Patient has been heard yelling out for staff multiple times today. Patient has been able to use phone and make calls and has been able to feed herself lunch and dinner (refused BKFT) and hold her drinks. Encouragement and education provided. Patient's Torsemide was d/c'd after this am dose r/t kidney function. Turned and repositioned by staff. Vital signs stable. Call light in reach, safety maintained.  Diuretic in Use: Torsemide discontinued after this am dose  Response to Diuretics (Output greater than intake): In 360, Out 1100ml  Daily Weight (up or down): weight charted is exact same as yesterday. Patient is a bed scale weight and as a result of this, weight may be inaccurate.  O2 Requirements: remains on 3L via nc  Functional Status (Activity level, tolerance and respiratory symptoms): Productive cough with changes in cough medication this shift. Continues to be moderate to max assist with turning and repositioning.    Discharge Plans: discharge to be determined at this time. LTC vs Rehab.

## 2022-11-06 NOTE — PROGRESS NOTES
Name: Miguelina Lopez ADMIT: 2022   : 1945  PCP: Arcelia Talbot APRN    MRN: [id number] LOS: 3 days   AGE/SEX: 76 y.o. female  ROOM: [facility]   Subjective   Chief Complaint   Patient presents with   • Shortness of Breath   • Cough     Dyspnea fair  +edema  +orthopnea  gen weakness  Some sinus congestion and cough    ROS  No f/c  No n/v  No cp/palp  +soa/cough    Objective   Vital Signs  Temp:  [97.5 °F (36.4 °C)-98.4 °F (36.9 °C)] 97.5 °F (36.4 °C)  Heart Rate:  [74-77] 75  Resp:  [16-20] 16  BP: (108-125)/(45-68) 122/56  SpO2:  [94 %-97 %] 94 %  on  Flow (L/min):  [3] 3;   Device (Oxygen Therapy): nasal cannula  Body mass index is 53.64 kg/m².    Physical Exam  Constitutional:       General: She is not in acute distress.     Appearance: She is obese. She is ill-appearing.   HENT:      Head: Normocephalic and atraumatic.   Eyes:      General: No scleral icterus.  Cardiovascular:      Rate and Rhythm: Regular rhythm.      Heart sounds: Murmur heard.   Pulmonary:      Effort: Respiratory distress present.      Breath sounds: Rales present.   Abdominal:      General: There is no distension.      Palpations: Abdomen is soft.   Musculoskeletal:      Cervical back: Neck supple.      Right lower leg: Edema present.      Left lower leg: Edema present.   Skin:     Coloration: Skin is pale.   Neurological:      Mental Status: She is alert.   Psychiatric:         Behavior: Behavior normal.     similar exam to yesterday  Some cough during exam    Results Review:       I reviewed the patient's new clinical results.  Results from last 7 days   Lab Units 22  0443 11/03/22  04222  04222  1746   WBC 10*3/mm3 6.82 7.57 7.77 8.25   HEMOGLOBIN g/dL 9.0* 8.6* 8.6* 8.7*   PLATELETS 10*3/mm3 179 171 182 199     Results from last 7 days   Lab Units 22  0437 22  0524 22  0443 22  0421   SODIUM mmol/L 139 139 134* 137   POTASSIUM mmol/L 3.8 3.8 4.4 4.4   CHLORIDE mmol/L 97*  96* 94* 97*   CO2 mmol/L 31.7* 32.5* 30.9* 31.4*   BUN mg/dL 77* 67* 65* 68*   CREATININE mg/dL 2.72* 2.39* 2.10* 2.18*   GLUCOSE mg/dL 127* 142* 154* 180*   Estimated Creatinine Clearance: 21.1 mL/min (A) (by C-G formula based on SCr of 2.72 mg/dL (H)).  Results from last 7 days   Lab Units 11/06/22 0437 11/05/22 0524 11/04/22 0443 11/03/22 0421 11/01/22  1746   ALBUMIN g/dL 2.80* 2.90* 2.90* 2.70* 3.40*   BILIRUBIN mg/dL  --   --   --   --  0.5   ALK PHOS U/L  --   --   --   --  171*   AST (SGOT) U/L  --   --   --   --  18   ALT (SGPT) U/L  --   --   --   --  11     Results from last 7 days   Lab Units 11/06/22 0437 11/05/22 0524 11/04/22 0443 11/03/22 0421   CALCIUM mg/dL 8.6 8.4* 8.6 8.7   ALBUMIN g/dL 2.80* 2.90* 2.90* 2.70*   MAGNESIUM mg/dL  --  1.9 1.9 1.8   PHOSPHORUS mg/dL 3.8 3.7 3.3 3.3         Coag     HbA1C   Lab Results   Component Value Date    HGBA1C 10.30 (H) 11/02/2022    HGBA1C 8.80 (H) 10/09/2022    HGBA1C 10.20 (H) 07/16/2022     Infection     Radiology(recent) No radiology results for the last day  No results found for: TROPONINT, TROPONINI, BNP  No components found for: TSH;2    allopurinol, 100 mg, Oral, Daily  atorvastatin, 40 mg, Oral, Daily  benzonatate, 100 mg, Oral, TID  budesonide-formoterol, 2 puff, Inhalation, BID - RT  carvedilol, 12.5 mg, Oral, BID With Meals  dextromethorphan polistirex ER, 60 mg, Oral, Q12H  enoxaparin, 30 mg, Subcutaneous, Q24H  ferrous sulfate, 325 mg, Oral, Daily With Breakfast  fluticasone, 2 spray, Each Nare, Daily  gabapentin, 100 mg, Oral, BID  guaiFENesin, 1,200 mg, Oral, Q12H  insulin lispro, 0-14 Units, Subcutaneous, TID AC  lactobacillus acidophilus, 1 capsule, Oral, Daily  levothyroxine, 100 mcg, Oral, Q AM  lidocaine, 1 patch, Transdermal, Q24H  pantoprazole, 40 mg, Oral, QAM  potassium chloride, 20 mEq, Oral, Daily  sennosides-docusate, 1 tablet, Oral, Every Other Day  sodium chloride, 2 spray, Each Nare, TID  vilazodone, 20 mg, Oral,  Nightly       Diet Regular; Cardiac, Low Sodium; 2,000 mg Na      Assessment & Plan      Active Hospital Problems    Diagnosis  POA   • **Acute on chronic combined systolic and diastolic HF (heart failure) ([facility]) [I50.43]  Yes   • Acute congestive heart failure, unspecified heart failure type ([facility]) [I50.9]  Yes   • S/P TAVR (transcatheter aortic valve replacement) [Z95.2]  Not Applicable   • Stage 3b chronic kidney disease ([facility]) [N18.32]  Yes   • Hypothyroidism (acquired) [E03.9]  Yes   • COPD (chronic obstructive pulmonary disease) ([facility]) [J44.9]  Yes   • Chronic respiratory failure with hypoxia and hypercapnia ([facility]) [J96.11, J96.12]  Yes   • Paroxysmal atrial fibrillation ([facility]) [I48.0]  Yes   • s/p MVR, TV-repair, CABG x2 6/13/16 [Z95.2]  Not Applicable   • Type 2 diabetes mellitus with kidney complication, without long-term current use of insulin ([facility]) [E11.29]  Yes   • OCHOA (obstructive sleep apnea) [G47.33]  Yes   • Anemia, chronic disease [D63.8]  Yes   • Essential hypertension [I10]  Yes      Resolved Hospital Problems   No resolved problems to display.       · diuertics as directed by Nephrology and Cardiology  · Monitor renal fx, weight, volume status. Cr worse today and diuretics held  · On insulin, monitor BG  · Monitor anemia  · Agents for CHF/HTN, monitor BG  · Increase agents for cough/congestion    Thanks to specialists     Poor ongoing prognosis with her CHF and volume issues and not candidate for valve surgery.     YAHIR RN    Dispo- to SNF, likely on 11/7    Sukhwinder Mauro MD  [facility]  11/06/22  14:36 EDT

## 2022-11-06 NOTE — PLAN OF CARE
Goal Outcome Evaluation:              Outcome Evaluation: VSS q2 turn, purewick in place, scd's in place, Maintain safety and continue to monitor.

## 2022-11-06 NOTE — PROGRESS NOTES
[facility] Progress Note      Patient Name: Miguelina Lopez  : 1945  MRN: [id number]  Primary Care Physician:  Arcelia Talbot APRN  Date of admission: 2022    Subjective     Interval History:   Follow-up acute kidney injury on chronic kidney disease    Patient feeling very tired.  Dyspnea improving slowly.  Denies any chest pain, nausea or vomiting.  Still has cough    Review of Systems:   As noted above    Objective     Vitals:   Temp:  [97.5 °F (36.4 °C)-98.4 °F (36.9 °C)] 97.5 °F (36.4 °C)  Heart Rate:  [74-77] 75  Resp:  [16-20] 16  BP: (108-125)/(45-68) 122/56  Flow (L/min):  [3] 3    Intake/Output Summary (Last 24 hours) at 2022 1014  Last data filed at 2022 0530  Gross per 24 hour   Intake --   Output 1550 ml   Net -1550 ml       Physical Exam:    General Appearance: alert, awake, no acute distress, morbidly obese and chronically  Skin: warm and dry  HEENT: oral mucosa normal, nonicteric sclera  Neck: Mild JVD  Lungs: Bilateral rhonchi and crackles, breathing effort not labored  Heart: RRR, normal S1 and S2, no rub  Abdomen: soft, nontender, nondistended, normoactive bowel  : no palpable bladder pure wick system is in place  Extremities: 1+ lower extremity edema.  Neuro: normal speech and mental status     Scheduled Meds:     allopurinol, 100 mg, Oral, Daily  atorvastatin, 40 mg, Oral, Daily  budesonide-formoterol, 2 puff, Inhalation, BID - RT  carvedilol, 12.5 mg, Oral, BID With Meals  dextromethorphan polistirex ER, 60 mg, Oral, Q12H  enoxaparin, 30 mg, Subcutaneous, Q24H  ferrous sulfate, 325 mg, Oral, Daily With Breakfast  gabapentin, 100 mg, Oral, BID  insulin lispro, 0-14 Units, Subcutaneous, TID AC  lactobacillus acidophilus, 1 capsule, Oral, Daily  levothyroxine, 100 mcg, Oral, Q AM  lidocaine, 1 patch, Transdermal, Q24H  pantoprazole, 40 mg, Oral, QAM  potassium chloride, 20 mEq, Oral, Daily  sennosides-docusate, 1 tablet, Oral, Every  HPI Comments: Patient is a 61 yo male with right flank pain. States this morning he felt slightly nauseated with some mild lower quadrant pain which resolved. States he was then at dinner tonight and developed severe onset right flank pain which \"doubled me over\". States he could not get comfortable and was pacing around without relief. He endorses nausea without vomiting. No fevers or chills, no chest pain or SOB and states at this time pain improved. Overall patient well appearing. Patient is a 60 y.o. male presenting with flank pain. The history is provided by the patient. No  was used. Flank Pain    Pertinent negatives include no chest pain, no fever, no headaches, no abdominal pain, no dysuria and no weakness. History reviewed. No pertinent past medical history. History reviewed. No pertinent past surgical history. History reviewed. No pertinent family history. Social History     Social History    Marital status:      Spouse name: N/A    Number of children: N/A    Years of education: N/A     Occupational History    Not on file. Social History Main Topics    Smoking status: Never Smoker    Smokeless tobacco: Not on file    Alcohol use Not on file    Drug use: Not on file    Sexual activity: Not on file     Other Topics Concern    Not on file     Social History Narrative    No narrative on file         ALLERGIES: Penicillins    Review of Systems   Constitutional: Negative for chills and fever. HENT: Negative for rhinorrhea and sore throat. Eyes: Negative for visual disturbance. Respiratory: Negative for cough and shortness of breath. Cardiovascular: Negative for chest pain and leg swelling. Gastrointestinal: Positive for nausea. Negative for abdominal pain, diarrhea and vomiting. Genitourinary: Positive for flank pain. Negative for dysuria. Musculoskeletal: Negative for back pain and neck pain. Skin: Negative for rash. Other Day  torsemide, 50 mg, Oral, Daily  vilazodone, 20 mg, Oral, Nightly      IV Meds:        Results Reviewed:   I have personally reviewed the results from the time of this admission to 11/6/2022 10:14 EST     Results from last 7 days   Lab Units 11/06/22 0437 11/05/22 0524 11/04/22 0443 11/02/22 0429 11/01/22  1746   SODIUM mmol/L 139 139 134*   < > 135*   POTASSIUM mmol/L 3.8 3.8 4.4   < > 4.4   CHLORIDE mmol/L 97* 96* 94*   < > 92*   CO2 mmol/L 31.7* 32.5* 30.9*   < > 28.6   BUN mg/dL 77* 67* 65*   < > 71*   CREATININE mg/dL 2.72* 2.39* 2.10*   < > 2.40*   CALCIUM mg/dL 8.6 8.4* 8.6   < > 8.7   BILIRUBIN mg/dL  --   --   --   --  0.5   ALK PHOS U/L  --   --   --   --  171*   ALT (SGPT) U/L  --   --   --   --  11   AST (SGOT) U/L  --   --   --   --  18   GLUCOSE mg/dL 127* 142* 154*   < > 467*    < > = values in this interval not displayed.       Estimated Creatinine Clearance: 21.1 mL/min (A) (by C-G formula based on SCr of 2.72 mg/dL (H)).    Results from last 7 days   Lab Units 11/06/22 0437 11/05/22 0524 11/04/22 0443 11/03/22 0421   MAGNESIUM mg/dL  --  1.9 1.9 1.8   PHOSPHORUS mg/dL 3.8 3.7 3.3 3.3       Results from last 7 days   Lab Units 11/05/22 0524 11/04/22 0443 11/03/22 0421   URIC ACID mg/dL 11.3* 10.7* 11.3*       Results from last 7 days   Lab Units 11/04/22 0443 11/03/22 0421 11/02/22 0429 11/01/22  1746   WBC 10*3/mm3 6.82 7.57 7.77 8.25   HEMOGLOBIN g/dL 9.0* 8.6* 8.6* 8.7*   PLATELETS 10*3/mm3 179 171 182 199             Assessment / Plan     ASSESSMENT:  -Acute kidney injury on chronic kidney disease, associated with diabetic and hypertensive glomerulosclerosis, creatinine increased to 2.7 Mg/DL with diuresis  -Fluid overload and congestive heart failure  -Coronary artery disease with prior CABG  -Aortic stenosis prior TAVR  -Prior mitral valve replacement tricuspid valve repair  -COPD with pulmonary hypertension  -Morbid obesity  -Chronic anemia, hemoglobin today 9, patient  has evidence of iron deficiency as noted in July 2022.  Iron stores revealed iron saturation 10% and the ferritin 167.  Patient receiving 3 doses of IV iron  -History of gout, treated with allopurinol and appears to be stable,    PLAN:  -Discontinue torsemide and reevaluate in morning  -Surveillance labs      Thank you for involving us in the care of Miguelina Lopez.  Please feel free to call with any questions.    Gómez Lucas MD  11/06/22  10:14 [facility]    [facility]  [phone]      Much of this encounter note is an electronic transcription/translation of spoken language to printed text. The electronic translation of spoken language may permit erroneous, or at times, nonsensical words or phrases to be inadvertently transcribed; Although I have reviewed the note for such errors, some may still exist.            

## 2022-11-07 NOTE — PLAN OF CARE
Goal Outcome Evaluation:  Plan of Care Reviewed With: patient           Outcome Evaluation: Pt continues to require a lot of assistance with bed mobility and transfers secondary to generalized weakness, pain, and decreased activity tolerance. Pt was able to acheive a full stand with heavy assist and cuing. PT will continue to follow to address strength, mobility, and transfers. PT recommends SNF stay upon discharge.

## 2022-11-07 NOTE — CASE MANAGEMENT/SOCIAL WORK
Continued Stay Note  [facility]     Patient Name: Miguelina Lopez  MRN: [id number]  Today's Date: 11/7/2022    Admit Date: 11/1/2022    Plan: Pre cert pending for [facility] --> LTC   Discharge Plan     Row Name 11/07/22 1814       Plan    Plan Pre cert pending for [facility] --> [facility]TC    Patient/Family in Agreement with Plan yes    Plan Comments CCP notified by Dr. Mauro patient is approaching discharge readiness. Notified [facility] who will start pre cert. Packet in cubby. Bety pope RN/CCP               Discharge Codes    No documentation.               Expected Discharge Date and Time     Expected Discharge Date Expected Discharge Time    Nov 8, 2022             Riana Phillips

## 2022-11-07 NOTE — THERAPY TREATMENT NOTE
Patient Name: Miguelina Lopez  : 1945    MRN: [id number]                              Today's Date: 2022       Admit Date: 2022    Visit Dx:     ICD-10-CM ICD-9-CM   1. Acute congestive heart failure, unspecified heart failure type ([facility])  I50.9 428.0   2. OLI (acute kidney injury) ([facility])  N17.9 584.9   3. Hyperglycemia  R73.9 790.29   4. Pulmonary hypertension ([facility])  I27.20 416.8   5. Paroxysmal atrial flutter ([facility])  I48.92 427.32   6. Heart valve disorder  I38 424.90   7. Acute on chronic combined systolic and diastolic congestive heart failure ([facility])  I50.43 428.43     428.0   8. S/P TAVR (transcatheter aortic valve replacement)  Z95.2 V43.3   9. Acute on chronic diastolic heart failure ([facility])  I50.33 428.33   10. Nonrheumatic aortic valve stenosis  I35.0 424.1   11. Complete heart block ([facility])  I44.2 426.0   12. Presence of permanent cardiac pacemaker  Z95.0 V45.01   13. Chronic coronary artery disease  I25.10 414.00   14. Nonrheumatic mitral valve regurgitation  I34.0 424.0   15. Bilateral lower extremity edema  R60.0 782.3     Patient Active Problem List   Diagnosis   • Anxiety disorder   • Arthritis of knee   • Asthma   • Chronic coronary artery disease   • CKD (chronic kidney disease) stage 3, GFR 30-59 ml/min ([facility])   • Depression   • Diabetic peripheral neuropathy ([facility])   • Gastroesophageal reflux disease   • Hyperlipidemia   • Insomnia   • Lower gastrointestinal hemorrhage   • Anemia, chronic disease   • OCHOA (obstructive sleep apnea)   • Type 2 diabetes mellitus with kidney complication, without long-term current use of insulin ([facility])   • Essential hypertension   • Hospital discharge follow-up   • Mitral stenosis   • Pulmonary hypertension due to sleep-disordered breathing (CMS/[facility])   • s/p MVR, TV-repair, CABG x2 16   • Leukocytosis   • Paroxysmal atrial fibrillation ([facility])   • Nocturnal hypoxia   • Dermatitis   • Medicare annual wellness visit, initial   • Class 3 severe obesity due  to excess calories with serious comorbidity and body mass index (BMI) of 50.0 to 59.9 in adult ([facility])   • Supplemental oxygen dependent   • Acute on chronic combined systolic and diastolic HF (heart failure) ([facility])   • Mitral regurgitation   • Aortic stenosis   • Medicare annual wellness visit, subsequent   • Localized edema   • Chronic right-sided congestive heart failure (HCC)   • Cellulitis of left lower extremity   • Proteinuria   • Bilateral lower extremity edema   • Subclinical hypothyroidism   • Chronic diastolic heart failure ([facility])   • Chronic respiratory failure with hypoxia and hypercapnia ([facility])   • Acute on chronic respiratory failure with hypoxia and hypercapnia ([facility])   • AV block, 2nd degree   • 1st degree AV block   • Chest pain   • COPD (chronic obstructive pulmonary disease) ([facility])   • Complete heart block ([facility])   • Presence of permanent cardiac pacemaker   • Hypothyroidism (acquired)   • Chronic anticoagulation   • Leukocytosis   • Stage 3b chronic kidney disease ([facility])   • Gastrointestinal hemorrhage with melena   • Acute blood loss anemia   • Metabolic encephalopathy   • Hypernatremia   • Hypokalemia   • TIA (transient ischemic attack)   • Nonrheumatic aortic valve stenosis   • Acute on chronic heart failure ([facility])   • S/P TAVR (transcatheter aortic valve replacement)   • CHF (congestive heart failure) ([facility])   • Heart valve disorder   • Paroxysmal atrial flutter ([facility])   • Peripheral neuropathy   • Pulmonary hypertension ([facility])   • Dry skin   • Closed fracture of one rib   • Impaired mobility and activities of daily living   • OLI (acute kidney injury) ([facility])   • Acute congestive heart failure, unspecified heart failure type ([facility])     Past Medical History:   Diagnosis Date   • Acute on chronic respiratory failure with hypoxia and hypercapnia ([facility])    • OLI (acute kidney injury) ([facility])    • Anemia    • Anxiety    • Aortic valve stenosis    • Arthritis     KNEES   • Bilateral lower extremity edema    • CHF  (congestive heart failure) ([facility])    • Chronic coronary artery disease    • Class 3 severe obesity due to excess calories in adult ([facility])    • COPD (chronic obstructive pulmonary disease) ([facility])    • Depression    • Diabetes mellitus ([facility])    • Dry skin    • Elevated cholesterol    • GERD (gastroesophageal reflux disease)    • Heart murmur    • Hypertension    • Mitral valve insufficiency    • Pneumonia     1/2016   • Pulmonary hypertension ([facility])     due to sleep disordered breathing   • Sleep apnea     Uses CPAP or oxygen   • Stage 3 chronic kidney disease ([facility])    • Subclinical hypothyroidism    • Supplemental oxygen dependent     3 LITERS   • TIA (transient ischemic attack) 3/15/2021   • Valvular heart disease      Past Surgical History:   Procedure Laterality Date   • AORTIC VALVE REPAIR/REPLACEMENT N/A 7/13/2021    Procedure: TTE TRANSFEMORAL TRANSCATHETER AORTIC VALVE REPLACEMENT PERCUTANEOUS APPROACH;  Surgeon: Sharlene Mejía MD;  Location: [facility] OR 18/19;  Service: Cardiothoracic;  Laterality: N/A;   • AORTIC VALVE REPAIR/REPLACEMENT N/A 7/13/2021    Procedure: Transfemoral Transcatheter Aortic Valve Replacement with intra-op tte and possible open surgical rescue;  Surgeon: Ayan Pacheco MD;  Location: [facility] OR 18/19;  Service: Cardiovascular;  Laterality: N/A;   • CARDIAC CATHETERIZATION     • CARDIAC CATHETERIZATION N/A 6/10/2016    Procedure: Left Heart Cath;  Surgeon: Chace Johnson MD;  Location: [facility] INVASIVE LOCATION;  Service:    • CARDIAC CATHETERIZATION N/A 6/10/2016    Procedure: Right Heart Cath;  Surgeon: Chace Johnson MD;  Location: [facility] INVASIVE LOCATION;  Service:    • CARDIAC CATHETERIZATION N/A 2/5/2021    Procedure: RIGHT AND LEFT HEART CATH;  Surgeon: Antoine Ayers MD;  Location: [facility] CATH INVASIVE LOCATION;  Service: Cardiology;  Laterality: N/A;   • CARDIAC CATHETERIZATION N/A 2/5/2021    Procedure: Coronary angiography;   Surgeon: Antoine Ayers MD;  Location: [facility] CATH INVASIVE LOCATION;  Service: Cardiology;  Laterality: N/A;   • CARDIAC CATHETERIZATION N/A 2/5/2021    Procedure: Left ventriculography- pressures;  Surgeon: Antoine Ayers MD;  Location: [facility] CATH INVASIVE LOCATION;  Service: Cardiology;  Laterality: N/A;   • CARDIAC ELECTROPHYSIOLOGY PROCEDURE N/A 2/2/2021    Procedure: Pacemaker DC new---[employer] MICRA;  Surgeon: Eliazar Bond MD;  Location: [facility] CATH INVASIVE LOCATION;  Service: Cardiology;  Laterality: N/A;   • CARDIAC SURGERY     • COLONOSCOPY N/A 10/14/2021    Procedure: COLONOSCOPY to cecum and TI  with cold snare polypectomies;  Surgeon: Dixon Azevedo MD;  Location: [facility] ENDOSCOPY;  Service: Gastroenterology;  Laterality: N/A;  pre: melena  post: polyps, diverticulosis   • CORONARY ARTERY BYPASS GRAFT      2 vessel   • CORONARY ARTERY BYPASS GRAFT WITH MITRAL VALVE REPAIR/REPLACEMENT N/A 6/13/2016    Procedure: INTRAOPERATIVE TARIQ, MIDLINE STERNOTOMY, CORONARY ARTERY BYPASS GRAFTING X  2 UTILIZING ENDOSCOPICALLY HARVESTED LEFT GREATER SAPHENOUS VEIN, MITRAL VALVE REPLACEMENT AND TRICUSPID VALVE REPAIR;  Surgeon: Eliecer Mistry MD;  Location: [facility] MAIN OR;  Service:    • CORONARY STENT PLACEMENT  2010    Approx. 6 yrs ago at [facility]   • ENDOSCOPY N/A 3/17/2021    Procedure: ESOPHAGOGASTRODUODENOSCOPY;  Surgeon: Dixon Haas MD;  Location: [facility] ENDOSCOPY;  Service: Gastroenterology;  Laterality: N/A;  PRE: GI BLEED  POST: ANTRAL ULCER   • ENDOSCOPY N/A 10/13/2021    Procedure: ESOPHAGOGASTRODUODENOSCOPY WITH BIOPSIES;  Surgeon: Riaan Milner MD;  Location: [facility] ENDOSCOPY;  Service: Gastroenterology;  Laterality: N/A;  pre-melena, anemia   post- mild gastritis, prepyloric erosion, duodenal lymphangiectasia    • HEMORRHOIDECTOMY     • HYSTERECTOMY     • MITRAL VALVE REPLACEMENT     • REPLACEMENT TOTAL KNEE Right    • THYROID SURGERY      Cyst removed from thyroid   •  VASCULAR SURGERY        General Information     Row Name 11/07/22 1533          Physical Therapy Time and Intention    Document Type therapy note (daily note)  -     Mode of Treatment individual therapy;physical therapy  -     Row Name 11/07/22 1533          General Information    Existing Precautions/Restrictions fall  -     Barriers to Rehab medically complex  -     Row Name 11/07/22 1533          Cognition    Orientation Status (Cognition) oriented x 3  -     Row Name 11/07/22 1533          Safety Issues, Functional Mobility    Impairments Affecting Function (Mobility) endurance/activity tolerance;strength;shortness of breath;pain;balance  -           User Key  (r) = Recorded By, (t) = Taken By, (c) = Cosigned By    Initials Name Provider Type     Precious Cisneros PT Physical Therapist               Mobility     Row Name 11/07/22 1545          Bed Mobility    Supine-Sit [place] (Bed Mobility) verbal cues;nonverbal cues (demo/gesture);moderate assist (50% patient effort);2 person assist;maximum assist (25% patient effort)  -     Sit-Supine [place] (Bed Mobility) verbal cues;nonverbal cues (demo/gesture);moderate assist (50% patient effort);2 person assist;maximum assist (25% patient effort)  -     Assistive Device (Bed Mobility) bed rails;head of bed elevated;draw sheet  -     Row Name 11/07/22 1545          Sit-Stand Transfer    Sit-Stand [place] (Transfers) verbal cues;nonverbal cues (demo/gesture);maximum assist (25% patient effort);2 person assist  -     Assistive Device (Sit-Stand Transfers) walker, front-wheeled  -     Comment, (Sit-Stand Transfer) pt able to acheive full stand x1, cues to push up and tuck in her bottom, pt stood for about 30 seconds before returning to sitting EOB  -     Row Name 11/07/22 1545          Gait/Stairs (Locomotion)    [place] Level (Gait) not tested  -           User Key  (r) = Recorded By, (t) = Taken By, (c) = Cosigned By     Initials Name Provider Type     Precious Cisneros, PT Physical Therapist               Obj/Interventions     Row Name 11/07/22 1547          Motor Skills    Therapeutic Exercise --  10 reps B LE AP and LAQ, LLE weaker than R  -           User Key  (r) = Recorded By, (t) = Taken By, (c) = Cosigned By    Initials Name Provider Type     Precious Cisneros, PT Physical Therapist               Goals/Plan    No documentation.                Clinical Impression     Row Name 11/07/22 1548          Pain    Pre/Posttreatment Pain Comment pt reports B LE and B hand pain  -     Pain Intervention(s) Repositioned  -     Row Name 11/07/22 1548          Plan of Care Review    Plan of Care Reviewed With patient  -     Outcome Evaluation Pt continues to require a lot of assistance with bed mobility and transfers secondary to generalized weakness, pain, and decreased activity tolerance. Pt was able to acheive a full stand with heavy assist and cuing. PT will continue to follow to address strength, mobility, and transfers. PT recommends SNF stay upon discharge.  -     Row Name 11/07/22 1548          Positioning and Restraints    Pre-Treatment Position in bed  -     Post Treatment Position bed  -     In Bed supine;call light within reach;encouraged to call for assist;exit alarm on  -           User Key  (r) = Recorded By, (t) = Taken By, (c) = Cosigned By    Initials Name Provider Type     Precious Cisneros, PT Physical Therapist               Outcome Measures     Row Name 11/07/22 1550 11/07/22 0830       How much help from another person do you currently need...    Turning from your back to your side while in flat bed without using bedrails? 2  -CH 1  -CM    Moving from lying on back to sitting on the side of a flat bed without bedrails? 2  -CH 1  -CM    Moving to and from a bed to a chair (including a wheelchair)? 1  -CH 1  -CM    Standing up from a chair using your arms (e.g., wheelchair, bedside chair)? 2   - 1  -CM    Climbing 3-5 steps with a railing? 1  -CH 1  -CM    To walk in hospital room? 1  -CH 1  -CM    AM-PAC 6 Clicks Score (PT) 9  - 6  -CM    Highest level of mobility 3 --> Sat at edge of bed  - 2 --> Bed activities/dependent transfer  -CM    Row Name 11/07/22 1550          Functional Assessment    Outcome Measure Options AM-PAC 6 Clicks Basic Mobility (PT)  -           User Key  (r) = Recorded By, (t) = Taken By, (c) = Cosigned By    Initials Name Provider Type     Precious Cisneros, PT Physical Therapist    Rosalia Zimmer, RN Registered Nurse                             Physical Therapy Education     Title: PT OT SLP Therapies (Done)     Topic: Physical Therapy (Done)     Point: Mobility training (Done)     Learning Progress Summary           Patient Acceptance, E,TB,D, VU,NR by  at 11/7/2022 1550                   Point: Home exercise program (Done)     Learning Progress Summary           Patient Acceptance, E,TB,D, VU,NR by  at 11/7/2022 1550                   Point: Body mechanics (Done)     Learning Progress Summary           Patient Acceptance, E,TB,D, VU,NR by  at 11/7/2022 1550                   Point: Precautions (Done)     Learning Progress Summary           Patient Acceptance, E,TB,D, VU,NR by  at 11/7/2022 1550    Acceptance, E, VU by MD at 11/4/2022 0948    Acceptance, E, NR by ND at 11/2/2022 1413                               User Key     Initials Effective Dates Name Provider Type Discipline     06/16/21 -  Precious Cisneros, PT Physical Therapist PT    MD 06/16/21 -  Elaine Navarro, PT Physical Therapist PT    ND 10/24/22 -  Senait Mayes, PT Student PT Student PT              PT Recommendation and Plan     Plan of Care Reviewed With: patient  Outcome Evaluation: Pt continues to require a lot of assistance with bed mobility and transfers secondary to generalized weakness, pain, and decreased activity tolerance. Pt was able to acheive a full stand with heavy  assist and cuing. PT will continue to follow to address strength, mobility, and transfers. PT recommends SNF stay upon discharge.     Time Calculation:    PT Charges     Row Name 11/07/22 1551             Time Calculation    Start Time 1425  -CH      Stop Time 1441  -      Time Calculation (min) 16 min  -CH      PT Received On 11/07/22  -      PT - Next Appointment 11/08/22  -         Time Calculation- PT    Total Timed Code Minutes- PT 16 minute(s)  -CH         Timed Charges    80740 - PT Therapeutic Activity Minutes 16  -CH         Total Minutes    Timed Charges Total Minutes 16  -CH       Total Minutes 16  -CH            User Key  (r) = Recorded By, (t) = Taken By, (c) = Cosigned By    Initials Name Provider Type     Precious Cisneros, PT Physical Therapist              Therapy Charges for Today     Code Description Service Date Service Provider Modifiers Qty    21419889960  PT THERAPEUTIC ACT EA 15 MIN 11/7/2022 Precious Cisneros, PT GP 1    [id number]  PT THER SUPP EA 15 MIN 11/7/2022 Precious Cisneros, PT GP 1          PT G-Codes  Outcome Measure Options: AM-PAC 6 Clicks Basic Mobility (PT)  AM-PAC 6 Clicks Score (PT): 9  AM-PAC 6 Clicks Score (OT): 12  Modified Cici Scale: 4 - Moderately severe disability.  Unable to walk without assistance, and unable to attend to own bodily needs without assistance.    Precious Cisneros PT  11/7/2022

## 2022-11-07 NOTE — PROGRESS NOTES
Hospital Follow Up    LOS:  LOS: 3 days   Patient Name: Miguelina Lopez  Age/Sex: 76 y.o. female  : 1945  MRN: [id number]    Day of Service: 22   Length of Stay: 3  Encounter Provider: ZOILA Herrmann  Place of Service: [facility] CARDIOLOGY  Patient Care Team:  Arcelia Talbot APRN as PCP - General (Nurse Practitioner)  Robbie Wilson MD as Consulting Physician (Hematology and Oncology)  Chace Johnson MD as Consulting Physician (Cardiology)  Duarte Cuevas MD as Consulting Physician (Pulmonary Disease)  Shania Severino RN as Ambulatory  ([facility])    Subjective:     Chief Complaint:Acute on chronic diastolic CHF    Interval History:Continues to feel SOA. No chest pain. On oral diuretics.    Objective:     Objective:  Temp:  [98.5 °F (36.9 °C)-98.9 °F (37.2 °C)] 98.5 °F (36.9 °C)  Heart Rate:  [75-79] 79  Resp:  [16-20] 18  BP: (104-113)/(43-54) 112/49     Intake/Output Summary (Last 24 hours) at 2022 1008  Last data filed at 2022 0528  Gross per 24 hour   Intake 360 ml   Output 1300 ml   Net -940 ml     Body mass index is 53.71 kg/m².      22  0550 22  0530 22  0528   Weight: 120 kg (265 lb 9.6 oz) 120 kg (265 lb 9.6 oz) 121 kg (265 lb 14.4 oz)     Weight change: 0.136 kg (4.8 oz)    Physical Exam:   General Appearance:    Awake alert and oriented in no acute distress.   Color:  Skin:  Neuro:  HEENT:    Lungs:     Pink  Warm and dry  No focal, motor or sensory deficits  Neck supple, pupils equal, round and reactive. No JVD, No Bruit  Bilateral rales,respirations regular, even and                  unlabored    Heart:    Regular rate and rhythm, S1 and S2, 3/6 murmur, no gallop, no rub. +BLE  edema, DP/PT pulses are 2+   Chest Wall:    No abnormalities observed   Abdomen:     Normal bowel sounds, no masses, no organomegaly, soft        non-tender, non-distended, no guarding, no ascites noted    Extremities:   Moves all extremities well, + BLE edema, no cyanosis, no redness       Lab Review:   Results from last 7 days   Lab Units 11/07/22  0404 11/06/22  0437 11/02/22  0429 11/01/22  1746   SODIUM mmol/L 137 139   < > 135*   POTASSIUM mmol/L 4.4 3.8   < > 4.4   CHLORIDE mmol/L 94* 97*   < > 92*   CO2 mmol/L 33.3* 31.7*   < > 28.6   BUN mg/dL 80* 77*   < > 71*   CREATININE mg/dL 2.92* 2.72*   < > 2.40*   GLUCOSE mg/dL 160* 127*   < > 467*   CALCIUM mg/dL 8.4* 8.6   < > 8.7   AST (SGOT) U/L  --   --   --  18   ALT (SGPT) U/L  --   --   --  11    < > = values in this interval not displayed.     Results from last 7 days   Lab Units 11/01/22 2113 11/01/22  1746   TROPONIN T ng/mL 0.032* 0.030     Results from last 7 days   Lab Units 11/04/22  0443 11/03/22  0421   WBC 10*3/mm3 6.82 7.57   HEMOGLOBIN g/dL 9.0* 8.6*   HEMATOCRIT % 28.0* 27.3*   PLATELETS 10*3/mm3 179 171         Results from last 7 days   Lab Units 11/05/22  0524 11/04/22  0443   MAGNESIUM mg/dL 1.9 1.9           Invalid input(s): LDLCALC  Results from last 7 days   Lab Units 11/01/22  1746   PROBNP pg/mL 10,416.0*         I reviewed the patient's new clinical results.  I personally viewed and interpreted the patient's EKG  Current Medications:   Scheduled Meds:allopurinol, 100 mg, Oral, Daily  atorvastatin, 40 mg, Oral, Daily  benzonatate, 100 mg, Oral, TID  budesonide-formoterol, 2 puff, Inhalation, BID - RT  carvedilol, 12.5 mg, Oral, BID With Meals  dextromethorphan polistirex ER, 60 mg, Oral, Q12H  enoxaparin, 30 mg, Subcutaneous, Q24H  ferrous sulfate, 325 mg, Oral, Daily With Breakfast  fluticasone, 2 spray, Each Nare, Daily  gabapentin, 100 mg, Oral, BID  guaiFENesin, 1,200 mg, Oral, Q12H  insulin lispro, 0-14 Units, Subcutaneous, TID AC  lactobacillus acidophilus, 1 capsule, Oral, Daily  levothyroxine, 100 mcg, Oral, Q AM  lidocaine, 1 patch, Transdermal, Q24H  pantoprazole, 40 mg, Oral, QAM  potassium chloride, 20 mEq, Oral,  Daily  sennosides-docusate, 1 tablet, Oral, Every Other Day  sodium chloride, 2 spray, Each Nare, TID  torsemide, 40 mg, Oral, BID  vilazodone, 20 mg, Oral, Nightly      Continuous Infusions:     Allergies:  Allergies   Allergen Reactions   • Coumadin [Warfarin Sodium] Diarrhea and Nausea Only   • Erythromycin    • Hctz [Hydrochlorothiazide] Swelling   • Zaroxolyn [Metolazone] Diarrhea   • Penicillins Rash     Tolerated cephalosporins in the past    • Sulfa Antibiotics Rash       Assessment:       Acute on chronic combined systolic and diastolic HF (heart failure) ([facility])    Anemia, chronic disease    OCHOA (obstructive sleep apnea)    Type 2 diabetes mellitus with kidney complication, without long-term current use of insulin ([facility])    Essential hypertension    s/p MVR, TV-repair, CABG x2 6/13/16    Paroxysmal atrial fibrillation ([facility])    Chronic respiratory failure with hypoxia and hypercapnia ([facility])    COPD (chronic obstructive pulmonary disease) ([facility])    Hypothyroidism (acquired)    Stage 3b chronic kidney disease ([facility])    S/P TAVR (transcatheter aortic valve replacement)    Acute congestive heart failure, unspecified heart failure type ([facility])        Plan:       1. Acute on chronic diastolic CHF: on beta blocker and torsemide PO. Nephrology managing diuresis. Echocardiogram demonstrated an EF of 50%.   2. Coronary artery disease: s/p CABG. Coronary angiography in July 2021 showed patent SVG to first diagonal, patent SVG to PDA, patent mid LAD stent, patent LCx stent. No anginal symptoms. On statin and beta blocker.  3. Severe aortic stenosis: s/p TAVR July 2021  4. Mitral valve regurgitation: s/p replacement. Echo showed at least moderate regurgitation with severe degenerating prosthetic valve disease. Not a candidate for intervention  5. CHB: s/p Micra PPM  6. Pulmonary HTN: moderate to severe  7. Chronic lymphedema  8. Non-MI troponin elevation: secondary to renal insufficiency, volume overload  9. PAF:  anticoagulation on hold due to significant bleeding in the past. On Lovenox now for DVT prophylaxis.     No significant change over the weekend. Remains on PO torsemide. Nephrology managing diuresis. Overall poor prognosis due to multiple co-morbidities making her not a surgical candidate. Will continue to optimize medical therapy.    ZOILA Herrmann  11/07/22  10:08 EST  Electronically signed by ZOILA Herrmann, 11/07/22, 10:08 AM EST.

## 2022-11-07 NOTE — PROGRESS NOTES
Name: Miguelina Lopez ADMIT: 2022   : 1945  PCP: Arcelia Talbot APRN    MRN: [id number] LOS: 3 days   AGE/SEX: 76 y.o. female  ROOM: [facility]   Subjective   Chief Complaint   Patient presents with   • Shortness of Breath   • Cough     Dyspnea fair  +edema  +orthopnea  gen weakness  Some sinus congestion and cough  More productive today  Requiring oxygen  gen weakness    ROS  No f/c  No n/v  No cp/palp  +soa/cough    Objective   Vital Signs  Temp:  [98.5 °F (36.9 °C)-98.9 °F (37.2 °C)] 98.5 °F (36.9 °C)  Heart Rate:  [75-79] 79  Resp:  [16-20] 18  BP: (104-113)/(43-54) 112/49  SpO2:  [94 %-96 %] 96 %  on  Flow (L/min):  [2-3] 2;   Device (Oxygen Therapy): nasal cannula  Body mass index is 53.71 kg/m².    Physical Exam  Constitutional:       General: She is not in acute distress.     Appearance: She is obese. She is ill-appearing.   HENT:      Head: Normocephalic and atraumatic.   Eyes:      General: No scleral icterus.  Cardiovascular:      Rate and Rhythm: Regular rhythm.      Heart sounds: Murmur heard.   Pulmonary:      Effort: Respiratory distress present.      Breath sounds: Rhonchi and rales present.   Abdominal:      General: There is no distension.      Palpations: Abdomen is soft.   Musculoskeletal:      Cervical back: Neck supple.      Right lower leg: Edema present.      Left lower leg: Edema present.   Skin:     Coloration: Skin is pale.   Neurological:      Mental Status: She is alert.   Psychiatric:         Behavior: Behavior normal.       cough during exam- productive phlem    Results Review:       I reviewed the patient's new clinical results.  Results from last 7 days   Lab Units 22  0443 22  0421 22  0429 22  1746   WBC 10*3/mm3 6.82 7.57 7.77 8.25   HEMOGLOBIN g/dL 9.0* 8.6* 8.6* 8.7*   PLATELETS 10*3/mm3 179 171 182 199     Results from last 7 days   Lab Units 22  0404 22  0437 22  0524 22  0443   SODIUM mmol/L 137 139 139 134*    POTASSIUM mmol/L 4.4 3.8 3.8 4.4   CHLORIDE mmol/L 94* 97* 96* 94*   CO2 mmol/L 33.3* 31.7* 32.5* 30.9*   BUN mg/dL 80* 77* 67* 65*   CREATININE mg/dL 2.92* 2.72* 2.39* 2.10*   GLUCOSE mg/dL 160* 127* 142* 154*   Estimated Creatinine Clearance: 19.8 mL/min (A) (by C-G formula based on SCr of 2.92 mg/dL (H)).  Results from last 7 days   Lab Units 11/07/22 0404 11/06/22 0437 11/05/22 0524 11/04/22 0443 11/03/22 0421 11/01/22  1746   ALBUMIN g/dL 3.00* 2.80* 2.90* 2.90*   < > 3.40*   BILIRUBIN mg/dL  --   --   --   --   --  0.5   ALK PHOS U/L  --   --   --   --   --  171*   AST (SGOT) U/L  --   --   --   --   --  18   ALT (SGPT) U/L  --   --   --   --   --  11    < > = values in this interval not displayed.     Results from last 7 days   Lab Units 11/07/22 0404 11/06/22 0437 11/05/22 0524 11/04/22 0443 11/03/22  0421   CALCIUM mg/dL 8.4* 8.6 8.4* 8.6 8.7   ALBUMIN g/dL 3.00* 2.80* 2.90* 2.90* 2.70*   MAGNESIUM mg/dL  --   --  1.9 1.9 1.8   PHOSPHORUS mg/dL 4.2 3.8 3.7 3.3 3.3         Coag     HbA1C   Lab Results   Component Value Date    HGBA1C 10.30 (H) 11/02/2022    HGBA1C 8.80 (H) 10/09/2022    HGBA1C 10.20 (H) 07/16/2022     Infection     Radiology(recent) No radiology results for the last day  No results found for: TROPONINT, TROPONINI, BNP  No components found for: TSH;2    allopurinol, 100 mg, Oral, Daily  atorvastatin, 40 mg, Oral, Daily  benzonatate, 100 mg, Oral, TID  budesonide-formoterol, 2 puff, Inhalation, BID - RT  carvedilol, 12.5 mg, Oral, BID With Meals  dextromethorphan polistirex ER, 60 mg, Oral, Q12H  enoxaparin, 30 mg, Subcutaneous, Q24H  ferrous sulfate, 325 mg, Oral, Daily With Breakfast  fluticasone, 2 spray, Each Nare, Daily  gabapentin, 100 mg, Oral, BID  guaiFENesin, 1,200 mg, Oral, Q12H  insulin lispro, 0-14 Units, Subcutaneous, TID AC  lactobacillus acidophilus, 1 capsule, Oral, Daily  levothyroxine, 100 mcg, Oral, Q AM  lidocaine, 1 patch, Transdermal, Q24H  pantoprazole, 40  mg, Oral, QAM  potassium chloride, 20 mEq, Oral, Daily  sennosides-docusate, 1 tablet, Oral, Every Other Day  sodium chloride, 2 spray, Each Nare, TID  torsemide, 40 mg, Oral, BID  vilazodone, 20 mg, Oral, Nightly       Diet Regular; Cardiac, Low Sodium; 2,000 mg Na      Assessment & Plan      Active Hospital Problems    Diagnosis  POA   • **Acute on chronic combined systolic and diastolic HF (heart failure) ([facility]) [I50.43]  Yes   • Acute congestive heart failure, unspecified heart failure type ([facility]) [I50.9]  Yes   • S/P TAVR (transcatheter aortic valve replacement) [Z95.2]  Not Applicable   • Stage 3b chronic kidney disease ([facility]) [N18.32]  Yes   • Hypothyroidism (acquired) [E03.9]  Yes   • COPD (chronic obstructive pulmonary disease) ([facility]) [J44.9]  Yes   • Chronic respiratory failure with hypoxia and hypercapnia ([facility]) [J96.11, J96.12]  Yes   • Paroxysmal atrial fibrillation ([facility]) [I48.0]  Yes   • s/p MVR, TV-repair, CABG x2 6/13/16 [Z95.2]  Not Applicable   • Type 2 diabetes mellitus with kidney complication, without long-term current use of insulin ([facility]) [E11.29]  Yes   • OCHOA (obstructive sleep apnea) [G47.33]  Yes   • Anemia, chronic disease [D63.8]  Yes   • Essential hypertension [I10]  Yes      Resolved Hospital Problems   No resolved problems to display.       · diuertics as directed by Nephrology  · Monitor renal fx, weight, volume status. Cr worse for a few days but volume status still up  · On insulin, monitor BG  · Monitor anemia  · Agents for CHF/HTN, monitor BG  · Increased agents for cough/congestion- productive- will check CXR and procal to see if needs ABX for PNA    Thanks to specialists     Poor ongoing prognosis with her CHF and volume issues and not candidate for valve surgery.     YAHIR RN    Dispo- to SNF when more stable - not today    Sukhwinder Mauro MD  [facility]  11/07/22  14:36 EDT

## 2022-11-08 NOTE — CASE MANAGEMENT/SOCIAL WORK
Continued Stay Note  [facility]     Patient Name: Miguelina Lopez  MRN: [id number]  Today's Date: 11/8/2022    Admit Date: 11/1/2022    Plan: Pre cert approved for [facility]   Discharge Plan     Row Name 11/08/22 0938       Plan    Plan Pre cert approved for [facility]    Patient/Family in Agreement with Plan yes    Plan Comments CCP notified by Iris/Signature pre cert has been approved for [facility]. Notified Dr. Mauro who confirms patient is ready for discharge. [facility] arranged for 11 am. Nursing, patient, and Iris/Signature notified. Bety DAVID RN/CCP    Final Discharge Disposition Code 03 - skilled nursing facility (SNF)    Final Note [facility] via [facility]               Discharge Codes    No documentation.               Expected Discharge Date and Time     Expected Discharge Date Expected Discharge Time    Nov 8, 2022             Riana Phillips

## 2022-11-08 NOTE — CASE MANAGEMENT/SOCIAL WORK
"Physicians Statement of Medical Necessity for  Ambulance Transportation    GENERAL INFORMATION     Name: Miguelina Lopez  YOB: 1945  Medicare #: [id number]  Transport Date: 11/8/2022 (Valid for round trips this date, or for scheduled repetitive trips for 60 days from the date signed below.)  Origin: [facility]  Destination: [facility]  Is the Patient's stay covered under Medicare Part A (PPS/DRG?)Yes  Closest appropriate facility? Yes  If this a hosp-hosp transfer? No  Is this a hospice patient? No    MEDICAL NECESSITY QUESTIONAIRE    Ambulance Transportation is medically necessary only if other means of transportation are contraindicated or would be potentially harmful to the patient.  To meet this requirement, the patient must be either \"bed confined\" or suffer from a condition such that transport by means other than an ambulance is contraindicated by the patient's condition.  The following questions must be answered by the healthcare professional signing below for this form to be valid:     1) Describe the MEDICAL CONDITION (physical and/or mental) of this patient AT THE TIME OF AMBULANCE TRANSPORT that requires the patient to be transported in an ambulance, and why transport by other means is contraindicated by the patient's condition: Congestive heart failure, COPD, class 3 severe obesity due to excess calories in adult, pulmonary hypertension  Past Medical History:   Diagnosis Date   • Acute on chronic respiratory failure with hypoxia and hypercapnia ([facility])    • OLI (acute kidney injury) ([facility])    • Anemia    • Anxiety    • Aortic valve stenosis    • Arthritis     KNEES   • Bilateral lower extremity edema    • CHF (congestive heart failure) ([facility])    • Chronic coronary artery disease    • Class 3 severe obesity due to excess calories in adult ([facility])    • COPD (chronic obstructive pulmonary disease) ([facility])    • Depression    • Diabetes mellitus ([facility])    • Dry skin    • Elevated " cholesterol    • GERD (gastroesophageal reflux disease)    • Heart murmur    • Hypertension    • Mitral valve insufficiency    • Pneumonia     1/2016   • Pulmonary hypertension (HCC)     due to sleep disordered breathing   • Sleep apnea     Uses CPAP or oxygen   • Stage 3 chronic kidney disease (HCC)    • Subclinical hypothyroidism    • Supplemental oxygen dependent     3 LITERS   • TIA (transient ischemic attack) 3/15/2021   • Valvular heart disease       Past Surgical History:   Procedure Laterality Date   • AORTIC VALVE REPAIR/REPLACEMENT N/A 7/13/2021    Procedure: TTE TRANSFEMORAL TRANSCATHETER AORTIC VALVE REPLACEMENT PERCUTANEOUS APPROACH;  Surgeon: Sharlene Mejía MD;  Location: [facility] OR 18/19;  Service: Cardiothoracic;  Laterality: N/A;   • AORTIC VALVE REPAIR/REPLACEMENT N/A 7/13/2021    Procedure: Transfemoral Transcatheter Aortic Valve Replacement with intra-op tte and possible open surgical rescue;  Surgeon: Ayan Pacheco MD;  Location: [facility] OR 18/19;  Service: Cardiovascular;  Laterality: N/A;   • CARDIAC CATHETERIZATION     • CARDIAC CATHETERIZATION N/A 6/10/2016    Procedure: Left Heart Cath;  Surgeon: Chace Johnson MD;  Location: [facility] INVASIVE LOCATION;  Service:    • CARDIAC CATHETERIZATION N/A 6/10/2016    Procedure: Right Heart Cath;  Surgeon: Chace Johnson MD;  Location: [facility] INVASIVE LOCATION;  Service:    • CARDIAC CATHETERIZATION N/A 2/5/2021    Procedure: RIGHT AND LEFT HEART CATH;  Surgeon: Antoine Ayers MD;  Location: [facility] INVASIVE LOCATION;  Service: Cardiology;  Laterality: N/A;   • CARDIAC CATHETERIZATION N/A 2/5/2021    Procedure: Coronary angiography;  Surgeon: Antoine Ayers MD;  Location: [facility] CATH INVASIVE LOCATION;  Service: Cardiology;  Laterality: N/A;   • CARDIAC CATHETERIZATION N/A 2/5/2021    Procedure: Left ventriculography- pressures;  Surgeon: Antoine Ayers MD;  Location: [facility] CATH  "INVASIVE LOCATION;  Service: Cardiology;  Laterality: N/A;   • CARDIAC ELECTROPHYSIOLOGY PROCEDURE N/A 2/2/2021    Procedure: Pacemaker DC new---[employer] MICRA;  Surgeon: Eliazar Bond MD;  Location: [facility] CATH INVASIVE LOCATION;  Service: Cardiology;  Laterality: N/A;   • CARDIAC SURGERY     • COLONOSCOPY N/A 10/14/2021    Procedure: COLONOSCOPY to cecum and TI  with cold snare polypectomies;  Surgeon: Dixon Azevedo MD;  Location: [facility] ENDOSCOPY;  Service: Gastroenterology;  Laterality: N/A;  pre: melena  post: polyps, diverticulosis   • CORONARY ARTERY BYPASS GRAFT      2 vessel   • CORONARY ARTERY BYPASS GRAFT WITH MITRAL VALVE REPAIR/REPLACEMENT N/A 6/13/2016    Procedure: INTRAOPERATIVE TARIQ, MIDLINE STERNOTOMY, CORONARY ARTERY BYPASS GRAFTING X  2 UTILIZING ENDOSCOPICALLY HARVESTED LEFT GREATER SAPHENOUS VEIN, MITRAL VALVE REPLACEMENT AND TRICUSPID VALVE REPAIR;  Surgeon: Eliecer Mistry MD;  Location: [facility] OR;  Service:    • CORONARY STENT PLACEMENT  2010    Approx. 6 yrs ago at [facility]   • ENDOSCOPY N/A 3/17/2021    Procedure: ESOPHAGOGASTRODUODENOSCOPY;  Surgeon: Dixon Haas MD;  Location: [facility] ENDOSCOPY;  Service: Gastroenterology;  Laterality: N/A;  PRE: GI BLEED  POST: ANTRAL ULCER   • ENDOSCOPY N/A 10/13/2021    Procedure: ESOPHAGOGASTRODUODENOSCOPY WITH BIOPSIES;  Surgeon: Riana Milner MD;  Location: [facility] ENDOSCOPY;  Service: Gastroenterology;  Laterality: N/A;  pre-melena, anemia   post- mild gastritis, prepyloric erosion, duodenal lymphangiectasia    • HEMORRHOIDECTOMY     • HYSTERECTOMY     • MITRAL VALVE REPLACEMENT     • REPLACEMENT TOTAL KNEE Right    • THYROID SURGERY      Cyst removed from thyroid   • VASCULAR SURGERY        2) Is this patient \"bed confined\" as defined below?Yes   To be \"bed confined\" the patient must satisfy all three of the following criteria:  (1) unable to get up from bed without assistance; AND (2) unable to ambulate;  AND (3) unable to " sit in a chair or wheelchair.  3) Can this patient safely be transported by car or wheelchair van (I.e., may safely sit during transport, without an attendant or monitoring?)No   4. In addition to completing questions 1-3 above, please check any of the following conditions that apply*:          *Note: supporting documentation for any boxes checked must be maintained in the patient's medical records Morbid obesity requires additional personnel/equipment to safely handle patient and Other patient is max assist x2 for transfers, non ambulatory, falls risk      SIGNATURE OF PHYSICIAN OR OTHER AUTHORIZED HEALTHCARE PROFESSIONAL    I certify that the above information is true and correct based on my evaluation of this patient, and represent that the patient requires transport by ambulance and that other forms of transport are contraindicated.  I understand that this information will be used by the Centers for Medicare and Medicaid Services (CMS) to support the determiniation of medical necessity for ambulance services, and I represent that I have personal knowledge of the patient's condition at the time of transport.       If this box is checked, I also certify that the patient is physically or mentally incapable of signing the ambulance service's claim form and that the institution with which I am affiliated has furnished care, services or assistance to the patient.  My signature below is made on behalf of the patient pursuant to 42 .36(b)(4). In accordance with 42 .37, the specific reason(s) that the patient is physically or mentally incapable of signing the claim for is as follows:     Signature of Physician or Healthcare Professional  Date/Time:   11/8/2022 09:43 EST       (For Scheduled repetitive transport, this form is not valid for transports performed more than 60 days after this date).                                                                                                                                             --------------------------------------------------------------------------------------------  Printed Name and Credentials of Physician or Authorized Healthcare Professional     *Form must be signed by patient's attending physician for scheduled, repetitive transports,.  For non-repetitive ambulance transports, if unable to obtain the signature of the attending physician, any of the following may sign (please select below):     Physician  Clinical Nurse Specialist  Registered Nurse     Physician Assistant  Discharge Planner  Licensed Practical Nurse     Nurse Practitioner

## 2022-11-08 NOTE — CASE MANAGEMENT/SOCIAL WORK
Case Management Discharge Note      Final Note: [facility] via [facility]         Selected Continued Care - Admitted Since 11/1/2022     Destination Coordination complete.    Service Provider Selected Services Address Phone Fax Patient Preferred    [facility] [address] [facility] [address] [phone] --          Durable Medical Equipment    No services have been selected for the patient.              Dialysis/Infusion    No services have been selected for the patient.              Home Medical Care    No services have been selected for the patient.              Therapy    No services have been selected for the patient.              Community Resources    No services have been selected for the patient.              Community & DME    No services have been selected for the patient.                Selected Continued Care - Episodes Includes continued care and service providers with selected services from the active episodes listed below    High Risk Care Management Episode start date: 7/25/2022 (Paused)   There are no active outsourced providers for this episode.             Selected Continued Care - Prior Encounters Includes continued care and service providers with selected services from prior encounters from 8/3/2022 to 11/8/2022    Discharged on 10/18/2022 Admission date: 10/9/2022 - Discharge disposition: Skilled Nursing Facility (DC - External)    Destination     Service Provider Selected Services Address Phone Fax Patient Preferred    [facility] [address] [facility] [address] [phone]        Internal Comment last updated by Aracelis Boyd, RN 10/11/2022 1455    S/w [facility] will accept pending cert .Aracelis Boyd RN                       Home Medical Care     Service Provider Selected Services Address Phone Fax Patient Preferred    [facility]  Home Health Services [address] [place], [place] [id number] [phone] --                    Transportation Services  Ambulance: [facility]    Final Discharge Disposition Code: 03 - skilled nursing facility (SNF)

## 2022-11-08 NOTE — DISCHARGE SUMMARY
NAME: Miguelina Lopez ADMIT: 2022   : 1945  PCP: Arcelia Talbot APRN    MRN: [id number] LOS: 4 days   AGE/SEX: 76 y.o. female  ROOM: N429/1     Date of Admission:  2022  Date of Discharge:  2022    PCP: Arcelia Talbot APRN    CHIEF COMPLAINT  Shortness of Breath and Cough      DISCHARGE DIAGNOSIS  Active Hospital Problems    Diagnosis  POA   • **Acute on chronic combined systolic and diastolic HF (heart failure) ([facility]) [I50.43]  Yes   • Acute congestive heart failure, unspecified heart failure type ([facility]) [I50.9]  Yes   • S/P TAVR (transcatheter aortic valve replacement) [Z95.2]  Not Applicable   • Stage 3b chronic kidney disease ([facility]) [N18.32]  Yes   • Hypothyroidism (acquired) [E03.9]  Yes   • COPD (chronic obstructive pulmonary disease) ([facility]) [J44.9]  Yes   • Chronic respiratory failure with hypoxia and hypercapnia ([facility]) [J96.11, J96.12]  Yes   • Paroxysmal atrial fibrillation ([facility]) [I48.0]  Yes   • s/p MVR, TV-repair, CABG x2 16 [Z95.2]  Not Applicable   • Type 2 diabetes mellitus with kidney complication, without long-term current use of insulin ([facility]) [E11.29]  Yes   • OCHOA (obstructive sleep apnea) [G47.33]  Yes   • Anemia, chronic disease [D63.8]  Yes   • Essential hypertension [I10]  Yes      Resolved Hospital Problems   No resolved problems to display.       SECONDARY DIAGNOSES  Past Medical History:   Diagnosis Date   • Acute on chronic respiratory failure with hypoxia and hypercapnia ([facility])    • OLI (acute kidney injury) ([facility])    • Anemia    • Anxiety    • Aortic valve stenosis    • Arthritis     KNEES   • Bilateral lower extremity edema    • CHF (congestive heart failure) ([facility])    • Chronic coronary artery disease    • Class 3 severe obesity due to excess calories in adult ([facility])    • COPD (chronic obstructive pulmonary disease) ([facility])    • Depression    • Diabetes mellitus ([facility])    • Dry skin    • Elevated cholesterol    • GERD (gastroesophageal reflux disease)     • Heart murmur    • Hypertension    • Mitral valve insufficiency    • Pneumonia     1/2016   • Pulmonary hypertension ([facility])     due to sleep disordered breathing   • Sleep apnea     Uses CPAP or oxygen   • Stage 3 chronic kidney disease ([facility])    • Subclinical hypothyroidism    • Supplemental oxygen dependent     3 LITERS   • TIA (transient ischemic attack) 3/15/2021   • Valvular heart disease        CONSULTS   Nephrology  Cardiology  Palliative Care    HOSPITAL COURSE  Patient is a 76 y.o. female with complicated history occluding paroxysmal A. Fib off anticoagulation due to previous suspected GI bleed, complete heart block status post micra permanent pacemaker, and coronary artery disease with prior CABG/MVR as well as aortic stenosis status post TAVR July 2021. She has hypertension, COPD on home oxygen, and chronic kidney disease and congestive heart failure.    She presented with shortness of breath and was found to have CHF exacerbation.  She was seen by cardiology as well as nephrology and given IV diuretics which was gradually transitioned to oral diuretics.  She had slightly elevated procalcitonin so giving 5 days of p.o. doxycycline for an element of bacterial bronchitis.    She is stable to be discharged to rehab facility today.  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  She is not a candidate for intervention on this.  She is DNR/DNI and does have a overall poor prognosis going forward with multiple comorbidities.    DIAGNOSTICS    Procedure Component Value Units Date/Time    XR Chest 1 View [[id number]] Sree as Reviewed   Order Status: Completed Collected: 11/07/22 1207    Updated: 11/07/22 1211   Narrative:     XR CHEST 1 VW-       Clinical: CHF       COMPARISON 11/1/2022       FINDINGS: There is cardiac enlargement with vascular congestion. The   degree of vascular congestion appears diminished within the interim. No   effusion or acute airspace disease or other interval change  has   developed.       CONCLUSION: Slightly diminished vascular congestion since 11/1/2022.       This report was finalized on 11/7/2022 12:08 PM by Dr. Fabian Solis M.D.        XR Chest 2 View [[id number]] Sree as Reviewed   Order Status: Completed Collected: 11/01/22 1825    Updated: 11/01/22 1830   Narrative:     XR CHEST 2 VW-       HISTORY: Female who is 76 years-old,  short of breath       TECHNIQUE: Frontal and lateral views of the chest       COMPARISON: 10/09/2022       FINDINGS: The heart is enlarged. Aorta is calcified. Sternotomy wires,   cardiac valve markers are present. Mild prominence of vascular and   interstitial markings. No focal pulmonary consolidation, pleural   effusion, or pneumothorax. No acute osseous process.       Impression:     No focal pulmonary consolidation. Cardiomegaly. Mild   prominence of vascular and interstitial markings.        11/08/2022 0428 11/08/2022 0552 Renal Function Panel [[id number]]    (Abnormal)   Blood    Final result Component Value Units   Glucose 144 High  mg/dL   BUN 89 High  mg/dL   Creatinine 2.89 High  mg/dL   Sodium 135 Low  mmol/L   Potassium 4.0 mmol/L   Chloride 93 Low  mmol/L   CO2 28.8 mmol/L   Calcium 8.4 Low  mg/dL   Albumin 2.60 Low  g/dL   Phosphorus 4.0 mg/dL   Anion Gap 13.2 mmol/L   BUN/Creatinine Ratio 30.8 High     eGFR 16.3 Low   mL/min/1.73          11/08/2022 0428 11/08/2022 0552 Uric Acid [[id number]]   (Abnormal)   Blood    Final result Component Value Units   Uric Acid 11.8 High  mg/dL          11/08/2022 0428 11/08/2022 0552 Magnesium [[id number]]   Blood    Final result Component Value Units   Magnesium 2.0 mg/dL          11/08/2022 0428 11/08/2022 0531 CBC Auto Differential [[id number]]   (Abnormal)   Blood    Final result Component Value Units   WBC 9.98 10*3/mm3   RBC 3.16 Low  10*6/mm3   Hemoglobin 8.4 Low  g/dL   Hematocrit 26.1 Low  %   MCV 82.6 fL   MCH 26.6 pg   MCHC 32.2 g/dL   RDW 15.5 High  %   RDW-SD 46.4 fl   MPV 10.9 fL    Platelets 143 10*3/mm3   Neutrophil % 83.0 High  %   Lymphocyte % 7.8 Low  %   Monocyte % 5.9 %   Eosinophil % 1.2 %   Basophil % 0.4 %   Immature Grans % 1.7 High  %   Neutrophils, Absolute 8.28 High  10*3/mm3          PHYSICAL EXAM  Objective    Alert  Chronically ill  +LE edema  Decreased bs at bases    CONDITION ON DISCHARGE  Fair      DISCHARGE DISPOSITION   Skilled Nursing Facility (DC - External)      DISCHARGE MEDICATIONS       Your medication list      START taking these medications      Instructions Last Dose Given Next Dose Due   benzonatate 100 MG capsule  Commonly known as: TESSALON      Take 1 capsule by mouth 3 (Three) Times a Day for 5 days.       dextromethorphan polistirex ER 30 MG/5ML Suspension Extended Release oral suspension  Commonly known as: DELSYM      Take 10 mL by mouth Every 12 (Twelve) Hours for 5 days.       doxycycline 100 MG capsule  Commonly known as: MONODOX      Take 1 capsule by mouth Every 12 (Twelve) Hours for 7 doses. Indications: Pneumonia       ferrous sulfate 325 (65 FE) MG tablet  Start taking on: November 9, 2022      Take 1 tablet by mouth Daily With Breakfast.       guaiFENesin 600 MG 12 hr tablet  Commonly known as: MUCINEX      Take 2 tablets by mouth Every 12 (Twelve) Hours.       insulin lispro 100 UNIT/ML injection  Commonly known as: ADMELOG      Inject 0-14 Units under the skin into the appropriate area as directed 3 (Three) Times a Day Before Meals.       melatonin 3 MG tablet      Take 1 tablet by mouth At Night As Needed for Sleep.          CHANGE how you take these medications      Instructions Last Dose Given Next Dose Due   ALPRAZolam 1 MG tablet  Commonly known as: XANAX  What changed:   · when to take this  · reasons to take this      Take 1 tablet by mouth 2 (Two) Times a Day As Needed for Anxiety.       fluticasone 50 MCG/ACT nasal spray  Commonly known as: FLONASE  What changed:   · when to take this  · reasons to take this      2 sprays by Each Nare  route Daily.       levothyroxine 25 MCG tablet  Commonly known as: SYNTHROID, LEVOTHROID  What changed:   · how to take this  · when to take this      TAKE 2 TABLETS BY MOUTH ON MONDAY, WEDNESDAY AND FRIDAY AND TAKE ONE TABLET ON TUESDAY, THURSDAY, SATURDAY AND SUNDAY       nitroglycerin 0.4 MG SL tablet  Commonly known as: NITROSTAT  What changed:   · when to take this  · reasons to take this  · additional instructions      DISSOLVE 1 TAB UNDER TONGUE FOR CHEST PAIN - IF PAIN REMAINS AFTER 5 MIN, CALL 911 AND REPEAT DOSE. MAX 3 TABS IN 15 MINUTES          CONTINUE taking these medications      Instructions Last Dose Given Next Dose Due   acetaminophen 325 MG tablet  Commonly known as: TYLENOL      Take 2 tablets by mouth Every 4 (Four) Hours As Needed for Mild Pain .       allopurinol 100 MG tablet  Commonly known as: ZYLOPRIM      Take 1 tablet by mouth Daily.       atorvastatin 40 MG tablet  Commonly known as: LIPITOR      TAKE ONE TABLET BY MOUTH DAILY       carvedilol 12.5 MG tablet  Commonly known as: COREG      Take 1 tablet by mouth 2 (Two) Times a Day With Meals.       Fluticasone-Salmeterol 250-50 MCG/ACT DISKUS  Commonly known as: ADVAIR/WIXELA      Inhale 1 puff 2 (Two) Times a Day.       gabapentin 100 MG capsule  Commonly known as: NEURONTIN      Take 1 capsule by mouth Every 12 (Twelve) Hours.       lidocaine 5 %  Commonly known as: LIDODERM      Place 1 patch on the skin as directed by provider Daily. Remove & Discard patch within 12 hours or as directed by MD       omeprazole 40 MG capsule  Commonly known as: priLOSEC      Take 1 capsule by mouth Daily.       polyethylene glycol 17 g packet  Commonly known as: MIRALAX      Take 17 g by mouth Daily.       potassium chloride 20 MEQ CR tablet  Commonly known as: K-DUR,KLOR-CON      Take 1 tablet by mouth Every Other Day.       Risaquad-2 capsule capsule      Take 1 capsule by mouth Daily.       sennosides-docusate 8.6-50 MG per tablet  Commonly known  as: PERICOLACE      Take 1 tablet by mouth Every Other Day.       sodium chloride 0.65 % nasal spray      2 sprays into the nostril(s) as directed by provider As Needed for Congestion.       torsemide 100 MG tablet  Commonly known as: DEMADEX      Take 1 tablet by mouth Daily.       Trulicity 1.5 MG/0.5ML solution pen-injector  Generic drug: Dulaglutide      INJECT 1.5 MG UNDER THE SKIN ONCE WEEKLY       vilazodone 20 MG tablet tablet  Commonly known as: Viibryd      Take 1 tablet by mouth Every Night.          STOP taking these medications    spironolactone 25 MG tablet  Commonly known as: ALDACTONE              Where to Get Your Medications      [place] [address] [address] - [phone] [facility] [phone]   [address] [address] [place] [address]    Phone: [phone]   · ALPRAZolam 1 MG tablet  · dextromethorphan polistirex ER 30 MG/5ML Suspension Extended Release oral suspension  · doxycycline 100 MG capsule  · gabapentin 100 MG capsule     Information about where to get these medications is not yet available    Ask your nurse or doctor about these medications  · benzonatate 100 MG capsule  · ferrous sulfate 325 (65 FE) MG tablet  · guaiFENesin 600 MG 12 hr tablet  · insulin lispro 100 UNIT/ML injection  · melatonin 3 MG tablet       Diet Instructions     Diet: Specialty Diet; Thin Liquids, No Restrictions; Low Sodium      Discharge Diet: Specialty Diet    Fluid Consistency: Thin Liquids, No Restrictions    Specialty Diets: Low Sodium         Future Appointments   Date Time Provider Department Center   11/8/2022 11:00 AM EMS MED 1 [facility] EMS S [facility]   11/9/2022 12:00 PM Arcelia Talbot APRN [facility] E[facility]PT [facility]   1/6/2023  7:30 PM [facility] SLEEP ROOMS [facility] SLEEP [facility]     Additional Instructions for the Follow-ups that You Need to Schedule     Discharge Follow-up with Specialty: Cardiology; 1 Week   As directed      Specialty: Cardiology     Follow Up: 1 Week         Discharge Follow-up with Specified Provider: PCP after dc from rehab, Nephrology in 2-3 weeks; 1 Week   As directed      To: PCP after dc from rehab, Nephrology in 2-3 weeks    Follow Up: 1 Week            Contact information for follow-up providers     Chace Johnson MD .    Specialty: Cardiology  Contact information:  [address]  [facility] [id number] [phone]             Arcelia Talbot, APRN .    Specialty: Nurse Practitioner  Contact information:  [address]  [address] [id number] [phone]                   Contact information for after-discharge care     Destination     [facility] .    Service: Skilled Nursing  Contact information:  [address]  [facility] [address] [phone]                             TEST  RESULTS PENDING AT DISCHARGE         Sukhwinder Mauro MD  [facility]  11/08/22  09:56 EST      Time: greater than 32 minutes on discharge  It was a pleasure taking care of this patient while in the hospital.

## 2022-11-14 NOTE — PROGRESS NOTES
"Enter Query Response Below      Query Response:     Due to both hypertension and valvular heart disease         If applicable, please update the problem list.       Patient: Miguelina Lopez        : 1945  Account: [id number]           Admit Date:         How to Respond to this query:       a. Click New Note     b. Answer query within the yellow box.                c. Update the Problem List, if applicable.      If you have any questions about this query contact me at:  [email or web address]     TALITA Raya APRROCKY,     The patient has Acute on chronic systolic and diastolic heart failure with a history of hypertension and valvular heart disease.  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  She is not a candidate for intervention on this.\"  Treatment has included diuresis.  The Discharge Medication list includes coreg and torsemide.    Please clarify the underlying etiology of the patient’s heart failure.    Due to hypertension  Due to valvular heart disease  Due to both hypertension and valvular heart disease  Other ______________________  Unable to further clarify    By submitting this query, we are merely seeking further clarification of documentation to accurately reflect all conditions that you are monitoring, evaluating, treating or that extend the hospitalization or utilize additional resources of care. Please utilize your independent clinical judgment when addressing the question(s) above.     This query and your response, once completed, will be entered into the legal medical record.    Sincerely,  Sandra LOMELI, RN, CDIS  Clinical Documentation Integrity Program   [email or web address]  "

## 2022-11-17 NOTE — TELEPHONE ENCOUNTER
Rx Refill Note  Requested Prescriptions     Pending Prescriptions Disp Refills   • levothyroxine (SYNTHROID, LEVOTHROID) 25 MCG tablet [Pharmacy Med Name: LEVOTHYROXINE 25 MCG TABLET] 120 tablet 1     Sig: TAKE TWO TABLETS BY MOUTH DAILY ON MONDAY, WEDNESDAY, AND FRIDAY THEN TAKE TAKE ONE TABLET BY MOUTH DAILY ON TUESDAY, THURSDAY, SATURDAY AND SUNDAY      Last office visit with prescribing clinician: 5/4/2022      Next office visit with prescribing clinician: Visit date not found            Lisbeth Leyva MA  11/17/22, 15:43 EST

## 2023-09-18 NOTE — NURSING NOTE
Pt HR dropping to low 30s, 28 at times. Pt easy to arouse. Vss. Asymptomatic. This has been happening for the past few nights. Will continue to monitor.  
Female

## 2024-06-12 NOTE — PROGRESS NOTES
Miguelina Lopez is a 72 y.o. female.      Assessment/Plan   Problem List Items Addressed This Visit        Cardiovascular and Mediastinum    Hyperlipidemia    Essential hypertension    Overview     Impression: 03/30/2015 - BP is high will add BB;          Relevant Orders    Comprehensive Metabolic Panel    Atrial fibrillation - Primary       Endocrine    Diabetic peripheral neuropathy    DM type 2 (diabetes mellitus, type 2)    Overview     Impression: 03/30/2015 - A1c is 7.5 , Pt to check BS BID add, Amryl 1 mg once to twice daily;          Relevant Orders    Microalbumin / Creatinine Urine Ratio - Urine, Clean Catch    Hemoglobin A1c       Other    Class 3 obesity with serious comorbidity in adult           Follow-up results of blood her primary management of chronic medical problems otherwise follow up as needed or in 6 months      Chief Complaint   Patient presents with   • follow up to hypertension   • follow up to diabetes   • kknoton lower right side (on back)     Social History   Substance Use Topics   • Smoking status: Never Smoker   • Smokeless tobacco: Never Used   • Alcohol use No       History of Present Illness   Follow-up chronic problems with hypertension diabetes and obesity she's developed a knot on her right lateral waistline she is nontender; its been there is no groin or not is no reading quality and seems to be persistent chest and tried to treat it with anything specifically  The following portions of the patient's history were reviewed and updated as appropriate:PMHroutine: Social history , Past Medical History, Allergies, Current Medications, Active Problem List, Family History and Health Maintenance    Review of Systems   Constitutional: Negative for activity change, diaphoresis, fatigue, fever and unexpected weight change.   HENT: Negative.  Negative for trouble swallowing.    Eyes: Negative.  Negative for photophobia.   Respiratory: Negative for cough and shortness of breath.   "  Cardiovascular: Negative for chest pain and leg swelling.   Gastrointestinal: Negative.    Endocrine: Negative.    Genitourinary: Negative.    Musculoskeletal: Positive for gait problem. Negative for neck pain.   Neurological: Negative for numbness and headaches.   Hematological: Negative.    Psychiatric/Behavioral: Positive for agitation and sleep disturbance. The patient is nervous/anxious.        Objective   Vitals:    02/22/18 1453   BP: 120/72   BP Location: Left arm   Patient Position: Sitting   Cuff Size: Large Adult   Pulse: 102   Temp: 98.5 °F (36.9 °C)   TempSrc: Oral   SpO2: 97%   Weight: 105 kg (231 lb 8 oz)   Height: 152.4 cm (60\")     Body mass index is 45.21 kg/(m^2).  Physical Exam   Constitutional: She is oriented to person, place, and time. She appears well-developed and well-nourished. No distress.   HENT:   Head: Normocephalic and atraumatic.   Eyes: Conjunctivae and EOM are normal. Pupils are equal, round, and reactive to light. Right eye exhibits no discharge. Left eye exhibits no discharge. No scleral icterus.   Neck: Normal range of motion. Neck supple. No tracheal deviation present. No thyromegaly present.   Cardiovascular: Normal rate, regular rhythm, normal heart sounds, intact distal pulses and normal pulses.  Exam reveals no gallop.    No murmur heard.  Pulmonary/Chest: Effort normal and breath sounds normal. No respiratory distress. She has no wheezes. She has no rales.   Abdominal: Soft. Bowel sounds are normal. She exhibits no distension. There is no tenderness.   Musculoskeletal: Normal range of motion.   Uses cane   Neurological: She is alert and oriented to person, place, and time. She exhibits normal muscle tone. Coordination normal.   Skin: Skin is warm. No rash noted. No erythema. No pallor.   Lateral waist reveals a 3 x 5 oh forte well-circumscribed lesion subcutaneous consistent with lipoma   Psychiatric: She has a normal mood and affect. Her behavior is normal. Judgment and " thought content normal.   Nursing note and vitals reviewed.    Reviewed Data:  No visits with results within 1 Month(s) from this visit.  Latest known visit with results is:    Office Visit on 08/01/2017   Component Date Value Ref Range Status   • Hemoglobin A1C 08/01/2017 8.09* 4.80 - 5.60 % Final    Comment: Hemoglobin A1C Ranges:  Increased Risk for Diabetes  5.7% to 6.4%  Diabetes                     >= 6.5%  Diabetic Goal                < 7.0%     • WBC 08/01/2017 11.39* 4.50 - 10.70 10*3/mm3 Final   • RBC 08/01/2017 4.42  3.90 - 5.20 10*6/mm3 Final   • Hemoglobin 08/01/2017 10.8* 11.9 - 15.5 g/dL Final   • Hematocrit 08/01/2017 35.9  35.6 - 45.5 % Final   • MCV 08/01/2017 81.2  80.5 - 98.2 fL Final   • MCH 08/01/2017 24.4* 26.9 - 32.0 pg Final   • MCHC 08/01/2017 30.1* 32.4 - 36.3 g/dL Final   • RDW 08/01/2017 16.8* 11.7 - 13.0 % Final   • Platelets 08/01/2017 356  140 - 500 10*3/mm3 Final   • Neutrophil Rel % 08/01/2017 71.1  42.7 - 76.0 % Final   • Lymphocyte Rel % 08/01/2017 21.5  19.6 - 45.3 % Final   • Monocyte Rel % 08/01/2017 5.2  5.0 - 12.0 % Final   • Eosinophil Rel % 08/01/2017 1.2  0.3 - 6.2 % Final   • Basophil Rel % 08/01/2017 0.6  0.0 - 1.5 % Final   • Neutrophils Absolute 08/01/2017 8.10  1.90 - 8.10 10*3/mm3 Final   • Lymphocytes Absolute 08/01/2017 2.45  0.90 - 4.80 10*3/mm3 Final   • Monocytes Absolute 08/01/2017 0.59  0.20 - 1.20 10*3/mm3 Final   • Eosinophils Absolute 08/01/2017 0.14  0.00 - 0.70 10*3/mm3 Final   • Basophils Absolute 08/01/2017 0.07  0.00 - 0.20 10*3/mm3 Final   • Immature Granulocyte Rel % 08/01/2017 0.4  0.0 - 0.5 % Final   • Immature Grans Absolute 08/01/2017 0.04* 0.00 - 0.03 10*3/mm3 Final   • nRBC 08/01/2017 0.0  0.0 - 0.0 /100 WBC Final   • Total Cholesterol 08/01/2017 200  0 - 200 mg/dL Final   • Triglycerides 08/01/2017 235* 0 - 150 mg/dL Final   • HDL Cholesterol 08/01/2017 53  40 - 60 mg/dL Final   • VLDL Cholesterol 08/01/2017 47* 5 - 40 mg/dL Final   • LDL  Cholesterol  08/01/2017 100  0 - 100 mg/dL Final   • Glucose 08/01/2017 201* 65 - 99 mg/dL Final   • BUN 08/01/2017 35* 8 - 23 mg/dL Final   • Creatinine 08/01/2017 1.43* 0.57 - 1.00 mg/dL Final   • eGFR Non African Am 08/01/2017 36* >60 mL/min/1.73 Final    Comment: The MDRD GFR formula is only valid for adults with stable  renal function between ages 18 and 70.     • eGFR  Am 08/01/2017 44* >60 mL/min/1.73 Final   • BUN/Creatinine Ratio 08/01/2017 24.5  7.0 - 25.0 Final   • Sodium 08/01/2017 142  136 - 145 mmol/L Final   • Potassium 08/01/2017 4.4  3.5 - 5.2 mmol/L Final   • Chloride 08/01/2017 96* 98 - 107 mmol/L Final   • Total CO2 08/01/2017 26.5  22.0 - 29.0 mmol/L Final   • Calcium 08/01/2017 9.5  8.6 - 10.5 mg/dL Final   • Total Protein 08/01/2017 7.4  6.0 - 8.5 g/dL Final   • Albumin 08/01/2017 4.20  3.50 - 5.20 g/dL Final   • Globulin 08/01/2017 3.2  gm/dL Final   • A/G Ratio 08/01/2017 1.3  g/dL Final   • Total Bilirubin 08/01/2017 0.2  0.1 - 1.2 mg/dL Final   • Alkaline Phosphatase 08/01/2017 120* 39 - 117 U/L Final   • AST (SGOT) 08/01/2017 13  1 - 32 U/L Final   • ALT (SGPT) 08/01/2017 11  1 - 33 U/L Final          Opt out

## 2025-04-01 NOTE — PLAN OF CARE
Goal Outcome Evaluation:  Plan of Care Reviewed With: patient     Outcome Summary: Pt is a 74 yo F who was admitted with chest pain, SOA, and CHF. Pt presents to PT with impaired functional mobility and gait secondary to generalized weakness, pain, impaired balance, and decreased activity tolerance. Pt may benefit from skilled PT to address strength, mobility, and gait.Patient was not wearing a face mask during this therapy encounter. Therapist used appropriate personal protective equipment including eye protection, mask, and gloves.  Mask used was standard procedure mask. Appropriate PPE was worn during the entire therapy session. Hand hygiene was completed before and after therapy session. Patient is not in enhanced droplet precautions.     Monitored/stable

## 2025-06-08 NOTE — PLAN OF CARE
Goal Outcome Evaluation  Diuretic in Use: pt started on demadex today   Response to Diuretics (Output greater than intake): moderate amount of urine  Daily Weight (up or down): no weight change from yesterday  O2 Requirements: o2 remains at 2L/NC  Functional Status (Activity level, tolerance and respiratory symptoms): SOB with  minimal exertion-worked with PT-  Discharge Plans:  hoping to go to Rehab at discharge  Safety maintained this shift                  None

## (undated) DEVICE — DIL VESL 16F .038 20CM

## (undated) DEVICE — INTRO SHEATH ART/FEM ENGAGE .035 5F12CM

## (undated) DEVICE — LN SMPL CO2 SHTRM SD STREAM W/M LUER

## (undated) DEVICE — CATH DIAG IMPULSE AL1 6F 100CM

## (undated) DEVICE — FRCP BX RADJAW4 NDL 2.8 240CM LG OG BX40

## (undated) DEVICE — DRSNG SURESITE WNDW 2.38X2.75

## (undated) DEVICE — TAVR: Brand: MEDLINE INDUSTRIES, INC.

## (undated) DEVICE — CATH DIAG IMPULSE PIG 5F 100CM

## (undated) DEVICE — SENSR CERBRL O2 PK/2

## (undated) DEVICE — CANN O2 ETCO2 FITS ALL CONN CO2 SMPL A/ 7IN DISP LF

## (undated) DEVICE — PINNACLE INTRODUCER SHEATH: Brand: PINNACLE

## (undated) DEVICE — PK CATH CARD 40

## (undated) DEVICE — CVR PROB 96IN LF STRL

## (undated) DEVICE — STPCK 1WY STD/PRESS SWIVELNUT

## (undated) DEVICE — SHEATH INTRO MICRA HC 23F 55.7CM

## (undated) DEVICE — SOL NACL 0.9PCT 1000ML

## (undated) DEVICE — CATH F4 INF AL II 100CM: Brand: INFINITI

## (undated) DEVICE — SKIN PREP TRAY W/CHG: Brand: MEDLINE INDUSTRIES, INC.

## (undated) DEVICE — TRANSD PRESS STOPCOCK1WAY CABL48IN

## (undated) DEVICE — 3M™ BAIR HUGGER® UNDERBODY BLANKET, FULL ACCESS, 10 PER CASE 63500: Brand: BAIR HUGGER™

## (undated) DEVICE — Device

## (undated) DEVICE — GW INQWIRE FC PTFE STR .035IN 150

## (undated) DEVICE — RADIFOCUS GLIDEWIRE: Brand: GLIDEWIRE

## (undated) DEVICE — HI-TORQUE SUPRA CORE .035 PERIPHERAL GUIDE WIRE .035 X 190 CM: Brand: HI-TORQUE SUPRA CORE

## (undated) DEVICE — KT ORCA ORCAPOD DISP STRL

## (undated) DEVICE — GW XCHG AMPLTZ XSTIF PTFE CRV .035 3X260

## (undated) DEVICE — MSK PROC CURAPLEX O2 2/ADAPT 7FT

## (undated) DEVICE — TUBING, SUCTION, 1/4" X 10', STRAIGHT: Brand: MEDLINE

## (undated) DEVICE — PERCLOSE PROGLIDE™ SUTURE-MEDIATED CLOSURE SYSTEM: Brand: PERCLOSE PROGLIDE™

## (undated) DEVICE — TBG INJ CONTRL PVCCLR RIGD RA 1200PSI 10

## (undated) DEVICE — SNAR POLYP CAPTIVATOR RND STFF 2.4 240CM 10MM 1P/U

## (undated) DEVICE — KT MANIFLD CARDIAC

## (undated) DEVICE — CATH DIAG CARD PERFORMA JL3.5 BT 4F100CM

## (undated) DEVICE — SOL IRR H2O BTL 1000ML STRL

## (undated) DEVICE — CVR TABL BACK STND

## (undated) DEVICE — CATH DIAG SITESEER 4FAL I

## (undated) DEVICE — SUT SILK 0 CT1 CR8 18IN C021D

## (undated) DEVICE — SOL ISO/ALC RUB 70PCT 4OZ

## (undated) DEVICE — MSK ENDO PORT O2 POM ELITE CURAPLEX A/

## (undated) DEVICE — GW EMR FIX EXCHG J STD .035 3MM 260CM

## (undated) DEVICE — RADIFOCUS TORQUE DEVICE MULTI-TORQUE VISE: Brand: RADIFOCUS TORQUE DEVICE

## (undated) DEVICE — CATH VENT MIV RADL PIG ST TIP 4F 110CM

## (undated) DEVICE — ADAPT CLN BIOGUARD AIR/H2O DISP

## (undated) DEVICE — SUT SILK 2 SUTUPAK TIE 60IN SA8H 2STRAND

## (undated) DEVICE — DECANT BG O JET

## (undated) DEVICE — ZINC CALCIUM ALGINATE DRESSING: Brand: KENDALL

## (undated) DEVICE — TBG ART PRESS 24 IN

## (undated) DEVICE — GLV SURG BIOGEL LTX PF 8

## (undated) DEVICE — BITEBLOCK OMNI BLOC

## (undated) DEVICE — DIL VESL 14F.038 20CM

## (undated) DEVICE — BIOPATCH™ ANTIMICROBIAL DRESSING WITH CHLORHEXIDINE GLUCONATE IS A HYDROPHILLIC POLYURETHANE ABSORPTIVE FOAM WITH CHLORHEXIDINE GLUCONATE (CHG) WHICH INHIBITS BACTERIAL GROWTH UNDER THE DRESSING. THE DRESSING IS INTENDED TO BE USED TO ABSORB EXUDATE, COVER A WOUND CAUSED BY VASCULAR AND NONVASCULAR PERCUTANEOUS MEDICAL DEVICES DURING SURGERY, AS WELL AS REDUCE LOCAL INFECTION AND COLONIZATION OF MICROORGANISMS.: Brand: BIOPATCH

## (undated) DEVICE — THE SINGLE USE ETRAP – POLYP TRAP IS USED FOR SUCTION RETRIEVAL OF ENDOSCOPICALLY REMOVED POLYPS.: Brand: ETRAP

## (undated) DEVICE — GLIDESHEATH BASIC HYDROPHILIC COATED INTRODUCER SHEATH: Brand: GLIDESHEATH

## (undated) DEVICE — CATH DIAG IMPULSE FR4 5F 100CM

## (undated) DEVICE — FR5 INFINITI MULTIPAC: Brand: INFINITI

## (undated) DEVICE — CATH DIAG CARD PERFORM JR4.0 BT 4F100CM

## (undated) DEVICE — DIL VESL STD .038 10F 20CM

## (undated) DEVICE — CATH DIAG IMPULSE FR4 6F 100CM

## (undated) DEVICE — TBG PRESS EXCITE INJ HPF1200 CLR 100IN

## (undated) DEVICE — ANGIO-SEAL VIP VASCULAR CLOSURE DEVICE: Brand: ANGIO-SEAL

## (undated) DEVICE — TRY INTRO PERC 6F

## (undated) DEVICE — SENSR O2 OXIMAX FNGR A/ 18IN NONSTR

## (undated) DEVICE — CATH DIAG IMPULSE FL3.5 5F 100CM

## (undated) DEVICE — TOWEL,OR,DSP,ST,BLUE,STD,4/PK,20PK/CS: Brand: MEDLINE

## (undated) DEVICE — INTRO SHEATH ART/FEM ENGAGE .035 6F12CM

## (undated) DEVICE — SPK CONTRST MISER

## (undated) DEVICE — SUTURE REMOVAL TRAY: Brand: MEDLINE INDUSTRIES, INC.

## (undated) DEVICE — BALN PRESS WEDGE 5F 110CM

## (undated) DEVICE — TR BAND RADIAL ARTERY COMPRESSION DEVICE: Brand: TR BAND

## (undated) DEVICE — INTRO SHEATH ART/FEM ENGAGE .035 8F12CM

## (undated) DEVICE — GW AMPLTZ SUPERSTIFF SHT/TPR STR .035IN 260CM

## (undated) DEVICE — CATH ELECTRD PACE TEMP BI NONHEP 5F110CM

## (undated) DEVICE — SUT MNCRYL 4/0 PS2 18 IN

## (undated) DEVICE — HI-TORQUE SUPRA CORE .035 PERIPHERAL GUIDE WIRE .035 X 300 CM: Brand: HI-TORQUE SUPRA CORE